# Patient Record
Sex: MALE | Race: WHITE | Employment: OTHER | URBAN - METROPOLITAN AREA
[De-identification: names, ages, dates, MRNs, and addresses within clinical notes are randomized per-mention and may not be internally consistent; named-entity substitution may affect disease eponyms.]

---

## 2017-01-03 ENCOUNTER — NURSE ONLY (OUTPATIENT)
Dept: CARDIOLOGY CLINIC | Age: 52
End: 2017-01-03

## 2017-01-03 DIAGNOSIS — I42.6 ALCOHOLIC CARDIOMYOPATHY (HCC): ICD-10-CM

## 2017-01-03 DIAGNOSIS — Z95.810 AUTOMATIC IMPLANTABLE CARDIOVERTER-DEFIBRILLATOR IN SITU: Primary | ICD-10-CM

## 2017-01-03 PROCEDURE — 93295 DEV INTERROG REMOTE 1/2/MLT: CPT | Performed by: INTERNAL MEDICINE

## 2017-01-03 PROCEDURE — 93296 REM INTERROG EVL PM/IDS: CPT | Performed by: INTERNAL MEDICINE

## 2017-01-05 DIAGNOSIS — E03.9 HYPOTHYROIDISM, UNSPECIFIED TYPE: ICD-10-CM

## 2017-01-05 DIAGNOSIS — E11.42 TYPE 2 DIABETES MELLITUS WITH PERIPHERAL NEUROPATHY (HCC): ICD-10-CM

## 2017-01-05 DIAGNOSIS — Z23 NEED FOR PNEUMOCOCCAL VACCINATION: ICD-10-CM

## 2017-01-05 DIAGNOSIS — E78.5 HYPERLIPIDEMIA, UNSPECIFIED HYPERLIPIDEMIA TYPE: ICD-10-CM

## 2017-01-05 RX ORDER — PEN NEEDLE, DIABETIC 31 GX3/16"
NEEDLE, DISPOSABLE MISCELLANEOUS
Qty: 100 EACH | Refills: 3 | Status: SHIPPED | OUTPATIENT
Start: 2017-01-05 | End: 2017-04-21 | Stop reason: SDUPTHER

## 2017-01-11 ENCOUNTER — TELEPHONE (OUTPATIENT)
Dept: FAMILY MEDICINE CLINIC | Age: 52
End: 2017-01-11

## 2017-01-11 DIAGNOSIS — E11.42 TYPE 2 DIABETES MELLITUS WITH PERIPHERAL NEUROPATHY (HCC): ICD-10-CM

## 2017-01-11 DIAGNOSIS — Z23 NEED FOR PNEUMOCOCCAL VACCINATION: ICD-10-CM

## 2017-01-11 DIAGNOSIS — E03.9 HYPOTHYROIDISM, UNSPECIFIED TYPE: ICD-10-CM

## 2017-01-11 DIAGNOSIS — E78.5 HYPERLIPIDEMIA, UNSPECIFIED HYPERLIPIDEMIA TYPE: ICD-10-CM

## 2017-01-11 RX ORDER — GABAPENTIN 300 MG/1
300 CAPSULE ORAL 3 TIMES DAILY
Qty: 90 CAPSULE | Refills: 3 | Status: SHIPPED | OUTPATIENT
Start: 2017-01-11 | End: 2017-01-17 | Stop reason: SDUPTHER

## 2017-01-17 ENCOUNTER — OFFICE VISIT (OUTPATIENT)
Dept: FAMILY MEDICINE CLINIC | Age: 52
End: 2017-01-17

## 2017-01-17 VITALS
OXYGEN SATURATION: 97 % | DIASTOLIC BLOOD PRESSURE: 80 MMHG | HEIGHT: 70 IN | SYSTOLIC BLOOD PRESSURE: 124 MMHG | TEMPERATURE: 97.6 F | HEART RATE: 84 BPM | BODY MASS INDEX: 32.21 KG/M2 | WEIGHT: 225 LBS

## 2017-01-17 DIAGNOSIS — E78.5 HYPERLIPIDEMIA, UNSPECIFIED HYPERLIPIDEMIA TYPE: ICD-10-CM

## 2017-01-17 DIAGNOSIS — E03.9 HYPOTHYROIDISM, UNSPECIFIED TYPE: ICD-10-CM

## 2017-01-17 DIAGNOSIS — E11.42 TYPE 2 DIABETES MELLITUS WITH PERIPHERAL NEUROPATHY (HCC): Primary | ICD-10-CM

## 2017-01-17 DIAGNOSIS — Z23 NEED FOR INFLUENZA VACCINATION: ICD-10-CM

## 2017-01-17 DIAGNOSIS — I10 ESSENTIAL HYPERTENSION: ICD-10-CM

## 2017-01-17 DIAGNOSIS — Z23 NEED FOR PNEUMOCOCCAL VACCINATION: ICD-10-CM

## 2017-01-17 PROCEDURE — 90630 INFLUENZA, QUADRIVALENT, INTRADERMAL, PF (FLUZONE QUADRIVALENT PF ID): CPT | Performed by: FAMILY MEDICINE

## 2017-01-17 PROCEDURE — 36415 COLL VENOUS BLD VENIPUNCTURE: CPT | Performed by: FAMILY MEDICINE

## 2017-01-17 PROCEDURE — 99214 OFFICE O/P EST MOD 30 MIN: CPT | Performed by: FAMILY MEDICINE

## 2017-01-17 PROCEDURE — 90471 IMMUNIZATION ADMIN: CPT | Performed by: FAMILY MEDICINE

## 2017-01-17 RX ORDER — LEVOTHYROXINE SODIUM 125 MCG
125 TABLET ORAL DAILY
Qty: 30 TABLET | Refills: 3 | Status: SHIPPED | OUTPATIENT
Start: 2017-01-17 | End: 2017-10-25

## 2017-01-17 RX ORDER — GABAPENTIN 300 MG/1
300 CAPSULE ORAL 3 TIMES DAILY
Qty: 90 CAPSULE | Refills: 6 | Status: SHIPPED | OUTPATIENT
Start: 2017-01-17 | End: 2017-09-06 | Stop reason: SDUPTHER

## 2017-01-17 RX ORDER — GLIPIZIDE 5 MG/1
TABLET ORAL
Qty: 60 TABLET | Refills: 6 | Status: SHIPPED | OUTPATIENT
Start: 2017-01-17 | End: 2017-08-16 | Stop reason: SDUPTHER

## 2017-01-17 RX ORDER — CARVEDILOL 12.5 MG/1
TABLET ORAL
Qty: 60 TABLET | Refills: 6 | Status: SHIPPED | OUTPATIENT
Start: 2017-01-17 | End: 2017-09-06 | Stop reason: SDUPTHER

## 2017-01-17 RX ORDER — TRAMADOL HYDROCHLORIDE 50 MG/1
TABLET ORAL
Qty: 30 TABLET | Refills: 2 | Status: SHIPPED | OUTPATIENT
Start: 2017-01-17 | End: 2017-04-21 | Stop reason: SDUPTHER

## 2017-01-17 ASSESSMENT — ENCOUNTER SYMPTOMS
ABDOMINAL DISTENTION: 1
COUGH: 0
EYE DISCHARGE: 0
CONSTIPATION: 0
DIARRHEA: 0
ABDOMINAL PAIN: 0
CHEST TIGHTNESS: 0
SHORTNESS OF BREATH: 0
NAUSEA: 1
ANAL BLEEDING: 0
BLOOD IN STOOL: 0

## 2017-01-18 LAB
A/G RATIO: 1.7 (ref 1.1–2.2)
ALBUMIN SERPL-MCNC: 4.5 G/DL (ref 3.4–5)
ALP BLD-CCNC: 68 U/L (ref 40–129)
ALT SERPL-CCNC: 19 U/L (ref 10–40)
ANION GAP SERPL CALCULATED.3IONS-SCNC: 18 MMOL/L (ref 3–16)
AST SERPL-CCNC: 15 U/L (ref 15–37)
BILIRUB SERPL-MCNC: 0.7 MG/DL (ref 0–1)
BUN BLDV-MCNC: 33 MG/DL (ref 7–20)
CALCIUM SERPL-MCNC: 10.1 MG/DL (ref 8.3–10.6)
CHLORIDE BLD-SCNC: 94 MMOL/L (ref 99–110)
CHOLESTEROL, TOTAL: 144 MG/DL (ref 0–199)
CO2: 26 MMOL/L (ref 21–32)
CREAT SERPL-MCNC: 1.1 MG/DL (ref 0.9–1.3)
ESTIMATED AVERAGE GLUCOSE: 177.2 MG/DL
GFR AFRICAN AMERICAN: >60
GFR NON-AFRICAN AMERICAN: >60
GLOBULIN: 2.7 G/DL
GLUCOSE BLD-MCNC: 175 MG/DL (ref 70–99)
HBA1C MFR BLD: 7.8 %
HDLC SERPL-MCNC: 41 MG/DL (ref 40–60)
LDL CHOLESTEROL CALCULATED: 51 MG/DL
POTASSIUM SERPL-SCNC: 5.7 MMOL/L (ref 3.5–5.1)
SODIUM BLD-SCNC: 138 MMOL/L (ref 136–145)
TOTAL PROTEIN: 7.2 G/DL (ref 6.4–8.2)
TRIGL SERPL-MCNC: 259 MG/DL (ref 0–150)
TSH SERPL DL<=0.05 MIU/L-ACNC: 11.14 UIU/ML (ref 0.27–4.2)
VLDLC SERPL CALC-MCNC: 52 MG/DL

## 2017-01-20 ENCOUNTER — HOSPITAL ENCOUNTER (OUTPATIENT)
Dept: WOUND CARE | Age: 52
Discharge: HOME | End: 2017-01-20
Attending: INTERNAL MEDICINE | Admitting: INTERNAL MEDICINE

## 2017-01-20 ENCOUNTER — NURSE ONLY (OUTPATIENT)
Dept: FAMILY MEDICINE CLINIC | Age: 52
End: 2017-01-20

## 2017-01-20 ENCOUNTER — TELEPHONE (OUTPATIENT)
Dept: FAMILY MEDICINE CLINIC | Age: 52
End: 2017-01-20

## 2017-01-20 VITALS
RESPIRATION RATE: 16 BRPM | HEIGHT: 70 IN | TEMPERATURE: 97.4 F | WEIGHT: 229.6 LBS | HEART RATE: 85 BPM | SYSTOLIC BLOOD PRESSURE: 133 MMHG | DIASTOLIC BLOOD PRESSURE: 79 MMHG | BODY MASS INDEX: 32.87 KG/M2

## 2017-01-20 DIAGNOSIS — E11.621 DIABETIC ULCER OF LEFT FOOT, LIMITED TO BREAKDOWN OF SKIN (HCC): ICD-10-CM

## 2017-01-20 DIAGNOSIS — E87.5 SERUM POTASSIUM ELEVATED: Primary | ICD-10-CM

## 2017-01-20 DIAGNOSIS — L97.521 DIABETIC ULCER OF LEFT FOOT, LIMITED TO BREAKDOWN OF SKIN (HCC): ICD-10-CM

## 2017-01-20 LAB
ANION GAP SERPL CALCULATED.3IONS-SCNC: 14 MMOL/L (ref 3–16)
BUN BLDV-MCNC: 27 MG/DL (ref 7–20)
CALCIUM SERPL-MCNC: 9.4 MG/DL (ref 8.3–10.6)
CHLORIDE BLD-SCNC: 98 MMOL/L (ref 99–110)
CO2: 26 MMOL/L (ref 21–32)
CREAT SERPL-MCNC: 1 MG/DL (ref 0.9–1.3)
GFR AFRICAN AMERICAN: >60
GFR NON-AFRICAN AMERICAN: >60
GLUCOSE BLD-MCNC: 211 MG/DL (ref 70–99)
POTASSIUM SERPL-SCNC: 5.1 MMOL/L (ref 3.5–5.1)
SODIUM BLD-SCNC: 138 MMOL/L (ref 136–145)

## 2017-01-20 PROCEDURE — 36415 COLL VENOUS BLD VENIPUNCTURE: CPT | Performed by: FAMILY MEDICINE

## 2017-01-20 PROCEDURE — 97597 DBRDMT OPN WND 1ST 20 CM/<: CPT | Performed by: INTERNAL MEDICINE

## 2017-01-20 RX ORDER — ALOGLIPTIN 25 MG/1
25 TABLET, FILM COATED ORAL DAILY
Qty: 90 TABLET | Refills: 1 | Status: SHIPPED | OUTPATIENT
Start: 2017-01-20 | End: 2017-07-18 | Stop reason: SDUPTHER

## 2017-01-20 ASSESSMENT — PAIN SCALES - GENERAL: PAINLEVEL_OUTOF10: 6

## 2017-01-20 ASSESSMENT — PAIN DESCRIPTION - PAIN TYPE: TYPE: ACUTE PAIN

## 2017-01-20 ASSESSMENT — PAIN DESCRIPTION - ORIENTATION: ORIENTATION: LEFT

## 2017-01-20 ASSESSMENT — PAIN DESCRIPTION - ONSET: ONSET: ON-GOING

## 2017-01-20 ASSESSMENT — PAIN DESCRIPTION - PROGRESSION: CLINICAL_PROGRESSION: NOT CHANGED

## 2017-01-20 ASSESSMENT — PAIN DESCRIPTION - FREQUENCY: FREQUENCY: INTERMITTENT

## 2017-01-20 ASSESSMENT — PAIN DESCRIPTION - DESCRIPTORS: DESCRIPTORS: SORE;TENDER

## 2017-01-22 PROBLEM — L97.521 DIABETIC ULCER OF LEFT FOOT, LIMITED TO BREAKDOWN OF SKIN (HCC): Status: ACTIVE | Noted: 2017-01-22

## 2017-01-22 PROBLEM — E11.621 DIABETIC ULCER OF LEFT FOOT, LIMITED TO BREAKDOWN OF SKIN (HCC): Status: ACTIVE | Noted: 2017-01-22

## 2017-01-27 ENCOUNTER — HOSPITAL ENCOUNTER (OUTPATIENT)
Dept: WOUND CARE | Age: 52
Discharge: HOME | End: 2017-01-27
Attending: INTERNAL MEDICINE | Admitting: INTERNAL MEDICINE

## 2017-01-27 VITALS
HEART RATE: 89 BPM | HEIGHT: 70 IN | SYSTOLIC BLOOD PRESSURE: 130 MMHG | RESPIRATION RATE: 18 BRPM | BODY MASS INDEX: 32.7 KG/M2 | TEMPERATURE: 97.3 F | WEIGHT: 228.4 LBS | DIASTOLIC BLOOD PRESSURE: 76 MMHG

## 2017-01-27 DIAGNOSIS — E11.621: ICD-10-CM

## 2017-01-27 DIAGNOSIS — L97.511: ICD-10-CM

## 2017-01-27 PROCEDURE — 97597 DBRDMT OPN WND 1ST 20 CM/<: CPT | Performed by: INTERNAL MEDICINE

## 2017-01-29 PROBLEM — L97.511 DIABETIC ULCER OF RIGHT FOOT, LIMITED TO BREAKDOWN OF SKIN (HCC): Status: ACTIVE | Noted: 2017-01-29

## 2017-01-29 PROBLEM — E11.621 DIABETIC ULCER OF RIGHT FOOT, LIMITED TO BREAKDOWN OF SKIN (HCC): Status: ACTIVE | Noted: 2017-01-29

## 2017-02-03 ENCOUNTER — HOSPITAL ENCOUNTER (OUTPATIENT)
Dept: WOUND CARE | Age: 52
Discharge: HOME | End: 2017-02-03
Attending: INTERNAL MEDICINE | Admitting: INTERNAL MEDICINE

## 2017-02-03 VITALS
WEIGHT: 228.4 LBS | HEART RATE: 83 BPM | RESPIRATION RATE: 16 BRPM | TEMPERATURE: 97 F | HEIGHT: 70 IN | BODY MASS INDEX: 32.7 KG/M2 | DIASTOLIC BLOOD PRESSURE: 75 MMHG | SYSTOLIC BLOOD PRESSURE: 125 MMHG

## 2017-02-03 PROCEDURE — 97597 DBRDMT OPN WND 1ST 20 CM/<: CPT | Performed by: INTERNAL MEDICINE

## 2017-02-03 ASSESSMENT — PAIN SCALES - GENERAL: PAINLEVEL_OUTOF10: 0

## 2017-02-10 ENCOUNTER — HOSPITAL ENCOUNTER (OUTPATIENT)
Dept: WOUND CARE | Age: 52
Discharge: HOME | End: 2017-02-10
Attending: INTERNAL MEDICINE | Admitting: INTERNAL MEDICINE

## 2017-02-10 VITALS
RESPIRATION RATE: 16 BRPM | SYSTOLIC BLOOD PRESSURE: 118 MMHG | HEIGHT: 70 IN | BODY MASS INDEX: 32.64 KG/M2 | HEART RATE: 88 BPM | TEMPERATURE: 97.2 F | DIASTOLIC BLOOD PRESSURE: 79 MMHG | WEIGHT: 228 LBS

## 2017-02-10 PROCEDURE — 97597 DBRDMT OPN WND 1ST 20 CM/<: CPT | Performed by: INTERNAL MEDICINE

## 2017-02-17 ENCOUNTER — HOSPITAL ENCOUNTER (OUTPATIENT)
Dept: WOUND CARE | Age: 52
Discharge: HOME | End: 2017-02-17
Attending: INTERNAL MEDICINE | Admitting: INTERNAL MEDICINE

## 2017-02-17 VITALS
DIASTOLIC BLOOD PRESSURE: 84 MMHG | HEART RATE: 88 BPM | BODY MASS INDEX: 33.13 KG/M2 | SYSTOLIC BLOOD PRESSURE: 156 MMHG | HEIGHT: 70 IN | TEMPERATURE: 97.6 F | RESPIRATION RATE: 16 BRPM | WEIGHT: 231.4 LBS

## 2017-02-17 PROCEDURE — 97597 DBRDMT OPN WND 1ST 20 CM/<: CPT | Performed by: INTERNAL MEDICINE

## 2017-02-17 PROCEDURE — 11042 DBRDMT SUBQ TIS 1ST 20SQCM/<: CPT | Performed by: INTERNAL MEDICINE

## 2017-02-22 PROBLEM — L97.522 DIABETIC ULCER OF LEFT FOOT WITH FAT LAYER EXPOSED (HCC): Status: ACTIVE | Noted: 2017-01-22

## 2017-02-24 ENCOUNTER — HOSPITAL ENCOUNTER (OUTPATIENT)
Dept: WOUND CARE | Age: 52
Discharge: HOME | End: 2017-02-24
Attending: INTERNAL MEDICINE | Admitting: INTERNAL MEDICINE

## 2017-02-24 VITALS
HEIGHT: 70 IN | SYSTOLIC BLOOD PRESSURE: 135 MMHG | WEIGHT: 230.6 LBS | TEMPERATURE: 97.9 F | RESPIRATION RATE: 16 BRPM | HEART RATE: 90 BPM | DIASTOLIC BLOOD PRESSURE: 80 MMHG | BODY MASS INDEX: 33.01 KG/M2

## 2017-02-24 PROCEDURE — 97597 DBRDMT OPN WND 1ST 20 CM/<: CPT | Performed by: INTERNAL MEDICINE

## 2017-02-24 ASSESSMENT — PAIN SCALES - GENERAL: PAINLEVEL_OUTOF10: 0

## 2017-02-27 DIAGNOSIS — E78.5 HYPERLIPIDEMIA, UNSPECIFIED HYPERLIPIDEMIA TYPE: ICD-10-CM

## 2017-02-27 DIAGNOSIS — Z23 NEED FOR PNEUMOCOCCAL VACCINATION: ICD-10-CM

## 2017-02-27 DIAGNOSIS — E03.9 HYPOTHYROIDISM, UNSPECIFIED TYPE: ICD-10-CM

## 2017-02-27 DIAGNOSIS — E11.42 TYPE 2 DIABETES MELLITUS WITH PERIPHERAL NEUROPATHY (HCC): ICD-10-CM

## 2017-03-03 ENCOUNTER — HOSPITAL ENCOUNTER (OUTPATIENT)
Dept: WOUND CARE | Age: 52
Discharge: HOME | End: 2017-03-03
Attending: INTERNAL MEDICINE | Admitting: INTERNAL MEDICINE

## 2017-03-10 ENCOUNTER — HOSPITAL ENCOUNTER (OUTPATIENT)
Dept: WOUND CARE | Age: 52
Discharge: HOME | End: 2017-03-10
Attending: INTERNAL MEDICINE | Admitting: INTERNAL MEDICINE

## 2017-03-10 VITALS
BODY MASS INDEX: 32.93 KG/M2 | HEIGHT: 70 IN | TEMPERATURE: 97.3 F | DIASTOLIC BLOOD PRESSURE: 75 MMHG | RESPIRATION RATE: 18 BRPM | HEART RATE: 83 BPM | WEIGHT: 230 LBS | SYSTOLIC BLOOD PRESSURE: 137 MMHG

## 2017-03-10 PROCEDURE — 15275 SKIN SUB GRAFT FACE/NK/HF/G: CPT | Performed by: INTERNAL MEDICINE

## 2017-03-17 ENCOUNTER — HOSPITAL ENCOUNTER (OUTPATIENT)
Dept: WOUND CARE | Age: 52
Discharge: HOME | End: 2017-03-17
Attending: INTERNAL MEDICINE | Admitting: INTERNAL MEDICINE

## 2017-03-17 VITALS
DIASTOLIC BLOOD PRESSURE: 91 MMHG | SYSTOLIC BLOOD PRESSURE: 147 MMHG | WEIGHT: 227 LBS | TEMPERATURE: 97.2 F | HEART RATE: 89 BPM | RESPIRATION RATE: 17 BRPM | BODY MASS INDEX: 32.5 KG/M2 | HEIGHT: 70 IN

## 2017-03-17 PROCEDURE — 15275 SKIN SUB GRAFT FACE/NK/HF/G: CPT | Performed by: INTERNAL MEDICINE

## 2017-03-17 ASSESSMENT — PAIN SCALES - GENERAL: PAINLEVEL_OUTOF10: 0

## 2017-03-24 ENCOUNTER — HOSPITAL ENCOUNTER (OUTPATIENT)
Dept: WOUND CARE | Age: 52
Discharge: HOME | End: 2017-03-24
Attending: INTERNAL MEDICINE | Admitting: INTERNAL MEDICINE

## 2017-03-24 VITALS
BODY MASS INDEX: 32.5 KG/M2 | TEMPERATURE: 98 F | DIASTOLIC BLOOD PRESSURE: 78 MMHG | HEIGHT: 70 IN | WEIGHT: 227 LBS | HEART RATE: 102 BPM | SYSTOLIC BLOOD PRESSURE: 114 MMHG | RESPIRATION RATE: 18 BRPM

## 2017-03-24 PROCEDURE — 15275 SKIN SUB GRAFT FACE/NK/HF/G: CPT | Performed by: INTERNAL MEDICINE

## 2017-03-24 ASSESSMENT — PAIN SCALES - GENERAL: PAINLEVEL_OUTOF10: 0

## 2017-03-28 ENCOUNTER — NURSE ONLY (OUTPATIENT)
Dept: CARDIOLOGY CLINIC | Age: 52
End: 2017-03-28

## 2017-03-28 DIAGNOSIS — Z95.810 AUTOMATIC IMPLANTABLE CARDIOVERTER-DEFIBRILLATOR IN SITU: Primary | ICD-10-CM

## 2017-03-28 DIAGNOSIS — I42.6 ALCOHOLIC CARDIOMYOPATHY (HCC): ICD-10-CM

## 2017-03-31 ENCOUNTER — HOSPITAL ENCOUNTER (OUTPATIENT)
Dept: WOUND CARE | Age: 52
Discharge: HOME | End: 2017-03-31
Attending: CLINICAL NURSE SPECIALIST | Admitting: CLINICAL NURSE SPECIALIST

## 2017-03-31 VITALS
WEIGHT: 228 LBS | HEIGHT: 70 IN | TEMPERATURE: 98 F | RESPIRATION RATE: 18 BRPM | SYSTOLIC BLOOD PRESSURE: 145 MMHG | HEART RATE: 93 BPM | BODY MASS INDEX: 32.64 KG/M2 | DIASTOLIC BLOOD PRESSURE: 85 MMHG

## 2017-03-31 PROCEDURE — 15271 SKIN SUB GRAFT TRNK/ARM/LEG: CPT | Performed by: CLINICAL NURSE SPECIALIST

## 2017-04-07 ENCOUNTER — HOSPITAL ENCOUNTER (OUTPATIENT)
Dept: WOUND CARE | Age: 52
Discharge: HOME | End: 2017-04-07
Attending: INTERNAL MEDICINE | Admitting: INTERNAL MEDICINE

## 2017-04-07 VITALS
HEART RATE: 91 BPM | HEIGHT: 70 IN | RESPIRATION RATE: 18 BRPM | TEMPERATURE: 97.4 F | DIASTOLIC BLOOD PRESSURE: 82 MMHG | WEIGHT: 225 LBS | SYSTOLIC BLOOD PRESSURE: 132 MMHG | BODY MASS INDEX: 32.21 KG/M2

## 2017-04-07 PROCEDURE — 15275 SKIN SUB GRAFT FACE/NK/HF/G: CPT | Performed by: INTERNAL MEDICINE

## 2017-04-07 ASSESSMENT — PAIN SCALES - GENERAL: PAINLEVEL_OUTOF10: 0

## 2017-04-12 PROBLEM — L97.521 DIABETIC ULCER OF LEFT FOOT, LIMITED TO BREAKDOWN OF SKIN (HCC): Status: RESOLVED | Noted: 2017-01-22 | Resolved: 2017-04-12

## 2017-04-12 PROBLEM — E11.621 DIABETIC ULCER OF LEFT FOOT, LIMITED TO BREAKDOWN OF SKIN (HCC): Status: RESOLVED | Noted: 2017-01-22 | Resolved: 2017-04-12

## 2017-04-14 ENCOUNTER — HOSPITAL ENCOUNTER (OUTPATIENT)
Dept: WOUND CARE | Age: 52
Discharge: HOME | End: 2017-04-14
Attending: INTERNAL MEDICINE | Admitting: INTERNAL MEDICINE

## 2017-04-14 VITALS
WEIGHT: 224 LBS | DIASTOLIC BLOOD PRESSURE: 76 MMHG | RESPIRATION RATE: 17 BRPM | HEIGHT: 70 IN | BODY MASS INDEX: 32.07 KG/M2 | TEMPERATURE: 97 F | HEART RATE: 90 BPM | SYSTOLIC BLOOD PRESSURE: 121 MMHG

## 2017-04-14 PROCEDURE — 15275 SKIN SUB GRAFT FACE/NK/HF/G: CPT | Performed by: INTERNAL MEDICINE

## 2017-04-14 ASSESSMENT — PAIN SCALES - GENERAL: PAINLEVEL_OUTOF10: 0

## 2017-04-18 ENCOUNTER — OFFICE VISIT (OUTPATIENT)
Dept: CARDIOLOGY CLINIC | Age: 52
End: 2017-04-18

## 2017-04-18 ENCOUNTER — OFFICE VISIT (OUTPATIENT)
Dept: FAMILY MEDICINE CLINIC | Age: 52
End: 2017-04-18

## 2017-04-18 VITALS
OXYGEN SATURATION: 99 % | DIASTOLIC BLOOD PRESSURE: 82 MMHG | WEIGHT: 227.4 LBS | HEIGHT: 70 IN | HEART RATE: 87 BPM | BODY MASS INDEX: 32.56 KG/M2 | SYSTOLIC BLOOD PRESSURE: 136 MMHG

## 2017-04-18 DIAGNOSIS — E78.2 MIXED HYPERLIPIDEMIA: ICD-10-CM

## 2017-04-18 DIAGNOSIS — I10 ESSENTIAL HYPERTENSION: ICD-10-CM

## 2017-04-18 DIAGNOSIS — E03.9 HYPOTHYROIDISM, UNSPECIFIED TYPE: Primary | ICD-10-CM

## 2017-04-18 DIAGNOSIS — E03.9 ACQUIRED HYPOTHYROIDISM: ICD-10-CM

## 2017-04-18 DIAGNOSIS — E11.42 TYPE 2 DIABETES MELLITUS WITH PERIPHERAL NEUROPATHY (HCC): ICD-10-CM

## 2017-04-18 DIAGNOSIS — I42.6 ALCOHOLIC CARDIOMYOPATHY (HCC): Primary | ICD-10-CM

## 2017-04-18 DIAGNOSIS — Z95.810 AUTOMATIC IMPLANTABLE CARDIOVERTER-DEFIBRILLATOR IN SITU: ICD-10-CM

## 2017-04-18 LAB
A/G RATIO: 1.9 (ref 1.1–2.2)
ALBUMIN SERPL-MCNC: 4.6 G/DL (ref 3.4–5)
ALP BLD-CCNC: 65 U/L (ref 40–129)
ALT SERPL-CCNC: 19 U/L (ref 10–40)
ANION GAP SERPL CALCULATED.3IONS-SCNC: 18 MMOL/L (ref 3–16)
AST SERPL-CCNC: 16 U/L (ref 15–37)
BILIRUB SERPL-MCNC: 0.5 MG/DL (ref 0–1)
BUN BLDV-MCNC: 16 MG/DL (ref 7–20)
CALCIUM SERPL-MCNC: 9.6 MG/DL (ref 8.3–10.6)
CHLORIDE BLD-SCNC: 98 MMOL/L (ref 99–110)
CO2: 23 MMOL/L (ref 21–32)
CREAT SERPL-MCNC: 0.7 MG/DL (ref 0.9–1.3)
GFR AFRICAN AMERICAN: >60
GFR NON-AFRICAN AMERICAN: >60
GLOBULIN: 2.4 G/DL
GLUCOSE BLD-MCNC: 176 MG/DL (ref 70–99)
POTASSIUM SERPL-SCNC: 5 MMOL/L (ref 3.5–5.1)
SODIUM BLD-SCNC: 139 MMOL/L (ref 136–145)
TOTAL PROTEIN: 7 G/DL (ref 6.4–8.2)
TSH SERPL DL<=0.05 MIU/L-ACNC: 4.89 UIU/ML (ref 0.27–4.2)

## 2017-04-18 PROCEDURE — 99213 OFFICE O/P EST LOW 20 MIN: CPT | Performed by: FAMILY MEDICINE

## 2017-04-18 PROCEDURE — 99214 OFFICE O/P EST MOD 30 MIN: CPT | Performed by: INTERNAL MEDICINE

## 2017-04-18 PROCEDURE — 36415 COLL VENOUS BLD VENIPUNCTURE: CPT | Performed by: FAMILY MEDICINE

## 2017-04-18 ASSESSMENT — ENCOUNTER SYMPTOMS
SHORTNESS OF BREATH: 0
EYE PAIN: 1
ANAL BLEEDING: 0
NAUSEA: 0
EYE DISCHARGE: 0
ABDOMINAL DISTENTION: 1
VOMITING: 0
CHEST TIGHTNESS: 0
COUGH: 0
BLOOD IN STOOL: 0
ABDOMINAL PAIN: 0
CONSTIPATION: 0
DIARRHEA: 0

## 2017-04-19 ENCOUNTER — TELEPHONE (OUTPATIENT)
Dept: FAMILY MEDICINE CLINIC | Age: 52
End: 2017-04-19

## 2017-04-19 VITALS
DIASTOLIC BLOOD PRESSURE: 84 MMHG | SYSTOLIC BLOOD PRESSURE: 130 MMHG | HEIGHT: 70 IN | HEART RATE: 84 BPM | OXYGEN SATURATION: 97 % | WEIGHT: 227 LBS | BODY MASS INDEX: 32.5 KG/M2

## 2017-04-19 LAB
ESTIMATED AVERAGE GLUCOSE: 165.7 MG/DL
HBA1C MFR BLD: 7.4 %

## 2017-04-21 ENCOUNTER — HOSPITAL ENCOUNTER (OUTPATIENT)
Dept: WOUND CARE | Age: 52
Discharge: HOME | End: 2017-04-21
Attending: INTERNAL MEDICINE | Admitting: INTERNAL MEDICINE

## 2017-04-21 VITALS
HEIGHT: 70 IN | BODY MASS INDEX: 32.47 KG/M2 | TEMPERATURE: 97.3 F | SYSTOLIC BLOOD PRESSURE: 120 MMHG | DIASTOLIC BLOOD PRESSURE: 74 MMHG | RESPIRATION RATE: 16 BRPM | HEART RATE: 81 BPM | WEIGHT: 226.8 LBS

## 2017-04-21 DIAGNOSIS — L97.522 DIABETIC ULCER OF TOE OF LEFT FOOT WITH FAT LAYER EXPOSED (HCC): ICD-10-CM

## 2017-04-21 DIAGNOSIS — E78.5 HYPERLIPIDEMIA, UNSPECIFIED HYPERLIPIDEMIA TYPE: ICD-10-CM

## 2017-04-21 DIAGNOSIS — E11.621 DIABETIC ULCER OF TOE OF LEFT FOOT WITH FAT LAYER EXPOSED (HCC): ICD-10-CM

## 2017-04-21 DIAGNOSIS — Z23 NEED FOR PNEUMOCOCCAL VACCINATION: ICD-10-CM

## 2017-04-21 DIAGNOSIS — E03.9 HYPOTHYROIDISM, UNSPECIFIED TYPE: ICD-10-CM

## 2017-04-21 DIAGNOSIS — E11.42 TYPE 2 DIABETES MELLITUS WITH PERIPHERAL NEUROPATHY (HCC): ICD-10-CM

## 2017-04-21 PROCEDURE — 15275 SKIN SUB GRAFT FACE/NK/HF/G: CPT | Performed by: INTERNAL MEDICINE

## 2017-04-21 PROCEDURE — 97597 DBRDMT OPN WND 1ST 20 CM/<: CPT | Performed by: INTERNAL MEDICINE

## 2017-04-21 PROCEDURE — 11055 PARING/CUTG B9 HYPRKER LES 1: CPT | Performed by: INTERNAL MEDICINE

## 2017-04-21 RX ORDER — TRAMADOL HYDROCHLORIDE 50 MG/1
TABLET ORAL
Qty: 30 TABLET | Refills: 3 | Status: SHIPPED | OUTPATIENT
Start: 2017-04-21 | End: 2017-09-06 | Stop reason: SDUPTHER

## 2017-04-21 RX ORDER — PEN NEEDLE, DIABETIC 31 GX3/16"
NEEDLE, DISPOSABLE MISCELLANEOUS
Qty: 100 EACH | Refills: 5 | Status: SHIPPED | OUTPATIENT
Start: 2017-04-21 | End: 2018-01-03 | Stop reason: SDUPTHER

## 2017-04-21 RX ORDER — SPIRONOLACTONE 25 MG/1
TABLET ORAL
Qty: 180 TABLET | Refills: 3 | Status: SHIPPED | OUTPATIENT
Start: 2017-04-21 | End: 2017-08-16 | Stop reason: SDUPTHER

## 2017-04-27 PROBLEM — L97.522 DIABETIC ULCER OF TOE OF LEFT FOOT WITH FAT LAYER EXPOSED (HCC): Status: ACTIVE | Noted: 2017-04-27

## 2017-04-27 PROBLEM — E11.621 DIABETIC ULCER OF TOE OF LEFT FOOT WITH FAT LAYER EXPOSED (HCC): Status: ACTIVE | Noted: 2017-04-27

## 2017-04-28 ENCOUNTER — HOSPITAL ENCOUNTER (OUTPATIENT)
Dept: WOUND CARE | Age: 52
Discharge: HOME | End: 2017-04-28
Attending: INTERNAL MEDICINE | Admitting: INTERNAL MEDICINE

## 2017-04-28 VITALS
BODY MASS INDEX: 32.23 KG/M2 | DIASTOLIC BLOOD PRESSURE: 79 MMHG | HEART RATE: 85 BPM | SYSTOLIC BLOOD PRESSURE: 125 MMHG | RESPIRATION RATE: 18 BRPM | TEMPERATURE: 97.3 F | WEIGHT: 224.6 LBS

## 2017-04-28 PROCEDURE — 11042 DBRDMT SUBQ TIS 1ST 20SQCM/<: CPT | Performed by: INTERNAL MEDICINE

## 2017-04-28 PROCEDURE — 97597 DBRDMT OPN WND 1ST 20 CM/<: CPT | Performed by: INTERNAL MEDICINE

## 2017-05-05 ENCOUNTER — HOSPITAL ENCOUNTER (OUTPATIENT)
Dept: WOUND CARE | Age: 52
Discharge: HOME | End: 2017-05-05
Attending: INTERNAL MEDICINE | Admitting: INTERNAL MEDICINE

## 2017-05-05 VITALS
BODY MASS INDEX: 32.4 KG/M2 | SYSTOLIC BLOOD PRESSURE: 121 MMHG | TEMPERATURE: 97.9 F | WEIGHT: 225.8 LBS | RESPIRATION RATE: 20 BRPM | HEART RATE: 87 BPM | DIASTOLIC BLOOD PRESSURE: 77 MMHG

## 2017-05-05 DIAGNOSIS — E11.621 DIABETIC ULCER OF TOE OF LEFT FOOT WITH FAT LAYER EXPOSED (HCC): ICD-10-CM

## 2017-05-05 DIAGNOSIS — L97.522 DIABETIC ULCER OF TOE OF LEFT FOOT WITH FAT LAYER EXPOSED (HCC): ICD-10-CM

## 2017-05-05 DIAGNOSIS — E11.42 TYPE 2 DIABETES MELLITUS WITH PERIPHERAL NEUROPATHY (HCC): Primary | ICD-10-CM

## 2017-05-05 PROCEDURE — 11055 PARING/CUTG B9 HYPRKER LES 1: CPT | Performed by: INTERNAL MEDICINE

## 2017-05-05 PROCEDURE — 11042 DBRDMT SUBQ TIS 1ST 20SQCM/<: CPT | Performed by: INTERNAL MEDICINE

## 2017-05-05 PROCEDURE — 15275 SKIN SUB GRAFT FACE/NK/HF/G: CPT | Performed by: INTERNAL MEDICINE

## 2017-05-12 ENCOUNTER — HOSPITAL ENCOUNTER (OUTPATIENT)
Dept: WOUND CARE | Age: 52
Discharge: HOME | End: 2017-05-12
Attending: INTERNAL MEDICINE | Admitting: INTERNAL MEDICINE

## 2017-05-12 VITALS
HEIGHT: 70 IN | TEMPERATURE: 97.5 F | WEIGHT: 224 LBS | SYSTOLIC BLOOD PRESSURE: 139 MMHG | DIASTOLIC BLOOD PRESSURE: 79 MMHG | HEART RATE: 80 BPM | RESPIRATION RATE: 18 BRPM | BODY MASS INDEX: 32.07 KG/M2

## 2017-05-12 PROCEDURE — 15275 SKIN SUB GRAFT FACE/NK/HF/G: CPT | Performed by: INTERNAL MEDICINE

## 2017-05-12 PROCEDURE — 11042 DBRDMT SUBQ TIS 1ST 20SQCM/<: CPT | Performed by: INTERNAL MEDICINE

## 2017-05-12 ASSESSMENT — PAIN SCALES - GENERAL: PAINLEVEL_OUTOF10: 0

## 2017-05-19 ENCOUNTER — HOSPITAL ENCOUNTER (OUTPATIENT)
Dept: WOUND CARE | Age: 52
Discharge: HOME | End: 2017-05-19
Attending: INTERNAL MEDICINE | Admitting: INTERNAL MEDICINE

## 2017-05-19 VITALS
TEMPERATURE: 97.6 F | WEIGHT: 225 LBS | HEART RATE: 83 BPM | HEIGHT: 70 IN | BODY MASS INDEX: 32.21 KG/M2 | DIASTOLIC BLOOD PRESSURE: 77 MMHG | SYSTOLIC BLOOD PRESSURE: 123 MMHG | RESPIRATION RATE: 16 BRPM

## 2017-05-19 PROCEDURE — 15275 SKIN SUB GRAFT FACE/NK/HF/G: CPT | Performed by: INTERNAL MEDICINE

## 2017-05-19 PROCEDURE — 11042 DBRDMT SUBQ TIS 1ST 20SQCM/<: CPT | Performed by: INTERNAL MEDICINE

## 2017-05-20 ENCOUNTER — HOSPITAL ENCOUNTER (OUTPATIENT)
Dept: OTHER | Age: 52
Discharge: HOME | End: 2017-05-20
Attending: INTERNAL MEDICINE | Admitting: INTERNAL MEDICINE

## 2017-05-20 DIAGNOSIS — L97.522 DIABETIC ULCER OF TOE OF LEFT FOOT WITH FAT LAYER EXPOSED (HCC): ICD-10-CM

## 2017-05-20 DIAGNOSIS — E11.42 TYPE 2 DIABETES MELLITUS WITH PERIPHERAL NEUROPATHY (HCC): ICD-10-CM

## 2017-05-20 DIAGNOSIS — E11.621 DIABETIC ULCER OF TOE OF LEFT FOOT WITH FAT LAYER EXPOSED (HCC): ICD-10-CM

## 2017-05-25 PROBLEM — L97.523 DIABETIC ULCER OF TOE OF LEFT FOOT WITH NECROSIS OF MUSCLE (HCC): Status: ACTIVE | Noted: 2017-04-27

## 2017-05-26 ENCOUNTER — HOSPITAL ENCOUNTER (OUTPATIENT)
Dept: WOUND CARE | Age: 52
Discharge: HOME | End: 2017-05-26
Attending: INTERNAL MEDICINE | Admitting: INTERNAL MEDICINE

## 2017-05-26 VITALS
HEART RATE: 84 BPM | WEIGHT: 226.8 LBS | SYSTOLIC BLOOD PRESSURE: 120 MMHG | DIASTOLIC BLOOD PRESSURE: 77 MMHG | RESPIRATION RATE: 16 BRPM | HEIGHT: 70 IN | BODY MASS INDEX: 32.47 KG/M2 | TEMPERATURE: 97.4 F

## 2017-05-26 PROCEDURE — 97597 DBRDMT OPN WND 1ST 20 CM/<: CPT | Performed by: INTERNAL MEDICINE

## 2017-05-26 ASSESSMENT — PAIN SCALES - GENERAL: PAINLEVEL_OUTOF10: 0

## 2017-06-02 ENCOUNTER — HOSPITAL ENCOUNTER (OUTPATIENT)
Dept: WOUND CARE | Age: 52
Discharge: HOME | End: 2017-06-02
Attending: INTERNAL MEDICINE | Admitting: INTERNAL MEDICINE

## 2017-06-02 VITALS
HEIGHT: 70 IN | BODY MASS INDEX: 32.07 KG/M2 | DIASTOLIC BLOOD PRESSURE: 79 MMHG | HEART RATE: 78 BPM | SYSTOLIC BLOOD PRESSURE: 132 MMHG | WEIGHT: 224 LBS | TEMPERATURE: 98.1 F | RESPIRATION RATE: 16 BRPM

## 2017-06-02 PROCEDURE — 97597 DBRDMT OPN WND 1ST 20 CM/<: CPT | Performed by: INTERNAL MEDICINE

## 2017-06-09 ENCOUNTER — HOSPITAL ENCOUNTER (OUTPATIENT)
Dept: WOUND CARE | Age: 52
Discharge: HOME | End: 2017-06-09
Attending: INTERNAL MEDICINE | Admitting: INTERNAL MEDICINE

## 2017-06-09 VITALS
DIASTOLIC BLOOD PRESSURE: 86 MMHG | RESPIRATION RATE: 18 BRPM | HEIGHT: 70 IN | HEART RATE: 91 BPM | BODY MASS INDEX: 32.27 KG/M2 | SYSTOLIC BLOOD PRESSURE: 142 MMHG | TEMPERATURE: 97.4 F | WEIGHT: 225.4 LBS

## 2017-06-09 PROCEDURE — 97597 DBRDMT OPN WND 1ST 20 CM/<: CPT | Performed by: INTERNAL MEDICINE

## 2017-06-09 ASSESSMENT — PAIN SCALES - GENERAL: PAINLEVEL_OUTOF10: 0

## 2017-06-16 ENCOUNTER — HOSPITAL ENCOUNTER (OUTPATIENT)
Dept: WOUND CARE | Age: 52
Discharge: HOME | End: 2017-06-16
Attending: INTERNAL MEDICINE | Admitting: INTERNAL MEDICINE

## 2017-06-16 VITALS
HEART RATE: 90 BPM | SYSTOLIC BLOOD PRESSURE: 128 MMHG | DIASTOLIC BLOOD PRESSURE: 77 MMHG | HEIGHT: 70 IN | TEMPERATURE: 97.8 F | RESPIRATION RATE: 18 BRPM

## 2017-06-16 PROCEDURE — 97597 DBRDMT OPN WND 1ST 20 CM/<: CPT | Performed by: INTERNAL MEDICINE

## 2017-06-16 ASSESSMENT — PAIN SCALES - GENERAL: PAINLEVEL_OUTOF10: 0

## 2017-06-23 ENCOUNTER — HOSPITAL ENCOUNTER (OUTPATIENT)
Dept: WOUND CARE | Age: 52
Discharge: HOME | End: 2017-06-23
Attending: INTERNAL MEDICINE | Admitting: INTERNAL MEDICINE

## 2017-06-23 VITALS
DIASTOLIC BLOOD PRESSURE: 79 MMHG | BODY MASS INDEX: 31.5 KG/M2 | WEIGHT: 220 LBS | HEIGHT: 70 IN | TEMPERATURE: 97.8 F | SYSTOLIC BLOOD PRESSURE: 121 MMHG | RESPIRATION RATE: 18 BRPM | HEART RATE: 87 BPM

## 2017-06-23 PROCEDURE — 97597 DBRDMT OPN WND 1ST 20 CM/<: CPT | Performed by: INTERNAL MEDICINE

## 2017-06-26 PROCEDURE — 93295 DEV INTERROG REMOTE 1/2/MLT: CPT | Performed by: INTERNAL MEDICINE

## 2017-06-26 PROCEDURE — 93296 REM INTERROG EVL PM/IDS: CPT | Performed by: INTERNAL MEDICINE

## 2017-06-27 ENCOUNTER — NURSE ONLY (OUTPATIENT)
Dept: CARDIOLOGY CLINIC | Age: 52
End: 2017-06-27

## 2017-06-27 DIAGNOSIS — I42.6 ALCOHOLIC CARDIOMYOPATHY (HCC): ICD-10-CM

## 2017-06-27 DIAGNOSIS — Z95.810 AUTOMATIC IMPLANTABLE CARDIOVERTER-DEFIBRILLATOR IN SITU: Primary | ICD-10-CM

## 2017-06-30 ENCOUNTER — HOSPITAL ENCOUNTER (OUTPATIENT)
Dept: WOUND CARE | Age: 52
Discharge: HOME | End: 2017-06-30
Attending: INTERNAL MEDICINE | Admitting: INTERNAL MEDICINE

## 2017-06-30 VITALS
SYSTOLIC BLOOD PRESSURE: 144 MMHG | TEMPERATURE: 97.6 F | HEIGHT: 70 IN | DIASTOLIC BLOOD PRESSURE: 87 MMHG | HEART RATE: 80 BPM | RESPIRATION RATE: 16 BRPM | WEIGHT: 222.4 LBS | BODY MASS INDEX: 31.84 KG/M2

## 2017-06-30 PROCEDURE — 97597 DBRDMT OPN WND 1ST 20 CM/<: CPT | Performed by: INTERNAL MEDICINE

## 2017-06-30 ASSESSMENT — PAIN SCALES - GENERAL: PAINLEVEL_OUTOF10: 0

## 2017-07-07 ENCOUNTER — HOSPITAL ENCOUNTER (OUTPATIENT)
Dept: WOUND CARE | Age: 52
Discharge: HOME | End: 2017-07-07
Attending: INTERNAL MEDICINE | Admitting: INTERNAL MEDICINE

## 2017-07-07 VITALS
RESPIRATION RATE: 18 BRPM | HEIGHT: 70 IN | WEIGHT: 222 LBS | DIASTOLIC BLOOD PRESSURE: 76 MMHG | TEMPERATURE: 97.5 F | BODY MASS INDEX: 31.78 KG/M2 | HEART RATE: 86 BPM | SYSTOLIC BLOOD PRESSURE: 116 MMHG

## 2017-07-07 PROCEDURE — 97597 DBRDMT OPN WND 1ST 20 CM/<: CPT | Performed by: INTERNAL MEDICINE

## 2017-07-07 ASSESSMENT — PAIN SCALES - GENERAL: PAINLEVEL_OUTOF10: 0

## 2017-07-13 ENCOUNTER — HOSPITAL ENCOUNTER (OUTPATIENT)
Dept: WOUND CARE | Age: 52
Discharge: HOME | End: 2017-07-13
Attending: INTERNAL MEDICINE | Admitting: INTERNAL MEDICINE

## 2017-07-13 VITALS
SYSTOLIC BLOOD PRESSURE: 125 MMHG | TEMPERATURE: 98.3 F | HEIGHT: 70 IN | BODY MASS INDEX: 32.21 KG/M2 | RESPIRATION RATE: 18 BRPM | HEART RATE: 88 BPM | WEIGHT: 225 LBS | DIASTOLIC BLOOD PRESSURE: 78 MMHG

## 2017-07-13 DIAGNOSIS — S90.511A ABRASION, RIGHT ANKLE, INITIAL ENCOUNTER: ICD-10-CM

## 2017-07-13 PROCEDURE — 97597 DBRDMT OPN WND 1ST 20 CM/<: CPT | Performed by: INTERNAL MEDICINE

## 2017-07-18 RX ORDER — ALOGLIPTIN 25 MG/1
TABLET, FILM COATED ORAL
Qty: 30 TABLET | Refills: 0 | Status: SHIPPED | OUTPATIENT
Start: 2017-07-18 | End: 2017-08-16 | Stop reason: SDUPTHER

## 2017-07-21 ENCOUNTER — HOSPITAL ENCOUNTER (OUTPATIENT)
Dept: WOUND CARE | Age: 52
Discharge: HOME | End: 2017-07-21
Attending: INTERNAL MEDICINE | Admitting: INTERNAL MEDICINE

## 2017-07-21 VITALS
BODY MASS INDEX: 31.78 KG/M2 | DIASTOLIC BLOOD PRESSURE: 82 MMHG | RESPIRATION RATE: 18 BRPM | SYSTOLIC BLOOD PRESSURE: 139 MMHG | TEMPERATURE: 97.4 F | WEIGHT: 222 LBS | HEART RATE: 87 BPM | HEIGHT: 70 IN

## 2017-07-21 PROBLEM — S90.511A ABRASION, RIGHT ANKLE, INITIAL ENCOUNTER: Status: ACTIVE | Noted: 2017-07-21

## 2017-07-21 PROCEDURE — 97597 DBRDMT OPN WND 1ST 20 CM/<: CPT | Performed by: INTERNAL MEDICINE

## 2017-07-28 ENCOUNTER — HOSPITAL ENCOUNTER (OUTPATIENT)
Dept: WOUND CARE | Age: 52
Discharge: HOME | End: 2017-07-28
Attending: INTERNAL MEDICINE | Admitting: INTERNAL MEDICINE

## 2017-07-28 VITALS
WEIGHT: 221 LBS | HEART RATE: 85 BPM | HEIGHT: 70 IN | DIASTOLIC BLOOD PRESSURE: 84 MMHG | TEMPERATURE: 97.9 F | SYSTOLIC BLOOD PRESSURE: 147 MMHG | RESPIRATION RATE: 18 BRPM | BODY MASS INDEX: 31.64 KG/M2

## 2017-07-28 PROCEDURE — 97597 DBRDMT OPN WND 1ST 20 CM/<: CPT | Performed by: INTERNAL MEDICINE

## 2017-07-28 PROCEDURE — 11043 DBRDMT MUSC&/FSCA 1ST 20/<: CPT | Performed by: INTERNAL MEDICINE

## 2017-08-03 PROBLEM — L97.521 DIABETIC ULCER OF LEFT FOOT ASSOCIATED WITH TYPE 2 DIABETES MELLITUS, LIMITED TO BREAKDOWN OF SKIN (HCC): Status: ACTIVE | Noted: 2017-04-27

## 2017-08-04 ENCOUNTER — HOSPITAL ENCOUNTER (OUTPATIENT)
Dept: WOUND CARE | Age: 52
Discharge: HOME | End: 2017-08-04
Attending: INTERNAL MEDICINE | Admitting: INTERNAL MEDICINE

## 2017-08-04 VITALS
TEMPERATURE: 97.8 F | HEIGHT: 70 IN | HEART RATE: 86 BPM | BODY MASS INDEX: 31.5 KG/M2 | WEIGHT: 220 LBS | SYSTOLIC BLOOD PRESSURE: 142 MMHG | RESPIRATION RATE: 18 BRPM | DIASTOLIC BLOOD PRESSURE: 84 MMHG

## 2017-08-04 PROCEDURE — 29445 APPL RIGID TOT CNTC LEG CAST: CPT | Performed by: INTERNAL MEDICINE

## 2017-08-14 ENCOUNTER — HOSPITAL ENCOUNTER (OUTPATIENT)
Dept: WOUND CARE | Age: 52
Discharge: HOME | End: 2017-08-14
Attending: PODIATRIST | Admitting: PODIATRIST

## 2017-08-14 VITALS
RESPIRATION RATE: 28 BRPM | DIASTOLIC BLOOD PRESSURE: 77 MMHG | WEIGHT: 217.8 LBS | TEMPERATURE: 98.1 F | SYSTOLIC BLOOD PRESSURE: 125 MMHG | HEIGHT: 70 IN | BODY MASS INDEX: 31.18 KG/M2 | HEART RATE: 91 BPM

## 2017-08-14 ASSESSMENT — PAIN SCALES - GENERAL: PAINLEVEL_OUTOF10: 0

## 2017-08-16 DIAGNOSIS — E03.9 HYPOTHYROIDISM, UNSPECIFIED TYPE: ICD-10-CM

## 2017-08-16 DIAGNOSIS — E11.42 TYPE 2 DIABETES MELLITUS WITH PERIPHERAL NEUROPATHY (HCC): ICD-10-CM

## 2017-08-16 DIAGNOSIS — Z23 NEED FOR PNEUMOCOCCAL VACCINATION: ICD-10-CM

## 2017-08-16 DIAGNOSIS — E78.5 HYPERLIPIDEMIA, UNSPECIFIED HYPERLIPIDEMIA TYPE: ICD-10-CM

## 2017-08-16 RX ORDER — TRAMADOL HYDROCHLORIDE 50 MG/1
TABLET ORAL
Qty: 30 TABLET | Refills: 0 | OUTPATIENT
Start: 2017-08-16

## 2017-08-16 RX ORDER — GLIPIZIDE 5 MG/1
TABLET ORAL
Qty: 60 TABLET | Refills: 0 | Status: SHIPPED | OUTPATIENT
Start: 2017-08-16 | End: 2017-09-06 | Stop reason: SDUPTHER

## 2017-08-16 RX ORDER — ALOGLIPTIN 25 MG/1
TABLET, FILM COATED ORAL
Qty: 30 TABLET | Refills: 0 | Status: SHIPPED | OUTPATIENT
Start: 2017-08-16 | End: 2017-09-06 | Stop reason: SDUPTHER

## 2017-08-17 ENCOUNTER — HOSPITAL ENCOUNTER (OUTPATIENT)
Dept: SURGERY | Age: 52
Discharge: HOME | End: 2017-08-17
Attending: PODIATRIST | Admitting: PODIATRIST

## 2017-08-17 VITALS
DIASTOLIC BLOOD PRESSURE: 81 MMHG | WEIGHT: 217 LBS | TEMPERATURE: 97.9 F | SYSTOLIC BLOOD PRESSURE: 118 MMHG | HEART RATE: 75 BPM | OXYGEN SATURATION: 98 % | HEIGHT: 70 IN | BODY MASS INDEX: 31.07 KG/M2 | RESPIRATION RATE: 16 BRPM

## 2017-08-17 LAB
ANION GAP SERPL CALCULATED.3IONS-SCNC: 12 MMOL/L (ref 3–16)
BUN BLDV-MCNC: 19 MG/DL (ref 7–20)
CALCIUM SERPL-MCNC: 8.7 MG/DL (ref 8.3–10.6)
CHLORIDE BLD-SCNC: 97 MMOL/L (ref 99–110)
CO2: 26 MMOL/L (ref 21–32)
CREAT SERPL-MCNC: 1 MG/DL (ref 0.9–1.3)
GFR AFRICAN AMERICAN: >60
GFR NON-AFRICAN AMERICAN: >60
GLUCOSE BLD-MCNC: 326 MG/DL (ref 70–99)
GLUCOSE BLD-MCNC: 365 MG/DL (ref 70–99)
PERFORMED ON: ABNORMAL
POTASSIUM SERPL-SCNC: 4.7 MMOL/L (ref 3.5–5.1)
SODIUM BLD-SCNC: 135 MMOL/L (ref 136–145)

## 2017-08-17 PROCEDURE — 93010 ELECTROCARDIOGRAM REPORT: CPT | Performed by: INTERNAL MEDICINE

## 2017-08-17 RX ORDER — MORPHINE SULFATE 4 MG/ML
2 INJECTION, SOLUTION INTRAMUSCULAR; INTRAVENOUS EVERY 5 MIN PRN
Status: DISCONTINUED | OUTPATIENT
Start: 2017-08-17 | End: 2017-08-18 | Stop reason: HOSPADM

## 2017-08-17 RX ORDER — SPIRONOLACTONE 25 MG/1
TABLET ORAL
Qty: 60 TABLET | Refills: 5 | Status: SHIPPED | OUTPATIENT
Start: 2017-08-17 | End: 2017-09-06 | Stop reason: SDUPTHER

## 2017-08-17 RX ORDER — MEPERIDINE HYDROCHLORIDE 25 MG/ML
12.5 INJECTION INTRAMUSCULAR; INTRAVENOUS; SUBCUTANEOUS EVERY 5 MIN PRN
Status: DISCONTINUED | OUTPATIENT
Start: 2017-08-17 | End: 2017-08-18 | Stop reason: HOSPADM

## 2017-08-17 RX ORDER — ONDANSETRON 2 MG/ML
4 INJECTION INTRAMUSCULAR; INTRAVENOUS PRN
Status: DISCONTINUED | OUTPATIENT
Start: 2017-08-17 | End: 2017-08-18 | Stop reason: HOSPADM

## 2017-08-17 RX ORDER — SODIUM CHLORIDE, SODIUM LACTATE, POTASSIUM CHLORIDE, CALCIUM CHLORIDE 600; 310; 30; 20 MG/100ML; MG/100ML; MG/100ML; MG/100ML
INJECTION, SOLUTION INTRAVENOUS CONTINUOUS
Status: DISCONTINUED | OUTPATIENT
Start: 2017-08-17 | End: 2017-08-18 | Stop reason: HOSPADM

## 2017-08-17 RX ORDER — HYDRALAZINE HYDROCHLORIDE 20 MG/ML
5 INJECTION INTRAMUSCULAR; INTRAVENOUS EVERY 10 MIN PRN
Status: DISCONTINUED | OUTPATIENT
Start: 2017-08-17 | End: 2017-08-18 | Stop reason: HOSPADM

## 2017-08-17 RX ORDER — DIPHENHYDRAMINE HYDROCHLORIDE 50 MG/ML
12.5 INJECTION INTRAMUSCULAR; INTRAVENOUS
Status: ACTIVE | OUTPATIENT
Start: 2017-08-17 | End: 2017-08-17

## 2017-08-17 RX ORDER — HYDROMORPHONE HCL 110MG/55ML
0.25 PATIENT CONTROLLED ANALGESIA SYRINGE INTRAVENOUS EVERY 5 MIN PRN
Status: DISCONTINUED | OUTPATIENT
Start: 2017-08-17 | End: 2017-08-18 | Stop reason: HOSPADM

## 2017-08-17 RX ORDER — LIDOCAINE HYDROCHLORIDE 10 MG/ML
1 INJECTION, SOLUTION EPIDURAL; INFILTRATION; INTRACAUDAL; PERINEURAL
Status: COMPLETED | OUTPATIENT
Start: 2017-08-17 | End: 2017-08-17

## 2017-08-17 RX ORDER — HYDROMORPHONE HCL 110MG/55ML
0.5 PATIENT CONTROLLED ANALGESIA SYRINGE INTRAVENOUS EVERY 5 MIN PRN
Status: DISCONTINUED | OUTPATIENT
Start: 2017-08-17 | End: 2017-08-18 | Stop reason: HOSPADM

## 2017-08-17 RX ORDER — SODIUM CHLORIDE 0.9 % (FLUSH) 0.9 %
10 SYRINGE (ML) INJECTION PRN
Status: DISCONTINUED | OUTPATIENT
Start: 2017-08-17 | End: 2017-08-18 | Stop reason: HOSPADM

## 2017-08-17 RX ORDER — OXYCODONE HYDROCHLORIDE AND ACETAMINOPHEN 5; 325 MG/1; MG/1
1-2 TABLET ORAL EVERY 4 HOURS PRN
Qty: 30 TABLET | Refills: 0 | Status: SHIPPED | OUTPATIENT
Start: 2017-08-17 | End: 2017-08-24

## 2017-08-17 RX ORDER — LABETALOL HYDROCHLORIDE 5 MG/ML
5 INJECTION, SOLUTION INTRAVENOUS EVERY 10 MIN PRN
Status: DISCONTINUED | OUTPATIENT
Start: 2017-08-17 | End: 2017-08-18 | Stop reason: HOSPADM

## 2017-08-17 RX ORDER — PROMETHAZINE HYDROCHLORIDE 25 MG/ML
6.25 INJECTION, SOLUTION INTRAMUSCULAR; INTRAVENOUS
Status: DISCONTINUED | OUTPATIENT
Start: 2017-08-17 | End: 2017-08-18 | Stop reason: HOSPADM

## 2017-08-17 RX ORDER — SODIUM CHLORIDE 0.9 % (FLUSH) 0.9 %
10 SYRINGE (ML) INJECTION EVERY 12 HOURS SCHEDULED
Status: DISCONTINUED | OUTPATIENT
Start: 2017-08-17 | End: 2017-08-18 | Stop reason: HOSPADM

## 2017-08-17 RX ORDER — OXYCODONE HYDROCHLORIDE AND ACETAMINOPHEN 5; 325 MG/1; MG/1
1 TABLET ORAL PRN
Status: ACTIVE | OUTPATIENT
Start: 2017-08-17 | End: 2017-08-17

## 2017-08-17 RX ORDER — OXYCODONE HYDROCHLORIDE AND ACETAMINOPHEN 5; 325 MG/1; MG/1
2 TABLET ORAL PRN
Status: ACTIVE | OUTPATIENT
Start: 2017-08-17 | End: 2017-08-17

## 2017-08-17 RX ORDER — MORPHINE SULFATE 4 MG/ML
1 INJECTION, SOLUTION INTRAMUSCULAR; INTRAVENOUS EVERY 5 MIN PRN
Status: DISCONTINUED | OUTPATIENT
Start: 2017-08-17 | End: 2017-08-18 | Stop reason: HOSPADM

## 2017-08-17 RX ADMIN — SODIUM CHLORIDE, SODIUM LACTATE, POTASSIUM CHLORIDE, CALCIUM CHLORIDE: 600; 310; 30; 20 INJECTION, SOLUTION INTRAVENOUS at 08:44

## 2017-08-17 RX ADMIN — LIDOCAINE HYDROCHLORIDE 1 ML: 10 INJECTION, SOLUTION EPIDURAL; INFILTRATION; INTRACAUDAL; PERINEURAL at 08:44

## 2017-08-17 ASSESSMENT — PAIN SCALES - GENERAL
PAINLEVEL_OUTOF10: 0
PAINLEVEL_OUTOF10: 0

## 2017-08-17 ASSESSMENT — PAIN - FUNCTIONAL ASSESSMENT: PAIN_FUNCTIONAL_ASSESSMENT: 0-10

## 2017-08-21 ENCOUNTER — HOSPITAL ENCOUNTER (OUTPATIENT)
Dept: WOUND CARE | Age: 52
Discharge: HOME | End: 2017-08-21
Attending: PODIATRIST | Admitting: PODIATRIST

## 2017-08-21 VITALS
WEIGHT: 224 LBS | TEMPERATURE: 97.9 F | HEART RATE: 95 BPM | HEIGHT: 70 IN | DIASTOLIC BLOOD PRESSURE: 78 MMHG | BODY MASS INDEX: 32.07 KG/M2 | RESPIRATION RATE: 18 BRPM | SYSTOLIC BLOOD PRESSURE: 133 MMHG

## 2017-08-23 LAB
EKG ATRIAL RATE: 77 BPM
EKG DIAGNOSIS: NORMAL
EKG P AXIS: 35 DEGREES
EKG P-R INTERVAL: 158 MS
EKG Q-T INTERVAL: 406 MS
EKG QRS DURATION: 108 MS
EKG QTC CALCULATION (BAZETT): 459 MS
EKG R AXIS: -35 DEGREES
EKG T AXIS: 15 DEGREES
EKG VENTRICULAR RATE: 77 BPM

## 2017-08-28 ENCOUNTER — HOSPITAL ENCOUNTER (OUTPATIENT)
Dept: WOUND CARE | Age: 52
Discharge: HOME | End: 2017-08-28
Attending: PODIATRIST | Admitting: PODIATRIST

## 2017-08-28 VITALS
WEIGHT: 220 LBS | HEART RATE: 83 BPM | RESPIRATION RATE: 18 BRPM | SYSTOLIC BLOOD PRESSURE: 135 MMHG | DIASTOLIC BLOOD PRESSURE: 82 MMHG | TEMPERATURE: 97.8 F | BODY MASS INDEX: 31.5 KG/M2 | HEIGHT: 70 IN

## 2017-08-28 ASSESSMENT — PAIN SCALES - GENERAL: PAINLEVEL_OUTOF10: 0

## 2017-09-06 ENCOUNTER — HOSPITAL ENCOUNTER (OUTPATIENT)
Dept: WOUND CARE | Age: 52
Discharge: HOME | End: 2017-09-06
Attending: INTERNAL MEDICINE | Admitting: INTERNAL MEDICINE

## 2017-09-06 ENCOUNTER — OFFICE VISIT (OUTPATIENT)
Dept: FAMILY MEDICINE CLINIC | Age: 52
End: 2017-09-06

## 2017-09-06 VITALS
DIASTOLIC BLOOD PRESSURE: 68 MMHG | WEIGHT: 219 LBS | SYSTOLIC BLOOD PRESSURE: 102 MMHG | BODY MASS INDEX: 31.42 KG/M2 | HEART RATE: 72 BPM

## 2017-09-06 VITALS
SYSTOLIC BLOOD PRESSURE: 114 MMHG | DIASTOLIC BLOOD PRESSURE: 77 MMHG | BODY MASS INDEX: 31.42 KG/M2 | HEART RATE: 83 BPM | TEMPERATURE: 96.8 F | HEIGHT: 70 IN | RESPIRATION RATE: 17 BRPM

## 2017-09-06 DIAGNOSIS — E11.42 TYPE 2 DIABETES MELLITUS WITH PERIPHERAL NEUROPATHY (HCC): ICD-10-CM

## 2017-09-06 DIAGNOSIS — E03.9 HYPOTHYROIDISM, UNSPECIFIED TYPE: ICD-10-CM

## 2017-09-06 DIAGNOSIS — E78.5 HYPERLIPIDEMIA, UNSPECIFIED HYPERLIPIDEMIA TYPE: ICD-10-CM

## 2017-09-06 DIAGNOSIS — R19.5 POSITIVE FIT (FECAL IMMUNOCHEMICAL TEST): Primary | ICD-10-CM

## 2017-09-06 DIAGNOSIS — Z23 NEED FOR PNEUMOCOCCAL VACCINATION: ICD-10-CM

## 2017-09-06 LAB
A/G RATIO: 1.6 (ref 1.1–2.2)
ALBUMIN SERPL-MCNC: 4.4 G/DL (ref 3.4–5)
ALP BLD-CCNC: 74 U/L (ref 40–129)
ALT SERPL-CCNC: 19 U/L (ref 10–40)
ANION GAP SERPL CALCULATED.3IONS-SCNC: 16 MMOL/L (ref 3–16)
AST SERPL-CCNC: 15 U/L (ref 15–37)
BILIRUB SERPL-MCNC: 0.7 MG/DL (ref 0–1)
BUN BLDV-MCNC: 24 MG/DL (ref 7–20)
CALCIUM SERPL-MCNC: 9.7 MG/DL (ref 8.3–10.6)
CHLORIDE BLD-SCNC: 97 MMOL/L (ref 99–110)
CO2: 24 MMOL/L (ref 21–32)
CREAT SERPL-MCNC: 1 MG/DL (ref 0.9–1.3)
CREATININE URINE: 98.6 MG/DL (ref 39–259)
GFR AFRICAN AMERICAN: >60
GFR NON-AFRICAN AMERICAN: >60
GLOBULIN: 2.7 G/DL
GLUCOSE BLD-MCNC: 291 MG/DL (ref 70–99)
MICROALBUMIN UR-MCNC: 20.4 MG/DL
MICROALBUMIN/CREAT UR-RTO: 206.9 MG/G (ref 0–30)
POTASSIUM SERPL-SCNC: 5.2 MMOL/L (ref 3.5–5.1)
SODIUM BLD-SCNC: 137 MMOL/L (ref 136–145)
T4 FREE: 1.4 NG/DL (ref 0.9–1.8)
TOTAL PROTEIN: 7.1 G/DL (ref 6.4–8.2)
TSH REFLEX: 12.54 UIU/ML (ref 0.27–4.2)

## 2017-09-06 PROCEDURE — 97597 DBRDMT OPN WND 1ST 20 CM/<: CPT | Performed by: INTERNAL MEDICINE

## 2017-09-06 PROCEDURE — 36415 COLL VENOUS BLD VENIPUNCTURE: CPT | Performed by: FAMILY MEDICINE

## 2017-09-06 PROCEDURE — 90471 IMMUNIZATION ADMIN: CPT | Performed by: FAMILY MEDICINE

## 2017-09-06 PROCEDURE — 99214 OFFICE O/P EST MOD 30 MIN: CPT | Performed by: FAMILY MEDICINE

## 2017-09-06 PROCEDURE — 90732 PPSV23 VACC 2 YRS+ SUBQ/IM: CPT | Performed by: FAMILY MEDICINE

## 2017-09-06 RX ORDER — CARVEDILOL 12.5 MG/1
TABLET ORAL
Qty: 180 TABLET | Refills: 3 | Status: SHIPPED | OUTPATIENT
Start: 2017-09-06 | End: 2018-01-03 | Stop reason: SDUPTHER

## 2017-09-06 RX ORDER — LISINOPRIL 5 MG/1
5 TABLET ORAL DAILY
Qty: 90 TABLET | Refills: 3 | Status: SHIPPED | OUTPATIENT
Start: 2017-09-06 | End: 2018-01-03 | Stop reason: SDUPTHER

## 2017-09-06 RX ORDER — FUROSEMIDE 40 MG/1
TABLET ORAL
Qty: 180 TABLET | Refills: 3 | Status: SHIPPED | OUTPATIENT
Start: 2017-09-06 | End: 2018-01-03 | Stop reason: SDUPTHER

## 2017-09-06 RX ORDER — TRAMADOL HYDROCHLORIDE 50 MG/1
50 TABLET ORAL EVERY 8 HOURS PRN
Qty: 30 TABLET | Refills: 3 | Status: SHIPPED | OUTPATIENT
Start: 2017-09-06 | End: 2018-01-03 | Stop reason: SDUPTHER

## 2017-09-06 RX ORDER — SPIRONOLACTONE 25 MG/1
25 TABLET ORAL DAILY
Qty: 90 TABLET | Refills: 3 | Status: SHIPPED | OUTPATIENT
Start: 2017-09-06 | End: 2018-01-03 | Stop reason: SDUPTHER

## 2017-09-06 RX ORDER — GABAPENTIN 300 MG/1
300 CAPSULE ORAL 2 TIMES DAILY
Qty: 180 CAPSULE | Refills: 3 | Status: SHIPPED | OUTPATIENT
Start: 2017-09-06 | End: 2018-01-03 | Stop reason: SDUPTHER

## 2017-09-06 RX ORDER — GLIPIZIDE 5 MG/1
5 TABLET ORAL
Qty: 180 TABLET | Refills: 3 | Status: SHIPPED | OUTPATIENT
Start: 2017-09-06 | End: 2018-01-03 | Stop reason: SDUPTHER

## 2017-09-06 RX ORDER — ALOGLIPTIN 25 MG/1
TABLET, FILM COATED ORAL
Qty: 90 TABLET | Refills: 3 | Status: SHIPPED | OUTPATIENT
Start: 2017-09-06 | End: 2018-01-03 | Stop reason: SDUPTHER

## 2017-09-06 RX ORDER — PRAVASTATIN SODIUM 20 MG
TABLET ORAL
Qty: 90 TABLET | Refills: 3 | Status: SHIPPED | OUTPATIENT
Start: 2017-09-06 | End: 2018-01-03 | Stop reason: SDUPTHER

## 2017-09-06 ASSESSMENT — PATIENT HEALTH QUESTIONNAIRE - PHQ9
2. FEELING DOWN, DEPRESSED OR HOPELESS: 0
SUM OF ALL RESPONSES TO PHQ QUESTIONS 1-9: 0
SUM OF ALL RESPONSES TO PHQ9 QUESTIONS 1 & 2: 0
1. LITTLE INTEREST OR PLEASURE IN DOING THINGS: 0

## 2017-09-07 LAB
ESTIMATED AVERAGE GLUCOSE: 203 MG/DL
HBA1C MFR BLD: 8.7 %

## 2017-09-07 ASSESSMENT — ENCOUNTER SYMPTOMS
ANAL BLEEDING: 0
DIARRHEA: 0
COUGH: 0
EYE DISCHARGE: 0
ABDOMINAL PAIN: 0
ABDOMINAL DISTENTION: 0
CHEST TIGHTNESS: 0
SHORTNESS OF BREATH: 0
CONSTIPATION: 0
BLOOD IN STOOL: 0

## 2017-09-10 PROBLEM — S90.511A ABRASION, RIGHT ANKLE, INITIAL ENCOUNTER: Status: RESOLVED | Noted: 2017-07-21 | Resolved: 2017-09-10

## 2017-09-11 ENCOUNTER — HOSPITAL ENCOUNTER (OUTPATIENT)
Dept: WOUND CARE | Age: 52
Discharge: HOME | End: 2017-09-11
Attending: PODIATRIST | Admitting: PODIATRIST

## 2017-09-11 VITALS
RESPIRATION RATE: 20 BRPM | BODY MASS INDEX: 31.78 KG/M2 | HEIGHT: 70 IN | HEART RATE: 91 BPM | SYSTOLIC BLOOD PRESSURE: 148 MMHG | DIASTOLIC BLOOD PRESSURE: 84 MMHG | TEMPERATURE: 97.3 F | WEIGHT: 222 LBS

## 2017-09-11 PROCEDURE — 29445 APPL RIGID TOT CNTC LEG CAST: CPT | Performed by: INTERNAL MEDICINE

## 2017-09-11 PROCEDURE — 97597 DBRDMT OPN WND 1ST 20 CM/<: CPT | Performed by: INTERNAL MEDICINE

## 2017-09-19 ENCOUNTER — HOSPITAL ENCOUNTER (OUTPATIENT)
Dept: WOUND CARE | Age: 52
Discharge: HOME | End: 2017-09-19
Attending: INTERNAL MEDICINE | Admitting: INTERNAL MEDICINE

## 2017-09-19 VITALS
HEART RATE: 86 BPM | DIASTOLIC BLOOD PRESSURE: 80 MMHG | BODY MASS INDEX: 31.35 KG/M2 | HEIGHT: 70 IN | WEIGHT: 219 LBS | TEMPERATURE: 98.4 F | RESPIRATION RATE: 18 BRPM | SYSTOLIC BLOOD PRESSURE: 131 MMHG

## 2017-09-19 PROCEDURE — 29445 APPL RIGID TOT CNTC LEG CAST: CPT | Performed by: INTERNAL MEDICINE

## 2017-09-19 PROCEDURE — 97597 DBRDMT OPN WND 1ST 20 CM/<: CPT | Performed by: INTERNAL MEDICINE

## 2017-09-26 ENCOUNTER — NURSE ONLY (OUTPATIENT)
Dept: CARDIOLOGY CLINIC | Age: 52
End: 2017-09-26

## 2017-09-26 ENCOUNTER — HOSPITAL ENCOUNTER (OUTPATIENT)
Dept: WOUND CARE | Age: 52
Discharge: HOME | End: 2017-09-26
Attending: INTERNAL MEDICINE | Admitting: INTERNAL MEDICINE

## 2017-09-26 VITALS
RESPIRATION RATE: 17 BRPM | HEART RATE: 82 BPM | BODY MASS INDEX: 32.04 KG/M2 | WEIGHT: 223.8 LBS | HEIGHT: 70 IN | TEMPERATURE: 97.5 F | DIASTOLIC BLOOD PRESSURE: 80 MMHG | SYSTOLIC BLOOD PRESSURE: 126 MMHG

## 2017-09-26 DIAGNOSIS — Z95.810 AUTOMATIC IMPLANTABLE CARDIOVERTER-DEFIBRILLATOR IN SITU: Primary | ICD-10-CM

## 2017-09-26 DIAGNOSIS — I42.6 ALCOHOLIC CARDIOMYOPATHY (HCC): ICD-10-CM

## 2017-09-26 PROCEDURE — 93295 DEV INTERROG REMOTE 1/2/MLT: CPT | Performed by: INTERNAL MEDICINE

## 2017-09-26 PROCEDURE — 93296 REM INTERROG EVL PM/IDS: CPT | Performed by: INTERNAL MEDICINE

## 2017-09-26 PROCEDURE — 99215 OFFICE O/P EST HI 40 MIN: CPT | Performed by: INTERNAL MEDICINE

## 2017-09-26 PROCEDURE — 97597 DBRDMT OPN WND 1ST 20 CM/<: CPT | Performed by: INTERNAL MEDICINE

## 2017-09-26 ASSESSMENT — PAIN SCALES - GENERAL: PAINLEVEL_OUTOF10: 0

## 2017-09-27 PROBLEM — M86.472 CHRONIC OSTEOMYELITIS OF LEFT FOOT WITH DRAINING SINUS (HCC): Status: ACTIVE | Noted: 2017-09-27

## 2017-09-29 PROBLEM — L97.524 DIABETIC ULCER OF LEFT FOOT ASSOCIATED WITH TYPE 2 DIABETES MELLITUS, WITH NECROSIS OF BONE (HCC): Status: ACTIVE | Noted: 2017-04-27

## 2017-09-29 PROBLEM — M86.172 ACUTE OSTEOMYELITIS OF LEFT FOOT (HCC): Status: ACTIVE | Noted: 2017-09-27

## 2017-10-02 ENCOUNTER — HOSPITAL ENCOUNTER (OUTPATIENT)
Dept: WOUND CARE | Age: 52
Discharge: HOME | End: 2017-10-02
Attending: PODIATRIST | Admitting: PODIATRIST

## 2017-10-02 VITALS
TEMPERATURE: 98.2 F | WEIGHT: 222.4 LBS | SYSTOLIC BLOOD PRESSURE: 125 MMHG | HEART RATE: 88 BPM | RESPIRATION RATE: 17 BRPM | BODY MASS INDEX: 31.84 KG/M2 | HEIGHT: 70 IN | DIASTOLIC BLOOD PRESSURE: 75 MMHG

## 2017-10-02 PROCEDURE — 97597 DBRDMT OPN WND 1ST 20 CM/<: CPT | Performed by: INTERNAL MEDICINE

## 2017-10-02 PROCEDURE — 99214 OFFICE O/P EST MOD 30 MIN: CPT | Performed by: INTERNAL MEDICINE

## 2017-10-02 ASSESSMENT — PAIN SCALES - GENERAL: PAINLEVEL_OUTOF10: 0

## 2017-10-02 NOTE — IP AVS SNAPSHOT
After Visit Summary  (Discharge Instructions)    Medication List for Home    Based on the information you provided to us as well as any changes during this visit, the following is your updated medication list.  Compare this with your prescription bottles at home. If you have any questions or concerns, contact your primary care physician's office. Daily Medication List (This medication list can be shared with any healthcare provider who is helping you manage your medications)      ASK your doctor about these medications if you have questions        Last Dose    Next Dose Due AM NOON PM NIGHT    alogliptin 25 MG Tabs tablet   Commonly known as:  NESINA   Take 1 tablet by mouth daily                                         aspirin 81 MG tablet   Take 81 mg by mouth daily. Blood Glucose Meter Kit   Use to test blood sugars. carvedilol 12.5 MG tablet   Commonly known as:  COREG   TAKE ONE TABLET BY MOUTH TWICE DAILY                                         furosemide 40 MG tablet   Commonly known as:  LASIX   TAKE ONE TABLET BY MOUTH TWICE DAILY                                         gabapentin 300 MG capsule   Commonly known as:  NEURONTIN   Take 1 capsule by mouth 2 times daily                                         glipiZIDE 5 MG tablet   Commonly known as:  GLUCOTROL   Take 1 tablet by mouth 2 times daily (before meals)                                         insulin glargine 300 UNIT/ML injection pen   Commonly known as:  TOUJEO SOLOSTAR   Inject 60 Units into the skin nightly                                         Lancets Misc   Use to test blood sugars once daily.                                          lisinopril 5 MG tablet   Commonly known as:  PRINIVIL;ZESTRIL   Take 1 tablet by mouth daily                                         magnesium oxide 400 (241.3 Mg) MG Tabs tablet   Commonly known as:  MAG-OX Influenza, Intradermal, Quadrivalent, Preservative Free 2017 0.1 mL 2015 Intradermal    Influenza, MDCK Quadrivalent, Preservative Free 2017 0.5 mL 2015 Intramuscular    Pneumococcal 13-valent Conjugate (Gtpmffi47) 2016 0.5 mL 2015 Intramuscular    Pneumococcal Polysaccharide (Fxodcshbs95) 2017 0.5 mL 2015 Intramuscular    Tdap (Boostrix, Adacel) 11/3/2015 0.5 mL 2015 Intramuscular      Last Vitals          Most Recent Value    Temp  98.2 °F (36.8 °C)    Pulse  88    Resp  17    BP  125/75         After Visit Summary    This summary was created for you. Thank you for entrusting your care to us. The following information includes details about your hospital/visit stay along with steps you should take to help with your recovery once you leave the hospital.  In this packet, you will find information about the topics listed below:    · Instructions about your medications including a list of your home medications  · A summary of your hospital visit  · Follow-up appointments once you have left the hospital  · Your care plan at home      You may receive a survey regarding the care you received during your stay. Your input is valuable to us. We encourage you to complete and return your survey in the envelope provided. We hope you will choose us in the future for your healthcare needs. Patient Information     Patient Name SILVIA Sanchez 1965      Care Provided at:     Name Address Phone       [facility] [address] [phone]            Your Visit    Here you will find information about your visit, including the reason for your visit. Please take this sheet with you when you visit your doctor or other health care provider in the future. It will help determine the best possible medical care for you at that time.   If you have any questions once you leave the hospital, please call the department phone number listed below. Why you were here     Your primary diagnosis was:  Not on File      Visit Information     Date & Time Provider Department Dept. Phone    10/2/2017 Izabella Bass DPM [facility] [phone]       Follow-up Appointments    Below is a list of your follow-up and future appointments. This may not be a complete list as you may have made appointments directly with providers that we are not aware of or your providers may have made some for you. Please call your providers to confirm appointments. It is important to keep your appointments. Please bring your current insurance card, photo ID, co-pay, and all medication bottles to your appointment. If self-pay, payment is expected at the time of service. Future Appointments     10/11/2017 8:00 AM     Appointment with Judi Hogue MD at [facility] Endocrinology & Diabetes ([phone])   Please arrive 15 minutes prior to scheduled appointment time to complete paper work, bring photo ID and insurance cards. [address] [facility]       10/20/2017 7:30 AM     Appointment with Ivana Ramirez MD at [address] [place] [address] ([phone])   Please arrive 15 minutes prior to appointment, bring photo ID and insurance card. 6[address] [facility] [address]       12/6/2017 8:20 AM     Appointment with Antonia Colorado MD at [place] [address] ([phone])   Please arrive 15 minutes prior to appointment, bring photo ID and insurance card.    [address] [place] [id number]       1/2/2018 5:00 PM     Appointment with [employer] PHONE TRANSMISSION at [address] ([phone])   [address]  [address] [address]       4/10/2018 5:00 PM     Appointment with [employer] PHONE TRANSMISSION at [address] [place] [address] ([phone])   [address] [place] [address]         Preventive Care        Date Due Eye Exam By An Eye Doctor 1/5/2017    Diabetic Foot Exam 6/20/2017    Colon Cancer Stool Test 7/5/2017    Hemoglobin A1C (Test For Long-Term Glucose Control) 12/26/2017    Cholesterol Screening 1/17/2018    Urine Check For Kidney Problems 9/6/2018    Tetanus Combination Vaccine (2 - Td) 11/3/2025                 Care Plan Once You Return Home    This section includes instructions you will need to follow once you leave the hospital.  Your care team will discuss these with you, so you and those caring for you know how to best care for your health needs at home. This section may also include educational information about certain health topics that may be of help to you. Discharge [address] [facility] Physician Orders and Discharge [address], [address]  [place], [facility]  Telephone: [phone] Fax: [phone]        Please fax these orders to:  N/A . Your wound-care supplies will be provided by:  Misael Win      NAME: Mimi Palacios Laureen: 1965  PRIMARY DIAGNOSIS FOR WOUND CARE CENTER:  DFU Pitts 1     ANESTHETIC APPLIED IN [facility] TODAY:  4% topical lidocaine      Cellular or tissue product history, if applicable:      #1 oasis [id number] EXP. 08/2018 saline lot: EXP 11/18 6S30   #2 oasis [id number] EXP 08/2018 saline lot EXP 11/18 6s33   #3 oasis [id number] EXP 08/2018 saline lot EXP 11/18 6S34   #4 oasis [id number] EXP 08/2018/ saline lot EXP 11/18 6S05  #5 oasis [id number] EXP 08/2018 saline lot EXP 11/18 6S33  #6 oasis [id number] Exp 08/2018 saline lot EXP 11/18 6s33  #7 oasis [id number] Exp 08/2018 saline lot EXP 1/19 7AA0   #8 Carole Byrne [id number] Exp: 08/2018 saline lot EXP: 1/19 7AAO  #9 Burton [id number] EXP 08/2018 Saline lot EXP 2/19 7B13  #10 oasis [id number] EXP 08/2018 saline lot Exp 2/19 7B13     Prior testing or prescriptions, if applicable:      None        Wound cleansing:   Do not scrub or use excessive force. F/U Appointment is with Dr. Juan Miguel Rodriguez on Monday                                 At                                          MD Signature:__________________________________Date:____________________ at_____________        Your nurse  is ROMERO Thomason RN. If we applied slip-resistant hospital socks today, be sure to remove them at least once a  day to inspect your toes or feet, even if you're not changing the wraps or dressings  underneath. If you see anything concerning (redness, excess moisture, etc), please call  and let us know right away. Should you experience any significant changes in your wound(s) (including redness, increased warmth, increased pain, increased drainage, odor, or fever) or have questions about your wound care, please contact the [address] [place] [address] at [phone] Monday-Thursday from 8:00 am  4:30 pm, or Friday from 8:00 am - 2:30 pm.         Important information for a smoker       SMOKING: QUIT SMOKING. THIS IS THE MOST IMPORTANT ACTION YOU CAN TAKE TO IMPROVE YOUR CURRENT AND FUTURE HEALTH. Call the [address] [place] [address] at [facility] ([phone])    Smoking harms nonsmokers. When nonsmokers are around people who smoke, they absorb nicotine, carbon monoxide, and other ingredients of tobacco smoke. DO NOT SMOKE AROUND CHILDREN     Children exposed to secondhand smoke are at an increased risk of:  Sudden Infant Death Syndrome (SIDS), acute respiratory infections, inflammation of the middle ear, and severe asthma. Over a longer time, it causes heart disease and lung cancer. There is no safe level of exposure to secondhand smoke. MyChart Signup     Our records indicate that you have declined MyChart signup. View your information online  ? Review your current list of  medications, immunization, and allergies. ? Review your future test results online .

## 2017-10-02 NOTE — PROGRESS NOTES
[facility] Progress Note      Mack Watkins     : 1965    DATE OF VISIT:  10/2/2017    Subjective:     Mack Watkins is a 46 y.o. male who has a chief complaint of a diabetic ulcer located on the bilateral foot. No pain in wounds. Patient was doing well a couple weeks ago. Was in TCC and when it was removed last week exposed bone of the 2nd MH which appeared necrotic. Was admitted and underwent resection of partial 2nd metatarsal. States doing well since surgery. No issues with antibiotics. Mr. Devin Pineda has a past medical history of Acute osteomyelitis of right foot ([address] Ascites; Blood transfusion reaction; Burst fracture of lumbar vertebra ([address] Cellulitis of right lower extremity; Diabetes ([address] Diabetic ulcer of right foot ([address] Diabetic ulcer of 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 2 diabetes mellitus, with necrosis of bone ([address] Fracture of tibial plateau; MRSA (methicillin resistant staph aureus) culture positive; Neuropathic ulcer of left foot, limited to breakdown of skin ([address] Neuropathic ulcer of toe ([address] Pleural effusion due to congestive heart failure ([address] Smoker; and Systolic CHF, acute ([address] He has a past surgical history that includes knee surgery (Left); pacemaker placement; other surgical history (Bilateral, 9/17/15); Toe amputation; Foot Tendon Surgery (Right, ); Foot Tendon Surgery (Left, 2017); other surgical history (Left, 2017); and Foot surgery (Left, 2017).     His family history includes Cancer in his maternal grandfather and maternal grandmother; Heart Disease in his father, maternal grandfather, maternal grandmother, mother, paternal grandfather, and paternal grandmother; High Blood Pressure in his father, maternal grandfather, maternal grandmother, mother, paternal grandfather, and paternal grandmother; High Cholesterol in his father, maternal grandfather, maternal grandmother, mother, paternal Ronnell.          Electronically signed by Zeb Reyes DPM on 10/2/2017 at 10:38 AM.

## 2017-10-08 NOTE — PROGRESS NOTES
88 [facility] Progress Note    Delores Reynoso     : 1965    DATE OF VISIT:  10/2/2017    Subjective:     Delores Reynoso is a 46 y.o. male who has a diabetic ulcer located on the left foot. Dr. Norah Guardado quickly took him into the OR last week for resection of his 2nd met head (plus a small portion of the met shaft). The bone at the level of transection was hard and bleeding. Wound re-dressed a couple of times since surgery; no pus or malodor. No F/C/D, no sore throat or mouth, no N/V/D, rash. Staying off his feet as much as possible. Current complaint of pain in this ulcer? yes. Quality of pain: aching and sharp  Timing: intermittent, stable  Severity: mild  Associated Signs/Symptoms:  edema, erythema, drainage and numbness  Other significant symptoms or pertinent ulcer history: as above. Additional ulcer(s) noted? yes. Small Pitts 1 right plantar ulcer. Mr. Jenae Crespo has a past medical history of Acute osteomyelitis of right foot ([address] Ascites; Blood transfusion reaction; Burst fracture of lumbar vertebra ([address] Cellulitis of right lower extremity; Diabetes ([address] Diabetic ulcer of right foot ([address] Diabetic ulcer of 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 2 diabetes mellitus, with necrosis of bone ([address] Fracture of tibial plateau; MRSA (methicillin resistant staph aureus) culture positive; Neuropathic ulcer of left foot, limited to breakdown of skin ([address] Neuropathic ulcer of toe ([address] Pleural effusion due to congestive heart failure ([address] Smoker; and Systolic CHF, acute ([address] He has a past surgical history that includes knee surgery (Left); pacemaker placement; other surgical history (Bilateral, 9/17/15); Toe amputation; Foot Tendon Surgery (Right, ); Foot Tendon Surgery (Left, 2017); other surgical history (Left, 2017); and Foot surgery (Left, 2017).     His family history includes Cancer in his maternal grandfather and maternal grandmother; Heart Disease in his father, maternal grandfather, maternal grandmother, mother, paternal grandfather, and paternal grandmother; High Blood Pressure in his father, maternal grandfather, maternal grandmother, mother, paternal grandfather, and paternal grandmother; High Cholesterol in his father, maternal grandfather, maternal grandmother, mother, paternal grandfather, and paternal grandmother. Mr. Luci De La Torre reports that he quit smoking about 37 years ago. He has never used smokeless tobacco. He reports that he does not drink alcohol or use drugs. His current medication list consists of Blood Glucose Meter, Insulin Pen Needle, Lancets, alogliptin, aspirin, carvedilol, furosemide, gabapentin, glipiZIDE, glucose blood VI test strips, insulin glargine, levothyroxine, lisinopril, magnesium oxide, metFORMIN, oxyCODONE-acetaminophen, pravastatin, sildenafil, spironolactone, sulfamethoxazole-trimethoprim, therapeutic multivitamin-minerals, and traMADol. On Bactrim since discharge home. Allergies: Bee venom    Pertinent items from the review of systems are discussed in the HPI; the remainder of the ROS was reviewed and is negative.      Objective:     Vitals:    10/02/17 0810   BP: 125/75   Pulse: 88   Resp: 17   Temp: 98.2 °F (36.8 °C)   TempSrc: Oral   Weight: 222 lb 6.4 oz (100.9 kg)   Height: 5' 10\" (1.778 m)       Constitutional:  well-developed, well-nourished, NAD  Psychiatric:  oriented to person, place and time; mood and affect appropriate for the situation   Eyes:  pupils equal, round and reactive to light; sclerae anicteric, conjunctivae not pale  ENT: no thrush or oral ulcers, mucous membranes moist  Abd: soft, NT, ND, good BS  Cardiovascular:  bilateral pedal pulses palpable; mild left pedal edema, foot warm, good cap refill  Lymphatic:  no inguinal or popliteal adenopathy, no angitis; mild cellulitis of left forefoot, faded since POD #1  Musculoskeletal:  no clubbing, cyanosis or petechiae; 09/27/17 Foot Left-Dressing/Treatment: Other (Comment) (dry dressing )  Wound 07/13/17 #11, left plantar foot, DFU, Pitts 1, onset 7/12/17, gradually appeared-Dressing/Treatment: Other (Comment) (sensicare, aquacell ag, 4x4, cast pad, kerlix, ace )  Wound 01/27/17 #8, Right Plantar,DFU Pitts 1, 1/25/17, pressure-Dressing/Treatment:  (aquacell ag, dry dressing ). Darco shoe and PegAssist insert for the left. Street shoe and custom insert for the right; considered casting today, but he drove himself. Not medically stable to return to work at this time. Continue Bactrim for residual soft tissue infection. If bone not completely covered and left foot wound without less depth next week, will urge starting HBOT. Home treatment: good handwashing before and after any dressing changes. Cleanse ulcer with saline or soap & water before dressing change. May use Vaseline (petrolatum), Aquaphor, Aveeno, CeraVe, Cetaphil, Eucerin, Lubriderm, etc for dry skin. Dressing type for home: Other: Multidex and gauze to the right foot daily; ZnO, silver alginate and gauze to the left foot, 3 times weekly. Written discharge instructions given to patient. Follow up in 1 week.     Electronically signed by Hope Vick MD on 10/8/2017 at 11:18 AM.

## 2017-10-09 ENCOUNTER — HOSPITAL ENCOUNTER (OUTPATIENT)
Dept: WOUND CARE | Age: 52
Discharge: HOME | End: 2017-10-09
Attending: PODIATRIST | Admitting: PODIATRIST

## 2017-10-09 VITALS
TEMPERATURE: 97.1 F | RESPIRATION RATE: 18 BRPM | HEART RATE: 83 BPM | BODY MASS INDEX: 32.07 KG/M2 | WEIGHT: 224 LBS | DIASTOLIC BLOOD PRESSURE: 74 MMHG | HEIGHT: 70 IN | SYSTOLIC BLOOD PRESSURE: 124 MMHG

## 2017-10-09 PROCEDURE — 99215 OFFICE O/P EST HI 40 MIN: CPT | Performed by: INTERNAL MEDICINE

## 2017-10-09 PROCEDURE — 97597 DBRDMT OPN WND 1ST 20 CM/<: CPT | Performed by: INTERNAL MEDICINE

## 2017-10-09 PROCEDURE — 11042 DBRDMT SUBQ TIS 1ST 20SQCM/<: CPT | Performed by: INTERNAL MEDICINE

## 2017-10-09 NOTE — PLAN OF CARE
Problem: Wound:  Intervention: Assess pain status  Wound debridement per MD, patient will return tomorrow for TCC of LRLE, f/u x 1 week, MD orders/D/C instructions reviewed with patient, all questions answered; copy of instructions given to patient

## 2017-10-09 NOTE — PROGRESS NOTES
[place] HBO Evaluation Note      Meldon Rinne     : 1965    DATE OF VISIT:  10/9/2017    Subjective:     Meldon Rinne is a 46 y.o. male who has a chief complaint of a diabetic ulcer located on the left foot. He and I were discussing the possible utility of hyperbaric oxygen therapy for his diagnosis of a Pitts 3 diabetic foot ulcer. Current complaint of pain in this ulcer? yes. Quality of pain: aching and sharp  Timing: intermittent, stable  Severity: moderate  Associated Signs/Symptoms:  edema, drainage and numbness  Other significant symptoms or pertinent wound history: systemically feeling ok this past week. Briefly had the sensation of a small ulcer on his lip, but no sore throat, no N/V/D, no F/C/D, no drug rash. Glucoses mostly 130s this week. Staying off his feet as much as he can; not back to work. Additional ulcer(s) noted? yes. Right plantar ulcer, small, but not getting smaller with current Rx (ever since casting stopped recently). Mr. Ludy Crystal has a past medical history of Acute osteomyelitis of right foot ([address] Ascites; Blood transfusion reaction; Burst fracture of lumbar vertebra ([address] Cellulitis of left foot; Cellulitis of right lower extremity; Diabetes ([address] Diabetic ulcer of right foot ([address] Diabetic ulcer of 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 2 diabetes mellitus, with necrosis of bone ([address] Fracture of tibial plateau; MRSA (methicillin resistant staph aureus) culture positive; Neuropathic ulcer of left foot, limited to breakdown of skin ([address] Neuropathic ulcer of toe ([address] Pleural effusion due to congestive heart failure ([address] Smoker; and Systolic CHF, acute ([address] He has a past surgical history that includes knee surgery (Left); pacemaker placement; other surgical history (Bilateral, 9/17/15); Toe amputation; Foot Tendon Surgery (Right, );  Foot Tendon Surgery (Left, 2017); other surgical history (Left, 2017); and Foot surgery (Left, 09/27/2017). His family history includes Cancer in his maternal grandfather and maternal grandmother; Heart Disease in his father, maternal grandfather, maternal grandmother, mother, paternal grandfather, and paternal grandmother; High Blood Pressure in his father, maternal grandfather, maternal grandmother, mother, paternal grandfather, and paternal grandmother; High Cholesterol in his father, maternal grandfather, maternal grandmother, mother, paternal grandfather, and paternal grandmother. Mr. Chadwick Sherman reports that he quit smoking about 37 years ago. He has never used smokeless tobacco. He reports that he does not drink alcohol or use drugs. His current medication list consists of Blood Glucose Meter, Insulin Pen Needle, Lancets, alogliptin, aspirin, carvedilol, furosemide, gabapentin, glipiZIDE, glucose blood VI test strips, insulin glargine, levothyroxine, lisinopril, magnesium oxide, metFORMIN, oxyCODONE-acetaminophen, pravastatin, sildenafil, spironolactone, sulfamethoxazole-trimethoprim, therapeutic multivitamin-minerals, and traMADol. Allergies: Bee venom    Pertinent items from the review of systems are discussed in the HPI; the remainder of the ROS was reviewed and is negative.      Objective:     Vitals:    10/09/17 0923   BP: 124/74   Pulse: 83   Resp: 18   Temp: 97.1 °F (36.2 °C)   TempSrc: Oral   Weight: 224 lb (101.6 kg)   Height: 5' 10\" (1.778 m)       Constitutional:  well-developed, well-nourished, overweight, NAD  Psychiatric:  oriented to person, place and time; mood and affect appropriate for the situation   Eyes:  pupils equal, round and reactive to light; sclerae anicteric, conjunctivae not pale  ENT: no thrush or oral ulcers, mucous membranes moist  Abd: soft, NT, ND, good BS  ENT: no thrush or oral ulcers; TMs visible, no effusion or erythema  Lungs: clear to auscultation B/L  Cardiovascular:  bilateral pedal pulses palpable; 1+ lower extremity edema, a limited to breakdown of skin ([facility] E11.621, L97.511    Acquired hypothyroidism E03.9    Diabetic ulcer of left foot associated with type 2 diabetes mellitus, with necrosis of bone ([facility]) E11.621, L97.524    Acute osteomyelitis of left foot ([facility]) M86.172       Assessment of today's active condition(s): DM2, glycemic control worse recently, but medications being adjusted, neuropathy, no significant vascular disease, longstanding B/L plantar Pitts 1 neuropathic ulcers which had improved with casting to an extent, but the right one recurred quickly after casting was stopped, and the left one acutely deteriorated two weeks ago while in a cast, I think from excess time on his feet causing shear and friction, maceration, tissue necrosis, and then leading to localized soft tissue infection, exposure of metatarsal head with acute osteomyelitis, resected 2 weeks ago in the OR. No clear signs of ongoing osteomyelitis necessarily, and the cellulitis is resolved, but bone is still exposed, the wound probes deeply, and he is at high risk of limb loss if we don't get him healed as quickly as possible. Factors contributing to occurrence and/or persistence of the chronic ulcer include diabetes, poor glucose control, chronic pressure, shear force, obesity and non-adherence. I believe HBOT is indicated as an important adjunctive therapy in this case because of Mr. Juana Marcelo refractory condition, to speed healing and to decrease the chance of serious complications including spread of infection and necrosis, and the risk of loss of limb. Specifically, the goals of HBOT in this case are to accelerate neoangiogenesis to support granulation tissue formation and cover the exposed bone, along with assistance in control of any edema and potential residual infection. Due to the potential adverse effects of HBO therapy, I reviewed some particular aspects of Mr. Juana Marcelo medical history.  There is no history of idiopathic epilepsy, appeared-Wound Length (cm): 1 cm    Wound 01/27/17 #8, Right Plantar,DFU Pitts 1, 1/25/17, pressure-Wound Width (cm): 0.5 cm  Incision 09/27/17 Foot Left-Wound Width (cm): 0.1 cm  Wound 07/13/17 #11, left plantar foot, DFU, Pitts 1, onset 7/12/17, gradually appeared-Wound Width (cm): 0.3 cm    Wound 01/27/17 #8, Right Plantar,DFU Pitts 1, 1/25/17, pressure-Wound Depth (cm) : 0.1  Incision 09/27/17 Foot Left-Wound Depth (cm) : 0.1  Wound 07/13/17 #11, left plantar foot, DFU, Pitts 1, onset 7/12/17, gradually appeared-Wound Depth (cm) : 1.6 (with 11:00 tunneling of greater than 3 cm)    The ulcers were then irrigated with normal saline solution. The procedure was completed with a small amount of bleeding, and hemostasis was by pressure. The patient tolerated the procedure well, with no significant complications. The patient's level of pain during and after the procedure was monitored, and is noted in the wound documentation flowsheet. Discharge plan:     Treatment in the wound care center today: Wound 01/27/17 #8, Right Plantar,DFU Pitts 1, 1/25/17, pressure-Dressing/Treatment:  (Safgel,Bandaid). Iodoform and dry gauze to the left foot. Will send documentation to insurance company to check on authorization of HBOT. He and I agree that his right foot would benefit from total contact casting again, but he drove himself today, so I don't feel comfortable applying it and then having him drive himself home. He can get a ride in here tomorrow to have his cast placed. Casts will likely continue weekly, and we'll have to be sure that any cast changes are after HBOT for that day. Continue offloading with the Darco shoe and PegAssist insert on the left side. Not medically stable for return to work at this time. Complete Bactrim script this week. If bone still palpable in a week or two, will repeat labs and XR. He has Percocet PRN for pain, but has needed less this past week.   Needs to work more diligently on glucose control -- reviewed some diabetic diet principles today. Home treatment: good handwashing before and after any dressing changes. Cleanse wound with saline or soap & water before dressing change. May use Vaseline (petrolatum), Aquaphor, Aveeno, CeraVe, Cetaphil, Eucerin, Lubriderm, etc for dry skin. Dressing type for home: Other: change silver alginate to iodoform on the left foot, lightly packing the wound and covering with gauze, daily. Written discharge instructions given to patient. Follow up in 1 day for TCC, hopefully in just a couple of days to start HBOT.     Electronically signed by Hope Vick MD on 10/9/2017 at 12:28 PM.

## 2017-10-10 ENCOUNTER — HOSPITAL ENCOUNTER (OUTPATIENT)
Dept: OTHER | Age: 52
Discharge: HOME | End: 2017-10-31
Attending: INTERNAL MEDICINE | Admitting: INTERNAL MEDICINE

## 2017-10-10 ENCOUNTER — HOSPITAL ENCOUNTER (OUTPATIENT)
Dept: WOUND CARE | Age: 52
Discharge: HOME | End: 2017-10-10
Attending: INTERNAL MEDICINE | Admitting: INTERNAL MEDICINE

## 2017-10-10 VITALS
DIASTOLIC BLOOD PRESSURE: 68 MMHG | SYSTOLIC BLOOD PRESSURE: 106 MMHG | BODY MASS INDEX: 32.08 KG/M2 | WEIGHT: 223.6 LBS | HEART RATE: 79 BPM | TEMPERATURE: 97.6 F | RESPIRATION RATE: 18 BRPM

## 2017-10-10 PROCEDURE — 29445 APPL RIGID TOT CNTC LEG CAST: CPT | Performed by: INTERNAL MEDICINE

## 2017-10-10 NOTE — PLAN OF CARE
Problem: Wound:  Goal: Will show signs of wound healing; wound closure and no evidence of infection  Will show signs of wound healing; wound closure and no evidence of infection   Outcome: Ongoing  Left foot not assessed today since seen with Dr. Hari Lizama on 10/9/17. TCC applied to Right foot per Dr. Leif Lizama. Will plan for HBOT orientation on 10/11/17 after his endorinology appt. Then plan for first treatment in HBOT on Thursday. F/u with Dr. Leif Lizama next Wednesday in [facility]. Pt. Instructed to call if something doesn't feel right & needs to have his cast changed sooner, to call [facility] or go to the ER if after hours or on the weekend. Pt. Jonh miranda. Discharge instructions reviewed with patient & mother, all questions answered, copy given to patient. Dressings were applied to all wounds per M.D. Instructions at this visit.

## 2017-10-11 ENCOUNTER — HOSPITAL ENCOUNTER (OUTPATIENT)
Dept: WOUND CARE | Age: 52
Discharge: HOME | End: 2017-10-11
Attending: INTERNAL MEDICINE | Admitting: INTERNAL MEDICINE

## 2017-10-11 NOTE — PROGRESS NOTES
Pt did not show for HBOT orientation. Called patient. Patient stated that he was not feeling well, upset stomach and diarrhea.   Pt to come for orientation and 1st HBOT tomorrow at 8:30 am.

## 2017-10-16 ENCOUNTER — HOSPITAL ENCOUNTER (OUTPATIENT)
Dept: WOUND CARE | Age: 52
Discharge: HOME | End: 2017-10-16
Admitting: INTERNAL MEDICINE

## 2017-10-16 ENCOUNTER — HOSPITAL ENCOUNTER (OUTPATIENT)
Dept: WOUND CARE | Age: 52
Discharge: HOME | End: 2017-10-16
Attending: INTERNAL MEDICINE | Admitting: INTERNAL MEDICINE

## 2017-10-16 VITALS
SYSTOLIC BLOOD PRESSURE: 159 MMHG | DIASTOLIC BLOOD PRESSURE: 79 MMHG | HEART RATE: 79 BPM | RESPIRATION RATE: 16 BRPM | TEMPERATURE: 97.6 F

## 2017-10-16 VITALS
SYSTOLIC BLOOD PRESSURE: 141 MMHG | TEMPERATURE: 97.9 F | HEART RATE: 73 BPM | DIASTOLIC BLOOD PRESSURE: 79 MMHG | RESPIRATION RATE: 16 BRPM

## 2017-10-16 LAB
GLUCOSE BLD-MCNC: 223 MG/DL (ref 70–99)
GLUCOSE BLD-MCNC: 247 MG/DL (ref 70–99)
PERFORMED ON: ABNORMAL
PERFORMED ON: ABNORMAL

## 2017-10-16 PROCEDURE — 99183 HYPERBARIC OXYGEN THERAPY: CPT | Performed by: INTERNAL MEDICINE

## 2017-10-16 NOTE — PLAN OF CARE
Problem: Wound:  Goal: Will show signs of wound healing; wound closure and no evidence of infection  Will show signs of wound healing; wound closure and no evidence of infection   Outcome: Ongoing  Met with patient to discuss HBO:       Discussed         1. How HBO will help the wound to heal         2. Complications associated with HBO i.e Barotrauma, pneumothorax, myopia,              hypoglycemia, oxygen toxicity,         3. Demonstrated techniques to equilibrate ear pressure         4. Discussed what is and what is not permitted in the chamber and the reasons why         5. Discussed daily routine           Instruct Pt to equilibrate ears   Assess Pts level of anxiety about HBO   Assess effects of HBO on Pitts 3 foot wound  Compliance with HBOT   Reinforce safety protocol with HBOT- pt aware of what can be taken into chamber and restricted items as well.     Left Foot dressing changed today per Dr. Karon Pace

## 2017-10-16 NOTE — PLAN OF CARE
Problem: Physical Regulation:  Goal: Tolerate HBO therapy and barotrauma will be prevented  Tolerate HBO therapy and barotrauma will be prevented  Outcome: Ongoing  Patient seen for HBOT treatment 1/30 today. Patient assessment WNL and cleared for HBOT. Patient was compressed in HBO chamber to 2.0 CAREY at 1.0 psi/min. Patient is tolerating treatment well and is currently resting comfortably and watching television. HBO RN at chamber side, will continue to monitor.

## 2017-10-17 ENCOUNTER — HOSPITAL ENCOUNTER (OUTPATIENT)
Dept: WOUND CARE | Age: 52
Discharge: HOME | End: 2017-10-17
Admitting: INTERNAL MEDICINE

## 2017-10-17 VITALS
HEIGHT: 70 IN | SYSTOLIC BLOOD PRESSURE: 117 MMHG | WEIGHT: 223 LBS | DIASTOLIC BLOOD PRESSURE: 72 MMHG | HEART RATE: 80 BPM | TEMPERATURE: 97.9 F | BODY MASS INDEX: 31.92 KG/M2 | RESPIRATION RATE: 16 BRPM

## 2017-10-17 LAB
GLUCOSE BLD-MCNC: 197 MG/DL (ref 70–99)
PERFORMED ON: ABNORMAL

## 2017-10-17 PROCEDURE — 99183 HYPERBARIC OXYGEN THERAPY: CPT | Performed by: INTERNAL MEDICINE

## 2017-10-17 ASSESSMENT — PAIN SCALES - GENERAL: PAINLEVEL_OUTOF10: 0

## 2017-10-17 NOTE — PROGRESS NOTES
[facility] Progress Note    Henrietta Henry     : 1965    DATE OF VISIT:  10/10/2017    Subjective:     Henrietta Henry is a 46 y.o. male who has a diabetic and neuropathic ulcer located on the right foot. Significant symptoms or pertinent wound history since last visit: Mr. Wang Marie was here yesterday for his formal ID and wound-care follow-up visit. We agreed on casting his right foot again, to get that ulcer healed, but since he drove himself here yesterday, we asked him to return today, with a ride home, to apply the cast with a safer plan for him to get home. No concerns overnight. Additional ulcer(s) noted? yes. The Pitts 3 ulcer on the left foot. His current medication list consists of Blood Glucose Meter, Insulin Pen Needle, Lancets, alogliptin, aspirin, carvedilol, furosemide, gabapentin, glipiZIDE, glucose blood VI test strips, insulin glargine, levothyroxine, lisinopril, magnesium oxide, metFORMIN, oxyCODONE-acetaminophen, pravastatin, sildenafil, spironolactone, therapeutic multivitamin-minerals, and traMADol. Has a few more days of Bactrim left. Allergies: Bee venom    Objective:     Vitals:    10/10/17 0814   BP: 106/68   Pulse: 79   Resp: 18   Temp: 97.6 °F (36.4 °C)   TempSrc: Oral   Weight: 223 lb 9.6 oz (101.4 kg)     AAOx3, NAD  Intact right pedal pulses, mild RLE edema, no cellulitis  Loss of pedal protective sensation, stable neuropathic deformities (severe hallux valgus, prominence of met heads in the plantar direction)  No acute arthritis  Vanesa-ulcer skin: Callus. Ulcer(s): small, pink-red, granular, mild fibrinous and serous exudate. Photos also saved in electronic chart.     Today's Measurements:  Wound 17 #8, Right Plantar,DFU Pitts 1, 17, pressure-Wound Length (cm):  (not measured today d/t seen on 10/9/17)  Wound 17 #11, left plantar foot, DFU, Pitts 3, onset 17, gradually appeared-Wound Length (cm):  (Not assessed gauze, foam), the wound care center staff applied the first few fabric layers of the cast system. I then personally applied the final fiberglass layers, being careful to keep the ankle at 90 degrees, and to custom-mold the cast to the patient's foot, ankle and leg. After additional drying time, proper walking technique was reviewed, and Mr. Wang Marie was able to safely walk out of the wound care center. Discharge plan:     Consideration for a cane or walker was recommended, to help with safe ambulation. Reminders were given regarding the importance of keeping the cast dry; information on a cast protector was offered. A walking sandal or cast shoe was provided, for use on hard surfaces, and outside of the home. An EvenUp device was recommended for the bottom of his contralateral shoe, to decrease ambulatory stress on the hips and knees. A pillowcase or something similar was recommended during sleep, to protect the contralateral leg from abrasions. Return to the wound care center in 8 days for cast removal and ulcer exam. Mr. Wang Marie should call the wound care center, or his primary doctor or the emergency room if after hours, if he experiences severe pain, numbness of the foot or toes, fever or chills, a strong odor from within the cast, or any other new concerning symptoms. If he's noting pain or a sense of cast pistoning in the coming days, we can change the cast sooner, after one of his HBO sessions. Received word back from his insurance company that HBOT does not require prior auth for his diagnosis, so will plan on a formal re-orientation visit and then start of HBOT later this week.      Electronically signed by Dayanna Agudelo MD on 10/17/2017 at 7:25 AM.

## 2017-10-17 NOTE — PLAN OF CARE
Problem: Wound:  Goal: Will show signs of wound healing; wound closure and no evidence of infection  Will show signs of wound healing; wound closure and no evidence of infection   Outcome: Ongoing  Patient seen for HBOT treatment   2/ 30   today. Patient assessment WNL and cleared for HBOT. Patient was compressed in HBO chamber to 1.0 CAREY at 2.0 psi/min. Patient is tolerating treatment well and is currently resting comfortably and watching television. HBO RN at chamber side, will continue to monitor.

## 2017-10-18 ENCOUNTER — HOSPITAL ENCOUNTER (OUTPATIENT)
Dept: WOUND CARE | Age: 52
Discharge: HOME | End: 2017-10-18
Attending: INTERNAL MEDICINE | Admitting: INTERNAL MEDICINE

## 2017-10-18 ENCOUNTER — HOSPITAL ENCOUNTER (OUTPATIENT)
Dept: WOUND CARE | Age: 52
Discharge: HOME | End: 2017-10-18
Admitting: INTERNAL MEDICINE

## 2017-10-18 VITALS
TEMPERATURE: 97.6 F | RESPIRATION RATE: 17 BRPM | DIASTOLIC BLOOD PRESSURE: 76 MMHG | HEIGHT: 70 IN | BODY MASS INDEX: 32.35 KG/M2 | WEIGHT: 226 LBS | SYSTOLIC BLOOD PRESSURE: 132 MMHG | HEART RATE: 77 BPM

## 2017-10-18 VITALS
RESPIRATION RATE: 18 BRPM | DIASTOLIC BLOOD PRESSURE: 76 MMHG | HEART RATE: 77 BPM | SYSTOLIC BLOOD PRESSURE: 132 MMHG | TEMPERATURE: 97.6 F

## 2017-10-18 LAB
GLUCOSE BLD-MCNC: 223 MG/DL (ref 70–99)
GLUCOSE BLD-MCNC: 233 MG/DL (ref 70–99)
PERFORMED ON: ABNORMAL
PERFORMED ON: ABNORMAL

## 2017-10-18 PROCEDURE — 29445 APPL RIGID TOT CNTC LEG CAST: CPT | Performed by: INTERNAL MEDICINE

## 2017-10-18 PROCEDURE — 99183 HYPERBARIC OXYGEN THERAPY: CPT | Performed by: EMERGENCY MEDICINE

## 2017-10-18 RX ORDER — OXYCODONE HYDROCHLORIDE AND ACETAMINOPHEN 5; 325 MG/1; MG/1
1-2 TABLET ORAL 3 TIMES DAILY PRN
Qty: 28 TABLET | Refills: 0 | Status: SHIPPED | OUTPATIENT
Start: 2017-10-18 | End: 2017-10-20 | Stop reason: SDUPTHER

## 2017-10-18 ASSESSMENT — PAIN SCALES - GENERAL: PAINLEVEL_OUTOF10: 0

## 2017-10-18 NOTE — PLAN OF CARE
Problem: Wound:  Goal: Will show signs of wound healing; wound closure and no evidence of infection  Will show signs of wound healing; wound closure and no evidence of infection   Outcome: Ongoing  Right Plantar foot wound showing improvement, debridement per Dr. Kailee Martínez & pt. Tolerated well. Will cont with TCC for off-loading as ordered. Left plantar foot with bone exposure per MD, debridement per Dr. Kailee Martínez & pt. Tolerated well. Will cont. With 1/4\" Iodoform & wound care as ordered. F/u in [address] [place] [address],[address] in 1 week as ordered. Discharge instructions reviewed with patient, all questions answered, copy given to patient. Dressings were applied to all wounds per M.D. Instructions at this visit.

## 2017-10-18 NOTE — PROGRESS NOTES
to the [facility] today for hyperbaric oxygen treatment #3 of 30 for the diagnosis as stated above. Treatment given at 2 CAREY for 90 minutes, with no air breaks. Patient Active Problem List   Diagnosis Code    Hypertension I10    Type 2 diabetes mellitus with peripheral neuropathy (HCC) G91.73    Alcoholic cardiomyopathy Z58.5    Automatic implantable cardioverter-defibrillator in situ Z95.810    Hypothyroidism E03.9    Hyperlipidemia E78.5    Onychomycosis B35.1    Dystrophic nail L60.3    Callus of foot L84    Hallux valgus M20.10    Diabetic ulcer of right foot associated with type 2 diabetes mellitus, limited to breakdown of skin (HCC) E11.621, L97.511    Acquired hypothyroidism E03.9    Diabetic ulcer of left foot associated with type 2 diabetes mellitus, with necrosis of bone ([facility]) E11.621, L97.524    Acute osteomyelitis of left foot ([facility] M86.172       In my clinical judgement, ongoing HBO therapy is necessary at this time, given a threat to patient function, limb or life from the current condition. Adjunctive Rx, objective weekly progress and goals of Rx will generally be updated on Mondays. Plan     1. Hyperbaric Oxygen - Ruben Eagle tolerated the treatment well today without complications. Continue HBO treatment as outlined above. 2. Other -     I was present on these premises and immediately available to furnish assistance & direction throughout the procedure.      -- Electronically signed by Juwan Juarez MD on 10/18/2017 at 10:56 AM

## 2017-10-18 NOTE — PLAN OF CARE
Problem: Wound:  Goal: Will show signs of wound healing; wound closure and no evidence of infection  Will show signs of wound healing; wound closure and no evidence of infection   Outcome: Ongoing  Pt to the [address] [place] [address],[address] [place] [address] for HBOT. Assessment completed and pt cleared for treatment. Pt to 2.0 CAREY at a rate of 2.0 psi. Pt denies complaints. Pt watching tv. Nurse at chamber side and will continue to monitor.

## 2017-10-19 ENCOUNTER — HOSPITAL ENCOUNTER (OUTPATIENT)
Dept: WOUND CARE | Age: 52
Discharge: HOME | End: 2017-10-19
Admitting: INTERNAL MEDICINE

## 2017-10-19 VITALS
HEART RATE: 84 BPM | SYSTOLIC BLOOD PRESSURE: 117 MMHG | DIASTOLIC BLOOD PRESSURE: 68 MMHG | TEMPERATURE: 97.7 F | RESPIRATION RATE: 18 BRPM

## 2017-10-19 DIAGNOSIS — M79.672 ACUTE POSTOPERATIVE PAIN OF LEFT FOOT: ICD-10-CM

## 2017-10-19 DIAGNOSIS — G89.18 ACUTE POSTOPERATIVE PAIN OF LEFT FOOT: ICD-10-CM

## 2017-10-19 LAB
GLUCOSE BLD-MCNC: 241 MG/DL (ref 70–99)
GLUCOSE BLD-MCNC: 305 MG/DL (ref 70–99)
PERFORMED ON: ABNORMAL
PERFORMED ON: ABNORMAL

## 2017-10-19 PROCEDURE — 99183 HYPERBARIC OXYGEN THERAPY: CPT | Performed by: INTERNAL MEDICINE

## 2017-10-19 ASSESSMENT — PAIN SCALES - GENERAL: PAINLEVEL_OUTOF10: 0

## 2017-10-19 NOTE — PLAN OF CARE
Problem: Wound:  Goal: Will show signs of wound healing; wound closure and no evidence of infection  Will show signs of wound healing; wound closure and no evidence of infection   Outcome: Ongoing  Patient seen for HBOT treatment  4 / 30 today, VS per doc flow sheet, bilateral lungs clear, , TEEDS-0. Patient assessment WNL and cleared for HBOT. Patient was compressed in HBO chamber to 2.0 CAREY at 2.0 psi/min. Patient is tolerating treatment well and is currently resting comfortably and watching television. HBO RN at chamber side, will continue to monitor.

## 2017-10-20 ENCOUNTER — HOSPITAL ENCOUNTER (OUTPATIENT)
Dept: WOUND CARE | Age: 52
Discharge: HOME | End: 2017-10-20
Admitting: INTERNAL MEDICINE

## 2017-10-20 ENCOUNTER — OFFICE VISIT (OUTPATIENT)
Dept: CARDIOLOGY CLINIC | Age: 52
End: 2017-10-20

## 2017-10-20 VITALS
TEMPERATURE: 97.9 F | SYSTOLIC BLOOD PRESSURE: 146 MMHG | DIASTOLIC BLOOD PRESSURE: 85 MMHG | RESPIRATION RATE: 16 BRPM | HEART RATE: 86 BPM

## 2017-10-20 VITALS
BODY MASS INDEX: 32.21 KG/M2 | HEART RATE: 81 BPM | HEIGHT: 70 IN | OXYGEN SATURATION: 97 % | DIASTOLIC BLOOD PRESSURE: 82 MMHG | WEIGHT: 225 LBS | SYSTOLIC BLOOD PRESSURE: 136 MMHG

## 2017-10-20 DIAGNOSIS — I42.6 ALCOHOLIC CARDIOMYOPATHY (HCC): Primary | ICD-10-CM

## 2017-10-20 DIAGNOSIS — Z95.810 AUTOMATIC IMPLANTABLE CARDIOVERTER-DEFIBRILLATOR IN SITU: ICD-10-CM

## 2017-10-20 DIAGNOSIS — E78.2 MIXED HYPERLIPIDEMIA: ICD-10-CM

## 2017-10-20 DIAGNOSIS — E03.9 ACQUIRED HYPOTHYROIDISM: ICD-10-CM

## 2017-10-20 LAB
GLUCOSE BLD-MCNC: 235 MG/DL (ref 70–99)
GLUCOSE BLD-MCNC: 316 MG/DL (ref 70–99)
PERFORMED ON: ABNORMAL
PERFORMED ON: ABNORMAL

## 2017-10-20 PROCEDURE — 1036F TOBACCO NON-USER: CPT | Performed by: INTERNAL MEDICINE

## 2017-10-20 PROCEDURE — 1111F DSCHRG MED/CURRENT MED MERGE: CPT | Performed by: INTERNAL MEDICINE

## 2017-10-20 PROCEDURE — G8427 DOCREV CUR MEDS BY ELIG CLIN: HCPCS | Performed by: INTERNAL MEDICINE

## 2017-10-20 PROCEDURE — 99214 OFFICE O/P EST MOD 30 MIN: CPT | Performed by: INTERNAL MEDICINE

## 2017-10-20 PROCEDURE — 99183 HYPERBARIC OXYGEN THERAPY: CPT | Performed by: INTERNAL MEDICINE

## 2017-10-20 PROCEDURE — G8484 FLU IMMUNIZE NO ADMIN: HCPCS | Performed by: INTERNAL MEDICINE

## 2017-10-20 PROCEDURE — G8417 CALC BMI ABV UP PARAM F/U: HCPCS | Performed by: INTERNAL MEDICINE

## 2017-10-20 PROCEDURE — 3017F COLORECTAL CA SCREEN DOC REV: CPT | Performed by: INTERNAL MEDICINE

## 2017-10-20 NOTE — LETTER
[address] [place] [address] Cardiology - [address] [place], [place] [address]  Phone: [phone]  Fax: [phone]    Estelle Love MD        2017     [facility], [address]    Patient: Ainsley Walker  MR Number: [id number]  YOB: 1965  Date of Visit: 10/20/2017    Dear Dr. Islas Ahr:      [facility]  10/20/2017     Subjective:  Mr. Svetlana Pickens is here today for follow up of  CHF and alcoholic  cardiomyopathy. Today he reports he has been feeling well. He has been taking his medications as prescribed. His energy level is good. He is being seen in wound care for diabetic ulcers on both feet. Currently hyperbaric therapy. He denies chest pain, palpitations, syncope, dizziness, shortness of breath or edema. [place]:    Mr. Svetlana Pickens presents for cardiac follow up for CM. He is sp ICD placement  by Dr. Karuna Ayala for non ischemic CM with low EF that failed to improve with guide line based medical therapy. Most recent check 9.27.17 and functioning normal without any shocks. He is no longer drinking ETOH products. Review of Systems: 12 point ROS negative in all areas as listed below except as in [place]  Constitutional, EENT, Cardiovascular, pulmonary, GI, , Musculoskeletal, skin, neurological, hematological, endocrine, Psychiatric    Reviewed past medical history, social, and family history. No alcohol nonsmoker  He works for [employer]. Mother: Heart disease, MI x4, young age. Father: Heart disease, CABG age 67.    Siblings: brother  age 40 of CHF, CVA  Past Medical History:   Diagnosis Date    Acute osteomyelitis of right foot ([address] 2016    Ascites     Blood transfusion reaction     Burst fracture of lumbar vertebra ([address] 2012    Cellulitis of left foot     Cellulitis of right lower extremity ,     Diabetes ([address]     Diabetic ulcer of right foot ([address] early   Diabetic ulcer of 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 2 diabetes mellitus, with necrosis of bone ([address]     Fracture of tibial plateau 60/2/6621    MRSA (methicillin resistant staph aureus) culture positive 4/28/16, 4/20/16    foot wound    Neuropathic ulcer of left foot, limited to breakdown of skin ([address] 11/3/2016    Neuropathic ulcer of toe ([address] 2/13/2014    Pleural effusion due to congestive heart failure ([address]     Smoker     Systolic CHF, acute ([address] 7/8/2013     Past Surgical History:   Procedure Laterality Date    FOOT SURGERY Left 09/27/2017    LEFT FOOT ULCER DEBRIDEMENT, POSSIBLE SECOND METATARSAL    FOOT TENDON SURGERY Right 2016    FOOT TENDON SURGERY Left 06/30/2017    KNEE SURGERY Left     OTHER SURGICAL HISTORY Bilateral 9/17/15    amputation 2nd digit bilateral, flexor tenotomy right hallux    OTHER SURGICAL HISTORY Left 08/17/2017    PARTIAL LEFT FOOT AMPUTATION      PACEMAKER PLACEMENT      1/23/14 pacer/defib    TOE AMPUTATION      2 toes       Objective:   /82   Pulse 81   Ht 5' 10\" (1.778 m)   Wt 225 lb (102.1 kg)   SpO2 97%   BMI 32.28 kg/m²      Wt Readings from Last 3 Encounters:   10/20/17 225 lb (102.1 kg)   10/18/17 226 lb (102.5 kg)   10/17/17 223 lb (101.2 kg)       Physical Exam:  General: No Respiratory distress, appears well developed and well nourished. Eyes:  Sclera nonicteric  Nose/Sinuses:  negative findings: nose shows no deformity, asymmetry, or inflammation, nasal mucosa normal, septum midline with no perforation or bleeding  Back:  no pain to palpation  Joint:  no active joint inflammation  Musculoskeletal:  negative  Skin:  Warm and dry  Neck: No JVD and no Carotid Bruits. Chest:  Clear to auscultation, respiration easy  Cardiovascular:  RRR, 96 bpm S1S2 normal, no murmur, no rub or thrill.   Abdomen:  Normal liver and spleen No ascites, obese  Extremities:  No edema, no clubbing or cyanosis  Pulses: Femoral pulses are 2+ Neuro: intact Doppler study suggests restrictive diastolic dysfunction. The left atrium is mildly dilated. Mild mitral regurgitation is present. Aortic valve appears sclerotic but opens adequately. There is moderate-severe tricuspid regurgitation with RVSP estimated at 40  mmHg. Stress test: 7/13:   No scintigraphic evidence of stress-induced myocardial  ischemia. 2. Chronic inferoapical infarct. 3. Severe left ventricular dilatation with severe diffuse  hypokinesis. 4. Severely reduced LVEF of approximately 20%. Echo 10/15/13  The left ventricular systolic function is severely reduced with an ejection  fraction of 20-25%. Severe global HK with the lateral wall being most  kinetic. Evidence for diastolic dysfunction. Left ventricular size is mildly increased. The left atrium is mildly dilated. The right atrium is mildly dilated. Mild mitral and aortic regurgitation is present. There is mild tricuspid regurgitation with RVSP estimated at 26 mmHg. Mild MAC. Assessment:  Sena Bradford was seen today for 6 month follow-up, cardiomyopathy, hypertension and hyperlipidemia. Diagnoses and associated orders for this visit:    Alcoholic cardiomyopathy, stable chronic systolic CHF functional class I    Hypertension, controlled BP: (136)/(82)      Automatic implantable cardiac defibrillator in situ recent check in April 2016 normal function        Plan:  1. Continue current medications he is compliant with therapy. 2. Remain as active as possible. 3. Continue regular device checks. 4. Continue to follow up with Nadia Caceres MD in regards to your Thyroid. I did not order any blood tests this time. 5. Healthy lifestyle education reviewed including nutrition, exercise and activity   6. Follow up with me 6 months. Zunilda Jones MD 10/20/2017 7:39 AM          If you have questions, please do not hesitate to call me. I look forward to following Ruben along with you.     Sincerely,        Corrina Serrano MD

## 2017-10-20 NOTE — PROGRESS NOTES
[address] Office Note  10/20/2017     Subjective:  Mr. Frantz Manriquez is here today for follow up of  CHF and alcoholic  cardiomyopathy. Today he reports he has been feeling well. He has been taking his medications as prescribed. His energy level is good. He is being seen in wound care for diabetic ulcers on both feet. Currently hyperbaric therapy. He denies chest pain, palpitations, syncope, dizziness, shortness of breath or edema. [place]:    Mr. Frantz Manriquez presents for cardiac follow up for CM. He is sp ICD placement  by Dr. Marck Guadarrama for non ischemic CM with low EF that failed to improve with guide line based medical therapy. Most recent check 9.27.17 and functioning normal without any shocks. He is no longer drinking ETOH products. Review of Systems: 12 point ROS negative in all areas as listed below except as in [place]  Constitutional, EENT, Cardiovascular, pulmonary, GI, , Musculoskeletal, skin, neurological, hematological, endocrine, Psychiatric    Reviewed past medical history, social, and family history. No alcohol nonsmoker  He works for [employer]. Mother: Heart disease, MI x4, young age. Father: Heart disease, CABG age 67.    Siblings: brother  age 40 of CHF, CVA  Past Medical History:   Diagnosis Date    Acute osteomyelitis of right foot ([address] 2016    Ascites     Blood transfusion reaction     Burst fracture of lumbar vertebra ([address] 2012    Cellulitis of left foot     Cellulitis of right lower extremity ,     Diabetes ([address]     Diabetic ulcer of right foot ([address] early     Diabetic ulcer of 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 2 diabetes mellitus, with necrosis of bone ([address]     Fracture of tibial plateau 3965    MRSA (methicillin resistant staph aureus) culture positive 16, 16    foot wound    Neuropathic ulcer of left foot, limited to breakdown of skin ([address] 11/3/2016    Neuropathic ulcer of toe ([address] 2014    Pleural effusion by mouth 2 times daily (with meals) 180 tablet 3    traMADol (ULTRAM) 50 MG tablet Take 1 tablet by mouth every 8 hours as needed for Pain 30 tablet 3    gabapentin (NEURONTIN) 300 MG capsule Take 1 capsule by mouth 2 times daily 180 capsule 3    insulin glargine (TOUJEO SOLOSTAR) 300 UNIT/ML injection pen Inject 60 Units into the skin nightly (Patient taking differently: Inject 70 Units into the skin nightly ) 15 Pen 3    carvedilol (COREG) 12.5 MG tablet TAKE ONE TABLET BY MOUTH TWICE DAILY 180 tablet 3    pravastatin (PRAVACHOL) 20 MG tablet TAKE ONE TABLET BY MOUTH ONE TIME DAILY 90 tablet 3    furosemide (LASIX) 40 MG tablet TAKE ONE TABLET BY MOUTH TWICE DAILY 180 tablet 3    lisinopril (PRINIVIL;ZESTRIL) 5 MG tablet Take 1 tablet by mouth daily 90 tablet 3    alogliptin (NESINA) 25 MG TABS tablet Take 1 tablet by mouth daily 90 tablet 3    spironolactone (ALDACTONE) 25 MG tablet Take 1 tablet by mouth daily 90 tablet 3    magnesium oxide (MAG-OX) 400 (241.3 Mg) MG TABS tablet Take 1 tablet by mouth daily (Patient taking differently: Take 400 mg by mouth 2 times daily ) 60 tablet 11    SYNTHROID 125 MCG tablet Take 1 tablet by mouth Daily Synthroid SONI 30 tablet 3    Multiple Vitamins-Minerals (THERAPEUTIC MULTIVITAMIN-MINERALS) tablet Take 1 tablet by mouth daily      sildenafil (VIAGRA) 100 MG tablet Take 1 tablet by mouth as needed for Erectile Dysfunction. 13 tablet 3    aspirin 81 MG tablet Take 81 mg by mouth daily.  [DISCONTINUED] oxyCODONE-acetaminophen (PERCOCET) 5-325 MG per tablet Take 1-2 tablets by mouth 3 times daily as needed for Pain . 28 tablet 0    UNIFINE PENTIPS 31G X 5 MM MISC USE 1 EACH DAY AS NEEDED FOR TESTING 100 each 5    ONE TOUCH ULTRA TEST strip Test blood sugar once daily. 50 strip 5    Blood Glucose Monitoring Suppl (BLOOD GLUCOSE METER) KIT Use to test blood sugars. 1 kit 0    Lancets MISC Use to test blood sugars once daily.  100 each 3     No

## 2017-10-20 NOTE — PLAN OF CARE
Problem: Wound:  Goal: Will show signs of wound healing; wound closure and no evidence of infection  Will show signs of wound healing; wound closure and no evidence of infection   Instruct Pt to equilibrate ears   Assess Pts level of anxiety about HBO   Assess effects of HBO on DFU  Compliance with HBOT   Reinforce safety protocol with HBOT- pt aware of what can be taken into chamber and restricted items as well. Outcome: Ongoing  Patient seen for HBOT treatment 5 /30     today. Patient assessment WNL- AVSS, TEED=0bilat, Lungs CTA, FSBS 316. Pt has TCC to R foot and dressing to L foot  - cleared for HBOT. Patient was compressed in HBO chamber to 2.0 CAREY at 2.0 psi/min x 90 min treatment w/o air breaks. Patient is tolerating treatment well and is currently resting comfortably and watching television. HBO RN at chamber side, will continue to monitor.

## 2017-10-20 NOTE — PATIENT INSTRUCTIONS
Plan:  1. Continue current medications. 2. Remain as active as possible. 3. Continue regular device checks. 4. Continue to follow up with Nadia Caceres MD in regards to your Thyroid. 5. Follow up with me 6 months.

## 2017-10-25 ENCOUNTER — HOSPITAL ENCOUNTER (OUTPATIENT)
Dept: WOUND CARE | Age: 52
Discharge: HOME | End: 2017-10-25
Admitting: INTERNAL MEDICINE

## 2017-10-25 ENCOUNTER — HOSPITAL ENCOUNTER (OUTPATIENT)
Dept: WOUND CARE | Age: 52
Discharge: HOME | End: 2017-10-25
Attending: INTERNAL MEDICINE | Admitting: INTERNAL MEDICINE

## 2017-10-25 ENCOUNTER — OFFICE VISIT (OUTPATIENT)
Dept: ENDOCRINOLOGY | Age: 52
End: 2017-10-25

## 2017-10-25 VITALS
RESPIRATION RATE: 17 BRPM | DIASTOLIC BLOOD PRESSURE: 81 MMHG | HEART RATE: 94 BPM | TEMPERATURE: 97.8 F | SYSTOLIC BLOOD PRESSURE: 140 MMHG

## 2017-10-25 VITALS
SYSTOLIC BLOOD PRESSURE: 115 MMHG | DIASTOLIC BLOOD PRESSURE: 76 MMHG | TEMPERATURE: 96.7 F | RESPIRATION RATE: 18 BRPM | HEART RATE: 91 BPM | HEIGHT: 70 IN | OXYGEN SATURATION: 98 % | WEIGHT: 226 LBS | BODY MASS INDEX: 32.35 KG/M2

## 2017-10-25 VITALS
DIASTOLIC BLOOD PRESSURE: 81 MMHG | RESPIRATION RATE: 18 BRPM | HEART RATE: 94 BPM | TEMPERATURE: 97.8 F | SYSTOLIC BLOOD PRESSURE: 140 MMHG

## 2017-10-25 DIAGNOSIS — E03.9 ACQUIRED HYPOTHYROIDISM: Primary | ICD-10-CM

## 2017-10-25 LAB
GLUCOSE BLD-MCNC: 206 MG/DL (ref 70–99)
GLUCOSE BLD-MCNC: 331 MG/DL (ref 70–99)
PERFORMED ON: ABNORMAL
PERFORMED ON: ABNORMAL

## 2017-10-25 PROCEDURE — G8417 CALC BMI ABV UP PARAM F/U: HCPCS | Performed by: INTERNAL MEDICINE

## 2017-10-25 PROCEDURE — 99203 OFFICE O/P NEW LOW 30 MIN: CPT | Performed by: INTERNAL MEDICINE

## 2017-10-25 PROCEDURE — 3017F COLORECTAL CA SCREEN DOC REV: CPT | Performed by: INTERNAL MEDICINE

## 2017-10-25 PROCEDURE — 99183 HYPERBARIC OXYGEN THERAPY: CPT | Performed by: INTERNAL MEDICINE

## 2017-10-25 PROCEDURE — 97597 DBRDMT OPN WND 1ST 20 CM/<: CPT | Performed by: INTERNAL MEDICINE

## 2017-10-25 PROCEDURE — 29445 APPL RIGID TOT CNTC LEG CAST: CPT | Performed by: INTERNAL MEDICINE

## 2017-10-25 PROCEDURE — G8484 FLU IMMUNIZE NO ADMIN: HCPCS | Performed by: INTERNAL MEDICINE

## 2017-10-25 PROCEDURE — G8427 DOCREV CUR MEDS BY ELIG CLIN: HCPCS | Performed by: INTERNAL MEDICINE

## 2017-10-25 RX ORDER — LEVOTHYROXINE AND LIOTHYRONINE 38; 9 UG/1; UG/1
60 TABLET ORAL DAILY
Qty: 30 TABLET | Refills: 3 | Status: SHIPPED | OUTPATIENT
Start: 2017-10-25 | End: 2018-01-31

## 2017-10-25 NOTE — PROGRESS NOTES
Endocrinology       New Patient Consultation  Nicole Claros M.D. Phone: [phone]   FAX: [phone]       Anny Roa   YOB: 1965    Date of Visit:  10/25/2017    Allergies   Allergen Reactions    Bee Venom Swelling and Rash     Outpatient Prescriptions Marked as Taking for the 10/25/17 encounter (Office Visit) with Judi Hogue MD   Medication Sig Dispense Refill    glipiZIDE (GLUCOTROL) 5 MG tablet Take 1 tablet by mouth 2 times daily (before meals) 180 tablet 3    metFORMIN (GLUCOPHAGE) 1000 MG tablet Take 1 tablet by mouth 2 times daily (with meals) 180 tablet 3    traMADol (ULTRAM) 50 MG tablet Take 1 tablet by mouth every 8 hours as needed for Pain 30 tablet 3    gabapentin (NEURONTIN) 300 MG capsule Take 1 capsule by mouth 2 times daily 180 capsule 3    insulin glargine (TOUJEO SOLOSTAR) 300 UNIT/ML injection pen Inject 60 Units into the skin nightly (Patient taking differently: Inject 70 Units into the skin nightly ) 15 Pen 3    carvedilol (COREG) 12.5 MG tablet TAKE ONE TABLET BY MOUTH TWICE DAILY 180 tablet 3    pravastatin (PRAVACHOL) 20 MG tablet TAKE ONE TABLET BY MOUTH ONE TIME DAILY 90 tablet 3    furosemide (LASIX) 40 MG tablet TAKE ONE TABLET BY MOUTH TWICE DAILY 180 tablet 3    lisinopril (PRINIVIL;ZESTRIL) 5 MG tablet Take 1 tablet by mouth daily 90 tablet 3    alogliptin (NESINA) 25 MG TABS tablet Take 1 tablet by mouth daily 90 tablet 3    spironolactone (ALDACTONE) 25 MG tablet Take 1 tablet by mouth daily 90 tablet 3    magnesium oxide (MAG-OX) 400 (241.3 Mg) MG TABS tablet Take 1 tablet by mouth daily (Patient taking differently: Take 400 mg by mouth 2 times daily ) 60 tablet 11    UNIFINE PENTIPS 31G X 5 MM MISC USE 1 EACH DAY AS NEEDED FOR TESTING 100 each 5    ONE TOUCH ULTRA TEST strip Test blood sugar once daily.  50 strip 5    SYNTHROID 125 MCG tablet Take 1 tablet by mouth Daily Synthroid SONI 30 tablet 3    Multiple SURGERY Left 06/30/2017    KNEE SURGERY Left     OTHER SURGICAL HISTORY Bilateral 9/17/15    amputation 2nd digit bilateral, flexor tenotomy right hallux    OTHER SURGICAL HISTORY Left 08/17/2017    PARTIAL LEFT FOOT AMPUTATION      PACEMAKER PLACEMENT      1/23/14 pacer/defib    TOE AMPUTATION      2 toes     Family History   Problem Relation Age of Onset    Heart Disease Mother     High Blood Pressure Mother     High Cholesterol Mother     Heart Disease Father     High Blood Pressure Father     High Cholesterol Father     Heart Disease Maternal Grandmother     High Blood Pressure Maternal Grandmother     High Cholesterol Maternal Grandmother     Cancer Maternal Grandmother      bone marrow & breast    Heart Disease Maternal Grandfather     High Blood Pressure Maternal Grandfather     High Cholesterol Maternal Grandfather     Cancer Maternal Grandfather      pancreatic CA    Heart Disease Paternal Grandmother     High Blood Pressure Paternal Grandmother     High Cholesterol Paternal Grandmother     Heart Disease Paternal Grandfather     High Blood Pressure Paternal Grandfather     High Cholesterol Paternal Grandfather      History   Smoking Status    Former Smoker    Quit date: 2/13/1980   Smokeless Tobacco    Never Used      History   Alcohol Use No       HPI      Anny Roa is a 46 y.o. male who is here for initial evaluation of thyroid disease  Patient is being seen at the request of Antonia Colorado MD    Patient has a PMH of DM, hypothyroidism, diabetic foot ulcers, osteomyelitis. Diagnosed with Hypothyroidism in [id number]  Current thyroid medication: levothyroxine 125  mcg daily  Does not take it regularly as it makes him bloated. Holds for 2-3 days and then resumes. Has tried taking it different times of the day and still unable to tolerate it. Takes it on an empty stomach these days.      FH of hypothyroidism: yes - Aunt  FH of thyroid cancer: no    C/o feeling tired and fatigued. Review of system Please see the scanned document filled by the patient, reviewed  by me today. Physical Exam   Constitutional: He is oriented to person, place, and time. He appears well-developed. No distress. HENT:   Mouth/Throat: Oropharynx is clear and moist.   Eyes: EOM are normal.   Neck: No thyromegaly present. Cardiovascular: Normal rate and normal heart sounds. Pulmonary/Chest: Effort normal. No respiratory distress. He has no wheezes. Abdominal: Soft. Bowel sounds are normal. There is no tenderness. Musculoskeletal: He exhibits no edema. Neurological: He is alert and oriented to person, place, and time. Skin: Skin is warm and dry. He is not diaphoretic. Psychiatric: His behavior is normal. Thought content normal.     Lab Results   Component Value Date    TSH 4.89 (H) 04/18/2017    TSH 11.14 (H) 01/17/2017    TSH 2.36 04/11/2016    TSH 5.81 (H) 12/29/2015    TSH 9.68 (H) 11/03/2015    TSH 5.02 (H) 07/15/2015    TSH 5.18 (H) 02/06/2015    TSH 5.03 (H) 08/06/2014       Assessment/Plan      1. Hypothyroidism    This 46 yrs old male has h/o hypothyroidism. He is currently on levothyroxine 125 mcg daily. He does not take it regularly as it makes him bloated. TSH 4.87 in 09/17  Free T4 normal.     Could not afford name brand synthroid. Discussed management options. Discussed using natural thyroid formulations to see if he can tolerate them better. Will start him on armour thyroid 60 mg daily. Repeat labs before next visit. 2. DM. Managed by Dr. Yue Hill    3. Diabetic foot ulcers. As per podiatry.

## 2017-10-25 NOTE — PLAN OF CARE
Problem: Wound:  Goal: Will show signs of wound healing; wound closure and no evidence of infection  Will show signs of wound healing; wound closure and no evidence of infection   Instruct Pt to equilibrate ears   Assess Pts level of anxiety about HBO   Assess effects of HBO on DFU  Compliance with HBOT   Reinforce safety protocol with HBOT- pt aware of what can be taken into chamber and restricted items as well. Outcome: Ongoing  Right plantar foot wound healed, will cont. With TCC for at least an additional week. Left plantar foot wound measuring smaller at surface & his mother is unable to get packing into wound as ordered. Wound injected with Lidocaine per Dr. Leif Lizama & site opened, overall depth has improved from last week. Will cont. With current wound care regime on left & f/u in [facility] in 1 week as ordered. Discharge instructions reviewed with patient, all questions answered, copy given to patient. Dressings were applied to all wounds per M.D. Instructions at this visit.

## 2017-10-25 NOTE — PLAN OF CARE
Problem: Wound:  Goal: Will show signs of wound healing; wound closure and no evidence of infection  Will show signs of wound healing; wound closure and no evidence of infection   Instruct Pt to equilibrate ears   Assess Pts level of anxiety about HBO   Assess effects of HBO on DFU  Compliance with HBOT   Reinforce safety protocol with HBOT- pt aware of what can be taken into chamber and restricted items as well. Outcome: Ongoing  Pt to the [address] [place] [address] for HBOT. Assessment completed and patient cleared for treatment. Pt to 2.0 CAREY at a rate of 2.0 psi. Pt denies any complaints and watching tv. Nurse at chamber side and will continue to monitor.

## 2017-10-25 NOTE — PROGRESS NOTES
to the [facility] today for hyperbaric oxygen treatment #1 of 30 for the diagnosis as stated above. Treatment given at 2 CAREY for 90 minutes, with no air breaks. Patient Active Problem List   Diagnosis Code    Hypertension I10    Type 2 diabetes mellitus with peripheral neuropathy (HCC) Q03.22    Alcoholic cardiomyopathy Q12.0    Automatic implantable cardioverter-defibrillator in situ Z95.810    Hypothyroidism E03.9    Hyperlipidemia E78.5    Onychomycosis B35.1    Dystrophic nail L60.3    Callus of foot L84    Hallux valgus M20.10    Diabetic ulcer of right foot associated with type 2 diabetes mellitus, limited to breakdown of skin (HCC) E11.621, L97.511    Acquired hypothyroidism E03.9    Diabetic ulcer of left foot associated with type 2 diabetes mellitus, with necrosis of bone (HCC) E11.621, L97.524    Acute osteomyelitis of left foot (HCC) M86.172    Acute postoperative pain of left foot M79.672, G89.18       In my clinical judgement, ongoing HBO therapy is necessary at this time, given a threat to patient function, limb or life from the current condition. Adjunctive Rx, objective weekly progress and goals of Rx will generally be updated on Mondays -- today is just his first dive. Plan     1. Hyperbaric Oxygen - Ruben Eagle tolerated the treatment well today without complications. Continue HBO treatment as outlined above. 2. Other -     I was present on these premises and immediately available to furnish assistance & direction throughout the procedure.      -- Electronically signed by Yesenia Le MD on 10/25/2017 at 7:41 AM

## 2017-10-26 ENCOUNTER — HOSPITAL ENCOUNTER (OUTPATIENT)
Dept: WOUND CARE | Age: 52
Discharge: HOME | End: 2017-10-26
Admitting: INTERNAL MEDICINE

## 2017-10-26 VITALS
TEMPERATURE: 98.7 F | HEART RATE: 78 BPM | RESPIRATION RATE: 18 BRPM | SYSTOLIC BLOOD PRESSURE: 134 MMHG | DIASTOLIC BLOOD PRESSURE: 81 MMHG

## 2017-10-26 LAB
GLUCOSE BLD-MCNC: 168 MG/DL (ref 70–99)
GLUCOSE BLD-MCNC: 217 MG/DL (ref 70–99)
PERFORMED ON: ABNORMAL
PERFORMED ON: ABNORMAL

## 2017-10-26 ASSESSMENT — PAIN SCALES - GENERAL: PAINLEVEL_OUTOF10: 0

## 2017-10-26 NOTE — PROGRESS NOTES
[address]  Hyperbaric Oxygen    Zeynep Cleveland     : 1965    DATE OF VISIT:  10/19/2017    Subjective     Zeynep Cleveland is a 46 y.o. male who  has a past medical history of Acute osteomyelitis of right foot ([address] (2016); Ascites; Blood transfusion reaction; Burst fracture of lumbar vertebra ([address] (2012); Cellulitis of left foot; Cellulitis of right lower extremity (, ); Diabetes ([address] Diabetic ulcer of right foot ([address] (early ); Diabetic ulcer of 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 2 diabetes mellitus, with necrosis of bone ([address] Fracture of tibial plateau (1052); MRSA (methicillin resistant staph aureus) culture positive (16, 16); Neuropathic ulcer of left foot, limited to breakdown of skin ([address] (11/3/2016); Neuropathic ulcer of toe ([address] (2014); Pleural effusion due to congestive heart failure ([address] Smoker; and Systolic CHF, acute ([address] (2013). He presents to the [facility] today for hyperbaric oxygen treatment of his [place] 3 DFU, which is refractory to standard wound care therapy for 30 days. Patient denies fever. Patient denies nausea, vomiting or diarrhea; no ear troubles and no other new complaints; no fears or anxiety regarding treatment today. Objective     Vitals:    10/19/17 1045 10/19/17 1246   BP: (!) 131/96 117/68   Pulse: 89 84   Resp: 18 18   Temp: 97.7 °F (36.5 °C) 97.7 °F (36.5 °C)   TempSrc: Oral Oral       General:  Alert, cooperative, no distress. Ears: External otic canals are within acceptable limits. TMs are well visualized. Teed Grade 0 on the right and 0 on the left pre-treatment. Teed Grade 0 on the right and 0 on the left post-treatment. Lungs:  Clear to auscultation bilaterally. Ulcer: Not examined today in HBO, if applicable.       Recent Labs      10/25/17   0943  10/25/17   1148  10/26/17   0812  10/26/17   1011   SAUL  331*  206*  217*  168*

## 2017-10-26 NOTE — PROGRESS NOTES
[address]  Hyperbaric Oxygen    Johnny Hooks     : 1965    DATE OF VISIT:  10/26/2017    Subjective     Johnny Hooks is a 46 y.o. male who  has a past medical history of Acute osteomyelitis of right foot ([address] (2016); Ascites; Blood transfusion reaction; Burst fracture of lumbar vertebra ([address] (2012); Cellulitis of left foot; Cellulitis of right lower extremity (, ); Diabetes ([address] Diabetic ulcer of right foot ([address] (early ); Diabetic ulcer of 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 2 diabetes mellitus, with necrosis of bone ([address] Fracture of tibial plateau (); MRSA (methicillin resistant staph aureus) culture positive (16, 16); Neuropathic ulcer of left foot, limited to breakdown of skin ([address] (11/3/2016); Neuropathic ulcer of toe ([address] (2014); Pleural effusion due to congestive heart failure ([address] Smoker; and Systolic CHF, acute ([address] (2013). He presents to the [facility] today for hyperbaric oxygen treatment of his DFU 3, which is refractory to standard wound care therapy for 30 days. Patient denies fever. Patient denies nausea, vomiting or diarrhea; no ear troubles and no other new complaints; no fears or anxiety regarding treatment today. Objective     Vitals:    10/26/17 0800 10/26/17 1006   BP: 117/65 134/81   Pulse: 81 78   Resp: 18 18   Temp: 99.7 °F (37.6 °C) 98.7 °F (37.1 °C)   TempSrc:  Oral       General:  Alert, cooperative, no distress. Ears: External otic canals are within acceptable limits. TMs are well visualized. Teed Grade 0 on the right and 0 on the left pre-treatment. Teed Grade 0 on the right and 0 on the left post-treatment. Lungs:  Clear to auscultation bilaterally. Ulcer: Not examined today in HBO, if applicable.       Recent Labs      10/25/17   0943  10/25/17   1148  10/26/17   0812  10/26/17   1011   POCGLU  331*  206*  217*  168*       Assessment

## 2017-10-26 NOTE — PLAN OF CARE
Problem: Wound:  Goal: Will show signs of wound healing; wound closure and no evidence of infection  Will show signs of wound healing; wound closure and no evidence of infection   Instruct Pt to equilibrate ears   Assess Pts level of anxiety about HBO   Assess effects of HBO on DFU  Compliance with HBOT   Reinforce safety protocol with HBOT- pt aware of what can be taken into chamber and restricted items as well. Outcome: Ongoing  Patient seen for HBOT treatment   7/ 30 today, VS per doc flow sheet, bilateral lungs clear, ,TEEDS=0. Patient assessment WNL and cleared for HBOT. Patient was compressed in HBO chamber to 2.0 CAREY at 2.0 psi/min. Patient is tolerating treatment well and is currently resting comfortably and watching television. HBO RN at chamber side, will continue to monitor.

## 2017-10-26 NOTE — PROGRESS NOTES
Diagnosis Code    Hypertension I10    Type 2 diabetes mellitus with peripheral neuropathy ([facility]) F98.38    Alcoholic cardiomyopathy U02.8    Automatic implantable cardioverter-defibrillator in situ Z95.810    Hypothyroidism E03.9    Hyperlipidemia E78.5    Onychomycosis B35.1    Dystrophic nail L60.3    Callus of foot L84    Hallux valgus M20.10    Diabetic ulcer of right foot associated with type 2 diabetes mellitus, limited to breakdown of skin ([facility]) E11.621, L97.511    Acquired hypothyroidism E03.9    Diabetic ulcer of left foot associated with type 2 diabetes mellitus, with necrosis of bone ([facility]) E11.621, L97.524    Acute osteomyelitis of left foot ([facility]) M86.172    Acute postoperative pain of left foot M79.672, G89.18       Assessment of today's active condition(s): uncontrolled DM2, neuropathy, nearly (re)healed Pitts 1 right plantar ulcer, and I believe improving Pitts 3 process on the left, after recent met head excision for acute osteo -- need to be vigilant for signs of residual osteo, though the amount of palpable bone is less each week. Factors contributing to occurrence and/or persistence of the chronic ulcer include diabetes, poor glucose control, shear force and decreased tissue oxygenation. Medical necessity of today's visit is shown by the above documentation. Sharp debridement is indicated today, based upon the exam findings in the wound(s) above. Procedure note:     Consent obtained. Time out performed per [facility] policy. Anesthetic  Anesthetic: 4% Topical Xylocaine     Using a curette and #15 blade scalpel, I sharply debrided the B/L foot ulcer(s) down through and including the removal of dermis. The type(s) of tissue debrided included fibrin, necrotic/eschar and callus. Total Surface Area Debrided: 1 sq cm.     Post  Debridement Measurements:  Wound 01/27/17 #8, Right Plantar,DFU Pitts 1, 1/25/17, pressure-Wound Length (cm): 0.4 cm  Wound 07/13/17 #11, left plantar foot, DFU, Pitts 3, onset 7/12/17, gradually appeared-Wound Length (cm): 0.8 cm    Wound 01/27/17 #8, Right Plantar,DFU Pitts 1, 1/25/17, pressure-Wound Width (cm): 0.1 cm  Wound 07/13/17 #11, left plantar foot, DFU, Pitts 3, onset 7/12/17, gradually appeared-Wound Width (cm): 0.5 cm    Wound 01/27/17 #8, Right Plantar,DFU Pitts 1, 1/25/17, pressure-Wound Depth (cm) : 0.1  Wound 07/13/17 #11, left plantar foot, DFU, Pitts 3, onset 7/12/17, gradually appeared-Wound Depth (cm) : 2.5    The ulcers were then irrigated with normal saline solution. The procedure was completed with a moderate amount of bleeding, and hemostasis was by pressure and by silver nitrate stick. The patient tolerated the procedure well, with no significant complications. The patient's level of pain during and after the procedure was monitored, and is noted in the wound documentation flowsheet.  _______________    Because of this patient's neuropathy and resulting neuropathic ulcer, I recommended a total contact cast as the optimal method to offload the ulcer and help it to heal in a timely manner. He has no active infection or gangrene, has adequate arterial flow to the pertinent extremity, does not have severe edema or severe pain, can maintain a stable gait, and will not have issues with driving with the cast in place. After the ulcer was cleansed with saline and had a dressing applied (periwound ZnO, polysporin, gauze), the wound care center staff applied the first few fabric layers of the cast system. I then personally applied the final fiberglass layers, being careful to keep the ankle at 90 degrees, and to custom-mold the cast to the patient's foot, ankle and leg. After additional drying time, proper walking technique was reviewed, and Mr. Candido Brown was able to safely walk out of the wound care center.     Discharge plan:     Treatment in the wound care center today: Wound 07/13/17 #11, left plantar foot, DFU, Pitts 3, onset 7/12/17, gradually appeared-Dressing/Treatment: Other (Comment) (sensicare,1/4 in iodaform, 4x4, cast padding,kerlix,ace). Consideration for a cane or walker was recommended, to help with safe ambulation. Reminders were given regarding the importance of keeping the cast dry; information on a cast protector was offered. A walking sandal or cast shoe was provided, for use on hard surfaces, and outside of the home. An EvenUp device was recommended for the bottom of his contralateral shoe, to decrease ambulatory stress on the hips and knees. A pillowcase or something similar was recommended during sleep, to protect the contralateral leg from abrasions. Mr. Jenn Vizcaino should call the wound care center, or his primary doctor or the emergency room if after hours, if he experiences severe pain, numbness of the foot or toes, fever or chills, a strong odor from within the cast, or any other new concerning symptoms. Home treatment: good handwashing before and after any dressing changes. Cleanse wound with saline or soap & water before dressing change. May use Vaseline (petrolatum), Aquaphor, Aveeno, CeraVe, Cetaphil, Eucerin, Lubriderm, etc for dry skin. Dressing type for home: Other: ZnO, Iodoform, padding, mild compression to left foot, every 1-2 days. Written discharge instructions given to patient. Follow up in 1 week to see me, but daily for HBOT.     Electronically signed by Liliam Cortez MD on 10/26/2017 at 1:26 PM.

## 2017-10-27 ENCOUNTER — HOSPITAL ENCOUNTER (OUTPATIENT)
Dept: WOUND CARE | Age: 52
Discharge: HOME | End: 2017-10-27
Admitting: INTERNAL MEDICINE

## 2017-10-27 VITALS
DIASTOLIC BLOOD PRESSURE: 81 MMHG | HEART RATE: 79 BPM | TEMPERATURE: 98.2 F | RESPIRATION RATE: 16 BRPM | SYSTOLIC BLOOD PRESSURE: 131 MMHG

## 2017-10-27 LAB
GLUCOSE BLD-MCNC: 165 MG/DL (ref 70–99)
GLUCOSE BLD-MCNC: 217 MG/DL (ref 70–99)
PERFORMED ON: ABNORMAL
PERFORMED ON: ABNORMAL

## 2017-10-27 PROCEDURE — 99183 HYPERBARIC OXYGEN THERAPY: CPT | Performed by: INTERNAL MEDICINE

## 2017-10-27 ASSESSMENT — PAIN SCALES - GENERAL: PAINLEVEL_OUTOF10: 0

## 2017-10-27 NOTE — PLAN OF CARE
Problem: Wound:  Goal: Will show signs of wound healing; wound closure and no evidence of infection  Will show signs of wound healing; wound closure and no evidence of infection   Instruct Pt to equilibrate ears   Assess Pts level of anxiety about HBO   Assess effects of HBO on DFU  Compliance with HBOT   Reinforce safety protocol with HBOT- pt aware of what can be taken into chamber and restricted items as well. Outcome: Ongoing  Patient seen for HBOT treatment  8 / 30   today. Patient assessment WNL- AVSS , TEED=0 bilat, Lungs CTA, FSBS 217 - cleared for HBOT. Patient was compressed in HBO chamber to 2.0 CAREY at 2.0 psi/min x 90 min treatment w/o air breaks. Patient is tolerating treatment well and is currently resting comfortably and watching television. HBO RN at chamber side, will continue to monitor.

## 2017-10-28 NOTE — PROGRESS NOTES
Timothy Conway is a 46 y.o. male who presented to the [facility] today for hyperbaric oxygen treatment #5 of 30 for the diagnosis as stated above. Treatment given at 2 CAREY for 90 minutes, with no air breaks. Patient Active Problem List   Diagnosis Code    Hypertension I10    Type 2 diabetes mellitus with peripheral neuropathy (HCC) T19.13    Alcoholic cardiomyopathy G93.0    Automatic implantable cardioverter-defibrillator in situ Z95.810    Hypothyroidism E03.9    Hyperlipidemia E78.5    Onychomycosis B35.1    Dystrophic nail L60.3    Callus of foot L84    Hallux valgus M20.10    Diabetic ulcer of right foot associated with type 2 diabetes mellitus, limited to breakdown of skin ([facility]) E11.621, L97.511    Acquired hypothyroidism E03.9    Diabetic ulcer of left foot associated with type 2 diabetes mellitus, with necrosis of bone ([facility]CC) E11.621, L97.524    Acute osteomyelitis of left foot ([facility]) M86.172    Acute postoperative pain of left foot M79.672, G89.18       In my clinical judgement, ongoing HBO therapy is necessary at this time, given a threat to patient function, limb or life from the current condition. Adjunctive Rx, objective weekly progress and goals of Rx will generally be updated on Mondays. Plan     1. Hyperbaric Oxygen - Ruben Eagle tolerated the treatment well today without complications. Continue HBO treatment as outlined above. 2. Other -     I was present on these premises and immediately available to furnish assistance & direction throughout the procedure.      -- Electronically signed by Devang Hannon MD on 10/28/2017 at 6:48 AM

## 2017-10-30 ENCOUNTER — HOSPITAL ENCOUNTER (OUTPATIENT)
Dept: WOUND CARE | Age: 52
Discharge: HOME | End: 2017-10-30
Admitting: INTERNAL MEDICINE

## 2017-10-30 VITALS
DIASTOLIC BLOOD PRESSURE: 81 MMHG | HEART RATE: 78 BPM | TEMPERATURE: 98.1 F | RESPIRATION RATE: 18 BRPM | SYSTOLIC BLOOD PRESSURE: 131 MMHG

## 2017-10-30 LAB
GLUCOSE BLD-MCNC: 188 MG/DL (ref 70–99)
GLUCOSE BLD-MCNC: 210 MG/DL (ref 70–99)
PERFORMED ON: ABNORMAL
PERFORMED ON: ABNORMAL

## 2017-10-30 PROCEDURE — 99183 HYPERBARIC OXYGEN THERAPY: CPT | Performed by: INTERNAL MEDICINE

## 2017-10-30 NOTE — PLAN OF CARE
Problem: Wound:  Goal: Will show signs of wound healing; wound closure and no evidence of infection  Will show signs of wound healing; wound closure and no evidence of infection   Instruct Pt to equilibrate ears   Assess Pts level of anxiety about HBO   Assess effects of HBO on DFU  Compliance with HBOT   Reinforce safety protocol with HBOT- pt aware of what can be taken into chamber and restricted items as well. Outcome: Ongoing  Patient seen for HBOT treatment 9/30 today. Patient assessment WNL and cleared for HBOT. Patient was compressed in HBO chamber to 2.0 CAREY at 2.0 psi/min. Patient is tolerating treatment well and is currently resting comfortably and watching television. HBO RN at chamber side, will continue to monitor.

## 2017-10-31 ENCOUNTER — HOSPITAL ENCOUNTER (OUTPATIENT)
Dept: WOUND CARE | Age: 52
Discharge: HOME | End: 2017-10-31
Admitting: INTERNAL MEDICINE

## 2017-10-31 VITALS
DIASTOLIC BLOOD PRESSURE: 78 MMHG | TEMPERATURE: 98.2 F | HEART RATE: 70 BPM | SYSTOLIC BLOOD PRESSURE: 132 MMHG | RESPIRATION RATE: 16 BRPM

## 2017-10-31 LAB
GLUCOSE BLD-MCNC: 209 MG/DL (ref 70–99)
GLUCOSE BLD-MCNC: 250 MG/DL (ref 70–99)
PERFORMED ON: ABNORMAL
PERFORMED ON: ABNORMAL

## 2017-10-31 PROCEDURE — 99183 HYPERBARIC OXYGEN THERAPY: CPT | Performed by: INTERNAL MEDICINE

## 2017-10-31 NOTE — PLAN OF CARE
Problem: Wound:  Goal: Will show signs of wound healing; wound closure and no evidence of infection  Will show signs of wound healing; wound closure and no evidence of infection   Instruct Pt to equilibrate ears   Assess Pts level of anxiety about HBO   Assess effects of HBO on DFU  Compliance with HBOT   Reinforce safety protocol with HBOT- pt aware of what can be taken into chamber and restricted items as well. Outcome: Ongoing  Patient seen for HBOT treatment   10/ 30 today. Patient assessment WNL with the exception of FSBS 250. Pt cleared for HBOT. Patient was compressed in HBO chamber to 2.0 CAREY at 2.0 psi/min. Patient is tolerating treatment well and is currently resting comfortably and watching television. HBO RN at chamber side, will continue to monitor.

## 2017-11-01 ENCOUNTER — HOSPITAL ENCOUNTER (OUTPATIENT)
Dept: WOUND CARE | Age: 52
Discharge: HOME | End: 2017-11-30
Admitting: INTERNAL MEDICINE

## 2017-11-01 ENCOUNTER — HOSPITAL ENCOUNTER (OUTPATIENT)
Dept: WOUND CARE | Age: 52
Discharge: HOME | End: 2017-11-01
Attending: INTERNAL MEDICINE | Admitting: INTERNAL MEDICINE

## 2017-11-01 ENCOUNTER — HOSPITAL ENCOUNTER (OUTPATIENT)
Dept: OTHER | Age: 52
Discharge: HOME | End: 2017-11-01
Attending: INTERNAL MEDICINE | Admitting: INTERNAL MEDICINE

## 2017-11-01 VITALS
RESPIRATION RATE: 17 BRPM | BODY MASS INDEX: 32.1 KG/M2 | WEIGHT: 224.2 LBS | HEART RATE: 80 BPM | TEMPERATURE: 98 F | DIASTOLIC BLOOD PRESSURE: 80 MMHG | HEIGHT: 70 IN | SYSTOLIC BLOOD PRESSURE: 142 MMHG

## 2017-11-01 VITALS
HEART RATE: 80 BPM | TEMPERATURE: 98 F | RESPIRATION RATE: 18 BRPM | SYSTOLIC BLOOD PRESSURE: 142 MMHG | DIASTOLIC BLOOD PRESSURE: 80 MMHG

## 2017-11-01 LAB
GLUCOSE BLD-MCNC: 172 MG/DL (ref 70–99)
GLUCOSE BLD-MCNC: 189 MG/DL (ref 70–99)
PERFORMED ON: ABNORMAL
PERFORMED ON: ABNORMAL

## 2017-11-01 PROCEDURE — 29445 APPL RIGID TOT CNTC LEG CAST: CPT | Performed by: INTERNAL MEDICINE

## 2017-11-01 PROCEDURE — 99183 HYPERBARIC OXYGEN THERAPY: CPT | Performed by: INTERNAL MEDICINE

## 2017-11-01 PROCEDURE — 97597 DBRDMT OPN WND 1ST 20 CM/<: CPT | Performed by: INTERNAL MEDICINE

## 2017-11-01 ASSESSMENT — PAIN SCALES - GENERAL: PAINLEVEL_OUTOF10: 0

## 2017-11-01 NOTE — PROGRESS NOTES
[address]  Hyperbaric Oxygen    Mahnaz Jeffries     : 1965    DATE OF VISIT:  10/25/2017    Subjective     Ruben Arroyo is a 46 y.o. male who  has a past medical history of Acute osteomyelitis of right foot ([address] (2016); Ascites; Blood transfusion reaction; Burst fracture of lumbar vertebra ([address] (2012); Cellulitis of left foot; Cellulitis of right lower extremity (, ); Diabetes ([address] Diabetic ulcer of right foot ([address] (early ); Diabetic ulcer of 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 2 diabetes mellitus, with necrosis of bone ([address] Fracture of tibial plateau (); MRSA (methicillin resistant staph aureus) culture positive (16, 16); Neuropathic ulcer of left foot, limited to breakdown of skin ([address] (11/3/2016); Neuropathic ulcer of toe ([address] (2014); Pleural effusion due to congestive heart failure ([address] Smoker; and Systolic CHF, acute ([address] (2013). He presents to the [facility] today for hyperbaric oxygen treatment of his Pitts 3 DFU, which is refractory to standard wound care therapy for 30 days. Patient denies fever. Patient denies nausea, vomiting or diarrhea; no ear troubles and no other new complaints; no fears or anxiety regarding treatment today. Objective     Vitals:    10/25/17 0950 10/25/17 1145   BP: 133/75 (!) 140/81   Pulse: 93 94   Resp: 18 18   Temp: 97.7 °F (36.5 °C) 97.8 °F (36.6 °C)   TempSrc: Oral Oral       General:  Alert, cooperative, no distress. Ears: External otic canals are within acceptable limits. TMs are well visualized. Teed Grade 0 on the right and 0 on the left pre-treatment. Teed Grade 0 on the right and 0 on the left post-treatment. Lungs:  Clear to auscultation bilaterally. Ulcer: Not examined today in HBO, if applicable.       Recent Labs      10/30/17   0811  10/30/17   1008  10/31/17   0806  10/31/17   1002   POCGLU  210*  188*  250*  209* Davina Middleton is a 46 y.o. male who presented to the [facility] today for hyperbaric oxygen treatment #6 of 30 for the diagnosis as stated above. Treatment given at 2 CAREY for 90 minutes, with no air breaks. Patient Active Problem List   Diagnosis Code    Hypertension I10    Type 2 diabetes mellitus with peripheral neuropathy (HCC) M92.03    Alcoholic cardiomyopathy S91.6    Automatic implantable cardioverter-defibrillator in situ Z95.810    Hypothyroidism E03.9    Hyperlipidemia E78.5    Onychomycosis B35.1    Dystrophic nail L60.3    Callus of foot L84    Hallux valgus M20.10    Diabetic ulcer of right foot associated with type 2 diabetes mellitus, limited to breakdown of skin ([facility]) E11.621, L97.511    Acquired hypothyroidism E03.9    Diabetic ulcer of left foot associated with type 2 diabetes mellitus, with necrosis of bone ([facility]) E11.621, L97.524    Acute osteomyelitis of left foot ([facility]) M86.172    Acute postoperative pain of left foot M79.672, G89.18       In my clinical judgement, ongoing HBO therapy is necessary at this time, given a threat to patient function, limb or life from the current condition. Adjunctive Rx, objective weekly progress and goals of Rx will generally be updated on Mondays. Plan     1. Hyperbaric Oxygen - Ruben Eagle tolerated the treatment well today without complications. Continue HBO treatment as outlined above. 2. Other -     I was present on these premises and immediately available to furnish assistance & direction throughout the procedure.      -- Electronically signed by Thu Colin MD on 11/1/2017 at 5:45 AM

## 2017-11-02 ENCOUNTER — TELEPHONE (OUTPATIENT)
Dept: ENDOCRINOLOGY | Age: 52
End: 2017-11-02

## 2017-11-02 ENCOUNTER — HOSPITAL ENCOUNTER (OUTPATIENT)
Dept: WOUND CARE | Age: 52
Discharge: HOME | End: 2017-11-02
Admitting: INTERNAL MEDICINE

## 2017-11-02 VITALS
HEART RATE: 84 BPM | BODY MASS INDEX: 32.07 KG/M2 | TEMPERATURE: 98.1 F | HEIGHT: 70 IN | DIASTOLIC BLOOD PRESSURE: 85 MMHG | WEIGHT: 224 LBS | SYSTOLIC BLOOD PRESSURE: 132 MMHG | RESPIRATION RATE: 18 BRPM

## 2017-11-02 LAB
GLUCOSE BLD-MCNC: 155 MG/DL (ref 70–99)
GLUCOSE BLD-MCNC: 213 MG/DL (ref 70–99)
PERFORMED ON: ABNORMAL
PERFORMED ON: ABNORMAL

## 2017-11-02 PROCEDURE — 99183 HYPERBARIC OXYGEN THERAPY: CPT | Performed by: INTERNAL MEDICINE

## 2017-11-02 ASSESSMENT — PAIN SCALES - GENERAL
PAINLEVEL_OUTOF10: 0
PAINLEVEL_OUTOF10: 0

## 2017-11-02 NOTE — PLAN OF CARE
Problem: Wound:  Goal: Will show signs of wound healing; wound closure and no evidence of infection  Will show signs of wound healing; wound closure and no evidence of infection   Instruct Pt to equilibrate ears   Assess Pts level of anxiety about HBO   Assess effects of HBO on DFU  Compliance with HBOT   Reinforce safety protocol with HBOT- pt aware of what can be taken into chamber and restricted items as well. Outcome: Ongoing  Patient seen for HBOT treatment  12 / 30  Today, VS per doc flow sheet, bilateral lungs clear. TEEDS=0 , . Patient assessment WNL and cleared for HBOT. Patient was compressed in HBO chamber to 2.0 CAREY at 2.0 psi/min. Patient is tolerating treatment well and is currently resting comfortably and watching television. HBO RN at chamber side, will continue to monitor.

## 2017-11-03 ENCOUNTER — HOSPITAL ENCOUNTER (OUTPATIENT)
Dept: WOUND CARE | Age: 52
Discharge: HOME | End: 2017-11-03
Admitting: INTERNAL MEDICINE

## 2017-11-03 VITALS
RESPIRATION RATE: 16 BRPM | DIASTOLIC BLOOD PRESSURE: 80 MMHG | SYSTOLIC BLOOD PRESSURE: 140 MMHG | TEMPERATURE: 98.9 F | HEART RATE: 83 BPM

## 2017-11-03 LAB
GLUCOSE BLD-MCNC: 147 MG/DL (ref 70–99)
GLUCOSE BLD-MCNC: 223 MG/DL (ref 70–99)
PERFORMED ON: ABNORMAL
PERFORMED ON: ABNORMAL

## 2017-11-03 PROCEDURE — 99183 HYPERBARIC OXYGEN THERAPY: CPT | Performed by: INTERNAL MEDICINE

## 2017-11-04 PROBLEM — G89.18 ACUTE POSTOPERATIVE PAIN OF LEFT FOOT: Status: RESOLVED | Noted: 2017-10-19 | Resolved: 2017-11-04

## 2017-11-04 PROBLEM — M79.672 ACUTE POSTOPERATIVE PAIN OF LEFT FOOT: Status: RESOLVED | Noted: 2017-10-19 | Resolved: 2017-11-04

## 2017-11-04 PROBLEM — M86.172 ACUTE OSTEOMYELITIS OF LEFT FOOT (HCC): Status: RESOLVED | Noted: 2017-09-27 | Resolved: 2017-11-04

## 2017-11-04 NOTE — PROGRESS NOTES
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 Factors contributing to occurrence and/or persistence of the chronic ulcer include diabetes, poor glucose control, shear force and decreased tissue oxygenation. Medical necessity of today's visit is shown by the above documentation. Sharp debridement is indicated today, based upon the exam findings in the wound(s) above. Procedure note:     Consent obtained. Time out performed per [facility] policy. Anesthetic  Anesthetic: Xylocaine Injectable  % plain/with epinephrine: 2% plain lidocaine   Total cc: 2 -- injected into the ID and SQ space on the left    Using a curette, #15 blade scalpel and forceps, I sharply excised and debrided the left foot ulcer(s) down through and including the removal of dermis. The type(s) of tissue debrided included fibrin, callus and a rim of healthy but overriding skin and granulation tissue. Total Surface Area Debrided: 1 sq cm.  After the wound edge was excised, I curetted some fibrin from within the wound, but did not definitively palpate tendon or bone.    Post  Debridement Measurements:  Wound 07/13/17 #11, left plantar foot, DFU, Pitts 3, onset 7/12/17, gradually appeared-Wound Length (cm): 0.9 cm  Wound 01/27/17 #8, Right Plantar,DFU Pitts 1, 1/25/17, pressure-Wound Length (cm): 0 cm    Wound 07/13/17 #11, left plantar foot, DFU, Pitts 3, onset 7/12/17, gradually appeared-Wound Width (cm): 0.8 cm  Wound 01/27/17 #8, Right Plantar,DFU Pitts 1, 1/25/17, pressure-Wound Width (cm): 0 cm    Wound 07/13/17 #11, left plantar foot, DFU, Pitts 3, onset 7/12/17, gradually appeared-Wound Depth (cm) : 1.8  Wound 01/27/17 #8, Right Plantar,DFU Pitts 1, 1/25/17, pressure-Wound Depth (cm) : 0    The ulcers were then irrigated with normal saline solution. The procedure was completed with a moderate amount of bleeding, and hemostasis was by pressure and by silver nitrate stick. The patient tolerated the procedure well, with no significant complications. The patient's level of pain during and after the procedure was monitored, and is noted in the wound documentation flowsheet.  ____________________    Because of this patient's neuropathy and resulting RIGHT foot neuropathic ulcer, I recommended a total contact cast as the optimal method to offload the ulcer and help it to heal in a timely manner -- though healed today, it's very fragile, and will reopen quickly if we back off on offloading. He has no active infection or gangrene, has adequate arterial flow to the pertinent extremity, does not have severe edema or severe pain, can maintain a stable gait, and will not have issues with driving with the cast in place. After the ulcer was cleansed with saline and had a dressing applied (Aquaphor, foam), the wound care center staff applied the first few fabric layers of the cast system. I then personally applied the final fiberglass layers, being careful to keep the ankle at 90 degrees, and to custom-mold the cast to the patient's foot, ankle and leg.  After additional drying time, proper walking technique was reviewed, and Mr. Purvi Shaw was able to safely walk out of the wound care center. Discharge plan:     Treatment in the wound care center today: Wound 07/13/17 #11, left plantar foot, DFU, Pitts 3, onset 7/12/17, gradually appeared-Dressing/Treatment: Ace Wrap, Dry dressing, 4x4, Other (Comment) (ZnO-nicki,iodoform,cast pad, kerlix)    Consideration for a cane or walker was recommended, to help with safe ambulation. Reminders were given regarding the importance of keeping the cast dry; information on a cast protector was offered. A walking sandal or cast shoe was provided, for use on hard surfaces, and outside of the home. An EvenUp device was recommended for the bottom of his contralateral shoe, to decrease ambulatory stress on the hips and knees. A pillowcase or something similar was recommended during sleep, to protect the contralateral leg from abrasions. Mr. Purvi Shaw should call the wound care center, or his primary doctor or the emergency room if after hours, if he experiences severe pain, numbness of the foot or toes, fever or chills, a strong odor from within the cast, or any other new concerning symptoms. Home treatment: good handwashing before and after any dressing changes. Cleanse wound with saline or soap & water before dressing change. May use Vaseline (petrolatum), Aquaphor, Aveeno, CeraVe, Cetaphil, Eucerin, Lubriderm, etc for dry skin. Dressing type for home: Other: as above for the left foot, daily. Written discharge instructions given to patient. Follow up in 1 week to see me, daily for HBOT. I wrote him a script for a new glucometer (he says his is a couple of years old), and asked him to check his glucose several times daily for the next several days; based on results, I might recommend an adjustment in insulin dose on Monday.     Electronically signed by Dani Pulido MD on 11/4/2017 at 6:59 PM.

## 2017-11-06 ENCOUNTER — HOSPITAL ENCOUNTER (OUTPATIENT)
Dept: WOUND CARE | Age: 52
Discharge: HOME | End: 2017-11-06
Admitting: INTERNAL MEDICINE

## 2017-11-06 VITALS
DIASTOLIC BLOOD PRESSURE: 84 MMHG | HEART RATE: 85 BPM | RESPIRATION RATE: 16 BRPM | TEMPERATURE: 97.8 F | SYSTOLIC BLOOD PRESSURE: 121 MMHG

## 2017-11-06 LAB
GLUCOSE BLD-MCNC: 134 MG/DL (ref 70–99)
GLUCOSE BLD-MCNC: 192 MG/DL (ref 70–99)
PERFORMED ON: ABNORMAL
PERFORMED ON: ABNORMAL

## 2017-11-06 PROCEDURE — 99183 HYPERBARIC OXYGEN THERAPY: CPT | Performed by: INTERNAL MEDICINE

## 2017-11-06 NOTE — PLAN OF CARE
Problem: Wound:  Goal: Will show signs of wound healing; wound closure and no evidence of infection  Will show signs of wound healing; wound closure and no evidence of infection   Instruct Pt to equilibrate ears   Assess Pts level of anxiety about HBO   Assess effects of HBO on DFU  Compliance with HBOT   Reinforce safety protocol with HBOT- pt aware of what can be taken into chamber and restricted items as well. Outcome: Ongoing  Patient seen for HBOT treatment 14/30 today. Patient assessment WNL and cleared for HBOT. Patient was compressed in HBO chamber to 2.0 CAREY at 2.0 psi/min. Patient is tolerating treatment well and is currently resting comfortably and watching television. HBO RN at chamber side, will continue to monitor.

## 2017-11-07 ENCOUNTER — HOSPITAL ENCOUNTER (OUTPATIENT)
Dept: WOUND CARE | Age: 52
Discharge: HOME | End: 2017-11-07
Admitting: INTERNAL MEDICINE

## 2017-11-07 VITALS
DIASTOLIC BLOOD PRESSURE: 82 MMHG | HEART RATE: 80 BPM | RESPIRATION RATE: 16 BRPM | TEMPERATURE: 97.9 F | SYSTOLIC BLOOD PRESSURE: 132 MMHG

## 2017-11-07 LAB
GLUCOSE BLD-MCNC: 148 MG/DL (ref 70–99)
GLUCOSE BLD-MCNC: 216 MG/DL (ref 70–99)
PERFORMED ON: ABNORMAL
PERFORMED ON: ABNORMAL

## 2017-11-07 PROCEDURE — 99183 HYPERBARIC OXYGEN THERAPY: CPT | Performed by: INTERNAL MEDICINE

## 2017-11-07 NOTE — PROGRESS NOTES
11/07/17   1005   POCGLU  192*  134*  216*  148*       Assessment     Ruben Syed is a 46 y.o. male who presented to the [facility] today for hyperbaric oxygen treatment #9 of 30 for the diagnosis as stated above. Treatment given at 2 CAREY for 90 minutes, with no air breaks. Patient Active Problem List   Diagnosis Code    Hypertension I10    Type 2 diabetes mellitus with peripheral neuropathy (HCC) Z53.54    Alcoholic cardiomyopathy L35.1    Automatic implantable cardioverter-defibrillator in situ Z95.810    Hypothyroidism E03.9    Hyperlipidemia E78.5    Onychomycosis B35.1    Dystrophic nail L60.3    Callus of foot L84    Hallux valgus M20.10    Diabetic ulcer of right foot associated with type 2 diabetes mellitus, limited to breakdown of skin (HCC) E11.621, L97.511    Acquired hypothyroidism E03.9    Diabetic ulcer of left foot associated with type 2 diabetes mellitus, with necrosis of bone (HCC) E11.621, L97.524       In my clinical judgement, ongoing HBO therapy is necessary at this time, given a threat to patient function, limb or life from the current condition. Good progress with HBOT thus far, with better wound granulation, less wound depth and volume, no signs of active soft tissue infection, and at least near-complete granulation tissue coverage over previously exposed bone. Tolerance of therapy has been excellent thus far, and he still does have some more clinical progress to make. Still do need to work more on glucoses. Plan to continue HBOT until pre-determined goals are met, as long as he's making progress and tolerating therapy. Plan     1. Hyperbaric Oxygen - Ruben Eagle tolerated the treatment well today without complications. Continue HBO treatment as outlined above. 2. Other -     I was present on these premises and immediately available to furnish assistance & direction throughout the procedure.      -- Electronically signed by Caroline Barrientos MD [place] on 11/7/2017 at 6:49 PM

## 2017-11-08 ENCOUNTER — HOSPITAL ENCOUNTER (OUTPATIENT)
Dept: WOUND CARE | Age: 52
Discharge: HOME | End: 2017-11-08
Attending: INTERNAL MEDICINE | Admitting: INTERNAL MEDICINE

## 2017-11-08 ENCOUNTER — HOSPITAL ENCOUNTER (OUTPATIENT)
Dept: WOUND CARE | Age: 52
Discharge: HOME | End: 2017-11-08
Admitting: INTERNAL MEDICINE

## 2017-11-08 VITALS
HEIGHT: 70 IN | TEMPERATURE: 98 F | DIASTOLIC BLOOD PRESSURE: 75 MMHG | WEIGHT: 226 LBS | BODY MASS INDEX: 32.35 KG/M2 | SYSTOLIC BLOOD PRESSURE: 124 MMHG | RESPIRATION RATE: 18 BRPM | HEART RATE: 78 BPM

## 2017-11-08 VITALS
RESPIRATION RATE: 18 BRPM | SYSTOLIC BLOOD PRESSURE: 124 MMHG | HEART RATE: 78 BPM | DIASTOLIC BLOOD PRESSURE: 75 MMHG | TEMPERATURE: 98 F

## 2017-11-08 LAB
GLUCOSE BLD-MCNC: 138 MG/DL (ref 70–99)
GLUCOSE BLD-MCNC: 201 MG/DL (ref 70–99)
PERFORMED ON: ABNORMAL
PERFORMED ON: ABNORMAL

## 2017-11-08 PROCEDURE — 11055 PARING/CUTG B9 HYPRKER LES 1: CPT | Performed by: INTERNAL MEDICINE

## 2017-11-08 PROCEDURE — 97597 DBRDMT OPN WND 1ST 20 CM/<: CPT | Performed by: INTERNAL MEDICINE

## 2017-11-08 PROCEDURE — 99183 HYPERBARIC OXYGEN THERAPY: CPT | Performed by: EMERGENCY MEDICINE

## 2017-11-08 NOTE — PLAN OF CARE
Problem: Wound:  Goal: Will show signs of wound healing; wound closure and no evidence of infection  Will show signs of wound healing; wound closure and no evidence of infection   Instruct Pt to equilibrate ears   Assess Pts level of anxiety about HBO   Assess effects of HBO on DFU  Compliance with HBOT   Reinforce safety protocol with HBOT- pt aware of what can be taken into chamber and restricted items as well. Outcome: Ongoing  Pt to the [facility] for HBOT. Assessment completed and pt cleared for treatment. Pt to 2.0 CAREY at a rate of 2.0 psi. Pt to watching tv and denies any complaints. Nurse at chamber side and will continue to monitor.

## 2017-11-08 NOTE — PROGRESS NOTES
[address]  Hyperbaric Oxygen    Jerry Rosa     : 1965    DATE OF VISIT:  2017    Subjective     Jerry Rosa is a 46 y.o. male who  has a past medical history of Acute osteomyelitis of left foot ([address] (2017); Acute osteomyelitis of right foot ([address] (2016); Ascites; Blood transfusion reaction; Burst fracture of lumbar vertebra ([address] (2012); Cellulitis of left foot; Cellulitis of right lower extremity (, ); Diabetes ([address] Diabetic ulcer of right foot ([address] (early ); Diabetic ulcer of 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 2 diabetes mellitus, with necrosis of bone ([address] Fracture of tibial plateau (); MRSA (methicillin resistant staph aureus) culture positive (16, 16); Neuropathic ulcer of left foot, limited to breakdown of skin ([address] (11/3/2016); Neuropathic ulcer of toe ([address] (2014); Pleural effusion due to congestive heart failure ([address] Smoker; and Systolic CHF, acute ([address] (2013). He presents to the [facility] today for hyperbaric oxygen treatment of his diabetic foot ulcer  , which is refractory to standard wound care therapy for 30 days. Patient denies fever. Patient denies nausea, vomiting or diarrhea; no ear troubles and no other new complaints; no fears or anxiety regarding treatment today. Objective     Vitals:    17 0812   BP: 119/81   Pulse: 84   Resp: 18   Temp: 98 °F (36.7 °C)   TempSrc: Oral       General:  Alert, cooperative, no distress. Ears: External otic canals are within acceptable limits. TMs are well visualized. Teed Grade 0 on the right and 0 on the left pre-treatment. Teed Grade 0 on the right and 0 on the left post-treatment. Lungs:  Clear to auscultation bilaterally. Ulcer: Not examined today in HBO, if applicable.       Recent Labs      17   0814  17   1010  17   0810  17   1005  17   0803   SAUL  192* 350 Arsh Floyd is a 46 y.o. male who presented to the [facility] today for hyperbaric oxygen treatment #16 of 30 for the diagnosis as stated above. Treatment given at 2 CAREY for 90 minutes, with no air breaks. Patient Active Problem List   Diagnosis Code    Hypertension I10    Type 2 diabetes mellitus with peripheral neuropathy (HCC) M84.43    Alcoholic cardiomyopathy P78.1    Automatic implantable cardioverter-defibrillator in situ Z95.810    Hypothyroidism E03.9    Hyperlipidemia E78.5    Onychomycosis B35.1    Dystrophic nail L60.3    Callus of foot L84    Hallux valgus M20.10    Diabetic ulcer of right foot associated with type 2 diabetes mellitus, limited to breakdown of skin (HCC) E11.621, L97.511    Acquired hypothyroidism E03.9    Diabetic ulcer of left foot associated with type 2 diabetes mellitus, with necrosis of bone (HCC) E11.621, L97.524       In my clinical judgement, ongoing HBO therapy is necessary at this time, given a threat to patient function, limb or life from the current condition. Adjunctive Rx, objective weekly progress and goals of Rx will generally be updated on Mondays. Plan     1. Hyperbaric Oxygen - Ruben Eagle tolerated the treatment well today without complications. Continue HBO treatment as outlined above. 2. Other -     I was present on these premises and immediately available to furnish assistance & direction throughout the procedure.      -- Electronically signed by Melvin Elizabeth MD on 11/8/2017 at 10:09 AM

## 2017-11-09 ENCOUNTER — HOSPITAL ENCOUNTER (OUTPATIENT)
Dept: WOUND CARE | Age: 52
Discharge: HOME | End: 2017-11-09
Admitting: INTERNAL MEDICINE

## 2017-11-09 VITALS
TEMPERATURE: 98 F | DIASTOLIC BLOOD PRESSURE: 80 MMHG | SYSTOLIC BLOOD PRESSURE: 129 MMHG | BODY MASS INDEX: 32.35 KG/M2 | HEIGHT: 70 IN | WEIGHT: 226 LBS | HEART RATE: 80 BPM | RESPIRATION RATE: 18 BRPM

## 2017-11-09 LAB
GLUCOSE BLD-MCNC: 116 MG/DL (ref 70–99)
GLUCOSE BLD-MCNC: 147 MG/DL (ref 70–99)
PERFORMED ON: ABNORMAL
PERFORMED ON: ABNORMAL

## 2017-11-09 PROCEDURE — 99183 HYPERBARIC OXYGEN THERAPY: CPT | Performed by: INTERNAL MEDICINE

## 2017-11-09 ASSESSMENT — PAIN SCALES - GENERAL: PAINLEVEL_OUTOF10: 0

## 2017-11-09 NOTE — PLAN OF CARE
Problem: Wound:  Goal: Will show signs of wound healing; wound closure and no evidence of infection  Will show signs of wound healing; wound closure and no evidence of infection   Instruct Pt to equilibrate ears   Assess Pts level of anxiety about HBO   Assess effects of HBO on DFU  Compliance with HBOT   Reinforce safety protocol with HBOT- pt aware of what can be taken into chamber and restricted items as well. Outcome: Ongoing  Patient seen for HBOT treatment   17/30 today, VS per doc flow sheet, bilateral lungs clear, , TEEDS=0. Patient assessment WNL and cleared for HBOT. Patient was compressed in HBO chamber to 2.0 CAREY at 2.0 psi/min. Patient is tolerating treatment well and is currently resting comfortably and watching television. HBO RN at chamber side, will continue to monitor.

## 2017-11-09 NOTE — PROGRESS NOTES
[address]  Hyperbaric Oxygen    Dalia Rinne     : 1965    DATE OF VISIT:  2017    Subjective     Dalia Rinne is a 46 y.o. male who  has a past medical history of Acute osteomyelitis of left foot ([address] (2017); Acute osteomyelitis of right foot ([address] (2016); Ascites; Blood transfusion reaction; Burst fracture of lumbar vertebra ([address] (2012); Cellulitis of left foot; Cellulitis of right lower extremity (, ); Diabetes ([address] Diabetic ulcer of right foot ([address] (early ); Diabetic ulcer of 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 2 diabetes mellitus, with necrosis of bone ([address] Fracture of tibial plateau (5411); MRSA (methicillin resistant staph aureus) culture positive (16, 16); Neuropathic ulcer of left foot, limited to breakdown of skin ([address] (11/3/2016); Neuropathic ulcer of toe ([address] (2014); Pleural effusion due to congestive heart failure ([address] Smoker; and Systolic CHF, acute ([address] (2013). He presents to the [facility] today for hyperbaric oxygen treatment of his Pitts 3 DFU, which is refractory to standard wound care therapy for 30 days. Patient denies fever. Patient denies nausea, vomiting or diarrhea; no ear troubles and no other new complaints; no fears or anxiety regarding treatment today. Objective     Vitals:    17 0815 17 1014   BP: 118/74 (!) 142/80   Pulse: 85 80   Resp: 18 18   Temp: 98.2 °F (36.8 °C) 98 °F (36.7 °C)   TempSrc: Oral Oral       General:  Alert, cooperative, no distress. Ears: External otic canals are within acceptable limits. TMs are well visualized. Teed Grade 0 on the right and 0 on the left pre-treatment. Teed Grade 0 on the right and 0 on the left post-treatment. Lungs:  Clear to auscultation bilaterally. Ulcer: Not examined today in HBO, if applicable.       Recent Labs      17   0814  17   1010  17   0810 11/07/17   1005  11/08/17   0803  11/08/17   1010   POCGLU  192*  134*  216*  148*  201*  138*       Assessment     Ruben Grubbs is a 46 y.o. male who presented to the [facility] today for hyperbaric oxygen treatment #11 of 30 for the diagnosis as stated above. Treatment given at 2 CAREY for 90 minutes, with no air breaks. Patient Active Problem List   Diagnosis Code    Hypertension I10    Type 2 diabetes mellitus with peripheral neuropathy (HCC) X11.82    Alcoholic cardiomyopathy V27.6    Automatic implantable cardioverter-defibrillator in situ Z95.810    Hypothyroidism E03.9    Hyperlipidemia E78.5    Onychomycosis B35.1    Dystrophic nail L60.3    Callus of foot L84    Hallux valgus M20.10    Diabetic ulcer of right foot associated with type 2 diabetes mellitus, limited to breakdown of skin (HCC) E11.621, L97.511    Acquired hypothyroidism E03.9    Diabetic ulcer of left foot associated with type 2 diabetes mellitus, with necrosis of bone (HCC) E11.621, L97.524       In my clinical judgement, ongoing HBO therapy is necessary at this time, given a threat to patient function, limb or life from the current condition. Adjunctive Rx, objective weekly progress and goals of Rx will generally be updated on Mondays. Plan     1. Hyperbaric Oxygen - Ruben Eagle tolerated the treatment well today without complications. Continue HBO treatment as outlined above. 2. Other -     I was present on these premises and immediately available to furnish assistance & direction throughout the procedure.      -- Electronically signed by Devang Hannon MD on 11/8/2017 at 9:49 PM

## 2017-11-10 ENCOUNTER — HOSPITAL ENCOUNTER (OUTPATIENT)
Dept: WOUND CARE | Age: 52
Discharge: HOME | End: 2017-11-10
Admitting: INTERNAL MEDICINE

## 2017-11-10 VITALS
HEART RATE: 81 BPM | RESPIRATION RATE: 16 BRPM | SYSTOLIC BLOOD PRESSURE: 126 MMHG | TEMPERATURE: 97.8 F | DIASTOLIC BLOOD PRESSURE: 78 MMHG

## 2017-11-10 LAB
GLUCOSE BLD-MCNC: 163 MG/DL (ref 70–99)
GLUCOSE BLD-MCNC: 189 MG/DL (ref 70–99)
PERFORMED ON: ABNORMAL
PERFORMED ON: ABNORMAL

## 2017-11-10 PROCEDURE — 99183 HYPERBARIC OXYGEN THERAPY: CPT | Performed by: INTERNAL MEDICINE

## 2017-11-11 PROBLEM — L97.511 DIABETIC ULCER OF RIGHT FOOT ASSOCIATED WITH TYPE 2 DIABETES MELLITUS, LIMITED TO BREAKDOWN OF SKIN (HCC): Status: RESOLVED | Noted: 2017-01-29 | Resolved: 2017-11-11

## 2017-11-11 PROBLEM — L97.525 DIABETIC ULCER OF LEFT FOOT ASSOCIATED WITH TYPE 2 DIABETES MELLITUS, WITH MUSCLE INVOLVEMENT WITHOUT EVIDENCE OF NECROSIS (HCC): Status: ACTIVE | Noted: 2017-04-27

## 2017-11-11 PROBLEM — E11.621 DIABETIC ULCER OF RIGHT FOOT ASSOCIATED WITH TYPE 2 DIABETES MELLITUS, LIMITED TO BREAKDOWN OF SKIN (HCC): Status: RESOLVED | Noted: 2017-01-29 | Resolved: 2017-11-11

## 2017-11-11 NOTE — PROGRESS NOTES
[address]  Hyperbaric Oxygen    Evita Elaine     : 1965    DATE OF VISIT:  2017    Subjective     Evita Elaine is a 46 y.o. male who  has a past medical history of Acute osteomyelitis of left foot ([address] (2017); Acute osteomyelitis of right foot ([address] (2016); Ascites; Blood transfusion reaction; Burst fracture of lumbar vertebra ([address] (2012); Cellulitis of left foot; Cellulitis of right lower extremity (, ); Diabetes ([address] Diabetic ulcer of right foot ([address] (early ); Diabetic ulcer of 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 2 diabetes mellitus, with necrosis of bone ([address] Fracture of tibial plateau (7067); MRSA (methicillin resistant staph aureus) culture positive (16, 16); Neuropathic ulcer of left foot, limited to breakdown of skin ([address] (11/3/2016); Neuropathic ulcer of toe ([address] (2014); Pleural effusion due to congestive heart failure ([address] Smoker; and Systolic CHF, acute ([address] (2013). He presents to the [facility] today for hyperbaric oxygen treatment of his Pitts 3 DFU, which is refractory to standard wound care therapy for 30 days. Patient denies fever. Patient denies nausea, vomiting or diarrhea; no ear troubles and no other new complaints; no fears or anxiety regarding treatment today. Objective     Vitals:    17 0815 17 1008   BP: 112/75 132/85   Pulse: 88 84   Resp: 18 18   Temp: 97.8 °F (36.6 °C) 98.1 °F (36.7 °C)   TempSrc: Oral Oral   Weight: 224 lb (101.6 kg) 224 lb (101.6 kg)   Height: 5' 10\" (1.778 m) 5' 10\" (1.778 m)       General:  Alert, cooperative, no distress. Ears: External otic canals are within acceptable limits. TMs are well visualized. Teed Grade 0 on the right and 0 on the left pre-treatment. Teed Grade 0 on the right and 0 on the left post-treatment. Lungs:  Clear to auscultation bilaterally.     Ulcer: Not examined today in HBO, if applicable. Recent Labs      11/09/17   0804  11/09/17   1000  11/10/17   0752  11/10/17   0945   POCGLU  147*  116*  189*  163*       Assessment     Ruben Aponte is a 46 y.o. male who presented to the [facility] today for hyperbaric oxygen treatment #12 of 30 for the diagnosis as stated above. Treatment given at 2 CAREY for 90 minutes, with no air breaks. Patient Active Problem List   Diagnosis Code    Hypertension I10    Type 2 diabetes mellitus with peripheral neuropathy (HCC) L41.80    Alcoholic cardiomyopathy L46.1    Automatic implantable cardioverter-defibrillator in situ Z95.810    Hypothyroidism E03.9    Hyperlipidemia E78.5    Onychomycosis B35.1    Dystrophic nail L60.3    Callus of foot L84    Hallux valgus M20.10    Acquired hypothyroidism E03.9    Diabetic ulcer of left foot associated with type 2 diabetes mellitus, with muscle involvement without evidence of necrosis (HCC) E11.621, L97.525       In my clinical judgement, ongoing HBO therapy is necessary at this time, given a threat to patient function, limb or life from the current condition. Adjunctive Rx, objective weekly progress and goals of Rx will generally be updated on Mondays. Plan     1. Hyperbaric Oxygen - Ruben Eagle tolerated the treatment well today without complications. Continue HBO treatment as outlined above. 2. Other -     I was present on these premises and immediately available to furnish assistance & direction throughout the procedure.      -- Electronically signed by Thu Colin MD on 11/11/2017 at 12:38 PM

## 2017-11-11 NOTE — PROGRESS NOTES
foot L84    Hallux valgus M20.10    Acquired hypothyroidism E03.9    Diabetic ulcer of left foot associated with type 2 diabetes mellitus, with muscle involvement without evidence of necrosis ([facility]) E11.621, L97.525       Assessment of today's active condition(s): DM2, worsening glucose control recently, neuropathy, delicately healed right foot Pitts 1 ulcer, slowly healing Pitts 3 ulcer on the left, no strong signs of ongoing infection after met head resection and oral ABx. Factors contributing to occurrence and/or persistence of the chronic ulcer include diabetes, poor glucose control, shear force and decreased tissue oxygenation. Medical necessity of today's visit is shown by the above documentation. Sharp debridement is indicated today, based upon the exam findings in the wound(s) above. Procedure note:     Consent obtained. Time out performed per [facility] policy. Anesthetic  Anesthetic: 4% Topical Xylocaine     Using a curette, I sharply debrided the left foot ulcer(s) down through and including the removal of dermis. The type(s) of tissue debrided included fibrin and exudate. Total Surface Area Debrided: 1 sq cm. Post  Debridement Measurements:  Wound 07/13/17 #11, left plantar foot, DFU, Pitts 3, onset 7/12/17, gradually appeared-Wound Length (cm): 0.9 cm  Wound 01/27/17 #8, Right Plantar,DFU Pitts 1, 1/25/17, pressure-Wound Length (cm): 0 cm    Wound 07/13/17 #11, left plantar foot, DFU, Pitts 3, onset 7/12/17, gradually appeared-Wound Width (cm): 0.5 cm  Wound 01/27/17 #8, Right Plantar,DFU Pitts 1, 1/25/17, pressure-Wound Width (cm): 0 cm    Wound 07/13/17 #11, left plantar foot, DFU, Pitts 3, onset 7/12/17, gradually appeared-Wound Depth (cm) : 1.7  Wound 01/27/17 #8, Right Plantar,DFU Pitts 1, 1/25/17, pressure-Wound Depth (cm) : 0    The ulcers were then irrigated with normal saline solution. The procedure was completed with a small amount of bleeding, and hemostasis was by pressure.  The patient tolerated the procedure well, with no significant complications. The patient's level of pain during and after the procedure was monitored, and is noted in the wound documentation flowsheet.  _______________    Because of this patient's neuropathy and resulting neuropathic ulcer (at high risk for reopening quickly if offloading is not kept up with), I recommended a RIGHT-sided total contact cast as the optimal method to offload the ulcer and help it to heal in a timely manner. He has no active infection or gangrene, has adequate arterial flow to the pertinent extremity, does not have severe edema or severe pain, can maintain a stable gait, and will not have issues with driving with the cast in place. After the ulcer was cleansed with saline and had a dressing applied (Aquaphor, foam), the wound care center staff applied the first few fabric layers of the cast system. I then personally applied the final fiberglass layers, being careful to keep the ankle at 90 degrees, and to custom-mold the cast to the patient's foot, ankle and leg. After additional drying time, proper walking technique was reviewed, and Mr. Mary Horner was able to safely walk out of the wound care center. Discharge plan:     Treatment in the wound care center today: Wound 07/13/17 #11, left plantar foot, DFU, Pitts 3, onset 7/12/17, gradually appeared-Dressing/Treatment: Dry dressing, Other (Comment) (Betadine gauze tucked into wound,4x4's,padmini,tape). Consideration for a cane or walker was recommended, to help with safe ambulation. Reminders were given regarding the importance of keeping the cast dry; information on a cast protector was offered. A walking sandal or cast shoe was provided, for use on hard surfaces, and outside of the home. An EvenUp device was recommended for the bottom of his contralateral shoe, to decrease ambulatory stress on the hips and knees.  A pillowcase or something similar was recommended during sleep, to protect the contralateral leg from abrasions. Mr. Ramon Valencia should call the wound care center, or his primary doctor or the emergency room if after hours, if he experiences severe pain, numbness of the foot or toes, fever or chills, a strong odor from within the cast, or any other new concerning symptoms. Continue Darco shoe and PegAssist insert on the left. Home treatment: good handwashing before and after any dressing changes. Cleanse wound with saline or soap & water before dressing change. May use Vaseline (petrolatum), Aquaphor, Aveeno, CeraVe, Cetaphil, Eucerin, Lubriderm, etc for dry skin. Dressing type for home: Other: will add a silver collagen, still lightly pack wound with 1/4\" Iodoform as well, cover gauze, daily. Written discharge instructions given to patient. Follow up in 1 week to see me, daily for HBOT.     Electronically signed by Lin Essex, MD on 11/11/2017 at 8:12 AM.

## 2017-11-12 NOTE — PROGRESS NOTES
[address]  Hyperbaric Oxygen    Johnny Hooks     : 1965    DATE OF VISIT:  11/3/2017    Subjective     Johnny Hooks is a 46 y.o. male who  has a past medical history of Acute osteomyelitis of left foot ([address] (2017); Acute osteomyelitis of right foot ([address] (2016); Ascites; Blood transfusion reaction; Burst fracture of lumbar vertebra ([address] (2012); Cellulitis of left foot; Cellulitis of right lower extremity (, ); Diabetes ([address] Diabetic ulcer of right foot ([address] (early ); Diabetic ulcer of 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 2 diabetes mellitus, with necrosis of bone ([address] Fracture of tibial plateau (); MRSA (methicillin resistant staph aureus) culture positive (16, 16); Neuropathic ulcer of left foot, limited to breakdown of skin ([address] (11/3/2016); Neuropathic ulcer of toe ([address] (2014); Pleural effusion due to congestive heart failure ([address] Smoker; and Systolic CHF, acute ([address] (2013). He presents to the [facility] today for hyperbaric oxygen treatment of his Pitts 3 DFU, which is refractory to standard wound care therapy for 30 days. Patient denies fever. Patient denies nausea, vomiting or diarrhea; no ear troubles and no other new complaints; no fears or anxiety regarding treatment today. Objective     Vitals:    17 0815 17 1017   BP: 117/76 (!) 140/80   Pulse: 86 83   Resp: 18 16   Temp: 98.1 °F (36.7 °C) 98.9 °F (37.2 °C)   TempSrc: Oral Oral       General:  Alert, cooperative, no distress. Ears: External otic canals are within acceptable limits. TMs are well visualized. Teed Grade 0 on the right and 0 on the left pre-treatment. Teed Grade 0 on the right and 0 on the left post-treatment. Lungs:  Clear to auscultation bilaterally. Ulcer: Not examined today in HBO, if applicable.       Recent Labs      11/10/17   0752  11/10/17   0945   POCGLU  189*

## 2017-11-13 ENCOUNTER — HOSPITAL ENCOUNTER (OUTPATIENT)
Dept: WOUND CARE | Age: 52
Discharge: HOME | End: 2017-11-13
Admitting: INTERNAL MEDICINE

## 2017-11-13 VITALS
TEMPERATURE: 98 F | RESPIRATION RATE: 16 BRPM | SYSTOLIC BLOOD PRESSURE: 125 MMHG | DIASTOLIC BLOOD PRESSURE: 81 MMHG | HEART RATE: 81 BPM

## 2017-11-13 LAB
GLUCOSE BLD-MCNC: 147 MG/DL (ref 70–99)
GLUCOSE BLD-MCNC: 177 MG/DL (ref 70–99)
PERFORMED ON: ABNORMAL
PERFORMED ON: ABNORMAL

## 2017-11-13 PROCEDURE — 99183 HYPERBARIC OXYGEN THERAPY: CPT | Performed by: INTERNAL MEDICINE

## 2017-11-13 NOTE — PLAN OF CARE
Problem: Wound:  Goal: Will show signs of wound healing; wound closure and no evidence of infection  Will show signs of wound healing; wound closure and no evidence of infection   Instruct Pt to equilibrate ears   Assess Pts level of anxiety about HBO   Assess effects of HBO on DFU  Compliance with HBOT   Reinforce safety protocol with HBOT- pt aware of what can be taken into chamber and restricted items as well. Outcome: Ongoing  Patient seen for HBOT treatment  19 /30     today. Patient assessment WNL- AVSS, lungs CTA, FSBS 177 prior to HBO, TEED=0 bilat - cleared for HBOT. Patient was compressed in HBO chamber to 2.0 CAREY at 2.0 psi/min x 90 min treatment w/o air breaks. Patient is tolerating treatment well and is currently resting comfortably and watching television. HBO RN at chamber side, will continue to monitor.

## 2017-11-14 ENCOUNTER — HOSPITAL ENCOUNTER (OUTPATIENT)
Dept: WOUND CARE | Age: 52
Discharge: HOME | End: 2017-11-14
Admitting: INTERNAL MEDICINE

## 2017-11-14 VITALS
DIASTOLIC BLOOD PRESSURE: 76 MMHG | HEART RATE: 81 BPM | TEMPERATURE: 97.7 F | SYSTOLIC BLOOD PRESSURE: 117 MMHG | RESPIRATION RATE: 16 BRPM

## 2017-11-14 LAB
GLUCOSE BLD-MCNC: 229 MG/DL (ref 70–99)
PERFORMED ON: ABNORMAL

## 2017-11-14 PROCEDURE — 99183 HYPERBARIC OXYGEN THERAPY: CPT | Performed by: INTERNAL MEDICINE

## 2017-11-14 NOTE — PLAN OF CARE
Problem: Wound:  Goal: Will show signs of wound healing; wound closure and no evidence of infection  Will show signs of wound healing; wound closure and no evidence of infection   Instruct Pt to equilibrate ears   Assess Pts level of anxiety about HBO   Assess effects of HBO on DFU  Compliance with HBOT   Reinforce safety protocol with HBOT- pt aware of what can be taken into chamber and restricted items as well. Outcome: Ongoing  Patient seen for HBOT treatment  20 / 30  today. Patient assessment WNL and cleared for HBOT. Patient was compressed in HBO chamber to 2.0 CAREY at 2.0 psi/min. Patient is tolerating treatment well and is currently resting comfortably and watching television. HBO RN at chamber side, will continue to monitor.

## 2017-11-14 NOTE — PROGRESS NOTES
[address] [facility] Radha Giron is a 46 y.o. male who presented to the [facility] today for hyperbaric oxygen treatment #14 of 30 for the diagnosis as stated above. Treatment given at 2 CAREY for 90 minutes, with no air breaks. Patient Active Problem List   Diagnosis Code    Hypertension I10    Type 2 diabetes mellitus with peripheral neuropathy (HCC) P27.19    Alcoholic cardiomyopathy J99.4    Automatic implantable cardioverter-defibrillator in situ Z95.810    Hypothyroidism E03.9    Hyperlipidemia E78.5    Onychomycosis B35.1    Dystrophic nail L60.3    Callus of foot L84    Hallux valgus M20.10    Acquired hypothyroidism E03.9    Diabetic ulcer of left foot associated with type 2 diabetes mellitus, with muscle involvement without evidence of necrosis (HCC) E11.621, L97.525       In my clinical judgement, ongoing HBO therapy is necessary at this time, given a threat to patient function, limb or life from the current condition.  In terms of an overall summary to his clinical course and wound care to this point: his left MIREYA was 1.00, he has a palpable pedal pulse, and no other signs of large-vessel malperfusion; necrotic bone seems likely to have been completely removed in the OR recently, and necrotic soft tissue is now at a minimum, and debrided weekly as needed; no cellulitis on exam, and his acute metatarsal head osteo seems likely to have been cured with surgical resection and a brief course of post-op Abx; he has no significant left foot edema; wound bed moisture balance is good, without significant periwound maceration; tissue growth is being addressed by HBOT, and while I'd like to add a cellular or tissue product to his local care, the small area of his wound makes him ineligible for insurance coverage of that, despite the wound depth; offloading is currently with a Darco shoe and PegAssist insert, avoidance of excessive ambulation, and taking time off work; pain was significant for a week or two post-op, but is minimal now; systemically, the main issue is glucose control, which has drifted a bit in the last month or so -- I've asked him to measure his glucoses several times per day for the next several days to see if there is a straightforward adjustment we can make to his medications. His tolerance of therapy has been excellent, with no claustrophobia, cardiorespiratory symptoms, treatment-related hypoglycemia, barotrauma or visual changes. Wound healing progress has been good thus far, with (I believe) complete granulation over the previously exposed bone, no active infection, and a decrease in wound depth from 2.5 to 1.8 cm in the last couple of weeks. The main ongoing goal of therapy is enhancement of granulation tissue formation, to further decrease that wound depth and get him closer to complete healing. At this time I believe the likely benefits of ongoing HBOT outweigh the risks, and that we should continue therapy; Mr. Jamila Thakkar is in agreement. Plan     1. Hyperbaric Oxygen - Ruben Eagle tolerated the treatment well today without complications. Continue HBO treatment as outlined above. 2. Other -     I was present on these premises and immediately available to furnish assistance & direction throughout the procedure.      -- Electronically signed by Rosetta Contreras MD on 11/14/2017 at 7:03 AM

## 2017-11-15 ENCOUNTER — HOSPITAL ENCOUNTER (OUTPATIENT)
Dept: WOUND CARE | Age: 52
Discharge: HOME | End: 2017-11-15
Admitting: INTERNAL MEDICINE

## 2017-11-15 ENCOUNTER — HOSPITAL ENCOUNTER (OUTPATIENT)
Dept: WOUND CARE | Age: 52
Discharge: HOME | End: 2017-11-15
Attending: INTERNAL MEDICINE | Admitting: INTERNAL MEDICINE

## 2017-11-15 VITALS
WEIGHT: 220 LBS | SYSTOLIC BLOOD PRESSURE: 138 MMHG | RESPIRATION RATE: 16 BRPM | HEART RATE: 76 BPM | TEMPERATURE: 97.9 F | DIASTOLIC BLOOD PRESSURE: 75 MMHG | BODY MASS INDEX: 31.57 KG/M2

## 2017-11-15 VITALS
HEART RATE: 76 BPM | SYSTOLIC BLOOD PRESSURE: 138 MMHG | DIASTOLIC BLOOD PRESSURE: 75 MMHG | TEMPERATURE: 97.9 F | RESPIRATION RATE: 16 BRPM

## 2017-11-15 LAB
GLUCOSE BLD-MCNC: 111 MG/DL (ref 70–99)
GLUCOSE BLD-MCNC: 129 MG/DL (ref 70–99)
GLUCOSE BLD-MCNC: 150 MG/DL (ref 70–99)
PERFORMED ON: ABNORMAL

## 2017-11-15 PROCEDURE — 97597 DBRDMT OPN WND 1ST 20 CM/<: CPT | Performed by: INTERNAL MEDICINE

## 2017-11-15 PROCEDURE — 99183 HYPERBARIC OXYGEN THERAPY: CPT | Performed by: EMERGENCY MEDICINE

## 2017-11-15 NOTE — PLAN OF CARE
Problem: Wound:  Goal: Will show signs of wound healing; wound closure and no evidence of infection  Will show signs of wound healing; wound closure and no evidence of infection   Instruct Pt to equilibrate ears   Assess Pts level of anxiety about HBO   Assess effects of HBO on DFU  Compliance with HBOT   Reinforce safety protocol with HBOT- pt aware of what can be taken into chamber and restricted items as well. Outcome: Ongoing  Right plantar foot remains healed. Cont. With blue foam again this week. Pt. To call [facility] to schedule appt. For re-eval. Of shoes & inserts. Left plantar foot stable, debridement per Dr. John Rosado & pt. Tolerated well. Depth not filling in. X-Ray ordered & pt. To have done after HBOT 11/16/17. Will cont. With current wound care regime this week. Cont. With HBOT as ordered. F/u in [address] [place] [address] in 1 week as ordered. Reinforced importance of off-loading as much as possible. Discharge instructions reviewed with patient, all questions answered, copy given to patient. Dressings were applied to all wounds per M.D. Instructions at this visit.

## 2017-11-16 ENCOUNTER — HOSPITAL ENCOUNTER (OUTPATIENT)
Dept: WOUND CARE | Age: 52
Discharge: HOME | End: 2017-11-16
Admitting: INTERNAL MEDICINE

## 2017-11-16 VITALS
BODY MASS INDEX: 31.5 KG/M2 | HEIGHT: 70 IN | TEMPERATURE: 97.9 F | DIASTOLIC BLOOD PRESSURE: 74 MMHG | SYSTOLIC BLOOD PRESSURE: 134 MMHG | RESPIRATION RATE: 16 BRPM | HEART RATE: 83 BPM | WEIGHT: 220 LBS

## 2017-11-16 LAB
GLUCOSE BLD-MCNC: 175 MG/DL (ref 70–99)
GLUCOSE BLD-MCNC: 205 MG/DL (ref 70–99)
PERFORMED ON: ABNORMAL
PERFORMED ON: ABNORMAL

## 2017-11-16 ASSESSMENT — PAIN SCALES - GENERAL: PAINLEVEL_OUTOF10: 0

## 2017-11-16 NOTE — PROGRESS NOTES
[address]  Hyperbaric Oxygen    Brigida Rothman     : 1965    DATE OF VISIT:  2017    Subjective     Brigida Rothman is a 46 y.o. male who  has a past medical history of Acute osteomyelitis of left foot ([address] (2017); Acute osteomyelitis of right foot ([address] (2016); Ascites; Blood transfusion reaction; Burst fracture of lumbar vertebra ([address] (2012); Cellulitis of left foot; Cellulitis of right lower extremity (, ); Diabetes ([address] Diabetic ulcer of right foot ([address] (early ); Diabetic ulcer of 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 2 diabetes mellitus, with necrosis of bone ([address] Fracture of tibial plateau (8413); MRSA (methicillin resistant staph aureus) culture positive (16, 16); Neuropathic ulcer of left foot, limited to breakdown of skin ([address] (11/3/2016); Neuropathic ulcer of toe ([address] (2014); Pleural effusion due to congestive heart failure ([address] Smoker; and Systolic CHF, acute ([facility] (2013). He presents to the [facility] today for hyperbaric oxygen treatment of his DFU 3, which is refractory to standard wound care therapy for 30 days. Patient denies fever. Patient denies nausea, vomiting or diarrhea; no ear troubles and no other new complaints; no fears or anxiety regarding treatment today. Objective     Vitals:    17 0745   BP: 134/74   Pulse: 90   Resp: 16   Temp: 97.6 °F (36.4 °C)   TempSrc: Oral   Weight: 220 lb (99.8 kg)   Height: 5' 10\" (1.778 m)       General:  Alert, cooperative, no distress. Ears: External otic canals are within acceptable limits. TMs are well visualized. Teed Grade 0 on the right and 0 on the left pre-treatment. Teed Grade 0 on the right and 0 on the left post-treatment. Lungs:  Clear to auscultation bilaterally. Ulcer: Not examined today in HBO, if applicable.       Recent Labs      17   1013  17   0819  11/15/17   0804 11/15/17   0832  11/15/17   1023  11/16/17   0756   [address] Luci De La Torre is a 46 y.o. male who presented to the [facility] today for hyperbaric oxygen treatment #22 of 30 for the diagnosis as stated above. Treatment given at 2 CAREY for 90 minutes, with no air breaks. Patient Active Problem List   Diagnosis Code    Hypertension I10    Type 2 diabetes mellitus with peripheral neuropathy (HCC) E36.59    Alcoholic cardiomyopathy A03.9    Automatic implantable cardioverter-defibrillator in situ Z95.810    Hypothyroidism E03.9    Hyperlipidemia E78.5    Onychomycosis B35.1    Dystrophic nail L60.3    Callus of foot L84    Hallux valgus M20.10    Acquired hypothyroidism E03.9    Diabetic ulcer of left foot associated with type 2 diabetes mellitus, with muscle involvement without evidence of necrosis (HCC) E11.621, L97.525       In my clinical judgement, ongoing HBO therapy is necessary at this time, given a threat to patient function, limb or life from the current condition. Adjunctive Rx, objective weekly progress and goals of Rx will generally be updated on Mondays. Plan     1. Hyperbaric Oxygen - Ruben Eagle tolerated the treatment well today without complications. Continue HBO treatment as outlined above. 2. Other -     I was present on these premises and immediately available to furnish assistance & direction throughout the procedure.      -- Electronically signed by Yury Car MD on 11/16/2017 at 9:40 AM

## 2017-11-16 NOTE — PLAN OF CARE
Problem: Wound:  Goal: Will show signs of wound healing; wound closure and no evidence of infection  Will show signs of wound healing; wound closure and no evidence of infection   Instruct Pt to equilibrate ears   Assess Pts level of anxiety about HBO   Assess effects of HBO on DFU  Compliance with HBOT   Reinforce safety protocol with HBOT- pt aware of what can be taken into chamber and restricted items as well. Outcome: Ongoing  Patient seen for HBOT treatment  22 / 30  Today, VS per doc flow sheet, bilateral lungs clear, Bg 205 Patient assessment WNL and cleared for HBOT. Patient was compressed in HBO chamber to 2.0 CAREY at 2.0 psi/min. Patient is tolerating treatment well and is currently resting comfortably and watching television. HBO RN at chamber side, will continue to monitor.

## 2017-11-20 ENCOUNTER — HOSPITAL ENCOUNTER (OUTPATIENT)
Dept: WOUND CARE | Age: 52
Discharge: HOME | End: 2017-11-20
Attending: INTERNAL MEDICINE | Admitting: INTERNAL MEDICINE

## 2017-11-20 ENCOUNTER — HOSPITAL ENCOUNTER (OUTPATIENT)
Dept: WOUND CARE | Age: 52
Discharge: HOME | End: 2017-11-20
Admitting: INTERNAL MEDICINE

## 2017-11-20 VITALS
TEMPERATURE: 97.9 F | HEART RATE: 81 BPM | SYSTOLIC BLOOD PRESSURE: 141 MMHG | RESPIRATION RATE: 16 BRPM | DIASTOLIC BLOOD PRESSURE: 92 MMHG

## 2017-11-20 VITALS
BODY MASS INDEX: 31.78 KG/M2 | HEART RATE: 81 BPM | SYSTOLIC BLOOD PRESSURE: 141 MMHG | RESPIRATION RATE: 16 BRPM | DIASTOLIC BLOOD PRESSURE: 92 MMHG | HEIGHT: 70 IN | WEIGHT: 222 LBS | TEMPERATURE: 97.9 F

## 2017-11-20 LAB
GLUCOSE BLD-MCNC: 202 MG/DL (ref 70–99)
GLUCOSE BLD-MCNC: 245 MG/DL (ref 70–99)
PERFORMED ON: ABNORMAL
PERFORMED ON: ABNORMAL

## 2017-11-20 PROCEDURE — 99183 HYPERBARIC OXYGEN THERAPY: CPT | Performed by: INTERNAL MEDICINE

## 2017-11-20 PROCEDURE — 99213 OFFICE O/P EST LOW 20 MIN: CPT | Performed by: INTERNAL MEDICINE

## 2017-11-20 ASSESSMENT — PAIN SCALES - GENERAL: PAINLEVEL_OUTOF10: 0

## 2017-11-20 NOTE — PLAN OF CARE
Problem: Wound:  Goal: Will show signs of wound healing; wound closure and no evidence of infection  Will show signs of wound healing; wound closure and no evidence of infection   Instruct Pt to equilibrate ears   Assess Pts level of anxiety about HBO   Assess effects of HBO on DFU  Compliance with HBOT   Reinforce safety protocol with HBOT- pt aware of what can be taken into chamber and restricted items as well. Outcome: Ongoing  Pt stated that when he changed his dressing he had pus come out of it. Pt to continue with present dressing regimen and HBOT. Dr John Rosado saw patient. Pt to follow up in the [facility] in 1 week with Dr John Rosado. Discharge instructions reviewed with patient, all questions answered, copy given to patient. Dressings were applied to all wounds per M.D. Instructions at this visit.

## 2017-11-20 NOTE — PROGRESS NOTES
[address]  Hyperbaric Oxygen    Jp Barahona     : 1965    DATE OF VISIT:  11/10/2017    Subjective     Jp Barahona is a 46 y.o. male who  has a past medical history of Acute osteomyelitis of left foot ([address] (2017); Acute osteomyelitis of right foot ([address] (2016); Ascites; Blood transfusion reaction; Burst fracture of lumbar vertebra ([address] (2012); Cellulitis of left foot; Cellulitis of right lower extremity (, ); Diabetes ([address] Diabetic ulcer of right foot ([address] (early ); Diabetic ulcer of 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 2 diabetes mellitus, with necrosis of bone ([address] Fracture of tibial plateau (); MRSA (methicillin resistant staph aureus) culture positive (16, 16); Neuropathic ulcer of left foot, limited to breakdown of skin ([address] (11/3/2016); Neuropathic ulcer of toe ([address] (2014); Pleural effusion due to congestive heart failure ([address] Smoker; and Systolic CHF, acute ([address] (2013). He presents to the [facility] today for hyperbaric oxygen treatment of his Pitts 3 DFU, which is refractory to standard wound care therapy for 30 days. Patient denies fever. Patient denies nausea, vomiting or diarrhea; no ear troubles and no other new complaints; no fears or anxiety regarding treatment today. Objective     Vitals:    11/10/17 0745 11/10/17 0947   BP: (!) 144/80 126/78   Pulse: 85 81   Resp: 18 16   Temp: 97 °F (36.1 °C) 97.8 °F (36.6 °C)   TempSrc: Oral Oral       General:  Alert, cooperative, no distress. Ears: External otic canals are within acceptable limits. TMs are well visualized. Teed Grade 0 on the right and 0 on the left pre-treatment. Teed Grade 0 on the right and 0 on the left post-treatment. Lungs:  Clear to auscultation bilaterally. Ulcer: Not examined today in HBO, if applicable.       Recent Labs      17   0756  17   0953   POCGLU  245* ОЛЕГ/Jeremy Murphy Skinny Sherman is a 46 y.o. male who presented to the [facility] today for hyperbaric oxygen treatment #18 of 30 for the diagnosis as stated above. Treatment given at 2 CAREY for 90 minutes, with no air breaks. Patient Active Problem List   Diagnosis Code    Hypertension I10    Type 2 diabetes mellitus with peripheral neuropathy (HCC) I87.24    Alcoholic cardiomyopathy X67.5    Automatic implantable cardioverter-defibrillator in situ Z95.810    Hypothyroidism E03.9    Hyperlipidemia E78.5    Onychomycosis B35.1    Dystrophic nail L60.3    Callus of foot L84    Hallux valgus M20.10    Acquired hypothyroidism E03.9    Diabetic ulcer of left foot associated with type 2 diabetes mellitus, with muscle involvement without evidence of necrosis (HCC) E11.621, L97.525       In my clinical judgement, ongoing HBO therapy is necessary at this time, given a threat to patient function, limb or life from the current condition. Adjunctive Rx, objective weekly progress and goals of Rx will generally be updated on Mondays. Plan     1. Hyperbaric Oxygen - Ruben Eagle tolerated the treatment well today without complications. Continue HBO treatment as outlined above. 2. Other -     I was present on these premises and immediately available to furnish assistance & direction throughout the procedure.      -- Electronically signed by Hope Vick MD on 11/20/2017 at 12:04 PM

## 2017-11-20 NOTE — PLAN OF CARE
Problem: Wound:  Goal: Will show signs of wound healing; wound closure and no evidence of infection  Will show signs of wound healing; wound closure and no evidence of infection   Instruct Pt to equilibrate ears   Assess Pts level of anxiety about HBO   Assess effects of HBO on DFU  Compliance with HBOT   Reinforce safety protocol with HBOT- pt aware of what can be taken into chamber and restricted items as well. Outcome: Ongoing  Patient seen for HBOT treatment 23/30 today. Patient assessment WNL and cleared for HBOT. Patient was compressed in HBO chamber to 2.0 CAREY at 2.0 psi/min. Patient is tolerating treatment well and is currently resting comfortably and watching television. HBO RN at chamber side, will continue to monitor.

## 2017-11-21 ENCOUNTER — HOSPITAL ENCOUNTER (OUTPATIENT)
Dept: WOUND CARE | Age: 52
Discharge: HOME | End: 2017-11-21
Admitting: INTERNAL MEDICINE

## 2017-11-21 VITALS
HEART RATE: 84 BPM | RESPIRATION RATE: 16 BRPM | TEMPERATURE: 97.8 F | DIASTOLIC BLOOD PRESSURE: 82 MMHG | SYSTOLIC BLOOD PRESSURE: 124 MMHG

## 2017-11-21 LAB
GLUCOSE BLD-MCNC: 181 MG/DL (ref 70–99)
GLUCOSE BLD-MCNC: 214 MG/DL (ref 70–99)
PERFORMED ON: ABNORMAL
PERFORMED ON: ABNORMAL

## 2017-11-21 PROCEDURE — 99183 HYPERBARIC OXYGEN THERAPY: CPT | Performed by: INTERNAL MEDICINE

## 2017-11-21 NOTE — PROGRESS NOTES
[address]  Hyperbaric Oxygen    Felicia De Anda     : 1965    DATE OF VISIT:  2017    Subjective     Felicia De Anda is a 46 y.o. male who  has a past medical history of Acute osteomyelitis of left foot ([address] (2017); Acute osteomyelitis of right foot ([address] (2016); Ascites; Blood transfusion reaction; Burst fracture of lumbar vertebra ([address] (2012); Cellulitis of left foot; Cellulitis of right lower extremity (, ); Diabetes ([address] Diabetic ulcer of right foot ([address] (early ); Diabetic ulcer of 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 2 diabetes mellitus, with necrosis of bone ([address] Fracture of tibial plateau (); MRSA (methicillin resistant staph aureus) culture positive (16, 16); Neuropathic ulcer of left foot, limited to breakdown of skin ([address] (11/3/2016); Neuropathic ulcer of toe ([address] (2014); Pleural effusion due to congestive heart failure ([address] Smoker; and Systolic CHF, acute ([address] (2013). He presents to the [facility] today for hyperbaric oxygen treatment of his Pitts 3 DFU, which is refractory to standard wound care therapy for 30 days. Patient denies fever. Patient denies nausea, vomiting or diarrhea; no ear troubles and no other new complaints; no fears or anxiety regarding treatment today. Objective     Vitals:    17 0810 17 1010   BP: 120/75 125/81   Pulse: 88 81   Resp: 16 16   Temp: 97.9 °F (36.6 °C) 98 °F (36.7 °C)   TempSrc: Oral Oral       General:  Alert, cooperative, no distress. Ears: External otic canals are within acceptable limits. TMs are well visualized. Teed Grade 0 on the right and 0 on the left pre-treatment. Teed Grade 0 on the right and 0 on the left post-treatment. Lungs:  Clear to auscultation bilaterally. Ulcer: Not examined today in HBO, if applicable.       Recent Labs      17   0756  17   0953  17   0753 11/21/17   [address] Myesha Vizcaino is a 46 y.o. male who presented to the [facility] today for hyperbaric oxygen treatment #19 of 30 for the diagnosis as stated above. Treatment given at 2 CAREY for 90 minutes, with no air breaks. Patient Active Problem List   Diagnosis Code    Hypertension I10    Type 2 diabetes mellitus with peripheral neuropathy (HCC) I40.13    Alcoholic cardiomyopathy C91.2    Automatic implantable cardioverter-defibrillator in situ Z95.810    Hypothyroidism E03.9    Hyperlipidemia E78.5    Onychomycosis B35.1    Dystrophic nail L60.3    Callus of foot L84    Hallux valgus M20.10    Acquired hypothyroidism E03.9    Diabetic ulcer of left foot associated with type 2 diabetes mellitus, with muscle involvement without evidence of necrosis (HCC) E11.621, L97.525       In my clinical judgement, ongoing HBO therapy is necessary at this time, given a threat to patient function, limb or life from the current condition. Plan     1. Hyperbaric Oxygen - Ruben Eagle tolerated the treatment well today without complications. Continue HBO treatment as outlined above. 2. Other -     I was present on these premises and immediately available to furnish assistance & direction throughout the procedure.      -- Electronically signed by Liliam Cortez MD on 11/21/2017 at 2:09 PM

## 2017-11-21 NOTE — PROGRESS NOTES
[address]  Hyperbaric Oxygen    Dick Hilario     : 1965    DATE OF VISIT:  2017    Subjective     Dick Hilario is a 46 y.o. male who  has a past medical history of Acute osteomyelitis of left foot ([address] (2017); Acute osteomyelitis of right foot ([address] (2016); Ascites; Blood transfusion reaction; Burst fracture of lumbar vertebra ([address] (2012); Cellulitis of left foot; Cellulitis of right lower extremity (, ); Diabetes ([address] Diabetic ulcer of right foot ([address] (early ); Diabetic ulcer of 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 2 diabetes mellitus, with necrosis of bone ([address] Fracture of tibial plateau (); MRSA (methicillin resistant staph aureus) culture positive (16, 16); Neuropathic ulcer of left foot, limited to breakdown of skin ([address] (11/3/2016); Neuropathic ulcer of toe ([address] (2014); Pleural effusion due to congestive heart failure ([address] Smoker; and Systolic CHF, acute ([address] (2013). He presents to the [facility] today for hyperbaric oxygen treatment of his Pitts 3 DFU, which is refractory to standard wound care therapy for 30 days. Patient denies fever. Patient denies nausea, vomiting or diarrhea; no ear troubles and no other new complaints; no fears or anxiety regarding treatment today. Objective     Vitals:    17 0818 17 1015   BP: 126/71 117/76   Pulse: 85 81   Resp: 16 16   Temp: 98 °F (36.7 °C) 97.7 °F (36.5 °C)   TempSrc: Oral Oral       General:  Alert, cooperative, no distress. Ears: External otic canals are within acceptable limits. TMs are well visualized. Teed Grade 0 on the right and 0 on the left pre-treatment. Teed Grade 0 on the right and 0 on the left post-treatment. Lungs:  Clear to auscultation bilaterally. Ulcer: Not examined today in HBO, if applicable.       Recent Labs      17   0756  17   0953  17   0753

## 2017-11-22 ENCOUNTER — HOSPITAL ENCOUNTER (OUTPATIENT)
Dept: WOUND CARE | Age: 52
Discharge: HOME | End: 2017-11-22
Admitting: INTERNAL MEDICINE

## 2017-11-22 VITALS
HEART RATE: 80 BPM | DIASTOLIC BLOOD PRESSURE: 67 MMHG | SYSTOLIC BLOOD PRESSURE: 111 MMHG | TEMPERATURE: 99 F | RESPIRATION RATE: 18 BRPM

## 2017-11-22 LAB
GLUCOSE BLD-MCNC: 151 MG/DL (ref 70–99)
GLUCOSE BLD-MCNC: 162 MG/DL (ref 70–99)
PERFORMED ON: ABNORMAL
PERFORMED ON: ABNORMAL

## 2017-11-22 PROCEDURE — 99183 HYPERBARIC OXYGEN THERAPY: CPT | Performed by: INTERNAL MEDICINE

## 2017-11-22 NOTE — PLAN OF CARE
Problem: Wound:  Goal: Will show signs of wound healing; wound closure and no evidence of infection  Will show signs of wound healing; wound closure and no evidence of infection   Instruct Pt to equilibrate ears   Assess Pts level of anxiety about HBO   Assess effects of HBO on DFU  Compliance with HBOT   Reinforce safety protocol with HBOT- pt aware of what can be taken into chamber and restricted items as well. Outcome: Ongoing  Pt to the [facility] for HBOT. Assessment completed and cleared for treatment. Pt to 2.0 CAREY at a rate of 2.0 psi. Pt denies complaints. Pt resting and watching tv. Nurse continues to monitor at bedside.

## 2017-11-24 NOTE — PROGRESS NOTES
[facility] Progress Note    Neel Gee     : 1965    DATE OF VISIT:  11/15/2017    Subjective:     Neel Gee is a 46 y.o. male who has a diabetic ulcer located on the left foot. Significant symptoms or pertinent wound history since last visit: feeling ok, minimal pain, mild drainage, no redness or swelling, tolerating HBOT well, still has some progress to make with glucose control. No F/C/D, no wound malodor. Has not yet called  to schedule an appt to have right insert adjusted. Additional ulcer(s) noted? no. Right side remains delicately healed. His current medication list consists of Blood Glucose Meter, Blood Glucose Monitoring Suppl, Insulin Pen Needle, Lancets, alogliptin, aspirin, carvedilol, furosemide, gabapentin, glipiZIDE, glucose blood VI test strips, insulin glargine, lisinopril, magnesium oxide, metFORMIN, pravastatin, sildenafil, spironolactone, therapeutic multivitamin-minerals, thyroid, and traMADol. Allergies: Bee venom    Objective:     Vitals:    11/15/17 1035   BP: 138/75   Pulse: 76   Resp: 16   Temp: 97.9 °F (36.6 °C)   TempSrc: Oral   Weight: 220 lb (99.8 kg)     AAOx3, NAD, overweight  Intact distal pulses, feet warm, good cap refill  Mild B/L lower leg edema; no cellulitis, angitis, fluctuance, acute arthritis  Stable hallux valgus and other forefoot neuropathic deformities  Right plantar ulcer delicately healed, mild hyperkeratosis  Vanesa-ulcer skin: Induration, Warmth and Pink, mild callus  Ulcer(s): granular, some fibrinous debris, depth stagnant however, but no palpable bone, no pus or malodor. Pre-debridement wound measurements are in the wound documentation flowsheet. Photos also saved in electronic chart.     Assessment:     Patient Active Problem List   Diagnosis Code    Hypertension I10    Type 2 diabetes mellitus with peripheral neuropathy (HCC) Z66.86    Alcoholic cardiomyopathy L42.8    Automatic implantable appeared-Wound Depth (cm) : 2  Wound 01/27/17 #8, Right Plantar,DFU Pitts 1, 1/25/17, pressure-Wound Depth (cm) : 0    The ulcers were then irrigated with normal saline solution. The procedure was completed with a small amount of bleeding, and hemostasis was by pressure. The patient tolerated the procedure well, with no significant complications. The patient's level of pain during and after the procedure was monitored, and is noted in the wound documentation flowsheet. Discharge plan:     Treatment in the wound care center today: Wound 07/13/17 #11, left plantar foot, DFU, Pitts 3, onset 7/12/17, gradually appeared-Dressing/Treatment: Dry dressing, 4x4 (roll gauze, cast paddiing, ace wrap). I applied a memory foam cushion over the healed right plantar ulcer today, lightly secured in place with Vahid. Needs to call [id number] to schedule a session to adjust his right shoe insert. Follow-up XR today or tomorrow. Keep focused on DM med and diet adherence. Continue HBOT for now. Home treatment: good handwashing before and after any dressing changes. Cleanse wound with saline or soap & water before dressing change. May use Vaseline (petrolatum), Aquaphor, Aveeno, CeraVe, Cetaphil, Eucerin, Lubriderm, etc for dry skin. Dressing type for home: Other: collagen, iodoform, gauze to left foot, daily. Written discharge instructions given to patient. Follow up in 1 week to see me, daily for HBOT.     Electronically signed by Anneliese Galeano MD on 11/23/2017 at 8:08 PM.

## 2017-11-24 NOTE — PROGRESS NOTES
[address]  Hyperbaric Oxygen    Nereida Almonte     : 1965    DATE OF VISIT:  2017    Subjective     Nereida Almonte is a 46 y.o. male who  has a past medical history of Acute osteomyelitis of left foot ([address] (2017); Acute osteomyelitis of right foot ([address] (2016); Ascites; Blood transfusion reaction; Burst fracture of lumbar vertebra ([address] (2012); Cellulitis of left foot; Cellulitis of right lower extremity (, ); Diabetes ([address] Diabetic ulcer of right foot ([address] (early ); Diabetic ulcer of 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 2 diabetes mellitus, with necrosis of bone ([address] Fracture of tibial plateau (5311); MRSA (methicillin resistant staph aureus) culture positive (16, 16); Neuropathic ulcer of left foot, limited to breakdown of skin ([address] (11/3/2016); Neuropathic ulcer of toe ([address] (2014); Pleural effusion due to congestive heart failure ([address] Smoker; and Systolic CHF, acute ([address] (2013). He presents to the [facility] today for hyperbaric oxygen treatment of his Pitts 3 DFU, which is refractory to standard wound care therapy for 30 days. Patient denies fever. Patient denies nausea, vomiting or diarrhea; no ear troubles and no other new complaints; no fears or anxiety regarding treatment today. Objective     Vitals:    17 0755 17 0950   BP: 138/83 (!) 141/92   Pulse: 97 81   Resp: 16 16   Temp: 97.7 °F (36.5 °C) 97.9 °F (36.6 °C)   TempSrc: Axillary Oral       General:  Alert, cooperative, no distress. Ears: External otic canals are within acceptable limits. TMs are well visualized. Teed Grade 0 on the right and 0 on the left pre-treatment. Teed Grade 0 on the right and 0 on the left post-treatment. Lungs:  Clear to auscultation bilaterally. Ulcer: Not examined today in HBO, if applicable.       Recent Labs      17   0804  17   1000   POCGLU [address] [facility] Jenae Crespo is a 46 y.o. male who presented to the [facility] today for hyperbaric oxygen treatment #23 of 30 for the diagnosis as stated above. Treatment given at 2 CAREY for 90 minutes, with no air breaks. Patient Active Problem List   Diagnosis Code    Hypertension I10    Type 2 diabetes mellitus with peripheral neuropathy (HCC) B96.44    Alcoholic cardiomyopathy G15.3    Automatic implantable cardioverter-defibrillator in situ Z95.810    Hypothyroidism E03.9    Hyperlipidemia E78.5    Onychomycosis B35.1    Dystrophic nail L60.3    Callus of foot L84    Hallux valgus M20.10    Acquired hypothyroidism E03.9    Diabetic ulcer of left foot associated with type 2 diabetes mellitus, with muscle involvement without evidence of necrosis (HCC) E11.621, L97.525       In my clinical judgement, ongoing HBO therapy is necessary at this time, given a threat to patient function, limb or life from the current condition. Plan     1. Hyperbaric Oxygen - Ruben Eagle tolerated the treatment well today without complications. Continue HBO treatment as outlined above. 2. Other -     I was present on these premises and immediately available to furnish assistance & direction throughout the procedure.      -- Electronically signed by Danisha Devi MD on 11/24/2017 at 6:17 PM

## 2017-11-24 NOTE — PROGRESS NOTES
[facility] Progress Note    Mahnaz Jeffries     : 1965    DATE OF VISIT:  2017    Subjective:     Mahnaz Jeffries is a 46 y.o. male who has a diabetic ulcer located on the left foot. Current complaint of pain in this ulcer? yes. Quality of pain: aching  Timing: intermittent  Severity: mild  Associated Signs/Symptoms:  drainage and numbness  Other significant symptoms or pertinent ulcer history: Mr. Nely Almanza had been scheduled to see me on Wednesday, but he stated there was purulent drainage on packing when his dressing was changed over the weekend. No malodor, no fever, no redness or swelling. Still has progress to make with glucose control. Additional ulcer(s) noted? no. Right plantar ulcer remains healed. Mr. Nely Almanza has a past medical history of Acute osteomyelitis of left foot ([address] Acute osteomyelitis of right foot ([address] Ascites; Blood transfusion reaction; Burst fracture of lumbar vertebra ([address] Cellulitis of left foot; Cellulitis of right lower extremity; Diabetes ([address] Diabetic ulcer of right foot ([address] Diabetic ulcer of 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 2 diabetes mellitus, with necrosis of bone ([address] Fracture of tibial plateau; MRSA (methicillin resistant staph aureus) culture positive; Neuropathic ulcer of left foot, limited to breakdown of skin ([address] Neuropathic ulcer of toe ([address] Pleural effusion due to congestive heart failure ([address] Smoker; and Systolic CHF, acute ([address] He has a past surgical history that includes knee surgery (Left); pacemaker placement; other surgical history (Bilateral, 9/17/15); Toe amputation; Foot Tendon Surgery (Right, ); Foot Tendon Surgery (Left, 2017); other surgical history (Left, 2017); and Foot surgery (Left, 2017).     His family history includes Cancer in his maternal grandfather and maternal grandmother; Heart Disease in his father, maternal grandfather, maternal grandmother, mother, paternal grandfather, and paternal grandmother; High Blood Pressure in his father, maternal grandfather, maternal grandmother, mother, paternal grandfather, and paternal grandmother; High Cholesterol in his father, maternal grandfather, maternal grandmother, mother, paternal grandfather, and paternal grandmother. Mr. Jose Manuel Austin reports that he quit smoking about 37 years ago. He has never used smokeless tobacco. He reports that he does not drink alcohol or use drugs. His current medication list consists of Blood Glucose Meter, Blood Glucose Monitoring Suppl, Insulin Pen Needle, Lancets, alogliptin, aspirin, carvedilol, furosemide, gabapentin, glipiZIDE, glucose blood VI test strips, insulin glargine, lisinopril, magnesium oxide, metFORMIN, pravastatin, sildenafil, spironolactone, therapeutic multivitamin-minerals, thyroid, and traMADol. Allergies: Bee venom    Pertinent items from the review of systems are discussed in the HPI; the remainder of the ROS was reviewed and is negative. Objective:     Vitals:    11/20/17 1012   BP: (!) 141/92   Pulse: 81   Resp: 16   Temp: 97.9 °F (36.6 °C)   TempSrc: Oral   Weight: 222 lb (100.7 kg)   Height: 5' 10\" (1.778 m)       Constitutional:  well-developed, well-nourished, overweight, NAD  Psychiatric:  oriented to person, place and time; mood and affect appropriate for the situation   Eyes:  pupils equal, round and reactive to light; sclerae anicteric, conjunctivae not pale  Cardiovascular:  bilateral pedal pulses palpable; no lower extremity edema  Lymphatic:  no inguinal or popliteal adenopathy, no cellulitis or angitis  Musculoskeletal:  no clubbing, cyanosis or petechiae; RLE and LLE with no gross effusions or acute arthritis; stable severe left hallux valgus  Vanesa-ulcer skin:  Induration, Warmth and Pink.   Ulcer(s):  visible portion red, granular, just serous exudate today, no clear palpable bone, and the volume of the wound cavity feels a bit less than last week, with the depth back down a couple of mm too. Today's ulcer measurements are in the electronic flowsheet. Photos also saved in electronic chart. Lab Results   Component Value Date    LABALBU 3.8 09/27/2017     Lab Results   Component Value Date    CREATININE 0.7 (L) 09/27/2017     Lab Results   Component Value Date    HGB 11.8 (L) 09/27/2017     Lab Results   Component Value Date    LABA1C 9.0 09/26/2017     XR reviewed -- difficult to completely rule out focal osteomyelitis at the cut end of the metatarsal, especially medially; laterally, there looks to be some evolving bony callus; recent depth of probing does seem to just about reach the cut end of bone, when laid out on the Xray. Assessment:     Patient Active Problem List   Diagnosis Code    Hypertension I10    Uncontrolled type 2 diabetes mellitus with diabetic polyneuropathy, with long-term current use of insulin ([facility]) E11.42, Z79.4, P94.70    Alcoholic cardiomyopathy E82.2    Automatic implantable cardioverter-defibrillator in situ Z95.810    Hypothyroidism E03.9    Hyperlipidemia E78.5    Onychomycosis B35.1    Dystrophic nail L60.3    Callus of foot L84    Hallux valgus M20.10    Acquired hypothyroidism E03.9    Diabetic ulcer of left foot associated with type 2 diabetes mellitus, with muscle involvement without evidence of necrosis ([facility]) E11.621, L97.525       Assessment of today's active condition(s): uncontrolled DM2, neuropathy, healed Pitts 1 neuropathic ulcer on the right plantar foot, healing Pitts 3 process on the right; I am not sure there was truly purulence over the weekend, perhaps just serous exudate, some fibrin, etc. No signs of soft tissue infection at all; possibility of osteo at the cut end of the metatarsal still remains, but it seems less likely than last week, by exam. Factors contributing to occurrence and/or persistence of the chronic ulcer include diabetes, poor glucose control, shear force and obesity. Medical necessity of today's visit is shown by the above documentation. Sharp debridement is not indicated today, based upon the exam findings in the wound(s) above. Discharge plan:     Treatment in the wound care center today: Wound 01/27/17 #8, Right Plantar,DFU Pitts 1, 1/25/17, pressure-Dressing/Treatment: Foam (placed per  )  Wound 07/13/17 #11, left plantar foot, DFU, Pitts 3, onset 7/12/17, gradually appeared-Dressing/Treatment: Other (Comment) (betadine,2x2,4x4,padmini). Continue HBOT for now, at least until wound depth decreases in a consistent manner and healing seems inevitable. Continue offloading with the Darco shoe and PegAssist insert on the left for now. He's still not contacted [address] [place] [address] [place] about getting his right insert adjusted, and needs to do that now. Durable healing there is far from guaranteed. Our HBO nurse inquired today about DM education classes, as she's seen some higher glucoses back in HBO recently; worryingly, Mr. Robbi Floyd responded that his DM isn't related to diet. ... We have a good bit of work to do, but I'll see what kind of progress we can make. After already losing several toes, and requiring HBOT a couple of times, I would have hoped he would have been more intent to see what he can do to prevent major limb loss. Home treatment: good handwashing before and after any dressing changes. Cleanse ulcer with saline or soap & water before dressing change. May use Vaseline (petrolatum), Aquaphor, Aveeno, CeraVe, Cetaphil, Eucerin, Lubriderm, etc for dry skin. Dressing type for home: Other: collagen, lightly packed iodoform, dry cover dressing, daily. Written discharge instructions given to patient. Follow up in 1 week to see me, daily for HBOT.     Electronically signed by Any John MD on 11/24/2017 at 6:26 PM.

## 2017-11-26 NOTE — PROGRESS NOTES
[address]  Hyperbaric Oxygen    Brigida Rothman     : 1965    DATE OF VISIT:  2017    Subjective     Brigida Rothman is a 46 y.o. male who  has a past medical history of Acute osteomyelitis of left foot ([address] (2017); Acute osteomyelitis of right foot ([address] (2016); Ascites; Blood transfusion reaction; Burst fracture of lumbar vertebra ([address] (2012); Cellulitis of left foot; Cellulitis of right lower extremity (, ); Diabetes ([address] Diabetic ulcer of right foot ([address] (early ); Diabetic ulcer of 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 2 diabetes mellitus, with necrosis of bone ([address] Fracture of tibial plateau (); MRSA (methicillin resistant staph aureus) culture positive (16, 16); Neuropathic ulcer of left foot, limited to breakdown of skin ([address] (11/3/2016); Neuropathic ulcer of toe ([address] (2014); Pleural effusion due to congestive heart failure ([address] Smoker; and Systolic CHF, acute ([address] (2013). He presents to the [facility] today for hyperbaric oxygen treatment of his Pitts 3 DFU, which is refractory to standard wound care therapy for 30 days. Patient denies fever. Patient denies nausea, vomiting or diarrhea; no ear troubles and no other new complaints; no fears or anxiety regarding treatment today. Objective     Vitals:    17 0805 17 1000   BP: 111/67 111/67   Pulse: 80 80   Resp: 18 18   Temp: 97.7 °F (36.5 °C) 99 °F (37.2 °C)   TempSrc: Oral Oral       General:  Alert, cooperative, no distress. Ears: External otic canals are within acceptable limits. TMs are well visualized. Teed Grade 0 on the right and 0 on the left pre-treatment. Teed Grade 0 on the right and 0 on the left post-treatment. Lungs:  Clear to auscultation bilaterally. Ulcer: Not examined today in HBO, if applicable. No results for input(s): POCGLU in the last 72 hours.     Assessment Anny Roa is a 46 y.o. male who presented to the [facility] today for hyperbaric oxygen treatment #25 of 30 for the diagnosis as stated above. Treatment given at 2 CAREY for 90 minutes, with no air breaks. Patient Active Problem List   Diagnosis Code    Hypertension I10    Uncontrolled type 2 diabetes mellitus with diabetic polyneuropathy, with long-term current use of insulin (HCC) E11.42, Z79.4, F54.94    Alcoholic cardiomyopathy C91.2    Automatic implantable cardioverter-defibrillator in situ Z95.810    Hypothyroidism E03.9    Hyperlipidemia E78.5    Onychomycosis B35.1    Dystrophic nail L60.3    Callus of foot L84    Hallux valgus M20.10    Acquired hypothyroidism E03.9    Diabetic ulcer of left foot associated with type 2 diabetes mellitus, with muscle involvement without evidence of necrosis (HCC) E11.621, L97.525       In my clinical judgement, ongoing HBO therapy is necessary at this time, given a threat to patient function, limb or life from the current condition. Adjunctive Rx, objective weekly progress and goals of Rx will generally be updated on Mondays. Plan     1. Hyperbaric Oxygen - Ruben Eagle tolerated the treatment well today without complications. Continue HBO treatment as outlined above. 2. Other -     I was present on these premises and immediately available to furnish assistance & direction throughout the procedure.      -- Electronically signed by Fina Carlson MD on 11/25/2017 at 9:32 PM

## 2017-11-27 ENCOUNTER — HOSPITAL ENCOUNTER (OUTPATIENT)
Dept: WOUND CARE | Age: 52
Discharge: HOME | End: 2017-11-27
Admitting: INTERNAL MEDICINE

## 2017-11-27 VITALS
DIASTOLIC BLOOD PRESSURE: 77 MMHG | HEART RATE: 83 BPM | SYSTOLIC BLOOD PRESSURE: 149 MMHG | TEMPERATURE: 97.7 F | RESPIRATION RATE: 16 BRPM

## 2017-11-27 LAB
GLUCOSE BLD-MCNC: 177 MG/DL (ref 70–99)
GLUCOSE BLD-MCNC: 257 MG/DL (ref 70–99)
PERFORMED ON: ABNORMAL
PERFORMED ON: ABNORMAL

## 2017-11-27 PROCEDURE — 99183 HYPERBARIC OXYGEN THERAPY: CPT | Performed by: INTERNAL MEDICINE

## 2017-11-27 NOTE — PROGRESS NOTES
[address]  Hyperbaric Oxygen    Henrietta Henry     : 1965    DATE OF VISIT:  2017    Subjective     Henrietta Henry is a 46 y.o. male who  has a past medical history of Acute osteomyelitis of left foot ([address] (2017); Acute osteomyelitis of right foot ([address] (2016); Ascites; Blood transfusion reaction; Burst fracture of lumbar vertebra ([address] (2012); Cellulitis of left foot; Cellulitis of right lower extremity (, ); Diabetes ([address] Diabetic ulcer of right foot ([address] (early ); Diabetic ulcer of 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 2 diabetes mellitus, with necrosis of bone ([address] Fracture of tibial plateau (2230); MRSA (methicillin resistant staph aureus) culture positive (16, 16); Neuropathic ulcer of left foot, limited to breakdown of skin ([address] (11/3/2016); Neuropathic ulcer of toe ([address] (2014); Pleural effusion due to congestive heart failure ([address] Smoker; and Systolic CHF, acute ([address] (2013). He presents to the [facility] today for hyperbaric oxygen treatment of his Pitts 3 DFU, which is refractory to standard wound care therapy for 30 days. Patient denies fever. Patient denies nausea, vomiting or diarrhea; no ear troubles and no other new complaints; no fears or anxiety regarding treatment today. Objective     Vitals:    17 0815 17 1010   BP: 136/79 (!) 149/77   Pulse: 79 83   Resp: 16 16   Temp: 97.7 °F (36.5 °C) 97.7 °F (36.5 °C)   TempSrc: Oral Oral       General:  Alert, cooperative, no distress. Ears: External otic canals are within acceptable limits. TMs are well visualized. Teed Grade 0 on the right and 0 on the left pre-treatment. Teed Grade 0 on the right and 0 on the left post-treatment. Lungs:  Clear to auscultation bilaterally. Ulcer: Not examined today in HBO, if applicable.       Recent Labs      17   0813  17   1015   POCGLU  257* [address] Anai Girard is a 46 y.o. male who presented to the [facility] today for hyperbaric oxygen treatment #26 of 30 for the diagnosis as stated above. Treatment given at 2 CAREY for 90 minutes, with no air breaks. Patient Active Problem List   Diagnosis Code    Hypertension I10    Uncontrolled type 2 diabetes mellitus with diabetic polyneuropathy, with long-term current use of insulin (HCC) E11.42, Z79.4, W19.85    Alcoholic cardiomyopathy Y87.5    Automatic implantable cardioverter-defibrillator in situ Z95.810    Hypothyroidism E03.9    Hyperlipidemia E78.5    Onychomycosis B35.1    Dystrophic nail L60.3    Callus of foot L84    Hallux valgus M20.10    Acquired hypothyroidism E03.9    Diabetic ulcer of left foot associated with type 2 diabetes mellitus, with muscle involvement without evidence of necrosis (HCC) E11.621, L97.525       In my clinical judgement, ongoing HBO therapy is necessary at this time, given a threat to patient function, limb or life from the current condition. Adjunctive Rx, objective weekly progress and goals of Rx will be updated after I next examine his wound, on Wednesday. May be useful to extend therapy beyond 30 treatments, based on wound exam, wound depth, and trajectory of measurements over these last few weeks. Plan     1. Hyperbaric Oxygen - Ruben Eagle tolerated the treatment well today without complications. Continue HBO treatment as outlined above. 2. Other - I gave him some refresher material on diabetic dietary principles today as well. Improved glycemic control can help in the short term with wound healing, and obviously in the long term, with a number of things. I was present on these premises and immediately available to furnish assistance & direction throughout the procedure.      -- Electronically signed by Boo Cueto MD on 11/27/2017 at 6:16 PM

## 2017-11-28 ENCOUNTER — HOSPITAL ENCOUNTER (OUTPATIENT)
Dept: WOUND CARE | Age: 52
Discharge: HOME | End: 2017-11-28
Admitting: INTERNAL MEDICINE

## 2017-11-28 VITALS
SYSTOLIC BLOOD PRESSURE: 134 MMHG | RESPIRATION RATE: 16 BRPM | DIASTOLIC BLOOD PRESSURE: 85 MMHG | TEMPERATURE: 98 F | HEART RATE: 81 BPM

## 2017-11-28 LAB
GLUCOSE BLD-MCNC: 144 MG/DL (ref 70–99)
GLUCOSE BLD-MCNC: 191 MG/DL (ref 70–99)
PERFORMED ON: ABNORMAL
PERFORMED ON: ABNORMAL

## 2017-11-28 PROCEDURE — 99183 HYPERBARIC OXYGEN THERAPY: CPT | Performed by: INTERNAL MEDICINE

## 2017-11-28 NOTE — PLAN OF CARE
Problem: Wound:  Goal: Will show signs of wound healing; wound closure and no evidence of infection  Will show signs of wound healing; wound closure and no evidence of infection   Instruct Pt to equilibrate ears   Assess Pts level of anxiety about HBO   Assess effects of HBO on DFU  Compliance with HBOT   Reinforce safety protocol with HBOT- pt aware of what can be taken into chamber and restricted items as well. Outcome: Ongoing  Patient seen for HBOT treatment  27 / 30 today. Patient assessment WNL and cleared for HBOT. Patient was compressed in HBO chamber to 2.0 CAREY at 2.0 psi/min. Patient is tolerating treatment well and is currently resting comfortably and watching television. HBO RN at chamber side, will continue to monitor.

## 2017-11-29 ENCOUNTER — HOSPITAL ENCOUNTER (OUTPATIENT)
Dept: WOUND CARE | Age: 52
Discharge: HOME | End: 2017-11-29
Attending: INTERNAL MEDICINE | Admitting: INTERNAL MEDICINE

## 2017-11-29 ENCOUNTER — HOSPITAL ENCOUNTER (OUTPATIENT)
Dept: WOUND CARE | Age: 52
Discharge: HOME | End: 2017-11-29
Admitting: INTERNAL MEDICINE

## 2017-11-29 VITALS
HEART RATE: 81 BPM | DIASTOLIC BLOOD PRESSURE: 90 MMHG | RESPIRATION RATE: 18 BRPM | TEMPERATURE: 98.7 F | SYSTOLIC BLOOD PRESSURE: 119 MMHG

## 2017-11-29 VITALS
SYSTOLIC BLOOD PRESSURE: 119 MMHG | BODY MASS INDEX: 31.81 KG/M2 | TEMPERATURE: 98.7 F | DIASTOLIC BLOOD PRESSURE: 90 MMHG | WEIGHT: 222.2 LBS | HEART RATE: 81 BPM | RESPIRATION RATE: 18 BRPM | HEIGHT: 70 IN

## 2017-11-29 LAB
GLUCOSE BLD-MCNC: 164 MG/DL (ref 70–99)
GLUCOSE BLD-MCNC: 204 MG/DL (ref 70–99)
PERFORMED ON: ABNORMAL
PERFORMED ON: ABNORMAL

## 2017-11-29 PROCEDURE — 99183 HYPERBARIC OXYGEN THERAPY: CPT | Performed by: EMERGENCY MEDICINE

## 2017-11-29 PROCEDURE — 97597 DBRDMT OPN WND 1ST 20 CM/<: CPT | Performed by: INTERNAL MEDICINE

## 2017-11-29 ASSESSMENT — PAIN SCALES - GENERAL: PAINLEVEL_OUTOF10: 0

## 2017-11-29 NOTE — PROGRESS NOTES
[address] [facility] Jo Ann is a 46 y.o. male who presented to the [facility] today for hyperbaric oxygen treatment #28 of 30 for the diagnosis as stated above. Treatment given at 2 CAREY for 90 minutes, with no air breaks. Patient Active Problem List   Diagnosis Code    Hypertension I10    Uncontrolled type 2 diabetes mellitus with diabetic polyneuropathy, with long-term current use of insulin (HCC) E11.42, Z79.4, K25.33    Alcoholic cardiomyopathy X91.2    Automatic implantable cardioverter-defibrillator in situ Z95.810    Hypothyroidism E03.9    Hyperlipidemia E78.5    Onychomycosis B35.1    Dystrophic nail L60.3    Callus of foot L84    Hallux valgus M20.10    Acquired hypothyroidism E03.9    Diabetic ulcer of left foot associated with type 2 diabetes mellitus, with muscle involvement without evidence of necrosis (HCC) E11.621, L97.525       In my clinical judgement, ongoing HBO therapy is necessary at this time, given a threat to patient function, limb or life from the current condition. Adjunctive Rx, objective weekly progress and goals of Rx will generally be updated on Mondays. Plan     1. Hyperbaric Oxygen - Ruben Eagle tolerated the treatment well today without complications. Continue HBO treatment as outlined above. 2. Other -     I was present on these premises and immediately available to furnish assistance & direction throughout the procedure.      -- Electronically signed by Joaquin Archuleta MD on 11/29/2017 at 11:53 AM

## 2017-11-30 NOTE — PROGRESS NOTES
[address]  Hyperbaric Oxygen    Telma Meredith     : 1965    DATE OF VISIT:  2017    Subjective     Telma Meredith is a 46 y.o. male who  has a past medical history of Acute osteomyelitis of left foot ([address] (2017); Acute osteomyelitis of right foot ([address] (2016); Ascites; Blood transfusion reaction; Burst fracture of lumbar vertebra ([address] (2012); Cellulitis of left foot; Cellulitis of right lower extremity (, ); Diabetes ([address] Diabetic ulcer of right foot ([address] (early ); Diabetic ulcer of 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 2 diabetes mellitus, with necrosis of bone ([address] Fracture of tibial plateau (5676); MRSA (methicillin resistant staph aureus) culture positive (16, 16); Neuropathic ulcer of left foot, limited to breakdown of skin ([address] (11/3/2016); Neuropathic ulcer of toe ([address] (2014); Pleural effusion due to congestive heart failure ([address] Smoker; and Systolic CHF, acute ([address] (2013). He presents to the [facility] today for hyperbaric oxygen treatment of his Pitts 3 DFU, which is refractory to standard wound care therapy for 30 days. Patient denies fever. Patient denies nausea, vomiting or diarrhea; no ear troubles and no other new complaints; no fears or anxiety regarding treatment today. Objective     Vitals:    17 0811 17 1010   BP: 124/76 134/85   Pulse: 86 81   Resp: 16 16   Temp: 97.8 °F (36.6 °C) 98 °F (36.7 °C)   TempSrc: Oral Oral       General:  Alert, cooperative, no distress. Ears: External otic canals are within acceptable limits. TMs are well visualized. Teed Grade 0 on the right and 0 on the left pre-treatment. Teed Grade 0 on the right and 0 on the left post-treatment. Lungs:  Clear to auscultation bilaterally. Ulcer: Not examined today in HBO, if applicable.       Recent Labs      17   0813  17   1015  17   1036 11/28/17   1007  11/29/17   0823  11/29/17   1014   POCGLU  257*  177*  191*  144*  204*  [address] Nanette Bal is a 46 y.o. male who presented to the [facility] today for hyperbaric oxygen treatment #27 of 30 for the diagnosis as stated above. Treatment given at 2 CAREY for 90 minutes, with no air breaks. Patient Active Problem List   Diagnosis Code    Hypertension I10    Uncontrolled type 2 diabetes mellitus with diabetic polyneuropathy, with long-term current use of insulin (HCC) E11.42, Z79.4, U69.85    Alcoholic cardiomyopathy I70.2    Automatic implantable cardioverter-defibrillator in situ Z95.810    Hypothyroidism E03.9    Hyperlipidemia E78.5    Onychomycosis B35.1    Dystrophic nail L60.3    Callus of foot L84    Hallux valgus M20.10    Acquired hypothyroidism E03.9    Diabetic ulcer of left foot associated with type 2 diabetes mellitus, with muscle involvement without evidence of necrosis (HCC) E11.621, L97.525       In my clinical judgement, ongoing HBO therapy is necessary at this time, given a threat to patient function, limb or life from the current condition. Adjunctive Rx, objective weekly progress and goals of Rx will generally be updated on Mondays. Plan     1. Hyperbaric Oxygen - Ruben Eagle tolerated the treatment well today without complications. Continue HBO treatment as outlined above. 2. Other -     I was present on these premises and immediately available to furnish assistance & direction throughout the procedure.      -- Electronically signed by Soraida Bates MD on 11/29/2017 at 8:43 PM

## 2017-12-01 ENCOUNTER — HOSPITAL ENCOUNTER (OUTPATIENT)
Dept: OTHER | Age: 52
Discharge: HOME | End: 2017-12-01
Attending: INTERNAL MEDICINE | Admitting: INTERNAL MEDICINE

## 2017-12-01 ENCOUNTER — TELEPHONE (OUTPATIENT)
Dept: FAMILY MEDICINE CLINIC | Age: 52
End: 2017-12-01

## 2017-12-01 ENCOUNTER — HOSPITAL ENCOUNTER (OUTPATIENT)
Dept: WOUND CARE | Age: 52
Discharge: HOME | End: 2017-12-31
Admitting: INTERNAL MEDICINE

## 2017-12-01 VITALS
SYSTOLIC BLOOD PRESSURE: 123 MMHG | RESPIRATION RATE: 16 BRPM | TEMPERATURE: 98.7 F | HEART RATE: 79 BPM | DIASTOLIC BLOOD PRESSURE: 80 MMHG

## 2017-12-01 LAB
GLUCOSE BLD-MCNC: 176 MG/DL (ref 70–99)
GLUCOSE BLD-MCNC: 222 MG/DL (ref 70–99)
PERFORMED ON: ABNORMAL
PERFORMED ON: ABNORMAL

## 2017-12-01 PROCEDURE — 99183 HYPERBARIC OXYGEN THERAPY: CPT | Performed by: INTERNAL MEDICINE

## 2017-12-01 NOTE — TELEPHONE ENCOUNTER
Pt dropped off \"  Therapeutic Shoes\" form to be completed by PCP. Last Seen 9/6/17  Future appt 12/6/17  I will place on [employer]'s desk. Please call when ready for .

## 2017-12-01 NOTE — PLAN OF CARE
Problem: Wound:  Goal: Will show signs of wound healing; wound closure and no evidence of infection  Will show signs of wound healing; wound closure and no evidence of infection   Instruct Pt to equilibrate ears   Assess Pts level of anxiety about HBO   Assess effects of HBO on DFU  Compliance with HBOT   Reinforce safety protocol with HBOT- pt aware of what can be taken into chamber and restricted items as well. Outcome: Ongoing  Patient seen for HBOT treatment  29 /30     today. Patient assessment WNL- AVSS, lungs CTA, TEED=0, FSBS 222 - cleared for HBOT. Patient was compressed in HBO chamber to 2.0 CAREY at 2.0 psi/minx 90 min treatment w/o air breaks. Patient is tolerating treatment well and is currently resting comfortably and watching television. HBO RN at chamber side, will continue to monitor.

## 2017-12-04 ENCOUNTER — HOSPITAL ENCOUNTER (OUTPATIENT)
Dept: WOUND CARE | Age: 52
Discharge: HOME | End: 2017-12-04
Attending: INTERNAL MEDICINE | Admitting: INTERNAL MEDICINE

## 2017-12-04 VITALS
SYSTOLIC BLOOD PRESSURE: 127 MMHG | BODY MASS INDEX: 32.35 KG/M2 | WEIGHT: 226 LBS | DIASTOLIC BLOOD PRESSURE: 77 MMHG | RESPIRATION RATE: 16 BRPM | TEMPERATURE: 96.6 F | HEIGHT: 70 IN | HEART RATE: 89 BPM

## 2017-12-04 PROCEDURE — 97597 DBRDMT OPN WND 1ST 20 CM/<: CPT | Performed by: INTERNAL MEDICINE

## 2017-12-04 NOTE — PLAN OF CARE
Problem: Wound:  Goal: Will show signs of wound healing; wound closure and no evidence of infection  Will show signs of wound healing; wound closure and no evidence of infection   Instruct Pt to equilibrate ears   Assess Pts level of anxiety about HBO   Assess effects of HBO on DFU  Compliance with HBOT   Reinforce safety protocol with HBOT- pt aware of what can be taken into chamber and restricted items as well. Outcome: Ongoing  Pt to the [address] [place] [address],[address] for follow up appointment. Dr Aletha Rubalcava to take patient to surgery on 12/7/17 for left foot. Pt to continue with present dressing regimen. Pt to continue with HBOT except on 12/7/17 and 12/8/17 due to surgery. Pt to follow up in the [address] [place] [address],[facility] Floor in 1 week with Dr Daryl Gilmore and Dr Aletha Rubalcava. Discharge instructions reviewed with patient, all questions answered, copy given to patient. Dressings were applied to all wounds per M.D. Instructions at this visit.

## 2017-12-05 NOTE — PROGRESS NOTES
[facility] Progress Note    Dick Hilario     : 1965    DATE OF VISIT:  2017    Subjective:     Dick Hilario is a 46 y.o. male who has a diabetic and neuropathic ulcer located on the B/L foot. Significant symptoms or pertinent wound history since last visit: has continue HBOT. No problems with dressings on the left side. Modest drainage, no pus or pain or fever or malodor. Additional ulcer(s) noted? yes. Right side opened up again, though he does have an appt on Friday at [facility] for an insert adjustment. His current medication list consists of Blood Glucose Meter, Blood Glucose Monitoring Suppl, Insulin Pen Needle, Lancets, alogliptin, aspirin, carvedilol, furosemide, gabapentin, glipiZIDE, glucose blood VI test strips, insulin glargine, lisinopril, magnesium oxide, metFORMIN, pravastatin, sildenafil, spironolactone, therapeutic multivitamin-minerals, thyroid, and traMADol. Allergies: Bee venom    Objective:     Vitals:    17 1025   BP: (!) 119/90   Pulse: 81   Resp: 18   Temp: 98.7 °F (37.1 °C)   TempSrc: Oral   Weight: 222 lb 3.2 oz (100.8 kg)   Height: 5' 10\" (1.778 m)     AAOx3, overweight, NAD  Intact distal pulses, feet warm, good cap refill, trace LE edema  No cellulitis, angitis, fluctuance, acute arthritis; stable neuropathic forefoot deformities (mostly hallux valgus L>R and plantar deviation of central met heads)  Vanesa-ulcer skin: Induration, Warmth, Callus and Pink. Ulcer(s): right side small, red, minor skin-edge and fibrinous necrosis; left side small in cross section, initially seemed only to probe in a few mm, but then quickly gave way to its prior 2 cm depth, with some fibrinous necrosis, no definite palpable bone, but laterally definitely a sense that the texture of tissue was different than the granulation elsewhere. Pre-debridement wound measurements are in the wound documentation flowsheet.  Photos also saved in electronic chart.    Assessment:     Patient Active Problem List   Diagnosis Code    Hypertension I10    Uncontrolled type 2 diabetes mellitus with diabetic polyneuropathy, with long-term current use of insulin ([facility]) E11.42, Z79.4, Z43.04    Alcoholic cardiomyopathy B06.0    Automatic implantable cardioverter-defibrillator in situ Z95.810    Hypothyroidism E03.9    Hyperlipidemia E78.5    Onychomycosis B35.1    Dystrophic nail L60.3    Callus of foot L84    Hallux valgus M20.10    Diabetic ulcer of right foot associated with type 2 diabetes mellitus, limited to breakdown of skin ([facility]) E11.621, L97.511    Acquired hypothyroidism E03.9    Diabetic ulcer of left foot associated with type 2 diabetes mellitus, with muscle involvement without evidence of necrosis ([facility]) E11.621, L97.525       Assessment of today's active condition(s): uncontrolled DM2, neuropathy, recurrent Pitts 1 right DFU and persistent Pitts 3 left DFU despite met head resection and post-op oral Abx -- suspect either a small focus of residual chronic osteo, or just a small focus of fibronecrotic deep soft tissue that can't be excised through the current wound. Factors contributing to occurrence and/or persistence of the chronic ulcer include diabetes, poor glucose control, chronic pressure, shear force, obesity, decreased tissue oxygenation and non-adherence. Medical necessity of today's visit is shown by the above documentation. Sharp debridement is indicated today, based upon the exam findings in the wound(s) above. Procedure note:     Consent obtained. Time out performed per [facility] policy. Anesthetic  Anesthetic: Other (comment) (4% lidocaine)     Using a curette and #15 blade scalpel, I sharply debrided the B/L foot ulcer(s) down through and including the removal of dermis. The type(s) of tissue debrided included fibrin, necrotic/eschar and callus. Total Surface Area Debrided: 1 sq cm.     Post  Debridement Measurements:  Wound 07/13/17 #11, left plantar foot, DFU, Pitts 3, onset 7/12/17, gradually appeared-Wound Length (cm): 0.5 cm  Wound 01/27/17 #8, Right Plantar,DFU Pitts 1, 1/25/17, pressure-Wound Length (cm): 0.7 cm    Wound 07/13/17 #11, left plantar foot, DFU, Pitts 3, onset 7/12/17, gradually appeared-Wound Width (cm): 0.3 cm  Wound 01/27/17 #8, Right Plantar,DFU Pitts 1, 1/25/17, pressure-Wound Width (cm): 0.3 cm    Wound 07/13/17 #11, left plantar foot, DFU, Pitts 3, onset 7/12/17, gradually appeared-Wound Depth (cm) : 2  Wound 01/27/17 #8, Right Plantar,DFU Pitts 1, 1/25/17, pressure-Wound Depth (cm) : 0.1    The ulcers were then irrigated with normal saline solution. The procedure was completed with a small amount of bleeding, and hemostasis was by pressure. The patient tolerated the procedure well, with no significant complications. The patient's level of pain during and after the procedure was monitored, and is noted in the wound documentation flowsheet. Discharge plan:     Treatment in the wound care center today: Wound 07/13/17 #11, left plantar foot, DFU, Pitts 3, onset 7/12/17, gradually appeared-Dressing/Treatment: Other (Comment) ([id number],,padmini-)  Wound 01/27/17 #8, Right Plantar,DFU Pitts 1, 1/25/17, pressure-Dressing/Treatment: Antibacterial Ointment, Dry dressing, Foam.    F/U at [facility] on Friday re: adjusting his right shoe and insert; F/U for left-sided work once healed there. If the right side doesn't heal soon with his custom insert, will have to revert to a TCC or football dressing; Darco shoe and PegAssist insert hasn't worked as well for him. Still needs to get more on top of his glucose control and diet. Home treatment: good handwashing before and after any dressing changes. Cleanse wound with saline or soap & water before dressing change. May use Vaseline (petrolatum), Aquaphor, Aveeno, CeraVe, Cetaphil, Eucerin, Lubriderm, etc for dry skin.      Dressing type for home: Other: collagen and gauze to

## 2017-12-05 NOTE — PRE-PROCEDURE INSTRUCTIONS
surgery. 14.  If you have a Living Will and Durable Power of  for Healthcare, please bring in a copy. 13.  Notify your Surgeon if you develop any illness between now and surgery time; cough, cold, fever, sore throat, nausea, vomiting, etc.  Please notify your surgeon if you experience dizziness, shortness of breath or blurred vision between now and the time of your surgery. 16.  DO NOT shave your operative site 96 hours (4 days) prior to surgery. For face and neck surgery, men may use an electric razor 48 hours (2 days) prior to surgery. 17. Shower the night before surgery and the morning of surgery with  an antibacterial soap   or  Chlorhexidine gluconate (for total joint replacement). To provide excellent care, visitors will be limited to two in a room at any given time. Please no children under the age of 15 in the surgical department.

## 2017-12-05 NOTE — PROGRESS NOTES
88 [facility] Progress Note      Dinorah Mccarthy     : 1965    DATE OF VISIT:  2017    Subjective:     Dinorah Mccarthy is a 46 y.o. male who has a chief complaint of a diabetic ulcer located on the bilateral foot. Patient is regularly followed in [facility] by Dr. Brooke Guerrero. Left foot wound had started to heal and depth had decreased. However last week depth greatly increased again. Therefore I am asked for consult for possible surgical intervention. Current complaint of pain in this ulcer? No.      Mr. Lindajo Brittle has a past medical history of Acute osteomyelitis of left foot ([address] Acute osteomyelitis of right foot ([address] Ascites; Blood transfusion reaction; Burst fracture of lumbar vertebra ([address] Cellulitis of left foot; Cellulitis of right lower extremity; Diabetes ([address] Diabetic ulcer of right foot ([address] Diabetic ulcer of 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 2 diabetes mellitus, with necrosis of bone ([address] Fracture of tibial plateau; MRSA (methicillin resistant staph aureus) culture positive; Neuropathic ulcer of left foot, limited to breakdown of skin ([address] Neuropathic ulcer of toe ([address] Pleural effusion due to congestive heart failure ([address] Smoker; and Systolic CHF, acute ([address] He has a past surgical history that includes knee surgery (Left); pacemaker placement; other surgical history (Bilateral, 9/17/15); Toe amputation; Foot Tendon Surgery (Right, ); Foot Tendon Surgery (Left, 2017); other surgical history (Left, 2017); and Foot surgery (Left, 2017).     His family history includes Cancer in his maternal grandfather and maternal grandmother; Heart Disease in his father, maternal grandfather, maternal grandmother, mother, paternal grandfather, and paternal grandmother; High Blood Pressure in his father, maternal grandfather, maternal grandmother, mother, paternal grandfather, and paternal grandmother; High Cholesterol in his father, maternal grandfather, maternal grandmother, mother, paternal grandfather, and paternal grandmother. Mr. Ludy Crystal reports that he quit smoking about 37 years ago. He has never used smokeless tobacco. He reports that he does not drink alcohol or use drugs. His current medication list consists of Blood Glucose Meter, Blood Glucose Monitoring Suppl, Insulin Pen Needle, Lancets, alogliptin, aspirin, carvedilol, furosemide, gabapentin, glipiZIDE, glucose blood VI test strips, insulin glargine, lisinopril, magnesium oxide, metFORMIN, pravastatin, sildenafil, spironolactone, therapeutic multivitamin-minerals, thyroid, and traMADol. Allergies: Bee venom    Pertinent items from the review of systems are discussed in the HPI; the remainder of the ROS was reviewed and is negative. Objective:     /77   Pulse 89   Temp 96.6 °F (35.9 °C) (Oral)   Resp 16   Ht 5' 10\" (1.778 m)   Wt 226 lb (102.5 kg)   BMI 32.43 kg/m²   General Appearance: alert and oriented to person, place and time, well developed and well- nourished, in no acute distress  Skin: warm and dry, no rash or erythema  Head: normocephalic and atraumatic  Eyes: pupils equal, round, and reactive to light, extraocular eye movements intact, conjunctivae normal  ENT: tympanic membrane, external ear and ear canal normal bilaterally, nose without deformity, nasal mucosa and turbinates normal without polyps  Neck: supple and non-tender without mass, no thyromegaly or thyroid nodules, no cervical lymphadenopathy  Pulmonary/Chest: clear to auscultation bilaterally- no wheezes, rales or rhonchi, normal air movement, no respiratory distress  Cardiovascular: normal rate, regular rhythm, normal S1 and S2, no murmurs, rubs, clicks, or gallops, distal pulses intact, no carotid bruits  Abdomen: soft, non-tender, non-distended, normal bowel sounds, no masses or organomegaly.      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 Brooke Guerrero. Today's ulcer measurements are in the wound documentation flowsheet. Wound 07/13/17 #11, left plantar foot, DFU, Pitts 3, onset 7/12/17, gradually appeared-Wound Length (cm): 0.4 cm  Wound 01/27/17 #8, Right Plantar,DFU Pitts 1, 1/25/17, pressure-Wound Length (cm): 0.6 cm  Wound 07/13/17 #11, left plantar foot, DFU, Pitts 3, onset 7/12/17, gradually appeared-Wound Width (cm): 0.3 cm  Wound 01/27/17 #8, Right Plantar,DFU Pitts 1, 1/25/17, pressure-Wound Width (cm): 0.4 cm  Wound 07/13/17 #11, left plantar foot, DFU, Pitts 3, onset 7/12/17, gradually appeared-Wound Depth (cm) : 0.6  Wound 01/27/17 #8, Right Plantar,DFU Pitts 1, 1/25/17, pressure-Wound Depth (cm) : 0.4    LABS  Lab Results   Component Value Date    LABA1C 9.0 09/26/2017     Xray left foot -   The distal end of the 2nd metatarsal has a permeates hip up appearance   worrisome for recurrent osteomyelitis. Prominent callus formation is present   about the head of this bone. Moderate soft tissue swelling about the   forefoot is present. Moderate degenerative changes are present at the 1st   metatarsophalangeal joint. Severe hallux valgus deformity present. No soft   tissue gas for finding worrisome for abscess. No other significant   abnormality. Small vessel arterial calcifications noted.            Impression   Recurrent or residual osteomyelitis involving the distal shaft of the 2nd   metatarsal.         Assessment:     Patient Active Problem List   Diagnosis Code    Hypertension I10    Uncontrolled type 2 diabetes mellitus with diabetic polyneuropathy, with long-term current use of insulin ([facility]) E11.42, Z79.4, X96.93    Alcoholic cardiomyopathy A16.1    Automatic implantable cardioverter-defibrillator in situ Z95.810    Hypothyroidism E03.9    Hyperlipidemia E78.5    Onychomycosis B35.1    Dystrophic nail L60.3    Callus of foot L84    Hallux valgus M20.10    Diabetic ulcer of right foot associated with type 2 diabetes mellitus, limited to breakdown of skin ([facility]) E11.621, L97.511    Acquired hypothyroidism E03.9    Diabetic ulcer of left foot associated with type 2 diabetes mellitus, with muscle involvement without evidence of necrosis ([facility]) E11.621, L97.525       Assessment of today's active condition(s): diabetic foot ulcer bilateral, osteomyelitis right foot, diabetes mellitus uncontrolled. Factors contributing to occurrence and/or persistence of the chronic ulcer include diabetes, poor glucose control and chronic pressure. Sharp debridement is not indicated today, based upon the exam findings in the ulcer(s) above. Discharge plan:     Treatment in the wound care center today: Wound 07/13/17 #11, left plantar foot, DFU, Pitts 3, onset 7/12/17, gradually appeared-Dressing/Treatment: Other (Comment) (sensicare,wound gel,iodarform,4x4,cast padding,kerlix,)  Wound 01/27/17 #8, Right Plantar,DFU Pitts 1, 1/25/17, pressure-Dressing/Treatment: Other (Comment) (blue foam, michelle, 4x4, kerlix). Home treatment: good handwashing before and after any dressing changes. Cleanse ulcer with saline or soap & water before dressing change. May use Vaseline (petrolatum), Aquaphor, Aveeno, CeraVe, Cetaphil, Eucerin, Lubriderm, etc for dry skin. Dressing type for home: Continue wound care orders as per Dr. Gunnar Alegre.    Written discharge instructions given to patient. Offload ulcer(s) as directed. Elevate leg(s) as directed. Follow up in 1 week. Reviewed xrays today. Patient has a wound that started to improve and then depth greatly increased again. Therefore I agree with Dr. Kailee Martínez that he will need surgical intervention. Explained to patient that it is difficult to determine exactly what needs to be excised at this point. Explained risks, complications, alternatives and benefits with patient. Understands chance of nonhealing wound, infection, need for further surgery, loss of limb or life.              Electronically signed by Izabella Bass DPM on 12/5/2017 at 8:27 AM.

## 2017-12-06 ENCOUNTER — HOSPITAL ENCOUNTER (OUTPATIENT)
Dept: WOUND CARE | Age: 52
Discharge: HOME | End: 2017-12-06
Admitting: INTERNAL MEDICINE

## 2017-12-06 VITALS
DIASTOLIC BLOOD PRESSURE: 81 MMHG | SYSTOLIC BLOOD PRESSURE: 133 MMHG | TEMPERATURE: 98 F | RESPIRATION RATE: 18 BRPM | HEART RATE: 80 BPM

## 2017-12-06 LAB
GLUCOSE BLD-MCNC: 161 MG/DL (ref 70–99)
PERFORMED ON: ABNORMAL

## 2017-12-06 PROCEDURE — 99183 HYPERBARIC OXYGEN THERAPY: CPT | Performed by: EMERGENCY MEDICINE

## 2017-12-06 NOTE — PLAN OF CARE
Problem: Wound:  Goal: Will show signs of wound healing; wound closure and no evidence of infection  Will show signs of wound healing; wound closure and no evidence of infection   Instruct Pt to equilibrate ears   Assess Pts level of anxiety about HBO   Assess effects of HBO on DFU  Compliance with HBOT   Reinforce safety protocol with HBOT- pt aware of what can be taken into chamber and restricted items as well. Outcome: Ongoing  Pt to the [facility] for HBOT. Assessment competed and pt cleared for treatment. Pt to 2.0 AT at rate of 2.0 psi. Pt denies complaints, watching tv. Nurse at chamber side and will continue to monitor.

## 2017-12-07 ENCOUNTER — HOSPITAL ENCOUNTER (OUTPATIENT)
Dept: SURGERY | Age: 52
Discharge: HOME | End: 2017-12-07
Attending: PODIATRIST | Admitting: PODIATRIST

## 2017-12-07 VITALS
SYSTOLIC BLOOD PRESSURE: 127 MMHG | WEIGHT: 226 LBS | DIASTOLIC BLOOD PRESSURE: 78 MMHG | HEART RATE: 73 BPM | TEMPERATURE: 97.9 F | OXYGEN SATURATION: 100 % | HEIGHT: 70 IN | RESPIRATION RATE: 12 BRPM | BODY MASS INDEX: 32.35 KG/M2

## 2017-12-07 LAB
GLUCOSE BLD-MCNC: 210 MG/DL (ref 70–99)
PERFORMED ON: ABNORMAL

## 2017-12-07 RX ORDER — PROMETHAZINE HYDROCHLORIDE 25 MG/ML
6.25 INJECTION, SOLUTION INTRAMUSCULAR; INTRAVENOUS
Status: ACTIVE | OUTPATIENT
Start: 2017-12-07 | End: 2017-12-07

## 2017-12-07 RX ORDER — OXYCODONE HYDROCHLORIDE AND ACETAMINOPHEN 5; 325 MG/1; MG/1
2 TABLET ORAL PRN
Status: COMPLETED | OUTPATIENT
Start: 2017-12-07 | End: 2017-12-07

## 2017-12-07 RX ORDER — MORPHINE SULFATE 4 MG/ML
1 INJECTION, SOLUTION INTRAMUSCULAR; INTRAVENOUS EVERY 5 MIN PRN
Status: DISCONTINUED | OUTPATIENT
Start: 2017-12-07 | End: 2017-12-08 | Stop reason: HOSPADM

## 2017-12-07 RX ORDER — ONDANSETRON 2 MG/ML
4 INJECTION INTRAMUSCULAR; INTRAVENOUS
Status: ACTIVE | OUTPATIENT
Start: 2017-12-07 | End: 2017-12-07

## 2017-12-07 RX ORDER — SODIUM CHLORIDE 0.9 % (FLUSH) 0.9 %
10 SYRINGE (ML) INJECTION PRN
Status: DISCONTINUED | OUTPATIENT
Start: 2017-12-07 | End: 2017-12-08 | Stop reason: HOSPADM

## 2017-12-07 RX ORDER — DIPHENHYDRAMINE HYDROCHLORIDE 50 MG/ML
12.5 INJECTION INTRAMUSCULAR; INTRAVENOUS
Status: ACTIVE | OUTPATIENT
Start: 2017-12-07 | End: 2017-12-07

## 2017-12-07 RX ORDER — LIDOCAINE HYDROCHLORIDE 10 MG/ML
0.3 INJECTION, SOLUTION EPIDURAL; INFILTRATION; INTRACAUDAL; PERINEURAL
Status: ACTIVE | OUTPATIENT
Start: 2017-12-07 | End: 2017-12-07

## 2017-12-07 RX ORDER — OXYCODONE HYDROCHLORIDE AND ACETAMINOPHEN 5; 325 MG/1; MG/1
1-2 TABLET ORAL EVERY 4 HOURS PRN
Qty: 30 TABLET | Refills: 0 | Status: SHIPPED | OUTPATIENT
Start: 2017-12-07 | End: 2017-12-14

## 2017-12-07 RX ORDER — SODIUM CHLORIDE, SODIUM LACTATE, POTASSIUM CHLORIDE, CALCIUM CHLORIDE 600; 310; 30; 20 MG/100ML; MG/100ML; MG/100ML; MG/100ML
INJECTION, SOLUTION INTRAVENOUS CONTINUOUS
Status: DISCONTINUED | OUTPATIENT
Start: 2017-12-07 | End: 2017-12-08 | Stop reason: HOSPADM

## 2017-12-07 RX ORDER — HYDRALAZINE HYDROCHLORIDE 20 MG/ML
5 INJECTION INTRAMUSCULAR; INTRAVENOUS EVERY 10 MIN PRN
Status: DISCONTINUED | OUTPATIENT
Start: 2017-12-07 | End: 2017-12-08 | Stop reason: HOSPADM

## 2017-12-07 RX ORDER — OXYCODONE HYDROCHLORIDE AND ACETAMINOPHEN 5; 325 MG/1; MG/1
1 TABLET ORAL PRN
Status: COMPLETED | OUTPATIENT
Start: 2017-12-07 | End: 2017-12-07

## 2017-12-07 RX ORDER — LABETALOL HYDROCHLORIDE 5 MG/ML
5 INJECTION, SOLUTION INTRAVENOUS EVERY 10 MIN PRN
Status: DISCONTINUED | OUTPATIENT
Start: 2017-12-07 | End: 2017-12-08 | Stop reason: HOSPADM

## 2017-12-07 RX ORDER — HYDROMORPHONE HCL 110MG/55ML
0.5 PATIENT CONTROLLED ANALGESIA SYRINGE INTRAVENOUS EVERY 5 MIN PRN
Status: DISCONTINUED | OUTPATIENT
Start: 2017-12-07 | End: 2017-12-08 | Stop reason: HOSPADM

## 2017-12-07 RX ORDER — HYDROMORPHONE HCL 110MG/55ML
0.25 PATIENT CONTROLLED ANALGESIA SYRINGE INTRAVENOUS EVERY 5 MIN PRN
Status: DISCONTINUED | OUTPATIENT
Start: 2017-12-07 | End: 2017-12-08 | Stop reason: HOSPADM

## 2017-12-07 RX ORDER — SODIUM CHLORIDE 0.9 % (FLUSH) 0.9 %
10 SYRINGE (ML) INJECTION EVERY 12 HOURS SCHEDULED
Status: DISCONTINUED | OUTPATIENT
Start: 2017-12-07 | End: 2017-12-08 | Stop reason: HOSPADM

## 2017-12-07 RX ORDER — MORPHINE SULFATE 10 MG/ML
2 INJECTION, SOLUTION INTRAMUSCULAR; INTRAVENOUS EVERY 5 MIN PRN
Status: DISCONTINUED | OUTPATIENT
Start: 2017-12-07 | End: 2017-12-08 | Stop reason: HOSPADM

## 2017-12-07 RX ORDER — MEPERIDINE HYDROCHLORIDE 25 MG/ML
12.5 INJECTION INTRAMUSCULAR; INTRAVENOUS; SUBCUTANEOUS EVERY 5 MIN PRN
Status: DISCONTINUED | OUTPATIENT
Start: 2017-12-07 | End: 2017-12-08 | Stop reason: HOSPADM

## 2017-12-07 RX ADMIN — SODIUM CHLORIDE, SODIUM LACTATE, POTASSIUM CHLORIDE, CALCIUM CHLORIDE: 600; 310; 30; 20 INJECTION, SOLUTION INTRAVENOUS at 09:17

## 2017-12-07 RX ADMIN — OXYCODONE HYDROCHLORIDE AND ACETAMINOPHEN 1 TABLET: 5; 325 TABLET ORAL at 11:12

## 2017-12-07 ASSESSMENT — PAIN SCALES - GENERAL
PAINLEVEL_OUTOF10: 0
PAINLEVEL_OUTOF10: 4

## 2017-12-07 ASSESSMENT — PAIN - FUNCTIONAL ASSESSMENT: PAIN_FUNCTIONAL_ASSESSMENT: 0-10

## 2017-12-07 NOTE — ANESTHESIA PRE-OP
Department of Anesthesiology  Preprocedure Note       Name:  Rosalina Bailon   Age:  46 y.o.  :  1965                                          MRN:  [id number]         Date:  2017      Surgeon:    Procedure:    Medications prior to admission:   Prior to Admission medications    Medication Sig Start Date End Date Taking? Authorizing Provider   thyroid [facility] THYROID) 60 MG tablet Take 1 tablet by mouth daily 10/25/17 10/25/18 Yes Darren Sena MD   Blood Glucose Monitoring Suppl GABI Measure glucose before breakfast, before dinner and at bedtime daily (we will check at lunchtime at HBO therapy).  10/25/17  Yes Albert Caban MD   glipiZIDE (GLUCOTROL) 5 MG tablet Take 1 tablet by mouth 2 times daily (before meals) 17  Yes Leann Kiran MD   metFORMIN (GLUCOPHAGE) 1000 MG tablet Take 1 tablet by mouth 2 times daily (with meals) 17  Yes Leann Kiran MD   traMADol [place]) 50 MG tablet Take 1 tablet by mouth every 8 hours as needed for Pain 17  Yes Leann Kiran MD   gabapentin (NEURONTIN) 300 MG capsule Take 1 capsule by mouth 2 times daily 17  Yes Leann Kiran MD   insulin glargine (TOUJEO SOLOSTAR) 300 UNIT/ML injection pen Inject 60 Units into the skin nightly  Patient taking differently: Inject 75 Units into the skin nightly  17  Yes Leann Kiran MD   carvedilol (COREG) 12.5 MG tablet TAKE ONE TABLET BY MOUTH TWICE DAILY 17  Yes Leann Kiran MD   pravastatin (PRAVACHOL) 20 MG tablet TAKE ONE TABLET BY MOUTH ONE TIME DAILY 17  Yes Leann Kiran MD   furosemide (LASIX) 40 MG tablet TAKE ONE TABLET BY MOUTH TWICE DAILY 17  Yes Leann Kiran MD   lisinopril (PRINIVIL;ZESTRIL) 5 MG tablet Take 1 tablet by mouth daily 17  Yes Leann Kiran MD   alogliptin (NESINA) 25 MG TABS tablet Take 1 tablet by mouth daily 17  Yes Leann Kiran MD   spironolactone (ALDACTONE) 25 MG tablet Take 1 tablet by mouth daily 17 TWICE DAILY 180 tablet 3    pravastatin (PRAVACHOL) 20 MG tablet TAKE ONE TABLET BY MOUTH ONE TIME DAILY 90 tablet 3    furosemide (LASIX) 40 MG tablet TAKE ONE TABLET BY MOUTH TWICE DAILY 180 tablet 3    lisinopril (PRINIVIL;ZESTRIL) 5 MG tablet Take 1 tablet by mouth daily 90 tablet 3    alogliptin (NESINA) 25 MG TABS tablet Take 1 tablet by mouth daily 90 tablet 3    spironolactone (ALDACTONE) 25 MG tablet Take 1 tablet by mouth daily 90 tablet 3    magnesium oxide (MAG-OX) 400 (241.3 Mg) MG TABS tablet Take 1 tablet by mouth daily (Patient taking differently: Take 400 mg by mouth 2 times daily ) 60 tablet 11    UNIFINE PENTIPS 31G X 5 MM MISC USE 1 EACH DAY AS NEEDED FOR TESTING 100 each 5    Multiple Vitamins-Minerals (THERAPEUTIC MULTIVITAMIN-MINERALS) tablet Take 1 tablet by mouth daily      Blood Glucose Monitoring Suppl (BLOOD GLUCOSE METER) KIT Use to test blood sugars. 1 kit 0    Lancets MISC Use to test blood sugars once daily. 100 each 3    aspirin 81 MG tablet Take 81 mg by mouth daily.  ONE TOUCH ULTRA TEST strip Test blood sugar once daily. 50 strip 5    sildenafil (VIAGRA) 100 MG tablet Take 1 tablet by mouth as needed for Erectile Dysfunction. 13 tablet 3     Current Facility-Administered Medications   Medication Dose Route Frequency Provider Last Rate Last Dose    ceFAZolin (ANCEF) 2 g in sterile water 20 mL IV syringe  2 g Intravenous Once Bruce Winston DPM        lactated ringers infusion   Intravenous Continuous Emerita Angela MD        sodium chloride flush 0.9 % injection 10 mL  10 mL Intravenous 2 times per day Emerita Angela MD        sodium chloride flush 0.9 % injection 10 mL  10 mL Intravenous PRN Emerita Angela MD        lidocaine PF 1 % injection 0.3 mL  0.3 mL Intradermal Once PRN Emerita Angela MD           Allergies:     Allergies   Allergen Reactions    Bee Venom Swelling and Rash       Problem List:    Patient Active Problem List   Diagnosis Code    Hypertension I10    Uncontrolled type 2 diabetes mellitus with diabetic polyneuropathy, with long-term current use of insulin ([facility]) E11.42, Z79.4, C34.83    Alcoholic cardiomyopathy T29.5    Automatic implantable cardioverter-defibrillator in situ Z95.810    Hypothyroidism E03.9    Hyperlipidemia E78.5    Onychomycosis B35.1    Dystrophic nail L60.3    Callus of foot L84    Hallux valgus M20.10    Diabetic ulcer of right foot associated with type 2 diabetes mellitus, limited to breakdown of skin ([facility]) E11.621, L97.511    Acquired hypothyroidism E03.9    Diabetic ulcer of left foot associated with type 2 diabetes mellitus, with muscle involvement without evidence of necrosis ([address] E11.621, L97.525       Past Medical History:        Diagnosis Date    Acute osteomyelitis of left foot ([address] 9/27/2017    Acute osteomyelitis of right foot ([address] 4/25/2016    Ascites     Blood transfusion reaction     Burst fracture of lumbar vertebra ([facility]) 11/9/2012    Cellulitis of left foot     Cellulitis of right lower extremity 2/16, 5/16    Diabetes ([address]     Diabetic ulcer of right foot ([address] early 2016    Diabetic ulcer of 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 2 diabetes mellitus, with necrosis of bone ([facility])     Fracture of tibial plateau 85/1/7663    MRSA (methicillin resistant staph aureus) culture positive 4/28/16, 4/20/16    foot wound    Neuropathic ulcer of left foot, limited to breakdown of skin ([address] 11/3/2016    Neuropathic ulcer of toe ([address] 2/13/2014    Pleural effusion due to congestive heart failure ([facility])     Smoker     Systolic CHF, acute ([address] 7/8/2013       Past Surgical History:        Procedure Laterality Date    FOOT SURGERY Left 09/27/2017    LEFT FOOT ULCER DEBRIDEMENT, POSSIBLE SECOND METATARSAL    FOOT TENDON SURGERY Right 2016    FOOT TENDON SURGERY Left 06/30/2017    KNEE SURGERY Left     OTHER SURGICAL HISTORY Bilateral 9/17/15    amputation 2nd digit bilateral, flexor tenotomy right hallux    OTHER SURGICAL HISTORY Left 08/17/2017    PARTIAL LEFT FOOT AMPUTATION      PACEMAKER PLACEMENT      1/23/14 pacer/defib    TOE AMPUTATION      2 toes       Social History:    Social History   Substance Use Topics    Smoking status: Former Smoker     Quit date: 2/13/1980    Smokeless tobacco: Never Used    Alcohol use No                                Counseling given: Not Answered      Vital Signs (Current):   Vitals:    12/05/17 1233 12/07/17 0854   BP:  (!) 149/88   Pulse:  76   Resp:  16   Temp:  97.9 °F (36.6 °C)   TempSrc:  Temporal   SpO2:  99%   Weight: 226 lb (102.5 kg)    Height: 5' 10\" (1.778 m)                                               BP Readings from Last 3 Encounters:   12/07/17 (!) 149/88   12/06/17 133/81   12/04/17 127/77       NPO Status: Time of last liquid consumption: 0000                        Time of last solid consumption: 0000                        Date of last liquid consumption: 12/07/17                        Date of last solid food consumption: 12/07/17    BMI:   Wt Readings from Last 3 Encounters:   12/05/17 226 lb (102.5 kg)   12/04/17 226 lb (102.5 kg)   11/29/17 222 lb 3.2 oz (100.8 kg)     Body mass index is 32.43 kg/m². Anesthesia Evaluation   no history of anesthetic complications:   Airway: Mallampati: II  TM distance: >3 FB   Neck ROM: full  Mouth opening: > = 3 FB Dental:    (+) edentulous      Pulmonary:                             ROS comment: H/o tob   Cardiovascular:    (+) hypertension:, pacemaker: pacemaker, CHF:,                   Neuro/Psych:               GI/Hepatic/Renal:            ROS comment: ascites. Endo/Other:    (+) Type II DM, using insulin, hypothyroidism::., .                 Abdominal:           Vascular:                                     Pre-Operative Diagnosis: OSTEOMYLITIS, DIABETIC ULCER LEFT    46 y.o.   BMI:  Body mass index is 32.43 kg/m².      Vitals:    12/05/17 1233 12/07/17 0854   BP:  (!) 149/88 Pulse:  76   Resp:  16   Temp:  97.9 °F (36.6 °C)   TempSrc:  Temporal   SpO2:  99%   Weight: 226 lb (102.5 kg)    Height: 5' 10\" (1.778 m)        Allergies   Allergen Reactions    Bee Venom Swelling and Rash       Social History   Substance Use Topics    Smoking status: Former Smoker     Quit date: 2/13/1980    Smokeless tobacco: Never Used    Alcohol use No       LABS:    CBC  Lab Results   Component Value Date/Time    WBC 6.2 09/27/2017 05:27 AM    HGB 11.8 (L) 09/27/2017 05:27 AM    HCT 34.3 (L) 09/27/2017 05:27 AM     09/27/2017 05:27 AM     RENAL  Lab Results   Component Value Date/Time     (L) 09/27/2017 05:27 AM    K 4.1 09/27/2017 05:27 AM    CL 97 (L) 09/27/2017 05:27 AM    CO2 26 09/27/2017 05:27 AM    BUN 20 09/27/2017 05:27 AM    CREATININE 0.7 (L) 09/27/2017 05:27 AM    GLUCOSE 141 (H) 09/27/2017 05:27 AM     COAGS  Lab Results   Component Value Date/Time    PROTIME 11.4 09/26/2017 04:30 PM    INR 1.01 09/26/2017 04:30 PM    APTT 33.5 09/26/2017 04:30 PM        Anesthesia Plan      MAC     ASA 3     (Risks, benefits and alternatives of MAC anesthesia discussed with pt. Questions answered. Willing to proceed.)  Induction: intravenous. Anesthetic plan and risks discussed with patient.                       Sang Rehman MD   12/7/2017

## 2017-12-07 NOTE — H&P
Progress Note    Admit Date:  12/7/2017    Subjective:  46 y.o. male who is seen for evaluation of ulcer on left foot. Regularly followed in [address] [place] [address],[address].        Past Medical History:        Diagnosis Date    Acute osteomyelitis of left foot ([address] 9/27/2017    Acute osteomyelitis of right foot ([address] 4/25/2016    Ascites     Blood transfusion reaction     Burst fracture of lumbar vertebra ([facility]CC) 11/9/2012    Cellulitis of left foot     Cellulitis of right lower extremity 2/16, 5/16    Diabetes ([address]     Diabetic ulcer of right foot ([address] early 2016    Diabetic ulcer of 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 2 diabetes mellitus, with necrosis of bone (HCC)     Fracture of tibial plateau 19/7/4967    MRSA (methicillin resistant staph aureus) culture positive 4/28/16, 4/20/16    foot wound    Neuropathic ulcer of left foot, limited to breakdown of skin ([address] 11/3/2016    Neuropathic ulcer of toe ([facility]) 2/13/2014    Pleural effusion due to congestive heart failure ([facility])     Smoker     Systolic CHF, acute ([address] 7/8/2013       Past Surgical History:        Procedure Laterality Date    FOOT SURGERY Left 09/27/2017    LEFT FOOT ULCER DEBRIDEMENT, POSSIBLE SECOND METATARSAL    FOOT TENDON SURGERY Right 2016    FOOT TENDON SURGERY Left 06/30/2017    KNEE SURGERY Left     OTHER SURGICAL HISTORY Bilateral 9/17/15    amputation 2nd digit bilateral, flexor tenotomy right hallux    OTHER SURGICAL HISTORY Left 08/17/2017    PARTIAL LEFT FOOT AMPUTATION      PACEMAKER PLACEMENT      1/23/14 pacer/defib    TOE AMPUTATION      2 toes       Current Medications:    Current Facility-Administered Medications: lactated ringers infusion, , Intravenous, Continuous  sodium chloride flush 0.9 % injection 10 mL, 10 mL, Intravenous, 2 times per day  sodium chloride flush 0.9 % injection 10 mL, 10 mL, Intravenous, PRN  lidocaine PF 1 % injection 0.3 mL, 0.3 mL, Intradermal, Once PRN  HYDROmorphone (DILAUDID) injection 0.25 mg, 0.25 mg, prominence left forefoot        Assessment:  Patient Active Problem List   Diagnosis Code    Hypertension I10    Uncontrolled type 2 diabetes mellitus with diabetic polyneuropathy, with long-term current use of insulin ([facility]) E11.42, Z79.4, Z20.93    Alcoholic cardiomyopathy C97.9    Automatic implantable cardioverter-defibrillator in situ Z95.810    Hypothyroidism E03.9    Hyperlipidemia E78.5    Onychomycosis B35.1    Dystrophic nail L60.3    Callus of foot L84    Hallux valgus M20.10    Diabetic ulcer of right foot associated with type 2 diabetes mellitus, limited to breakdown of skin ([facility]) E11.621, L97.511    Acquired hypothyroidism E03.9    Diabetic ulcer of left foot associated with type 2 diabetes mellitus, with muscle involvement without evidence of necrosis ([facility] G85.701, Z08.365         Plan  Patient examined. Explained risks, complications, alternatives and benefits with patient. Understands chance of nonhealing wound, infection, need for further surgery, loss of limb or life.            Electronically signed by Jeffery Fagan DPM on 12/7/2017 at 9:57 AM.

## 2017-12-07 NOTE — ANESTHESIA POST-OP
Postoperative Anesthesia Note    Name:    Ainsley Walker  MRN:      [id number]    Patient Vitals for the past 12 hrs:   BP Temp Temp src Pulse Resp SpO2   12/07/17 1114 127/78 97.9 °F (36.6 °C) Temporal 73 12 100 %   12/07/17 1026 124/76 97.9 °F (36.6 °C) Temporal 75 12 98 %   12/07/17 0854 (!) 149/88 97.9 °F (36.6 °C) Temporal 76 16 99 %        LABS:    CBC  Lab Results   Component Value Date/Time    WBC 6.2 09/27/2017 05:27 AM    HGB 11.8 (L) 09/27/2017 05:27 AM    HCT 34.3 (L) 09/27/2017 05:27 AM     09/27/2017 05:27 AM     RENAL  Lab Results   Component Value Date/Time     (L) 09/27/2017 05:27 AM    K 4.1 09/27/2017 05:27 AM    CL 97 (L) 09/27/2017 05:27 AM    CO2 26 09/27/2017 05:27 AM    BUN 20 09/27/2017 05:27 AM    CREATININE 0.7 (L) 09/27/2017 05:27 AM    GLUCOSE 141 (H) 09/27/2017 05:27 AM     COAGS  Lab Results   Component Value Date/Time    PROTIME 11.4 09/26/2017 04:30 PM    INR 1.01 09/26/2017 04:30 PM    APTT 33.5 09/26/2017 04:30 PM       Intake & Output: In: 500 [I.V.:500]  Out: 0     Nausea & Vomiting:  No    Level of Consciousness:  Awake    Pain Assessment:  Adequate analgesia    Anesthesia Complications:  No apparent anesthetic complications    SUMMARY      Vital signs stable  OK to discharge from Stage I post anesthesia care.   Care transferred from Anesthesiology department on discharge from perioperative area

## 2017-12-07 NOTE — BRIEF OP NOTE
Brief Postoperative Note    Arthurine [place]  YOB: 1965  [id number]    Pre-operative Diagnosis: osteomyelitis left foot, diabetic foot ulcer left, diabetes mellitus     Post-operative Diagnosis: Same    Procedure: Debridement infected bone and tissue left foot; ulcer debridement left foot with graft application    Anesthesia: MAC and Local    Surgeons/Assistants: Fior Aguayo     Estimated Blood Loss: less than 50     Complications: None    Specimens: Was Obtained: bone for culture, bone for path    Findings: ulcer right foot      Electronically signed by Fior Aguayo DPM on 12/7/2017 at 10:30 AM

## 2017-12-07 NOTE — PROGRESS NOTES
Sonny Elias is a 46 y.o. male who presented to the [facility] today for hyperbaric oxygen treatment #29 of 30 for the diagnosis as stated above. Treatment given at 2 CAREY for 90 minutes, with no air breaks. Patient Active Problem List   Diagnosis Code    Hypertension I10    Uncontrolled type 2 diabetes mellitus with diabetic polyneuropathy, with long-term current use of insulin ([facility]) E11.42, Z79.4, M79.77    Alcoholic cardiomyopathy W01.6    Automatic implantable cardioverter-defibrillator in situ Z95.810    Hypothyroidism E03.9    Hyperlipidemia E78.5    Onychomycosis B35.1    Dystrophic nail L60.3    Callus of foot L84    Hallux valgus M20.10    Diabetic ulcer of right foot associated with type 2 diabetes mellitus, limited to breakdown of skin ([facility]) E11.621, L97.511    Acquired hypothyroidism E03.9    Diabetic ulcer of left foot associated with type 2 diabetes mellitus, with muscle involvement without evidence of necrosis ([facility]) E11.621, L97.525       In my clinical judgement, ongoing HBO therapy is necessary at this time, given a threat to patient function, limb or life from the current condition. Adjunctive Rx, objective weekly progress and goals of Rx will generally be updated on Mondays. Plan     1. Hyperbaric Oxygen - Ruben Eagle tolerated the treatment well today without complications. Continue HBO treatment as outlined above. Will decide on Monday whether we extend HBO any longer -- I'm concerned that there is still some pathology at the distal end of the left foot wound; if it should prove to be filling in by Monday, will likely stop at 30 dives; if not, I'll have Dr. Ariana Baird see him to discuss surgical exploration and debridement, and will likely extend to 40 dives. 2. Other -     I was present on these premises and immediately available to furnish assistance & direction throughout the procedure.      -- Electronically signed by Any John MD on 12/7/2017 at 8:40 AM

## 2017-12-11 ENCOUNTER — HOSPITAL ENCOUNTER (OUTPATIENT)
Dept: WOUND CARE | Age: 52
Discharge: HOME | End: 2017-12-11
Admitting: INTERNAL MEDICINE

## 2017-12-11 ENCOUNTER — HOSPITAL ENCOUNTER (OUTPATIENT)
Dept: WOUND CARE | Age: 52
Discharge: HOME | End: 2017-12-11
Attending: PODIATRIST | Admitting: PODIATRIST

## 2017-12-11 VITALS
RESPIRATION RATE: 16 BRPM | TEMPERATURE: 97.9 F | DIASTOLIC BLOOD PRESSURE: 81 MMHG | HEART RATE: 78 BPM | SYSTOLIC BLOOD PRESSURE: 135 MMHG

## 2017-12-11 VITALS
SYSTOLIC BLOOD PRESSURE: 135 MMHG | TEMPERATURE: 97.9 F | DIASTOLIC BLOOD PRESSURE: 81 MMHG | BODY MASS INDEX: 32.64 KG/M2 | WEIGHT: 228 LBS | RESPIRATION RATE: 16 BRPM | HEART RATE: 78 BPM | HEIGHT: 70 IN

## 2017-12-11 LAB
GLUCOSE BLD-MCNC: 232 MG/DL (ref 70–99)
GLUCOSE BLD-MCNC: 274 MG/DL (ref 70–99)
PERFORMED ON: ABNORMAL
PERFORMED ON: ABNORMAL

## 2017-12-11 PROCEDURE — 97597 DBRDMT OPN WND 1ST 20 CM/<: CPT | Performed by: INTERNAL MEDICINE

## 2017-12-11 PROCEDURE — 99183 HYPERBARIC OXYGEN THERAPY: CPT | Performed by: INTERNAL MEDICINE

## 2017-12-11 ASSESSMENT — PAIN SCALES - GENERAL: PAINLEVEL_OUTOF10: 0

## 2017-12-12 ENCOUNTER — HOSPITAL ENCOUNTER (OUTPATIENT)
Dept: WOUND CARE | Age: 52
Discharge: HOME | End: 2017-12-12
Admitting: INTERNAL MEDICINE

## 2017-12-12 VITALS
RESPIRATION RATE: 16 BRPM | DIASTOLIC BLOOD PRESSURE: 82 MMHG | TEMPERATURE: 98 F | HEART RATE: 80 BPM | SYSTOLIC BLOOD PRESSURE: 143 MMHG

## 2017-12-12 LAB
ANAEROBIC CULTURE: NORMAL
CULTURE SURGICAL: NORMAL
GLUCOSE BLD-MCNC: 300 MG/DL (ref 70–99)
GLUCOSE BLD-MCNC: 342 MG/DL (ref 70–99)
GRAM STAIN RESULT: NORMAL
PERFORMED ON: ABNORMAL
PERFORMED ON: ABNORMAL

## 2017-12-12 PROCEDURE — 99183 HYPERBARIC OXYGEN THERAPY: CPT | Performed by: INTERNAL MEDICINE
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
## 2017-12-12 NOTE — PLAN OF CARE
Problem: Wound:  Goal: Will show signs of wound healing; wound closure and no evidence of infection  Will show signs of wound healing; wound closure and no evidence of infection   Instruct Pt to equilibrate ears   Assess Pts level of anxiety about HBO   Assess effects of HBO on DFU  Compliance with HBOT   Reinforce safety protocol with HBOT- pt aware of what can be taken into chamber and restricted items as well. Outcome: Ongoing  Patient seen for HBOT treatment 32 /40  today. Patient assessment WNL with the exception of FSBS 342, notified  and patient cleared for HBOT. Patient was compressed in HBO chamber to 2.0 CAREY at 2.0 psi/min. Patient is tolerating treatment well and is currently resting comfortably and watching television. HBO RN at chamber side, will continue to monitor.

## 2017-12-14 ENCOUNTER — HOSPITAL ENCOUNTER (OUTPATIENT)
Dept: WOUND CARE | Age: 52
Discharge: HOME | End: 2017-12-14
Admitting: INTERNAL MEDICINE

## 2017-12-14 VITALS
TEMPERATURE: 98.6 F | HEIGHT: 70 IN | WEIGHT: 228 LBS | HEART RATE: 81 BPM | DIASTOLIC BLOOD PRESSURE: 80 MMHG | SYSTOLIC BLOOD PRESSURE: 136 MMHG | BODY MASS INDEX: 32.64 KG/M2 | RESPIRATION RATE: 18 BRPM

## 2017-12-14 LAB
GLUCOSE BLD-MCNC: 199 MG/DL (ref 70–99)
GLUCOSE BLD-MCNC: 223 MG/DL (ref 70–99)
PERFORMED ON: ABNORMAL
PERFORMED ON: ABNORMAL

## 2017-12-14 ASSESSMENT — PAIN SCALES - GENERAL: PAINLEVEL_OUTOF10: 0

## 2017-12-15 ENCOUNTER — HOSPITAL ENCOUNTER (OUTPATIENT)
Dept: WOUND CARE | Age: 52
Discharge: HOME | End: 2017-12-15
Admitting: INTERNAL MEDICINE

## 2017-12-15 VITALS
HEART RATE: 77 BPM | DIASTOLIC BLOOD PRESSURE: 75 MMHG | SYSTOLIC BLOOD PRESSURE: 113 MMHG | TEMPERATURE: 98.2 F | RESPIRATION RATE: 18 BRPM

## 2017-12-15 LAB
GLUCOSE BLD-MCNC: 196 MG/DL (ref 70–99)
GLUCOSE BLD-MCNC: 223 MG/DL (ref 70–99)
PERFORMED ON: ABNORMAL
PERFORMED ON: ABNORMAL

## 2017-12-15 PROCEDURE — 99183 HYPERBARIC OXYGEN THERAPY: CPT | Performed by: INTERNAL MEDICINE

## 2017-12-15 ASSESSMENT — PAIN DESCRIPTION - DESCRIPTORS: DESCRIPTORS: DULL;ACHING

## 2017-12-15 ASSESSMENT — PAIN DESCRIPTION - PAIN TYPE: TYPE: SURGICAL PAIN

## 2017-12-15 ASSESSMENT — PAIN DESCRIPTION - ONSET: ONSET: ON-GOING

## 2017-12-15 ASSESSMENT — PAIN SCALES - GENERAL: PAINLEVEL_OUTOF10: 1

## 2017-12-15 ASSESSMENT — ACTIVITIES OF DAILY LIVING (ADL): EFFECT OF PAIN ON DAILY ACTIVITIES: WALKING

## 2017-12-15 ASSESSMENT — PAIN DESCRIPTION - PROGRESSION: CLINICAL_PROGRESSION: GRADUALLY IMPROVING

## 2017-12-15 ASSESSMENT — PAIN DESCRIPTION - ORIENTATION: ORIENTATION: LEFT

## 2017-12-15 ASSESSMENT — PAIN DESCRIPTION - FREQUENCY: FREQUENCY: INTERMITTENT

## 2017-12-15 ASSESSMENT — PAIN DESCRIPTION - LOCATION: LOCATION: FOOT

## 2017-12-15 NOTE — PLAN OF CARE
Problem: Wound:  Goal: Will show signs of wound healing; wound closure and no evidence of infection  Will show signs of wound healing; wound closure and no evidence of infection   Instruct Pt to equilibrate ears   Assess Pts level of anxiety about HBO   Assess effects of HBO on DFU  Compliance with HBOT   Reinforce safety protocol with HBOT- pt aware of what can be taken into chamber and restricted items as well. Outcome: Ongoing  Patient seen for HBOT treatment 34 / 30    today. Patient assessment WNL -AVSS, lungs CTA, TEED=0 bilat, FSBS 223-cleared for HBOT. Patient was compressed in HBO chamber to 2.0 CAREY at 2.0 psi/min x 90 min treatment w/o airbreaks. Patient is tolerating treatment well and is currently resting comfortably and watching television. HBO RN at chamber side, will continue to monitor.

## 2017-12-18 ENCOUNTER — HOSPITAL ENCOUNTER (OUTPATIENT)
Dept: WOUND CARE | Age: 52
Discharge: HOME | End: 2017-12-18
Admitting: INTERNAL MEDICINE

## 2017-12-18 ENCOUNTER — HOSPITAL ENCOUNTER (OUTPATIENT)
Dept: WOUND CARE | Age: 52
Discharge: HOME | End: 2017-12-18
Attending: PODIATRIST | Admitting: PODIATRIST

## 2017-12-18 VITALS
DIASTOLIC BLOOD PRESSURE: 66 MMHG | TEMPERATURE: 98 F | RESPIRATION RATE: 16 BRPM | BODY MASS INDEX: 32.47 KG/M2 | HEART RATE: 80 BPM | HEIGHT: 70 IN | SYSTOLIC BLOOD PRESSURE: 116 MMHG | WEIGHT: 226.8 LBS

## 2017-12-18 VITALS
RESPIRATION RATE: 16 BRPM | DIASTOLIC BLOOD PRESSURE: 66 MMHG | SYSTOLIC BLOOD PRESSURE: 116 MMHG | HEART RATE: 80 BPM | TEMPERATURE: 98 F

## 2017-12-18 LAB
GLUCOSE BLD-MCNC: 211 MG/DL (ref 70–99)
GLUCOSE BLD-MCNC: 246 MG/DL (ref 70–99)
PERFORMED ON: ABNORMAL
PERFORMED ON: ABNORMAL

## 2017-12-18 PROCEDURE — 97597 DBRDMT OPN WND 1ST 20 CM/<: CPT | Performed by: INTERNAL MEDICINE

## 2017-12-18 PROCEDURE — 99183 HYPERBARIC OXYGEN THERAPY: CPT | Performed by: INTERNAL MEDICINE

## 2017-12-18 RX ORDER — OXYCODONE HYDROCHLORIDE AND ACETAMINOPHEN 5; 325 MG/1; MG/1
1-2 TABLET ORAL EVERY 4 HOURS PRN
Qty: 30 TABLET | Refills: 0 | Status: SHIPPED | OUTPATIENT
Start: 2017-12-18 | End: 2017-12-25

## 2017-12-18 ASSESSMENT — PAIN SCALES - GENERAL: PAINLEVEL_OUTOF10: 0

## 2017-12-18 NOTE — PROGRESS NOTES
7 OR path with \"benign cartilage and bone\" (no inflammation). Assessment:     Patient Active Problem List   Diagnosis Code    Hypertension I10    Uncontrolled type 2 diabetes mellitus with diabetic polyneuropathy, with long-term current use of insulin ([facility]) E11.42, Z79.4, L00.55    Alcoholic cardiomyopathy I34.1    Automatic implantable cardioverter-defibrillator in situ Z95.810    Hypothyroidism E03.9    Hyperlipidemia E78.5    Onychomycosis B35.1    Dystrophic nail L60.3    Callus of foot L84    Hallux valgus M20.10    Diabetic ulcer of right foot associated with type 2 diabetes mellitus, limited to breakdown of skin ([facility]) E11.621, L97.511    Acquired hypothyroidism E03.9    Diabetic ulcer of left foot associated with type 2 diabetes mellitus, with muscle involvement without evidence of necrosis ([facility]) E11.621, L97.525       Assessment of today's active condition(s): DM2, uncontrolled, neuropathy, stagnant right plantar Pitts 1 ulcer, but hoping for more healing now, with inserts adjusted and with less ambulation; left sided Pitts 3 hopefully starting to heal more soon, now that some deep fibronecrotic tissue was removed; had been infected (acute osteo), but not clearly now. Factors contributing to occurrence and/or persistence of the chronic ulcer include diabetes, poor glucose control, chronic pressure, shear force and decreased tissue oxygenation. Medical necessity of today's visit is shown by the above documentation. Sharp debridement is indicated today, based upon the exam findings in the wound(s) above. Procedure note:     Consent obtained. Time out performed per [facility] policy. Anesthetic  Anesthetic: Other (comment) (4% lidocaine)     Using a curette and #15 blade scalpel, I sharply debrided the right foot ulcer(s) down through and including the removal of dermis. The type(s) of tissue debrided included fibrin, necrotic/eschar and callus. Total Surface Area Debrided: 1 sq cm.     Post Debridement Measurements:  Wound 07/13/17 #11, left plantar foot, DFU, Pitts 3, onset 7/12/17, gradually appeared-Wound Length (cm): 0.9 cm  Incision 12/07/17 Foot Left-Wound Length (cm): 2.3 cm  Wound 01/27/17 #8, Right Plantar,DFU Pitts 1, 1/25/17, pressure-Wound Length (cm): 0.9 cm    Wound 07/13/17 #11, left plantar foot, DFU, Pitts 3, onset 7/12/17, gradually appeared-Wound Width (cm): 0.3 cm  Incision 12/07/17 Foot Left-Wound Width (cm): 0.1 cm  Wound 01/27/17 #8, Right Plantar,DFU Pitts 1, 1/25/17, pressure-Wound Width (cm): 0.7 cm    Wound 07/13/17 #11, left plantar foot, DFU, Pitts 3, onset 7/12/17, gradually appeared-Wound Depth (cm) : 0.2  Incision 12/07/17 Foot Left-Wound Depth (cm) : 0.1  Wound 01/27/17 #8, Right Plantar,DFU Pitts 1, 1/25/17, pressure-Wound Depth (cm) : 0.2    The ulcers were then irrigated with normal saline solution. The procedure was completed with a small amount of bleeding, and hemostasis was by pressure and by silver nitrate stick. The patient tolerated the procedure well, with no significant complications. The patient's level of pain during and after the procedure was monitored, and is noted in the wound documentation flowsheet. Discharge plan:     Treatment in the wound care center today: Wound 07/13/17 #11, left plantar foot, DFU, Pitts 3, onset 7/12/17, gradually appeared-Dressing/Treatment:  (betadine, polymen, 4x4, kerlix, coban )  Incision 12/07/17 Foot Left-Dressing/Treatment: Dry dressing (betadine)  Wound 01/27/17 #8, Right Plantar,DFU Pitts 1, 1/25/17, pressure-Dressing/Treatment:  (pso, dry dressing coban ). Local care on left side per Dr. Alma Harmon. Continue to offload there with Darco shoe and PegAssist insert; custom insert and street shoe on the right. Continue to avoid excessive ambulation; currently off work.   Still has work to be done with glucoses -- some patient education materials handed out recently (re: diet), but he declined formal DM ed classes. Continue HBOT for now, until left foot wound depth is fairly minimal.    Home treatment: good handwashing before and after any dressing changes. Cleanse wound with saline or soap & water before dressing change. May use Vaseline (petrolatum), Aquaphor, Aveeno, CeraVe, Cetaphil, Eucerin, Lubriderm, etc for dry skin. Dressing type for home: Other: collagen (if wound moist and draining) or Multidex gel (if drier), gauze to right foot, daily. Written discharge instructions given to patient. Follow up in 1 week to see Dr. Macho Last and me, but daily for HBOT.     Electronically signed by Prudencio Farris MD on 12/18/2017 at 4:26 PM.

## 2017-12-18 NOTE — PROGRESS NOTES
[address]  Hyperbaric Oxygen    Dick Hilario     : 1965    DATE OF VISIT:  2017    Subjective     Dick Hilario is a 46 y.o. male who  has a past medical history of Acute osteomyelitis of left foot ([address] (2017); Acute osteomyelitis of right foot ([address] (2016); Ascites; Blood transfusion reaction; Burst fracture of lumbar vertebra ([address] (2012); Cellulitis of left foot; Cellulitis of right lower extremity (, ); Diabetes ([address] Diabetic ulcer of right foot ([address] (early ); Diabetic ulcer of 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 2 diabetes mellitus, with necrosis of bone ([address] Fracture of tibial plateau (2936); MRSA (methicillin resistant staph aureus) culture positive (16, 16); Neuropathic ulcer of left foot, limited to breakdown of skin ([address] (11/3/2016); Neuropathic ulcer of toe ([address] (2014); Pleural effusion due to congestive heart failure ([address] Smoker; and Systolic CHF, acute ([address] (2013). He presents to the [facility] today for hyperbaric oxygen treatment of his Pitts 3 DFU, which is refractory to standard wound care therapy for 30 days. Patient denies fever. Patient denies nausea, vomiting or diarrhea; no ear troubles and no other new complaints; no fears or anxiety regarding treatment today. Had surgery on Thursday, via a new dorsal incision, with exploration of the depths of the wound, and debridement of a bit of fibronecrotic soft tissue and bone; no gross signs of infection. No immediate post-op problems    Objective     Vitals:    17 0755 17 0950   BP: (!) 178/92 135/81   Pulse: 90 78   Resp: 16 16   Temp: 97.6 °F (36.4 °C) 97.9 °F (36.6 °C)   TempSrc: Oral Oral       General:  Alert, cooperative, no distress. Ears: External otic canals are within acceptable limits. TMs are well visualized. Teed Grade 0 on the right and 0 on the left pre-treatment.    Teed 12/17/2017 at 9:05 PM

## 2017-12-18 NOTE — PROGRESS NOTES
that he quit smoking about 37 years ago. He has never used smokeless tobacco. He reports that he does not drink alcohol or use drugs. His current medication list consists of Blood Glucose Meter, Blood Glucose Monitoring Suppl, Insulin Pen Needle, Lancets, alogliptin, aspirin, carvedilol, furosemide, gabapentin, glipiZIDE, glucose blood VI test strips, insulin glargine, lisinopril, magnesium oxide, metFORMIN, oxyCODONE-acetaminophen, pravastatin, sildenafil, spironolactone, therapeutic multivitamin-minerals, thyroid, and traMADol. Allergies: Bee venom    Pertinent items from the review of systems are discussed in the HPI; the remainder of the ROS was reviewed and is negative. Objective:     /66   Pulse 80   Temp 98 °F (36.7 °C) (Oral)   Resp 16   Ht 5' 10\" (1.778 m)   Wt 226 lb 12.8 oz (102.9 kg)   BMI 32.54 kg/m²   General Appearance: alert and oriented to person, place and time, well developed and well- nourished, in no acute distress  Skin: warm and dry, no rash or erythema  Head: normocephalic and atraumatic  Eyes: pupils equal, round, and reactive to light, extraocular eye movements intact, conjunctivae normal  ENT: tympanic membrane, external ear and ear canal normal bilaterally, nose without deformity, nasal mucosa and turbinates normal without polyps  Neck: supple and non-tender without mass, no thyromegaly or thyroid nodules, no cervical lymphadenopathy  Pulmonary/Chest: clear to auscultation bilaterally- no wheezes, rales or rhonchi, normal air movement, no respiratory distress  Cardiovascular: normal rate, regular rhythm, normal S1 and S2, no murmurs, rubs, clicks, or gallops, distal pulses intact, no carotid bruits  Abdomen: soft, non-tender, non-distended, normal bowel sounds, no masses or organomegaly.      Dorsalis pedis pulse left palpable  Posterior tibial pulse left palpable  Dorsalis pedis pulse right palpable  Posterior tibial pulse right palpable  Protective sensation absent bilateral LE      Ulcer on the plantar aspect left 2nd ray region with no fibrotic tissue, red granulation tissue, mild serous drainage, no hyperkeratotic rim, no undermining, minimal tunneling towards 2nd metatarsal, no purulence, no malodor, no eschar, no periwound maceration, no periwound erythema, minimal edema, no crepitus, no increase in skin temperature, ulcer probes to soft tissue only     Sutures intact dorsal aspect left 2nd ray well coapted. Mild ecchymosis noted. 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 Larry Cruz. Today's ulcer measurements are in the wound documentation flowsheet.      Wound 07/13/17 #11, left plantar foot, DFU, Pitts 3, onset 7/12/17, gradually appeared-Wound Length (cm): 0.5 cm  Wound 01/27/17 #8, Right Plantar,DFU Pitts 1, 1/25/17, pressure-Wound Length (cm): 0.7 cm  Incision 12/07/17 Foot Left-Wound Length (cm): 2 cm  Wound 07/13/17 #11, left plantar foot, DFU, Pitts 3, onset 7/12/17, gradually appeared-Wound Width (cm): 0.5 cm  Wound 01/27/17 #8, Right Plantar,DFU Pitts 1, 1/25/17, pressure-Wound Width (cm): 0.2 cm  Incision 12/07/17 Foot Left-Wound Width (cm): 0.1 cm  Wound 07/13/17 #11, left plantar foot, DFU, Pitts 3, onset 7/12/17, gradually appeared-Wound Depth (cm) : 0.5  Wound 01/27/17 #8, Right Plantar,DFU Pitts 1, 1/25/17, pressure-Wound Depth (cm) : 0.4  Incision 12/07/17 Foot Left-Wound Depth (cm) : 0.1    LABS  Lab Results   Component Value Date    LABA1C 9.0 09/26/2017     Culture - no growth noted    Path - no osteomyelitis noted      Assessment:     Patient Active Problem List   Diagnosis Code    Hypertension I10    Uncontrolled type 2 diabetes mellitus with diabetic polyneuropathy, with long-term current use of insulin ([facility]) E11.42, Z79.4, Q48.61    Alcoholic cardiomyopathy I25.7    Automatic implantable cardioverter-defibrillator in situ Z95.810    Hypothyroidism E03.9    Hyperlipidemia E78.5    Onychomycosis B35.1    Dystrophic nail L60.3    Callus of foot L84    Hallux valgus M20.10    Diabetic ulcer of right foot associated with type 2 diabetes mellitus, limited to breakdown of skin ([facility]) E11.621, L97.511    Acquired hypothyroidism E03.9    Diabetic ulcer of left foot associated with type 2 diabetes mellitus, with muscle involvement without evidence of necrosis ([facility]) E11.621, L97.525       Assessment of today's active condition(s): diabetic foot ulcer bilateral, diabetes mellitus uncontrolled. Factors contributing to occurrence and/or persistence of the chronic ulcer include diabetes, poor glucose control and chronic pressure. Sharp debridement is not indicated today, based upon the exam findings in the ulcer(s) above. Discharge plan:     Treatment in the wound care center today: Wound 07/13/17 #11, left plantar foot, DFU, Pitts 3, onset 7/12/17, gradually appeared-Dressing/Treatment: Other (Comment) (betadine,polymem ag,4x4,abd,kerlix,coban)  Wound 01/27/17 #8, Right Plantar,DFU Pitts 1, 1/25/17, pressure-Dressing/Treatment: Other (Comment) (Polysporin,4x4,kerlix)  Incision 12/07/17 Foot Left-Dressing/Treatment: Other (Comment) (betadine,polymem ag,4x4,abd,kerlix,coban). Home treatment: good handwashing before and after any dressing changes. Cleanse ulcer with saline or soap & water before dressing change. May use Vaseline (petrolatum), Aquaphor, Aveeno, CeraVe, Cetaphil, Eucerin, Lubriderm, etc for dry skin. Dressing type for home: Continue wound care orders as per Dr. Kailee Martínez to right foot. Betadine and polymem to the left foot to remain intact for one week. Written discharge instructions given to patient. Offload ulcer(s) as directed. Elevate leg(s) as directed. Follow up in 1 week. HBO as per Dr. Kailee Martínez.          Electronically signed by Izabella Bass DPM on 12/18/2017 at 4:14 PM.

## 2017-12-18 NOTE — PROGRESS NOTES
[address]  Hyperbaric Oxygen    Ainsley Walker     : 1965    DATE OF VISIT:  2017    Subjective     Ainsley Walker is a 46 y.o. male who  has a past medical history of Acute osteomyelitis of left foot ([address] (2017); Acute osteomyelitis of right foot ([address] (2016); Ascites; Blood transfusion reaction; Burst fracture of lumbar vertebra ([address] (2012); Cellulitis of left foot; Cellulitis of right lower extremity (, ); Diabetes ([address] Diabetic ulcer of right foot ([address] (early ); Diabetic ulcer of 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 2 diabetes mellitus, with necrosis of bone ([address] Fracture of tibial plateau (); MRSA (methicillin resistant staph aureus) culture positive (16, 16); Neuropathic ulcer of left foot, limited to breakdown of skin ([address] (11/3/2016); Neuropathic ulcer of toe ([address] (2014); Pleural effusion due to congestive heart failure ([address] Smoker; and Systolic CHF, acute ([address] (2013). He presents to the [facility] today for hyperbaric oxygen treatment of his Lelan Jorden 3 left DU, which is refractory to standard wound care therapy for 30 days. Patient denies fever. Patient denies nausea, vomiting or diarrhea; no ear troubles and no other new complaints; no fears or anxiety regarding treatment today. Objective     Vitals:    17 0748 17 0950   BP: 128/76 (!) 143/82   Pulse: 89 80   Resp: 16 16   Temp: 98.1 °F (36.7 °C) 98 °F (36.7 °C)   TempSrc: Oral Oral       General:  Alert, cooperative, no distress. Ears: External otic canals are within acceptable limits. TMs are well visualized. Teed Grade 0 on the right and 0 on the left pre-treatment. Teed Grade 0 on the right and 0 on the left post-treatment. Lungs:  Clear to auscultation bilaterally. Ulcer: Not examined today in HBO, if applicable.       Recent Labs      17   0813  17   0948   POCGLU  246*

## 2017-12-19 ENCOUNTER — HOSPITAL ENCOUNTER (OUTPATIENT)
Dept: WOUND CARE | Age: 52
Discharge: HOME | End: 2017-12-19
Admitting: INTERNAL MEDICINE

## 2017-12-19 VITALS
HEART RATE: 79 BPM | TEMPERATURE: 98 F | RESPIRATION RATE: 16 BRPM | DIASTOLIC BLOOD PRESSURE: 83 MMHG | SYSTOLIC BLOOD PRESSURE: 130 MMHG

## 2017-12-19 LAB
GLUCOSE BLD-MCNC: 170 MG/DL (ref 70–99)
GLUCOSE BLD-MCNC: 209 MG/DL (ref 70–99)
PERFORMED ON: ABNORMAL
PERFORMED ON: ABNORMAL

## 2017-12-19 PROCEDURE — 99183 HYPERBARIC OXYGEN THERAPY: CPT | Performed by: INTERNAL MEDICINE

## 2017-12-19 NOTE — PLAN OF CARE
Problem: Wound:  Goal: Will show signs of wound healing; wound closure and no evidence of infection  Will show signs of wound healing; wound closure and no evidence of infection   Instruct Pt to equilibrate ears   Assess Pts level of anxiety about HBO   Assess effects of HBO on DFU  Compliance with HBOT   Reinforce safety protocol with HBOT- pt aware of what can be taken into chamber and restricted items as well. Outcome: Ongoing  Patient seen for HBOT treatment  36 / 40 today. Patient assessment WNL and cleared for HBOT. Patient was compressed in HBO chamber to 2.0 CAREY at 2.0 psi/min. Patient is tolerating treatment well and is currently resting comfortably and watching television. HBO RN at chamber side, will continue to monitor.

## 2017-12-20 ENCOUNTER — HOSPITAL ENCOUNTER (OUTPATIENT)
Dept: WOUND CARE | Age: 52
Discharge: HOME | End: 2017-12-20
Admitting: INTERNAL MEDICINE

## 2017-12-20 VITALS
RESPIRATION RATE: 18 BRPM | SYSTOLIC BLOOD PRESSURE: 120 MMHG | DIASTOLIC BLOOD PRESSURE: 70 MMHG | TEMPERATURE: 97.7 F | HEART RATE: 87 BPM

## 2017-12-20 LAB
GLUCOSE BLD-MCNC: 191 MG/DL (ref 70–99)
GLUCOSE BLD-MCNC: 224 MG/DL (ref 70–99)
PERFORMED ON: ABNORMAL
PERFORMED ON: ABNORMAL

## 2017-12-20 PROCEDURE — 99183 HYPERBARIC OXYGEN THERAPY: CPT | Performed by: EMERGENCY MEDICINE

## 2017-12-20 NOTE — PROGRESS NOTES
[address]  Hyperbaric Oxygen    [employer]     : 1965    DATE OF VISIT:  2017    Kingsley Bustos is a 46 y.o. male who  has a past medical history of Acute osteomyelitis of left foot ([address] (2017); Acute osteomyelitis of right foot ([address] (2016); Ascites; Blood transfusion reaction; Burst fracture of lumbar vertebra ([address] (2012); Cellulitis of left foot; Cellulitis of right lower extremity (, ); Diabetes ([address] Diabetic ulcer of right foot ([address] (early ); Diabetic ulcer of 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 2 diabetes mellitus, with necrosis of bone ([address] Fracture of tibial plateau (7472); MRSA (methicillin resistant staph aureus) culture positive (16, 16); Neuropathic ulcer of left foot, limited to breakdown of skin ([address] (11/3/2016); Neuropathic ulcer of toe ([address] (2014); Pleural effusion due to congestive heart failure ([address] Smoker; and Systolic CHF, acute ([address] (2013). He presents to the [facility] today for hyperbaric oxygen treatment of his diabetic foot ulcer  , which is refractory to standard wound care therapy for 30 days. Patient denies fever. Patient denies nausea, vomiting or diarrhea; no ear troubles and no other new complaints; no fears or anxiety regarding treatment today. Objective     Vitals:    17 0804 17 1005   BP: 120/70 120/70   Pulse: 87 87   Resp: 18 18   Temp: 97.9 °F (36.6 °C) 97.7 °F (36.5 °C)   TempSrc: Oral Oral       General:  Alert, cooperative, no distress. Ears: External otic canals are within acceptable limits. TMs are well visualized. Teed Grade 0 on the right and 0 on the left pre-treatment. Teed Grade 0 on the right and 0 on the left post-treatment. Lungs:  Clear to auscultation bilaterally. Ulcer: Not examined today in HBO, if applicable.       Recent Labs      17   0813  17   0948  17

## 2017-12-21 ENCOUNTER — HOSPITAL ENCOUNTER (OUTPATIENT)
Dept: WOUND CARE | Age: 52
Discharge: HOME | End: 2017-12-21
Admitting: INTERNAL MEDICINE

## 2017-12-21 VITALS
RESPIRATION RATE: 18 BRPM | WEIGHT: 226 LBS | BODY MASS INDEX: 32.35 KG/M2 | TEMPERATURE: 97.6 F | DIASTOLIC BLOOD PRESSURE: 74 MMHG | HEIGHT: 70 IN | HEART RATE: 91 BPM | SYSTOLIC BLOOD PRESSURE: 121 MMHG

## 2017-12-21 LAB
GLUCOSE BLD-MCNC: 170 MG/DL (ref 70–99)
GLUCOSE BLD-MCNC: 218 MG/DL (ref 70–99)
PERFORMED ON: ABNORMAL
PERFORMED ON: ABNORMAL

## 2017-12-21 ASSESSMENT — PAIN SCALES - GENERAL: PAINLEVEL_OUTOF10: 0

## 2017-12-27 NOTE — PROGRESS NOTES
Evita Elaine is a 46 y.o. male who presented to the [facility] today for hyperbaric oxygen treatment #34 of 40 for the diagnosis as stated above. Treatment given at 2 CAREY for 90 minutes, with no air breaks. Patient Active Problem List   Diagnosis Code    Hypertension I10    Uncontrolled type 2 diabetes mellitus with diabetic polyneuropathy, with long-term current use of insulin ([facility]) E11.42, Z79.4, Y16.28    Alcoholic cardiomyopathy E25.6    Automatic implantable cardioverter-defibrillator in situ Z95.810    Hypothyroidism E03.9    Hyperlipidemia E78.5    Onychomycosis B35.1    Dystrophic nail L60.3    Callus of foot L84    Hallux valgus M20.10    Diabetic ulcer of right foot associated with type 2 diabetes mellitus, limited to breakdown of skin ([facility]) E11.621, L97.511    Acquired hypothyroidism E03.9    Diabetic ulcer of left foot associated with type 2 diabetes mellitus, with muscle involvement without evidence of necrosis ([facility]) E11.621, L97.525       In my clinical judgement, ongoing HBO therapy is necessary at this time, given a threat to patient function, limb or life from the current condition. Adjunctive Rx, objective weekly progress and goals of Rx will generally be updated on Mondays. Plan     1. Hyperbaric Oxygen - Ruben Eagle tolerated the treatment well today without complications. Continue HBO treatment as outlined above. 2. Other - wound-care visit on Monday. I was present on these premises and immediately available to furnish assistance & direction throughout the procedure.      -- Electronically signed by Kyara Gould MD on 12/27/2017 at 10:57 AM

## 2017-12-29 ENCOUNTER — HOSPITAL ENCOUNTER (OUTPATIENT)
Dept: WOUND CARE | Age: 52
Discharge: HOME | End: 2017-12-29
Attending: PODIATRIST | Admitting: PODIATRIST

## 2017-12-29 VITALS
BODY MASS INDEX: 32.35 KG/M2 | HEART RATE: 88 BPM | HEIGHT: 70 IN | RESPIRATION RATE: 18 BRPM | SYSTOLIC BLOOD PRESSURE: 130 MMHG | WEIGHT: 226 LBS | DIASTOLIC BLOOD PRESSURE: 80 MMHG | TEMPERATURE: 97.3 F

## 2017-12-29 RX ORDER — SULFAMETHOXAZOLE AND TRIMETHOPRIM 800; 160 MG/1; MG/1
1 TABLET ORAL 2 TIMES DAILY
Qty: 28 TABLET | Refills: 0 | Status: SHIPPED | OUTPATIENT
Start: 2017-12-29 | End: 2018-01-04 | Stop reason: HOSPADM

## 2017-12-29 RX ORDER — ALOGLIPTIN 25 MG/1
TABLET, FILM COATED ORAL
Qty: 30 TABLET | Refills: 0 | OUTPATIENT
Start: 2017-12-29

## 2017-12-29 ASSESSMENT — PAIN SCALES - GENERAL: PAINLEVEL_OUTOF10: 0

## 2018-01-01 ENCOUNTER — HOSPITAL ENCOUNTER (OUTPATIENT)
Dept: OTHER | Age: 53
Discharge: HOME | End: 2018-01-31
Attending: INTERNAL MEDICINE | Admitting: INTERNAL MEDICINE

## 2018-01-01 NOTE — PROGRESS NOTES
[address]  Hyperbaric Oxygen    Evaristo Boston     : 1965    DATE OF VISIT:  2017    Subjective     Evaristo Boston is a 46 y.o. male who  has a past medical history of Acute osteomyelitis of left foot ([address] (2017); Acute osteomyelitis of right foot ([address] (2016); Ascites; Blood transfusion reaction; Burst fracture of lumbar vertebra ([address] (2012); Cellulitis of left foot; Cellulitis of right lower extremity (, ); Diabetes ([address] Diabetic ulcer of right foot ([address] (early ); Diabetic ulcer of 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 2 diabetes mellitus, with necrosis of bone ([address] Fracture of tibial plateau (); MRSA (methicillin resistant staph aureus) culture positive (16, 16); Neuropathic ulcer of left foot, limited to breakdown of skin ([address] (11/3/2016); Neuropathic ulcer of toe ([address] (2014); Pleural effusion due to congestive heart failure ([address] Smoker; and Systolic CHF, acute ([address] (2013). He presents to the [facility] today for hyperbaric oxygen treatment of his Pitts 3 DFU, which is refractory to standard wound care therapy for 30 days. Patient denies fever. Patient denies nausea, vomiting or diarrhea; no ear troubles and no other new complaints; no fears or anxiety regarding treatment today. Objective     Vitals:    17 0815 17 0950   BP: 127/73 116/66   Pulse: 89 80   Resp: 16 16   Temp: 97.6 °F (36.4 °C) 98 °F (36.7 °C)   TempSrc: Oral Oral       General:  Alert, cooperative, no distress. Ears: External otic canals are within acceptable limits. TMs are well visualized. Teed Grade 0 on the right and 0 on the left pre-treatment. Teed Grade 0 on the right and 0 on the left post-treatment. Lungs:  Clear to auscultation bilaterally. Ulcer: See wound-care note from today. No results for input(s): POCGLU in the last 72 hours.     Assessment     Ruben CASANOVA Livan Pettit is a 46 y.o. male who presented to the [facility] today for hyperbaric oxygen treatment #35 of 40 for the diagnosis as stated above. Treatment given at 2 CAREY for 90 minutes, with no air breaks. Patient Active Problem List   Diagnosis Code    Hypertension I10    Uncontrolled type 2 diabetes mellitus with diabetic polyneuropathy, with long-term current use of insulin (HCC) E11.42, Z79.4, G99.66    Alcoholic cardiomyopathy D99.5    Automatic implantable cardioverter-defibrillator in situ Z95.810    Hypothyroidism E03.9    Hyperlipidemia E78.5    Onychomycosis B35.1    Dystrophic nail L60.3    Callus of foot L84    Hallux valgus M20.10    Diabetic ulcer of right foot associated with type 2 diabetes mellitus, limited to breakdown of skin (HCC) E11.621, L97.511    Acquired hypothyroidism E03.9    Diabetic ulcer of left foot associated with type 2 diabetes mellitus, with muscle involvement without evidence of necrosis ([facility]) E11.621, L97.525       In my clinical judgement, ongoing HBO therapy is necessary at this time, given a threat to patient function, limb or life from the current condition. Adjunctive Rx, objective weekly progress and goals of Rx will generally be updated on Mondays. Plan     1. Hyperbaric Oxygen - Ruben Eagle tolerated the treatment well today without complications. Continue HBO treatment as outlined above; plan to stop at the end of the week, given good progress with therapy, and the fact that he's getting close to being healed on that left side. 2. Other -     I was present on these premises and immediately available to furnish assistance & direction throughout the procedure.      -- Electronically signed by Carlos Tom MD on 12/31/2017 at 9:56 PM

## 2018-01-01 NOTE — PROGRESS NOTES
drainage, change daily. Written discharge instructions given to patient. Follow up in 1 week for wound-care visit, but daily for HBOT -- anticipate stopping at the end of this week, given good progress on left side recently.     Electronically signed by Kyara Gould MD on 12/31/2017 at 10:32 PM.

## 2018-01-01 NOTE — PROGRESS NOTES
Mack Watkins is a 46 y.o. male who presented to the [facility] today for hyperbaric oxygen treatment #36 of 40 for the diagnosis as stated above. Treatment given at 2 CAREY for 90 minutes, with no air breaks. Patient Active Problem List   Diagnosis Code    Hypertension I10    Uncontrolled type 2 diabetes mellitus with diabetic polyneuropathy, with long-term current use of insulin (HCC) E11.42, Z79.4, Y21.44    Alcoholic cardiomyopathy X86.5    Automatic implantable cardioverter-defibrillator in situ Z95.810    Hypothyroidism E03.9    Hyperlipidemia E78.5    Onychomycosis B35.1    Dystrophic nail L60.3    Callus of foot L84    Hallux valgus M20.10    Diabetic ulcer of right foot associated with type 2 diabetes mellitus, limited to breakdown of skin ([facility]) E11.621, L97.511    Acquired hypothyroidism E03.9    Diabetic ulcer of left foot associated with type 2 diabetes mellitus, with muscle involvement without evidence of necrosis ([facility]) E11.621, L97.525       In my clinical judgement, ongoing HBO therapy is necessary at this time, given a threat to patient function, limb or life from the current condition. Adjunctive Rx, objective weekly progress and goals of Rx will generally be updated on Mondays. Plan     1. Hyperbaric Oxygen - Ruben Eagle tolerated the treatment well today without complications. Continue HBO treatment as outlined above. 2. Other -     I was present on these premises and immediately available to furnish assistance & direction throughout the procedure.      -- Electronically signed by Ricardo Rivas MD on 1/1/2018 at 4:02 PM

## 2018-01-02 ENCOUNTER — NURSE ONLY (OUTPATIENT)
Dept: CARDIOLOGY CLINIC | Age: 53
End: 2018-01-02

## 2018-01-02 DIAGNOSIS — Z95.810 AUTOMATIC IMPLANTABLE CARDIOVERTER-DEFIBRILLATOR IN SITU: Primary | ICD-10-CM

## 2018-01-02 DIAGNOSIS — I42.6 ALCOHOLIC CARDIOMYOPATHY (HCC): ICD-10-CM

## 2018-01-02 PROCEDURE — 93296 REM INTERROG EVL PM/IDS: CPT | Performed by: INTERNAL MEDICINE

## 2018-01-02 PROCEDURE — 93295 DEV INTERROG REMOTE 1/2/MLT: CPT | Performed by: INTERNAL MEDICINE

## 2018-01-03 ENCOUNTER — OFFICE VISIT (OUTPATIENT)
Dept: FAMILY MEDICINE CLINIC | Age: 53
End: 2018-01-03

## 2018-01-03 VITALS
SYSTOLIC BLOOD PRESSURE: 112 MMHG | OXYGEN SATURATION: 97 % | BODY MASS INDEX: 32.43 KG/M2 | DIASTOLIC BLOOD PRESSURE: 72 MMHG | HEART RATE: 90 BPM | WEIGHT: 226 LBS

## 2018-01-03 DIAGNOSIS — E78.2 MIXED HYPERLIPIDEMIA: ICD-10-CM

## 2018-01-03 DIAGNOSIS — E03.9 ACQUIRED HYPOTHYROIDISM: ICD-10-CM

## 2018-01-03 DIAGNOSIS — I10 ESSENTIAL HYPERTENSION: ICD-10-CM

## 2018-01-03 DIAGNOSIS — E03.9 HYPOTHYROIDISM, UNSPECIFIED TYPE: ICD-10-CM

## 2018-01-03 DIAGNOSIS — E11.621 DIABETIC ULCER OF OTHER PART OF LEFT FOOT ASSOCIATED WITH TYPE 2 DIABETES MELLITUS, WITH MUSCLE INVOLVEMENT WITHOUT EVIDENCE OF NECROSIS (HCC): ICD-10-CM

## 2018-01-03 DIAGNOSIS — L97.525 DIABETIC ULCER OF OTHER PART OF LEFT FOOT ASSOCIATED WITH TYPE 2 DIABETES MELLITUS, WITH MUSCLE INVOLVEMENT WITHOUT EVIDENCE OF NECROSIS (HCC): ICD-10-CM

## 2018-01-03 LAB
A/G RATIO: 1.5 (ref 1.1–2.2)
ALBUMIN SERPL-MCNC: 4.6 G/DL (ref 3.4–5)
ALP BLD-CCNC: 81 U/L (ref 40–129)
ALT SERPL-CCNC: 15 U/L (ref 10–40)
ANION GAP SERPL CALCULATED.3IONS-SCNC: 16 MMOL/L (ref 3–16)
AST SERPL-CCNC: 13 U/L (ref 15–37)
BILIRUB SERPL-MCNC: 0.4 MG/DL (ref 0–1)
BUN BLDV-MCNC: 37 MG/DL (ref 7–20)
CALCIUM SERPL-MCNC: 10 MG/DL (ref 8.3–10.6)
CHLORIDE BLD-SCNC: 91 MMOL/L (ref 99–110)
CHOLESTEROL, TOTAL: 134 MG/DL (ref 0–199)
CO2: 24 MMOL/L (ref 21–32)
CREAT SERPL-MCNC: 1.9 MG/DL (ref 0.9–1.3)
CREATININE URINE: 111.8 MG/DL (ref 39–259)
GFR AFRICAN AMERICAN: 45
GFR NON-AFRICAN AMERICAN: 37
GLOBULIN: 3 G/DL
GLUCOSE BLD-MCNC: 364 MG/DL (ref 70–99)
HDLC SERPL-MCNC: 25 MG/DL (ref 40–60)
LDL CHOLESTEROL CALCULATED: ABNORMAL MG/DL
LDL CHOLESTEROL DIRECT: 49 MG/DL
MICROALBUMIN UR-MCNC: 6.1 MG/DL
MICROALBUMIN/CREAT UR-RTO: 54.6 MG/G (ref 0–30)
POTASSIUM SERPL-SCNC: 6.4 MMOL/L (ref 3.5–5.1)
SODIUM BLD-SCNC: 131 MMOL/L (ref 136–145)
T3 FREE: 3.3 PG/ML (ref 2.3–4.2)
T4 FREE: 1 NG/DL (ref 0.9–1.8)
TOTAL PROTEIN: 7.6 G/DL (ref 6.4–8.2)
TRIGL SERPL-MCNC: 415 MG/DL (ref 0–150)
TSH SERPL DL<=0.05 MIU/L-ACNC: 7.69 UIU/ML (ref 0.27–4.2)
VLDLC SERPL CALC-MCNC: ABNORMAL MG/DL

## 2018-01-03 PROCEDURE — 3045F PR MOST RECENT HEMOGLOBIN A1C LEVEL 7.0-9.0%: CPT | Performed by: FAMILY MEDICINE

## 2018-01-03 PROCEDURE — G8427 DOCREV CUR MEDS BY ELIG CLIN: HCPCS | Performed by: FAMILY MEDICINE

## 2018-01-03 PROCEDURE — G8417 CALC BMI ABV UP PARAM F/U: HCPCS | Performed by: FAMILY MEDICINE

## 2018-01-03 PROCEDURE — G8484 FLU IMMUNIZE NO ADMIN: HCPCS | Performed by: FAMILY MEDICINE

## 2018-01-03 PROCEDURE — 99214 OFFICE O/P EST MOD 30 MIN: CPT | Performed by: FAMILY MEDICINE

## 2018-01-03 PROCEDURE — 1036F TOBACCO NON-USER: CPT | Performed by: FAMILY MEDICINE

## 2018-01-03 PROCEDURE — 36415 COLL VENOUS BLD VENIPUNCTURE: CPT | Performed by: FAMILY MEDICINE

## 2018-01-03 PROCEDURE — 3017F COLORECTAL CA SCREEN DOC REV: CPT | Performed by: FAMILY MEDICINE

## 2018-01-03 RX ORDER — FUROSEMIDE 40 MG/1
TABLET ORAL
Qty: 180 TABLET | Refills: 3 | Status: SHIPPED | OUTPATIENT
Start: 2018-01-03 | End: 2018-12-18 | Stop reason: SDUPTHER

## 2018-01-03 RX ORDER — CARVEDILOL 12.5 MG/1
TABLET ORAL
Qty: 180 TABLET | Refills: 3 | Status: SHIPPED | OUTPATIENT
Start: 2018-01-03 | End: 2018-12-18 | Stop reason: SDUPTHER

## 2018-01-03 RX ORDER — GLIPIZIDE 5 MG/1
5 TABLET ORAL
Qty: 180 TABLET | Refills: 3 | Status: SHIPPED | OUTPATIENT
Start: 2018-01-03 | End: 2020-02-14 | Stop reason: SDUPTHER

## 2018-01-03 RX ORDER — PRAVASTATIN SODIUM 20 MG
TABLET ORAL
Qty: 90 TABLET | Refills: 3 | Status: SHIPPED | OUTPATIENT
Start: 2018-01-03 | End: 2018-12-18 | Stop reason: SDUPTHER

## 2018-01-03 RX ORDER — TRAMADOL HYDROCHLORIDE 50 MG/1
50 TABLET ORAL EVERY 8 HOURS PRN
Qty: 30 TABLET | Refills: 2 | Status: SHIPPED | OUTPATIENT
Start: 2018-01-03 | End: 2019-01-03

## 2018-01-03 RX ORDER — GABAPENTIN 300 MG/1
300 CAPSULE ORAL 2 TIMES DAILY
Qty: 180 CAPSULE | Refills: 3 | Status: SHIPPED | OUTPATIENT
Start: 2018-01-03 | End: 2020-05-11

## 2018-01-03 RX ORDER — LISINOPRIL 5 MG/1
5 TABLET ORAL DAILY
Qty: 90 TABLET | Refills: 3 | Status: SHIPPED | OUTPATIENT
Start: 2018-01-03 | End: 2018-12-18 | Stop reason: SDUPTHER

## 2018-01-03 RX ORDER — ALOGLIPTIN 25 MG/1
TABLET, FILM COATED ORAL
Qty: 90 TABLET | Refills: 3 | Status: SHIPPED | OUTPATIENT
Start: 2018-01-03 | End: 2018-06-11 | Stop reason: ALTCHOICE

## 2018-01-03 RX ORDER — SPIRONOLACTONE 25 MG/1
25 TABLET ORAL DAILY
Qty: 90 TABLET | Refills: 3 | Status: SHIPPED | OUTPATIENT
Start: 2018-01-03 | End: 2018-12-18 | Stop reason: SDUPTHER

## 2018-01-03 NOTE — PROGRESS NOTES
Subjective:      Patient ID: Caity Ha is a 46 y.o. male. HPI: 46 y.o. in for   Chief Complaint   Patient presents with    Medication Check     tramadol    Diabetes    Hypertension    Hyperlipidemia      Diabetes: pt's numbers are improved but is continuing to get revisions of toe amputations that are not healing well. Lab Results   Component Value Date    LABA1C 7.1 01/03/2018     Lab Results   Component Value Date    .1 01/03/2018      No sugars over 200. Most less than 150  Currently taking glipizide 5mg bid, metformin 1g bid, toujeo 80 units nightly, and nesina 25mg daily    Htn: doing well. Taking carvedilol, lisinopril, spironolactone and lasix (CHF; sees cards)    Hld: pravastatin    Hypothyroid: sees Dr Anusha Perera. I have asked the pt to have him take over the DM2 as well given that I am leaving the practice. Pt did not tolerate t4 (GI issues even when his tsh was at goal), now on armour thyroid and doing better    Chronic back and foot pain: I am currently managing him with tramadol.  (he is about to have another foot surgery; it is okay to get percocet from Dr Jatin Scruggs.)    Review of Systems   Constitutional: Negative for chills, fatigue and fever. HENT: Negative for ear pain, hearing loss and postnasal drip. Eyes: Negative for discharge. Respiratory: Negative for cough, chest tightness and shortness of breath. Cardiovascular: Negative for chest pain and palpitations. Gastrointestinal: Negative for abdominal distention, abdominal pain, anal bleeding, blood in stool, constipation and diarrhea. Genitourinary: Negative for difficulty urinating and dysuria. Musculoskeletal: Negative. Skin: Negative for rash. /72 (Site: Right Arm, Position: Sitting)   Pulse 90   Wt 226 lb (102.5 kg)   SpO2 97%   BMI 32.43 kg/m²    Objective:   Physical Exam   Constitutional: He appears well-developed and well-nourished. No distress.    HENT:   Head: Normocephalic and MD)  Medication Contracts: Existing medication contract. Heidi Doran MD)     Acquired hypothyroidism: pt has bw ordered by DR Ros Norris will repeat today. Mixed hyperlipidemia:  Stable, no changes. bw as below  -     Lipid Panel  -     Comprehensive Metabolic Panel    Essential hypertension: controlled, no changes. -     Comprehensive Metabolic Panel    Other orders  -     glipiZIDE (GLUCOTROL) 5 MG tablet; Take 1 tablet by mouth 2 times daily (before meals)  -     metFORMIN (GLUCOPHAGE) 1000 MG tablet; Take 1 tablet by mouth 2 times daily (with meals)  -     gabapentin (NEURONTIN) 300 MG capsule; Take 1 capsule by mouth 2 times daily. -     insulin glargine (TOUJEO SOLOSTAR) 300 UNIT/ML injection pen; Inject 80 Units into the skin nightly  -     carvedilol (COREG) 12.5 MG tablet; TAKE ONE TABLET BY MOUTH TWICE DAILY  -     pravastatin (PRAVACHOL) 20 MG tablet; TAKE ONE TABLET BY MOUTH ONE TIME DAILY  -     lisinopril (PRINIVIL;ZESTRIL) 5 MG tablet; Take 1 tablet by mouth daily  -     furosemide (LASIX) 40 MG tablet; TAKE ONE TABLET BY MOUTH TWICE DAILY  -     alogliptin (NESINA) 25 MG TABS tablet; Take 1 tablet by mouth daily  -     spironolactone (ALDACTONE) 25 MG tablet; Take 1 tablet by mouth daily  -     magnesium oxide (MAG-OX) 400 (241.3 Mg) MG TABS tablet; Take 1 tablet by mouth 2 times daily  -     glucose blood VI test strips (ONE TOUCH ULTRA TEST) strip;  Test blood sugar once daily.  -     Insulin Pen Needle (UNIFINE PENTIPS) 31G X 5 MM MISC; USE 1 EACH DAY AS NEEDED FOR TESTING

## 2018-01-04 ENCOUNTER — HOSPITAL ENCOUNTER (OUTPATIENT)
Dept: GENERAL RADIOLOGY | Age: 53
Discharge: HOME | End: 2018-01-04

## 2018-01-04 ENCOUNTER — HOSPITAL ENCOUNTER (OUTPATIENT)
Dept: SURGERY | Age: 53
Discharge: HOME | End: 2018-01-04

## 2018-01-04 VITALS
DIASTOLIC BLOOD PRESSURE: 92 MMHG | OXYGEN SATURATION: 100 % | HEART RATE: 77 BPM | RESPIRATION RATE: 16 BRPM | HEIGHT: 70 IN | SYSTOLIC BLOOD PRESSURE: 143 MMHG | TEMPERATURE: 97.8 F | BODY MASS INDEX: 32.21 KG/M2 | WEIGHT: 225 LBS

## 2018-01-04 DIAGNOSIS — L97.514 ULCER OF RIGHT FOOT, WITH NECROSIS OF BONE (HCC): Primary | ICD-10-CM

## 2018-01-04 LAB
ALBUMIN SERPL-MCNC: 4 G/DL (ref 3.4–5)
ANION GAP SERPL CALCULATED.3IONS-SCNC: 8 MMOL/L (ref 3–16)
BUN BLDV-MCNC: 39 MG/DL (ref 7–20)
CALCIUM SERPL-MCNC: 9.5 MG/DL (ref 8.3–10.6)
CHLORIDE BLD-SCNC: 91 MMOL/L (ref 99–110)
CO2: 28 MMOL/L (ref 21–32)
CREAT SERPL-MCNC: 1.6 MG/DL (ref 0.9–1.3)
ESTIMATED AVERAGE GLUCOSE: 157.1 MG/DL
GFR AFRICAN AMERICAN: 55
GFR NON-AFRICAN AMERICAN: 46
GLUCOSE BLD-MCNC: 239 MG/DL (ref 70–99)
GLUCOSE BLD-MCNC: 253 MG/DL (ref 70–99)
HBA1C MFR BLD: 7.1 %
PERFORMED ON: ABNORMAL
PHOSPHORUS: 4.6 MG/DL (ref 2.5–4.9)
POTASSIUM SERPL-SCNC: 5.2 MMOL/L (ref 3.5–5.1)
SODIUM BLD-SCNC: 127 MMOL/L (ref 136–145)

## 2018-01-04 RX ORDER — HYDRALAZINE HYDROCHLORIDE 20 MG/ML
5 INJECTION INTRAMUSCULAR; INTRAVENOUS EVERY 10 MIN PRN
Status: DISCONTINUED | OUTPATIENT
Start: 2018-01-04 | End: 2018-01-05 | Stop reason: HOSPADM

## 2018-01-04 RX ORDER — SODIUM CHLORIDE, SODIUM LACTATE, POTASSIUM CHLORIDE, CALCIUM CHLORIDE 600; 310; 30; 20 MG/100ML; MG/100ML; MG/100ML; MG/100ML
INJECTION, SOLUTION INTRAVENOUS CONTINUOUS
Status: DISCONTINUED | OUTPATIENT
Start: 2018-01-04 | End: 2018-01-05 | Stop reason: HOSPADM

## 2018-01-04 RX ORDER — MORPHINE SULFATE 10 MG/ML
2 INJECTION, SOLUTION INTRAMUSCULAR; INTRAVENOUS EVERY 5 MIN PRN
Status: DISCONTINUED | OUTPATIENT
Start: 2018-01-04 | End: 2018-01-05 | Stop reason: HOSPADM

## 2018-01-04 RX ORDER — HYDROMORPHONE HCL 110MG/55ML
0.25 PATIENT CONTROLLED ANALGESIA SYRINGE INTRAVENOUS EVERY 5 MIN PRN
Status: DISCONTINUED | OUTPATIENT
Start: 2018-01-04 | End: 2018-01-05 | Stop reason: HOSPADM

## 2018-01-04 RX ORDER — LIDOCAINE HYDROCHLORIDE 10 MG/ML
0.3 INJECTION, SOLUTION EPIDURAL; INFILTRATION; INTRACAUDAL; PERINEURAL
Status: COMPLETED | OUTPATIENT
Start: 2018-01-04 | End: 2018-01-04

## 2018-01-04 RX ORDER — SODIUM CHLORIDE 0.9 % (FLUSH) 0.9 %
10 SYRINGE (ML) INJECTION EVERY 12 HOURS SCHEDULED
Status: DISCONTINUED | OUTPATIENT
Start: 2018-01-04 | End: 2018-01-05 | Stop reason: HOSPADM

## 2018-01-04 RX ORDER — MORPHINE SULFATE 4 MG/ML
1 INJECTION, SOLUTION INTRAMUSCULAR; INTRAVENOUS EVERY 5 MIN PRN
Status: DISCONTINUED | OUTPATIENT
Start: 2018-01-04 | End: 2018-01-05 | Stop reason: HOSPADM

## 2018-01-04 RX ORDER — DIPHENHYDRAMINE HYDROCHLORIDE 50 MG/ML
12.5 INJECTION INTRAMUSCULAR; INTRAVENOUS
Status: ACTIVE | OUTPATIENT
Start: 2018-01-04 | End: 2018-01-04

## 2018-01-04 RX ORDER — SODIUM CHLORIDE 0.9 % (FLUSH) 0.9 %
10 SYRINGE (ML) INJECTION PRN
Status: DISCONTINUED | OUTPATIENT
Start: 2018-01-04 | End: 2018-01-05 | Stop reason: HOSPADM

## 2018-01-04 RX ORDER — MEPERIDINE HYDROCHLORIDE 25 MG/ML
12.5 INJECTION INTRAMUSCULAR; INTRAVENOUS; SUBCUTANEOUS EVERY 5 MIN PRN
Status: DISCONTINUED | OUTPATIENT
Start: 2018-01-04 | End: 2018-01-05 | Stop reason: HOSPADM

## 2018-01-04 RX ORDER — LABETALOL HYDROCHLORIDE 5 MG/ML
5 INJECTION, SOLUTION INTRAVENOUS EVERY 10 MIN PRN
Status: DISCONTINUED | OUTPATIENT
Start: 2018-01-04 | End: 2018-01-05 | Stop reason: HOSPADM

## 2018-01-04 RX ORDER — OXYCODONE HYDROCHLORIDE AND ACETAMINOPHEN 5; 325 MG/1; MG/1
1-2 TABLET ORAL EVERY 4 HOURS PRN
Qty: 30 TABLET | Refills: 0 | Status: SHIPPED | OUTPATIENT
Start: 2018-01-04 | End: 2018-01-11

## 2018-01-04 RX ORDER — DOXYCYCLINE HYCLATE 100 MG
100 TABLET ORAL 2 TIMES DAILY
Qty: 28 TABLET | Refills: 0 | Status: SHIPPED | OUTPATIENT
Start: 2018-01-04 | End: 2018-01-22 | Stop reason: ALTCHOICE

## 2018-01-04 RX ORDER — ONDANSETRON 2 MG/ML
4 INJECTION INTRAMUSCULAR; INTRAVENOUS
Status: ACTIVE | OUTPATIENT
Start: 2018-01-04 | End: 2018-01-04

## 2018-01-04 RX ORDER — OXYCODONE HYDROCHLORIDE AND ACETAMINOPHEN 5; 325 MG/1; MG/1
1 TABLET ORAL PRN
Status: ACTIVE | OUTPATIENT
Start: 2018-01-04 | End: 2018-01-04

## 2018-01-04 RX ORDER — PROMETHAZINE HYDROCHLORIDE 25 MG/ML
6.25 INJECTION, SOLUTION INTRAMUSCULAR; INTRAVENOUS
Status: ACTIVE | OUTPATIENT
Start: 2018-01-04 | End: 2018-01-04

## 2018-01-04 RX ORDER — OXYCODONE HYDROCHLORIDE AND ACETAMINOPHEN 5; 325 MG/1; MG/1
2 TABLET ORAL PRN
Status: ACTIVE | OUTPATIENT
Start: 2018-01-04 | End: 2018-01-04

## 2018-01-04 RX ORDER — HYDROMORPHONE HCL 110MG/55ML
0.5 PATIENT CONTROLLED ANALGESIA SYRINGE INTRAVENOUS EVERY 5 MIN PRN
Status: DISCONTINUED | OUTPATIENT
Start: 2018-01-04 | End: 2018-01-05 | Stop reason: HOSPADM

## 2018-01-04 RX ADMIN — SODIUM CHLORIDE, SODIUM LACTATE, POTASSIUM CHLORIDE, CALCIUM CHLORIDE: 600; 310; 30; 20 INJECTION, SOLUTION INTRAVENOUS at 07:11

## 2018-01-04 RX ADMIN — LIDOCAINE HYDROCHLORIDE 0.3 ML: 10 INJECTION, SOLUTION EPIDURAL; INFILTRATION; INTRACAUDAL; PERINEURAL at 07:12

## 2018-01-04 ASSESSMENT — PAIN - FUNCTIONAL ASSESSMENT: PAIN_FUNCTIONAL_ASSESSMENT: 0-10

## 2018-01-04 NOTE — ANESTHESIA PRE-OP
Zonia Kauffman MD        HYDROmorphone (DILAUDID) injection 0.5 mg  0.5 mg Intravenous Q5 Min KELLY Elaine MD        morphine (PF) injection 1 mg  1 mg Intravenous Q5 Min KELLY Elaine MD        morphine (PF) injection 2 mg  2 mg Intravenous Q5 Min KELLY Elaine MD        oxyCODONE-acetaminophen (PERCOCET) 5-325 MG per tablet 1 tablet  1 tablet Oral KELLY Elaine MD        Or    oxyCODONE-acetaminophen (PERCOCET) 5-325 MG per tablet 2 tablet  2 tablet Oral KELLY Elaine MD        diphenhydrAMINE (BENADRYL) injection 12.5 mg  12.5 mg Intravenous Once PRAMELIA Elaine MD        ondansetron [facility]) injection 4 mg  4 mg Intravenous Once PRAMELIA Elaine MD        promethazine [facility]) injection 6.25 mg  6.25 mg Intravenous Once PRAMELIA Elaine MD        labetalol (NORMODYNE;TRANDATE) injection 5 mg  5 mg Intravenous Q10 Min PRAMELIA Elaine MD        hydrALAZINE (APRESOLINE) injection 5 mg  5 mg Intravenous Q10 Min PRAMELIA Elaine MD        meperidine (DEMEROL) injection 12.5 mg  12.5 mg Intravenous Q5 Min PRAMELIA Elaine MD           Allergies:     Allergies   Allergen Reactions    Bee Venom Swelling and Rash       Problem List:    Patient Active Problem List   Diagnosis Code    Hypertension I10    Uncontrolled type 2 diabetes mellitus with diabetic polyneuropathy, with long-term current use of insulin ([facility]) E11.42, Z79.4, T47.11    Alcoholic cardiomyopathy U26.0    Automatic implantable cardioverter-defibrillator in situ Z95.810    Hypothyroidism E03.9    Hyperlipidemia E78.5    Onychomycosis B35.1    Dystrophic nail L60.3    Callus of foot L84    Hallux valgus M20.10    Diabetic ulcer of right foot associated with type 2 diabetes mellitus, limited to breakdown of skin ([facility]) E11.621, L97.511    Acquired hypothyroidism E03.9    Diabetic ulcer of left foot associated with type 2 diabetes

## 2018-01-04 NOTE — PROGRESS NOTES
Assessment unchanged from previous. Verbalizes understanding of discharge instructions and written instructions also given to patient. Questions and concerns addressed at this time. Mother at bedside. Denies pain or any needs at this time. IV d/c'd. Pt discharged via wheelchair to car in good condition. All personal belongings returned to pt. Pt had a local procedure.

## 2018-01-04 NOTE — BRIEF OP NOTE
Brief Postoperative Note    Kit Hopper  YOB: 1965  [id number]    Pre-operative Diagnosis: diabetic foot ulcer right, osteomyelitis right foot, diabetes mellitus     Post-operative Diagnosis: Same    Procedure: Debridement infected bone and tissue right foot. Ulcer debridement right foot with graft application.      Anesthesia: Local    Surgeons/Assistants: Karlyne Aase     Estimated Blood Loss: less than 50     Complications: None    Specimens: Was Obtained: bone for culture, bone for path    Findings: ulcer right foot    Electronically signed by Karlyne Aase, DPM on 1/4/2018 at 8:29 AM

## 2018-01-04 NOTE — H&P
Progress Note    Admit Date:  1/4/2018    Subjective:  46 y.o. male who is seen for evaluation of ulcer right foot. Exposed bone of the metatarsal.   Regularly followed in the [facility]. Potassium is elevated. Has been on Bactrim.        Past Medical History:        Diagnosis Date    Acute osteomyelitis of left foot ([address] 9/27/2017    Acute osteomyelitis of right foot ([address] 4/25/2016    Ascites     Blood transfusion reaction     Burst fracture of lumbar vertebra (HCC) 11/9/2012    Cellulitis of left foot     Cellulitis of right lower extremity 2/16, 5/16    Diabetes ([address]     Diabetic ulcer of right foot ([address] early 2016    Diabetic ulcer of 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 2 diabetes mellitus, with necrosis of bone ([address]     Fracture of tibial plateau 35/5/6406    MRSA (methicillin resistant staph aureus) culture positive 4/28/16, 4/20/16    foot wound    Neuropathic ulcer of left foot, limited to breakdown of skin ([address] 11/3/2016    Neuropathic ulcer of toe ([address] 2/13/2014    Pleural effusion due to congestive heart failure ([address]     Smoker     Systolic CHF, acute ([address] 7/8/2013       Past Surgical History:        Procedure Laterality Date    FOOT SURGERY Left 09/27/2017    LEFT FOOT ULCER DEBRIDEMENT, POSSIBLE SECOND METATARSAL    FOOT TENDON SURGERY Right 2016    FOOT TENDON SURGERY Left 06/30/2017    KNEE SURGERY Left     OTHER SURGICAL HISTORY Bilateral 9/17/15    amputation 2nd digit bilateral, flexor tenotomy right hallux    OTHER SURGICAL HISTORY Left 08/17/2017    PARTIAL LEFT FOOT AMPUTATION      OTHER SURGICAL HISTORY Left 12/07/2017    Debridement infected bone and tissue left foot; ulcer debridement left foot with graft application with dr mathur     PACEMAKER PLACEMENT      1/23/14 pacer/defib    TOE AMPUTATION      2 toes       Current Medications:    Current Facility-Administered Medications: ceFAZolin (ANCEF) 2 g in sterile water 20 mL IV syringe, 2 g, Intravenous,

## 2018-01-04 NOTE — PROGRESS NOTES
soft, non-tender, non-distended, normal bowel sounds, no masses or organomegaly. Dorsalis pedis pulse left palpable  Posterior tibial pulse left palpable  Dorsalis pedis pulse right palpable  Posterior tibial pulse right palpable  Protective sensation absent bilateral LE      Ulcer on the plantar aspect right 2nd MH with mild fibrotic tissue, red granulation tissue, mild serous drainage, no hyperkeratotic rim, + undermining, + tunneling towards 2nd metatarsal and dorsal foot, no purulence, no malodor, no eschar, no periwound maceration, mild periwound erythema, mild edema, no crepitus, no increase in skin temperature, ulcer probes to soft tissue only     Sutures intact dorsal aspect left 2nd ray well coapted. Ulcer sub left 2nd MH epithelialized. Prior amputation left 2nd and 3rd digits      Today's ulcer measurements are in the wound documentation flowsheet.      Wound 07/13/17 #11, left plantar foot, DFU, Pitts 3, onset 7/12/17, gradually appeared-Wound Length (cm): 0.3 cm  Wound 01/27/17 #8, Right Plantar,DFU Pitts 1, 1/25/17, pressure-Wound Length (cm): 0.7 cm  Incision 12/07/17 Foot Left-Wound Length (cm): 1.5 cm  Wound 07/13/17 #11, left plantar foot, DFU, Pitts 3, onset 7/12/17, gradually appeared-Wound Width (cm): 0.1 cm  Wound 01/27/17 #8, Right Plantar,DFU Pitts 1, 1/25/17, pressure-Wound Width (cm): 0.4 cm  Incision 12/07/17 Foot Left-Wound Width (cm): 0.1 cm  Wound 07/13/17 #11, left plantar foot, DFU, Pitts 3, onset 7/12/17, gradually appeared-Wound Depth (cm) : 0.2  Wound 01/27/17 #8, Right Plantar,DFU Pitts 1, 1/25/17, pressure-Wound Depth (cm) : 0.9  Incision 12/07/17 Foot Left-Wound Depth (cm) : 0.1    LABS  Lab Results   Component Value Date    LABA1C 7.1 01/03/2018     Culture - no growth noted    Path - no osteomyelitis noted      Assessment:     Patient Active Problem List   Diagnosis Code    Hypertension I10    Uncontrolled type 2 diabetes mellitus with diabetic polyneuropathy, with long-term current use of insulin ([facility]) E11.42, Z79.4, E05.33    Alcoholic cardiomyopathy Y24.3    Automatic implantable cardioverter-defibrillator in situ Z95.810    Hypothyroidism E03.9    Hyperlipidemia E78.5    Onychomycosis B35.1    Dystrophic nail L60.3    Callus of foot L84    Hallux valgus M20.10    Diabetic ulcer of right foot associated with type 2 diabetes mellitus, limited to breakdown of skin ([facility]) E11.621, L97.511    Acquired hypothyroidism E03.9    Diabetic ulcer of left foot associated with type 2 diabetes mellitus, with muscle involvement without evidence of necrosis ([facility]) E11.621, L97.525       Assessment of today's active condition(s): diabetic foot ulcer bilateral, diabetes mellitus uncontrolled. Factors contributing to occurrence and/or persistence of the chronic ulcer include diabetes, poor glucose control and chronic pressure. Sharp debridement is not indicated today, based upon the exam findings in the ulcer(s) above. Discharge plan:     Treatment in the wound care center today: Wound 07/13/17 #11, left plantar foot, DFU, Pitts 3, onset 7/12/17, gradually appeared-Dressing/Treatment: Other (Comment) (betadine,dry dressing )  Wound 01/27/17 #8, Right Plantar,DFU Pitts 1, 1/25/17, pressure-Dressing/Treatment: Other (Comment) (betadine,dry dressing )  Incision 12/07/17 Foot Left-Dressing/Treatment: Other (Comment) (betadine,dry dressing). Home treatment: good handwashing before and after any dressing changes. Cleanse ulcer with saline or soap & water before dressing change. May use Vaseline (petrolatum), Aquaphor, Aveeno, CeraVe, Cetaphil, Eucerin, Lubriderm, etc for dry skin. Dressing type for home: Sutures removed left foot. Betadine and dry dressing daily. Right foot - betadine and dry dressing daily. Written discharge instructions given to patient. Offload ulcer(s) as directed. Elevate leg(s) as directed. Follow up in 1 week.     Ordered xray right foot to evaluate for osteomyelitis. Given exposed bone in nonhealing wound for months I expect underlying osteomyelitis. Prominence of the metatarsal head is also causing pressure on the wound site and by excising this bone will allow the wound to improve. Rx Bactrim given cellulitis. Discussed risks, complications, alternatives and benefits with patient and he opts for surgery given the long standing ulcer. Understands chance of nonhealing wound, infection, need for further surgery, transfer lesions, loss of limb or life. Will schedule next week.          Electronically signed by Shauna Dominguez DPM on 1/4/2018 at 10:37 AM.

## 2018-01-06 ASSESSMENT — ENCOUNTER SYMPTOMS
BLOOD IN STOOL: 0
SHORTNESS OF BREATH: 0
DIARRHEA: 0
ABDOMINAL DISTENTION: 0
CONSTIPATION: 0
ABDOMINAL PAIN: 0
EYE DISCHARGE: 0
ANAL BLEEDING: 0
COUGH: 0
CHEST TIGHTNESS: 0

## 2018-01-07 LAB
GRAM STAIN RESULT: NORMAL
WOUND/ABSCESS: NORMAL

## 2018-01-08 ENCOUNTER — HOSPITAL ENCOUNTER (OUTPATIENT)
Dept: WOUND CARE | Age: 53
Discharge: HOME | End: 2018-01-08
Attending: PODIATRIST | Admitting: PODIATRIST

## 2018-01-08 VITALS
BODY MASS INDEX: 32.78 KG/M2 | HEIGHT: 70 IN | HEART RATE: 99 BPM | WEIGHT: 229 LBS | RESPIRATION RATE: 16 BRPM | TEMPERATURE: 97.8 F | SYSTOLIC BLOOD PRESSURE: 164 MMHG | DIASTOLIC BLOOD PRESSURE: 81 MMHG

## 2018-01-08 NOTE — PROGRESS NOTES
[address] [facility] Progress Note      David Lantigua     : 1965    DATE OF VISIT:  2018    Subjective:     David Lantigua is a 46 y.o. male who has a chief complaint of a diabetic ulcer located on the bilateral foot. States doing well since surgery. No issues with left foot. Current complaint of pain in this ulcer? No.      Mr. Mckenna Win has a past medical history of Acute osteomyelitis of left foot ([address] Acute osteomyelitis of right foot ([address] Ascites; Blood transfusion reaction; Burst fracture of lumbar vertebra ([address] Cellulitis of left foot; Cellulitis of right lower extremity; Diabetes ([address] Diabetic ulcer of right foot ([address] Diabetic ulcer of 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 2 diabetes mellitus, with necrosis of bone ([address] Fracture of tibial plateau; MRSA (methicillin resistant staph aureus) culture positive; Neuropathic ulcer of left foot, limited to breakdown of skin ([address] Neuropathic ulcer of toe ([address] Pleural effusion due to congestive heart failure ([address] Smoker; and Systolic CHF, acute ([address] He has a past surgical history that includes knee surgery (Left); pacemaker placement; other surgical history (Bilateral, 9/17/15); Toe amputation; Foot Tendon Surgery (Right, ); Foot Tendon Surgery (Left, 2017); other surgical history (Left, 2017); Foot surgery (Left, 2017); other surgical history (Left, 2017); and other surgical history (Right, 2018). His family history includes Cancer in his maternal grandfather and maternal grandmother; Heart Disease in his father, maternal grandfather, maternal grandmother, mother, paternal grandfather, and paternal grandmother; High Blood Pressure in his father, maternal grandfather, maternal grandmother, mother, paternal grandfather, and paternal grandmother; High Cholesterol in his father, maternal grandfather, maternal grandmother, mother, paternal grandfather, and paternal grandmother. Mr. Aries Jackson reports that he quit smoking about 37 years ago. He has never used smokeless tobacco. He reports that he does not drink alcohol or use drugs. His current medication list consists of Blood Glucose Meter, Blood Glucose Monitoring Suppl, Insulin Pen Needle, Lancets, alogliptin, aspirin, carvedilol, doxycycline hyclate, furosemide, gabapentin, glipiZIDE, glucose blood VI test strips, insulin glargine, lisinopril, magnesium oxide, metFORMIN, oxyCODONE-acetaminophen, pravastatin, sildenafil, spironolactone, therapeutic multivitamin-minerals, thyroid, and traMADol. Allergies: Bactrim [sulfamethoxazole-trimethoprim] and Bee venom    Pertinent items from the review of systems are discussed in the HPI; the remainder of the ROS was reviewed and is negative. Objective:     BP (!) 164/81   Pulse 99   Temp 97.8 °F (36.6 °C) (Oral)   Resp 16   Ht 5' 10\" (1.778 m)   Wt 229 lb (103.9 kg)   BMI 32.86 kg/m²   General Appearance: alert and oriented to person, place and time, well developed and well- nourished, in no acute distress  Skin: warm and dry, no rash or erythema  Head: normocephalic and atraumatic  Eyes: pupils equal, round, and reactive to light, extraocular eye movements intact, conjunctivae normal  ENT: tympanic membrane, external ear and ear canal normal bilaterally, nose without deformity, nasal mucosa and turbinates normal without polyps  Neck: supple and non-tender without mass, no thyromegaly or thyroid nodules, no cervical lymphadenopathy  Pulmonary/Chest: clear to auscultation bilaterally- no wheezes, rales or rhonchi, normal air movement, no respiratory distress  Cardiovascular: normal rate, regular rhythm, normal S1 and S2, no murmurs, rubs, clicks, or gallops, distal pulses intact, no carotid bruits  Abdomen: soft, non-tender, non-distended, normal bowel sounds, no masses or organomegaly.      Dorsalis pedis pulse left palpable  Posterior tibial pulse left palpable  Dorsalis pedis Onychomycosis B35.1    Dystrophic nail L60.3    Callus of foot L84    Hallux valgus M20.10    Diabetic ulcer of right foot associated with type 2 diabetes mellitus, limited to breakdown of skin (HCC) E11.621, L97.511    Acquired hypothyroidism E03.9    Diabetic ulcer of left foot associated with type 2 diabetes mellitus, with muscle involvement without evidence of necrosis (HCC) E11.621, L97.525       Assessment of today's active condition(s): diabetic foot ulcer right, S/P partial resection right 2nd MH secondary to osteomyelitis, diabetes mellitus uncontrolled. Factors contributing to occurrence and/or persistence of the chronic ulcer include diabetes, poor glucose control and chronic pressure. Sharp debridement is not indicated today, based upon the exam findings in the ulcer(s) above. Discharge plan:     Treatment in the wound care center today: Incision 01/04/18 Foot Right-Dressing/Treatment: Other (Comment) (polymem ag,4x4,abd,kerlix,coban)  Wound 01/27/17 #8, Right Plantar,[facility], 1/25/17, pressure-Dressing/Treatment: Other (Comment) (polymem ag,4x4,abd,kerlix,coban). Home treatment: good handwashing before and after any dressing changes. Cleanse ulcer with saline or soap & water before dressing change. May use Vaseline (petrolatum), Aquaphor, Aveeno, CeraVe, Cetaphil, Eucerin, Lubriderm, etc for dry skin. Dressing type for home: Right foot - polymem and well padded dry dressing to remain intact for one week. Written discharge instructions given to patient. Offload ulcer(s) as directed. Elevate leg(s) as directed. Follow up in 1 week.         Electronically signed by Sony Lopez DPM on 1/8/2018 at 2:34 PM.

## 2018-01-09 NOTE — PROGRESS NOTES
See PACEART report under Cardiology tab. Carelink transmission shows normal functioning device. Thoracic impedance trend stable.

## 2018-01-15 ENCOUNTER — HOSPITAL ENCOUNTER (OUTPATIENT)
Dept: WOUND CARE | Age: 53
Discharge: HOME | End: 2018-01-15
Attending: PODIATRIST | Admitting: PODIATRIST

## 2018-01-15 VITALS
BODY MASS INDEX: 32.64 KG/M2 | WEIGHT: 228 LBS | SYSTOLIC BLOOD PRESSURE: 128 MMHG | HEIGHT: 70 IN | RESPIRATION RATE: 20 BRPM | DIASTOLIC BLOOD PRESSURE: 79 MMHG | HEART RATE: 92 BPM | TEMPERATURE: 97.6 F

## 2018-01-15 NOTE — PLAN OF CARE
Problem: Wound:  Goal: Will show signs of wound healing; wound closure and no evidence of infection  Will show signs of wound healing; wound closure and no evidence of infection   Instruct Pt to equilibrate ears   Assess Pts level of anxiety about HBO   Assess effects of HBO on DFU  Compliance with HBOT   Reinforce safety protocol with HBOT- pt aware of what can be taken into chamber and restricted items as well. Outcome: Ongoing  Pt to the [facility] for follow up appointment. Sutures intact. Pt to continue with present dressing regimen. Pt to follow up in the [facility] in 1 week with Dr Ronna Silva. Discharge instructions reviewed with patient, all questions answered, copy given to patient. Dressings were applied to all wounds per M.D. Instructions at this visit.

## 2018-01-18 NOTE — PROGRESS NOTES
88 [facility] Progress Note      Saul Ding     : 1965    DATE OF VISIT:  1/15/2018    Subjective:     Saul Ding is a 46 y.o. male who has a chief complaint of a diabetic ulcer located on the bilateral foot. States doing well since surgery. No issues with left foot. Current complaint of pain in this ulcer? No.      Mr. Heather Blount has a past medical history of Acute osteomyelitis of left foot ([address] Acute osteomyelitis of right foot ([address] Ascites; Blood transfusion reaction; Burst fracture of lumbar vertebra ([address] Cellulitis of left foot; Cellulitis of right lower extremity; Diabetes ([address] Diabetic ulcer of right foot ([address] Diabetic ulcer of 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 2 diabetes mellitus, with necrosis of bone ([address] Fracture of tibial plateau; MRSA (methicillin resistant staph aureus) culture positive; Neuropathic ulcer of left foot, limited to breakdown of skin ([address] Neuropathic ulcer of toe ([address] Pleural effusion due to congestive heart failure ([address] Smoker; and Systolic CHF, acute ([address] He has a past surgical history that includes knee surgery (Left); pacemaker placement; other surgical history (Bilateral, 9/17/15); Toe amputation; Foot Tendon Surgery (Right, ); Foot Tendon Surgery (Left, 2017); other surgical history (Left, 2017); Foot surgery (Left, 2017); other surgical history (Left, 2017); and other surgical history (Right, 2018).     His family history includes Cancer in his maternal grandfather and maternal grandmother; Heart Disease in his father, maternal grandfather, maternal grandmother, mother, paternal grandfather, and paternal grandmother; High Blood Pressure in his father, maternal grandfather, maternal grandmother, mother, paternal grandfather, and paternal grandmother; High Cholesterol in his father, maternal grandfather, maternal grandmother, mother, paternal grandfather, and paternal palpable  Posterior tibial pulse right palpable  Protective sensation absent bilateral LE      Ulcer on the plantar aspect right 2nd MH with no fibrotic tissue, red granulation tissue, mild serous drainage, no hyperkeratotic rim, mild undermining, no tunneling, no purulence, no malodor, no eschar, no periwound maceration, mild periwound erythema, mild edema, no crepitus, no increase in skin temperature, ulcer probes to soft tissue only     Sutures intact dorsal aspect right 2nd ray well coapted. Left foot - no open lesions noted. Today's ulcer measurements are in the wound documentation flowsheet.      Wound 01/27/17 #8, Right Plantar,[facility], 1/25/17, pressure-Wound Length (cm): 0.4 cm  Incision 01/04/18 Foot Right-Wound Length (cm): 3 cm  Wound 01/27/17 #8, Right Plantar,[facility] 1, 1/25/17, pressure-Wound Width (cm): 0.2 cm  Incision 01/04/18 Foot Right-Wound Width (cm): 0.6 cm  Wound 01/27/17 #8, Right Plantar,[facility] 1, 1/25/17, pressure-Wound Depth (cm) : 0.5  Incision 01/04/18 Foot Right-Wound Depth (cm) : 0.1    LABS  Lab Results   Component Value Date    LABA1C 7.1 01/03/2018     Culture - no growth noted    Path - no osteomyelitis noted      Assessment:     Patient Active Problem List   Diagnosis Code    Hypertension I10    Uncontrolled type 2 diabetes mellitus with diabetic polyneuropathy, with long-term current use of insulin ([facility]) E11.42, Z79.4, L88.79    Alcoholic cardiomyopathy P93.8    Automatic implantable cardioverter-defibrillator in situ Z95.810    Hypothyroidism E03.9    Hyperlipidemia E78.5    Onychomycosis B35.1    Dystrophic nail L60.3    Callus of foot L84    Hallux valgus M20.10    Diabetic ulcer of right foot associated with type 2 diabetes mellitus, limited to breakdown of skin ([facility]) E11.621, L97.511    Acquired hypothyroidism E03.9    Diabetic ulcer of left foot associated with type 2 diabetes mellitus, with muscle involvement without evidence of necrosis ([facility] E11.621, L97.525       Assessment of today's active condition(s): diabetic foot ulcer right, S/P partial resection right 2nd MH secondary to osteomyelitis, diabetes mellitus uncontrolled. Factors contributing to occurrence and/or persistence of the chronic ulcer include diabetes, poor glucose control and chronic pressure. Sharp debridement is not indicated today, based upon the exam findings in the ulcer(s) above. Discharge plan:     Treatment in the wound care center today: Wound 01/27/17 #8, Right Plantar,[facility], 1/25/17, pressure-Dressing/Treatment: Other (Comment) (sensicare,aquacell ag, dry dressing,coban ). Home treatment: good handwashing before and after any dressing changes. Cleanse ulcer with saline or soap & water before dressing change. May use Vaseline (petrolatum), Aquaphor, Aveeno, CeraVe, Cetaphil, Eucerin, Lubriderm, etc for dry skin. Dressing type for home: Right foot - polymem and well padded dry dressing to remain intact for one week. Written discharge instructions given to patient. Offload ulcer(s) as directed. Elevate leg(s) as directed. Follow up in 1 week.         Electronically signed by Ellyn Stanley DPM on 1/18/2018 at 8:38 AM.

## 2018-01-22 ENCOUNTER — HOSPITAL ENCOUNTER (OUTPATIENT)
Dept: WOUND CARE | Age: 53
Discharge: HOME | End: 2018-01-22
Attending: PODIATRIST | Admitting: PODIATRIST

## 2018-01-22 VITALS
BODY MASS INDEX: 32.47 KG/M2 | RESPIRATION RATE: 20 BRPM | DIASTOLIC BLOOD PRESSURE: 86 MMHG | TEMPERATURE: 97.6 F | HEART RATE: 97 BPM | SYSTOLIC BLOOD PRESSURE: 144 MMHG | HEIGHT: 70 IN | WEIGHT: 226.8 LBS

## 2018-01-22 NOTE — PLAN OF CARE
Problem: Wound:  Goal: Will show signs of wound healing; wound closure and no evidence of infection  Will show signs of wound healing; wound closure and no evidence of infection   Instruct Pt to equilibrate ears   Assess Pts level of anxiety about HBO   Assess effects of HBO on DFU  Compliance with HBOT   Reinforce safety protocol with HBOT- pt aware of what can be taken into chamber and restricted items as well. Outcome: Ongoing  Pt to the [address] [place] [address] for follow up appointment. Pt to continue with weekly dressing. Pt to follow up in the [address] [place] [address],[address] [place] [address] in 1 week with Dr Brittny Navarrete. Discharge instructions reviewed with patient, all questions answered, copy given to patient. Dressings were applied to all wounds per M.D. Instructions at this visit.

## 2018-01-29 ENCOUNTER — HOSPITAL ENCOUNTER (OUTPATIENT)
Dept: WOUND CARE | Age: 53
Discharge: HOME | End: 2018-01-29
Attending: PODIATRIST | Admitting: PODIATRIST

## 2018-01-29 VITALS
RESPIRATION RATE: 18 BRPM | HEIGHT: 70 IN | DIASTOLIC BLOOD PRESSURE: 88 MMHG | WEIGHT: 226 LBS | TEMPERATURE: 96.6 F | BODY MASS INDEX: 32.35 KG/M2 | SYSTOLIC BLOOD PRESSURE: 138 MMHG | HEART RATE: 91 BPM

## 2018-01-29 ASSESSMENT — PAIN SCALES - GENERAL: PAINLEVEL_OUTOF10: 0

## 2018-01-29 NOTE — PROGRESS NOTES
88 [facility] Progress Note      Willis Vega     : 1965    DATE OF VISIT:  2018    Subjective:     Willis Vega is a 46 y.o. male who has a chief complaint of a diabetic ulcer located on the bilateral foot. States doing well since surgery. No issues with left foot. Current complaint of pain in this ulcer? No.      Mr. Jose Cruz Zheng has a past medical history of Acute osteomyelitis of left foot ([address] Acute osteomyelitis of right foot ([address] Ascites; Blood transfusion reaction; Burst fracture of lumbar vertebra ([address] Cellulitis of left foot; Cellulitis of right lower extremity; Diabetes ([address] Diabetic ulcer of right foot ([address] Diabetic ulcer of 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 2 diabetes mellitus, with necrosis of bone ([address] Fracture of tibial plateau; MRSA (methicillin resistant staph aureus) culture positive; Neuropathic ulcer of left foot, limited to breakdown of skin ([address] Neuropathic ulcer of toe ([address] Pleural effusion due to congestive heart failure ([address] Smoker; and Systolic CHF, acute ([address] He has a past surgical history that includes knee surgery (Left); pacemaker placement; other surgical history (Bilateral, 9/17/15); Toe amputation; Foot Tendon Surgery (Right, ); Foot Tendon Surgery (Left, 2017); other surgical history (Left, 2017); Foot surgery (Left, 2017); other surgical history (Left, 2017); and other surgical history (Right, 2018).     His family history includes Cancer in his maternal grandfather and maternal grandmother; Heart Disease in his father, maternal grandfather, maternal grandmother, mother, paternal grandfather, and paternal grandmother; High Blood Pressure in his father, maternal grandfather, maternal grandmother, mother, paternal grandfather, and paternal grandmother; High Cholesterol in his father, maternal grandfather, maternal grandmother, mother, paternal grandfather, and paternal tibial pulse right palpable  Protective sensation absent bilateral LE      Ulcer on the plantar aspect right 2nd MH epithelialized. Sutures intact dorsal aspect right 2nd ray well coapted. Left foot - no open lesions noted. Today's ulcer measurements are in the wound documentation flowsheet. Incision 01/04/18 Foot Right-Wound Length (cm): 2.5 cm  [REMOVED] Wound 01/27/17 #8, Right Plantar,[facility] 1, 1/25/17, pressure-Wound Length (cm): 0 cm  Incision 01/04/18 Foot Right-Wound Width (cm): 0.1 cm  [REMOVED] Wound 01/27/17 #8, Right Plantar,[facility] 1, 1/25/17, pressure-Wound Width (cm): 0 cm  Incision 01/04/18 Foot Right-Wound Depth (cm) : 0.1  [REMOVED] Wound 01/27/17 #8, Right Plantar,[facility] 1, 1/25/17, pressure-Wound Depth (cm) : 0    LABS  Lab Results   Component Value Date    LABA1C 7.1 01/03/2018     Culture - no growth noted    Path - no osteomyelitis noted      Assessment:     Patient Active Problem List   Diagnosis Code    Hypertension I10    Uncontrolled type 2 diabetes mellitus with diabetic polyneuropathy, with long-term current use of insulin ([facility]) E11.42, Z79.4, G74.73    Alcoholic cardiomyopathy M52.5    Automatic implantable cardioverter-defibrillator in situ Z95.810    Hypothyroidism E03.9    Hyperlipidemia E78.5    Onychomycosis B35.1    Dystrophic nail L60.3    Callus of foot L84    Hallux valgus M20.10    Diabetic ulcer of right foot associated with type 2 diabetes mellitus, limited to breakdown of skin ([facility]) E11.621, L97.511    Acquired hypothyroidism E03.9    Diabetic ulcer of left foot associated with type 2 diabetes mellitus, with muscle involvement without evidence of necrosis ([facility]) E11.621, L97.525       Assessment of today's active condition(s): diabetic foot ulcer right, S/P partial resection right 2nd MH secondary to osteomyelitis, diabetes mellitus uncontrolled.  Factors contributing to occurrence and/or persistence of the chronic ulcer include

## 2018-01-31 ENCOUNTER — OFFICE VISIT (OUTPATIENT)
Dept: ENDOCRINOLOGY | Age: 53
End: 2018-01-31

## 2018-01-31 VITALS
DIASTOLIC BLOOD PRESSURE: 82 MMHG | SYSTOLIC BLOOD PRESSURE: 144 MMHG | WEIGHT: 229.2 LBS | HEART RATE: 92 BPM | HEIGHT: 70 IN | OXYGEN SATURATION: 98 % | BODY MASS INDEX: 32.81 KG/M2

## 2018-01-31 DIAGNOSIS — E03.9 HYPOTHYROIDISM, UNSPECIFIED TYPE: Primary | ICD-10-CM

## 2018-01-31 PROCEDURE — G8427 DOCREV CUR MEDS BY ELIG CLIN: HCPCS | Performed by: INTERNAL MEDICINE

## 2018-01-31 PROCEDURE — 1036F TOBACCO NON-USER: CPT | Performed by: INTERNAL MEDICINE

## 2018-01-31 PROCEDURE — 3017F COLORECTAL CA SCREEN DOC REV: CPT | Performed by: INTERNAL MEDICINE

## 2018-01-31 PROCEDURE — G8484 FLU IMMUNIZE NO ADMIN: HCPCS | Performed by: INTERNAL MEDICINE

## 2018-01-31 PROCEDURE — G8417 CALC BMI ABV UP PARAM F/U: HCPCS | Performed by: INTERNAL MEDICINE

## 2018-01-31 PROCEDURE — 99214 OFFICE O/P EST MOD 30 MIN: CPT | Performed by: INTERNAL MEDICINE

## 2018-01-31 PROCEDURE — 3045F PR MOST RECENT HEMOGLOBIN A1C LEVEL 7.0-9.0%: CPT | Performed by: INTERNAL MEDICINE

## 2018-01-31 RX ORDER — LEVOTHYROXINE AND LIOTHYRONINE 57; 13.5 UG/1; UG/1
90 TABLET ORAL DAILY
Qty: 30 TABLET | Refills: 3 | Status: SHIPPED | OUTPATIENT
Start: 2018-01-31 | End: 2018-03-14 | Stop reason: SDUPTHER

## 2018-01-31 ASSESSMENT — ENCOUNTER SYMPTOMS
BLURRED VISION: 0
ORTHOPNEA: 0
PHOTOPHOBIA: 0
DOUBLE VISION: 0
COUGH: 0
BACK PAIN: 0
HEMOPTYSIS: 0

## 2018-01-31 ASSESSMENT — PATIENT HEALTH QUESTIONNAIRE - PHQ9
SUM OF ALL RESPONSES TO PHQ QUESTIONS 1-9: 0
1. LITTLE INTEREST OR PLEASURE IN DOING THINGS: 0
SUM OF ALL RESPONSES TO PHQ9 QUESTIONS 1 & 2: 0
2. FEELING DOWN, DEPRESSED OR HOPELESS: 0

## 2018-01-31 NOTE — PROGRESS NOTES
tablet 3    Blood Glucose Monitoring Suppl GABI Measure glucose before breakfast, before dinner and at bedtime daily (we will check at lunchtime at HBO therapy). 1 Device 0    Multiple Vitamins-Minerals (THERAPEUTIC MULTIVITAMIN-MINERALS) tablet Take 1 tablet by mouth daily      Blood Glucose Monitoring Suppl (BLOOD GLUCOSE METER) KIT Use to test blood sugars. 1 kit 0    Lancets MISC Use to test blood sugars once daily. 100 each 3    sildenafil (VIAGRA) 100 MG tablet Take 1 tablet by mouth as needed for Erectile Dysfunction. 13 tablet 3    aspirin 81 MG tablet Take 81 mg by mouth daily. Vitals:    01/31/18 1045   BP: (!) 144/82   Site: Left Arm   Position: Sitting   Cuff Size: Large Adult   Pulse: 92   SpO2: 98%   Weight: 229 lb 3.2 oz (104 kg)   Height: 5' 10\" (1.778 m)     Body mass index is 32.89 kg/m².      Wt Readings from Last 3 Encounters:   01/31/18 229 lb 3.2 oz (104 kg)   01/29/18 226 lb (102.5 kg)   01/22/18 226 lb 12.8 oz (102.9 kg)     BP Readings from Last 3 Encounters:   01/31/18 (!) 144/82   01/29/18 138/88   01/22/18 (!) 144/86        Past Medical History:   Diagnosis Date    Acute osteomyelitis of left foot ([address] 9/27/2017    Acute osteomyelitis of right foot ([address] 4/25/2016    Ascites     Blood transfusion reaction     Burst fracture of lumbar vertebra ([address] 11/9/2012    Cellulitis of left foot     Cellulitis of right lower extremity 2/16, 5/16    Diabetes ([address]     Diabetic ulcer of right foot ([address] early 2016    Diabetic ulcer of 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 2 diabetes mellitus, with necrosis of bone ([address]     Fracture of tibial plateau 83/1/4991    MRSA (methicillin resistant staph aureus) culture positive 4/28/16, 4/20/16    foot wound    Neuropathic ulcer of left foot, limited to breakdown of skin ([address] 11/3/2016    Neuropathic ulcer of toe ([address] 2/13/2014    Pleural effusion due to congestive heart failure (HCC)     Smoker     Systolic CHF, acute

## 2018-02-05 ENCOUNTER — HOSPITAL ENCOUNTER (OUTPATIENT)
Dept: WOUND CARE | Age: 53
Discharge: HOME | End: 2018-02-05
Attending: PODIATRIST | Admitting: PODIATRIST

## 2018-02-05 VITALS
RESPIRATION RATE: 18 BRPM | WEIGHT: 228 LBS | BODY MASS INDEX: 32.64 KG/M2 | SYSTOLIC BLOOD PRESSURE: 127 MMHG | HEART RATE: 90 BPM | DIASTOLIC BLOOD PRESSURE: 69 MMHG | HEIGHT: 70 IN | TEMPERATURE: 97.2 F

## 2018-03-09 ENCOUNTER — NURSE ONLY (OUTPATIENT)
Dept: FAMILY MEDICINE CLINIC | Age: 53
End: 2018-03-09

## 2018-03-09 DIAGNOSIS — E03.9 HYPOTHYROIDISM, UNSPECIFIED TYPE: ICD-10-CM

## 2018-03-09 LAB
A/G RATIO: 1.7 (ref 1.1–2.2)
ALBUMIN SERPL-MCNC: 4.5 G/DL (ref 3.4–5)
ALP BLD-CCNC: 74 U/L (ref 40–129)
ALT SERPL-CCNC: 23 U/L (ref 10–40)
ANION GAP SERPL CALCULATED.3IONS-SCNC: 13 MMOL/L (ref 3–16)
AST SERPL-CCNC: 16 U/L (ref 15–37)
BILIRUB SERPL-MCNC: 0.5 MG/DL (ref 0–1)
BUN BLDV-MCNC: 19 MG/DL (ref 7–20)
CALCIUM SERPL-MCNC: 8.8 MG/DL (ref 8.3–10.6)
CHLORIDE BLD-SCNC: 98 MMOL/L (ref 99–110)
CO2: 26 MMOL/L (ref 21–32)
CREAT SERPL-MCNC: 0.9 MG/DL (ref 0.9–1.3)
GFR AFRICAN AMERICAN: >60
GFR NON-AFRICAN AMERICAN: >60
GLOBULIN: 2.7 G/DL
GLUCOSE BLD-MCNC: 393 MG/DL (ref 70–99)
POTASSIUM SERPL-SCNC: 5 MMOL/L (ref 3.5–5.1)
SODIUM BLD-SCNC: 137 MMOL/L (ref 136–145)
T3 FREE: 3.9 PG/ML (ref 2.3–4.2)
T4 FREE: 1 NG/DL (ref 0.9–1.8)
TOTAL PROTEIN: 7.2 G/DL (ref 6.4–8.2)
TSH SERPL DL<=0.05 MIU/L-ACNC: 4.19 UIU/ML (ref 0.27–4.2)

## 2018-03-09 PROCEDURE — 36415 COLL VENOUS BLD VENIPUNCTURE: CPT | Performed by: FAMILY MEDICINE

## 2018-03-14 ENCOUNTER — OFFICE VISIT (OUTPATIENT)
Dept: ENDOCRINOLOGY | Age: 53
End: 2018-03-14

## 2018-03-14 VITALS
DIASTOLIC BLOOD PRESSURE: 80 MMHG | HEIGHT: 70 IN | OXYGEN SATURATION: 99 % | HEART RATE: 81 BPM | BODY MASS INDEX: 32.44 KG/M2 | WEIGHT: 226.6 LBS | SYSTOLIC BLOOD PRESSURE: 135 MMHG

## 2018-03-14 DIAGNOSIS — E03.9 ACQUIRED HYPOTHYROIDISM: ICD-10-CM

## 2018-03-14 DIAGNOSIS — I10 ESSENTIAL HYPERTENSION: ICD-10-CM

## 2018-03-14 DIAGNOSIS — E78.2 MIXED HYPERLIPIDEMIA: ICD-10-CM

## 2018-03-14 PROCEDURE — 99214 OFFICE O/P EST MOD 30 MIN: CPT | Performed by: NURSE PRACTITIONER

## 2018-03-14 PROCEDURE — G8427 DOCREV CUR MEDS BY ELIG CLIN: HCPCS | Performed by: NURSE PRACTITIONER

## 2018-03-14 PROCEDURE — G8417 CALC BMI ABV UP PARAM F/U: HCPCS | Performed by: NURSE PRACTITIONER

## 2018-03-14 PROCEDURE — 3045F PR MOST RECENT HEMOGLOBIN A1C LEVEL 7.0-9.0%: CPT | Performed by: NURSE PRACTITIONER

## 2018-03-14 PROCEDURE — 1036F TOBACCO NON-USER: CPT | Performed by: NURSE PRACTITIONER

## 2018-03-14 PROCEDURE — 3017F COLORECTAL CA SCREEN DOC REV: CPT | Performed by: NURSE PRACTITIONER

## 2018-03-14 PROCEDURE — G8482 FLU IMMUNIZE ORDER/ADMIN: HCPCS | Performed by: NURSE PRACTITIONER

## 2018-03-14 RX ORDER — LEVOTHYROXINE AND LIOTHYRONINE 57; 13.5 UG/1; UG/1
90 TABLET ORAL DAILY
Qty: 30 TABLET | Refills: 3 | Status: SHIPPED | OUTPATIENT
Start: 2018-03-14 | End: 2018-09-11 | Stop reason: SDUPTHER

## 2018-03-14 ASSESSMENT — ENCOUNTER SYMPTOMS
DIARRHEA: 0
COLOR CHANGE: 0
SHORTNESS OF BREATH: 0
BACK PAIN: 1
CONSTIPATION: 0
EYE PAIN: 0
NAUSEA: 0

## 2018-03-14 ASSESSMENT — PATIENT HEALTH QUESTIONNAIRE - PHQ9
SUM OF ALL RESPONSES TO PHQ9 QUESTIONS 1 & 2: 0
SUM OF ALL RESPONSES TO PHQ QUESTIONS 1-9: 0
2. FEELING DOWN, DEPRESSED OR HOPELESS: 0
1. LITTLE INTEREST OR PLEASURE IN DOING THINGS: 0
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
## 2018-03-16 ENCOUNTER — TELEPHONE (OUTPATIENT)
Dept: ENDOCRINOLOGY | Age: 53
End: 2018-03-16

## 2018-03-16 NOTE — TELEPHONE ENCOUNTER
Pt called to say his new script for Victoza needs a PA. He uses [facility]  located @ [employer].

## 2018-03-20 ENCOUNTER — OFFICE VISIT (OUTPATIENT)
Dept: FAMILY MEDICINE CLINIC | Age: 53
End: 2018-03-20

## 2018-03-20 VITALS
OXYGEN SATURATION: 97 % | WEIGHT: 227 LBS | TEMPERATURE: 97.8 F | HEIGHT: 70 IN | BODY MASS INDEX: 32.5 KG/M2 | DIASTOLIC BLOOD PRESSURE: 80 MMHG | HEART RATE: 89 BPM | SYSTOLIC BLOOD PRESSURE: 124 MMHG

## 2018-03-20 DIAGNOSIS — Z01.818 PREOP EXAMINATION: Primary | ICD-10-CM

## 2018-03-20 PROCEDURE — G8482 FLU IMMUNIZE ORDER/ADMIN: HCPCS | Performed by: NURSE PRACTITIONER

## 2018-03-20 PROCEDURE — 99242 OFF/OP CONSLTJ NEW/EST SF 20: CPT | Performed by: NURSE PRACTITIONER

## 2018-03-20 PROCEDURE — G8427 DOCREV CUR MEDS BY ELIG CLIN: HCPCS | Performed by: NURSE PRACTITIONER

## 2018-03-20 PROCEDURE — G8417 CALC BMI ABV UP PARAM F/U: HCPCS | Performed by: NURSE PRACTITIONER

## 2018-03-20 PROCEDURE — 3017F COLORECTAL CA SCREEN DOC REV: CPT | Performed by: NURSE PRACTITIONER

## 2018-03-20 NOTE — PROGRESS NOTES
negative  INTEGUMENT/BREAST:  negative  HEMATOLOGIC/LYMPHATIC:  negative  ALLERGIC/IMMUNOLOGIC:  negative  ENDOCRINE:  Negative for symptoms. Does have Diabetes  MUSCULOSKELETAL:  negative  NEUROLOGICAL:  negative    PHYSICAL EXAM:      /80   Pulse 89   Temp 97.8 °F (36.6 °C)   Ht 5' 10\" (1.778 m)   Wt 227 lb (103 kg)   SpO2 97%   BMI 32.57 kg/m²     CONSTITUTIONAL:  awake, alert, cooperative, no apparent distress, and appears stated age    Eyes:  Lids and lashes normal, pupils equal, round and reactive to light, extra ocular muscles intact, sclera clear, conjunctiva normal    Head/ENT:  Normocephalic, without obvious abnormality, atramatic, sinuses nontender on palpation, external ears without lesions, oral pharynx with moist mucus membranes, tonsils without erythema or exudates, gums normal and good dentition. Neck:  Supple, symmetrical, trachea midline, no adenopathy, thyroid symmetric, not enlarged and no tenderness, skin normal, No carotid bruit    Heart:  Normal apical impulse, regular rate and rhythm, normal S1 and S2, no S3 or S4, and no murmur noted    Lungs:  No increased work of breathing, good air exchange, clear to auscultation bilaterally, no crackles or wheezing    Abdomen:  No scars, normal bowel sounds, soft, non-distended, non-tender, no masses palpated, no hepatosplenomegally    Extremities:  No clubbing, cyanosis, or edema    NEUROLOGIC:  Awake, alert, oriented to name, place and time. Cranial nerves II-XII are grossly intact. Motor is 5 out of 5 bilaterally. DATA:  EKG:  Date:  9/26/2017  I have reviewed EKG with the following interpretation:  Impression:  Sinus rhythm with short AK  Non-specific intra-ventricular conduction delay  Otherwise normal ECG      ASSESSMENT AND PLAN:    1. Patient is a 46 y.o. male with above specified procedure planned on 3/26/18  with Dr. Carmen Nayak at [facility].  They will not require cardiology evaluation prior

## 2018-04-10 ENCOUNTER — NURSE ONLY (OUTPATIENT)
Dept: CARDIOLOGY CLINIC | Age: 53
End: 2018-04-10

## 2018-04-10 DIAGNOSIS — I42.6 ALCOHOLIC CARDIOMYOPATHY (HCC): ICD-10-CM

## 2018-04-10 DIAGNOSIS — Z95.810 AUTOMATIC IMPLANTABLE CARDIOVERTER-DEFIBRILLATOR IN SITU: Primary | ICD-10-CM

## 2018-04-10 PROCEDURE — 93296 REM INTERROG EVL PM/IDS: CPT | Performed by: INTERNAL MEDICINE

## 2018-04-10 PROCEDURE — 93295 DEV INTERROG REMOTE 1/2/MLT: CPT | Performed by: INTERNAL MEDICINE

## 2018-06-11 ENCOUNTER — OFFICE VISIT (OUTPATIENT)
Dept: CARDIOLOGY CLINIC | Age: 53
End: 2018-06-11

## 2018-06-11 VITALS
BODY MASS INDEX: 30.49 KG/M2 | DIASTOLIC BLOOD PRESSURE: 70 MMHG | HEART RATE: 98 BPM | WEIGHT: 213 LBS | OXYGEN SATURATION: 97 % | SYSTOLIC BLOOD PRESSURE: 122 MMHG | HEIGHT: 70 IN

## 2018-06-11 DIAGNOSIS — I42.6 ALCOHOLIC CARDIOMYOPATHY (HCC): Primary | ICD-10-CM

## 2018-06-11 DIAGNOSIS — I10 ESSENTIAL HYPERTENSION: ICD-10-CM

## 2018-06-11 DIAGNOSIS — Z95.810 AUTOMATIC IMPLANTABLE CARDIOVERTER-DEFIBRILLATOR IN SITU: ICD-10-CM

## 2018-06-11 DIAGNOSIS — E78.2 MIXED HYPERLIPIDEMIA: ICD-10-CM

## 2018-06-11 PROCEDURE — G8417 CALC BMI ABV UP PARAM F/U: HCPCS | Performed by: INTERNAL MEDICINE

## 2018-06-11 PROCEDURE — G8427 DOCREV CUR MEDS BY ELIG CLIN: HCPCS | Performed by: INTERNAL MEDICINE

## 2018-06-11 PROCEDURE — 1036F TOBACCO NON-USER: CPT | Performed by: INTERNAL MEDICINE

## 2018-06-11 PROCEDURE — 99214 OFFICE O/P EST MOD 30 MIN: CPT | Performed by: INTERNAL MEDICINE

## 2018-06-11 PROCEDURE — 3017F COLORECTAL CA SCREEN DOC REV: CPT | Performed by: INTERNAL MEDICINE

## 2018-07-10 ENCOUNTER — NURSE ONLY (OUTPATIENT)
Dept: CARDIOLOGY CLINIC | Age: 53
End: 2018-07-10

## 2018-07-10 DIAGNOSIS — Z95.810 AUTOMATIC IMPLANTABLE CARDIOVERTER-DEFIBRILLATOR IN SITU: Primary | ICD-10-CM

## 2018-07-10 DIAGNOSIS — I42.6 ALCOHOLIC CARDIOMYOPATHY (HCC): ICD-10-CM

## 2018-07-10 PROCEDURE — 93296 REM INTERROG EVL PM/IDS: CPT | Performed by: INTERNAL MEDICINE

## 2018-07-10 PROCEDURE — 93295 DEV INTERROG REMOTE 1/2/MLT: CPT | Performed by: INTERNAL MEDICINE

## 2018-07-10 NOTE — LETTER
[address] [employer] [facility] [phone]  [address] [facility],[address] [phone]    Pacemaker/Defibrillator Clinic          07/12/18        Tomi Hernandez [address]        Dear Bladimir Randall    This letter is to inform you that we received the transmission from your monitor at home that checks your pacemaker and/or defibrillator, or implanted heart monitor. The next date your monitor will automatically transmit will be 10/09/18. Please do not send additional routine transmissions unless specifically requested. Your device and monitor are wireless and most transmit cellularly, but please periodically check your monitor is still plugged in to the electrical outlet. If you still use the telephone land line to send please ensure the connection to the phone wendy is secure. This will help to ensure successful automatic transmissions in the future. Also, the monitor needs to be close to you while sleeping at night. Please be aware that the remote device transmission sites are periodically monitored only during regular business hours during which simultaneous in-office device clinics are being run. If your transmission requires attention, we will contact you as soon as possible. Thank you.             Maria Esther Delatorre

## 2018-08-01 ENCOUNTER — HOSPITAL ENCOUNTER (OUTPATIENT)
Dept: GENERAL RADIOLOGY | Age: 53
Discharge: HOME | End: 2018-08-01
Payer: MEDICAID

## 2018-08-01 ENCOUNTER — HOSPITAL ENCOUNTER (OUTPATIENT)
Age: 53
Discharge: HOME | End: 2018-08-01
Payer: MEDICAID

## 2018-08-01 DIAGNOSIS — L97.524 ULCER OF LEFT FOOT, WITH NECROSIS OF BONE (HCC): ICD-10-CM

## 2018-08-01 PROCEDURE — 73630 X-RAY EXAM OF FOOT: CPT

## 2018-08-06 NOTE — PRE-PROCEDURE INSTRUCTIONS
.1.  Do not eat or drink anything after 12 midnight prior to surgery. This includes no water, chewing gum or mints. 2.  Take the following pills with a small sip of water on the morning of surgery 8/9/18.  3.  Aspirin, Ibuprofen, Advil, Naproxen, Vitamin E and other Anti-inflammatory products should be stopped for 5 days before surgery or as directed by your physician. 4.  Check with your doctor regarding stopping Plavix, Coumadin, Lovenox, Fragmin or other blood thinners. 5.  Do not smoke and do not drink alcoholic beverages 24 hours prior to surgery. This includes NA Beer. 6.  You may brush your teeth and gargle the morning of surgery. DO NOT SWALLOW WATER.  7.  You MUST make arrangements for a responsible adult to take you home after your surgery. You will not be allowed to leave alone or drive yourself home. It is strongly suggested someone stay with you the first 24 hours. Your surgery will be cancelled if you do not have a ride home. 8.  A parent/legal guardian must accompany a child scheduled for surgery and plan to stay at the hospital until the child is discharged. Please do not bring other children with you. 9.  Please wear simple, loose fitting clothing to the hospital.  Erich Gilford not bring valuables ( money, credit cards, checkbooks, etc.)  Do not wear any makeup (including no eye makeup) or nail polish on your fingers or toes. 10.  Do not wear any jewelry or piercing on the day of surgery. All body piercing jewelry must be removed. 11.  If you have dentures, they will be removed before going to the OR; we will provide you a container. If you wear contact lenses or glasses, they will be removed; please bring a case for them. 12.  Please see your family doctor/pediatrician for a history & physical and/or concerning medications. Bring any test results/reports from your physician's office the day of surgery.     13.  Remember to bring Blood Bank Bracelet to the hospital on the day of

## 2018-08-08 ENCOUNTER — ANESTHESIA EVENT (OUTPATIENT)
Dept: OPERATING ROOM | Age: 53
End: 2018-08-08
Payer: MEDICAID

## 2018-08-08 NOTE — ANESTHESIA PRE PROCEDURE
Department of Anesthesiology  Preprocedure Note       Name:  Jan Ibrahim   Age:  48 y.o.  :  1965                                          MRN:  [id number]         Date:  2018      Surgeon:  Jose Nix DPM    Procedure:  PARTIAL FOOT AMPUTATION    HPI: This is a 49 y/o male with a h/o EtOH-Induced CM, DM,HTN who is s/p ICD placement. Most recent check . and functioning normal without any shocks. He is no longer drinking ETOH products. Patient has a h/o DM-ulcers of both feet and now has progressed to osteomyelitis. Medications prior to admission:    Dulaglutide 1.5 MG/0.5ML SOPN Inject 1.5 Units into the skin once a week   thyroid (ARMOUR THYROID) 90 MG tablet Take 1 tablet by mouth daily   insulin glargine (TOUJEO SOLOSTAR)  injection pen Inject 90 Units into the skin nightly   glipiZIDE (GLUCOTROL) 5 MG tablet Take 1 tablet by mouth 2 times daily (before meals)   metFORMIN (GLUCOPHAGE) 1000 MG tablet Take 1 tablet by mouth 2 times daily (with meals)   traMADol (ULTRAM) 50 MG tablet Take 1 tablet by mouth every 8 hours as needed for Pain.   gabapentin (NEURONTIN) 300 MG capsule Take 1 capsule by mouth 2 times daily. carvedilol (COREG) 12.5 MG tablet TAKE ONE TABLET BY MOUTH TWICE DAILY   pravastatin (PRAVACHOL) 20 MG tablet TAKE ONE TABLET BY MOUTH ONE TIME DAILY   lisinopril (PRINIVIL;ZESTRIL) 5 MG tablet Take 1 tablet by mouth daily   furosemide (LASIX) 40 MG tablet TAKE ONE TABLET BY MOUTH TWICE DAILY   spironolactone (ALDACTONE) 25 MG tablet Take 1 tablet by mouth daily   magnesium oxide (MAG-OX) 400 mg  tablet Take 1 tablet by mouth 2 times daily   Multiple Vitamins-Minerals  Take 1 tablet by mouth daily   sildenafil (VIAGRA) 100 MG tablet Take 1 tablet by mouth as needed for Erectile Dysfunction. aspirin 81 MG tablet Take 81 mg by mouth daily.        Allergies:     Bactrim [Sulfamethoxazole-Trimethoprim] Rash     Pt reports that his lips tingle and then skin on his lips peel off,and rash on thigh     Bee Venom Swelling and Rash     Problem List:     Hypertension I10    Uncontrolled type 2 diabetes mellitus with diabetic polyneuropathy, with long-term current use of insulin E11.42, Z79.4, V34.80    Alcoholic cardiomyopathy N93.8    Automatic implantable cardioverter-defibrillator in situ Z95.810    Hypothyroidism E03.9    Hyperlipidemia E78.5    Onychomycosis B35.1    Dystrophic nail L60.3    Callus of foot L84    Hallux valgus M20.10    Diabetic ulcer of right foot associated with type 2 diabetes mellitus, limited to breakdown of skin ([facility]) E11.621, L97.511    Acquired hypothyroidism E03.9    Diabetic ulcer of left foot associated with type 2 diabetes mellitus, with muscle involvement without evidence of necrosis ([address] E11.621, Q09.221     Past Medical History:     Acute osteomyelitis of left foot ([address] 9/27/2017    Acute osteomyelitis of right foot ([address] 4/25/2016    Ascites     Blood transfusion reaction     Burst fracture of lumbar vertebra (HCC) 11/9/2012    Cellulitis of left foot     Cellulitis of right lower extremity 2/16, 5/16    Diabetes ([address]     Diabetic ulcer of right foot ([address] early 2016    Diabetic ulcer of 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 2 diabetes mellitus, with necrosis of bone      Fracture of tibial plateau 11/5/8651    MRSA (methicillin resistant staph aureus) culture positive 4/28/16, 4/20/16    foot wound    Neuropathic ulcer of left foot, limited to breakdown of skin ([address] 11/3/2016    Neuropathic ulcer of toe ([address] 2/13/2014    Pleural effusion due to congestive heart failure ([address]     Smoker     quit 5686    Systolic CHF, acute ([address] 7/8/2013     Past Surgical History:     FOOT SURGERY Left 09/27/2017    LEFT FOOT ULCER DEBRIDEMENT, POSSIBLE SECOND METATARSAL    FOOT TENDON SURGERY Right 2016    FOOT TENDON SURGERY Left 06/30/2017    KNEE SURGERY Left     OTHER SURGICAL HISTORY Bilateral 9/17/15    amputation 2nd digit bilateral, flexor tenotomy right hallux    OTHER SURGICAL HISTORY Left 2017    PARTIAL LEFT FOOT AMPUTATION      OTHER SURGICAL HISTORY Left 2017    Debridement infected bone and tissue left foot; ulcer debridement left foot with graft application with dr mathur     OTHER SURGICAL HISTORY Right 2018    DEBRIDEMENT INFECTED BONE AND TISSUE RIGHT FOOT, ULCER DEBRIDEMENT RIGHT FOOT WITH GRAFT APPLICATION    PACEMAKER PLACEMENT      14 pacer/defib    TOE AMPUTATION      2 toes     Social History:     Smoking status: Former Smoker     Quit date: 1980    Smokeless tobacco: Never Used    Alcohol use No     Vital Signs (Current): BP: 138/82  Pulse: 84 Resp: 14  SpO2: 99 Temp: 98.4 °F (36.9 °C) Height: 5' 10\" (1.778 m)  (18)  Weight: 230 lb (104.3 kg)  (18) BMI: 33.1    NPO Status:>MN    EK2017 Sinus rhythm with short HI 82; Non-specific intra-ventricular conduction delay   ECHO: Left ventricular systolic function is mildly reduced with an ejection fraction of 44%. Mild global hypokinesis. Diastolic filling parameters suggests grade I diastolic dysfunction. Compared to previous study from 2014, there is improvement in ejection fraction.     CBC:    WBC 6.2 2017    RBC 3.93 2017    HGB 11.8 2017    HCT 34.3 2017     2017     CMP:     2018    K 5.0 2018    CL 98 2018    CO2 26 2018    BUN 19 2018    CREATININE 0.9 2018    GLUCOSE 393 2018    PROT 7.2 2018    PROT 7.1 2012    CALCIUM 8.8 2018    BILITOT 0.5 2018    ALKPHOS 74 2018    AST 16 2018    ALT 23 2018     Coags:    PROTIME 11.4 2017    INR 1.01 2017    APTT 33.5 2017     Anesthesia Evaluation  Patient summary reviewed and Nursing notes reviewed  Airway: Mallampati: II  TM distance: >3 FB   Neck ROM: full  Mouth opening: > = 3 FB Dental:    (+) edentulous

## 2018-08-09 ENCOUNTER — APPOINTMENT (OUTPATIENT)
Dept: GENERAL RADIOLOGY | Age: 53
End: 2018-08-09
Attending: PODIATRIST
Payer: MEDICAID

## 2018-08-09 ENCOUNTER — ANESTHESIA (OUTPATIENT)
Dept: OPERATING ROOM | Age: 53
End: 2018-08-09
Payer: MEDICAID

## 2018-08-09 ENCOUNTER — HOSPITAL ENCOUNTER (OUTPATIENT)
Age: 53
Setting detail: OUTPATIENT SURGERY
Discharge: HOME | End: 2018-08-09
Attending: PODIATRIST | Admitting: PODIATRIST
Payer: MEDICAID

## 2018-08-09 VITALS
WEIGHT: 230 LBS | TEMPERATURE: 98 F | DIASTOLIC BLOOD PRESSURE: 86 MMHG | BODY MASS INDEX: 32.93 KG/M2 | HEART RATE: 86 BPM | SYSTOLIC BLOOD PRESSURE: 134 MMHG | HEIGHT: 70 IN | OXYGEN SATURATION: 98 % | RESPIRATION RATE: 16 BRPM

## 2018-08-09 VITALS
RESPIRATION RATE: 16 BRPM | OXYGEN SATURATION: 98 % | TEMPERATURE: 98.6 F | DIASTOLIC BLOOD PRESSURE: 69 MMHG | SYSTOLIC BLOOD PRESSURE: 113 MMHG

## 2018-08-09 DIAGNOSIS — E11.621 DIABETIC ULCER OF TOE OF LEFT FOOT ASSOCIATED WITH TYPE 2 DIABETES MELLITUS, WITH MUSCLE INVOLVEMENT WITHOUT EVIDENCE OF NECROSIS (HCC): ICD-10-CM

## 2018-08-09 DIAGNOSIS — L97.525 DIABETIC ULCER OF TOE OF LEFT FOOT ASSOCIATED WITH TYPE 2 DIABETES MELLITUS, WITH MUSCLE INVOLVEMENT WITHOUT EVIDENCE OF NECROSIS (HCC): ICD-10-CM

## 2018-08-09 DIAGNOSIS — G89.18 POSTOPERATIVE PAIN: Primary | ICD-10-CM

## 2018-08-09 LAB
GLUCOSE BLD-MCNC: 84 MG/DL (ref 70–99)
GLUCOSE BLD-MCNC: 91 MG/DL (ref 70–99)
PERFORMED ON: NORMAL
PERFORMED ON: NORMAL

## 2018-08-09 PROCEDURE — 7100000011 HC PHASE II RECOVERY - ADDTL 15 MIN: Performed by: PODIATRIST

## 2018-08-09 PROCEDURE — 6370000000 HC RX 637 (ALT 250 FOR IP): Performed by: ANESTHESIOLOGY

## 2018-08-09 PROCEDURE — 3700000001 HC ADD 15 MINUTES (ANESTHESIA): Performed by: PODIATRIST

## 2018-08-09 PROCEDURE — 73620 X-RAY EXAM OF FOOT: CPT

## 2018-08-09 PROCEDURE — 2580000003 HC RX 258: Performed by: ANESTHESIOLOGY

## 2018-08-09 PROCEDURE — 7100000010 HC PHASE II RECOVERY - FIRST 15 MIN: Performed by: PODIATRIST

## 2018-08-09 PROCEDURE — L3260 AMBULATORY SURGICAL BOOT EAC: HCPCS | Performed by: PODIATRIST

## 2018-08-09 PROCEDURE — 2500000003 HC RX 250 WO HCPCS: Performed by: PODIATRIST

## 2018-08-09 PROCEDURE — 88305 TISSUE EXAM BY PATHOLOGIST: CPT

## 2018-08-09 PROCEDURE — 2500000003 HC RX 250 WO HCPCS: Performed by: NURSE ANESTHETIST, CERTIFIED REGISTERED

## 2018-08-09 PROCEDURE — 6360000002 HC RX W HCPCS: Performed by: PODIATRIST

## 2018-08-09 PROCEDURE — 2709999900 HC NON-CHARGEABLE SUPPLY: Performed by: PODIATRIST

## 2018-08-09 PROCEDURE — 2500000003 HC RX 250 WO HCPCS: Performed by: ANESTHESIOLOGY

## 2018-08-09 PROCEDURE — 3600000003 HC SURGERY LEVEL 3 BASE: Performed by: PODIATRIST

## 2018-08-09 PROCEDURE — 6360000002 HC RX W HCPCS: Performed by: NURSE ANESTHETIST, CERTIFIED REGISTERED

## 2018-08-09 PROCEDURE — 3700000000 HC ANESTHESIA ATTENDED CARE: Performed by: PODIATRIST

## 2018-08-09 PROCEDURE — 3600000013 HC SURGERY LEVEL 3 ADDTL 15MIN: Performed by: PODIATRIST

## 2018-08-09 PROCEDURE — 88311 DECALCIFY TISSUE: CPT

## 2018-08-09 DEVICE — PATCH AMNION 2 LAYR PROTCT 4 X 4CM STERISHIELD II: Type: IMPLANTABLE DEVICE | Site: FOOT | Status: FUNCTIONAL

## 2018-08-09 RX ORDER — FENTANYL CITRATE 50 UG/ML
25 INJECTION, SOLUTION INTRAMUSCULAR; INTRAVENOUS EVERY 5 MIN PRN
Status: DISCONTINUED | OUTPATIENT
Start: 2018-08-09 | End: 2018-08-09 | Stop reason: HOSPADM

## 2018-08-09 RX ORDER — OXYCODONE HYDROCHLORIDE AND ACETAMINOPHEN 5; 325 MG/1; MG/1
1-2 TABLET ORAL EVERY 4 HOURS PRN
Qty: 28 TABLET | Refills: 0 | Status: SHIPPED | OUTPATIENT
Start: 2018-08-09 | End: 2018-08-16

## 2018-08-09 RX ORDER — SODIUM CHLORIDE, SODIUM LACTATE, POTASSIUM CHLORIDE, CALCIUM CHLORIDE 600; 310; 30; 20 MG/100ML; MG/100ML; MG/100ML; MG/100ML
INJECTION, SOLUTION INTRAVENOUS
Status: DISCONTINUED
Start: 2018-08-09 | End: 2018-08-09 | Stop reason: HOSPADM

## 2018-08-09 RX ORDER — MEPERIDINE HYDROCHLORIDE 25 MG/ML
12.5 INJECTION INTRAMUSCULAR; INTRAVENOUS; SUBCUTANEOUS EVERY 5 MIN PRN
Status: DISCONTINUED | OUTPATIENT
Start: 2018-08-09 | End: 2018-08-09 | Stop reason: HOSPADM

## 2018-08-09 RX ORDER — HYDROMORPHONE HCL 110MG/55ML
0.5 PATIENT CONTROLLED ANALGESIA SYRINGE INTRAVENOUS EVERY 5 MIN PRN
Status: DISCONTINUED | OUTPATIENT
Start: 2018-08-09 | End: 2018-08-09 | Stop reason: HOSPADM

## 2018-08-09 RX ORDER — MIDAZOLAM HYDROCHLORIDE 1 MG/ML
INJECTION INTRAMUSCULAR; INTRAVENOUS PRN
Status: DISCONTINUED | OUTPATIENT
Start: 2018-08-09 | End: 2018-08-09 | Stop reason: SDUPTHER

## 2018-08-09 RX ORDER — HYDRALAZINE HYDROCHLORIDE 20 MG/ML
5 INJECTION INTRAMUSCULAR; INTRAVENOUS EVERY 10 MIN PRN
Status: DISCONTINUED | OUTPATIENT
Start: 2018-08-09 | End: 2018-08-09 | Stop reason: HOSPADM

## 2018-08-09 RX ORDER — LIDOCAINE HYDROCHLORIDE 10 MG/ML
INJECTION, SOLUTION INFILTRATION; PERINEURAL PRN
Status: DISCONTINUED | OUTPATIENT
Start: 2018-08-09 | End: 2018-08-09 | Stop reason: SDUPTHER

## 2018-08-09 RX ORDER — OXYCODONE HYDROCHLORIDE AND ACETAMINOPHEN 5; 325 MG/1; MG/1
1 TABLET ORAL PRN
Status: COMPLETED | OUTPATIENT
Start: 2018-08-09 | End: 2018-08-09

## 2018-08-09 RX ORDER — LIDOCAINE HYDROCHLORIDE 10 MG/ML
INJECTION, SOLUTION EPIDURAL; INFILTRATION; INTRACAUDAL; PERINEURAL PRN
Status: DISCONTINUED | OUTPATIENT
Start: 2018-08-09 | End: 2018-08-09 | Stop reason: HOSPADM

## 2018-08-09 RX ORDER — SODIUM CHLORIDE, SODIUM LACTATE, POTASSIUM CHLORIDE, CALCIUM CHLORIDE 600; 310; 30; 20 MG/100ML; MG/100ML; MG/100ML; MG/100ML
INJECTION, SOLUTION INTRAVENOUS CONTINUOUS
Status: DISCONTINUED | OUTPATIENT
Start: 2018-08-09 | End: 2018-08-09 | Stop reason: HOSPADM

## 2018-08-09 RX ORDER — LIDOCAINE HYDROCHLORIDE 10 MG/ML
0.3 INJECTION, SOLUTION EPIDURAL; INFILTRATION; INTRACAUDAL; PERINEURAL ONCE
Status: COMPLETED | OUTPATIENT
Start: 2018-08-09 | End: 2018-08-09

## 2018-08-09 RX ORDER — BUPIVACAINE HYDROCHLORIDE 5 MG/ML
INJECTION, SOLUTION PERINEURAL PRN
Status: DISCONTINUED | OUTPATIENT
Start: 2018-08-09 | End: 2018-08-09 | Stop reason: HOSPADM

## 2018-08-09 RX ORDER — PROPOFOL 10 MG/ML
INJECTION, EMULSION INTRAVENOUS PRN
Status: DISCONTINUED | OUTPATIENT
Start: 2018-08-09 | End: 2018-08-09 | Stop reason: SDUPTHER

## 2018-08-09 RX ORDER — HYDROMORPHONE HCL 110MG/55ML
0.25 PATIENT CONTROLLED ANALGESIA SYRINGE INTRAVENOUS EVERY 5 MIN PRN
Status: DISCONTINUED | OUTPATIENT
Start: 2018-08-09 | End: 2018-08-09 | Stop reason: HOSPADM

## 2018-08-09 RX ORDER — OXYCODONE HYDROCHLORIDE AND ACETAMINOPHEN 5; 325 MG/1; MG/1
2 TABLET ORAL PRN
Status: COMPLETED | OUTPATIENT
Start: 2018-08-09 | End: 2018-08-09

## 2018-08-09 RX ORDER — ONDANSETRON 2 MG/ML
4 INJECTION INTRAMUSCULAR; INTRAVENOUS
Status: DISCONTINUED | OUTPATIENT
Start: 2018-08-09 | End: 2018-08-09 | Stop reason: HOSPADM

## 2018-08-09 RX ADMIN — Medication 2 G: at 07:52

## 2018-08-09 RX ADMIN — OXYCODONE HYDROCHLORIDE AND ACETAMINOPHEN 1 TABLET: 5; 325 TABLET ORAL at 08:56

## 2018-08-09 RX ADMIN — MIDAZOLAM 2 MG: 1 INJECTION INTRAMUSCULAR; INTRAVENOUS at 07:55

## 2018-08-09 RX ADMIN — PROPOFOL 10 MG: 10 INJECTION, EMULSION INTRAVENOUS at 08:05

## 2018-08-09 RX ADMIN — LIDOCAINE HYDROCHLORIDE 0.3 ML: 10 INJECTION, SOLUTION EPIDURAL; INFILTRATION; INTRACAUDAL; PERINEURAL at 06:54

## 2018-08-09 RX ADMIN — PROPOFOL 40 MG: 10 INJECTION, EMULSION INTRAVENOUS at 07:59

## 2018-08-09 RX ADMIN — PROPOFOL 10 MG: 10 INJECTION, EMULSION INTRAVENOUS at 08:02

## 2018-08-09 RX ADMIN — SODIUM CHLORIDE, POTASSIUM CHLORIDE, SODIUM LACTATE AND CALCIUM CHLORIDE: 600; 310; 30; 20 INJECTION, SOLUTION INTRAVENOUS at 06:54

## 2018-08-09 RX ADMIN — LIDOCAINE HYDROCHLORIDE 40 MG: 10 INJECTION, SOLUTION INFILTRATION; PERINEURAL at 07:59

## 2018-08-09 ASSESSMENT — PULMONARY FUNCTION TESTS
PIF_VALUE: 0
PIF_VALUE: 1
PIF_VALUE: 0
PIF_VALUE: 0
PIF_VALUE: 1
PIF_VALUE: 0
PIF_VALUE: 2
PIF_VALUE: 0

## 2018-08-09 ASSESSMENT — PAIN DESCRIPTION - ORIENTATION: ORIENTATION: LEFT

## 2018-08-09 ASSESSMENT — ENCOUNTER SYMPTOMS: SHORTNESS OF BREATH: 0

## 2018-08-09 ASSESSMENT — PAIN DESCRIPTION - PAIN TYPE: TYPE: ACUTE PAIN

## 2018-08-09 ASSESSMENT — PAIN SCALES - GENERAL
PAINLEVEL_OUTOF10: 4
PAINLEVEL_OUTOF10: 4

## 2018-08-09 ASSESSMENT — PAIN DESCRIPTION - FREQUENCY: FREQUENCY: INTERMITTENT

## 2018-08-09 ASSESSMENT — PAIN DESCRIPTION - PROGRESSION: CLINICAL_PROGRESSION: GRADUALLY WORSENING

## 2018-08-09 ASSESSMENT — PAIN DESCRIPTION - ONSET: ONSET: GRADUAL

## 2018-08-09 ASSESSMENT — LIFESTYLE VARIABLES: SMOKING_STATUS: 0

## 2018-08-09 ASSESSMENT — PAIN DESCRIPTION - DESCRIPTORS: DESCRIPTORS: BURNING

## 2018-08-09 ASSESSMENT — PAIN DESCRIPTION - LOCATION: LOCATION: FOOT

## 2018-08-09 NOTE — H&P
soft, non-tender, non-distended, normal bowel sounds, no masses or organomegaly    DP/PT palpable left foot  Ulcer on the plantar medial aspect of the 1st MPJ with exposed bone. Mild serous drainage noted. Mild erythema noted. No increase in skin temperature noted. Bony prominence medial aspect of the 1st MH with abducted hallux noted. Assessment:  Patient Active Problem List   Diagnosis Code    Hypertension I10    Uncontrolled type 2 diabetes mellitus with diabetic polyneuropathy, with long-term current use of insulin ([facility]) E11.42, Z79.4, S51.47    Alcoholic cardiomyopathy B56.6    Automatic implantable cardioverter-defibrillator in situ Z95.810    Hypothyroidism E03.9    Hyperlipidemia E78.5    Onychomycosis B35.1    Dystrophic nail L60.3    Callus of foot L84    Hallux valgus M20.10    Diabetic ulcer of right foot associated with type 2 diabetes mellitus, limited to breakdown of skin ([facility]) E11.621, L97.511    Acquired hypothyroidism E03.9    Diabetic ulcer of left foot associated with type 2 diabetes mellitus, with muscle involvement without evidence of necrosis ([facility]) E11.621, L97.525       diabetic foot ulcer left secondary to peripheral neuropathy   Osteomyelitis left foot secondary to diabetes mellitus   diabetes mellitus       Plan  Patient examined. Discussed risks, complications, alternatives and benefits with patient. Understands chance of nonhealing wound, infection, need for further surgery, loss of limb or life.          Electronically signed by Sawyer Nicholson DPM on 8/9/2018 at 7:38 AM.

## 2018-08-09 NOTE — ANESTHESIA POSTPROCEDURE EVALUATION
Department of Anesthesiology  Postprocedure Note    Patient: Victor Manuel Gould  MRN: [id number]  YOB: 1965  Date of evaluation: 8/9/2018  Time:  6:56 AM     Procedure Summary     Date:  08/09/18 Room / Location:  [address] [place] [address] / [facility] OR    Anesthesia Start:   Anesthesia Stop:      Procedure:  PARTIAL FOOT AMPUTATION (Left ) Diagnosis:  (DIABETIC FOOT ULCER, OSTEOMYELITIS)    Surgeon:  Jose Chicas DPM Responsible Provider:      Anesthesia Type:  TIVA ASA Status:  3     Anesthesia Post Evaluation   Anesthetic Problems: no   Last Vitals:     BP: 134/86 Pulse: 86   Resp: 16 SpO2: 98   Temp: 98 °F (36.7 °C)     Cardiovascular System Stable: yes  Respiratory Function: Airway Patent yes  ETT no  Ventilator no  Level of consciousness: awake, alert and oriented  Post-op pain: adequate analgesia  Hydration Adequate: yes  Nausea/Vomiting:no  Other Issues:     Helen Snow MD

## 2018-08-09 NOTE — PROGRESS NOTES
Pt is alert and oriented, stable for discharge to home, iv out ,cath intact, pressure and dressing applied, pt and family received printed and verbal education for post op care at home, denied any further questions at this time, pt taken out via wheelchair   Pt received percocet x 1 po tab for pain rate of 4, plus icepacks , xray completed before discharge

## 2018-08-22 ENCOUNTER — ANESTHESIA EVENT (OUTPATIENT)
Dept: OPERATING ROOM | Age: 53
End: 2018-08-22
Payer: MEDICAID

## 2018-08-22 ASSESSMENT — ENCOUNTER SYMPTOMS: SHORTNESS OF BREATH: 0

## 2018-08-22 NOTE — ANESTHESIA PRE PROCEDURE
Department of Anesthesiology  Preprocedure Note       Name:  Victor Manuel Gould   Age:  48 y.o.  :  1965                                          MRN:  [id number]         Date:  2018      Surgeon:  Jose Chicas DPM    Procedure: ULCER DEBRIDEMENT LEFT FOOT WITH GRAFT APPLICATION    HPI:  This is a 49 y/o male with a h/o EtOH-Induced CM, DM,HTN who is s/p ICD placement. Most recent check 17 and functioning normal without any shocks. He is no longer drinking ETOH products. Patient has a h/o DM-ulcers of both feet and now has progressed to osteomyelitis. He was las operated upon on . Medications prior to admission:   Dulaglutide 1.5 MG/0.5ML SOPN Inject 1.5 Units into the skin once a week   thyroid (ARMOUR THYROID) 90 MG tablet Take 1 tablet by mouth daily   insulin glargine (TOUJEO SOLOSTAR) 300 UNIT/ML injection pen Inject 90 Units into the skin nightly   glipiZIDE (GLUCOTROL) 5 MG tablet Take 1 tablet by mouth 2 times daily (before meals)   metFORMIN (GLUCOPHAGE) 1000 MG tablet Take 1 tablet by mouth 2 times daily (with meals)   traMADol (ULTRAM) 50 MG tablet Take 1 tablet by mouth every 8 hours as needed for Pain.   gabapentin (NEURONTIN) 300 MG capsule Take 1 capsule by mouth 2 times daily. carvedilol (COREG) 12.5 MG tablet TAKE ONE TABLET BY MOUTH TWICE DAILY   pravastatin (PRAVACHOL) 20 MG tablet TAKE ONE TABLET BY MOUTH ONE TIME DAILY   lisinopril (PRINIVIL;ZESTRIL) 5 MG tablet Take 1 tablet by mouth daily   furosemide (LASIX) 40 MG tablet TAKE ONE TABLET BY MOUTH TWICE DAILY   spironolactone (ALDACTONE) 25 MG tablet Take 1 tablet by mouth daily   magnesium oxide (MAG-OX) 400 (241.3 Mg) MG TABS tablet Take 1 tablet by mouth 2 times daily   glucose blood VI test strips (ONE TOUCH ULTRA TEST) strip Test blood sugar once daily.    Insulin Pen Needle (UNIFINE PENTIPS) 31G X 5 MM MISC USE 1 EACH DAY AS NEEDED FOR TESTING   Blood Glucose Monitoring Suppl GABI Measure glucose before breakfast, resistant staph aureus) culture positive 16, 16    foot wound    Neuropathic ulcer of left foot, limited to breakdown of skin ([address] 11/3/2016    Neuropathic ulcer of toe ([address] 2014    Pleural effusion due to congestive heart failure ([address]     Smoker     quit 2986    Systolic CHF, acute ([address] 2013     Past Surgical History:     FOOT AMPUTATION Left 2018    PARTIAL FOOT AMPUTATION w. Dr Vesna Miller Left 2017    LEFT FOOT ULCER DEBRIDEMENT, POSSIBLE SECOND METATARSAL    FOOT TENDON SURGERY Right 2016    FOOT TENDON SURGERY Left 2017    KNEE SURGERY Left     OTHER SURGICAL HISTORY Bilateral 9/17/15    amputation 2nd digit bilateral, flexor tenotomy right hallux    OTHER SURGICAL HISTORY Left 2017    PARTIAL LEFT FOOT AMPUTATION      OTHER SURGICAL HISTORY Left 2017    Debridement infected bone and tissue left foot; ulcer debridement left foot with graft application with dr mathur     OTHER SURGICAL HISTORY Right 2018    DEBRIDEMENT INFECTED BONE AND TISSUE RIGHT FOOT, ULCER DEBRIDEMENT RIGHT FOOT WITH GRAFT APPLICATION    PACEMAKER PLACEMENT      14 pacer/defib    MS PART REMV OTHR TARSAL/METATARSAL Left 2018    PARTIAL FOOT AMPUTATION WITH GRAFT APPLICATION performed by Marbella Higuera DPM at [address] TOE AMPUTATION      2 toes     Social History:     Smoking status: Former Smoker     Quit date: 1980    Smokeless tobacco: Never Used    Alcohol use No     Vital Signs (Current):  BP: 129/78  Pulse: 89 Resp: 16  SpO2: 99 Temp: 98.2 °F (36.8 °C) Height: 5' 10\" (1.778 m)  (18)  Weight: 213 lb (96.6 kg)  (18) BMI: 30.6    NPO Status: >MN    EK2017 Sinus rhythm with short MS 82; Non-specific intra-ventricular conduction delay   ECHO: Left ventricular systolic function is mildly reduced with an ejection fraction of 44%. Mild global hypokinesis.  Diastolic filling parameters suggests grade I diastolic

## 2018-08-23 ENCOUNTER — APPOINTMENT (OUTPATIENT)
Dept: GENERAL RADIOLOGY | Age: 53
End: 2018-08-23
Attending: PODIATRIST
Payer: MEDICAID

## 2018-08-23 ENCOUNTER — ANESTHESIA (OUTPATIENT)
Dept: OPERATING ROOM | Age: 53
End: 2018-08-23
Payer: MEDICAID

## 2018-08-23 ENCOUNTER — HOSPITAL ENCOUNTER (OUTPATIENT)
Age: 53
Setting detail: OUTPATIENT SURGERY
Discharge: HOME | End: 2018-08-23
Attending: PODIATRIST | Admitting: PODIATRIST
Payer: MEDICAID

## 2018-08-23 VITALS
SYSTOLIC BLOOD PRESSURE: 117 MMHG | BODY MASS INDEX: 30.49 KG/M2 | OXYGEN SATURATION: 98 % | DIASTOLIC BLOOD PRESSURE: 78 MMHG | TEMPERATURE: 98 F | RESPIRATION RATE: 16 BRPM | WEIGHT: 213 LBS | HEART RATE: 84 BPM | HEIGHT: 70 IN

## 2018-08-23 VITALS
SYSTOLIC BLOOD PRESSURE: 114 MMHG | DIASTOLIC BLOOD PRESSURE: 71 MMHG | TEMPERATURE: 98.6 F | OXYGEN SATURATION: 99 % | RESPIRATION RATE: 3 BRPM

## 2018-08-23 DIAGNOSIS — G89.18 POSTOPERATIVE PAIN: Primary | ICD-10-CM

## 2018-08-23 LAB
GLUCOSE BLD-MCNC: 90 MG/DL (ref 70–99)
PERFORMED ON: NORMAL

## 2018-08-23 PROCEDURE — L3260 AMBULATORY SURGICAL BOOT EAC: HCPCS | Performed by: PODIATRIST

## 2018-08-23 PROCEDURE — 2580000003 HC RX 258: Performed by: PODIATRIST

## 2018-08-23 PROCEDURE — 7100000011 HC PHASE II RECOVERY - ADDTL 15 MIN: Performed by: PODIATRIST

## 2018-08-23 PROCEDURE — 3700000001 HC ADD 15 MINUTES (ANESTHESIA): Performed by: PODIATRIST

## 2018-08-23 PROCEDURE — 2580000003 HC RX 258: Performed by: NURSE ANESTHETIST, CERTIFIED REGISTERED

## 2018-08-23 PROCEDURE — 2580000003 HC RX 258

## 2018-08-23 PROCEDURE — 7100000010 HC PHASE II RECOVERY - FIRST 15 MIN: Performed by: PODIATRIST

## 2018-08-23 PROCEDURE — 2500000003 HC RX 250 WO HCPCS: Performed by: NURSE ANESTHETIST, CERTIFIED REGISTERED

## 2018-08-23 PROCEDURE — 3600000012 HC SURGERY LEVEL 2 ADDTL 15MIN: Performed by: PODIATRIST

## 2018-08-23 PROCEDURE — 3600000002 HC SURGERY LEVEL 2 BASE: Performed by: PODIATRIST

## 2018-08-23 PROCEDURE — 2709999900 HC NON-CHARGEABLE SUPPLY: Performed by: PODIATRIST

## 2018-08-23 PROCEDURE — 6360000002 HC RX W HCPCS: Performed by: NURSE ANESTHETIST, CERTIFIED REGISTERED

## 2018-08-23 PROCEDURE — 73620 X-RAY EXAM OF FOOT: CPT

## 2018-08-23 PROCEDURE — 3700000000 HC ANESTHESIA ATTENDED CARE: Performed by: PODIATRIST

## 2018-08-23 PROCEDURE — 2500000003 HC RX 250 WO HCPCS: Performed by: PODIATRIST

## 2018-08-23 PROCEDURE — 6360000002 HC RX W HCPCS: Performed by: PODIATRIST

## 2018-08-23 DEVICE — PATCH AMNION 2 LAYR PROTCT 4 X 4CM STERISHIELD II: Type: IMPLANTABLE DEVICE | Site: FOOT | Status: FUNCTIONAL

## 2018-08-23 RX ORDER — ONDANSETRON 2 MG/ML
4 INJECTION INTRAMUSCULAR; INTRAVENOUS
Status: DISCONTINUED | OUTPATIENT
Start: 2018-08-23 | End: 2018-08-23 | Stop reason: HOSPADM

## 2018-08-23 RX ORDER — OXYCODONE HYDROCHLORIDE AND ACETAMINOPHEN 5; 325 MG/1; MG/1
1-2 TABLET ORAL EVERY 4 HOURS PRN
Qty: 28 TABLET | Refills: 0 | Status: SHIPPED | OUTPATIENT
Start: 2018-08-23 | End: 2018-08-30

## 2018-08-23 RX ORDER — SODIUM CHLORIDE, SODIUM LACTATE, POTASSIUM CHLORIDE, CALCIUM CHLORIDE 600; 310; 30; 20 MG/100ML; MG/100ML; MG/100ML; MG/100ML
INJECTION, SOLUTION INTRAVENOUS ONCE
Status: DISCONTINUED | OUTPATIENT
Start: 2018-08-23 | End: 2018-08-23

## 2018-08-23 RX ORDER — HYDROMORPHONE HCL 110MG/55ML
0.5 PATIENT CONTROLLED ANALGESIA SYRINGE INTRAVENOUS EVERY 5 MIN PRN
Status: DISCONTINUED | OUTPATIENT
Start: 2018-08-23 | End: 2018-08-23 | Stop reason: HOSPADM

## 2018-08-23 RX ORDER — SODIUM CHLORIDE, SODIUM LACTATE, POTASSIUM CHLORIDE, CALCIUM CHLORIDE 600; 310; 30; 20 MG/100ML; MG/100ML; MG/100ML; MG/100ML
INJECTION, SOLUTION INTRAVENOUS
Status: COMPLETED
Start: 2018-08-23 | End: 2018-08-23

## 2018-08-23 RX ORDER — FENTANYL CITRATE 50 UG/ML
25 INJECTION, SOLUTION INTRAMUSCULAR; INTRAVENOUS EVERY 5 MIN PRN
Status: DISCONTINUED | OUTPATIENT
Start: 2018-08-23 | End: 2018-08-23 | Stop reason: HOSPADM

## 2018-08-23 RX ORDER — MAGNESIUM HYDROXIDE 1200 MG/15ML
LIQUID ORAL CONTINUOUS PRN
Status: DISCONTINUED | OUTPATIENT
Start: 2018-08-23 | End: 2018-08-23 | Stop reason: HOSPADM

## 2018-08-23 RX ORDER — SODIUM CHLORIDE, SODIUM LACTATE, POTASSIUM CHLORIDE, CALCIUM CHLORIDE 600; 310; 30; 20 MG/100ML; MG/100ML; MG/100ML; MG/100ML
INJECTION, SOLUTION INTRAVENOUS ONCE
Status: CANCELLED | OUTPATIENT
Start: 2018-08-23

## 2018-08-23 RX ORDER — LIDOCAINE HYDROCHLORIDE 20 MG/ML
INJECTION, SOLUTION INFILTRATION; PERINEURAL PRN
Status: DISCONTINUED | OUTPATIENT
Start: 2018-08-23 | End: 2018-08-23 | Stop reason: SDUPTHER

## 2018-08-23 RX ORDER — DEXAMETHASONE SODIUM PHOSPHATE 4 MG/ML
INJECTION, SOLUTION INTRA-ARTICULAR; INTRALESIONAL; INTRAMUSCULAR; INTRAVENOUS; SOFT TISSUE PRN
Status: DISCONTINUED | OUTPATIENT
Start: 2018-08-23 | End: 2018-08-23 | Stop reason: SDUPTHER

## 2018-08-23 RX ORDER — MEPERIDINE HYDROCHLORIDE 25 MG/ML
12.5 INJECTION INTRAMUSCULAR; INTRAVENOUS; SUBCUTANEOUS EVERY 5 MIN PRN
Status: DISCONTINUED | OUTPATIENT
Start: 2018-08-23 | End: 2018-08-23 | Stop reason: HOSPADM

## 2018-08-23 RX ORDER — OXYCODONE HYDROCHLORIDE AND ACETAMINOPHEN 5; 325 MG/1; MG/1
2 TABLET ORAL PRN
Status: DISCONTINUED | OUTPATIENT
Start: 2018-08-23 | End: 2018-08-23 | Stop reason: HOSPADM

## 2018-08-23 RX ORDER — SODIUM CHLORIDE, SODIUM LACTATE, POTASSIUM CHLORIDE, CALCIUM CHLORIDE 600; 310; 30; 20 MG/100ML; MG/100ML; MG/100ML; MG/100ML
INJECTION, SOLUTION INTRAVENOUS CONTINUOUS PRN
Status: DISCONTINUED | OUTPATIENT
Start: 2018-08-23 | End: 2018-08-23 | Stop reason: SDUPTHER

## 2018-08-23 RX ORDER — OXYCODONE HYDROCHLORIDE AND ACETAMINOPHEN 5; 325 MG/1; MG/1
1 TABLET ORAL PRN
Status: DISCONTINUED | OUTPATIENT
Start: 2018-08-23 | End: 2018-08-23 | Stop reason: HOSPADM

## 2018-08-23 RX ORDER — LIDOCAINE HYDROCHLORIDE 10 MG/ML
INJECTION, SOLUTION EPIDURAL; INFILTRATION; INTRACAUDAL; PERINEURAL PRN
Status: DISCONTINUED | OUTPATIENT
Start: 2018-08-23 | End: 2018-08-23 | Stop reason: HOSPADM

## 2018-08-23 RX ORDER — HYDROMORPHONE HCL 110MG/55ML
0.25 PATIENT CONTROLLED ANALGESIA SYRINGE INTRAVENOUS EVERY 5 MIN PRN
Status: DISCONTINUED | OUTPATIENT
Start: 2018-08-23 | End: 2018-08-23 | Stop reason: HOSPADM

## 2018-08-23 RX ORDER — PROPOFOL 10 MG/ML
INJECTION, EMULSION INTRAVENOUS PRN
Status: DISCONTINUED | OUTPATIENT
Start: 2018-08-23 | End: 2018-08-23 | Stop reason: SDUPTHER

## 2018-08-23 RX ORDER — HYDRALAZINE HYDROCHLORIDE 20 MG/ML
5 INJECTION INTRAMUSCULAR; INTRAVENOUS EVERY 10 MIN PRN
Status: DISCONTINUED | OUTPATIENT
Start: 2018-08-23 | End: 2018-08-23 | Stop reason: HOSPADM

## 2018-08-23 RX ORDER — BUPIVACAINE HYDROCHLORIDE 5 MG/ML
INJECTION, SOLUTION EPIDURAL; INTRACAUDAL PRN
Status: DISCONTINUED | OUTPATIENT
Start: 2018-08-23 | End: 2018-08-23 | Stop reason: HOSPADM

## 2018-08-23 RX ADMIN — SODIUM CHLORIDE, POTASSIUM CHLORIDE, SODIUM LACTATE AND CALCIUM CHLORIDE: 600; 310; 30; 20 INJECTION, SOLUTION INTRAVENOUS at 10:21

## 2018-08-23 RX ADMIN — PROPOFOL 20 MG: 10 INJECTION, EMULSION INTRAVENOUS at 10:37

## 2018-08-23 RX ADMIN — PROPOFOL 20 MG: 10 INJECTION, EMULSION INTRAVENOUS at 10:30

## 2018-08-23 RX ADMIN — LIDOCAINE HYDROCHLORIDE 80 MG: 20 INJECTION, SOLUTION INFILTRATION; PERINEURAL at 10:21

## 2018-08-23 RX ADMIN — PROPOFOL 30 MG: 10 INJECTION, EMULSION INTRAVENOUS at 10:24

## 2018-08-23 RX ADMIN — PROPOFOL 20 MG: 10 INJECTION, EMULSION INTRAVENOUS at 10:42

## 2018-08-23 RX ADMIN — PROPOFOL 20 MG: 10 INJECTION, EMULSION INTRAVENOUS at 10:32

## 2018-08-23 RX ADMIN — PROPOFOL 50 MG: 10 INJECTION, EMULSION INTRAVENOUS at 10:22

## 2018-08-23 RX ADMIN — PROPOFOL 20 MG: 10 INJECTION, EMULSION INTRAVENOUS at 10:28

## 2018-08-23 RX ADMIN — DEXAMETHASONE SODIUM PHOSPHATE 10 MG: 4 INJECTION, SOLUTION INTRAMUSCULAR; INTRAVENOUS at 10:26

## 2018-08-23 RX ADMIN — SODIUM CHLORIDE, POTASSIUM CHLORIDE, SODIUM LACTATE AND CALCIUM CHLORIDE 1000 ML: 600; 310; 30; 20 INJECTION, SOLUTION INTRAVENOUS at 10:10

## 2018-08-23 RX ADMIN — PROPOFOL 20 MG: 10 INJECTION, EMULSION INTRAVENOUS at 10:34

## 2018-08-23 RX ADMIN — Medication 2 G: at 10:20

## 2018-08-23 RX ADMIN — PROPOFOL 20 MG: 10 INJECTION, EMULSION INTRAVENOUS at 10:26

## 2018-08-23 ASSESSMENT — PULMONARY FUNCTION TESTS
PIF_VALUE: 0

## 2018-08-23 ASSESSMENT — PAIN - FUNCTIONAL ASSESSMENT: PAIN_FUNCTIONAL_ASSESSMENT: 0-10

## 2018-09-11 DIAGNOSIS — E03.9 ACQUIRED HYPOTHYROIDISM: ICD-10-CM

## 2018-09-11 RX ORDER — DULAGLUTIDE 1.5 MG/.5ML
INJECTION, SOLUTION SUBCUTANEOUS
Qty: 2 ML | Refills: 2 | Status: SHIPPED | OUTPATIENT
Start: 2018-09-11 | End: 2018-12-03 | Stop reason: SDUPTHER

## 2018-09-11 RX ORDER — INSULIN GLARGINE 300 U/ML
90 INJECTION, SOLUTION SUBCUTANEOUS NIGHTLY
Qty: 9 ML | Refills: 2 | Status: SHIPPED | OUTPATIENT
Start: 2018-09-11 | End: 2018-12-03 | Stop reason: SDUPTHER

## 2018-09-11 RX ORDER — THYROID,PORK 90 MG
90 TABLET ORAL DAILY
Qty: 30 TABLET | Refills: 2 | Status: SHIPPED | OUTPATIENT
Start: 2018-09-11 | End: 2019-01-25 | Stop reason: ALTCHOICE

## 2018-10-09 ENCOUNTER — NURSE ONLY (OUTPATIENT)
Dept: CARDIOLOGY CLINIC | Age: 53
End: 2018-10-09
Payer: MEDICAID

## 2018-10-09 DIAGNOSIS — Z95.810 AUTOMATIC IMPLANTABLE CARDIOVERTER-DEFIBRILLATOR IN SITU: Primary | ICD-10-CM

## 2018-10-09 DIAGNOSIS — I42.6 ALCOHOLIC CARDIOMYOPATHY (HCC): ICD-10-CM

## 2018-10-09 PROCEDURE — 93296 REM INTERROG EVL PM/IDS: CPT | Performed by: INTERNAL MEDICINE

## 2018-10-09 PROCEDURE — 93295 DEV INTERROG REMOTE 1/2/MLT: CPT | Performed by: INTERNAL MEDICINE

## 2018-10-11 NOTE — PROGRESS NOTES
[employer] transmission shows normal functioning device. 4 SVT episodes . Thoracic impedance stable. On coreg. See PACEART report under Cardiology tab.

## 2018-10-18 ENCOUNTER — TELEPHONE (OUTPATIENT)
Dept: CARDIOLOGY CLINIC | Age: 53
End: 2018-10-18

## 2018-10-18 NOTE — TELEPHONE ENCOUNTER
PACEMAKER REMOTE (PACEART)   Order: [id number]   Status:  Edited Result - FINAL   Visible to patient:  No (Not Released)   Notes recorded by Sonu Ramirez MD on 10/18/2018 at 9:02 AM EDT  Device check shows 4 tachycardia episodes ST vs AT. Increase Coreg to 25 mg bid     Notified the patient's mom of this.

## 2018-11-01 ENCOUNTER — HOSPITAL ENCOUNTER (OUTPATIENT)
Age: 53
Setting detail: OUTPATIENT SURGERY
Discharge: HOME | End: 2018-11-01
Attending: PODIATRIST | Admitting: PODIATRIST
Payer: MEDICAID

## 2018-11-01 ENCOUNTER — ANESTHESIA EVENT (OUTPATIENT)
Dept: OPERATING ROOM | Age: 53
End: 2018-11-01
Payer: MEDICAID

## 2018-11-01 ENCOUNTER — ANESTHESIA (OUTPATIENT)
Dept: OPERATING ROOM | Age: 53
End: 2018-11-01
Payer: MEDICAID

## 2018-11-01 ENCOUNTER — APPOINTMENT (OUTPATIENT)
Dept: GENERAL RADIOLOGY | Age: 53
End: 2018-11-01
Attending: PODIATRIST
Payer: MEDICAID

## 2018-11-01 VITALS
SYSTOLIC BLOOD PRESSURE: 93 MMHG | RESPIRATION RATE: 13 BRPM | OXYGEN SATURATION: 100 % | DIASTOLIC BLOOD PRESSURE: 54 MMHG

## 2018-11-01 VITALS
HEART RATE: 90 BPM | SYSTOLIC BLOOD PRESSURE: 113 MMHG | BODY MASS INDEX: 31.55 KG/M2 | RESPIRATION RATE: 16 BRPM | DIASTOLIC BLOOD PRESSURE: 72 MMHG | WEIGHT: 213 LBS | OXYGEN SATURATION: 97 % | HEIGHT: 69 IN | TEMPERATURE: 97.3 F

## 2018-11-01 DIAGNOSIS — G89.18 POSTOPERATIVE PAIN: Primary | ICD-10-CM

## 2018-11-01 LAB
GLUCOSE BLD-MCNC: 107 MG/DL (ref 70–99)
GLUCOSE BLD-MCNC: 113 MG/DL (ref 70–99)
PERFORMED ON: ABNORMAL
PERFORMED ON: ABNORMAL

## 2018-11-01 PROCEDURE — 88304 TISSUE EXAM BY PATHOLOGIST: CPT

## 2018-11-01 PROCEDURE — 2709999900 HC NON-CHARGEABLE SUPPLY: Performed by: PODIATRIST

## 2018-11-01 PROCEDURE — 2500000003 HC RX 250 WO HCPCS: Performed by: NURSE ANESTHETIST, CERTIFIED REGISTERED

## 2018-11-01 PROCEDURE — 3700000000 HC ANESTHESIA ATTENDED CARE: Performed by: PODIATRIST

## 2018-11-01 PROCEDURE — 6360000002 HC RX W HCPCS: Performed by: PODIATRIST

## 2018-11-01 PROCEDURE — 7100000011 HC PHASE II RECOVERY - ADDTL 15 MIN: Performed by: PODIATRIST

## 2018-11-01 PROCEDURE — 3600000003 HC SURGERY LEVEL 3 BASE: Performed by: PODIATRIST

## 2018-11-01 PROCEDURE — 2580000003 HC RX 258: Performed by: ANESTHESIOLOGY

## 2018-11-01 PROCEDURE — 7100000010 HC PHASE II RECOVERY - FIRST 15 MIN: Performed by: PODIATRIST

## 2018-11-01 PROCEDURE — 2500000003 HC RX 250 WO HCPCS: Performed by: PODIATRIST

## 2018-11-01 PROCEDURE — 3600000013 HC SURGERY LEVEL 3 ADDTL 15MIN: Performed by: PODIATRIST

## 2018-11-01 PROCEDURE — 2500000003 HC RX 250 WO HCPCS: Performed by: ANESTHESIOLOGY

## 2018-11-01 PROCEDURE — 6360000002 HC RX W HCPCS: Performed by: NURSE ANESTHETIST, CERTIFIED REGISTERED

## 2018-11-01 PROCEDURE — 88311 DECALCIFY TISSUE: CPT

## 2018-11-01 PROCEDURE — 3700000001 HC ADD 15 MINUTES (ANESTHESIA): Performed by: PODIATRIST

## 2018-11-01 PROCEDURE — 2580000003 HC RX 258: Performed by: PODIATRIST

## 2018-11-01 PROCEDURE — 73620 X-RAY EXAM OF FOOT: CPT

## 2018-11-01 DEVICE — PATCH AMNION 2 LAYR PROTCT 4 X 4CM STERISHIELD II: Type: IMPLANTABLE DEVICE | Site: FOOT | Status: FUNCTIONAL

## 2018-11-01 RX ORDER — HYDROMORPHONE HCL 110MG/55ML
0.5 PATIENT CONTROLLED ANALGESIA SYRINGE INTRAVENOUS EVERY 5 MIN PRN
Status: DISCONTINUED | OUTPATIENT
Start: 2018-11-01 | End: 2018-11-01 | Stop reason: HOSPADM

## 2018-11-01 RX ORDER — OXYCODONE HYDROCHLORIDE AND ACETAMINOPHEN 5; 325 MG/1; MG/1
2 TABLET ORAL PRN
Status: DISCONTINUED | OUTPATIENT
Start: 2018-11-01 | End: 2018-11-01 | Stop reason: HOSPADM

## 2018-11-01 RX ORDER — BUPIVACAINE HYDROCHLORIDE 5 MG/ML
INJECTION, SOLUTION EPIDURAL; INTRACAUDAL PRN
Status: DISCONTINUED | OUTPATIENT
Start: 2018-11-01 | End: 2018-11-01 | Stop reason: HOSPADM

## 2018-11-01 RX ORDER — OXYCODONE HYDROCHLORIDE AND ACETAMINOPHEN 5; 325 MG/1; MG/1
1 TABLET ORAL EVERY 4 HOURS PRN
COMMUNITY
End: 2019-01-25 | Stop reason: ALTCHOICE

## 2018-11-01 RX ORDER — DIPHENHYDRAMINE HYDROCHLORIDE 50 MG/ML
12.5 INJECTION INTRAMUSCULAR; INTRAVENOUS
Status: DISCONTINUED | OUTPATIENT
Start: 2018-11-01 | End: 2018-11-01 | Stop reason: HOSPADM

## 2018-11-01 RX ORDER — MEPERIDINE HYDROCHLORIDE 25 MG/ML
12.5 INJECTION INTRAMUSCULAR; INTRAVENOUS; SUBCUTANEOUS EVERY 5 MIN PRN
Status: DISCONTINUED | OUTPATIENT
Start: 2018-11-01 | End: 2018-11-01 | Stop reason: HOSPADM

## 2018-11-01 RX ORDER — HYDROMORPHONE HCL 110MG/55ML
0.25 PATIENT CONTROLLED ANALGESIA SYRINGE INTRAVENOUS EVERY 5 MIN PRN
Status: DISCONTINUED | OUTPATIENT
Start: 2018-11-01 | End: 2018-11-01 | Stop reason: HOSPADM

## 2018-11-01 RX ORDER — MIDAZOLAM HYDROCHLORIDE 1 MG/ML
INJECTION INTRAMUSCULAR; INTRAVENOUS PRN
Status: DISCONTINUED | OUTPATIENT
Start: 2018-11-01 | End: 2018-11-01 | Stop reason: SDUPTHER

## 2018-11-01 RX ORDER — ONDANSETRON 2 MG/ML
4 INJECTION INTRAMUSCULAR; INTRAVENOUS PRN
Status: DISCONTINUED | OUTPATIENT
Start: 2018-11-01 | End: 2018-11-01 | Stop reason: HOSPADM

## 2018-11-01 RX ORDER — PROMETHAZINE HYDROCHLORIDE 25 MG/ML
6.25 INJECTION, SOLUTION INTRAMUSCULAR; INTRAVENOUS
Status: DISCONTINUED | OUTPATIENT
Start: 2018-11-01 | End: 2018-11-01 | Stop reason: HOSPADM

## 2018-11-01 RX ORDER — LABETALOL HYDROCHLORIDE 5 MG/ML
5 INJECTION, SOLUTION INTRAVENOUS EVERY 10 MIN PRN
Status: DISCONTINUED | OUTPATIENT
Start: 2018-11-01 | End: 2018-11-01 | Stop reason: HOSPADM

## 2018-11-01 RX ORDER — FENTANYL CITRATE 50 UG/ML
INJECTION, SOLUTION INTRAMUSCULAR; INTRAVENOUS PRN
Status: DISCONTINUED | OUTPATIENT
Start: 2018-11-01 | End: 2018-11-01 | Stop reason: SDUPTHER

## 2018-11-01 RX ORDER — LIDOCAINE HYDROCHLORIDE 20 MG/ML
INJECTION, SOLUTION INFILTRATION; PERINEURAL PRN
Status: DISCONTINUED | OUTPATIENT
Start: 2018-11-01 | End: 2018-11-01 | Stop reason: SDUPTHER

## 2018-11-01 RX ORDER — OXYCODONE HYDROCHLORIDE AND ACETAMINOPHEN 5; 325 MG/1; MG/1
1-2 TABLET ORAL EVERY 4 HOURS PRN
Qty: 28 TABLET | Refills: 0 | Status: SHIPPED | OUTPATIENT
Start: 2018-11-01 | End: 2018-11-08

## 2018-11-01 RX ORDER — MAGNESIUM HYDROXIDE 1200 MG/15ML
LIQUID ORAL CONTINUOUS PRN
Status: DISCONTINUED | OUTPATIENT
Start: 2018-11-01 | End: 2018-11-01 | Stop reason: HOSPADM

## 2018-11-01 RX ORDER — CIPROFLOXACIN 500 MG/1
500 TABLET, FILM COATED ORAL 2 TIMES DAILY
Qty: 28 TABLET | Refills: 0 | Status: SHIPPED | OUTPATIENT
Start: 2018-11-01 | End: 2018-11-15

## 2018-11-01 RX ORDER — HYDRALAZINE HYDROCHLORIDE 20 MG/ML
5 INJECTION INTRAMUSCULAR; INTRAVENOUS EVERY 10 MIN PRN
Status: DISCONTINUED | OUTPATIENT
Start: 2018-11-01 | End: 2018-11-01 | Stop reason: HOSPADM

## 2018-11-01 RX ORDER — PROPOFOL 10 MG/ML
INJECTION, EMULSION INTRAVENOUS PRN
Status: DISCONTINUED | OUTPATIENT
Start: 2018-11-01 | End: 2018-11-01 | Stop reason: SDUPTHER

## 2018-11-01 RX ORDER — MORPHINE SULFATE 10 MG/ML
2 INJECTION, SOLUTION INTRAMUSCULAR; INTRAVENOUS EVERY 5 MIN PRN
Status: DISCONTINUED | OUTPATIENT
Start: 2018-11-01 | End: 2018-11-01 | Stop reason: HOSPADM

## 2018-11-01 RX ORDER — LIDOCAINE HYDROCHLORIDE 10 MG/ML
INJECTION, SOLUTION EPIDURAL; INFILTRATION; INTRACAUDAL; PERINEURAL PRN
Status: DISCONTINUED | OUTPATIENT
Start: 2018-11-01 | End: 2018-11-01 | Stop reason: HOSPADM

## 2018-11-01 RX ORDER — SODIUM CHLORIDE, SODIUM LACTATE, POTASSIUM CHLORIDE, CALCIUM CHLORIDE 600; 310; 30; 20 MG/100ML; MG/100ML; MG/100ML; MG/100ML
INJECTION, SOLUTION INTRAVENOUS CONTINUOUS
Status: DISCONTINUED | OUTPATIENT
Start: 2018-11-01 | End: 2018-11-01 | Stop reason: HOSPADM

## 2018-11-01 RX ORDER — MORPHINE SULFATE 10 MG/ML
1 INJECTION, SOLUTION INTRAMUSCULAR; INTRAVENOUS EVERY 5 MIN PRN
Status: DISCONTINUED | OUTPATIENT
Start: 2018-11-01 | End: 2018-11-01 | Stop reason: HOSPADM

## 2018-11-01 RX ORDER — OXYCODONE HYDROCHLORIDE AND ACETAMINOPHEN 5; 325 MG/1; MG/1
1 TABLET ORAL PRN
Status: DISCONTINUED | OUTPATIENT
Start: 2018-11-01 | End: 2018-11-01 | Stop reason: HOSPADM

## 2018-11-01 RX ORDER — LIDOCAINE HYDROCHLORIDE 10 MG/ML
2 INJECTION, SOLUTION EPIDURAL; INFILTRATION; INTRACAUDAL; PERINEURAL
Status: COMPLETED | OUTPATIENT
Start: 2018-11-01 | End: 2018-11-01

## 2018-11-01 RX ADMIN — Medication 2 G: at 09:35

## 2018-11-01 RX ADMIN — MIDAZOLAM 2 MG: 1 INJECTION INTRAMUSCULAR; INTRAVENOUS at 09:40

## 2018-11-01 RX ADMIN — LIDOCAINE HYDROCHLORIDE 0.3 ML: 10 INJECTION, SOLUTION EPIDURAL; INFILTRATION; INTRACAUDAL; PERINEURAL at 08:59

## 2018-11-01 RX ADMIN — SODIUM CHLORIDE, POTASSIUM CHLORIDE, SODIUM LACTATE AND CALCIUM CHLORIDE: 600; 310; 30; 20 INJECTION, SOLUTION INTRAVENOUS at 08:59

## 2018-11-01 RX ADMIN — PROPOFOL 250 MG: 10 INJECTION, EMULSION INTRAVENOUS at 09:44

## 2018-11-01 RX ADMIN — LIDOCAINE HYDROCHLORIDE 50 MG: 20 INJECTION, SOLUTION INFILTRATION; PERINEURAL at 09:44

## 2018-11-01 RX ADMIN — FENTANYL CITRATE 50 MCG: 50 INJECTION INTRAMUSCULAR; INTRAVENOUS at 09:40

## 2018-11-01 ASSESSMENT — PULMONARY FUNCTION TESTS
PIF_VALUE: 1
PIF_VALUE: 0
PIF_VALUE: 1
PIF_VALUE: 0
PIF_VALUE: 0
PIF_VALUE: 1
PIF_VALUE: 0
PIF_VALUE: 0
PIF_VALUE: 1
PIF_VALUE: 0
PIF_VALUE: 1
PIF_VALUE: 0
PIF_VALUE: 1
PIF_VALUE: 0
PIF_VALUE: 1
PIF_VALUE: 0
PIF_VALUE: 1
PIF_VALUE: 1

## 2018-11-01 ASSESSMENT — PAIN SCALES - GENERAL
PAINLEVEL_OUTOF10: 0
PAINLEVEL_OUTOF10: 0

## 2018-11-01 ASSESSMENT — PAIN - FUNCTIONAL ASSESSMENT: PAIN_FUNCTIONAL_ASSESSMENT: 0-10

## 2018-11-01 NOTE — ANESTHESIA POSTPROCEDURE EVALUATION
Department of Anesthesiology  Postprocedure Note    Patient: Fior Spears  MRN: [id number]  YOB: 1965  Date of evaluation: 11/1/2018  Time:  12:52 PM     Procedure Summary     Date:  11/01/18 Room / Location:  [place] [address] / [facility] OR    Anesthesia Start:  0940 Anesthesia Stop:  6598    Procedure:  PARTIAL RESECTION LEFT METATARSAL, ULCER DEBRIDEMENT LEFT FOOT WITH GRAFT APPLICATION (Left Foot) Diagnosis:  (DIABETIC FOOT ULCER, OSTEOMYELITIS)    Surgeon:  Jailene Flores DPM Responsible Provider:  Kathryn Barr MD    Anesthesia Type:  MAC ASA Status:  3          Anesthesia Type: MAC    Stephan Phase I:      Stephan Phase II: Stephan Score: 10    Last vitals: Reviewed and per EMR flowsheets.        Anesthesia Post Evaluation    Patient location during evaluation: PACU  Patient participation: complete - patient participated  Level of consciousness: awake and alert  Pain score: 0  Airway patency: patent  Nausea & Vomiting: no nausea and no vomiting  Complications: no  Cardiovascular status: blood pressure returned to baseline  Respiratory status: acceptable  Hydration status: stable

## 2018-11-01 NOTE — H&P
ulcer  MUSCULOSKELETAL:  positive for  pain  NEUROLOGICAL:  positive for numbness      Objective:   Ht 5' 9\" (1.753 m)   Wt 213 lb (96.6 kg)   BMI 31.45 kg/m²     Data:  CBC: No results for input(s): WBC, HGB, HCT, MCV, PLT in the last 72 hours. BMP: No results for input(s): NA, K, CL, CO2, PHOS, BUN, CREATININE in the last 72 hours. Invalid input(s): CA  LIVER PROFILE: No results for input(s): AST, ALT, LIPASE, BILIDIR, BILITOT, ALKPHOS in the last 72 hours. Invalid input(s): AMYLASE,  ALB  PT/INR: No results for input(s): PROT, INR in the last 72 hours. HgBA1c:  Lab Results   Component Value Date    LABA1C 7.1 01/03/2018       Cultures:     Imaging: xray left foot - prior partial resection of 1st and 2nd rays    Physical Exam:    General Appearance: alert and oriented to person, place and time, well developed and well- nourished, in no acute distress  Skin: warm and dry, no rash or erythema  Head: normocephalic and atraumatic  Eyes: pupils equal, round, and reactive to light, extraocular eye movements intact, conjunctivae normal  ENT: tympanic membrane, external ear and ear canal normal bilaterally, nose without deformity, nasal mucosa and turbinates normal without polyps  Neck: supple and non-tender without mass, no thyromegaly or thyroid nodules, no cervical lymphadenopathy  Pulmonary/Chest: clear to auscultation bilaterally- no wheezes, rales or rhonchi, normal air movement, no respiratory distress  Cardiovascular: normal rate, regular rhythm, normal S1 and S2, no murmurs, rubs, clicks, or gallops, distal pulses intact, no carotid bruits  Abdomen: soft, non-tender, non-distended, normal bowel sounds, no masses or organomegaly    DP/PT palpable left foot  Ulcer on the left plantar forefoot in region of 3rd MH with fibrotic and granulation tissue noted. Mild serous drainage. Exposed bone of the 3rd MH noted which is discolored.    Prior partial amputation of 1st and 2nd rays and 3rd

## 2018-11-01 NOTE — ANESTHESIA PRE PROCEDURE
Department of Anesthesiology  Preprocedure Note       Name:  Mackenzie Walton   Age:  48 y.o.  :  1965                                          MRN:  [id number]         Date:  2018      Surgeon: Tobin Brewer):  Radha Sam DPM    Procedure: PARTIAL RESECTION LEFT METATARSAL, ULCER DEBRIDEMENT LEFT FOOT WITH GRAFT APPLICATION (Left Foot)    Medications prior to admission:   Prior to Admission medications    Medication Sig Start Date End Date Taking? Authorizing Provider   oxyCODONE-acetaminophen (PERCOCET) 5-325 MG per tablet Take 1 tablet by mouth every 4 hours as needed for Pain. Evonne Rose Historical Provider, MD REHMAN THYROID 90 MG tablet Take 1 tablet by mouth daily 18 Yes Marcie Lake MD   TRULICITY 1.5 JM/1.5RK SOPN Inject 1.5 mg (1 PEN) into the skin once a week 18  Yes Marcie Lake MD   TOUJEO SOLOSTAR 300 UNIT/ML injection pen Inject 90 Units into the skin nightly 18  Yes Marcie Lake MD   glipiZIDE (GLUCOTROL) 5 MG tablet Take 1 tablet by mouth 2 times daily (before meals) 1/3/18  Yes Adelita Randhawa MD   metFORMIN (GLUCOPHAGE) 1000 MG tablet Take 1 tablet by mouth 2 times daily (with meals) 1/3/18  Yes Adelita Randhawa MD   traMADol (ULTRAM) 50 MG tablet Take 1 tablet by mouth every 8 hours as needed for Pain. 1/3/18 1/3/19 Yes Adelita Randhawa MD   gabapentin (NEURONTIN) 300 MG capsule Take 1 capsule by mouth 2 times daily.  1/3/18 1/3/19 Yes Adelita Randhawa MD   carvedilol (COREG) 12.5 MG tablet TAKE ONE TABLET BY MOUTH TWICE DAILY  Patient taking differently: Take 25 mg by mouth 2 times daily TAKE ONE TABLET BY MOUTH TWICE DAILY  1/3/18  Yes Adelita Randhawa MD   pravastatin (PRAVACHOL) 20 MG tablet TAKE ONE TABLET BY MOUTH ONE TIME DAILY 1/3/18  Yes Adelita Randhawa MD   lisinopril (PRINIVIL;ZESTRIL) 5 MG tablet Take 1 tablet by mouth daily 1/3/18  Yes Adelita Randhawa MD   furosemide (LASIX) 40 MG tablet TAKE ONE TABLET BY MOUTH TWICE DAILY 09/27/2017    RBC 3.93 09/27/2017    HGB 11.8 09/27/2017    HCT 34.3 09/27/2017    MCV 87.2 09/27/2017    RDW 13.2 09/27/2017     09/27/2017       CMP:   Lab Results   Component Value Date     03/09/2018    K 5.0 03/09/2018    CL 98 03/09/2018    CO2 26 03/09/2018    BUN 19 03/09/2018    CREATININE 0.9 03/09/2018    GFRAA >60 03/09/2018    GFRAA >60 08/01/2012    AGRATIO 1.7 03/09/2018    LABGLOM >60 03/09/2018    GLUCOSE 393 03/09/2018    PROT 7.2 03/09/2018    PROT 7.1 08/01/2012    CALCIUM 8.8 03/09/2018    BILITOT 0.5 03/09/2018    ALKPHOS 74 03/09/2018    AST 16 03/09/2018    ALT 23 03/09/2018       POC Tests: No results for input(s): POCGLU, POCNA, POCK, POCCL, POCBUN, POCHEMO, POCHCT in the last 72 hours. Coags:   Lab Results   Component Value Date    PROTIME 11.4 09/26/2017    INR 1.01 09/26/2017    APTT 33.5 09/26/2017       HCG (If Applicable): No results found for: PREGTESTUR, PREGSERUM, HCG, HCGQUANT     ABGs: No results found for: PHART, PO2ART, PPU6MHW, DAS1UGN, BEART, W9AQENCQ     Type & Screen (If Applicable):  No results found for: [facility]    Anesthesia Evaluation  Patient summary reviewed and Nursing notes reviewed  Airway: Mallampati: II  TM distance: <3 FB   Neck ROM: full  Mouth opening: > = 3 FB Dental:    (+) edentulous      Pulmonary:Negative Pulmonary ROS and normal exam  breath sounds clear to auscultation                             Cardiovascular:    (+) hypertension:, pacemaker: AICD, CHF: systolic,         Rhythm: regular  Rate: normal           : will give as needed in OR. ROS comment: EF 44%, grade 1 DD     Neuro/Psych:   (+) neuromuscular disease:,             GI/Hepatic/Renal: Neg GI/Hepatic/Renal ROS            Endo/Other:    (+) DiabetesType II DM, , hypothyroidism::., .                 Abdominal:   (+) obese,         Vascular: negative vascular ROS.                                        Anesthesia Plan      MAC     ASA 3       Induction:

## 2018-11-01 NOTE — PROGRESS NOTES
Post op [id number]. Pt given soda per request. Pt denies pain or other needs. Call light in reach. Family at bedside.

## 2018-11-01 NOTE — PROGRESS NOTES
Assessment unchanged from previous. Verbalizes understanding of discharge instructions and written instructions also given to patient. Questions and concerns addressed at this time. Pt's family at bedside. Denies pain or any needs at this time. IV d/c'd. Pt discharged via wheelchair to car in good condition. All personal belongings returned to pt.

## 2018-11-02 NOTE — OP NOTE
[address]                 [address] [place] [address]                                OPERATIVE REPORT    PATIENT NAME: Priya Ingram                    :        1965  MED REC NO:   [id number]                          ROOM:  ACCOUNT NO:   [de-identified]                           ADMIT DATE: 2018  PROVIDER:     Nehemiah Henderson DPM    DATE OF PROCEDURE:  2018    PREOPERATIVE DIAGNOSES:  1. Partial resection of left metatarsal.  2.  Ulcer debridement of left foot with graft application. POSTOPERATIVE DIAGNOSES:  1. Partial resection of left metatarsal.  2.  Ulcer debridement of left foot with graft application. SURGEON:  Nehemiah Henderson DPM    ANESTHESIA:  Local MAC. HEMOSTASIS:  Ankle tourniquet 250 mmHg. ESTIMATED BLOOD LOSS:  Less than 50 mL. REPORT OF OPERATION:  The patient brought in to the operating room,  placed on the operating table in supine position. Under mild IV  sedation, the surgical site was then anesthetized with 10 mL of 1:1  mixture of 1% lidocaine plain and 0.5% Marcaine plain. The foot was  then scrubbed, prepped, and draped in the usual sterile manner. An  Esmarch was then utilized to exsanguinate the left foot. Ankle  tourniquet was then inflated to 250 mmHg. Attention was then directed to the left plantar foot where the  ulceration was identified. At this time, a linear longitudinal incision  was made superiorly to the ulceration site. Dissection was carried down  in order to expose the head of the third metatarsal.  At this time, a  sagittal saw was utilized to transect the third metatarsal and  approximately the mid shaft region. Distal fragment was then passed off  the field in total.  A combination of scalpel and rongeur was then  utilized to excise surrounding  tissues back to the healthy tissue.     Attention was then directed to the plantar aspect of the forefoot where  the ulceration was

## 2018-12-04 RX ORDER — INSULIN GLARGINE 300 U/ML
90 INJECTION, SOLUTION SUBCUTANEOUS NIGHTLY
Qty: 9 ML | Refills: 1 | Status: SHIPPED | OUTPATIENT
Start: 2018-12-04 | End: 2019-01-30 | Stop reason: SDUPTHER

## 2018-12-04 RX ORDER — DULAGLUTIDE 1.5 MG/.5ML
INJECTION, SOLUTION SUBCUTANEOUS
Qty: 2 ML | Refills: 1 | Status: SHIPPED | OUTPATIENT
Start: 2018-12-04 | End: 2019-01-30 | Stop reason: SDUPTHER

## 2018-12-18 ENCOUNTER — OFFICE VISIT (OUTPATIENT)
Dept: CARDIOLOGY CLINIC | Age: 53
End: 2018-12-18
Payer: MEDICAID

## 2018-12-18 VITALS
SYSTOLIC BLOOD PRESSURE: 114 MMHG | DIASTOLIC BLOOD PRESSURE: 70 MMHG | BODY MASS INDEX: 30.78 KG/M2 | OXYGEN SATURATION: 96 % | HEIGHT: 70 IN | HEART RATE: 98 BPM | WEIGHT: 215 LBS

## 2018-12-18 DIAGNOSIS — I10 ESSENTIAL HYPERTENSION: ICD-10-CM

## 2018-12-18 DIAGNOSIS — Z95.810 AUTOMATIC IMPLANTABLE CARDIOVERTER-DEFIBRILLATOR IN SITU: ICD-10-CM

## 2018-12-18 DIAGNOSIS — E78.2 MIXED HYPERLIPIDEMIA: ICD-10-CM

## 2018-12-18 DIAGNOSIS — I42.6 ALCOHOLIC CARDIOMYOPATHY (HCC): Primary | ICD-10-CM

## 2018-12-18 DIAGNOSIS — E11.9 TYPE 2 DIABETES MELLITUS WITHOUT COMPLICATION, WITHOUT LONG-TERM CURRENT USE OF INSULIN (HCC): ICD-10-CM

## 2018-12-18 PROCEDURE — G8417 CALC BMI ABV UP PARAM F/U: HCPCS | Performed by: INTERNAL MEDICINE

## 2018-12-18 PROCEDURE — 3045F PR MOST RECENT HEMOGLOBIN A1C LEVEL 7.0-9.0%: CPT | Performed by: INTERNAL MEDICINE

## 2018-12-18 PROCEDURE — G8484 FLU IMMUNIZE NO ADMIN: HCPCS | Performed by: INTERNAL MEDICINE

## 2018-12-18 PROCEDURE — 3017F COLORECTAL CA SCREEN DOC REV: CPT | Performed by: INTERNAL MEDICINE

## 2018-12-18 PROCEDURE — 1036F TOBACCO NON-USER: CPT | Performed by: INTERNAL MEDICINE

## 2018-12-18 PROCEDURE — G8427 DOCREV CUR MEDS BY ELIG CLIN: HCPCS | Performed by: INTERNAL MEDICINE

## 2018-12-18 PROCEDURE — 99214 OFFICE O/P EST MOD 30 MIN: CPT | Performed by: INTERNAL MEDICINE

## 2018-12-18 PROCEDURE — 2022F DILAT RTA XM EVC RTNOPTHY: CPT | Performed by: INTERNAL MEDICINE

## 2018-12-18 RX ORDER — CARVEDILOL 25 MG/1
25 TABLET ORAL 2 TIMES DAILY
Qty: 180 TABLET | Refills: 3 | Status: SHIPPED | OUTPATIENT
Start: 2018-12-18 | End: 2019-09-17 | Stop reason: SDUPTHER

## 2018-12-18 RX ORDER — FUROSEMIDE 40 MG/1
TABLET ORAL
Qty: 180 TABLET | Refills: 3 | Status: SHIPPED | OUTPATIENT
Start: 2018-12-18 | End: 2019-09-17 | Stop reason: SDUPTHER

## 2018-12-18 RX ORDER — SPIRONOLACTONE 25 MG/1
25 TABLET ORAL DAILY
Qty: 90 TABLET | Refills: 3 | Status: SHIPPED | OUTPATIENT
Start: 2018-12-18 | End: 2019-09-17 | Stop reason: SDUPTHER

## 2018-12-18 RX ORDER — LISINOPRIL 5 MG/1
5 TABLET ORAL DAILY
Qty: 90 TABLET | Refills: 3 | Status: SHIPPED | OUTPATIENT
Start: 2018-12-18 | End: 2019-09-17 | Stop reason: SDUPTHER

## 2018-12-18 RX ORDER — PRAVASTATIN SODIUM 20 MG
TABLET ORAL
Qty: 90 TABLET | Refills: 3 | Status: SHIPPED | OUTPATIENT
Start: 2018-12-18 | End: 2019-09-17 | Stop reason: SDUPTHER

## 2018-12-18 NOTE — PATIENT INSTRUCTIONS
rusty:  1. Continue current medications he is compliant with therapy. 2. Remain as active as possible. 3. Continue regular device checks. 4.   Healthy lifestyle education reviewed including nutrition, exercise and activity   5. Follow up with me 6 months.    6. Will check cmp, tsh, A1c, lipids in January

## 2019-01-08 ENCOUNTER — NURSE ONLY (OUTPATIENT)
Dept: CARDIOLOGY CLINIC | Age: 54
End: 2019-01-08
Payer: MEDICAID

## 2019-01-08 DIAGNOSIS — I42.6 ALCOHOLIC CARDIOMYOPATHY (HCC): ICD-10-CM

## 2019-01-08 DIAGNOSIS — Z95.810 AUTOMATIC IMPLANTABLE CARDIOVERTER-DEFIBRILLATOR IN SITU: Primary | ICD-10-CM

## 2019-01-08 PROCEDURE — 93296 REM INTERROG EVL PM/IDS: CPT | Performed by: INTERNAL MEDICINE

## 2019-01-08 PROCEDURE — 93295 DEV INTERROG REMOTE 1/2/MLT: CPT | Performed by: INTERNAL MEDICINE

## 2019-01-23 ENCOUNTER — HOSPITAL ENCOUNTER (OUTPATIENT)
Dept: GENERAL RADIOLOGY | Age: 54
Discharge: HOME | End: 2019-01-23
Payer: MEDICAID

## 2019-01-23 ENCOUNTER — HOSPITAL ENCOUNTER (OUTPATIENT)
Age: 54
Discharge: HOME | End: 2019-01-23
Payer: MEDICAID

## 2019-01-23 DIAGNOSIS — L97.525 DIABETIC ULCER OF TOE OF LEFT FOOT ASSOCIATED WITH TYPE 2 DIABETES MELLITUS, WITH MUSCLE INVOLVEMENT WITHOUT EVIDENCE OF NECROSIS (HCC): ICD-10-CM

## 2019-01-23 DIAGNOSIS — I42.6 ALCOHOLIC CARDIOMYOPATHY (HCC): ICD-10-CM

## 2019-01-23 DIAGNOSIS — E11.9 TYPE 2 DIABETES MELLITUS WITHOUT COMPLICATION, WITHOUT LONG-TERM CURRENT USE OF INSULIN (HCC): ICD-10-CM

## 2019-01-23 DIAGNOSIS — E11.621 DIABETIC ULCER OF TOE OF LEFT FOOT ASSOCIATED WITH TYPE 2 DIABETES MELLITUS, WITH MUSCLE INVOLVEMENT WITHOUT EVIDENCE OF NECROSIS (HCC): ICD-10-CM

## 2019-01-23 DIAGNOSIS — Z95.810 AUTOMATIC IMPLANTABLE CARDIOVERTER-DEFIBRILLATOR IN SITU: ICD-10-CM

## 2019-01-23 LAB
A/G RATIO: 1.7 (ref 1.1–2.2)
ALBUMIN SERPL-MCNC: 4.6 G/DL (ref 3.4–5)
ALP BLD-CCNC: 69 U/L (ref 40–129)
ALT SERPL-CCNC: 19 U/L (ref 10–40)
ANION GAP SERPL CALCULATED.3IONS-SCNC: 15 MMOL/L (ref 3–16)
AST SERPL-CCNC: 14 U/L (ref 15–37)
BILIRUB SERPL-MCNC: 0.6 MG/DL (ref 0–1)
BUN BLDV-MCNC: 25 MG/DL (ref 7–20)
CALCIUM SERPL-MCNC: 9.2 MG/DL (ref 8.3–10.6)
CHLORIDE BLD-SCNC: 100 MMOL/L (ref 99–110)
CHOLESTEROL, FASTING: 142 MG/DL (ref 0–199)
CO2: 24 MMOL/L (ref 21–32)
CREAT SERPL-MCNC: 1 MG/DL (ref 0.9–1.3)
GFR AFRICAN AMERICAN: >60
GFR NON-AFRICAN AMERICAN: >60
GLOBULIN: 2.7 G/DL
GLUCOSE FASTING: 246 MG/DL (ref 70–99)
HDLC SERPL-MCNC: 32 MG/DL (ref 40–60)
LDL CHOLESTEROL CALCULATED: 57 MG/DL
POTASSIUM SERPL-SCNC: 4.8 MMOL/L (ref 3.5–5.1)
SODIUM BLD-SCNC: 139 MMOL/L (ref 136–145)
T4 FREE: 1 NG/DL (ref 0.9–1.8)
TOTAL PROTEIN: 7.3 G/DL (ref 6.4–8.2)
TRIGLYCERIDE, FASTING: 266 MG/DL (ref 0–150)
TSH REFLEX FT4: 19.2 UIU/ML (ref 0.27–4.2)
VLDLC SERPL CALC-MCNC: 53 MG/DL

## 2019-01-23 PROCEDURE — 84443 ASSAY THYROID STIM HORMONE: CPT

## 2019-01-23 PROCEDURE — 80061 LIPID PANEL: CPT

## 2019-01-23 PROCEDURE — 36415 COLL VENOUS BLD VENIPUNCTURE: CPT

## 2019-01-23 PROCEDURE — 84439 ASSAY OF FREE THYROXINE: CPT

## 2019-01-23 PROCEDURE — 83036 HEMOGLOBIN GLYCOSYLATED A1C: CPT

## 2019-01-23 PROCEDURE — 80053 COMPREHEN METABOLIC PANEL: CPT

## 2019-01-23 PROCEDURE — 73620 X-RAY EXAM OF FOOT: CPT

## 2019-01-24 LAB
ESTIMATED AVERAGE GLUCOSE: 142.7 MG/DL
HBA1C MFR BLD: 6.6 %

## 2019-01-30 ENCOUNTER — ANESTHESIA EVENT (OUTPATIENT)
Dept: OPERATING ROOM | Age: 54
End: 2019-01-30
Payer: MEDICAID

## 2019-01-30 RX ORDER — INSULIN GLARGINE 300 U/ML
90 INJECTION, SOLUTION SUBCUTANEOUS NIGHTLY
Qty: 9 ML | Refills: 1 | Status: SHIPPED | OUTPATIENT
Start: 2019-01-30 | End: 2020-05-26 | Stop reason: SDUPTHER

## 2019-01-30 RX ORDER — DULAGLUTIDE 1.5 MG/.5ML
INJECTION, SOLUTION SUBCUTANEOUS
Qty: 2 ML | Refills: 0 | Status: SHIPPED | OUTPATIENT
Start: 2019-01-30 | End: 2020-02-14 | Stop reason: SDUPTHER

## 2019-01-31 ENCOUNTER — APPOINTMENT (OUTPATIENT)
Dept: GENERAL RADIOLOGY | Age: 54
End: 2019-01-31
Attending: PODIATRIST
Payer: MEDICAID

## 2019-01-31 ENCOUNTER — HOSPITAL ENCOUNTER (OUTPATIENT)
Age: 54
Setting detail: OUTPATIENT SURGERY
Discharge: HOME | End: 2019-01-31
Attending: PODIATRIST | Admitting: PODIATRIST
Payer: MEDICAID

## 2019-01-31 ENCOUNTER — ANESTHESIA (OUTPATIENT)
Dept: OPERATING ROOM | Age: 54
End: 2019-01-31
Payer: MEDICAID

## 2019-01-31 VITALS
TEMPERATURE: 97.2 F | HEIGHT: 70 IN | OXYGEN SATURATION: 96 % | DIASTOLIC BLOOD PRESSURE: 62 MMHG | SYSTOLIC BLOOD PRESSURE: 119 MMHG | WEIGHT: 215 LBS | BODY MASS INDEX: 30.78 KG/M2 | HEART RATE: 88 BPM | RESPIRATION RATE: 16 BRPM

## 2019-01-31 VITALS
OXYGEN SATURATION: 99 % | DIASTOLIC BLOOD PRESSURE: 66 MMHG | RESPIRATION RATE: 14 BRPM | SYSTOLIC BLOOD PRESSURE: 102 MMHG

## 2019-01-31 DIAGNOSIS — G89.18 POSTOPERATIVE PAIN: Primary | ICD-10-CM

## 2019-01-31 LAB
EKG ATRIAL RATE: 89 BPM
EKG DIAGNOSIS: NORMAL
EKG P AXIS: 14 DEGREES
EKG P-R INTERVAL: 134 MS
EKG Q-T INTERVAL: 384 MS
EKG QRS DURATION: 112 MS
EKG QTC CALCULATION (BAZETT): 467 MS
EKG R AXIS: -37 DEGREES
EKG T AXIS: 22 DEGREES
EKG VENTRICULAR RATE: 89 BPM
GLUCOSE BLD-MCNC: 195 MG/DL (ref 70–99)
PERFORMED ON: ABNORMAL

## 2019-01-31 PROCEDURE — 2580000003 HC RX 258: Performed by: PODIATRIST

## 2019-01-31 PROCEDURE — 73620 X-RAY EXAM OF FOOT: CPT

## 2019-01-31 PROCEDURE — 7100000011 HC PHASE II RECOVERY - ADDTL 15 MIN: Performed by: PODIATRIST

## 2019-01-31 PROCEDURE — 2580000003 HC RX 258: Performed by: ANESTHESIOLOGY

## 2019-01-31 PROCEDURE — 3700000001 HC ADD 15 MINUTES (ANESTHESIA): Performed by: PODIATRIST

## 2019-01-31 PROCEDURE — 93005 ELECTROCARDIOGRAM TRACING: CPT | Performed by: ANESTHESIOLOGY

## 2019-01-31 PROCEDURE — 3600000002 HC SURGERY LEVEL 2 BASE: Performed by: PODIATRIST

## 2019-01-31 PROCEDURE — 2500000003 HC RX 250 WO HCPCS: Performed by: PODIATRIST

## 2019-01-31 PROCEDURE — 3700000000 HC ANESTHESIA ATTENDED CARE: Performed by: PODIATRIST

## 2019-01-31 PROCEDURE — 93010 ELECTROCARDIOGRAM REPORT: CPT | Performed by: INTERNAL MEDICINE

## 2019-01-31 PROCEDURE — 2500000003 HC RX 250 WO HCPCS: Performed by: ANESTHESIOLOGY

## 2019-01-31 PROCEDURE — 2709999900 HC NON-CHARGEABLE SUPPLY: Performed by: PODIATRIST

## 2019-01-31 PROCEDURE — 7100000010 HC PHASE II RECOVERY - FIRST 15 MIN: Performed by: PODIATRIST

## 2019-01-31 PROCEDURE — 6360000002 HC RX W HCPCS: Performed by: NURSE ANESTHETIST, CERTIFIED REGISTERED

## 2019-01-31 PROCEDURE — 3600000012 HC SURGERY LEVEL 2 ADDTL 15MIN: Performed by: PODIATRIST

## 2019-01-31 PROCEDURE — 6360000002 HC RX W HCPCS: Performed by: PODIATRIST

## 2019-01-31 DEVICE — PATCH AMNION 2 LAYR PROTCT 4 X 4CM STERISHIELD II: Type: IMPLANTABLE DEVICE | Site: FOOT | Status: FUNCTIONAL

## 2019-01-31 RX ORDER — BUPIVACAINE HYDROCHLORIDE 5 MG/ML
INJECTION, SOLUTION EPIDURAL; INTRACAUDAL PRN
Status: DISCONTINUED | OUTPATIENT
Start: 2019-01-31 | End: 2019-01-31 | Stop reason: HOSPADM

## 2019-01-31 RX ORDER — OXYCODONE HYDROCHLORIDE AND ACETAMINOPHEN 5; 325 MG/1; MG/1
1 TABLET ORAL PRN
Status: DISCONTINUED | OUTPATIENT
Start: 2019-01-31 | End: 2019-01-31 | Stop reason: HOSPADM

## 2019-01-31 RX ORDER — FENTANYL CITRATE 50 UG/ML
INJECTION, SOLUTION INTRAMUSCULAR; INTRAVENOUS PRN
Status: DISCONTINUED | OUTPATIENT
Start: 2019-01-31 | End: 2019-01-31 | Stop reason: SDUPTHER

## 2019-01-31 RX ORDER — OXYCODONE HYDROCHLORIDE AND ACETAMINOPHEN 5; 325 MG/1; MG/1
1-2 TABLET ORAL EVERY 4 HOURS PRN
Qty: 28 TABLET | Refills: 0 | Status: SHIPPED | OUTPATIENT
Start: 2019-01-31 | End: 2019-02-07

## 2019-01-31 RX ORDER — MORPHINE SULFATE 10 MG/ML
1 INJECTION, SOLUTION INTRAMUSCULAR; INTRAVENOUS EVERY 5 MIN PRN
Status: DISCONTINUED | OUTPATIENT
Start: 2019-01-31 | End: 2019-01-31 | Stop reason: HOSPADM

## 2019-01-31 RX ORDER — HYDROMORPHONE HCL 110MG/55ML
0.5 PATIENT CONTROLLED ANALGESIA SYRINGE INTRAVENOUS EVERY 5 MIN PRN
Status: DISCONTINUED | OUTPATIENT
Start: 2019-01-31 | End: 2019-01-31 | Stop reason: HOSPADM

## 2019-01-31 RX ORDER — MORPHINE SULFATE 10 MG/ML
2 INJECTION, SOLUTION INTRAMUSCULAR; INTRAVENOUS EVERY 5 MIN PRN
Status: DISCONTINUED | OUTPATIENT
Start: 2019-01-31 | End: 2019-01-31 | Stop reason: HOSPADM

## 2019-01-31 RX ORDER — MEPERIDINE HYDROCHLORIDE 25 MG/ML
12.5 INJECTION INTRAMUSCULAR; INTRAVENOUS; SUBCUTANEOUS EVERY 5 MIN PRN
Status: DISCONTINUED | OUTPATIENT
Start: 2019-01-31 | End: 2019-01-31 | Stop reason: HOSPADM

## 2019-01-31 RX ORDER — PROMETHAZINE HYDROCHLORIDE 25 MG/ML
6.25 INJECTION, SOLUTION INTRAMUSCULAR; INTRAVENOUS
Status: DISCONTINUED | OUTPATIENT
Start: 2019-01-31 | End: 2019-01-31 | Stop reason: HOSPADM

## 2019-01-31 RX ORDER — PROPOFOL 10 MG/ML
INJECTION, EMULSION INTRAVENOUS PRN
Status: DISCONTINUED | OUTPATIENT
Start: 2019-01-31 | End: 2019-01-31 | Stop reason: SDUPTHER

## 2019-01-31 RX ORDER — SODIUM CHLORIDE, SODIUM LACTATE, POTASSIUM CHLORIDE, CALCIUM CHLORIDE 600; 310; 30; 20 MG/100ML; MG/100ML; MG/100ML; MG/100ML
INJECTION, SOLUTION INTRAVENOUS CONTINUOUS
Status: DISCONTINUED | OUTPATIENT
Start: 2019-01-31 | End: 2019-01-31 | Stop reason: HOSPADM

## 2019-01-31 RX ORDER — DIPHENHYDRAMINE HYDROCHLORIDE 50 MG/ML
12.5 INJECTION INTRAMUSCULAR; INTRAVENOUS
Status: DISCONTINUED | OUTPATIENT
Start: 2019-01-31 | End: 2019-01-31 | Stop reason: HOSPADM

## 2019-01-31 RX ORDER — LABETALOL HYDROCHLORIDE 5 MG/ML
5 INJECTION, SOLUTION INTRAVENOUS EVERY 10 MIN PRN
Status: DISCONTINUED | OUTPATIENT
Start: 2019-01-31 | End: 2019-01-31 | Stop reason: HOSPADM

## 2019-01-31 RX ORDER — HYDROMORPHONE HCL 110MG/55ML
0.25 PATIENT CONTROLLED ANALGESIA SYRINGE INTRAVENOUS EVERY 5 MIN PRN
Status: DISCONTINUED | OUTPATIENT
Start: 2019-01-31 | End: 2019-01-31 | Stop reason: HOSPADM

## 2019-01-31 RX ORDER — SODIUM CHLORIDE 0.9 % (FLUSH) 0.9 %
10 SYRINGE (ML) INJECTION EVERY 12 HOURS SCHEDULED
Status: DISCONTINUED | OUTPATIENT
Start: 2019-01-31 | End: 2019-01-31 | Stop reason: HOSPADM

## 2019-01-31 RX ORDER — MAGNESIUM HYDROXIDE 1200 MG/15ML
LIQUID ORAL CONTINUOUS PRN
Status: DISCONTINUED | OUTPATIENT
Start: 2019-01-31 | End: 2019-01-31 | Stop reason: HOSPADM

## 2019-01-31 RX ORDER — ONDANSETRON 2 MG/ML
4 INJECTION INTRAMUSCULAR; INTRAVENOUS PRN
Status: DISCONTINUED | OUTPATIENT
Start: 2019-01-31 | End: 2019-01-31 | Stop reason: HOSPADM

## 2019-01-31 RX ORDER — SODIUM CHLORIDE 0.9 % (FLUSH) 0.9 %
10 SYRINGE (ML) INJECTION PRN
Status: DISCONTINUED | OUTPATIENT
Start: 2019-01-31 | End: 2019-01-31 | Stop reason: HOSPADM

## 2019-01-31 RX ORDER — LIDOCAINE HYDROCHLORIDE 10 MG/ML
1 INJECTION, SOLUTION EPIDURAL; INFILTRATION; INTRACAUDAL; PERINEURAL
Status: COMPLETED | OUTPATIENT
Start: 2019-01-31 | End: 2019-01-31

## 2019-01-31 RX ORDER — HYDRALAZINE HYDROCHLORIDE 20 MG/ML
5 INJECTION INTRAMUSCULAR; INTRAVENOUS EVERY 10 MIN PRN
Status: DISCONTINUED | OUTPATIENT
Start: 2019-01-31 | End: 2019-01-31 | Stop reason: HOSPADM

## 2019-01-31 RX ORDER — OXYCODONE HYDROCHLORIDE AND ACETAMINOPHEN 5; 325 MG/1; MG/1
2 TABLET ORAL PRN
Status: DISCONTINUED | OUTPATIENT
Start: 2019-01-31 | End: 2019-01-31 | Stop reason: HOSPADM

## 2019-01-31 RX ORDER — LIDOCAINE HYDROCHLORIDE 10 MG/ML
INJECTION, SOLUTION EPIDURAL; INFILTRATION; INTRACAUDAL; PERINEURAL PRN
Status: DISCONTINUED | OUTPATIENT
Start: 2019-01-31 | End: 2019-01-31 | Stop reason: HOSPADM

## 2019-01-31 RX ORDER — MIDAZOLAM HYDROCHLORIDE 1 MG/ML
INJECTION INTRAMUSCULAR; INTRAVENOUS PRN
Status: DISCONTINUED | OUTPATIENT
Start: 2019-01-31 | End: 2019-01-31 | Stop reason: SDUPTHER

## 2019-01-31 RX ADMIN — FENTANYL CITRATE 50 MCG: 50 INJECTION INTRAMUSCULAR; INTRAVENOUS at 09:38

## 2019-01-31 RX ADMIN — LIDOCAINE HYDROCHLORIDE 0.3 ML: 10 INJECTION, SOLUTION EPIDURAL; INFILTRATION; INTRACAUDAL; PERINEURAL at 09:02

## 2019-01-31 RX ADMIN — PROPOFOL 80 MG: 10 INJECTION, EMULSION INTRAVENOUS at 09:54

## 2019-01-31 RX ADMIN — Medication 2 G: at 09:36

## 2019-01-31 RX ADMIN — PROPOFOL 80 MG: 10 INJECTION, EMULSION INTRAVENOUS at 09:47

## 2019-01-31 RX ADMIN — PROPOFOL 80 MG: 10 INJECTION, EMULSION INTRAVENOUS at 09:44

## 2019-01-31 RX ADMIN — MIDAZOLAM 2 MG: 1 INJECTION INTRAMUSCULAR; INTRAVENOUS at 09:38

## 2019-01-31 RX ADMIN — SODIUM CHLORIDE, POTASSIUM CHLORIDE, SODIUM LACTATE AND CALCIUM CHLORIDE: 600; 310; 30; 20 INJECTION, SOLUTION INTRAVENOUS at 09:01

## 2019-01-31 ASSESSMENT — PULMONARY FUNCTION TESTS
PIF_VALUE: 0

## 2019-01-31 ASSESSMENT — PAIN SCALES - GENERAL
PAINLEVEL_OUTOF10: 0
PAINLEVEL_OUTOF10: 0

## 2019-04-05 RX ORDER — DULAGLUTIDE 1.5 MG/.5ML
INJECTION, SOLUTION SUBCUTANEOUS
Qty: 2 ML | Refills: 0 | OUTPATIENT
Start: 2019-04-05

## 2019-04-09 ENCOUNTER — NURSE ONLY (OUTPATIENT)
Dept: CARDIOLOGY CLINIC | Age: 54
End: 2019-04-09
Payer: MEDICAID

## 2019-04-09 DIAGNOSIS — I42.6 ALCOHOLIC CARDIOMYOPATHY (HCC): ICD-10-CM

## 2019-04-09 DIAGNOSIS — Z95.810 AUTOMATIC IMPLANTABLE CARDIOVERTER-DEFIBRILLATOR IN SITU: Primary | ICD-10-CM

## 2019-04-09 PROCEDURE — 93295 DEV INTERROG REMOTE 1/2/MLT: CPT | Performed by: INTERNAL MEDICINE

## 2019-04-09 PROCEDURE — 93296 REM INTERROG EVL PM/IDS: CPT | Performed by: INTERNAL MEDICINE

## 2019-04-09 NOTE — LETTER
[address] [facility] [phone]  [address] [facility],[address] [phone]    Pacemaker/Defibrillator Clinic          04/10/19        Tomi [address]        Dear Andria Quevedo    This letter is to inform you that we received the transmission from your monitor at home that checks your pacemaker and/or defibrillator, or implanted heart monitor. The next date your monitor will automatically transmit will be 7/9/19. Please do not send additional routine transmissions unless specifically requested. Your device and monitor are wireless and most transmit cellularly, but please periodically check your monitor is still plugged in to the electrical outlet. If you still use the telephone land line to send please ensure the connection to the phone wendy is secure. This will help to ensure successful automatic transmissions in the future. Also, the monitor needs to be close to you while sleeping at night. Please be aware that the remote device transmission sites are periodically monitored only during regular business hours during which simultaneous in-office device clinics are being run. If your transmission requires attention, we will contact you as soon as possible. Thank you.             Maria Esther 81

## 2019-07-09 ENCOUNTER — NURSE ONLY (OUTPATIENT)
Dept: CARDIOLOGY CLINIC | Age: 54
End: 2019-07-09

## 2019-07-09 DIAGNOSIS — Z95.810 ICD (IMPLANTABLE CARDIOVERTER-DEFIBRILLATOR) IN PLACE: Primary | ICD-10-CM

## 2019-07-09 DIAGNOSIS — I42.6 ALCOHOLIC CARDIOMYOPATHY (HCC): ICD-10-CM

## 2019-07-09 PROCEDURE — 93296 REM INTERROG EVL PM/IDS: CPT | Performed by: INTERNAL MEDICINE

## 2019-07-09 PROCEDURE — 93295 DEV INTERROG REMOTE 1/2/MLT: CPT | Performed by: INTERNAL MEDICINE

## 2019-09-16 NOTE — PROGRESS NOTES
APPLICATION performed by Rosa Velez DPM at [facility] TARSAL/METATARSAL Left 11/1/2018    PARTIAL RESECTION LEFT METATARSAL, ULCER DEBRIDEMENT LEFT FOOT WITH GRAFT APPLICATION performed by Rosa Velez DPM at [facility] TOE AMPUTATION      2 toes       Objective:   /70   Pulse 96   Ht 5' 10\" (1.778 m)   Wt 216 lb (98 kg)   SpO2 98%   BMI 30.99 kg/m²     Wt Readings from Last 3 Encounters:   09/17/19 216 lb (98 kg)   01/25/19 215 lb (97.5 kg)   12/18/18 215 lb (97.5 kg)       Physical Exam:  General: No Respiratory distress, appears well developed and well nourished. Eyes:  Sclera nonicteric  Nose/Sinuses:  negative findings: nose shows no deformity, asymmetry, or inflammation, nasal mucosa normal, septum midline with no perforation or bleeding  Back:  no pain to palpation  Joint:  no active joint inflammation  Musculoskeletal:  negative  Skin:  Warm and dry  Neck: No JVD and no Carotid Bruits. Chest:  Clear to auscultation, respiration easy  Cardiovascular:  RRR, 96 bpm S1S2 normal, no murmur, no rub or thrill.   Abdomen:  Normal liver and spleen No ascites, obese  Extremities:  No edema  no clubbing or cyanosis  Pulses: Femoral pulses are 2+ Neuro: intact    Medications:   Outpatient Encounter Medications as of 9/17/2019   Medication Sig Dispense Refill    TOUJEO SOLOSTAR 300 UNIT/ML injection pen Inject 90 Units into the skin nightly 9 mL 1    TRULICITY 1.5 FM/5.6KF SOPN Inject 1.5 mg (1 PEN) into the skin once a week 2 mL 0    carvedilol (COREG) 25 MG tablet Take 1 tablet by mouth 2 times daily TAKE ONE TABLET BY MOUTH TWICE DAILY 180 tablet 3    pravastatin (PRAVACHOL) 20 MG tablet TAKE ONE TABLET BY MOUTH ONE TIME DAILY 90 tablet 3    lisinopril (PRINIVIL;ZESTRIL) 5 MG tablet Take 1 tablet by mouth daily 90 tablet 3    furosemide (LASIX) 40 MG tablet TAKE ONE TABLET BY MOUTH TWICE DAILY 180 tablet 3    spironolactone (ALDACTONE) 25 MG tablet Take 1 tablet by mouth

## 2019-09-17 ENCOUNTER — OFFICE VISIT (OUTPATIENT)
Dept: CARDIOLOGY CLINIC | Age: 54
End: 2019-09-17
Payer: COMMERCIAL

## 2019-09-17 VITALS
OXYGEN SATURATION: 98 % | WEIGHT: 216 LBS | BODY MASS INDEX: 30.92 KG/M2 | DIASTOLIC BLOOD PRESSURE: 70 MMHG | HEART RATE: 96 BPM | HEIGHT: 70 IN | SYSTOLIC BLOOD PRESSURE: 118 MMHG

## 2019-09-17 DIAGNOSIS — Z95.810 AUTOMATIC IMPLANTABLE CARDIOVERTER-DEFIBRILLATOR IN SITU: ICD-10-CM

## 2019-09-17 DIAGNOSIS — E78.2 MIXED HYPERLIPIDEMIA: ICD-10-CM

## 2019-09-17 DIAGNOSIS — I10 ESSENTIAL HYPERTENSION: ICD-10-CM

## 2019-09-17 DIAGNOSIS — I42.6 ALCOHOLIC CARDIOMYOPATHY (HCC): Primary | ICD-10-CM

## 2019-09-17 PROCEDURE — 99213 OFFICE O/P EST LOW 20 MIN: CPT | Performed by: INTERNAL MEDICINE

## 2019-09-17 RX ORDER — PRAVASTATIN SODIUM 20 MG
TABLET ORAL
Qty: 90 TABLET | Refills: 3 | Status: SHIPPED | OUTPATIENT
Start: 2019-09-17 | End: 2020-05-11 | Stop reason: SDUPTHER

## 2019-09-17 RX ORDER — FUROSEMIDE 40 MG/1
TABLET ORAL
Qty: 180 TABLET | Refills: 3 | Status: SHIPPED | OUTPATIENT
Start: 2019-09-17 | End: 2020-05-11 | Stop reason: SDUPTHER

## 2019-09-17 RX ORDER — SPIRONOLACTONE 25 MG/1
25 TABLET ORAL DAILY
Qty: 90 TABLET | Refills: 3 | Status: ON HOLD | OUTPATIENT
Start: 2019-09-17 | End: 2020-08-15 | Stop reason: HOSPADM

## 2019-09-17 RX ORDER — LISINOPRIL 5 MG/1
5 TABLET ORAL DAILY
Qty: 90 TABLET | Refills: 3 | Status: SHIPPED | OUTPATIENT
Start: 2019-09-17 | End: 2020-05-11 | Stop reason: SDUPTHER

## 2019-09-17 RX ORDER — CARVEDILOL 25 MG/1
25 TABLET ORAL 2 TIMES DAILY
Qty: 180 TABLET | Refills: 3 | Status: SHIPPED | OUTPATIENT
Start: 2019-09-17 | End: 2020-05-11 | Stop reason: SDUPTHER

## 2019-10-16 ENCOUNTER — HOSPITAL ENCOUNTER (OUTPATIENT)
Age: 54
Discharge: HOME | End: 2019-10-16
Payer: COMMERCIAL

## 2019-10-16 ENCOUNTER — HOSPITAL ENCOUNTER (OUTPATIENT)
Dept: GENERAL RADIOLOGY | Age: 54
Discharge: HOME | End: 2019-10-16
Payer: COMMERCIAL

## 2019-10-16 DIAGNOSIS — L97.509 DIABETIC FOOT ULCER ASSOCIATED WITH OTHER SPECIFIED DIABETES MELLITUS, UNSPECIFIED LATERALITY, UNSPECIFIED PART OF FOOT, UNSPECIFIED ULCER STAGE (HCC): ICD-10-CM

## 2019-10-16 DIAGNOSIS — E13.621 DIABETIC FOOT ULCER ASSOCIATED WITH OTHER SPECIFIED DIABETES MELLITUS, UNSPECIFIED LATERALITY, UNSPECIFIED PART OF FOOT, UNSPECIFIED ULCER STAGE (HCC): ICD-10-CM

## 2019-10-16 DIAGNOSIS — I42.6 ALCOHOLIC CARDIOMYOPATHY (HCC): ICD-10-CM

## 2019-10-16 DIAGNOSIS — E78.2 MIXED HYPERLIPIDEMIA: ICD-10-CM

## 2019-10-16 LAB
A/G RATIO: 1 (ref 1.1–2.2)
ALBUMIN SERPL-MCNC: 4 G/DL (ref 3.4–5)
ALP BLD-CCNC: 87 U/L (ref 40–129)
ALT SERPL-CCNC: 12 U/L (ref 10–40)
ANION GAP SERPL CALCULATED.3IONS-SCNC: 12 MMOL/L (ref 3–16)
AST SERPL-CCNC: 12 U/L (ref 15–37)
BASOPHILS ABSOLUTE: 0 K/UL (ref 0–0.2)
BASOPHILS RELATIVE PERCENT: 0.6 %
BILIRUB SERPL-MCNC: 0.3 MG/DL (ref 0–1)
BUN BLDV-MCNC: 23 MG/DL (ref 7–20)
CALCIUM SERPL-MCNC: 9.2 MG/DL (ref 8.3–10.6)
CHLORIDE BLD-SCNC: 100 MMOL/L (ref 99–110)
CHOLESTEROL, TOTAL: 100 MG/DL (ref 0–199)
CO2: 25 MMOL/L (ref 21–32)
CREAT SERPL-MCNC: 1 MG/DL (ref 0.9–1.3)
EOSINOPHILS ABSOLUTE: 0.2 K/UL (ref 0–0.6)
EOSINOPHILS RELATIVE PERCENT: 2.4 %
GFR AFRICAN AMERICAN: >60
GFR NON-AFRICAN AMERICAN: >60
GLOBULIN: 3.9 G/DL
GLUCOSE BLD-MCNC: 211 MG/DL (ref 70–99)
HCT VFR BLD CALC: 26.7 % (ref 40.5–52.5)
HDLC SERPL-MCNC: 36 MG/DL (ref 40–60)
HEMOGLOBIN: 9 G/DL (ref 13.5–17.5)
LDL CHOLESTEROL CALCULATED: 29 MG/DL
LYMPHOCYTES ABSOLUTE: 1.8 K/UL (ref 1–5.1)
LYMPHOCYTES RELATIVE PERCENT: 23 %
MCH RBC QN AUTO: 28.7 PG (ref 26–34)
MCHC RBC AUTO-ENTMCNC: 33.7 G/DL (ref 31–36)
MCV RBC AUTO: 85.2 FL (ref 80–100)
MONOCYTES ABSOLUTE: 0.4 K/UL (ref 0–1.3)
MONOCYTES RELATIVE PERCENT: 5.4 %
NEUTROPHILS ABSOLUTE: 5.4 K/UL (ref 1.7–7.7)
NEUTROPHILS RELATIVE PERCENT: 68.6 %
PDW BLD-RTO: 16.5 % (ref 12.4–15.4)
PLATELET # BLD: 221 K/UL (ref 135–450)
PMV BLD AUTO: 7.1 FL (ref 5–10.5)
POTASSIUM SERPL-SCNC: 4.8 MMOL/L (ref 3.5–5.1)
RBC # BLD: 3.13 M/UL (ref 4.2–5.9)
SODIUM BLD-SCNC: 137 MMOL/L (ref 136–145)
TOTAL PROTEIN: 7.9 G/DL (ref 6.4–8.2)
TRIGL SERPL-MCNC: 176 MG/DL (ref 0–150)
VLDLC SERPL CALC-MCNC: 35 MG/DL
WBC # BLD: 7.8 K/UL (ref 4–11)

## 2019-10-16 PROCEDURE — 80061 LIPID PANEL: CPT

## 2019-10-16 PROCEDURE — 85025 COMPLETE CBC W/AUTO DIFF WBC: CPT

## 2019-10-16 PROCEDURE — 36415 COLL VENOUS BLD VENIPUNCTURE: CPT

## 2019-10-16 PROCEDURE — 73630 X-RAY EXAM OF FOOT: CPT

## 2019-10-16 PROCEDURE — 80053 COMPREHEN METABOLIC PANEL: CPT

## 2019-10-21 ENCOUNTER — TELEPHONE (OUTPATIENT)
Dept: CARDIOLOGY CLINIC | Age: 54
End: 2019-10-21

## 2019-10-21 DIAGNOSIS — D64.9 ANEMIA, UNSPECIFIED TYPE: Primary | ICD-10-CM

## 2019-10-29 ENCOUNTER — NURSE ONLY (OUTPATIENT)
Dept: CARDIOLOGY CLINIC | Age: 54
End: 2019-10-29
Payer: COMMERCIAL

## 2019-10-29 DIAGNOSIS — I42.6 ALCOHOLIC CARDIOMYOPATHY (HCC): ICD-10-CM

## 2019-10-29 DIAGNOSIS — Z95.810 ICD (IMPLANTABLE CARDIOVERTER-DEFIBRILLATOR) IN PLACE: ICD-10-CM

## 2019-10-29 PROCEDURE — 93295 DEV INTERROG REMOTE 1/2/MLT: CPT | Performed by: INTERNAL MEDICINE

## 2019-10-29 PROCEDURE — 93296 REM INTERROG EVL PM/IDS: CPT | Performed by: INTERNAL MEDICINE

## 2019-11-04 ENCOUNTER — TELEPHONE (OUTPATIENT)
Dept: CARDIOLOGY CLINIC | Age: 54
End: 2019-11-04

## 2019-11-19 NOTE — PROGRESS NOTES
Pt is being discharged to home. Discharge instructions and scripts gone over and given to pt/mother. Pt/mother denies any questions or concerns at this time. IV removed. Pt is in stable condition. RN will review with Dr Renner.

## 2019-11-27 ENCOUNTER — HOSPITAL ENCOUNTER (OUTPATIENT)
Dept: GENERAL RADIOLOGY | Age: 54
Discharge: HOME | End: 2019-11-27
Payer: COMMERCIAL

## 2019-11-27 ENCOUNTER — OFFICE VISIT (OUTPATIENT)
Dept: CARDIOLOGY CLINIC | Age: 54
End: 2019-11-27
Payer: COMMERCIAL

## 2019-11-27 ENCOUNTER — HOSPITAL ENCOUNTER (OUTPATIENT)
Age: 54
Discharge: HOME | End: 2019-11-27
Payer: COMMERCIAL

## 2019-11-27 VITALS
DIASTOLIC BLOOD PRESSURE: 78 MMHG | OXYGEN SATURATION: 98 % | SYSTOLIC BLOOD PRESSURE: 120 MMHG | RESPIRATION RATE: 16 BRPM | WEIGHT: 222.23 LBS | BODY MASS INDEX: 31.81 KG/M2 | HEIGHT: 70 IN | HEART RATE: 84 BPM

## 2019-11-27 DIAGNOSIS — L97.522 ULCER OF LEFT FOOT, WITH FAT LAYER EXPOSED (HCC): ICD-10-CM

## 2019-11-27 DIAGNOSIS — I47.29 NSVT (NONSUSTAINED VENTRICULAR TACHYCARDIA): ICD-10-CM

## 2019-11-27 DIAGNOSIS — Z76.89 ENCOUNTER TO ESTABLISH CARE: Primary | ICD-10-CM

## 2019-11-27 DIAGNOSIS — Z76.89 ENCOUNTER TO ESTABLISH CARE: ICD-10-CM

## 2019-11-27 PROCEDURE — 36415 COLL VENOUS BLD VENIPUNCTURE: CPT

## 2019-11-27 PROCEDURE — 84439 ASSAY OF FREE THYROXINE: CPT

## 2019-11-27 PROCEDURE — 84443 ASSAY THYROID STIM HORMONE: CPT

## 2019-11-27 PROCEDURE — 73630 X-RAY EXAM OF FOOT: CPT

## 2019-11-27 PROCEDURE — 99243 OFF/OP CNSLTJ NEW/EST LOW 30: CPT | Performed by: INTERNAL MEDICINE

## 2019-11-28 LAB
T4 FREE: 1 NG/DL (ref 0.9–1.8)
TSH REFLEX: 11.12 UIU/ML (ref 0.27–4.2)

## 2019-12-02 RX ORDER — LEVOTHYROXINE SODIUM 0.03 MG/1
25 TABLET ORAL DAILY
Qty: 30 TABLET | Refills: 11 | Status: SHIPPED | OUTPATIENT
Start: 2019-12-02 | End: 2019-12-03 | Stop reason: ALTCHOICE

## 2019-12-03 ENCOUNTER — TELEPHONE (OUTPATIENT)
Dept: CARDIOLOGY CLINIC | Age: 54
End: 2019-12-03

## 2019-12-03 RX ORDER — LEVOTHYROXINE AND LIOTHYRONINE 38; 9 UG/1; UG/1
60 TABLET ORAL DAILY
Qty: 30 TABLET | Refills: 3 | Status: SHIPPED | OUTPATIENT
Start: 2019-12-03 | End: 2020-04-23

## 2020-02-14 ENCOUNTER — OFFICE VISIT (OUTPATIENT)
Dept: INTERNAL MEDICINE CLINIC | Age: 55
End: 2020-02-14

## 2020-02-14 VITALS
SYSTOLIC BLOOD PRESSURE: 120 MMHG | BODY MASS INDEX: 32.21 KG/M2 | HEART RATE: 70 BPM | DIASTOLIC BLOOD PRESSURE: 80 MMHG | HEIGHT: 70 IN | RESPIRATION RATE: 14 BRPM | WEIGHT: 225 LBS

## 2020-02-14 DIAGNOSIS — E03.9 ACQUIRED HYPOTHYROIDISM: ICD-10-CM

## 2020-02-14 LAB
BASOPHILS ABSOLUTE: 0.1 K/UL (ref 0–0.2)
BASOPHILS RELATIVE PERCENT: 0.7 %
CREATININE URINE: 38.1 MG/DL (ref 39–259)
EOSINOPHILS ABSOLUTE: 0.2 K/UL (ref 0–0.6)
EOSINOPHILS RELATIVE PERCENT: 2.8 %
HCT VFR BLD CALC: 38.8 % (ref 40.5–52.5)
HEMOGLOBIN: 13.7 G/DL (ref 13.5–17.5)
LYMPHOCYTES ABSOLUTE: 2 K/UL (ref 1–5.1)
LYMPHOCYTES RELATIVE PERCENT: 29 %
MCH RBC QN AUTO: 31.5 PG (ref 26–34)
MCHC RBC AUTO-ENTMCNC: 35.3 G/DL (ref 31–36)
MCV RBC AUTO: 89.2 FL (ref 80–100)
MICROALBUMIN UR-MCNC: 15.6 MG/DL
MICROALBUMIN/CREAT UR-RTO: 409.4 MG/G (ref 0–30)
MONOCYTES ABSOLUTE: 0.5 K/UL (ref 0–1.3)
MONOCYTES RELATIVE PERCENT: 6.7 %
NEUTROPHILS ABSOLUTE: 4.3 K/UL (ref 1.7–7.7)
NEUTROPHILS RELATIVE PERCENT: 60.8 %
PDW BLD-RTO: 14.7 % (ref 12.4–15.4)
PLATELET # BLD: 181 K/UL (ref 135–450)
PMV BLD AUTO: 7.9 FL (ref 5–10.5)
RBC # BLD: 4.34 M/UL (ref 4.2–5.9)
TSH SERPL DL<=0.05 MIU/L-ACNC: 6.27 UIU/ML (ref 0.27–4.2)
WBC # BLD: 7.1 K/UL (ref 4–11)

## 2020-02-14 PROCEDURE — 99204 OFFICE O/P NEW MOD 45 MIN: CPT | Performed by: INTERNAL MEDICINE

## 2020-02-14 PROCEDURE — 81002 URINALYSIS NONAUTO W/O SCOPE: CPT | Performed by: INTERNAL MEDICINE

## 2020-02-14 RX ORDER — GLIPIZIDE 5 MG/1
5 TABLET ORAL
Qty: 180 TABLET | Refills: 3 | Status: SHIPPED | OUTPATIENT
Start: 2020-02-14 | End: 2020-12-01 | Stop reason: SDUPTHER

## 2020-02-14 ASSESSMENT — ENCOUNTER SYMPTOMS
NAUSEA: 0
BACK PAIN: 0
SHORTNESS OF BREATH: 0
WHEEZING: 0
ABDOMINAL PAIN: 0
VOMITING: 0
RHINORRHEA: 0

## 2020-02-14 NOTE — PROGRESS NOTES
5/16    Diabetes ([address]     Diabetic ulcer of right foot ([address] early 2016    Diabetic ulcer of 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 2 diabetes mellitus, with necrosis of bone ([address]     Fracture of tibial plateau 25/1/0066    MRSA (methicillin resistant staph aureus) culture positive 4/28/16, 4/20/16    foot wound    Neuropathic ulcer of left foot, limited to breakdown of skin ([address] 11/3/2016    Neuropathic ulcer of toe ([address] 2/13/2014    Pleural effusion due to congestive heart failure ([address]     Smoker     quit [id number]    Systolic CHF, acute ([address] 7/8/2013       Past Surgical History:   Procedure Laterality Date    FOOT AMPUTATION Left 08/09/2018    PARTIAL FOOT AMPUTATION w. Dr Phani Warner Left 1/31/2019    EXOSTECTOMY LEFT FOOT, ULCER DEBRIDEMENT LEFT FOOT WITH GRAFT APPLICATION performed by Kirstin Olguin DPM at [place] [address] Left 09/27/2017    LEFT FOOT ULCER DEBRIDEMENT, POSSIBLE SECOND METATARSAL    FOOT SURGERY Left 01/31/2019    Exostectomy left foot, ulcer debridement with graft application left foot    FOOT TENDON SURGERY Right 2016    FOOT TENDON SURGERY Left 06/30/2017    KNEE SURGERY Left     OTHER SURGICAL HISTORY Bilateral 9/17/15    amputation 2nd digit bilateral, flexor tenotomy right hallux    OTHER SURGICAL HISTORY Left 08/17/2017    PARTIAL LEFT FOOT AMPUTATION      OTHER SURGICAL HISTORY Left 12/07/2017    Debridement infected bone and tissue left foot; ulcer debridement left foot with graft application with dr mathur     OTHER SURGICAL HISTORY Right 01/04/2018    DEBRIDEMENT INFECTED BONE AND TISSUE RIGHT FOOT, ULCER DEBRIDEMENT RIGHT FOOT WITH GRAFT APPLICATION    OTHER SURGICAL HISTORY Left 11/01/2018    Procedure: PARTIAL RESECTION LEFT METATARSAL, ULCER DEBRIDEMENT LEFT FOOT WITH GRAFT APPLICATION     PACEMAKER PLACEMENT      1/23/14 pacer/defib    [place] OFFICE/OUTPT VISIT,PROCEDURE ONLY Left 8/23/2018    ULCER DEBRIDEMENT LEFT FOOT WITH GRAFT APPLICATION performed by Pastor Domo DPM at [facility] TARSAL/METATARSAL Left 2018    PARTIAL FOOT AMPUTATION WITH GRAFT APPLICATION performed by Pastor Domo DPM at [facility] TARSAL/METATARSAL Left 2018    PARTIAL RESECTION LEFT METATARSAL, ULCER DEBRIDEMENT LEFT FOOT WITH GRAFT APPLICATION performed by Pastor Domo DPM at [facility] OR    TOE AMPUTATION      2 toes       Family History   Problem Relation Age of Onset    Heart Disease Mother     High Blood Pressure Mother     High Cholesterol Mother     Diabetes Mother     Anemia Mother     Heart Disease Father     High Blood Pressure Father     High Cholesterol Father     Heart Disease Maternal Grandmother     High Blood Pressure Maternal Grandmother     High Cholesterol Maternal Grandmother     Cancer Maternal Grandmother         bone marrow & breast    Heart Disease Maternal Grandfather     High Blood Pressure Maternal Grandfather     High Cholesterol Maternal Grandfather     Cancer Maternal Grandfather         pancreatic CA    Heart Disease Paternal Grandmother     High Blood Pressure Paternal Grandmother     High Cholesterol Paternal Grandmother     Heart Disease Paternal Grandfather     High Blood Pressure Paternal Grandfather     High Cholesterol Paternal Grandfather     Stroke Brother     Heart Failure Brother     Heart Disease Brother     Diabetes type 2  Brother     Diabetes type 2  Brother     Diabetes type 2  Brother        Social History     Tobacco Use    Smoking status: Former Smoker     Last attempt to quit: 1980     Years since quittin.0    Smokeless tobacco: Never Used   Substance Use Topics    Alcohol use: No     Alcohol/week: 0.0 standard drinks    Drug use: No         Current Outpatient Medications:     metFORMIN (GLUCOPHAGE) 1000 MG tablet, Take 1 tablet by mouth 2 times daily (with meals), Disp: 180 tablet, Rfl: 3    glipiZIDE (GLUCOTROL) 5 MG tablet, Take 1 tablet by mouth 2 times daily (before meals), Disp: 180 tablet, Rfl: 3    Dulaglutide (TRULICITY) 1.5 NO/5.0HZ SOPN, Inject 1.5 mg (1 PEN) into the skin once a week, Disp: 2 mL, Rfl: 0    thyroid (ARMOUR THYROID) 60 MG tablet, Take 1 tablet by mouth daily, Disp: 30 tablet, Rfl: 3    spironolactone (ALDACTONE) 25 MG tablet, Take 1 tablet by mouth daily, Disp: 90 tablet, Rfl: 3    pravastatin (PRAVACHOL) 20 MG tablet, TAKE ONE TABLET BY MOUTH ONE TIME DAILY, Disp: 90 tablet, Rfl: 3    lisinopril (PRINIVIL;ZESTRIL) 5 MG tablet, Take 1 tablet by mouth daily, Disp: 90 tablet, Rfl: 3    furosemide (LASIX) 40 MG tablet, TAKE ONE TABLET BY MOUTH TWICE DAILY, Disp: 180 tablet, Rfl: 3    carvedilol (COREG) 25 MG tablet, Take 1 tablet by mouth 2 times daily TAKE ONE TABLET BY MOUTH TWICE DAILY, Disp: 180 tablet, Rfl: 3    aspirin 81 MG tablet, Take 1 tablet by mouth daily, Disp: 90 tablet, Rfl: 3    TOUJEO SOLOSTAR 300 UNIT/ML injection pen, Inject 90 Units into the skin nightly, Disp: 9 mL, Rfl: 1    magnesium oxide (MAG-OX) 400 (241.3 Mg) MG TABS tablet, Take 1 tablet by mouth 2 times daily, Disp: 180 tablet, Rfl: 3    gabapentin (NEURONTIN) 300 MG capsule, Take 1 capsule by mouth 2 times daily. , Disp: 180 capsule, Rfl: 3    glucose blood VI test strips (ONE TOUCH ULTRA TEST) strip, Test blood sugar once daily. , Disp: 150 strip, Rfl: 3    Insulin Pen Needle (UNIFINE PENTIPS) 31G X 5 MM MISC, USE 1 EACH DAY AS NEEDED FOR TESTING, Disp: 100 each, Rfl: 3    Blood Glucose Monitoring Suppl GABI, Measure glucose before breakfast, before dinner and at bedtime daily (we will check at lunchtime at HBO therapy). , Disp: 1 Device, Rfl: 0    Multiple Vitamins-Minerals (THERAPEUTIC MULTIVITAMIN-MINERALS) tablet, Take 1 tablet by mouth daily, Disp: , Rfl:     Blood Glucose Monitoring Suppl (BLOOD GLUCOSE METER) KIT, Use to test blood sugars. , Disp: 1 kit, Rfl: 0    Lancets MISC, Use to test blood sugars once daily. , Disp: 100 each, Rfl: 3    sildenafil (VIAGRA) 100 MG tablet, Take 1 tablet by mouth as needed for Erectile Dysfunction. , Disp: 13 tablet, Rfl: 3    /80 (Site: Right Upper Arm, Position: Supine, Cuff Size: Medium Adult)   Pulse 70   Resp 14   Ht 5' 10\" (1.778 m)   Wt 225 lb (102.1 kg)   BMI 32.28 kg/m²         Objective:   Physical Exam  Constitutional:       Appearance: He is well-developed. HENT:      Head: Normocephalic. Eyes:      Conjunctiva/sclera: Conjunctivae normal.      Pupils: Pupils are equal, round, and reactive to light. Neck:      Musculoskeletal: Normal range of motion and neck supple. Thyroid: No thyroid mass or thyromegaly. Vascular: No carotid bruit or JVD. Trachea: Trachea normal.   Cardiovascular:      Rate and Rhythm: Normal rate and regular rhythm. Heart sounds: Normal heart sounds. No murmur. No gallop. Pulmonary:      Effort: Pulmonary effort is normal. No respiratory distress. Breath sounds: Normal breath sounds. No wheezing or rales. Abdominal:      General: Bowel sounds are normal. There is no distension. Palpations: Abdomen is soft. There is no hepatomegaly, splenomegaly or mass. Tenderness: There is no abdominal tenderness. Musculoskeletal: Normal range of motion. Lymphadenopathy:      Cervical: No cervical adenopathy. Skin:     General: Skin is warm and dry. Findings: No rash. Neurological:      Mental Status: He is alert and oriented to person, place, and time. Cranial Nerves: No cranial nerve deficit. Deep Tendon Reflexes: Reflexes are normal and symmetric. Psychiatric:         Behavior: Behavior normal.         Thought Content: Thought content normal.         Judgment: Judgment normal.         Assessment:       Diagnosis Orders   1.  Uncontrolled type 2 diabetes mellitus with diabetic polyneuropathy, with long-term current use of insulin (HCC)  Dulaglutide (TRULICITY) 1.5 UL/6.1OY SOPN    CBC Auto Differential    Microalbumin / Creatinine Urine Ratio    POCT Urinalysis no Micro   2. Alcoholic cardiomyopathy     3. Essential hypertension     4. Hyperlipidemia associated with type 2 diabetes mellitus ([address]     5. Acquired hypothyroidism  TSH without Reflex          Plan:      #Diabetes mellitus type 2. Patient has been doing better. Last A1c was 6.6. Continue insulin. #Hypertension. Blood pressure was high on initial check. Blood pressure be rechecked. It was normal    #Cardiomyopathy due to alcohol. He is completely quit drinking 5 years ago. Continue with Coreg Lasix and lisinopril. He sees cardiology. #Hyperlipidemia well controlled. #Hypo-thyroidism. He cannot tolerate levothyroxine. He takes [place] Thyroid. Need to check. #Anemia. GI work-up negative. Repeat blood count.             Arden Fermin MD

## 2020-03-03 ENCOUNTER — NURSE ONLY (OUTPATIENT)
Dept: CARDIOLOGY CLINIC | Age: 55
End: 2020-03-03
Payer: COMMERCIAL

## 2020-03-03 PROCEDURE — 93295 DEV INTERROG REMOTE 1/2/MLT: CPT | Performed by: INTERNAL MEDICINE

## 2020-03-03 PROCEDURE — 93296 REM INTERROG EVL PM/IDS: CPT | Performed by: INTERNAL MEDICINE

## 2020-03-19 RX ORDER — DULAGLUTIDE 1.5 MG/.5ML
INJECTION, SOLUTION SUBCUTANEOUS
Qty: 2 ML | Refills: 0 | Status: SHIPPED | OUTPATIENT
Start: 2020-03-19 | End: 2020-05-14

## 2020-04-24 RX ORDER — THYROID 60 MG
TABLET ORAL
Qty: 30 TABLET | Refills: 5 | Status: SHIPPED | OUTPATIENT
Start: 2020-04-24 | End: 2021-01-05

## 2020-05-11 ENCOUNTER — OFFICE VISIT (OUTPATIENT)
Dept: CARDIOLOGY CLINIC | Age: 55
End: 2020-05-11
Payer: COMMERCIAL

## 2020-05-11 VITALS
OXYGEN SATURATION: 98 % | DIASTOLIC BLOOD PRESSURE: 70 MMHG | TEMPERATURE: 97.6 F | WEIGHT: 230 LBS | SYSTOLIC BLOOD PRESSURE: 108 MMHG | BODY MASS INDEX: 32.93 KG/M2 | HEIGHT: 70 IN | HEART RATE: 98 BPM

## 2020-05-11 PROCEDURE — 99214 OFFICE O/P EST MOD 30 MIN: CPT | Performed by: INTERNAL MEDICINE

## 2020-05-11 RX ORDER — CARVEDILOL 25 MG/1
25 TABLET ORAL 2 TIMES DAILY
Qty: 180 TABLET | Refills: 3 | Status: ON HOLD | OUTPATIENT
Start: 2020-05-11 | End: 2020-08-15 | Stop reason: HOSPADM

## 2020-05-11 RX ORDER — FUROSEMIDE 40 MG/1
TABLET ORAL
Qty: 180 TABLET | Refills: 3 | Status: ON HOLD | OUTPATIENT
Start: 2020-05-11 | End: 2020-08-15 | Stop reason: HOSPADM

## 2020-05-11 RX ORDER — LISINOPRIL 5 MG/1
5 TABLET ORAL DAILY
Qty: 90 TABLET | Refills: 3 | Status: ON HOLD | OUTPATIENT
Start: 2020-05-11 | End: 2020-08-15 | Stop reason: HOSPADM

## 2020-05-11 RX ORDER — PRAVASTATIN SODIUM 20 MG
TABLET ORAL
Qty: 90 TABLET | Refills: 3 | Status: ON HOLD | OUTPATIENT
Start: 2020-05-11 | End: 2020-08-15 | Stop reason: HOSPADM

## 2020-05-11 NOTE — LETTER
Diagnosis Date    Acute osteomyelitis of left foot ([address] 9/27/2017    Acute osteomyelitis of right foot ([address] 4/25/2016    Ascites     Blood transfusion reaction     Burst fracture of lumbar vertebra (HCC) 11/9/2012    Cellulitis of left foot     Cellulitis of right lower extremity 2/16, 5/16    Diabetes ([address]     Diabetic ulcer of right foot ([address] early 2016    Diabetic ulcer of 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 2 diabetes mellitus, with necrosis of bone (HCC)     Fracture of tibial plateau 25/7/1409    MRSA (methicillin resistant staph aureus) culture positive 4/28/16, 4/20/16    foot wound    Neuropathic ulcer of left foot, limited to breakdown of skin ([address] 11/3/2016    Neuropathic ulcer of toe ([address] 2/13/2014    Pleural effusion due to congestive heart failure ([address]     Smoker     quit 1655    Systolic CHF, acute ([address] 7/8/2013     Past Surgical History:   Procedure Laterality Date    FOOT AMPUTATION Left 08/09/2018    PARTIAL FOOT AMPUTATION w. Dr Osmin Lee Left 1/31/2019    EXOSTECTOMY LEFT FOOT, ULCER DEBRIDEMENT LEFT FOOT WITH GRAFT APPLICATION performed by Jasper Evans DPM at [address] Left 09/27/2017    LEFT FOOT ULCER DEBRIDEMENT, POSSIBLE SECOND METATARSAL    FOOT SURGERY Left 01/31/2019    Exostectomy left foot, ulcer debridement with graft application left foot    FOOT TENDON SURGERY Right 2016    FOOT TENDON SURGERY Left 06/30/2017    KNEE SURGERY Left     OTHER SURGICAL HISTORY Bilateral 9/17/15    amputation 2nd digit bilateral, flexor tenotomy right hallux    OTHER SURGICAL HISTORY Left 08/17/2017    PARTIAL LEFT FOOT AMPUTATION      OTHER SURGICAL HISTORY Left 12/07/2017    Debridement infected bone and tissue left foot; ulcer debridement left foot with graft application with dr mathur     OTHER SURGICAL HISTORY Right 01/04/2018    DEBRIDEMENT INFECTED BONE AND TISSUE RIGHT FOOT, ULCER DEBRIDEMENT RIGHT FOOT WITH GRAFT APPLICATION  metFORMIN (GLUCOPHAGE) 1000 MG tablet Take 1 tablet by mouth 2 times daily (with meals) 180 tablet 3    glipiZIDE (GLUCOTROL) 5 MG tablet Take 1 tablet by mouth 2 times daily (before meals) 180 tablet 3    spironolactone (ALDACTONE) 25 MG tablet Take 1 tablet by mouth daily 90 tablet 3    pravastatin (PRAVACHOL) 20 MG tablet TAKE ONE TABLET BY MOUTH ONE TIME DAILY 90 tablet 3    lisinopril (PRINIVIL;ZESTRIL) 5 MG tablet Take 1 tablet by mouth daily 90 tablet 3    furosemide (LASIX) 40 MG tablet TAKE ONE TABLET BY MOUTH TWICE DAILY 180 tablet 3    carvedilol (COREG) 25 MG tablet Take 1 tablet by mouth 2 times daily TAKE ONE TABLET BY MOUTH TWICE DAILY 180 tablet 3    aspirin 81 MG tablet Take 1 tablet by mouth daily 90 tablet 3    TOUJEO SOLOSTAR 300 UNIT/ML injection pen Inject 90 Units into the skin nightly 9 mL 1    gabapentin (NEURONTIN) 300 MG capsule Take 1 capsule by mouth 2 times daily. 180 capsule 3    glucose blood VI test strips (ONE TOUCH ULTRA TEST) strip Test blood sugar once daily. 150 strip 3    Insulin Pen Needle (UNIFINE PENTIPS) 31G X 5 MM MISC USE 1 EACH DAY AS NEEDED FOR TESTING 100 each 3    Blood Glucose Monitoring Suppl GABI Measure glucose before breakfast, before dinner and at bedtime daily (we will check at lunchtime at HBO therapy). 1 Device 0    Multiple Vitamins-Minerals (THERAPEUTIC MULTIVITAMIN-MINERALS) tablet Take 1 tablet by mouth daily      Blood Glucose Monitoring Suppl (BLOOD GLUCOSE METER) KIT Use to test blood sugars. 1 kit 0    Lancets MISC Use to test blood sugars once daily. 100 each 3    sildenafil (VIAGRA) 100 MG tablet Take 1 tablet by mouth as needed for Erectile Dysfunction. 13 tablet 3     No facility-administered encounter medications on file as of 5/11/2020. Lab Data:  CBC: No results for input(s): WBC, HGB, HCT, MCV, PLT in the last 72 hours. BMP: No results for input(s): NA, K, CL, CO2, PHOS, BUN, CREATININE in the last 72 hours.

## 2020-05-11 NOTE — PROGRESS NOTES
4/20/16    foot wound    Neuropathic ulcer of left foot, limited to breakdown of skin ([facility] 11/3/2016    Neuropathic ulcer of toe ([facility] 2/13/2014    Pleural effusion due to congestive heart failure ([facility]     Smoker     quit 0836    Systolic CHF, acute ([facility] 7/8/2013     Past Surgical History:   Procedure Laterality Date    FOOT AMPUTATION Left 08/09/2018    PARTIAL FOOT AMPUTATION w. Dr Quinton Robledo Left 1/31/2019    EXOSTECTOMY LEFT FOOT, ULCER DEBRIDEMENT LEFT FOOT WITH GRAFT APPLICATION performed by Deysi Vail DPM at [facility] Left 09/27/2017    LEFT FOOT ULCER DEBRIDEMENT, POSSIBLE SECOND METATARSAL    FOOT SURGERY Left 01/31/2019    Exostectomy left foot, ulcer debridement with graft application left foot    FOOT TENDON SURGERY Right 2016    FOOT TENDON SURGERY Left 06/30/2017    KNEE SURGERY Left     OTHER SURGICAL HISTORY Bilateral 9/17/15    amputation 2nd digit bilateral, flexor tenotomy right hallux    OTHER SURGICAL HISTORY Left 08/17/2017    PARTIAL LEFT FOOT AMPUTATION      OTHER SURGICAL HISTORY Left 12/07/2017    Debridement infected bone and tissue left foot; ulcer debridement left foot with graft application with dr mathur     OTHER SURGICAL HISTORY Right 01/04/2018    DEBRIDEMENT INFECTED BONE AND TISSUE RIGHT FOOT, ULCER DEBRIDEMENT RIGHT FOOT WITH GRAFT APPLICATION    OTHER SURGICAL HISTORY Left 11/01/2018    Procedure: PARTIAL RESECTION LEFT METATARSAL, ULCER DEBRIDEMENT LEFT FOOT WITH GRAFT APPLICATION     PACEMAKER PLACEMENT      1/23/14 pacer/defib    AR OFFICE/OUTPT VISIT,PROCEDURE ONLY Left 8/23/2018    ULCER DEBRIDEMENT LEFT FOOT WITH GRAFT APPLICATION performed by Deysi Vail DPM at [facility] TARSAL/METATARSAL Left 8/9/2018    PARTIAL FOOT AMPUTATION WITH GRAFT APPLICATION performed by Deysi Vail DPM at [facility] TARSAL/METATARSAL Left 11/1/2018    PARTIAL RESECTION LEFT METATARSAL, ULCER DEBRIDEMENT LEFT FOOT WITH GRAFT APPLICATION performed by Fay Flowers DPM at [address] TOE AMPUTATION      2 toes       Objective: There were no vitals taken for this visit. Wt Readings from Last 3 Encounters:   02/14/20 225 lb (102.1 kg)   11/27/19 222 lb 3.7 oz (100.8 kg)   09/17/19 216 lb (98 kg)       Physical Exam:  General: No Respiratory distress, appears well developed and well nourished. Eyes:  Sclera nonicteric  Nose/Sinuses:  negative findings: nose shows no deformity, asymmetry, or inflammation, nasal mucosa normal, septum midline with no perforation or bleeding  Back:  no pain to palpation  Joint:  no active joint inflammation  Musculoskeletal:  negative  Skin:  Warm and dry  Neck: No JVD and no Carotid Bruits. Chest:  Clear to auscultation, respiration easy  Cardiovascular:  RRR, 96 bpm S1S2 normal, no murmur, no rub or thrill.   Abdomen:  Normal liver and spleen No ascites, obese  Extremities:  No edema  no clubbing or cyanosis    Medications:   Outpatient Encounter Medications as of 5/11/2020   Medication Sig Dispense Refill    magnesium oxide (MAG-OX) 400 (241.3 Mg) MG TABS tablet Take 1 tablet by mouth 2 times daily 180 tablet 1    ARMOUR THYROID 60 MG tablet Take 1 tablet by mouth daily 30 tablet 5    Dulaglutide (TRULICITY) 1.5 TZ/1.7CV SOPN Inject 1.5 mg (1 PEN) into the skin once a week 2 mL 0    metFORMIN (GLUCOPHAGE) 1000 MG tablet Take 1 tablet by mouth 2 times daily (with meals) 180 tablet 3    glipiZIDE (GLUCOTROL) 5 MG tablet Take 1 tablet by mouth 2 times daily (before meals) 180 tablet 3    spironolactone (ALDACTONE) 25 MG tablet Take 1 tablet by mouth daily 90 tablet 3    pravastatin (PRAVACHOL) 20 MG tablet TAKE ONE TABLET BY MOUTH ONE TIME DAILY 90 tablet 3    lisinopril (PRINIVIL;ZESTRIL) 5 MG tablet Take 1 tablet by mouth daily 90 tablet 3    furosemide (LASIX) 40 MG tablet TAKE ONE TABLET BY MOUTH TWICE DAILY 180 tablet 3    carvedilol (COREG) 25 MG tablet

## 2020-05-14 RX ORDER — DULAGLUTIDE 1.5 MG/.5ML
INJECTION, SOLUTION SUBCUTANEOUS
Qty: 2 ML | Refills: 0 | Status: SHIPPED | OUTPATIENT
Start: 2020-05-14 | End: 2020-07-20

## 2020-05-26 ENCOUNTER — OFFICE VISIT (OUTPATIENT)
Dept: INTERNAL MEDICINE CLINIC | Age: 55
End: 2020-05-26

## 2020-05-26 ENCOUNTER — HOSPITAL ENCOUNTER (OUTPATIENT)
Age: 55
Discharge: HOME | End: 2020-05-26
Payer: COMMERCIAL

## 2020-05-26 VITALS
WEIGHT: 233 LBS | HEART RATE: 70 BPM | BODY MASS INDEX: 33.36 KG/M2 | RESPIRATION RATE: 14 BRPM | HEIGHT: 70 IN | SYSTOLIC BLOOD PRESSURE: 130 MMHG | DIASTOLIC BLOOD PRESSURE: 80 MMHG

## 2020-05-26 LAB
A/G RATIO: 1.5 (ref 1.1–2.2)
ALBUMIN SERPL-MCNC: 4 G/DL (ref 3.4–5)
ALP BLD-CCNC: 63 U/L (ref 40–129)
ALT SERPL-CCNC: 20 U/L (ref 10–40)
ANION GAP SERPL CALCULATED.3IONS-SCNC: 11 MMOL/L (ref 3–16)
AST SERPL-CCNC: 17 U/L (ref 15–37)
BILIRUB SERPL-MCNC: 0.5 MG/DL (ref 0–1)
BUN BLDV-MCNC: 18 MG/DL (ref 7–20)
CALCIUM SERPL-MCNC: 9.1 MG/DL (ref 8.3–10.6)
CHLORIDE BLD-SCNC: 101 MMOL/L (ref 99–110)
CHOLESTEROL, FASTING: 115 MG/DL (ref 0–199)
CO2: 26 MMOL/L (ref 21–32)
CREAT SERPL-MCNC: 0.8 MG/DL (ref 0.9–1.3)
GFR AFRICAN AMERICAN: >60
GFR NON-AFRICAN AMERICAN: >60
GLOBULIN: 2.7 G/DL
GLUCOSE BLD-MCNC: 159 MG/DL (ref 70–99)
HCT VFR BLD CALC: 35 % (ref 40.5–52.5)
HDLC SERPL-MCNC: 30 MG/DL (ref 40–60)
HEMOGLOBIN: 12.2 G/DL (ref 13.5–17.5)
LDL CHOLESTEROL CALCULATED: ABNORMAL MG/DL
LDL CHOLESTEROL DIRECT: 42 MG/DL
MCH RBC QN AUTO: 31.1 PG (ref 26–34)
MCHC RBC AUTO-ENTMCNC: 34.9 G/DL (ref 31–36)
MCV RBC AUTO: 89.1 FL (ref 80–100)
PDW BLD-RTO: 13.6 % (ref 12.4–15.4)
PLATELET # BLD: 190 K/UL (ref 135–450)
PMV BLD AUTO: 7.6 FL (ref 5–10.5)
POTASSIUM SERPL-SCNC: 4.5 MMOL/L (ref 3.5–5.1)
RBC # BLD: 3.93 M/UL (ref 4.2–5.9)
SODIUM BLD-SCNC: 138 MMOL/L (ref 136–145)
T4 FREE: 0.9 NG/DL (ref 0.9–1.8)
TOTAL PROTEIN: 6.7 G/DL (ref 6.4–8.2)
TRIGLYCERIDE, FASTING: 354 MG/DL (ref 0–150)
TSH REFLEX: 10.56 UIU/ML (ref 0.27–4.2)
VLDLC SERPL CALC-MCNC: ABNORMAL MG/DL
WBC # BLD: 7.7 K/UL (ref 4–11)

## 2020-05-26 PROCEDURE — 84443 ASSAY THYROID STIM HORMONE: CPT

## 2020-05-26 PROCEDURE — 83036 HEMOGLOBIN GLYCOSYLATED A1C: CPT

## 2020-05-26 PROCEDURE — 80053 COMPREHEN METABOLIC PANEL: CPT

## 2020-05-26 PROCEDURE — 99214 OFFICE O/P EST MOD 30 MIN: CPT | Performed by: INTERNAL MEDICINE

## 2020-05-26 PROCEDURE — 84439 ASSAY OF FREE THYROXINE: CPT

## 2020-05-26 PROCEDURE — 85027 COMPLETE CBC AUTOMATED: CPT

## 2020-05-26 PROCEDURE — 80061 LIPID PANEL: CPT

## 2020-05-26 PROCEDURE — 36415 COLL VENOUS BLD VENIPUNCTURE: CPT

## 2020-05-26 RX ORDER — INSULIN GLARGINE 300 U/ML
90 INJECTION, SOLUTION SUBCUTANEOUS NIGHTLY
Qty: 9 ML | Refills: 1 | Status: SHIPPED | OUTPATIENT
Start: 2020-05-26 | End: 2020-07-20

## 2020-05-26 RX ORDER — BLOOD-GLUCOSE METER
1 KIT MISCELLANEOUS DAILY
Qty: 1 KIT | Refills: 0 | Status: SHIPPED | OUTPATIENT
Start: 2020-05-26 | End: 2021-05-04 | Stop reason: ALTCHOICE

## 2020-05-26 ASSESSMENT — PATIENT HEALTH QUESTIONNAIRE - PHQ9
2. FEELING DOWN, DEPRESSED OR HOPELESS: 0
SUM OF ALL RESPONSES TO PHQ9 QUESTIONS 1 & 2: 0
SUM OF ALL RESPONSES TO PHQ QUESTIONS 1-9: 0
SUM OF ALL RESPONSES TO PHQ QUESTIONS 1-9: 0
1. LITTLE INTEREST OR PLEASURE IN DOING THINGS: 0

## 2020-05-26 ASSESSMENT — ENCOUNTER SYMPTOMS
ABDOMINAL PAIN: 0
BACK PAIN: 0
RHINORRHEA: 0
SHORTNESS OF BREATH: 0
WHEEZING: 0
VOMITING: 0
NAUSEA: 0

## 2020-05-26 NOTE — PROGRESS NOTES
([address]     Diabetic ulcer of right foot ([address] early 2016    Diabetic ulcer of 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 2 diabetes mellitus, with necrosis of bone ([address]     Fracture of tibial plateau 76/3/4721    MRSA (methicillin resistant staph aureus) culture positive 4/28/16, 4/20/16    foot wound    Neuropathic ulcer of left foot, limited to breakdown of skin ([address] 11/3/2016    Neuropathic ulcer of toe ([address] 2/13/2014    Pleural effusion due to congestive heart failure ([address]     Smoker     quit 6584    Systolic CHF, acute ([address] 7/8/2013       Past Surgical History:   Procedure Laterality Date    FOOT AMPUTATION Left 08/09/2018    PARTIAL FOOT AMPUTATION w. Dr Sophia Simmons Left 1/31/2019    EXOSTECTOMY LEFT FOOT, ULCER DEBRIDEMENT LEFT FOOT WITH GRAFT APPLICATION performed by Katherine Menjivar DPM at [address] Left 09/27/2017    LEFT FOOT ULCER DEBRIDEMENT, POSSIBLE SECOND METATARSAL    FOOT SURGERY Left 01/31/2019    Exostectomy left foot, ulcer debridement with graft application left foot    FOOT TENDON SURGERY Right 2016    FOOT TENDON SURGERY Left 06/30/2017    KNEE SURGERY Left     OTHER SURGICAL HISTORY Bilateral 9/17/15    amputation 2nd digit bilateral, flexor tenotomy right hallux    OTHER SURGICAL HISTORY Left 08/17/2017    PARTIAL LEFT FOOT AMPUTATION      OTHER SURGICAL HISTORY Left 12/07/2017    Debridement infected bone and tissue left foot; ulcer debridement left foot with graft application with dr mathur     OTHER SURGICAL HISTORY Right 01/04/2018    DEBRIDEMENT INFECTED BONE AND TISSUE RIGHT FOOT, ULCER DEBRIDEMENT RIGHT FOOT WITH GRAFT APPLICATION    OTHER SURGICAL HISTORY Left 11/01/2018    Procedure: PARTIAL RESECTION LEFT METATARSAL, ULCER DEBRIDEMENT LEFT FOOT WITH GRAFT APPLICATION     PACEMAKER PLACEMENT      1/23/14 pacer/defib    RI OFFICE/OUTPT VISIT,PROCEDURE ONLY Left 8/23/2018    ULCER DEBRIDEMENT LEFT FOOT WITH GRAFT APPLICATION performed by w/Device KIT, 1 kit by Does not apply route daily, Disp: 1 kit, Rfl: 0    TRULICITY 1.5 ON/6.6YK SOPN, Inject 1.5 mg (1 PEN) into the skin once a week, Disp: 2 mL, Rfl: 0    magnesium oxide (MAG-OX) 400 (241.3 Mg) MG TABS tablet, Take 1 tablet by mouth 2 times daily, Disp: 180 tablet, Rfl: 1    pravastatin (PRAVACHOL) 20 MG tablet, TAKE ONE TABLET BY MOUTH ONE TIME DAILY, Disp: 90 tablet, Rfl: 3    lisinopril (PRINIVIL;ZESTRIL) 5 MG tablet, Take 1 tablet by mouth daily, Disp: 90 tablet, Rfl: 3    furosemide (LASIX) 40 MG tablet, TAKE ONE TABLET BY MOUTH TWICE DAILY, Disp: 180 tablet, Rfl: 3    carvedilol (COREG) 25 MG tablet, Take 1 tablet by mouth 2 times daily TAKE ONE TABLET BY MOUTH TWICE DAILY, Disp: 180 tablet, Rfl: 3    ARMOUR THYROID 60 MG tablet, Take 1 tablet by mouth daily, Disp: 30 tablet, Rfl: 5    metFORMIN (GLUCOPHAGE) 1000 MG tablet, Take 1 tablet by mouth 2 times daily (with meals), Disp: 180 tablet, Rfl: 3    glipiZIDE (GLUCOTROL) 5 MG tablet, Take 1 tablet by mouth 2 times daily (before meals), Disp: 180 tablet, Rfl: 3    spironolactone (ALDACTONE) 25 MG tablet, Take 1 tablet by mouth daily, Disp: 90 tablet, Rfl: 3    aspirin 81 MG tablet, Take 1 tablet by mouth daily, Disp: 90 tablet, Rfl: 3    glucose blood VI test strips (ONE TOUCH ULTRA TEST) strip, Test blood sugar once daily. , Disp: 150 strip, Rfl: 3    Insulin Pen Needle (UNIFINE PENTIPS) 31G X 5 MM MISC, USE 1 EACH DAY AS NEEDED FOR TESTING, Disp: 100 each, Rfl: 3    Blood Glucose Monitoring Suppl GABI, Measure glucose before breakfast, before dinner and at bedtime daily (we will check at lunchtime at [facility] therapy). , Disp: 1 Device, Rfl: 0    Multiple Vitamins-Minerals (THERAPEUTIC MULTIVITAMIN-MINERALS) tablet, Take 1 tablet by mouth daily, Disp: , Rfl:     Blood Glucose Monitoring Suppl (BLOOD GLUCOSE METER) KIT, Use to test blood sugars. , Disp: 1 kit, Rfl: 0    Lancets MISC, Use to test blood sugars once daily. , Disp: insulin. #Hypertension. Blood pressure was high on initial check. Blood pressure be rechecked. It was normal    #Cardiomyopathy due to alcohol. He is completely quit drinking 5 years ago. Continue with Coreg Lasix and lisinopril. He sees cardiology. #Hyperlipidemia well controlled. #Hypo-thyroidism. He cannot tolerate levothyroxine. He takes [place] Thyroid. #Anemia. GI work-up negative.             Taras Arredondo MD

## 2020-05-27 LAB
ESTIMATED AVERAGE GLUCOSE: 165.7 MG/DL
HBA1C MFR BLD: 7.4 %

## 2020-06-02 ENCOUNTER — NURSE ONLY (OUTPATIENT)
Dept: CARDIOLOGY CLINIC | Age: 55
End: 2020-06-02
Payer: COMMERCIAL

## 2020-06-02 PROCEDURE — 93295 DEV INTERROG REMOTE 1/2/MLT: CPT | Performed by: INTERNAL MEDICINE

## 2020-06-02 PROCEDURE — 93296 REM INTERROG EVL PM/IDS: CPT | Performed by: INTERNAL MEDICINE

## 2020-07-20 RX ORDER — DULAGLUTIDE 1.5 MG/.5ML
INJECTION, SOLUTION SUBCUTANEOUS
Qty: 2 ML | Refills: 0 | Status: SHIPPED | OUTPATIENT
Start: 2020-07-20 | End: 2020-11-23

## 2020-07-20 RX ORDER — INSULIN GLARGINE 300 U/ML
90 INJECTION, SOLUTION SUBCUTANEOUS NIGHTLY
Qty: 9 ML | Refills: 2 | Status: ON HOLD | OUTPATIENT
Start: 2020-07-20 | End: 2020-08-15 | Stop reason: HOSPADM

## 2020-08-04 ENCOUNTER — HOSPITAL ENCOUNTER (INPATIENT)
Age: 55
LOS: 2 days | Discharge: OTHER INSTITUTION | DRG: 871 | End: 2020-08-06
Attending: EMERGENCY MEDICINE | Admitting: INTERNAL MEDICINE
Payer: COMMERCIAL

## 2020-08-04 ENCOUNTER — APPOINTMENT (OUTPATIENT)
Dept: GENERAL RADIOLOGY | Age: 55
DRG: 871 | End: 2020-08-04

## 2020-08-04 PROBLEM — J96.00 ACUTE RESPIRATORY FAILURE (HCC): Status: ACTIVE | Noted: 2020-08-04

## 2020-08-04 LAB
A/G RATIO: 1.4 (ref 1.1–2.2)
ALBUMIN SERPL-MCNC: 4.5 G/DL (ref 3.4–5)
ALP BLD-CCNC: 89 U/L (ref 40–129)
ALT SERPL-CCNC: 22 U/L (ref 10–40)
ANION GAP SERPL CALCULATED.3IONS-SCNC: 13 MMOL/L (ref 3–16)
ANISOCYTOSIS: ABNORMAL
APTT: 32 SEC (ref 24.2–36.2)
AST SERPL-CCNC: 28 U/L (ref 15–37)
ATYPICAL LYMPHOCYTE RELATIVE PERCENT: 6 % (ref 0–6)
BANDED NEUTROPHILS RELATIVE PERCENT: 1 % (ref 0–7)
BASOPHILS ABSOLUTE: 0 K/UL (ref 0–0.2)
BASOPHILS RELATIVE PERCENT: 0 %
BILIRUB SERPL-MCNC: 0.7 MG/DL (ref 0–1)
BUN BLDV-MCNC: 16 MG/DL (ref 7–20)
CALCIUM SERPL-MCNC: 9 MG/DL (ref 8.3–10.6)
CHLORIDE BLD-SCNC: 100 MMOL/L (ref 99–110)
CO2: 23 MMOL/L (ref 21–32)
CREAT SERPL-MCNC: 1.1 MG/DL (ref 0.9–1.3)
D DIMER: 275 NG/ML DDU (ref 0–229)
EKG ATRIAL RATE: 142 BPM
EKG DIAGNOSIS: NORMAL
EKG P AXIS: 50 DEGREES
EKG P-R INTERVAL: 124 MS
EKG Q-T INTERVAL: 288 MS
EKG QRS DURATION: 116 MS
EKG QTC CALCULATION (BAZETT): 443 MS
EKG R AXIS: -22 DEGREES
EKG T AXIS: 79 DEGREES
EKG VENTRICULAR RATE: 142 BPM
EOSINOPHILS ABSOLUTE: 0.2 K/UL (ref 0–0.6)
EOSINOPHILS RELATIVE PERCENT: 1 %
GFR AFRICAN AMERICAN: >60
GFR NON-AFRICAN AMERICAN: >60
GLOBULIN: 3.2 G/DL
GLUCOSE BLD-MCNC: 266 MG/DL (ref 70–99)
GLUCOSE BLD-MCNC: 334 MG/DL (ref 70–99)
GLUCOSE BLD-MCNC: 358 MG/DL (ref 70–99)
GLUCOSE BLD-MCNC: 396 MG/DL (ref 70–99)
HCT VFR BLD CALC: 43.6 % (ref 40.5–52.5)
HEMATOLOGY PATH CONSULT: NORMAL
HEMATOLOGY PATH CONSULT: YES
HEMOGLOBIN: 14.8 G/DL (ref 13.5–17.5)
INR BLD: 0.92 (ref 0.86–1.14)
LACTIC ACID, SEPSIS: 2 MMOL/L (ref 0.4–1.9)
LACTIC ACID, SEPSIS: 2.8 MMOL/L (ref 0.4–1.9)
LYMPHOCYTES ABSOLUTE: 6.9 K/UL (ref 1–5.1)
LYMPHOCYTES RELATIVE PERCENT: 27 %
MCH RBC QN AUTO: 30.7 PG (ref 26–34)
MCHC RBC AUTO-ENTMCNC: 33.9 G/DL (ref 31–36)
MCV RBC AUTO: 90.5 FL (ref 80–100)
MONOCYTES ABSOLUTE: 1.3 K/UL (ref 0–1.3)
MONOCYTES RELATIVE PERCENT: 6 %
NEUTROPHILS ABSOLUTE: 12.6 K/UL (ref 1.7–7.7)
NEUTROPHILS RELATIVE PERCENT: 59 %
PDW BLD-RTO: 14 % (ref 12.4–15.4)
PERFORMED ON: ABNORMAL
PLATELET # BLD: 322 K/UL (ref 135–450)
PLATELET SLIDE REVIEW: ADEQUATE
PMV BLD AUTO: 7.3 FL (ref 5–10.5)
POIKILOCYTES: ABNORMAL
POLYCHROMASIA: ABNORMAL
POTASSIUM REFLEX MAGNESIUM: 4.3 MMOL/L (ref 3.5–5.1)
PRO-BNP: 2569 PG/ML (ref 0–124)
PROCALCITONIN: 0.08 NG/ML (ref 0–0.15)
PROTHROMBIN TIME: 10.7 SEC (ref 10–13.2)
RBC # BLD: 4.82 M/UL (ref 4.2–5.9)
SLIDE REVIEW: ABNORMAL
SODIUM BLD-SCNC: 136 MMOL/L (ref 136–145)
TOTAL PROTEIN: 7.7 G/DL (ref 6.4–8.2)
TROPONIN: 0.15 NG/ML
TROPONIN: 0.23 NG/ML
TROPONIN: 0.36 NG/ML
TROPONIN: 0.4 NG/ML
TSH REFLEX FT4: 4.79 UIU/ML (ref 0.27–4.2)
WBC # BLD: 21 K/UL (ref 4–11)

## 2020-08-04 PROCEDURE — 6360000002 HC RX W HCPCS: Performed by: INTERNAL MEDICINE

## 2020-08-04 PROCEDURE — 84484 ASSAY OF TROPONIN QUANT: CPT

## 2020-08-04 PROCEDURE — 94660 CPAP INITIATION&MGMT: CPT

## 2020-08-04 PROCEDURE — 99291 CRITICAL CARE FIRST HOUR: CPT | Performed by: INTERNAL MEDICINE

## 2020-08-04 PROCEDURE — 85379 FIBRIN DEGRADATION QUANT: CPT

## 2020-08-04 PROCEDURE — 99285 EMERGENCY DEPT VISIT HI MDM: CPT

## 2020-08-04 PROCEDURE — 6370000000 HC RX 637 (ALT 250 FOR IP): Performed by: INTERNAL MEDICINE

## 2020-08-04 PROCEDURE — U0003 INFECTIOUS AGENT DETECTION BY NUCLEIC ACID (DNA OR RNA); SEVERE ACUTE RESPIRATORY SYNDROME CORONAVIRUS 2 (SARS-COV-2) (CORONAVIRUS DISEASE [COVID-19]), AMPLIFIED PROBE TECHNIQUE, MAKING USE OF HIGH THROUGHPUT TECHNOLOGIES AS DESCRIBED BY CMS-2020-01-R: HCPCS

## 2020-08-04 PROCEDURE — 6360000002 HC RX W HCPCS: Performed by: STUDENT IN AN ORGANIZED HEALTH CARE EDUCATION/TRAINING PROGRAM

## 2020-08-04 PROCEDURE — 36415 COLL VENOUS BLD VENIPUNCTURE: CPT

## 2020-08-04 PROCEDURE — 83880 ASSAY OF NATRIURETIC PEPTIDE: CPT

## 2020-08-04 PROCEDURE — 71045 X-RAY EXAM CHEST 1 VIEW: CPT

## 2020-08-04 PROCEDURE — 2580000003 HC RX 258: Performed by: INTERNAL MEDICINE

## 2020-08-04 PROCEDURE — 84443 ASSAY THYROID STIM HORMONE: CPT

## 2020-08-04 PROCEDURE — 96375 TX/PRO/DX INJ NEW DRUG ADDON: CPT

## 2020-08-04 PROCEDURE — 84439 ASSAY OF FREE THYROXINE: CPT

## 2020-08-04 PROCEDURE — 6370000000 HC RX 637 (ALT 250 FOR IP): Performed by: EMERGENCY MEDICINE

## 2020-08-04 PROCEDURE — 2580000003 HC RX 258: Performed by: EMERGENCY MEDICINE

## 2020-08-04 PROCEDURE — 2000000000 HC ICU R&B

## 2020-08-04 PROCEDURE — 94761 N-INVAS EAR/PLS OXIMETRY MLT: CPT

## 2020-08-04 PROCEDURE — 85730 THROMBOPLASTIN TIME PARTIAL: CPT

## 2020-08-04 PROCEDURE — 85610 PROTHROMBIN TIME: CPT

## 2020-08-04 PROCEDURE — 2700000000 HC OXYGEN THERAPY PER DAY

## 2020-08-04 PROCEDURE — 99223 1ST HOSP IP/OBS HIGH 75: CPT | Performed by: INTERNAL MEDICINE

## 2020-08-04 PROCEDURE — 84145 PROCALCITONIN (PCT): CPT

## 2020-08-04 PROCEDURE — 96365 THER/PROPH/DIAG IV INF INIT: CPT

## 2020-08-04 PROCEDURE — 85025 COMPLETE CBC W/AUTO DIFF WBC: CPT

## 2020-08-04 PROCEDURE — 6360000002 HC RX W HCPCS: Performed by: EMERGENCY MEDICINE

## 2020-08-04 PROCEDURE — 80053 COMPREHEN METABOLIC PANEL: CPT

## 2020-08-04 PROCEDURE — 93005 ELECTROCARDIOGRAM TRACING: CPT | Performed by: EMERGENCY MEDICINE

## 2020-08-04 PROCEDURE — 99254 IP/OBS CNSLTJ NEW/EST MOD 60: CPT | Performed by: INTERNAL MEDICINE

## 2020-08-04 PROCEDURE — 96367 TX/PROPH/DG ADDL SEQ IV INF: CPT

## 2020-08-04 PROCEDURE — 83605 ASSAY OF LACTIC ACID: CPT

## 2020-08-04 PROCEDURE — 93010 ELECTROCARDIOGRAM REPORT: CPT | Performed by: INTERNAL MEDICINE

## 2020-08-04 RX ORDER — SODIUM CHLORIDE 0.9 % (FLUSH) 0.9 %
10 SYRINGE (ML) INJECTION PRN
Status: DISCONTINUED | OUTPATIENT
Start: 2020-08-04 | End: 2020-08-06 | Stop reason: HOSPADM

## 2020-08-04 RX ORDER — NITROGLYCERIN 0.4 MG/1
0.4 TABLET SUBLINGUAL ONCE
Status: COMPLETED | OUTPATIENT
Start: 2020-08-04 | End: 2020-08-04

## 2020-08-04 RX ORDER — POLYETHYLENE GLYCOL 3350 17 G/17G
17 POWDER, FOR SOLUTION ORAL DAILY PRN
Status: DISCONTINUED | OUTPATIENT
Start: 2020-08-04 | End: 2020-08-06 | Stop reason: HOSPADM

## 2020-08-04 RX ORDER — FUROSEMIDE 10 MG/ML
40 INJECTION INTRAMUSCULAR; INTRAVENOUS 2 TIMES DAILY
Status: DISCONTINUED | OUTPATIENT
Start: 2020-08-04 | End: 2020-08-05

## 2020-08-04 RX ORDER — ACETAMINOPHEN 650 MG/1
650 SUPPOSITORY RECTAL EVERY 6 HOURS PRN
Status: DISCONTINUED | OUTPATIENT
Start: 2020-08-04 | End: 2020-08-06 | Stop reason: HOSPADM

## 2020-08-04 RX ORDER — NICOTINE POLACRILEX 4 MG
15 LOZENGE BUCCAL PRN
Status: DISCONTINUED | OUTPATIENT
Start: 2020-08-04 | End: 2020-08-06 | Stop reason: HOSPADM

## 2020-08-04 RX ORDER — DEXTROSE MONOHYDRATE 25 G/50ML
12.5 INJECTION, SOLUTION INTRAVENOUS PRN
Status: DISCONTINUED | OUTPATIENT
Start: 2020-08-04 | End: 2020-08-06 | Stop reason: HOSPADM

## 2020-08-04 RX ORDER — PRAVASTATIN SODIUM 20 MG
20 TABLET ORAL NIGHTLY
Status: DISCONTINUED | OUTPATIENT
Start: 2020-08-04 | End: 2020-08-06 | Stop reason: HOSPADM

## 2020-08-04 RX ORDER — INSULIN GLARGINE 100 [IU]/ML
32 INJECTION, SOLUTION SUBCUTANEOUS NIGHTLY
Status: DISCONTINUED | OUTPATIENT
Start: 2020-08-04 | End: 2020-08-05

## 2020-08-04 RX ORDER — SPIRONOLACTONE 25 MG/1
25 TABLET ORAL DAILY
Status: DISCONTINUED | OUTPATIENT
Start: 2020-08-04 | End: 2020-08-06 | Stop reason: HOSPADM

## 2020-08-04 RX ORDER — ALBUTEROL SULFATE 90 UG/1
2 AEROSOL, METERED RESPIRATORY (INHALATION) EVERY 4 HOURS PRN
Status: DISCONTINUED | OUTPATIENT
Start: 2020-08-04 | End: 2020-08-06 | Stop reason: HOSPADM

## 2020-08-04 RX ORDER — ASPIRIN 81 MG/1
81 TABLET, CHEWABLE ORAL DAILY
Status: DISCONTINUED | OUTPATIENT
Start: 2020-08-05 | End: 2020-08-06 | Stop reason: HOSPADM

## 2020-08-04 RX ORDER — ACETAMINOPHEN 325 MG/1
650 TABLET ORAL EVERY 6 HOURS PRN
Status: DISCONTINUED | OUTPATIENT
Start: 2020-08-04 | End: 2020-08-06 | Stop reason: HOSPADM

## 2020-08-04 RX ORDER — SODIUM CHLORIDE 0.9 % (FLUSH) 0.9 %
10 SYRINGE (ML) INJECTION EVERY 12 HOURS SCHEDULED
Status: DISCONTINUED | OUTPATIENT
Start: 2020-08-04 | End: 2020-08-06 | Stop reason: HOSPADM

## 2020-08-04 RX ORDER — FUROSEMIDE 40 MG/1
40 TABLET ORAL 2 TIMES DAILY
Status: DISCONTINUED | OUTPATIENT
Start: 2020-08-04 | End: 2020-08-04

## 2020-08-04 RX ORDER — CARVEDILOL 25 MG/1
25 TABLET ORAL 2 TIMES DAILY
Status: DISCONTINUED | OUTPATIENT
Start: 2020-08-04 | End: 2020-08-06 | Stop reason: HOSPADM

## 2020-08-04 RX ORDER — LEVOTHYROXINE AND LIOTHYRONINE 19; 4.5 UG/1; UG/1
60 TABLET ORAL DAILY
Status: DISCONTINUED | OUTPATIENT
Start: 2020-08-04 | End: 2020-08-04

## 2020-08-04 RX ORDER — DEXTROSE MONOHYDRATE 50 MG/ML
100 INJECTION, SOLUTION INTRAVENOUS PRN
Status: DISCONTINUED | OUTPATIENT
Start: 2020-08-04 | End: 2020-08-06 | Stop reason: HOSPADM

## 2020-08-04 RX ORDER — 0.9 % SODIUM CHLORIDE 0.9 %
30 INTRAVENOUS SOLUTION INTRAVENOUS ONCE
Status: COMPLETED | OUTPATIENT
Start: 2020-08-04 | End: 2020-08-04

## 2020-08-04 RX ORDER — ONDANSETRON 2 MG/ML
4 INJECTION INTRAMUSCULAR; INTRAVENOUS EVERY 6 HOURS PRN
Status: DISCONTINUED | OUTPATIENT
Start: 2020-08-04 | End: 2020-08-06 | Stop reason: HOSPADM

## 2020-08-04 RX ORDER — FUROSEMIDE 10 MG/ML
20 INJECTION INTRAMUSCULAR; INTRAVENOUS ONCE
Status: COMPLETED | OUTPATIENT
Start: 2020-08-04 | End: 2020-08-04

## 2020-08-04 RX ORDER — ASPIRIN 325 MG
325 TABLET ORAL ONCE
Status: COMPLETED | OUTPATIENT
Start: 2020-08-04 | End: 2020-08-04

## 2020-08-04 RX ORDER — LEVOTHYROXINE AND LIOTHYRONINE 19; 4.5 UG/1; UG/1
60 TABLET ORAL DAILY
Status: DISCONTINUED | OUTPATIENT
Start: 2020-08-05 | End: 2020-08-05

## 2020-08-04 RX ORDER — LEVOTHYROXINE SODIUM ANHYDROUS 100 UG/5ML
50 INJECTION, POWDER, LYOPHILIZED, FOR SOLUTION INTRAVENOUS DAILY
Status: DISCONTINUED | OUTPATIENT
Start: 2020-08-04 | End: 2020-08-04

## 2020-08-04 RX ORDER — PROMETHAZINE HYDROCHLORIDE 25 MG/1
12.5 TABLET ORAL EVERY 6 HOURS PRN
Status: DISCONTINUED | OUTPATIENT
Start: 2020-08-04 | End: 2020-08-06 | Stop reason: HOSPADM

## 2020-08-04 RX ADMIN — Medication 10 ML: at 11:11

## 2020-08-04 RX ADMIN — FUROSEMIDE 20 MG: 10 INJECTION, SOLUTION INTRAVENOUS at 08:05

## 2020-08-04 RX ADMIN — SPIRONOLACTONE 25 MG: 25 TABLET ORAL at 11:09

## 2020-08-04 RX ADMIN — ONDANSETRON HYDROCHLORIDE 4 MG: 2 INJECTION, SOLUTION INTRAMUSCULAR; INTRAVENOUS at 19:03

## 2020-08-04 RX ADMIN — CARVEDILOL 25 MG: 25 TABLET, FILM COATED ORAL at 21:14

## 2020-08-04 RX ADMIN — ASPIRIN 325 MG: 325 TABLET, FILM COATED ORAL at 07:40

## 2020-08-04 RX ADMIN — INSULIN GLARGINE 32 UNITS: 100 INJECTION, SOLUTION SUBCUTANEOUS at 21:42

## 2020-08-04 RX ADMIN — DEXTROSE MONOHYDRATE 500 MG: 50 INJECTION, SOLUTION INTRAVENOUS at 07:39

## 2020-08-04 RX ADMIN — MUPIROCIN: 20 OINTMENT TOPICAL at 21:14

## 2020-08-04 RX ADMIN — ENOXAPARIN SODIUM 100 MG: 100 INJECTION SUBCUTANEOUS at 21:31

## 2020-08-04 RX ADMIN — Medication 10 ML: at 21:15

## 2020-08-04 RX ADMIN — FUROSEMIDE 40 MG: 10 INJECTION, SOLUTION INTRAMUSCULAR; INTRAVENOUS at 18:38

## 2020-08-04 RX ADMIN — Medication 10 ML: at 21:14

## 2020-08-04 RX ADMIN — CARVEDILOL 25 MG: 25 TABLET, FILM COATED ORAL at 11:11

## 2020-08-04 RX ADMIN — VANCOMYCIN HYDROCHLORIDE 1.5 G: 10 INJECTION, POWDER, LYOPHILIZED, FOR SOLUTION INTRAVENOUS at 14:28

## 2020-08-04 RX ADMIN — THYROID, PORCINE 60 MG: 30 TABLET ORAL at 11:09

## 2020-08-04 RX ADMIN — NITROGLYCERIN 0.4 MG: 0.4 TABLET SUBLINGUAL at 05:58

## 2020-08-04 RX ADMIN — INSULIN LISPRO 6 UNITS: 100 INJECTION, SOLUTION INTRAVENOUS; SUBCUTANEOUS at 16:35

## 2020-08-04 RX ADMIN — CEFTRIAXONE SODIUM 1 G: 1 INJECTION, POWDER, FOR SOLUTION INTRAMUSCULAR; INTRAVENOUS at 06:28

## 2020-08-04 RX ADMIN — SODIUM CHLORIDE 2190 ML: 9 INJECTION, SOLUTION INTRAVENOUS at 06:28

## 2020-08-04 RX ADMIN — NITROGLYCERIN 0.5 INCH: 20 OINTMENT TOPICAL at 05:58

## 2020-08-04 RX ADMIN — MUPIROCIN: 20 OINTMENT TOPICAL at 11:11

## 2020-08-04 RX ADMIN — INSULIN LISPRO 10 UNITS: 100 INJECTION, SOLUTION INTRAVENOUS; SUBCUTANEOUS at 11:16

## 2020-08-04 RX ADMIN — PRAVASTATIN SODIUM 20 MG: 20 TABLET ORAL at 21:14

## 2020-08-04 ASSESSMENT — PAIN SCALES - GENERAL: PAINLEVEL_OUTOF10: 0

## 2020-08-04 NOTE — PROGRESS NOTES
RN addressed thyroid medication dosing with Dr. Madison Ramirez. Patient expressed to nursing staff that he will not take levothyroxine because he is unable to tolerate it. RN was instructed by Dr. Madison Ramirez to d/c IV synthroid and restart PO [place] 60 mg due to patient intolerance ov levothyroxine.      Electronically signed by Lizz Howard RN on 8/4/2020 at 3:00 PM

## 2020-08-04 NOTE — CONSULTS
[facility] Wound Ostomy Continence Nurse  Consult Note       NAME:  Frank Lei  MEDICAL RECORD NUMBER:  [id number]  AGE: 54 y.o. GENDER: male  : 1965  TODAY'S DATE:  2020    Subjective: I see Dr. Chari Bolton about once a month. Reason for WOCN Evaluation and Assessment: Diabetic foot ulcers      Frank Lei is a 54 y.o. male referred by:   [] Physician  [x] Nursing  [] Other:     Wound Identification:  Wound Type: diabetic  Contributing Factors: diabetes, chronic pressure, decreased mobility and shear force    Wound History: The patient states that just earlier today he began to feel short of breath. Notes this is been intermittently worsening over the Course of the last week. Has had a mild but generally nonproductive cough. Denies chest pain. Denies lower extremity swelling. No abdominal pain, nausea or vomiting. No fevers. EMS found the patient at his house satting 72% on room air. He was placed on 15 L nonrebreather mask and his oxygen saturation came up to 92%.   Current Wound Care Treatment: Bilateral feet - betadine    Patient Goal of Care:  [x] Wound Healing  [] Odor Control  [] Palliative Care  [] Pain Control   [] Other:         PAST MEDICAL HISTORY        Diagnosis Date    Acute osteomyelitis of left foot ([address] 2017    Acute osteomyelitis of right foot ([address] 2016    Ascites     Blood transfusion reaction     Burst fracture of lumbar vertebra ([address] 2012    Cellulitis of left foot     Cellulitis of right lower extremity ,     Diabetes ([address]     Diabetic ulcer of right foot ([address] early     Diabetic ulcer of 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 2 diabetes mellitus, with necrosis of bone (HCC)     Fracture of tibial plateau     MRSA (methicillin resistant staph aureus) culture positive 16, 16    foot wound    Neuropathic ulcer of left foot, limited to breakdown of skin ([address] 11/3/2016    Neuropathic ulcer of toe ([address] 2/13/2014    Pleural effusion due to congestive heart failure ([facility]     Smoker     quit 7667    Systolic CHF, acute ([facility] 7/8/2013       PAST SURGICAL HISTORY    Past Surgical History:   Procedure Laterality Date    FOOT AMPUTATION Left 08/09/2018    PARTIAL FOOT AMPUTATION w. Dr Farhana Rasmussen Left 1/31/2019    EXOSTECTOMY LEFT FOOT, ULCER DEBRIDEMENT LEFT FOOT WITH GRAFT APPLICATION performed by Ron Rojas DPM at [facility] Left 09/27/2017    LEFT FOOT ULCER DEBRIDEMENT, POSSIBLE SECOND METATARSAL    FOOT SURGERY Left 01/31/2019    Exostectomy left foot, ulcer debridement with graft application left foot    FOOT TENDON SURGERY Right 2016    FOOT TENDON SURGERY Left 06/30/2017    KNEE SURGERY Left     OTHER SURGICAL HISTORY Bilateral 9/17/15    amputation 2nd digit bilateral, flexor tenotomy right hallux    OTHER SURGICAL HISTORY Left 08/17/2017    PARTIAL LEFT FOOT AMPUTATION      OTHER SURGICAL HISTORY Left 12/07/2017    Debridement infected bone and tissue left foot; ulcer debridement left foot with graft application with dr mathur     OTHER SURGICAL HISTORY Right 01/04/2018    DEBRIDEMENT INFECTED BONE AND TISSUE RIGHT FOOT, ULCER DEBRIDEMENT RIGHT FOOT WITH GRAFT APPLICATION    OTHER SURGICAL HISTORY Left 11/01/2018    Procedure: PARTIAL RESECTION LEFT METATARSAL, ULCER DEBRIDEMENT LEFT FOOT WITH GRAFT APPLICATION     PACEMAKER PLACEMENT      1/23/14 pacer/defib    [place] OFFICE/OUTPT VISIT,PROCEDURE ONLY Left 8/23/2018    ULCER DEBRIDEMENT LEFT FOOT WITH GRAFT APPLICATION performed by Ron Rojas DPM at [facility] TARSAL/METATARSAL Left 8/9/2018    PARTIAL FOOT AMPUTATION WITH GRAFT APPLICATION performed by Ron Rojas DPM at [facility] TARSAL/METATARSAL Left 11/1/2018    PARTIAL RESECTION LEFT METATARSAL, ULCER DEBRIDEMENT LEFT FOOT WITH GRAFT APPLICATION performed by Ron Rojas DPM at [address] TOE AMPUTATION      2 toes FAMILY HISTORY    Family History   Problem Relation Age of Onset    Heart Disease Mother     High Blood Pressure Mother     High Cholesterol Mother     Diabetes Mother     Anemia Mother     Heart Disease Father     High Blood Pressure Father     High Cholesterol Father     Heart Disease Maternal Grandmother     High Blood Pressure Maternal Grandmother     High Cholesterol Maternal Grandmother     Cancer Maternal Grandmother         bone marrow & breast    Heart Disease Maternal Grandfather     High Blood Pressure Maternal Grandfather     High Cholesterol Maternal Grandfather     Cancer Maternal Grandfather         pancreatic CA    Heart Disease Paternal Grandmother     High Blood Pressure Paternal Grandmother     High Cholesterol Paternal Grandmother     Heart Disease Paternal Grandfather     High Blood Pressure Paternal Grandfather     High Cholesterol Paternal Grandfather     Stroke Brother     Heart Failure Brother     Heart Disease Brother     Diabetes type 2  Brother     Diabetes type 2  Brother     Diabetes type 2  Brother        SOCIAL HISTORY    Social History     Tobacco Use    Smoking status: Former Smoker     Last attempt to quit: 1980     Years since quittin.5    Smokeless tobacco: Never Used   Substance Use Topics    Alcohol use: No     Alcohol/week: 0.0 standard drinks    Drug use: No       ALLERGIES    Allergies   Allergen Reactions    Bactrim [Sulfamethoxazole-Trimethoprim] Rash     Pt reports that his lips tingle and then skin on his lips peel off,and rash on thigh       Bee Venom Swelling and Rash       MEDICATIONS    No current facility-administered medications on file prior to encounter.       Current Outpatient Medications on File Prior to Encounter   Medication Sig Dispense Refill    TRULICITY 1.5 CW/4.7AB SOPN Inject 1.5 mg (1 PEN) into the skin once a week 2 mL 0    TOUJEO SOLOSTAR 300 UNIT/ML SOPN Inject 90 Units into the skin nightly 9 mL 2 08/04/2020     H/H:    Lab Results   Component Value Date    HGB 14.8 08/04/2020    HCT 43.6 08/04/2020     PTT:    Lab Results   Component Value Date    APTT 32.0 08/04/2020   [APTT}  PT/INR:    Lab Results   Component Value Date    PROTIME 10.7 08/04/2020    INR 0.92 08/04/2020     HgBA1c:    Lab Results   Component Value Date    LABA1C 7.4 05/26/2020       Assessment: R Foot Plantar - diabetic ulcer with red wound bed  L Foot Plantar and 4th Toe - dry diabetic ulcers   Christiano Risk Score: Christiano Scale Score: 21    Patient Active Problem List   Diagnosis Code    Hypertension I10    Uncontrolled type 2 diabetes mellitus with diabetic polyneuropathy, with long-term current use of insulin ([facility]) E11.42, Z79.4, V61.98    Acute systolic CHF (congestive heart failure) ([facility]) M60.09    Alcoholic cardiomyopathy Z05.8    Automatic implantable cardioverter-defibrillator in situ Z95.810    Hyperlipidemia E78.5    Onychomycosis B35.1    Dystrophic nail L60.3    Callus of foot L84    Hallux valgus M20.10    Diabetic ulcer of right foot associated with type 2 diabetes mellitus, limited to breakdown of skin ([facility]) E11.621, L97.511    Acquired hypothyroidism E03.9    Diabetic ulcer of left foot associated with type 2 diabetes mellitus, with muscle involvement without evidence of necrosis ([facility]) E11.621, L97.525    NSVT (nonsustained ventricular tachycardia) ([facility]) I47.2    Acute respiratory failure ([facility]) J96.00    Community acquired pneumonia J18.9       Measurements:  Wound 02/13/14 Diabetic Ulcer Other (Comment) #1 right great toe (onset 11/15/2014) (Active)   Number of days: 2364       Wound 07/13/17 #10, right lateral ankle, DFU, [place] 1, 7/7/17, trauma (Active)   Number of days: 1118       Incision 09/17/15 Toe (Comment  which one) Left (Active)   Number of days: 1783       Incision 09/27/17 Foot Left (Active)   Number of days: 1042       Incision 08/09/18 Foot Left (Active)   Number of days: 726       Incision 08/23/18 Foot Left (Active)   Number of days: 712       Incision 11/01/18 Foot Left (Active)   Number of days: 642       Wound 08/04/20 Foot Right;Plantar (Active)   Wound Image   08/04/20 1711   Wound Diabetic 08/04/20 1711   Dressing Status Intact 08/04/20 1711   Dressing/Treatment Betadine swabs 08/04/20 1711   Wound Cleansed Rinsed/Irrigated with saline 08/04/20 1711   Dressing Change Due 08/05/20 08/04/20 1711   Wound Length (cm) 2 cm 08/04/20 1711   Wound Width (cm) 1 cm 08/04/20 1711   Wound Depth (cm) 0.5 cm 08/04/20 1711   Wound Surface Area (cm^2) 2 cm^2 08/04/20 1711   Wound Volume (cm^3) 1 cm^3 08/04/20 1711   Wound Assessment Red;Yellow 08/04/20 1711   Drainage Amount None 08/04/20 1711   Odor None 08/04/20 1711   Margins Unattached edges 08/04/20 1711   Vanesa-wound Assessment Calloused 08/04/20 1711   Red%Wound Bed 100 08/04/20 1711   Culture Taken No 08/04/20 1711   Number of days: 0    R Foot Plantar:         Wound 08/04/20 Foot Left;Plantar (Active)   Wound Image   08/04/20 1711   Wound Diabetic 08/04/20 1711   Dressing Status Intact 08/04/20 1711   Dressing/Treatment Betadine swabs 08/04/20 1711   Wound Cleansed Rinsed/Irrigated with saline 08/04/20 1711   Dressing Change Due 08/05/20 08/04/20 1711   Wound Length (cm) 0.8 cm 08/04/20 1711   Wound Width (cm) 0.6 cm 08/04/20 1711   Wound Depth (cm) 0.2 cm 08/04/20 1711   Wound Surface Area (cm^2) 0.48 cm^2 08/04/20 1711   Wound Volume (cm^3) 0.1 cm^3 08/04/20 1711   Wound Assessment Dry;Pink;Yellow 08/04/20 1711   Drainage Amount None 08/04/20 1711   Odor None 08/04/20 1711   Margins Unattached edges 08/04/20 1711   Vanesa-wound Assessment Calloused 08/04/20 1711   [place]%Wound Bed 25 08/04/20 1711   Yellow%Wound Bed 75 08/04/20 1711   Culture Taken No 08/04/20 1711   Number of days: 0    L Foot Plantar:         Wound 08/04/20 Toe (Comment  which one) Left 4th Toe (Active)   Wound Image   08/04/20 1711   Wound Diabetic 08/04/20 1711   Dressing Status Intact 08/04/20 1711   Dressing/Treatment Betadine swabs 08/04/20 1711   Wound Cleansed Rinsed/Irrigated with saline 08/04/20 1711   Dressing Change Due 08/05/20 08/04/20 1711   Wound Length (cm) 0.5 cm 08/04/20 1711   Wound Width (cm) 0.5 cm 08/04/20 1711   Wound Depth (cm) 0.3 cm 08/04/20 1711   Wound Surface Area (cm^2) 0.25 cm^2 08/04/20 1711   Wound Volume (cm^3) 0.08 cm^3 08/04/20 1711   Wound Assessment Dry;Yellow 08/04/20 1711   Drainage Amount None 08/04/20 1711   Odor None 08/04/20 1711   Margins Unattached edges 08/04/20 1711   Vanesa-wound Assessment Calloused 08/04/20 1711   Yellow%Wound Bed 100 08/04/20 1711   Culture Taken No 08/04/20 1711   Number of days: 0   L 4th Toe:      Response to treatment:  Well tolerated by patient. Pain Assessment:  Severity:  0 / 10  Quality of pain: N/A  Wound Pain Timing/Severity: none  Premedicated: No    Plan   Plan of Care: Wound 08/04/20 Foot Right;Plantar-Dressing/Treatment: Betadine swabs  Wound 08/04/20 Foot Left;Plantar-Dressing/Treatment: Betadine swabs  Wound 08/04/20 Toe (Comment  which one) Left 4th Toe-Dressing/Treatment: Betadine swabs   Recommendation: Bilateral Feet - clean with NSS; pat dry; paint with betadine daily  Call Wound Care for deterioration. Specialty Bed Required : Yes   [x] Low Air Loss   [x] Pressure Redistribution  [] Fluid Immersion  [] Bariatric  [] Total Pressure Relief  [] Other:     Current Diet: DIET CARB CONTROL;   Dietician consult:  No    Discharge Plan:  Placement for patient upon discharge: home with support    Patient appropriate for Outpatient [address]: Yes pt of Dr. Shaheen Will    Referrals:  []   [] [address] [facility]  [] Supplies  [] Other    Patient/Caregiver Teaching:  Level of patient/caregiver understanding able to:   [x] Indicates understanding       [] Needs reinforcement  [] Unsuccessful      [] Verbal Understanding  [] Demonstrated understanding       [] No evidence of learning  [] Refused teaching         [] N/A       Electronically signed by Chi Jones RN, CWOCN on 8/4/2020 at 5:16 PM

## 2020-08-04 NOTE — ED NOTES
Report given to [facility]. Patient belongings sent with patient. VSS.      Bre Darden RN  08/04/20 6711

## 2020-08-04 NOTE — PROGRESS NOTES
Pharmacy Note  Vancomycin Consult    Felisa Poole is a 54 y.o. male started on Vancomycin for CAP; consult received from Dr. Esha Novoa to manage therapy. Also receiving the following antibiotics: Rocephin and Zithromax. Patient Active Problem List   Diagnosis    Hypertension    Uncontrolled type 2 diabetes mellitus with diabetic polyneuropathy, with long-term current use of insulin (HCC)    Alcoholic cardiomyopathy    Automatic implantable cardioverter-defibrillator in situ    Hyperlipidemia    Onychomycosis    Dystrophic nail    Callus of foot    Hallux valgus    Diabetic ulcer of right foot associated with type 2 diabetes mellitus, limited to breakdown of skin ([address]    Acquired hypothyroidism    Diabetic ulcer of left foot associated with type 2 diabetes mellitus, with muscle involvement without evidence of necrosis (HCC)    NSVT (nonsustained ventricular tachycardia) ([facility])    Acute respiratory failure ([facility])       Allergies:  Bactrim [sulfamethoxazole-trimethoprim] and Bee venom     Temp max:     Recent Labs     08/04/20  0545   BUN 16       Recent Labs     08/04/20  0545   CREATININE 1.1       Recent Labs     08/04/20  0545   WBC 21.0*         Intake/Output Summary (Last 24 hours) at 8/4/2020 1203  Last data filed at 8/4/2020 0941  Gross per 24 hour   Intake --   Output 400 ml   Net -400 ml       Culture Date      Source                       Results  NGTD    Ht Readings from Last 1 Encounters:   08/04/20 5' 10\" (1.778 m)        Wt Readings from Last 1 Encounters:   08/04/20 230 lb (104.3 kg)         Body mass index is 33 kg/m². Estimated Creatinine Clearance: 92 mL/min (based on SCr of 1.1 mg/dL). Goal Trough Level: 15-18 mcg/mL    Assessment/Plan:  Will initiate Vancomycin 1500mg q12h and trough on 8/6 at 0130 . Thank you for the consult. Will continue to follow.   Gautam Coronel Pharm D 8/4/202012:04 PM  .

## 2020-08-04 NOTE — CONSULTS
Reason for referral and CC: Acute hypoxic respiratory failure, c/o SOB    HISTORY OF PRESENT ILLNESS: 59-year-old male with a history of alcoholic cardiomyopathy presented with shortness of breath. He notes this is not been compliant by as much scrotal or lower extremity edema. He reports compliance with his medication regimen. No fever. No CP. No productive cough. Worsening over the last several days. He was severely hypoxic in the emergency room and remains hypoxic despite high flow oxygen and was placed on BiPAP in the ICU.        Past Medical History:   Diagnosis Date    Acute osteomyelitis of left foot ([address] 9/27/2017    Acute osteomyelitis of right foot ([address] 4/25/2016    Ascites     Blood transfusion reaction     Burst fracture of lumbar vertebra (HCC) 11/9/2012    Cellulitis of left foot     Cellulitis of right lower extremity 2/16, 5/16    Diabetes ([address]     Diabetic ulcer of right foot ([address] early 2016    Diabetic ulcer of 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 2 diabetes mellitus, with necrosis of bone (HCC)     Fracture of tibial plateau 25/7/0529    MRSA (methicillin resistant staph aureus) culture positive 4/28/16, 4/20/16    foot wound    Neuropathic ulcer of left foot, limited to breakdown of skin ([address] 11/3/2016    Neuropathic ulcer of toe ([address] 2/13/2014    Pleural effusion due to congestive heart failure ([address]     Smoker     quit [id number]    Systolic CHF, acute ([address] 7/8/2013     Past Surgical History:   Procedure Laterality Date    FOOT AMPUTATION Left 08/09/2018    PARTIAL FOOT AMPUTATION w. Dr Stefany Guevara Left 1/31/2019    EXOSTECTOMY LEFT FOOT, ULCER DEBRIDEMENT LEFT FOOT WITH GRAFT APPLICATION performed by Pa Aguillon DPM at [place] [address]. Natacha Robles Left 09/27/2017    LEFT FOOT ULCER DEBRIDEMENT, POSSIBLE SECOND METATARSAL    FOOT SURGERY Left 01/31/2019    Exostectomy left foot, ulcer debridement with graft application left foot    FOOT TENDON SURGERY Right 2016    FOOT TENDON SURGERY Left 06/30/2017    KNEE SURGERY Left     OTHER SURGICAL HISTORY Bilateral 9/17/15    amputation 2nd digit bilateral, flexor tenotomy right hallux    OTHER SURGICAL HISTORY Left 08/17/2017    PARTIAL LEFT FOOT AMPUTATION      OTHER SURGICAL HISTORY Left 12/07/2017    Debridement infected bone and tissue left foot; ulcer debridement left foot with graft application with dr mathur     OTHER SURGICAL HISTORY Right 01/04/2018    DEBRIDEMENT INFECTED BONE AND TISSUE RIGHT FOOT, ULCER DEBRIDEMENT RIGHT FOOT WITH GRAFT APPLICATION    OTHER SURGICAL HISTORY Left 11/01/2018    Procedure: PARTIAL RESECTION LEFT METATARSAL, ULCER DEBRIDEMENT LEFT FOOT WITH GRAFT APPLICATION     PACEMAKER PLACEMENT      1/23/14 pacer/defib    [place] OFFICE/OUTPT VISIT,PROCEDURE ONLY Left 8/23/2018    ULCER DEBRIDEMENT LEFT FOOT WITH GRAFT APPLICATION performed by Vanessa Diaz DPM at [facility] TARSAL/METATARSAL Left 8/9/2018    PARTIAL FOOT AMPUTATION WITH GRAFT APPLICATION performed by Vanessa Diaz DPM at [facility] TARSAL/METATARSAL Left 11/1/2018    PARTIAL RESECTION LEFT METATARSAL, ULCER DEBRIDEMENT LEFT FOOT WITH GRAFT APPLICATION performed by Vanessa Diaz DPM at [address] [place] [address] TOE AMPUTATION      2 toes     Family History  family history includes Anemia in his mother; Cancer in his maternal grandfather and maternal grandmother; Diabetes in his mother; Diabetes type 2  in his brother, brother, and brother; Heart Disease in his brother, father, maternal grandfather, maternal grandmother, mother, paternal grandfather, and paternal grandmother; Heart Failure in his brother; High Blood Pressure in his father, maternal grandfather, maternal grandmother, mother, paternal grandfather, and paternal grandmother; High Cholesterol in his father, maternal grandfather, maternal grandmother, mother, paternal grandfather, and paternal grandmother; Stroke in his brother.     Social History:  reports that he quit smoking about 40 years ago. He has never used smokeless tobacco.   reports no history of alcohol use. ALLERGIES:  Patient is allergic to bactrim [sulfamethoxazole-trimethoprim] and bee venom.   Continuous Infusions:   dextrose       Scheduled Meds:   sodium chloride flush  10 mL Intravenous 2 times per day    [START ON 8/5/2020] aspirin  81 mg Oral Daily    carvedilol  25 mg Oral BID    pravastatin  20 mg Oral Nightly    spironolactone  25 mg Oral Daily    sodium chloride flush  10 mL Intravenous 2 times per day    enoxaparin  40 mg Subcutaneous Daily    [START ON 8/5/2020] azithromycin  500 mg Intravenous Q24H    And    [START ON 8/5/2020] cefTRIAXone (ROCEPHIN) IV  1 g Intravenous Q24H    insulin lispro  0-12 Units Subcutaneous TID WC    insulin lispro  0-6 Units Subcutaneous Nightly    insulin glargine  32 Units Subcutaneous Nightly    mupirocin   Nasal BID    vancomycin  1,500 mg Intravenous Q12H    furosemide  40 mg Intravenous BID    levothyroxine  50 mcg Intravenous Daily     PRN Meds:  sodium chloride flush, sodium chloride flush, acetaminophen **OR** acetaminophen, polyethylene glycol, promethazine **OR** ondansetron, glucose, dextrose, glucagon (rDNA), dextrose, albuterol sulfate HFA    REVIEW OF SYSTEMS:  Constitutional: Negative for fever  HENT: Negative for sore throat  Eyes: Negative for redness   Respiratory: + for dyspnea, no cough  Cardiovascular: Negative for chest pain  Gastrointestinal: Negative for vomiting, diarrhea   Genitourinary: Negative for hematuria   Musculoskeletal: Negative for arthralgias   Skin: Negative for rash  Neurological: Negative for syncope  Hematological: Negative for adenopathy  Psychiatric/Behavorial: Negative for anxiety    PHYSICAL EXAM: BP (!) 160/98   Pulse 122   Temp 97.4 °F (36.3 °C) (Oral)   Resp 13   Ht 5' 10\" (1.778 m)   Wt 230 lb (104.3 kg)   SpO2 97%   BMI 33.00 kg/m²  on bipap  Constitutional:  No acute distress. Eyes: PERRL. Conjunctivae anicteric. ENT: Normal nose. Normal tongue. Neck:  Trachea is midline. No thyroid tenderness. Respiratory: +accessory muscle usage. No wheezes. No rales. No Rhonchi. Cardiovascular: Normal S1S2. No digit clubbing. No digit cyanosis. No LE edema. Gastrointestinal: No mass palpated. No tenderness palpated. No umbilical hernia. Lymphatic: No cervical or supraclavicular adenopathy. Skin: No rash on the exposed extremities. No Nodules or induration on exposed extremities. Psychiatric: No anxiety or Agitation. Alert and Oriented to person, place and time. CBC:   Recent Labs     08/04/20  0545   WBC 21.0*   HGB 14.8   HCT 43.6   MCV 90.5        BMP:   Recent Labs     08/04/20  0545      K 4.3      CO2 23   BUN 16   CREATININE 1.1        Recent Labs     08/04/20  0545   AST 28   ALT 22   BILITOT 0.7   ALKPHOS 89     Recent Labs     08/04/20  0545   PROTIME 10.7   INR 0.92     No results for input(s): NITRITE, COLORU, PHUR, LABCAST, WBCUA, RBCUA, MUCUS, TRICHOMONAS, YEAST, BACTERIA, CLARITYU, SPECGRAV, LEUKOCYTESUR, UROBILINOGEN, BILIRUBINUR, BLOODU, GLUCOSEU, AMORPHOUS in the last 72 hours. Invalid input(s): KETONESU  No results for input(s): PHART, ZPJ6EWT, PO2ART in the last 72 hours. Procalcitonin     Chest imaging was reviewed by me and showed   Bilateral lung multifocal ill-defined consolidation, greatest at the right    lung base, consistent with pneumonia. ASSESSMENT:  · Acute hypoxic respiratory failure  · Acute on chronic systolic heart failure  · Possible CAP: His procalcitonin is normal and he does not have a productive cough however he did have leukocytosis on admission. Will cover with antibiotics today.   · Elevated troponin  · ICD  · DM2    PLAN:  Noninvasive positive pressure ventilation per my orders if needed  Supplemental oxygen to maintain SaO2 >92%; wean as tolerated  Droplet plus precautions while COVID test pending  IV lasix  Follow troponin  Cardiology following  CTX/Azithromycin D#1  Consider stopping vanc tomorrow   Sputum Cx  · SSI  · Lovenox DVT prophylaxis  · CCT32 min    Thank you Robbie Ohara MD for this consult

## 2020-08-04 NOTE — ED PROVIDER NOTES
[address] ED  EMERGENCY DEPARTMENT ENCOUNTER      Pt Name: Ricarda Walls  MRN: [id number]  [place] 1965  Date of evaluation: 8/4/2020  Provider: Yobany Nickerson MD    CHIEF COMPLAINT       Chief Complaint   Patient presents with    Respiratory Distress     pt c/o diff breathing, pt 72% on RA per squad, 92% on 15 lpm, pt , pt is very diaphoretic, , pt hx of CHF, DM and pacemaker         HISTORY OF PRESENT ILLNESS   (Location/Symptom, Timing/Onset, Context/Setting, Quality, Duration, Modifying Factors, Severity)  Note limiting factors. Ricarda Walls is a 54 y.o. male with past medical history of hypertension, hyperlipidemia, diabetes, cardiomyopathy status post AICD here today with shortness of breath    The patient states that just earlier today he began to feel short of breath. Notes this is been intermittently worsening over the Course of the last week. Has had a mild but generally nonproductive cough. Denies chest pain. Denies lower extremity swelling. No abdominal pain, nausea or vomiting. No fevers. EMS found the patient at his house satting 72% on room air. He was placed on 15 L nonrebreather mask and his oxygen saturation came up to 92%. HPI    Nursing Notes were reviewed. REVIEW OF SYSTEMS    (2-9 systems for level 4, 10 or more for level 5)     Review of Systems    Please see HPI for pertinent positive and negative review of system findings. A full 10 system ROS was performed and otherwise negative.         PAST MEDICAL HISTORY     Past Medical History:   Diagnosis Date    Acute osteomyelitis of left foot ([address] 9/27/2017    Acute osteomyelitis of right foot ([address] 4/25/2016    Ascites     Blood transfusion reaction     Burst fracture of lumbar vertebra ([address] 11/9/2012    Cellulitis of left foot     Cellulitis of right lower extremity 2/16, 5/16    Diabetes ([address]     Diabetic ulcer of right foot ([address] early 2016    Diabetic ulcer of 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 2 diabetes mellitus, with necrosis of bone ([address]     Fracture of tibial plateau 17/8/2271    MRSA (methicillin resistant staph aureus) culture positive 4/28/16, 4/20/16    foot wound    Neuropathic ulcer of left foot, limited to breakdown of skin ([address] 11/3/2016    Neuropathic ulcer of toe ([address] 2/13/2014    Pleural effusion due to congestive heart failure ([address]     Smoker     quit [id number]    Systolic CHF, acute ([address] 7/8/2013         SURGICAL HISTORY       Past Surgical History:   Procedure Laterality Date    FOOT AMPUTATION Left 08/09/2018    PARTIAL FOOT AMPUTATION w. Dr Maverick Sim Left 1/31/2019    EXOSTECTOMY LEFT FOOT, ULCER DEBRIDEMENT LEFT FOOT WITH GRAFT APPLICATION performed by Govind Beck DPM at [address] [place] [address] Left 09/27/2017    LEFT FOOT ULCER DEBRIDEMENT, POSSIBLE SECOND METATARSAL    FOOT SURGERY Left 01/31/2019    Exostectomy left foot, ulcer debridement with graft application left foot    FOOT TENDON SURGERY Right 2016    FOOT TENDON SURGERY Left 06/30/2017    KNEE SURGERY Left     OTHER SURGICAL HISTORY Bilateral 9/17/15    amputation 2nd digit bilateral, flexor tenotomy right hallux    OTHER SURGICAL HISTORY Left 08/17/2017    PARTIAL LEFT FOOT AMPUTATION      OTHER SURGICAL HISTORY Left 12/07/2017    Debridement infected bone and tissue left foot; ulcer debridement left foot with graft application with dr mathur     OTHER SURGICAL HISTORY Right 01/04/2018    DEBRIDEMENT INFECTED BONE AND TISSUE RIGHT FOOT, ULCER DEBRIDEMENT RIGHT FOOT WITH GRAFT APPLICATION    OTHER SURGICAL HISTORY Left 11/01/2018    Procedure: PARTIAL RESECTION LEFT METATARSAL, ULCER DEBRIDEMENT LEFT FOOT WITH GRAFT APPLICATION     PACEMAKER PLACEMENT      1/23/14 pacer/defib    [place] OFFICE/OUTPT VISIT,PROCEDURE ONLY Left 8/23/2018    ULCER DEBRIDEMENT LEFT FOOT WITH GRAFT APPLICATION performed by Govind Beck DPM at [facility] TARSAL/METATARSAL Left 8/9/2018    PARTIAL FOOT AMPUTATION WITH GRAFT APPLICATION performed by Dieudonne Eaton DPM at [facility] TARSAL/METATARSAL Left 11/1/2018    PARTIAL RESECTION LEFT METATARSAL, ULCER DEBRIDEMENT LEFT FOOT WITH GRAFT APPLICATION performed by Dieudonne Eaton DPM at [address] [place] [address] TOE AMPUTATION      2 toes         CURRENT MEDICATIONS       Previous Medications    ARMOUR THYROID 60 MG TABLET    Take 1 tablet by mouth daily    ASPIRIN 81 MG TABLET    Take 1 tablet by mouth daily    BLOOD GLUCOSE MONITORING SUPPL (BLOOD GLUCOSE METER) KIT    Use to test blood sugars. BLOOD GLUCOSE MONITORING SUPPL (ONE TOUCH ULTRA MINI) W/DEVICE KIT    1 kit by Does not apply route daily    BLOOD GLUCOSE MONITORING SUPPL GABI    Measure glucose before breakfast, before dinner and at bedtime daily (we will check at lunchtime at [facility] therapy). CARVEDILOL (COREG) 25 MG TABLET    Take 1 tablet by mouth 2 times daily TAKE ONE TABLET BY MOUTH TWICE DAILY    FUROSEMIDE (LASIX) 40 MG TABLET    TAKE ONE TABLET BY MOUTH TWICE DAILY    GLIPIZIDE (GLUCOTROL) 5 MG TABLET    Take 1 tablet by mouth 2 times daily (before meals)    GLUCOSE BLOOD VI TEST STRIPS (ONE TOUCH ULTRA TEST) STRIP    Test blood sugar once daily. INSULIN PEN NEEDLE (UNIFINE PENTIPS) 31G X 5 MM MISC    USE 1 EACH DAY AS NEEDED FOR TESTING    LANCETS MISC    Use to test blood sugars once daily.     LISINOPRIL (PRINIVIL;ZESTRIL) 5 MG TABLET    Take 1 tablet by mouth daily    MAGNESIUM OXIDE (MAG-OX) 400 (241.3 MG) MG TABS TABLET    Take 1 tablet by mouth 2 times daily    METFORMIN (GLUCOPHAGE) 1000 MG TABLET    Take 1 tablet by mouth 2 times daily (with meals)    MULTIPLE VITAMINS-MINERALS (THERAPEUTIC MULTIVITAMIN-MINERALS) TABLET    Take 1 tablet by mouth daily    PRAVASTATIN (PRAVACHOL) 20 MG TABLET    TAKE ONE TABLET BY MOUTH ONE TIME DAILY    SPIRONOLACTONE (ALDACTONE) 25 MG TABLET    Take 1 tablet by mouth daily    TOUJEO SOLOSTAR 300 UNIT/ML SOPN Inject 90 Units into the skin nightly    TRULICITY 1.5 ZP/5.5ZC SOPN    Inject 1.5 mg (1 PEN) into the skin once a week       ALLERGIES     Bactrim [sulfamethoxazole-trimethoprim] and Bee venom    FAMILY HISTORY       Family History   Problem Relation Age of Onset    Heart Disease Mother     High Blood Pressure Mother     High Cholesterol Mother     Diabetes Mother     Anemia Mother     Heart Disease Father     High Blood Pressure Father     High Cholesterol Father     Heart Disease Maternal Grandmother     High Blood Pressure Maternal Grandmother     High Cholesterol Maternal Grandmother     Cancer Maternal Grandmother         bone marrow & breast    Heart Disease Maternal Grandfather     High Blood Pressure Maternal Grandfather     High Cholesterol Maternal Grandfather     Cancer Maternal Grandfather         pancreatic CA    Heart Disease Paternal Grandmother     High Blood Pressure Paternal Grandmother     High Cholesterol Paternal Grandmother     Heart Disease Paternal Grandfather     High Blood Pressure Paternal Grandfather     High Cholesterol Paternal Grandfather     Stroke Brother     Heart Failure Brother     Heart Disease Brother     Diabetes type 2  Brother     Diabetes type 2  Brother     Diabetes type 2  Brother           SOCIAL HISTORY       Social History     Socioeconomic History    Marital status:      Spouse name: Not on file    Number of children: Not on file    Years of education: Not on file    Highest education level: Not on file   Occupational History    Not on file   Social Needs    Financial resource strain: Not on file    Food insecurity     Worry: Not on file     Inability: Not on file    Transportation needs     Medical: Not on file     Non-medical: Not on file   Tobacco Use    Smoking status: Former Smoker     Last attempt to quit: 1980     Years since quittin.5    Smokeless tobacco: Never Used   Substance and Sexual Activity    Alcohol use: No     Alcohol/week: 0.0 standard drinks    Drug use: No    Sexual activity: Never   Lifestyle    Physical activity     Days per week: Not on file     Minutes per session: Not on file    Stress: Not on file   Relationships    Social connections     Talks on phone: Not on file     Gets together: Not on file     Attends Latter-day service: Not on file     Active member of club or organization: Not on file     Attends meetings of clubs or organizations: Not on file     Relationship status: Not on file    Intimate partner violence     Fear of current or ex partner: Not on file     Emotionally abused: Not on file     Physically abused: Not on file     Forced sexual activity: Not on file   Other Topics Concern    Not on file   Social History Narrative    Not on file       SCREENINGS               PHYSICAL EXAM    (up to 7 for level 4, 8 or more for level 5)     ED Triage Vitals [08/04/20 0539]   BP Temp Temp src Pulse Resp SpO2 Height Weight   (!) 165/139 -- -- 143 (!) 37 94 % 5' 10\" (1.778 m) 230 lb (104.3 kg)       Physical Exam    General appearance: Patient in respiratory distress and diaphoretic  Skin:  Warm. Dry. Eye:  Extraocular movements intact. Ears, nose, mouth and throat:  Oral mucosa moist,  Neck:  Trachea midline. Heart: Tachycardic but regular  Perfusion:  intact  Respiratory: Rales heard throughout all lung fields with increased work of breathing. Tachypneic  Abdominal:   Non distended. Nontender  Neurological:  Alert and oriented x 3.   Moves all extremities spontaneously  Musculoskeletal:   Normal ROM, no deformities          Psychiatric:  Normal mood      DIAGNOSTIC RESULTS       Labs Reviewed   CBC WITH AUTO DIFFERENTIAL - Abnormal; Notable for the following components:       Result Value    WBC 21.0 (*)     All other components within normal limits    Narrative:     Performed at:  [facility] [place] [facility],  [place], [facility] Phone [phone]   COMPREHENSIVE METABOLIC PANEL W/ REFLEX TO MG FOR LOW K - Abnormal; Notable for the following components:    Glucose 396 (*)     All other components within normal limits    Narrative:     Performed at:  [facility],  [place], [facility]   Phone [phone]   TROPONIN - Abnormal; Notable for the following components:    Troponin 0.15 (*)     All other components within normal limits    Narrative:     Performed at:  [place] [address],  [place], [place]   Phone [phone]   BRAIN NATRIURETIC PEPTIDE - Abnormal; Notable for the following components:    Pro-BNP 2,569 (*)     All other components within normal limits    Narrative:     Performed at:  [place] [address],  [place], [place]   Phone [phone]   LACTATE, SEPSIS - Abnormal; Notable for the following components:    Lactic Acid, Sepsis 2.8 (*)     All other components within normal limits    Narrative:     Performed at:  [facility],  [place], [place]   Phone [phone]   D-DIMER, QUANTITATIVE - Abnormal; Notable for the following components:    D-Dimer, Quant 275 (*)     All other components within normal limits    Narrative:     Performed at:  [facility] [address],  [place], [place]   Phone [phone]   PROTIME-INR    Narrative:     Performed at:  [place] [address],  [place], [place]   Phone [phone]   APTT    Narrative:     Performed at:  [facility] [address],  [place], [place]   Phone [phone]   LACTATE, SEPSIS   COVID-19       Interpretation per the Radiologist below, if obtained/available at the time of this note:    XR CHEST PORTABLE   Final Result   Bilateral lung multifocal ill-defined consolidation, greatest at the right   lung base, consistent with pneumonia. All other labs/imaging were within normal range or not returned as of this dictation. EMERGENCY DEPARTMENT COURSE and DIFFERENTIAL DIAGNOSIS/MDM:   Vitals:    Vitals:    08/04/20 0539 08/04/20 0603 08/04/20 0611   BP: (!) 165/139 (!) 144/79    Pulse: 143 136    Resp: (!) 37 (!) 31 26   SpO2: 94% 98%    Weight: 230 lb (104.3 kg)     Height: 5' 10\" (1.778 m)         EKG: Sinus tachycardia rate of 142 bpm.  Septal Q waves. Prolonged QRS at 116 ms. No ST elevation. Compared to prior from 1/31/2019 morphology is unchanged. Patient presents to the emergency department today shortness of breath. Significant increased work of breathing and diaphoresis on exam.  Markedly tachycardic and hypertensive upon arrival.  History of CHF and I was initially concerned about possible flash pulmonary edema. Patient was given initial dose of nitrates placed on BiPAP and his heart rate lowered approximately 30 bpm he was much more comfortable but after laboratory studies returned there is more concern for sepsis from community-acquired pneumonia. Patient has leukocytosis, lactic acidosis with right greater than left pulmonary infiltrates. COVID testing performed. Nitropaste was wiped off the patient he was given IV fluids here. He does appear much more comfortable at this time heart rate now in the 1 teens and respiratory rate has improved on BiPAP as well. Plan to admit for further treatment    Addendum: Patient is markedly improving is resting comfortably.   Initially placed on BiPAP for possible flash pulmonary edema given her new concern for multifocal pulmonary infiltrates and potentially COVID-19 will switch to Vapotherm in order to avoid BiPAP and its possible aerosolization of viral particles    MDM    CONSULTS     IP CONSULT TO HOSPITALIST    Critical Care:     CRITICAL CARE TIME   Total Critical Care time was 30 minutes, excluding separately reportable procedures. There was a high probability of clinically significant/life threatening deterioration in the patient's condition which required my urgent intervention. This time was spent reviewing the patient chart, interpreting diagnostic/laboratory data, management of BiPAP setting, transfusion of large-volume IV fluids and antibiotics for community-acquired pneumonia with sepsis and administration of supplemental oxygen for acute hypoxic respiratory failure      REASSESSMENT          PROCEDURE     Unless otherwise noted below, none     Procedures      FINAL IMPRESSION      1. Acute respiratory failure with hypoxia ([address]    2. Community acquired pneumonia, unspecified laterality    3. Septicemia [facility])            DISPOSITION/PLAN   DISPOSITION Decision To Admit 08/04/2020 06:22:27 AM        PATIENT REFERRED TO:  No follow-up provider specified. DISCHARGE MEDICATIONS:  New Prescriptions    No medications on file     Controlled Substances Monitoring:     RX Monitoring 1/6/2018   Attestation The Prescription Monitoring Report for this patient was reviewed today. Periodic Controlled Substance Monitoring Possible medication side effects, risk of tolerance and/or dependence, and alternative treatments discussed. ;No signs of potential drug abuse or diversion identified.        (Please note that portions of this note were completed with a voice recognition program.  Efforts were made to edit the dictations but occasionally words are mis-transcribed.)    Bari Antoine MD (electronically signed)  Attending Emergency Physician            Carlos Trejo MD  08/04/20 [id number]       Carlos Trejo MD  08/04/20 2207

## 2020-08-04 NOTE — ED NOTES
Received telephone admission orders from Dr. Julien Goldsmith. He will place additional admission orders when he arrives at the hospital. Pt admitted to ICU on vapotherm.       Slime Mock RN  08/04/20 1938

## 2020-08-04 NOTE — PROGRESS NOTES
4 Eyes Skin Assessment     The patient is being assess for   Admission    I agree that 2 RN's have performed a thorough Head to Toe Skin Assessment on the patient. ALL assessment sites listed below have been assessed. Areas assessed by both nurses:   [x]   Head, Face, and Ears   [x]   Shoulders, Back, and Chest, Abdomen  [x]   Arms, Elbows, and Hands   [x]   Coccyx, Sacrum, and Ischium  [x]   Legs, Feet, and Heels        x1 diabetic foot ulcer to plantar surface of each foot. Both appear healthy. No drainage. Nearly healed. **SHARE this note so that the co-signing nurse is able to place an eSignature**    Co-signer eSignature: Electronically signed by Cuba Polk RN on 8/4/20 at 1:28 PM EDT    Does the Patient have Skin Breakdown?   Yes LDA WOUND CARE was Initiated documentation include the Vanesa-wound, Wound Assessment, Measurements, Dressing Treatment, Drainage, and Color\",          Christiano Prevention initiated:  No  Wound Care Orders initiated:  pending      WOC nurse consulted for Pressure Injury (Stage 3,4, Unstageable, DTI, NWPT, Complex wounds)and New or Established Ostomies:  Yes      Primary Nurse eSignature: Electronically signed by Mikayla Carcamo RN on 8/4/20 at 11:22 AM EDT

## 2020-08-04 NOTE — CONSULTS
[facility]  [phone]        Reason for Consultation/Chief Complaint: \"I have been having sob. \"    Remains in covid rule out    History of Present Illness:  Vivien Saucedo is a 54 y.o. patient who presented to the hospital with complaints of shortness of breath  He is in COVID precautions and test results are pending. He has non ischemic cardiomyopathy due to alcohol abuse and he is no longer drinking. I have been asked to provide consultation regarding further management and testing. Past Medical History: LOV with DR Brion Kayser May 2020  PMH CMP. He is sp ICD placement  by Dr. Jerome Green for non ischemic CM with low EF that failed to improve with guide line based medical therapy. He is no longer drinking ETOH products. has a past medical history of Acute osteomyelitis of left foot ([address] Acute osteomyelitis of right foot ([address] Ascites, Blood transfusion reaction, Burst fracture of lumbar vertebra ([address] Cellulitis of left foot, Cellulitis of right lower extremity, Diabetes ([address] Diabetic ulcer of right foot ([address] Diabetic ulcer of 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 2 diabetes mellitus, with necrosis of bone ([address] Fracture of tibial plateau, MRSA (methicillin resistant staph aureus) culture positive, Neuropathic ulcer of left foot, limited to breakdown of skin ([address] Neuropathic ulcer of toe ([address] Pleural effusion due to congestive heart failure ([address] Smoker, and Systolic CHF, acute ([address] Surgical History:   has a past surgical history that includes knee surgery (Left); pacemaker placement; other surgical history (Bilateral, 9/17/15); Toe amputation; Foot Tendon Surgery (Right, 2016); Foot Tendon Surgery (Left, 06/30/2017); other surgical history (Left, 08/17/2017); Foot surgery (Left, 09/27/2017); other surgical history (Left, 12/07/2017); other surgical history (Right, 01/04/2018);  Foot Amputation (Left, 08/09/2018); pr part remv othr tarsal/metatarsal (Left, 2018); pr office/outpt visit,procedure only (Left, 2018); other surgical history (Left, 2018); pr part remv othr tarsal/metatarsal (Left, 2018); Foot surgery (Left, 2019); and Foot Debridement (Left, 2019). Social History:   , works at [employer] lives with his mother. He reports that he quit smoking about 40 years ago. He has never used smokeless tobacco. He reports that he does not drink alcohol or use drugs. Family History: Mother: Heart disease, MI x4, young age. Father: Heart disease, CABG age 67. Siblings: brother  age 40 of CHF, CVA  family history includes Anemia in his mother; Cancer in his maternal grandfather and maternal grandmother; Diabetes in his mother; Diabetes type 2  in his brother, brother, and brother; Heart Disease in his brother, father, maternal grandfather, maternal grandmother, mother, paternal grandfather, and paternal grandmother; Heart Failure in his brother; High Blood Pressure in his father, maternal grandfather, maternal grandmother, mother, paternal grandfather, and paternal grandmother; High Cholesterol in his father, maternal grandfather, maternal grandmother, mother, paternal grandfather, and paternal grandmother; Stroke in his brother. Home Medications:  Were reviewed and are listed in nursing record. and/or listed below  Prior to Admission medications    Medication Sig Start Date End Date Taking?  Authorizing Provider   TRULICITY 1.5 AG/9.1YA SOPN Inject 1.5 mg (1 PEN) into the skin once a week 20  Yes MD SANDRA Desai 300 UNIT/ML SOPN Inject 90 Units into the skin nightly 20  Yes Ana Sanders MD   Blood Glucose Monitoring Suppl (ONE TOUCH ULTRA MINI) w/Device KIT 1 kit by Does not apply route daily 20  Yes Ana Sanders MD   magnesium oxide (MAG-OX) 400 (241.3 Mg) MG TABS tablet Take 1 tablet by mouth 2 times daily 20  Yes Christine Cobb MD pravastatin (PRAVACHOL) 20 MG tablet TAKE ONE TABLET BY MOUTH ONE TIME DAILY 5/11/20  Yes Christine Cobb MD   lisinopril (PRINIVIL;ZESTRIL) 5 MG tablet Take 1 tablet by mouth daily 5/11/20  Yes Christine Cobb MD   furosemide (LASIX) 40 MG tablet TAKE ONE TABLET BY MOUTH TWICE DAILY 5/11/20  Yes Christine Cobb MD   carvedilol (COREG) 25 MG tablet Take 1 tablet by mouth 2 times daily TAKE ONE TABLET BY MOUTH TWICE DAILY 5/11/20  Yes Christine Cobb MD   ARMOUR THYROID 60 MG tablet Take 1 tablet by mouth daily 4/24/20  Yes Christine Cobb MD   metFORMIN (GLUCOPHAGE) 1000 MG tablet Take 1 tablet by mouth 2 times daily (with meals) 2/14/20  Yes Ana Sanders MD   glipiZIDE (GLUCOTROL) 5 MG tablet Take 1 tablet by mouth 2 times daily (before meals) 2/14/20  Yes Ana Sanders MD   spironolactone (ALDACTONE) 25 MG tablet Take 1 tablet by mouth daily 9/17/19  Yes Christine Cobb MD   aspirin 81 MG tablet Take 1 tablet by mouth daily 9/17/19  Yes Christine Cobb MD   glucose blood VI test strips (ONE TOUCH ULTRA TEST) strip Test blood sugar once daily. 1/3/18  Yes Tanmay Morrissey MD   Insulin Pen Needle (UNIFINE PENTIPS) 31G X 5 MM MISC USE 1 EACH DAY AS NEEDED FOR TESTING 1/3/18  Yes Tanmay Morrissey MD   Blood Glucose Monitoring Suppl GABI Measure glucose before breakfast, before dinner and at bedtime daily (we will check at lunchtime at HBO therapy). 10/25/17  Yes Chung Salazar MD   Multiple Vitamins-Minerals (THERAPEUTIC MULTIVITAMIN-MINERALS) tablet Take 1 tablet by mouth daily   Yes Sherrie Thomas MD   Blood Glucose Monitoring Suppl (BLOOD GLUCOSE METER) KIT Use to test blood sugars. 7/15/15  Yes Tanmay Morrissey MD   Lancets MISC Use to test blood sugars once daily.  7/15/15  Yes Tanmay Morrissey MD        Allergies:  Bactrim [sulfamethoxazole-trimethoprim] and Bee venom     Review of Systems:   12 point ROS negative in all areas as listed below except as in [place]  Constitutional, EENT, Cardiovascular, pulmonary, GI, , Musculoskeletal, skin, neurological, hematological, endocrine, Psychiatric    Physical Examination:    Vitals:    08/04/20 1000   BP: (!) 137/92   Pulse: 113   Resp: 20   Temp:    SpO2: 100%    Weight: 230 lb (104.3 kg)       Deferred for now due to COVID isolation and to conserve PPE  General Appearance:  Alert, cooperative, no distress, appears stated age   Head:  Normocephalic, without obvious abnormality, atraumatic   Eyes:  PERRL, conjunctiva/corneas clear       Nose: Nares normal, no drainage or sinus tenderness   Throat: Lips, mucosa, and tongue normal   Neck: Supple, symmetrical, trachea midline, no adenopathy, thyroid: not enlarged, symmetric, no tenderness/mass/nodules, no carotid bruit or JVD       Lungs:   Clear to auscultation bilaterally, respirations unlabored   Chest Wall:  No tenderness or deformity   Heart:  Regular rate and rhythm, S1, S2 normal, no murmur, rub or gallop   Abdomen:   Soft, non-tender, bowel sounds active all four quadrants,  no masses, no organomegaly           Extremities: Extremities normal, atraumatic, no cyanosis or edema   Pulses: 2+ and symmetric   Skin: Skin color, texture, turgor normal, no rashes or lesions   Pysch: Normal mood and affect   Neurologic: Normal gross motor and sensory exam.         Labs  CBC:   Lab Results   Component Value Date    WBC 21.0 08/04/2020    RBC 4.82 08/04/2020    HGB 14.8 08/04/2020    HCT 43.6 08/04/2020    MCV 90.5 08/04/2020    RDW 14.0 08/04/2020     08/04/2020     CMP:    Lab Results   Component Value Date     08/04/2020    K 4.3 08/04/2020     08/04/2020    CO2 23 08/04/2020    BUN 16 08/04/2020    CREATININE 1.1 08/04/2020    GFRAA >60 08/04/2020    GFRAA >60 08/01/2012    AGRATIO 1.4 08/04/2020    LABGLOM >60 08/04/2020    GLUCOSE 396 08/04/2020    PROT 7.7 08/04/2020    PROT 7.1 08/01/2012    CALCIUM 9.0 08/04/2020    BILITOT 0.7 08/04/2020    ALKPHOS 89 2020    AST 28 2020    ALT 22 2020     PT/INR:  No results found for: PTINR  Lab Results   Component Value Date    TROPONINI 0.15 (H) 2020     BNP 2569  Troponin 0.15 D-dimer 275    EK20 I have reviewed EKG with the following interpretation: Sinus tachycardia Intra-ventricular conduction delay Septal infarct , age undetermined Abnormal ECGNo significant change was found When compared with ECG of1.31.19Confirmed by GIL HUFFMAN, [address] ()      CXR 20  FINDINGS: Left-sided ICD is seen with single right ventricular lead which is grossly appropriate in position. The heart is normal in size and configuration. The mediastinal contours are within normal limits. Ill-defined dense consolidation is seen scattered within the bilateral lungs, most severe within the right lung base. No definitive evidence of pleural effusion is identified. Bones and soft tissues are unremarkable. Device check 20  Carelink transmission shows normal sensing and pacing function. See interrogation for more details. Episodes Since: 03-Mar-2020  2 Non-sustained VT and 3 SVT-wavelets (longest 1.5 min). He takes yamile-ox and coreg. Device check 3/3/20   Carelink transmission shows normal sensing and pacing function. See interrogation for more details. Hx SVT/NSVT (coreg). Episodes Since: 26-Oct-2019  3 SVT-longest 1 min and 3 sec. Average day/night ventricular rates slightly higher then trend over the last 2-3 weeks. Ov RKG 3/17. 5.6 years battery life      Device check 10/9/18  Carelink transmission shows normal functioning device. 4 SVT episodes . Thoracic impedance stable     17  Carelink transmission shows normal functioning device. No new arrhythmias recorded. Thoracic impedance trend stable.   5.5.16 Echo doppler of heart   Summary   Limited study for estimation of ejection fraction.   Left ventricular systolic function is mildly reduced with an ejection   fraction of 44% by Alanis's method.   Mild global hypokinesis.   Diastolic filling parameters suggests grade I diastolic dysfunction.   Compared to previous study from 1-9-2014, there is improvement in ejection   fraction.        7/8/13 ECHO-  The left ventricular systolic function is severely reduced with an ejection  fraction of 15-20 %. There is akinesis of the entire septal and inferior walls. There is severe hypokinesis of the anterior and lateral walls. Doppler study suggests restrictive diastolic dysfunction. The left atrium is mildly dilated. Mild mitral regurgitation is present. Aortic valve appears sclerotic but opens adequately. There is moderate-severe tricuspid regurgitation with RVSP estimated at 40  mmHg.     Stress test: 7/13:   No scintigraphic evidence of stress-induced myocardial  ischemia. 2. Chronic inferoapical infarct. 3. Severe left ventricular dilatation with severe diffuse  hypokinesis. 4. Severely reduced LVEF of approximately 20%.    Echo 10/15/13  The left ventricular systolic function is severely reduced with an ejection  fraction of 20-25%. Severe global HK with the lateral wall being most  kinetic. Evidence for diastolic dysfunction. Left ventricular size is mildly increased. The left atrium is mildly dilated. The right atrium is mildly dilated. Mild mitral and aortic regurgitation is present. There is mild tricuspid regurgitation with RVSP estimated at 26 mmHg.   Mild MAC.       Assessment  Acute resp failure  Hypothyroidism  Non ischemic cardiomyopathy with low EF  AICD  DM    plan  Change lasix to IV  Change armor thyroid to IV  Supportive care    Patient Active Problem List   Diagnosis    Hypertension    Uncontrolled type 2 diabetes mellitus with diabetic polyneuropathy, with long-term current use of insulin (HCC)    Alcoholic cardiomyopathy    Automatic implantable cardioverter-defibrillator in situ    Hyperlipidemia    Onychomycosis    Dystrophic nail    Callus of foot  Hallux valgus    Diabetic ulcer of right foot associated with type 2 diabetes mellitus, limited to breakdown of skin ([address]    Acquired hypothyroidism    Diabetic ulcer of left foot associated with type 2 diabetes mellitus, with muscle involvement without evidence of necrosis ([address]    NSVT (nonsustained ventricular tachycardia) ([address]    Acute respiratory failure ([address]            Thank you for allowing to us to participate in the [facility]. Further evaluation will be based upon the patient's clinical course and testing results.      Shellie Mora MD

## 2020-08-04 NOTE — H&P
Hospital Medicine History & Physical      PCP: Era Roland MD    Date of Admission: 8/4/2020    Date of Service: Pt seen/examined on 8/4/2020     Chief Complaint:    Chief Complaint   Patient presents with    Respiratory Distress     pt c/o diff breathing, pt 72% on RA per squad, 92% on 15 lpm, pt , pt is very diaphoretic, , pt hx of CHF, DM and pacemaker       History Of Present Illness: The patient is a 54 y.o. male with DM type 2, neuropathy, and systolic CHF, severe nonischemic cardiomyopathy related to alcohol abuse with ejection fraction 15%, s/p AICD who presents to [facility] with c/o respiratory distress. Patient has been having intermittent shortness of breath for the last 1 week, symptoms got significantly worse early this morning. Patient was dyspneic and diaphoretic on arrival to the ED. He has had a mild cough. He denies chest pain, lower extremity edema, abdominal pain, nausea, vomiting, or fevers. He was hypoxic in the 70's on RA upon EMS arrival.  Patient tachypnea, tachycardic, and hypoxic. BP elevated. Patient placed on Vapotherm. Labs with lactic acidosis, elevated BNP, elevated troponin, leukocytosis. CXR with pneumonia. Admitted to ICU. Pulmonology and cardiology consulted. Patient currently on high flow oxygen per Vapotherm, FiO2 40% 25 L, on broad-spectrum IV antibiotics, and received IV Lasix. Starting to have good urine output. He is feeling better already. No dyspnea or distress now.     Rule out COVID-19, patient placed on droplet plus precautions    Past Medical History:        Diagnosis Date    Acute osteomyelitis of left foot ([address] 9/27/2017    Acute osteomyelitis of right foot ([address] 4/25/2016    Ascites     Blood transfusion reaction     Burst fracture of lumbar vertebra ([address] 11/9/2012    Cellulitis of left foot     Cellulitis of right lower extremity 2/16, 5/16    Diabetes ([address]     Diabetic ulcer of right foot ([address] early 2016  Diabetic ulcer of 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 2 diabetes mellitus, with necrosis of bone (HCC)     Fracture of tibial plateau 67/6/9767    MRSA (methicillin resistant staph aureus) culture positive 4/28/16, 4/20/16    foot wound    Neuropathic ulcer of left foot, limited to breakdown of skin ([facility] 11/3/2016    Neuropathic ulcer of toe ([facility] 2/13/2014    Pleural effusion due to congestive heart failure ([facility]     Smoker     quit [id number]    Systolic CHF, acute ([facility] 7/8/2013       Past Surgical History:        Procedure Laterality Date    FOOT AMPUTATION Left 08/09/2018    PARTIAL FOOT AMPUTATION w. Dr Cruzito Winslow Left 1/31/2019    EXOSTECTOMY LEFT FOOT, ULCER DEBRIDEMENT LEFT FOOT WITH GRAFT APPLICATION performed by Shantell Calderon DPM at [facility] Left 09/27/2017    LEFT FOOT ULCER DEBRIDEMENT, POSSIBLE SECOND METATARSAL    FOOT SURGERY Left 01/31/2019    Exostectomy left foot, ulcer debridement with graft application left foot    FOOT TENDON SURGERY Right 2016    FOOT TENDON SURGERY Left 06/30/2017    KNEE SURGERY Left     OTHER SURGICAL HISTORY Bilateral 9/17/15    amputation 2nd digit bilateral, flexor tenotomy right hallux    OTHER SURGICAL HISTORY Left 08/17/2017    PARTIAL LEFT FOOT AMPUTATION      OTHER SURGICAL HISTORY Left 12/07/2017    Debridement infected bone and tissue left foot; ulcer debridement left foot with graft application with dr mathur     OTHER SURGICAL HISTORY Right 01/04/2018    DEBRIDEMENT INFECTED BONE AND TISSUE RIGHT FOOT, ULCER DEBRIDEMENT RIGHT FOOT WITH GRAFT APPLICATION    OTHER SURGICAL HISTORY Left 11/01/2018    Procedure: PARTIAL RESECTION LEFT METATARSAL, ULCER DEBRIDEMENT LEFT FOOT WITH GRAFT APPLICATION     PACEMAKER PLACEMENT      1/23/14 pacer/defib    [place] OFFICE/OUTPT VISIT,PROCEDURE ONLY Left 8/23/2018    ULCER DEBRIDEMENT LEFT FOOT WITH GRAFT APPLICATION performed by Shantell Calderon DPM at [facility] TARSAL/METATARSAL Left 8/9/2018    PARTIAL FOOT AMPUTATION WITH GRAFT APPLICATION performed by [place], DPM at [facility] TARSAL/METATARSAL Left 11/1/2018    PARTIAL RESECTION LEFT METATARSAL, ULCER DEBRIDEMENT LEFT FOOT WITH GRAFT APPLICATION performed by [place], DPM at [address] [place] [address],[address]      2 toes       Medications Prior to Admission:    Prior to Admission medications    Medication Sig Start Date End Date Taking?  Authorizing Provider   TRULICITY 1.5 CP/5.0UI SOPN Inject 1.5 mg (1 PEN) into the skin once a week 7/20/20  Yes MD SANDRA Talamantes 300 UNIT/ML SOPN Inject 90 Units into the skin nightly 7/20/20  Yes Rachael York MD   Blood Glucose Monitoring Suppl (ONE TOUCH ULTRA MINI) w/Device KIT 1 kit by Does not apply route daily 5/26/20  Yes Rachael York MD   magnesium oxide (MAG-OX) 400 (241.3 Mg) MG TABS tablet Take 1 tablet by mouth 2 times daily 5/11/20  Yes Dao Oconnell MD   pravastatin (PRAVACHOL) 20 MG tablet TAKE ONE TABLET BY MOUTH ONE TIME DAILY 5/11/20  Yes Dao Oconnell MD   lisinopril (PRINIVIL;ZESTRIL) 5 MG tablet Take 1 tablet by mouth daily 5/11/20  Yes Dao Oconnell MD   furosemide (LASIX) 40 MG tablet TAKE ONE TABLET BY MOUTH TWICE DAILY 5/11/20  Yes Dao Oconnell MD   carvedilol (COREG) 25 MG tablet Take 1 tablet by mouth 2 times daily TAKE ONE TABLET BY MOUTH TWICE DAILY 5/11/20  Yes Dao Oconnell MD   ARMOUR THYROID 60 MG tablet Take 1 tablet by mouth daily 4/24/20  Yes Dao Oconnell MD   metFORMIN (GLUCOPHAGE) 1000 MG tablet Take 1 tablet by mouth 2 times daily (with meals) 2/14/20  Yes Rachael York MD   glipiZIDE (GLUCOTROL) 5 MG tablet Take 1 tablet by mouth 2 times daily (before meals) 2/14/20  Yes Rachael York MD   spironolactone (ALDACTONE) 25 MG tablet Take 1 tablet by mouth daily 9/17/19  Yes Dao Oconnell MD   aspirin 81 MG tablet Take 1 tablet by mouth daily 9/17/19  Yes Rosanna Manriquez MD   glucose blood VI test strips (ONE TOUCH ULTRA TEST) strip Test blood sugar once daily. 1/3/18  Yes Lynn Riedel, MD   Insulin Pen Needle (UNIFINE PENTIPS) 31G X 5 MM MISC USE 1 EACH DAY AS NEEDED FOR TESTING 1/3/18  Yes Lynn Riedel, MD   Blood Glucose Monitoring Suppl GABI Measure glucose before breakfast, before dinner and at bedtime daily (we will check at lunchtime at HBO therapy). 10/25/17  Yes Colette Quintero MD   Multiple Vitamins-Minerals (THERAPEUTIC MULTIVITAMIN-MINERALS) tablet Take 1 tablet by mouth daily   Yes Sherrie Thomas MD   Blood Glucose Monitoring Suppl (BLOOD GLUCOSE METER) KIT Use to test blood sugars. 7/15/15  Yes Lynn Riedel, MD   Lancets MISC Use to test blood sugars once daily. 7/15/15  Yes Lynn Riedel, MD       Allergies:  Bactrim [sulfamethoxazole-trimethoprim] and Bee venom    Social History:    TOBACCO:   reports that he quit smoking about 40 years ago. He has never used smokeless tobacco.  ETOH:   reports no history of alcohol use.       Family History:   Positive as follows:        Problem Relation Age of Onset    Heart Disease Mother     High Blood Pressure Mother     High Cholesterol Mother     Diabetes Mother     Anemia Mother     Heart Disease Father     High Blood Pressure Father     High Cholesterol Father     Heart Disease Maternal Grandmother     High Blood Pressure Maternal Grandmother     High Cholesterol Maternal Grandmother     Cancer Maternal Grandmother         bone marrow & breast    Heart Disease Maternal Grandfather     High Blood Pressure Maternal Grandfather     High Cholesterol Maternal Grandfather     Cancer Maternal Grandfather         pancreatic CA    Heart Disease Paternal Grandmother     High Blood Pressure Paternal Grandmother     High Cholesterol Paternal Grandmother     Heart Disease Paternal Grandfather     High Blood Pressure Paternal Grandfather     High Cholesterol Paternal Grandfather     Stroke Brother     Heart Failure Brother     Heart Disease Brother     Diabetes type 2  Brother     Diabetes type 2  Brother     Diabetes type 2  Brother        REVIEW OF SYSTEMS:     Constitutional: Negative for fever   HENT: Negative for sore throat   Eyes: Negative for redness   Respiratory: +  for dyspnea, cough   Cardiovascular: Negative for chest pain   Gastrointestinal: Negative for vomiting, diarrhea   Genitourinary: Negative for hematuria   Musculoskeletal: Negative for arthralgias   Skin: Negative for rash   Neurological: Negative for syncope   Hematological: Negative for adenopathy   Psychiatric/Behavorial: Negative for anxiety        PHYSICAL EXAM:    /87   Pulse 109   Temp 97.4 °F (36.3 °C) (Oral)   Resp 23   Ht 5' 10\" (1.778 m)   Wt 230 lb (104.3 kg)   SpO2 96%   BMI 33.00 kg/m²     Gen: No distress. Alert. Awake and well-oriented  Eyes: PERRL. No sclera icterus. No conjunctival injection. ENT: No discharge. Pharynx clear. Neck: No JVD. No Carotid Bruit. Trachea midline. Resp: No accessory muscle use. Diminished breath sounds with bibasilar rales , mild rhonchi . CV: Regular rate. Regular rhythm. No murmur. No rub. No edema. GI: Non-tender. Non-distended. No masses. No organomegaly. Normal bowel sounds. No hernia. Skin: Warm and dry. No nodule on exposed extremities. No rash on exposed extremities. M/S: No cyanosis. No joint deformity. No clubbing. Neuro: Awake. Grossly nonfocal    Psych: Oriented x 3. No anxiety or agitation.      CBC:   Recent Labs     08/04/20  0545   WBC 21.0*   HGB 14.8   HCT 43.6   MCV 90.5        BMP:   Recent Labs     08/04/20  0545      K 4.3      CO2 23   BUN 16   CREATININE 1.1     LIVER PROFILE:   Recent Labs     08/04/20  0545   AST 28   ALT 22   BILITOT 0.7   ALKPHOS 89     PT/INR:   Recent Labs     08/04/20  0545   PROTIME 10.7   INR 0.92     APTT:   Recent Labs     08/04/20  0545   APTT 32.0       CARDIAC ENZYMES  Recent Labs     08/04/20  0545 08/04/20  1029   TROPONINI 0.15* 0.23*       U/A:    Lab Results   Component Value Date    COLORU Yellow 09/26/2017    WBCUA None seen 09/26/2017    RBCUA 0-2 09/26/2017    MUCUS 1+ 09/26/2017    CLARITYU Clear 09/26/2017    SPECGRAV 1.020 09/26/2017    LEUKOCYTESUR Negative 09/26/2017    BLOODU Negative 09/26/2017    GLUCOSEU Negative 09/26/2017       CULTURES  COVID: pending  Sputum: pending    EKG:  I have reviewed the EKG with the following interpretation:   Sinus tachycardia Intra-ventricular conduction delay, Septal infarct    RADIOLOGY  XR CHEST PORTABLE   Final Result   Bilateral lung multifocal ill-defined consolidation, greatest at the right   lung base, consistent with pneumonia. Active Problems:    Acute respiratory failure (HCC)  Resolved Problems:    * No resolved hospital problems. *        ASSESSMENT/PLAN:    Acute hypoxic respiratory failure  - patient presented in respiratory distress  - due to pneumonia, CHF. COVID needs to be ruled out.   -Admitted to ICU  -Keep on droplet plus precautions  - on Vapotherm  -  Pulmonology consulted  - wean oxygen as tolerated. COVID rule out  - testing is pending.   -On droplet plus precautions    Pneumonia, gram positive organism  - treat with Rocephin, Zithromax, Vanc D#1.   - albuterol prn. Sepsis, POA  - lactic acidosis, leukocytosis, tachycardia, tachypnea, hypoxia  - due to pneumonia  - not requiring pressors  - sputum cx pending    Acute on chronic systolic CHF  Severe nonischemic cardiomyopathy due to alcohol abuse  -Ejection fraction 15% Per old records. No recent echocardiogram in chart. Will check echo  - s/p AICD. - continue IV Lasix, he is starting to diurese well  - cont Aldactone, Coreg.   - Lisinopril was held. Elevated troponin  - 0.15, 0.23  - monitor on tele  - consulted cardiology. - aspirin was added.     DM type 2, with hyperglycemia  Neuropathy  - monitor glucose. - continue Lantus 32 units nightly. SSI coverage. Hyperlipidemia  - on Pravastatin. Hypothyroidism  - continue Cowgill thyroid. Patient refuses to take any other form of levothyroxine.   Check TSH    DVT Prophylaxis: Lovenox  Diet: DIET CARB CONTROL;  Code Status: Full Code          Tim Noel MD    8/4/2020

## 2020-08-04 NOTE — PROGRESS NOTES
Shift assessment completed. Patient is currently on VAPOTHERM at 25 LPM and 40 FiO2. Lung sounds diminished with bilaterally crackles throughout. Respirations seem labored, patient denies SOB. Nonproductive cough as report by patient. ST on monitor 110s. BP remains elevated 130s/100s. Pitting edema +1 in BLE. CHG bath given.     Electronically signed by Cuba Polk RN on 8/4/2020 at 1:40 PM

## 2020-08-04 NOTE — PROGRESS NOTES
RESPIRATORY THERAPY ASSESSMENT    Name:  Jeanette Goode Record Number:  [id number]  Age: 54 y.o. Gender: male  : 1965  Today's Date:  2020  Room:  3006/3006-01    Assessment     Is the patient being admitted for a COPD or Asthma exacerbation? No   (If yes the patient will be seen every 4 hours for the first 24 hours and then reassessed)    Patient Admission Diagnosis      Allergies  Allergies   Allergen Reactions    Bactrim [Sulfamethoxazole-Trimethoprim] Rash     Pt reports that his lips tingle and then skin on his lips peel off,and rash on thigh       Bee Venom Swelling and Rash       Minimum Predicted Vital Capacity:     na          Actual Vital Capacity:      na              Pulmonary History: Former smoker  Home Oxygen Therapy:  room air  Home Respiratory Therapy: None   Current Respiratory Therapy:  Albuterol mdi prn          Respiratory Severity Index(RSI)   Patients with orders for inhalation medications, oxygen, or any therapeutic treatment modality will be placed on Respiratory Protocol. They will be assessed with the first treatment and at least every 72 hours thereafter. The following severity scale will be used to determine frequency of treatment intervention.     Smoking History: Pulmonary Disease or Smoking History, Greater than 15 pack year = 2    Social History  Social History     Tobacco Use    Smoking status: Former Smoker     Last attempt to quit: 1980     Years since quittin.5    Smokeless tobacco: Never Used   Substance Use Topics    Alcohol use: No     Alcohol/week: 0.0 standard drinks    Drug use: No       Recent Surgical History: None = 0  Past Surgical History  Past Surgical History:   Procedure Laterality Date    FOOT AMPUTATION Left 2018    PARTIAL FOOT AMPUTATION w. Dr Byron Mtz Left 2019    EXOSTECTOMY LEFT FOOT, ULCER DEBRIDEMENT LEFT FOOT WITH GRAFT APPLICATION [facility] Rose Mary Jenkins [facility] SURGERY Left 09/27/2017    LEFT FOOT ULCER DEBRIDEMENT, POSSIBLE SECOND METATARSAL    FOOT SURGERY Left 01/31/2019    Exostectomy left foot, ulcer debridement with graft application left foot    FOOT TENDON SURGERY Right 2016    FOOT TENDON SURGERY Left 06/30/2017    KNEE SURGERY Left     OTHER SURGICAL HISTORY Bilateral 9/17/15    amputation 2nd digit bilateral, flexor tenotomy right hallux    OTHER SURGICAL HISTORY Left 08/17/2017    PARTIAL LEFT FOOT AMPUTATION      OTHER SURGICAL HISTORY Left 12/07/2017    Debridement infected bone and tissue left foot; ulcer debridement left foot with graft application with dr mathur     OTHER SURGICAL HISTORY Right 01/04/2018    DEBRIDEMENT INFECTED BONE AND TISSUE RIGHT FOOT, ULCER DEBRIDEMENT RIGHT FOOT WITH GRAFT APPLICATION    OTHER SURGICAL HISTORY Left 11/01/2018    Procedure: PARTIAL RESECTION LEFT METATARSAL, ULCER DEBRIDEMENT LEFT FOOT WITH GRAFT APPLICATION     PACEMAKER PLACEMENT      1/23/14 pacer/defib    [place] OFFICE/OUTPT VISIT,PROCEDURE ONLY Left 8/23/2018    ULCER DEBRIDEMENT LEFT FOOT WITH GRAFT APPLICATION performed by Saeed Arias DPM at [facility] TARSAL/METATARSAL Left 8/9/2018    PARTIAL FOOT AMPUTATION WITH GRAFT APPLICATION performed by Saeed Arias DPM at [facility] TARSAL/METATARSAL Left 11/1/2018    PARTIAL RESECTION LEFT METATARSAL, ULCER DEBRIDEMENT LEFT FOOT WITH GRAFT APPLICATION performed by Saeed Arias DPM at [address] TOE AMPUTATION      2 toes       Level of Consciousness: Alert, Oriented, and Cooperative = 0    Level of Activity: Walking with assistance = 1    Respiratory Pattern: Increased; RR 21-30 = 1    Breath Sounds: Diminshed bilaterally and/or crackles = 2    Sputum   ,  ,    Cough: Strong, spontaneous, non-productive = 0    Vital Signs   BP (!) 132/99   Pulse 110   Temp 97.4 °F (36.3 °C) (Oral)   Resp 24   Ht 5' 10\" (1.778 m)   Wt 230 lb (104.3 kg)   SpO2 97%   BMI 33.00 kg/m² SPO2 (COPD values may differ): 88-89% on room air or greater than 92% on FiO2 28- 35% = 2    Peak Flow (asthma only): not applicable = 0    RSI: 7-8 = BID and Q4HPRN (every four hours as needed) for dyspnea        Plan       Goals: medication delivery, mobilize retained secretions, volume expansion and improve oxygenation    Patient/caregiver was educated on the proper method of use for Respiratory Care Devices:  Yes      Level of patient/caregiver understanding able to:   ? Verbalize understanding   ? Demonstrate understanding       ? Teach back        ? Needs reinforcement       ? No available caregiver               ? Other:     Response to education:  Good     Is patient being placed on Home Treatment Regimen? No     Does the patient have everything they need prior to discharge? Yes     Comments: patient assessed/interviewed. Plan of Care: Continue prn. Covid r/o    Electronically signed by Ramses Cruz RCP on 8/4/2020 at 11:52 AM    Respiratory Protocol Guidelines     1. Assessment and treatment by Respiratory Therapy will be initiated for medication and therapeutic interventions upon initiation of aerosolized medication. 2. Physician will be contacted for respiratory rate (RR) greater than 35 breaths per minute. Therapy will be held for heart rate (HR) greater than 140 beats per minute, pending direction from physician. 3. Bronchodilators will be administered via Metered Dose Inhaler (MDI) with spacer when the following criteria are met:  a. Alert and cooperative     b. HR < 140 bpm  c. RR < 30 bpm                d. Can demonstrate a 2-3 second inspiratory hold  4. Bronchodilators will be administered via Hand Held Nebulizer [facility] to patients when ANY of the following criteria are met  a. Incognizant or uncooperative          b. Patients treated with HHN at Home        c. Unable to demonstrate proper use of MDI with spacer     d. RR > 30 bpm   5.  Bronchodilators will be delivered via Metered Dose Inhaler (MDI), HHN, Aerogen to intubated patients on mechanical ventilation. 6. Inhalation medication orders will be delivered and/or substituted as outlined below. Aerosolized Medications Ordering and Administration Guidelines:    1. All Medications will be ordered by a physician, and their frequency and/or modality will be adjusted as defined by the patients Respiratory Severity Index (RSI) score. 2. If the patient does not have documented COPD, consider discontinuing anticholinergics when RSI is less than 9.  3. If the bronchospasm worsens (increased RSI), then the bronchodilator frequency can be increased to a maximum of every 4 hours. If greater than every 4 hours is required, the physician will be contacted. 4. If the bronchospasm improves, the frequency of the bronchodilator can be decreased, based on the patient's RSI, but not less than home treatment regimen frequency. 5. Bronchodilator(s) will be discontinued if patient has a RSI less than 9 and has received no scheduled or as needed treatment for 72  Hrs. Patients Ordered on a Mucolytic Agent:    1. Must always be administered with a bronchodilator. 2. Discontinue if patient experiences worsened bronchospasm, or secretions have lessened to the point that the patient is able to clear them with a cough. Anti-inflammatory and Combination Medications:    1. If the patient lacks prior history of lung disease, is not using inhaled anti-inflammatory medication at home, and lacks wheezing by examination or by history for at least 24 hours, contact physician for possible discontinuation.

## 2020-08-04 NOTE — PROGRESS NOTES
Patient admitted to ICU for shortness of breath, hypoxia and increased WOB. Patient states that while at home over the past week he has had several episodes of SOB while at rest and somewhat improved with exertion. However, this morning at 0300 he awoke from dead sleep with increased SOB, WOB and diaphoresis. Denies any chest pain during this time. However, he does state he had stomach pain during this episode. EMS was called by mother and he was found extremely diaphoretic and SOB on the porch. 72% on RA. Placed on NRB and increased. Telemetry monitor hooked up to patient. ST on monitor 120s. Hx of systolic CHF. Last ECHO is saw was 2016 44% EF. Defibrillator to left chest.   Sees Dr. Susi Mata in office. ? Cardiology consult. HTN upon arrival to ICU. Breathing pattern appears labored. Lung sounds show crackling throughout. Admitted to ICU on [address] [place] [address]. Patient is currently on VAPOTHERM at 30 LPM and 40% FiO2. Chronic cough per patient. NON PRODUCTIVE. He believes it is from LISINOPRIL. I will address this with ICU team. Cough is unchanged compared to his normal cough per patient. Mental status appears intact. A&O x4. BUE strength is strong and equal. BLE strength is strong and equal. 5/5. PERRLA. Abdomen appears rounded and soft. Bowels active active x4. IV access is x2 PIV. Urinary is WNL. +1 pitting edema noted in BLE. Refused CHG bath at this time d/t SOB. Skin assessment complete.        Electronically signed by Debbie Canada RN on 8/4/2020 at 9:23 AM    Vitals:    08/04/20 0921   BP: (!) 151/100   Pulse: 120   Resp: 24   Temp: 98.3 °F (36.8 °C)   SpO2: 97%

## 2020-08-04 NOTE — PROGRESS NOTES
Patient reports \"stomach feeling upset and sudden hot flash\". 4mg IV Zofran given.      Electronically signed by Ilan Orr RN on 8/4/2020 at 7:31 PM

## 2020-08-04 NOTE — PROGRESS NOTES
WOB appears improved with BiPAP. BP improved as well.      Electronically signed by Lizz Howard RN on 8/4/2020 at 10:09 AM

## 2020-08-04 NOTE — PROGRESS NOTES
Patient on BiPAP with s a setting of 20/8.      Electronically signed by Dez Murphy RN on 8/4/2020 at 9:55 AM

## 2020-08-04 NOTE — PROGRESS NOTES
Spoke with Dr. Cyndy Smith of cardiology regarding tropnin continuing to trend upward. Now 0.40. RN was instructed to start patient on full dose LOVENOX starting tonight and then BID on 8/5.  However, Dr. Cyndy Smith wants the morning dose of lovenox held until [place] sees patient in the AM.     Electronically signed by Maria T Enrique RN on 8/4/2020 at 6:45 PM

## 2020-08-05 ENCOUNTER — APPOINTMENT (OUTPATIENT)
Dept: GENERAL RADIOLOGY | Age: 55
DRG: 871 | End: 2020-08-05

## 2020-08-05 LAB
ANION GAP SERPL CALCULATED.3IONS-SCNC: 10 MMOL/L (ref 3–16)
BASOPHILS ABSOLUTE: 0 K/UL (ref 0–0.2)
BASOPHILS RELATIVE PERCENT: 0.4 %
BUN BLDV-MCNC: 16 MG/DL (ref 7–20)
CALCIUM SERPL-MCNC: 8.6 MG/DL (ref 8.3–10.6)
CHLORIDE BLD-SCNC: 99 MMOL/L (ref 99–110)
CO2: 25 MMOL/L (ref 21–32)
CREAT SERPL-MCNC: 0.8 MG/DL (ref 0.9–1.3)
EOSINOPHILS ABSOLUTE: 0.1 K/UL (ref 0–0.6)
EOSINOPHILS RELATIVE PERCENT: 1.5 %
GFR AFRICAN AMERICAN: >60
GFR NON-AFRICAN AMERICAN: >60
GLUCOSE BLD-MCNC: 212 MG/DL (ref 70–99)
GLUCOSE BLD-MCNC: 228 MG/DL (ref 70–99)
GLUCOSE BLD-MCNC: 234 MG/DL (ref 70–99)
GLUCOSE BLD-MCNC: 236 MG/DL (ref 70–99)
GLUCOSE BLD-MCNC: 274 MG/DL (ref 70–99)
GLUCOSE BLD-MCNC: 314 MG/DL (ref 70–99)
HCT VFR BLD CALC: 35 % (ref 40.5–52.5)
HEMOGLOBIN: 12 G/DL (ref 13.5–17.5)
LIPASE: 22 U/L (ref 13–60)
LV EF: 28 %
LVEF MODALITY: NORMAL
LYMPHOCYTES ABSOLUTE: 2.4 K/UL (ref 1–5.1)
LYMPHOCYTES RELATIVE PERCENT: 28 %
MAGNESIUM: 1.9 MG/DL (ref 1.8–2.4)
MCH RBC QN AUTO: 31.1 PG (ref 26–34)
MCHC RBC AUTO-ENTMCNC: 34.4 G/DL (ref 31–36)
MCV RBC AUTO: 90.3 FL (ref 80–100)
MONOCYTES ABSOLUTE: 0.6 K/UL (ref 0–1.3)
MONOCYTES RELATIVE PERCENT: 7 %
NEUTROPHILS ABSOLUTE: 5.4 K/UL (ref 1.7–7.7)
NEUTROPHILS RELATIVE PERCENT: 63.1 %
PDW BLD-RTO: 14.1 % (ref 12.4–15.4)
PERFORMED ON: ABNORMAL
PLATELET # BLD: 190 K/UL (ref 135–450)
PMV BLD AUTO: 7.2 FL (ref 5–10.5)
POTASSIUM REFLEX MAGNESIUM: 3.5 MMOL/L (ref 3.5–5.1)
RBC # BLD: 3.87 M/UL (ref 4.2–5.9)
SARS-COV-2, NAA: NOT DETECTED
SODIUM BLD-SCNC: 134 MMOL/L (ref 136–145)
T4 FREE: 1.1 NG/DL (ref 0.9–1.8)
WBC # BLD: 8.6 K/UL (ref 4–11)

## 2020-08-05 PROCEDURE — 71045 X-RAY EXAM CHEST 1 VIEW: CPT

## 2020-08-05 PROCEDURE — 6370000000 HC RX 637 (ALT 250 FOR IP): Performed by: INTERNAL MEDICINE

## 2020-08-05 PROCEDURE — 99232 SBSQ HOSP IP/OBS MODERATE 35: CPT | Performed by: INTERNAL MEDICINE

## 2020-08-05 PROCEDURE — 6360000002 HC RX W HCPCS: Performed by: INTERNAL MEDICINE

## 2020-08-05 PROCEDURE — 36415 COLL VENOUS BLD VENIPUNCTURE: CPT

## 2020-08-05 PROCEDURE — 93306 TTE W/DOPPLER COMPLETE: CPT

## 2020-08-05 PROCEDURE — 2580000003 HC RX 258: Performed by: INTERNAL MEDICINE

## 2020-08-05 PROCEDURE — 83690 ASSAY OF LIPASE: CPT

## 2020-08-05 PROCEDURE — 2580000003 HC RX 258

## 2020-08-05 PROCEDURE — 99233 SBSQ HOSP IP/OBS HIGH 50: CPT | Performed by: INTERNAL MEDICINE

## 2020-08-05 PROCEDURE — 80048 BASIC METABOLIC PNL TOTAL CA: CPT

## 2020-08-05 PROCEDURE — 85025 COMPLETE CBC W/AUTO DIFF WBC: CPT

## 2020-08-05 PROCEDURE — 83735 ASSAY OF MAGNESIUM: CPT

## 2020-08-05 PROCEDURE — 74018 RADEX ABDOMEN 1 VIEW: CPT

## 2020-08-05 PROCEDURE — 2060000000 HC ICU INTERMEDIATE R&B

## 2020-08-05 RX ORDER — POTASSIUM CHLORIDE 750 MG/1
20 TABLET, EXTENDED RELEASE ORAL ONCE
Status: COMPLETED | OUTPATIENT
Start: 2020-08-05 | End: 2020-08-05

## 2020-08-05 RX ORDER — LISINOPRIL 5 MG/1
2.5 TABLET ORAL 2 TIMES DAILY
Status: DISCONTINUED | OUTPATIENT
Start: 2020-08-05 | End: 2020-08-06 | Stop reason: HOSPADM

## 2020-08-05 RX ORDER — LEVOTHYROXINE AND LIOTHYRONINE 19; 4.5 UG/1; UG/1
90 TABLET ORAL DAILY
Status: DISCONTINUED | OUTPATIENT
Start: 2020-08-06 | End: 2020-08-06 | Stop reason: HOSPADM

## 2020-08-05 RX ORDER — INSULIN GLARGINE 100 [IU]/ML
40 INJECTION, SOLUTION SUBCUTANEOUS NIGHTLY
Status: DISCONTINUED | OUTPATIENT
Start: 2020-08-05 | End: 2020-08-06 | Stop reason: HOSPADM

## 2020-08-05 RX ORDER — SODIUM CHLORIDE 9 MG/ML
INJECTION, SOLUTION INTRAVENOUS
Status: COMPLETED
Start: 2020-08-05 | End: 2020-08-05

## 2020-08-05 RX ORDER — AZITHROMYCIN 250 MG/1
500 TABLET, FILM COATED ORAL DAILY
Status: COMPLETED | OUTPATIENT
Start: 2020-08-06 | End: 2020-08-06

## 2020-08-05 RX ADMIN — CEFTRIAXONE SODIUM 1 G: 1 INJECTION, POWDER, FOR SOLUTION INTRAMUSCULAR; INTRAVENOUS at 05:31

## 2020-08-05 RX ADMIN — ASPIRIN 81 MG: 81 TABLET, CHEWABLE ORAL at 08:01

## 2020-08-05 RX ADMIN — INSULIN GLARGINE 40 UNITS: 100 INJECTION, SOLUTION SUBCUTANEOUS at 20:43

## 2020-08-05 RX ADMIN — SODIUM CHLORIDE 250 ML: 9 INJECTION, SOLUTION INTRAVENOUS at 05:32

## 2020-08-05 RX ADMIN — INSULIN LISPRO 8 UNITS: 100 INJECTION, SOLUTION INTRAVENOUS; SUBCUTANEOUS at 08:05

## 2020-08-05 RX ADMIN — LISINOPRIL 2.5 MG: 5 TABLET ORAL at 17:49

## 2020-08-05 RX ADMIN — MUPIROCIN: 20 OINTMENT TOPICAL at 08:05

## 2020-08-05 RX ADMIN — Medication 10 ML: at 20:42

## 2020-08-05 RX ADMIN — ONDANSETRON HYDROCHLORIDE 4 MG: 2 INJECTION, SOLUTION INTRAMUSCULAR; INTRAVENOUS at 05:43

## 2020-08-05 RX ADMIN — MUPIROCIN: 20 OINTMENT TOPICAL at 20:42

## 2020-08-05 RX ADMIN — FUROSEMIDE 40 MG: 10 INJECTION, SOLUTION INTRAMUSCULAR; INTRAVENOUS at 16:55

## 2020-08-05 RX ADMIN — DEXTROSE MONOHYDRATE 500 MG: 5 INJECTION INTRAVENOUS at 05:31

## 2020-08-05 RX ADMIN — POTASSIUM CHLORIDE 20 MEQ: 750 TABLET, EXTENDED RELEASE ORAL at 11:55

## 2020-08-05 RX ADMIN — THYROID, PORCINE 60 MG: 30 TABLET ORAL at 05:32

## 2020-08-05 RX ADMIN — Medication 10 ML: at 08:05

## 2020-08-05 RX ADMIN — FUROSEMIDE 40 MG: 10 INJECTION, SOLUTION INTRAMUSCULAR; INTRAVENOUS at 08:01

## 2020-08-05 RX ADMIN — SPIRONOLACTONE 25 MG: 25 TABLET ORAL at 08:01

## 2020-08-05 RX ADMIN — ENOXAPARIN SODIUM 100 MG: 100 INJECTION SUBCUTANEOUS at 09:00

## 2020-08-05 RX ADMIN — CARVEDILOL 25 MG: 25 TABLET, FILM COATED ORAL at 20:42

## 2020-08-05 RX ADMIN — CARVEDILOL 25 MG: 25 TABLET, FILM COATED ORAL at 08:01

## 2020-08-05 RX ADMIN — VANCOMYCIN HYDROCHLORIDE 1.5 G: 10 INJECTION, POWDER, LYOPHILIZED, FOR SOLUTION INTRAVENOUS at 01:41

## 2020-08-05 RX ADMIN — PRAVASTATIN SODIUM 20 MG: 20 TABLET ORAL at 20:42

## 2020-08-05 ASSESSMENT — PAIN SCALES - GENERAL: PAINLEVEL_OUTOF10: 0

## 2020-08-05 NOTE — PROGRESS NOTES
Procedure Laterality Date    FOOT AMPUTATION Left 08/09/2018    PARTIAL FOOT AMPUTATION w. Dr Nany Sam Left 1/31/2019    EXOSTECTOMY LEFT FOOT, ULCER DEBRIDEMENT LEFT FOOT WITH GRAFT APPLICATION performed by Olga Lidia Be DPM at [address] Left 09/27/2017    LEFT FOOT ULCER DEBRIDEMENT, POSSIBLE SECOND METATARSAL    FOOT SURGERY Left 01/31/2019    Exostectomy left foot, ulcer debridement with graft application left foot    FOOT TENDON SURGERY Right 2016    FOOT TENDON SURGERY Left 06/30/2017    KNEE SURGERY Left     OTHER SURGICAL HISTORY Bilateral 9/17/15    amputation 2nd digit bilateral, flexor tenotomy right hallux    OTHER SURGICAL HISTORY Left 08/17/2017    PARTIAL LEFT FOOT AMPUTATION      OTHER SURGICAL HISTORY Left 12/07/2017    Debridement infected bone and tissue left foot; ulcer debridement left foot with graft application with dr mathur     OTHER SURGICAL HISTORY Right 01/04/2018    DEBRIDEMENT INFECTED BONE AND TISSUE RIGHT FOOT, ULCER DEBRIDEMENT RIGHT FOOT WITH GRAFT APPLICATION    OTHER SURGICAL HISTORY Left 11/01/2018    Procedure: PARTIAL RESECTION LEFT METATARSAL, ULCER DEBRIDEMENT LEFT FOOT WITH GRAFT APPLICATION     PACEMAKER PLACEMENT      1/23/14 pacer/defib    [place] OFFICE/OUTPT VISIT,PROCEDURE ONLY Left 8/23/2018    ULCER DEBRIDEMENT LEFT FOOT WITH GRAFT APPLICATION performed by Olga Lidia Be DPM at [facility] TARSAL/METATARSAL Left 8/9/2018    PARTIAL FOOT AMPUTATION WITH GRAFT APPLICATION performed by Olga Lidia Be DPM at [facility] TARSAL/METATARSAL Left 11/1/2018    PARTIAL RESECTION LEFT METATARSAL, ULCER DEBRIDEMENT LEFT FOOT WITH GRAFT APPLICATION performed by Olga Lidia Be DPM at [facility] 81 TOE AMPUTATION      2 toes       Objective:   /79   Pulse 94   Temp 98.1 °F (36.7 °C) (Oral)   Resp 26   Ht 5' 10\" (1.778 m)   Wt 230 lb (104.3 kg)   SpO2 100%   BMI 33.00 kg/m²       Intake/Output Summary (Last 24 hours) at 8/5/2020 0915  Last data filed at 8/5/2020 0600  Gross per 24 hour   Intake 955 ml   Output 1375 ml   Net -420 ml       TELEMETRY: NSR    Physical Exam:  General: No Respiratory distress, appears well developed and well nourished. Eyes:  Sclera nonicteric  Nose/Sinuses:  negative findings: nose shows no deformity, asymmetry, or inflammation, nasal mucosa normal, septum midline with no perforation or bleeding  Back:  no pain to palpation  Joint:  no active joint inflammation  Musculoskeletal:  negative  Skin:  Warm and dry  Neck:  Negative for JVD and Carotid Bruits. Chest:  Clear to auscultation, respiration easy  Cardiovascular:  RRR, S1S2 normal, no murmur, no rub or thrill.   Abdomen:  Soft normal liver and spleen  Extremities:   No edema, clubbing, cyanosis,  Pulses: Femoral and pedal pulses are normal.  Neuro: intact    Medications:    [START ON 8/6/2020] thyroid  90 mg Oral Daily    sodium chloride flush  10 mL Intravenous 2 times per day    aspirin  81 mg Oral Daily    carvedilol  25 mg Oral BID    pravastatin  20 mg Oral Nightly    spironolactone  25 mg Oral Daily    sodium chloride flush  10 mL Intravenous 2 times per day    azithromycin  500 mg Intravenous Q24H    And    cefTRIAXone (ROCEPHIN) IV  1 g Intravenous Q24H    insulin lispro  0-12 Units Subcutaneous TID WC    insulin lispro  0-6 Units Subcutaneous Nightly    insulin glargine  32 Units Subcutaneous Nightly    mupirocin   Nasal BID    furosemide  40 mg Intravenous BID    enoxaparin  1 mg/kg Subcutaneous BID      dextrose       sodium chloride flush, sodium chloride flush, acetaminophen **OR** acetaminophen, polyethylene glycol, promethazine **OR** ondansetron, glucose, dextrose, glucagon (rDNA), dextrose, albuterol sulfate HFA    Lab Data:  CBC:   Recent Labs     08/04/20  0545 08/05/20  0454   WBC 21.0* 8.6   HGB 14.8 12.0*   HCT 43.6 35.0*   MCV 90.5 90.3    190     BMP:   Recent Labs 20  0545 20  0454    134*   K 4.3 3.5    99   CO2 23 25   BUN 16 16   CREATININE 1.1 0.8*     LIVER PROFILE:   Recent Labs     20  0545 20  0454   AST 28  --    ALT 22  --    LIPASE  --  22.0   BILITOT 0.7  --    ALKPHOS 89  --      PT/INR:   Recent Labs     20  0545   PROTIME 10.7   INR 0.92     APTT:   Recent Labs     2045   APTT 32.0     BNP:  No results for input(s): BNP in the last 72 hours. IMAGING:     TROPONINI 0.15 (H) 2020   Troponin 0.15, 0.23, 0.40, 0.36  BNP 2569  Troponin 0.15 D-dimer 275   Xray abdomen:.  Impression    Nonobstructive bowel gas pattern.         Formed stool noted in the right colon may indicate constipation.           Echo doppler.     Summary   Left ventricular systolic function is severely reduced with ejection   fraction estimated at 25-30 %. Severe global hypokinesis is present. Left ventricle size is normal.   Normal left ventricular wall thickness. Grade III diastolic dysfunction with elevated filing pressure. Mild posterior mitral annular calcification is present. No evidence of mitral valve stenosis. Severe mitral regurgitation. The left atrium is mildly dilated. No evidence of aortic valve stenosis. Mild aortic regurgitation is present. Mild tricuspid regurgitation. Normal systolic pulmonary artery pressure (SPAP) estimated at 39 mmHg (RA   pressure 8 mmHg).   EK20 I have reviewed EKG with the following interpretation: Sinus tachycardia Intra-ventricular conduction delay Septal infarct , age undetermined Abnormal ECGNo significant change was found When compared with ECG of1.31.19Confirmed by GIL HUFFMAN, [address] ()       CXR 20  FINDINGS: Left-sided ICD is seen with single right ventricular lead which is grossly appropriate in position.  The heart is normal in size and configuration.  The mediastinal contours are within normal limits.  Ill-defined dense consolidation is seen scattered within the bilateral lungs, most severe within the right lung base.  No definitive evidence of pleural effusion is identified.  Bones and soft tissues are unremarkable.      Device check 06/02/20  Carelink transmission shows normal sensing and pacing function. See interrogation for more details. Episodes Since: 03-Mar-2020  2 Non-sustained VT and 3 SVT-wavelets (longest 1.5 min). He takes yamile-ox and coreg.       Echo 5.5.16  Summary   Limited study for estimation of ejection fraction. Left ventricular systolic function is mildly reduced with an ejection   fraction of 44% by Alanis's method. Mild global hypokinesis. Diastolic filling parameters suggests grade I diastolic dysfunction. Compared to previous study from 1-9-2014, there is improvement in ejection   fraction. Priority  Sent On  From  To  Message Type     7/10/2013  4:27 PM  MD Mainor Souza MD  CC'd Results    Radiation Dose Estimates     No radiation information found for this patient    Narrative    SPECT MYOCARDIAL PERFUSION SCAN WITH WALL MOTION ANALYSIS AND    EJECTION FRACTION CALCULATION: 7/9/2013.         Comparison: None.         Clinical History: Shortness of breath, cough, leg swelling, CHF.         Findings:         Following the LexiScan protocol, 25.2 mCi of Tc-Myoview was    injected during stress, while 26.2 mCi of Tc-Myoview was injected    during rest.  Then, standard SPECT myocardial perfusion imaging    was performed, along with wall motion analysis and ejection    fraction calculation.         The SPECT myocardial perfusion images demonstrate persistent    decreased radiotracer uptake within the inferoapical wall on both    stress and rest imaging, consistent with chronic infarct. There is    no scintigraphic evidence of stress-induced myocardial ischemia or    a reversible defect. There is global left ventricular dilatation.     Wall motion analysis demonstrates diffuse severe hypokinesis. The    LVEF is severely reduced, measuring approximately 20%.           Impression    IMPRESSION:         1. No scintigraphic evidence of stress-induced myocardial    ischemia.         2. Chronic inferoapical infarct.         3. Severe left ventricular dilatation with severe diffuse    hypokinesis.         4. Severely reduced LVEF of approximately 20%. Assessment:  Acute myocardial injury   Abdominal pain rule out intraabdominal process as a cause of his symptoms  Acute resp failure  Hypothyroidism  Non ischemic cardiomyopathy with low EF  AICD  DM     Plan:  Based on these findings I recommend left heart cath for definitive evaluation of coronary arteries. Risks, benefits, expectations, and alternative treatments were discussed. Questions appropriately answered. Ayesha Alonso agrees to proceed and verbalized understanding. Change armor thyroid to higher dose   Supportive care  Discussed with RN and critical care attending.   Patient Active Problem List    Diagnosis Date Noted    Alcoholic cardiomyopathy 15/58/6576     Priority: High     Class: Chronic    Acute systolic CHF (congestive heart failure) ([address] 07/08/2013     Priority: High     Class: Acute    Acute respiratory failure ([address] 08/04/2020    Community acquired pneumonia     Septicemia ([address]     NSVT (nonsustained ventricular tachycardia) ([address] 11/27/2019    Diabetic ulcer of left foot associated with type 2 diabetes mellitus, with muscle involvement without evidence of necrosis ([address] 04/27/2017    Acquired hypothyroidism 04/18/2017    Diabetic ulcer of right foot associated with type 2 diabetes mellitus, limited to breakdown of skin ([address] 01/29/2017    Hallux valgus 11/11/2016    Callus of foot 11/03/2016    Onychomycosis 04/02/2016    Dystrophic nail 04/02/2016    Hyperlipidemia 02/23/2016    Automatic implantable cardioverter-defibrillator in situ 01/30/2014    Hypertension     Uncontrolled type 2 diabetes mellitus with diabetic polyneuropathy, with long-term current use of insulin ([facility]        Kandice Archibald MD 8/5/2020 9:15 AM

## 2020-08-05 NOTE — PROGRESS NOTES
Report given to Kurtis Daniel RN at bedside. Transferred to PCU in stable condition at this time.    Electronically signed by Janna Simpson RN on 8/5/2020 at 6:11 PM

## 2020-08-05 NOTE — PROGRESS NOTES
Shift assessment complete. Overall, patient appears much improved and more comfortable compared to yesterday. Troponin maxed at 0.40. Now 0.36. No need to trend any further per Dr. Brisa Stewart. Denies any chest pain. However, during episode of SOB and increased WOB patient consistently states that symptoms are as followed, mid abdominal pain that progresses to SOB and then extreme diaphoresis. No episode of this overnight or today thus far. NSR on monitor. Lungs appear clear in uppers and diminished in lowers. On 2L NC now. RR are easy and even. Bowels are active x4. Abdomen is rounded, soft and non tender at this time. States he is passing gas and stool with no problem    Trace edema noted in BLE. Appears improved. Good urine output and kidney function stable. Per Dr. Brisa Stewart there is no plan to CATH today but may CATH tomorrow if COVID is ruled out. ECHO also ordered. Okay to resume full dose lovenox and give food.     Electronically signed by Vineet Gomez RN on 8/5/2020 at 8:47 AM    Vitals:    08/05/20 0802   BP: 120/82   Pulse: 96   Resp: 19   Temp: 98.1 °F (36.7 °C)   SpO2: 98%

## 2020-08-05 NOTE — PROGRESS NOTES
Progress Note    Admit Date:  8/4/2020    Subjective:  Mr. Elfego Alexander is  doing much better today. Hypoxia has resolved. He has had good diuresis on IV Lasix  Echocardiogram done. COVID-19 ruled out   troponin elevated . no CP or SOB today       Objective:   /76   Pulse 90   Temp 97 °F (36.1 °C) (Oral)   Resp 18   Ht 5' 10\" (1.778 m)   Wt 230 lb (104.3 kg)   SpO2 93%   BMI 33.00 kg/m²       Intake/Output Summary (Last 24 hours) at 8/5/2020 1623  Last data filed at 8/5/2020 1228  Gross per 24 hour   Intake 1175 ml   Output 1575 ml   Net -400 ml         Physical Exam:  General:  Awake, alert, NAD  Skin:  Warm and dry  Neck:  JVD absent. Neck supple  Chest:  Clear to auscultation, respiration easy. No wheezes, rales or rhonchi. Cardiovascular:  RRR ,S1S2 normal  Abdomen:  Soft, non tender, non distended, BS +  Extremities:  No edema. Intact peripheral pulses.  Brisk cap refill, < 2 secs  Neuro: non focal      Medications:   Scheduled Meds:   [START ON 8/6/2020] thyroid  90 mg Oral Daily    [START ON 8/6/2020] azithromycin  500 mg Oral Daily    insulin lispro  0-18 Units Subcutaneous TID WC    insulin lispro  0-9 Units Subcutaneous Nightly    insulin glargine  40 Units Subcutaneous Nightly    sodium chloride flush  10 mL Intravenous 2 times per day    aspirin  81 mg Oral Daily    carvedilol  25 mg Oral BID    pravastatin  20 mg Oral Nightly    spironolactone  25 mg Oral Daily    sodium chloride flush  10 mL Intravenous 2 times per day    cefTRIAXone (ROCEPHIN) IV  1 g Intravenous Q24H    mupirocin   Nasal BID    furosemide  40 mg Intravenous BID    enoxaparin  1 mg/kg Subcutaneous BID       Continuous Infusions:   dextrose         Data:  CBC:   Recent Labs     08/04/20  0545 08/05/20  0454   WBC 21.0* 8.6   RBC 4.82 3.87*   HGB 14.8 12.0*   HCT 43.6 35.0*   MCV 90.5 90.3   RDW 14.0 14.1    190     BMP:   Recent Labs     08/04/20  0545 08/05/20  0454    134*   K 4.3 3.5  99   CO2 23 25   BUN 16 16   CREATININE 1.1 0.8*     BNP: No results for input(s): BNP in the last 72 hours. PT/INR:   Recent Labs     08/04/20  0545   PROTIME 10.7   INR 0.92     APTT:   Recent Labs     08/04/20  0545   APTT 32.0     CARDIAC ENZYMES:   Recent Labs     08/04/20  1029 08/04/20  1709 08/04/20  2051   TROPONINI 0.23* 0.40* 0.36*     FASTING LIPID PANEL:  Lab Results   Component Value Date    CHOL 100 10/16/2019    HDL 30 (L) 05/26/2020    TRIG 176 (H) 10/16/2019     LIVER PROFILE:   Recent Labs     08/04/20  0545   AST 28   ALT 22   BILITOT 0.7   ALKPHOS 89          XR ABDOMEN (KUB) (SINGLE AP VIEW)   Final Result   Nonobstructive bowel gas pattern. Formed stool noted in the right colon may indicate constipation. XR CHEST PORTABLE   Final Result   Bilateral lung multifocal ill-defined consolidation, greatest at the right   lung base, consistent with pneumonia.               ECHO   Summary    Left ventricular systolic function is severely reduced with ejection    fraction estimated at 25-30 %.    Severe global hypokinesis is present.    Left ventricle size is normal.    Normal left ventricular wall thickness.    Grade III diastolic dysfunction with elevated filing pressure.    Mild posterior mitral annular calcification is present.    No evidence of mitral valve stenosis.    Severe mitral regurgitation.    The left atrium is mildly dilated.    No evidence of aortic valve stenosis.    Mild aortic regurgitation is present.    Mild tricuspid regurgitation.    Normal systolic pulmonary artery pressure (SPAP) estimated at 39 mmHg (RA    pressure 8 mmHg).     Assessment:  Active Problems:    Acute systolic CHF (congestive heart failure) (HCC)    Acute respiratory failure ([address]    Community acquired pneumonia    Septicemia ([address]  Resolved Problems:    * No resolved hospital problems. *      Plan:     Acute hypoxic respiratory failure  - Patient presented in respiratory distress. Admitted to ICU . Seen by pulmonology , placed on high flow O2 per vapotherm   - due to CHF.   - PNA ruled out . COVID  ruled out.   - off  droplet plus precautions. abx Dced   - wean oxygen as tolerated. On RA today     SIRS  - lactic acidosis, leukocytosis, tachycardia, tachypnea, hypoxia-- resolved   - no sepsis  - not requiring pressors  - off abx      Acute on chronic systolic CHF  Severe nonischemic cardiomyopathy due to alcohol abuse  -Ejection fraction 15% Per old records in 2013 . pt had neg stress test at that time . S/P AICD. Ayleen Roblero No recent echocardiogram in chart. Per pt EF was up to 45 % in 2016     Checked ECHO this admission on 8/5/2020-->  EF 25 to 30 %  - s/p AICD. - continue IV Lasix, he is starting to diurese well  - cont Aldactone, Coreg, Lisinopril      Elevated troponin   NSTEMI  - 0.15, 0.23,0.36   - monitor on tele  - consulted cardiology. - aspirin was added. - plan cath in AM      DM type 2, with hyperglycemia  Neuropathy  - monitor glucose. - continue Lantus 32 units nightly. SSI coverage.      Hyperlipidemia  - on Pravastatin.      Hypothyroidism  - continue Willard thyroid- dose increased . Patient refuses to take any other form of levothyroxine. Checked TSH 4.79     DVT Prophylaxis: Lovenox  Diet: DIET CARB CONTROL;  Code Status: Full Code     Plan transfer to PCU.    Keep n.p.o. after midnight for cardiac cath in a.m..     Kristan No MD 8/5/2020 4:23 PM

## 2020-08-05 NOTE — PROGRESS NOTES
Pulmonary Progress Note    CC: Acute hypoxic respiratory failure    Subjective:  Did not need Bipap overnight. Related that his abd pain came with SOB and sweating. Has not recuurred today      EXAM: /78   Pulse 96   Temp 98.5 °F (36.9 °C) (Oral)   Resp 15   Ht 5' 10\" (1.778 m)   Wt 230 lb (104.3 kg)   SpO2 97%   BMI 33.00 kg/m²  on 2lpm  Constitutional:  No acute distress. Eyes: PERRL. Conjunctivae anicteric. ENT: Normal nose. Normal tongue. Neck:  Trachea is midline. No thyroid tenderness. Respiratory: No accessory muscle usage. no decreased breath sounds. No wheezes. No rales. No Rhonchi. Cardiovascular: Normal S1S2. No digit clubbing. No digit cyanosis. No LE edema. Psychiatric: No anxiety or Agitation. Alert and Oriented to person, place and time.     Scheduled Meds:   sodium chloride flush  10 mL Intravenous 2 times per day    aspirin  81 mg Oral Daily    carvedilol  25 mg Oral BID    pravastatin  20 mg Oral Nightly    spironolactone  25 mg Oral Daily    sodium chloride flush  10 mL Intravenous 2 times per day    azithromycin  500 mg Intravenous Q24H    And    cefTRIAXone (ROCEPHIN) IV  1 g Intravenous Q24H    insulin lispro  0-12 Units Subcutaneous TID WC    insulin lispro  0-6 Units Subcutaneous Nightly    insulin glargine  32 Units Subcutaneous Nightly    mupirocin   Nasal BID    vancomycin  1,500 mg Intravenous Q12H    furosemide  40 mg Intravenous BID    thyroid  60 mg Oral Daily    enoxaparin  1 mg/kg Subcutaneous BID     Continuous Infusions:   dextrose       PRN Meds:  sodium chloride flush, sodium chloride flush, acetaminophen **OR** acetaminophen, polyethylene glycol, promethazine **OR** ondansetron, glucose, dextrose, glucagon (rDNA), dextrose, albuterol sulfate HFA    Labs:  CBC:   Recent Labs     08/04/20  0545 08/05/20  0454   WBC 21.0* 8.6   HGB 14.8 12.0*   HCT 43.6 35.0*   MCV 90.5 90.3    190     BMP:   Recent Labs     08/04/20  0545 08/05/20  0454    134*   K 4.3 3.5    99   CO2 23 25   BUN 16 16   CREATININE 1.1 0.8*       Cultures:    Chest imaging was reviewed by me and showed   Bilateral lung multifocal ill-defined consolidation, greatest at the right    lung base, consistent with pneumonia.            ASSESSMENT:  · Acute hypoxic respiratory failure -resolved  · Acute on chronic systolic heart failure  · NSTEMI  · Possible CAP: His procalcitonin is normal and leukocytosis resolved  · ICD  · DM2  · NO further abdominal pain today - gilmer have been an anginal equivalent     PLAN:  · Droplet plus precautions while COVID test pending  · IV lasix  · TTE pending  · Cardiology following and rec [facility] if COVID negative  · CTX/Azithromycin D#2  · Increase Lantus to 40 and SSI to high  · Stop vanc today and consider stopping all abx soon  · Sputum Cx  · SSI  · Lovenox therapeutic dose  · OK for PCU with tele  · Check KUB for abd pain he had yesterday  · OK for transfer to [facility] [place] if VOID neg today

## 2020-08-05 NOTE — FLOWSHEET NOTE
08/04/20 2002   Vital Signs   Temp 98.5 °F (36.9 °C)   Temp Source Oral   Pulse 108   Heart Rate Source Monitor   Resp 22   /88   MAP (mmHg) 99   Level of Consciousness 0   MEWS Score 3   Oxygen Therapy   SpO2 95 %   O2 Device High flow nasal cannula   O2 Flow Rate (L/min) 3 L/min   Pt. Resting in bed. Call light in reach. Shift assessment completed see flow sheet. Denies any needs at this time. Will continue to monitor.

## 2020-08-05 NOTE — CARE COORDINATION
Pulmonary Rehab: No  Pain Clinic: No  Community Mental Health: No    Wound Clinic: No     COVID SCREENING: Yes - collected 8/4/20    Other: none    The Plan for Transition of Care is related to the following treatment goals:  Return home    DISCHARGE PLAN:  Patient states that he lives with his mother in one floor home. He states he was independent with all ADLs and does his own driving. He denies any home health needs. He may need to transfer to [facility] for a cardiac cath. Explained Case Management role/services.

## 2020-08-06 ENCOUNTER — HOSPITAL ENCOUNTER (INPATIENT)
Dept: CARDIAC CATH/INVASIVE PROCEDURES | Age: 55
LOS: 9 days | Discharge: HOME HEALTH | DRG: 233 | End: 2020-08-15
Attending: INTERNAL MEDICINE | Admitting: THORACIC SURGERY (CARDIOTHORACIC VASCULAR SURGERY)
Payer: COMMERCIAL

## 2020-08-06 ENCOUNTER — PROCEDURE VISIT (OUTPATIENT)
Dept: CARDIOLOGY CLINIC | Age: 55
End: 2020-08-06

## 2020-08-06 VITALS
HEART RATE: 93 BPM | SYSTOLIC BLOOD PRESSURE: 132 MMHG | TEMPERATURE: 97.6 F | RESPIRATION RATE: 18 BRPM | WEIGHT: 231.3 LBS | DIASTOLIC BLOOD PRESSURE: 78 MMHG | BODY MASS INDEX: 33.11 KG/M2 | OXYGEN SATURATION: 96 % | HEIGHT: 70 IN

## 2020-08-06 PROBLEM — E66.9 OBESITY: Status: ACTIVE | Noted: 2020-08-06

## 2020-08-06 PROBLEM — I25.10 CAD IN NATIVE ARTERY: Status: ACTIVE | Noted: 2020-08-06

## 2020-08-06 PROBLEM — R07.9 CHEST PAIN: Status: ACTIVE | Noted: 2020-08-06

## 2020-08-06 LAB
ALBUMIN SERPL-MCNC: 3.2 G/DL (ref 3.4–5)
ANION GAP SERPL CALCULATED.3IONS-SCNC: 10 MMOL/L (ref 3–16)
BUN BLDV-MCNC: 22 MG/DL (ref 7–20)
CALCIUM SERPL-MCNC: 8.5 MG/DL (ref 8.3–10.6)
CHLORIDE BLD-SCNC: 100 MMOL/L (ref 99–110)
CO2: 26 MMOL/L (ref 21–32)
CREAT SERPL-MCNC: 1 MG/DL (ref 0.9–1.3)
GFR AFRICAN AMERICAN: >60
GFR NON-AFRICAN AMERICAN: >60
GLUCOSE BLD-MCNC: 175 MG/DL (ref 70–99)
GLUCOSE BLD-MCNC: 240 MG/DL (ref 70–99)
GLUCOSE BLD-MCNC: 255 MG/DL (ref 70–99)
GLUCOSE BLD-MCNC: 264 MG/DL (ref 70–99)
GLUCOSE BLD-MCNC: 276 MG/DL (ref 70–99)
HCT VFR BLD CALC: 31.9 % (ref 40.5–52.5)
HEMOGLOBIN: 10.8 G/DL (ref 13.5–17.5)
MCH RBC QN AUTO: 30.1 PG (ref 26–34)
MCHC RBC AUTO-ENTMCNC: 33.8 G/DL (ref 31–36)
MCV RBC AUTO: 89.1 FL (ref 80–100)
PDW BLD-RTO: 13.7 % (ref 12.4–15.4)
PERFORMED ON: ABNORMAL
PHOSPHORUS: 3.4 MG/DL (ref 2.5–4.9)
PLATELET # BLD: 179 K/UL (ref 135–450)
PMV BLD AUTO: 7.2 FL (ref 5–10.5)
POC ACT LR: 255 SEC
POTASSIUM SERPL-SCNC: 4 MMOL/L (ref 3.5–5.1)
RBC # BLD: 3.58 M/UL (ref 4.2–5.9)
SODIUM BLD-SCNC: 136 MMOL/L (ref 136–145)
WBC # BLD: 5.9 K/UL (ref 4–11)

## 2020-08-06 PROCEDURE — B2111ZZ FLUOROSCOPY OF MULTIPLE CORONARY ARTERIES USING LOW OSMOLAR CONTRAST: ICD-10-PCS | Performed by: INTERNAL MEDICINE

## 2020-08-06 PROCEDURE — 80069 RENAL FUNCTION PANEL: CPT

## 2020-08-06 PROCEDURE — 2580000003 HC RX 258

## 2020-08-06 PROCEDURE — 6360000002 HC RX W HCPCS: Performed by: INTERNAL MEDICINE

## 2020-08-06 PROCEDURE — 2580000003 HC RX 258: Performed by: INTERNAL MEDICINE

## 2020-08-06 PROCEDURE — 6360000002 HC RX W HCPCS

## 2020-08-06 PROCEDURE — 99238 HOSP IP/OBS DSCHRG MGMT 30/<: CPT | Performed by: INTERNAL MEDICINE

## 2020-08-06 PROCEDURE — 36415 COLL VENOUS BLD VENIPUNCTURE: CPT

## 2020-08-06 PROCEDURE — 2060000000 HC ICU INTERMEDIATE R&B

## 2020-08-06 PROCEDURE — 2500000003 HC RX 250 WO HCPCS

## 2020-08-06 PROCEDURE — B2151ZZ FLUOROSCOPY OF LEFT HEART USING LOW OSMOLAR CONTRAST: ICD-10-PCS | Performed by: INTERNAL MEDICINE

## 2020-08-06 PROCEDURE — 85347 COAGULATION TIME ACTIVATED: CPT

## 2020-08-06 PROCEDURE — 93458 L HRT ARTERY/VENTRICLE ANGIO: CPT | Performed by: INTERNAL MEDICINE

## 2020-08-06 PROCEDURE — 4A023N7 MEASUREMENT OF CARDIAC SAMPLING AND PRESSURE, LEFT HEART, PERCUTANEOUS APPROACH: ICD-10-PCS | Performed by: INTERNAL MEDICINE

## 2020-08-06 PROCEDURE — 6370000000 HC RX 637 (ALT 250 FOR IP): Performed by: INTERNAL MEDICINE

## 2020-08-06 PROCEDURE — 93458 L HRT ARTERY/VENTRICLE ANGIO: CPT

## 2020-08-06 PROCEDURE — 2709999900 HC NON-CHARGEABLE SUPPLY

## 2020-08-06 PROCEDURE — 93642 EP EVL 1/2CHMB TRNSVNS CVDFB: CPT

## 2020-08-06 PROCEDURE — 85027 COMPLETE CBC AUTOMATED: CPT

## 2020-08-06 PROCEDURE — C1894 INTRO/SHEATH, NON-LASER: HCPCS

## 2020-08-06 PROCEDURE — 99233 SBSQ HOSP IP/OBS HIGH 50: CPT | Performed by: INTERNAL MEDICINE

## 2020-08-06 PROCEDURE — 6360000004 HC RX CONTRAST MEDICATION

## 2020-08-06 PROCEDURE — C1769 GUIDE WIRE: HCPCS

## 2020-08-06 PROCEDURE — 99232 SBSQ HOSP IP/OBS MODERATE 35: CPT | Performed by: INTERNAL MEDICINE

## 2020-08-06 RX ORDER — SODIUM CHLORIDE 0.9 % (FLUSH) 0.9 %
10 SYRINGE (ML) INJECTION PRN
Status: DISCONTINUED | OUTPATIENT
Start: 2020-08-06 | End: 2020-08-11

## 2020-08-06 RX ORDER — 0.9 % SODIUM CHLORIDE 0.9 %
INTRAVENOUS SOLUTION INTRAVENOUS CONTINUOUS PRN
Status: COMPLETED | OUTPATIENT
Start: 2020-08-06 | End: 2020-08-06

## 2020-08-06 RX ORDER — FUROSEMIDE 40 MG/1
40 TABLET ORAL 2 TIMES DAILY
Status: DISCONTINUED | OUTPATIENT
Start: 2020-08-06 | End: 2020-08-11

## 2020-08-06 RX ORDER — SODIUM CHLORIDE 0.9 % (FLUSH) 0.9 %
10 SYRINGE (ML) INJECTION EVERY 12 HOURS SCHEDULED
Status: DISCONTINUED | OUTPATIENT
Start: 2020-08-06 | End: 2020-08-11

## 2020-08-06 RX ORDER — DEXTROSE MONOHYDRATE 25 G/50ML
12.5 INJECTION, SOLUTION INTRAVENOUS PRN
Status: DISCONTINUED | OUTPATIENT
Start: 2020-08-06 | End: 2020-08-11

## 2020-08-06 RX ORDER — FENTANYL CITRATE 50 UG/ML
INJECTION, SOLUTION INTRAMUSCULAR; INTRAVENOUS
Status: COMPLETED | OUTPATIENT
Start: 2020-08-06 | End: 2020-08-06

## 2020-08-06 RX ORDER — DEXTROSE MONOHYDRATE 50 MG/ML
100 INJECTION, SOLUTION INTRAVENOUS PRN
Status: DISCONTINUED | OUTPATIENT
Start: 2020-08-06 | End: 2020-08-11

## 2020-08-06 RX ORDER — HEPARIN SODIUM 1000 [USP'U]/ML
INJECTION, SOLUTION INTRAVENOUS; SUBCUTANEOUS
Status: COMPLETED | OUTPATIENT
Start: 2020-08-06 | End: 2020-08-06

## 2020-08-06 RX ORDER — CARVEDILOL 6.25 MG/1
25 TABLET ORAL 2 TIMES DAILY
Status: DISCONTINUED | OUTPATIENT
Start: 2020-08-06 | End: 2020-08-11

## 2020-08-06 RX ORDER — POLYETHYLENE GLYCOL 3350 17 G/17G
17 POWDER, FOR SOLUTION ORAL DAILY PRN
Status: DISCONTINUED | OUTPATIENT
Start: 2020-08-06 | End: 2020-08-11

## 2020-08-06 RX ORDER — SODIUM CHLORIDE 9 MG/ML
INJECTION, SOLUTION INTRAVENOUS
Status: COMPLETED
Start: 2020-08-06 | End: 2020-08-06

## 2020-08-06 RX ORDER — PROMETHAZINE HYDROCHLORIDE 25 MG/1
12.5 TABLET ORAL EVERY 6 HOURS PRN
Status: DISCONTINUED | OUTPATIENT
Start: 2020-08-06 | End: 2020-08-11

## 2020-08-06 RX ORDER — INSULIN GLARGINE 100 [IU]/ML
72 INJECTION, SOLUTION SUBCUTANEOUS NIGHTLY
Status: DISCONTINUED | OUTPATIENT
Start: 2020-08-06 | End: 2020-08-10

## 2020-08-06 RX ORDER — ACETAMINOPHEN 325 MG/1
650 TABLET ORAL EVERY 6 HOURS PRN
Status: DISCONTINUED | OUTPATIENT
Start: 2020-08-06 | End: 2020-08-11

## 2020-08-06 RX ORDER — ACETAMINOPHEN 650 MG/1
650 SUPPOSITORY RECTAL EVERY 6 HOURS PRN
Status: DISCONTINUED | OUTPATIENT
Start: 2020-08-06 | End: 2020-08-11

## 2020-08-06 RX ORDER — SPIRONOLACTONE 25 MG/1
25 TABLET ORAL DAILY
Status: DISCONTINUED | OUTPATIENT
Start: 2020-08-07 | End: 2020-08-11

## 2020-08-06 RX ORDER — MIDAZOLAM HYDROCHLORIDE 5 MG/ML
INJECTION INTRAMUSCULAR; INTRAVENOUS
Status: COMPLETED | OUTPATIENT
Start: 2020-08-06 | End: 2020-08-06

## 2020-08-06 RX ORDER — LEVOTHYROXINE AND LIOTHYRONINE 19; 4.5 UG/1; UG/1
60 TABLET ORAL DAILY
Status: DISCONTINUED | OUTPATIENT
Start: 2020-08-07 | End: 2020-08-11

## 2020-08-06 RX ORDER — ACETAMINOPHEN 325 MG/1
650 TABLET ORAL EVERY 4 HOURS PRN
Status: DISCONTINUED | OUTPATIENT
Start: 2020-08-06 | End: 2020-08-11

## 2020-08-06 RX ORDER — ASPIRIN 81 MG/1
81 TABLET ORAL DAILY
Status: DISCONTINUED | OUTPATIENT
Start: 2020-08-07 | End: 2020-08-11

## 2020-08-06 RX ORDER — LISINOPRIL 10 MG/1
5 TABLET ORAL DAILY
Status: DISCONTINUED | OUTPATIENT
Start: 2020-08-07 | End: 2020-08-11

## 2020-08-06 RX ORDER — ONDANSETRON 2 MG/ML
4 INJECTION INTRAMUSCULAR; INTRAVENOUS EVERY 6 HOURS PRN
Status: DISCONTINUED | OUTPATIENT
Start: 2020-08-06 | End: 2020-08-11

## 2020-08-06 RX ORDER — PRAVASTATIN SODIUM 40 MG
40 TABLET ORAL NIGHTLY
Status: DISCONTINUED | OUTPATIENT
Start: 2020-08-06 | End: 2020-08-11

## 2020-08-06 RX ORDER — NICOTINE POLACRILEX 4 MG
15 LOZENGE BUCCAL PRN
Status: DISCONTINUED | OUTPATIENT
Start: 2020-08-06 | End: 2020-08-11

## 2020-08-06 RX ORDER — GLIPIZIDE 5 MG/1
5 TABLET ORAL
Status: DISCONTINUED | OUTPATIENT
Start: 2020-08-06 | End: 2020-08-10

## 2020-08-06 RX ADMIN — MIDAZOLAM HYDROCHLORIDE 2 MG: 5 INJECTION INTRAMUSCULAR; INTRAVENOUS at 16:28

## 2020-08-06 RX ADMIN — CARVEDILOL 25 MG: 6.25 TABLET, FILM COATED ORAL at 21:47

## 2020-08-06 RX ADMIN — MIDAZOLAM HYDROCHLORIDE 1 MG: 5 INJECTION INTRAMUSCULAR; INTRAVENOUS at 16:55

## 2020-08-06 RX ADMIN — AZITHROMYCIN 500 MG: 250 TABLET, FILM COATED ORAL at 08:13

## 2020-08-06 RX ADMIN — SPIRONOLACTONE 25 MG: 25 TABLET ORAL at 08:13

## 2020-08-06 RX ADMIN — Medication 10 ML: at 08:14

## 2020-08-06 RX ADMIN — Medication 10 ML: at 20:22

## 2020-08-06 RX ADMIN — FENTANYL CITRATE 50 MCG: 50 INJECTION, SOLUTION INTRAMUSCULAR; INTRAVENOUS at 16:45

## 2020-08-06 RX ADMIN — HEPARIN SODIUM 5000 UNITS: 1000 INJECTION, SOLUTION INTRAVENOUS; SUBCUTANEOUS at 16:47

## 2020-08-06 RX ADMIN — Medication 10 ML: at 08:13

## 2020-08-06 RX ADMIN — PRAVASTATIN SODIUM 40 MG: 40 TABLET ORAL at 21:47

## 2020-08-06 RX ADMIN — GLIPIZIDE 5 MG: 5 TABLET ORAL at 18:06

## 2020-08-06 RX ADMIN — INSULIN GLARGINE 72 UNITS: 100 INJECTION, SOLUTION SUBCUTANEOUS at 22:31

## 2020-08-06 RX ADMIN — CARVEDILOL 25 MG: 25 TABLET, FILM COATED ORAL at 08:13

## 2020-08-06 RX ADMIN — MAGNESIUM GLUCONATE 500 MG ORAL TABLET 400 MG: 500 TABLET ORAL at 21:47

## 2020-08-06 RX ADMIN — CEFTRIAXONE SODIUM 1 G: 1 INJECTION, POWDER, FOR SOLUTION INTRAMUSCULAR; INTRAVENOUS at 05:18

## 2020-08-06 RX ADMIN — LISINOPRIL 2.5 MG: 5 TABLET ORAL at 08:13

## 2020-08-06 RX ADMIN — FENTANYL CITRATE 50 MCG: 50 INJECTION, SOLUTION INTRAMUSCULAR; INTRAVENOUS at 16:28

## 2020-08-06 RX ADMIN — FENTANYL CITRATE 50 MCG: 50 INJECTION, SOLUTION INTRAMUSCULAR; INTRAVENOUS at 17:05

## 2020-08-06 RX ADMIN — FENTANYL CITRATE 50 MCG: 50 INJECTION, SOLUTION INTRAMUSCULAR; INTRAVENOUS at 16:55

## 2020-08-06 RX ADMIN — Medication 500 ML: at 16:47

## 2020-08-06 RX ADMIN — ASPIRIN 81 MG: 81 TABLET, CHEWABLE ORAL at 08:13

## 2020-08-06 RX ADMIN — FUROSEMIDE 40 MG: 40 TABLET ORAL at 18:06

## 2020-08-06 RX ADMIN — MIDAZOLAM HYDROCHLORIDE 1 MG: 5 INJECTION INTRAMUSCULAR; INTRAVENOUS at 17:05

## 2020-08-06 RX ADMIN — INSULIN LISPRO 2 UNITS: 100 INJECTION, SOLUTION INTRAVENOUS; SUBCUTANEOUS at 18:07

## 2020-08-06 RX ADMIN — THYROID, PORCINE 90 MG: 30 TABLET ORAL at 05:19

## 2020-08-06 RX ADMIN — SODIUM CHLORIDE 250 ML: 9 INJECTION, SOLUTION INTRAVENOUS at 05:20

## 2020-08-06 RX ADMIN — MIDAZOLAM HYDROCHLORIDE 2 MG: 5 INJECTION INTRAMUSCULAR; INTRAVENOUS at 16:45

## 2020-08-06 ASSESSMENT — PAIN SCALES - GENERAL
PAINLEVEL_OUTOF10: 0
PAINLEVEL_OUTOF10: 0

## 2020-08-06 NOTE — H&P
Hospital Medicine History & Physical      PCP: Pau Manriquez MD    Date of Admission: 8/6/2020    Date of Service: Pt seen/examined on 6 August and Admitted to Inpatient with expected LOS greater than two midnights due to medical therapy. Chief Complaint:  Chest Pain       History Of Present Illness:       54 y.o. male w/ hx DM/HTN who presented to [facility] with moderately severe substernal chest pain and underwent LHCath found to have multivessel CAD. He is currently CP free. The patient denies any fever/chills or other signs/sxs of systemic illness or identifiable aggravating/alleviating factors.       Past Medical History:          Diagnosis Date    Acute osteomyelitis of left foot ([address] 9/27/2017    Acute osteomyelitis of right foot ([address] 4/25/2016    Ascites     Blood transfusion reaction     Burst fracture of lumbar vertebra (HCC) 11/9/2012    Cellulitis of left foot     Cellulitis of right lower extremity 2/16, 5/16    Diabetes ([address]     Diabetic ulcer of right foot ([address] early 2016    Diabetic ulcer of 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 2 diabetes mellitus, with necrosis of bone ([address]     ETOH abuse     Fracture of tibial plateau 21/4/5623    High cholesterol     HTN (hypertension)     MI (myocardial infarction) ([address] 08/04/2020    + Troponins    MRSA (methicillin resistant staph aureus) culture positive 4/28/16, 4/20/16    foot wound    Neuropathic ulcer of left foot, limited to breakdown of skin ([address] 11/3/2016    Neuropathic ulcer of toe ([address] 2/13/2014    NSVT (nonsustained ventricular tachycardia) ([address] 2014    Pleural effusion due to congestive heart failure ([address]     Smoker     quit [id number]    Systolic CHF, acute ([address] 7/8/2013       Past Surgical History:          Procedure Laterality Date    CARDIAC DEFIBRILLATOR PLACEMENT  01/29/2014    ICD Placement    FOOT AMPUTATION Left 08/09/2018    PARTIAL FOOT AMPUTATION w. Dr Joanna Ortega DEBRIDEMENT Left 1/31/2019    EXOSTECTOMY LEFT FOOT, ULCER DEBRIDEMENT LEFT FOOT WITH GRAFT APPLICATION performed by Jaswant Jacobo DPM at [address] Left 09/27/2017    LEFT FOOT ULCER DEBRIDEMENT, POSSIBLE SECOND METATARSAL    FOOT SURGERY Left 01/31/2019    Exostectomy left foot, ulcer debridement with graft application left foot    FOOT TENDON SURGERY Right 2016    FOOT TENDON SURGERY Left 06/30/2017    KNEE SURGERY Left     OTHER SURGICAL HISTORY Bilateral 9/17/15    amputation 2nd digit bilateral, flexor tenotomy right hallux    OTHER SURGICAL HISTORY Left 08/17/2017    PARTIAL LEFT FOOT AMPUTATION      OTHER SURGICAL HISTORY Left 12/07/2017    Debridement infected bone and tissue left foot; ulcer debridement left foot with graft application with dr mathur     OTHER SURGICAL HISTORY Right 01/04/2018    DEBRIDEMENT INFECTED BONE AND TISSUE RIGHT FOOT, ULCER DEBRIDEMENT RIGHT FOOT WITH GRAFT APPLICATION    OTHER SURGICAL HISTORY Left 11/01/2018    Procedure: PARTIAL RESECTION LEFT METATARSAL, ULCER DEBRIDEMENT LEFT FOOT WITH GRAFT APPLICATION     DE OFFICE/OUTPT VISIT,PROCEDURE ONLY Left 8/23/2018    ULCER DEBRIDEMENT LEFT FOOT WITH GRAFT APPLICATION performed by Jaswant Jacobo DPM at [facility] TARSAL/METATARSAL Left 8/9/2018    PARTIAL FOOT AMPUTATION WITH GRAFT APPLICATION performed by Jaswant Jacobo DPM at [facility] TARSAL/METATARSAL Left 11/1/2018    PARTIAL RESECTION LEFT METATARSAL, ULCER DEBRIDEMENT LEFT FOOT WITH GRAFT APPLICATION performed by Jaswant Jacobo DPM at [address] [facility]      2 toes       Medications Prior to Admission:      Prior to Admission medications    Medication Sig Start Date End Date Taking?  Authorizing Provider   TRULICITY 1.5 HM/6.7RQ SOPN Inject 1.5 mg (1 PEN) into the skin once a week 7/20/20  Yes MD SANDRA Torres 300 UNIT/ML SOPN Inject 90 Units into the skin nightly 7/20/20  Yes 7/15/15  Yes Melania Taylor MD   Lancets MISC Use to test blood sugars once daily. 7/15/15  Yes Melania Taylor MD       Allergies:  Bactrim [sulfamethoxazole-trimethoprim] and Bee venom    Social History:      The patient currently lives independently    TOBACCO:   reports that he quit smoking about 40 years ago. He has never used smokeless tobacco.  ETOH:   reports no history of alcohol use. Family History:      Reviewed in detail and positive as follows:        Problem Relation Age of Onset    Heart Disease Mother     High Blood Pressure Mother     High Cholesterol Mother     Diabetes Mother     Anemia Mother     Heart Disease Father     High Blood Pressure Father     High Cholesterol Father     Heart Disease Maternal Grandmother     High Blood Pressure Maternal Grandmother     High Cholesterol Maternal Grandmother     Cancer Maternal Grandmother         bone marrow & breast    Heart Disease Maternal Grandfather     High Blood Pressure Maternal Grandfather     High Cholesterol Maternal Grandfather     Cancer Maternal Grandfather         pancreatic CA    Heart Disease Paternal Grandmother     High Blood Pressure Paternal Grandmother     High Cholesterol Paternal Grandmother     Heart Disease Paternal Grandfather     High Blood Pressure Paternal Grandfather     High Cholesterol Paternal Grandfather     Stroke Brother     Heart Failure Brother     Heart Disease Brother     Diabetes type 2  Brother     Diabetes type 2  Brother     Diabetes type 2  Brother        REVIEW OF SYSTEMS:   Pertinent positives as noted in the HPI. All other systems reviewed and negative. PHYSICAL EXAM:    Ht 5' 10\" (1.778 m)   Wt 231 lb (104.8 kg)   BMI 33.15 kg/m²     General appearance:  No apparent distress, appears stated age and cooperative. HEENT:  Normal cephalic, atraumatic without obvious deformity. Pupils equal, round, and reactive to light. Extra ocular muscles intact. Conjunctivae/corneas clear. Neck: Supple, with full range of motion. No jugular venous distention. Trachea midline. Respiratory:  Normal respiratory effort. Clear to auscultation, bilaterally without Rales/Wheezes/Rhonchi. Cardiovascular:  Regular rate and rhythm with normal S1/S2 without murmurs, rubs or gallops. Abdomen: Soft, non-tender, non-distended with normal bowel sounds. Musculoskeletal:  No clubbing, cyanosis or edema bilaterally. Full range of motion without deformity. Skin: Skin color, texture, turgor normal.  No rashes or lesions. Neurologic:  Neurovascularly intact without any focal sensory/motor deficits. Cranial nerves: II-XII intact, grossly non-focal.  Psychiatric:  Alert and oriented, thought content appropriate, normal insight  Capillary Refill: Brisk,< 3 seconds   Peripheral Pulses: +2 palpable, equal bilaterally       CXR:  I have reviewed the CXR with the following interpretation: No acute ASD   EKG:  I have reviewed the EKG with the following interpretation: No acute ischemic changes.      Labs:     Recent Labs     08/04/20  0545 08/05/20  0454 08/06/20  0511   WBC 21.0* 8.6 5.9   HGB 14.8 12.0* 10.8*   HCT 43.6 35.0* 31.9*    190 179     Recent Labs     08/04/20  0545 08/05/20  0454 08/06/20  0511    134* 136   K 4.3 3.5 4.0    99 100   CO2 23 25 26   BUN 16 16 22*   CREATININE 1.1 0.8* 1.0   CALCIUM 9.0 8.6 8.5   PHOS  --   --  3.4     Recent Labs     08/04/20  0545   AST 28   ALT 22   BILITOT 0.7   ALKPHOS 89     Recent Labs     08/04/20  0545   INR 0.92     Recent Labs     08/04/20  1029 08/04/20  1709 08/04/20  2051   TROPONINI 0.23* 0.40* 0.36*       Urinalysis:      Lab Results   Component Value Date    NITRU Negative 09/26/2017    WBCUA None seen 09/26/2017    RBCUA 0-2 09/26/2017    BLOODU Negative 09/26/2017    SPECGRAV 1.020 09/26/2017    GLUCOSEU Negative 09/26/2017         ASSESSMENT:    Active Hospital Problems    Diagnosis Date Noted    CAD in native artery [I25.10] 08/06/2020    Chest pain [R07.9] 08/06/2020    Obesity [E66.9] 08/06/2020    Acquired hypothyroidism [E03.9] 04/18/2017    Hyperlipidemia [E78.5] 02/23/2016    Hypertension [I10]     DM (diabetes mellitus) ([facility] [E11.9] 09/18/2012       PLAN:      CAD - multivessel CAD on [facility] 6 August w/out complications for CT surgery eval vs high risk staged PCI. HTN - w/out known CAD and no evidence of active signs/sxs of ischemia/failure. Currently controlled on home meds w/ vitals reviewed and documented as above. HyperLipidemia - controlled on home Statin. Continue, w/ f/u and med adjustment w/ PCP    DM2 - controlled on home Insulin - continued. Follow FSBS/SSI high regimen. Last HbA1c 7.4% dated May 2020. Anticipate resuming/continuing home regimen at discharge. HypoThyroid - clinically euthyroid on oral replacement therapy. Continue, w/ outpt monitoring as previously arranged. Obesity -  With Body mass index is 33.15 kg/m². Complicating assessment and treatment. Placing patient at risk for multiple co-morbidities as well as early death and contributing to the patient's presentation. Counseled on weight loss. DVT Prophylaxis: LMWH  Diet: DIET GENERAL;  Code Status: Full Code      PT/OT Eval Status: Not yet ordered. Dispo - Here for the foreseeable future. Zayda Manriquez MD    Thank you Soumya Fuentes MD for the opportunity to be involved in this patient's care. If you have any questions or concerns please feel free to contact me at [phone].

## 2020-08-06 NOTE — PLAN OF CARE
Problem: Pain:  Goal: Pain level will decrease  Description: Pain level will decrease  8/5/2020 1038 by Carolanne Sandifer, RN  Outcome: Ongoing     Problem: Pain:  Goal: Control of acute pain  Description: Control of acute pain  8/5/2020 1038 by Carolanne Sandifer, RN  Outcome: Ongoing     Problem: Pain:  Goal: Control of chronic pain  Description: Control of chronic pain  8/5/2020 1038 by Carolanne Sandifer, RN  Outcome: Ongoing     Problem: Pain:  Goal: Patient's pain/discomfort is manageable  Description: Patient's pain/discomfort is manageable  8/5/2020 1038 by Carolanne Sandifer, RN  Outcome: Ongoing     Problem: Falls - Risk of:  Goal: Will remain free from falls  Description: Will remain free from falls  8/5/2020 1038 by Carolanne Sandifer, RN  Outcome: Ongoing     Problem: Falls - Risk of:  Goal: Absence of physical injury  Description: Absence of physical injury  8/5/2020 1038 by Carolanne Sandifer, RN  Outcome: Ongoing     Problem: Infection:  Goal: Will remain free from infection  Description: Will remain free from infection  8/5/2020 1038 by Carolanne Sandifer, RN  Outcome: Ongoing     Problem: Safety:  Goal: Free from accidental physical injury  Description: Free from accidental physical injury  8/5/2020 1038 by Carolanne Sandifer, RN  Outcome: Ongoing     Problem: Safety:  Goal: Free from intentional harm  Description: Free from intentional harm  8/5/2020 1038 by Carolanne Sandifer, RN  Outcome: Ongoing     Problem: Daily Care:  Goal: Daily care needs are met  Description: Daily care needs are met  8/5/2020 1038 by Carolanne Sandifer, RN  Outcome: Ongoing     Problem: Skin Integrity:  Goal: Skin integrity will stabilize  Description: Skin integrity will stabilize  8/5/2020 1038 by Carolanne Sandifer, RN  Outcome: Ongoing     Problem: Discharge Planning:  Goal: Patients continuum of care needs are met  Description: Patients continuum of care needs are met  8/5/2020 1038 by Carolanne Sandifer,
Problem: Pain:  Goal: Pain level will decrease  Description: Pain level will decrease  Outcome: Ongoing  Goal: Control of acute pain  Description: Control of acute pain  Outcome: Ongoing  Goal: Control of chronic pain  Description: Control of chronic pain  Outcome: Ongoing  Goal: Patient's pain/discomfort is manageable  Description: Patient's pain/discomfort is manageable  Outcome: Ongoing     Problem: Falls - Risk of:  Goal: Will remain free from falls  Description: Will remain free from falls  Outcome: Ongoing  Goal: Absence of physical injury  Description: Absence of physical injury  Outcome: Ongoing     Problem: Infection:  Goal: Will remain free from infection  Description: Will remain free from infection  Outcome: Ongoing     Problem: Safety:  Goal: Free from accidental physical injury  Description: Free from accidental physical injury  Outcome: Ongoing  Goal: Free from intentional harm  Description: Free from intentional harm  Outcome: Ongoing     Problem: Daily Care:  Goal: Daily care needs are met  Description: Daily care needs are met  Outcome: Ongoing     Problem: Skin Integrity:  Goal: Skin integrity will stabilize  Description: Skin integrity will stabilize  Outcome: Ongoing     Problem: Discharge Planning:  Goal: Patients continuum of care needs are met  Description: Patients continuum of care needs are met  Outcome: Ongoing
met  8/5/2020 2308 by Nayeli Barahona RN  Outcome: Ongoing  8/5/2020 1038 by Debbie Canada RN  Outcome: Ongoing     Problem: Skin Integrity:  Goal: Skin integrity will stabilize  Description: Skin integrity will stabilize  8/5/2020 2308 by Nayeli Barahona RN  Outcome: Ongoing  8/5/2020 1038 by Debbie Canada RN  Outcome: Ongoing     Problem: Discharge Planning:  Goal: Patients continuum of care needs are met  Description: Patients continuum of care needs are met  8/5/2020 2308 by Nayeli Barahona RN  Outcome: Ongoing  8/5/2020 1038 by Debbie Canada RN  Outcome: Ongoing     Problem: OXYGENATION/RESPIRATORY FUNCTION  Goal: Patient will maintain patent airway  8/5/2020 2308 by Nayeli Barahona RN  Outcome: Ongoing  8/5/2020 1038 by Debbie Canada RN  Outcome: Ongoing  Goal: Patient will achieve/maintain normal respiratory rate/effort  Description: Respiratory rate and effort will be within normal limits for the patient  8/5/2020 2308 by Nayeli Barahona RN  Outcome: Ongoing  8/5/2020 1038 by Debbie Canada RN  Outcome: Ongoing     Problem: HEMODYNAMIC STATUS  Goal: Patient has stable vital signs and fluid balance  8/5/2020 2308 by Nayeli Barahona RN  Outcome: Ongoing  8/5/2020 1038 by Debbie Canada RN  Outcome: Ongoing     Problem: FLUID AND ELECTROLYTE IMBALANCE  Goal: Fluid and electrolyte balance are achieved/maintained  8/5/2020 2308 by Nayeli Barahona RN  Outcome: Ongoing  8/5/2020 1038 by Debbie Canada RN  Outcome: Ongoing     Problem: ACTIVITY INTOLERANCE/IMPAIRED MOBILITY  Goal: Mobility/activity is maintained at optimum level for patient  8/5/2020 2308 by Nayeli Barahona RN  Outcome: Ongoing  8/5/2020 1038 by Debbie Canada RN  Outcome: Ongoing

## 2020-08-06 NOTE — PROGRESS NOTES
Patient transferred to [facility] in stable condition. Patient belongings sent with patient. Telemetry removed and patient placed on cardiac monitor for transport.

## 2020-08-06 NOTE — PROGRESS NOTES
Pulmonary Progress Note    CC: Acute hypoxic respiratory failure    Subjective:  Did not need Bipap overnight. Yesterday he related that his abd pain came with SOB and sweating. Has not recuurred today      EXAM: BP 96/75   Pulse 83   Temp 97.1 °F (36.2 °C) (Oral)   Resp 18   Ht 5' 10\" (1.778 m)   Wt 231 lb 4.8 oz (104.9 kg)   SpO2 95%   BMI 33.19 kg/m²  on 2lpm  Constitutional:  No acute distress. Eyes: PERRL. Conjunctivae anicteric. ENT: Normal nose. Normal tongue. Neck:  Trachea is midline. No thyroid tenderness. Respiratory: No accessory muscle usage. no decreased breath sounds. No wheezes. No rales. No Rhonchi. Cardiovascular: Normal S1S2. No digit clubbing. No digit cyanosis. No LE edema. Psychiatric: No anxiety or Agitation. Alert and Oriented to person, place and time.     Scheduled Meds:   thyroid  90 mg Oral Daily    azithromycin  500 mg Oral Daily    insulin lispro  0-18 Units Subcutaneous TID WC    insulin lispro  0-9 Units Subcutaneous Nightly    insulin glargine  40 Units Subcutaneous Nightly    lisinopril  2.5 mg Oral BID    sodium chloride flush  10 mL Intravenous 2 times per day    aspirin  81 mg Oral Daily    carvedilol  25 mg Oral BID    pravastatin  20 mg Oral Nightly    spironolactone  25 mg Oral Daily    sodium chloride flush  10 mL Intravenous 2 times per day    cefTRIAXone (ROCEPHIN) IV  1 g Intravenous Q24H    mupirocin   Nasal BID     Continuous Infusions:   dextrose       PRN Meds:  sodium chloride flush, sodium chloride flush, acetaminophen **OR** acetaminophen, polyethylene glycol, promethazine **OR** ondansetron, glucose, dextrose, glucagon (rDNA), dextrose, albuterol sulfate HFA    Labs:  CBC:   Recent Labs     08/04/20  0545 08/05/20  0454 08/06/20  0511   WBC 21.0* 8.6 5.9   HGB 14.8 12.0* 10.8*   HCT 43.6 35.0* 31.9*   MCV 90.5 90.3 89.1    190 179     BMP:   Recent Labs     08/04/20  0545 08/05/20  0454 08/06/20  0511    134* 136   K

## 2020-08-06 NOTE — PLAN OF CARE
Problem: Falls - Risk of:  Goal: Will remain free from falls  Description: Will remain free from falls  Outcome: Ongoing  Goal: Absence of physical injury  Description: Absence of physical injury  Outcome: Ongoing  Patient instructed to use call light if assistance is needed. Call light within reach.

## 2020-08-06 NOTE — PROGRESS NOTES
Brief Pre-Op Note/Sedation Assessment      Xenia Smith  1965  Cath Pool Rm/NONE      [id number]  4:33 PM    Planned Procedure: Cardiac Catheterization Procedure    Post Procedure Plan: Return to same level of care    Consent: I have discussed with the patient and/or the patient representative the indication, alternatives, and the possible risks and/or complications of the planned procedure and the anesthesia methods. The patient and/or patient representative appear to understand and agree to proceed. DISCUSSION OF CARDIAC CATHETERIZATION PROCEDURES: The procedures, indications, risks and alternatives have been discussed with the patient and, as appropriate, with the patient's guardian . Risks discussed included, but are not limited to, bleeding, development of blood clots/emboli, damage to blood vessels, renal failure, malignant cardiac arrhythmias, stroke, heart attack, emergent coronary bypass surgery, death, dye allergy. The patient (and guardian as appropriate) expressed understanding of the aforementioned and wished to proceed. Chief Complaint: Dyspnea, may be anginal equivalent      Indications for Cath Procedure: Worsening Angina and Cardiomyopathy  Anginal Classification within 2 weeks:  CCS IV - Inability to perform any activity without angina or angina at rest, i.e., severe limitation  NYHA Heart Failure Class within 2 weeks: Class IV - Symptoms of HF at rest, Newly Diagnosed? No, Heart Failure Type: Systolic  Is Cath Lab Visit Valve-related?: No  Surgical Risk: N/A  Functional Type: N/A    Anti- Anginal Meds within 2 weeks:   Yes: Beta Blockers and Non-Statin (Any)    Stress or Imaging Studies Performed:  None     Vital Signs:  Ht 5' 10\" (1.778 m)   Wt 231 lb (104.8 kg)   BMI 33.15 kg/m²     156/70  HR 60    Allergies:   Allergies   Allergen Reactions    Bactrim [Sulfamethoxazole-Trimethoprim] Rash     Pt reports that his lips tingle and then skin on his lips peel off,and rash HISTORY Left 08/17/2017    PARTIAL LEFT FOOT AMPUTATION      OTHER SURGICAL HISTORY Left 12/07/2017    Debridement infected bone and tissue left foot; ulcer debridement left foot with graft application with dr mathur     OTHER SURGICAL HISTORY Right 01/04/2018    DEBRIDEMENT INFECTED BONE AND TISSUE RIGHT FOOT, ULCER DEBRIDEMENT RIGHT FOOT WITH GRAFT APPLICATION    OTHER SURGICAL HISTORY Left 11/01/2018    Procedure: PARTIAL RESECTION LEFT METATARSAL, ULCER DEBRIDEMENT LEFT FOOT WITH GRAFT APPLICATION     MA OFFICE/OUTPT VISIT,PROCEDURE ONLY Left 8/23/2018    ULCER DEBRIDEMENT LEFT FOOT WITH GRAFT APPLICATION performed by Kaushik Gannon DPM at [facility] TARSAL/METATARSAL Left 8/9/2018    PARTIAL FOOT AMPUTATION WITH GRAFT APPLICATION performed by Kaushik Gannon DPM at [facility] TARSAL/METATARSAL Left 11/1/2018    PARTIAL RESECTION LEFT METATARSAL, ULCER DEBRIDEMENT LEFT FOOT WITH GRAFT APPLICATION performed by Kaushik Gannon DPM at [address] [place] [address]      2 toes         Medications:  No current facility-administered medications for this encounter. Pre-Sedation:    Pre-Sedation Documentation and Exam:  I have assessed the patient and agree with the H&P present on the chart. Prior History of Anesthesia Complications:   none    Modified Mallampati:  III (soft palate, base of uvula visible)    ASA Classification:  Class 3 - A patient with severe systemic disease that limits activity but is not incapacitating      Stephan Scale: Activity:  2 - Able to move 4 extremities voluntarily on command  Respiration:  2 - Able to breathe deeply and cough freely  Circulation:  2 - BP+/- 20mmHg of normal  Consciousness:  2 - Fully awake  Oxygen Saturation (color):  2 - Able to maintain oxygen saturation >92% on room air    Sedation/Anesthesia Plan:  Guard the patient's safety and welfare. Minimize physical discomfort and pain.   Minimize negative psychological responses to

## 2020-08-06 NOTE — PROGRESS NOTES
[facility] Daily Progress Note      Admit Date:  8/4/2020    Subjective:  Mr. Kai Dorado is in ICU COVID isolation  covid negative   Vivien Saucedo is a 54 y.o. patient who presented to the hospital with complaints of shortness of breath  He is in COVID precautions and test results are pending. He has non ischemic cardiomyopathy due to alcohol abuse and he is no longer drinking. He has abdominal pain get diaphoretic and then symptoms go away. Staff is concerned he may be having angina variant  His troponins are slightly rising but no STEMI  No prior documented CAD although nuclear stress test in 2013 was abnormal for fixed defect  No documented left heart cath in records. He subsequently had AICD but no cor angiogram      ROS:  NA  Past Medical History: LOV with Dr Brion Kayser May 2020  PMH  alcohol induced cardiomyoapthy. He is s/p ICD placement  1.29.14 by Dr. Jerome Green for non ischemic CM with low EF that failed to improve with guide line based medical therapy.  Madisyn Liu is no longer drinking ETOH products.    Past Medical History:   Diagnosis Date    Acute osteomyelitis of left foot ([address] 9/27/2017    Acute osteomyelitis of right foot ([address] 4/25/2016    Ascites     Blood transfusion reaction     Burst fracture of lumbar vertebra (HCC) 11/9/2012    Cellulitis of left foot     Cellulitis of right lower extremity 2/16, 5/16    Diabetes ([address]     Diabetic ulcer of right foot ([address] early 2016    Diabetic ulcer of 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 2 diabetes mellitus, with necrosis of bone ([address]     Fracture of tibial plateau 63/5/8615    MRSA (methicillin resistant staph aureus) culture positive 4/28/16, 4/20/16    foot wound    Neuropathic ulcer of left foot, limited to breakdown of skin ([address] 11/3/2016    Neuropathic ulcer of toe ([address] 2/13/2014    Pleural effusion due to congestive heart failure ([address]     Smoker     quit [id number]    Systolic CHF, acute ([address] 7/8/2013     Past Surgical History: Procedure Laterality Date    FOOT AMPUTATION Left 08/09/2018    PARTIAL FOOT AMPUTATION w. Dr Farhana Rasmussen Left 1/31/2019    EXOSTECTOMY LEFT FOOT, ULCER DEBRIDEMENT LEFT FOOT WITH GRAFT APPLICATION performed by Ron Rojas DPM at [facility] Left 09/27/2017    LEFT FOOT ULCER DEBRIDEMENT, POSSIBLE SECOND METATARSAL    FOOT SURGERY Left 01/31/2019    Exostectomy left foot, ulcer debridement with graft application left foot    FOOT TENDON SURGERY Right 2016    FOOT TENDON SURGERY Left 06/30/2017    KNEE SURGERY Left     OTHER SURGICAL HISTORY Bilateral 9/17/15    amputation 2nd digit bilateral, flexor tenotomy right hallux    OTHER SURGICAL HISTORY Left 08/17/2017    PARTIAL LEFT FOOT AMPUTATION      OTHER SURGICAL HISTORY Left 12/07/2017    Debridement infected bone and tissue left foot; ulcer debridement left foot with graft application with dr mathur     OTHER SURGICAL HISTORY Right 01/04/2018    DEBRIDEMENT INFECTED BONE AND TISSUE RIGHT FOOT, ULCER DEBRIDEMENT RIGHT FOOT WITH GRAFT APPLICATION    OTHER SURGICAL HISTORY Left 11/01/2018    Procedure: PARTIAL RESECTION LEFT METATARSAL, ULCER DEBRIDEMENT LEFT FOOT WITH GRAFT APPLICATION     PACEMAKER PLACEMENT      1/23/14 pacer/defib    SD OFFICE/OUTPT VISIT,PROCEDURE ONLY Left 8/23/2018    ULCER DEBRIDEMENT LEFT FOOT WITH GRAFT APPLICATION performed by Ron Rojas DPM at [facility] TARSAL/METATARSAL Left 8/9/2018    PARTIAL FOOT AMPUTATION WITH GRAFT APPLICATION performed by Ron Rojas DPM at [facility] TARSAL/METATARSAL Left 11/1/2018    PARTIAL RESECTION LEFT METATARSAL, ULCER DEBRIDEMENT LEFT FOOT WITH GRAFT APPLICATION performed by Ron Rojas DPM at [facility] 81 TOE AMPUTATION      2 toes       Objective:   BP 96/75   Pulse 83   Temp 97.1 °F (36.2 °C) (Oral)   Resp 18   Ht 5' 10\" (1.778 m)   Wt 231 lb 4.8 oz (104.9 kg)   SpO2 95%   BMI 33.19 kg/m²       Intake/Output Summary (Last 24 hours) at 8/6/2020 0751  Last data filed at 8/6/2020 0520  Gross per 24 hour   Intake 820 ml   Output 1550 ml   Net -730 ml       TELEMETRY: NSR    Physical Exam:  General: No Respiratory distress, appears well developed and well nourished. Eyes:  Sclera nonicteric  Nose/Sinuses:  negative findings: nose shows no deformity, asymmetry, or inflammation, nasal mucosa normal, septum midline with no perforation or bleeding  Back:  no pain to palpation  Joint:  no active joint inflammation  Musculoskeletal:  negative  Skin:  Warm and dry  Neck:  Negative for JVD and Carotid Bruits. Chest:  Clear to auscultation, respiration easy  Cardiovascular:  RRR, S1S2 normal, no murmur, no rub or thrill.   Abdomen:  Soft normal liver and spleen  Extremities:   No edema, clubbing, cyanosis,  Pulses: Femoral 1 + rt 2 + left femoral  Neg rt pedal and left DP 2+  Neuro: intact    Medications:    thyroid  90 mg Oral Daily    azithromycin  500 mg Oral Daily    insulin lispro  0-18 Units Subcutaneous TID WC    insulin lispro  0-9 Units Subcutaneous Nightly    insulin glargine  40 Units Subcutaneous Nightly    lisinopril  2.5 mg Oral BID    sodium chloride flush  10 mL Intravenous 2 times per day    aspirin  81 mg Oral Daily    carvedilol  25 mg Oral BID    pravastatin  20 mg Oral Nightly    spironolactone  25 mg Oral Daily    sodium chloride flush  10 mL Intravenous 2 times per day    mupirocin   Nasal BID      dextrose       sodium chloride flush, sodium chloride flush, acetaminophen **OR** acetaminophen, polyethylene glycol, promethazine **OR** ondansetron, glucose, dextrose, glucagon (rDNA), dextrose, albuterol sulfate HFA    Lab Data:  CBC:   Recent Labs     08/04/20  0545 08/05/20  0454 08/06/20  0511   WBC 21.0* 8.6 5.9   HGB 14.8 12.0* 10.8*   HCT 43.6 35.0* 31.9*   MCV 90.5 90.3 89.1    190 179     BMP:   Recent Labs     08/04/20  0545 08/05/20  0454 08/06/20  0511    134* 136   K 4.3 3.5 4.0    99 100   CO2 23 25 26   PHOS  --   --  3.4   BUN 16 16 22*   CREATININE 1.1 0.8* 1.0     LIVER PROFILE:   Recent Labs     20  0545 20  0454   AST 28  --    ALT 22  --    LIPASE  --  22.0   BILITOT 0.7  --    ALKPHOS 89  --      PT/INR:   Recent Labs     20  0545   PROTIME 10.7   INR 0.92     APTT:   Recent Labs     20  0545   APTT 32.0     BNP:  No results for input(s): BNP in the last 72 hours. IMAGING:     TROPONINI 0.15 (H) 2020   Troponin 0.15, 0.23, 0.40, 0.36  BNP 2569  Troponin 0.15 D-dimer 275   Xray abdomen:20  Impression    Nonobstructive bowel gas pattern.         Formed stool noted in the right colon may indicate constipation.           Echo doppler.     Summary   Left ventricular systolic function is severely reduced with ejection   fraction estimated at 25-30 %. Severe global hypokinesis is present. Left ventricle size is normal.   Normal left ventricular wall thickness. Grade III diastolic dysfunction with elevated filing pressure. Mild posterior mitral annular calcification is present. No evidence of mitral valve stenosis. Severe mitral regurgitation. The left atrium is mildly dilated. No evidence of aortic valve stenosis. Mild aortic regurgitation is present. Mild tricuspid regurgitation. Normal systolic pulmonary artery pressure (SPAP) estimated at 39 mmHg (RA   pressure 8 mmHg).   EK20 I have reviewed EKG with the following interpretation: Sinus tachycardia Intra-ventricular conduction delay Septal infarct , age undetermined Abnormal ECGNo significant change was found When compared with ECG of1.31.19Confirmed by GIL HUFFMAN, [address] ()       CXR 20  FINDINGS: Left-sided ICD is seen with single right ventricular lead which is grossly appropriate in position.  The heart is normal in size and configuration.  The mediastinal contours are within normal limits.  Ill-defined dense consolidation is seen scattered within the bilateral lungs, most severe within the right lung base.  No definitive evidence of pleural effusion is identified.  Bones and soft tissues are unremarkable.      Device check 06/02/20  Carelink transmission shows normal sensing and pacing function. See interrogation for more details. Episodes Since: 03-Mar-2020  2 Non-sustained VT and 3 SVT-wavelets (longest 1.5 min). He takes yamile-ox and coreg.       Echo 5.5.16  Summary   Limited study for estimation of ejection fraction. Left ventricular systolic function is mildly reduced with an ejection   fraction of 44% by Alanis's method. Mild global hypokinesis. Diastolic filling parameters suggests grade I diastolic dysfunction. Compared to previous study from 1-9-2014, there is improvement in ejection   fraction. Priority  Sent On  From  To  Message Type     7/10/2013  4:27 PM  MD Shivani Khan MD  CC'd Results    Radiation Dose Estimates     No radiation information found for this patient    Narrative    SPECT MYOCARDIAL PERFUSION SCAN WITH WALL MOTION ANALYSIS AND    EJECTION FRACTION CALCULATION: 7/9/2013.         Comparison: None.         Clinical History: Shortness of breath, cough, leg swelling, CHF.         Findings:         Following the LexiScan protocol, 25.2 mCi of Tc-Myoview was    injected during stress, while 26.2 mCi of Tc-Myoview was injected    during rest.  Then, standard SPECT myocardial perfusion imaging    was performed, along with wall motion analysis and ejection    fraction calculation.         The SPECT myocardial perfusion images demonstrate persistent    decreased radiotracer uptake within the inferoapical wall on both    stress and rest imaging, consistent with chronic infarct. There is    no scintigraphic evidence of stress-induced myocardial ischemia or    a reversible defect. There is global left ventricular dilatation. Wall motion analysis demonstrates diffuse severe hypokinesis.  The LVEF is severely reduced, measuring approximately 20%.           Impression    IMPRESSION:         1. No scintigraphic evidence of stress-induced myocardial    ischemia.         2. Chronic inferoapical infarct.         3. Severe left ventricular dilatation with severe diffuse    hypokinesis.         4. Severely reduced LVEF of approximately 20%. Assessment:  Acute myocardial injury   Abdominal pain rule out intraabdominal process as a cause of his symptoms  Acute resp failure  Hypothyroidism  Non ischemic cardiomyopathy with low EF  AICD  DM     Plan:  Based on these findings I recommend left heart cath for definitive evaluation of coronary arteries. Risks, benefits, expectations, and alternative treatments were discussed. Questions appropriately answered. Gil Randolph agrees to proceed and verbalized understanding. Change armor thyroid to higher dose   Supportive care  Discussed with RN and critical care attending.   Patient Active Problem List    Diagnosis Date Noted    Alcoholic cardiomyopathy 80/45/8278     Priority: High     Class: Chronic    Acute systolic CHF (congestive heart failure) ([address] 07/08/2013     Priority: High     Class: Acute    Acute respiratory failure ([address] 08/04/2020    Community acquired pneumonia     Septicemia ([address]     NSVT (nonsustained ventricular tachycardia) ([address] 11/27/2019    Diabetic ulcer of left foot associated with type 2 diabetes mellitus, with muscle involvement without evidence of necrosis ([address] 04/27/2017    Acquired hypothyroidism 04/18/2017    Diabetic ulcer of right foot associated with type 2 diabetes mellitus, limited to breakdown of skin ([address] 01/29/2017    Hallux valgus 11/11/2016    Callus of foot 11/03/2016    Onychomycosis 04/02/2016    Dystrophic nail 04/02/2016    Hyperlipidemia 02/23/2016    Automatic implantable cardioverter-defibrillator in situ 01/30/2014    Hypertension     Uncontrolled type 2 diabetes mellitus with

## 2020-08-06 NOTE — PROGRESS NOTES
8/6/20 0733 [facility] needing to go to [facility] [place] cath lab.  will be at 1000 8/6/20 cardiac cath 1100, spoke to Wilberto Poe at [facility].  Sean Baptiste MT/HUC

## 2020-08-06 NOTE — DISCHARGE SUMMARY
Name:  Dedrick Marcelo  Room:  /8005-38  MRN:    [id number]    Discharge Summary      This discharge summary is in conjunction with a complete physical exam done on the day of discharge. Discharging Physician: Dr. Leona Kim: 8/4/2020  Discharge: 8/6/2020     HPI taken from admission H&P:    The patient is a 54 y.o. male with DM type 2, neuropathy, and systolic CHF, severe nonischemic cardiomyopathy related to alcohol abuse with ejection fraction 15%, s/p AICD who presents to [facility] with c/o respiratory distress. Patient has been having intermittent shortness of breath for the last 1 week, symptoms got significantly worse early this morning. Patient was dyspneic and diaphoretic on arrival to the ED. He has had a mild cough. He denies chest pain, lower extremity edema, abdominal pain, nausea, vomiting, or fevers. He was hypoxic in the 70's on RA upon EMS arrival.  Patient tachypnea, tachycardic, and hypoxic. BP elevated. Patient placed on Vapotherm. Labs with lactic acidosis, elevated BNP, elevated troponin, leukocytosis. CXR with pneumonia. Admitted to ICU. Pulmonology and cardiology consulted. Patient currently on high flow oxygen per Vapotherm, FiO2 40% 25 L, on broad-spectrum IV antibiotics, and received IV Lasix. Starting to have good urine output. He is feeling better already. No dyspnea or distress now.     Rule out COVID-19, patient placed on droplet plus precautions    Diagnoses this Admission and Hospital Course   Acute hypoxic respiratory failure  - Patient presented in respiratory distress. Admitted to ICU . Seen by pulmonology , placed on high flow O2 per vapotherm   - due to CHF.   - PNA ruled out . COVID  ruled out.   - off  droplet plus precautions. abx Dced   - wean oxygen as tolerated.  On RA today     SIRS  - lactic acidosis, leukocytosis, tachycardia, tachypnea, hypoxia-- resolved   - no sepsis  - not requiring pressors  - off abx      Acute on chronic systolic CHF  Severe nonischemic cardiomyopathy due to alcohol abuse  -Ejection fraction 15% Per old records in 2013 . pt had neg stress test at that time . S/P AICD. Cata Mars recent echocardiogram in chart. Per pt EF was up to 45 % in 2016     Checked ECHO this admission on 8/5/2020-->  EF 25 to 30 %  - s/p AICD. - continue IV Lasix, he is starting to diurese well  - cont Aldactone, Coreg, Lisinopril     Elevated troponin   NSTEMI  - 0.15, 0.23,0.36   - monitor on tele  - consulted cardiology. - aspirin was added. - transfer to [facility] [place] for [facility]     DM type 2, with hyperglycemia  Neuropathy  - monitor glucose. - continue Lantus 32 units nightly. SSI coverage.      Hyperlipidemia  - on Pravastatin.      Hypothyroidism  - continue [place] thyroid- dose increased .  Patient refuses to take any other form of levothyroxine.    - Checked TSH 4.79    Procedures (Please Review Full Report for Details)  ECHO    Consults    Cardiology  Pulmonology     Physical Exam at Discharge:    /78   Pulse 93   Temp 97.6 °F (36.4 °C) (Oral)   Resp 18   Ht 5' 10\" (1.778 m)   Wt 231 lb 4.8 oz (104.9 kg)   SpO2 96%   BMI 33.19 kg/m²   General:  Awake, alert, NAD  Skin:  Warm and dry  Neck:  JVD absent. Neck supple  Chest:  Clear to auscultation, respiration easy. No wheezes, rales or rhonchi. Cardiovascular:  RRR ,S1S2 normal  Abdomen:  Soft, non tender, non distended, BS +  Extremities:  No edema. Intact peripheral pulses.  Brisk cap refill, < 2 secs  Neuro: non focal    CBC:   Recent Labs     08/04/20  0545 08/05/20  0454 08/06/20  0511   WBC 21.0* 8.6 5.9   HGB 14.8 12.0* 10.8*   HCT 43.6 35.0* 31.9*   MCV 90.5 90.3 89.1    190 179     BMP:   Recent Labs     08/04/20  0545 08/05/20  0454 08/06/20  0511    134* 136   K 4.3 3.5 4.0    99 100   CO2 23 25 26   PHOS  --   --  3.4   BUN 16 16 22*   CREATININE 1.1 0.8* 1.0     LIVER PROFILE:   Recent Labs     08/04/20  0545 08/05/20  0454   AST 28  --    ALT 22 --    LIPASE  --  22.0   BILITOT 0.7  --    ALKPHOS 89  --      PT/INR:   Recent Labs     08/04/20  0545   PROTIME 10.7   INR 0.92     APTT:   Recent Labs     08/04/20  0545   APTT 32.0     CULTURES  COVID-19: not detected     RADIOLOGY  XR CHEST PORTABLE   Final Result   Decreased pulmonary vascular congestion. Decreased interstitial edema. XR ABDOMEN (KUB) (SINGLE AP VIEW)   Final Result   Nonobstructive bowel gas pattern. Formed stool noted in the right colon may indicate constipation. XR CHEST PORTABLE   Final Result   Bilateral lung multifocal ill-defined consolidation, greatest at the right   lung base, consistent with pneumonia. ECHO:  Conclusions      Summary   Left ventricular systolic function is severely reduced with ejection   fraction estimated at 25-30 %. Severe global hypokinesis is present. Left ventricle size is normal.   Normal left ventricular wall thickness. Grade III diastolic dysfunction with elevated filing pressure. Mild posterior mitral annular calcification is present. No evidence of mitral valve stenosis. Severe mitral regurgitation. The left atrium is mildly dilated. No evidence of aortic valve stenosis. Mild aortic regurgitation is present. Mild tricuspid regurgitation. Normal systolic pulmonary artery pressure (SPAP) estimated at 39 mmHg (RA   pressure 8 mmHg).             Discharge Medications  Medications at the time of transfer  Current Facility-Administered Medications   Medication Dose Route Frequency Provider Last Rate Last Dose    thyroid (ARMOUR) tablet 90 mg  90 mg Oral Daily Juan Blas MD   90 mg at 08/06/20 0519    insulin lispro (HUMALOG) injection vial 0-18 Units  0-18 Units Subcutaneous TID WC Amee Tyler MD   6 Units at 08/05/20 1655    insulin lispro (HUMALOG) injection vial 0-9 Units  0-9 Units Subcutaneous Nightly Amee Tyler MD   3 Units at 08/05/20 2043    insulin glargine (LANTUS) injection vial 40 Units  40 Units Subcutaneous Nightly Herrera Park MD   40 Units at 08/05/20 2043    lisinopril (PRINIVIL;ZESTRIL) tablet 2.5 mg  2.5 mg Oral BID yMra Fowler MD   2.5 mg at 08/06/20 0813    sodium chloride flush 0.9 % injection 10 mL  10 mL Intravenous 2 times per day Essence Muñoz MD   10 mL at 08/06/20 0814    sodium chloride flush 0.9 % injection 10 mL  10 mL Intravenous PRN Essence Muñoz MD        aspirin chewable tablet 81 mg  81 mg Oral Daily Essence Muñoz MD   81 mg at 08/06/20 0813    carvedilol (COREG) tablet 25 mg  25 mg Oral BID Essence Muñoz MD   25 mg at 08/06/20 0813    pravastatin (PRAVACHOL) tablet 20 mg  20 mg Oral Nightly Essence Muñoz MD   20 mg at 08/05/20 2042    spironolactone (ALDACTONE) tablet 25 mg  25 mg Oral Daily Essence Muñoz MD   25 mg at 08/06/20 0813    sodium chloride flush 0.9 % injection 10 mL  10 mL Intravenous 2 times per day Essence Muñoz MD   10 mL at 08/06/20 0813    sodium chloride flush 0.9 % injection 10 mL  10 mL Intravenous PRN Essence Muñoz MD        acetaminophen (TYLENOL) tablet 650 mg  650 mg Oral Q6H PRN Essence Muñoz MD        Or    acetaminophen (TYLENOL) suppository 650 mg  650 mg Rectal Q6H PRN Essence Muñoz MD        polyethylene glycol (GLYCOLAX) packet 17 g  17 g Oral Daily PRN Essence Muñoz MD        promethazine (PHENERGAN) tablet 12.5 mg  12.5 mg Oral Q6H PRN Essence Muñoz MD        Or    ondansetron (ZOFRAN) injection 4 mg  4 mg Intravenous Q6H PRN Essence Muñoz MD   4 mg at 08/05/20 0543    glucose (GLUTOSE) 40 % oral gel 15 g  15 g Oral PRN Essence Muñoz MD        dextrose 50 % IV solution  12.5 g Intravenous PRN Essence Muñoz MD        glucagon (rDNA) injection 1 mg  1 mg Intramuscular PRN Essence Muñoz MD        dextrose 5 % solution  100 mL/hr Intravenous PRN Demarco Parham MD        albuterol sulfate  (90 Base) MCG/ACT inhaler 2 puff  2 puff Inhalation Q4H PRN Demarco Parham MD        mupirocin (BACTROBAN) 2 % ointment   Nasal BID Eufemia Crawley MD             Transferred in stable condition to [facility] [place] for [place] [facility] regarding transfer         Eufemia Crawley MD   8/6/2020

## 2020-08-06 NOTE — FLOWSHEET NOTE
08/05/20 2000   Vital Signs   Temp 97 °F (36.1 °C)   Temp Source Oral   Pulse 96   Heart Rate Source Monitor   Resp 18   /75   Level of Consciousness 0   MEWS Score 1   Oxygen Therapy   SpO2 94 %   O2 Device None (Room air)   Pt. Resting in bed. Call light in reach. Shift assessment completed see flow sheet. Denies any needs at this time. Will continue to monitor.

## 2020-08-06 NOTE — PROGRESS NOTES
4 Eyes Skin Assessment     The patient is being assess for   Cath Lab Post-Op    I agree that 2 RN's have performed a thorough Head to Toe Skin Assessment on the patient. ALL assessment sites listed below have been assessed. Areas assessed by both nurses:   [x]   Head, Face, and Ears   [x]   Shoulders, Back, and Chest, Abdomen  [x]   Arms, Elbows, and Hands   [x]   Coccyx, Sacrum, and Ischium  [x]   Legs, Feet, and Heels        Healing diabetic ulcers to bilateral feet    **SHARE this note so that the co-signing nurse is able to place an eSignature**    Co-signer eSignature: {Esignature:[id number]}    Does the Patient have Skin Breakdown?   Yes [facility] WOUND CARE was Initiated documentation include the Vanesa-wound, Wound Assessment, Measurements, Dressing Treatment, Drainage, and Color\",          Christiano Prevention initiated:  No   Wound Care Orders initiated:  Yes      [id number]  nurse consulted for Pressure Injury (Stage 3,4, Unstageable, DTI, NWPT, Complex wounds)and New or Established Ostomies:  Yes      Primary Nurse eSignature: Electronically signed by Neel Mcghee RN on 8/6/20 at 6:28 PM EDT

## 2020-08-07 LAB
ALBUMIN SERPL-MCNC: 3.5 G/DL (ref 3.4–5)
ANION GAP SERPL CALCULATED.3IONS-SCNC: 11 MMOL/L (ref 3–16)
BILIRUBIN URINE: NEGATIVE
BLOOD, URINE: ABNORMAL
BUN BLDV-MCNC: 16 MG/DL (ref 7–20)
CALCIUM SERPL-MCNC: 8.5 MG/DL (ref 8.3–10.6)
CHLORIDE BLD-SCNC: 104 MMOL/L (ref 99–110)
CLARITY: CLEAR
CO2: 26 MMOL/L (ref 21–32)
COLOR: YELLOW
CREAT SERPL-MCNC: 0.8 MG/DL (ref 0.9–1.3)
EPITHELIAL CELLS, UA: NORMAL /HPF (ref 0–5)
ESTIMATED AVERAGE GLUCOSE: 145.6 MG/DL
GFR AFRICAN AMERICAN: >60
GFR NON-AFRICAN AMERICAN: >60
GLUCOSE BLD-MCNC: 109 MG/DL (ref 70–99)
GLUCOSE BLD-MCNC: 144 MG/DL (ref 70–99)
GLUCOSE BLD-MCNC: 194 MG/DL (ref 70–99)
GLUCOSE BLD-MCNC: 198 MG/DL (ref 70–99)
GLUCOSE BLD-MCNC: 249 MG/DL (ref 70–99)
GLUCOSE URINE: NEGATIVE MG/DL
HBA1C MFR BLD: 6.7 %
HCT VFR BLD CALC: 31.3 % (ref 40.5–52.5)
HEMOGLOBIN: 10.8 G/DL (ref 13.5–17.5)
KETONES, URINE: NEGATIVE MG/DL
LEUKOCYTE ESTERASE, URINE: NEGATIVE
LV EF: 15 %
LVEF MODALITY: NORMAL
MAGNESIUM: 2.1 MG/DL (ref 1.8–2.4)
MCH RBC QN AUTO: 30.6 PG (ref 26–34)
MCHC RBC AUTO-ENTMCNC: 34.6 G/DL (ref 31–36)
MCV RBC AUTO: 88.7 FL (ref 80–100)
MICROSCOPIC EXAMINATION: YES
NITRITE, URINE: NEGATIVE
PDW BLD-RTO: 13.9 % (ref 12.4–15.4)
PERFORMED ON: ABNORMAL
PH UA: 6 (ref 5–8)
PHOSPHORUS: 3.2 MG/DL (ref 2.5–4.9)
PLATELET # BLD: 178 K/UL (ref 135–450)
PMV BLD AUTO: 7 FL (ref 5–10.5)
POTASSIUM SERPL-SCNC: 3.8 MMOL/L (ref 3.5–5.1)
PROTEIN UA: 30 MG/DL
RBC # BLD: 3.53 M/UL (ref 4.2–5.9)
RBC UA: NORMAL /HPF (ref 0–4)
SODIUM BLD-SCNC: 141 MMOL/L (ref 136–145)
SPECIFIC GRAVITY UA: 1.02 (ref 1–1.03)
URINE REFLEX TO CULTURE: ABNORMAL
URINE TYPE: ABNORMAL
UROBILINOGEN, URINE: 0.2 E.U./DL
WBC # BLD: 5.7 K/UL (ref 4–11)
WBC UA: NORMAL /HPF (ref 0–5)

## 2020-08-07 PROCEDURE — 2580000003 HC RX 258: Performed by: INTERNAL MEDICINE

## 2020-08-07 PROCEDURE — 6370000000 HC RX 637 (ALT 250 FOR IP): Performed by: NURSE PRACTITIONER

## 2020-08-07 PROCEDURE — 2580000003 HC RX 258: Performed by: NURSE PRACTITIONER

## 2020-08-07 PROCEDURE — 6360000002 HC RX W HCPCS: Performed by: INTERNAL MEDICINE

## 2020-08-07 PROCEDURE — 94729 DIFFUSING CAPACITY: CPT

## 2020-08-07 PROCEDURE — 94060 EVALUATION OF WHEEZING: CPT

## 2020-08-07 PROCEDURE — 36415 COLL VENOUS BLD VENIPUNCTURE: CPT

## 2020-08-07 PROCEDURE — 94760 N-INVAS EAR/PLS OXIMETRY 1: CPT

## 2020-08-07 PROCEDURE — 99233 SBSQ HOSP IP/OBS HIGH 50: CPT | Performed by: NURSE PRACTITIONER

## 2020-08-07 PROCEDURE — 6370000000 HC RX 637 (ALT 250 FOR IP): Performed by: THORACIC SURGERY (CARDIOTHORACIC VASCULAR SURGERY)

## 2020-08-07 PROCEDURE — 81001 URINALYSIS AUTO W/SCOPE: CPT

## 2020-08-07 PROCEDURE — 85027 COMPLETE CBC AUTOMATED: CPT

## 2020-08-07 PROCEDURE — 2060000000 HC ICU INTERMEDIATE R&B

## 2020-08-07 PROCEDURE — 83735 ASSAY OF MAGNESIUM: CPT

## 2020-08-07 PROCEDURE — 83036 HEMOGLOBIN GLYCOSYLATED A1C: CPT

## 2020-08-07 PROCEDURE — 94726 PLETHYSMOGRAPHY LUNG VOLUMES: CPT

## 2020-08-07 PROCEDURE — 6360000002 HC RX W HCPCS: Performed by: NURSE PRACTITIONER

## 2020-08-07 PROCEDURE — 6370000000 HC RX 637 (ALT 250 FOR IP): Performed by: INTERNAL MEDICINE

## 2020-08-07 PROCEDURE — 80069 RENAL FUNCTION PANEL: CPT

## 2020-08-07 RX ORDER — ALBUTEROL SULFATE 90 UG/1
4 AEROSOL, METERED RESPIRATORY (INHALATION) ONCE
Status: COMPLETED | OUTPATIENT
Start: 2020-08-07 | End: 2020-08-07

## 2020-08-07 RX ORDER — M-VIT,TX,IRON,MINS/CALC/FOLIC 27MG-0.4MG
1 TABLET ORAL DAILY
Status: DISCONTINUED | OUTPATIENT
Start: 2020-08-07 | End: 2020-08-11

## 2020-08-07 RX ADMIN — PRAVASTATIN SODIUM 40 MG: 40 TABLET ORAL at 20:14

## 2020-08-07 RX ADMIN — INSULIN LISPRO 2 UNITS: 100 INJECTION, SOLUTION INTRAVENOUS; SUBCUTANEOUS at 20:53

## 2020-08-07 RX ADMIN — ENOXAPARIN SODIUM 40 MG: 40 INJECTION SUBCUTANEOUS at 08:42

## 2020-08-07 RX ADMIN — SPIRONOLACTONE 25 MG: 25 TABLET ORAL at 08:41

## 2020-08-07 RX ADMIN — Medication 4 PUFF: at 15:43

## 2020-08-07 RX ADMIN — LISINOPRIL 5 MG: 10 TABLET ORAL at 08:41

## 2020-08-07 RX ADMIN — Medication 10 ML: at 20:14

## 2020-08-07 RX ADMIN — Medication 1 TABLET: at 11:29

## 2020-08-07 RX ADMIN — INSULIN LISPRO 2 UNITS: 100 INJECTION, SOLUTION INTRAVENOUS; SUBCUTANEOUS at 08:42

## 2020-08-07 RX ADMIN — FUROSEMIDE 40 MG: 40 TABLET ORAL at 17:22

## 2020-08-07 RX ADMIN — INSULIN LISPRO 2 UNITS: 100 INJECTION, SOLUTION INTRAVENOUS; SUBCUTANEOUS at 11:29

## 2020-08-07 RX ADMIN — GLIPIZIDE 5 MG: 5 TABLET ORAL at 17:22

## 2020-08-07 RX ADMIN — FUROSEMIDE 40 MG: 40 TABLET ORAL at 05:46

## 2020-08-07 RX ADMIN — MAGNESIUM GLUCONATE 500 MG ORAL TABLET 400 MG: 500 TABLET ORAL at 20:14

## 2020-08-07 RX ADMIN — INSULIN LISPRO 2 UNITS: 100 INJECTION, SOLUTION INTRAVENOUS; SUBCUTANEOUS at 17:22

## 2020-08-07 RX ADMIN — LEVOTHYROXINE, LIOTHYRONINE 60 MG: 19; 4.5 TABLET ORAL at 08:49

## 2020-08-07 RX ADMIN — CARVEDILOL 25 MG: 6.25 TABLET, FILM COATED ORAL at 20:14

## 2020-08-07 RX ADMIN — INSULIN GLARGINE 72 UNITS: 100 INJECTION, SOLUTION SUBCUTANEOUS at 20:52

## 2020-08-07 RX ADMIN — Medication 10 ML: at 20:16

## 2020-08-07 RX ADMIN — ASPIRIN 81 MG: 81 TABLET, COATED ORAL at 08:41

## 2020-08-07 RX ADMIN — GLIPIZIDE 5 MG: 5 TABLET ORAL at 05:46

## 2020-08-07 RX ADMIN — CARVEDILOL 25 MG: 6.25 TABLET, FILM COATED ORAL at 08:41

## 2020-08-07 RX ADMIN — MAGNESIUM GLUCONATE 500 MG ORAL TABLET 400 MG: 500 TABLET ORAL at 08:41

## 2020-08-07 RX ADMIN — Medication 10 ML: at 08:42

## 2020-08-07 RX ADMIN — Medication 10 ML: at 08:49

## 2020-08-07 RX ADMIN — IRON SUCROSE 200 MG: 20 INJECTION, SOLUTION INTRAVENOUS at 12:28

## 2020-08-07 ASSESSMENT — PAIN SCALES - GENERAL
PAINLEVEL_OUTOF10: 0

## 2020-08-07 NOTE — CONSULTS
 Systolic CHF, acute ([facility] [address] 7/8/2013     Past Surgical History:        Procedure Laterality Date    CARDIAC DEFIBRILLATOR PLACEMENT  01/29/2014    ICD Placement    FOOT AMPUTATION Left 08/09/2018    PARTIAL FOOT AMPUTATION w. Dr Arleth Agudelo Left 1/31/2019    EXOSTECTOMY LEFT FOOT, ULCER DEBRIDEMENT LEFT FOOT WITH GRAFT APPLICATION performed by Saint Flank, DPM at [address] Left 09/27/2017    LEFT FOOT ULCER DEBRIDEMENT, POSSIBLE SECOND METATARSAL    FOOT SURGERY Left 01/31/2019    Exostectomy left foot, ulcer debridement with graft application left foot    FOOT TENDON SURGERY Right 2016    FOOT TENDON SURGERY Left 06/30/2017    KNEE SURGERY Left     OTHER SURGICAL HISTORY Bilateral 9/17/15    amputation 2nd digit bilateral, flexor tenotomy right hallux    OTHER SURGICAL HISTORY Left 08/17/2017    PARTIAL LEFT FOOT AMPUTATION      OTHER SURGICAL HISTORY Left 12/07/2017    Debridement infected bone and tissue left foot; ulcer debridement left foot with graft application with dr mathur     OTHER SURGICAL HISTORY Right 01/04/2018    DEBRIDEMENT INFECTED BONE AND TISSUE RIGHT FOOT, ULCER DEBRIDEMENT RIGHT FOOT WITH GRAFT APPLICATION    OTHER SURGICAL HISTORY Left 11/01/2018    Procedure: PARTIAL RESECTION LEFT METATARSAL, ULCER DEBRIDEMENT LEFT FOOT WITH GRAFT APPLICATION     [place] OFFICE/OUTPT VISIT,PROCEDURE ONLY Left 8/23/2018    ULCER DEBRIDEMENT LEFT FOOT WITH GRAFT APPLICATION performed by Saint Flank, DPM at [facility] TARSAL/METATARSAL Left 8/9/2018    PARTIAL FOOT AMPUTATION WITH GRAFT APPLICATION performed by Saint Flank, DPM at [facility] TARSAL/METATARSAL Left 11/1/2018    PARTIAL RESECTION LEFT METATARSAL, ULCER DEBRIDEMENT LEFT FOOT WITH GRAFT APPLICATION performed by Saint Flank, DPM at [facility]      2 toes       Medications Prior to Admission:   Medications Prior to Admission: TRULICITY 1.5 AY/1.2ZO SOPN, Inject 1.5 mg (1 PEN) into the skin once a week  TOUJEO SOLOSTAR 300 UNIT/ML SOPN, Inject 90 Units into the skin nightly  Blood Glucose Monitoring Suppl (ONE TOUCH ULTRA MINI) w/Device KIT, 1 kit by Does not apply route daily  magnesium oxide (MAG-OX) 400 (241.3 Mg) MG TABS tablet, Take 1 tablet by mouth 2 times daily  pravastatin (PRAVACHOL) 20 MG tablet, TAKE ONE TABLET BY MOUTH ONE TIME DAILY  lisinopril (PRINIVIL;ZESTRIL) 5 MG tablet, Take 1 tablet by mouth daily  furosemide (LASIX) 40 MG tablet, TAKE ONE TABLET BY MOUTH TWICE DAILY  carvedilol (COREG) 25 MG tablet, Take 1 tablet by mouth 2 times daily TAKE ONE TABLET BY MOUTH TWICE DAILY  ARMOUR THYROID 60 MG tablet, Take 1 tablet by mouth daily  metFORMIN (GLUCOPHAGE) 1000 MG tablet, Take 1 tablet by mouth 2 times daily (with meals)  glipiZIDE (GLUCOTROL) 5 MG tablet, Take 1 tablet by mouth 2 times daily (before meals)  spironolactone (ALDACTONE) 25 MG tablet, Take 1 tablet by mouth daily  aspirin 81 MG tablet, Take 1 tablet by mouth daily  glucose blood VI test strips (ONE TOUCH ULTRA TEST) strip, Test blood sugar once daily. Insulin Pen Needle (UNIFINE PENTIPS) 31G X 5 MM MISC, USE 1 EACH DAY AS NEEDED FOR TESTING  Blood Glucose Monitoring Suppl GABI, Measure glucose before breakfast, before dinner and at bedtime daily (we will check at lunchtime at HBO therapy). Multiple Vitamins-Minerals (THERAPEUTIC MULTIVITAMIN-MINERALS) tablet, Take 1 tablet by mouth daily  Blood Glucose Monitoring Suppl (BLOOD GLUCOSE METER) KIT, Use to test blood sugars. Lancets MISC, Use to test blood sugars once daily. Allergies:  Bactrim [sulfamethoxazole-trimethoprim] and Bee venom    Social History:    TOBACCO:   reports that he quit smoking about 40 years ago. He has never used smokeless tobacco.  ETOH:   reports no history of alcohol use. quit drinking in 2013  CAFFEINE ABUSE:  No  DRUGS:   reports no history of drug use.   LIFESTYLE: active    MARITAL STATUS:   OCCUPATION:  Retired      Family History:        Problem Relation Age of Onset    Heart Disease Mother     High Blood Pressure Mother     High Cholesterol Mother     Diabetes Mother     Anemia Mother     Heart Disease Father     High Blood Pressure Father     High Cholesterol Father     Heart Disease Maternal Grandmother     High Blood Pressure Maternal Grandmother     High Cholesterol Maternal Grandmother     Cancer Maternal Grandmother         bone marrow & breast    Heart Disease Maternal Grandfather     High Blood Pressure Maternal Grandfather     High Cholesterol Maternal Grandfather     Cancer Maternal Grandfather         pancreatic CA    Heart Disease Paternal Grandmother     High Blood Pressure Paternal Grandmother     High Cholesterol Paternal Grandmother     Heart Disease Paternal Grandfather     High Blood Pressure Paternal Grandfather     High Cholesterol Paternal Grandfather     Stroke Brother     Heart Failure Brother     Heart Disease Brother     Diabetes type 2  Brother     Diabetes type 2  Brother     Diabetes type 2  Brother        REVIEW OF SYSTEMS:      Constitutional:  No night sweats, headaches, weight loss. Eyes:  No glaucoma, cataracts. ENMT:  No nosebleeds, deviated septum. Cardiac:  No arrhythmias, +chest pain. Vascular:  No claudication, varicosities. GI:  No PUD, heartburn. :  No kidney stones, frequent UTIs  Musculoskeletal:  No arthritis, gout. Respiratory:  No SOB, emphysema, asthma. Integumentary:  No dermatitis, itching, rash. Neurological:  No stroke, TIAs, seizures. Psychiatric:  No depression, anxiety. Endocrine: No thyroid issues. +diabetes  Hematologic:  No bleeding, easy bruising. Immunologic:  No known cancer, steroid therapies.     PHYSICAL EXAM:    VITALS:  /64   Pulse 90   Temp 97.8 °F (36.6 °C) (Oral)   Resp 18   Ht 5' 10\" (1.778 m)   Wt 231 lb (104.8 kg)   SpO2 95%   BMI 33.15 kg/m²     Constitutional: Well developed and nourished male. No acute distress. +obesity. Eyes:  lids and lashes normal, pupils equal, round and reactive to light, extra ocular muscles intact, sclera clear, conjunctiva normal    Head/ENT:  +upper and lower dentures (left at home) otherwise normal gums, & palate. Moist mucus membranes. No cyanosis or pallor. Neck:  supple, symmetrical, trachea midline, no lymphadenopathy, no jugular venous distension, no carotid bruits and MASSES:  no masses. No thyromegaly. Lungs:  no increased work of breathing, good air exchange, no retractions and clear to auscultation, no crackles or wheezing. No tactile fremitus. Cardiovascular:  regular rate and rhythm, S1, S2 normal, no murmur, click, rub or gallop. Apical impulse in 5th intercostal space. Pulses:  Right dorsalis pedis 1, Left dorsalis pedis 1, Right posterior tibial 1, Left Posterior tibial 1, Right radial 2, and Left radial 2. Abdomen:  normal bowel sounds, non-tender, aorta ELIN 2/2 body habitus and bruits absent. No hepatosplenomegaly or masses. Musculoskeletal:  Back is straight and non-tender, full ROM of upper and lower extremities. No kyphosis or scoliosis. Extremities:  Warm, pink, no clubbing, cyanosis, petechiae, ischemia, or deformities. no peripheral edema. +only has end small toe on left foot, on right foot missing 2nd toe next to great toe. Has diabetic foot ulcerations on plantar surface of each foot. Skin: no rashes, no ecchymoses, no petechiae, no nodules, no jaundice, +hyperpigmentation -  lower leg(s) bilateral, full sleeve tattoos on BUE, tattoos on legs, bilateral diabetic foot ulcerations.     Neurological/Psychiatric: oriented, normal mood, CN II-XII intact    DATA:    EK2020 Sinus tachycardia Intra-ventricular conduction delay Septal infarct , age undetermined Abnormal ECG No significant change was found When compared with ECG of1.. Confirmed by GIL HUFFMAN, [email or web address] () on 2020 6:51:55 AM      CBC with Differential:    Lab Results   Component Value Date    WBC 5.7 2020    RBC 3.53 2020    HGB 10.8 2020    HCT 31.3 2020     2020    MCV 88.7 2020    MCH 30.6 2020    MCHC 34.6 2020    RDW 13.9 2020    SEGSPCT 54.5 2012    BANDSPCT 1 2020    LYMPHOPCT 28.0 2020    MONOPCT 7.0 2020    EOSPCT 2.3 2012    BASOPCT 0.4 2020    MONOSABS 0.6 2020    LYMPHSABS 2.4 2020    EOSABS 0.1 2020    BASOSABS 0.0 2020    DIFFTYPE Auto 2012     CMP:    Lab Results   Component Value Date     2020    K 3.8 2020    K 3.5 2020     2020    CO2 26 2020    BUN 16 2020    CREATININE 0.8 2020    GFRAA >60 2020    GFRAA >60 2012    AGRATIO 1.4 2020    LABGLOM >60 2020    GLUCOSE 109 2020    PROT 7.7 2020    PROT 7.1 2012    LABALBU 3.5 2020    CALCIUM 8.5 2020    BILITOT 0.7 2020    ALKPHOS 89 2020    AST 28 2020    ALT 22 2020     Hepatic Function Panel:    Lab Results   Component Value Date    ALKPHOS 89 2020    ALT 22 2020    AST 28 2020    PROT 7.7 2020    PROT 7.1 2012    BILITOT 0.7 2020    LABALBU 3.5 2020     Magnesium:    Lab Results   Component Value Date    MG 2.10 2020     Phosphorus:    Lab Results   Component Value Date    PHOS 3.2 2020     PT/INR:    Lab Results   Component Value Date    PROTIME 10.7 2020    INR 0.92 2020     Last 3 Troponin:    Lab Results   Component Value Date    TROPONINI 0.36 2020    TROPONINI 0.40 2020    TROPONINI 0.23 2020     CXR: 2020   Impression    Decreased pulmonary vascular congestion.  Decreased interstitial edema.       ECHO: 2020 with Dr. Lewis [place]   Summary:   Left ventricular systolic function is severely reduced with ejection fraction estimated at 25-30 %. Severe global hypokinesis is present. Left ventricle size is normal.   Normal left ventricular wall thickness. Grade III diastolic dysfunction with elevated filing pressure. Mild posterior mitral annular calcification is present. No evidence of mitral valve stenosis. Severe mitral regurgitation. The left atrium is mildly dilated. No evidence of aortic valve stenosis. Mild aortic regurgitation is present. Mild tricuspid regurgitation. Normal systolic pulmonary artery pressure (SPAP) estimated at 39 mmHg (RA   pressure 8 mmHg). LHC: 8/6/20 with Dr. Alis Olea  LVEDP  19   GRADIENT ACROSS AORTIC VALVE  none   LV FUNCTION EF 15 %   WALL MOTION  severe global hypokinesis   MITRAL REGURGITATION  mild      CORONARY ARTERIES  LM Less than 10% rrmevnkt-tco-thaqzm stenosis            LAD Heavily calcified, there is a proximal-mid 90% stenosis with distal 50 to 60% stenosis vertically noted at the apex.     D1 is a small to medium size vessel with calcification and 40 to 50% ikggrthg-jhl-zpevle stenosis.       LCX Medium size vessel with ostial/proximal and mid 50 to 70% stenosis.       OM1 is a small to medium size vessel with proximal-mid 70% stenosis.       RI Large vessel with 10 to 20% fsedfdgh-kna-paujyr stenosis         RCA Calcified, tortuous vessel with proximal-mid 60 to 70% stenosis.   Distal vessel has 20 to 30% stenosis.      CONCLUSIONS:     Multivessel CAD/ASHD  Will refer to CT surgery for consideration of CABG  Due to severe LV dysfunction, high risk PCI can be considered with Impella support devices as well as atherectomy if he is not felt to be a good surgical candidate.     [id number] Score for Atrial Fibrillation Stroke Risk   Risk   Factors  Component Value   C CHF Yes 1   H HTN Yes 1   A2 Age >= 76 No,  (54 y.o.) 0   D DM Yes 1   S2 Prior Stroke/TIA No 0   V Vascular Disease Yes 1   A Age 74-69 No,  (54 y.o.) 0   Sc Sex male 0 [id number]  Score  4   Score last updated 8/7/20 51:70 AM EDT    Click here for a link to the UpToDate guideline \"Atrial Fibrillation: Anticoagulation therapy to prevent embolization    Disclaimer: Risk Score calculation is dependent on accuracy of patient problem list and past encounter diagnosis. ASSESSMENT AND PLAN:  STS Cardiac Surgery Risk profile: CABG with MVR    Mortality:  4.32%  Renal Failure:  5.25%  Permanent Stroke:  1.63%  Prolonged Ventilation:  28.85%  Deep Sternal Infection:  0.68%  Reoperation:  4.50%  Morbidity and Mortality:  31.90%  Short LOS:  16.18%  Long LOS:  12.56%    Pt is a 53 yo male, with a severely reduced EF of 25%, found to have multivessel CAD as well as severe mitral valve regurgitation. He is scheduled for a CABG with MV repair (and possible balloon pump) on Tuesday 8/11 with Dr. Estelle Hay. Continue with pre-op testing and risk stratification. Carotid duplex. Vein mapping. PFTs. Urine. Gage Kulkarni CNP   8/7/2020  5:36 AM  .  .

## 2020-08-07 NOTE — PROGRESS NOTES
Nutrition Education    Reviewed Heart Failure Diet Guidelines and DM Diet Guidelines with pt today. Pt states has never had this high of an A1c before and would like to lower it. Pt already eats low sodium foods and does not eat many sweets. States tries to eat smaller portions of \"bad\" foods and larger portions of \"good\" foods. Discussed label reading with pt as pt is new to reading these. Pt states understands what to look for on a label and expresses interest in trying this once goes home. Discussed drinking diet soda or water instead of regular soda and choosing whole grain options instead of refined grains. Pt does not drink any milk so suggested pt drink almond, soy, or an alternative kind of milk. Also discussed how to prepare chicken and hamburgers so has less fat and sodium. Pt seemed receptive of info. All questions were answered and RD will be available if pt has any further questions. · Verbally reviewed information with Patient  · Educated on Heart Failure Diet Guidelines; DM Diet Guidelines  · Written educational materials provided. · Refer to Patient Education activity for more details.     Electronically signed by Mayo Jalloh RD, LD on 8/7/20 at 2:43 PM EDT    Contact: [phone]

## 2020-08-07 NOTE — PROGRESS NOTES
[address]     Cardiology                                     Progress Note    Admission date:  2020    Reason for follow up visit: CAD    HPI/CC: Xenia Smith was admitted to [facility] on 2020 with SOB and abdominal pain. COVID negative. Was diagnosed with NSTEMI and was transferred to [facility]. LHC on 2020 showed multivessel CAD and CT surgery has been consulted. Rhythm has been sinus. Subjective: He currently denies chest pain, palpitations, shortness of breath, and dizziness. Vitals:  Blood pressure 117/70, pulse 105, temperature 98.1 °F (36.7 °C), temperature source Oral, resp. rate 18, height 5' 10\" (1.778 m), weight 231 lb 4.8 oz (104.9 kg), SpO2 95 %.   Temp  Av °F (36.7 °C)  Min: 97.8 °F (36.6 °C)  Max: 98.1 °F (36.7 °C)  Pulse  Av.5  Min: 90  Max: 105  BP  Min: 97/65  Max: 133/84  SpO2  Av.3 %  Min: 93 %  Max: 95 %    24 hour I/O    Intake/Output Summary (Last 24 hours) at 2020 0908  Last data filed at 2020 0851  Gross per 24 hour   Intake --   Output 500 ml   Net -500 ml     Current Facility-Administered Medications   Medication Dose Route Frequency Provider Last Rate Last Dose    sodium chloride flush 0.9 % injection 10 mL  10 mL Intravenous 2 times per day Alisha Chu MD   10 mL at 20 0849    sodium chloride flush 0.9 % injection 10 mL  10 mL Intravenous PRN Alisha Chu MD        acetaminophen (TYLENOL) tablet 650 mg  650 mg Oral Q4H PRN Alisha Chu MD        thyroid (ARMOUR) tablet 60 mg  60 mg Oral Daily Terence Smith MD   60 mg at 20 0849    carvedilol (COREG) tablet 25 mg  25 mg Oral BID Terence Smith MD   25 mg at 20 0841    furosemide (LASIX) tablet 40 mg  40 mg Oral BID Terence Smith MD   40 mg at 20 0546    aspirin EC tablet 81 mg  81 mg Oral Daily Terence Smith MD   81 mg at 20 0841    glipiZIDE (GLUCOTROL) tablet 5 mg  5 mg Oral BID AC Terence Smith MD   5 mg at 20 0552    lisinopril (PRINIVIL;ZESTRIL) tablet 5 mg  5 mg Oral Daily Michelle Sampson MD   5 mg at 08/07/20 0841    magnesium oxide (MAG-OX) tablet 400 mg  400 mg Oral BID Michelle Sampson MD   400 mg at 08/07/20 0841    pravastatin (PRAVACHOL) tablet 40 mg  40 mg Oral Nightly Michelle Sampson MD   40 mg at 08/06/20 2147    spironolactone (ALDACTONE) tablet 25 mg  25 mg Oral Daily Michelle Sampson MD   25 mg at 08/07/20 0841    glucose (GLUTOSE) 40 % oral gel 15 g  15 g Oral PRN Michelle Sampson MD        dextrose 50 % IV solution  12.5 g Intravenous PRN Michelle Sampson MD        glucagon (rDNA) injection 1 mg  1 mg Intramuscular PRN Michelle Sampson MD        dextrose 5 % solution  100 mL/hr Intravenous PRN Michelle Sampson MD        insulin lispro (HUMALOG) injection vial 0-12 Units  0-12 Units Subcutaneous TID WC Michelle Sampson MD   2 Units at 08/07/20 6957    insulin lispro (HUMALOG) injection vial 0-6 Units  0-6 Units Subcutaneous Nightly Michelle Sampson MD        sodium chloride flush 0.9 % injection 10 mL  10 mL Intravenous 2 times per day Michelle Sampson MD   10 mL at 08/07/20 0842    sodium chloride flush 0.9 % injection 10 mL  10 mL Intravenous PRN Michelle Sampson MD        acetaminophen (TYLENOL) tablet 650 mg  650 mg Oral Q6H PRN Michelle Sampson MD        Or   [facility] acetaminophen (TYLENOL) suppository 650 mg  650 mg Rectal Q6H PRN Michelle Sampson MD        polyethylene glycol (GLYCOLAX) packet 17 g  17 g Oral Daily PRN Michelle Sampson MD        promethazine (PHENERGAN) tablet 12.5 mg  12.5 mg Oral Q6H PRN Michelle Sampson MD        Or    [facility]ron [facility]) injection 4 mg  4 mg Intravenous Q6H PRN Michelle Sampson MD        enoxaparin (LOVENOX) injection 40 mg  40 mg Subcutaneous Daily Michelle Sampson MD   40 mg at 08/07/20 0842    insulin glargine (LANTUS) injection vial 72 Units  72 Units Subcutaneous Nightly Michelle Sampson MD   72 Units at 08/06/20 8146       Objective:     Telemetry monitor: sinus    Physical Exam:  Constitutional and general appearance: alert, cooperative, no distress and appears stated age  HEENT: PERRL, no cervical lymphadenopathy. No masses palpable. Normal oral mucosa  Respiratory:  · Normal excursion and expansion without use of accessory muscles  · Resp auscultation: Normal breath sounds without wheezing, rhonchi, and rales  Cardiovascular:  · The apical impulse is not displaced  · Heart tones are crisp and normal. regular S1 and S2.  · Jugular venous pulsation Normal  · The carotid upstroke is normal in amplitude and contour without delay or bruit  · Peripheral pulses are symmetrical and full   Abdomen:  · No masses or tenderness  · Bowel sounds present  Extremities:  ·  No cyanosis or clubbing  ·  No lower extremity edema  ·  Skin: warm and dry; right radial access site stable; right radial pulse 2+; right hand warm, normal color  Neurological:  · Alert and oriented  · Moves all extremities well  · No abnormalities of mood, affect, memory, mentation, or behavior are noted    Data    Echo 8/5/2020:  Left ventricular systolic function is severely reduced with ejection fraction estimated at 25-30 %. Severe global hypokinesis is present. Left ventricle size is normal.  Normal left ventricular wall thickness. Grade III diastolic dysfunction with elevated filing pressure. Mild posterior mitral annular calcification is present. No evidence of mitral valve stenosis. Severe mitral regurgitation. The left atrium is mildly dilated. No evidence of aortic valve stenosis. Mild aortic regurgitation is present. Mild tricuspid regurgitation. Normal systolic pulmonary artery pressure (SPAP) estimated at 39 mmHg (RA pressure 8 mmHg).     [facility] 8/6/2020 Eulas Govern):  LVGRAM     LVEDP  19   GRADIENT ACROSS AORTIC VALVE  none   LV FUNCTION EF 15 %   WALL MOTION  severe global hypokinesis   MITRAL REGURGITATION  mild      CORONARY ARTERIES     LM Less than 10% jawnghbq-pxb-wcwuhw stenosis          LAD Heavily calcified, there is a proximal-mid 90% stenosis with distal 50 to 60% stenosis vertically noted at the apex.     D1 is a small to medium size vessel with calcification and 40 to 50% pidiqcsn-byw-uxeksi stenosis.       LCX Medium size vessel with ostial/proximal and mid 50 to 70% stenosis.       OM1 is a small to medium size vessel with proximal-mid 70% stenosis.       RI Large vessel with 10 to 20% zledyftq-fzq-qmokfi stenosis         RCA Calcified, tortuous vessel with proximal-mid 60 to 70% stenosis. Distal vessel has 20 to 30% stenosis.      CONCLUSIONS:      Multivessel CAD/ASHD  Will refer to CT surgery for consideration of CABG  Due to severe LV dysfunction, high risk PCI can be considered with Impella support devices as well as atherectomy if he is not felt to be a good surgical candidate. All labs and testing reviewed.   Lab Review     Renal Profile:   Lab Results   Component Value Date    CREATININE 0.8 08/07/2020    BUN 16 08/07/2020     08/07/2020    K 3.8 08/07/2020    K 3.5 08/05/2020     08/07/2020    CO2 26 08/07/2020     CBC:    Lab Results   Component Value Date    WBC 5.7 08/07/2020    RBC 3.53 08/07/2020    HGB 10.8 08/07/2020    HCT 31.3 08/07/2020    MCV 88.7 08/07/2020    RDW 13.9 08/07/2020     08/07/2020     BNP:    Lab Results   Component Value Date    BNP 1,049 07/08/2013     Fasting Lipid Panel:    Lab Results   Component Value Date    CHOL 100 10/16/2019    HDL 30 05/26/2020    TRIG 176 10/16/2019     Cardiac Enzymes:  CK/MbTroponin  Lab Results   Component Value Date    TROPONINI 0.36 08/04/2020     PT/ INR   Lab Results   Component Value Date    INR 0.92 08/04/2020    INR 1.01 09/26/2017    INR 1.15 01/23/2014    PROTIME 10.7 08/04/2020    PROTIME 11.4 09/26/2017    PROTIME 12.8 01/23/2014     PTT No results found for: PTT   Lab Results   Component Value Date    MG 2.10 08/07/2020      Lab Results   Component Value Date    TSH 6.27 02/14/2020 Assessment:  NSTEMI  CAD: ongoing   -MV disease on [facility] 8/6/2020  History of nonischemic cardiomyopathy: now with ischemia    -s/p single chamber ICD implant 1/2014 ([employer])   -EF 25-30% on echo 8/2020  Chronic combined CHF: compensated  HTN: controlled   Mitral regurgitation: severe on echo, mild on cath 8/2020  HLD  DM with neuropathy   History of osteomyelitis in both feet   Hypothyroidism  Acute respiratory failure   -COVID negative 8/4/2020  History of alcohol abuse: quit 2015  Chronic normocytic anemia: stable    Plan:   1. Continue ASA, Coreg, Lasix, lisinopril, spironolactone, and statin  2. CT surgery evaluation pending. Per Dr. Brandie Naylor, can consider high risk PCI with Impella support if he is not a good surgical candidate.     Sherly Gar, APRN-CNP  [facility]  [phone]

## 2020-08-07 NOTE — PROGRESS NOTES
Hospitalist Progress Note      PCP: Milena Almeida MD    Date of Admission: 8/6/2020    Chief Complaint: Chest pain    Hospital Course: See H&P    Subjective:   Patient is up in bed, comfortable, not in distress. Denies any chest pain or shortness of breath. No new event overnight noted. Medications:  Reviewed    Infusion Medications    dextrose       Scheduled Medications    therapeutic multivitamin-minerals  1 tablet Oral Daily    sodium chloride flush  10 mL Intravenous 2 times per day    thyroid  60 mg Oral Daily    carvedilol  25 mg Oral BID    furosemide  40 mg Oral BID    aspirin  81 mg Oral Daily    glipiZIDE  5 mg Oral BID AC    lisinopril  5 mg Oral Daily    magnesium oxide  400 mg Oral BID    pravastatin  40 mg Oral Nightly    spironolactone  25 mg Oral Daily    insulin lispro  0-12 Units Subcutaneous TID WC    insulin lispro  0-6 Units Subcutaneous Nightly    sodium chloride flush  10 mL Intravenous 2 times per day    enoxaparin  40 mg Subcutaneous Daily    insulin glargine  72 Units Subcutaneous Nightly     PRN Meds: sodium chloride flush, acetaminophen, glucose, dextrose, glucagon (rDNA), dextrose, sodium chloride flush, acetaminophen **OR** acetaminophen, polyethylene glycol, promethazine **OR** ondansetron      Intake/Output Summary (Last 24 hours) at 8/7/2020 1132  Last data filed at 8/7/2020 0851  Gross per 24 hour   Intake --   Output 500 ml   Net -500 ml       Physical Exam Performed:    /83   Pulse 96   Temp 98.2 °F (36.8 °C) (Oral)   Resp 18   Ht 5' 10\" (1.778 m)   Wt 231 lb 4.8 oz (104.9 kg)   SpO2 96%   BMI 33.19 kg/m²     General appearance: No apparent distress, appears stated age and cooperative. HEENT: Pupils equal, round, and reactive to light. Conjunctivae/corneas clear. Neck: Supple, with full range of motion. No jugular venous distention. Trachea midline. Respiratory:  Normal respiratory effort.  Clear to auscultation, bilaterally without Rales/Wheezes/Rhonchi. Cardiovascular: Regular rate and rhythm with normal S1/S2 without murmurs, rubs or gallops. Abdomen: Soft, non-tender, non-distended with normal bowel sounds. Musculoskeletal: No clubbing, cyanosis or edema bilaterally. Full range of motion without deformity. Skin: Skin color, texture, turgor normal.  No rashes or lesions. Neurologic:  Neurovascularly intact without any focal sensory/motor deficits.  Cranial nerves: II-XII intact, grossly non-focal.  Psychiatric: Alert and oriented, thought content appropriate, normal insight  Capillary Refill: Brisk,< 3 seconds   Peripheral Pulses: +2 palpable, equal bilaterally       Labs:   Recent Labs     08/05/20  0454 08/06/20  0511 08/07/20  0500   WBC 8.6 5.9 5.7   HGB 12.0* 10.8* 10.8*   HCT 35.0* 31.9* 31.3*    179 178     Recent Labs     08/05/20  0454 08/06/20  0511 08/07/20  0500   * 136 141   K 3.5 4.0 3.8   CL 99 100 104   CO2 25 26 26   BUN 16 22* 16   CREATININE 0.8* 1.0 0.8*   CALCIUM 8.6 8.5 8.5   PHOS  --  3.4 3.2       Recent Labs     08/04/20  1709 08/04/20  2051   TROPONINI 0.40* 0.36*       Urinalysis:      Lab Results   Component Value Date    NITRU Negative 09/26/2017    WBCUA None seen 09/26/2017    RBCUA 0-2 09/26/2017    BLOODU Negative 09/26/2017    SPECGRAV 1.020 09/26/2017    GLUCOSEU Negative 09/26/2017       Radiology:  VL PRE OP VEIN MAPPING    (Results Pending)   VL DUP CAROTID BILATERAL    (Results Pending)           Assessment/Plan:    Active Hospital Problems    Diagnosis    Alcoholic cardiomyopathy [A97.1]     Priority: High     Class: Chronic    CAD in native artery [I25.10]    Chest pain [R07.9]    Obesity [E66.9]    Acquired hypothyroidism [E03.9]    Hyperlipidemia [E78.5]    Hypertension [I10]    [facility] [E11.9]     CAD - multivessel CAD on [facility] 6 August w/out complications for CT surgery eval vs high risk staged PCI.       HTN - w/out known CAD and no evidence of active signs/sxs of ischemia/failure. Currently controlled on home meds w/ vitals reviewed and documented as above.     HyperLipidemia - controlled on home Statin. Continue, w/ f/u and med adjustment w/ PCP     DM2 - controlled on home Insulin - continued. Follow FSBS/SSI high regimen. Last HbA1c 7.4% dated May 2020. Anticipate resuming/continuing home regimen at discharge.      HypoThyroid - clinically euthyroid on oral replacement therapy. Continue, w/ outpt monitoring as previously arranged.      Obesity -  With Body mass index is 33.15 kg/m². Complicating assessment and treatment. Placing patient at risk for multiple co-morbidities as well as early death and contributing to the patient's presentation. Counseled on weight loss.     DVT Prophylaxis: Lovenox  Diet: DIET CARDIAC;  Code Status: Full Code    PT/OT Eval Status: Ambulatory    Dispo - pending Post op course    John Torres MD

## 2020-08-07 NOTE — PROGRESS NOTES
Full PFT performed in Pulmonary Function Lab. Patient tolerated well. Copy of test placed in patients chart.

## 2020-08-07 NOTE — CARE COORDINATION
CASE MANAGEMENT INITIAL ASSESSMENT      Reviewed chart and completed assessment with: patient   Explained Case Management role/services. Primary contact information: Bryan Bhat date/status: 8/6/20 Inpatient  Diagnosis: left heart cath   Is this a Readmission?: no    Insurance: [facility] required for SNF - Y        3 night stay required -  N    Living arrangements, Adls, care needs, prior to admission: lives in a house with his mom    Transportation: patient     Durable Medical Equipment at home: none      Services in the home and/or outpatient, prior to admission: none      PT/OT recs: 2 [place] Notification (HEN): not initiated    Barriers to discharge: none    Plan/comments: Patient is independent at home. He denies any DME or services at home. Was seen at [facility] by CM. Likely no needs from CM.

## 2020-08-07 NOTE — PLAN OF CARE
Problem: Nutrition  Goal: Optimal nutrition therapy  Outcome: Ongoing     Nutrition Problem #1: Increased nutrient needs  Intervention: Food and/or Nutrient Delivery: Continue Current Diet  Nutritional Goals: Consume >50% of meals

## 2020-08-07 NOTE — CONSULTS
Consulted for diabetic ulcers on bilateral feet; pt familiar to Wound Care RN, saw at [facility] on 8/4/2020; will continue treatment started at [facility]; Wound Care to follow.

## 2020-08-08 ENCOUNTER — APPOINTMENT (OUTPATIENT)
Dept: VASCULAR LAB | Age: 55
DRG: 233 | End: 2020-08-08
Attending: INTERNAL MEDICINE

## 2020-08-08 ENCOUNTER — ANESTHESIA EVENT (OUTPATIENT)
Dept: OPERATING ROOM | Age: 55
DRG: 233 | End: 2020-08-08

## 2020-08-08 LAB
ANION GAP SERPL CALCULATED.3IONS-SCNC: 12 MMOL/L (ref 3–16)
BASOPHILS ABSOLUTE: 0 K/UL (ref 0–0.2)
BASOPHILS RELATIVE PERCENT: 0.7 %
BUN BLDV-MCNC: 13 MG/DL (ref 7–20)
CALCIUM SERPL-MCNC: 9.4 MG/DL (ref 8.3–10.6)
CHLORIDE BLD-SCNC: 99 MMOL/L (ref 99–110)
CO2: 29 MMOL/L (ref 21–32)
CREAT SERPL-MCNC: 0.9 MG/DL (ref 0.9–1.3)
EOSINOPHILS ABSOLUTE: 0.2 K/UL (ref 0–0.6)
EOSINOPHILS RELATIVE PERCENT: 2.6 %
GFR AFRICAN AMERICAN: >60
GFR NON-AFRICAN AMERICAN: >60
GLUCOSE BLD-MCNC: 107 MG/DL (ref 70–99)
GLUCOSE BLD-MCNC: 200 MG/DL (ref 70–99)
GLUCOSE BLD-MCNC: 217 MG/DL (ref 70–99)
GLUCOSE BLD-MCNC: 230 MG/DL (ref 70–99)
GLUCOSE BLD-MCNC: 267 MG/DL (ref 70–99)
HCT VFR BLD CALC: 34.5 % (ref 40.5–52.5)
HEMOGLOBIN: 11.9 G/DL (ref 13.5–17.5)
LYMPHOCYTES ABSOLUTE: 1.7 K/UL (ref 1–5.1)
LYMPHOCYTES RELATIVE PERCENT: 27.1 %
MCH RBC QN AUTO: 31 PG (ref 26–34)
MCHC RBC AUTO-ENTMCNC: 34.5 G/DL (ref 31–36)
MCV RBC AUTO: 90 FL (ref 80–100)
MONOCYTES ABSOLUTE: 0.4 K/UL (ref 0–1.3)
MONOCYTES RELATIVE PERCENT: 6.7 %
NEUTROPHILS ABSOLUTE: 3.9 K/UL (ref 1.7–7.7)
NEUTROPHILS RELATIVE PERCENT: 62.9 %
PDW BLD-RTO: 13.9 % (ref 12.4–15.4)
PERFORMED ON: ABNORMAL
PLATELET # BLD: 208 K/UL (ref 135–450)
PMV BLD AUTO: 6.9 FL (ref 5–10.5)
POTASSIUM SERPL-SCNC: 3.9 MMOL/L (ref 3.5–5.1)
RBC # BLD: 3.83 M/UL (ref 4.2–5.9)
SODIUM BLD-SCNC: 140 MMOL/L (ref 136–145)
WBC # BLD: 6.2 K/UL (ref 4–11)

## 2020-08-08 PROCEDURE — 80048 BASIC METABOLIC PNL TOTAL CA: CPT

## 2020-08-08 PROCEDURE — 6370000000 HC RX 637 (ALT 250 FOR IP): Performed by: INTERNAL MEDICINE

## 2020-08-08 PROCEDURE — 2580000003 HC RX 258: Performed by: INTERNAL MEDICINE

## 2020-08-08 PROCEDURE — 93971 EXTREMITY STUDY: CPT

## 2020-08-08 PROCEDURE — 99232 SBSQ HOSP IP/OBS MODERATE 35: CPT | Performed by: NURSE PRACTITIONER

## 2020-08-08 PROCEDURE — 6360000002 HC RX W HCPCS: Performed by: INTERNAL MEDICINE

## 2020-08-08 PROCEDURE — P9047 ALBUMIN (HUMAN), 25%, 50ML: HCPCS

## 2020-08-08 PROCEDURE — 2500000003 HC RX 250 WO HCPCS

## 2020-08-08 PROCEDURE — 6360000002 HC RX W HCPCS

## 2020-08-08 PROCEDURE — 85025 COMPLETE CBC W/AUTO DIFF WBC: CPT

## 2020-08-08 PROCEDURE — 93880 EXTRACRANIAL BILAT STUDY: CPT

## 2020-08-08 PROCEDURE — 36415 COLL VENOUS BLD VENIPUNCTURE: CPT

## 2020-08-08 PROCEDURE — 2580000003 HC RX 258

## 2020-08-08 PROCEDURE — 2060000000 HC ICU INTERMEDIATE R&B

## 2020-08-08 PROCEDURE — 6370000000 HC RX 637 (ALT 250 FOR IP): Performed by: NURSE PRACTITIONER

## 2020-08-08 RX ADMIN — INSULIN GLARGINE 72 UNITS: 100 INJECTION, SOLUTION SUBCUTANEOUS at 20:51

## 2020-08-08 RX ADMIN — CARVEDILOL 25 MG: 6.25 TABLET, FILM COATED ORAL at 08:04

## 2020-08-08 RX ADMIN — Medication 1 TABLET: at 08:03

## 2020-08-08 RX ADMIN — Medication 10 ML: at 20:35

## 2020-08-08 RX ADMIN — GLIPIZIDE 5 MG: 5 TABLET ORAL at 16:28

## 2020-08-08 RX ADMIN — FUROSEMIDE 40 MG: 40 TABLET ORAL at 18:49

## 2020-08-08 RX ADMIN — LEVOTHYROXINE, LIOTHYRONINE 60 MG: 19; 4.5 TABLET ORAL at 10:14

## 2020-08-08 RX ADMIN — Medication 10 ML: at 20:38

## 2020-08-08 RX ADMIN — INSULIN LISPRO 2 UNITS: 100 INJECTION, SOLUTION INTRAVENOUS; SUBCUTANEOUS at 20:51

## 2020-08-08 RX ADMIN — INSULIN LISPRO 6 UNITS: 100 INJECTION, SOLUTION INTRAVENOUS; SUBCUTANEOUS at 12:15

## 2020-08-08 RX ADMIN — MAGNESIUM GLUCONATE 500 MG ORAL TABLET 400 MG: 500 TABLET ORAL at 08:03

## 2020-08-08 RX ADMIN — MAGNESIUM GLUCONATE 500 MG ORAL TABLET 400 MG: 500 TABLET ORAL at 20:34

## 2020-08-08 RX ADMIN — GLIPIZIDE 5 MG: 5 TABLET ORAL at 10:15

## 2020-08-08 RX ADMIN — FUROSEMIDE 40 MG: 40 TABLET ORAL at 05:46

## 2020-08-08 RX ADMIN — SPIRONOLACTONE 25 MG: 25 TABLET ORAL at 08:04

## 2020-08-08 RX ADMIN — ENOXAPARIN SODIUM 40 MG: 40 INJECTION SUBCUTANEOUS at 08:04

## 2020-08-08 RX ADMIN — CARVEDILOL 25 MG: 6.25 TABLET, FILM COATED ORAL at 20:35

## 2020-08-08 RX ADMIN — ASPIRIN 81 MG: 81 TABLET, COATED ORAL at 08:03

## 2020-08-08 RX ADMIN — LISINOPRIL 5 MG: 10 TABLET ORAL at 08:03

## 2020-08-08 RX ADMIN — PRAVASTATIN SODIUM 40 MG: 40 TABLET ORAL at 20:35

## 2020-08-08 RX ADMIN — INSULIN LISPRO 4 UNITS: 100 INJECTION, SOLUTION INTRAVENOUS; SUBCUTANEOUS at 16:28

## 2020-08-08 RX ADMIN — Medication 10 ML: at 08:04

## 2020-08-08 ASSESSMENT — ENCOUNTER SYMPTOMS
RESPIRATORY NEGATIVE: 1
GASTROINTESTINAL NEGATIVE: 1

## 2020-08-08 ASSESSMENT — PAIN SCALES - GENERAL
PAINLEVEL_OUTOF10: 0
PAINLEVEL_OUTOF10: 0

## 2020-08-08 NOTE — PROGRESS NOTES
[facility]  Cardiology  Progress Note    Admission date:  2020    Reason for follow up visit: NSTEMI/CAD    HPI/CC: Leah Gonzalez is a 54 y.o. male who was admitted to [facility] 2020 for SOB. Troponin elevated. Transferred to [facility] 2020 showed multi vessel CAD, CT surgery referral. Plan for CABG 2020. Rhythm has been sinus. Subjective: Denies chest pain, shortness of breath, palpitations and dizziness. Vitals:  Blood pressure 118/73, pulse 87, temperature 98.6 °F (37 °C), temperature source Oral, resp. rate 19, height 5' 10\" (1.778 m), weight 230 lb 9.6 oz (104.6 kg), SpO2 95 %.   Temp  Av.2 °F (36.8 °C)  Min: 98 °F (36.7 °C)  Max: 98.6 °F (37 °C)  Pulse  Av.7  Min: 87  Max: 105  BP  Min: 117/70  Max: 143/88  SpO2  Av.2 %  Min: 94 %  Max: 96 %    24 hour I/O    Intake/Output Summary (Last 24 hours) at 2020 8915  Last data filed at 2020 0548  Gross per 24 hour   Intake 190 ml   Output 1725 ml   Net -1535 ml     Current Facility-Administered Medications   Medication Dose Route Frequency Provider Last Rate Last Dose    therapeutic multivitamin-minerals 1 tablet  1 tablet Oral Daily JOSE Glynn CNP   1 tablet at 20 1129    [START ON 2020] iron sucrose (VENOFER) 200 mg in sodium chloride 0.9 % 100 mL IVPB  200 mg Intravenous Once JOSE Glynn CNP        sodium chloride flush 0.9 % injection 10 mL  10 mL Intravenous 2 times per day Kaur Escobedo MD   10 mL at 20 2016    sodium chloride flush 0.9 % injection 10 mL  10 mL Intravenous PRN Kaur Escobedo MD        acetaminophen (TYLENOL) tablet 650 mg  650 mg Oral Q4H PRN Kaur Escobedo MD        [facility]) tablet 60 mg  60 mg Oral Daily Michelle Estevez MD   60 mg at 20 0849    carvedilol (COREG) tablet 25 mg  25 mg Oral BID Michelle Estevez MD   25 mg at 20    furosemide (LASIX) tablet 40 mg  40 mg Oral BID Michelle Estevez MD   40 mg at 20 5787  aspirin EC tablet 81 mg  81 mg Oral Daily Airam Simpson MD   81 mg at 08/07/20 0841    glipiZIDE (GLUCOTROL) tablet 5 mg  5 mg Oral BID AC Airam Simpson MD   5 mg at 08/07/20 1722    lisinopril (PRINIVIL;ZESTRIL) tablet 5 mg  5 mg Oral Daily Airam Simpson MD   5 mg at 08/07/20 0841    magnesium oxide (MAG-OX) tablet 400 mg  400 mg Oral BID Airam Simpson MD   400 mg at 08/07/20 2014    pravastatin (PRAVACHOL) tablet 40 mg  40 mg Oral Nightly Airam Simpson MD   40 mg at 08/07/20 2014    spironolactone (ALDACTONE) tablet 25 mg  25 mg Oral Daily Airam Simpson MD   25 mg at 08/07/20 0841    glucose (GLUTOSE) 40 % oral gel 15 g  15 g Oral PRN Airam Simpson MD        dextrose 50 % IV solution  12.5 g Intravenous PRN Airam Simpson MD        glucagon (rDNA) injection 1 mg  1 mg Intramuscular PRN Airam Simpson MD        dextrose 5 % solution  100 mL/hr Intravenous PRN Airam Simpson MD        insulin lispro (HUMALOG) injection vial 0-12 Units  0-12 Units Subcutaneous TID WC Airam Simpson MD   2 Units at 08/07/20 1722    insulin lispro (HUMALOG) injection vial 0-6 Units  0-6 Units Subcutaneous Nightly Airam Simpson MD   2 Units at 08/07/20 2053    sodium chloride flush 0.9 % injection 10 mL  10 mL Intravenous 2 times per day Airam Simpson MD   10 mL at 08/07/20 2014    sodium chloride flush 0.9 % injection 10 mL  10 mL Intravenous PRN Airam Simpson MD        acetaminophen (TYLENOL) tablet 650 mg  650 mg Oral Q6H PRN Airam Simpson MD        Or   Boris Oviedo acetaminophen (TYLENOL) suppository 650 mg  650 mg Rectal Q6H PRN Airam Simpson MD        polyethylene glycol (GLYCOLAX) packet 17 g  17 g Oral Daily PRN Airam Simpson MD        promethazine (PHENERGAN) tablet 12.5 mg  12.5 mg Oral Q6H PRN Airam Simpson MD        Or    ondansetron [facility]) injection 4 mg  4 mg Intravenous Q6H PRN Airam Simpson MD        enoxaparin (LOVENOX) injection 40 mg  40 mg Subcutaneous Daily Dora Trevizo MD Alexandre   40 mg at 08/07/20 0842    insulin glargine (LANTUS) injection vial 72 Units  72 Units Subcutaneous Nightly Nikko Mckeon MD   72 Units at 08/07/20 2052     Review of Systems   Constitutional: Negative. Respiratory: Negative. Cardiovascular: Negative. Gastrointestinal: Negative. Neurological: Negative. Objective:     Telemetry monitor: SR    Physical Exam:  Constitutional:  Comfortable and alert, NAD, appears older than stated age  Eyes: PERRL, sclera nonicteric  Neck:  Supple, no masses, no thyroidmegaly, no JVD  Skin:  Warm and dry; no rash or lesions  Heart:  Regular, normal apex, S1 and S2 normal, no M/G/R  Lungs:  Normal respiratory effort; clear; no wheezing/rhonchi/rales  Abdomen: soft, non tender, + bowel sounds  Extremities:  No edema or cyanosis; no clubbing  Neuro: alert and oriented, moves legs and arms equally, normal mood and affect  Right radial site soft, no hematoma, 2+ pulse    Data Reviewed:    Coronary angiogram 8/6/2020:  Cardiomyopathy  PROCEDURES PERFORMED    Left heart catheterization  LVgram  Coronary angiogam  Coronary cath  PROCEDURE DESCRIPTION   Risks/benefits/alternatives/outcomes were discussed with patient and/or family and informed consent was obtained. Using the [facility] scale, the patient's right radial artery was found to be a level B. Patient was prepped draped in the usual sterile fashion. Local anaesthetic was applied over puncture site. Using a back wall technique, a 6 Chinese Terumo sheath was inserted into right radial artery. Verapamil, nitroglycerin were administered through the sheath. Heparin was administered. Diagnostic 5 [facility] Medtronic pigtail, ultra, [place], 3 [facility] catheters were used for diagnostic angiograms. Pigtail was used for left ventricular angiogram, ultra and [place] were taken, however, left-sided coronaries could not be cannulated with the ultra catheter and rather were cannulated with the La catheter.   Neither the ultra nor the [place]re were able to be utilized for RCA cannulation and a 3 DRC catheter was used for that. At the conclusion of the procedure, a TR band was placed over the puncture site and hemostasis was obtained. There were no immediate complications. I supervised sedation with versed 6 mg/fentanyl 200 Mcg during the procedure. 150 cc contrast was utilized. <20cc EBL. FINDINGS     LVEDP  19   GRADIENT ACROSS AORTIC VALVE  none   LV FUNCTION EF 15 %   WALL MOTION  severe global hypokinesis   MITRAL REGURGITATION  mild      LM Less than 10% ydwihiod-prj-pjwmtz stenosis            LAD Heavily calcified, there is a proximal-mid 90% stenosis with distal 50 to 60% stenosis vertically noted at the apex.     D1 is a small to medium size vessel with calcification and 40 to 50% kbsyfnrq-kpi-fskwhs stenosis.       LCX Medium size vessel with ostial/proximal and mid 50 to 70% stenosis.       OM1 is a small to medium size vessel with proximal-mid 70% stenosis.       RI Large vessel with 10 to 20% wzgobmuk-nsf-atqzkx stenosis         RCA Calcified, tortuous vessel with proximal-mid 60 to 70% stenosis. Distal vessel has 20 to 30% stenosis. CONCLUSIONS:    Multivessel CAD/ASHD  Will refer to CT surgery for consideration of CABG  Due to severe LV dysfunction, high risk PCI can be considered with Impella support devices as well as atherectomy if he is not felt to be a good surgical candidate. Echo 8/5/2020:  Left ventricular systolic function is severely reduced with ejection   fraction estimated at 25-30 %. Severe global hypokinesis is present. Left ventricle size is normal.   Normal left ventricular wall thickness. Grade III diastolic dysfunction with elevated filing pressure. Mild posterior mitral annular calcification is present. No evidence of mitral valve stenosis. Severe mitral regurgitation. The left atrium is mildly dilated. No evidence of aortic valve stenosis.    Mild aortic regurgitation is present. Mild tricuspid regurgitation. Normal systolic pulmonary artery pressure (SPAP) estimated at 39 mmHg (RA   pressure 8 mmHg). Lab Reviewed:     Renal Profile:  Lab Results   Component Value Date    CREATININE 0.8 08/07/2020    BUN 16 08/07/2020     08/07/2020    K 3.8 08/07/2020    K 3.5 08/05/2020     08/07/2020    CO2 26 08/07/2020     CBC:    Lab Results   Component Value Date    WBC 5.7 08/07/2020    RBC 3.53 08/07/2020    HGB 10.8 08/07/2020    HCT 31.3 08/07/2020    MCV 88.7 08/07/2020    RDW 13.9 08/07/2020     08/07/2020     BNP:    Lab Results   Component Value Date    PROBNP 2,569 08/04/2020     Fasting Lipid Panel:    Lab Results   Component Value Date    CHOL 100 10/16/2019    HDL 30 05/26/2020    TRIG 176 10/16/2019     Cardiac Enzymes:  CK/MbTroponin  Lab Results   Component Value Date    TROPONINI 0.36 08/04/2020     PT/ INR   Lab Results   Component Value Date    INR 0.92 08/04/2020    INR 1.01 09/26/2017    INR 1.15 01/23/2014    PROTIME 10.7 08/04/2020    PROTIME 11.4 09/26/2017    PROTIME 12.8 01/23/2014     PTT No results found for: PTT   Lab Results   Component Value Date    MG 2.10 08/07/2020      Lab Results   Component Value Date    TSH 6.27 02/14/2020     All labs and imaging reviewed today    Assessment:  CAD/NSTEMI: awaiting CABG, multivessel on angiogram 8/6/2020  History of nonischemic cardiomyopathy, now with ischemia: EF 25-30% on echo 8/2020; EF 15-20% in 2013   - s/p ICD 1/2014   - historically due to alcohol abuse  Chronic combined systolic and diastolic CHF  Mitral regurgitation: severe on LHC, mild on echo 8/2020  HTN: controlled  HLD: controlled, LDL 42, continue statin  DM: hgb a1c 6.7  Hypothyroidism  History of alcohol abuse: quit 2015  Anemia    Plan:   1. CBC and BMP  2. Continue aspirin, statin, carvedilol, lisinopril, spironolactone, lasix  3. Pre op testing per CT surgery  4.  Anticipate CABG with MV repair 8/11/2020    Abi Recinos,

## 2020-08-08 NOTE — PROGRESS NOTES
Hospitalist Progress Note      PCP: Soumya Fuentes MD    Date of Admission: 8/6/2020    Chief Complaint: Chest pain    Hospital Course: See H&P    Subjective:   Patient is up in bed, comfortable, not in distress. Denies any chest pain or shortness of breath. No new event overnight noted. Medications:  Reviewed    Infusion Medications    dextrose       Scheduled Medications    therapeutic multivitamin-minerals  1 tablet Oral Daily    [START ON 8/9/2020] iron sucrose  200 mg Intravenous Once    sodium chloride flush  10 mL Intravenous 2 times per day    thyroid  60 mg Oral Daily    carvedilol  25 mg Oral BID    furosemide  40 mg Oral BID    aspirin  81 mg Oral Daily    glipiZIDE  5 mg Oral BID AC    lisinopril  5 mg Oral Daily    magnesium oxide  400 mg Oral BID    pravastatin  40 mg Oral Nightly    spironolactone  25 mg Oral Daily    insulin lispro  0-12 Units Subcutaneous TID WC    insulin lispro  0-6 Units Subcutaneous Nightly    sodium chloride flush  10 mL Intravenous 2 times per day    enoxaparin  40 mg Subcutaneous Daily    insulin glargine  72 Units Subcutaneous Nightly     PRN Meds: sodium chloride flush, acetaminophen, glucose, dextrose, glucagon (rDNA), dextrose, sodium chloride flush, acetaminophen **OR** acetaminophen, polyethylene glycol, promethazine **OR** ondansetron      Intake/Output Summary (Last 24 hours) at 8/8/2020 1520  Last data filed at 8/8/2020 0548  Gross per 24 hour   Intake 190 ml   Output 1225 ml   Net -1035 ml       Physical Exam Performed:    /77   Pulse 88   Temp 98.1 °F (36.7 °C) (Oral)   Resp 16   Ht 5' 10\" (1.778 m)   Wt 230 lb 9.6 oz (104.6 kg)   SpO2 96%   BMI 33.09 kg/m²     General appearance: No apparent distress, appears stated age and cooperative. HEENT: Pupils equal, round, and reactive to light. Conjunctivae/corneas clear. Neck: Supple, with full range of motion. No jugular venous distention.  Trachea midline. Respiratory:  Normal respiratory effort. Clear to auscultation, bilaterally without Rales/Wheezes/Rhonchi. Cardiovascular: Regular rate and rhythm with normal S1/S2 without murmurs, rubs or gallops. Abdomen: Soft, non-tender, non-distended with normal bowel sounds. Musculoskeletal: No clubbing, cyanosis or edema bilaterally. Full range of motion without deformity. Skin: Skin color, texture, turgor normal.  No rashes or lesions. Neurologic:  Neurovascularly intact without any focal sensory/motor deficits. Cranial nerves: II-XII intact, grossly non-focal.  Psychiatric: Alert and oriented, thought content appropriate, normal insight  Capillary Refill: Brisk,< 3 seconds   Peripheral Pulses: +2 palpable, equal bilaterally       Labs:   Recent Labs     08/06/20  0511 08/07/20  0500 08/08/20  1041   WBC 5.9 5.7 6.2   HGB 10.8* 10.8* 11.9*   HCT 31.9* 31.3* 34.5*    178 208     Recent Labs     08/06/20  0511 08/07/20  0500 08/08/20  1041    141 140   K 4.0 3.8 3.9    104 99   CO2 26 26 29   BUN 22* 16 13   CREATININE 1.0 0.8* 0.9   CALCIUM 8.5 8.5 9.4   PHOS 3.4 3.2  --        No results for input(s): CKTOTAL, TROPONINI in the last 72 hours. Urinalysis:      Lab Results   Component Value Date    NITRU Negative 08/07/2020    WBCUA None seen 08/07/2020    RBCUA 3-4 08/07/2020    BLOODU SMALL 08/07/2020    SPECGRAV 1.020 08/07/2020    GLUCOSEU Negative 08/07/2020       Radiology:  VL DUP CAROTID BILATERAL         VL PRE OP VEIN MAPPING                 Assessment/Plan:    Active Hospital Problems    Diagnosis    Alcoholic cardiomyopathy [W61.3]     Priority: High     Class: Chronic    CAD in native artery [I25.10]    Chest pain [R07.9]    Obesity [E66.9]    Acquired hypothyroidism [E03.9]    Hyperlipidemia [E78.5]    Hypertension [I10]    [facility] [E11.9]     CAD - multivessel CAD on [facility] 6 August w/out complications for CT surgery eval vs high risk staged PCI.

## 2020-08-08 NOTE — PLAN OF CARE
Problem: Falls - Risk of:  Goal: Will remain free from falls  Description: Will remain free from falls  Outcome: Ongoing  Note: Safety/fall prevention maintained. Bed locked on lowest position, side rails up 2 x 4, Hourly rounding done. Attended to needs promptly. Pt free from falls.  MKRN     Problem: Falls - Risk of:  Goal: Absence of physical injury  Description: Absence of physical injury  Outcome: Ongoing

## 2020-08-08 NOTE — PLAN OF CARE
Problem: Falls - Risk of:  Goal: Will remain free from falls  Description: Will remain free from falls  8/8/2020 1021 by Eliseo Salcido RN  Outcome: Ongoing  Note: Pt. Is independent. Calls out for needs. 8/8/2020 0956 by Eliseo Salcido RN  Outcome: Ongoing  Note: Pt. Is independent. Calls out for needs. 8/8/2020 0443 by Robert Marte RN  Outcome: Ongoing  Note: Safety/fall prevention maintained. Bed locked on lowest position, side rails up 2 x 4, Hourly rounding done. Attended to needs promptly. Pt free from falls. MKRN  Goal: Absence of physical injury  Description: Absence of physical injury  8/8/2020 0443 by Robert Marte RN  Outcome: Ongoing     Problem: Preoperative Routine:  Goal: Will comply with preparation for surgery or procedure  Description: Will comply with preparation for surgery or procedure  8/8/2020 1021 by Eliseo Salcido RN  Outcome: Ongoing  Note: Pt. Is aware and cooperative with testing for procedure. 8/8/2020 0956 by Eliseo Salcido RN  Outcome: Ongoing  Note: Pt. Is cooperative with testing for procedure. Problem: Pain:  Goal: Pain level will decrease  Description: Pain level will decrease  Outcome: Ongoing  Goal: Control of acute pain  Description: Control of acute pain  Outcome: Ongoing  Note: Pt. Uses pain scale. Medications available as needed.

## 2020-08-08 NOTE — PLAN OF CARE
Problem: Falls - Risk of:  Goal: Will remain free from falls  Description: Will remain free from falls  8/8/2020 1021 by Brenda Funez RN  Outcome: Ongoing  Note: Pt. Is independent. Calls out for needs. 8/8/2020 0956 by Brenda Funez RN  Outcome: Ongoing  Note: Pt. Is independent. Calls out for needs. 8/8/2020 0443 by Shun Mejía RN  Outcome: Ongoing  Note: Safety/fall prevention maintained. Bed locked on lowest position, side rails up 2 x 4, Hourly rounding done. Attended to needs promptly. Pt free from falls. MKRN  Goal: Absence of physical injury  Description: Absence of physical injury  8/8/2020 0443 by Shun Mejía RN  Outcome: Ongoing     Problem: Preoperative Routine:  Goal: Will comply with preparation for surgery or procedure  Description: Will comply with preparation for surgery or procedure  8/8/2020 1021 by Brenda Funez RN  Outcome: Ongoing  Note: Pt. Is aware and cooperative with testing for procedure. 8/8/2020 0956 by Brenda Funez RN  Outcome: Ongoing  Note: Pt. Is cooperative with testing for procedure. Problem: Pain:  Goal: Pain level will decrease  Description: Pain level will decrease  Outcome: Ongoing  Goal: Control of acute pain  Description: Control of acute pain  Outcome: Ongoing  Note: Pt. Uses pain scale. Medications available as needed. Problem: Serum Glucose Level - Abnormal:  Goal: Ability to maintain appropriate glucose levels will improve  Description: Ability to maintain appropriate glucose levels will improve  Outcome: Ongoing  Note: Blood glucose monitored before meals and at bedtime. Medications available as needed.

## 2020-08-08 NOTE — FLOWSHEET NOTE
08/07/20 2011   Assessment   Charting Type Shift assessment   Neurological   Neuro (WDL) WDL   Level of Consciousness 0   Orientation Level Oriented X4   Cognition Appropriate judgement; Appropriate for developmental age; Follows commands   Swallow Screening   Is the patient able to remain alert for testing? Yes   Was the Patient Eating a Modified Diet Prior to being Admitted? No   Is there an Existing PEG or Abdominal Feeding Tube? No   Does the patient have a Head of Bed [facility] restriction <30°? No   Is there a Tracheostomy Tube Present? No   3 oz Water Swallow Screen Pass   Lizzie Coma Scale   Eye Opening 4   Best Verbal Response 5   Best Motor Response 6   Gray Coma Scale Score 15   HEENT   Teeth Edentulous   Respiratory   Respiratory Pattern Regular   Respiratory Depth Normal   Respiratory Quality/Effort Unlabored   Chest Assessment Chest expansion symmetrical   Breath Sounds   Right Upper Lobe Clear   Right Middle Lobe Diminished   Right Lower Lobe Diminished   Left Upper Lobe Clear   Left Lower Lobe Diminished   Cardiac   Cardiac Regularity Regular   Heart Sounds S1, S2   Cardiac Rhythm NSR  (on tele monitor)   Rhythm Interpretation   Pulse 98   Cardiac Monitor   Telemetry Monitor On Yes   Telemetry Audible Yes   Telemetry Alarms Set Yes   Gastrointestinal   RUQ Bowel Sounds Active   LUQ Bowel Sounds Active   RLQ Bowel Sounds Active   LLQ Bowel Sounds Active   Abdomen Inspection Rounded; Soft   Tenderness Soft;Tenderness   Peripheral Vascular   RLE Edema +1   LLE Edema +2   Sensation RLE Full sensation   Sensation LLE Full sensation   RUE Neurovascular Assessment   Capillary Refill Less than/equal to 3 seconds   Color Appropriate for ethnicity   Temperature Warm   Sensation RUE Full sensation   R Radial Pulse +2   RLE Neurovascular Assessment   Capillary Refill Less than/equal to 3 seconds   Color Appropriate for ethnicity   R Pedal Pulse +2   Temperature Warm   R Calf Tenderness  Negative   LLE Neurovascular Assessment   Capillary Refill Less than/equal to 3 seconds   Color Appropriate for ethnicity   L Pedal Pulse +2   Temperature Warm   L Calf Tenderness Negative   Puncture Site Assessment 1   Location Radial - right   Site Assessment No redness, drainage, swelling or hematoma   Skin Color/Condition   Skin Color/Condition (WDL)   (multiple scattered skin tattoos)   Skin Condition/Temp Dry; Warm   Skin Integrity   Skin Integrity Other (Comment)  (diabetic ulcer)   Location Feet   Musculoskeletal   RL Extremity Amputation  (2nd toe)   LL Extremity Amputation  (toes)   Genitourinary   Genitourinary (WDL) WDL   Flank Tenderness No   Suprapubic Tenderness No   Dysuria No   Urine Assessment   Incontinence No   Anus/Rectum   Anus/Rectum (WDL) WDL   Wound 08/04/20 Foot Right;Plantar   Date First Assessed/Time First Assessed: 08/04/20 1710   Present on Hospital Admission: Yes  Primary Wound Type: Diabetic Ulcer  Location: Foot  Wound Location Orientation: Right;Plantar   Wound Diabetic   Dressing Status Dry   Dressing/Treatment Betadine swabs;Open to air   Wound Assessment Red;Yellow   Wound 08/04/20 Foot Left;Plantar   Date First Assessed/Time First Assessed: 08/04/20 1710   Present on Hospital Admission: Yes  Primary Wound Type: Diabetic Ulcer  Location: Foot  Wound Location Orientation: Left;Plantar   Wound Diabetic   Dressing Status Dry   Dressing/Treatment Betadine swabs;Open to air   Drainage Amount None   Wound 08/04/20 Toe (Comment  which one) Left 4th Toe   Date First Assessed/Time First Assessed: 08/04/20 1711   Present on Hospital Admission: Yes  Primary Wound Type: Diabetic Ulcer  Location: Toe (Comment  which one)  Wound Location Orientation: Left  Wound Description (Comments): 4th Toe   Wound Diabetic   Dressing/Treatment Open to air   Wound Assessment Dry   Drainage Amount None   Psychosocial   Psychosocial (WDL) WDL   Pt resting quietly in bed, No c/o pain/discomfort.  Updated with POC, verbalized understanding. No concerns voiced. Denies any needs at this time. Safety/fall prevention maintained. Personal belongings and call light within reach. Will continue to monitor.  RSOALIORN

## 2020-08-08 NOTE — PLAN OF CARE
Patient's EF (Ejection Fraction) is less than 40%    Heart Failure Medications:  Diuretics[de-identified] Spironolactone    (One of the following REQUIRED for EF <40%/SYSTOLIC FAILURE but MAY be used in EF% >40%/DIASTOLIC FAILURE)        ACE[de-identified] Lisinopril        ARB[de-identified] None         ARNI[de-identified] None    (Beta Blockers)  NON- Evidenced Based Beta Blocker (for EF% >40%/DIASTOLIC FAILURE): None    Evidenced Based Beta Blocker::(REQUIRED for EF% <40%/SYSTOLIC FAILURE) Carvedilol- Coreg  . .................................................................................................................................................. Patient's weights and intake/output reviewed: No    Patient's Last Weight: 230.9 lbs obtained by standing scale. Difference of 1 lbs less than last documented weight. Intake/Output Summary (Last 24 hours) at 8/8/2020 1034  Last data filed at 8/8/2020 0548  Gross per 24 hour   Intake 190 ml   Output 1225 ml   Net -1035 ml       Comorbidities Reviewed No    Patient has a past medical history of Acute osteomyelitis of left foot ([address] Acute osteomyelitis of right foot ([address] Ascites, Blood transfusion reaction, Burst fracture of lumbar vertebra ([address] Cellulitis of left foot, Cellulitis of right lower extremity, Diabetes ([address] Diabetic ulcer of right foot ([address] Diabetic ulcer of 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 2 diabetes mellitus, with necrosis of bone ([address] ETOH abuse, Fracture of tibial plateau, High cholesterol, HTN (hypertension), MI (myocardial infarction) ([address] MRSA (methicillin resistant staph aureus) culture positive, Neuropathic ulcer of left foot, limited to breakdown of skin ([address] Neuropathic ulcer of toe ([address] NSVT (nonsustained ventricular tachycardia) ([address] Pleural effusion due to congestive heart failure ([address] Smoker, and Systolic CHF, acute ([facility] [address]      >>For CHF and Comorbidity documentation on Education Time and Topics, please see Education Tab    Progressive Mobility Assessment: What is this patient's Current Level of Mobility?: Ambulatory-Up Ad Elena  How was this patient Mobilized today?: Edge of Bed                 With Whom? Self                 Level of Difficulty/Assistance: Independent     Pt sitting in bed at this time on room air. Pt denies shortness of breath. Pt without lower extremity edema.      Patient and/or Family's stated Goal of Care this Admission: increase activity tolerance prior to discharge        :

## 2020-08-09 LAB
ANION GAP SERPL CALCULATED.3IONS-SCNC: 8 MMOL/L (ref 3–16)
BASOPHILS ABSOLUTE: 0 K/UL (ref 0–0.2)
BASOPHILS RELATIVE PERCENT: 0.6 %
BUN BLDV-MCNC: 14 MG/DL (ref 7–20)
CALCIUM SERPL-MCNC: 9 MG/DL (ref 8.3–10.6)
CHLORIDE BLD-SCNC: 98 MMOL/L (ref 99–110)
CO2: 28 MMOL/L (ref 21–32)
CREAT SERPL-MCNC: 0.9 MG/DL (ref 0.9–1.3)
EOSINOPHILS ABSOLUTE: 0.2 K/UL (ref 0–0.6)
EOSINOPHILS RELATIVE PERCENT: 3.5 %
GFR AFRICAN AMERICAN: >60
GFR NON-AFRICAN AMERICAN: >60
GLUCOSE BLD-MCNC: 146 MG/DL (ref 70–99)
GLUCOSE BLD-MCNC: 170 MG/DL (ref 70–99)
GLUCOSE BLD-MCNC: 177 MG/DL (ref 70–99)
GLUCOSE BLD-MCNC: 271 MG/DL (ref 70–99)
GLUCOSE BLD-MCNC: 299 MG/DL (ref 70–99)
HCT VFR BLD CALC: 32.1 % (ref 40.5–52.5)
HEMOGLOBIN: 11.2 G/DL (ref 13.5–17.5)
LYMPHOCYTES ABSOLUTE: 1.8 K/UL (ref 1–5.1)
LYMPHOCYTES RELATIVE PERCENT: 29.2 %
MCH RBC QN AUTO: 31 PG (ref 26–34)
MCHC RBC AUTO-ENTMCNC: 34.8 G/DL (ref 31–36)
MCV RBC AUTO: 89.1 FL (ref 80–100)
MONOCYTES ABSOLUTE: 0.4 K/UL (ref 0–1.3)
MONOCYTES RELATIVE PERCENT: 7.4 %
NEUTROPHILS ABSOLUTE: 3.6 K/UL (ref 1.7–7.7)
NEUTROPHILS RELATIVE PERCENT: 59.3 %
PDW BLD-RTO: 14.2 % (ref 12.4–15.4)
PERFORMED ON: ABNORMAL
PLATELET # BLD: 179 K/UL (ref 135–450)
PMV BLD AUTO: 7.1 FL (ref 5–10.5)
POTASSIUM SERPL-SCNC: 3.4 MMOL/L (ref 3.5–5.1)
RBC # BLD: 3.6 M/UL (ref 4.2–5.9)
SODIUM BLD-SCNC: 134 MMOL/L (ref 136–145)
WBC # BLD: 6 K/UL (ref 4–11)

## 2020-08-09 PROCEDURE — 6370000000 HC RX 637 (ALT 250 FOR IP): Performed by: NURSE PRACTITIONER

## 2020-08-09 PROCEDURE — 99999 PR OFFICE/OUTPT VISIT,PROCEDURE ONLY: CPT | Performed by: THORACIC SURGERY (CARDIOTHORACIC VASCULAR SURGERY)

## 2020-08-09 PROCEDURE — 6360000002 HC RX W HCPCS: Performed by: INTERNAL MEDICINE

## 2020-08-09 PROCEDURE — 2580000003 HC RX 258: Performed by: INTERNAL MEDICINE

## 2020-08-09 PROCEDURE — 36415 COLL VENOUS BLD VENIPUNCTURE: CPT

## 2020-08-09 PROCEDURE — 85025 COMPLETE CBC W/AUTO DIFF WBC: CPT

## 2020-08-09 PROCEDURE — 2060000000 HC ICU INTERMEDIATE R&B

## 2020-08-09 PROCEDURE — 6360000002 HC RX W HCPCS: Performed by: NURSE PRACTITIONER

## 2020-08-09 PROCEDURE — 80048 BASIC METABOLIC PNL TOTAL CA: CPT

## 2020-08-09 PROCEDURE — 6370000000 HC RX 637 (ALT 250 FOR IP): Performed by: INTERNAL MEDICINE

## 2020-08-09 PROCEDURE — 2580000003 HC RX 258: Performed by: NURSE PRACTITIONER

## 2020-08-09 PROCEDURE — 99232 SBSQ HOSP IP/OBS MODERATE 35: CPT | Performed by: NURSE PRACTITIONER

## 2020-08-09 RX ADMIN — SPIRONOLACTONE 25 MG: 25 TABLET ORAL at 08:24

## 2020-08-09 RX ADMIN — INSULIN LISPRO 1 UNITS: 100 INJECTION, SOLUTION INTRAVENOUS; SUBCUTANEOUS at 20:25

## 2020-08-09 RX ADMIN — Medication 1 TABLET: at 08:24

## 2020-08-09 RX ADMIN — Medication 10 ML: at 20:24

## 2020-08-09 RX ADMIN — INSULIN LISPRO 2 UNITS: 100 INJECTION, SOLUTION INTRAVENOUS; SUBCUTANEOUS at 08:27

## 2020-08-09 RX ADMIN — CARVEDILOL 25 MG: 6.25 TABLET, FILM COATED ORAL at 20:24

## 2020-08-09 RX ADMIN — CARVEDILOL 25 MG: 6.25 TABLET, FILM COATED ORAL at 08:25

## 2020-08-09 RX ADMIN — LISINOPRIL 5 MG: 10 TABLET ORAL at 08:25

## 2020-08-09 RX ADMIN — LEVOTHYROXINE, LIOTHYRONINE 60 MG: 19; 4.5 TABLET ORAL at 08:24

## 2020-08-09 RX ADMIN — PRAVASTATIN SODIUM 40 MG: 40 TABLET ORAL at 20:24

## 2020-08-09 RX ADMIN — ASPIRIN 81 MG: 81 TABLET, COATED ORAL at 08:25

## 2020-08-09 RX ADMIN — FUROSEMIDE 40 MG: 40 TABLET ORAL at 17:10

## 2020-08-09 RX ADMIN — GLIPIZIDE 5 MG: 5 TABLET ORAL at 08:30

## 2020-08-09 RX ADMIN — FUROSEMIDE 40 MG: 40 TABLET ORAL at 05:00

## 2020-08-09 RX ADMIN — INSULIN LISPRO 6 UNITS: 100 INJECTION, SOLUTION INTRAVENOUS; SUBCUTANEOUS at 17:11

## 2020-08-09 RX ADMIN — MAGNESIUM GLUCONATE 500 MG ORAL TABLET 400 MG: 500 TABLET ORAL at 20:24

## 2020-08-09 RX ADMIN — INSULIN LISPRO 6 UNITS: 100 INJECTION, SOLUTION INTRAVENOUS; SUBCUTANEOUS at 11:43

## 2020-08-09 RX ADMIN — MAGNESIUM GLUCONATE 500 MG ORAL TABLET 400 MG: 500 TABLET ORAL at 08:24

## 2020-08-09 RX ADMIN — GLIPIZIDE 5 MG: 5 TABLET ORAL at 17:10

## 2020-08-09 RX ADMIN — ENOXAPARIN SODIUM 40 MG: 40 INJECTION SUBCUTANEOUS at 08:24

## 2020-08-09 RX ADMIN — Medication 10 ML: at 08:26

## 2020-08-09 RX ADMIN — INSULIN GLARGINE 72 UNITS: 100 INJECTION, SOLUTION SUBCUTANEOUS at 20:25

## 2020-08-09 RX ADMIN — IRON SUCROSE 200 MG: 20 INJECTION, SOLUTION INTRAVENOUS at 08:24

## 2020-08-09 ASSESSMENT — ENCOUNTER SYMPTOMS
GASTROINTESTINAL NEGATIVE: 1
RESPIRATORY NEGATIVE: 1

## 2020-08-09 ASSESSMENT — PAIN SCALES - GENERAL: PAINLEVEL_OUTOF10: 0

## 2020-08-09 NOTE — PLAN OF CARE
Patient's EF (Ejection Fraction) is less than 40%    Heart Failure Medications:  Diuretics[de-identified] Spironolactone    (One of the following REQUIRED for EF <40%/SYSTOLIC FAILURE but MAY be used in EF% >40%/DIASTOLIC FAILURE)        ACE[de-identified] Lisinopril        ARB[de-identified] None         ARNI[de-identified] None    (Beta Blockers)  NON- Evidenced Based Beta Blocker (for EF% >40%/DIASTOLIC FAILURE): None    Evidenced Based Beta Blocker::(REQUIRED for EF% <40%/SYSTOLIC FAILURE) Carvedilol- Coreg  . .................................................................................................................................................. Patient's weights and intake/output reviewed: No    Patient's Last Weight: 231.1 lbs obtained by standing scale. Difference of 0.5 lbs more than last documented weight. Intake/Output Summary (Last 24 hours) at 8/9/2020 1022  Last data filed at 8/9/2020 3108  Gross per 24 hour   Intake 550 ml   Output 1725 ml   Net -1175 ml       Comorbidities Reviewed No    Patient has a past medical history of Acute osteomyelitis of left foot ([address] Acute osteomyelitis of right foot ([address] Ascites, Blood transfusion reaction, Burst fracture of lumbar vertebra ([address] Cellulitis of left foot, Cellulitis of right lower extremity, Diabetes ([address] Diabetic ulcer of right foot ([address] Diabetic ulcer of 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 2 diabetes mellitus, with necrosis of bone ([address] ETOH abuse, Fracture of tibial plateau, High cholesterol, HTN (hypertension), MI (myocardial infarction) ([address] MRSA (methicillin resistant staph aureus) culture positive, Neuropathic ulcer of left foot, limited to breakdown of skin ([address] Neuropathic ulcer of toe ([facility] [address] NSVT (nonsustained ventricular tachycardia) ([facility] [facility] Pleural effusion due to congestive heart failure ([facility] Smoker, and Systolic CHF, acute ([facility] [address]      >>For CHF and Comorbidity documentation on Education Time and Topics, please see Education Tab    Progressive Mobility Assessment:  What is this patient's Current Level of Mobility?: Ambulatory-Up Ad Elena  How was this patient Mobilized today?: Up to Chair                 With Whom? Self                 Level of Difficulty/Assistance: Independent     Pt up in chair at this time on room air. Pt denies shortness of breath. Pt without lower extremity edema.      Patient and/or Family's stated Goal of Care this Admission: increase activity tolerance prior to discharge        :

## 2020-08-09 NOTE — PLAN OF CARE
Problem: HEMODYNAMIC STATUS  Goal: Patient has stable vital signs and fluid balance  Outcome: Ongoing  Note:   Patient's EF (Ejection Fraction) is less than 40%    Heart Failure Medications:  Diuretics[de-identified] Furosemide and Spironolactone    (One of the following REQUIRED for EF <40%/SYSTOLIC FAILURE but MAY be used in EF% >40%/DIASTOLIC FAILURE)        ACE[de-identified] Lisinopril        ARB[de-identified] Other         ARNI[de-identified] None    (Beta Blockers)  NON- Evidenced Based Beta Blocker (for EF% >40%/DIASTOLIC FAILURE): Other    Evidenced Based Beta Blocker::(REQUIRED for EF% <40%/SYSTOLIC FAILURE) Carvedilol- Coreg  . .................................................................................................................................................. Patient's weights and intake/output reviewed: Yes    Patient's Last Weight: 230 lbs obtained by standing scale. Difference of 1 lbs more than last documented weight. Intake/Output Summary (Last 24 hours) at 8/9/2020 0453  Last data filed at 8/9/2020 0451  Gross per 24 hour   Intake 790 ml   Output 1100 ml   Net -310 ml       Comorbidities Reviewed Yes    Patient has a past medical history of Acute osteomyelitis of left foot ([address] Acute osteomyelitis of right foot ([address] Ascites, Blood transfusion reaction, Burst fracture of lumbar vertebra ([address] Cellulitis of left foot, Cellulitis of right lower extremity, Diabetes ([address] Diabetic ulcer of right foot ([address] Diabetic ulcer of 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 2 diabetes mellitus, with necrosis of bone ([address] ETOH abuse, Fracture of tibial plateau, High cholesterol, HTN (hypertension), MI (myocardial infarction) ([address] MRSA (methicillin resistant staph aureus) culture positive, Neuropathic ulcer of left foot, limited to breakdown of skin ([address] Neuropathic ulcer of toe ([address] NSVT (nonsustained ventricular tachycardia) ([address] Pleural effusion due to congestive heart failure ([address] Smoker, and Systolic CHF, acute ([address] >>For CHF and Comorbidity documentation on Education Time and Topics, please see Education Tab    Progressive Mobility Assessment:  What is this patient's Current Level of Mobility?: Ambulatory-Up Ad Elena  How was this patient Mobilized today?: Edge of Bed, Up to Chair, Bedside Commode,  Up to Toilet/Shower, Up in Room, and Up in Hallway                 With Whom? Self                 Level of Difficulty/Assistance: Independent     Pt resting in bed at this time on room air. Pt denies shortness of breath. Pt with pitting lower extremity edema.      Patient and/or Family's stated Goal of Care this Admission: reduce shortness of breath, increase activity tolerance, better understand heart failure and disease management, be more comfortable, and reduce lower extremity edema prior to discharge        :

## 2020-08-09 NOTE — PROGRESS NOTES
Hospitalist Progress Note      PCP: Krishna Lunsford MD    Date of Admission: 8/6/2020    Chief Complaint: Chest pain    Hospital Course: See H&P    Subjective:   Patient is up in bed, comfortable, not in distress. Denies any chest pain or shortness of breath. No new event overnight noted. Patient feeling well and ambulating on the unit independently      Medications:  Reviewed    Infusion Medications    dextrose       Scheduled Medications    therapeutic multivitamin-minerals  1 tablet Oral Daily    sodium chloride flush  10 mL Intravenous 2 times per day    thyroid  60 mg Oral Daily    carvedilol  25 mg Oral BID    furosemide  40 mg Oral BID    aspirin  81 mg Oral Daily    glipiZIDE  5 mg Oral BID AC    lisinopril  5 mg Oral Daily    magnesium oxide  400 mg Oral BID    pravastatin  40 mg Oral Nightly    spironolactone  25 mg Oral Daily    insulin lispro  0-12 Units Subcutaneous TID WC    insulin lispro  0-6 Units Subcutaneous Nightly    sodium chloride flush  10 mL Intravenous 2 times per day    enoxaparin  40 mg Subcutaneous Daily    insulin glargine  72 Units Subcutaneous Nightly     PRN Meds: sodium chloride flush, acetaminophen, glucose, dextrose, glucagon (rDNA), dextrose, sodium chloride flush, acetaminophen **OR** acetaminophen, polyethylene glycol, promethazine **OR** ondansetron      Intake/Output Summary (Last 24 hours) at 8/9/2020 1456  Last data filed at 8/9/2020 1343  Gross per 24 hour   Intake 1030 ml   Output 2150 ml   Net -1120 ml       Physical Exam Performed:    /63   Pulse 94   Temp 97.9 °F (36.6 °C) (Oral)   Resp 15   Ht 5' 10\" (1.778 m)   Wt 231 lb 1.6 oz (104.8 kg)   SpO2 95%   BMI 33.16 kg/m²     General appearance: No apparent distress, appears stated age and cooperative. HEENT: Pupils equal, round, and reactive to light. Conjunctivae/corneas clear. Neck: Supple, with full range of motion. No jugular venous distention.  Trachea controlled on home Insulin - continued. Follow FSBS/SSI high regimen. Last HbA1c 7.4% dated May 2020. Anticipate resuming/continuing home regimen at discharge.      HypoThyroid - clinically euthyroid on oral replacement therapy. Continue, w/ outpt monitoring as previously arranged.      Obesity -  With Body mass index is 33.15 kg/m². Complicating assessment and treatment. Placing patient at risk for multiple co-morbidities as well as early death and contributing to the patient's presentation. Counseled on weight loss.     DVT Prophylaxis: Lovenox  Diet: DIET CARDIAC;  Code Status: Full Code    PT/OT Eval Status: Ambulatory    Dispo - pending Post op course    Josias Rodriguez MD

## 2020-08-09 NOTE — FLOWSHEET NOTE
08/08/20 2033   Assessment   Charting Type Shift assessment   Neurological   Neuro (WDL) WDL   Level of Consciousness 0   Orientation Level Oriented X4   Cognition Appropriate judgement; Appropriate safety awareness; Appropriate attention/concentration; Appropriate for developmental age   [de-identified] Screening   Is the patient able to remain alert for testing? Yes   Was the Patient Eating a Modified Diet Prior to being Admitted? No   Is there an Existing PEG or Abdominal Feeding Tube? No   Does the patient have a Head of Bed [facility] restriction <30°? No   Is there a Tracheostomy Tube Present? No   3 oz Water Swallow Screen Pass   Lizzie Coma Scale   Eye Opening 4   Best Verbal Response 5   Best Motor Response 6   Lizzie Coma Scale Score 15   HEENT   Teeth Edentulous   Respiratory   Respiratory Pattern Regular   Respiratory Depth Normal   Respiratory Quality/Effort Unlabored   Chest Assessment Chest expansion symmetrical   Breath Sounds   Right Upper Lobe Clear   Right Middle Lobe Diminished   Right Lower Lobe Diminished   Left Upper Lobe Clear   Left Lower Lobe Diminished   Cardiac   Cardiac Regularity Regular   Heart Sounds S1, S2   Cardiac Rhythm NSR  (on tele monitor. MKRN)   Rhythm Interpretation   Pulse 93   Cardiac Monitor   Telemetry Monitor On Yes   Telemetry Audible Yes   Telemetry Alarms Set Yes   Telemetry Box Number 70   Gastrointestinal   RUQ Bowel Sounds Active   LUQ Bowel Sounds Active   RLQ Bowel Sounds Active   LLQ Bowel Sounds Active   Abdomen Inspection Rounded; Soft   Tenderness Soft;Nontender   Peripheral Vascular   RLE Edema +1   LLE Edema +2   Sensation RLE Full sensation   Sensation LLE Full sensation   RUE Neurovascular Assessment   Capillary Refill Less than/equal to 3 seconds   Color Appropriate for ethnicity   Temperature Warm   Sensation RUE Full sensation   R Radial Pulse +2   RLE Neurovascular Assessment   Capillary Refill Less than/equal to 3 seconds   Color Appropriate for ethnicity   R Pedal Pulse +2   Temperature Warm   R Calf Tenderness  Negative   LLE Neurovascular Assessment   Capillary Refill Less than/equal to 3 seconds   Color Appropriate for ethnicity   L Pedal Pulse +2   Temperature Warm   L Calf Tenderness Negative   Skin Color/Condition   Skin Color/Condition (WDL) WDL  (multiple scattered  skin tattoos- MKRN)   Skin Condition/Temp Dry; Warm   Skin Integrity   Skin Integrity Other (Comment)  (Diabetic ulcers)   Location Feet   Musculoskeletal   RL Extremity Amputation  (toes)   LL Extremity Amputation  (toes)   Genitourinary   Genitourinary (WDL) WDL   Flank Tenderness No   Suprapubic Tenderness No   Dysuria No   Wound 08/04/20 Foot Right;Plantar   Date First Assessed/Time First Assessed: 08/04/20 1710   Present on Hospital Admission: Yes  Primary Wound Type: Diabetic Ulcer  Location: Foot  Wound Location Orientation: Right;Plantar   Wound Diabetic   Dressing Status Dry   Dressing/Treatment Betadine swabs   Wound Assessment Dry   Drainage Amount None   Vanesa-wound Assessment Calloused   Wound 08/04/20 Foot Left;Plantar   Date First Assessed/Time First Assessed: 08/04/20 1710   Present on Hospital Admission: Yes  Primary Wound Type: Diabetic Ulcer  Location: Foot  Wound Location Orientation: Left;Plantar   Wound Diabetic   Dressing Status Dry   Dressing/Treatment Betadine swabs   Wound Assessment Dry   Drainage Amount None   Vanesa-wound Assessment Calloused   Wound 08/04/20 Toe (Comment  which one) Left 4th Toe   Date First Assessed/Time First Assessed: 08/04/20 1711   Present on Hospital Admission: Yes  Primary Wound Type: Diabetic Ulcer  Location: Toe (Comment  which one)  Wound Location Orientation: Left  Wound Description (Comments): 4th Toe   Wound Diabetic   Dressing Status Dry   Dressing/Treatment Open to air   Wound Assessment Dry   Drainage Amount None   Psychosocial   Psychosocial (WDL) WDL   Pt resting quietly in bed, shift assessment done.  Updated with POC, verbalized

## 2020-08-09 NOTE — PLAN OF CARE
Problem: Falls - Risk of:  Goal: Will remain free from falls  Description: Will remain free from falls  Outcome: Ongoing  Note: Safety/fall prevention maintained. Bed locked on lowest position, side rails up 2 x 4, hourly rounding done. Attended to needs promptly. Pt free from falls.  MKRN     Problem: Preoperative Routine:  Goal: Will comply with preparation for surgery or procedure  Description: Will comply with preparation for surgery or procedure  Outcome: Ongoing     Problem: Pain:  Goal: Pain level will decrease  Description: Pain level will decrease  Outcome: Ongoing     Problem: Serum Glucose Level - Abnormal:  Goal: Ability to maintain appropriate glucose levels will improve  Description: Ability to maintain appropriate glucose levels will improve  Outcome: Ongoing

## 2020-08-09 NOTE — PROGRESS NOTES
[facility]  Cardiology  Progress Note    Admission date:  2020    Reason for follow up visit: NSTEMI/CAD    HPI/CC: Kerry Rodriguez is a 54 y.o. male who was admitted to [address] [place] [address] 2020 for SOB. Troponin elevated. Transferred to [facility] and [address] [place] [address] 2020 showed multi vessel CAD, CT surgery referral. Plan for CABG 2020. Rhythm has been sinus. Subjective: Denies chest pain, shortness of breath, palpitations and dizziness. Vitals:  Blood pressure 120/74, pulse 88, temperature 98.8 °F (37.1 °C), temperature source Oral, resp. rate 18, height 5' 10\" (1.778 m), weight 231 lb 1.6 oz (104.8 kg), SpO2 96 %.   Temp  Av.2 °F (36.8 °C)  Min: 97.7 °F (36.5 °C)  Max: 98.8 °F (37.1 °C)  Pulse  Av.2  Min: 83  Max: 94  BP  Min: 110/71  Max: 139/85  SpO2  Av.7 %  Min: 93 %  Max: 97 %    24 hour I/O    Intake/Output Summary (Last 24 hours) at 2020 0745  Last data filed at 2020 0500  Gross per 24 hour   Intake 790 ml   Output 1100 ml   Net -310 ml     Current Facility-Administered Medications   Medication Dose Route Frequency Provider Last Rate Last Dose    therapeutic multivitamin-minerals 1 tablet  1 tablet Oral Daily Willberto Jalloh APRN - CNP   1 tablet at 20 0803    iron sucrose (VENOFER) 200 mg in sodium chloride 0.9 % 100 mL IVPB  200 mg Intravenous Once Willma Yeimi, APRN - CNP        sodium chloride flush 0.9 % injection 10 mL  10 mL Intravenous 2 times per day Cat Pope MD   10 mL at 20    sodium chloride flush 0.9 % injection 10 mL  10 mL Intravenous PRN Cat Pope MD        acetaminophen (TYLENOL) tablet 650 mg  650 mg Oral Q4H PRN Cat Pope MD        thyroid [facility]) tablet 60 mg  60 mg Oral Daily Ben Kuhn MD   60 mg at 20 1014    carvedilol (COREG) tablet 25 mg  25 mg Oral BID Ben Kuhn MD   25 mg at 20    furosemide (LASIX) tablet 40 mg  40 mg Oral BID Ben Kuhn MD   40 mg at 20 0500    aspirin EC tablet 81 mg  81 mg Oral Daily Janus Holter, MD   81 mg at 08/08/20 0803    glipiZIDE (GLUCOTROL) tablet 5 mg  5 mg Oral BID AC Janus Holter, MD   5 mg at 08/08/20 1628    lisinopril (PRINIVIL;ZESTRIL) tablet 5 mg  5 mg Oral Daily Janus Holter, MD   5 mg at 08/08/20 0803    magnesium oxide (MAG-OX) tablet 400 mg  400 mg Oral BID Janus Holter, MD   400 mg at 08/08/20 2034    pravastatin (PRAVACHOL) tablet 40 mg  40 mg Oral Nightly Janus Holter, MD   40 mg at 08/08/20 2035    spironolactone (ALDACTONE) tablet 25 mg  25 mg Oral Daily Janus Holter, MD   25 mg at 08/08/20 0804    glucose (GLUTOSE) 40 % oral gel 15 g  15 g Oral PRN Janus Holter, MD        dextrose 50 % IV solution  12.5 g Intravenous PRN Janus Holter, MD        glucagon (rDNA) injection 1 mg  1 mg Intramuscular PRN Janus Holter, MD        dextrose 5 % solution  100 mL/hr Intravenous PRN Janus Holter, MD        insulin lispro (HUMALOG) injection vial 0-12 Units  0-12 Units Subcutaneous TID WC Janus Holter, MD   4 Units at 08/08/20 1628    insulin lispro (HUMALOG) injection vial 0-6 Units  0-6 Units Subcutaneous Nightly Janus Holter, MD   2 Units at 08/08/20 2051    sodium chloride flush 0.9 % injection 10 mL  10 mL Intravenous 2 times per day Janus Holter, MD   10 mL at 08/08/20 2035    sodium chloride flush 0.9 % injection 10 mL  10 mL Intravenous PRN Janus Holter, MD        acetaminophen (TYLENOL) tablet 650 mg  650 mg Oral Q6H PRN Janus Holter, MD        Or   [facility] acetaminophen (TYLENOL) suppository 650 mg  650 mg Rectal Q6H PRN Janus Holter, MD        polyethylene glycol (GLYCOLAX) packet 17 g  17 g Oral Daily PRN Janus Holter, MD        promethazine (PHENERGAN) tablet 12.5 mg  12.5 mg Oral Q6H PRN Janus Holter, MD        Or    ondansetron [facility]) injection 4 mg  4 mg Intravenous Q6H PRN Janus Holter, MD        enoxaparin (LOVENOX) injection 40 mg  40 mg Subcutaneous Daily Janus Holter, MD   40 mg at 08/08/20 0804    insulin glargine (LANTUS) injection vial 72 Units  72 Units Subcutaneous Nightly Michelle Sampson MD   72 Units at 08/08/20 2051     Review of Systems   Constitutional: Negative. Respiratory: Negative. Cardiovascular: Negative. Gastrointestinal: Negative. Neurological: Negative. Objective:     Telemetry monitor: SR    Physical Exam:  Constitutional:  Comfortable and alert, NAD, appears older than stated age  Eyes: PERRL, sclera nonicteric  Neck:  Supple, no masses, no thyroidmegaly, no JVD  Skin:  Warm and dry; no rash or lesions  Heart:  Regular, normal apex, S1 and S2 normal, no M/G/R  Lungs:  Normal respiratory effort; clear; no wheezing/rhonchi/rales  Abdomen: soft, non tender, + bowel sounds  Extremities:  No edema or cyanosis; no clubbing  Neuro: alert and oriented, moves legs and arms equally, normal mood and affect  Right radial site soft, no hematoma, 2+ pulse    Data Reviewed:    Coronary angiogram 8/6/2020:  Cardiomyopathy  PROCEDURES PERFORMED    Left heart catheterization  LVgram  Coronary angiogam  Coronary cath  PROCEDURE DESCRIPTION   Risks/benefits/alternatives/outcomes were discussed with patient and/or family and informed consent was obtained. Using the [facility] scale, the patient's right radial artery was found to be a level B. Patient was prepped draped in the usual sterile fashion. Local anaesthetic was applied over puncture site. Using a back wall technique, a 6 [place] Terumo sheath was inserted into right radial artery. Verapamil, nitroglycerin were administered through the sheath. Heparin was administered. Diagnostic [facility] [employer] pigtail, ultra, La, 3 [facility] catheters were used for diagnostic angiograms. Pigtail was used for left ventricular angiogram, ultra and Natoma were taken, however, left-sided coronaries could not be cannulated with the ultra catheter and rather were cannulated with the La catheter.   Neither the ultra nor the Natoma were able to be utilized for RCA cannulation and a 3 DRC catheter was used for that. At the conclusion of the procedure, a TR band was placed over the puncture site and hemostasis was obtained. There were no immediate complications. I supervised sedation with versed 6 mg/fentanyl 200 Mcg during the procedure. 150 cc contrast was utilized. <20cc EBL. FINDINGS     LVEDP  19   GRADIENT ACROSS AORTIC VALVE  none   LV FUNCTION EF 15 %   WALL MOTION  severe global hypokinesis   MITRAL REGURGITATION  mild      LM Less than 10% nlsltqca-car-aisant stenosis            LAD Heavily calcified, there is a proximal-mid 90% stenosis with distal 50 to 60% stenosis vertically noted at the apex.     D1 is a small to medium size vessel with calcification and 40 to 50% jjqtnfko-sul-euufyp stenosis.       LCX Medium size vessel with ostial/proximal and mid 50 to 70% stenosis.       OM1 is a small to medium size vessel with proximal-mid 70% stenosis.       RI Large vessel with 10 to 20% tjlfasuv-ucx-fasalt stenosis         RCA Calcified, tortuous vessel with proximal-mid 60 to 70% stenosis. Distal vessel has 20 to 30% stenosis. CONCLUSIONS:    Multivessel CAD/ASHD  Will refer to CT surgery for consideration of CABG  Due to severe LV dysfunction, high risk PCI can be considered with Impella support devices as well as atherectomy if he is not felt to be a good surgical candidate. Echo 8/5/2020:  Left ventricular systolic function is severely reduced with ejection   fraction estimated at 25-30 %. Severe global hypokinesis is present. Left ventricle size is normal.   Normal left ventricular wall thickness. Grade III diastolic dysfunction with elevated filing pressure. Mild posterior mitral annular calcification is present. No evidence of mitral valve stenosis. Severe mitral regurgitation. The left atrium is mildly dilated. No evidence of aortic valve stenosis. Mild aortic regurgitation is present.    Mild tricuspid regurgitation. Normal systolic pulmonary artery pressure (SPAP) estimated at 39 mmHg (RA   pressure 8 mmHg). Lab Reviewed:     Renal Profile:  Lab Results   Component Value Date    CREATININE 0.9 08/09/2020    BUN 14 08/09/2020     08/09/2020    K 3.4 08/09/2020    K 3.5 08/05/2020    CL 98 08/09/2020    CO2 28 08/09/2020     CBC:    Lab Results   Component Value Date    WBC 6.0 08/09/2020    RBC 3.60 08/09/2020    HGB 11.2 08/09/2020    HCT 32.1 08/09/2020    MCV 89.1 08/09/2020    RDW 14.2 08/09/2020     08/09/2020     BNP:    Lab Results   Component Value Date    PROBNP 2,569 08/04/2020     Fasting Lipid Panel:    Lab Results   Component Value Date    CHOL 100 10/16/2019    HDL 30 05/26/2020    TRIG 176 10/16/2019     Cardiac Enzymes:  CK/MbTroponin  Lab Results   Component Value Date    TROPONINI 0.36 08/04/2020     PT/ INR   Lab Results   Component Value Date    INR 0.92 08/04/2020    INR 1.01 09/26/2017    INR 1.15 01/23/2014    PROTIME 10.7 08/04/2020    PROTIME 11.4 09/26/2017    PROTIME 12.8 01/23/2014     PTT No results found for: PTT   Lab Results   Component Value Date    MG 2.10 08/07/2020      Lab Results   Component Value Date    TSH 6.27 02/14/2020     All labs and imaging reviewed today    Assessment:  CAD/NSTEMI: awaiting CABG, multivessel on angiogram 8/6/2020  History of nonischemic cardiomyopathy, now with ischemia: EF 25-30% on echo 8/2020; EF 15-20% in 2013   - s/p ICD 1/2014   - historically due to alcohol abuse  Chronic combined systolic and diastolic CHF: compensated  Mitral regurgitation: severe on LHC, mild on echo 8/2020  HTN: controlled  HLD: controlled, LDL 42, continue statin  DM: hgb a1c 6.7  Hypothyroidism  History of alcohol abuse: quit 2015  Anemia    Plan: 1. Continue aspirin, statin, carvedilol, lisinopril, spironolactone, lasix  2. Pre op testing per CT surgery  3. Anticipate CABG with MV repair 8/11/2020  4.  Cardiology will not see tomorrow unless needed, will see post op.     Kvng Valles, JOSE-CNP  [address]  [phone]

## 2020-08-10 LAB
ABO/RH: NORMAL
ANION GAP SERPL CALCULATED.3IONS-SCNC: 10 MMOL/L (ref 3–16)
ANTIBODY SCREEN: NORMAL
BASOPHILS ABSOLUTE: 0.1 K/UL (ref 0–0.2)
BASOPHILS RELATIVE PERCENT: 0.9 %
BUN BLDV-MCNC: 14 MG/DL (ref 7–20)
CALCIUM SERPL-MCNC: 9.4 MG/DL (ref 8.3–10.6)
CHLORIDE BLD-SCNC: 102 MMOL/L (ref 99–110)
CO2: 29 MMOL/L (ref 21–32)
CREAT SERPL-MCNC: 0.8 MG/DL (ref 0.9–1.3)
EOSINOPHILS ABSOLUTE: 0.3 K/UL (ref 0–0.6)
EOSINOPHILS RELATIVE PERCENT: 4.6 %
GFR AFRICAN AMERICAN: >60
GFR NON-AFRICAN AMERICAN: >60
GLUCOSE BLD-MCNC: 156 MG/DL (ref 70–99)
GLUCOSE BLD-MCNC: 247 MG/DL (ref 70–99)
GLUCOSE BLD-MCNC: 300 MG/DL (ref 70–99)
GLUCOSE BLD-MCNC: 78 MG/DL (ref 70–99)
GLUCOSE BLD-MCNC: 97 MG/DL (ref 70–99)
HCT VFR BLD CALC: 33.6 % (ref 40.5–52.5)
HEMOGLOBIN: 11.8 G/DL (ref 13.5–17.5)
LYMPHOCYTES ABSOLUTE: 2.3 K/UL (ref 1–5.1)
LYMPHOCYTES RELATIVE PERCENT: 30.6 %
MAGNESIUM: 1.9 MG/DL (ref 1.8–2.4)
MCH RBC QN AUTO: 31.2 PG (ref 26–34)
MCHC RBC AUTO-ENTMCNC: 35.2 G/DL (ref 31–36)
MCV RBC AUTO: 88.5 FL (ref 80–100)
MONOCYTES ABSOLUTE: 0.6 K/UL (ref 0–1.3)
MONOCYTES RELATIVE PERCENT: 7.9 %
NEUTROPHILS ABSOLUTE: 4.2 K/UL (ref 1.7–7.7)
NEUTROPHILS RELATIVE PERCENT: 56 %
PDW BLD-RTO: 14.4 % (ref 12.4–15.4)
PERFORMED ON: ABNORMAL
PERFORMED ON: NORMAL
PLATELET # BLD: 199 K/UL (ref 135–450)
PMV BLD AUTO: 7.1 FL (ref 5–10.5)
POTASSIUM SERPL-SCNC: 3.5 MMOL/L (ref 3.5–5.1)
RBC # BLD: 3.8 M/UL (ref 4.2–5.9)
SODIUM BLD-SCNC: 141 MMOL/L (ref 136–145)
WBC # BLD: 7.5 K/UL (ref 4–11)

## 2020-08-10 PROCEDURE — P9016 RBC LEUKOCYTES REDUCED: HCPCS

## 2020-08-10 PROCEDURE — 6370000000 HC RX 637 (ALT 250 FOR IP): Performed by: NURSE PRACTITIONER

## 2020-08-10 PROCEDURE — 80048 BASIC METABOLIC PNL TOTAL CA: CPT

## 2020-08-10 PROCEDURE — 86901 BLOOD TYPING SEROLOGIC RH(D): CPT

## 2020-08-10 PROCEDURE — 2580000003 HC RX 258: Performed by: INTERNAL MEDICINE

## 2020-08-10 PROCEDURE — 83735 ASSAY OF MAGNESIUM: CPT

## 2020-08-10 PROCEDURE — 86923 COMPATIBILITY TEST ELECTRIC: CPT

## 2020-08-10 PROCEDURE — 86850 RBC ANTIBODY SCREEN: CPT

## 2020-08-10 PROCEDURE — 2060000000 HC ICU INTERMEDIATE R&B

## 2020-08-10 PROCEDURE — 36415 COLL VENOUS BLD VENIPUNCTURE: CPT

## 2020-08-10 PROCEDURE — 85025 COMPLETE CBC W/AUTO DIFF WBC: CPT

## 2020-08-10 PROCEDURE — 86900 BLOOD TYPING SEROLOGIC ABO: CPT

## 2020-08-10 PROCEDURE — 6370000000 HC RX 637 (ALT 250 FOR IP): Performed by: INTERNAL MEDICINE

## 2020-08-10 PROCEDURE — 6360000002 HC RX W HCPCS: Performed by: INTERNAL MEDICINE

## 2020-08-10 RX ORDER — MIDAZOLAM HYDROCHLORIDE 5 MG/ML
2 INJECTION INTRAMUSCULAR; INTRAVENOUS ONCE
Status: COMPLETED | OUTPATIENT
Start: 2020-08-11 | End: 2020-08-11

## 2020-08-10 RX ORDER — CARVEDILOL 3.12 MG/1
3.12 TABLET ORAL ONCE
Status: COMPLETED | OUTPATIENT
Start: 2020-08-10 | End: 2020-08-11

## 2020-08-10 RX ORDER — INSULIN GLARGINE 100 [IU]/ML
35 INJECTION, SOLUTION SUBCUTANEOUS NIGHTLY
Status: DISCONTINUED | OUTPATIENT
Start: 2020-08-10 | End: 2020-08-11

## 2020-08-10 RX ORDER — SODIUM CHLORIDE 9 MG/ML
INJECTION, SOLUTION INTRAVENOUS CONTINUOUS
Status: DISCONTINUED | OUTPATIENT
Start: 2020-08-11 | End: 2020-08-11

## 2020-08-10 RX ORDER — BISACODYL 10 MG
10 SUPPOSITORY, RECTAL RECTAL ONCE
Status: DISCONTINUED | OUTPATIENT
Start: 2020-08-10 | End: 2020-08-11

## 2020-08-10 RX ORDER — CHLORHEXIDINE GLUCONATE 4 G/100ML
SOLUTION TOPICAL SEE ADMIN INSTRUCTIONS
Status: COMPLETED | OUTPATIENT
Start: 2020-08-10 | End: 2020-08-11

## 2020-08-10 RX ORDER — CHLORHEXIDINE GLUCONATE 0.12 MG/ML
15 RINSE ORAL ONCE
Status: COMPLETED | OUTPATIENT
Start: 2020-08-11 | End: 2020-08-11

## 2020-08-10 RX ORDER — ASPIRIN 81 MG/1
81 TABLET ORAL ONCE
Status: COMPLETED | OUTPATIENT
Start: 2020-08-11 | End: 2020-08-11

## 2020-08-10 RX ADMIN — GLIPIZIDE 5 MG: 5 TABLET ORAL at 10:11

## 2020-08-10 RX ADMIN — INSULIN LISPRO 4 UNITS: 100 INJECTION, SOLUTION INTRAVENOUS; SUBCUTANEOUS at 22:02

## 2020-08-10 RX ADMIN — Medication 10 ML: at 09:20

## 2020-08-10 RX ADMIN — CARVEDILOL 25 MG: 6.25 TABLET, FILM COATED ORAL at 21:57

## 2020-08-10 RX ADMIN — ENOXAPARIN SODIUM 40 MG: 40 INJECTION SUBCUTANEOUS at 08:42

## 2020-08-10 RX ADMIN — MAGNESIUM GLUCONATE 500 MG ORAL TABLET 400 MG: 500 TABLET ORAL at 21:57

## 2020-08-10 RX ADMIN — SPIRONOLACTONE 25 MG: 25 TABLET ORAL at 08:42

## 2020-08-10 RX ADMIN — INSULIN GLARGINE 35 UNITS: 100 INJECTION, SOLUTION SUBCUTANEOUS at 22:02

## 2020-08-10 RX ADMIN — CARVEDILOL 25 MG: 6.25 TABLET, FILM COATED ORAL at 08:41

## 2020-08-10 RX ADMIN — MUPIROCIN: 20 OINTMENT TOPICAL at 22:00

## 2020-08-10 RX ADMIN — MAGNESIUM GLUCONATE 500 MG ORAL TABLET 400 MG: 500 TABLET ORAL at 08:41

## 2020-08-10 RX ADMIN — FUROSEMIDE 40 MG: 40 TABLET ORAL at 06:16

## 2020-08-10 RX ADMIN — Medication 10 ML: at 21:59

## 2020-08-10 RX ADMIN — LISINOPRIL 5 MG: 10 TABLET ORAL at 08:41

## 2020-08-10 RX ADMIN — Medication 1 TABLET: at 08:41

## 2020-08-10 RX ADMIN — PRAVASTATIN SODIUM 40 MG: 40 TABLET ORAL at 21:58

## 2020-08-10 RX ADMIN — INSULIN LISPRO 4 UNITS: 100 INJECTION, SOLUTION INTRAVENOUS; SUBCUTANEOUS at 11:51

## 2020-08-10 RX ADMIN — FUROSEMIDE 40 MG: 40 TABLET ORAL at 17:41

## 2020-08-10 RX ADMIN — Medication 10 ML: at 23:36

## 2020-08-10 RX ADMIN — LEVOTHYROXINE, LIOTHYRONINE 60 MG: 19; 4.5 TABLET ORAL at 09:15

## 2020-08-10 RX ADMIN — ANTISEPTIC SURGICAL SCRUB: 0.04 SOLUTION TOPICAL at 21:59

## 2020-08-10 RX ADMIN — ASPIRIN 81 MG: 81 TABLET, COATED ORAL at 08:41

## 2020-08-10 RX ADMIN — INSULIN LISPRO 2 UNITS: 100 INJECTION, SOLUTION INTRAVENOUS; SUBCUTANEOUS at 17:41

## 2020-08-10 ASSESSMENT — PAIN SCALES - GENERAL
PAINLEVEL_OUTOF10: 0
PAINLEVEL_OUTOF10: 0

## 2020-08-10 NOTE — FLOWSHEET NOTE
L Pedal Pulse +2   Temperature Warm   Puncture Site Assessment 1   Location Radial - right   Site Assessment No change   Skin Color/Condition   Skin Color/Condition (WDL) WDL   Skin Integrity   Skin Integrity (WDL) X   Skin Integrity Other (Comment)  (diabetic ulcer)   Location bilateral feet   Musculoskeletal   Musculoskeletal (WDL) X   RL Extremity Amputation  (toes)   LL Extremity Amputation  (toes)   Genitourinary   Genitourinary (WDL) WDL   Anus/Rectum   Anus/Rectum (WDL) WDL   Wound 08/04/20 Foot Right;Plantar   Date First Assessed/Time First Assessed: 08/04/20 1710   Present on Hospital Admission: Yes  Primary Wound Type: Diabetic Ulcer  Location: Foot  Wound Location Orientation: Right;Plantar   Dressing Change Due 08/10/20   Wound Assessment Dry   Drainage Amount None   Vanesa-wound Assessment Calloused   Wound 08/04/20 Foot Left;Plantar   Date First Assessed/Time First Assessed: 08/04/20 1710   Present on Hospital Admission: Yes  Primary Wound Type: Diabetic Ulcer  Location: Foot  Wound Location Orientation: Left;Plantar   Dressing Status Dry   Dressing Change Due 08/10/20   Wound Assessment Dry   Drainage Amount None   Odor None   Vanesa-wound Assessment Calloused   Wound 08/04/20 Toe (Comment  which one) Left 4th Toe   Date First Assessed/Time First Assessed: 08/04/20 1711   Present on Hospital Admission: Yes  Primary Wound Type: Diabetic Ulcer  Location: Toe (Comment  which one)  Wound Location Orientation: Left  Wound Description (Comments): 4th Toe   Dressing Status Dry   Dressing/Treatment Open to air   Wound Assessment Dry   Drainage Amount None   Vanesa-wound Assessment Calloused   Psychosocial   Psychosocial (WDL) WDL

## 2020-08-10 NOTE — CARE COORDINATION
Consult placed for CM to see patient due to financial assistance. Spoke with patient at bedside. He is having a CABG tomorrow. He is worried about his hospital bill because he states he does not know how much longer he will have insurance due to he has been unemployed since last November. He is agreeable with a referral to financial counselor. Message sent to Neha Smith in financial of the above. She states she will follow up with patient to see if we can offer financial assistance and if he will qualify for Medicaid.

## 2020-08-10 NOTE — PROGRESS NOTES
Hospitalist Progress Note      PCP: Aurora Bales MD    Date of Admission: 8/6/2020    Chief Complaint: Chest Pain    Subjective: no new c/o. Medications:  Reviewed    Infusion Medications    dextrose       Scheduled Medications    therapeutic multivitamin-minerals  1 tablet Oral Daily    sodium chloride flush  10 mL Intravenous 2 times per day    thyroid  60 mg Oral Daily    carvedilol  25 mg Oral BID    furosemide  40 mg Oral BID    aspirin  81 mg Oral Daily    glipiZIDE  5 mg Oral BID AC    lisinopril  5 mg Oral Daily    magnesium oxide  400 mg Oral BID    pravastatin  40 mg Oral Nightly    spironolactone  25 mg Oral Daily    insulin lispro  0-12 Units Subcutaneous TID WC    insulin lispro  0-6 Units Subcutaneous Nightly    sodium chloride flush  10 mL Intravenous 2 times per day    enoxaparin  40 mg Subcutaneous Daily    insulin glargine  72 Units Subcutaneous Nightly     PRN Meds: sodium chloride flush, acetaminophen, glucose, dextrose, glucagon (rDNA), dextrose, sodium chloride flush, acetaminophen **OR** acetaminophen, polyethylene glycol, promethazine **OR** ondansetron      Intake/Output Summary (Last 24 hours) at 8/10/2020 0854  Last data filed at 8/10/2020 0615  Gross per 24 hour   Intake 720 ml   Output 2750 ml   Net -2030 ml       Physical Exam Performed:    /77   Pulse 95   Temp 97.7 °F (36.5 °C) (Oral)   Resp 18   Ht 5' 10\" (1.778 m)   Wt 228 lb 6.4 oz (103.6 kg)   SpO2 95%   BMI 32.77 kg/m²     General appearance: No apparent distress, appears stated age and cooperative. HEENT: Pupils equal, round, and reactive to light. Conjunctivae/corneas clear. Neck: Supple, with full range of motion. No jugular venous distention. Trachea midline. Respiratory:  Normal respiratory effort. Clear to auscultation, bilaterally without Rales/Wheezes/Rhonchi. Cardiovascular: Regular rate and rhythm with normal S1/S2 without murmurs, rubs or gallops.   Abdomen: Soft, non-tender, non-distended with normal bowel sounds. Musculoskeletal: No clubbing, cyanosis or edema bilaterally. Full range of motion without deformity. Skin: Skin color, texture, turgor normal.  No rashes or lesions. Neurologic:  Neurovascularly intact without any focal sensory/motor deficits. Cranial nerves: II-XII intact, grossly non-focal.  Psychiatric: Alert and oriented, thought content appropriate, normal insight  Capillary Refill: Brisk,< 3 seconds   Peripheral Pulses: +2 palpable, equal bilaterally       Labs:   Recent Labs     08/08/20  1041 08/09/20  0514 08/10/20  0527   WBC 6.2 6.0 7.5   HGB 11.9* 11.2* 11.8*   HCT 34.5* 32.1* 33.6*    179 199     Recent Labs     08/08/20  1041 08/09/20  0514 08/10/20  0527    134* 141   K 3.9 3.4* 3.5   CL 99 98* 102   CO2 29 28 29   BUN 13 14 14   CREATININE 0.9 0.9 0.8*   CALCIUM 9.4 9.0 9.4     No results for input(s): AST, ALT, BILIDIR, BILITOT, ALKPHOS in the last 72 hours. No results for input(s): INR in the last 72 hours. No results for input(s): Vallarie Brunette in the last 72 hours. Urinalysis:      Lab Results   Component Value Date    NITRU Negative 08/07/2020    WBCUA None seen 08/07/2020    RBCUA 3-4 08/07/2020    BLOODU SMALL 08/07/2020    SPECGRAV 1.020 08/07/2020    GLUCOSEU Negative 08/07/2020       Consults:    IP CONSULT TO CARDIOTHORACIC SURGERY  IP CONSULT TO SOCIAL WORK      Assessment/Plan:    Active Hospital Problems    Diagnosis    Alcoholic cardiomyopathy [I42.3]     Priority: High     Class: Chronic    CAD in native artery [I25.10]    Chest pain [R07.9]    Obesity [E66.9]    Acquired hypothyroidism [E03.9]    Hyperlipidemia [E78.5]    Hypertension [I10]    DM (diabetes mellitus) ([facility] [address] [E11.9]       CAD - multivessel CAD seen on [facility] 6 August w/out complications. Evaluated by  CT surgery for a high risk staged PCI.    Awaiting a CABG procedure on 8/11/20     HTN - w/out known CAD and no evidence of active signs/sxs of ischemia/failure. Currently controlled on home meds w/ vitals reviewed and documented as above.     HyperLipidemia - controlled on home Statin. Continue, w/ f/u and med adjustment w/ PCP     DM2 - controlled on home oral antiglycemics - held and Insulin - dose reduced 10 August -  continued at 1/2 home dose PM before surgery .  Follow FSBS/SSI high regimen. Last HbA1c 7.4% dated May 2020. Anticipate resuming/continuing home regimen at discharge.      HypoThyroid - clinically euthyroid on oral replacement therapy. Continue, w/ outpt monitoring as previously arranged.      Obesity -  With Body mass index is 62.20 kg/m². Complicating assessment and treatment. Placing patient at risk for multiple co-morbidities as well as early death and contributing to the patient's presentation.  Counseled on weight loss.         DVT Prophylaxis: LMWH  Diet: DIET CARDIAC;  Code Status: Full Code      PT/OT Eval Status: not yet ordered.      Dispo - pending Post op course      Sandrita Rodriguez MD

## 2020-08-10 NOTE — PROGRESS NOTES
Progress Note    Pre op     Cc: denies any symptoms     Vital Signs:                                                 /77   Pulse 95   Temp 97.7 °F (36.5 °C) (Oral)   Resp 18   Ht 5' 10\" (1.778 m)   Wt 228 lb 6.4 oz (103.6 kg)   SpO2 95%   BMI 32.77 kg/m²           Admission Weight: 231 lb (104.8 kg)      Vent Settings:  Vent Information  SpO2: 95 %       I/O:      Intake/Output Summary (Last 24 hours) at 8/10/2020 1109  Last data filed at 8/10/2020 0615  Gross per 24 hour   Intake 360 ml   Output 2325 ml   Net -1965 ml     O/E  GEN: nad  HEENT: unremarkable  CV: RRR   Pulm: air entry b/l  Abd: soft, nt, nd, no peritoneal signs  Ext: warm, edema, wound c/d/i    Data Review:  CBC:   Recent Labs     08/08/20  1041 08/09/20  0514 08/10/20  0527   WBC 6.2 6.0 7.5   HGB 11.9* 11.2* 11.8*   HCT 34.5* 32.1* 33.6*   MCV 90.0 89.1 88.5    179 199     BMP:   Recent Labs     08/08/20  1041 08/09/20  0514 08/10/20  0527    134* 141   K 3.9 3.4* 3.5   CL 99 98* 102   CO2 29 28 29   BUN 13 14 14   CREATININE 0.9 0.9 0.8*   CALCIUM 9.4 9.0 9.4   MG  --   --  1.90       Assessment/Plan:  Care per primary team    Multivessel CAD:  Pre op testing completed. Answered all pt questions. Plan for surgery tomorrow with Dr. Elsa Wilson. (CABG with MV repair, AIDEN clip and possible balloon pump)  NPO at midnight.          JOSE Julian - CNP  8/10/2020  11:09 AM

## 2020-08-10 NOTE — ANESTHESIA PRE PROCEDURE
Department of Anesthesiology  Preprocedure Note       Name:  Eunice Jain   Age:  54 y.o.  :  1965                                          MRN:  [id number]         Date:  8/10/2020      Surgeon: Mir Winslow):  Lidia Gruber MD    Procedure: Procedure(s):  CORONARY ARTERY BYPASS GRAFTING X3, INTERNAL MAMMARY ARTERY, SAPHENOUS VEIN GRAFT, ON PUMP WITH  MITRAL VALVE REPAIR WITH POSSIBLE INTRAAORTIC BALLOON PUMP    Medications prior to admission:   Prior to Admission medications    Medication Sig Start Date End Date Taking?  Authorizing Provider   TRULICITY 1.5 UT/5.3MY SOPN Inject 1.5 mg (1 PEN) into the skin once a week 20  Yes MD JANINE LiUJEO SOLOSTAR 300 UNIT/ML SOPN Inject 90 Units into the skin nightly 20  Yes Lizz Grijalva MD   Blood Glucose Monitoring Suppl (ONE TOUCH ULTRA MINI) w/Device KIT 1 kit by Does not apply route daily 20  Yes Lizz Grijalva MD   magnesium oxide (MAG-OX) 400 (241.3 Mg) MG TABS tablet Take 1 tablet by mouth 2 times daily 20  Yes Paramjit Warner MD   pravastatin (PRAVACHOL) 20 MG tablet TAKE ONE TABLET BY MOUTH ONE TIME DAILY 20  Yes Paramjit Warner MD   lisinopril (PRINIVIL;ZESTRIL) 5 MG tablet Take 1 tablet by mouth daily 20  Yes Paramjit Warner MD   furosemide (LASIX) 40 MG tablet TAKE ONE TABLET BY MOUTH TWICE DAILY 20  Yes Paramjit Warner MD   carvedilol (COREG) 25 MG tablet Take 1 tablet by mouth 2 times daily TAKE ONE TABLET BY MOUTH TWICE DAILY 20  Yes Paramjit Warner MD   ARMOUR THYROID 60 MG tablet Take 1 tablet by mouth daily 20  Yes Paramjit Warner MD   metFORMIN (GLUCOPHAGE) 1000 MG tablet Take 1 tablet by mouth 2 times daily (with meals) 20  Yes Lizz Grijalva MD   glipiZIDE (GLUCOTROL) 5 MG tablet Take 1 tablet by mouth 2 times daily (before meals) 20  Yes Lizz Grijalva MD   spironolactone (ALDACTONE) 25 MG tablet Take 1 tablet by mouth daily 19 Yes Adrianne Dos Santos MD   aspirin 81 MG tablet Take 1 tablet by mouth daily 9/17/19  Yes Adrianne Dos Santos MD   glucose blood VI test strips (ONE TOUCH ULTRA TEST) strip Test blood sugar once daily. 1/3/18  Yes Telma Jin MD   Insulin Pen Needle (UNIFINE PENTIPS) 31G X 5 MM MISC USE 1 EACH DAY AS NEEDED FOR TESTING 1/3/18  Yes Telma Jin MD   Blood Glucose Monitoring Suppl GABI Measure glucose before breakfast, before dinner and at bedtime daily (we will check at lunchtime at HBO therapy). 10/25/17  Yes Viviana Ng MD   Multiple Vitamins-Minerals (THERAPEUTIC MULTIVITAMIN-MINERALS) tablet Take 1 tablet by mouth daily   Yes Historical MD Martha   Blood Glucose Monitoring Suppl (BLOOD GLUCOSE METER) KIT Use to test blood sugars. 7/15/15  Yes Telma Jin MD   Lancets MISC Use to test blood sugars once daily.  7/15/15  Yes Telma Jin MD       Current medications:    Current Facility-Administered Medications   Medication Dose Route Frequency Provider Last Rate Last Dose    insulin glargine (LANTUS) injection vial 35 Units  35 Units Subcutaneous Nightly Airam Simpson MD        bisacodyl (DULCOLAX) suppository 10 mg  10 mg Rectal Once JOSE Galdamez CNP        [START ON 8/11/2020] insulin regular (HUMULIN R;NOVOLIN R) 100 Units in sodium chloride 0.9 % 100 mL infusion  0.5 Units/hr Intravenous On Call JOSE Galdamez CNP        [START ON 8/11/2020] aspirin EC tablet 81 mg  81 mg Oral Once JOSE Galdamez CNP        [START ON 8/11/2020] 0.9 % sodium chloride infusion   Intravenous Continuous JOSE Galdamez CNP        therapeutic multivitamin-minerals 1 tablet  1 tablet Oral Daily JOSE Galdamez CNP   1 tablet at 08/10/20 0841    sodium chloride flush 0.9 % injection 10 mL  10 mL Intravenous 2 times per day Slava Garza MD   10 mL at 08/08/20 2038    sodium chloride flush 0.9 % injection 10 mL  10 mL Intravenous PRN Slava Garza MD       Madlyn Guard acetaminophen (TYLENOL) tablet 650 mg  650 mg Oral Q4H PRN Alisha Chu MD        thyroid [facility]) tablet 60 mg  60 mg Oral Daily Terence Smith MD   60 mg at 08/10/20 0915    carvedilol (COREG) tablet 25 mg  25 mg Oral BID Terence Smith MD   25 mg at 08/10/20 0841    furosemide (LASIX) tablet 40 mg  40 mg Oral BID Terence Smith MD   40 mg at 08/10/20 0616    aspirin EC tablet 81 mg  81 mg Oral Daily Terence Smith MD   81 mg at 08/10/20 0841    lisinopril (PRINIVIL;ZESTRIL) tablet 5 mg  5 mg Oral Daily Terence Smith MD   5 mg at 08/10/20 0841    magnesium oxide (MAG-OX) tablet 400 mg  400 mg Oral BID Terence Smith MD   400 mg at 08/10/20 0841    pravastatin (PRAVACHOL) tablet 40 mg  40 mg Oral Nightly Terence Smith MD   40 mg at 08/09/20 2024    spironolactone (ALDACTONE) tablet 25 mg  25 mg Oral Daily Terence Smith MD   25 mg at 08/10/20 0842    glucose (GLUTOSE) 40 % oral gel 15 g  15 g Oral PRN Terence Smith MD        dextrose 50 % IV solution  12.5 g Intravenous PRN Terence Smith MD        glucagon (rDNA) injection 1 mg  1 mg Intramuscular PRN Terence Smith MD        dextrose 5 % solution  100 mL/hr Intravenous PRN Terence Smith MD        insulin lispro (HUMALOG) injection vial 0-12 Units  0-12 Units Subcutaneous TID WC Terence Smith MD   4 Units at 08/10/20 1151    insulin lispro (HUMALOG) injection vial 0-6 Units  0-6 Units Subcutaneous Nightly Terence Smith MD   1 Units at 08/09/20 2025    sodium chloride flush 0.9 % injection 10 mL  10 mL Intravenous 2 times per day Terence Smith MD   10 mL at 08/10/20 0920    sodium chloride flush 0.9 % injection 10 mL  10 mL Intravenous PRN Terence Smith MD        acetaminophen (TYLENOL) tablet 650 mg  650 mg Oral Q6H PRN Terence Smith MD        Or   Olam Anel acetaminophen (TYLENOL) suppository 650 mg  650 mg Rectal Q6H PRN Terence Smith MD        polyethylene glycol [facility]) packet 17 g  17 g Oral Daily PRN Terence Smith, MD        promethazine (PHENERGAN) tablet 12.5 mg  12.5 mg Oral Q6H PRN Richard Sesay MD        Or    ondansetron [facility]) injection 4 mg  4 mg Intravenous Q6H PRN Richard Sesay MD        enoxaparin (LOVENOX) injection 40 mg  40 mg Subcutaneous Daily Richard Sesay MD   40 mg at 08/10/20 5305       Allergies:     Allergies   Allergen Reactions    Bactrim [Sulfamethoxazole-Trimethoprim] Rash     Pt reports that his lips tingle and then skin on his lips peel off,and rash on thigh       Bee Venom Swelling and Rash       Problem List:    Patient Active Problem List   Diagnosis Code    DM (diabetes mellitus) ([facility] E11.9    Hypertension I10    Uncontrolled type 2 diabetes mellitus with diabetic polyneuropathy, with long-term current use of insulin ([facility]) E11.42, Z79.4, E02.96    Alcoholic cardiomyopathy V98.9    Automatic implantable cardioverter-defibrillator in situ Z95.810    Hyperlipidemia E78.5    Onychomycosis B35.1    Dystrophic nail L60.3    Callus of foot L84    Hallux valgus M20.10    Diabetic ulcer of right foot associated with type 2 diabetes mellitus, limited to breakdown of skin ([facility]) E11.621, L97.511    Acquired hypothyroidism E03.9    Diabetic ulcer of left foot associated with type 2 diabetes mellitus, with muscle involvement without evidence of necrosis ([facility]) E11.621, L97.525    NSVT (nonsustained ventricular tachycardia) ([facility]) I47.2    Acute respiratory failure ([facility]) J96.00    Community acquired pneumonia J18.9    Septicemia ([facility]) A41.9    NSTEMI (non-ST elevated myocardial infarction) ([facility] I21.4    CAD in native artery I25.10    Chest pain R07.9    Obesity E66.9       Past Medical History:        Diagnosis Date    Acute osteomyelitis of left foot ([facility] 9/27/2017    Acute osteomyelitis of right foot ([facility] 4/25/2016    Ascites     Blood transfusion reaction     Burst fracture of lumbar vertebra ([facility]) 11/9/2012    Cellulitis of left foot     Cellulitis of right lower extremity 2/16, 5/16    Diabetes ([address]     Diabetic ulcer of right foot ([address] early 2016    Diabetic ulcer of 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 2 diabetes mellitus, with necrosis of bone ([address]     ETOH abuse     Fracture of tibial plateau 05/1/4296    High cholesterol     HTN (hypertension)     MI (myocardial infarction) ([address] 08/04/2020    + Troponins    MRSA (methicillin resistant staph aureus) culture positive 4/28/16, 4/20/16    foot wound    Neuropathic ulcer of left foot, limited to breakdown of skin ([address] 11/3/2016    Neuropathic ulcer of toe ([address] 2/13/2014    NSVT (nonsustained ventricular tachycardia) ([address] 2014    Pleural effusion due to congestive heart failure ([address]     Smoker     quit 0973    Systolic CHF, acute ([address] 7/8/2013       Past Surgical History:        Procedure Laterality Date    CARDIAC DEFIBRILLATOR PLACEMENT  01/29/2014    ICD Placement    FOOT AMPUTATION Left 08/09/2018    PARTIAL FOOT AMPUTATION w. Dr Nikky Kerns Left 1/31/2019    EXOSTECTOMY LEFT FOOT, ULCER DEBRIDEMENT LEFT FOOT WITH GRAFT APPLICATION performed by Tasha Bolaños DPM at [address] Left 09/27/2017    LEFT FOOT ULCER DEBRIDEMENT, POSSIBLE SECOND METATARSAL    FOOT SURGERY Left 01/31/2019    Exostectomy left foot, ulcer debridement with graft application left foot    FOOT TENDON SURGERY Right 2016    FOOT TENDON SURGERY Left 06/30/2017    KNEE SURGERY Left     OTHER SURGICAL HISTORY Bilateral 9/17/15    amputation 2nd digit bilateral, flexor tenotomy right hallux    OTHER SURGICAL HISTORY Left 08/17/2017    PARTIAL LEFT FOOT AMPUTATION      OTHER SURGICAL HISTORY Left 12/07/2017    Debridement infected bone and tissue left foot; ulcer debridement left foot with graft application with dr mathur     OTHER SURGICAL HISTORY Right 01/04/2018    DEBRIDEMENT INFECTED BONE AND TISSUE RIGHT FOOT, ULCER DEBRIDEMENT RIGHT FOOT WITH GRAFT APPLICATION    OTHER SURGICAL HISTORY Date     08/10/2020    K 3.5 08/10/2020    K 3.5 2020     08/10/2020    CO2 29 08/10/2020    BUN 14 08/10/2020    CREATININE 0.8 08/10/2020    GFRAA >60 08/10/2020    GFRAA >60 2012    AGRATIO 1.4 2020    LABGLOM >60 08/10/2020    GLUCOSE 78 08/10/2020    PROT 7.7 2020    PROT 7.1 2012    CALCIUM 9.4 08/10/2020    BILITOT 0.7 2020    ALKPHOS 89 2020    AST 28 2020    ALT 22 2020       POC Tests:   Recent Labs     08/10/20  1142   POCGLU 247*       Coags:   Lab Results   Component Value Date    PROTIME 10.7 2020    INR 0.92 2020    APTT 32.0 2020       HCG (If Applicable): No results found for: PREGTESTUR, PREGSERUM, HCG, HCGQUANT     ABGs: No results found for: PHART, PO2ART, DKO8GFQ, LBM6WRS, BEART, Q8JDEPKN     Type & Screen (If Applicable):  No results found for: LABABO, LABRH    Drug/Infectious Status (If Applicable):  No results found for: HIV, HEPCAB    COVID-19 Screening (If Applicable):   Lab Results   Component Value Date    COVID19 NOT DETECTED 2020         Anesthesia Evaluation    Airway: Mallampati: II  TM distance: <3 FB   Neck ROM: full  Mouth opening: > = 3 FB Dental:    (+) edentulous      Pulmonary:   (+) pneumonia: resolved,                            ROS comment: Social History    Tobacco Use      Smoking status: Former Smoker        Quit date: 1980        Years since quittin.5      Smokeless tobacco: Never Used     Cardiovascular:    (+) hypertension:, past MI:, CAD: obstructive, CHF: systolic, hyperlipidemia                  Neuro/Psych:   (+) neuromuscular disease:, psychiatric history:            GI/Hepatic/Renal:             Endo/Other:    (+) DiabetesType II DM, using insulin, hypothyroidism::., .                 Abdominal:           Vascular:                                        Anesthesia Plan      general     ASA 4     (  [address] EVALUATION FORM       Name:  Tatiana Calix

## 2020-08-11 ENCOUNTER — ANESTHESIA (OUTPATIENT)
Dept: OPERATING ROOM | Age: 55
DRG: 233 | End: 2020-08-11

## 2020-08-11 ENCOUNTER — APPOINTMENT (OUTPATIENT)
Dept: GENERAL RADIOLOGY | Age: 55
DRG: 233 | End: 2020-08-11
Attending: INTERNAL MEDICINE

## 2020-08-11 VITALS — DIASTOLIC BLOOD PRESSURE: 71 MMHG | OXYGEN SATURATION: 95 % | SYSTOLIC BLOOD PRESSURE: 113 MMHG | TEMPERATURE: 98.6 F

## 2020-08-11 LAB
ACTIVATED CLOTTING TIME: 117 SEC (ref 99–130)
ACTIVATED CLOTTING TIME: 498 SEC (ref 99–130)
ACTIVATED CLOTTING TIME: 589 SEC (ref 99–130)
ACTIVATED CLOTTING TIME: 780 SEC (ref 99–130)
ACTIVATED CLOTTING TIME: 84 SEC (ref 99–130)
ALBUMIN SERPL-MCNC: 3.6 G/DL (ref 3.4–5)
ALP BLD-CCNC: 56 U/L (ref 40–129)
ALT SERPL-CCNC: 24 U/L (ref 10–40)
ANION GAP SERPL CALCULATED.3IONS-SCNC: 10 MMOL/L (ref 3–16)
ANION GAP SERPL CALCULATED.3IONS-SCNC: 9 MMOL/L (ref 3–16)
APTT: 34.9 SEC (ref 24.2–36.2)
AST SERPL-CCNC: 17 U/L (ref 15–37)
BASE EXCESS ARTERIAL: 1 (ref -3–3)
BASE EXCESS ARTERIAL: 1 (ref -3–3)
BASE EXCESS ARTERIAL: 3 (ref -3–3)
BASE EXCESS ARTERIAL: 4 (ref -3–3)
BASE EXCESS ARTERIAL: 5 (ref -3–3)
BASOPHILS ABSOLUTE: 0.1 K/UL (ref 0–0.2)
BASOPHILS RELATIVE PERCENT: 0.8 %
BILIRUB SERPL-MCNC: 0.5 MG/DL (ref 0–1)
BILIRUBIN DIRECT: <0.2 MG/DL (ref 0–0.3)
BILIRUBIN, INDIRECT: ABNORMAL MG/DL (ref 0–1)
BLOOD BANK DISPENSE STATUS: NORMAL
BLOOD BANK PRODUCT CODE: NORMAL
BPU ID: NORMAL
BUN BLDV-MCNC: 16 MG/DL (ref 7–20)
BUN BLDV-MCNC: 19 MG/DL (ref 7–20)
CALCIUM IONIZED: 1.06 MMOL/L (ref 1.12–1.32)
CALCIUM IONIZED: 1.07 MMOL/L (ref 1.12–1.32)
CALCIUM IONIZED: 1.19 MMOL/L (ref 1.12–1.32)
CALCIUM IONIZED: 1.3 MMOL/L (ref 1.12–1.32)
CALCIUM IONIZED: 1.41 MMOL/L (ref 1.12–1.32)
CALCIUM SERPL-MCNC: 8.3 MG/DL (ref 8.3–10.6)
CALCIUM SERPL-MCNC: 9.1 MG/DL (ref 8.3–10.6)
CHLORIDE BLD-SCNC: 103 MMOL/L (ref 99–110)
CHLORIDE BLD-SCNC: 99 MMOL/L (ref 99–110)
CHOLESTEROL, TOTAL: 108 MG/DL (ref 0–199)
CO2: 24 MMOL/L (ref 21–32)
CO2: 30 MMOL/L (ref 21–32)
CREAT SERPL-MCNC: 0.9 MG/DL (ref 0.9–1.3)
CREAT SERPL-MCNC: 0.9 MG/DL (ref 0.9–1.3)
DESCRIPTION BLOOD BANK: NORMAL
EOSINOPHILS ABSOLUTE: 0.2 K/UL (ref 0–0.6)
EOSINOPHILS RELATIVE PERCENT: 3.1 %
FIBRINOGEN: 201 MG/DL (ref 200–397)
GFR AFRICAN AMERICAN: >60
GFR AFRICAN AMERICAN: >60
GFR NON-AFRICAN AMERICAN: >60
GFR NON-AFRICAN AMERICAN: >60
GLUCOSE BLD-MCNC: 107 MG/DL (ref 70–99)
GLUCOSE BLD-MCNC: 113 MG/DL (ref 70–99)
GLUCOSE BLD-MCNC: 118 MG/DL (ref 70–99)
GLUCOSE BLD-MCNC: 126 MG/DL (ref 70–99)
GLUCOSE BLD-MCNC: 129 MG/DL (ref 70–99)
GLUCOSE BLD-MCNC: 131 MG/DL (ref 70–99)
GLUCOSE BLD-MCNC: 134 MG/DL (ref 70–99)
GLUCOSE BLD-MCNC: 134 MG/DL (ref 70–99)
GLUCOSE BLD-MCNC: 141 MG/DL (ref 70–99)
GLUCOSE BLD-MCNC: 183 MG/DL (ref 70–99)
GLUCOSE BLD-MCNC: 184 MG/DL (ref 70–99)
GLUCOSE BLD-MCNC: 186 MG/DL (ref 70–99)
GLUCOSE BLD-MCNC: 197 MG/DL (ref 70–99)
GLUCOSE BLD-MCNC: 206 MG/DL (ref 70–99)
GLUCOSE BLD-MCNC: 215 MG/DL (ref 70–99)
GLUCOSE BLD-MCNC: 218 MG/DL (ref 70–99)
HCO3 ARTERIAL: 24.4 MMOL/L (ref 21–29)
HCO3 ARTERIAL: 26.7 MMOL/L (ref 21–29)
HCO3 ARTERIAL: 27.1 MMOL/L (ref 21–29)
HCO3 ARTERIAL: 28.3 MMOL/L (ref 21–29)
HCO3 ARTERIAL: 28.8 MMOL/L (ref 21–29)
HCT VFR BLD CALC: 24.8 % (ref 40.5–52.5)
HCT VFR BLD CALC: 26.6 % (ref 40.5–52.5)
HCT VFR BLD CALC: 28.3 % (ref 40.5–52.5)
HCT VFR BLD CALC: 30.7 % (ref 40.5–52.5)
HDLC SERPL-MCNC: 23 MG/DL (ref 40–60)
HEMOGLOBIN: 10.7 G/DL (ref 13.5–17.5)
HEMOGLOBIN: 6.3 GM/DL (ref 13.5–17.5)
HEMOGLOBIN: 6.6 GM/DL (ref 13.5–17.5)
HEMOGLOBIN: 7 GM/DL (ref 13.5–17.5)
HEMOGLOBIN: 8.5 G/DL (ref 13.5–17.5)
HEMOGLOBIN: 8.5 GM/DL (ref 13.5–17.5)
HEMOGLOBIN: 9.1 G/DL (ref 13.5–17.5)
HEMOGLOBIN: 9.2 GM/DL (ref 13.5–17.5)
HEMOGLOBIN: 9.2 GM/DL (ref 13.5–17.5)
HEMOGLOBIN: 9.6 G/DL (ref 13.5–17.5)
INR BLD: 1.04 (ref 0.86–1.14)
INR BLD: 1.32 (ref 0.86–1.14)
LACTATE: 1.1 MMOL/L (ref 0.4–2)
LDL CHOLESTEROL CALCULATED: 35 MG/DL
LYMPHOCYTES ABSOLUTE: 2.4 K/UL (ref 1–5.1)
LYMPHOCYTES RELATIVE PERCENT: 34.8 %
MAGNESIUM: 1.8 MG/DL (ref 1.8–2.4)
MAGNESIUM: 2.8 MG/DL (ref 1.8–2.4)
MAGNESIUM: 2.8 MG/DL (ref 1.8–2.4)
MCH RBC QN AUTO: 30.7 PG (ref 26–34)
MCH RBC QN AUTO: 31.1 PG (ref 26–34)
MCHC RBC AUTO-ENTMCNC: 34.1 G/DL (ref 31–36)
MCHC RBC AUTO-ENTMCNC: 35 G/DL (ref 31–36)
MCV RBC AUTO: 88.9 FL (ref 80–100)
MCV RBC AUTO: 90.1 FL (ref 80–100)
MONOCYTES ABSOLUTE: 0.6 K/UL (ref 0–1.3)
MONOCYTES RELATIVE PERCENT: 8.4 %
NEUTROPHILS ABSOLUTE: 3.6 K/UL (ref 1.7–7.7)
NEUTROPHILS RELATIVE PERCENT: 52.9 %
O2 SAT, ARTERIAL: 100 % (ref 93–100)
O2 SAT, ARTERIAL: 98 % (ref 93–100)
PCO2 ARTERIAL: 34.4 MM HG (ref 35–45)
PCO2 ARTERIAL: 38 MM HG (ref 35–45)
PCO2 ARTERIAL: 42.5 MM HG (ref 35–45)
PCO2 ARTERIAL: 44.1 MM HG (ref 35–45)
PCO2 ARTERIAL: 51.4 MM HG (ref 35–45)
PDW BLD-RTO: 14.3 % (ref 12.4–15.4)
PDW BLD-RTO: 14.4 % (ref 12.4–15.4)
PERFORMED ON: ABNORMAL
PH ARTERIAL: 7.32 (ref 7.35–7.45)
PH ARTERIAL: 7.41 (ref 7.35–7.45)
PH ARTERIAL: 7.42 (ref 7.35–7.45)
PH ARTERIAL: 7.46 (ref 7.35–7.45)
PH ARTERIAL: 7.48 (ref 7.35–7.45)
PLATELET # BLD: 165 K/UL (ref 135–450)
PLATELET # BLD: 177 K/UL (ref 135–450)
PMV BLD AUTO: 6.7 FL (ref 5–10.5)
PMV BLD AUTO: 6.8 FL (ref 5–10.5)
PO2 ARTERIAL: 112.7 MM HG (ref 75–108)
PO2 ARTERIAL: 200.2 MM HG (ref 75–108)
PO2 ARTERIAL: 273.9 MM HG (ref 75–108)
PO2 ARTERIAL: 321.5 MM HG (ref 75–108)
PO2 ARTERIAL: 410.6 MM HG (ref 75–108)
POC HEMATOCRIT: 18 % (ref 40.5–52.5)
POC HEMATOCRIT: 19 % (ref 40.5–52.5)
POC HEMATOCRIT: 21 % (ref 40.5–52.5)
POC HEMATOCRIT: 25 % (ref 40.5–52.5)
POC HEMATOCRIT: 27 % (ref 40.5–52.5)
POC HEMATOCRIT: 27 % (ref 40.5–52.5)
POC POTASSIUM: 3.4 MMOL/L (ref 3.5–5.1)
POC POTASSIUM: 4 MMOL/L (ref 3.5–5.1)
POC POTASSIUM: 4.1 MMOL/L (ref 3.5–5.1)
POC POTASSIUM: 4.5 MMOL/L (ref 3.5–5.1)
POC POTASSIUM: 4.8 MMOL/L (ref 3.5–5.1)
POC SAMPLE TYPE: ABNORMAL
POC SODIUM: 133 MMOL/L (ref 136–145)
POC SODIUM: 133 MMOL/L (ref 136–145)
POC SODIUM: 135 MMOL/L (ref 136–145)
POC SODIUM: 136 MMOL/L (ref 136–145)
POC SODIUM: 140 MMOL/L (ref 136–145)
POTASSIUM SERPL-SCNC: 3.5 MMOL/L (ref 3.5–5.1)
POTASSIUM SERPL-SCNC: 4.1 MMOL/L (ref 3.5–5.1)
POTASSIUM SERPL-SCNC: 4.8 MMOL/L (ref 3.5–5.1)
POTASSIUM SERPL-SCNC: 5.1 MMOL/L (ref 3.5–5.1)
PROTHROMBIN TIME: 12.1 SEC (ref 10–13.2)
PROTHROMBIN TIME: 15.3 SEC (ref 10–13.2)
RBC # BLD: 3.14 M/UL (ref 4.2–5.9)
RBC # BLD: 3.45 M/UL (ref 4.2–5.9)
SODIUM BLD-SCNC: 136 MMOL/L (ref 136–145)
SODIUM BLD-SCNC: 139 MMOL/L (ref 136–145)
TCO2 ARTERIAL: 26 MMOL/L
TCO2 ARTERIAL: 28 MMOL/L
TCO2 ARTERIAL: 28 MMOL/L
TCO2 ARTERIAL: 30 MMOL/L
TCO2 ARTERIAL: 30 MMOL/L
TOTAL PROTEIN: 5.9 G/DL (ref 6.4–8.2)
TRIGL SERPL-MCNC: 248 MG/DL (ref 0–150)
VLDLC SERPL CALC-MCNC: 50 MG/DL
WBC # BLD: 18.9 K/UL (ref 4–11)
WBC # BLD: 6.8 K/UL (ref 4–11)

## 2020-08-11 PROCEDURE — 33518 CABG ARTERY-VEIN TWO: CPT | Performed by: THORACIC SURGERY (CARDIOTHORACIC VASCULAR SURGERY)

## 2020-08-11 PROCEDURE — 6370000000 HC RX 637 (ALT 250 FOR IP): Performed by: THORACIC SURGERY (CARDIOTHORACIC VASCULAR SURGERY)

## 2020-08-11 PROCEDURE — 85025 COMPLETE CBC W/AUTO DIFF WBC: CPT

## 2020-08-11 PROCEDURE — 0210099 BYPASS CORONARY ARTERY, ONE ARTERY FROM LEFT INTERNAL MAMMARY WITH AUTOLOGOUS VENOUS TISSUE, OPEN APPROACH: ICD-10-PCS | Performed by: THORACIC SURGERY (CARDIOTHORACIC VASCULAR SURGERY)

## 2020-08-11 PROCEDURE — 83605 ASSAY OF LACTIC ACID: CPT

## 2020-08-11 PROCEDURE — 85018 HEMOGLOBIN: CPT

## 2020-08-11 PROCEDURE — 02L70CK OCCLUSION OF LEFT ATRIAL APPENDAGE WITH EXTRALUMINAL DEVICE, OPEN APPROACH: ICD-10-PCS | Performed by: THORACIC SURGERY (CARDIOTHORACIC VASCULAR SURGERY)

## 2020-08-11 PROCEDURE — 7100000010 HC PHASE II RECOVERY - FIRST 15 MIN

## 2020-08-11 PROCEDURE — 85347 COAGULATION TIME ACTIVATED: CPT

## 2020-08-11 PROCEDURE — 6360000002 HC RX W HCPCS: Performed by: ANESTHESIOLOGY

## 2020-08-11 PROCEDURE — 2500000003 HC RX 250 WO HCPCS: Performed by: ANESTHESIOLOGY

## 2020-08-11 PROCEDURE — 3600000018 HC SURGERY OHS ADDTL 15MIN: Performed by: THORACIC SURGERY (CARDIOTHORACIC VASCULAR SURGERY)

## 2020-08-11 PROCEDURE — 71045 X-RAY EXAM CHEST 1 VIEW: CPT

## 2020-08-11 PROCEDURE — 2580000003 HC RX 258: Performed by: NURSE PRACTITIONER

## 2020-08-11 PROCEDURE — 6360000002 HC RX W HCPCS: Performed by: THORACIC SURGERY (CARDIOTHORACIC VASCULAR SURGERY)

## 2020-08-11 PROCEDURE — 6360000002 HC RX W HCPCS

## 2020-08-11 PROCEDURE — 6370000000 HC RX 637 (ALT 250 FOR IP): Performed by: NURSE PRACTITIONER

## 2020-08-11 PROCEDURE — 7100000011 HC PHASE II RECOVERY - ADDTL 15 MIN

## 2020-08-11 PROCEDURE — 85027 COMPLETE CBC AUTOMATED: CPT

## 2020-08-11 PROCEDURE — 85384 FIBRINOGEN ACTIVITY: CPT

## 2020-08-11 PROCEDURE — 82330 ASSAY OF CALCIUM: CPT

## 2020-08-11 PROCEDURE — 82947 ASSAY GLUCOSE BLOOD QUANT: CPT

## 2020-08-11 PROCEDURE — 3600000008 HC SURGERY OHS BASE: Performed by: THORACIC SURGERY (CARDIOTHORACIC VASCULAR SURGERY)

## 2020-08-11 PROCEDURE — 82803 BLOOD GASES ANY COMBINATION: CPT

## 2020-08-11 PROCEDURE — C1729 CATH, DRAINAGE: HCPCS | Performed by: THORACIC SURGERY (CARDIOTHORACIC VASCULAR SURGERY)

## 2020-08-11 PROCEDURE — 94669 MECHANICAL CHEST WALL OSCILL: CPT

## 2020-08-11 PROCEDURE — 3700000000 HC ANESTHESIA ATTENDED CARE: Performed by: THORACIC SURGERY (CARDIOTHORACIC VASCULAR SURGERY)

## 2020-08-11 PROCEDURE — 84132 ASSAY OF SERUM POTASSIUM: CPT

## 2020-08-11 PROCEDURE — 85730 THROMBOPLASTIN TIME PARTIAL: CPT

## 2020-08-11 PROCEDURE — 33508 ENDOSCOPIC VEIN HARVEST: CPT | Performed by: THORACIC SURGERY (CARDIOTHORACIC VASCULAR SURGERY)

## 2020-08-11 PROCEDURE — 3E0T3BZ INTRODUCTION OF ANESTHETIC AGENT INTO PERIPHERAL NERVES AND PLEXI, PERCUTANEOUS APPROACH: ICD-10-PCS | Performed by: ANESTHESIOLOGY

## 2020-08-11 PROCEDURE — 33533 CABG ARTERIAL SINGLE: CPT | Performed by: THORACIC SURGERY (CARDIOTHORACIC VASCULAR SURGERY)

## 2020-08-11 PROCEDURE — 80076 HEPATIC FUNCTION PANEL: CPT

## 2020-08-11 PROCEDURE — 2580000003 HC RX 258: Performed by: INTERNAL MEDICINE

## 2020-08-11 PROCEDURE — P9045 ALBUMIN (HUMAN), 5%, 250 ML: HCPCS | Performed by: THORACIC SURGERY (CARDIOTHORACIC VASCULAR SURGERY)

## 2020-08-11 PROCEDURE — 06BP4ZZ EXCISION OF RIGHT SAPHENOUS VEIN, PERCUTANEOUS ENDOSCOPIC APPROACH: ICD-10-PCS | Performed by: THORACIC SURGERY (CARDIOTHORACIC VASCULAR SURGERY)

## 2020-08-11 PROCEDURE — 85610 PROTHROMBIN TIME: CPT

## 2020-08-11 PROCEDURE — 2580000003 HC RX 258: Performed by: THORACIC SURGERY (CARDIOTHORACIC VASCULAR SURGERY)

## 2020-08-11 PROCEDURE — 2500000003 HC RX 250 WO HCPCS: Performed by: THORACIC SURGERY (CARDIOTHORACIC VASCULAR SURGERY)

## 2020-08-11 PROCEDURE — 94640 AIRWAY INHALATION TREATMENT: CPT

## 2020-08-11 PROCEDURE — 2709999900 HC NON-CHARGEABLE SUPPLY: Performed by: THORACIC SURGERY (CARDIOTHORACIC VASCULAR SURGERY)

## 2020-08-11 PROCEDURE — C9290 INJ, BUPIVACAINE LIPOSOME: HCPCS | Performed by: THORACIC SURGERY (CARDIOTHORACIC VASCULAR SURGERY)

## 2020-08-11 PROCEDURE — 80061 LIPID PANEL: CPT

## 2020-08-11 PROCEDURE — 5A1221Z PERFORMANCE OF CARDIAC OUTPUT, CONTINUOUS: ICD-10-PCS | Performed by: THORACIC SURGERY (CARDIOTHORACIC VASCULAR SURGERY)

## 2020-08-11 PROCEDURE — 83735 ASSAY OF MAGNESIUM: CPT

## 2020-08-11 PROCEDURE — 2100000000 HC CCU R&B

## 2020-08-11 PROCEDURE — 83036 HEMOGLOBIN GLYCOSYLATED A1C: CPT

## 2020-08-11 PROCEDURE — 94761 N-INVAS EAR/PLS OXIMETRY MLT: CPT

## 2020-08-11 PROCEDURE — 2580000003 HC RX 258: Performed by: ANESTHESIOLOGY

## 2020-08-11 PROCEDURE — 84295 ASSAY OF SERUM SODIUM: CPT

## 2020-08-11 PROCEDURE — 021109W BYPASS CORONARY ARTERY, TWO ARTERIES FROM AORTA WITH AUTOLOGOUS VENOUS TISSUE, OPEN APPROACH: ICD-10-PCS | Performed by: THORACIC SURGERY (CARDIOTHORACIC VASCULAR SURGERY)

## 2020-08-11 PROCEDURE — 2780000010 HC IMPLANT OTHER: Performed by: THORACIC SURGERY (CARDIOTHORACIC VASCULAR SURGERY)

## 2020-08-11 PROCEDURE — 6360000002 HC RX W HCPCS: Performed by: NURSE PRACTITIONER

## 2020-08-11 PROCEDURE — 80048 BASIC METABOLIC PNL TOTAL CA: CPT

## 2020-08-11 PROCEDURE — 2700000000 HC OXYGEN THERAPY PER DAY

## 2020-08-11 PROCEDURE — 85014 HEMATOCRIT: CPT

## 2020-08-11 PROCEDURE — 3700000001 HC ADD 15 MINUTES (ANESTHESIA): Performed by: THORACIC SURGERY (CARDIOTHORACIC VASCULAR SURGERY)

## 2020-08-11 DEVICE — ZINACTIVE USE 2540329 DEVICE OCCL CLP L40MM PLUNG GRP FLX SHFT FOR GILLINOV: Type: IMPLANTABLE DEVICE | Site: HEART | Status: FUNCTIONAL

## 2020-08-11 RX ORDER — MIDAZOLAM HYDROCHLORIDE 1 MG/ML
INJECTION INTRAMUSCULAR; INTRAVENOUS PRN
Status: DISCONTINUED | OUTPATIENT
Start: 2020-08-11 | End: 2020-08-11 | Stop reason: SDUPTHER

## 2020-08-11 RX ORDER — NEOSTIGMINE METHYLSULFATE 5 MG/5 ML
SYRINGE (ML) INTRAVENOUS PRN
Status: DISCONTINUED | OUTPATIENT
Start: 2020-08-11 | End: 2020-08-11 | Stop reason: SDUPTHER

## 2020-08-11 RX ORDER — POLYETHYLENE GLYCOL 3350 17 G/17G
17 POWDER, FOR SOLUTION ORAL DAILY
Status: DISCONTINUED | OUTPATIENT
Start: 2020-08-12 | End: 2020-08-15 | Stop reason: HOSPADM

## 2020-08-11 RX ORDER — OXYCODONE HYDROCHLORIDE 5 MG/1
5 TABLET ORAL EVERY 4 HOURS PRN
Status: DISCONTINUED | OUTPATIENT
Start: 2020-08-11 | End: 2020-08-15 | Stop reason: HOSPADM

## 2020-08-11 RX ORDER — KETAMINE HYDROCHLORIDE 50 MG/ML
INJECTION, SOLUTION, CONCENTRATE INTRAMUSCULAR; INTRAVENOUS PRN
Status: DISCONTINUED | OUTPATIENT
Start: 2020-08-11 | End: 2020-08-11 | Stop reason: SDUPTHER

## 2020-08-11 RX ORDER — ASPIRIN 300 MG/1
300 SUPPOSITORY RECTAL DAILY
Status: DISCONTINUED | OUTPATIENT
Start: 2020-08-11 | End: 2020-08-11

## 2020-08-11 RX ORDER — KETOROLAC TROMETHAMINE 30 MG/ML
15 INJECTION, SOLUTION INTRAMUSCULAR; INTRAVENOUS EVERY 6 HOURS PRN
Status: DISCONTINUED | OUTPATIENT
Start: 2020-08-11 | End: 2020-08-13

## 2020-08-11 RX ORDER — EPHEDRINE SULFATE 50 MG/ML
INJECTION INTRAVENOUS PRN
Status: DISCONTINUED | OUTPATIENT
Start: 2020-08-11 | End: 2020-08-11 | Stop reason: SDUPTHER

## 2020-08-11 RX ORDER — PROTAMINE SULFATE 10 MG/ML
50 INJECTION, SOLUTION INTRAVENOUS
Status: ACTIVE | OUTPATIENT
Start: 2020-08-11 | End: 2020-08-11

## 2020-08-11 RX ORDER — SODIUM CHLORIDE, SODIUM LACTATE, POTASSIUM CHLORIDE, CALCIUM CHLORIDE 600; 310; 30; 20 MG/100ML; MG/100ML; MG/100ML; MG/100ML
INJECTION, SOLUTION INTRAVENOUS CONTINUOUS PRN
Status: DISCONTINUED | OUTPATIENT
Start: 2020-08-11 | End: 2020-08-11 | Stop reason: SDUPTHER

## 2020-08-11 RX ORDER — MILRINONE LACTATE 0.2 MG/ML
0.12 INJECTION, SOLUTION INTRAVENOUS CONTINUOUS
Status: DISCONTINUED | OUTPATIENT
Start: 2020-08-11 | End: 2020-08-13

## 2020-08-11 RX ORDER — MAGNESIUM SULFATE IN WATER 40 MG/ML
2 INJECTION, SOLUTION INTRAVENOUS PRN
Status: DISCONTINUED | OUTPATIENT
Start: 2020-08-11 | End: 2020-08-15 | Stop reason: HOSPADM

## 2020-08-11 RX ORDER — SODIUM CHLORIDE 0.9 % (FLUSH) 0.9 %
10 SYRINGE (ML) INJECTION EVERY 12 HOURS SCHEDULED
Status: DISCONTINUED | OUTPATIENT
Start: 2020-08-11 | End: 2020-08-15 | Stop reason: HOSPADM

## 2020-08-11 RX ORDER — ONDANSETRON 2 MG/ML
4 INJECTION INTRAMUSCULAR; INTRAVENOUS EVERY 8 HOURS PRN
Status: DISCONTINUED | OUTPATIENT
Start: 2020-08-11 | End: 2020-08-15 | Stop reason: HOSPADM

## 2020-08-11 RX ORDER — FAMOTIDINE 20 MG/1
20 TABLET, FILM COATED ORAL 2 TIMES DAILY
Status: DISCONTINUED | OUTPATIENT
Start: 2020-08-12 | End: 2020-08-15 | Stop reason: HOSPADM

## 2020-08-11 RX ORDER — OXYCODONE HYDROCHLORIDE 5 MG/1
10 TABLET ORAL EVERY 4 HOURS PRN
Status: DISCONTINUED | OUTPATIENT
Start: 2020-08-11 | End: 2020-08-15 | Stop reason: HOSPADM

## 2020-08-11 RX ORDER — CALCIUM CHLORIDE 100 MG/ML
INJECTION INTRAVENOUS; INTRAVENTRICULAR PRN
Status: DISCONTINUED | OUTPATIENT
Start: 2020-08-11 | End: 2020-08-11 | Stop reason: SDUPTHER

## 2020-08-11 RX ORDER — DEXTROSE MONOHYDRATE 50 MG/ML
100 INJECTION, SOLUTION INTRAVENOUS PRN
Status: DISCONTINUED | OUTPATIENT
Start: 2020-08-11 | End: 2020-08-15 | Stop reason: HOSPADM

## 2020-08-11 RX ORDER — ACETAMINOPHEN 325 MG/1
650 TABLET ORAL EVERY 6 HOURS SCHEDULED
Status: COMPLETED | OUTPATIENT
Start: 2020-08-12 | End: 2020-08-14

## 2020-08-11 RX ORDER — ATORVASTATIN CALCIUM 40 MG/1
40 TABLET, FILM COATED ORAL NIGHTLY
Status: DISCONTINUED | OUTPATIENT
Start: 2020-08-12 | End: 2020-08-15 | Stop reason: HOSPADM

## 2020-08-11 RX ORDER — MORPHINE SULFATE 4 MG/ML
4 INJECTION, SOLUTION INTRAMUSCULAR; INTRAVENOUS
Status: DISCONTINUED | OUTPATIENT
Start: 2020-08-11 | End: 2020-08-15 | Stop reason: HOSPADM

## 2020-08-11 RX ORDER — FUROSEMIDE 10 MG/ML
20 INJECTION INTRAMUSCULAR; INTRAVENOUS 4 TIMES DAILY
Status: DISCONTINUED | OUTPATIENT
Start: 2020-08-12 | End: 2020-08-14

## 2020-08-11 RX ORDER — CLOPIDOGREL BISULFATE 75 MG/1
75 TABLET ORAL DAILY
Status: DISCONTINUED | OUTPATIENT
Start: 2020-08-12 | End: 2020-08-15 | Stop reason: HOSPADM

## 2020-08-11 RX ORDER — ALBUMIN, HUMAN INJ 5% 5 %
25 SOLUTION INTRAVENOUS PRN
Status: DISCONTINUED | OUTPATIENT
Start: 2020-08-11 | End: 2020-08-14

## 2020-08-11 RX ORDER — POTASSIUM CHLORIDE 29.8 MG/ML
20 INJECTION INTRAVENOUS PRN
Status: DISCONTINUED | OUTPATIENT
Start: 2020-08-11 | End: 2020-08-15 | Stop reason: HOSPADM

## 2020-08-11 RX ORDER — POTASSIUM CHLORIDE 750 MG/1
10 TABLET, EXTENDED RELEASE ORAL
Status: DISCONTINUED | OUTPATIENT
Start: 2020-08-12 | End: 2020-08-14

## 2020-08-11 RX ORDER — SODIUM CHLORIDE 9 MG/ML
INJECTION, SOLUTION INTRAVENOUS CONTINUOUS PRN
Status: DISCONTINUED | OUTPATIENT
Start: 2020-08-11 | End: 2020-08-11 | Stop reason: SDUPTHER

## 2020-08-11 RX ORDER — DEXTROSE MONOHYDRATE 25 G/50ML
12.5 INJECTION, SOLUTION INTRAVENOUS PRN
Status: DISCONTINUED | OUTPATIENT
Start: 2020-08-11 | End: 2020-08-15 | Stop reason: HOSPADM

## 2020-08-11 RX ORDER — GLYCOPYRROLATE 0.2 MG/ML
INJECTION INTRAMUSCULAR; INTRAVENOUS PRN
Status: DISCONTINUED | OUTPATIENT
Start: 2020-08-11 | End: 2020-08-11 | Stop reason: SDUPTHER

## 2020-08-11 RX ORDER — HYDRALAZINE HYDROCHLORIDE 20 MG/ML
5 INJECTION INTRAMUSCULAR; INTRAVENOUS EVERY 5 MIN PRN
Status: DISCONTINUED | OUTPATIENT
Start: 2020-08-11 | End: 2020-08-15 | Stop reason: HOSPADM

## 2020-08-11 RX ORDER — ALBUTEROL SULFATE 2.5 MG/3ML
2.5 SOLUTION RESPIRATORY (INHALATION)
Status: DISCONTINUED | OUTPATIENT
Start: 2020-08-11 | End: 2020-08-15 | Stop reason: HOSPADM

## 2020-08-11 RX ORDER — MORPHINE SULFATE 2 MG/ML
2 INJECTION, SOLUTION INTRAMUSCULAR; INTRAVENOUS
Status: DISCONTINUED | OUTPATIENT
Start: 2020-08-11 | End: 2020-08-15 | Stop reason: HOSPADM

## 2020-08-11 RX ORDER — CHLORHEXIDINE GLUCONATE 0.12 MG/ML
15 RINSE ORAL 2 TIMES DAILY
Status: DISCONTINUED | OUTPATIENT
Start: 2020-08-11 | End: 2020-08-15 | Stop reason: HOSPADM

## 2020-08-11 RX ORDER — PROTAMINE SULFATE 10 MG/ML
INJECTION, SOLUTION INTRAVENOUS PRN
Status: DISCONTINUED | OUTPATIENT
Start: 2020-08-11 | End: 2020-08-11 | Stop reason: SDUPTHER

## 2020-08-11 RX ORDER — INSULIN GLARGINE 100 [IU]/ML
0.25 INJECTION, SOLUTION SUBCUTANEOUS NIGHTLY
Status: DISCONTINUED | OUTPATIENT
Start: 2020-08-12 | End: 2020-08-15 | Stop reason: HOSPADM

## 2020-08-11 RX ORDER — METOPROLOL TARTRATE 5 MG/5ML
2.5 INJECTION INTRAVENOUS EVERY 10 MIN PRN
Status: DISCONTINUED | OUTPATIENT
Start: 2020-08-11 | End: 2020-08-15 | Stop reason: HOSPADM

## 2020-08-11 RX ORDER — SODIUM CHLORIDE 0.9 % (FLUSH) 0.9 %
10 SYRINGE (ML) INJECTION PRN
Status: DISCONTINUED | OUTPATIENT
Start: 2020-08-11 | End: 2020-08-15 | Stop reason: HOSPADM

## 2020-08-11 RX ORDER — FENTANYL CITRATE 50 UG/ML
INJECTION, SOLUTION INTRAMUSCULAR; INTRAVENOUS PRN
Status: DISCONTINUED | OUTPATIENT
Start: 2020-08-11 | End: 2020-08-11 | Stop reason: SDUPTHER

## 2020-08-11 RX ORDER — ASPIRIN 81 MG/1
81 TABLET ORAL DAILY
Status: DISCONTINUED | OUTPATIENT
Start: 2020-08-12 | End: 2020-08-15 | Stop reason: HOSPADM

## 2020-08-11 RX ORDER — MIDAZOLAM HYDROCHLORIDE 1 MG/ML
1 INJECTION INTRAMUSCULAR; INTRAVENOUS
Status: DISCONTINUED | OUTPATIENT
Start: 2020-08-11 | End: 2020-08-13

## 2020-08-11 RX ORDER — DEXTROSE AND SODIUM CHLORIDE 5; .45 G/100ML; G/100ML
INJECTION, SOLUTION INTRAVENOUS CONTINUOUS
Status: DISCONTINUED | OUTPATIENT
Start: 2020-08-11 | End: 2020-08-12

## 2020-08-11 RX ORDER — ACETAMINOPHEN 650 MG/1
650 SUPPOSITORY RECTAL EVERY 4 HOURS PRN
Status: DISCONTINUED | OUTPATIENT
Start: 2020-08-11 | End: 2020-08-15 | Stop reason: HOSPADM

## 2020-08-11 RX ORDER — ACETAMINOPHEN 650 MG/1
SUPPOSITORY RECTAL PRN
Status: DISCONTINUED | OUTPATIENT
Start: 2020-08-11 | End: 2020-08-11 | Stop reason: ALTCHOICE

## 2020-08-11 RX ORDER — AMINOCAPROIC ACID 250 MG/ML
INJECTION, SOLUTION INTRAVENOUS PRN
Status: DISCONTINUED | OUTPATIENT
Start: 2020-08-11 | End: 2020-08-11 | Stop reason: SDUPTHER

## 2020-08-11 RX ORDER — KETOROLAC TROMETHAMINE 30 MG/ML
INJECTION, SOLUTION INTRAMUSCULAR; INTRAVENOUS
Status: COMPLETED
Start: 2020-08-11 | End: 2020-08-11

## 2020-08-11 RX ORDER — MILRINONE LACTATE 0.2 MG/ML
INJECTION, SOLUTION INTRAVENOUS CONTINUOUS PRN
Status: DISCONTINUED | OUTPATIENT
Start: 2020-08-11 | End: 2020-08-11 | Stop reason: SDUPTHER

## 2020-08-11 RX ORDER — NICOTINE POLACRILEX 4 MG
15 LOZENGE BUCCAL PRN
Status: DISCONTINUED | OUTPATIENT
Start: 2020-08-11 | End: 2020-08-15 | Stop reason: HOSPADM

## 2020-08-11 RX ORDER — HEPARIN SODIUM 1000 [USP'U]/ML
INJECTION, SOLUTION INTRAVENOUS; SUBCUTANEOUS PRN
Status: DISCONTINUED | OUTPATIENT
Start: 2020-08-11 | End: 2020-08-11 | Stop reason: SDUPTHER

## 2020-08-11 RX ORDER — CISATRACURIUM BESYLATE 2 MG/ML
INJECTION, SOLUTION INTRAVENOUS PRN
Status: DISCONTINUED | OUTPATIENT
Start: 2020-08-11 | End: 2020-08-11 | Stop reason: SDUPTHER

## 2020-08-11 RX ADMIN — Medication 1.5 G: at 08:00

## 2020-08-11 RX ADMIN — SODIUM CHLORIDE 2.1 UNITS/HR: 9 INJECTION, SOLUTION INTRAVENOUS at 08:10

## 2020-08-11 RX ADMIN — HEPARIN SODIUM 45000 UNITS: 1000 INJECTION, SOLUTION INTRAVENOUS; SUBCUTANEOUS at 09:12

## 2020-08-11 RX ADMIN — ALBUTEROL SULFATE 2.5 MG: 2.5 SOLUTION RESPIRATORY (INHALATION) at 19:43

## 2020-08-11 RX ADMIN — MUPIROCIN: 20 OINTMENT TOPICAL at 20:36

## 2020-08-11 RX ADMIN — CEFAZOLIN SODIUM 2 G: 10 INJECTION, POWDER, FOR SOLUTION INTRAVENOUS at 20:30

## 2020-08-11 RX ADMIN — MIDAZOLAM HYDROCHLORIDE 2 MG: 5 INJECTION, SOLUTION INTRAMUSCULAR; INTRAVENOUS at 06:39

## 2020-08-11 RX ADMIN — PROTAMINE SULFATE 50 MG: 10 INJECTION, SOLUTION INTRAVENOUS at 11:03

## 2020-08-11 RX ADMIN — CARVEDILOL 3.12 MG: 3.12 TABLET, FILM COATED ORAL at 05:15

## 2020-08-11 RX ADMIN — CEFAZOLIN SODIUM 1 G: 10 INJECTION, POWDER, FOR SOLUTION INTRAVENOUS at 11:00

## 2020-08-11 RX ADMIN — CISATRACURIUM BESYLATE 10 MG: 2 INJECTION INTRAVENOUS at 08:30

## 2020-08-11 RX ADMIN — ANTISEPTIC SURGICAL SCRUB: 0.04 SOLUTION TOPICAL at 04:58

## 2020-08-11 RX ADMIN — FENTANYL CITRATE 250 MCG: 50 INJECTION, SOLUTION INTRAMUSCULAR; INTRAVENOUS at 07:40

## 2020-08-11 RX ADMIN — SODIUM CHLORIDE, POTASSIUM CHLORIDE, SODIUM LACTATE AND CALCIUM CHLORIDE: 600; 310; 30; 20 INJECTION, SOLUTION INTRAVENOUS at 07:33

## 2020-08-11 RX ADMIN — Medication 10 ML: at 06:31

## 2020-08-11 RX ADMIN — ALBUMIN (HUMAN) 12.5 G: 12.5 INJECTION, SOLUTION INTRAVENOUS at 21:00

## 2020-08-11 RX ADMIN — ALBUMIN (HUMAN) 12.5 G: 12.5 INJECTION, SOLUTION INTRAVENOUS at 20:00

## 2020-08-11 RX ADMIN — SODIUM CHLORIDE, POTASSIUM CHLORIDE, SODIUM LACTATE AND CALCIUM CHLORIDE: 600; 310; 30; 20 INJECTION, SOLUTION INTRAVENOUS at 12:09

## 2020-08-11 RX ADMIN — FENTANYL CITRATE 250 MCG: 50 INJECTION, SOLUTION INTRAMUSCULAR; INTRAVENOUS at 08:14

## 2020-08-11 RX ADMIN — DEXTROSE AND SODIUM CHLORIDE: 5; 450 INJECTION, SOLUTION INTRAVENOUS at 12:10

## 2020-08-11 RX ADMIN — ASPIRIN 325 MG: 325 TABLET, DELAYED RELEASE ORAL at 18:28

## 2020-08-11 RX ADMIN — KETAMINE HYDROCHLORIDE 100 MG: 50 INJECTION INTRAMUSCULAR; INTRAVENOUS at 07:38

## 2020-08-11 RX ADMIN — POTASSIUM CHLORIDE 20 MEQ: 400 INJECTION, SOLUTION INTRAVENOUS at 12:23

## 2020-08-11 RX ADMIN — AMINOCAPROIC ACID 5000 MG: 250 INJECTION, SOLUTION INTRAVENOUS at 08:00

## 2020-08-11 RX ADMIN — KETOROLAC TROMETHAMINE 15 MG: 30 INJECTION, SOLUTION INTRAMUSCULAR at 14:05

## 2020-08-11 RX ADMIN — CEFAZOLIN SODIUM 2 G: 10 INJECTION, POWDER, FOR SOLUTION INTRAVENOUS at 08:00

## 2020-08-11 RX ADMIN — SODIUM CHLORIDE: 9 INJECTION, SOLUTION INTRAVENOUS at 08:10

## 2020-08-11 RX ADMIN — OXYCODONE 5 MG: 5 TABLET ORAL at 18:31

## 2020-08-11 RX ADMIN — Medication 15 ML: at 04:41

## 2020-08-11 RX ADMIN — POTASSIUM CHLORIDE 20 MEQ: 400 INJECTION, SOLUTION INTRAVENOUS at 13:25

## 2020-08-11 RX ADMIN — CISATRACURIUM BESYLATE 10 MG: 2 INJECTION INTRAVENOUS at 10:30

## 2020-08-11 RX ADMIN — EPINEPHRINE 0.01 MCG/KG/MIN: 1 INJECTION, SOLUTION, CONCENTRATE INTRAVENOUS at 08:10

## 2020-08-11 RX ADMIN — CISATRACURIUM BESYLATE 20 MG: 2 INJECTION INTRAVENOUS at 07:41

## 2020-08-11 RX ADMIN — KETOROLAC TROMETHAMINE 15 MG: 30 INJECTION, SOLUTION INTRAMUSCULAR at 21:01

## 2020-08-11 RX ADMIN — SODIUM CHLORIDE: 9 INJECTION, SOLUTION INTRAVENOUS at 05:05

## 2020-08-11 RX ADMIN — Medication 10 ML: at 20:37

## 2020-08-11 RX ADMIN — MIDAZOLAM HYDROCHLORIDE 4 MG: 2 INJECTION, SOLUTION INTRAMUSCULAR; INTRAVENOUS at 07:38

## 2020-08-11 RX ADMIN — ASPIRIN 81 MG: 81 TABLET ORAL at 05:02

## 2020-08-11 RX ADMIN — ALBUMIN (HUMAN) 250 ML: 12.5 INJECTION, SOLUTION INTRAVENOUS at 13:30

## 2020-08-11 RX ADMIN — VANCOMYCIN HYDROCHLORIDE 1.5 G: 10 INJECTION, POWDER, LYOPHILIZED, FOR SOLUTION INTRAVENOUS at 20:36

## 2020-08-11 RX ADMIN — MUPIROCIN: 20 OINTMENT TOPICAL at 05:08

## 2020-08-11 RX ADMIN — ALBUTEROL SULFATE 2.5 MG: 2.5 SOLUTION RESPIRATORY (INHALATION) at 15:18

## 2020-08-11 RX ADMIN — Medication 5 MG: at 11:30

## 2020-08-11 RX ADMIN — CALCIUM CHLORIDE 0.25 G: 100 INJECTION, SOLUTION INTRAVENOUS at 11:05

## 2020-08-11 RX ADMIN — SODIUM CHLORIDE, POTASSIUM CHLORIDE, SODIUM LACTATE AND CALCIUM CHLORIDE: 600; 310; 30; 20 INJECTION, SOLUTION INTRAVENOUS at 11:17

## 2020-08-11 RX ADMIN — FENTANYL CITRATE 500 MCG: 50 INJECTION, SOLUTION INTRAMUSCULAR; INTRAVENOUS at 08:45

## 2020-08-11 RX ADMIN — PROTAMINE SULFATE 600 MG: 10 INJECTION, SOLUTION INTRAVENOUS at 10:50

## 2020-08-11 RX ADMIN — Medication 10 ML: at 05:04

## 2020-08-11 RX ADMIN — CALCIUM CHLORIDE 0.5 G: 100 INJECTION, SOLUTION INTRAVENOUS at 10:48

## 2020-08-11 RX ADMIN — MILRINONE LACTATE 0.38 MCG/KG/MIN: 0.2 INJECTION, SOLUTION INTRAVENOUS at 08:10

## 2020-08-11 RX ADMIN — CALCIUM CHLORIDE 0.25 G: 100 INJECTION, SOLUTION INTRAVENOUS at 11:15

## 2020-08-11 RX ADMIN — EPHEDRINE SULFATE 5 MG: 50 INJECTION INTRAVENOUS at 10:56

## 2020-08-11 RX ADMIN — GLYCOPYRROLATE 0.5 MG: 0.2 INJECTION, SOLUTION INTRAMUSCULAR; INTRAVENOUS at 11:30

## 2020-08-11 RX ADMIN — Medication 15 ML: at 20:36

## 2020-08-11 ASSESSMENT — PULMONARY FUNCTION TESTS
PIF_VALUE: 30
PIF_VALUE: 27
PIF_VALUE: 0
PIF_VALUE: 30
PIF_VALUE: 31
PIF_VALUE: 37
PIF_VALUE: 35
PIF_VALUE: 30
PIF_VALUE: 36
PIF_VALUE: 36
PIF_VALUE: 29
PIF_VALUE: 31
PIF_VALUE: 37
PIF_VALUE: 1
PIF_VALUE: 3
PIF_VALUE: 0
PIF_VALUE: 32
PIF_VALUE: 1
PIF_VALUE: 35
PIF_VALUE: 36
PIF_VALUE: 1
PIF_VALUE: 28
PIF_VALUE: 0
PIF_VALUE: 30
PIF_VALUE: 2
PIF_VALUE: 31
PIF_VALUE: 29
PIF_VALUE: 34
PIF_VALUE: 31
PIF_VALUE: 1
PIF_VALUE: 18
PIF_VALUE: 31
PIF_VALUE: 0
PIF_VALUE: 1
PIF_VALUE: 1
PIF_VALUE: 40
PIF_VALUE: 31
PIF_VALUE: 1
PIF_VALUE: 28
PIF_VALUE: 1
PIF_VALUE: 36
PIF_VALUE: 36
PIF_VALUE: 28
PIF_VALUE: 1
PIF_VALUE: 0
PIF_VALUE: 0
PIF_VALUE: 29
PIF_VALUE: 0
PIF_VALUE: 31
PIF_VALUE: 31
PIF_VALUE: 30
PIF_VALUE: 1
PIF_VALUE: 28
PIF_VALUE: 5
PIF_VALUE: 31
PIF_VALUE: 30
PIF_VALUE: 1
PIF_VALUE: 32
PIF_VALUE: 29
PIF_VALUE: 34
PIF_VALUE: 10
PIF_VALUE: 33
PIF_VALUE: 38
PIF_VALUE: 1
PIF_VALUE: 0
PIF_VALUE: 37
PIF_VALUE: 29
PIF_VALUE: 30
PIF_VALUE: 0
PIF_VALUE: 31
PIF_VALUE: 0
PIF_VALUE: 34
PIF_VALUE: 28
PIF_VALUE: 1
PIF_VALUE: 35
PIF_VALUE: 19
PIF_VALUE: 28
PIF_VALUE: 33
PIF_VALUE: 35
PIF_VALUE: 19
PIF_VALUE: 33
PIF_VALUE: 1
PIF_VALUE: 31
PIF_VALUE: 0
PIF_VALUE: 0
PIF_VALUE: 1
PIF_VALUE: 31
PIF_VALUE: 32
PIF_VALUE: 0
PIF_VALUE: 22
PIF_VALUE: 37
PIF_VALUE: 1
PIF_VALUE: 1
PIF_VALUE: 31
PIF_VALUE: 29
PIF_VALUE: 1
PIF_VALUE: 1
PIF_VALUE: 28
PIF_VALUE: 30
PIF_VALUE: 30
PIF_VALUE: 31
PIF_VALUE: 30
PIF_VALUE: 0
PIF_VALUE: 38
PIF_VALUE: 1
PIF_VALUE: 31
PIF_VALUE: 29
PIF_VALUE: 0
PIF_VALUE: 1
PIF_VALUE: 30
PIF_VALUE: 29
PIF_VALUE: 9
PIF_VALUE: 38
PIF_VALUE: 1
PIF_VALUE: 33
PIF_VALUE: 35
PIF_VALUE: 31
PIF_VALUE: 37
PIF_VALUE: 33
PIF_VALUE: 0
PIF_VALUE: 1
PIF_VALUE: 28
PIF_VALUE: 1
PIF_VALUE: 33
PIF_VALUE: 36
PIF_VALUE: 1
PIF_VALUE: 38
PIF_VALUE: 36
PIF_VALUE: 28
PIF_VALUE: 35
PIF_VALUE: 18
PIF_VALUE: 0
PIF_VALUE: 30
PIF_VALUE: 38
PIF_VALUE: 29
PIF_VALUE: 30
PIF_VALUE: 35
PIF_VALUE: 0
PIF_VALUE: 30
PIF_VALUE: 1
PIF_VALUE: 29
PIF_VALUE: 32
PIF_VALUE: 34
PIF_VALUE: 1
PIF_VALUE: 35
PIF_VALUE: 31
PIF_VALUE: 36
PIF_VALUE: 36
PIF_VALUE: 0
PIF_VALUE: 3
PIF_VALUE: 0
PIF_VALUE: 32
PIF_VALUE: 30
PIF_VALUE: 17
PIF_VALUE: 30
PIF_VALUE: 13
PIF_VALUE: 31
PIF_VALUE: 30
PIF_VALUE: 33
PIF_VALUE: 1
PIF_VALUE: 38
PIF_VALUE: 36
PIF_VALUE: 37
PIF_VALUE: 28
PIF_VALUE: 1
PIF_VALUE: 32
PIF_VALUE: 29
PIF_VALUE: 0
PIF_VALUE: 30
PIF_VALUE: 32
PIF_VALUE: 28
PIF_VALUE: 28
PIF_VALUE: 0
PIF_VALUE: 37
PIF_VALUE: 35
PIF_VALUE: 28
PIF_VALUE: 0
PIF_VALUE: 37
PIF_VALUE: 38
PIF_VALUE: 32
PIF_VALUE: 0
PIF_VALUE: 34
PIF_VALUE: 36
PIF_VALUE: 0
PIF_VALUE: 29
PIF_VALUE: 22
PIF_VALUE: 36
PIF_VALUE: 28
PIF_VALUE: 31
PIF_VALUE: 28
PIF_VALUE: 0
PIF_VALUE: 28
PIF_VALUE: 35
PIF_VALUE: 1
PIF_VALUE: 27
PIF_VALUE: 30
PIF_VALUE: 10
PIF_VALUE: 36
PIF_VALUE: 1
PIF_VALUE: 0
PIF_VALUE: 31
PIF_VALUE: 28
PIF_VALUE: 29
PIF_VALUE: 33
PIF_VALUE: 31
PIF_VALUE: 17
PIF_VALUE: 0
PIF_VALUE: 0
PIF_VALUE: 37
PIF_VALUE: 35
PIF_VALUE: 36
PIF_VALUE: 35
PIF_VALUE: 36
PIF_VALUE: 37
PIF_VALUE: 27
PIF_VALUE: 35
PIF_VALUE: 1
PIF_VALUE: 35
PIF_VALUE: 30
PIF_VALUE: 31

## 2020-08-11 ASSESSMENT — PAIN SCALES - GENERAL
PAINLEVEL_OUTOF10: 0
PAINLEVEL_OUTOF10: 5
PAINLEVEL_OUTOF10: 5
PAINLEVEL_OUTOF10: 2
PAINLEVEL_OUTOF10: 4

## 2020-08-11 ASSESSMENT — PAIN SCALES - WONG BAKER
WONGBAKER_NUMERICALRESPONSE: 0

## 2020-08-11 ASSESSMENT — PAIN DESCRIPTION - LOCATION: LOCATION: CHEST

## 2020-08-11 ASSESSMENT — PAIN DESCRIPTION - PAIN TYPE: TYPE: SURGICAL PAIN

## 2020-08-11 NOTE — BRIEF OP NOTE
Brief Postoperative Note      Patient: Harjeet Anderson  YOB: 1965  MRN: [id number]    Date of Procedure: 8/11/2020    Pre-Op Diagnosis: -    Post-Op Diagnosis: Same       Procedure(s):  CORONARY ARTERY BYPASS GRAFTING X3, LEFT ATRIAL APPENDAGE CLIP, INTERNAL MAMMARY ARTERY, SAPHENOUS VEIN GRAFT, ON PUMP, 5 LEVEL BILATERAL INTERCOSTAL NERVE BLOCK    Surgeon(s):  Gilbert Wilson MD    Assistant:  Surgical Assistant: Alon Caicedo    Anesthesia: General    Estimated Blood Loss (mL): 529    Complications: None    Specimens:   * No specimens in log *    Implants:  Implant Name Type Inv. Item Serial No.  Lot No. LRB No. Used Action   CLIP ATRICLIP FLEX HNDL 40MM - [id number] Heart CLIP ATRICLIP FLEX HNDL 40MM [id number] ATRICURE  N/A 1 Implanted         Drains:   Chest Tube 1 Mediastinal 24 [place] (Active)       Chest Tube 2 Pleural 24 [place] (Active)       Closed/Suction Drain Right Leg Bulb 7 [place] (Active)       Urethral Catheter Double-lumen; Latex;Straight-tip; Temperature probe 16 fr (Active)       Findings: LAD OM2 RCA     Electronically signed by Gilbert Wilson MD on 8/11/2020 at 11:33 AM

## 2020-08-11 NOTE — PROGRESS NOTES
Regarding med error (waste error) in Pyxis - Initial dose of Versed pulled (2mg) was accidentally wasted and not given to pt. Initial waste (3mg) witnessed by Andree Boggs RN at [facility]. Tried to waste the remianing 2 mg that was unable to be given in pyxis with Jacki as well, but the system would not allow it. New vial of Versed then had to be removed and 2 mg was given to pt successfully.

## 2020-08-11 NOTE — PLAN OF CARE
Problem: HEMODYNAMIC STATUS  Goal: Patient has stable vital signs and fluid balance  8/11/2020 0337 by Mine Jones RN  Outcome: Ongoing     Patient's EF (Ejection Fraction) is less than 40%    Heart Failure Medications:  Diuretics[de-identified] Post OHS-none today  (One of the following REQUIRED for EF <40%/SYSTOLIC FAILURE but MAY be used in EF% >40%/DIASTOLIC FAILURE)        ACE[de-identified]  OHS and None        ARB[de-identified]  OHS and None         ARNI[de-identified]  OHS    (Beta Blockers)  NON- Evidenced Based Beta Blocker (for EF% >40%/DIASTOLIC FAILURE):  OHS and None    Evidenced Based Beta Blocker::(REQUIRED for EF% <40%/SYSTOLIC FAILURE)  OHS and None  . .................................................................................................................................................. Patient's weights and intake/output reviewed: Yes    Patient's Last Weight: 103.5 kg obtained by standing scale. Comorbidities Reviewed  Post op OHS    Patient has a past medical history of Acute osteomyelitis of left foot ([address] Acute osteomyelitis of right foot ([address] Ascites, Blood transfusion reaction, Burst fracture of lumbar vertebra ([address] Cellulitis of left foot, Cellulitis of right lower extremity, Diabetes ([address] Diabetic ulcer of right foot ([address] Diabetic ulcer of 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 2 diabetes mellitus, with necrosis of bone ([address] ETOH abuse, Fracture of tibial plateau, High cholesterol, HTN (hypertension), MI (myocardial infarction) ([address] MRSA (methicillin resistant staph aureus) culture positive, Neuropathic ulcer of left foot, limited to breakdown of skin ([address] Neuropathic ulcer of toe ([address] NSVT (nonsustained ventricular tachycardia) ([address] Pleural effusion due to congestive heart failure ([address] Smoker, and Systolic CHF, acute ([address]      >>For CHF and Comorbidity documentation on Education Time and Topics, please see Education Tab    Progressive Mobility Assessment:  What is this patient's Current Level of Mobility?: Requires Bed Rest  How was this patient Mobilized today?:  OHS                 With Whom? N/A                 Level of Difficulty/Assistance:  N/A      Pt resting in bed at this time on  4 L O2. Pt denies shortness of breath. Pt without lower extremity edema.      Patient and/or Family's stated Goal of Care this Admission: reduce shortness of breath, increase activity tolerance, better understand heart failure and disease management, be more comfortable, and reduce lower extremity edema prior to discharge        :

## 2020-08-11 NOTE — FLOWSHEET NOTE
08/10/20 2020   Assessment   Charting Type Shift assessment   Neurological   Neuro (WDL) WDL   Level of Consciousness 0   Orientation Level Oriented X4   Cognition Appropriate judgement; Appropriate safety awareness; Appropriate attention/concentration; Appropriate for developmental age; Follows commands   [place] Coma Scale   Eye Opening 4   Best Verbal Response 5   Best Motor Response 6   [place] Coma Scale Score 15   NIH/MNHISS Stroke Scale   NIH/MNIHSS Stroke Scale Assessed No   HEENT   HEENT (WDL) X   Teeth Edentulous; Dentures upper;Dentures lower  (dentures at home, not in pt posession )   Respiratory   Respiratory (WDL) X   Respiratory Pattern Regular   Respiratory Depth Normal   Respiratory Quality/Effort Unlabored   Chest Assessment Trachea midline; Chest expansion symmetrical   L Breath Sounds Diminished   R Breath Sounds Diminished   Breath Sounds   Right Upper Lobe Clear   Right Middle Lobe Diminished   Right Lower Lobe Diminished   Left Upper Lobe Clear   Left Lower Lobe Diminished   Cardiac   Cardiac (WDL) WDL   Cardiac Regularity Regular   Heart Sounds S1, S2   Cardiac Rhythm NSR   Rhythm Interpretation   Rhythm Normal sinus rhythm   Pulse 92   Cardiac Monitor   Telemetry Monitor On Yes   Telemetry Audible Yes   Telemetry Alarms Set Yes   Telemetry Box Number 70   Gastrointestinal   Abdominal (WDL) WDL   RUQ Bowel Sounds Active; Audible   LUQ Bowel Sounds Active; Audible   RLQ Bowel Sounds Active; Audible   LLQ Bowel Sounds Active; Audible   Abdomen Inspection Rounded; Soft   Last BM (including prior to admit) 08/09/20   Tenderness Soft; No guarding;Nontender   Peripheral Vascular   Peripheral Vascular (WDL) X   Edema Right lower extremity; Left lower extremity   RLE Edema +1;Non-pitting   LLE Edema +1;Non-pitting   Sensation RLE Full sensation   Sensation LLE Full sensation   RUE Neurovascular Assessment   Capillary Refill Less than/equal to 3 seconds   Color Appropriate for ethnicity   Temperature Warm Sensation RUE Full sensation   R Radial Pulse +2   RLE Neurovascular Assessment   Capillary Refill Less than/equal to 3 seconds   Color Appropriate for ethnicity   R Pedal Pulse +2   Temperature Warm   LLE Neurovascular Assessment   Capillary Refill Less than/equal to 3 seconds   Color Appropriate for ethnicity   L Pedal Pulse +2   Temperature Warm   Puncture Site Assessment 1   Location Radial - right   Site Assessment No change; No redness, drainage, swelling or hematoma   Skin Color/Condition   Skin Color/Condition (WDL) WDL   Skin Condition/Temp Dry; Warm   Skin Integrity   Skin Integrity (WDL) X   Skin Integrity Other (Comment)  (Diabetic Ulcers)   Location Bilateral feet   Musculoskeletal   Musculoskeletal (WDL) X   RL Extremity Amputation  (toes)   LL Extremity Amputation  (toes)   Genitourinary   Genitourinary (WDL) WDL   Flank Tenderness No   Suprapubic Tenderness No   Dysuria No   Urine Assessment   Incontinence No   Urine Color ELIN   Urine Appearance ELIN   Urine Odor ELIN   Anus/Rectum   Anus/Rectum (WDL) WDL   Wound 08/04/20 Foot Right;Plantar   Date First Assessed/Time First Assessed: 08/04/20 1710   Present on Hospital Admission: Yes  Primary Wound Type: Diabetic Ulcer  Location: Foot  Wound Location Orientation: Right;Plantar   Wound Diabetic   Dressing Change Due 08/11/20   Wound Assessment Drainage   Drainage Amount Scant   Drainage Description Serosanguinous   Odor Mild   Margins Unattached edges   Vanesa-wound Assessment Calloused   Wound 08/04/20 Foot Left;Plantar   Date First Assessed/Time First Assessed: 08/04/20 1710   Present on Hospital Admission: Yes  Primary Wound Type: Diabetic Ulcer  Location: Foot  Wound Location Orientation: Left;Plantar   Wound Diabetic   Dressing Status Dry   Dressing Change Due 08/11/20   Wound Assessment Drainage   Drainage Amount Scant   Drainage Description Serosanguinous   Odor Mild   Margins Unattached edges   Vanesa-wound Assessment Calloused   Wound 08/04/20 Toe (Comment  which one) Left 4th Toe   Date First Assessed/Time First Assessed: 08/04/20 1711   Present on Hospital Admission: Yes  Primary Wound Type: Diabetic Ulcer  Location: Toe (Comment  which one)  Wound Location Orientation: Left  Wound Description (Comments): 4th Toe   Dressing Status Dry   Dressing/Treatment Open to air   Wound Assessment Dry   Drainage Amount None   Vanesa-wound Assessment Calloused   Psychosocial   Psychosocial (WDL) WDL

## 2020-08-11 NOTE — PROGRESS NOTES
4 Eyes Skin Assessment     The patient is being assess for   Post-Op Surgical    I agree that 2 RN's have performed a thorough Head to Toe Skin Assessment on the patient. ALL assessment sites listed below have been assessed. Areas assessed by both nurses:   [x]   Head, Face, and Ears   [x]   Shoulders, Back, and Chest, Abdomen  [x]   Arms, Elbows, and Hands   [x]   Coccyx, Sacrum, and Ischium  [x]   Legs, Feet, and Heels    Wounds to bilat feet per assessments along with missing toes, rods and plate in LLE  **SHARE this note so that the co-signing nurse is able to place an eSignature**    Co-signer eSignature: Electronically signed by Harris Almonte RN on 8/11/20 at 5:55 PM EDT    Does the Patient have Skin Breakdown?   Yes [facility] WOUND CARE was Initiated documentation include the Vanesa-wound, Wound Assessment, Measurements, Dressing Treatment, Drainage, and Color\",          Christiano Prevention initiated:  Yes   Wound Care Orders initiated:  Yes      [id number]  nurse consulted for Pressure Injury (Stage 3,4, Unstageable, DTI, NWPT, Complex wounds)and New or Established Ostomies:  NA      Primary Nurse eSignature: Electronically signed by Aneta Dawkins RN on 8/11/20 at 7:10 PM EDT

## 2020-08-11 NOTE — PROGRESS NOTES
Consent signed and placed in paper chart. First pre-op shower completed with Hibiclens. Bactroban administered to both nares. Room and surfaces disinfected with Clorox wipes. Tele leads changed. Pt aware he is to be NPO after midnight in preporation for surgery. Plan of care reviewed with pt, pt verbalized understanding.

## 2020-08-11 NOTE — PROCEDURES
[address] [address]                               PULMONARY FUNCTION    PATIENT NAME: Riley Perry                    :        1965  MED REC NO:   [id number]                          ROOM:       0944  ACCOUNT NO:   [de-identified]                           ADMIT DATE: 2020  PROVIDER:     Santiago Villalpando MD    DATE OF PROCEDURE:  2020    PFT    Spirometry showed FVC 3.29 L, 67% predicted, FEV1 2.63 L, 70% predicted,  with FEV1/FVC ratio of 80%. There was no response to bronchodilator. TLC 74% predicted, ERV 6% predicted and DLCO 55% predicted. IMPRESSION:  PFT shows a mild restrictive defect, part of which is due  to body habitus due to reduced ERV. There is moderate reduction in  diffusion. An interstitial process is not ruled out. Clinical  correlation is recommended. Vandana Carpio MD    D: 08/10/2020 18:13:31       T: 2020 1:39:07     [email or web address]  Job#: [id number]     Doc#: [id number]    CC:   MD Kanika Dent MD

## 2020-08-11 NOTE — PLAN OF CARE
Problem: Falls - Risk of:  Goal: Will remain free from falls  Description: Will remain free from falls  8/11/2020 0337 by Tom Krishnan RN  Outcome: Ongoing  Goal: Absence of physical injury  Description: Absence of physical injury  8/11/2020 0337 by Tmo Krishnan RN  Outcome: Ongoing     Problem: Pain:  Description: Pain management should include both nonpharmacologic and pharmacologic interventions.   Goal: Pain level will decrease  Description: Pain level will decrease  8/11/2020 0286 by Tom Krishnan RN  Outcome: Ongoing  Goal: Control of acute pain  Description: Control of acute pain  8/11/2020 0337 by Tom Krishnan RN  Outcome: Ongoing  Goal: Control of chronic pain  Description: Control of chronic pain  8/11/2020 0337 by Tom Krishnan RN  Outcome: Ongoing   Prn pain medication    Problem: Serum Glucose Level - Abnormal:  Goal: Ability to maintain appropriate glucose levels will improve  Description: Ability to maintain appropriate glucose levels will improve  8/11/2020 0337 by Tom Krishnan RN  Outcome: Ongoing     Problem: OXYGENATION/RESPIRATORY FUNCTION  Goal: Patient will maintain patent airway  8/11/2020 0337 by Tom Krishnan RN  Outcome: Ongoing  Goal: Patient will achieve/maintain normal respiratory rate/effort  Description: Respiratory rate and effort will be within normal limits for the patient  8/11/2020 3335 by Tom Krishnan RN  Outcome: Ongoing     Problem: HEMODYNAMIC STATUS  Goal: Patient has stable vital signs and fluid balance  8/11/2020 0337 by Tom Krishnan RN  Outcome: Ongoing     Problem: FLUID AND ELECTROLYTE IMBALANCE  Goal: Fluid and electrolyte balance are achieved/maintained  8/11/2020 0337 by Tom Krishnan RN  Outcome: Ongoing     Problem: ACTIVITY INTOLERANCE/IMPAIRED MOBILITY  Goal: Mobility/activity is maintained at optimum level for patient  8/11/2020 4301 by Tom Krishnan RN  Outcome: Ongoing     Problem: Pain:  Description: Pain management should include both

## 2020-08-11 NOTE — PROGRESS NOTES
Patient admitted to CVU from [place] [address] ICU monitoring commenced. Report received from anesthesiologist.  Chest x-ray ordered. Labs drawn and sent. Assessment complete. Hemodymanics stable and will continue to monitor. [address]  Jaimee Jimenez

## 2020-08-11 NOTE — PLAN OF CARE
Problem: Falls - Risk of:  Goal: Will remain free from falls  Description: Will remain free from falls  Outcome: Ongoing     Problem: Preoperative Routine:  Goal: Will comply with preparation for surgery or procedure  Description: Will comply with preparation for surgery or procedure  Outcome: Ongoing     Problem: Serum Glucose Level - Abnormal:  Goal: Ability to maintain appropriate glucose levels will improve  Description: Ability to maintain appropriate glucose levels will improve  Outcome: Ongoing     Problem: OXYGENATION/RESPIRATORY FUNCTION  Goal: Patient will maintain patent airway  Outcome: Ongoing     Problem: HEMODYNAMIC STATUS  Goal: Patient has stable vital signs and fluid balance  Outcome: Ongoing     Problem: ACTIVITY INTOLERANCE/IMPAIRED MOBILITY  Goal: Mobility/activity is maintained at optimum level for patient  Outcome: Ongoing

## 2020-08-11 NOTE — PROGRESS NOTES
4 Eyes Skin Assessment     The patient is being assess for   Shift Handoff    I agree that 2 RN's have performed a thorough Head to Toe Skin Assessment on the patient. ALL assessment sites listed below have been assessed. Areas assessed by both nurses:   [x]   Head, Face, and Ears   [x]   Shoulders, Back, and Chest, Abdomen  [x]   Arms, Elbows, and Hands   [x]   Coccyx, Sacrum, and Ischium  [x]   Legs, Feet, and Heels        Wounds as documented from assessment    **SHARE this note so that the co-signing nurse is able to place an eSignature**    Co-signer eSignature: Electronically signed by Rik Morales RN on 8/12/20 at 3:22 AM EDT    Does the Patient have Skin Breakdown?   Yes [facility]DA WOUND CARE was Initiated documentation include the Vanesa-wound, Wound Assessment, Measurements, Dressing Treatment, Drainage, and Color\",          Christiano Prevention initiated:  Yes   Wound Care Orders initiated:  Yes      [address]  nurse consulted for Pressure Injury (Stage 3,4, Unstageable, DTI, NWPT, Complex wounds)and New or Established Ostomies:  Yes      Primary Nurse eSignature: Electronically signed by Anju Lazo RN on 8/11/20 at 7:10 PM EDT

## 2020-08-12 ENCOUNTER — APPOINTMENT (OUTPATIENT)
Dept: GENERAL RADIOLOGY | Age: 55
DRG: 233 | End: 2020-08-12
Attending: INTERNAL MEDICINE

## 2020-08-12 LAB
ANION GAP SERPL CALCULATED.3IONS-SCNC: 10 MMOL/L (ref 3–16)
BUN BLDV-MCNC: 18 MG/DL (ref 7–20)
CALCIUM SERPL-MCNC: 8.2 MG/DL (ref 8.3–10.6)
CHLORIDE BLD-SCNC: 106 MMOL/L (ref 99–110)
CO2: 23 MMOL/L (ref 21–32)
CREAT SERPL-MCNC: 1.1 MG/DL (ref 0.9–1.3)
ESTIMATED AVERAGE GLUCOSE: 142.7 MG/DL
GFR AFRICAN AMERICAN: >60
GFR NON-AFRICAN AMERICAN: >60
GLUCOSE BLD-MCNC: 102 MG/DL (ref 70–99)
GLUCOSE BLD-MCNC: 114 MG/DL (ref 70–99)
GLUCOSE BLD-MCNC: 115 MG/DL (ref 70–99)
GLUCOSE BLD-MCNC: 116 MG/DL (ref 70–99)
GLUCOSE BLD-MCNC: 117 MG/DL (ref 70–99)
GLUCOSE BLD-MCNC: 118 MG/DL (ref 70–99)
GLUCOSE BLD-MCNC: 120 MG/DL (ref 70–99)
GLUCOSE BLD-MCNC: 121 MG/DL (ref 70–99)
GLUCOSE BLD-MCNC: 124 MG/DL (ref 70–99)
GLUCOSE BLD-MCNC: 137 MG/DL (ref 70–99)
GLUCOSE BLD-MCNC: 157 MG/DL (ref 70–99)
GLUCOSE BLD-MCNC: 167 MG/DL (ref 70–99)
HBA1C MFR BLD: 6.6 %
HCT VFR BLD CALC: 24 % (ref 40.5–52.5)
HEMOGLOBIN: 8.3 G/DL (ref 13.5–17.5)
MAGNESIUM: 2.7 MG/DL (ref 1.8–2.4)
MCH RBC QN AUTO: 31 PG (ref 26–34)
MCHC RBC AUTO-ENTMCNC: 34.7 G/DL (ref 31–36)
MCV RBC AUTO: 89.4 FL (ref 80–100)
PDW BLD-RTO: 14.8 % (ref 12.4–15.4)
PERFORMED ON: ABNORMAL
PLATELET # BLD: 169 K/UL (ref 135–450)
PMV BLD AUTO: 7 FL (ref 5–10.5)
POTASSIUM SERPL-SCNC: 4.4 MMOL/L (ref 3.5–5.1)
RBC # BLD: 2.69 M/UL (ref 4.2–5.9)
SODIUM BLD-SCNC: 139 MMOL/L (ref 136–145)
WBC # BLD: 12.7 K/UL (ref 4–11)

## 2020-08-12 PROCEDURE — 97116 GAIT TRAINING THERAPY: CPT

## 2020-08-12 PROCEDURE — 94669 MECHANICAL CHEST WALL OSCILL: CPT

## 2020-08-12 PROCEDURE — 97162 PT EVAL MOD COMPLEX 30 MIN: CPT

## 2020-08-12 PROCEDURE — 80048 BASIC METABOLIC PNL TOTAL CA: CPT

## 2020-08-12 PROCEDURE — 2700000000 HC OXYGEN THERAPY PER DAY

## 2020-08-12 PROCEDURE — 94761 N-INVAS EAR/PLS OXIMETRY MLT: CPT

## 2020-08-12 PROCEDURE — 71045 X-RAY EXAM CHEST 1 VIEW: CPT

## 2020-08-12 PROCEDURE — 2100000000 HC CCU R&B

## 2020-08-12 PROCEDURE — 6360000002 HC RX W HCPCS: Performed by: THORACIC SURGERY (CARDIOTHORACIC VASCULAR SURGERY)

## 2020-08-12 PROCEDURE — 83735 ASSAY OF MAGNESIUM: CPT

## 2020-08-12 PROCEDURE — 2580000003 HC RX 258: Performed by: THORACIC SURGERY (CARDIOTHORACIC VASCULAR SURGERY)

## 2020-08-12 PROCEDURE — 94640 AIRWAY INHALATION TREATMENT: CPT

## 2020-08-12 PROCEDURE — 97535 SELF CARE MNGMENT TRAINING: CPT

## 2020-08-12 PROCEDURE — P9045 ALBUMIN (HUMAN), 5%, 250 ML: HCPCS | Performed by: THORACIC SURGERY (CARDIOTHORACIC VASCULAR SURGERY)

## 2020-08-12 PROCEDURE — 99233 SBSQ HOSP IP/OBS HIGH 50: CPT | Performed by: INTERNAL MEDICINE

## 2020-08-12 PROCEDURE — 6370000000 HC RX 637 (ALT 250 FOR IP): Performed by: THORACIC SURGERY (CARDIOTHORACIC VASCULAR SURGERY)

## 2020-08-12 PROCEDURE — 85027 COMPLETE CBC AUTOMATED: CPT

## 2020-08-12 PROCEDURE — 97166 OT EVAL MOD COMPLEX 45 MIN: CPT

## 2020-08-12 RX ORDER — LEVOTHYROXINE AND LIOTHYRONINE 19; 4.5 UG/1; UG/1
60 TABLET ORAL DAILY
Status: DISCONTINUED | OUTPATIENT
Start: 2020-08-12 | End: 2020-08-15 | Stop reason: HOSPADM

## 2020-08-12 RX ADMIN — OXYCODONE 5 MG: 5 TABLET ORAL at 09:25

## 2020-08-12 RX ADMIN — CEFAZOLIN SODIUM 2 G: 10 INJECTION, POWDER, FOR SOLUTION INTRAVENOUS at 03:06

## 2020-08-12 RX ADMIN — INSULIN GLARGINE 26 UNITS: 100 INJECTION, SOLUTION SUBCUTANEOUS at 20:28

## 2020-08-12 RX ADMIN — OXYCODONE 10 MG: 5 TABLET ORAL at 15:06

## 2020-08-12 RX ADMIN — CEFAZOLIN SODIUM 2 G: 10 INJECTION, POWDER, FOR SOLUTION INTRAVENOUS at 10:59

## 2020-08-12 RX ADMIN — Medication 15 ML: at 08:11

## 2020-08-12 RX ADMIN — SODIUM CHLORIDE 6.79 UNITS/HR: 9 INJECTION, SOLUTION INTRAVENOUS at 14:51

## 2020-08-12 RX ADMIN — Medication 10 ML: at 08:11

## 2020-08-12 RX ADMIN — ACETAMINOPHEN 650 MG: 325 TABLET ORAL at 06:18

## 2020-08-12 RX ADMIN — POTASSIUM CHLORIDE 10 MEQ: 750 TABLET, EXTENDED RELEASE ORAL at 13:30

## 2020-08-12 RX ADMIN — ALBUTEROL SULFATE 2.5 MG: 2.5 SOLUTION RESPIRATORY (INHALATION) at 19:31

## 2020-08-12 RX ADMIN — FUROSEMIDE 20 MG: 10 INJECTION, SOLUTION INTRAMUSCULAR; INTRAVENOUS at 08:19

## 2020-08-12 RX ADMIN — CEFAZOLIN SODIUM 2 G: 10 INJECTION, POWDER, FOR SOLUTION INTRAVENOUS at 18:42

## 2020-08-12 RX ADMIN — ACETAMINOPHEN 650 MG: 325 TABLET ORAL at 17:57

## 2020-08-12 RX ADMIN — FAMOTIDINE 20 MG: 20 TABLET, FILM COATED ORAL at 08:18

## 2020-08-12 RX ADMIN — MUPIROCIN: 20 OINTMENT TOPICAL at 21:33

## 2020-08-12 RX ADMIN — ACETAMINOPHEN 650 MG: 325 TABLET ORAL at 13:30

## 2020-08-12 RX ADMIN — ATORVASTATIN CALCIUM 40 MG: 40 TABLET, FILM COATED ORAL at 20:28

## 2020-08-12 RX ADMIN — POLYETHYLENE GLYCOL 3350 17 G: 17 POWDER, FOR SOLUTION ORAL at 08:18

## 2020-08-12 RX ADMIN — VANCOMYCIN HYDROCHLORIDE 1.5 G: 10 INJECTION, POWDER, LYOPHILIZED, FOR SOLUTION INTRAVENOUS at 08:22

## 2020-08-12 RX ADMIN — Medication 10 ML: at 20:36

## 2020-08-12 RX ADMIN — CLOPIDOGREL BISULFATE 75 MG: 75 TABLET ORAL at 08:18

## 2020-08-12 RX ADMIN — POTASSIUM CHLORIDE 10 MEQ: 750 TABLET, EXTENDED RELEASE ORAL at 08:19

## 2020-08-12 RX ADMIN — ALBUTEROL SULFATE 2.5 MG: 2.5 SOLUTION RESPIRATORY (INHALATION) at 15:56

## 2020-08-12 RX ADMIN — SODIUM CHLORIDE 4.2 UNITS/KG/HR: 9 INJECTION, SOLUTION INTRAVENOUS at 01:04

## 2020-08-12 RX ADMIN — FUROSEMIDE 20 MG: 10 INJECTION, SOLUTION INTRAMUSCULAR; INTRAVENOUS at 17:57

## 2020-08-12 RX ADMIN — FAMOTIDINE 20 MG: 20 TABLET, FILM COATED ORAL at 20:28

## 2020-08-12 RX ADMIN — ALBUTEROL SULFATE 2.5 MG: 2.5 SOLUTION RESPIRATORY (INHALATION) at 08:23

## 2020-08-12 RX ADMIN — ALBUMIN (HUMAN) 12.5 G: 12.5 INJECTION, SOLUTION INTRAVENOUS at 06:45

## 2020-08-12 RX ADMIN — KETOROLAC TROMETHAMINE 15 MG: 30 INJECTION, SOLUTION INTRAMUSCULAR at 03:06

## 2020-08-12 RX ADMIN — ASPIRIN 81 MG: 81 TABLET ORAL at 08:18

## 2020-08-12 RX ADMIN — VANCOMYCIN HYDROCHLORIDE 1.5 G: 10 INJECTION, POWDER, LYOPHILIZED, FOR SOLUTION INTRAVENOUS at 20:27

## 2020-08-12 RX ADMIN — KETOROLAC TROMETHAMINE 15 MG: 30 INJECTION, SOLUTION INTRAMUSCULAR at 20:27

## 2020-08-12 RX ADMIN — ALBUTEROL SULFATE 2.5 MG: 2.5 SOLUTION RESPIRATORY (INHALATION) at 11:58

## 2020-08-12 RX ADMIN — MORPHINE SULFATE 4 MG: 4 INJECTION, SOLUTION INTRAMUSCULAR; INTRAVENOUS at 10:57

## 2020-08-12 RX ADMIN — MUPIROCIN: 20 OINTMENT TOPICAL at 08:11

## 2020-08-12 RX ADMIN — POTASSIUM CHLORIDE 10 MEQ: 750 TABLET, EXTENDED RELEASE ORAL at 17:57

## 2020-08-12 RX ADMIN — ONDANSETRON 4 MG: 2 INJECTION INTRAMUSCULAR; INTRAVENOUS at 09:25

## 2020-08-12 RX ADMIN — FUROSEMIDE 20 MG: 10 INJECTION, SOLUTION INTRAMUSCULAR; INTRAVENOUS at 13:30

## 2020-08-12 RX ADMIN — FUROSEMIDE 20 MG: 10 INJECTION, SOLUTION INTRAMUSCULAR; INTRAVENOUS at 20:28

## 2020-08-12 RX ADMIN — OXYCODONE 5 MG: 5 TABLET ORAL at 02:04

## 2020-08-12 ASSESSMENT — PAIN SCALES - WONG BAKER
WONGBAKER_NUMERICALRESPONSE: 0

## 2020-08-12 ASSESSMENT — PAIN SCALES - GENERAL
PAINLEVEL_OUTOF10: 3
PAINLEVEL_OUTOF10: 7
PAINLEVEL_OUTOF10: 3
PAINLEVEL_OUTOF10: 4
PAINLEVEL_OUTOF10: 3
PAINLEVEL_OUTOF10: 3
PAINLEVEL_OUTOF10: 4
PAINLEVEL_OUTOF10: 1
PAINLEVEL_OUTOF10: 4
PAINLEVEL_OUTOF10: 6
PAINLEVEL_OUTOF10: 4
PAINLEVEL_OUTOF10: 7
PAINLEVEL_OUTOF10: 4

## 2020-08-12 ASSESSMENT — PAIN DESCRIPTION - ORIENTATION
ORIENTATION: LEFT;MID
ORIENTATION: MID

## 2020-08-12 ASSESSMENT — PAIN DESCRIPTION - LOCATION
LOCATION: CHEST

## 2020-08-12 ASSESSMENT — PAIN DESCRIPTION - PAIN TYPE
TYPE: SURGICAL PAIN
TYPE: ACUTE PAIN;SURGICAL PAIN
TYPE: ACUTE PAIN;SURGICAL PAIN

## 2020-08-12 NOTE — PROGRESS NOTES
Occupational Therapy   Occupational Therapy Initial Assessment and Treatment Note  Date: 2020   Patient Name: Joy Nickerson  MRN: [id number]     : 1965    Date of Service: 2020    Discharge Recommendations:  24 hour supervision or assist     Assessment   Performance deficits / Impairments: Decreased functional mobility ; Decreased ADL status; Decreased endurance  After evaluation, pt found to be presenting with the above mentioned occupational performance deficits which are affecting participation in daily living skills. Pt would benefit from continued skilled occupational therapy to address ADLs, functional mobility, and safety while in acute care. Prognosis: Good  Decision Making: Medium Complexity  OT Education: OT Role;Transfer Training;Plan of Care;Precautions  Patient Education: Disease specific ed:  sternal precautions, mobility, use of call light. Pt reported understanding of instructions. REQUIRES OT FOLLOW UP: Yes  Activity Tolerance  Activity Tolerance: Patient Tolerated treatment well  Activity Tolerance: /53  Safety Devices  Safety Devices in place: Yes  Type of devices: Nurse notified;Gait belt;Call light within reach; Left in chair         Patient Diagnosis(es): There were no encounter diagnoses.      has a past medical history of Acute osteomyelitis of left foot ([address] Acute osteomyelitis of right foot ([address] Ascites, Blood transfusion reaction, Burst fracture of lumbar vertebra ([address] Cellulitis of left foot, Cellulitis of right lower extremity, Diabetes ([address] Diabetic ulcer of right foot ([address] Diabetic ulcer of 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 2 diabetes mellitus, with necrosis of bone ([address] ETOH abuse, Fracture of tibial plateau, High cholesterol, HTN (hypertension), MI (myocardial infarction) ([address] MRSA (methicillin resistant staph aureus) culture positive, Neuropathic ulcer of left foot, limited to breakdown of skin ([address] Neuropathic ulcer of toe ([address] NSVT (nonsustained ventricular tachycardia) ([facility] Pleural effusion due to congestive heart failure ([facility] Smoker, and Systolic CHF, acute ([facility] has a past surgical history that includes knee surgery (Left); other surgical history (Bilateral, 9/17/15); Toe amputation; Foot Tendon Surgery (Right, 2016); Foot Tendon Surgery (Left, 06/30/2017); other surgical history (Left, 08/17/2017); Foot surgery (Left, 09/27/2017); other surgical history (Left, 12/07/2017); other surgical history (Right, 01/04/2018); Foot Amputation (Left, 08/09/2018); pr part remv othr tarsal/metatarsal (Left, 8/9/2018); pr office/outpt visit,procedure only (Left, 8/23/2018); other surgical history (Left, 11/01/2018); pr part remv othr tarsal/metatarsal (Left, 11/1/2018); Foot surgery (Left, 01/31/2019); Foot Debridement (Left, 1/31/2019); Cardiac defibrillator placement (01/29/2014); and Coronary artery bypass graft (N/A, 8/11/2020).        Restrictions  Restrictions/Precautions  Restrictions/Precautions: General Precautions, Fall Risk, Cardiac  Position Activity Restriction  Sternal Precautions: No Pushing, No Pulling, 5# Lifting Restrictions  Other position/activity restrictions: ambulate, up in chair, 2 chest tubes, A line    Subjective   General  Chart Reviewed: Yes  Patient assessed for rehabilitation services?: Yes  Referring Practitioner: Joann Powell  Diagnosis: CAD s/p CABG x3 8/11/2020  Subjective  Subjective: Pt agreeable to OT  General Comment  Comments: RN approved therapy  Patient Currently in Pain: Yes  Pain Assessment  Pain Assessment: 0-10  Pain Level: 3  Pain Type: Acute pain;Surgical pain  Pain Location: Chest  Pain Orientation: Left;Mid  Non-Pharmaceutical Pain Intervention(s): Ambulation/Increased Activity;Repositioned  Response to Pain Intervention: Patient Satisfied  Vital Signs  Pulse: 89  Heart Rate Source: Monitor  Resp: 15  Patient Currently in Pain: Yes  Oxygen Therapy  SpO2: 96 %  Pulse Oximeter Device Mode: Continuous  Pulse Oximeter Device Location: Finger  O2 Device: (S) None (Room air)  O2 Flow Rate (L/min): 1 L/min  Social/Functional History  Social/Functional History  Lives With: Other (comment)(mother)  Type of Home: House  Home Layout: One level(1 step into kitchen from living room)  Home Access: Stairs to enter without rails  Entrance Stairs - Number of Steps: 1  Bathroom Shower/Tub: Tub/Shower unit  Bathroom Toilet: Standard  Bathroom Equipment: Shower chair  Home Equipment: Standard walker, Cane, Crutches  ADL Assistance: Independent  Homemaking Assistance: Independent  Ambulation Assistance: Independent  Transfer Assistance: Independent  Active : Yes  Occupation: Unemployed  Type of occupation: blacktop  Leisure & Hobbies: fishing     Objective   Vision: Impaired  Vision Exceptions: Wears glasses for distance  Hearing: Within functional limits    Orientation  Overall Orientation Status: Within Normal Limits     Balance  Sitting Balance: Stand by assistance  Standing Balance: Minimal assistance(RW)  Standing Balance  Activity: bed to chair transfer with RW and min x1 (assist of another to manage multiple lines)  ADL  Feeding: Setup  UE Dressing: Moderate assistance(gown)  LE Dressing: Maximum assistance(socks)  Additional Comments: Pt instructed on sternal precautions for ADLs and transfers. He will need reinforcement of instructions.   Tone RUE  RUE Tone: Normotonic  Tone LUE  LUE Tone: Normotonic  Coordination  Movements Are Fluid And Coordinated: Yes     Bed mobility  Supine to Sit: Moderate assistance;2 Person assistance(HOB partially elevated)  Transfers  Stand Pivot Transfers: Minimal assistance(rw)  Sit to stand: Minimal assistance  Stand to sit: Minimal assistance     Cognition  Overall Cognitive Status: WNL        Sensation  Overall Sensation Status: Impaired(BLE neuropathy)        LUE AROM (degrees)  LUE AROM : WFL  LUE General AROM: within sternal precautions  RUE AROM (degrees)  RUE AROM :

## 2020-08-12 NOTE — PROGRESS NOTES
Physical Therapy    Facility/Department: [facility] - ICU  Initial Assessment    NAME: Clarissa Quevedo  : 1965  MRN: [id number]    Date of Service: 2020    Discharge Recommendations:  24 hour supervision or assist   PT Equipment Recommendations  Equipment Needed: No    Assessment   Body structures, Functions, Activity limitations: Decreased functional mobility ; Decreased balance;Decreased endurance  Assessment: Pt is 53 yo male who presents POD 1 s/p CABG x 3. Pt indep with mobility at baseline. Grossly CGA to min A for mobility this date. Able to take steps to chair with RW. Cues throughout session to maintain sternal precautions. Pt would benefit from continued skilled therapy to address deficits. Recommend home with 24-hr sup at d/c. Treatment Diagnosis: decreased (I) with mobility s/p CABG  Specific instructions for Next Treatment: progress mobility as tolerated  Prognosis: Good  Decision Making: Medium Complexity  PT Education: General Safety;Goals;Gait Training;PT Role;Disease Specific Education;Plan of Care; Functional Mobility Training;Precautions;Transfer Training  Patient Education: Educated on sternal precautions s/p CABG with all mobility tasks -- pt verbalized understanding  Barriers to Learning: none  REQUIRES PT FOLLOW UP: Yes  Activity Tolerance  Activity Tolerance: Patient Tolerated treatment well  Activity Tolerance: Vitals stable throughout session       Patient Diagnosis(es): There were no encounter diagnoses.      has a past medical history of Acute osteomyelitis of left foot ([address] Acute osteomyelitis of right foot ([address] Ascites, Blood transfusion reaction, Burst fracture of lumbar vertebra ([address] Cellulitis of left foot, Cellulitis of right lower extremity, Diabetes ([address] Diabetic ulcer of right foot ([address] Diabetic ulcer of 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 2 diabetes mellitus, with necrosis of bone ([address] ETOH abuse, Fracture of tibial plateau, High cholesterol, HTN (hypertension), MI (myocardial infarction) ([address] MRSA (methicillin resistant staph aureus) culture positive, Neuropathic ulcer of left foot, limited to breakdown of skin ([address] Neuropathic ulcer of toe ([address] NSVT (nonsustained ventricular tachycardia) ([address] Pleural effusion due to congestive heart failure ([address] Smoker, and Systolic CHF, acute ([address] has a past surgical history that includes knee surgery (Left); other surgical history (Bilateral, 9/17/15); Toe amputation; Foot Tendon Surgery (Right, 2016); Foot Tendon Surgery (Left, 06/30/2017); other surgical history (Left, 08/17/2017); Foot surgery (Left, 09/27/2017); other surgical history (Left, 12/07/2017); other surgical history (Right, 01/04/2018); Foot Amputation (Left, 08/09/2018); pr part remv othr tarsal/metatarsal (Left, 8/9/2018); pr office/outpt visit,procedure only (Left, 8/23/2018); other surgical history (Left, 11/01/2018); pr part remv othr tarsal/metatarsal (Left, 11/1/2018); Foot surgery (Left, 01/31/2019); Foot Debridement (Left, 1/31/2019); Cardiac defibrillator placement (01/29/2014); and Coronary artery bypass graft (N/A, 8/11/2020).     Restrictions  Restrictions/Precautions  Restrictions/Precautions: General Precautions, Fall Risk, Cardiac  Position Activity Restriction  Sternal Precautions: No Pushing, No Pulling, 5# Lifting Restrictions  Other position/activity restrictions: ambulate, up in chair  Vision/Hearing  Vision: Impaired  Vision Exceptions: Wears glasses for distance  Hearing: Within functional limits     Subjective  General  Chart Reviewed: Yes  Patient assessed for rehabilitation services?: Yes  Additional Pertinent Hx: Left partial foot amputation  Response To Previous Treatment: Not applicable  Family / Caregiver Present: No  Referring Practitioner: Dr. Boni Nevarez MD  Referral Date : 08/12/20  Diagnosis: CAD; s/p CABG x 3 8/11/2020  Follows Commands: Within Functional Limits  General Comment  Comments: Pt resting in bed on approach; RN cleared pt for therapy  Subjective  Subjective: pt agreeable to therapy  Pain Screening  Patient Currently in Pain: Yes  Pain Assessment  Pain Assessment: 0-10  Pain Level: 3  Pain Type: Acute pain;Surgical pain  Pain Location: Chest  Non-Pharmaceutical Pain Intervention(s): Ambulation/Increased Activity;Repositioned  Response to Pain Intervention: Patient Satisfied       Orientation  Orientation  Overall Orientation Status: Within Functional Limits  Social/Functional History  Social/Functional History  Lives With: Other (comment)(mother)  Type of Home: House  Home Layout: One level(1 step into kitchen from living room)  Home Access: Stairs to enter without rails  Entrance Stairs - Number of Steps: 1  Bathroom Shower/Tub: Tub/Shower unit  Bathroom Toilet: Standard  Bathroom Equipment: Shower chair  Home Equipment: Standard walker, Cane, Crutches  ADL Assistance: Independent  Homemaking Assistance: Independent  Ambulation Assistance: Independent  Transfer Assistance: Independent  Active : Yes  Occupation: Unemployed  Type of occupation: blacktop  Leisure & Hobbies: fishing    Objective     RLE AROM: WFL  LLE AROM : WFL  Strength RLE: WFL  Strength LLE: WFL     Sensation  Overall Sensation Status: Impaired(BLE neuropathy)     Bed mobility  Supine to Sit: Moderate assistance;2 Person assistance(assist for trunk. HOB elevated, cues for sternal precautions)  Sit to Supine: Unable to assess(pt up in chair at end of session)     Transfers  Sit to Stand: Contact guard assistance(Cues for sternal precautions)  Stand to sit: Contact guard assistance     Ambulation  Ambulation?: Yes  Ambulation 1  Surface: level tile  Device: Rolling Walker  Assistance: Minimal assistance  Quality of Gait: Min A for balance and to negotiate RW. Cues for posture and seqencing, no LOB  Gait Deviations: Shuffles; Slow Tamara;Decreased step length;Decreased step height  Distance: 3 ft to chair     Balance  Sitting - Static: Good  Sitting - Dynamic: Good  Standing - Static: Fair;+  Standing - Dynamic: [address]  Times per week: 5-7x/wk while in ICU  Times per day: Daily  Specific instructions for Next Treatment: progress mobility as tolerated  Current Treatment Recommendations: Strengthening, Safety Education & Training, Neuromuscular Re-education, Home Exercise Program, Balance Training, Endurance Training, Patient/Caregiver Education & Training, Functional Mobility Training, Transfer Training, Stair training, Gait Training  Safety Devices  Type of devices: All fall risk precautions in place, Call light within reach, Gait belt, Nurse notified, Left in chair(no chair alarm needed per RN)                                   AM-PAC Score  AM-PAC Inpatient Mobility Raw Score : 17 (08/12/20 1157)  AM-PAC Inpatient T-Scale Score : 42.13 (08/12/20 1157)  Mobility Inpatient CMS 0-100% Score: 50.57 (08/12/20 1157)  Mobility Inpatient CMS G-Code Modifier : CK (08/12/20 1157)          Goals  Short term goals  Time Frame for Short term goals: 1 week (8/19) unless otherwise specified  Short term goal 1: Pt will be mod I with bed mobility. Short term goal 2: Pt will be mod I with transfers. Short term goal 3: Pt will ambulate 150 ft supervision. Short term goal 4: Pt will negotiate 1 stair with SBA. Short term goal 5: 8/15: Pt will participate in 12-15 reps of BLE exercises to promote strength and activity tolerance.   Patient Goals   Patient goals : \"to go home\"       Therapy Time   Individual Concurrent Group Co-treatment   Time In 5496         Time Out 1149         Minutes 20         Timed Code Treatment Minutes: [address] [place] [address] [facility]

## 2020-08-12 NOTE — ADT AUTH CERT
Coronary Artery Bypass Graft (CABG) - Care Day 1 (8/11/2020) by Buffy Bailon RN         Review Status  Review Entered    Completed  8/12/2020 16:25        Criteria Review       Care Day: 1 Care Date: 8/11/2020 Level of Care: ICU    Guideline Day 1    Level Of Care    (X) OR to ICU    8/12/2020 4:25 PM EDT by aJne Feldman      ICU    Clinical Status    (X) * Clinical Indications met [J]    8/12/2020 4:25 PM EDT by Jane Feldman      indications met    Activity    (X) Bed rest with head of bed elevated 40 degrees    Routes    (X) IV fluids, medications    8/12/2020 4:25 PM EDT by Jane Feldman      NS 50ml/hr  D5 ½ Ns 75ml/hr    Interventions    (X) Chest tube    (X) Oxygen    8/12/2020 4:25 PM EDT by Jane Feldman      NC 2LPM    Medications    (X) Prophylactic antibiotics    8/12/2020 4:25 PM EDT by Jane Feldman      Ancef 2g IV q8h  Ancef 2g IV x 1 OR  Vanc 1.5g IV q12h  Vanc 1.5g IV x 1 OR    (X) Possible aspirin    8/12/2020 4:25 PM EDT by Jane Feldman      ASA 325mg PO x 1  ASA 81mg PO x 1    (X) Beta-blocker [L]    8/12/2020 4:25 PM EDT by Isak Fountain 3.125mg PO x 1    * Milestone    Additional Notes             8/11/2020    LOC - IP - concurrent review - ICU       Cardiothoracic surgery note:          Procedure(s):    CORONARY ARTERY BYPASS GRAFTING X3, LEFT ATRIAL APPENDAGE CLIP, INTERNAL MAMMARY ARTERY, SAPHENOUS VEIN GRAFT, ON PUMP, 5 LEVEL BILATERAL INTERCOSTAL NERVE BLOCK    Complications: none    Findings: LAD OM2 RCA       Vitals:  98.4, HR 86, RR 17, BP 82/52, 98% on NC 2LPM       8/11/2020 03:29    Glucose: 129 (H)    Calcium: 9.1    Total Protein: 5.9 (L)    Cholesterol, Total: 108    HDL Cholesterol: 23 (L)    LDL Calculated: 35       8/11/2020 08:04    POC Glucose: 134 (H)    POC Sodium: 140    POC Potassium: 3.4 (L)    POC Hematocrit: 27.0 (L)    Glucose: 129 (H)    Hemoglobin A1C: 6.6    eAG (mg/dL): 142.7    WBC: 6.8    RBC: 3.45 (L)    Hemoglobin Quant: 10.7 (L)    Hematocrit: 30.7 (L)       8/11/2020 08:04    POC Glucose: 134 (H)    Hemoglobin Quant: 9.2 (L)    POC Hematocrit: 27.0 (L)       8/11/2020 09:19    POC Glucose: 218 (H)    Hemoglobin Quant: 8.5 (L)    POC Hematocrit: 25.0 (L)       8/11/2020 18:15    Magnesium: 2.80 (H)       pH, Arterial: 7.480 (H)    pCO2, Arterial: 38.0    pO2, Arterial: 273.9 (H)    HCO3, Arterial: 28.3    TCO2 (calc), Art: 30    Base Excess, Arterial: 5 (H)    O2 Sat, Arterial: 100       Chest xray: Streaky left basilar opacity, new.  Findings likely represent atelectasis or edema. Left-sided Hemingway-Seth catheter with tip which appears curled upon itself in the region of the pulmonary outflow tract.        Meds:    Albuterol neb q4h    Humalog SC TID SSI    Humalog SC nightly SSI    Epinephrine drip 0.01mcg/kg/min OR    Insulin drip 1 unit/hr    Primacor 20mg IV 0.125mcg/kg/min    Levophed drip 2mcg/min    Albumin 25g IV PRN x 3    Oxycodone 5mg PO q4h PRN x 1    Potassium chloride 20meq IV x 2       Orders: NPO, acapella q4h, CT care, daily weight, IS, I&O, continuous oximetry           Coronary Artery Bypass Graft (CABG) - Clinical Indications for Procedure by Akash Manuel RN         Review Status  Review Entered    Completed  8/12/2020 16:23        Criteria Review       Clinical Indications for Procedure    Most Recent : Cindy Fine Most Recent Date: 8/12/2020 4:23 PM EDT    (X) Procedure is indicated for  1 or more  of the following  (1) (2) (3) (4) (5) (6):       (X) Three-vessel coronary artery stenosis (ie, 3 vessels with greater than or equal to 70% stenosis,       FFR less than 0.80, or iFR less than 0.89) [A] [B]       8/12/2020 4:23 PM EDT by Cindy Fine         LAD 90% stenosis   LCX 50-70 % stenosis  RCA 60-70% stenosis        Chest Pain - Care Day 4 (8/9/2020) by Akash Manuel RN         Review Status  Review Entered    Completed  8/12/2020 15:58        Criteria Review       Care Day: 4 Care Date: 2020 Level of Care:    Guideline Day 2    Level Of Care    (X) ICU, intermediate care, or telemetry to discharge    2020 3:58 PM EDT by Si Sink      PCU    Clinical Status    (X) * Hemodynamic stability    2020 3:58 PM EDT by Amalia Aguilera: 98.8, HR 89, RR 18, /63, 95% on RA    ( ) * Acute coronary syndrome ruled out    (X) * Pain absent or managed    (X) * Dangerous arrhythmia absent    ( ) * No identification of etiology of pain requiring inpatient care    ( ) * Discharge plans and education understood    Activity    (X) * Ambulatory    2020 3:58 PM EDT by Si Sink      up as tolerated    Routes    (X) * Oral hydration, medications, and diet    2020 3:58 PM EDT by Si Sink      cardiac diet  see meds below    Medications    (X) Possible aspirin or cardiac medications    2020 3:58 PM EDT by Si Sink      ASA 81mg PO daily    * Milestone    Additional Notes    2020    LOC -  - concurrent review - PCU       Cardiology note:    54 y.o. male who was admitted to [facility] 2020 for SOB. Troponin elevated. Transferred to [facility] and [facility] 2020 showed multi vessel CAD, CT surgery referral. Plan for CABG 2020. Rhythm has been sinus.         Subjective: Denies chest pain, shortness of breath, palpitations and dizziness    Assessment:    CAD/NSTEMI: awaiting CABG, multivessel on angiogram 2020    History of nonischemic cardiomyopathy, now with ischemia: EF 25-30% on echo 2020; EF 15-20% in                 - s/p ICD 2014                - historically due to alcohol abuse    Chronic combined systolic and diastolic CHF: compensated    Mitral regurgitation: severe on LHC, mild on echo 2020    HTN: controlled    HLD: controlled, LDL 42, continue statin    DM: hgb a1c 6.7    Hypothyroidism    History of alcohol abuse: quit     Anemia         Plan: 1. Continue aspirin, statin, carvedilol, lisinopril, spironolactone, lasix    2. Pre op testing per CT surgery    3. Anticipate CABG with MV repair 8/11/2020    4. Cardiology will not see tomorrow unless needed, will see post op. Cardiothoracic surgery:    Assessment/Plan:    Pre op    Plan for surgery with Dr. Desirae Garcia on Tuesday 8/11/20    8/5 [id number] : non-detected           IM note:    Patient is up in bed, comfortable, not in distress. Denies any chest pain or shortness of breath. No new event overnight noted. Patient feeling well and ambulating on the unit independently    Respiratory:  Normal respiratory effort. Clear to auscultation, bilaterally without Rales/Wheezes/Rhonchi. Cardiovascular: Regular rate and rhythm with normal S1/S2 without murmurs, rubs or gallops. Abdomen: Soft, non-tender, non-distended with normal bowel sounds. Musculoskeletal: No clubbing, cyanosis or edema bilaterally.  Full range of motion without deformity. Skin: Skin color, texture, turgor normal.  No rashes or lesions. Neurologic:  Neurovascularly intact without any focal sensory/motor deficits. Cranial nerves: II-XII intact, grossly non-focal.    Psychiatric: Alert and oriented, thought content appropriate, normal insight    Assessment/Plan:         CAD - multivessel CAD on [facility] 6 August w/out complications. Evaluated by  CT surgery for a high risk staged PCI.      Awaiting a CABG procedure on 8/11/20         HTN - w/out known CAD and no evidence of active signs/sxs of ischemia/failure. Currently controlled on home meds w/ vitals reviewed and documented as above.         HyperLipidemia - controlled on home Statin. Continue, w/ f/u and med adjustment w/ PCP         DM2 - controlled on home Insulin - continued.  Follow FSBS/SSI high regimen. Last HbA1c 7.4% dated May 2020. Anticipate resuming/continuing home regimen at discharge.         HypoThyroid - clinically euthyroid on oral replacement therapy.  Continue, w/ outpt monitoring as previously arranged.         Obesity -  With Body mass index is 33.15 kg/m². Complicating assessment and treatment. Placing patient at risk for multiple co-morbidities as well as early death and contributing to the patient's presentation.  Counseled on weight loss.         DVT Prophylaxis: Lovenox    Diet: DIET CARDIAC;    Code Status: Full Code         PT/OT Eval Status: Ambulatory         Vitals: 98.8, HR 89, RR 18, /63, 95% on RA          Sodium: 134 (L)    Potassium: 3.4 (L)    Chloride: 98 (L)       RBC: 3.60 (L)    Hemoglobin Quant: 11.2 (L)    Hematocrit: 32.1 (L)       Meds:    Coreg 25mg PO BID    Lovenox 40mg SC daily    Lasix 40mg PO BID    Glipizide 5mg PO BID    Lantus 35units SC nightly    Humalog SC TID SSI    Humalog SC nightly SSI    Venofer 200mg IV x 1    Lisinopril 5mg PO daily    Mag ox 400mg PO BID    Pravachol 40mg PO nightly    Aldactone 25mg PO daily    [place] 60mg PO daily       Orders: cardiac diet, telemetry, up as tolerated

## 2020-08-12 NOTE — PROGRESS NOTES
4 Eyes Skin Assessment     The patient is being assess for   Shift Handoff    I agree that 2 RN's have performed a thorough Head to Toe Skin Assessment on the patient. ALL assessment sites listed below have been assessed. Areas assessed by both nurses:   [x]   Head, Face, and Ears   [x]   Shoulders, Back, and Chest, Abdomen  [x]   Arms, Elbows, and Hands   [x]   Coccyx, Sacrum, and Ischium  [x]   Legs, Feet, and Heels            **SHARE this note so that the co-signing nurse is able to place an eSignature**    Co-signer eSignature: {Esignature:[id number]}    Does the Patient have Skin Breakdown?   Yes LDA WOUND CARE was Initiated documentation include the Vanesa-wound, Wound Assessment, Measurements, Dressing Treatment, Drainage, and Color\",          Christiano Prevention initiated:  Yes   Wound Care Orders initiated:  Yes      [id number]  nurse consulted for Pressure Injury (Stage 3,4, Unstageable, DTI, NWPT, Complex wounds)and New or Established Ostomies:  Yes      Primary Nurse eSignature: Electronically signed by Melida Flores RN on 8/12/20 at 7:25 AM EDT

## 2020-08-12 NOTE — PROGRESS NOTES
Cellulitis of right lower extremity 2/16, 5/16    Diabetes ([address]     Diabetic ulcer of right foot ([address] early 2016    Diabetic ulcer of 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 2 diabetes mellitus, with necrosis of bone ([address]     ETOH abuse     Fracture of tibial plateau 90/2/0884    High cholesterol     HTN (hypertension)     MI (myocardial infarction) ([address] 08/04/2020    + Troponins    MRSA (methicillin resistant staph aureus) culture positive 4/28/16, 4/20/16    foot wound    Neuropathic ulcer of left foot, limited to breakdown of skin ([address] 11/3/2016    Neuropathic ulcer of toe ([address] 2/13/2014    NSVT (nonsustained ventricular tachycardia) ([address] 2014    Pleural effusion due to congestive heart failure ([address]     Smoker     quit 1453    Systolic CHF, acute ([address] 7/8/2013     Past Surgical History:   Procedure Laterality Date    CARDIAC DEFIBRILLATOR PLACEMENT  01/29/2014    ICD Placement    CORONARY ARTERY BYPASS GRAFT N/A 8/11/2020    CORONARY ARTERY BYPASS GRAFTING X3, LEFT ATRIAL APPENDAGE CLIP, INTERNAL MAMMARY ARTERY, SAPHENOUS VEIN GRAFT, ON PUMP, 5 LEVEL BILATERAL INTERCOSTAL NERVE BLOCK performed by Ruy Weems MD at [facility] Left 08/09/2018    PARTIAL FOOT AMPUTATION w. Dr Stefany Guevara Left 1/31/2019    EXOSTECTOMY LEFT FOOT, ULCER DEBRIDEMENT LEFT FOOT WITH GRAFT APPLICATION performed by Pa Aguillno DPM at [facility] Left 09/27/2017    LEFT FOOT ULCER DEBRIDEMENT, POSSIBLE SECOND METATARSAL    FOOT SURGERY Left 01/31/2019    Exostectomy left foot, ulcer debridement with graft application left foot    FOOT TENDON SURGERY Right 2016    FOOT TENDON SURGERY Left 06/30/2017    KNEE SURGERY Left     OTHER SURGICAL HISTORY Bilateral 9/17/15    amputation 2nd digit bilateral, flexor tenotomy right hallux    OTHER SURGICAL HISTORY Left 08/17/2017    PARTIAL LEFT FOOT AMPUTATION      OTHER SURGICAL HISTORY Left 12/07/2017    Debridement chloride flush  10 mL Intravenous 2 times per day    ceFAZolin (ANCEF) IVPB  2 g Intravenous Q8H    vancomycin (VANCOCIN) IV  1,500 mg Intravenous Q12H    acetaminophen  650 mg Oral 4 times per day    polyethylene glycol  17 g Oral Daily    famotidine  20 mg Oral BID    metoprolol tartrate  12.5 mg Oral BID    chlorhexidine  15 mL Mouth/Throat BID    furosemide  20 mg Intravenous 4x Daily    magnesium oxide  400 mg Oral BID    mupirocin   Nasal BID    potassium chloride  10 mEq Oral TID WC    atorvastatin  40 mg Oral Nightly    aspirin  81 mg Oral Daily    clopidogrel  75 mg Oral Daily    albuterol  2.5 mg Nebulization Q4H WA    insulin glargine  0.25 Units/kg Subcutaneous Nightly    [START ON 8/13/2020] insulin lispro  0-6 Units Subcutaneous TID WC    [START ON 8/13/2020] insulin lispro  0-3 Units Subcutaneous Nightly      dextrose 5 % and 0.45 % NaCl 75 mL/hr at 08/11/20 1210    norepinephrine 4 mcg/min (08/12/20 0704)    niCARdipine      insulin 2.52 Units/kg/hr (08/12/20 0700)    dextrose      milrinone Stopped (08/11/20 1243)    EPINEPHrine infusion Stopped (08/11/20 1235)     sodium chloride flush, potassium chloride, magnesium sulfate, calcium chloride IVPB, calcium chloride IVPB, acetaminophen, oxyCODONE **OR** oxyCODONE, morphine **OR** morphine, midazolam, ondansetron, hydrALAZINE, metoprolol, albumin human, norepinephrine, niCARdipine, glucose, dextrose, glucagon (rDNA), dextrose, EPINEPHrine infusion, ketorolac    Lab Data:  CBC:   Recent Labs     08/11/20  0329  08/11/20  1200  08/11/20  1815 08/11/20  2320 08/12/20  0430   WBC 6.8  --  18.9*  --   --   --  12.7*   HGB 10.7*   < > 9.6*   < > 9.1* 8.5* 8.3*   HCT 30.7*  --  28.3*  --  26.6* 24.8* 24.0*   MCV 88.9  --  90.1  --   --   --  89.4     --  177  --   --   --  169    < > = values in this interval not displayed.      BMP:   Recent Labs     08/11/20  0329 08/11/20  1200 08/11/20  1815 08/11/20  2320 08/12/20  0430  136  --   --  139   K 3.5 4.1 5.1 4.8 4.4   CL 99 103  --   --  106   CO2 30 24  --   --  23   BUN 19 16  --   --  18   CREATININE 0.9 0.9  --   --  1.1     LIVER PROFILE:   Recent Labs     08/11/20  0329   AST 17   ALT 24   BILIDIR <0.2   BILITOT 0.5   ALKPHOS 56     PT/INR:   Recent Labs     08/11/20  0329 08/11/20  1200   PROTIME 12.1 15.3*   INR 1.04 1.32*     APTT:   Recent Labs     08/11/20  1200   APTT 34.9     BNP:  No results for input(s): BNP in the last 72 hours. IMAGING:     Assessment:  CAD s/p VABG  Secondary severe mitral regurgitation proportionate to LV dysfunction  Combined acute on chronic systolic CHF  Non ischemic and ischemic cardiomyopathy  Diabetes mellitus  Hypothyroidism  Primary preventive therapy ICD  Patient Active Problem List    Diagnosis Date Noted    Alcoholic cardiomyopathy 55/08/7390     Priority: High     Class: Chronic    CAD in native artery 08/06/2020    Chest pain 08/06/2020    Obesity 08/06/2020    NSTEMI (non-ST elevated myocardial infarction) ([address]     Acute respiratory failure ([address] 08/04/2020    Community acquired pneumonia     Septicemia ([address]     NSVT (nonsustained ventricular tachycardia) ([address] 11/27/2019    Diabetic ulcer of left foot associated with type 2 diabetes mellitus, with muscle involvement without evidence of necrosis ([address] 04/27/2017    Acquired hypothyroidism 04/18/2017    Diabetic ulcer of right foot associated with type 2 diabetes mellitus, limited to breakdown of skin ([address] 01/29/2017    Hallux valgus 11/11/2016    Callus of foot 11/03/2016    Onychomycosis 04/02/2016    Dystrophic nail 04/02/2016    Hyperlipidemia 02/23/2016    Automatic implantable cardioverter-defibrillator in situ 01/30/2014    Hypertension     Uncontrolled type 2 diabetes mellitus with diabetic polyneuropathy, with long-term current use of insulin ([address]     DM (diabetes mellitus) ([address] 09/18/2012       Plan:  1.  Restart thyroid replacement  2. Inotropics  3. Once weaned off levophed will institute aldactone for low EF  4. Will benefit from long term ace inhibitors  and guideline recommended beta blockers for low EF   5.  Repeat complete echo in 3 months for EF and mitral regurgitation    Core Measures:  · Discharge instructions:   · LVEF documented: 25-30%  · ACEI for LV dysfunction: will add in next few days  · Smoking Cessation:NA  I have spent 35   minutes of face to face time with the patient with more than 50% spent counseling and coordinating care for this patient    Tin Moya MD 8/12/2020 8:11 AM

## 2020-08-12 NOTE — PROGRESS NOTES
CVTS Cardiothoracic Progress Note:                                CC:  Post op follow up     Surgery: 8/11/2020 CORONARY ARTERY BYPASS GRAFTING X3, LEFT ATRIAL APPENDAGE CLIP, INTERNAL MAMMARY ARTERY, SAPHENOUS VEIN GRAFT, ON PUMP, 5 LEVEL BILATERAL INTERCOSTAL NERVE BLOCK     Subjective:   Dietary Intake: improving   no Nausea   Pain Control: controlled  Complaints: mild post op pain near chest tubes  Bowels: have not moved    Past Medical History:   Diagnosis Date    Acute osteomyelitis of left foot ([address] 9/27/2017    Acute osteomyelitis of right foot ([address] 4/25/2016    Ascites     Blood transfusion reaction     Burst fracture of lumbar vertebra (HCC) 11/9/2012    Cellulitis of left foot     Cellulitis of right lower extremity 2/16, 5/16    Diabetes ([address]     Diabetic ulcer of right foot ([address] early 2016    Diabetic ulcer of 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 2 diabetes mellitus, with necrosis of bone ([address]     ETOH abuse     Fracture of tibial plateau 54/8/7736    High cholesterol     HTN (hypertension)     MI (myocardial infarction) ([address] 08/04/2020    + Troponins    MRSA (methicillin resistant staph aureus) culture positive 4/28/16, 4/20/16    foot wound    Neuropathic ulcer of left foot, limited to breakdown of skin ([address] 11/3/2016    Neuropathic ulcer of toe ([address] 2/13/2014    NSVT (nonsustained ventricular tachycardia) ([address] 2014    Pleural effusion due to congestive heart failure ([address]     Smoker     quit 8064    Systolic CHF, acute ([address] 7/8/2013        Past Surgical History:   Procedure Laterality Date    CARDIAC DEFIBRILLATOR PLACEMENT  01/29/2014    ICD Placement    CORONARY ARTERY BYPASS GRAFT N/A 8/11/2020    CORONARY ARTERY BYPASS GRAFTING X3, LEFT ATRIAL APPENDAGE CLIP, INTERNAL MAMMARY ARTERY, SAPHENOUS VEIN GRAFT, ON PUMP, 5 LEVEL BILATERAL INTERCOSTAL NERVE BLOCK performed by Orlin Yang MD at [facility] 08/09/2018    PARTIAL FOOT AMPUTATION [place] Left 1/31/2019    EXOSTECTOMY LEFT FOOT, ULCER DEBRIDEMENT LEFT FOOT WITH GRAFT APPLICATION performed by Armaan Resendiz DPM at [facility] Left 09/27/2017    LEFT FOOT ULCER DEBRIDEMENT, POSSIBLE SECOND METATARSAL    FOOT SURGERY Left 01/31/2019    Exostectomy left foot, ulcer debridement with graft application left foot    FOOT TENDON SURGERY Right 2016    FOOT TENDON SURGERY Left 06/30/2017    KNEE SURGERY Left     OTHER SURGICAL HISTORY Bilateral 9/17/15    amputation 2nd digit bilateral, flexor tenotomy right hallux    OTHER SURGICAL HISTORY Left 08/17/2017    PARTIAL LEFT FOOT AMPUTATION      OTHER SURGICAL HISTORY Left 12/07/2017    Debridement infected bone and tissue left foot; ulcer debridement left foot with graft application with dr mathur     OTHER SURGICAL HISTORY Right 01/04/2018    DEBRIDEMENT INFECTED BONE AND TISSUE RIGHT FOOT, ULCER DEBRIDEMENT RIGHT FOOT WITH GRAFT APPLICATION    OTHER SURGICAL HISTORY Left 11/01/2018    Procedure: PARTIAL RESECTION LEFT METATARSAL, ULCER DEBRIDEMENT LEFT FOOT WITH GRAFT APPLICATION     MS OFFICE/OUTPT VISIT,PROCEDURE ONLY Left 8/23/2018    ULCER DEBRIDEMENT LEFT FOOT WITH GRAFT APPLICATION performed by Armaan Resendiz DPM at [facility] TARSAL/METATARSAL Left 8/9/2018    PARTIAL FOOT AMPUTATION WITH GRAFT APPLICATION performed by Armaan Resendiz DPM at [facility] TARSAL/METATARSAL Left 11/1/2018    PARTIAL RESECTION LEFT METATARSAL, ULCER DEBRIDEMENT LEFT FOOT WITH GRAFT APPLICATION performed by Armaan Resendiz DPM at [facility] TOE AMPUTATION      2 toes        Allergies as of 08/06/2020 - Review Complete 08/06/2020   Allergen Reaction Noted    Bactrim [sulfamethoxazole-trimethoprim] Rash 01/08/2018    Bee venom Swelling and Rash 02/20/2014        Patient Active Problem List   Diagnosis    DM [facility]diabetes mellitus) [facility]    Hypertension    Uncontrolled type 2 diabetes mellitus as LAISHA drain and tolbert. 11. D/C Goals: home with [facility] in next 2-3 days    12.  Continue post-op care of patient in the ICU    Meds:    The patient is on a beta-blocker   The patient is not on an ace-i/ARB- 2/2 relative hypotension   The patient is on a statin   The patient is on oral antiplatelet therapy   ________________________________________________________________________  Jelly Dickerson CNP  8/12/2020  10:36 AM

## 2020-08-12 NOTE — DISCHARGE INSTR - DIET
 Good nutrition is important when healing from an illness, injury, or surgery. Follow any nutrition recommendations given to you during your hospital stay. A plan called Therapeutic Lifestyle Changes (TLC) can help people who have high amounts of cholesterol in their blood. It can help you after a coronary artery bypass graft or angioplasty. Following the [facility] plan can help reduce the low-density lipoprotein, or LDL, cholesterol (also called bad cholesterol) in your blood. High-density lipoprotein cholesterol (sometimes called good cholesterol) helps rid your body of LDL cholesterol. Keys to TLC  Limit saturated fats and trans fats:   Foods high in saturated fats include fatty meat, poultry skin, akins, sausage, whole milk, cream, and butter. Trans fats are found in stick margarine, shortening, some fried foods, and packaged foods made with hydrogenated oils. Instead of butter or stick margarine, try reduced-fat, whipped, or liquid spreads. Limit the amount of cholesterol that you eat to less than 200 milligrams (mg) per day. Foods high in cholesterol include egg yolks (one egg yolk has about 212 mg cholesterol), fatty meat, whole milk, cheese, shrimp, lobster, and crab. Eat more omega-3 fats (heart-healthy fats):   Good choices include salmon, tuna, mackerel, and sardines. Aim to eat fish twice a week. Other foods with omega-3 fats include walnuts and canola and soybean oil. Flaxseed is another source of omega-3 fats. Have it as flaxseed oil or ground flaxseed. Keep the total amount of fat that you eat (including heart-healthy fats) to 25% to 35% of the calories that you eat. If you should eat 2,000 calories per day, your fat intake can be between 50 grams (g) and 75 g per day. Get 20 g to 30 g dietary fiber per day:   Fruits, vegetables, whole grains, and dried beans are good sources of fiber:   Aim for 5 cups of fruits and vegetables per day.    Have 3 ounces (oz) of whole grain foods every day. Plan to eat more plant-based meals, using beans and soy foods for protein. Talk with your dietitian or doctor about what a healthy weight is for you. Set goals to reach and maintain that weight. Talk with your health care team to find out what types of physical activity are best for you. Set a plan to get about 30 minutes of exercise on most days.     Foods Recommended  Food Group Foods Recommended   Grains Whole grain breads and cereals, including oats and barley  Pasta, especially whole wheat or other whole grain types  Brown rice  Low-fat crackers and pretzels   Vegetables Fresh, frozen, or canned vegetables without added fat or salt   Fruits Fresh, frozen, canned, or dried fruit   Milk Nonfat (skim), low-fat, or 1% fat milk or buttermilk  Nonfat or low-fat yogurt or cottage cheese  Fat-free and low-fat cheese   Meat and Other Protein Foods Lean cuts of beef and pork (loin, leg, round, extra lean hamburger)  Skinless poultry  Fish  Venison and other wild game  Dried beans and peas  Nuts and nut butters  Meat alternatives made with soy or textured vegetable protein  Egg whites or egg substitute  Cold cuts made with lean meat or soy protein   Fats and Oils Unsaturated oils (olive, peanut, soy, sunflower, canola)  Soft or liquid margarines and vegetable oil spreads  Salad dressings  Seeds and nuts  Avocado     Foods Not Recommended  Food Group Foods Not Recommended   Grains High-fat bakery products, such as doughnuts, biscuits, croissants, Italian pastries, pies, cookies  Snacks made with partially hydrogenated oils, including chips, cheese puffs, snack mixes, regular crackers, butter-flavored popcorn   Vegetables Fried vegetables  Vegetables prepared with butter, cheese, or cream sauce   Fruits Fried fruits  Fruits served with butter or cream   Milk Whole milk  2% fat milk  Whole milk yogurt or ice cream  Cream  Half-&-half  Cream cheese  Sour cream  Cheese   Meat and Other Protein Foods Higher-fat cuts of meats (ribs, t-bone steak, regular hamburger)  Therisa Constant  Sausage  Cold cuts, such as salami or bologna  Corned beef  Hot dogs  Organ meats (liver, brains, sweetbreads)  Poultry with skin  Fried meat, poultry, and fish  Whole eggs and egg yolks   Fats and Oils Butter  Stick margarine  Shortening  Partially hydrogenated oils  Tropical oils (coconut, palm, and palm kernel oils)     Coronary Artery Bypass Graft (CABG) Sample 1-Day Menu View Nutrient Info   Breakfast 1/2 cup apple juice   3/4 cup oatmeal   1 cup fat-free milk   1 small banana   1 cup brewed coffee   Lunch 2 slices whole-wheat bread   2 oz lean deli turkey breast   1 oz low-fat Swiss cheese   2 slices tomato   2 lettuce leaves   1 pear   1 cup nonfat milk   Afternoon Snack 1 oz trail mix (with nuts, seeds, raisins)   1 cup blueberries   1 cup nonfat milk   Evening Meal 3 oz broiled fish   1 cup brown rice   1 tsp margarine   1 medium stalk broccoli   1 medium carrot   1/8 cup chickpeas, for salad   1 tablespoon olive oil and vinegar dressing   1 small whole-wheat roll   1 tsp margarine   1/2 cup nonfat frozen yogurt   1/4 cup blueberries, with frozen yogurt   1 cup tea          If you have any questions about your diet or nutrition, call the hospital and ask for the dietitian.

## 2020-08-12 NOTE — PROGRESS NOTES
Criteria met per clinical pathway to remove epicardial pacing wires & MD order received. Procedure explained to patient. INR is less than 2.0. Pacing wire site within normal limits. Cleansed site with Chloroprep. Ventricular pacing wires removed per policy without difficulty. Dry sterile dressing applied. Vital signs taken every 15 minutes X 2, every 30 minutes X 1, and every hour X 1 recorded in Doc Flowsheets. Patient tolerated procedure well, heart tones easily auscultated, oxygen saturation within normal limits. No signs/symtoms of cardiac tamponade.

## 2020-08-12 NOTE — PROGRESS NOTES
Patient met criteria per open heart protocol to transition to stepdown level of care. Procedures explained to patient. Left IJ Luna Erichsen discontinued and obturator inserted. Patient tolerated well and minimal ectopy on monitor. Right arterial line kept in pace at this time per Dr. Luis M Canales. Patient tolerated well.

## 2020-08-12 NOTE — CONSULTS
Pt familiar with Wound Care; diabetic ulcers on R Plantar Foot, L Plantar Foot and L 4th Toe; modified wound care orders to paint diabetic ulcers with betadine daily; will continue to follow. 36.9

## 2020-08-12 NOTE — PROGRESS NOTES
At beginning of shift patient's SVR increased to 1500 index dropped to 1.4  CVP 4 PAD 14 albumin given levo increased 1 mc  Then 2 mcs. Patient's index increased no problems through out the night levo decreased starting at 2400.  Patient tolerated well until this am. Levo increased and albumin given this am. Will let Dr. Ben Vásquez advise on transitioning patient this am.

## 2020-08-13 ENCOUNTER — APPOINTMENT (OUTPATIENT)
Dept: GENERAL RADIOLOGY | Age: 55
DRG: 233 | End: 2020-08-13
Attending: INTERNAL MEDICINE

## 2020-08-13 LAB
ANION GAP SERPL CALCULATED.3IONS-SCNC: 9 MMOL/L (ref 3–16)
BUN BLDV-MCNC: 27 MG/DL (ref 7–20)
CALCIUM SERPL-MCNC: 8 MG/DL (ref 8.3–10.6)
CHLORIDE BLD-SCNC: 100 MMOL/L (ref 99–110)
CO2: 21 MMOL/L (ref 21–32)
CREAT SERPL-MCNC: 1.7 MG/DL (ref 0.9–1.3)
GFR AFRICAN AMERICAN: 51
GFR NON-AFRICAN AMERICAN: 42
GLUCOSE BLD-MCNC: 104 MG/DL (ref 70–99)
GLUCOSE BLD-MCNC: 133 MG/DL (ref 70–99)
GLUCOSE BLD-MCNC: 182 MG/DL (ref 70–99)
GLUCOSE BLD-MCNC: 242 MG/DL (ref 70–99)
GLUCOSE BLD-MCNC: 242 MG/DL (ref 70–99)
GLUCOSE BLD-MCNC: 284 MG/DL (ref 70–99)
GLUCOSE BLD-MCNC: 86 MG/DL (ref 70–99)
GLUCOSE BLD-MCNC: 89 MG/DL (ref 70–99)
GLUCOSE BLD-MCNC: 98 MG/DL (ref 70–99)
HCT VFR BLD CALC: 21.4 % (ref 40.5–52.5)
HEMOGLOBIN: 7.1 G/DL (ref 13.5–17.5)
MAGNESIUM: 2.7 MG/DL (ref 1.8–2.4)
MCH RBC QN AUTO: 30.4 PG (ref 26–34)
MCHC RBC AUTO-ENTMCNC: 33.1 G/DL (ref 31–36)
MCV RBC AUTO: 91.8 FL (ref 80–100)
PDW BLD-RTO: 15.9 % (ref 12.4–15.4)
PERFORMED ON: ABNORMAL
PERFORMED ON: NORMAL
PLATELET # BLD: 141 K/UL (ref 135–450)
PMV BLD AUTO: 7.6 FL (ref 5–10.5)
POTASSIUM SERPL-SCNC: 4.9 MMOL/L (ref 3.5–5.1)
RBC # BLD: 2.33 M/UL (ref 4.2–5.9)
SODIUM BLD-SCNC: 130 MMOL/L (ref 136–145)
WBC # BLD: 11.4 K/UL (ref 4–11)

## 2020-08-13 PROCEDURE — 6370000000 HC RX 637 (ALT 250 FOR IP): Performed by: INTERNAL MEDICINE

## 2020-08-13 PROCEDURE — 6370000000 HC RX 637 (ALT 250 FOR IP): Performed by: THORACIC SURGERY (CARDIOTHORACIC VASCULAR SURGERY)

## 2020-08-13 PROCEDURE — 94669 MECHANICAL CHEST WALL OSCILL: CPT

## 2020-08-13 PROCEDURE — 6360000002 HC RX W HCPCS

## 2020-08-13 PROCEDURE — 2580000003 HC RX 258: Performed by: THORACIC SURGERY (CARDIOTHORACIC VASCULAR SURGERY)

## 2020-08-13 PROCEDURE — 80048 BASIC METABOLIC PNL TOTAL CA: CPT

## 2020-08-13 PROCEDURE — 36592 COLLECT BLOOD FROM PICC: CPT

## 2020-08-13 PROCEDURE — 83735 ASSAY OF MAGNESIUM: CPT

## 2020-08-13 PROCEDURE — 36430 TRANSFUSION BLD/BLD COMPNT: CPT

## 2020-08-13 PROCEDURE — 99024 POSTOP FOLLOW-UP VISIT: CPT | Performed by: THORACIC SURGERY (CARDIOTHORACIC VASCULAR SURGERY)

## 2020-08-13 PROCEDURE — 51798 US URINE CAPACITY MEASURE: CPT

## 2020-08-13 PROCEDURE — 94640 AIRWAY INHALATION TREATMENT: CPT

## 2020-08-13 PROCEDURE — 99232 SBSQ HOSP IP/OBS MODERATE 35: CPT | Performed by: INTERNAL MEDICINE

## 2020-08-13 PROCEDURE — 85027 COMPLETE CBC AUTOMATED: CPT

## 2020-08-13 PROCEDURE — 6360000002 HC RX W HCPCS: Performed by: THORACIC SURGERY (CARDIOTHORACIC VASCULAR SURGERY)

## 2020-08-13 PROCEDURE — 2000000000 HC ICU R&B

## 2020-08-13 PROCEDURE — 71045 X-RAY EXAM CHEST 1 VIEW: CPT

## 2020-08-13 RX ORDER — DIPHENHYDRAMINE HYDROCHLORIDE 50 MG/ML
12.5 INJECTION INTRAMUSCULAR; INTRAVENOUS ONCE
Status: COMPLETED | OUTPATIENT
Start: 2020-08-13 | End: 2020-08-13

## 2020-08-13 RX ORDER — ALBUMIN, HUMAN INJ 5% 5 %
12.5 SOLUTION INTRAVENOUS ONCE
Status: DISCONTINUED | OUTPATIENT
Start: 2020-08-13 | End: 2020-08-15 | Stop reason: HOSPADM

## 2020-08-13 RX ORDER — DIPHENHYDRAMINE HYDROCHLORIDE 50 MG/ML
INJECTION INTRAMUSCULAR; INTRAVENOUS
Status: COMPLETED
Start: 2020-08-13 | End: 2020-08-13

## 2020-08-13 RX ORDER — 0.9 % SODIUM CHLORIDE 0.9 %
20 INTRAVENOUS SOLUTION INTRAVENOUS ONCE
Status: COMPLETED | OUTPATIENT
Start: 2020-08-13 | End: 2020-08-13

## 2020-08-13 RX ORDER — SODIUM CHLORIDE 9 MG/ML
INJECTION, SOLUTION INTRAVENOUS
Status: DISPENSED
Start: 2020-08-13 | End: 2020-08-13

## 2020-08-13 RX ORDER — FERROUS SULFATE 325(65) MG
325 TABLET ORAL 2 TIMES DAILY WITH MEALS
Status: DISCONTINUED | OUTPATIENT
Start: 2020-08-13 | End: 2020-08-15 | Stop reason: HOSPADM

## 2020-08-13 RX ADMIN — FUROSEMIDE 20 MG: 10 INJECTION, SOLUTION INTRAMUSCULAR; INTRAVENOUS at 21:50

## 2020-08-13 RX ADMIN — OXYCODONE 5 MG: 5 TABLET ORAL at 21:53

## 2020-08-13 RX ADMIN — ACETAMINOPHEN 650 MG: 325 TABLET ORAL at 10:43

## 2020-08-13 RX ADMIN — MORPHINE SULFATE 4 MG: 4 INJECTION, SOLUTION INTRAMUSCULAR; INTRAVENOUS at 19:12

## 2020-08-13 RX ADMIN — ALBUTEROL SULFATE 2.5 MG: 2.5 SOLUTION RESPIRATORY (INHALATION) at 19:48

## 2020-08-13 RX ADMIN — OXYCODONE 5 MG: 5 TABLET ORAL at 09:53

## 2020-08-13 RX ADMIN — CEFAZOLIN SODIUM 2 G: 10 INJECTION, POWDER, FOR SOLUTION INTRAVENOUS at 03:29

## 2020-08-13 RX ADMIN — METOPROLOL TARTRATE 12.5 MG: 25 TABLET, FILM COATED ORAL at 21:50

## 2020-08-13 RX ADMIN — INSULIN LISPRO 3 UNITS: 100 INJECTION, SOLUTION INTRAVENOUS; SUBCUTANEOUS at 19:09

## 2020-08-13 RX ADMIN — ALBUTEROL SULFATE 2.5 MG: 2.5 SOLUTION RESPIRATORY (INHALATION) at 07:56

## 2020-08-13 RX ADMIN — DIPHENHYDRAMINE HYDROCHLORIDE 12.5 MG: 50 INJECTION, SOLUTION INTRAMUSCULAR; INTRAVENOUS at 10:40

## 2020-08-13 RX ADMIN — POTASSIUM CHLORIDE 10 MEQ: 750 TABLET, EXTENDED RELEASE ORAL at 12:38

## 2020-08-13 RX ADMIN — FUROSEMIDE 20 MG: 10 INJECTION, SOLUTION INTRAMUSCULAR; INTRAVENOUS at 13:25

## 2020-08-13 RX ADMIN — MUPIROCIN: 20 OINTMENT TOPICAL at 21:51

## 2020-08-13 RX ADMIN — INSULIN LISPRO 1 UNITS: 100 INJECTION, SOLUTION INTRAVENOUS; SUBCUTANEOUS at 21:51

## 2020-08-13 RX ADMIN — Medication 10 ML: at 09:45

## 2020-08-13 RX ADMIN — ACETAMINOPHEN 650 MG: 325 TABLET ORAL at 05:57

## 2020-08-13 RX ADMIN — ASPIRIN 81 MG: 81 TABLET ORAL at 09:19

## 2020-08-13 RX ADMIN — Medication 10 ML: at 21:51

## 2020-08-13 RX ADMIN — KETOROLAC TROMETHAMINE 15 MG: 30 INJECTION, SOLUTION INTRAMUSCULAR at 04:22

## 2020-08-13 RX ADMIN — DIPHENHYDRAMINE HYDROCHLORIDE 12.5 MG: 50 INJECTION INTRAMUSCULAR; INTRAVENOUS at 10:40

## 2020-08-13 RX ADMIN — INSULIN GLARGINE 26 UNITS: 100 INJECTION, SOLUTION SUBCUTANEOUS at 21:51

## 2020-08-13 RX ADMIN — FUROSEMIDE 20 MG: 10 INJECTION, SOLUTION INTRAMUSCULAR; INTRAVENOUS at 16:05

## 2020-08-13 RX ADMIN — FUROSEMIDE 20 MG: 10 INJECTION, SOLUTION INTRAMUSCULAR; INTRAVENOUS at 09:20

## 2020-08-13 RX ADMIN — POLYETHYLENE GLYCOL 3350 17 G: 17 POWDER, FOR SOLUTION ORAL at 09:20

## 2020-08-13 RX ADMIN — FAMOTIDINE 20 MG: 20 TABLET, FILM COATED ORAL at 09:19

## 2020-08-13 RX ADMIN — FERROUS SULFATE TAB 325 MG (65 MG ELEMENTAL FE) 325 MG: 325 (65 FE) TAB at 16:05

## 2020-08-13 RX ADMIN — ACETAMINOPHEN 650 MG: 325 TABLET ORAL at 00:23

## 2020-08-13 RX ADMIN — INSULIN LISPRO 2 UNITS: 100 INJECTION, SOLUTION INTRAVENOUS; SUBCUTANEOUS at 12:38

## 2020-08-13 RX ADMIN — ALBUTEROL SULFATE 2.5 MG: 2.5 SOLUTION RESPIRATORY (INHALATION) at 12:03

## 2020-08-13 RX ADMIN — FERROUS SULFATE TAB 325 MG (65 MG ELEMENTAL FE) 325 MG: 325 (65 FE) TAB at 08:08

## 2020-08-13 RX ADMIN — CLOPIDOGREL BISULFATE 75 MG: 75 TABLET ORAL at 09:19

## 2020-08-13 RX ADMIN — ATORVASTATIN CALCIUM 40 MG: 40 TABLET, FILM COATED ORAL at 21:50

## 2020-08-13 RX ADMIN — POTASSIUM CHLORIDE 10 MEQ: 750 TABLET, EXTENDED RELEASE ORAL at 16:05

## 2020-08-13 RX ADMIN — ACETAMINOPHEN 650 MG: 325 TABLET ORAL at 16:05

## 2020-08-13 RX ADMIN — OXYCODONE 10 MG: 5 TABLET ORAL at 17:01

## 2020-08-13 RX ADMIN — SODIUM CHLORIDE 20 ML: 9 INJECTION, SOLUTION INTRAVENOUS at 11:00

## 2020-08-13 RX ADMIN — LEVOTHYROXINE, LIOTHYRONINE 60 MG: 19; 4.5 TABLET ORAL at 10:44

## 2020-08-13 ASSESSMENT — PAIN SCALES - GENERAL
PAINLEVEL_OUTOF10: 0
PAINLEVEL_OUTOF10: 4
PAINLEVEL_OUTOF10: 4
PAINLEVEL_OUTOF10: 0
PAINLEVEL_OUTOF10: 0
PAINLEVEL_OUTOF10: 4
PAINLEVEL_OUTOF10: 0
PAINLEVEL_OUTOF10: 4
PAINLEVEL_OUTOF10: 7
PAINLEVEL_OUTOF10: 3
PAINLEVEL_OUTOF10: 3
PAINLEVEL_OUTOF10: 7
PAINLEVEL_OUTOF10: 0
PAINLEVEL_OUTOF10: 0

## 2020-08-13 NOTE — PROGRESS NOTES
Beginning Blood transfusion per MD order. Pt. Will previous transfusion reaction, Shoshana Ayala CNP aware of hx. Pre-medicated patient with 12.5mg benadryl and Tylenol. Patient monitored with VSS for first 15 minutes.   Will continue to monitor

## 2020-08-13 NOTE — PROGRESS NOTES
[facility] Daily Progress Note      Admit Date:  8/6/2020    Subjective:  Mr. Tad Harmon is seen for cardiology follow up   He is s/p CABG 8.11.20  Denies chest pain, shortness of breath, edema, dizziness, palpitations and syncope. He is on levophed drip at 2 mcg/min  He is extubated and now he is up in chair  Denies chest pain, shortness of breath, edema, dizziness, palpitations and syncope. [place]:  Admitted to [facility] with NSTEMI and acute pulmonary edema  8.4.20   Transferred here for cath and revascularization. Found to have three vessel CAD. Echo last week showed EF 25-30% severe proportionate MRPatrica Past Medical History: LOV with DR Seamus Rothman May 2020  PMH CMP. He is sp ICD placement  by Dr. Herbie Moreno for non ischemic CM with low EF that failed to improve with guide line based medical therapy.  Jamal Hickey is no longer drinking ETOH products. has a past medical history of Acute osteomyelitis of left foot ([address] Acute osteomyelitis of right foot ([address] Ascites, Blood transfusion reaction, Burst fracture of lumbar vertebra ([address] Cellulitis of left foot, Cellulitis of right lower extremity, Diabetes ([address] Diabetic ulcer of right foot ([address] Diabetic ulcer of 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 2 diabetes mellitus, with necrosis of bone ([address] Fracture of tibial plateau, MRSA (methicillin resistant staph aureus) culture positive, Neuropathic ulcer of left foot, limited to breakdown of skin ([address] Neuropathic ulcer of toe ([address] Pleural effusion due to congestive heart failure ([address] Smoker, and Systolic CHF, acute ([address]     ROS:  12 point ROS negative in all areas as listed below except as in [place]  Constitutional, EENT, Cardiovascular, pulmonary, GI, , Musculoskeletal, skin, neurological, hematological, endocrine, Psychiatric    Past Medical History:   Diagnosis Date    Acute osteomyelitis of left foot ([address] 9/27/2017    Acute osteomyelitis of right foot ([address] 4/25/2016    Ascites     Blood transfusion reaction     Burst fracture of lumbar vertebra ([address] 11/9/2012    Cellulitis of left foot     Cellulitis of right lower extremity 2/16, 5/16    Diabetes ([address]     Diabetic ulcer of right foot ([address] early 2016    Diabetic ulcer of 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 2 diabetes mellitus, with necrosis of bone ([address]     ETOH abuse     Fracture of tibial plateau 03/4/1823    High cholesterol     HTN (hypertension)     MI (myocardial infarction) ([address] 08/04/2020    + Troponins    MRSA (methicillin resistant staph aureus) culture positive 4/28/16, 4/20/16    foot wound    Neuropathic ulcer of left foot, limited to breakdown of skin ([address] 11/3/2016    Neuropathic ulcer of toe ([address] 2/13/2014    NSVT (nonsustained ventricular tachycardia) ([address] 2014    Pleural effusion due to congestive heart failure ([address]     Smoker     quit [id number]    Systolic CHF, acute ([address] 7/8/2013     Past Surgical History:   Procedure Laterality Date    CARDIAC DEFIBRILLATOR PLACEMENT  01/29/2014    ICD Placement    CORONARY ARTERY BYPASS GRAFT N/A 8/11/2020    CORONARY ARTERY BYPASS GRAFTING X3, LEFT ATRIAL APPENDAGE CLIP, INTERNAL MAMMARY ARTERY, SAPHENOUS VEIN GRAFT, ON PUMP, 5 LEVEL BILATERAL INTERCOSTAL NERVE BLOCK performed by Elias Bal MD at [facility] Left 08/09/2018    PARTIAL FOOT AMPUTATION w. Dr Lu Glasgow Left 1/31/2019    EXOSTECTOMY LEFT FOOT, ULCER DEBRIDEMENT LEFT FOOT WITH GRAFT APPLICATION performed by Roz Savage DPM at [facility] Left 09/27/2017    LEFT FOOT ULCER DEBRIDEMENT, POSSIBLE SECOND METATARSAL    FOOT SURGERY Left 01/31/2019    Exostectomy left foot, ulcer debridement with graft application left foot    FOOT TENDON SURGERY Right 2016    FOOT TENDON SURGERY Left 06/30/2017    KNEE SURGERY Left     OTHER SURGICAL HISTORY Bilateral 9/17/15    amputation 2nd digit bilateral, flexor tenotomy right hallux    OTHER SURGICAL HISTORY Left 08/17/2017    PARTIAL LEFT FOOT AMPUTATION      OTHER SURGICAL HISTORY Left 12/07/2017    Debridement infected bone and tissue left foot; ulcer debridement left foot with graft application with dr mathur     OTHER SURGICAL HISTORY Right 01/04/2018    DEBRIDEMENT INFECTED BONE AND TISSUE RIGHT FOOT, ULCER DEBRIDEMENT RIGHT FOOT WITH GRAFT APPLICATION    OTHER SURGICAL HISTORY Left 11/01/2018    Procedure: PARTIAL RESECTION LEFT METATARSAL, ULCER DEBRIDEMENT LEFT FOOT WITH GRAFT APPLICATION     OK OFFICE/OUTPT VISIT,PROCEDURE ONLY Left 8/23/2018    ULCER DEBRIDEMENT LEFT FOOT WITH GRAFT APPLICATION performed by Kaushik Gannon DPM at [facility] TARSAL/METATARSAL Left 8/9/2018    PARTIAL FOOT AMPUTATION WITH GRAFT APPLICATION performed by Kaushik Gannon DPM at [facility] TARSAL/METATARSAL Left 11/1/2018    PARTIAL RESECTION LEFT METATARSAL, ULCER DEBRIDEMENT LEFT FOOT WITH GRAFT APPLICATION performed by Kaushik Gannon DPM at [address] [place] [address]'S [facility] TOE AMPUTATION      2 toes       Objective:   BP (!) 105/54   Pulse 92   Temp 97.9 °F (36.6 °C) (Oral)   Resp 16   Ht 5' 10\" (1.778 m)   Wt 241 lb 9.6 oz (109.6 kg)   SpO2 92%   BMI 34.67 kg/m²       Intake/Output Summary (Last 24 hours) at 8/13/2020 1029  Last data filed at 8/13/2020 0947  Gross per 24 hour   Intake 1755 ml   Output 1125 ml   Net 630 ml       TELEMETRY: NSR  Physical Exam:  General: No Respiratory distress, appears well developed and well nourished. Eyes:  Sclera nonicteric  Nose/Sinuses:  negative findings: nose shows no deformity, asymmetry, or inflammation, nasal mucosa normal, septum midline with no perforation or bleeding  Back:  no pain to palpation  Joint:  no active joint inflammation  Musculoskeletal:  negative  Skin:  Warm and dry  Neck:  Negative for JVD and Carotid Bruits.    Chest:   Diminished at LLL posteriorly to auscultation, respiration easy  Cardiovascular:  RRR, S1S2 normal, no murmur, no rub or thrill. Abdomen:  Soft normal liver and spleen  Extremities:  1+ edema,  No clubbing, cyanosis,  Neuro: intact    Medications:    [START ON 8/14/2020] magnesium oxide  400 mg Oral Daily    ferrous sulfate  325 mg Oral BID     albumin human  12.5 g Intravenous Once    sodium chloride  20 mL Intravenous Once    thyroid  60 mg Oral Daily    sodium chloride flush  10 mL Intravenous 2 times per day    acetaminophen  650 mg Oral 4 times per day    polyethylene glycol  17 g Oral Daily    famotidine  20 mg Oral BID    metoprolol tartrate  12.5 mg Oral BID    chlorhexidine  15 mL Mouth/Throat BID    furosemide  20 mg Intravenous 4x Daily    mupirocin   Nasal BID    potassium chloride  10 mEq Oral TID     atorvastatin  40 mg Oral Nightly    aspirin  81 mg Oral Daily    clopidogrel  75 mg Oral Daily    albuterol  2.5 mg Nebulization Q4H WA    insulin glargine  0.25 Units/kg Subcutaneous Nightly    insulin lispro  0-6 Units Subcutaneous TID     insulin lispro  0-3 Units Subcutaneous Nightly      norepinephrine 2 mcg/min (08/13/20 0800)    dextrose       sodium chloride flush, potassium chloride, magnesium sulfate, acetaminophen, oxyCODONE **OR** oxyCODONE, morphine **OR** morphine, ondansetron, hydrALAZINE, metoprolol, albumin human, norepinephrine, glucose, dextrose, glucagon (rDNA), dextrose    Lab Data:  CBC:   Recent Labs     08/11/20 1200 08/11/20 2320 08/12/20 0430 08/13/20  0546   WBC 18.9*  --   --  12.7* 11.4*   HGB 9.6*   < > 8.5* 8.3* 7.1*   HCT 28.3*   < > 24.8* 24.0* 21.4*   MCV 90.1  --   --  89.4 91.8     --   --  169 141    < > = values in this interval not displayed. BMP:   Recent Labs     08/11/20 1200 08/11/20 2320 08/12/20 0430 08/13/20  0546     --   --  139 130*   K 4.1   < > 4.8 4.4 4.9     --   --  106 100   CO2 24  --   --  23 21   BUN 16  --   --  18 27*   CREATININE 0.9  --   --  1.1 1.7*    < > = values in this interval not displayed. LIVER PROFILE:   Recent Labs     08/11/20  0329   AST 17   ALT 24   BILIDIR <0.2   BILITOT 0.5   ALKPHOS 56     PT/INR:   Recent Labs     08/11/20  0329 08/11/20  1200   PROTIME 12.1 15.3*   INR 1.04 1.32*     APTT:   Recent Labs     08/11/20  1200   APTT 34.9     BNP:  No results for input(s): BNP in the last 72 hours. IMAGING:     Assessment:  CAD s/p CABG  Secondary severe mitral regurgitation proportionate to LV dysfunction  Combined acute on chronic systolic CHF  Non ischemic and ischemic cardiomyopathy  Diabetes mellitus  Hypothyroidism  Primary preventive therapy ICD  Patient Active Problem List    Diagnosis Date Noted    Alcoholic cardiomyopathy 99/94/3778     Priority: High     Class: Chronic    CAD in native artery 08/06/2020    Chest pain 08/06/2020    Obesity 08/06/2020    NSTEMI (non-ST elevated myocardial infarction) ([address]     Acute respiratory failure ([address] 08/04/2020    Community acquired pneumonia     Septicemia ([address]     NSVT (nonsustained ventricular tachycardia) ([address] 11/27/2019    Diabetic ulcer of left foot associated with type 2 diabetes mellitus, with muscle involvement without evidence of necrosis ([address] 04/27/2017    Acquired hypothyroidism 04/18/2017    Diabetic ulcer of right foot associated with type 2 diabetes mellitus, limited to breakdown of skin ([address] 01/29/2017    Hallux valgus 11/11/2016    Callus of foot 11/03/2016    Onychomycosis 04/02/2016    Dystrophic nail 04/02/2016    Hyperlipidemia 02/23/2016    Automatic implantable cardioverter-defibrillator in situ 01/30/2014    Hypertension     Uncontrolled type 2 diabetes mellitus with diabetic polyneuropathy, with long-term current use of insulin ([address]     DM (diabetes mellitus) ([address] 09/18/2012       Plan:  1. Stable post op CABG  2. Inotropics being weaned off  3. Once weaned off levophed will institute aldactone for low EF  4.  Will benefit from long term ace inhibitors  and guideline recommended beta blockers for low EF   5.  Repeat complete echo in 3 months for EF and mitral regurgitation    Core Measures:  · Discharge instructions:   · LVEF documented: 25-30%  · ACEI for LV dysfunction: will add in next few days  · Smoking Cessation:NA  I have spent 25   minutes of face to face time with the patient with more than 50% spent counseling and coordinating care for this patient    Yvonne Richards MD 8/13/2020 10:29 AM

## 2020-08-13 NOTE — PROGRESS NOTES
Occupational Therapy/Physical Therapy  Attempted OT /PT tx. Pt receiving blood transfusion. Cont OT/PT.   Jeannine Lee OT  Zelalem Comber PT

## 2020-08-13 NOTE — PROGRESS NOTES
CVTS Cardiothoracic Progress Note:                                CC:  Post op follow up     Surgery: 8/11/2020 CORONARY ARTERY BYPASS GRAFTING X3, LEFT ATRIAL APPENDAGE CLIP, INTERNAL MAMMARY ARTERY, SAPHENOUS VEIN GRAFT, ON PUMP, 5 LEVEL BILATERAL INTERCOSTAL NERVE BLOCK     Subjective:   Dietary Intake: good   no Nausea   Pain Control: controlled  Complaints: mild post op pain near chest tubes  Bowels: have not moved    Hospital course:   8/13 up in chair, appears comfortable. Remains SR.      Past Medical History:   Diagnosis Date    Acute osteomyelitis of left foot ([address] 9/27/2017    Acute osteomyelitis of right foot ([address] 4/25/2016    Ascites     Blood transfusion reaction     Burst fracture of lumbar vertebra (HCC) 11/9/2012    Cellulitis of left foot     Cellulitis of right lower extremity 2/16, 5/16    Diabetes ([address]     Diabetic ulcer of right foot ([address] early 2016    Diabetic ulcer of 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 2 diabetes mellitus, with necrosis of bone ([address]     ETOH abuse     Fracture of tibial plateau 63/4/0583    High cholesterol     HTN (hypertension)     MI (myocardial infarction) ([address] 08/04/2020    + Troponins    MRSA (methicillin resistant staph aureus) culture positive 4/28/16, 4/20/16    foot wound    Neuropathic ulcer of left foot, limited to breakdown of skin ([address] 11/3/2016    Neuropathic ulcer of toe ([address] 2/13/2014    NSVT (nonsustained ventricular tachycardia) ([address] 2014    Pleural effusion due to congestive heart failure ([address]     Smoker     quit 3251    Systolic CHF, acute ([address] 7/8/2013        Past Surgical History:   Procedure Laterality Date    CARDIAC DEFIBRILLATOR PLACEMENT  01/29/2014    ICD Placement    CORONARY ARTERY BYPASS GRAFT N/A 8/11/2020    CORONARY ARTERY BYPASS GRAFTING X3, LEFT ATRIAL APPENDAGE CLIP, INTERNAL MAMMARY ARTERY, SAPHENOUS VEIN GRAFT, ON PUMP, 5 LEVEL BILATERAL INTERCOSTAL NERVE BLOCK performed by Bud Tam MD at [facility]    FOOT AMPUTATION Left 08/09/2018    PARTIAL FOOT AMPUTATION w. Dr Hayley Kebede Left 1/31/2019    EXOSTECTOMY LEFT FOOT, ULCER DEBRIDEMENT LEFT FOOT WITH GRAFT APPLICATION performed by Christina Suárez DPM at [address] Left 09/27/2017    LEFT FOOT ULCER DEBRIDEMENT, POSSIBLE SECOND METATARSAL    FOOT SURGERY Left 01/31/2019    Exostectomy left foot, ulcer debridement with graft application left foot    FOOT TENDON SURGERY Right 2016    FOOT TENDON SURGERY Left 06/30/2017    KNEE SURGERY Left     OTHER SURGICAL HISTORY Bilateral 9/17/15    amputation 2nd digit bilateral, flexor tenotomy right hallux    OTHER SURGICAL HISTORY Left 08/17/2017    PARTIAL LEFT FOOT AMPUTATION      OTHER SURGICAL HISTORY Left 12/07/2017    Debridement infected bone and tissue left foot; ulcer debridement left foot with graft application with dr mathur     OTHER SURGICAL HISTORY Right 01/04/2018    DEBRIDEMENT INFECTED BONE AND TISSUE RIGHT FOOT, ULCER DEBRIDEMENT RIGHT FOOT WITH GRAFT APPLICATION    OTHER SURGICAL HISTORY Left 11/01/2018    Procedure: PARTIAL RESECTION LEFT METATARSAL, ULCER DEBRIDEMENT LEFT FOOT WITH GRAFT APPLICATION     ME OFFICE/OUTPT VISIT,PROCEDURE ONLY Left 8/23/2018    ULCER DEBRIDEMENT LEFT FOOT WITH GRAFT APPLICATION performed by Christina Suárez DPM at [facility] TARSAL/METATARSAL Left 8/9/2018    PARTIAL FOOT AMPUTATION WITH GRAFT APPLICATION performed by Christina Suárez DPM at [facility] TARSAL/METATARSAL Left 11/1/2018    PARTIAL RESECTION LEFT METATARSAL, ULCER DEBRIDEMENT LEFT FOOT WITH GRAFT APPLICATION performed by Christina Suárez DPM at [address] TOE AMPUTATION      2 toes        Allergies as of 08/06/2020 - Review Complete 08/06/2020   Allergen Reaction Noted    Bactrim [sulfamethoxazole-trimethoprim] Rash 01/08/2018    Bee venom Swelling and Rash 02/20/2014        Patient Active Problem List   Diagnosis    DM [facility]diabetes mellitus) [facility]    Hypertension    Uncontrolled type 2 diabetes mellitus with diabetic polyneuropathy, with long-term current use of insulin (HCC)    Alcoholic cardiomyopathy    Automatic implantable cardioverter-defibrillator in situ    Hyperlipidemia    Onychomycosis    Dystrophic nail    Callus of foot    Hallux valgus    Diabetic ulcer of right foot associated with type 2 diabetes mellitus, limited to breakdown of skin ([facility]    Acquired hypothyroidism    Diabetic ulcer of left foot associated with type 2 diabetes mellitus, with muscle involvement without evidence of necrosis (HCC)    NSVT [facility]nonsustained ventricular tachycardia) [facility]    Acute respiratory failure ([facility]    Community acquired pneumonia    Septicemia ([facility]    NSTEMI (non-ST elevated myocardial infarction) ([facility]    CAD in native artery    Chest pain    Obesity        Vital Signs: BP (!) 110/57   Pulse 90   Temp 98.4 °F (36.9 °C) (Axillary)   Resp 19   Ht 5' 10\" (1.778 m)   Wt 241 lb 9.6 oz (109.6 kg)   SpO2 95%   BMI 34.67 kg/m²  O2 Flow Rate (L/min): 0 L/min     Admission Weight: Weight: 231 lb (104.8 kg)    Weight on 8/11 (103.5 kg) Pre-op   Weight on 8/12 (105.2 kg)  8/13  109.6 kg     Intake/Output:     Intake/Output Summary (Last 24 hours) at 8/13/2020 0857  Last data filed at 8/13/2020 0555  Gross per 24 hour   Intake 2310.22 ml   Output 1140 ml   Net 1170.22 ml      GTTS: norepinephrine   Uledi Seth Catheter: PAP 51/26 (36), CVP 19, CO 6.2, CI 2.8,     Extubation Time: 8/11 @ 11:38 AM  Transition Time:  8/12 @ 09:50 AM    LABORATORY DATA:     CBC:   Recent Labs     08/11/20  1200  08/11/20  2320 08/12/20  0430 08/13/20  0546   WBC 18.9*  --   --  12.7* 11.4*   HGB 9.6*   < > 8.5* 8.3* 7.1*   HCT 28.3*   < > 24.8* 24.0* 21.4*   MCV 90.1  --   --  89.4 91.8     --   --  169 141    < > = values in this interval not displayed.      BMP:   Recent Labs     08/11/20  1200  08/11/20  2320 08/12/20  6507 anemia  Hgb 7.1 this morning. Giving 1 unit RBC. Will pre-medicate with IV Benadryl and Tylenol given pt's history of blood transfusion reaction. Added Iron PO daily. 7. Microbiology: nothing at this time    8. Nutrition: cardiac     9. Labs: monitor    10. Post-op Drains/Wires: will remove mediastinal chest tube. 11. D/C Goals: home with [facility] in next 2-3 days    12. Continue post-op care of patient in the ICU    Meds:    The patient is on a beta-blocker   The patient is not on an ace-i/ARB- 2/2 relative hypotension   The patient is on a statin   The patient is on oral antiplatelet therapy   ________________________________________________________________________  Wil Hancock CNP  8/13/2020  8:57 AM  Note reviewed, events of last 24 hours reviewed along with vital signs and I/Os and patient examined. Assessment and plans discussed and are as outlined above.      Nelsy Ray MD, FACS, [facility], FACCP, [place]

## 2020-08-13 NOTE — PROGRESS NOTES
Chest tubes meet criteria to remove per open heart protocol. If chest tubes do not meet criteria, a specific order has been entered to remove by A. Cate NP  No air leak and no crepitus noted. Pt instructed on procedure. Dressings removed. Incision within normal limits. Site cleansed and prepped per protocol. Mediastinal Chest tube removed without difficulty. Vaseline gauze and dry sterile dressing applied. Bilateral breath sounds audible. O2Sats 95% on room air. Patient tolerated well.

## 2020-08-14 LAB
ANION GAP SERPL CALCULATED.3IONS-SCNC: 11 MMOL/L (ref 3–16)
BLOOD BANK DISPENSE STATUS: NORMAL
BLOOD BANK PRODUCT CODE: NORMAL
BPU ID: NORMAL
BUN BLDV-MCNC: 34 MG/DL (ref 7–20)
CALCIUM SERPL-MCNC: 8.6 MG/DL (ref 8.3–10.6)
CHLORIDE BLD-SCNC: 101 MMOL/L (ref 99–110)
CO2: 22 MMOL/L (ref 21–32)
CREAT SERPL-MCNC: 1.3 MG/DL (ref 0.9–1.3)
DESCRIPTION BLOOD BANK: NORMAL
GFR AFRICAN AMERICAN: >60
GFR NON-AFRICAN AMERICAN: 57
GLUCOSE BLD-MCNC: 230 MG/DL (ref 70–99)
GLUCOSE BLD-MCNC: 244 MG/DL (ref 70–99)
GLUCOSE BLD-MCNC: 332 MG/DL (ref 70–99)
GLUCOSE BLD-MCNC: 350 MG/DL (ref 70–99)
GLUCOSE BLD-MCNC: 363 MG/DL (ref 70–99)
HCT VFR BLD CALC: 25.7 % (ref 40.5–52.5)
HEMOGLOBIN: 8.7 G/DL (ref 13.5–17.5)
MAGNESIUM: 2.7 MG/DL (ref 1.8–2.4)
MCH RBC QN AUTO: 31 PG (ref 26–34)
MCHC RBC AUTO-ENTMCNC: 33.9 G/DL (ref 31–36)
MCV RBC AUTO: 91.5 FL (ref 80–100)
PDW BLD-RTO: 15.2 % (ref 12.4–15.4)
PERFORMED ON: ABNORMAL
PLATELET # BLD: 164 K/UL (ref 135–450)
PMV BLD AUTO: 7.4 FL (ref 5–10.5)
POTASSIUM SERPL-SCNC: 5.3 MMOL/L (ref 3.5–5.1)
RBC # BLD: 2.81 M/UL (ref 4.2–5.9)
SODIUM BLD-SCNC: 134 MMOL/L (ref 136–145)
T4 FREE: 1 NG/DL (ref 0.9–1.8)
TSH REFLEX FT4: 4.7 UIU/ML (ref 0.27–4.2)
WBC # BLD: 12.6 K/UL (ref 4–11)

## 2020-08-14 PROCEDURE — 36592 COLLECT BLOOD FROM PICC: CPT

## 2020-08-14 PROCEDURE — 6360000002 HC RX W HCPCS: Performed by: THORACIC SURGERY (CARDIOTHORACIC VASCULAR SURGERY)

## 2020-08-14 PROCEDURE — 6370000000 HC RX 637 (ALT 250 FOR IP): Performed by: THORACIC SURGERY (CARDIOTHORACIC VASCULAR SURGERY)

## 2020-08-14 PROCEDURE — 2580000003 HC RX 258: Performed by: THORACIC SURGERY (CARDIOTHORACIC VASCULAR SURGERY)

## 2020-08-14 PROCEDURE — 97530 THERAPEUTIC ACTIVITIES: CPT

## 2020-08-14 PROCEDURE — 99232 SBSQ HOSP IP/OBS MODERATE 35: CPT | Performed by: INTERNAL MEDICINE

## 2020-08-14 PROCEDURE — 6370000000 HC RX 637 (ALT 250 FOR IP): Performed by: INTERNAL MEDICINE

## 2020-08-14 PROCEDURE — 97110 THERAPEUTIC EXERCISES: CPT

## 2020-08-14 PROCEDURE — 94669 MECHANICAL CHEST WALL OSCILL: CPT

## 2020-08-14 PROCEDURE — 2000000000 HC ICU R&B

## 2020-08-14 PROCEDURE — 84443 ASSAY THYROID STIM HORMONE: CPT

## 2020-08-14 PROCEDURE — 84439 ASSAY OF FREE THYROXINE: CPT

## 2020-08-14 PROCEDURE — 6370000000 HC RX 637 (ALT 250 FOR IP): Performed by: NURSE PRACTITIONER

## 2020-08-14 PROCEDURE — 94640 AIRWAY INHALATION TREATMENT: CPT

## 2020-08-14 PROCEDURE — 85027 COMPLETE CBC AUTOMATED: CPT

## 2020-08-14 PROCEDURE — 97116 GAIT TRAINING THERAPY: CPT

## 2020-08-14 PROCEDURE — 83735 ASSAY OF MAGNESIUM: CPT

## 2020-08-14 PROCEDURE — 80048 BASIC METABOLIC PNL TOTAL CA: CPT

## 2020-08-14 RX ORDER — BISACODYL 10 MG
20 SUPPOSITORY, RECTAL RECTAL ONCE
Status: DISCONTINUED | OUTPATIENT
Start: 2020-08-14 | End: 2020-08-15 | Stop reason: HOSPADM

## 2020-08-14 RX ORDER — FUROSEMIDE 40 MG/1
40 TABLET ORAL 2 TIMES DAILY
Status: DISCONTINUED | OUTPATIENT
Start: 2020-08-14 | End: 2020-08-15 | Stop reason: HOSPADM

## 2020-08-14 RX ORDER — SPIRONOLACTONE 25 MG/1
25 TABLET ORAL 2 TIMES DAILY
Status: DISCONTINUED | OUTPATIENT
Start: 2020-08-14 | End: 2020-08-15 | Stop reason: HOSPADM

## 2020-08-14 RX ADMIN — LEVOTHYROXINE, LIOTHYRONINE 60 MG: 19; 4.5 TABLET ORAL at 09:58

## 2020-08-14 RX ADMIN — OXYCODONE 5 MG: 5 TABLET ORAL at 20:38

## 2020-08-14 RX ADMIN — FERROUS SULFATE TAB 325 MG (65 MG ELEMENTAL FE) 325 MG: 325 (65 FE) TAB at 08:24

## 2020-08-14 RX ADMIN — OXYCODONE 5 MG: 5 TABLET ORAL at 04:18

## 2020-08-14 RX ADMIN — ACETAMINOPHEN 650 MG: 325 TABLET ORAL at 00:07

## 2020-08-14 RX ADMIN — SPIRONOLACTONE 25 MG: 25 TABLET ORAL at 09:54

## 2020-08-14 RX ADMIN — FUROSEMIDE 40 MG: 40 TABLET ORAL at 09:58

## 2020-08-14 RX ADMIN — ATORVASTATIN CALCIUM 40 MG: 40 TABLET, FILM COATED ORAL at 19:52

## 2020-08-14 RX ADMIN — ALBUTEROL SULFATE 2.5 MG: 2.5 SOLUTION RESPIRATORY (INHALATION) at 11:58

## 2020-08-14 RX ADMIN — Medication 15 ML: at 10:02

## 2020-08-14 RX ADMIN — ALBUTEROL SULFATE 2.5 MG: 2.5 SOLUTION RESPIRATORY (INHALATION) at 19:49

## 2020-08-14 RX ADMIN — MORPHINE SULFATE 2 MG: 2 INJECTION, SOLUTION INTRAMUSCULAR; INTRAVENOUS at 22:46

## 2020-08-14 RX ADMIN — POLYETHYLENE GLYCOL 3350 17 G: 17 POWDER, FOR SOLUTION ORAL at 09:51

## 2020-08-14 RX ADMIN — INSULIN LISPRO 5 UNITS: 100 INJECTION, SOLUTION INTRAVENOUS; SUBCUTANEOUS at 19:52

## 2020-08-14 RX ADMIN — MUPIROCIN: 20 OINTMENT TOPICAL at 19:54

## 2020-08-14 RX ADMIN — FAMOTIDINE 20 MG: 20 TABLET, FILM COATED ORAL at 00:06

## 2020-08-14 RX ADMIN — FERROUS SULFATE TAB 325 MG (65 MG ELEMENTAL FE) 325 MG: 325 (65 FE) TAB at 17:25

## 2020-08-14 RX ADMIN — Medication 10 ML: at 08:24

## 2020-08-14 RX ADMIN — ALBUTEROL SULFATE 2.5 MG: 2.5 SOLUTION RESPIRATORY (INHALATION) at 17:01

## 2020-08-14 RX ADMIN — OXYCODONE 5 MG: 5 TABLET ORAL at 16:21

## 2020-08-14 RX ADMIN — FAMOTIDINE 20 MG: 20 TABLET, FILM COATED ORAL at 19:52

## 2020-08-14 RX ADMIN — INSULIN GLARGINE 26 UNITS: 100 INJECTION, SOLUTION SUBCUTANEOUS at 19:52

## 2020-08-14 RX ADMIN — CLOPIDOGREL BISULFATE 75 MG: 75 TABLET ORAL at 09:40

## 2020-08-14 RX ADMIN — FUROSEMIDE 40 MG: 40 TABLET ORAL at 17:25

## 2020-08-14 RX ADMIN — OXYCODONE 5 MG: 5 TABLET ORAL at 09:54

## 2020-08-14 RX ADMIN — METOPROLOL TARTRATE 25 MG: 25 TABLET, FILM COATED ORAL at 19:52

## 2020-08-14 RX ADMIN — INSULIN LISPRO 10 UNITS: 100 INJECTION, SOLUTION INTRAVENOUS; SUBCUTANEOUS at 12:09

## 2020-08-14 RX ADMIN — ASPIRIN 81 MG: 81 TABLET ORAL at 09:40

## 2020-08-14 RX ADMIN — Medication 10 ML: at 19:54

## 2020-08-14 RX ADMIN — ALBUTEROL SULFATE 2.5 MG: 2.5 SOLUTION RESPIRATORY (INHALATION) at 08:40

## 2020-08-14 RX ADMIN — SPIRONOLACTONE 25 MG: 25 TABLET ORAL at 17:29

## 2020-08-14 RX ADMIN — METOPROLOL TARTRATE 25 MG: 25 TABLET, FILM COATED ORAL at 09:52

## 2020-08-14 RX ADMIN — FAMOTIDINE 20 MG: 20 TABLET, FILM COATED ORAL at 08:24

## 2020-08-14 RX ADMIN — INSULIN LISPRO 8 UNITS: 100 INJECTION, SOLUTION INTRAVENOUS; SUBCUTANEOUS at 16:23

## 2020-08-14 RX ADMIN — INSULIN LISPRO 4 UNITS: 100 INJECTION, SOLUTION INTRAVENOUS; SUBCUTANEOUS at 08:19

## 2020-08-14 RX ADMIN — MUPIROCIN: 20 OINTMENT TOPICAL at 10:02

## 2020-08-14 ASSESSMENT — PAIN SCALES - GENERAL
PAINLEVEL_OUTOF10: 5
PAINLEVEL_OUTOF10: 5
PAINLEVEL_OUTOF10: 0
PAINLEVEL_OUTOF10: 6
PAINLEVEL_OUTOF10: 4
PAINLEVEL_OUTOF10: 2
PAINLEVEL_OUTOF10: 2
PAINLEVEL_OUTOF10: 4
PAINLEVEL_OUTOF10: 2
PAINLEVEL_OUTOF10: 0
PAINLEVEL_OUTOF10: 4
PAINLEVEL_OUTOF10: 2

## 2020-08-14 ASSESSMENT — PAIN DESCRIPTION - LOCATION
LOCATION: CHEST
LOCATION: CHEST

## 2020-08-14 ASSESSMENT — PAIN SCALES - WONG BAKER
WONGBAKER_NUMERICALRESPONSE: 0

## 2020-08-14 ASSESSMENT — PAIN DESCRIPTION - PAIN TYPE
TYPE: SURGICAL PAIN
TYPE: SURGICAL PAIN

## 2020-08-14 ASSESSMENT — PAIN DESCRIPTION - ORIENTATION
ORIENTATION: MID
ORIENTATION: MID

## 2020-08-14 NOTE — PROGRESS NOTES
4 Eyes Skin Assessment     The patient is being assess for   Shift Handoff    I agree that 2 RN's have performed a thorough Head to Toe Skin Assessment on the patient. ALL assessment sites listed below have been assessed. Areas assessed by both nurses:   [x]   Head, Face, and Ears   [x]   Shoulders, Back, and Chest, Abdomen  [x]   Arms, Elbows, and Hands   [x]   Coccyx, Sacrum, and Ischium  [x]   Legs, Feet, and Heels            **SHARE this note so that the co-signing nurse is able to place an eSignature**    Co-signer eSignature: {Esignature:[id number]}    Does the Patient have Skin Breakdown?   No          Christiano Prevention initiated:  Yes   Wound Care Orders initiated:  No      WOC nurse consulted for Pressure Injury (Stage 3,4, Unstageable, DTI, NWPT, Complex wounds)and New or Established Ostomies:  NA      Primary Nurse eSignature: {Esignature:[id number]}

## 2020-08-14 NOTE — PROGRESS NOTES
CVTS Cardiothoracic Progress Note:                                CC:  Post op follow up     Surgery: 8/11/2020 CORONARY ARTERY BYPASS GRAFTING X3, LEFT ATRIAL APPENDAGE CLIP, INTERNAL MAMMARY ARTERY, SAPHENOUS VEIN GRAFT, ON PUMP, 5 LEVEL BILATERAL INTERCOSTAL NERVE BLOCK     Subjective:   Dietary Intake: good   no Nausea   Pain Control: controlled  Complaints: mild post op pain near chest tubes  Bowels: have not moved, +flatus    Hospital course:   8/12 mild post op pain, mostly from chest tubes, SR.  8/13 up in chair, appears comfortable. Remains SR.   8/14 up in chair eating breakfast, NAD.     Past Medical History:   Diagnosis Date    Acute osteomyelitis of left foot ([address] 9/27/2017    Acute osteomyelitis of right foot ([address] 4/25/2016    Ascites     Blood transfusion reaction     Burst fracture of lumbar vertebra (HCC) 11/9/2012    Cellulitis of left foot     Cellulitis of right lower extremity 2/16, 5/16    Diabetes ([address]     Diabetic ulcer of right foot ([address] early 2016    Diabetic ulcer of 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 2 diabetes mellitus, with necrosis of bone ([address]     ETOH abuse     Fracture of tibial plateau 09/3/3056    High cholesterol     HTN (hypertension)     MI (myocardial infarction) ([address] 08/04/2020    + Troponins    MRSA (methicillin resistant staph aureus) culture positive 4/28/16, 4/20/16    foot wound    Neuropathic ulcer of left foot, limited to breakdown of skin ([address] 11/3/2016    Neuropathic ulcer of toe ([address] 2/13/2014    NSVT (nonsustained ventricular tachycardia) ([address] 2014    Pleural effusion due to congestive heart failure ([address]     Smoker     quit 1225    Systolic CHF, acute ([address] 7/8/2013        Past Surgical History:   Procedure Laterality Date    CARDIAC DEFIBRILLATOR PLACEMENT  01/29/2014    ICD Placement    CORONARY ARTERY BYPASS GRAFT N/A 8/11/2020    CORONARY ARTERY BYPASS GRAFTING X3, LEFT ATRIAL APPENDAGE CLIP, INTERNAL MAMMARY ARTERY, SAPHENOUS VEIN GRAFT, ON PUMP, 5 LEVEL BILATERAL INTERCOSTAL NERVE BLOCK performed by Faustine Alpers, MD at [facility] Left 08/09/2018    PARTIAL FOOT AMPUTATION w. Dr Ainsley Forrester Left 1/31/2019    EXOSTECTOMY LEFT FOOT, ULCER DEBRIDEMENT LEFT FOOT WITH GRAFT APPLICATION performed by Maribell Alegre DPM at [place] [address] [facility] [address] Natacha Robles Left 09/27/2017    LEFT FOOT ULCER DEBRIDEMENT, POSSIBLE SECOND METATARSAL    FOOT SURGERY Left 01/31/2019    Exostectomy left foot, ulcer debridement with graft application left foot    FOOT TENDON SURGERY Right 2016    FOOT TENDON SURGERY Left 06/30/2017    KNEE SURGERY Left     OTHER SURGICAL HISTORY Bilateral 9/17/15    amputation 2nd digit bilateral, flexor tenotomy right hallux    OTHER SURGICAL HISTORY Left 08/17/2017    PARTIAL LEFT FOOT AMPUTATION      OTHER SURGICAL HISTORY Left 12/07/2017    Debridement infected bone and tissue left foot; ulcer debridement left foot with graft application with dr mathur     OTHER SURGICAL HISTORY Right 01/04/2018    DEBRIDEMENT INFECTED BONE AND TISSUE RIGHT FOOT, ULCER DEBRIDEMENT RIGHT FOOT WITH GRAFT APPLICATION    OTHER SURGICAL HISTORY Left 11/01/2018    Procedure: PARTIAL RESECTION LEFT METATARSAL, ULCER DEBRIDEMENT LEFT FOOT WITH GRAFT APPLICATION     ME OFFICE/OUTPT VISIT,PROCEDURE ONLY Left 8/23/2018    ULCER DEBRIDEMENT LEFT FOOT WITH GRAFT APPLICATION performed by Maribell Alegre DPM at [facility] TARSAL/METATARSAL Left 8/9/2018    PARTIAL FOOT AMPUTATION WITH GRAFT APPLICATION performed by Maribell Alegre DPM at [facility] TARSAL/METATARSAL Left 11/1/2018    PARTIAL RESECTION LEFT METATARSAL, ULCER DEBRIDEMENT LEFT FOOT WITH GRAFT APPLICATION [facility]      2 toes        Allergies as of 08/06/2020 - Review Complete 08/06/2020   Allergen Reaction Noted    Bactrim [sulfamethoxazole-trimethoprim] Rash 01/08/2018    Recent Labs     08/12/20  0430 08/13/20  0546 08/14/20  0430   MG 2.70* 2.70* 2.70*      PT/INR:   Recent Labs     08/11/20  1200   PROTIME 15.3*   INR 1.32*     APTT:   Recent Labs     08/11/20  1200   APTT 34.9     CXR: 8/12/20      Impression    Recent history of cardiothoracic surgery.  Increasing left pleural effusion. ___________________________________________________    Objective:   General appearance: awake, alert, up in chair  Lungs: diminished in bases  Heart: S1S2 RRR; SR on monitor, HR in the 90's   Chest: symmetrical expansion with inspiration and expirations; no rocking of sternum noted   Abdomen: soft, non-tender  Bowel sounds: normoactive   Kidneys: UOP satisfactory ml in 24 hrs; Cr 1.3  Wound/Incisions: Midsternal incision CDI; RLE incisions with dressings CDI; Pacing wires removed 8/12  Extremities: BLE pulses palpable; minimal tibial swelling noted  Neurological: non focal exam, speech strong   Chest tubes/Drains: Chest tube with 100-70-0= 170 ml serosanguinous drainage in 24 hours overnight; no airleaks noted     Assessment:   Post-op: 3 days. Condition: In stable condition. Plan:   1. Cardiovascular: s/p CABG- ASA, BB, Statin   Will increase BB today. Relative hypotension - will revisit starting pt back on ACEI when he follows up in the office as an outpatient. 2. Pulmonary: needs expansion - IS, OOBTC, PT/OT, ambulation. 3. Neurology: analgesia as needed    4. Nephrology: fluid mgt- diurese as tolerated. Will change lasix to 40 mg PO BID. Adding spironolactone 25 mg BID. KRYSTAL - resolving; Cr 1.3 today (was 1.7 yesterday). Keep pt off of nephrotoxic meds    5. Endocrinology: increasing SSI to medium dose, needs tighter control. Will hold off restarting pts home metformin until kidney function returns to baseline. 6. Hematology: acute blood loss anemia- improved. H&H 8.7 & 25.7 this morning, received 1 unit RBC 8/13. Monitor.     7. Microbiology: nothing at this time    8. Nutrition: cardiac   Constipation: Will give suppository this afternoon if he has not had a BM yet. 9. Labs: monitor    10. Post-op Drains/Wires: will remove remaining chest tube. 11. D/C Goals: home with [facility] in next 1-2 days    12.  Continue post-op care of patient in the ICU    Meds:    The patient is on a beta-blocker   The patient is not on an ace-i/ARB- 2/2 relative hypotension   The patient is on a statin   The patient is on oral antiplatelet therapy   ________________________________________________________________________  Dalila Faulkner, CNP  8/14/2020  8:34 AM

## 2020-08-14 NOTE — PROGRESS NOTES
Physical Therapy  Facility/Department: [facility] B2 - ICU  Daily Treatment Note  NAME: Pb Haynes  : 1965  MRN: [id number]    Date of Service: 2020    Discharge Recommendations:  24 hour supervision or assist   PT Equipment Recommendations  Equipment Needed: No    Assessment    Body structures, Functions, Activity limitations: Decreased functional mobility ; Decreased balance;Decreased endurance  Assessment: Patient seen for gait training and LE strengthening. Patient cleared by RN for therapy participation this date. Patient agreeable to therapy. Patient completed transfers and ambulated 100 ft with rollator and CGA with min cues for sternal precautions. Pt completed LE exercises in bed with rest breaks between sets d/t SOB and fatigue. P.T .will continue to follow throughout LOS. Continue to recommend d/c to home with  supervision. Treatment Diagnosis: decreased (I) with mobility s/p CABG  Specific instructions for Next Treatment: progress mobility as tolerated  Prognosis: Good  Decision Making: Medium Complexity  PT Education: General Safety;Goals;Gait Training;PT Role;Disease Specific Education;Plan of Care; Functional Mobility Training;Precautions;Transfer Training  Patient Education: pt educated on importance of sternal precautions and hand placement during transfers  Barriers to Learning: none  REQUIRES PT FOLLOW UP: Yes  Activity Tolerance  Activity Tolerance: Patient Tolerated treatment well  Activity Tolerance: pt required cues for PLB d/t pt holding breath during exercises     Patient Diagnosis(es): There were no encounter diagnoses.      has a past medical history of Acute osteomyelitis of left foot ([address] Acute osteomyelitis of right foot ([address] Ascites, Blood transfusion reaction, Burst fracture of lumbar vertebra ([address] Cellulitis of left foot, Cellulitis of right lower extremity, Diabetes ([address] Diabetic ulcer of right foot ([address] Diabetic ulcer of 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 2 diabetes mellitus, with necrosis of bone ([place]), ETOH abuse, Fracture of tibial plateau, High cholesterol, HTN (hypertension), MI (myocardial infarction) ([place]), MRSA (methicillin resistant staph aureus) culture positive, Neuropathic ulcer of left foot, limited to breakdown of skin ([place]), Neuropathic ulcer of toe ([place]), NSVT (nonsustained ventricular tachycardia) ([place]), Pleural effusion due to congestive heart failure ([place]), Smoker, and Systolic CHF, acute ([place]). has a past surgical history that includes knee surgery (Left); other surgical history (Bilateral, 9/17/15); Toe amputation; Foot Tendon Surgery (Right, 2016); Foot Tendon Surgery (Left, 06/30/2017); other surgical history (Left, 08/17/2017); Foot surgery (Left, 09/27/2017); other surgical history (Left, 12/07/2017); other surgical history (Right, 01/04/2018); Foot Amputation (Left, 08/09/2018); pr part remv othr tarsal/metatarsal (Left, 8/9/2018); pr office/outpt visit,procedure only (Left, 8/23/2018); other surgical history (Left, 11/01/2018); pr part remv othr tarsal/metatarsal (Left, 11/1/2018); Foot surgery (Left, 01/31/2019); Foot Debridement (Left, 1/31/2019); Cardiac defibrillator placement (01/29/2014); and Coronary artery bypass graft (N/A, 8/11/2020). Restrictions  Restrictions/Precautions  Restrictions/Precautions: General Precautions, Fall Risk, Cardiac  Position Activity Restriction  Sternal Precautions: No Pushing, No Pulling, 5# Lifting Restrictions  Other position/activity restrictions: ambulate, up in chair, 1 chest tube, diabetic ulcers on R Plantar Foot, L Plantar Foot and L 4th Toe  Subjective   General  Chart Reviewed:  Yes  Additional Pertinent Hx: Left partial foot amputation  Response To Previous Treatment: Patient unable to report, no changes reported from family or staff  Family / Caregiver Present: No  Referring Practitioner: Dr. Luis M Canales MD  Subjective  Subjective: pt up in chair, agreeable to therapy  Pain Screening  Patient Currently in Pain: Yes  Pain Assessment  Pain Level: 6  Pain Type: Surgical pain  Pain Location: Chest  Pain Orientation: Mid  Non-Pharmaceutical Pain Intervention(s): Ambulation/Increased Activity;Repositioned  Vital Signs  Patient Currently in Pain: Yes       Orientation  Orientation  Overall Orientation Status: Within Functional Limits     Objective   Bed mobility  Supine to Sit: Unable to assess(pt up in chair at end of session)  Sit to Supine: Minimal assistance(HOB elevated)  Transfers  Sit to Stand: Contact guard assistance  Stand to sit: Contact guard assistance(cues for sternal precautions)  Ambulation  Ambulation?: Yes  Ambulation 1  Surface: level tile  Device: Rollator  Assistance: Contact guard assistance  Quality of Gait: Pt ambulates with decreased indy, decreased step length/height without LOB  Gait Deviations: Shuffles; Slow Indy;Decreased step length;Decreased step height  Distance: 100 ft        Exercises  Quad Sets: 1x 15 BLE  Heelslides: 1x 15 BLE  Gluteal Sets: 1x 15 adelaida  Hip Abduction: 1x 15 BLE  Ankle Pumps: 1x 15 BLE                     Goals  Short term goals  Time Frame for Short term goals: 1 week (8/19) unless otherwise specified  Short term goal 1: Pt will be mod I with bed mobility. Short term goal 2: Pt will be mod I with transfers. Short term goal 3: Pt will ambulate 150 ft supervision. Short term goal 4: Pt will negotiate 1 stair with SBA.   Short term goal 5: 8/15: Pt will participate in 12-15 reps of BLE exercises to promote strength and activity tolerance; goal met 8/14 - pt completed 15 reps of LE exercises for strengthening and endurance  Patient Goals   Patient goals : \"to go home\"    Plan    Plan  Times per week: 5-7x/wk while in ICU  Times per day: Daily  Specific instructions for Next Treatment: progress mobility as tolerated  Current Treatment Recommendations: Strengthening, Safety Education & Training, Neuromuscular Re-education, Home Exercise Program, Balance Training, Endurance Training, Patient/Caregiver Education & Training, Functional Mobility Training, Transfer Training, Stair training, Gait Training  Safety Devices  Type of devices: All fall risk precautions in place, Call light within reach, Gait belt, Nurse notified, Left in bed  Restraints  Initially in place: No     Therapy Time   Individual Concurrent Group Co-treatment   Time In 0934         Time Out 0959         Minutes 25         Timed Code Treatment Minutes: 25 Minutes     If pt is unable to be seen after this session, please let this note serve as discharge summary. Please see case management note for discharge disposition. Thank you.     Ree Nayak, PT

## 2020-08-14 NOTE — PLAN OF CARE
Diabetic ulcer of right foot ([address] Diabetic ulcer of 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 2 diabetes mellitus, with necrosis of bone ([address] ETOH abuse, Fracture of tibial plateau, High cholesterol, HTN (hypertension), MI (myocardial infarction) ([address] MRSA (methicillin resistant staph aureus) culture positive, Neuropathic ulcer of left foot, limited to breakdown of skin ([address] Neuropathic ulcer of toe ([address] NSVT (nonsustained ventricular tachycardia) ([address] Pleural effusion due to congestive heart failure ([address] Smoker, and Systolic CHF, acute ([address] >> For CHF and Comorbidity Education Time and Topics, please see Education Tab. Progressive Mobility Assessment:  What is this patient's Current Level of Mobility?: Ambulatory- with Assistance  How was this patient Mobilized today?: Edge of Bed, Up to Chair, Bedside Commode,  Up to Toilet/Shower, Up in Room, and Up in Hallway                 With Whom? Nurse, PT, and OT                 Level of Difficulty/Assistance: 2x Assist     Pt is currently . Pt with nonpitting lower extremity edema. Patient's weights and intake/output reviewed:      Patient and/or Family's stated Goal of Care this Admission: reduce shortness of breath, increase activity tolerance, better understand heart failure and disease management, be more comfortable, and reduce lower extremity edema prior to discharge          Problem: HEMODYNAMIC STATUS  Goal: Patient has stable vital signs and fluid balance  Outcome: Ongoing     Problem: FLUID AND ELECTROLYTE IMBALANCE  Goal: Fluid and electrolyte balance are achieved/maintained  Outcome: Ongoing     Problem: ACTIVITY INTOLERANCE/IMPAIRED MOBILITY  Goal: Mobility/activity is maintained at optimum level for patient  Outcome: Ongoing     Problem: Discharge Planning:  Goal: Discharged to appropriate level of care  Description: Discharged to appropriate level of care  Outcome: Ongoing     Problem:  Activity Intolerance:  Goal: Able to perform

## 2020-08-14 NOTE — PROGRESS NOTES
Chest tubes meet criteria to remove per open heart protocol. No air leak or crepitus. Patient instructed on procedure. Patient premedicated with oxycodone 5 mg. Site cleansed and prepped per protocol. Chest tubes X 1 removed without difficulty. Dry sterile dressing applied. Bilateral breath sounds audible. O2 Sats 92% on room air. Incision site within normal limits. Patient tolerated well. Removed at 1035.   Hulen Shark

## 2020-08-14 NOTE — ADT AUTH CERT
Coronary Artery Bypass Graft (CABG) - Care Day 3 (8/13/2020) by Michelle Lazaro RN         Review Status  Review Entered    Completed  8/14/2020 09:13        Criteria Review       Care Day: 3 Care Date: 8/13/2020 Level of Care: ICU    Guideline Day 2    Level Of Care    (X) ICU    Clinical Status    (X) * Procedure completed    8/14/2020 9:13 AM EDT by Billy Padron      POD 2 CABG    (X) * Hemodynamic stability    8/14/2020 9:13 AM EDT by Billy Padron      Vitals: 98.7, , RR 20, BP 97/52, 89% on RA - recheck 94% on Ra    (X) * No evidence of myocardial ischemia    (X) * Mechanical ventilation absent    Activity    (X) * Up to chair    8/14/2020 9:13 AM EDT by Billy Padron      up to chair for meals    Routes    (X) * Clear liquid diet, advance as tolerated    8/14/2020 9:13 AM EDT by Billy Padron      cardiac diet    (X) Oral or parenteral medications    8/14/2020 9:13 AM EDT by Naun Saldaña see med list below    Interventions    (X) Glucose control    8/14/2020 9:13 AM EDT by Billy Padron      POC glucose 4x daily, SSI    Medications    (X) Aspirin    8/14/2020 9:13 AM EDT by Billy Padron      ASA 81mg PO daily    (X) Beta-blocker    8/14/2020 9:13 AM EDT by Billy Padron      Lopressor 12.5mg PO BID    (X) Statin medication    8/14/2020 9:13 AM EDT by Billy Padron      Lipitor 40mg PO nightly    * Milestone    Additional Notes    8/13/2020    LOC - IP - ICU    Cardiothoracic surgery note:    POD 2:    CORONARY ARTERY BYPASS GRAFTING X3, LEFT ATRIAL APPENDAGE CLIP, INTERNAL MAMMARY ARTERY, SAPHENOUS VEIN GRAFT, ON PUMP, 5 LEVEL BILATERAL INTERCOSTAL NERVE BLOCK       Subjective:    Dietary Intake: good    no Nausea    Pain Control: controlled    Complaints: mild post op pain near chest tubes    Bowels: have not moved    [facility] course:    8/13 up in chair, appears comfortable. Remains SR.        General appearance: awake, alert, up in chair    Lungs: diminished in bases    Heart: S1S2 RRR; SR on monitor, HR in the upper 80's/low 90's    Chest: symmetrical expansion with inspiration and expirations; no rocking of sternum noted    Abdomen: soft, non-tender    Bowel sounds: normoactive    Kidneys: UOP satisfactory ml in 24 hrs; Cr 1.7    Wound/Incisions: Midsternal incision CDI; RLE incisions with dressings CDI; Pacing wires removed 8/12    Extremities: BLE pulses palpable; minimal tibial swelling noted    Neurological: non focal exam, speech strong    Chest tubes/Drains: Chest tube # 1 with 60-= 230 ml serosanguinous drainage in 24 hours overnight; Chest tube # 2 with 60-= 260 ml serosanguinous drainage in 24 hours overnight; no airleaks noted in either tube         Assessment:    Post-op: 2 days. Condition: In stable condition.         Plan:    1. Cardiovascular: s/p CABG- ASA, BB, Statin          Still requiring Levo - will wean as tolerated and transition.         2. Pulmonary: needs expansion - IS, OOBTC, PT/OT, ambulation. Is saturating 95% on RA, appears comfortable.         3. Neurology: analgesia as needed         4. Nephrology: fluid mgt- diurese as tolerated, continue IV lasix 20 mg QID         5. Endocrinology: d/c insulin gtt, start SSI.         6. Hematology: acute blood loss anemia    Hgb 7.1 this morning. Giving 1 unit RBC. Will pre-medicate with IV Benadryl and Tylenol given pt's history of blood transfusion reaction. Added Iron PO daily.         7. Microbiology: nothing at this time         8. Nutrition: cardiac         9. Labs: monitor         10. Post-op Drains/Wires: will remove mediastinal chest tube.         11. D/C Goals: home with [facility] in next 2-3 days         12. Continue post-op care of patient in the ICU       RN note: 1050    Beginning Blood transfusion per MD order.  Pt.  Will previous transfusion reaction, Carolina MELCHOR aware of hx.  Pre-medicated patient with 12.5mg benadryl and Tylenol.  Patient monitored with VSS for first 15 minutes.  Will continue to monitor         RN not 1545:    Chest tubes meet criteria to remove per open heart protocol. If chest tubes do not meet criteria, a specific order has been entered to remove by ROMERO Esposito NP  No air leak and no crepitus noted.  Pt instructed on procedure.   Dressings removed.  Incision within normal limits.  Site cleansed and prepped per protocol.  Mediastinal Chest tube removed without difficulty.  Vaseline gauze and dry sterile dressing applied.  Bilateral breath sounds audible.  O2Sats 95% on room air.    Patient tolerated well.        Vitals: 98.7, , RR 20, BP 97/52, 89% on RA - recheck 94% on Ra          Sodium: 130 (L)    BUN: 27 (H)    Creatinine: 1.7 (H)    GFR Non-: 42 (A)    GFR : 51 (A)    Magnesium: 2.70 (H)    Glucose: 133 (H)    POC Glucose: 182 (H)    Calcium: 8.0 (L)    WBC: 11.4 (H)    RBC: 2.33 (L)    Hemoglobin Quant: 7.1 (L)    Hematocrit: 21.4 (L)    RDW: 15.9 (H)       8/13/2020 12:36    POC Glucose: 242 (H)       8/13/2020 19:09    POC Glucose: 284 (H)       8/13/2020 21:49    POC Glucose: 242 (H)       Chest xray: slightly improved aeration in the left lung base       Meds:    Tylenol 650mg PO 4x daily    Albuterol 2.5mg q4h    Ancef 2g IV q8h    Plavix 75mg PO daily    Benadryl 12.5m IV x 1    Pepcid 20mg PO BID    Iron 325mg PO BID    Lasix 20mg IV 4x daily    Lantus 26 units SC nightly    Humalog SC nightly SSI    Humalog SC TID    Klor-con 10meq PO TID    [place] 60mg PO daily    Insulin regular drip 1unit/hr - d/c'd 0800    Levophed drip 2mcg/min - d/c'd 1230    Toradol 15mg IV q6h PRN x 1    Morphine 4mg IV q2h PRN x 1    Oxycodone 5mg PO q4h PRN x 2    Oxycodone 10mg PO q4h PRN x 1       Orders: daily BMP, CBC, mag, cardiac diet, ambulate, acapella q4h, daily weight, IS, I&O, continuous oximetry, telemetry, up in chair for meals           Coronary Artery Bypass Graft (CABG) - Care Day 1 (8/11/2020) by Meg Rodriguez Marco Antonio Khoury RN         Review Status  Review Entered    Completed  8/12/2020 16:25        Criteria Review       Care Day: 1 Care Date: 8/11/2020 Level of Care: ICU    Guideline Day 1    Level Of Care    (X) OR to ICU    8/12/2020 4:25 PM EDT by Jazmyne Cadena      ICU    Clinical Status    (X) * Clinical Indications met [J]    8/12/2020 4:25 PM EDT by Jazmyne Cadena      indications met    Activity    (X) Bed rest with head of bed elevated 40 degrees    Routes    (X) IV fluids, medications    8/12/2020 4:25 PM EDT by Jazmyne Cadena      NS 50ml/hr  D5 ½ Ns 75ml/hr    Interventions    (X) Chest tube    (X) Oxygen    8/12/2020 4:25 PM EDT by Jazmyne Cadena      NC 2LPM    Medications    (X) Prophylactic antibiotics    8/12/2020 4:25 PM EDT by Jazmyne Cadena      Ancef 2g IV q8h  Ancef 2g IV x 1 OR  Vanc 1.5g IV q12h  Vanc 1.5g IV x 1 OR    (X) Possible aspirin    8/12/2020 4:25 PM EDT by Jazmyne Cadena      ASA 325mg PO x 1  ASA 81mg PO x 1    (X) Beta-blocker [L]    8/12/2020 4:25 PM EDT by Vincent Minus 3.125mg PO x 1    * Milestone    Additional Notes             8/11/2020    LOC -  - concurrent review - ICU       Cardiothoracic surgery note:          Procedure(s):    CORONARY ARTERY BYPASS GRAFTING X3, LEFT ATRIAL APPENDAGE CLIP, INTERNAL MAMMARY ARTERY, SAPHENOUS VEIN GRAFT, ON PUMP, 5 LEVEL BILATERAL INTERCOSTAL NERVE BLOCK    Complications: none    Findings: LAD OM2 RCA       Vitals:  98.4, HR 86, RR 17, BP 82/52, 98% on NC 2LPM       8/11/2020 03:29    Glucose: 129 (H)    Calcium: 9.1    Total Protein: 5.9 (L)    Cholesterol, Total: 108    HDL Cholesterol: 23 (L)    LDL Calculated: 35       8/11/2020 08:04    POC Glucose: 134 (H)    POC Sodium: 140    POC Potassium: 3.4 (L)    POC Hematocrit: 27.0 (L)    Glucose: 129 (H)    Hemoglobin A1C: 6.6    eAG (mg/dL): 142.7    WBC: 6.8    RBC: 3.45 (L)    Hemoglobin Quant: 10.7 (L)    Hematocrit: 30.7 (L)       8/11/2020 08:04    POC Glucose: 134 (H)    Hemoglobin Quant: 9.2 (L)    POC Hematocrit: 27.0 (L)       8/11/2020 09:19    POC Glucose: 218 (H)    Hemoglobin Quant: 8.5 (L)    POC Hematocrit: 25.0 (L)       8/11/2020 18:15    Magnesium: 2.80 (H)       pH, Arterial: 7.480 (H)    pCO2, Arterial: 38.0    pO2, Arterial: 273.9 (H)    HCO3, Arterial: 28.3    TCO2 (calc), Art: 30    Base Excess, Arterial: 5 (H)    O2 Sat, Arterial: 100       Chest xray: Streaky left basilar opacity, new.  Findings likely represent atelectasis or edema. Left-sided Drexel-Seth catheter with tip which appears curled upon itself in the region of the pulmonary outflow tract.        Meds:    Albuterol neb q4h    Humalog SC TID SSI    Humalog SC nightly SSI    Epinephrine drip 0.01mcg/kg/min OR    Insulin drip 1 unit/hr    Primacor 20mg IV 0.125mcg/kg/min    Levophed drip 2mcg/min    Albumin 25g IV PRN x 3    Oxycodone 5mg PO q4h PRN x 1    Potassium chloride 20meq IV x 2       Orders: NPO, acapella q4h, CT care, daily weight, IS, I&O, continuous oximetry           Coronary Artery Bypass Graft (CABG) - Clinical Indications for Procedure by Akash Manuel RN         Review Status  Review Entered    Completed  8/12/2020 16:23        Criteria Review       Clinical Indications for Procedure    Most Recent : Cindy Fine Most Recent Date: 8/12/2020 4:23 PM EDT    (X) Procedure is indicated for  1 or more  of the following  (1) (2) (3) (4) (5) (6):       (X) Three-vessel coronary artery stenosis (ie, 3 vessels with greater than or equal to 70% stenosis,       FFR less than 0.80, or iFR less than 0.89) [A] [B]       8/12/2020 4:23 PM EDT by Cindy Fine         LAD 90% stenosis   LCX 50-70 % stenosis  RCA 60-70% stenosis        Chest Pain - Care Day 4 (8/9/2020) by Akash Maser, RN         Review Status  Review Entered    Completed  8/12/2020 15:58        Criteria Review       Care Day: 4 Care Date: 8/9/2020 Level of Care:    Guideline Day 2    Level Of Care    (X) ICU, intermediate care, or telemetry to discharge    2020 3:58 PM EDT by Shamar Maravilla      PCU    Clinical Status    (X) * Hemodynamic stability    2020 3:58 PM EDT by Talib Runnin.8, HR 89, RR 18, /63, 95% on RA    ( ) * Acute coronary syndrome ruled out    (X) * Pain absent or managed    (X) * Dangerous arrhythmia absent    ( ) * No identification of etiology of pain requiring inpatient care    ( ) * Discharge plans and education understood    Activity    (X) * Ambulatory    2020 3:58 PM EDT by Shamar Maravilla      up as tolerated    Routes    (X) * Oral hydration, medications, and diet    2020 3:58 PM EDT by Shamar Maravilla      cardiac diet  see meds below    Medications    (X) Possible aspirin or cardiac medications    2020 3:58 PM EDT by Shamar Maravilla      ASA 81mg PO daily    * Milestone    Additional Notes    2020    LOC - IP - concurrent review - PCU       Cardiology note:    54 y.o. male who was admitted to [facility] 2020 for SOB. Troponin elevated. Transferred to [facility] and [address] [place] [address] 2020 showed multi vessel CAD, CT surgery referral. Plan for CABG 2020. Rhythm has been sinus.         Subjective: Denies chest pain, shortness of breath, palpitations and dizziness    Assessment:    CAD/NSTEMI: awaiting CABG, multivessel on angiogram 2020    History of nonischemic cardiomyopathy, now with ischemia: EF 25-30% on echo 2020; EF 15-20% in                 - s/p ICD 2014                - historically due to alcohol abuse    Chronic combined systolic and diastolic CHF: compensated    Mitral regurgitation: severe on LHC, mild on echo 2020    HTN: controlled    HLD: controlled, LDL 42, continue statin    DM: hgb a1c 6.7    Hypothyroidism    History of alcohol abuse: quit     Anemia         Plan: 1. Continue aspirin, statin, carvedilol, lisinopril, spironolactone, lasix    2. Pre op testing per CT surgery    3.  Anticipate CABG with MV repair 8/11/2020    4. Cardiology will not see tomorrow unless needed, will see post op. Cardiothoracic surgery:    Assessment/Plan:    Pre op    Plan for surgery with Dr. Boni Nevarez on Tuesday 8/11/20 8/5 [id number] : non-detected           IM note:    Patient is up in bed, comfortable, not in distress. Denies any chest pain or shortness of breath. No new event overnight noted. Patient feeling well and ambulating on the unit independently    Respiratory:  Normal respiratory effort. Clear to auscultation, bilaterally without Rales/Wheezes/Rhonchi. Cardiovascular: Regular rate and rhythm with normal S1/S2 without murmurs, rubs or gallops. Abdomen: Soft, non-tender, non-distended with normal bowel sounds. Musculoskeletal: No clubbing, cyanosis or edema bilaterally.  Full range of motion without deformity. Skin: Skin color, texture, turgor normal.  No rashes or lesions. Neurologic:  Neurovascularly intact without any focal sensory/motor deficits. Cranial nerves: II-XII intact, grossly non-focal.    Psychiatric: Alert and oriented, thought content appropriate, normal insight    Assessment/Plan:         CAD - multivessel CAD on [facility] 6 August w/out complications. Evaluated by  CT surgery for a high risk staged PCI.      Awaiting a CABG procedure on 8/11/20         HTN - w/out known CAD and no evidence of active signs/sxs of ischemia/failure. Currently controlled on home meds w/ vitals reviewed and documented as above.         HyperLipidemia - controlled on home Statin. Continue, w/ f/u and med adjustment w/ PCP         DM2 - controlled on home Insulin - continued.  Follow FSBS/SSI high regimen. Last HbA1c 7.4% dated May 2020. Anticipate resuming/continuing home regimen at discharge.         HypoThyroid - clinically euthyroid on oral replacement therapy. Continue, w/ outpt monitoring as previously arranged.         Obesity -  With Body mass index is 33.15 kg/m².  Complicating assessment and treatment. Placing patient at risk for multiple co-morbidities as well as early death and contributing to the patient's presentation.  Counseled on weight loss.         DVT Prophylaxis: Lovenox    Diet: DIET CARDIAC;    Code Status: Full Code         PT/OT Eval Status: Ambulatory         Vitals: 98.8, HR 89, RR 18, /63, 95% on RA          Sodium: 134 (L)    Potassium: 3.4 (L)    Chloride: 98 (L)       RBC: 3.60 (L)    Hemoglobin Quant: 11.2 (L)    Hematocrit: 32.1 (L)       Meds:    Coreg 25mg PO BID    Lovenox 40mg SC daily    Lasix 40mg PO BID    Glipizide 5mg PO BID    Lantus 35units SC nightly    Humalog SC TID SSI    Humalog SC nightly SSI    Venofer 200mg IV x 1    Lisinopril 5mg PO daily    Mag ox 400mg PO BID    Pravachol 40mg PO nightly    Aldactone 25mg PO daily    [place] 60mg PO daily       Orders: cardiac diet, telemetry, up as tolerated

## 2020-08-14 NOTE — PROGRESS NOTES
Occupational Therapy  Facility/Department: [facility] B2 - ICU  Daily Treatment Note  NAME: Harjeet Anderson  : 1965  MRN: [id number]    Date of Service: 2020    Discharge Recommendations:  24 hour supervision or assist       Assessment   Performance deficits / Impairments: Decreased functional mobility ; Decreased ADL status; Decreased endurance;Decreased high-level IADLs  Assessment: pt normally independent with high level ADL's & functional mobility, pt s/p CABG 8, requiring mod assist with LE self care & min assist with UE self care, min assist with transfers; pt to benefit from skilled OT services  OT Education: OT Role;Transfer Training;Plan of Care;Precautions  Patient Education: Disease specific ed:  sternal precautions, mobility and transfers,  REQUIRES OT FOLLOW UP: Yes  Activity Tolerance  Activity Tolerance: Patient Tolerated treatment well  Activity Tolerance: decreased O 2 sats on RA with minimal exertion, but reboundsin less than a minute with cues for pursed lip breathing  Safety Devices  Type of devices: Call light within reach; Left in bed;Nurse notified         Patient Diagnosis(es): There were no encounter diagnoses.       has a past medical history of Acute osteomyelitis of left foot ([address] Acute osteomyelitis of right foot ([address] Ascites, Blood transfusion reaction, Burst fracture of lumbar vertebra ([address] Cellulitis of left foot, Cellulitis of right lower extremity, Diabetes ([address] Diabetic ulcer of right foot ([address] Diabetic ulcer of 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 2 diabetes mellitus, with necrosis of bone ([address] ETOH abuse, Fracture of tibial plateau, High cholesterol, HTN (hypertension), MI (myocardial infarction) ([address] MRSA (methicillin resistant staph aureus) culture positive, Neuropathic ulcer of left foot, limited to breakdown of skin ([address] Neuropathic ulcer of toe ([address] NSVT (nonsustained ventricular tachycardia) ([address] Pleural effusion due to congestive heart failure ([facility] Smoker, and Systolic CHF, acute ([facility] [address] has a past surgical history that includes knee surgery (Left); other surgical history (Bilateral, 9/17/15); Toe amputation; Foot Tendon Surgery (Right, 2016); Foot Tendon Surgery (Left, 06/30/2017); other surgical history (Left, 08/17/2017); Foot surgery (Left, 09/27/2017); other surgical history (Left, 12/07/2017); other surgical history (Right, 01/04/2018); Foot Amputation (Left, 08/09/2018); pr part remv othr tarsal/metatarsal (Left, 8/9/2018); pr office/outpt visit,procedure only (Left, 8/23/2018); other surgical history (Left, 11/01/2018); pr part remv othr tarsal/metatarsal (Left, 11/1/2018); Foot surgery (Left, 01/31/2019); Foot Debridement (Left, 1/31/2019); Cardiac defibrillator placement (01/29/2014); and Coronary artery bypass graft (N/A, 8/11/2020).     Restrictions  Restrictions/Precautions  Restrictions/Precautions: General Precautions, Fall Risk, Cardiac  Position Activity Restriction  Sternal Precautions: No Pushing, No Pulling, 5# Lifting Restrictions  Other position/activity restrictions: ambulate, up in chair, 1 chest tube, diabetic ulcers on R Plantar Foot, L Plantar Foot and L 4th Toe  Subjective   General  Chart Reviewed: Yes  Patient assessed for rehabilitation services?: Yes  Family / Caregiver Present: No  Referring Practitioner: Funmi Mohan  Diagnosis: CAD s/p CABG x3 8/11/2020  Subjective  Subjective: Pt agreeable to OT  General Comment  Comments: RN cleared pt for OT; pt up in chair,      Orientation  Orientation  Overall Orientation Status: Within Functional Limits  Objective    ADL  Grooming: Setup(high garcia's in bed to wash face & hands)        Balance  Sitting Balance: Supervision(seated EOB)  Standing Balance: Contact guard assistance(with 4WW)  Standing Balance  Time: 3 minutes x 1  Activity: functional mobility  in hallway     Transfers  Sit to stand: Minimal assistance(from chair)  Stand to sit: Contact guard assistance  Transfer Comments: cues for sternal precautions        Cognition  Overall Cognitive Status: WFL    Type of ROM/Therapeutic Exercise: AROM  Comment: BUE in bed  Hand flex/ext: x  15  Reps  Wrist flex/ext:  X  15 Reps  Elbow flex/ext:  x   15 Reps  Forearm sup/pron:  x  15  Reps       Plan   Plan  Times per week: 4-5x  Current Treatment Recommendations: Functional Mobility Training, Endurance Training, Self-Care / ADL, Safety Education & Training           AM-PAC Score        AM-PAC Inpatient Daily Activity Raw Score: 15 (08/14/20 1032)  AM-PAC Inpatient ADL T-Scale Score : 34.69 (08/14/20 1032)  ADL Inpatient CMS 0-100% Score: 56.46 (08/14/20 1032)  ADL Inpatient CMS G-Code Modifier : CK (08/14/20 1032)    Goals  Short term goals  Time Frame for Short term goals: for 1 week (8/19) unless noted  Short term goal 1: Perform functional transfers with SBA with AD as needed; 8/14 progressing, requires CGA/min assist  Short term goal 2: Perform LE ADL tasks with min A  Short term goal 3: Follow sternal precautions during ADLs and transfers with SBA by 8/17  Patient Goals   Patient goals :  \"Go home\"       Therapy Time   Individual Concurrent Group Co-treatment   Time In [address] [facility]         Time Out 1000         Minutes [address] [place] [address]

## 2020-08-14 NOTE — PROGRESS NOTES
[address] Daily Progress Note      Admit Date:  8/6/2020    Subjective:  Mr. Lexi Connell is seen for cardiology follow up   He is s/p CABG 8.11.20  He had a lot of chest pain last night after tube removal.  Denies chest pain, shortness of breath, edema, dizziness, palpitations and syncope. He is off levophed drip He is extubated and now he is up in chair    [place]:  Admitted to [facility] with NSTEMI and acute pulmonary edema  8.4.20   Transferred here for cath and revascularization. Found to have three vessel CAD. Echo last week showed EF 25-30% severe proportionate MRPatrica Past Medical History: LOV with DR Cleo Kessler May 2020  PMH CMP. He is sp ICD placement  by Dr. Lydia Yung for non ischemic CM with low EF that failed to improve with guide line based medical therapy.  Tawanna Garcia is no longer drinking ETOH products. has a past medical history of Acute osteomyelitis of left foot ([address] Acute osteomyelitis of right foot ([address] Ascites, Blood transfusion reaction, Burst fracture of lumbar vertebra ([address] Cellulitis of left foot, Cellulitis of right lower extremity, Diabetes ([address] Diabetic ulcer of right foot ([address] Diabetic ulcer of 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 2 diabetes mellitus, with necrosis of bone ([address] Fracture of tibial plateau, MRSA (methicillin resistant staph aureus) culture positive, Neuropathic ulcer of left foot, limited to breakdown of skin ([address] Neuropathic ulcer of toe ([address] Pleural effusion due to congestive heart failure ([address] Smoker, and Systolic CHF, acute ([address]     ROS:  12 point ROS negative in all areas as listed below except as in [place]  Constitutional, EENT, Cardiovascular, pulmonary, GI, , Musculoskeletal, skin, neurological, hematological, endocrine, Psychiatric    Past Medical History:   Diagnosis Date    Acute osteomyelitis of left foot ([address] 9/27/2017    Acute osteomyelitis of right foot ([address] 4/25/2016    Ascites     Blood transfusion reaction     Burst fracture of AMPUTATION      OTHER SURGICAL HISTORY Left 12/07/2017    Debridement infected bone and tissue left foot; ulcer debridement left foot with graft application with dr mathur     OTHER SURGICAL HISTORY Right 01/04/2018    DEBRIDEMENT INFECTED BONE AND TISSUE RIGHT FOOT, ULCER DEBRIDEMENT RIGHT FOOT WITH GRAFT APPLICATION    OTHER SURGICAL HISTORY Left 11/01/2018    Procedure: PARTIAL RESECTION LEFT METATARSAL, ULCER DEBRIDEMENT LEFT FOOT WITH GRAFT APPLICATION     UT OFFICE/OUTPT VISIT,PROCEDURE ONLY Left 8/23/2018    ULCER DEBRIDEMENT LEFT FOOT WITH GRAFT APPLICATION performed by Tawnya Dumas DPM at [facility] TARSAL/METATARSAL Left 8/9/2018    PARTIAL FOOT AMPUTATION WITH GRAFT APPLICATION performed by Tawnya Dumas DPM at [facility] TARSAL/METATARSAL Left 11/1/2018    PARTIAL RESECTION LEFT METATARSAL, ULCER DEBRIDEMENT LEFT FOOT WITH GRAFT APPLICATION performed by Tawnya Dumas DPM at [facility] TOE AMPUTATION      2 toes       Objective:   BP 99/66   Pulse 102   Temp 98 °F (36.7 °C) (Axillary)   Resp 18   Ht 5' 10\" (1.778 m)   Wt 235 lb 14.4 oz (107 kg)   SpO2 94%   BMI 33.85 kg/m²       Intake/Output Summary (Last 24 hours) at 8/14/2020 0902  Last data filed at 8/14/2020 9062  Gross per 24 hour   Intake 685 ml   Output 1490 ml   Net -805 ml       TELEMETRY: NSR  Physical Exam:  General: No Respiratory distress, appears well developed and well nourished. Eyes:  Sclera nonicteric  Nose/Sinuses:  negative findings: nose shows no deformity, asymmetry, or inflammation, nasal mucosa normal, septum midline with no perforation or bleeding  Back:  no pain to palpation  Joint:  no active joint inflammation  Musculoskeletal:  negative  Skin:  Warm and dry  Neck:  Negative for JVD and Carotid Bruits. Chest:   Crackles bilat ant and post.Cardiovascular:  RRR, S1S2 normal, no murmur, no rub or thrill.   Abdomen:  Soft normal liver and spleen  Extremities:  1+ edema,  No mellitus  Hypothyroidism  Primary preventive therapy ICD  Patient Active Problem List    Diagnosis Date Noted    Alcoholic cardiomyopathy 86/85/4184     Priority: High     Class: Chronic    CAD in native artery 08/06/2020    Chest pain 08/06/2020    Obesity 08/06/2020    NSTEMI (non-ST elevated myocardial infarction) ([address]     Acute respiratory failure ([address] 08/04/2020    Community acquired pneumonia     Septicemia ([address]     NSVT (nonsustained ventricular tachycardia) ([address] 11/27/2019    Diabetic ulcer of left foot associated with type 2 diabetes mellitus, with muscle involvement without evidence of necrosis ([address] 04/27/2017    Acquired hypothyroidism 04/18/2017    Diabetic ulcer of right foot associated with type 2 diabetes mellitus, limited to breakdown of skin ([address] 01/29/2017    Hallux valgus 11/11/2016    Callus of foot 11/03/2016    Onychomycosis 04/02/2016    Dystrophic nail 04/02/2016    Hyperlipidemia 02/23/2016    Automatic implantable cardioverter-defibrillator in situ 01/30/2014    Hypertension     Uncontrolled type 2 diabetes mellitus with diabetic polyneuropathy, with long-term current use of insulin ([address]     DM (diabetes mellitus) ([facility] 09/18/2012       Plan:  1. Stable post op CABG  2. Inotropics weaned off  3. Once BP is better will institute aldactone for low EF  4. Will benefit from long term ace inhibitors  and guideline recommended beta blockers for low EF   5.  Repeat complete echo in 3 months for EF and mitral regurgitation    Core Measures:  · Discharge instructions:   · LVEF documented: 25-30%  · ACEI for LV dysfunction: will add in next few days  · Smoking Cessation:NA  I have spent 25   minutes of face to face time with the patient with more than 50% spent counseling and coordinating care for this patient    Makeda Baker MD 8/14/2020 9:02 AM

## 2020-08-14 NOTE — CARE COORDINATION
Chart review completed. Patient a  54year old male, admitted for CAD. Hospital day 8, currently in ICU level of care following CABG. Writer met with patient at bedside and states his mom is able to provide 24 hour care and agreeable to [facility]. All arrangements thus far have been made. Please advise should any additional needs arise.  Daniela Linda RN

## 2020-08-15 VITALS
SYSTOLIC BLOOD PRESSURE: 123 MMHG | WEIGHT: 237.5 LBS | BODY MASS INDEX: 34 KG/M2 | HEART RATE: 88 BPM | RESPIRATION RATE: 18 BRPM | HEIGHT: 70 IN | TEMPERATURE: 98.2 F | DIASTOLIC BLOOD PRESSURE: 65 MMHG | OXYGEN SATURATION: 98 %

## 2020-08-15 LAB
ANION GAP SERPL CALCULATED.3IONS-SCNC: 12 MMOL/L (ref 3–16)
BUN BLDV-MCNC: 36 MG/DL (ref 7–20)
CALCIUM SERPL-MCNC: 8.6 MG/DL (ref 8.3–10.6)
CHLORIDE BLD-SCNC: 97 MMOL/L (ref 99–110)
CO2: 23 MMOL/L (ref 21–32)
CREAT SERPL-MCNC: 1.1 MG/DL (ref 0.9–1.3)
GFR AFRICAN AMERICAN: >60
GFR NON-AFRICAN AMERICAN: >60
GLUCOSE BLD-MCNC: 224 MG/DL (ref 70–99)
GLUCOSE BLD-MCNC: 251 MG/DL (ref 70–99)
HCT VFR BLD CALC: 24 % (ref 40.5–52.5)
HEMOGLOBIN: 8.2 G/DL (ref 13.5–17.5)
MAGNESIUM: 2.4 MG/DL (ref 1.8–2.4)
MCH RBC QN AUTO: 31.5 PG (ref 26–34)
MCHC RBC AUTO-ENTMCNC: 34.1 G/DL (ref 31–36)
MCV RBC AUTO: 92.4 FL (ref 80–100)
PDW BLD-RTO: 14.9 % (ref 12.4–15.4)
PERFORMED ON: ABNORMAL
PLATELET # BLD: 207 K/UL (ref 135–450)
PMV BLD AUTO: 7.3 FL (ref 5–10.5)
POTASSIUM SERPL-SCNC: 4.5 MMOL/L (ref 3.5–5.1)
RBC # BLD: 2.59 M/UL (ref 4.2–5.9)
SODIUM BLD-SCNC: 132 MMOL/L (ref 136–145)
WBC # BLD: 10.2 K/UL (ref 4–11)

## 2020-08-15 PROCEDURE — 80048 BASIC METABOLIC PNL TOTAL CA: CPT

## 2020-08-15 PROCEDURE — 6370000000 HC RX 637 (ALT 250 FOR IP): Performed by: NURSE PRACTITIONER

## 2020-08-15 PROCEDURE — 94640 AIRWAY INHALATION TREATMENT: CPT

## 2020-08-15 PROCEDURE — 6370000000 HC RX 637 (ALT 250 FOR IP): Performed by: THORACIC SURGERY (CARDIOTHORACIC VASCULAR SURGERY)

## 2020-08-15 PROCEDURE — 99024 POSTOP FOLLOW-UP VISIT: CPT | Performed by: THORACIC SURGERY (CARDIOTHORACIC VASCULAR SURGERY)

## 2020-08-15 PROCEDURE — 83735 ASSAY OF MAGNESIUM: CPT

## 2020-08-15 PROCEDURE — 2580000003 HC RX 258: Performed by: THORACIC SURGERY (CARDIOTHORACIC VASCULAR SURGERY)

## 2020-08-15 PROCEDURE — 6370000000 HC RX 637 (ALT 250 FOR IP): Performed by: INTERNAL MEDICINE

## 2020-08-15 PROCEDURE — 6360000002 HC RX W HCPCS: Performed by: THORACIC SURGERY (CARDIOTHORACIC VASCULAR SURGERY)

## 2020-08-15 PROCEDURE — 94669 MECHANICAL CHEST WALL OSCILL: CPT

## 2020-08-15 PROCEDURE — 85027 COMPLETE CBC AUTOMATED: CPT

## 2020-08-15 RX ORDER — SPIRONOLACTONE 25 MG/1
25 TABLET ORAL 2 TIMES DAILY
Qty: 14 TABLET | Refills: 0 | Status: SHIPPED | OUTPATIENT
Start: 2020-08-15 | End: 2020-09-15 | Stop reason: ALTCHOICE

## 2020-08-15 RX ORDER — ATORVASTATIN CALCIUM 40 MG/1
40 TABLET, FILM COATED ORAL NIGHTLY
Qty: 30 TABLET | Refills: 0 | Status: SHIPPED | OUTPATIENT
Start: 2020-08-15 | End: 2020-09-15 | Stop reason: ALTCHOICE

## 2020-08-15 RX ORDER — CLOPIDOGREL BISULFATE 75 MG/1
75 TABLET ORAL DAILY
Qty: 30 TABLET | Refills: 0 | Status: SHIPPED | OUTPATIENT
Start: 2020-08-15 | End: 2020-09-15 | Stop reason: ALTCHOICE

## 2020-08-15 RX ORDER — OXYCODONE HYDROCHLORIDE 5 MG/1
5 TABLET ORAL EVERY 6 HOURS PRN
Qty: 28 TABLET | Refills: 0 | Status: SHIPPED | OUTPATIENT
Start: 2020-08-15 | End: 2020-08-22

## 2020-08-15 RX ORDER — FUROSEMIDE 40 MG/1
40 TABLET ORAL 2 TIMES DAILY
Qty: 14 TABLET | Refills: 0 | Status: SHIPPED | OUTPATIENT
Start: 2020-08-15 | End: 2020-09-14

## 2020-08-15 RX ORDER — FERROUS SULFATE 325(65) MG
325 TABLET ORAL 2 TIMES DAILY WITH MEALS
Qty: 14 TABLET | Refills: 0 | Status: SHIPPED | OUTPATIENT
Start: 2020-08-15 | End: 2020-08-24 | Stop reason: ALTCHOICE

## 2020-08-15 RX ORDER — POLYETHYLENE GLYCOL 3350 17 G/17G
17 POWDER, FOR SOLUTION ORAL DAILY
Qty: 7 EACH | Refills: 1 | Status: SHIPPED | OUTPATIENT
Start: 2020-08-15 | End: 2020-08-22

## 2020-08-15 RX ORDER — FAMOTIDINE 20 MG/1
20 TABLET, FILM COATED ORAL DAILY
Qty: 30 TABLET | Refills: 0 | Status: SHIPPED | OUTPATIENT
Start: 2020-08-15 | End: 2020-11-16

## 2020-08-15 RX ADMIN — INSULIN LISPRO 6 UNITS: 100 INJECTION, SOLUTION INTRAVENOUS; SUBCUTANEOUS at 08:26

## 2020-08-15 RX ADMIN — CLOPIDOGREL BISULFATE 75 MG: 75 TABLET ORAL at 08:25

## 2020-08-15 RX ADMIN — ALBUTEROL SULFATE 2.5 MG: 2.5 SOLUTION RESPIRATORY (INHALATION) at 08:03

## 2020-08-15 RX ADMIN — METOPROLOL TARTRATE 25 MG: 25 TABLET, FILM COATED ORAL at 08:24

## 2020-08-15 RX ADMIN — MAGNESIUM CITRATE 296 ML: 1.75 LIQUID ORAL at 09:51

## 2020-08-15 RX ADMIN — FERROUS SULFATE TAB 325 MG (65 MG ELEMENTAL FE) 325 MG: 325 (65 FE) TAB at 08:24

## 2020-08-15 RX ADMIN — FAMOTIDINE 20 MG: 20 TABLET, FILM COATED ORAL at 08:25

## 2020-08-15 RX ADMIN — MUPIROCIN: 20 OINTMENT TOPICAL at 08:25

## 2020-08-15 RX ADMIN — SPIRONOLACTONE 25 MG: 25 TABLET ORAL at 08:25

## 2020-08-15 RX ADMIN — POLYETHYLENE GLYCOL 3350 17 G: 17 POWDER, FOR SOLUTION ORAL at 08:25

## 2020-08-15 RX ADMIN — ASPIRIN 81 MG: 81 TABLET ORAL at 08:24

## 2020-08-15 RX ADMIN — ALBUTEROL SULFATE 2.5 MG: 2.5 SOLUTION RESPIRATORY (INHALATION) at 12:10

## 2020-08-15 RX ADMIN — Medication 10 ML: at 08:26

## 2020-08-15 RX ADMIN — LEVOTHYROXINE, LIOTHYRONINE 60 MG: 19; 4.5 TABLET ORAL at 08:25

## 2020-08-15 RX ADMIN — FUROSEMIDE 40 MG: 40 TABLET ORAL at 08:24

## 2020-08-15 RX ADMIN — Medication 15 ML: at 08:25

## 2020-08-15 RX ADMIN — OXYCODONE 5 MG: 5 TABLET ORAL at 04:16

## 2020-08-15 ASSESSMENT — PAIN SCALES - WONG BAKER
WONGBAKER_NUMERICALRESPONSE: 0

## 2020-08-15 ASSESSMENT — PAIN SCALES - GENERAL
PAINLEVEL_OUTOF10: 2
PAINLEVEL_OUTOF10: 4
PAINLEVEL_OUTOF10: 3

## 2020-08-15 ASSESSMENT — PAIN DESCRIPTION - PAIN TYPE: TYPE: SURGICAL PAIN

## 2020-08-15 NOTE — DISCHARGE INSTR - COC
Continuity of Care Form    Patient Name: Kennedy Ontiveros   :  1965  MRN:  [id number]    Admit date:  2020  Discharge date:  ***    Code Status Order: Full Code   Advance Directives:   Advance Care Flowsheet Documentation     Date/Time Healthcare Directive Type of Healthcare Directive Copy in [address] Agent's Name Healthcare Agent's Phone Number    20 [phone]  No, patient does not have an advance directive for healthcare treatment -- -- -- -- --          Admitting Physician:  Evette Warren MD  PCP: Jessica Hackett MD    Discharging Nurse: [facility] Unit/Room#: 2134/2442-14  Discharging Unit Phone Number: ***    Emergency Contact:   Extended Emergency Contact Information  Primary Emergency Contact: Luda Lua  Address: [address] [place] [address] [place]  Phone: [phone]  Relation: Parent  Secondary Emergency Contact: Jayne Leyden  Mobile Phone: [phone]  Relation: Brother/Sister    Past Surgical History:  Past Surgical History:   Procedure Laterality Date    CARDIAC DEFIBRILLATOR PLACEMENT  2014    ICD Placement    CORONARY ARTERY BYPASS GRAFT N/A 2020    CORONARY ARTERY BYPASS GRAFTING X3, LEFT ATRIAL APPENDAGE CLIP, INTERNAL MAMMARY ARTERY, SAPHENOUS VEIN GRAFT, ON PUMP, 5 LEVEL BILATERAL INTERCOSTAL NERVE BLOCK performed by Evette Warren MD at [facility] Left 2018    PARTIAL FOOT AMPUTATION w. Dr Katie Savage Left 2019    EXOSTECTOMY LEFT FOOT, ULCER DEBRIDEMENT LEFT FOOT WITH GRAFT APPLICATION performed by Kaushik Gannon DPM at [facility] Left 2017    LEFT FOOT ULCER [address], POSSIBLE SECOND METATARSAL    FOOT SURGERY Left 2019    Exostectomy left foot, ulcer debridement with graft application left foot    FOOT TENDON SURGERY Right 2016    FOOT TENDON SURGERY Left 2017    KNEE SURGERY Left     OTHER SURGICAL HISTORY Bilateral 9/17/15    amputation 2nd digit bilateral, flexor tenotomy right hallux    OTHER SURGICAL HISTORY Left 08/17/2017    PARTIAL LEFT FOOT AMPUTATION      OTHER SURGICAL HISTORY Left 12/07/2017    Debridement infected bone and tissue left foot; ulcer debridement left foot with graft application with dr mathur     OTHER SURGICAL HISTORY Right 01/04/2018    DEBRIDEMENT INFECTED BONE AND TISSUE RIGHT FOOT, ULCER DEBRIDEMENT RIGHT FOOT WITH GRAFT APPLICATION    OTHER SURGICAL HISTORY Left 11/01/2018    Procedure: PARTIAL RESECTION LEFT METATARSAL, ULCER DEBRIDEMENT LEFT FOOT WITH GRAFT APPLICATION     CT OFFICE/OUTPT VISIT,PROCEDURE ONLY Left 8/23/2018    ULCER DEBRIDEMENT LEFT FOOT WITH GRAFT APPLICATION performed by Schuyler Montiel DPM at [facility] TARSAL/METATARSAL Left 8/9/2018    PARTIAL FOOT AMPUTATION WITH GRAFT APPLICATION performed by Schuyler Montiel DPM at [facility] TARSAL/METATARSAL Left 11/1/2018    PARTIAL RESECTION LEFT METATARSAL, ULCER DEBRIDEMENT LEFT FOOT WITH GRAFT APPLICATION performed by Schuyler Montiel DPM at [facility] TOE AMPUTATION      2 toes       Immunization History:   Immunization History   Administered Date(s) Administered    Influenza, Intradermal, Preservative free 11/03/2015    Influenza, Intradermal, Quadrivalent, Preservative Free 01/17/2017    Influenza, MDCK Quadv, IM, PF (Flucelvax 4 yrs and older) 09/28/2017    Pneumococcal Conjugate 13-valent (Cmyjrhn57) 06/20/2016    Pneumococcal Polysaccharide ([address]05) 09/06/2017    Tdap (Boostrix, Adacel) 11/03/2015       Active Problems:  Patient Active Problem List   Diagnosis Code    DM (diabetes mellitus) ([facility] E11.9    Hypertension I10    Uncontrolled type 2 diabetes mellitus with diabetic polyneuropathy, with long-term current use of insulin ([facility]) E11.42, Z79.4, B86.91    Alcoholic cardiomyopathy D17.1    Automatic implantable cardioverter-defibrillator in situ Z95.810    Hyperlipidemia E78.5    Onychomycosis B35.1    Dystrophic nail L60.3    Callus of foot L84    Hallux valgus M20.10    Diabetic ulcer of right foot associated with type 2 diabetes mellitus, limited to breakdown of skin ([facility]) E11.621, L97.511    Acquired hypothyroidism E03.9    Diabetic ulcer of left foot associated with type 2 diabetes mellitus, with muscle involvement without evidence of necrosis ([facility]) E11.621, L97.525    NSVT (nonsustained ventricular tachycardia) ([facility]) I47.2    Acute respiratory failure ([facility]) J96.00    Community acquired pneumonia J18.9    Septicemia ([facility]) A41.9    NSTEMI (non-ST elevated myocardial infarction) ([facility] I21.4    CAD in native artery I25.10    Chest pain R07.9    Obesity E66.9       Isolation/Infection:   Isolation          No Isolation        Patient Infection Status     Infection Onset Added Last Indicated Last Indicated By Review Planned Expiration Resolved Resolved By    None active    Resolved    COVID-19 Rule Out 20 COVID-19 (Ordered)   20 Rule-Out Test Resulted    MRSA  16 Xi Laboy RN   20 Diomedes Dave, RN          Nurse Assessment:  Last Vital Signs: /65   Pulse 88   Temp 98.2 °F (36.8 °C) (Oral)   Resp 18   Ht 5' 10\" (1.778 m)   Wt 237 lb 8 oz (107.7 kg)   SpO2 95%   BMI 34.08 kg/m²     Last documented pain score (0-10 scale): Pain Level: 2  Last Weight:   Wt Readings from Last 1 Encounters:   08/15/20 237 lb 8 oz (107.7 kg)     Mental Status:  {IP PT MENTAL STATUS:}    IV Access:  {[facility] IV ACCESS:270044025}    Nursing Mobility/ADLs:  Walking   {CHP DME CJBQ:451236093}  Transfer  {CHP DME FQDE:059197454}  Bathing  {CHP DME FIJ}  Dressing  {CHP DME ZMZN:435083291}  Toileting  {CHP DME GFNL:767579777}  Feeding  {CHP DME DEUB:205913052}  Med Admin  {CHP DME DGEY:012530441}  Med Delivery   {[facility] MED Delivery:249012674}    Wound 1711   Wound Depth (cm) 0.2 cm 08/04/20 1711   Wound Surface Area (cm^2) 0.48 cm^2 08/04/20 1711   Wound Volume (cm^3) 0.1 cm^3 08/04/20 1711   Wound Assessment Clean;Dry; Intact 08/15/20 0807   Drainage Amount Scant 08/14/20 1600   Drainage Description Serosanguinous 08/14/20 1600   Odor None 08/14/20 1600   Margins Unattached edges 08/10/20 2020   Vanesa-wound Assessment Clean;Dry; Intact 08/14/20 1600   [place]%Wound Bed 25 08/09/20 1524   Yellow%Wound Bed 75 08/09/20 1524   Culture Taken No 08/04/20 1711   Number of days: 10       Wound 08/04/20 Toe (Comment  which one) Left 4th Toe (Active)   Wound Image   08/04/20 1711   Wound Diabetic 08/14/20 0408   Dressing Status Dry 08/10/20 2020   Dressing/Treatment Open to air 08/15/20 0807   Wound Cleansed Rinsed/Irrigated with saline 08/04/20 1711   Dressing Change Due 08/08/20 08/09/20 1523   Wound Length (cm) 0.5 cm 08/04/20 1711   Wound Width (cm) 0.5 cm 08/04/20 1711   Wound Depth (cm) 0.3 cm 08/04/20 1711   Wound Surface Area (cm^2) 0.25 cm^2 08/04/20 1711   Wound Volume (cm^3) 0.08 cm^3 08/04/20 1711   Wound Assessment Clean;Dry; Intact 08/14/20 1600   Drainage Amount None 08/14/20 1600   Odor None 08/09/20 1524   Margins Unattached edges 08/09/20 1524   Vanesa-wound Assessment Clean;Dry; Intact 08/14/20 1600   Yellow%Wound Bed 100 08/09/20 1524   Black%Wound Bed 25 08/12/20 1600   Culture Taken No 08/04/20 1711   Number of days: 10        Elimination:  Continence:   · Bowel: {YES / MO:48056}  · Bladder: {YES / YL:92790}  Urinary Catheter: {Urinary Catheter:905814483}   Colostomy/Ileostomy/Ileal Conduit: {YES / AR:87318}       Date of Last BM: constipation given mag citrate 8/15/20    Intake/Output Summary (Last 24 hours) at 8/15/2020 1000  Last data filed at 8/15/2020 0902  Gross per 24 hour   Intake 1000 ml   Output 250 ml   Net 750 ml     I/O last 3 completed shifts: In: 65 [P.O.:840;  I.V.:10]  Out: 850 [Urine:850]    Safety Concerns:     sternal precations    Impairments/Disabilities:      None    Nutrition Therapy:  Current Nutrition Therapy:   - Oral Diet:  Carb Control 4 carbs/meal (1800kcals/day)    Routes of Feeding: Oral  Liquids: Thin Liquids  Daily Fluid Restriction: yes - amount ***  Last Modified Barium Swallow with Video (Video Swallowing Test): {Done Not Done YTZE:565733311}    Treatments at the Time of Hospital Discharge:   Respiratory Treatments: ***  Oxygen Therapy:  {Therapy; copd oxygen:46665}  Ventilator:    {[facility] Vent PRHF:035613817}    Rehab Therapies: {THERAPEUTIC INTERVENTION:8716806761}  Weight Bearing Status/Restrictions: {[facility] Weight Bearin}  Other Medical Equipment (for information only, NOT a DME order):  {EQUIPMENT:186618354}  Other Treatments: ***    Patient's personal belongings (please select all that are sent with patient):  {CHP DME Belongings:026925430}    RN SIGNATURE:  {Esignature:566674405}    CASE MANAGEMENT/SOCIAL WORK SECTION    Inpatient Status Date: ***    Readmission Risk Assessment Score:  Readmission Risk              Risk of Unplanned Readmission:        19           Discharging to Facility/ Agency   · Name: [facility]  · Address:  · Phone:[phone]  · Fax:[phone]    Dialysis Facility (if applicable)   · Name:  · Address:  · Dialysis Schedule:  · Phone:  · Fax:    / signature: Electronically signed by Jane Rees RN on 8/15/20 at 11:51 AM EDT    PHYSICIAN SECTION    Prognosis: {Prognosis:8394567822}    Condition at Discharge: [address] [place] [address] Patient Condition:049348344}    Rehab Potential (if transferring to Rehab): {Prognosis:3250009108}    Recommended Labs or Other Treatments After Discharge: ***    Physician Certification: I certify the above information and transfer of Katelynn Gomez  is necessary for the continuing treatment of the diagnosis listed and that he requires {Admit to Appropriate Level of Care:21773} for {GREATER/LESS:204468210} 30 days.      Update Admission H&P: {CHP DME Changes in QFBBR:133883625}    PHYSICIAN SIGNATURE:  Electronically signed by Efe Mota MD on 8/15/20 at 10:00 AM EDT

## 2020-08-15 NOTE — CARE COORDINATION
CASE MANAGEMENT DISCHARGE SUMMARY      Discharge to: home with [address] ordered/agency: na    Transportation:    Family/car: private    Confirmed discharge [address]     Patient: yes       Facility/Agency, name:  [facility] faxed [phone]       RN, name: Geraldo France     Note: Discharging nurse to complete RAD, reconcile AVS, and place final copy with patient's discharge packet. RN to ensure that written prescriptions for  Level II medications are sent with patient to the facility as per protocol.

## 2020-08-15 NOTE — PROGRESS NOTES
Physical Therapy Attempt Note    Chart reviewed for the patient and attempted to see the pt on 8/15/20 at 1128. Unable to see the pt at this time d/t pt requesting to hold therapy until lunch arrives. Pt reports he needs to have a bowel movement today and educated pt that functional mobility stimulates the bowels. Pt continued to decline. Will re-attempt as pt is available and as schedule allows.   Rao Ag, PT, DPT

## 2020-08-15 NOTE — PROGRESS NOTES
Spoke with Dr. Lyndsay De La Cruz on the phone and completed medication reconciliation with readback for the 4 unverified medications at [phone], updated in [address] [place] and med list that patient is to be discharged with.  Patient taken off of telemetry monitor at this time, reviewed discharge instructions with patient and family members, signed AVS placed in chart, patient taken via wheelchair to private vehicle, Marcelle Morejon RN

## 2020-08-15 NOTE — PROGRESS NOTES
Looks well. Dong Ruben to get home but is concerned he hasn't had \"a decent bowel movement\" since he's been in the hospital.  Will use mag citrate this morning. Everything else all set for d/c today if he wants. Told him he can stay until tomorrow if his bowels still disagree with him.

## 2020-08-18 NOTE — DISCHARGE SUMMARY
Cardiac, Vascular & Thoracic Surgery  Discharge Summary    Patient:  Gordon [place] 1965 [id number]   Admission Date:  8/6/2020 10:33 AM  Discharge Date:  8/15/2020 SV    Principle Diagnosis:  CAD in native artery    Secondary Diagnosis:  Principal Problem:    CAD in native artery  Active Problems:    Alcoholic cardiomyopathy    DM (diabetes mellitus) ([facility]    Hypertension    Hyperlipidemia    Acquired hypothyroidism    Chest pain    Obesity  Resolved Problems:    * No resolved hospital problems. *    LHC: 8/6/20 with Dr. Zunilda Oliveros  LVEDP  19   GRADIENT ACROSS AORTIC VALVE  none   LV FUNCTION EF 15 %   WALL MOTION  severe global hypokinesis   MITRAL REGURGITATION  mild      CORONARY ARTERIES  LM Less than 10% nweyjlcw-nuo-lqehaz stenosis            LAD Heavily calcified, there is a proximal-mid 90% stenosis with distal 50 to 60% stenosis vertically noted at the apex.     D1 is a small to medium size vessel with calcification and 40 to 50% znbwagdv-rug-pbuszp stenosis.       LCX Medium size vessel with ostial/proximal and mid 50 to 70% stenosis.       OM1 is a small to medium size vessel with proximal-mid 70% stenosis.       RI Large vessel with 10 to 20% ijnonuwj-yrs-kqmqyq stenosis         RCA Calcified, tortuous vessel with proximal-mid 60 to 70% stenosis.  Distal vessel has 20 to 30% stenosis.      CONCLUSIONS:     Multivessel CAD/ASHD  Will refer to CT surgery for consideration of CABG  Due to severe LV dysfunction, high risk PCI can be considered with Impella support devices as well as atherectomy if he is not felt to be a good surgical candidate.     Procedure: 8/11/2020 CORONARY ARTERY BYPASS GRAFTING X3, LEFT ATRIAL APPENDAGE CLIP, INTERNAL MAMMARY ARTERY, SAPHENOUS VEIN GRAFT, ON PUMP, 5 LEVEL BILATERAL INTERCOSTAL NERVE BLOCK     History: The patient is a 54 y.o. male previous smoker with significant past medical history of HTN, HLD, ICD (placed by Dr. Ana Bernal 2014 for non-ischemic CM with low EF), T2DM (with diabetic ulcers to LE's), class IV systolic HF, and ETOH abuse who presented to [facility] ED on 8/4 with c/o SOB (per EMS was 72% on RA and diaphoretic). He was admitted for further treatment and evaluation. While in ICU he required Vapotherm and BiPap to assist with his oxygenation. Cardiology was consulted and once COVID test was negative he was transferred to [facility] for cardiac catheterization. We have been consulted for possible surgical revascularization vs. High risk PCI. Hospital Course: The post operative period for this patient was mildly complicated by the patient feeling somewhat constipated. He had a BM but reported he hadn't had \"a decent BM\" since surgery. Pt was given mag citrate with a positive result and discharged on 8/15/2020 home with [place] [address]. Discharged Condition: stable    Disposition:  Home with [place] [address]. Medications:  Aspirin:start  ADP Inhibitor (plavix/brillinta/effient):start  ACE/ARB:contraindicated 2/2 hypotension  Betablocker:start  Statin:increase    Discharge Medications:   Zoie Jeffreyor   Home Medication Instructions YPO:[id number]    Printed on:08/18/20 8728   Medication Information                      ARMOUR THYROID 60 MG tablet  Take 1 tablet by mouth daily             aspirin 81 MG tablet  Take 1 tablet by mouth daily             atorvastatin (LIPITOR) 40 MG tablet  Take 1 tablet by mouth nightly             Blood Glucose Monitoring Suppl (BLOOD GLUCOSE METER) KIT  Use to test blood sugars. Blood Glucose Monitoring Suppl (ONE TOUCH ULTRA MINI) w/Device KIT  1 kit by Does not apply route daily             Blood Glucose Monitoring Suppl GABI  Measure glucose before breakfast, before dinner and at bedtime daily (we will check at lunchtime at HBO therapy).              clopidogrel (PLAVIX) 75 MG tablet  Take 1 tablet by mouth daily             famotidine (PEPCID) 20 MG tablet  Take 1 tablet by mouth daily             ferrous sulfate (IRON 325) 325 (65 Fe) MG

## 2020-08-24 ENCOUNTER — OFFICE VISIT (OUTPATIENT)
Dept: CARDIOTHORACIC SURGERY | Age: 55
End: 2020-08-24

## 2020-08-24 VITALS
HEIGHT: 71 IN | SYSTOLIC BLOOD PRESSURE: 120 MMHG | BODY MASS INDEX: 30.52 KG/M2 | DIASTOLIC BLOOD PRESSURE: 72 MMHG | WEIGHT: 218 LBS | TEMPERATURE: 97.8 F | HEART RATE: 98 BPM | OXYGEN SATURATION: 95 %

## 2020-08-24 PROCEDURE — 99024 POSTOP FOLLOW-UP VISIT: CPT | Performed by: THORACIC SURGERY (CARDIOTHORACIC VASCULAR SURGERY)

## 2020-08-24 NOTE — PROGRESS NOTES
[facility]  Clinic Note     8/24/2020 10:31 AM  Surgeon:  Pauline Weems     S/P : Coronary artery bypass x3    Chief complaint :  Subjective:  Mr. Elfego Alexander     Vital Signs: There were no vitals taken for this visit. I/O:  No intake or output data in the 24 hours ending 08/24/20 1031    Exam:   Cardiovascular: S1 plus S2 +0 no additional  Pulmonary: Bilaterally clear no additional sound    Incision: Dry and clean    Labs:   CBC: No results for input(s): WBC, HGB, HCT, MCV, PLT in the last 72 hours. BMP: No results for input(s): NA, K, CL, CO2, PHOS, BUN, CREATININE in the last 72 hours. Invalid input(s): CA  PT/INR: No results for input(s): PROTIME, INR in the last 72 hours. APTT: No results for input(s): APTT in the last 72 hours. Scheduled Meds:     Patient Active Problem List   Diagnosis    DM (diabetes mellitus) ([address]    Hypertension    Uncontrolled type 2 diabetes mellitus with diabetic polyneuropathy, with long-term current use of insulin (HCC)    Alcoholic cardiomyopathy    Automatic implantable cardioverter-defibrillator in situ    Hyperlipidemia    Onychomycosis    Dystrophic nail    Callus of foot    Hallux valgus    Diabetic ulcer of right foot associated with type 2 diabetes mellitus, limited to breakdown of skin ([address]    Acquired hypothyroidism    Diabetic ulcer of left foot associated with type 2 diabetes mellitus, with muscle involvement without evidence of necrosis (HCC)    NSVT (nonsustained ventricular tachycardia) (HCC)    Acute respiratory failure ([address]    Community acquired pneumonia    Septicemia ([address]    NSTEMI (non-ST elevated myocardial infarction) ([address]    CAD in native artery    Chest pain    Obesity       Assessment/Plan:   1. Incision is dry and clean patient is improving stamina is back  2.  We will readjust his pain medications and medication once we see him in 2 weeks    Maulik Driscoll MD FACS

## 2020-08-25 NOTE — OP NOTE
[facility]                                OPERATIVE REPORT    PATIENT NAME: Sarah Canchola                    :        1965  MED REC NO:   [id number]                          ROOM:       7158  ACCOUNT NO:   [de-identified]                           ADMIT DATE: 2020  PROVIDER:     Kalen Neville MD    DATE OF PROCEDURE:  2020    PREOPERATIVE DIAGNOSES:  1. Complex coronary artery disease with non-ST MI.  2.  Hyperlipidemia. 3.  Hypertension. 4.  Diabetes with no complication. 5.  Overweight, obesity. 6.  Alcoholic cardiomyopathy. 7.  Acute on top of chronic congestive heart failure. POSTOPERATIVE DIAGNOSES:  1. Complex coronary artery disease with non-ST MI.  2.  Hyperlipidemia. 3.  Hypertension. 4.  Diabetes with no complication. 5.  Overweight, obesity. 6.  Alcoholic cardiomyopathy. 7.  Acute on top of chronic congestive heart failure. OPERATION PERFORMED:  1. Coronary artery bypass x3, LIMA to LAD, reverse saphenous vein to  the right coronary artery, reverse saphenous vein to OM1.  2.  Intercostal nerve block. 3.  Endo vein harvest.  4.  Left atrial appendage clip. 5.  Temporary pacing wire. 6.  Transesophageal echo. STAFF SURGEON:  Kalen Neville MD    FIRST ASSIST:  Hortencia Shelton. ANESTHESIA:  General.    COMPLICATION:  None immediate. ESTIMATED BLOOD LOSS:  500 on bypass. PROCEDURE DESCRIPTION:  The patient was taken to the operating room. After informed written consent was obtained, lying supine under general  anesthesia, ET tube was placed with no difficulty. Transesophageal echo  was performed showed ejection fraction of roughly 20% preoperatively and  postoperatively, improved up to 40% with minimum chemical support and  atrial appendage showed complete closure with no flow. Next, the  patient was prepped and draped in standard fashion.   Ports for the  course were done in standard manner. Midline incision was made. Bovie  was used to cut through the subcutaneous tissue and fat. Sternotomy was  performed. The left chest was suspended. Mammary was harvested. Good  quality mammary was seen. Left leg incision was made over the greater  saphenous vein. Tunnel was developed. All side branches were  transected. Vein was delivered through an encounter incision. Good  quality vein was seen. Next, the heart was suspended by opening the pericardium placing the  patient on cardiopulmonary bypass through an ascending aorta cannula and  two-stage arteriovenous cannula. Cardioplegia was administered in an  antegrade fashion. Next, good diastolic arrest was achieved. Reverse  saphenous vein was used to reconstruct the anastomosis to the right  coronary artery. Good flow with no leakage was seen. Next, reverse  saphenous vein was used to reconstruct the anastomosis to OM1. Good  flow with no leakage was seen. Proximal was performed to an ascending  aorta, and separately _____ by using 6-0 Prolene. Next, mammary was  used to reconstruct the anastomosis to the LAD. Good flow with no  leakage was seen. Copious amount of irrigation was done. No active  bleeding was seen. One [place] was placed in the middle and one [place] was  placed to the side. The patient weaned off cardiopulmonary bypass with  no difficulty. Protamine was administered, tolerated well by the  patient, decannulated. All proximal and distal side chest tube was  checked. No active bleeding was seen. Two V-wire was placed and  sternotomy was closed. Intercostal nerve block was injected to the left  and right by using Exparel and Marcaine. Soft tissue and skin was  closed. The patient was dispositioned to recovery room in stable  condition without any complication.       Stephen Lunsford MD    D: 08/24/2020 9:03:36       T: 08/24/2020 17:39:34     [email or web address]  Job#: [id number]     Doc#: [id number]    CC:

## 2020-09-01 ENCOUNTER — OFFICE VISIT (OUTPATIENT)
Dept: INTERNAL MEDICINE CLINIC | Age: 55
End: 2020-09-01

## 2020-09-01 VITALS
DIASTOLIC BLOOD PRESSURE: 70 MMHG | RESPIRATION RATE: 12 BRPM | WEIGHT: 210 LBS | HEIGHT: 71 IN | HEART RATE: 70 BPM | BODY MASS INDEX: 29.4 KG/M2 | SYSTOLIC BLOOD PRESSURE: 110 MMHG

## 2020-09-01 PROBLEM — J96.00 ACUTE RESPIRATORY FAILURE (HCC): Status: RESOLVED | Noted: 2020-08-04 | Resolved: 2020-09-01

## 2020-09-01 PROCEDURE — 99214 OFFICE O/P EST MOD 30 MIN: CPT | Performed by: INTERNAL MEDICINE

## 2020-09-01 ASSESSMENT — ENCOUNTER SYMPTOMS
WHEEZING: 0
BACK PAIN: 0
RHINORRHEA: 0
SHORTNESS OF BREATH: 0
VOMITING: 0
NAUSEA: 0
ABDOMINAL PAIN: 0

## 2020-09-01 NOTE — PROGRESS NOTES
Subjective:      Patient ID: Nicki Winston is a 54 y.o. male. HPI     Patient is here for follow up. Patient was was seen in the ER for diaphoresis and sent to [facility]. He has triple vessel disease and had three way CABG. He is doing well after surgery. He had issues with hypoglycemia in the hospital and was asked to stop Metformin and Toujeo. His sugars have since started going up. He has a history of cardiomyopathy. He has an AICD device placed in 2014. Cardiomyopathy is related to alcohol abuse. He sees cardiology on a regular basis. He has a history of anemia. He has seen GI for this. He is also had neuropathic ulcers of his feet possibly from diabetic neuropathy. He is a former smoker. He sees podiatry. He has a history of osteomyelitis of the left and right foot. He has a history of diabetes which has been uncontrolled causing complications with his feet. He has lost the second toe in his right foot and the first, second and third toe on his left foot. His sugars have been under good control recently. Patient's diabetes at present is well controlled. His last hemoglobin A1c was 6.6. He has hyperlipidemia. His cholesterol is at goal.    He has microalbuminuria. Patient has hypothyroidism and takes [place] Thyroid for it. Review of Systems   Constitutional: Negative for activity change and appetite change. HENT: Negative for postnasal drip and rhinorrhea. Respiratory: Negative for shortness of breath and wheezing. Cardiovascular: Negative for chest pain, palpitations and leg swelling. Gastrointestinal: Negative for abdominal pain, nausea and vomiting. Genitourinary: Negative for difficulty urinating and frequency. Musculoskeletal: Negative for back pain and joint swelling. Skin: Negative for rash. Neurological: Negative for light-headedness. Psychiatric/Behavioral: Negative for sleep disturbance.        Past Medical History:   Diagnosis Date    Acute osteomyelitis of left foot ([address] 9/27/2017    Acute osteomyelitis of right foot ([address] 4/25/2016    Acute respiratory failure ([address] 8/4/2020    Ascites     Blood transfusion reaction     Burst fracture of lumbar vertebra (HCC) 11/9/2012    Cellulitis of left foot     Cellulitis of right lower extremity 2/16, 5/16    Community acquired pneumonia     Diabetes ([address]     Diabetic ulcer of right foot ([address] early 2016    Diabetic ulcer of 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 2 diabetes mellitus, with necrosis of bone ([address]     ETOH abuse     Fracture of tibial plateau 68/3/1920    High cholesterol     HTN (hypertension)     MI (myocardial infarction) ([address] 08/04/2020    + Troponins    MRSA (methicillin resistant staph aureus) culture positive 4/28/16, 4/20/16    foot wound    Neuropathic ulcer of left foot, limited to breakdown of skin ([address] 11/3/2016    Neuropathic ulcer of toe ([address] 2/13/2014    NSVT (nonsustained ventricular tachycardia) ([address] 2014    Pleural effusion due to congestive heart failure (HCC)     Septicemia ([address]     Smoker     quit [id number]    Systolic CHF, acute ([address] 7/8/2013       Past Surgical History:   Procedure Laterality Date    CARDIAC DEFIBRILLATOR PLACEMENT  01/29/2014    ICD Placement    CORONARY ARTERY BYPASS GRAFT N/A 8/11/2020    CORONARY ARTERY BYPASS GRAFTING X3, LEFT ATRIAL APPENDAGE CLIP, INTERNAL MAMMARY ARTERY, SAPHENOUS VEIN GRAFT, ON PUMP, 5 LEVEL BILATERAL INTERCOSTAL NERVE BLOCK performed by Ruy Weems MD at \[facility]\"" Left 08/09/2018    PARTIAL FOOT AMPUTATION w. Dr Stefany Guevara Left 1/31/2019    EXOSTECTOMY LEFT FOOT, ULCER DEBRIDEMENT LEFT FOOT WITH GRAFT APPLICATION performed by Pa Aguillon DPM at [place] [address] [place]. Natacha Robles Left 09/27/2017    LEFT FOOT ULCER DEBRIDEMENT, POSSIBLE SECOND METATARSAL    FOOT SURGERY Left 01/31/2019    Exostectomy left foot, ulcer debridement with graft application left foot    FOOT TENDON SURGERY Right 2016    FOOT TENDON SURGERY Left 06/30/2017    KNEE SURGERY Left     OTHER SURGICAL HISTORY Bilateral 9/17/15    amputation 2nd digit bilateral, flexor tenotomy right hallux    OTHER SURGICAL HISTORY Left 08/17/2017    PARTIAL LEFT FOOT AMPUTATION      OTHER SURGICAL HISTORY Left 12/07/2017    Debridement infected bone and tissue left foot; ulcer debridement left foot with graft application with dr mathur     OTHER SURGICAL HISTORY Right 01/04/2018    DEBRIDEMENT INFECTED BONE AND TISSUE RIGHT FOOT, ULCER DEBRIDEMENT RIGHT FOOT WITH GRAFT APPLICATION    OTHER SURGICAL HISTORY Left 11/01/2018    Procedure: PARTIAL RESECTION LEFT METATARSAL, ULCER DEBRIDEMENT LEFT FOOT WITH GRAFT APPLICATION     [place] OFFICE/OUTPT VISIT,PROCEDURE ONLY Left 8/23/2018    ULCER DEBRIDEMENT LEFT FOOT WITH GRAFT APPLICATION performed by Geri Josue DPM at [facility] TARSAL/METATARSAL Left 8/9/2018    PARTIAL FOOT AMPUTATION WITH GRAFT APPLICATION performed by Geri Josue DPM at [facility] TARSAL/METATARSAL Left 11/1/2018    PARTIAL RESECTION LEFT METATARSAL, ULCER DEBRIDEMENT LEFT FOOT WITH GRAFT APPLICATION performed by Geri Josue DPM at [facility] OR    TOE AMPUTATION      2 toes       Family History   Problem Relation Age of Onset    Heart Disease Mother     High Blood Pressure Mother     High Cholesterol Mother     Diabetes Mother     Anemia Mother     Heart Disease Father     High Blood Pressure Father     High Cholesterol Father     Heart Disease Maternal Grandmother     High Blood Pressure Maternal Grandmother     High Cholesterol Maternal Grandmother     Cancer Maternal Grandmother         bone marrow & breast    Heart Disease Maternal Grandfather     High Blood Pressure Maternal Grandfather     High Cholesterol Maternal Grandfather     Cancer Maternal Grandfather         pancreatic CA    Heart Disease Paternal Grandmother     High Blood Pressure to person, place, and time. Cranial Nerves: No cranial nerve deficit. Deep Tendon Reflexes: Reflexes are normal and symmetric. Psychiatric:         Behavior: Behavior normal.         Thought Content: Thought content normal.         Judgment: Judgment normal.         Assessment:       Diagnosis Orders   1. Diabetic ulcer of 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 2 diabetes mellitus, with muscle involvement without evidence of necrosis ([address]     2. Essential hypertension     3. Mixed hyperlipidemia     4. Uncontrolled type 2 diabetes mellitus with diabetic polyneuropathy, with long-term current use of insulin ([address]     5. Diabetic ulcer of other part of right foot associated with type 2 diabetes mellitus, limited to breakdown of skin ([address]     6. Acquired hypothyroidism     7. Alcoholic cardiomyopathy            Plan:        #CAD. He had CABG. Doing well. #Diabetes mellitus type 2. Patient has been doing better. Last A1c was 6.6. Resume Metformin. Will need to start back on insulin. #Hypertension. BP controlled. 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 He is completely quit drinking 5 years ago. Continue with Coreg Lasix and lisinopril. He sees cardiology. #Hyperlipidemia well controlled. #Hypo-thyroidism. He cannot tolerate levothyroxine. He takes [place] Thyroid. #Anemia. GI work-up negative.             Pura River MD

## 2020-09-14 ENCOUNTER — OFFICE VISIT (OUTPATIENT)
Dept: CARDIOTHORACIC SURGERY | Age: 55
End: 2020-09-14

## 2020-09-14 VITALS
WEIGHT: 213 LBS | DIASTOLIC BLOOD PRESSURE: 80 MMHG | SYSTOLIC BLOOD PRESSURE: 118 MMHG | OXYGEN SATURATION: 98 % | HEIGHT: 70 IN | BODY MASS INDEX: 30.49 KG/M2 | HEART RATE: 92 BPM | TEMPERATURE: 97.6 F

## 2020-09-14 PROBLEM — R07.9 CHEST PAIN: Status: RESOLVED | Noted: 2020-08-06 | Resolved: 2020-09-14

## 2020-09-14 PROCEDURE — 99024 POSTOP FOLLOW-UP VISIT: CPT | Performed by: THORACIC SURGERY (CARDIOTHORACIC VASCULAR SURGERY)

## 2020-09-14 RX ORDER — FUROSEMIDE 40 MG/1
40 TABLET ORAL 2 TIMES DAILY
COMMUNITY
End: 2020-09-15 | Stop reason: SDUPTHER

## 2020-09-14 RX ORDER — OXYCODONE HYDROCHLORIDE 5 MG/1
5 TABLET ORAL EVERY 8 HOURS PRN
Qty: 21 TABLET | Refills: 0 | Status: SHIPPED | OUTPATIENT
Start: 2020-09-14 | End: 2020-09-21

## 2020-09-14 RX ORDER — OXYCODONE HYDROCHLORIDE 5 MG/1
5 TABLET ORAL EVERY 8 HOURS PRN
COMMUNITY
End: 2020-09-14 | Stop reason: SDUPTHER

## 2020-09-14 NOTE — PROGRESS NOTES
Cardiac, V[facility]geons  Clinic Note     9/14/2020 9:27 AM  Surgeon:  Gisell Aragon     S/P :  CABG x3, AIDEN clip on 8/11/20    Chief complaint : 2nd post op  Subjective:  Mr. Abby Duarte is doing well post operatively. C/o dull aching pain. Ran out of pain medication about one week ago. Would like a refill today. No edema. Currently on lasix and aldactone. Sees Dr. Ashanti Yu tomorrow. PCP would like patient to startToujerolando today but up to Dr. Gisell Aragon. Vital Signs: There were no vitals taken for this visit. I/O:  No intake or output data in the 24 hours ending 09/14/20 0927    Exam:   Cardiovascular: S1 plus S2 +0 no additional  Pulmonary: Bilaterally clear no additional sound    Lower extremity: Dry and clean    Labs:   CBC: No results for input(s): WBC, HGB, HCT, MCV, PLT in the last 72 hours. BMP: No results for input(s): NA, K, CL, CO2, PHOS, BUN, CREATININE in the last 72 hours. Invalid input(s): CA  PT/INR: No results for input(s): PROTIME, INR in the last 72 hours. APTT: No results for input(s): APTT in the last 72 hours.     Scheduled Meds:     Patient Active Problem List   Diagnosis    [facility]    Hypertension    Uncontrolled type 2 diabetes mellitus with diabetic polyneuropathy, with long-term current use of insulin (HCC)    Alcoholic cardiomyopathy    Automatic implantable cardioverter-defibrillator in situ    Hyperlipidemia    Onychomycosis    Dystrophic nail    Callus of foot    Hallux valgus    Diabetic ulcer of right foot associated with type 2 diabetes mellitus, limited to breakdown of skin ([address]    Acquired hypothyroidism    Diabetic ulcer of left foot associated with type 2 diabetes mellitus, with muscle involvement without evidence of necrosis (HCC)    NSVT [facility]onsustained ventricular [facility]cardia) [facility]    NSTEMI (non-ST elevated myocardial infarction) ([facility]    Coronary artery disease involving native coronary artery of native heart without angina pectoris    Chest pain    Obesity    Acute post-operative pain       Assessment/Plan:   1. Improved  2. Follow-up with cardiology for long-term care  3. Plavix can be DC'd in 3 months  4.  New pain prescription today    Sheree Posadas MD FACS

## 2020-09-15 ENCOUNTER — OFFICE VISIT (OUTPATIENT)
Dept: CARDIOLOGY CLINIC | Age: 55
End: 2020-09-15
Payer: COMMERCIAL

## 2020-09-15 VITALS
WEIGHT: 215 LBS | DIASTOLIC BLOOD PRESSURE: 76 MMHG | SYSTOLIC BLOOD PRESSURE: 114 MMHG | HEIGHT: 70 IN | HEART RATE: 98 BPM | BODY MASS INDEX: 30.78 KG/M2 | OXYGEN SATURATION: 98 %

## 2020-09-15 PROCEDURE — 99214 OFFICE O/P EST MOD 30 MIN: CPT | Performed by: INTERNAL MEDICINE

## 2020-09-15 RX ORDER — ATORVASTATIN CALCIUM 40 MG/1
40 TABLET, FILM COATED ORAL NIGHTLY
Qty: 30 TABLET | Refills: 5 | Status: SHIPPED | OUTPATIENT
Start: 2020-09-15 | End: 2021-02-16

## 2020-09-15 RX ORDER — CLOPIDOGREL BISULFATE 75 MG/1
75 TABLET ORAL DAILY
Qty: 30 TABLET | Refills: 11 | Status: SHIPPED | OUTPATIENT
Start: 2020-09-15 | End: 2021-08-23

## 2020-09-15 RX ORDER — FUROSEMIDE 40 MG/1
40 TABLET ORAL 2 TIMES DAILY
Qty: 60 TABLET | Refills: 5 | Status: SHIPPED | OUTPATIENT
Start: 2020-09-15 | End: 2021-04-28

## 2020-09-15 RX ORDER — METOPROLOL SUCCINATE 50 MG/1
50 TABLET, EXTENDED RELEASE ORAL DAILY
Qty: 30 TABLET | Refills: 11 | Status: SHIPPED | OUTPATIENT
Start: 2020-09-15 | End: 2020-12-02

## 2020-09-15 RX ORDER — SPIRONOLACTONE 25 MG/1
25 TABLET ORAL DAILY
Qty: 30 TABLET | Refills: 5 | Status: SHIPPED | OUTPATIENT
Start: 2020-09-15 | End: 2020-12-09 | Stop reason: SDUPTHER

## 2020-09-15 NOTE — LETTER
[address] Office Note  9/15/2020     Subjective:  Mr. Sehrice De Leon presents today for hospital follow up. S/p CABG x 3. On 20 he presented to [facility] with complaints of SOB. His Echo demonstrated EF 25-30%, severe MR, mild AR and TR.  EKG demonstrated ST, intraventricular conduction delay, septal infarct. Troponin's elevated. He was then transferred to [facility] [place]. A left heart cath performed demonstrated multivessel CAD. He then underwent a CABG x 3 on 2020. Today he reports feeling ok. He states he is not working. Patient denies chest pain, sob, palpitations, dizziness or syncope. PMH:  Alcoholic CMP, ICD placement  by Dr. Eduardo Ayala for non ischemic CM with low EF that failed to improve with guide line based medical therapy. He is no longer drinking ETOH products. On 19 he underwent left foot ulcer debridement. He works for [employer]. Review of Systems: 12 point ROS negative in all areas as listed below except as in [place]  Constitutional, EENT, Cardiovascular, pulmonary, GI, , Musculoskeletal, skin, neurological, hematological, endocrine, Psychiatric    Reviewed past medical history, social, and family history. No alcohol nonsmoker  He works for [employer]. Mother: Heart disease, MI x4, young age. Father: Heart disease, CABG age 67.    Siblings: brother  age 40 of CHF, CVA  Past Medical History:   Diagnosis Date    Acute osteomyelitis of left foot ([address] 2017    Acute osteomyelitis of right foot ([address] 2016    Acute respiratory failure ([address] 2020    Ascites     Blood transfusion reaction     Burst fracture of lumbar vertebra ([address] 2012    Cellulitis of left foot     Cellulitis of right lower extremity ,     Community acquired pneumonia     Diabetes ([address]     Diabetic ulcer of right foot ([address] early     Diabetic ulcer of 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 2 diabetes mellitus, with necrosis of bone ([address] mouth every 8 hours as needed for Pain for up to 7 days. 21 tablet 0    atorvastatin (LIPITOR) 40 MG tablet Take 1 tablet by mouth nightly 30 tablet 0    metoprolol tartrate (LOPRESSOR) 25 MG tablet Take 1 tablet by mouth 2 times daily Hold this medication for pulse <60 or systolic BP <932 60 tablet 0    spironolactone (ALDACTONE) 25 MG tablet Take 1 tablet by mouth 2 times daily for 7 days (Patient taking differently: Take 25 mg by mouth daily ) 14 tablet 0    clopidogrel (PLAVIX) 75 MG tablet Take 1 tablet by mouth daily 30 tablet 0    famotidine (PEPCID) 20 MG tablet Take 1 tablet by mouth daily 30 tablet 0    TRULICITY 1.5 XJ/8.3TQ SOPN Inject 1.5 mg (1 PEN) into the skin once a week 2 mL 0    Blood Glucose Monitoring Suppl (ONE TOUCH ULTRA MINI) w/Device KIT 1 kit by Does not apply route daily 1 kit 0    ARMOUR THYROID 60 MG tablet Take 1 tablet by mouth daily 30 tablet 5    glipiZIDE (GLUCOTROL) 5 MG tablet Take 1 tablet by mouth 2 times daily (before meals) 180 tablet 3    aspirin 81 MG tablet Take 1 tablet by mouth daily 90 tablet 3    glucose blood VI test strips (ONE TOUCH ULTRA TEST) strip Test blood sugar once daily. 150 strip 3    Insulin Pen Needle (UNIFINE PENTIPS) 31G X 5 MM MISC USE 1 EACH DAY AS NEEDED FOR TESTING 100 each 3    Blood Glucose Monitoring Suppl GABI Measure glucose before breakfast, before dinner and at bedtime daily (we will check at lunchtime at HBO therapy). 1 Device 0    Multiple Vitamins-Minerals (THERAPEUTIC MULTIVITAMIN-MINERALS) tablet Take 1 tablet by mouth daily      Blood Glucose Monitoring Suppl (BLOOD GLUCOSE METER) KIT Use to test blood sugars. 1 kit 0    Lancets MISC Use to test blood sugars once daily. 100 each 3     No facility-administered encounter medications on file as of 9/15/2020. Lab Data:  CBC: No results for input(s): WBC, HGB, HCT, MCV, PLT in the last 72 hours. BMP: No results for input(s): NA, K, CL, CO2, PHOS, BUN, CREATININE in the last 72 hours. Invalid input(s): CA  LIVER PROFILE: No results for input(s): AST, ALT, LIPASE, BILIDIR, BILITOT, ALKPHOS in the last 72 hours. Invalid input(s): AMYLASE,  ALB  LIPID:   No components found for: CHLPL  Lab Results   Component Value Date    TRIG 248 (H) 08/11/2020    TRIG 176 (H) 10/16/2019    TRIG 415 (H) 01/03/2018     Lab Results   Component Value Date    HDL 23 (L) 08/11/2020    HDL 30 (L) 05/26/2020    HDL 36 (L) 10/16/2019     Lab Results   Component Value Date    LDLCALC 35 08/11/2020    LDLCALC see below 05/26/2020    [address] 10/16/2019     Lab Results   Component Value Date    LABVLDL 50 08/11/2020    LABVLDL see below 05/26/2020    LABVLDL 35 10/16/2019     PT/INR: No results for input(s): PROTIME, INR in the last 72 hours. A1C:   Lab Results   Component Value Date    LABA1C 6.6 08/11/2020       IMAGING:     EKG 8/4/2020  Sinus tachycardia Intra-ventricular conduction delaySeptal infarct , age undeterminedAbnormal ECGNo significant change was foundWhen compared with ECG of1.31.19Confirmed by GIL HUFFMAN, [address] ([id number]) on 8/4/2020 6:51:55 AM   CABG:  Surgeon:  Desean Cobian     S/P :  CABG x3, AIDEN clip on 8/11/20  CHEST XRAY 8/13/2020  FINDINGS: Recent surgical history of CABG. Left vascular sheath remains in place but [place]-Seth catheter has been removed. ICD stable on the left. Improving aeration in the left lung base with some residual pleural fluid and airspace change. Cardiomegaly is demonstrated. No skeletal finding. CHEST XRAY 8/5/2020  FINDINGS: Cardial pericardial silhouette is stable in appearance. Unipolar pacemaker/ICD is noted. Pulmonary vasculature is congested, but decreased when compared to the previous exam.  Interstitial opacities seen previously has for the most part resolved. No pneumothorax is identified. No free air.  No acute bony abnormality     ECHO 8/5/2020   Summary Left ventricular systolic function is severely reduced with ejection   fraction estimated at 25-30 %. Severe global hypokinesis is present. Left ventricle size is normal.   Normal left ventricular wall thickness. Grade III diastolic dysfunction with elevated filing pressure. Mild posterior mitral annular calcification is present. No evidence of mitral valve stenosis. Severe mitral regurgitation. The left atrium is mildly dilated. No evidence of aortic valve stenosis. Mild aortic regurgitation is present. Mild tricuspid regurgitation. Normal systolic pulmonary artery pressure (SPAP) estimated at 39 mmHg (RA   pressure 8 mmHg). ECHO 5/5/2016   Summary   Limited study for estimation of ejection fraction. Left ventricular systolic function is mildly reduced with an ejection   fraction of 44% by Alanis's method. Mild global hypokinesis. Diastolic filling parameters suggests grade I diastolic dysfunction. Compared to previous study from 1-9-2014, there is improvement in ejection   fraction. Stress test: 7/13:   No scintigraphic evidence of stress-induced myocardial  ischemia. 2. Chronic inferoapical infarct. 3. Severe left ventricular dilatation with severe diffuse  hypokinesis. 4. Severely reduced LVEF of approximately 20%. Assessment:  Encounter Diagnoses   Name Primary?  Essential hypertension     Mixed hyperlipidemia     Alcoholic cardiomyopathy ([address]     Coronary artery disease involving native coronary artery of native heart without angina pectoris Yes       Ruben was seen today for 6 month follow-up, CAD cardiomyopathy, hypertension and hyperlipidemia.     Diagnoses and associated orders for this visit:  CAD s/p CABG 7.98.97  Alcoholic cardiomyopathy, stable chronic systolic CHF functional class I    Hypertension, controlled     Automatic implantable cardiac defibrillator in situ recent check in 7.9.19 normal function    Plan: 1. Recommend waiting until next week (full 6 week recovery) before doing any type of work  2. Stop taking the metoprolol tartrate (lopressor)  3. Start taking Toprol XL 50 mg once daily in view of LV systolic dysfunction  4. Recommend remain on the Plavix long term due to severe CAD  And he is tolerating it well. 5. Follow up in 3 months   6. Labs - lipids, cmp and cbc      QUALITY MEASURES  1. Tobacco Cessation Counseling: NA  2. Retake of BP if >140/90:   NA  3. Documentation to PCP/referring for new patient:  Sent to PCP at close of office visit  4. CAD patient on anti-platelet: Yes  5. CAD patient on STATIN therapy:  Yes  6. Patient with CHF and aFib on anticoagulation:  NA     This note was scribed in the presence of Dr. Patel Loja MD by Fredy Jeff RN.   I, Dr. Heidi Price, personally performed the services described in this documentation, as scribed by the above signed scribe in my presence. It is both accurate and complete to my knowledge. I agree with the details independently gathered by the clinical support staff, while the remaining scribed note accurately describes my personal service to the patient.       Heidi Price MD 9/15/2020 7:59 AM

## 2020-09-15 NOTE — PROGRESS NOTES
Fracture of tibial plateau 02/2/3320    High cholesterol     HTN (hypertension)     MI (myocardial infarction) ([address] 08/04/2020    + Troponins    MRSA (methicillin resistant staph aureus) culture positive 4/28/16, 4/20/16    foot wound    Neuropathic ulcer of left foot, limited to breakdown of skin ([address] 11/3/2016    Neuropathic ulcer of toe ([address] 2/13/2014    NSVT (nonsustained ventricular tachycardia) ([address] 2014    Pleural effusion due to congestive heart failure ([address]     Septicemia ([address]     Smoker     quit [id number]    Systolic CHF, acute ([address] 7/8/2013     Past Surgical History:   Procedure Laterality Date    CARDIAC DEFIBRILLATOR PLACEMENT  01/29/2014    ICD Placement    CORONARY ARTERY BYPASS GRAFT N/A 8/11/2020    CORONARY ARTERY BYPASS GRAFTING X3, LEFT ATRIAL APPENDAGE CLIP, INTERNAL MAMMARY ARTERY, SAPHENOUS VEIN GRAFT, ON PUMP, 5 LEVEL BILATERAL INTERCOSTAL NERVE BLOCK performed by Evette Warren MD at [facility] Left 08/09/2018    PARTIAL FOOT AMPUTATION w. Dr Katie Savage Left 1/31/2019    EXOSTECTOMY LEFT FOOT, ULCER DEBRIDEMENT LEFT FOOT WITH GRAFT APPLICATION performed by Kaushik Gannon DPM at [facility] Left 09/27/2017    LEFT FOOT ULCER DEBRIDEMENT, POSSIBLE SECOND METATARSAL    FOOT SURGERY Left 01/31/2019    Exostectomy left foot, ulcer debridement with graft application left foot    FOOT TENDON SURGERY Right 2016    FOOT TENDON SURGERY Left 06/30/2017    KNEE SURGERY Left     OTHER SURGICAL HISTORY Bilateral 9/17/15    amputation 2nd digit bilateral, flexor tenotomy right hallux    OTHER SURGICAL HISTORY Left 08/17/2017    PARTIAL LEFT FOOT AMPUTATION      OTHER SURGICAL HISTORY Left 12/07/2017    Debridement infected bone and tissue left foot; ulcer debridement left foot with graft application with dr mathur     OTHER SURGICAL HISTORY Right 01/04/2018    DEBRIDEMENT INFECTED BONE AND TISSUE RIGHT FOOT, ULCER DEBRIDEMENT RIGHT FOOT WITH GRAFT APPLICATION    OTHER SURGICAL HISTORY Left 11/01/2018    Procedure: PARTIAL RESECTION LEFT METATARSAL, ULCER DEBRIDEMENT LEFT FOOT WITH GRAFT APPLICATION     [place] OFFICE/OUTPT VISIT,PROCEDURE ONLY Left 8/23/2018    ULCER DEBRIDEMENT LEFT FOOT WITH GRAFT APPLICATION performed by Suyapa Singh DPM at [facility] TARSAL/METATARSAL Left 8/9/2018    PARTIAL FOOT AMPUTATION WITH GRAFT APPLICATION performed by Suyapa Singh DPM at [facility] TARSAL/METATARSAL Left 11/1/2018    PARTIAL RESECTION LEFT METATARSAL, ULCER DEBRIDEMENT LEFT FOOT WITH GRAFT APPLICATION performed by Suyapa Singh DPM at [address] [place] [address] TOE AMPUTATION      2 toes       Objective: There were no vitals taken for this visit. Wt Readings from Last 3 Encounters:   09/14/20 213 lb (96.6 kg)   09/01/20 210 lb (95.3 kg)   08/24/20 218 lb (98.9 kg)       Physical Exam:  General: No Respiratory distress, appears well developed and well nourished. Eyes:  Sclera nonicteric  Nose/Sinuses:  negative findings: nose shows no deformity, asymmetry, or inflammation, nasal mucosa normal, septum midline with no perforation or bleeding  Back:  no pain to palpation  Joint:  no active joint inflammation  Musculoskeletal:  negative  Skin:  Warm and dry  Neck: No JVD and no Carotid Bruits. Chest:  Clear to auscultation, respiration easy  Cardiovascular:  RRR, 96 bpm S1S2 normal, no murmur, no rub or thrill. Extremities:  No edema  no clubbing or cyanosis  Pulses: Femoral pulses are 2+ Neuro: intact    Medications:   Outpatient Encounter Medications as of 9/15/2020   Medication Sig Dispense Refill    metFORMIN (GLUCOPHAGE) 1000 MG tablet Take 1,000 mg by mouth 2 times daily (with meals)      furosemide (LASIX) 40 MG tablet Take 40 mg by mouth 2 times daily      oxyCODONE (ROXICODONE) 5 MG immediate release tablet Take 1 tablet by mouth every 8 hours as needed for Pain for up to 7 days.  21 tablet 0    atorvastatin (LIPITOR) 40 MG tablet Take 1 tablet by mouth nightly 30 tablet 0    metoprolol tartrate (LOPRESSOR) 25 MG tablet Take 1 tablet by mouth 2 times daily Hold this medication for pulse <40 or systolic BP <696 60 tablet 0    spironolactone (ALDACTONE) 25 MG tablet Take 1 tablet by mouth 2 times daily for 7 days (Patient taking differently: Take 25 mg by mouth daily ) 14 tablet 0    clopidogrel (PLAVIX) 75 MG tablet Take 1 tablet by mouth daily 30 tablet 0    famotidine (PEPCID) 20 MG tablet Take 1 tablet by mouth daily 30 tablet 0    TRULICITY 1.5 MU/0.2HM SOPN Inject 1.5 mg (1 PEN) into the skin once a week 2 mL 0    Blood Glucose Monitoring Suppl (ONE TOUCH ULTRA MINI) w/Device KIT 1 kit by Does not apply route daily 1 kit 0    ARMOUR THYROID 60 MG tablet Take 1 tablet by mouth daily 30 tablet 5    glipiZIDE (GLUCOTROL) 5 MG tablet Take 1 tablet by mouth 2 times daily (before meals) 180 tablet 3    aspirin 81 MG tablet Take 1 tablet by mouth daily 90 tablet 3    glucose blood VI test strips (ONE TOUCH ULTRA TEST) strip Test blood sugar once daily. 150 strip 3    Insulin Pen Needle (UNIFINE PENTIPS) 31G X 5 MM MISC USE 1 EACH DAY AS NEEDED FOR TESTING 100 each 3    Blood Glucose Monitoring Suppl GABI Measure glucose before breakfast, before dinner and at bedtime daily (we will check at lunchtime at HBO therapy). 1 Device 0    Multiple Vitamins-Minerals (THERAPEUTIC MULTIVITAMIN-MINERALS) tablet Take 1 tablet by mouth daily      Blood Glucose Monitoring Suppl (BLOOD GLUCOSE METER) KIT Use to test blood sugars. 1 kit 0    Lancets MISC Use to test blood sugars once daily. 100 each 3     No facility-administered encounter medications on file as of 9/15/2020. Lab Data:  CBC: No results for input(s): WBC, HGB, HCT, MCV, PLT in the last 72 hours. BMP: No results for input(s): NA, K, CL, CO2, PHOS, BUN, CREATININE in the last 72 hours.     Invalid input(s): CA  LIVER PROFILE: No results for input(s): AST, ALT, LIPASE, BILIDIR, BILITOT, ALKPHOS in the last 72 hours. Invalid input(s): AMYLASE,  ALB  LIPID:   No components found for: CHLPL  Lab Results   Component Value Date    TRIG 248 (H) 08/11/2020    TRIG 176 (H) 10/16/2019    TRIG 415 (H) 01/03/2018     Lab Results   Component Value Date    HDL 23 (L) 08/11/2020    HDL 30 (L) 05/26/2020    HDL 36 (L) 10/16/2019     Lab Results   Component Value Date    LDLCALC 35 08/11/2020    LDLCALC see below 05/26/2020    [facility] 10/16/2019     Lab Results   Component Value Date    LABVLDL 50 08/11/2020    LABVLDL see below 05/26/2020    LABVLDL 35 10/16/2019     PT/INR: No results for input(s): PROTIME, INR in the last 72 hours. A1C:   Lab Results   Component Value Date    LABA1C 6.6 08/11/2020       IMAGING:     EKG 8/4/2020  Sinus tachycardia Intra-ventricular conduction delaySeptal infarct , age undeterminedAbnormal ECGNo significant change was foundWhen compared with ECG of1.31.19Confirmed by GIL HUFFMAN, [address] (1986) on 8/4/2020 6:51:55 AM   CABG:  Surgeon:  Roz Cruz     S/P :  CABG x3, AIDEN clip on 8/11/20  CHEST XRAY 8/13/2020  FINDINGS: Recent surgical history of CABG. Left vascular sheath remains in place but [place]-Seth catheter has been removed. ICD stable on the left. Improving aeration in the left lung base with some residual pleural fluid and airspace change. Cardiomegaly is demonstrated. No skeletal finding. CHEST XRAY 8/5/2020  FINDINGS: Cardial pericardial silhouette is stable in appearance. Unipolar pacemaker/ICD is noted. Pulmonary vasculature is congested, but decreased when compared to the previous exam.  Interstitial opacities seen previously has for the most part resolved. No pneumothorax is identified. No free air. No acute bony abnormality     ECHO 8/5/2020   Summary   Left ventricular systolic function is severely reduced with ejection   fraction estimated at 25-30 %. Severe global hypokinesis is present.    Left ventricle size is normal.   Normal left ventricular wall thickness. Grade III diastolic dysfunction with elevated filing pressure. Mild posterior mitral annular calcification is present. No evidence of mitral valve stenosis. Severe mitral regurgitation. The left atrium is mildly dilated. No evidence of aortic valve stenosis. Mild aortic regurgitation is present. Mild tricuspid regurgitation. Normal systolic pulmonary artery pressure (SPAP) estimated at 39 mmHg (RA   pressure 8 mmHg). ECHO 5/5/2016   Summary   Limited study for estimation of ejection fraction. Left ventricular systolic function is mildly reduced with an ejection   fraction of 44% by Alanis's method. Mild global hypokinesis. Diastolic filling parameters suggests grade I diastolic dysfunction. Compared to previous study from 1-9-2014, there is improvement in ejection   fraction. Stress test: 7/13:   No scintigraphic evidence of stress-induced myocardial  ischemia. 2. Chronic inferoapical infarct. 3. Severe left ventricular dilatation with severe diffuse  hypokinesis. 4. Severely reduced LVEF of approximately 20%. Assessment:  Encounter Diagnoses   Name Primary?  Essential hypertension     Mixed hyperlipidemia     Alcoholic cardiomyopathy ([address]     Coronary artery disease involving native coronary artery of native heart without angina pectoris Yes       Ruben was seen today for 6 month follow-up, CAD cardiomyopathy, hypertension and hyperlipidemia. Diagnoses and associated orders for this visit:  CAD s/p CABG 7.20.17  Alcoholic cardiomyopathy, stable chronic systolic CHF functional class I    Hypertension, controlled     Automatic implantable cardiac defibrillator in situ recent check in 7.9.19 normal function    Plan:  1. Recommend waiting until next week (full 6 week recovery) before doing any type of work  2. Stop taking the metoprolol tartrate (lopressor)  3.  Start taking Toprol XL 50 mg once daily in view of LV

## 2020-09-15 NOTE — PATIENT INSTRUCTIONS
Plan:  1. Recommend waiting until next week (full 6 week recovery) before doing any type of work  2. Stop taking the metoprolol tartrate (lopressor)  3. Start taking Toprol XL 50 mg once daily  4. Recommend remain on the Plavix and continue all other medications  5. Follow up in 3 months   6.  Labs - lipids, cmp and cbc

## 2020-09-24 RX ORDER — ASPIRIN 81 MG/1
TABLET, COATED ORAL
Qty: 90 TABLET | Refills: 3 | Status: SHIPPED | OUTPATIENT
Start: 2020-09-24 | End: 2021-03-18

## 2020-10-01 ENCOUNTER — TELEPHONE (OUTPATIENT)
Dept: CARDIOTHORACIC SURGERY | Age: 55
End: 2020-10-01

## 2020-10-01 RX ORDER — AMOXICILLIN AND CLAVULANATE POTASSIUM 875; 125 MG/1; MG/1
1 TABLET, FILM COATED ORAL 2 TIMES DAILY
Qty: 20 TABLET | Refills: 0 | Status: SHIPPED | OUTPATIENT
Start: 2020-10-01 | End: 2020-10-11

## 2020-10-01 NOTE — TELEPHONE ENCOUNTER
Patient called today to report that a small area at the proximal end of his midsternal incision has opened up and he did have some purulent drainage yesterday. Picture sent. The area does appear red and swollen. Reviewed above with Dr. Hannon. He would like to put patient on Augmentin for 10 days. He would also like patient to send a repeat picture in about 4-5 days. Patient is agreeable to plan. Only allergic to Bactrim. We will continue to follow.

## 2020-10-06 NOTE — PROGRESS NOTES
acute ([facility] Surgical History:   has a past surgical history that includes knee surgery (Left); other surgical history (Bilateral, 9/17/15); Toe amputation; Foot Tendon Surgery (Right, 2016); Foot Tendon Surgery (Left, 06/30/2017); other surgical history (Left, 08/17/2017); Foot surgery (Left, 09/27/2017); other surgical history (Left, 12/07/2017); other surgical history (Right, 01/04/2018); Foot Amputation (Left, 08/09/2018); pr part remv othr tarsal/metatarsal (Left, 8/9/2018); pr office/outpt visit,procedure only (Left, 8/23/2018); other surgical history (Left, 11/01/2018); pr part remv othr tarsal/metatarsal (Left, 11/1/2018); Foot surgery (Left, 01/31/2019); Foot Debridement (Left, 1/31/2019); Cardiac defibrillator placement (01/29/2014); and Coronary artery bypass graft (N/A, 8/11/2020). Social History:   reports that he quit smoking about 40 years ago. He has never used smokeless tobacco. He reports that he does not drink alcohol or use drugs. Family History:  family history includes Anemia in his mother; Cancer in his maternal grandfather and maternal grandmother; Diabetes in his mother; Diabetes type 2  in his brother, brother, and brother; Heart Disease in his brother, father, maternal grandfather, maternal grandmother, mother, paternal grandfather, and paternal grandmother; Heart Failure in his brother; High Blood Pressure in his father, maternal grandfather, maternal grandmother, mother, paternal grandfather, and paternal grandmother; High Cholesterol in his father, maternal grandfather, maternal grandmother, mother, paternal grandfather, and paternal grandmother; Stroke in his brother.      Home Medications:  Outpatient Encounter Medications as of 10/7/2020   Medication Sig Dispense Refill    amoxicillin-clavulanate (AUGMENTIN) 875-125 MG per tablet Take 1 tablet by mouth 2 times daily for 10 days 20 tablet 0    ASPIRIN LOW DOSE 81 MG EC tablet Take 1 tablet by mouth daily 90 tablet 3    clopidogrel (PLAVIX) 75 MG tablet Take 1 tablet by mouth daily 30 tablet 11    atorvastatin (LIPITOR) 40 MG tablet Take 1 tablet by mouth nightly 30 tablet 5    metoprolol succinate (TOPROL XL) 50 MG extended release tablet Take 1 tablet by mouth daily 30 tablet 11    furosemide (LASIX) 40 MG tablet Take 1 tablet by mouth 2 times daily 60 tablet 5    metFORMIN (GLUCOPHAGE) 1000 MG tablet Take 1,000 mg by mouth 2 times daily (with meals)      TRULICITY 1.5 HT/1.7BA SOPN Inject 1.5 mg (1 PEN) into the skin once a week 2 mL 0    Blood Glucose Monitoring Suppl (ONE TOUCH ULTRA MINI) w/Device KIT 1 kit by Does not apply route daily 1 kit 0    ARMOUR THYROID 60 MG tablet Take 1 tablet by mouth daily 30 tablet 5    glipiZIDE (GLUCOTROL) 5 MG tablet Take 1 tablet by mouth 2 times daily (before meals) 180 tablet 3    glucose blood VI test strips (ONE TOUCH ULTRA TEST) strip Test blood sugar once daily. 150 strip 3    Insulin Pen Needle (UNIFINE PENTIPS) 31G X 5 MM MISC USE 1 EACH DAY AS NEEDED FOR TESTING 100 each 3    Blood Glucose Monitoring Suppl GABI Measure glucose before breakfast, before dinner and at bedtime daily (we will check at lunchtime at HBO therapy). 1 Device 0    Multiple Vitamins-Minerals (THERAPEUTIC MULTIVITAMIN-MINERALS) tablet Take 1 tablet by mouth daily      Blood Glucose Monitoring Suppl (BLOOD GLUCOSE METER) KIT Use to test blood sugars. 1 kit 0    Lancets MISC Use to test blood sugars once daily. 100 each 3    spironolactone (ALDACTONE) 25 MG tablet Take 1 tablet by mouth daily for 1 day 30 tablet 5    famotidine (PEPCID) 20 MG tablet Take 1 tablet by mouth daily 30 tablet 0     No facility-administered encounter medications on file as of 10/7/2020. Allergies:  Bactrim [sulfamethoxazole-trimethoprim] and Bee venom     Review of Systems   Constitutional: Negative. HENT: Negative. Eyes: Negative. Respiratory: Negative. Cardiovascular: Negative.    Gastrointestinal: Negative. Genitourinary: Negative. Musculoskeletal: Negative. Skin: Negative. Neurological: Negative. Hematological: Negative. Psychiatric/Behavioral: Negative. /70   Pulse 93   Ht 5' 10\" (1.778 m)   Wt 216 lb 8 oz (98.2 kg)   SpO2 99%   BMI 31.06 kg/m²      Data:     CABG X3 8/15/2020:  1. Coronary artery bypass x3, LIMA to LAD, reverse saphenous vein to  the right coronary artery, reverse saphenous vein to OM1.  2. CM  3. DM  4. Incomplete LBBB    CARDIAC CATH 8/6/2020:   Multivessel CAD/ASHD  Will refer to CT surgery for consideration of CABG  Due to severe LV dysfunction, high risk PCI can be considered with Impella support devices as well as atherectomy if he is not felt to be a good surgical candidate. ECHO 8/4/2020:    Summary   Left ventricular systolic function is severely reduced with ejection   fraction estimated at 25-30 %. Severe global hypokinesis is present. Left ventricle size is normal.   Normal left ventricular wall thickness. Grade III diastolic dysfunction with elevated filing pressure. Mild posterior mitral annular calcification is present. No evidence of mitral valve stenosis. Severe mitral regurgitation. The left atrium is mildly dilated. No evidence of aortic valve stenosis. Mild aortic regurgitation is present. Mild tricuspid regurgitation. Normal systolic pulmonary artery pressure (SPAP) estimated at 39 mmHg (RA   pressure 8 mmHg). STRESS TEST 7/8/2020:  IMPRESSION:     1. No scintigraphic evidence of stress-induced myocardial ischemia. 2. Chronic inferoapical infarct. 3. Severe left ventricular dilatation with severe diffuse hypokinesis. 4. Severely reduced LVEF of approximately 20%. Objective:  Physical Exam   Constitutional: He is oriented to person, place, and time. He appears well-developed and well-nourished. HENT:   Head: Normocephalic and atraumatic.    Eyes: Pupils are equal, round, and reactive to

## 2020-10-07 ENCOUNTER — NURSE ONLY (OUTPATIENT)
Dept: CARDIOLOGY CLINIC | Age: 55
End: 2020-10-07
Payer: COMMERCIAL

## 2020-10-07 ENCOUNTER — OFFICE VISIT (OUTPATIENT)
Dept: CARDIOLOGY CLINIC | Age: 55
End: 2020-10-07
Payer: COMMERCIAL

## 2020-10-07 VITALS
BODY MASS INDEX: 30.99 KG/M2 | OXYGEN SATURATION: 99 % | HEART RATE: 93 BPM | SYSTOLIC BLOOD PRESSURE: 110 MMHG | HEIGHT: 70 IN | DIASTOLIC BLOOD PRESSURE: 70 MMHG | WEIGHT: 216.5 LBS

## 2020-10-07 PROCEDURE — 93000 ELECTROCARDIOGRAM COMPLETE: CPT | Performed by: INTERNAL MEDICINE

## 2020-10-07 PROCEDURE — 99214 OFFICE O/P EST MOD 30 MIN: CPT | Performed by: INTERNAL MEDICINE

## 2020-10-07 PROCEDURE — 93282 PRGRMG EVAL IMPLANTABLE DFB: CPT | Performed by: INTERNAL MEDICINE

## 2020-10-07 RX ORDER — LISINOPRIL 5 MG/1
5 TABLET ORAL DAILY
COMMUNITY
End: 2021-02-08 | Stop reason: SDUPTHER

## 2020-10-07 NOTE — PATIENT INSTRUCTIONS
Plan:  1. Limited echo to assess left ventricular function in December 2020.   2. Continue all medications as prescribed. 3. Device checks every 3 months. 4. Follow up with EP NP in 6 months.      Call [address] [place] [address] to schedule echo

## 2020-10-07 NOTE — LETTER
[facility]   Cardiac Consultation    Primary Care Provider:  Jaclyn Caraballo MD     Chief Complaint:   Chief Complaint   Patient presents with    1 Year Follow Up    Tachycardia     NSVT    Cardiomyopathy    Other     device management           HPI:  Lucinda Jeans is a 54 y.o. male with a history of alcoholic (nonischemic) CM s/p placement on 1/23/14 [employer] single chamber ICD. He has severe MR, mild AR and TR. He also has CAD, and underwent a CABG x3 on 8/15/2020. Today, 10/7/20, patients device is functioning normally. He has  <0.1%. No VT events recorded since last office visit. Optivol previously increased. Battery life 5 years. Nightly HR >85 bpm x7 days. QRS duration today is 130. He reports he is doing poorly from a cardiac standpoint. He reports that he is still having chest pain every now and again since his CABG 8/15/20. He is taking all mediations as prescribed and tolerated them well. Patient denies current edema, sob, palpitations, dizziness or syncope.         Past Medical History:   has a past medical history of Acute osteomyelitis of left foot ([address] Acute osteomyelitis of right foot ([address] Acute respiratory failure ([address] Ascites, Blood transfusion reaction, Burst fracture of lumbar vertebra ([address] Cellulitis of left foot, Cellulitis of right lower extremity, Community acquired pneumonia, Diabetes ([address] Diabetic ulcer of right foot ([address] Diabetic ulcer of 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 2 diabetes mellitus, with necrosis of bone ([address] ETOH abuse, Fracture of tibial plateau, High cholesterol, HTN (hypertension), MI (myocardial infarction) ([address] MRSA (methicillin resistant staph aureus) culture positive, Neuropathic ulcer of left foot, limited to breakdown of skin ([address] Neuropathic ulcer of toe ([address] NSVT (nonsustained ventricular tachycardia) ([address] Pleural effusion due to congestive heart failure ([facility] Septicemia ([facility] Smoker, and Systolic CHF, acute ([facility] Surgical History:   has a past surgical history that includes knee surgery (Left); other surgical history (Bilateral, 9/17/15); Toe amputation; Foot Tendon Surgery (Right, 2016); Foot Tendon Surgery (Left, 06/30/2017); other surgical history (Left, 08/17/2017); Foot surgery (Left, 09/27/2017); other surgical history (Left, 12/07/2017); other surgical history (Right, 01/04/2018); Foot Amputation (Left, 08/09/2018); pr part remv othr tarsal/metatarsal (Left, 8/9/2018); pr office/outpt visit,procedure only (Left, 8/23/2018); other surgical history (Left, 11/01/2018); pr part remv othr tarsal/metatarsal (Left, 11/1/2018); Foot surgery (Left, 01/31/2019); Foot Debridement (Left, 1/31/2019); Cardiac defibrillator placement (01/29/2014); and Coronary artery bypass graft (N/A, 8/11/2020). Social History:   reports that he quit smoking about 40 years ago. He has never used smokeless tobacco. He reports that he does not drink alcohol or use drugs. Family History:  family history includes Anemia in his mother; Cancer in his maternal grandfather and maternal grandmother; Diabetes in his mother; Diabetes type 2  in his brother, brother, and brother; Heart Disease in his brother, father, maternal grandfather, maternal grandmother, mother, paternal grandfather, and paternal grandmother; Heart Failure in his brother; High Blood Pressure in his father, maternal grandfather, maternal grandmother, mother, paternal grandfather, and paternal grandmother; High Cholesterol in his father, maternal grandfather, maternal grandmother, mother, paternal grandfather, and paternal grandmother; Stroke in his brother.      Home Medications:  Outpatient Encounter Medications as of 10/7/2020   Medication Sig Dispense Refill    amoxicillin-clavulanate (AUGMENTIN) 875-125 MG per tablet Take 1 tablet by mouth 2 times daily for 10 days 20 tablet 0  ASPIRIN LOW DOSE 81 MG EC tablet Take 1 tablet by mouth daily 90 tablet 3    clopidogrel (PLAVIX) 75 MG tablet Take 1 tablet by mouth daily 30 tablet 11    atorvastatin (LIPITOR) 40 MG tablet Take 1 tablet by mouth nightly 30 tablet 5    metoprolol succinate (TOPROL XL) 50 MG extended release tablet Take 1 tablet by mouth daily 30 tablet 11    furosemide (LASIX) 40 MG tablet Take 1 tablet by mouth 2 times daily 60 tablet 5    metFORMIN (GLUCOPHAGE) 1000 MG tablet Take 1,000 mg by mouth 2 times daily (with meals)      TRULICITY 1.5 GM/4.5DP SOPN Inject 1.5 mg (1 PEN) into the skin once a week 2 mL 0    Blood Glucose Monitoring Suppl (ONE TOUCH ULTRA MINI) w/Device KIT 1 kit by Does not apply route daily 1 kit 0    ARMOUR THYROID 60 MG tablet Take 1 tablet by mouth daily 30 tablet 5    glipiZIDE (GLUCOTROL) 5 MG tablet Take 1 tablet by mouth 2 times daily (before meals) 180 tablet 3    glucose blood VI test strips (ONE TOUCH ULTRA TEST) strip Test blood sugar once daily. 150 strip 3    Insulin Pen Needle (UNIFINE PENTIPS) 31G X 5 MM MISC USE 1 EACH DAY AS NEEDED FOR TESTING 100 each 3    Blood Glucose Monitoring Suppl GABI Measure glucose before breakfast, before dinner and at bedtime daily (we will check at lunchtime at HBO therapy). 1 Device 0    Multiple Vitamins-Minerals (THERAPEUTIC MULTIVITAMIN-MINERALS) tablet Take 1 tablet by mouth daily      Blood Glucose Monitoring Suppl (BLOOD GLUCOSE METER) KIT Use to test blood sugars. 1 kit 0    Lancets MISC Use to test blood sugars once daily. 100 each 3    spironolactone (ALDACTONE) 25 MG tablet Take 1 tablet by mouth daily for 1 day 30 tablet 5    famotidine (PEPCID) 20 MG tablet Take 1 tablet by mouth daily 30 tablet 0     No facility-administered encounter medications on file as of 10/7/2020. Allergies:  Bactrim [sulfamethoxazole-trimethoprim] and Bee venom     Review of Systems   Constitutional: Negative. HENT: Negative. Eyes: Negative. Respiratory: Negative. Cardiovascular: Negative. Gastrointestinal: Negative. Genitourinary: Negative. Musculoskeletal: Negative. Skin: Negative. Neurological: Negative. Hematological: Negative. Psychiatric/Behavioral: Negative. /70   Pulse 93   Ht 5' 10\" (1.778 m)   Wt 216 lb 8 oz (98.2 kg)   SpO2 99%   BMI 31.06 kg/m²      Data:     CABG X3 8/15/2020:  1. Coronary artery bypass x3, LIMA to LAD, reverse saphenous vein to  the right coronary artery, reverse saphenous vein to OM1.  2. CM  3. DM  4. Incomplete LBBB    CARDIAC CATH 8/6/2020:   Multivessel CAD/ASHD  Will refer to CT surgery for consideration of CABG  Due to severe LV dysfunction, high risk PCI can be considered with Impella support devices as well as atherectomy if he is not felt to be a good surgical candidate. ECHO 8/4/2020:    Summary   Left ventricular systolic function is severely reduced with ejection   fraction estimated at 25-30 %. Severe global hypokinesis is present. Left ventricle size is normal.   Normal left ventricular wall thickness. Grade III diastolic dysfunction with elevated filing pressure. Mild posterior mitral annular calcification is present. No evidence of mitral valve stenosis. Severe mitral regurgitation. The left atrium is mildly dilated. No evidence of aortic valve stenosis. Mild aortic regurgitation is present. Mild tricuspid regurgitation. Normal systolic pulmonary artery pressure (SPAP) estimated at 39 mmHg (RA   pressure 8 mmHg). STRESS TEST 7/8/2020:  IMPRESSION:     1. No scintigraphic evidence of stress-induced myocardial ischemia. 2. Chronic inferoapical infarct. 3. Severe left ventricular dilatation with severe diffuse hypokinesis. 4. Severely reduced LVEF of approximately 20%. Objective:  Physical Exam   Constitutional: He is oriented to person, place, and time. He appears well-developed and well-nourished. HENT:   Head: Normocephalic and atraumatic. Eyes: Pupils are equal, round, and reactive to light. Neck: Normal range of motion. Cardiovascular: Normal rate, regular rhythm and normal heart sounds. Pulmonary/Chest: Effort normal and breath sounds normal.   Abdominal: Soft. No tenderness. Musculoskeletal: Normal range of motion. He exhibits no edema. Neurological: He is alert and oriented to person, place, and time. Skin: Skin is warm and dry. Psychiatric: He has a normal mood and affect. Assessment:  1.  CAD, S/P CABS  2. NICM-he had a history of nonischemic cardiomyopathy, thought related to ethanol exposure. The etiology of his LV dysfunction is now less clear as he has significant coronary artery disease and has undergone coronary artery bypass surgery. 3. DM  4. Incomplete left bundle branch block-     Plan:  1. Limited echo to assess left ventricular function 3 months after revascularization (in December 2020). Will consider CRT therapy in the future depending on echo results and symptoms. 2. Continue all medications as prescribed. 3. Device checks every 3 months. 4. Follow up with EP NP in 6 months. QUALITY MEASURES  1. Tobacco Cessation Counseling: NA  2. Retake of BP if >140/90:   NA  3. Documentation to PCP/referring for new patient:  Sent to PCP at close of office visit  4. CAD patient on anti-platelet: Yes  5. CAD patient on STATIN therapy:  Yes  6. Patient with CHF and aFib on anticoagulation:  NA    This note has been scribed in the presence of Albaro Azul MD by Rodríguez Delgado RN.     I, Dr. Albaro Azul, personally performed the services described in this documentation as scribed by Rodríguez Delgado RN in my presence, and it is both accurate and complete.         Albaro Azul M.D.

## 2020-10-07 NOTE — PROGRESS NOTES
Patient presents to the device clinic today for a programming evaluation for his defibrillator. Patient has a history of NSVT and CM. Takes Plavix and Toprol XL. Last device interrogation was on 8/6. Since then, no arrhythmias recorded. Optivol previously increased.  < 0.1%  All sensing and pacing parameters are within normal range. No changes need to be made at this time. Patient education was provided about device functionality, in home monitoring, and any other patient questions and/or concerns were addressed. Patient voices understanding. Please see interrogation for more detail. Patient will see Dr. Maurice Laureano today in office. Patient will follow up in 3 months in office or remotely. See [facility] report under the Cardiology tab.

## 2020-11-16 ENCOUNTER — OFFICE VISIT (OUTPATIENT)
Dept: CARDIOTHORACIC SURGERY | Age: 55
End: 2020-11-16

## 2020-11-16 VITALS
HEART RATE: 92 BPM | TEMPERATURE: 97.3 F | OXYGEN SATURATION: 95 % | BODY MASS INDEX: 29.63 KG/M2 | WEIGHT: 207 LBS | RESPIRATION RATE: 14 BRPM | HEIGHT: 70 IN

## 2020-11-16 PROCEDURE — 99024 POSTOP FOLLOW-UP VISIT: CPT | Performed by: THORACIC SURGERY (CARDIOTHORACIC VASCULAR SURGERY)

## 2020-11-16 NOTE — PROGRESS NOTES
[facility]  Clinic Note     11/16/2020 2:29 PM  Surgeon:  Maribel Christensen     S/P : CABG x3 in August    Chief complaint :  Subjective:  Mr. Shawn Randle     Vital Signs: Pulse 92   Temp 97.3 °F (36.3 °C)   Resp 14   Ht 5' 10\" (1.778 m)   Wt 207 lb (93.9 kg)   SpO2 95%   BMI 29.70 kg/m²      I/O:  No intake or output data in the 24 hours ending 11/16/20 1429    Exam:   Cardiovascular: S1 plus S2 +0 no additional sound  Pulmonary: Bilateral clear no additional sound      Incision: Dry and clean PDS suture flipped in the top        Labs:   CBC: No results for input(s): WBC, HGB, HCT, MCV, PLT in the last 72 hours. BMP: No results for input(s): NA, K, CL, CO2, PHOS, BUN, CREATININE in the last 72 hours. Invalid input(s): CA  PT/INR: No results for input(s): PROTIME, INR in the last 72 hours. APTT: No results for input(s): APTT in the last 72 hours. Scheduled Meds:     Patient Active Problem List   Diagnosis    DM [facility]diabetes mellitus) [facility]    Hypertension    Uncontrolled type 2 diabetes mellitus with diabetic polyneuropathy, with long-term current use of insulin (HCC)    Alcoholic cardiomyopathy    Automatic implantable cardioverter-defibrillator in situ    Hyperlipidemia    Onychomycosis    Dystrophic nail    Callus of foot    Hallux valgus    Diabetic ulcer of right foot associated with type 2 diabetes mellitus, limited to breakdown of skin ([address]    Acquired hypothyroidism    Diabetic ulcer of left foot associated with type 2 diabetes mellitus, with muscle involvement without evidence of necrosis (HCC)    NSVT (nonsustained ventricular tachycardia) (HCC)    NSTEMI (non-ST elevated myocardial infarction) ([address]    Coronary artery disease involving native coronary artery of native heart without angina pectoris    Obesity    Acute post-operative pain       Assessment/Plan:   1.  Stitch was removed patient was advised to follow-up as needed    Shaun Wilson MD FACS

## 2020-11-23 RX ORDER — DULAGLUTIDE 1.5 MG/.5ML
INJECTION, SOLUTION SUBCUTANEOUS
Qty: 2 ML | Refills: 0 | Status: ON HOLD | OUTPATIENT
Start: 2020-11-23 | End: 2021-01-16 | Stop reason: HOSPADM

## 2020-12-01 RX ORDER — CARVEDILOL 25 MG/1
TABLET ORAL
Qty: 60 TABLET | Refills: 0 | OUTPATIENT
Start: 2020-12-01

## 2020-12-01 RX ORDER — GLIPIZIDE 5 MG/1
5 TABLET ORAL
Qty: 180 TABLET | Refills: 0 | Status: SHIPPED | OUTPATIENT
Start: 2020-12-01 | End: 2020-12-02

## 2020-12-01 NOTE — TELEPHONE ENCOUNTER
----- Message from Deon Chapman sent at 12/1/2020  4:50 PM EST -----  Okay to do per Dr. America Dhillon.  ----- Message -----  From: Deon Chapman  Sent: 12/1/2020  12:55 PM EST  To: Gail Sanchez MD    Pt requesting a refill for carvedilol 25 mg that Dr. Jhoana Tidwell prescribed in the past. Shows that it was discontinued on 8/15/20 to stop taking at discharge but pt states that he has been taking it. Please advise.   Blanca Cat

## 2020-12-01 NOTE — TELEPHONE ENCOUNTER
----- Message from Lara Snyder sent at 12/1/2020 12:29 PM EST -----  Per Dr. Comfort galdamez to do.  ----- Message -----  From: Mariano Beyer  Sent: 12/1/2020  10:17 AM EST  To: Zachariah Vargas MD    Pt needs glipizide refilled but needs an appointment. Pt states he doesn't have insurance currently and doesn't know when he will. Wanting to know if you can refill his medication?

## 2020-12-02 RX ORDER — CARVEDILOL 25 MG/1
25 TABLET ORAL 2 TIMES DAILY
Qty: 60 TABLET | Refills: 0 | Status: SHIPPED | OUTPATIENT
Start: 2020-12-02 | End: 2021-01-05

## 2020-12-02 RX ORDER — GLIPIZIDE 5 MG/1
5 TABLET ORAL
Qty: 60 TABLET | Refills: 0 | Status: SHIPPED | OUTPATIENT
Start: 2020-12-02 | End: 2021-05-03

## 2020-12-09 ENCOUNTER — OFFICE VISIT (OUTPATIENT)
Dept: CARDIOLOGY CLINIC | Age: 55
End: 2020-12-09
Payer: MEDICAID

## 2020-12-09 ENCOUNTER — NURSE ONLY (OUTPATIENT)
Dept: CARDIOLOGY CLINIC | Age: 55
End: 2020-12-09

## 2020-12-09 VITALS
WEIGHT: 213.5 LBS | SYSTOLIC BLOOD PRESSURE: 90 MMHG | HEIGHT: 70 IN | OXYGEN SATURATION: 95 % | DIASTOLIC BLOOD PRESSURE: 64 MMHG | HEART RATE: 77 BPM | BODY MASS INDEX: 30.56 KG/M2

## 2020-12-09 PROBLEM — Z95.1 S/P CABG X 3: Status: ACTIVE | Noted: 2020-12-09

## 2020-12-09 PROCEDURE — 99214 OFFICE O/P EST MOD 30 MIN: CPT | Performed by: INTERNAL MEDICINE

## 2020-12-09 RX ORDER — MAGNESIUM OXIDE 400 MG/1
400 TABLET ORAL 2 TIMES DAILY
COMMUNITY
End: 2021-02-08 | Stop reason: SDUPTHER

## 2020-12-09 RX ORDER — SPIRONOLACTONE 25 MG/1
25 TABLET ORAL DAILY
Qty: 30 TABLET | Refills: 5 | Status: SHIPPED | OUTPATIENT
Start: 2020-12-09 | End: 2021-08-13

## 2020-12-09 NOTE — LETTER
Reviewed past medical history, social, and family history. No alcohol nonsmoker  He works for [employer]. Mother: Heart disease, MI x4, young age. Father: Heart disease, CABG age 67.    Siblings: brother  age 40 of CHF, CVA  Past Medical History:   Diagnosis Date    Acute osteomyelitis of left foot ([address] 2017    Acute osteomyelitis of right foot ([address] 2016    Acute respiratory failure ([address] 2020    Ascites     Blood transfusion reaction     Burst fracture of lumbar vertebra ([address] 2012    Cellulitis of left foot     Cellulitis of right lower extremity ,     Community acquired pneumonia     Diabetes ([address]     Diabetic ulcer of right foot ([address] early     Diabetic ulcer of 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 2 diabetes mellitus, with necrosis of bone ([address]     ETOH abuse     Fracture of tibial plateau     High cholesterol     HTN (hypertension)     MI (myocardial infarction) ([address] 2020    + Troponins    MRSA (methicillin resistant staph aureus) culture positive 16, 16    foot wound    Neuropathic ulcer of left foot, limited to breakdown of skin ([address] 11/3/2016    Neuropathic ulcer of toe ([address] 2014    NSVT (nonsustained ventricular tachycardia) ([address]     Pleural effusion due to congestive heart failure ([address]     Septicemia ([address]     Smoker     quit [id number]    Systolic CHF, acute ([address] 2013     Past Surgical History:   Procedure Laterality Date    CARDIAC DEFIBRILLATOR PLACEMENT  2014    ICD Placement    CORONARY ARTERY BYPASS GRAFT N/A 2020    CORONARY ARTERY BYPASS GRAFTING X3, LEFT ATRIAL APPENDAGE CLIP, INTERNAL MAMMARY ARTERY, SAPHENOUS VEIN GRAFT, ON PUMP, 5 LEVEL BILATERAL INTERCOSTAL NERVE BLOCK performed by Makayla Denis MD at [facility] 2018    PARTIAL FOOT AMPUTATION w. Dr Davis Shepard Left 2019 EXOSTECTOMY LEFT FOOT, ULCER DEBRIDEMENT LEFT FOOT WITH GRAFT APPLICATION performed by Jessica Valenzuela DPM at [address] Left 09/27/2017    LEFT FOOT ULCER DEBRIDEMENT, POSSIBLE SECOND METATARSAL    FOOT SURGERY Left 01/31/2019    Exostectomy left foot, ulcer debridement with graft application left foot    FOOT TENDON SURGERY Right 2016    FOOT TENDON SURGERY Left 06/30/2017    KNEE SURGERY Left     OTHER SURGICAL HISTORY Bilateral 9/17/15    amputation 2nd digit bilateral, flexor tenotomy right hallux    OTHER SURGICAL HISTORY Left 08/17/2017    PARTIAL LEFT FOOT AMPUTATION      OTHER SURGICAL HISTORY Left 12/07/2017    Debridement infected bone and tissue left foot; ulcer debridement left foot with graft application with dr mathur     OTHER SURGICAL HISTORY Right 01/04/2018    DEBRIDEMENT INFECTED BONE AND TISSUE RIGHT FOOT, ULCER DEBRIDEMENT RIGHT FOOT WITH GRAFT APPLICATION    OTHER SURGICAL HISTORY Left 11/01/2018    Procedure: PARTIAL RESECTION LEFT METATARSAL, ULCER DEBRIDEMENT LEFT FOOT WITH GRAFT APPLICATION     TX OFFICE/OUTPT VISIT,PROCEDURE ONLY Left 8/23/2018    ULCER DEBRIDEMENT LEFT FOOT WITH GRAFT APPLICATION performed by Jessica Valenzuela DPM at [facility] TARSAL/METATARSAL Left 8/9/2018    PARTIAL FOOT AMPUTATION WITH GRAFT APPLICATION performed by Jessica Valenzuela DPM at [facility] TARSAL/METATARSAL Left 11/1/2018    PARTIAL RESECTION LEFT METATARSAL, ULCER DEBRIDEMENT LEFT FOOT WITH GRAFT APPLICATION performed by Jessica Valenzuela DPM at [facility] TOE AMPUTATION      2 toes       Objective:   BP 90/64   Pulse 77   Ht 5' 10\" (1.778 m)   Wt 213 lb 8 oz (96.8 kg)   SpO2 95%   BMI 30.63 kg/m²     Wt Readings from Last 3 Encounters:   12/09/20 213 lb 8 oz (96.8 kg)   11/16/20 207 lb (93.9 kg)   10/07/20 216 lb 8 oz (98.2 kg)       Physical Exam:  General: No Respiratory distress, appears well developed and well nourished.    Eyes:  Sclera nonicteric Nose/Sinuses:  negative findings: nose shows no deformity, asymmetry, or inflammation, nasal mucosa normal, septum midline with no perforation or bleeding  Back:  no pain to palpation  Joint:  no active joint inflammation  Musculoskeletal:  negative  Skin:  Warm and dry  Neck: No JVD and no Carotid Bruits. Chest:  Bilateral crackles at bases, respiration easy  Cardiovascular:  RRR, 96 bpm S1S2 normal, no murmur, no rub or thrill. Extremities:  1 + edema left foot, 3 toes amputated, 1 deep ulcer. Right foot 1 toe amputation and diabetic ulcer. no clubbing or cyanosis  Pulses: Femoral pulses are 2+ Neuro: intact    Medications:   Outpatient Encounter Medications as of 12/9/2020   Medication Sig Dispense Refill    magnesium oxide (MAG-OX) 400 MG tablet Take 400 mg by mouth 2 times daily      glipiZIDE (GLUCOTROL) 5 MG tablet Take 1 tablet by mouth 2 times daily (before meals) 60 tablet 0    carvedilol (COREG) 25 MG tablet Take 1 tablet by mouth 2 times daily TAKE ONE TABLET BY MOUTH TWICE DAILY 60 tablet 0    metFORMIN (GLUCOPHAGE) 1000 MG tablet Take 1 tablet by mouth 2 times daily (with meals) 60 tablet 0    TRULICITY 1.5 JM/3.7FW SOPN Inject 1.5 mg (1 PEN) into the skin once a week 2 mL 0    lisinopril (PRINIVIL;ZESTRIL) 5 MG tablet Take 5 mg by mouth daily      ASPIRIN LOW DOSE 81 MG EC tablet Take 1 tablet by mouth daily 90 tablet 3    clopidogrel (PLAVIX) 75 MG tablet Take 1 tablet by mouth daily 30 tablet 11    atorvastatin (LIPITOR) 40 MG tablet Take 1 tablet by mouth nightly 30 tablet 5    furosemide (LASIX) 40 MG tablet Take 1 tablet by mouth 2 times daily 60 tablet 5    Blood Glucose Monitoring Suppl (ONE TOUCH ULTRA MINI) w/Device KIT 1 kit by Does not apply route daily 1 kit 0    ARMOUR THYROID 60 MG tablet Take 1 tablet by mouth daily 30 tablet 5    glucose blood VI test strips (ONE TOUCH ULTRA TEST) strip Test blood sugar once daily.  150 strip 3  Insulin Pen Needle (UNIFINE PENTIPS) 31G X 5 MM MISC USE 1 EACH DAY AS NEEDED FOR TESTING 100 each 3    Blood Glucose Monitoring Suppl GABI Measure glucose before breakfast, before dinner and at bedtime daily (we will check at lunchtime at HBO therapy). 1 Device 0    Multiple Vitamins-Minerals (THERAPEUTIC MULTIVITAMIN-MINERALS) tablet Take 1 tablet by mouth daily      Blood Glucose Monitoring Suppl (BLOOD GLUCOSE METER) KIT Use to test blood sugars. 1 kit 0    Lancets MISC Use to test blood sugars once daily. 100 each 3    spironolactone (ALDACTONE) 25 MG tablet Take 1 tablet by mouth daily for 1 day 30 tablet 5     No facility-administered encounter medications on file as of 12/9/2020. Lab Data:  CBC: No results for input(s): WBC, HGB, HCT, MCV, PLT in the last 72 hours. BMP: No results for input(s): NA, K, CL, CO2, PHOS, BUN, CREATININE in the last 72 hours. Invalid input(s): CA  LIVER PROFILE: No results for input(s): AST, ALT, LIPASE, BILIDIR, BILITOT, ALKPHOS in the last 72 hours. Invalid input(s): AMYLASE,  ALB  LIPID:   No components found for: CHLPL  Lab Results   Component Value Date    TRIG 248 (H) 08/11/2020    TRIG 176 (H) 10/16/2019    TRIG 415 (H) 01/03/2018     Lab Results   Component Value Date    HDL 23 (L) 08/11/2020    HDL 30 (L) 05/26/2020    HDL 36 (L) 10/16/2019     Lab Results   Component Value Date    LDLCALC 35 08/11/2020    LDLCALC see below 05/26/2020    [address] 10/16/2019     Lab Results   Component Value Date    LABVLDL 50 08/11/2020    LABVLDL see below 05/26/2020    LABVLDL 35 10/16/2019     PT/INR: No results for input(s): PROTIME, INR in the last 72 hours. A1C:   Lab Results   Component Value Date    LABA1C 6.6 08/11/2020       IMAGING:   I have reviewed the following tests and documented in this encounter as follows:   Discussed with patient.   EKG 8/4/2020  Sinus tachycardia Intra-ventricular conduction delaySeptal infarct , age undeterminedAbnormal ECGNo significant change was foundWhen compared with ECG of1.31.19Confirmed by GIL HUFFMAN, [address] ([id number]) on 8/4/2020 6:51:55 AM   CABG:  Surgeon:  Aviva Sommer     S/P :  CABG x3, AIDEN clip on 8/11/20  CHEST XRAY 8/13/2020  FINDINGS: Recent surgical history of CABG. Left vascular sheath remains in place but [place]-Seth catheter has been removed. ICD stable on the left. Improving aeration in the left lung base with some residual pleural fluid and airspace change. Cardiomegaly is demonstrated. No skeletal finding. CHEST XRAY 8/5/2020  FINDINGS: Cardial pericardial silhouette is stable in appearance. Unipolar pacemaker/ICD is noted. Pulmonary vasculature is congested, but decreased when compared to the previous exam.  Interstitial opacities seen previously has for the most part resolved. No pneumothorax is identified. No free air. No acute bony abnormality     ECHO 8/5/2020   Summary   Left ventricular systolic function is severely reduced with ejection   fraction estimated at 25-30 %. Severe global hypokinesis is present. Left ventricle size is normal.   Normal left ventricular wall thickness. Grade III diastolic dysfunction with elevated filing pressure. Mild posterior mitral annular calcification is present. No evidence of mitral valve stenosis. Severe mitral regurgitation. The left atrium is mildly dilated. No evidence of aortic valve stenosis. Mild aortic regurgitation is present. Mild tricuspid regurgitation. Normal systolic pulmonary artery pressure (SPAP) estimated at 39 mmHg (RA   pressure 8 mmHg). ECHO 5/5/2016   Summary   Limited study for estimation of ejection fraction. Left ventricular systolic function is mildly reduced with an ejection   fraction of 44% by Alanis's method. Mild global hypokinesis. Diastolic filling parameters suggests grade I diastolic dysfunction.    Compared to previous study from 1-9-2014, there is improvement in ejection fraction. Stress test: 7/13:   No scintigraphic evidence of stress-induced myocardial  ischemia. 2. Chronic inferoapical infarct. 3. Severe left ventricular dilatation with severe diffuse  hypokinesis. 4. Severely reduced LVEF of approximately 20%. Assessment:  Encounter Diagnoses   Name Primary?  Coronary artery disease involving native coronary artery of native heart without angina pectoris Yes    Automatic implantable cardioverter-defibrillator in situ     S/P CABG x 3     Diabetic ulcer of other part of right foot associated with type 2 diabetes mellitus, limited to breakdown of skin ([facility]        La Russell Hence was seen today for 6 month follow-up, CAD cardiomyopathy, hypertension and hyperlipidemia. Diagnoses and associated orders for this visit:  CAD s/p CABG 6.10.53  Alcoholic cardiomyopathy, stable chronic systolic CHF functional class I    Hypertension, controlled     Automatic implantable cardiac defibrillator in situ recent check in 7.9.19 normal function    Plan:  1. No change in medications. Continue current regimen  2. Follow up in 3 months   3. He is not able to work and has been not working since last November 2019  He is on appropriate medications for his heart condition  He is not able to pay for his blood test or echocardiogram    QUALITY MEASURES  1. Tobacco Cessation Counseling: NA  2. Retake of BP if >140/90:   NA  3. Documentation to PCP/referring for new patient:  Sent to PCP at close of office visit  4. CAD patient on anti-platelet: Yes  5. CAD patient on STATIN therapy:  Yes  6. Patient with CHF and aFib on anticoagulation:  NA       This note was scribed in the presence of Dr. Danielle Wilson MD by Fredy Macias RN.     I, Dr. Usman Churchill, personally performed the services described in this documentation, as scribed by the above signed scribe in my presence. It is both accurate and complete to my knowledge.  I agree with the details independently gathered by the clinical support staff, while the remaining scribed note accurately describes my personal service to the patient.           Lory Riedel, MD 12/9/2020 11:11 AM             Sincerely,      Anil Lopez MD

## 2020-12-09 NOTE — LETTER
[facility] - [address] [facility],First Floor [address]  Phone: [phone]  Fax: [phone]    [address] [place], [place]        December 9, 2020     [address] [address]      To Whom it May Concern:    Patient is under cardiology care. He is unable to work due to his cardiac issues. If you have any questions or concerns, please don't hesitate to call.     Sincerely,        [address] [place], MD

## 2020-12-09 NOTE — PROGRESS NOTES
[address] Office Note  2020     Subjective:  Mr. Fred Johnson presents today for follow up. CABG x 3. [place]:  Today he reports feeling unwell. He states if he tries to any activity he is unable to continue. He states he has to stop and then restart. He states when he coughs or sneezes his chest hurts. He reports he is without insurance right now, but is planning to check into this. Device check on 10/7/20 showed He has  <0.1%. No VT events recorded since last office visit. Optivol previously increased. Battery life 5 years. Nightly HR >85 bpm x7 days. QRS duration was 130. He reports he has a left open wound ulcer on the bottom of his foot. He follows with Dr. Sang Meeks for this for last year and half. PMH:  Alcoholic CMP, ICD placement  by Dr. Willy Valiente for non ischemic CM with low EF that failed to improve with guide line based medical therapy. He is no longer drinking ETOH products. On 19 he underwent left foot ulcer debridement. He works for [employer]. A left heart cath performed 20 demonstrated multivessel CAD. He then underwent a CABG x 3 on 2020. Review of Systems: 12 point ROS negative in all areas as listed below except as in [place]  Constitutional, EENT, Cardiovascular, pulmonary, GI, ,  skin, neurological, hematological, endocrine, Psychiatric    Reviewed past medical history, social, and family history. No alcohol nonsmoker  He works for [employer]. Mother: Heart disease, MI x4, young age. Father: Heart disease, CABG age 67.    Siblings: brother  age 40 of CHF, CVA  Past Medical History:   Diagnosis Date    Acute osteomyelitis of left foot ([address] 2017    Acute osteomyelitis of right foot ([address] 2016    Acute respiratory failure ([address] 2020    Ascites     Blood transfusion reaction     Burst fracture of lumbar vertebra ([address] 2012    Cellulitis of left foot     Cellulitis of right lower extremity ,    [address] bone and tissue left foot; ulcer debridement left foot with graft application with dr mathur     OTHER SURGICAL HISTORY Right 01/04/2018    DEBRIDEMENT INFECTED BONE AND TISSUE RIGHT FOOT, ULCER DEBRIDEMENT RIGHT FOOT WITH GRAFT APPLICATION    OTHER SURGICAL HISTORY Left 11/01/2018    Procedure: PARTIAL RESECTION LEFT METATARSAL, ULCER DEBRIDEMENT LEFT FOOT WITH GRAFT APPLICATION     [place] OFFICE/OUTPT VISIT,PROCEDURE ONLY Left 8/23/2018    ULCER DEBRIDEMENT LEFT FOOT WITH GRAFT APPLICATION performed by Johanna Zarco DPM at [facility] TARSAL/METATARSAL Left 8/9/2018    PARTIAL FOOT AMPUTATION WITH GRAFT APPLICATION performed by Johanna Zarco DPM at [facility] TARSAL/METATARSAL Left 11/1/2018    PARTIAL RESECTION LEFT METATARSAL, ULCER DEBRIDEMENT LEFT FOOT WITH GRAFT APPLICATION performed by Johanna Zarco DPM at [address] TOE AMPUTATION      2 toes       Objective:   BP 90/64   Pulse 77   Ht 5' 10\" (1.778 m)   Wt 213 lb 8 oz (96.8 kg)   SpO2 95%   BMI 30.63 kg/m²     Wt Readings from Last 3 Encounters:   12/09/20 213 lb 8 oz (96.8 kg)   11/16/20 207 lb (93.9 kg)   10/07/20 216 lb 8 oz (98.2 kg)       Physical Exam:  General: No Respiratory distress, appears well developed and well nourished. Eyes:  Sclera nonicteric  Nose/Sinuses:  negative findings: nose shows no deformity, asymmetry, or inflammation, nasal mucosa normal, septum midline with no perforation or bleeding  Back:  no pain to palpation  Joint:  no active joint inflammation  Musculoskeletal:  negative  Skin:  Warm and dry  Neck: No JVD and no Carotid Bruits. Chest:  Bilateral crackles at bases, respiration easy  Cardiovascular:  RRR, 96 bpm S1S2 normal, no murmur, no rub or thrill. Extremities:  1 + edema left foot, 3 toes amputated, 1 deep ulcer. Right foot 1 toe amputation and diabetic ulcer.    no clubbing or cyanosis  Pulses: Femoral pulses are 2+ Neuro: intact    Medications:   Outpatient Encounter Medications as of 12/9/2020   Medication Sig Dispense Refill    magnesium oxide (MAG-OX) 400 MG tablet Take 400 mg by mouth 2 times daily      glipiZIDE (GLUCOTROL) 5 MG tablet Take 1 tablet by mouth 2 times daily (before meals) 60 tablet 0    carvedilol (COREG) 25 MG tablet Take 1 tablet by mouth 2 times daily TAKE ONE TABLET BY MOUTH TWICE DAILY 60 tablet 0    metFORMIN (GLUCOPHAGE) 1000 MG tablet Take 1 tablet by mouth 2 times daily (with meals) 60 tablet 0    TRULICITY 1.5 WQ/0.7EG SOPN Inject 1.5 mg (1 PEN) into the skin once a week 2 mL 0    lisinopril (PRINIVIL;ZESTRIL) 5 MG tablet Take 5 mg by mouth daily      ASPIRIN LOW DOSE 81 MG EC tablet Take 1 tablet by mouth daily 90 tablet 3    clopidogrel (PLAVIX) 75 MG tablet Take 1 tablet by mouth daily 30 tablet 11    atorvastatin (LIPITOR) 40 MG tablet Take 1 tablet by mouth nightly 30 tablet 5    furosemide (LASIX) 40 MG tablet Take 1 tablet by mouth 2 times daily 60 tablet 5    Blood Glucose Monitoring Suppl (ONE TOUCH ULTRA MINI) w/Device KIT 1 kit by Does not apply route daily 1 kit 0    ARMOUR THYROID 60 MG tablet Take 1 tablet by mouth daily 30 tablet 5    glucose blood VI test strips (ONE TOUCH ULTRA TEST) strip Test blood sugar once daily. 150 strip 3    Insulin Pen Needle (UNIFINE PENTIPS) 31G X 5 MM MISC USE 1 EACH DAY AS NEEDED FOR TESTING 100 each 3    Blood Glucose Monitoring Suppl GABI Measure glucose before breakfast, before dinner and at bedtime daily (we will check at lunchtime at HBO therapy). 1 Device 0    Multiple Vitamins-Minerals (THERAPEUTIC MULTIVITAMIN-MINERALS) tablet Take 1 tablet by mouth daily      Blood Glucose Monitoring Suppl (BLOOD GLUCOSE METER) KIT Use to test blood sugars. 1 kit 0    Lancets MISC Use to test blood sugars once daily.  100 each 3    spironolactone (ALDACTONE) 25 MG tablet Take 1 tablet by mouth daily for 1 day 30 tablet 5     No facility-administered encounter medications on file as of 12/9/2020. Lab Data:  CBC: No results for input(s): WBC, HGB, HCT, MCV, PLT in the last 72 hours. BMP: No results for input(s): NA, K, CL, CO2, PHOS, BUN, CREATININE in the last 72 hours. Invalid input(s): CA  LIVER PROFILE: No results for input(s): AST, ALT, LIPASE, BILIDIR, BILITOT, ALKPHOS in the last 72 hours. Invalid input(s): AMYLASE,  ALB  LIPID:   No components found for: CHLPL  Lab Results   Component Value Date    TRIG 248 (H) 08/11/2020    TRIG 176 (H) 10/16/2019    TRIG 415 (H) 01/03/2018     Lab Results   Component Value Date    HDL 23 (L) 08/11/2020    HDL 30 (L) 05/26/2020    HDL 36 (L) 10/16/2019     Lab Results   Component Value Date    LDLCALC 35 08/11/2020    LDLCALC see below 05/26/2020    [address] 10/16/2019     Lab Results   Component Value Date    LABVLDL 50 08/11/2020    LABVLDL see below 05/26/2020    LABVLDL 35 10/16/2019     PT/INR: No results for input(s): PROTIME, INR in the last 72 hours. A1C:   Lab Results   Component Value Date    LABA1C 6.6 08/11/2020       IMAGING:   I have reviewed the following tests and documented in this encounter as follows:   Discussed with patient. EKG 8/4/2020  Sinus tachycardia Intra-ventricular conduction delaySeptal infarct , age undeterminedAbnormal ECGNo significant change was foundWhen compared with ECG of1.31.19Confirmed by GIL HUFFMAN, [address] (1986) on 8/4/2020 6:51:55 AM   CABG:  Surgeon:  Gurmeet Simmons     S/P :  CABG x3, AIDEN clip on 8/11/20  CHEST XRAY 8/13/2020  FINDINGS: Recent surgical history of CABG. Left vascular sheath remains in place but [place]-Seth catheter has been removed. ICD stable on the left. Improving aeration in the left lung base with some residual pleural fluid and airspace change. Cardiomegaly is demonstrated. No skeletal finding. CHEST XRAY 8/5/2020  FINDINGS: Cardial pericardial silhouette is stable in appearance. Unipolar pacemaker/ICD is noted.   Pulmonary vasculature is congested, but decreased when compared to the previous exam.  Interstitial opacities seen previously has for the most part resolved. No pneumothorax is identified. No free air. No acute bony abnormality     ECHO 8/5/2020   Summary   Left ventricular systolic function is severely reduced with ejection   fraction estimated at 25-30 %. Severe global hypokinesis is present. Left ventricle size is normal.   Normal left ventricular wall thickness. Grade III diastolic dysfunction with elevated filing pressure. Mild posterior mitral annular calcification is present. No evidence of mitral valve stenosis. Severe mitral regurgitation. The left atrium is mildly dilated. No evidence of aortic valve stenosis. Mild aortic regurgitation is present. Mild tricuspid regurgitation. Normal systolic pulmonary artery pressure (SPAP) estimated at 39 mmHg (RA   pressure 8 mmHg). ECHO 5/5/2016   Summary   Limited study for estimation of ejection fraction. Left ventricular systolic function is mildly reduced with an ejection   fraction of 44% by Alanis's method. Mild global hypokinesis. Diastolic filling parameters suggests grade I diastolic dysfunction. Compared to previous study from 1-9-2014, there is improvement in ejection   fraction. Stress test: 7/13:   No scintigraphic evidence of stress-induced myocardial  ischemia. 2. Chronic inferoapical infarct. 3. Severe left ventricular dilatation with severe diffuse  hypokinesis. 4. Severely reduced LVEF of approximately 20%. Assessment:  Encounter Diagnoses   Name Primary?     Coronary artery disease involving native coronary artery of native heart without angina pectoris Yes    Automatic implantable cardioverter-defibrillator in situ     S/P CABG x 3     Diabetic ulcer of other part of right foot associated with type 2 diabetes mellitus, limited to breakdown of skin ([address]        Ree Ha was seen today for 6 month follow-up, CAD cardiomyopathy, hypertension and

## 2020-12-28 ENCOUNTER — TELEPHONE (OUTPATIENT)
Dept: CARDIOTHORACIC SURGERY | Age: 55
End: 2020-12-28

## 2020-12-28 NOTE — TELEPHONE ENCOUNTER
Patient called to report that he is having problems with the proximal end of his midsternal incision. He reports redness, irritation, and more stitch is protruding from where  had removed a stitch about 5 weeks ago. Does also report minimal serous drainage from site. He has been on doxycycline for a foot infection that is due to complete tomorrow. Denies fevers. Will have him come in tomorrow to see Dr. Nataly Gaitan since Dr. Edmundo Giron is out of the office until next week. Patient is agreeable to this.

## 2020-12-29 ENCOUNTER — OFFICE VISIT (OUTPATIENT)
Dept: CARDIOTHORACIC SURGERY | Age: 55
End: 2020-12-29
Payer: MEDICAID

## 2020-12-29 VITALS
OXYGEN SATURATION: 98 % | HEART RATE: 92 BPM | SYSTOLIC BLOOD PRESSURE: 96 MMHG | DIASTOLIC BLOOD PRESSURE: 68 MMHG | WEIGHT: 207 LBS | HEIGHT: 70 IN | BODY MASS INDEX: 29.63 KG/M2 | TEMPERATURE: 97.4 F

## 2020-12-29 PROCEDURE — 99213 OFFICE O/P EST LOW 20 MIN: CPT | Performed by: THORACIC SURGERY (CARDIOTHORACIC VASCULAR SURGERY)

## 2020-12-29 RX ORDER — TRAMADOL HYDROCHLORIDE 50 MG/1
50 TABLET ORAL EVERY 6 HOURS PRN
Qty: 12 TABLET | Refills: 0 | Status: SHIPPED | OUTPATIENT
Start: 2020-12-29 | End: 2021-01-03

## 2020-12-29 RX ORDER — CEPHALEXIN 250 MG/1
250 CAPSULE ORAL 2 TIMES DAILY
Qty: 6 CAPSULE | Refills: 0 | Status: ON HOLD | OUTPATIENT
Start: 2020-12-29 | End: 2021-01-16 | Stop reason: HOSPADM

## 2020-12-29 NOTE — PROGRESS NOTES
[facility]  Clinic Note     12/29/2020 11:02 AM  Surgeon:  Shamar Leblanc     S/P : CABG x3 on 8/11/20 with Dr. Jay Garcia. Chief complaint : Possible suture protruding from proximal end of MSI  Subjective:  Mr. Vaughn Smith presents to office today due to red, irritated area at proximal end of midsternal incision. He had previous suture removal with Dr. Jay Garcia in November around the same area. Does have purulent drainage from area. No fever or chills. Vital Signs: BP 96/68 (Site: Left Upper Arm, Position: Sitting, Cuff Size: Medium Adult)   Pulse 92   Temp 97.4 °F (36.3 °C) (Temporal)   Ht 5' 10\" (1.778 m)   Wt 207 lb (93.9 kg)   SpO2 98%   BMI 29.70 kg/m²      I/O:  No intake or output data in the 24 hours ending 12/29/20 1102    Exam:   Cardiovascular:  Pulmonary:    Lower extremity:  Incision:    Chest X-Ray:  normal     Labs:   CBC: No results for input(s): WBC, HGB, HCT, MCV, PLT in the last 72 hours. BMP: No results for input(s): NA, K, CL, CO2, PHOS, BUN, CREATININE in the last 72 hours. Invalid input(s): CA  PT/INR: No results for input(s): PROTIME, INR in the last 72 hours. APTT: No results for input(s): APTT in the last 72 hours.     Scheduled Meds:     Patient Active Problem List   Diagnosis    Hypertension    Uncontrolled type 2 diabetes mellitus with diabetic polyneuropathy, with long-term current use of insulin (HCC)    Alcoholic cardiomyopathy    Automatic implantable cardioverter-defibrillator in situ    Hyperlipidemia    Onychomycosis    Dystrophic nail    Callus of foot    Hallux valgus    Diabetic ulcer of right foot associated with type 2 diabetes mellitus, limited to breakdown of skin ([address]    Acquired hypothyroidism    Diabetic ulcer of left foot associated with type 2 diabetes mellitus, with muscle involvement without evidence of necrosis ([facility])    NSVT (nonsustained ventricular tachycardia) ([address]    NSTEMI (non-ST elevated myocardial infarction) [facility]    Coronary artery disease involving native coronary artery of native heart without angina pectoris    Obesity    Acute post-operative pain    S/P CABG x 3       Assessment/Plan:   1.       Olga Ambriz MD

## 2020-12-29 NOTE — PROGRESS NOTES
without evidence of necrosis ([address]    NSVT (nonsustained ventricular tachycardia) (HCC)    NSTEMI (non-ST elevated myocardial infarction) ([address]    Coronary artery disease involving native coronary artery of native heart without angina pectoris    Obesity    Acute post-operative pain    S/P CABG x 3       Assessment/Plan:   1. Suture granuloma Mr. Lawrence Walton underwent a office incision and drainage and removal of suture today. This involved the superior most portion of his midline sternotomy wound. The rest of his wound is well-healed and intact. Mr. Lawrence Walton states this is the second time he has had issues with his wound as he was previously placed on antibiotics. 2. He will have his follow-up with Dr. Parker Robert; Wound cultures were sent and I placed him on a short course of Keflex. He has an allergy to Bactrim.     Pauline Parrish MD   Cardiothoracic surgery

## 2021-01-01 LAB
GRAM STAIN RESULT: ABNORMAL
ORGANISM: ABNORMAL
WOUND/ABSCESS: ABNORMAL

## 2021-01-01 NOTE — Clinical Note
Dear Ysabel Smith,  Thank you so much for involving me in the care of your patient. Please review the enclosed note. Feel free to call me with any questions.  Warm Regards, Kyrie PAST SURGICAL HISTORY:  No significant past surgical history

## 2021-01-05 DIAGNOSIS — I10 ESSENTIAL HYPERTENSION: ICD-10-CM

## 2021-01-05 DIAGNOSIS — E78.2 MIXED HYPERLIPIDEMIA: ICD-10-CM

## 2021-01-05 DIAGNOSIS — I42.6 ALCOHOLIC CARDIOMYOPATHY (HCC): ICD-10-CM

## 2021-01-05 RX ORDER — CARVEDILOL 25 MG/1
25 TABLET ORAL 2 TIMES DAILY
Qty: 60 TABLET | Refills: 0 | Status: SHIPPED | OUTPATIENT
Start: 2021-01-05 | End: 2021-02-08 | Stop reason: SDUPTHER

## 2021-01-05 RX ORDER — THYROID 60 MG
TABLET ORAL
Qty: 90 TABLET | Refills: 3 | Status: SHIPPED | OUTPATIENT
Start: 2021-01-05 | End: 2021-07-01

## 2021-01-05 NOTE — TELEPHONE ENCOUNTER
Refill request  magnesium oxide (MAG-OX) 400 MG tablet and ORI THYROID 60 MG tablet       [address] [facility], [address] [facility]

## 2021-01-15 ENCOUNTER — APPOINTMENT (OUTPATIENT)
Dept: GENERAL RADIOLOGY | Age: 56
End: 2021-01-15
Payer: MEDICARE

## 2021-01-15 ENCOUNTER — HOSPITAL ENCOUNTER (OUTPATIENT)
Age: 56
Setting detail: OBSERVATION
Discharge: HOME | End: 2021-01-16
Attending: STUDENT IN AN ORGANIZED HEALTH CARE EDUCATION/TRAINING PROGRAM | Admitting: HOSPITALIST
Payer: MEDICARE

## 2021-01-15 DIAGNOSIS — R07.9 CHEST PAIN, UNSPECIFIED TYPE: Primary | ICD-10-CM

## 2021-01-15 LAB
A/G RATIO: 1.5 (ref 1.1–2.2)
ALBUMIN SERPL-MCNC: 4.2 G/DL (ref 3.4–5)
ALP BLD-CCNC: 106 U/L (ref 40–129)
ALT SERPL-CCNC: 13 U/L (ref 10–40)
ANION GAP SERPL CALCULATED.3IONS-SCNC: 12 MMOL/L (ref 3–16)
AST SERPL-CCNC: 12 U/L (ref 15–37)
BASOPHILS ABSOLUTE: 0.1 K/UL (ref 0–0.2)
BASOPHILS RELATIVE PERCENT: 0.7 %
BILIRUB SERPL-MCNC: 0.8 MG/DL (ref 0–1)
BUN BLDV-MCNC: 23 MG/DL (ref 7–20)
CALCIUM SERPL-MCNC: 9 MG/DL (ref 8.3–10.6)
CHLORIDE BLD-SCNC: 93 MMOL/L (ref 99–110)
CO2: 27 MMOL/L (ref 21–32)
CREAT SERPL-MCNC: 1.2 MG/DL (ref 0.9–1.3)
EOSINOPHILS ABSOLUTE: 0.1 K/UL (ref 0–0.6)
EOSINOPHILS RELATIVE PERCENT: 1 %
GFR AFRICAN AMERICAN: >60
GFR NON-AFRICAN AMERICAN: >60
GLOBULIN: 2.8 G/DL
GLUCOSE BLD-MCNC: 433 MG/DL (ref 70–99)
HCT VFR BLD CALC: 32.9 % (ref 40.5–52.5)
HEMOGLOBIN: 11.3 G/DL (ref 13.5–17.5)
LYMPHOCYTES ABSOLUTE: 0.4 K/UL (ref 1–5.1)
LYMPHOCYTES RELATIVE PERCENT: 5.3 %
MCH RBC QN AUTO: 29.6 PG (ref 26–34)
MCHC RBC AUTO-ENTMCNC: 34.5 G/DL (ref 31–36)
MCV RBC AUTO: 86 FL (ref 80–100)
MONOCYTES ABSOLUTE: 0.6 K/UL (ref 0–1.3)
MONOCYTES RELATIVE PERCENT: 6.8 %
NEUTROPHILS ABSOLUTE: 7.1 K/UL (ref 1.7–7.7)
NEUTROPHILS RELATIVE PERCENT: 86.2 %
PDW BLD-RTO: 15.3 % (ref 12.4–15.4)
PLATELET # BLD: 167 K/UL (ref 135–450)
PMV BLD AUTO: 6.8 FL (ref 5–10.5)
POTASSIUM REFLEX MAGNESIUM: 4.5 MMOL/L (ref 3.5–5.1)
PRO-BNP: 3375 PG/ML (ref 0–124)
RBC # BLD: 3.82 M/UL (ref 4.2–5.9)
SODIUM BLD-SCNC: 132 MMOL/L (ref 136–145)
TOTAL PROTEIN: 7 G/DL (ref 6.4–8.2)
TROPONIN: <0.01 NG/ML
WBC # BLD: 8.3 K/UL (ref 4–11)

## 2021-01-15 PROCEDURE — 99283 EMERGENCY DEPT VISIT LOW MDM: CPT

## 2021-01-15 PROCEDURE — 71045 X-RAY EXAM CHEST 1 VIEW: CPT

## 2021-01-15 PROCEDURE — 93005 ELECTROCARDIOGRAM TRACING: CPT | Performed by: STUDENT IN AN ORGANIZED HEALTH CARE EDUCATION/TRAINING PROGRAM

## 2021-01-15 PROCEDURE — 83880 ASSAY OF NATRIURETIC PEPTIDE: CPT

## 2021-01-15 PROCEDURE — 85025 COMPLETE CBC W/AUTO DIFF WBC: CPT

## 2021-01-15 PROCEDURE — 84484 ASSAY OF TROPONIN QUANT: CPT

## 2021-01-15 PROCEDURE — 80053 COMPREHEN METABOLIC PANEL: CPT

## 2021-01-15 RX ORDER — ASPIRIN 81 MG/1
162 TABLET, CHEWABLE ORAL ONCE
Status: COMPLETED | OUTPATIENT
Start: 2021-01-15 | End: 2021-01-16

## 2021-01-15 ASSESSMENT — HEART SCORE: ECG: 0

## 2021-01-15 ASSESSMENT — PAIN SCALES - GENERAL: PAINLEVEL_OUTOF10: 0

## 2021-01-16 VITALS
OXYGEN SATURATION: 97 % | HEIGHT: 70 IN | HEART RATE: 90 BPM | RESPIRATION RATE: 16 BRPM | DIASTOLIC BLOOD PRESSURE: 67 MMHG | WEIGHT: 212.7 LBS | BODY MASS INDEX: 30.45 KG/M2 | TEMPERATURE: 98.8 F | SYSTOLIC BLOOD PRESSURE: 106 MMHG

## 2021-01-16 PROBLEM — R07.9 CHEST PAIN: Status: ACTIVE | Noted: 2021-01-16

## 2021-01-16 LAB
EKG ATRIAL RATE: 104 BPM
EKG DIAGNOSIS: NORMAL
EKG P AXIS: 76 DEGREES
EKG P-R INTERVAL: 174 MS
EKG Q-T INTERVAL: 364 MS
EKG QRS DURATION: 122 MS
EKG QTC CALCULATION (BAZETT): 478 MS
EKG R AXIS: -52 DEGREES
EKG T AXIS: 92 DEGREES
EKG VENTRICULAR RATE: 104 BPM
GLUCOSE BLD-MCNC: 359 MG/DL (ref 70–99)
GLUCOSE BLD-MCNC: 373 MG/DL (ref 70–99)
GLUCOSE BLD-MCNC: 378 MG/DL (ref 70–99)
GLUCOSE BLD-MCNC: 386 MG/DL (ref 70–99)
PERFORMED ON: ABNORMAL
SARS-COV-2, NAAT: NOT DETECTED
TROPONIN: <0.01 NG/ML

## 2021-01-16 PROCEDURE — 2580000003 HC RX 258: Performed by: HOSPITALIST

## 2021-01-16 PROCEDURE — 99214 OFFICE O/P EST MOD 30 MIN: CPT | Performed by: INTERNAL MEDICINE

## 2021-01-16 PROCEDURE — 6360000002 HC RX W HCPCS: Performed by: HOSPITALIST

## 2021-01-16 PROCEDURE — 96372 THER/PROPH/DIAG INJ SC/IM: CPT

## 2021-01-16 PROCEDURE — 84484 ASSAY OF TROPONIN QUANT: CPT

## 2021-01-16 PROCEDURE — U0002 COVID-19 LAB TEST NON-CDC: HCPCS

## 2021-01-16 PROCEDURE — 93010 ELECTROCARDIOGRAM REPORT: CPT | Performed by: INTERNAL MEDICINE

## 2021-01-16 PROCEDURE — 36415 COLL VENOUS BLD VENIPUNCTURE: CPT

## 2021-01-16 PROCEDURE — 6370000000 HC RX 637 (ALT 250 FOR IP): Performed by: HOSPITALIST

## 2021-01-16 PROCEDURE — 6370000000 HC RX 637 (ALT 250 FOR IP): Performed by: INTERNAL MEDICINE

## 2021-01-16 PROCEDURE — G0378 HOSPITAL OBSERVATION PER HR: HCPCS

## 2021-01-16 PROCEDURE — 83036 HEMOGLOBIN GLYCOSYLATED A1C: CPT

## 2021-01-16 PROCEDURE — 99219 PR INITIAL OBSERVATION CARE/DAY 50 MINUTES: CPT | Performed by: NURSE PRACTITIONER

## 2021-01-16 PROCEDURE — 6370000000 HC RX 637 (ALT 250 FOR IP): Performed by: STUDENT IN AN ORGANIZED HEALTH CARE EDUCATION/TRAINING PROGRAM

## 2021-01-16 RX ORDER — ONDANSETRON 2 MG/ML
4 INJECTION INTRAMUSCULAR; INTRAVENOUS EVERY 6 HOURS PRN
Status: DISCONTINUED | OUTPATIENT
Start: 2021-01-16 | End: 2021-01-16 | Stop reason: HOSPADM

## 2021-01-16 RX ORDER — ATORVASTATIN CALCIUM 40 MG/1
40 TABLET, FILM COATED ORAL NIGHTLY
Status: DISCONTINUED | OUTPATIENT
Start: 2021-01-16 | End: 2021-01-16 | Stop reason: HOSPADM

## 2021-01-16 RX ORDER — LEVOTHYROXINE AND LIOTHYRONINE 19; 4.5 UG/1; UG/1
60 TABLET ORAL DAILY
Status: DISCONTINUED | OUTPATIENT
Start: 2021-01-16 | End: 2021-01-16 | Stop reason: HOSPADM

## 2021-01-16 RX ORDER — PEN NEEDLE, DIABETIC 31 GX5/16"
1 NEEDLE, DISPOSABLE MISCELLANEOUS DAILY
Qty: 100 EACH | Refills: 3 | Status: SHIPPED | OUTPATIENT
Start: 2021-01-16 | End: 2021-07-26 | Stop reason: SDUPTHER

## 2021-01-16 RX ORDER — LISINOPRIL 5 MG/1
5 TABLET ORAL DAILY
Status: DISCONTINUED | OUTPATIENT
Start: 2021-01-16 | End: 2021-01-16 | Stop reason: HOSPADM

## 2021-01-16 RX ORDER — CARVEDILOL 25 MG/1
25 TABLET ORAL 2 TIMES DAILY
Status: DISCONTINUED | OUTPATIENT
Start: 2021-01-16 | End: 2021-01-16 | Stop reason: HOSPADM

## 2021-01-16 RX ORDER — POLYETHYLENE GLYCOL 3350 17 G/17G
17 POWDER, FOR SOLUTION ORAL DAILY PRN
Status: DISCONTINUED | OUTPATIENT
Start: 2021-01-16 | End: 2021-01-16 | Stop reason: HOSPADM

## 2021-01-16 RX ORDER — DEXTROSE MONOHYDRATE 50 MG/ML
100 INJECTION, SOLUTION INTRAVENOUS PRN
Status: DISCONTINUED | OUTPATIENT
Start: 2021-01-16 | End: 2021-01-16 | Stop reason: HOSPADM

## 2021-01-16 RX ORDER — SODIUM CHLORIDE 0.9 % (FLUSH) 0.9 %
10 SYRINGE (ML) INJECTION EVERY 12 HOURS SCHEDULED
Status: DISCONTINUED | OUTPATIENT
Start: 2021-01-16 | End: 2021-01-16 | Stop reason: HOSPADM

## 2021-01-16 RX ORDER — NICOTINE POLACRILEX 4 MG
15 LOZENGE BUCCAL PRN
Status: DISCONTINUED | OUTPATIENT
Start: 2021-01-16 | End: 2021-01-16 | Stop reason: HOSPADM

## 2021-01-16 RX ORDER — ACETAMINOPHEN 650 MG/1
650 SUPPOSITORY RECTAL EVERY 6 HOURS PRN
Status: DISCONTINUED | OUTPATIENT
Start: 2021-01-16 | End: 2021-01-16 | Stop reason: HOSPADM

## 2021-01-16 RX ORDER — PROMETHAZINE HYDROCHLORIDE 25 MG/1
12.5 TABLET ORAL EVERY 6 HOURS PRN
Status: DISCONTINUED | OUTPATIENT
Start: 2021-01-16 | End: 2021-01-16 | Stop reason: HOSPADM

## 2021-01-16 RX ORDER — ISOSORBIDE MONONITRATE 30 MG/1
30 TABLET, EXTENDED RELEASE ORAL DAILY
Qty: 30 TABLET | Refills: 3 | Status: SHIPPED | OUTPATIENT
Start: 2021-01-17 | End: 2021-02-11 | Stop reason: SDUPTHER

## 2021-01-16 RX ORDER — DEXTROSE MONOHYDRATE 25 G/50ML
12.5 INJECTION, SOLUTION INTRAVENOUS PRN
Status: DISCONTINUED | OUTPATIENT
Start: 2021-01-16 | End: 2021-01-16 | Stop reason: HOSPADM

## 2021-01-16 RX ORDER — SODIUM CHLORIDE 0.9 % (FLUSH) 0.9 %
10 SYRINGE (ML) INJECTION PRN
Status: DISCONTINUED | OUTPATIENT
Start: 2021-01-16 | End: 2021-01-16 | Stop reason: HOSPADM

## 2021-01-16 RX ORDER — CLOPIDOGREL BISULFATE 75 MG/1
75 TABLET ORAL DAILY
Status: DISCONTINUED | OUTPATIENT
Start: 2021-01-16 | End: 2021-01-16 | Stop reason: HOSPADM

## 2021-01-16 RX ORDER — INSULIN HUMAN 100 [IU]/ML
12 INJECTION, SUSPENSION SUBCUTANEOUS
Qty: 5 PEN | Refills: 3 | Status: SHIPPED | OUTPATIENT
Start: 2021-01-16 | End: 2021-04-29

## 2021-01-16 RX ORDER — ASPIRIN 81 MG/1
81 TABLET ORAL DAILY
Status: DISCONTINUED | OUTPATIENT
Start: 2021-01-16 | End: 2021-01-16 | Stop reason: HOSPADM

## 2021-01-16 RX ORDER — IBUPROFEN 200 MG
1 TABLET ORAL 3 TIMES DAILY
Qty: 100 EACH | Refills: 3 | Status: SHIPPED | OUTPATIENT
Start: 2021-01-16

## 2021-01-16 RX ORDER — ACETAMINOPHEN 325 MG/1
650 TABLET ORAL EVERY 6 HOURS PRN
Status: DISCONTINUED | OUTPATIENT
Start: 2021-01-16 | End: 2021-01-16 | Stop reason: HOSPADM

## 2021-01-16 RX ORDER — ISOSORBIDE MONONITRATE 30 MG/1
30 TABLET, EXTENDED RELEASE ORAL DAILY
Status: DISCONTINUED | OUTPATIENT
Start: 2021-01-16 | End: 2021-01-16 | Stop reason: HOSPADM

## 2021-01-16 RX ADMIN — THYROID, PORCINE 60 MG: 30 TABLET ORAL at 09:08

## 2021-01-16 RX ADMIN — ISOSORBIDE MONONITRATE 30 MG: 30 TABLET ORAL at 09:21

## 2021-01-16 RX ADMIN — CLOPIDOGREL BISULFATE 75 MG: 75 TABLET ORAL at 09:07

## 2021-01-16 RX ADMIN — CARVEDILOL 25 MG: 25 TABLET, FILM COATED ORAL at 09:07

## 2021-01-16 RX ADMIN — ASPIRIN 162 MG: 81 TABLET, CHEWABLE ORAL at 02:16

## 2021-01-16 RX ADMIN — Medication 10 ML: at 09:08

## 2021-01-16 RX ADMIN — LISINOPRIL 5 MG: 5 TABLET ORAL at 09:07

## 2021-01-16 RX ADMIN — INSULIN LISPRO 3 UNITS: 100 INJECTION, SOLUTION INTRAVENOUS; SUBCUTANEOUS at 05:44

## 2021-01-16 RX ADMIN — ENOXAPARIN SODIUM 40 MG: 40 INJECTION SUBCUTANEOUS at 09:07

## 2021-01-16 RX ADMIN — ASPIRIN 81 MG: 81 TABLET, COATED ORAL at 09:07

## 2021-01-16 NOTE — PROGRESS NOTES
Patient admitted to room 308 from ER. Patient oriented to room, call light, bed rails, phone, lights and bathroom. Patient instructed about the schedule of the day including: vital sign frequency, lab draws, possible tests, frequency of MD and staff rounds, daily weights, I &O's and prescribed diet. Telemetry box in place, patient aware of placement and reason. Bed locked, in lowest position, side rails up 2/4, call light within reach. Admission assessment complete. Pt denies any chest pain at this time. Recliner Assessment  Patient is able to demonstrate the ability to move from a reclining position to an upright position within the recliner. 4 Eyes Skin Assessment     The patient is being assess for   Admission    I agree that 2 RN's have performed a thorough Head to Toe Skin Assessment on the patient. ALL assessment sites listed below have been assessed. Areas assessed by both nurses:   [x]   Head, Face, and Ears   [x]   Shoulders, Back, and Chest, Abdomen  [x]   Arms, Elbows, and Hands   [x]   Coccyx, Sacrum, and Ischium  [x]   Legs, Feet, and Heels    Diabetic ulcer to bottom of right foot, left foot, left 4th digit. **SHARE this note so that the co-signing nurse is able to place an eSignature**    Co-signer eSignature: joanne hussein RN    Does the Patient have Skin Breakdown?   No          Christiano Prevention initiated:  NA   Wound Care Orders initiated:  NA      [facility] nurse consulted for Pressure Injury (Stage 3,4, Unstageable, DTI, NWPT, Complex wounds)and New or Established Ostomies:  NA      Primary Nurse eSignature: Electronically signed by Nancy Mata RN on 1/16/21 at 2:43 AM EST

## 2021-01-16 NOTE — CONSULTS
[address] [place] [address]  [phone]        Reason for Consultation/Chief Complaint: \"I have been having SOB and CP . \"  Consulted for CP, CAD    History of Present Illness:  Iliana Sellers is a 54 y.o. patient who presented to [facility] 1/15/21 with c/o SOB and CP. Follows with Dr. Jef Garcia and Dr. Jean Pierre Giron for cardiac care--last [facility] 12/9/20. He has PMH of alcoholic CM s/p ICD per Dr. Radha Stephenson and combined ischemic CM EF=25-30%  CAD s/p 3V CABG per Dr. Rodri Hopper 8/11/20, DM, HLD, HTN, NSTEMI, hx osteomyelitis both feet s/p partial left foot amputation, and diabetic foot ulcers. Most recent ECHO 8/5/20 EF=25-30% (EF=44% in 5/16); severe global HK; grade III DD elevated filling pressure; severe MR; mild AI/TR. Note most recent [address] [facility] 8/6/20 showed MV CAD and then underwent 3V CABG. Most recent judy nuc 7/13 negative for ischemia; EF=20%. Now presents with c/o SOB and CP. Reports laying on couch last night when felt SOB mainly and then had mild chest discomfort dull in character. His SOB lasted couple of hours and mild CP x 1 hour. He took tramadol which helped relieve symptoms. This episode is not like problems before MI. Feels good now without further recurrence. Admit EKG ST 104bpm; left axis; IVCD; nonspecific ST change (no change 10/20 EKG). CXR stable moderate CM; note 3 negative Yuly; BMW=5289 (2569 in 8/20); Reports compliance with meds, fluid restrict, and low salt diet. He has not been taking insulin because pharmacy will not fill given no insurance he reports. I have been asked to provide consultation regarding further management and testing.       Past Medical History:   has a past medical history of Acute osteomyelitis of left foot ([address] Acute osteomyelitis of right foot ([address] Acute respiratory failure ([address] Ascites, Blood transfusion reaction, Burst fracture of lumbar vertebra ([address] Cellulitis of left foot, Cellulitis of right lower extremity, Community acquired pneumonia, Diabetes ([facility] Diabetic ulcer of right foot ([address] Diabetic ulcer of 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 2 diabetes mellitus, with necrosis of bone ([address] ETOH abuse, Fracture of tibial plateau, High cholesterol, History of blood transfusion, HTN (hypertension), Hx of blood clots, MI (myocardial infarction) ([address] MRSA (methicillin resistant staph aureus) culture positive, Neuropathic ulcer of left foot, limited to breakdown of skin ([address] Neuropathic ulcer of toe ([address] NSVT (nonsustained ventricular tachycardia) ([address] Pleural effusion due to congestive heart failure ([address] Septicemia ([address] Smoker, Systolic CHF, acute ([address] and Thyroid disease. Surgical History:   has a past surgical history that includes knee surgery (Left); other surgical history (Bilateral, 9/17/15); Toe amputation; Foot Tendon Surgery (Right, 2016); Foot Tendon Surgery (Left, 06/30/2017); other surgical history (Left, 08/17/2017); Foot surgery (Left, 09/27/2017); other surgical history (Left, 12/07/2017); other surgical history (Right, 01/04/2018); Foot Amputation (Left, 08/09/2018); pr part remv othr tarsal/metatarsal (Left, 8/9/2018); pr office/outpt visit,procedure only (Left, 8/23/2018); other surgical history (Left, 11/01/2018); pr part remv othr tarsal/metatarsal (Left, 11/1/2018); Foot surgery (Left, 01/31/2019); Foot Debridement (Left, 1/31/2019); Cardiac defibrillator placement (01/29/2014); Coronary artery bypass graft (N/A, 8/11/2020); and eye surgery. Social History:   reports that he quit smoking about 40 years ago. He has a 0.25 pack-year smoking history. He has never used smokeless tobacco. He reports that he does not drink alcohol or use drugs.      Family History:  family history includes Anemia in his mother; Cancer in his maternal grandfather and maternal grandmother; Diabetes in his mother; Diabetes type 2  in his brother, brother, and brother; Heart Disease in his brother, father, maternal grandfather, maternal grandmother, mother, paternal grandfather, and paternal grandmother; Heart Failure in his brother; High Blood Pressure in his father, maternal grandfather, maternal grandmother, mother, paternal grandfather, and paternal grandmother; High Cholesterol in his father, maternal grandfather, maternal grandmother, mother, paternal grandfather, and paternal grandmother; Stroke in his brother. Home Medications:  Were reviewed and are listed in nursing record. and/or listed below  Prior to Admission medications    Medication Sig Start Date End Date Taking?  Authorizing Provider   ORI THYROID 60 MG tablet Take 1 tablet by mouth daily 1/5/21  Yes Nuha Perez MD   carvedilol (COREG) 25 MG tablet Take 1 tablet by mouth 2 times daily TAKE ONE TABLET BY MOUTH TWICE DAILY 1/5/21  Yes Cinthya Mcarthur MD   magnesium oxide (MAG-OX) 400 (241.3 Mg) MG TABS tablet Take 1 tablet by mouth 2 times daily 1/5/21  Yes Nuha Perez MD   cephALEXin (KEFLEX) 250 MG capsule Take 1 capsule by mouth 2 times daily 12/29/20  Yes Brandon Sharma MD   spironolactone (ALDACTONE) 25 MG tablet Take 1 tablet by mouth daily for 1 day 12/9/20 1/16/21 Yes Nuha Perez MD   glipiZIDE (GLUCOTROL) 5 MG tablet Take 1 tablet by mouth 2 times daily (before meals) 12/2/20  Yes Cinthya Mcarthur MD   metFORMIN (GLUCOPHAGE) 1000 MG tablet Take 1 tablet by mouth 2 times daily (with meals) 11/23/20  Yes Cinthya Mcarthur MD   TRULICITY 1.5 XX/1.2IG SOPN Inject 1.5 mg (1 PEN) into the skin once a week 11/23/20  Yes Cinthya Mcarthur MD   lisinopril (PRINIVIL;ZESTRIL) 5 MG tablet Take 5 mg by mouth daily   Yes Historical Provider, MD   ASPIRIN LOW DOSE 81 MG EC tablet Take 1 tablet by mouth daily 9/24/20  Yes Nuha Perez MD   clopidogrel (PLAVIX) 75 MG tablet Take 1 tablet by mouth daily 9/15/20  Yes Nuha Perez MD   atorvastatin (LIPITOR) 40 MG tablet Take 1 tablet by mouth nightly 9/15/20  Yes Nuha Perez MD   furosemide (LASIX) 40 MG tablet Take 1 tablet by mouth 2 times daily 9/15/20  Yes Dari Castillo MD   Blood Glucose Monitoring Suppl (ONE TOUCH ULTRA MINI) w/Device KIT 1 kit by Does not apply route daily 5/26/20  Yes Petra Burgess MD   glucose blood VI test strips (ONE TOUCH ULTRA TEST) strip Test blood sugar once daily. 1/3/18  Yes Lorena Kemp MD   Insulin Pen Needle (UNIFINE PENTIPS) 31G X 5 MM MISC USE 1 EACH DAY AS NEEDED FOR TESTING 1/3/18  Yes Lorena Kemp MD   Blood Glucose Monitoring Suppl GABI Measure glucose before breakfast, before dinner and at bedtime daily (we will check at lunchtime at HBO therapy). 10/25/17  Yes Remigio Wilson MD   Multiple Vitamins-Minerals (THERAPEUTIC MULTIVITAMIN-MINERALS) tablet Take 1 tablet by mouth daily   Yes Historical Provider, MD   Blood Glucose Monitoring Suppl (BLOOD GLUCOSE METER) KIT Use to test blood sugars. 7/15/15  Yes Lorena Kemp MD   Lancets MISC Use to test blood sugars once daily.  7/15/15  Yes Lorena Kemp MD   Insulin Glargine (TOUJEO SOLOSTAR SC) Inject 25 Units into the skin    Historical Provider, MD   magnesium oxide (MAG-OX) 400 MG tablet Take 400 mg by mouth 2 times daily    Historical Provider, MD        Allergies:  Bactrim [sulfamethoxazole-trimethoprim] and Bee venom     Review of Systems:   12 point ROS negative in all areas as listed below except as in [place]  Constitutional, EENT, Cardiovascular, pulmonary, GI, , Musculoskeletal, skin, neurological, hematological, endocrine, Psychiatric    Physical Examination:    Vitals:    01/16/21 0230   BP: 108/70   Pulse: 98   Resp: 22   Temp: 98.9 °F (37.2 °C)   SpO2: 94%    Weight: 212 lb 11.2 oz (96.5 kg)         General Appearance:  Alert, cooperative, no distress, appears older thanstated age; extensive tattoos   Head:  Normocephalic, without obvious abnormality, atraumatic   Eyes:  PERRL, conjunctiva/corneas clear       Nose: Nares normal, no drainage or sinus tenderness   Throat: Lips, mucosa, and tongue normal   Neck: Supple, symmetrical, trachea midline, no adenopathy, thyroid: not enlarged, symmetric, no tenderness/mass/nodules, no carotid bruit or JVD       Lungs:   Clear soft bs especially bases   Chest Wall:  No tenderness or deformity   Heart:  Regular rate and rhythm, S1, S2 normal, no murmur, rub or gallop   Abdomen:   Soft, non-tender, bowel sounds active all four quadrants,  no masses, no organomegaly           Extremities: Extremities normal, atraumatic, no cyanosis or edema   Pulses: 2+ and symmetric   Skin: Skin color, texture, turgor normal, no rashes or lesions   Pysch: Normal mood and affect   Neurologic: Normal gross motor and sensory exam.         Labs  CBC:   Lab Results   Component Value Date    WBC 8.3 01/15/2021    RBC 3.82 01/15/2021    HGB 11.3 01/15/2021    HCT 32.9 01/15/2021    MCV 86.0 01/15/2021    RDW 15.3 01/15/2021     01/15/2021     CMP:    Lab Results   Component Value Date     01/15/2021    K 4.5 01/15/2021    CL 93 01/15/2021    CO2 27 01/15/2021    BUN 23 01/15/2021    CREATININE 1.2 01/15/2021    GFRAA >60 01/15/2021    GFRAA >60 08/01/2012    AGRATIO 1.5 01/15/2021    LABGLOM >60 01/15/2021    GLUCOSE 433 01/15/2021    PROT 7.0 01/15/2021    PROT 7.1 08/01/2012    CALCIUM 9.0 01/15/2021    BILITOT 0.8 01/15/2021    ALKPHOS 106 01/15/2021    AST 12 01/15/2021    ALT 13 01/15/2021     PT/INR:  No results found for: PTINR  Lab Results   Component Value Date    TROPONINI <0.01 01/16/2021       EKG:  I have reviewed EKG with the following interpretation:  Impression:  See JAGJIT Coello July 2013  Impression   IMPRESSION:       1. No scintigraphic evidence of stress-induced myocardial   ischemia.        2. Chronic inferoapical infarct.       3. Severe left ventricular dilatation with severe diffuse   hypokinesis.       4. Severely reduced LVEF of approximately 20%.        8/5/20 ECHO Summary   Left ventricular systolic function is severely reduced with EF estimated at 25-30 %. Severe global hypokinesis is present. Left ventricle size is normal.   Normal left ventricular wall thickness. Grade III diastolic dysfunction with elevated filing pressure. Mild posterior mitral annular calcification is present. No evidence of mitral valve stenosis. Severe mitral regurgitation. The left atrium is mildly dilated. No evidence of aortic valve stenosis. Mild aortic regurgitation is present. Mild tricuspid regurgitation. Normal systolic pulmonary artery pressure (SPAP) estimated at 39 mmHg (RA pressure 8 mmHg). Assessment:  Samantha Christie is a 54 y.o. patient who presented to [facility] 1/15/21 with c/o SOB and CP. Follows with Dr. Isabell Flores and Dr. Sravan Pisano for cardiac care--last [facility] 12/9/20. He has PMH of alcoholic CM s/p ICD per Dr. Kori Clayton and combined ischemic CM EF=25-30%  CAD s/p 3V CABG per Dr. Chito Stauffer 8/11/20, DM, HLD, HTN, NSTEMI, hx osteomyelitis both feet s/p partial left foot amputation, and diabetic foot ulcers. Most recent ECHO 8/5/20 EF=25-30% (EF=44% in 5/16); severe global HK; grade III DD elevated filling pressure; severe MR; mild AI/TR. Note most recent [address] [facility] 8/6/20 showed MV CAD and then underwent 3V CABG. Most recent judy nuc 7/13 negative for ischemia; EF=20%. Now presents with c/o SOB and CP. Reports laying on couch last night when felt SOB mainly and then had mild chest discomfort dull in character. His SOB lasted couple of hours and mild CP x 1 hour. He took tramadol which helped relieve symptoms. This episode is not like problems before MI. Feels good now without further recurrence. Admit EKG ST 104bpm; left axis; IVCD; nonspecific ST change (no change 10/20 EKG). CXR stable moderate CM; note 3 negative Yuly; VPK=5020 (2569 in 8/20); Reports compliance with meds, fluid restrict, and low salt diet. He has not been taking insulin because pharmacy will not fill given no insurance he reports.     Diagnosis of SOB and unspecified CP in middle-aged male with hx CAD s/p 3V CABG and combined ETOH/ischemic cardiomyopathy. Recs:  1. Ruled out for MI and no signs of fluid overload on exam to suggest CHF. Could be related to cardiomyopathy and known underlying CAD and needs better med mgt. 2. I recommend ECHO to evaluate LV function and size, wall motion, and valves for any structural abnormalities. Elder Barr and Terry scheduled ECHO for EF reassessment for next week. 3. Start imdur 30mg daily for anti-anginal treatment. No ischemia testing unless EF depressed further on ECHO today compared to 8/20 study. 4. Continue baby asa, lipitor 40mg qd, coreg 25mg BID, plavix 75mg qd, and lisinopril 5mg qd    If ECHO without concerning issues and stable or improved EF then he is OK for d/c from cardiology standpoint later today. He has f/u appt Dr. Karen Barr early March and will call if further issues. Patient Active Problem List   Diagnosis    Hypertension    Uncontrolled type 2 diabetes mellitus with diabetic polyneuropathy, with long-term current use of insulin (HCC)    Alcoholic cardiomyopathy    Automatic implantable cardioverter-defibrillator in situ    Hyperlipidemia    Onychomycosis    Dystrophic nail    Callus of foot    Hallux valgus    Diabetic ulcer of right foot associated with type 2 diabetes mellitus, limited to breakdown of skin ([address]    Acquired hypothyroidism    Diabetic ulcer of left foot associated with type 2 diabetes mellitus, with muscle involvement without evidence of necrosis ([facility]    NSVT (nonsustained ventricular tachycardia) ([address]    NSTEMI (non-ST elevated myocardial infarction) ([address]    Coronary artery disease involving native coronary artery of native heart without angina pectoris    Obesity    Acute post-operative pain    S/P CABG x 3    Chest pain       Thank you for allowing to us to participate in the care or Salty Goldman.  Further evaluation will be based upon the patient's clinical course and testing results.

## 2021-01-16 NOTE — H&P
[facility] Medicine History & Physical      PCP: Cinthia Rosado MD    Date of Admission: 1/15/2021    Date of Service: Pt seen/examined on 1/16/2021     Chief Complaint:    Chief Complaint   Patient presents with    Chest Pain     Co of chest pain that came on about two hours ago with SOB. Pt was sitting on the couch when it started. denies N/V/D         History Of Present Illness: The patient is a 54 y.o. male with hypertension, DM type 2, Systolic CHF, CAD s/p CABG, h/o NSVT, neuropathy, hyperlipidemia, and hypothyroidism who presented to [facility] ED with complaint of shortness of breath and chest pain that started around 8 pm last night while laying on the couch. He was laying on his left side. Patient describes the pain as a dull pain. It was associated with shortness of breath. Aggravated by nothing. Alleviated by aspirin and Tramadol. Pain lasted an hour before subsiding. Denies any URI symptoms, cough, shortness of breath, pain with inspiration, recent new exercises or heavy lifting, abd pain, n/v/d. The patient has had a cardiac work up in the past.     Cardiac risk factors:   - HTN yes  - HLD yes  - DM yes  - Tobacco abuse former  - PHx or FHx of CAD yes    The patient denies any known history of connective tissue disease or aneurysms. The patient denies any recent surgery, hospitalization, immobilization, long car/plane trip, active malignancy, long bone fracture, or history of DVT/PE.     Past Medical History:        Diagnosis Date    Acute osteomyelitis of left foot ([address] 9/27/2017    Acute osteomyelitis of right foot ([address] 4/25/2016    Acute respiratory failure ([address] 8/4/2020    Ascites     Blood transfusion reaction     Burst fracture of lumbar vertebra ([address] 11/9/2012    Cellulitis of left foot     Cellulitis of right lower extremity 2/16, 5/16    Community acquired pneumonia     Diabetes ([address]     Diabetic ulcer of right foot ([address] early 2016    Diabetic ulcer of 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 2 diabetes mellitus, with necrosis of bone ([address]     ETOH abuse     Fracture of tibial plateau 35/0/0824    High cholesterol     History of blood transfusion     reaction    HTN (hypertension)     Hx of blood clots     MI (myocardial infarction) ([address] 08/04/2020    + Troponins    MRSA (methicillin resistant staph aureus) culture positive 4/28/16, 4/20/16    foot wound    Neuropathic ulcer of left foot, limited to breakdown of skin ([address] 11/3/2016    Neuropathic ulcer of toe ([address] 2/13/2014    NSVT (nonsustained ventricular tachycardia) ([address] 2014    Pleural effusion due to congestive heart failure ([address]     Septicemia ([address]     Smoker     quit [id number]    Systolic CHF, acute ([address] 7/8/2013    Thyroid disease        Past Surgical History:        Procedure Laterality Date    CARDIAC DEFIBRILLATOR PLACEMENT  01/29/2014    ICD Placement    CORONARY ARTERY BYPASS GRAFT N/A 8/11/2020    CORONARY ARTERY BYPASS GRAFTING X3, LEFT ATRIAL APPENDAGE CLIP, INTERNAL MAMMARY ARTERY, SAPHENOUS VEIN GRAFT, ON PUMP, 5 LEVEL BILATERAL INTERCOSTAL NERVE BLOCK performed by Farida Leal MD at [address] [facility] Left 08/09/2018    PARTIAL FOOT AMPUTATION w. Dr Angel Brown Left 1/31/2019    EXOSTECTOMY LEFT FOOT, ULCER DEBRIDEMENT LEFT FOOT WITH GRAFT APPLICATION performed by Debora Adler DPM at [facility] Left 09/27/2017    LEFT FOOT ULCER DEBRIDEMENT, POSSIBLE SECOND METATARSAL    FOOT SURGERY Left 01/31/2019    Exostectomy left foot, ulcer debridement with graft application left foot    FOOT TENDON SURGERY Right 2016    FOOT TENDON SURGERY Left 06/30/2017    KNEE SURGERY Left     OTHER SURGICAL HISTORY Bilateral 9/17/15    amputation 2nd digit bilateral, flexor tenotomy right hallux    OTHER SURGICAL HISTORY Left 08/17/2017    PARTIAL LEFT FOOT AMPUTATION      OTHER SURGICAL HISTORY Left 12/07/2017    Debridement infected bone and tissue left foot; ulcer debridement left foot with graft application with dr mathur     OTHER SURGICAL HISTORY Right 01/04/2018    DEBRIDEMENT INFECTED BONE AND TISSUE RIGHT FOOT, ULCER DEBRIDEMENT RIGHT FOOT WITH GRAFT APPLICATION    OTHER SURGICAL HISTORY Left 11/01/2018    Procedure: PARTIAL RESECTION LEFT METATARSAL, ULCER DEBRIDEMENT LEFT FOOT WITH GRAFT APPLICATION     NC OFFICE/OUTPT VISIT,PROCEDURE ONLY Left 8/23/2018    ULCER DEBRIDEMENT LEFT FOOT WITH GRAFT APPLICATION performed by Vidya Tripathi DPM at [facility] TARSAL/METATARSAL Left 8/9/2018    PARTIAL FOOT AMPUTATION WITH GRAFT APPLICATION performed by Vidya Tripathi DPM at [facility] TARSAL/METATARSAL Left 11/1/2018    PARTIAL RESECTION LEFT METATARSAL, ULCER DEBRIDEMENT LEFT FOOT WITH GRAFT APPLICATION performed by Vidya Tripathi DPM at [address] [place] [address]      2 toes       Medications Prior to Admission:    Prior to Admission medications    Medication Sig Start Date End Date Taking?  Authorizing Provider   ORI THYROID 60 MG tablet Take 1 tablet by mouth daily 1/5/21  Yes Lucian Palmer MD   carvedilol (COREG) 25 MG tablet Take 1 tablet by mouth 2 times daily TAKE ONE TABLET BY MOUTH TWICE DAILY 1/5/21  Yes Vj Contreras MD   magnesium oxide (MAG-OX) 400 (241.3 Mg) MG TABS tablet Take 1 tablet by mouth 2 times daily 1/5/21  Yes Lucian Palmer MD   cephALEXin (KEFLEX) 250 MG capsule Take 1 capsule by mouth 2 times daily 12/29/20  Yes Joellen Mathis MD   spironolactone (ALDACTONE) 25 MG tablet Take 1 tablet by mouth daily for 1 day 12/9/20 1/16/21 Yes Lucian Palmer MD   glipiZIDE (GLUCOTROL) 5 MG tablet Take 1 tablet by mouth 2 times daily (before meals) 12/2/20  Yes Vj Contreras MD   metFORMIN (GLUCOPHAGE) 1000 MG tablet Take 1 tablet by mouth 2 times daily (with meals) 11/23/20  Yes Vj Contreras MD   TRULICITY 1.5 AG/8.4GD SOPN Inject 1.5 mg (1 PEN) into the skin once a week 11/23/20  Yes Mercedes Peralta MD   lisinopril (PRINIVIL;ZESTRIL) 5 MG tablet Take 5 mg by mouth daily   Yes Historical Provider, MD   ASPIRIN LOW DOSE 81 MG EC tablet Take 1 tablet by mouth daily 9/24/20  Yes Miguel Phillips MD   clopidogrel (PLAVIX) 75 MG tablet Take 1 tablet by mouth daily 9/15/20  Yes Miguel Phillips MD   atorvastatin (LIPITOR) 40 MG tablet Take 1 tablet by mouth nightly 9/15/20  Yes Miguel Phillips MD   furosemide (LASIX) 40 MG tablet Take 1 tablet by mouth 2 times daily 9/15/20  Yes Miguel Phillips MD   Blood Glucose Monitoring Suppl (ONE TOUCH ULTRA MINI) w/Device KIT 1 kit by Does not apply route daily 5/26/20  Yes Mercedes Peralta MD   glucose blood VI test strips (ONE TOUCH ULTRA TEST) strip Test blood sugar once daily. 1/3/18  Yes Chaim Garrett MD   Insulin Pen Needle (UNIFINE PENTIPS) 31G X 5 MM MISC USE 1 EACH DAY AS NEEDED FOR TESTING 1/3/18  Yes Chaim Garrett MD   Blood Glucose Monitoring Suppl GABI Measure glucose before breakfast, before dinner and at bedtime daily (we will check at lunchtime at HBO therapy). 10/25/17  Yes Governor Hina MD   Multiple Vitamins-Minerals (THERAPEUTIC MULTIVITAMIN-MINERALS) tablet Take 1 tablet by mouth daily   Yes Historical Provider, MD   Blood Glucose Monitoring Suppl (BLOOD GLUCOSE METER) KIT Use to test blood sugars. 7/15/15  Yes Chaim Garrett MD   Lancets MISC Use to test blood sugars once daily. 7/15/15  Yes Chaim Garrett MD   Insulin Glargine (TOUJEO SOLOSTAR SC) Inject 25 Units into the skin    Historical Provider, MD   magnesium oxide (MAG-OX) 400 MG tablet Take 400 mg by mouth 2 times daily    Historical Provider, MD       Allergies:  Bactrim [sulfamethoxazole-trimethoprim] and Bee venom    Social History:    TOBACCO:   reports that he quit smoking about 40 years ago. He has a 0.25 pack-year smoking history.  He has never used smokeless tobacco.  ETOH:   reports no history of alcohol use. Family History:   Positive as follows:        Problem Relation Age of Onset    Heart Disease Mother     High Blood Pressure Mother     High Cholesterol Mother     Diabetes Mother     Anemia Mother     Heart Disease Father     High Blood Pressure Father     High Cholesterol Father     Heart Disease Maternal Grandmother     High Blood Pressure Maternal Grandmother     High Cholesterol Maternal Grandmother     Cancer Maternal Grandmother         bone marrow & breast    Heart Disease Maternal Grandfather     High Blood Pressure Maternal Grandfather     High Cholesterol Maternal Grandfather     Cancer Maternal Grandfather         pancreatic CA    Heart Disease Paternal Grandmother     High Blood Pressure Paternal Grandmother     High Cholesterol Paternal Grandmother     Heart Disease Paternal Grandfather     High Blood Pressure Paternal Grandfather     High Cholesterol Paternal Grandfather     Stroke Brother     Heart Failure Brother     Heart Disease Brother     Diabetes type 2  Brother     Diabetes type 2  Brother     Diabetes type 2  Brother        REVIEW OF SYSTEMS:       Constitutional: Negative for fever   Respiratory: Negative for cough + dyspnea, orthopnea  Cardiovascular: + chest pain   Gastrointestinal: Negative for vomiting, diarrhea   Genitourinary: Negative for dysuria  Musculoskeletal: Negative for arthralgias   Skin: Negative for rash   Neurological: Negative for syncope   Psychiatric/Behavorial: Negative for anxiety    PHYSICAL EXAM:    /67   Pulse 90   Temp 98.8 °F (37.1 °C) (Oral)   Resp 16   Ht 5' 10\" (1.778 m)   Wt 212 lb 11.2 oz (96.5 kg)   SpO2 97%   BMI 30.52 kg/m²     Gen: No distress. Alert. Eyes: PERRL. No sclera icterus. No conjunctival injection. ENT: No discharge. Pharynx clear. Neck:  Trachea midline. Resp: No accessory muscle use. + RLL, LLL crackles. No wheezes. No rhonchi. CV: Regular rate. Regular rhythm. No murmur.   No rub. +1 BLE edema. GI: Non-tender. Non-distended. Normal bowel sounds. No hernia. Skin: Warm and dry. Wound right foot, toe amputations bilateral feet. M/S: No cyanosis. No joint deformity. No clubbing. Neuro: Awake. Grossly nonfocal    Psych: Oriented x 3. No anxiety or agitation. CARDIAC ENZYMES  Recent Labs     01/15/21  2220 01/16/21  0150 01/16/21  0411   TROPONINI <0.01 <0.01 <0.01       CULTURES  Rapid COVID: not detected    EKG:  I have reviewed the EKG with the following interpretation:   Sinus tachycardia, Left axis deviation, Nonspecific ST abnormality, Non-specific intra-ventricular conduction delay    RADIOLOGY  XR CHEST PORTABLE   Final Result   Stable moderate cardiomegaly. Echo 8/5/2020   Summary   Left ventricular systolic function is severely reduced with ejection   fraction estimated at 25-30 %. Severe global hypokinesis is present. Left ventricle size is normal.   Normal left ventricular wall thickness. Grade III diastolic dysfunction with elevated filing pressure. Mild posterior mitral annular calcification is present. No evidence of mitral valve stenosis. Severe mitral regurgitation. The left atrium is mildly dilated. No evidence of aortic valve stenosis. Mild aortic regurgitation is present. Mild tricuspid regurgitation. Normal systolic pulmonary artery pressure (SPAP) estimated at 39 mmHg (RA   pressure 8 mmHg). ASSESSMENT/PLAN:    Chest pain  Shortness of breath  CAD s/p 3 vessel CABG  - Admitted for cardiac evaluation to PCU  - monitored on tele  - Serial troponin negative x3, EKG no acute ischemic changes. - patient is s/p 3 vessel CABG  - seen by cardiology  - check Echo  - continue medical management with Coreg, aspirin, Plavix, Atorvastatin. Added Imdur.      DM type 2  - uncontrolled, with hyperglycemia  - patient states he is unable to afford Toujeo and Trulicity  - he has not been able to take his diabetes medications due to not having insurance and not working since August  - Will Try Lantus, and Humalog. Also on Glipizde and Metformin. - needs close F/u with PCP  - checked HgbA1C    Neuropathy  Diabetic foot ulcers  - F/w Dr. Sahra Lee. Chronic systolic/diastolic CHF  Alcohol related cardiomyopathy  - has AICD  - last EF 25-30%  - checking Echo today  - continue Coreg, Lisinopril, Aldactone, Lasix  - Imdur added  - F/w Dr. Wong Certain. Hypothyroidism  - continue Amour. Hypertension  - BP stable. Continue Coreg, Lisinopril. Imdur was added per cardiology. Hyperlipidemia   - on Atorvastatin.      DVT Prophylaxis: Lovenox  Diet: Diet NPO Effective Now  Code Status: Full Code    Beverly Tracy FNP-C  1/16/2021

## 2021-01-16 NOTE — ED NOTES
EKG obtained and given to Dr. Joselyn Colorado. Patient states he is having ABD pain, and Shortness of breath. Patient did appear to be short of breath. Patient also states his cardiac history is triple by-pass.      [facility]  01/15/21 1679

## 2021-01-16 NOTE — PROGRESS NOTES
Perfect serve sent to  updating him that pt's blood sugar was 373 and there was no scheduled insulin to give at this time. He messaged back to give a 1 time dose now.  Electronically signed by Miguel Schmidt RN on 1/16/2021 at 4:36 AM

## 2021-01-16 NOTE — PROGRESS NOTES
End of shift report given Dariela MARTINEZ. Pt in stable condition, care transferred at this time.  Electronically signed by Michael Coreas RN on 1/16/2021 at 7:25 AM

## 2021-01-16 NOTE — ED PROVIDER NOTES
[address] ED      CHIEF COMPLAINT  Chest Pain (Co of chest pain that came on about two hours ago with SOB. Pt was sitting on the couch when it started. denies N/V/D)       HISTORY OF PRESENT ILLNESS  Mary Anne Funez is a 54 y.o. male with a past medical history of diabetes, hyperlipidemia, hypertension, MI in August 2020, and CHF, who presents to the ED complaining of chest pain. Patient reports acute onset of left-sided chest pain that started at rest at approximately 8 PM.  He is currently pain-free. He reports it was an aching pain, nonradiating. Pain was intermittent when present-no factors that seem to bring it on. He did have associated shortness of breath. He does report chronic cough. No recent change, nonproductive. He denies fever, nausea/vomiting, abdominal pain, or diaphoresis. He states his symptoms that required him to get a cardiac cath were more abdominal pain than chest pain. Palliative or provocative factors. Patient denies previous blood clot or active malignancy, leg swelling, hemoptysis, recent travel or surgery/prolonged immobilization, or OCP or other hormone use. Patient has a history of coronary artery disease with previous cardiac catheterization and CABG. They report that their most recent cardiac evaluation was: 8/2020. Patient had a CABG with grafting x3 on 8/11/2020    Cardiac Cath:   Multivessel CAD/ASHD  Will refer to CT surgery for consideration of CABG  Due to severe LV dysfunction, high risk PCI can be considered with Impella support devices as well as atherectomy if he is not felt to be a good surgical candidate. Echo: Summary   Left ventricular systolic function is severely reduced with ejection fraction estimated at 25-30 %. Severe global hypokinesis is present. Left ventricle size is normal.   Normal left ventricular wall thickness. Grade III diastolic dysfunction with elevated filing pressure.    Mild posterior mitral annular calcification is present. No evidence of mitral valve stenosis. Severe mitral regurgitation. The left atrium is mildly dilated. No evidence of aortic valve stenosis. Mild aortic regurgitation is present. Mild tricuspid regurgitation. Normal systolic pulmonary artery pressure (SPAP) estimated at 39 mmHg (RA pressure 8 mmHg). Patient does not smoke. Patient also has diabetic foot wounds on his bilateral feet. He reports these are stable, no acute concerns at this time. No other complaints, modifying factors or associated symptoms. I have reviewed the following from the nursing documentation.     Past Medical History:   Diagnosis Date    Acute osteomyelitis of left foot ([address] 9/27/2017    Acute osteomyelitis of right foot ([address] 4/25/2016    Acute respiratory failure ([address] 8/4/2020    Ascites     Blood transfusion reaction     Burst fracture of lumbar vertebra (HCC) 11/9/2012    Cellulitis of left foot     Cellulitis of right lower extremity 2/16, 5/16    Community acquired pneumonia     Diabetes ([address]     Diabetic ulcer of right foot ([address] early 2016    Diabetic ulcer of 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 2 diabetes mellitus, with necrosis of bone ([address]     ETOH abuse     Fracture of tibial plateau 96/8/8398    High cholesterol     HTN (hypertension)     MI (myocardial infarction) ([address] 08/04/2020    + Troponins    MRSA (methicillin resistant staph aureus) culture positive 4/28/16, 4/20/16    foot wound    Neuropathic ulcer of left foot, limited to breakdown of skin ([address] 11/3/2016    Neuropathic ulcer of toe ([address] 2/13/2014    NSVT (nonsustained ventricular tachycardia) ([address] 2014    Pleural effusion due to congestive heart failure ([address]     Septicemia ([address]     Smoker     quit 5014    Systolic CHF, acute ([address] 7/8/2013     Past Surgical History:   Procedure Laterality Date    CARDIAC DEFIBRILLATOR PLACEMENT  01/29/2014    ICD Placement    CORONARY ARTERY BYPASS GRAFT N/A 8/11/2020 CORONARY ARTERY BYPASS GRAFTING X3, LEFT ATRIAL APPENDAGE CLIP, INTERNAL MAMMARY ARTERY, SAPHENOUS VEIN GRAFT, ON PUMP, 5 LEVEL BILATERAL INTERCOSTAL NERVE BLOCK performed by Taz Claros MD at hospitals Left 08/09/2018    PARTIAL FOOT AMPUTATION w. Dr John Chatman Left 1/31/2019    EXOSTECTOMY LEFT FOOT, ULCER DEBRIDEMENT LEFT FOOT WITH GRAFT APPLICATION performed by Gerda Mars DPM at [place] [address] Left 09/27/2017    LEFT FOOT ULCER DEBRIDEMENT, POSSIBLE SECOND METATARSAL    FOOT SURGERY Left 01/31/2019    Exostectomy left foot, ulcer debridement with graft application left foot    FOOT TENDON SURGERY Right 2016    FOOT TENDON SURGERY Left 06/30/2017    KNEE SURGERY Left     OTHER SURGICAL HISTORY Bilateral 9/17/15    amputation 2nd digit bilateral, flexor tenotomy right hallux    OTHER SURGICAL HISTORY Left 08/17/2017    PARTIAL LEFT FOOT AMPUTATION      OTHER SURGICAL HISTORY Left 12/07/2017    Debridement infected bone and tissue left foot; ulcer debridement left foot with graft application with dr mathur     OTHER SURGICAL HISTORY Right 01/04/2018    DEBRIDEMENT INFECTED BONE AND TISSUE RIGHT FOOT, ULCER DEBRIDEMENT RIGHT FOOT WITH GRAFT APPLICATION    OTHER SURGICAL HISTORY Left 11/01/2018    Procedure: PARTIAL RESECTION LEFT METATARSAL, ULCER DEBRIDEMENT LEFT FOOT WITH GRAFT APPLICATION     MI OFFICE/OUTPT VISIT,PROCEDURE ONLY Left 8/23/2018    ULCER DEBRIDEMENT LEFT FOOT WITH GRAFT APPLICATION performed by Gerda Mars DPM at [facility] TARSAL/METATARSAL Left 8/9/2018    PARTIAL FOOT AMPUTATION WITH GRAFT APPLICATION performed by Gerda Mars DPM at [facility] TARSAL/METATARSAL Left 11/1/2018    PARTIAL RESECTION LEFT METATARSAL, ULCER DEBRIDEMENT LEFT FOOT WITH GRAFT APPLICATION performed by Gerda Mars DPM at [facility] [address] TOE AMPUTATION      2 toes     Family History   Problem Relation Age of Onset    Heart Disease Mother     High Blood Pressure Mother     High Cholesterol Mother     Diabetes Mother     Anemia Mother     Heart Disease Father     High Blood Pressure Father     High Cholesterol Father     Heart Disease Maternal Grandmother     High Blood Pressure Maternal Grandmother     High Cholesterol Maternal Grandmother     Cancer Maternal Grandmother         bone marrow & breast    Heart Disease Maternal Grandfather     High Blood Pressure Maternal Grandfather     High Cholesterol Maternal Grandfather     Cancer Maternal Grandfather         pancreatic CA    Heart Disease Paternal Grandmother     High Blood Pressure Paternal Grandmother     High Cholesterol Paternal Grandmother     Heart Disease Paternal Grandfather     High Blood Pressure Paternal Grandfather     High Cholesterol Paternal Grandfather     Stroke Brother     Heart Failure Brother     Heart Disease Brother     Diabetes type 2  Brother     Diabetes type 2  Brother     Diabetes type 2  Brother      Social History     Socioeconomic History    Marital status:      Spouse name: Not on file    Number of children: Not on file    Years of education: Not on file    Highest education level: Not on file   Occupational History    Not on file   Social Needs    Financial resource strain: Not on file    Food insecurity     Worry: Not on file     Inability: Not on file    Transportation needs     Medical: Not on file     Non-medical: Not on file   Tobacco Use    Smoking status: Former Smoker     Quit date: 1980     Years since quittin.9    Smokeless tobacco: Never Used   Substance and Sexual Activity    Alcohol use: No     Alcohol/week: 0.0 standard drinks    Drug use: No    Sexual activity: Never   Lifestyle    Physical activity     Days per week: Not on file     Minutes per session: Not on file    Stress: Not on file   Relationships    Social connections     Talks on phone: Not on file     Gets together: Not on file     Attends Nondenominational service: Not on file     Active member of club or organization: Not on file     Attends meetings of clubs or organizations: Not on file     Relationship status: Not on file    Intimate partner violence     Fear of current or ex partner: Not on file     Emotionally abused: Not on file     Physically abused: Not on file     Forced sexual activity: Not on file   Other Topics Concern    Not on file   Social History Narrative    Not on file     Current Facility-Administered Medications   Medication Dose Route Frequency Provider Last Rate Last Admin    aspirin chewable tablet 162 mg  162 mg Oral Once Joseluis Bates MD         Current Outpatient Medications   Medication Sig Dispense Refill    ARMOUR THYROID 60 MG tablet Take 1 tablet by mouth daily 90 tablet 3    carvedilol (COREG) 25 MG tablet Take 1 tablet by mouth 2 times daily TAKE ONE TABLET BY MOUTH TWICE DAILY 60 tablet 0    magnesium oxide (MAG-OX) 400 (241.3 Mg) MG TABS tablet Take 1 tablet by mouth 2 times daily 180 tablet 1    cephALEXin (KEFLEX) 250 MG capsule Take 1 capsule by mouth 2 times daily 6 capsule 0    magnesium oxide (MAG-OX) 400 MG tablet Take 400 mg by mouth 2 times daily      spironolactone (ALDACTONE) 25 MG tablet Take 1 tablet by mouth daily for 1 day 30 tablet 5    glipiZIDE (GLUCOTROL) 5 MG tablet Take 1 tablet by mouth 2 times daily (before meals) 60 tablet 0    metFORMIN (GLUCOPHAGE) 1000 MG tablet Take 1 tablet by mouth 2 times daily (with meals) 60 tablet 0    TRULICITY 1.5 [place]/4.9IT SOPN Inject 1.5 mg (1 PEN) into the skin once a week 2 mL 0    lisinopril (PRINIVIL;ZESTRIL) 5 MG tablet Take 5 mg by mouth daily      ASPIRIN LOW DOSE 81 MG EC tablet Take 1 tablet by mouth daily 90 tablet 3    clopidogrel (PLAVIX) 75 MG tablet Take 1 tablet by mouth daily 30 tablet 11    atorvastatin (LIPITOR) 40 MG tablet Take 1 tablet by mouth nightly 30 tablet 5    furosemide (LASIX) 40 MG tablet Take 1 tablet by mouth 2 times daily 60 tablet 5    Blood Glucose Monitoring Suppl (ONE TOUCH ULTRA MINI) w/Device KIT 1 kit by Does not apply route daily 1 kit 0    glucose blood VI test strips (ONE TOUCH ULTRA TEST) strip Test blood sugar once daily. 150 strip 3    Insulin Pen Needle (UNIFINE PENTIPS) 31G X 5 MM MISC USE 1 EACH DAY AS NEEDED FOR TESTING 100 each 3    Blood Glucose Monitoring Suppl GABI Measure glucose before breakfast, before dinner and at bedtime daily (we will check at lunchtime at HBO therapy). 1 Device 0    Multiple Vitamins-Minerals (THERAPEUTIC MULTIVITAMIN-MINERALS) tablet Take 1 tablet by mouth daily      Blood Glucose Monitoring Suppl (BLOOD GLUCOSE METER) KIT Use to test blood sugars. 1 kit 0    Lancets MISC Use to test blood sugars once daily. 100 each 3     Allergies   Allergen Reactions    Bactrim [Sulfamethoxazole-Trimethoprim] Rash     Pt reports that his lips tingle and then skin on his lips peel off,and rash on thigh       Bee Venom Swelling and Rash       REVIEW OF SYSTEMS  10 systems reviewed, pertinent positives per HPI otherwise noted to be negative. PHYSICAL EXAM  /69   Pulse 104   Temp 99.9 °F (37.7 °C) (Oral)   Resp 18   SpO2 93%    GENERAL APPEARANCE: Awake and alert. Cooperative. HENT: Normocephalic. Atraumatic. Mucous membranes are moist  NECK: Supple. Full range of motion of the neck without stiffness or pain. EYES: PERRL. EOM's grossly intact. HEART/CHEST: RRR. No murmurs. Chest wall is not tender to palpation. LUNGS: Respirations unlabored. CTAB. Good air exchange. Speaking comfortably in full sentences. ABDOMEN: No tenderness. Soft. Non-distended. No masses. No organomegaly. No guarding or rebound. MUSCULOSKELETAL: No extremity edema. Compartments soft. No deformity. No tenderness in the extremities. All extremities neurovascularly intact. SKIN: Warm and dry. No acute rashes. Bilateral foot wounds that patient reports are baseline. Evidence of previous amputations. NEUROLOGICAL: Alert and oriented. No gross facial drooping. Strength 5/5, sensation intact. PSYCHIATRIC: Normal mood and affect. LABS  I have reviewed all labs for this visit.    Results for orders placed or performed during the hospital encounter of 01/15/21   CBC Auto Differential   Result Value Ref Range    WBC 8.3 4.0 - 11.0 K/uL    RBC 3.82 (L) 4.20 - 5.90 M/uL    Hemoglobin 11.3 (L) 13.5 - 17.5 g/dL    Hematocrit 32.9 (L) 40.5 - 52.5 %    MCV 86.0 80.0 - 100.0 fL    MCH 29.6 26.0 - 34.0 pg    MCHC 34.5 31.0 - 36.0 g/dL    RDW 15.3 12.4 - 15.4 %    Platelets 528 091 - 922 K/uL    MPV 6.8 5.0 - 10.5 fL    Neutrophils % 86.2 %    Lymphocytes % 5.3 %    Monocytes % 6.8 %    Eosinophils % 1.0 %    Basophils % 0.7 %    Neutrophils Absolute 7.1 1.7 - 7.7 K/uL    Lymphocytes Absolute 0.4 (L) 1.0 - 5.1 K/uL    Monocytes Absolute 0.6 0.0 - 1.3 K/uL    Eosinophils Absolute 0.1 0.0 - 0.6 K/uL    Basophils Absolute 0.1 0.0 - 0.2 K/uL   Comprehensive Metabolic Panel w/ Reflex to MG   Result Value Ref Range    Sodium 132 (L) 136 - 145 mmol/L    Potassium reflex Magnesium 4.5 3.5 - 5.1 mmol/L    Chloride 93 (L) 99 - 110 mmol/L    CO2 27 21 - 32 mmol/L    Anion Gap 12 3 - 16    Glucose 433 (H) 70 - 99 mg/dL    BUN 23 (H) 7 - 20 mg/dL    CREATININE 1.2 0.9 - 1.3 mg/dL    GFR Non-African American >60 >60    GFR African American >60 >60    Calcium 9.0 8.3 - 10.6 mg/dL    Total Protein 7.0 6.4 - 8.2 g/dL    Alb 4.2 3.4 - 5.0 g/dL    Albumin/Globulin Ratio 1.5 1.1 - 2.2    Total Bilirubin 0.8 0.0 - 1.0 mg/dL    Alkaline Phosphatase 106 40 - 129 U/L    ALT 13 10 - 40 U/L    AST 12 (L) 15 - 37 U/L    Globulin 2.8 g/dL   Troponin   Result Value Ref Range    Troponin <0.01 <0.01 ng/mL   Brain Natriuretic Peptide   Result Value Ref Range    Pro-BNP 3,375 (H) 0 - 124 pg/mL   EKG 12 Lead   Result Value Ref Range    Ventricular Rate 104 BPM    Atrial Rate 104 BPM    P-R Interval 174 ms    QRS Duration troponins. [ER]   4825 BNP is elevated, no significant evidence of fluid overload on exam or chest x-ray. [ER]   6862 Patient is hyperglycemic, no anion gap or change in bicarb. He does have mild hyponatremia, suspect pseudohyponatremia in the setting of hyperglycemia. Also has hypochloremia. [ER]   9394 Kidney function stable from previous. [ER]   2305 Liver function testing unremarkable. [ER]   2306 Heart score of 4. [ER]   Sat Jan 16, 2021   0004 Patient accepted by hospital team.    [ER]      ED Course User Index  [ER] Asim Alonso MD        During the patient's ED course, the patient was given:  Medications   aspirin chewable tablet 162 mg (has no administration in time range)          EKG showed no evidence of acute ischemia. Troponin was within normal limits. Patient has significant cardiac history with recent CABG. Heart score: 4. This falls under the following category: Score of 4-6, which indicates low/moderate risk for major adverse cardiac event and supports observation with repeated troponins and/or non-invasive testing  . Given patient's significant recent cardiac history, do feel that he requires admission at this time for further monitoring and evaluation. Patient had taken 162 mg of aspirin earlier today. Given rest of aspirin load. He is currently pain-free, he was not given nitroglycerin or heparin. BNP was elevated, patient does have known heart failure, no evidence of significant fluid overload on exam or chest x-ray. Low Suspicion for CHF exacerbation. Chest x-ray showed no evidence of pneumonia, pleural effusion, pulmonary edema, or pneumothorax. At this time I have low concern for pulmonary embolism. Patient has not had a previous blood clot. Patient denies other risk factors for pulmonary embolism. Patient does not have any evidence of DVT on exam.  Patient is low risk on Wells criteria.  At this time, considering that risks associated with further work-up for pulmonary embolism outweigh the likelihood of this diagnosis. Low suspicion for aortic pathology. Patient is not hypertensive. Patient has strong pulses in the bilateral radial and femoral arteries. Pain was not maximal at onset. There is no evidence of mediastinal widening on chest x-ray. Patient does not have any neurologic deficits. The evidence indicates that the patient is very low risk for Aortic Dissection and this is consistent with my clinical intuition. The risk of further workup or hospitalization for aortic dissection is likely higher than the risk of the patient having an aortic dissection. Low suspicion for esophageal perforation. Patient has not had vomiting. There is no crepitus on exam.  No subcutaneous air or pneumomediastinum on chest x-ray. Based on results of work-up, I am concerned for chest pain with significant risk factors and significant cardiac history. At this time, do feel the patient requires admission for further work-up and management. Discussed the patient with hospital team.    CLINICAL IMPRESSION  1. Chest pain, unspecified type        Blood pressure 111/69, pulse 97, temperature 99.9 °F (37.7 °C), temperature source Oral, resp. rate 18, SpO2 93 %. DISPOSITION  Akin Vega was admitted in stable condition. DISCLAIMER: This chart was created using Dragon dictation software. Efforts were made by me to ensure accuracy, however some errors may be present due to limitations of this technology and occasionally words are not transcribed correctly.             Donia Felty, MD  01/16/21 0403

## 2021-01-16 NOTE — PROGRESS NOTES
Patient educated on discharge instructions as well as new medications use, dosage, administration and possible side effects. Patient verified knowledge. IV removed without difficulty and dry dressing in place. Telemetry monitor removed and returned to [address]. Pt left facility in stable condition to Home with all of their personal belongings. Pt is currently waiting for his ride.

## 2021-01-16 NOTE — DISCHARGE SUMMARY
See Combined H & P and Discharge Summary       Medication List      START taking these medications    HumuLIN N KwikPen 100 UNIT/ML injection pen  Generic drug: insulin NPH  Inject 12 Units into the skin 2 times daily (before meals)     insulin lispro 100 UNIT/ML injection vial  Commonly known as: HUMALOG  Inject 8 Units into the skin 3 times daily (with meals)     INSULIN SYRINGE 1CC/29G 29G X 1/2\" 1 ML Misc  Commonly known as: AIMSCO INSULIN SYR ULTRA THIN  1 each by Does not apply route 3 times daily     isosorbide mononitrate 30 MG extended release tablet  Commonly known as: IMDUR  Take 1 tablet by mouth daily  Start taking on: January 17, 2021        CHANGE how you take these medications    * Insulin Pen Needle 31G X 5 MM Misc  Commonly known as: Unifine Pentips  USE 1 EACH DAY AS NEEDED FOR TESTING  What changed: Another medication with the same name was added. Make sure you understand how and when to take each. * B-D ULTRAFINE III SHORT PEN 31G X 8 MM Misc  Generic drug: Insulin Pen Needle  1 each by Does not apply route daily  What changed: You were already taking a medication with the same name, and this prescription was added. Make sure you understand how and when to take each. * This list has 2 medication(s) that are the same as other medications prescribed for you. Read the directions carefully, and ask your doctor or other care provider to review them with you. CONTINUE taking these medications    [place] Thyroid 60 MG tablet  Generic drug: thyroid  Take 1 tablet by mouth daily     Aspirin Low Dose 81 MG EC tablet  Generic drug: aspirin  Take 1 tablet by mouth daily     atorvastatin 40 MG tablet  Commonly known as: LIPITOR  Take 1 tablet by mouth nightly     * Blood Glucose Meter Kit  Use to test blood sugars. * blood glucose monitor kit and supplies  Measure glucose before breakfast, before dinner and at bedtime daily (we will check at lunchtime at HBO therapy).      * ONE 1/2\" 1 ML Misc  · isosorbide mononitrate 30 MG extended release tablet         Discharged home in stable condition. Close f/u with PCP and Cardiology. To have outpatient Echo on 1/20/2021.

## 2021-01-16 NOTE — FLOWSHEET NOTE
01/16/21 0811   Vitals   Temp 98.8 °F (37.1 °C)   Temp Source Oral   Pulse 90   Heart Rate Source Monitor   Resp 16   /67   MAP (mmHg) 80   BP Location Left Arm   BP Upper/Lower Upper   Patient Position Semi fowlers   Level of Consciousness Alert (0)   MEWS Score 1   Patient Currently in Pain Denies   Oxygen Therapy   SpO2 97 %   O2 Device None (Room air)   Patient resting quietly in bed. No s/s of distress noted. Shift assessment complete, see flow sheet. Denies needs at this time. Call light within reach. Will continue to monitor.

## 2021-01-16 NOTE — CARE COORDINATION
DISCHARGE ORDER  Date/Time 2021 2:49 PM  Completed by: Alvin Munroe Case Management    Patient Name: Henry Rubalcava      : 1965  Admitting Diagnosis: Chest pain [R07.9]      Admit order Date and Status: 1/15/21 Observation  (verify MD's last order for status of admission)      Noted discharge order. If applicable PT/OT recommendation at Discharge: N/A  DME recommendation by PT/OT: N/A  Confirmed discharge plan  : Yes  with whom patient  If pt confirmed DC plan does family need to be contacted by CM No    Discharge Plan:  Order for dc noted. Met with pt briefly prior to dc. States lives home and plan return. [facility] is IPT[facility] with care. Discussed no insurance and pt [facility] has filed for Medicaid on 1/15/21. Discussed need for insulin with pt and PA as pt has no insurance and has not been taking insulin. PA will change insulin. Nsg to supply pt with insulin for dc as unable to obtain at pharm. Today. Pt inst to call on Monday and come to outpt pharm for insulin needs. Left VM for Evette Trujillo in outpt pharm that pt will be following up re: insulin needs. Chart reviewed and no other dc needs identified. No home O2 on admit       Reviewed chart. Role of discharge planner explained and patient verbalized understanding. Discharge order is noted. Has Home O2 in place on admit:  No  Informed of need to bring portable home O2 tank on day of discharge for nursing to connect prior to leaving:   Not Indicated  Verbalized agreement/Understanding:   Not Indicated  Pt is being d/c'd to home today. Pt's O2 sats are 97% on RA. Discharge timeout done with  Nsg, CM and pt. All discharge needs and concerns addressed.

## 2021-01-17 LAB
ESTIMATED AVERAGE GLUCOSE: 203 MG/DL
HBA1C MFR BLD: 8.7 %

## 2021-01-18 ENCOUNTER — TELEPHONE (OUTPATIENT)
Dept: INTERNAL MEDICINE CLINIC | Age: 56
End: 2021-01-18

## 2021-01-18 NOTE — TELEPHONE ENCOUNTER
[facility] Transitions Initial Follow Up Call    Outreach made within 2 business days of discharge: Yes    Patient: Yvonne Truong Patient : 1965   MRN: [id number]  Reason for Admission: There are no discharge diagnoses documented for the most recent discharge. Discharge Date: 21       Spoke with:  @ 1:45 PM: Attempted to contact patient, unable to leave message. 2021 @ 8:49 am: Spoke to patient     Discharge department/facility: [facility]    [facility] Interactive Patient Contact:  Was patient able to fill all prescriptions: Yes  Was patient instructed to bring all medications to the follow-up visit: Yes  Is patient taking all medications as directed in the discharge summary?  Yes  Does patient understand their discharge instructions: Yes  Does patient have questions or concerns that need addressed prior to 7-14 day follow up office visit: no    Scheduled appointment with PCP within 7-14 days    Follow Up  Future Appointments   Date Time Provider Ada Ovalles   2021 10:00 AM [facility]CHO [facility]OM 01 [facility] Alethia Hodgkin \[facility]\""   3/10/2021 10:30 AM SCHEDULE, [facility] Heriberto Brain [facility]   3/10/2021 10:30 AM MD Heriberto SantiagoRUST [address]

## 2021-01-20 ENCOUNTER — TELEPHONE (OUTPATIENT)
Dept: INTERNAL MEDICINE CLINIC | Age: 56
End: 2021-01-20

## 2021-01-20 ENCOUNTER — HOSPITAL ENCOUNTER (OUTPATIENT)
Dept: NON INVASIVE DIAGNOSTICS | Age: 56
Discharge: HOME | End: 2021-01-20
Payer: MEDICARE

## 2021-01-20 DIAGNOSIS — I42.6 ALCOHOLIC CARDIOMYOPATHY (HCC): ICD-10-CM

## 2021-01-20 PROCEDURE — 93308 TTE F-UP OR LMTD: CPT

## 2021-01-20 NOTE — TELEPHONE ENCOUNTER
----- Message from Ingrid Noel MD sent at 1/20/2021  8:58 AM EST -----  He can skip the visit if he is doing ok. It is up to him  ----- Message -----  From: [place] Jyoti  Sent: 1/20/2021   8:50 AM EST  To: Ingrid Noel MD    Patient recently discharged from the hospital and needs a hospital follow up. However, patient does not have insurance at this time and does not have the money to pay for the visit. Please advise.

## 2021-01-26 ENCOUNTER — TELEPHONE (OUTPATIENT)
Dept: CARDIOLOGY CLINIC | Age: 56
End: 2021-01-26

## 2021-01-26 NOTE — TELEPHONE ENCOUNTER
----- Message from Mirian Levin MA sent at 1/26/2021  8:44 AM EST -----  Patient is seen at the [facility] office.   ----- Message -----  From: Gretta Section  Sent: 1/26/2021   7:22 AM EST  To: Mirian Levin MA    Good morning , not sure why this was sent to [facility] cardio,patient does not come here

## 2021-02-08 DIAGNOSIS — I10 ESSENTIAL HYPERTENSION: ICD-10-CM

## 2021-02-08 DIAGNOSIS — E78.2 MIXED HYPERLIPIDEMIA: ICD-10-CM

## 2021-02-08 DIAGNOSIS — I42.6 ALCOHOLIC CARDIOMYOPATHY (HCC): ICD-10-CM

## 2021-02-08 RX ORDER — LISINOPRIL 5 MG/1
5 TABLET ORAL DAILY
Qty: 30 TABLET | Refills: 5 | Status: SHIPPED | OUTPATIENT
Start: 2021-02-08 | End: 2021-10-27 | Stop reason: SINTOL

## 2021-02-08 RX ORDER — MAGNESIUM OXIDE 400 MG/1
400 TABLET ORAL 2 TIMES DAILY
Qty: 30 TABLET | Refills: 5 | Status: SHIPPED | OUTPATIENT
Start: 2021-02-08 | End: 2021-07-28

## 2021-02-08 RX ORDER — CARVEDILOL 25 MG/1
25 TABLET ORAL 2 TIMES DAILY
Qty: 60 TABLET | Refills: 5 | Status: SHIPPED | OUTPATIENT
Start: 2021-02-08 | End: 2021-09-15

## 2021-02-08 NOTE — TELEPHONE ENCOUNTER
Pt last OV 12/9/20, next scheduled 3/10/21 RKG  Assessment:       Encounter Diagnoses   Name Primary?  Coronary artery disease involving native coronary artery of native heart without angina pectoris Yes    Automatic implantable cardioverter-defibrillator in situ      S/P CABG x 3      Diabetic ulcer of other part of right foot associated with type 2 diabetes mellitus, limited to breakdown of skin ([address]           Ruben was seen today for 6 month follow-up, CAD cardiomyopathy, hypertension and hyperlipidemia.     Diagnoses and associated orders for this visit:  CAD s/p CABG 7.41.58  Alcoholic cardiomyopathy, stable chronic systolic CHF functional class I     Hypertension, controlled      Automatic implantable cardiac defibrillator in situ recent check in 7.9.19 normal function     Plan:  1. No change in medications. Continue current regimen  2. Follow up in 3 months   3.  He is not able to work and has been not working since last November 2019  He is on appropriate medications for his heart condition  He is not able to pay for his blood test or echocardiogram

## 2021-02-08 NOTE — TELEPHONE ENCOUNTER
Refill request  magnesium oxide (MAG-OX) 400 (241.3 Mg) MG TABS tablet    lisinopril (PRINIVIL;ZESTRIL) 5 MG tablet    carvedilol (COREG) 25 MG tablet     [facility] Mojgan Christianson, [address] [facility] [place]

## 2021-02-11 ENCOUNTER — OFFICE VISIT (OUTPATIENT)
Dept: CARDIOLOGY CLINIC | Age: 56
End: 2021-02-11
Payer: MEDICARE

## 2021-02-11 ENCOUNTER — NURSE ONLY (OUTPATIENT)
Dept: CARDIOLOGY CLINIC | Age: 56
End: 2021-02-11
Payer: MEDICARE

## 2021-02-11 VITALS
WEIGHT: 225 LBS | BODY MASS INDEX: 32.21 KG/M2 | DIASTOLIC BLOOD PRESSURE: 66 MMHG | HEART RATE: 80 BPM | SYSTOLIC BLOOD PRESSURE: 100 MMHG | HEIGHT: 70 IN

## 2021-02-11 DIAGNOSIS — I25.5 ISCHEMIC CARDIOMYOPATHY: ICD-10-CM

## 2021-02-11 DIAGNOSIS — Z95.810 ICD (IMPLANTABLE CARDIOVERTER-DEFIBRILLATOR) IN PLACE: ICD-10-CM

## 2021-02-11 DIAGNOSIS — I47.29 NSVT (NONSUSTAINED VENTRICULAR TACHYCARDIA): ICD-10-CM

## 2021-02-11 DIAGNOSIS — I25.5 ISCHEMIC CARDIOMYOPATHY: Primary | ICD-10-CM

## 2021-02-11 DIAGNOSIS — I42.6 ALCOHOLIC CARDIOMYOPATHY (HCC): ICD-10-CM

## 2021-02-11 DIAGNOSIS — I10 ESSENTIAL HYPERTENSION: ICD-10-CM

## 2021-02-11 PROCEDURE — G8484 FLU IMMUNIZE NO ADMIN: HCPCS | Performed by: NURSE PRACTITIONER

## 2021-02-11 PROCEDURE — 99215 OFFICE O/P EST HI 40 MIN: CPT | Performed by: NURSE PRACTITIONER

## 2021-02-11 PROCEDURE — G8427 DOCREV CUR MEDS BY ELIG CLIN: HCPCS | Performed by: NURSE PRACTITIONER

## 2021-02-11 PROCEDURE — 93282 PRGRMG EVAL IMPLANTABLE DFB: CPT | Performed by: INTERNAL MEDICINE

## 2021-02-11 PROCEDURE — G8417 CALC BMI ABV UP PARAM F/U: HCPCS | Performed by: NURSE PRACTITIONER

## 2021-02-11 PROCEDURE — 1036F TOBACCO NON-USER: CPT | Performed by: NURSE PRACTITIONER

## 2021-02-11 PROCEDURE — 3017F COLORECTAL CA SCREEN DOC REV: CPT | Performed by: NURSE PRACTITIONER

## 2021-02-11 RX ORDER — ISOSORBIDE MONONITRATE 30 MG/1
30 TABLET, EXTENDED RELEASE ORAL DAILY
Qty: 90 TABLET | Refills: 3 | Status: SHIPPED | OUTPATIENT
Start: 2021-02-11 | End: 2022-02-18

## 2021-02-11 NOTE — LETTER
Optivol: increased since 10/2020    Past Medical History:   has a past medical history of Acute osteomyelitis of left foot ([address] Acute osteomyelitis of right foot ([address] Acute respiratory failure ([address] Ascites, Blood transfusion reaction, Burst fracture of lumbar vertebra ([address] Cellulitis of left foot, Cellulitis of right lower extremity, Community acquired pneumonia, Diabetes ([address] Diabetic ulcer of right foot ([address] Diabetic ulcer of 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 2 diabetes mellitus, with necrosis of bone ([address] ETOH abuse, Fracture of tibial plateau, High cholesterol, History of blood transfusion, HTN (hypertension), Hx of blood clots, MI (myocardial infarction) ([address] MRSA (methicillin resistant staph aureus) culture positive, Neuropathic ulcer of left foot, limited to breakdown of skin ([address] Neuropathic ulcer of toe ([address] NSVT (nonsustained ventricular tachycardia) ([address] Pleural effusion due to congestive heart failure ([address] Septicemia ([address] Smoker, Systolic CHF, acute ([address] and Thyroid disease. Past Surgical History:   has a past surgical history that includes knee surgery (Left); other surgical history (Bilateral, 9/17/15); Toe amputation; Foot Tendon Surgery (Right, 2016); Foot Tendon Surgery (Left, 06/30/2017); other surgical history (Left, 08/17/2017); Foot surgery (Left, 09/27/2017); other surgical history (Left, 12/07/2017); other surgical history (Right, 01/04/2018); Foot Amputation (Left, 08/09/2018); pr part remv othr tarsal/metatarsal (Left, 8/9/2018); pr office/outpt visit,procedure only (Left, 8/23/2018); other surgical history (Left, 11/01/2018); pr part remv othr tarsal/metatarsal (Left, 11/1/2018); Foot surgery (Left, 01/31/2019); Foot Debridement (Left, 1/31/2019); Cardiac defibrillator placement (01/29/2014); Coronary artery bypass graft (N/A, 8/11/2020); and eye surgery.      Home Medications:    Prior to Admission medications Medication Sig Start Date End Date Taking?  Authorizing Provider   carvedilol (COREG) 25 MG tablet Take 1 tablet by mouth 2 times daily TAKE ONE TABLET BY MOUTH TWICE DAILY 2/8/21  Yes Lucian Palmer MD   lisinopril (PRINIVIL;ZESTRIL) 5 MG tablet Take 1 tablet by mouth daily 2/8/21  Yes Lucian Palmer MD   magnesium oxide (MAG-OX) 400 MG tablet Take 1 tablet by mouth 2 times daily 2/8/21  Yes Lucian Palmer MD   isosorbide mononitrate (IMDUR) 30 MG extended release tablet Take 1 tablet by mouth daily 1/17/21  Yes JOSE Frey CNP   insulin NPH (HUMULIN N KWIKPEN) 100 UNIT/ML injection pen Inject 12 Units into the skin 2 times daily (before meals) 1/16/21  Yes JOSE Frey CNP   Insulin Pen Needle (B-D ULTRAFINE III SHORT PEN) 31G X 8 MM MISC 1 each by Does not apply route daily 1/16/21  Yes JOSE Frey CNP   INSULIN SYRINGE 1CC/29G (AIMSCO INSULIN SYR ULTRA THIN) 29G X 1/2\" 1 ML MISC 1 each by Does not apply route 3 times daily 1/16/21  Yes JOSE Frey CNP   insulin lispro (HUMALOG) 100 UNIT/ML injection vial Inject 8 Units into the skin 3 times daily (with meals) 1/16/21  Yes JOSE Frey CNP   ARMOUR THYROID 60 MG tablet Take 1 tablet by mouth daily 1/5/21  Yes Lucian Palmer MD   spironolactone (ALDACTONE) 25 MG tablet Take 1 tablet by mouth daily for 1 day 12/9/20 2/11/21 Yes Lucian Palmer MD   glipiZIDE (GLUCOTROL) 5 MG tablet Take 1 tablet by mouth 2 times daily (before meals) 12/2/20  Yes Vj Contreras MD   metFORMIN (GLUCOPHAGE) 1000 MG tablet Take 1 tablet by mouth 2 times daily (with meals) 11/23/20  Yes Vj Contreras MD   ASPIRIN LOW DOSE 81 MG EC tablet Take 1 tablet by mouth daily 9/24/20  Yes Lucian Palmer MD   clopidogrel (PLAVIX) 75 MG tablet Take 1 tablet by mouth daily 9/15/20  Yes Lucian Palmer MD   atorvastatin (LIPITOR) 40 MG tablet Take 1 tablet by mouth nightly 9/15/20  Yes Lucian Palmer MD furosemide (LASIX) 40 MG tablet Take 1 tablet by mouth 2 times daily 9/15/20  Yes Cara Baugh MD   Blood Glucose Monitoring Suppl (ONE TOUCH ULTRA MINI) w/Device KIT 1 kit by Does not apply route daily 5/26/20  Yes Ingrid Noel MD   glucose blood VI test strips (ONE TOUCH ULTRA TEST) strip Test blood sugar once daily. 1/3/18  Yes Terence Mcgovern MD   Insulin Pen Needle (UNIFINE PENTIPS) 31G X 5 MM MISC USE 1 EACH DAY AS NEEDED FOR TESTING 1/3/18  Yes Terence Mcgovern MD   Blood Glucose Monitoring Suppl GABI Measure glucose before breakfast, before dinner and at bedtime daily (we will check at lunchtime at HBO therapy). 10/25/17  Yes Subhash Leonardo MD   Multiple Vitamins-Minerals (THERAPEUTIC MULTIVITAMIN-MINERALS) tablet Take 1 tablet by mouth daily   Yes Sherrie Thomas MD   Blood Glucose Monitoring Suppl (BLOOD GLUCOSE METER) KIT Use to test blood sugars. 7/15/15  Yes Terence Mcgovern MD   Lancets MISC Use to test blood sugars once daily. 7/15/15  Yes Terence Mcgovern MD       Allergies:  Bactrim [sulfamethoxazole-trimethoprim] and Bee venom    Social History:   reports that he quit smoking about 41 years ago. He has a 0.25 pack-year smoking history. He has never used smokeless tobacco. He reports that he does not drink alcohol or use drugs. Family History: family history includes Anemia in his mother; Cancer in his maternal grandfather and maternal grandmother; Diabetes in his mother; Diabetes type 2  in his brother, brother, and brother; Heart Disease in his brother, father, maternal grandfather, maternal grandmother, mother, paternal grandfather, and paternal grandmother; Heart Failure in his brother; High Blood Pressure in his father, maternal grandfather, maternal grandmother, mother, paternal grandfather, and paternal grandmother; High Cholesterol in his father, maternal grandfather, maternal grandmother, mother, paternal grandfather, and paternal grandmother; Stroke in his brother. Review of Systems   All 14-point review of systems are completed and pertinent positives are mentioned in the history of present illness. Other systems are reviewed and are negative. PHYSICAL EXAM:    Vital signs:    /66   Pulse 80   Ht 5' 10\" (1.778 m)   Wt 225 lb (102.1 kg)   BMI 32.28 kg/m²      Constitutional and general appearance: alert, cooperative, no distress and appears stated age  [de-identified]: PERRL, no cervical lymphadenopathy. No masses palpable.  Normal oral mucosa  Respiratory:  · Normal excursion and expansion without use of accessory muscles  · Resp auscultation: Normal breath sounds without wheezing, rhonchi, and rales  Cardiovascular:  · The apical impulse is not displaced  · Heart tones are crisp and normal. regular S1 and S2.  · Jugular venous pulsation Normal  · The carotid upstroke is normal in amplitude and contour without delay or bruit  · Peripheral pulses are symmetrical and full   Abdomen:  · No masses or tenderness  · Bowel sounds present  Extremities:  ·  No cyanosis or clubbing  ·  No lower extremity edema  ·  Skin: warm and dry  Neurological:  · Alert and oriented  · Moves all extremities well  · No abnormalities of mood, affect, memory, mentation, or behavior are noted    DATA:    ECG 2/11/2021:  SR with IVCD and a HR of 80 Echo 1/20/2021:  Left ventricular systolic function is reduced with ejection fraction estimated at 25-30 %. All remaining wall segments appear severely hypokinetic . Abnormal (paradoxical) septal motion is present likely due to pacemaker. There is hypokinesis of the mid and basal anterior and anteroseptal wall segments. All remaining wall segments appear severely hypokinetic . Pacer / ICD wire is visualized in the right ventricle and right atrium. Echo 8- 25-30% EF severe global hypokinesis. Echo 8/5/2020:  Left ventricular systolic function is severely reduced with ejection fraction estimated at 25-30 %. Severe global hypokinesis is present. Left ventricle size is normal.  Normal left ventricular wall thickness. Grade III diastolic dysfunction with elevated filing pressure. Mild posterior mitral annular calcification is present. No evidence of mitral valve stenosis. Severe mitral regurgitation. The left atrium is mildly dilated. No evidence of aortic valve stenosis. Mild aortic regurgitation is present. Mild tricuspid regurgitation. Normal systolic pulmonary artery pressure (SPAP) estimated at 39 mmHg (RA pressure 8 mmHg). CT surgery 8/11/2020 [facility]):  CORONARY ARTERY BYPASS GRAFTING X3, LEFT ATRIAL APPENDAGE CLIP, INTERNAL MAMMARY ARTERY, SAPHENOUS VEIN GRAFT    [facility] 8/6/2020 [facility]):  LVGRAM     LVEDP  19   GRADIENT ACROSS AORTIC VALVE  none   LV FUNCTION EF 15 %   WALL MOTION  severe global hypokinesis   MITRAL REGURGITATION  mild      CORONARY ARTERIES     LM Less than 10% proximalmiddistal stenosis            LAD Heavily calcified, there is a proximalmid 90% stenosis with distal 50 to 60% stenosis vertically noted at the apex.     D1 is a small to medium size vessel with calcification and 40 to 50% proximalmiddistal stenosis.       LCX Medium size vessel with ostial/proximal and mid 50 to 70% stenosis.       OM1 is a small to medium size vessel with proximalmid 70% stenosis.       RI Large vessel with 10 to 20% proximalmiddistal stenosis         RCA Calcified, tortuous vessel with proximalmid 60 to 70% stenosis.  Distal vessel has 20 to 30% stenosis.      CONCLUSIONS:      Multivessel CAD/ASHD  Will refer to CT surgery for consideration of CABG  Due to severe LV dysfunction, high risk PCI can be considered with Impella support devices as well as atherectomy if he is not felt to be a good surgical candidate. All labs and testing reviewed. CARDIOLOGY LABS:   CBC: No results for input(s): WBC, HGB, HCT, PLT in the last 72 hours. BMP: No results for input(s): NA, K, CO2, BUN, CREATININE, LABGLOM, GLUCOSE in the last 72 hours. PT/INR: No results for input(s): PROTIME, INR in the last 72 hours. APTT:No results for input(s): APTT in the last 72 hours. FASTING LIPID PANEL:  Lab Results   Component Value Date    HDL 23 08/11/2020    LDLDIRECT 42 05/26/2020    LDLCALC 35 08/11/2020    TRIG 248 08/11/2020     LIVER PROFILE:No results for input(s): AST, ALT, ALB in the last 72 hours. Assessment:   History of mixed cardiomyopathy: ongoing              -s/p single chamber ICD implant 1/2014 ([employer]) for NICM              -EF 25-30% on echo 8/2020 and 1/2020  NSVT: stable  CAD: ongoing              -s/p CABG 8/2020  Chest pain: recurrent   Chronic combined CHF: compensated  HTN: controlled   Mitral regurgitation: severe on echo, mild on cath 8/2020  HLD  DM with neuropathy   History of osteomyelitis in both feet   Hypothyroidism  History of alcohol abuse: quit 2015  Chronic normocytic anemia: stable    Plan:   1. Continue Coreg, lisinopril, Lasix, and spironolactone   2. DAPT, Imdur and statin per cardiology   3. Will review EKG with Dr. Faby Membreno to determine if patient is a candidate for an upgrade to dual chamber versus CRT-D  4.  Follow up with EP to be determined Discussed chest pain with Dr. Shelby Aguilar. MD recommending Maribeth Elam (order placed). Patient to follow up in office with Dr. Shelby Aguilar on 3/10/2021.     MDM: ALEXIS Lang  [facility]  [phone]            Sincerely,      JOSE Jung CNP

## 2021-02-11 NOTE — PROGRESS NOTES
Patient presents to the device clinic today for a programming evaluation for his defibrillator. Patient has a history of NSVT and ICM. Takes MagOx, Coreg, and Plavix. Last device interrogation was on 10/7. Since then, numerous SVT-Wavelet events recorded with V rates ranging 136-150 bpm. The longest recorded event was on 10/17 x 9.5 minutes. 2 NSVT events recorded with the longest and most recent event on 12/18 x 2 seconds. Optivol shows fluid accumulation.  <0.1%    All sensing and pacing parameters are within normal range. No changes need to be made at this time. Patient education was provided about device functionality, in home monitoring, and any other patient questions and/or concerns were addressed. Patient voices understanding. Gave patient [employer]'s phone number to help troubleshoot disconnected monitor status. Please see interrogation for more detail. Patient will see REMA Lakhani today in office. Patient will follow up in 3 months in office or remotely. See [employer] report under the Cardiology tab.

## 2021-02-11 NOTE — PATIENT INSTRUCTIONS
No changes today   I will discuss your chest pain with Dr. Susan Rowell and call you. Follow up to be determined.

## 2021-02-11 NOTE — PROGRESS NOTES
[address]   Electrophysiology Outpatient Note              Date:  February 11, 2021  Patient name: Samantha Christie  YOB: 1965    Primary Care physician: Rebeka Valle MD    HISTORY OF PRESENT ILLNESS: The patient is a 54 y.o.  male with a history of mixed cardiomyopathy, CAD, HTN, chronic combined CHF, mitral regurgitation, HLD, DM, with neuropathy, osteomyelitis, hypothyroidism, anemia, and former alcohol abuse. In 1/2014 he had a single chamber ICD implanted for primary prevention. Was admitted 8/2020 with NSTEMI. LHC showed MVD and he had a CABG x3. Echo 8/2020 showed an EF of 25-30%. He was admitted in 1/2021 with CP and SOB and troponin was negative. Echo 1/2021 showed an EF of 25-30%. Today he is being seen for cardiomyopathy. EKG shows SR with IVCD and a HR of 80. Patient complains of 2 'spells' since discharge 1/16/2021 (reported as chest pain, SOB, blurry vision, and dizziness) that relieved with NTG SL x1. Does not check home weight or BP. Weight is up 13 pounds since discharge. Currently not having chest pain.      Device check today shows:   Brand: evidanza  Mode: VVI  Normal function   Less than 1%   Arrhythmias: PSVT versus ST and 2 seconds of NSVT x2  Battery life 4.8 years  RV impedance 342 ohms   RV threshold 0.75 V @ 0.4 ms  RV sensitivity 0.3 mV  Optivol: increased since 10/2020    Past Medical History:   has a past medical history of Acute osteomyelitis of left foot ([address] Acute osteomyelitis of right foot ([address] Acute respiratory failure ([address] Ascites, Blood transfusion reaction, Burst fracture of lumbar vertebra ([address] Cellulitis of left foot, Cellulitis of right lower extremity, Community acquired pneumonia, Diabetes ([address] Diabetic ulcer of right foot ([address] Diabetic ulcer of 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 2 diabetes mellitus, with necrosis of bone ([address] ETOH abuse, Fracture of tibial plateau, High cholesterol, History of blood daily (before meals) 1/16/21  Yes JOSE Lucio CNP   Insulin Pen Needle (B-D ULTRAFINE III SHORT PEN) 31G X 8 MM MISC 1 each by Does not apply route daily 1/16/21  Yes JOSE Lucio CNP   INSULIN SYRINGE 1CC/29G (AIMSCO INSULIN SYR ULTRA THIN) 29G X 1/2\" 1 ML MISC 1 each by Does not apply route 3 times daily 1/16/21  Yes JOSE Lucio CNP   insulin lispro (HUMALOG) 100 UNIT/ML injection vial Inject 8 Units into the skin 3 times daily (with meals) 1/16/21  Yes JOSE Lucio CNP   ARMOUR THYROID 60 MG tablet Take 1 tablet by mouth daily 1/5/21  Yes yTler Greco MD   spironolactone (ALDACTONE) 25 MG tablet Take 1 tablet by mouth daily for 1 day 12/9/20 2/11/21 Yes Tyler Greco MD   glipiZIDE (GLUCOTROL) 5 MG tablet Take 1 tablet by mouth 2 times daily (before meals) 12/2/20  Yes Petra Burgess MD   metFORMIN (GLUCOPHAGE) 1000 MG tablet Take 1 tablet by mouth 2 times daily (with meals) 11/23/20  Yes Petra Burgess MD   ASPIRIN LOW DOSE 81 MG EC tablet Take 1 tablet by mouth daily 9/24/20  Yes Tyler Greco MD   clopidogrel (PLAVIX) 75 MG tablet Take 1 tablet by mouth daily 9/15/20  Yes Tyler Greco MD   atorvastatin (LIPITOR) 40 MG tablet Take 1 tablet by mouth nightly 9/15/20  Yes Tyler Greco MD   furosemide (LASIX) 40 MG tablet Take 1 tablet by mouth 2 times daily 9/15/20  Yes Tyler Greco MD   Blood Glucose Monitoring Suppl (ONE TOUCH ULTRA MINI) w/Device KIT 1 kit by Does not apply route daily 5/26/20  Yes Petra Burgess MD   glucose blood VI test strips (ONE TOUCH ULTRA TEST) strip Test blood sugar once daily. 1/3/18  Yes Lorena Kemp MD   Insulin Pen Needle (UNIFINE PENTIPS) 31G X 5 MM MISC USE 1 EACH DAY AS NEEDED FOR TESTING 1/3/18  Yes Lorena Kemp MD   Blood Glucose Monitoring Suppl GABI Measure glucose before breakfast, before dinner and at bedtime daily (we will check at lunchtime at HBO therapy).  10/25/17  Yes Alan Solis MD   Multiple Vitamins-Minerals (THERAPEUTIC MULTIVITAMIN-MINERALS) tablet Take 1 tablet by mouth daily   Yes Historical Provider, MD   Blood Glucose Monitoring Suppl (BLOOD GLUCOSE METER) KIT Use to test blood sugars. 7/15/15  Yes Gayathri Savage MD   Lancets MISC Use to test blood sugars once daily. 7/15/15  Yes Gayathri Savage MD       Allergies:  Bactrim [sulfamethoxazole-trimethoprim] and Bee venom    Social History:   reports that he quit smoking about 41 years ago. He has a 0.25 pack-year smoking history. He has never used smokeless tobacco. He reports that he does not drink alcohol or use drugs. Family History: family history includes Anemia in his mother; Cancer in his maternal grandfather and maternal grandmother; Diabetes in his mother; Diabetes type 2  in his brother, brother, and brother; Heart Disease in his brother, father, maternal grandfather, maternal grandmother, mother, paternal grandfather, and paternal grandmother; Heart Failure in his brother; High Blood Pressure in his father, maternal grandfather, maternal grandmother, mother, paternal grandfather, and paternal grandmother; High Cholesterol in his father, maternal grandfather, maternal grandmother, mother, paternal grandfather, and paternal grandmother; Stroke in his brother. Review of Systems   All 14-point review of systems are completed and pertinent positives are mentioned in the history of present illness. Other systems are reviewed and are negative. PHYSICAL EXAM:    Vital signs:    /66   Pulse 80   Ht 5' 10\" (1.778 m)   Wt 225 lb (102.1 kg)   BMI 32.28 kg/m²      Constitutional and general appearance: alert, cooperative, no distress and appears stated age  [de-identified]: PERRL, no cervical lymphadenopathy. No masses palpable.  Normal oral mucosa  Respiratory:  · Normal excursion and expansion without use of accessory muscles  · Resp auscultation: Normal breath sounds without wheezing, rhonchi, and rales  Cardiovascular:  · The apical impulse is not displaced  · Heart tones are crisp and normal. regular S1 and S2.  · Jugular venous pulsation Normal  · The carotid upstroke is normal in amplitude and contour without delay or bruit  · Peripheral pulses are symmetrical and full   Abdomen:  · No masses or tenderness  · Bowel sounds present  Extremities:  ·  No cyanosis or clubbing  ·  No lower extremity edema  ·  Skin: warm and dry  Neurological:  · Alert and oriented  · Moves all extremities well  · No abnormalities of mood, affect, memory, mentation, or behavior are noted    DATA:    ECG 2/11/2021:  SR with IVCD and a HR of 80    Echo 1/20/2021:  Left ventricular systolic function is reduced with ejection fraction estimated at 25-30 %. All remaining wall segments appear severely hypokinetic . Abnormal (paradoxical) septal motion is present likely due to pacemaker. There is hypokinesis of the mid and basal anterior and anteroseptal wall segments. All remaining wall segments appear severely hypokinetic . Pacer / ICD wire is visualized in the right ventricle and right atrium. Echo 8- 25-30% EF severe global hypokinesis. Echo 8/5/2020:  Left ventricular systolic function is severely reduced with ejection fraction estimated at 25-30 %. Severe global hypokinesis is present. Left ventricle size is normal.  Normal left ventricular wall thickness. Grade III diastolic dysfunction with elevated filing pressure. Mild posterior mitral annular calcification is present. No evidence of mitral valve stenosis. Severe mitral regurgitation. The left atrium is mildly dilated. No evidence of aortic valve stenosis. Mild aortic regurgitation is present. Mild tricuspid regurgitation. Normal systolic pulmonary artery pressure (SPAP) estimated at 39 mmHg (RA pressure 8 mmHg).     CT surgery 8/11/2020 [facility]):  CORONARY ARTERY BYPASS GRAFTING X3, LEFT ATRIAL APPENDAGE CLIP, INTERNAL MAMMARY ARTERY, SAPHENOUS VEIN GRAFT    C 8/6/2020 [facility]):  LVGRAM     LVEDP  19   GRADIENT ACROSS AORTIC VALVE  none   LV FUNCTION EF 15 %   WALL MOTION  severe global hypokinesis   MITRAL REGURGITATION  mild      CORONARY ARTERIES     LM Less than 10% sokdkfsr-jco-mvwxxf stenosis            LAD Heavily calcified, there is a proximal-mid 90% stenosis with distal 50 to 60% stenosis vertically noted at the apex.     D1 is a small to medium size vessel with calcification and 40 to 50% zcktddjl-ylz-jbpdsr stenosis.       LCX Medium size vessel with ostial/proximal and mid 50 to 70% stenosis.       OM1 is a small to medium size vessel with proximal-mid 70% stenosis.       RI Large vessel with 10 to 20% vpdwcmeu-fzi-gxrbue stenosis         RCA Calcified, tortuous vessel with proximal-mid 60 to 70% stenosis.  Distal vessel has 20 to 30% stenosis.      CONCLUSIONS:      Multivessel CAD/ASHD  Will refer to CT surgery for consideration of CABG  Due to severe LV dysfunction, high risk PCI can be considered with Impella support devices as well as atherectomy if he is not felt to be a good surgical candidate. All labs and testing reviewed. CARDIOLOGY LABS:   CBC: No results for input(s): WBC, HGB, HCT, PLT in the last 72 hours. BMP: No results for input(s): NA, K, CO2, BUN, CREATININE, LABGLOM, GLUCOSE in the last 72 hours. PT/INR: No results for input(s): PROTIME, INR in the last 72 hours. APTT:No results for input(s): APTT in the last 72 hours. FASTING LIPID PANEL:  Lab Results   Component Value Date    HDL 23 08/11/2020    LDLDIRECT 42 05/26/2020    LDLCALC 35 08/11/2020    TRIG 248 08/11/2020     LIVER PROFILE:No results for input(s): AST, ALT, ALB in the last 72 hours.       Assessment:   History of mixed cardiomyopathy: ongoing              -s/p single chamber ICD implant 1/2014 ([employer]) for NICM              -EF 25-30% on echo 8/2020 and 1/2020  NSVT: stable  CAD: ongoing              -s/p CABG 8/2020  Chest pain: recurrent   Chronic combined CHF: compensated  HTN: controlled   Mitral regurgitation: severe on echo, mild on cath 8/2020  HLD  DM with neuropathy   History of osteomyelitis in both feet   Hypothyroidism  History of alcohol abuse: quit 2015  Chronic normocytic anemia: stable    Plan:   1. Continue Coreg, lisinopril, Lasix, and spironolactone   2. DAPT, Imdur and statin per cardiology   3. Will review EKG with Dr. Ilir Edwards to determine if patient is a candidate for an upgrade to dual chamber versus CRT-D  4. Follow up with EP to be determined    Discussed chest pain with Dr. Michell Zamora. MD recommending [address] (order placed). Patient to follow up in office with Dr. Michell Zamora on 3/10/2021.     MDM: JOSE Nieto-CNP  [employer]  [phone]

## 2021-02-11 NOTE — TELEPHONE ENCOUNTER
LOV 12/09/2020 nathan/ JERRICA    Plan:  1. No change in medications. Continue current regimen  2. Follow up in 3 months   3.  He is not able to work and has been not working since last November 2019  He is on appropriate medications for his heart condition  He is not able to pay for his blood test or echocardiogram

## 2021-02-16 ENCOUNTER — TELEPHONE (OUTPATIENT)
Dept: CARDIOLOGY CLINIC | Age: 56
End: 2021-02-16

## 2021-02-16 RX ORDER — ATORVASTATIN CALCIUM 40 MG/1
40 TABLET, FILM COATED ORAL NIGHTLY
Qty: 90 TABLET | Refills: 1 | Status: SHIPPED | OUTPATIENT
Start: 2021-02-16 | End: 2021-10-18

## 2021-02-16 NOTE — TELEPHONE ENCOUNTER
RN [employer] the pt is seeing Jean-Claude Pipe at [facility] on 3-10-21 at [phone]. Would we be able to work the pt in to see VEL this same day? Thank you.

## 2021-02-16 NOTE — TELEPHONE ENCOUNTER
EKG and chart reviewed with Dr. Rosanne Richardson. Upgrade to CRT-D can be considered if patient is having CHF symptoms. Dr. Rosanne Richardson would like to see him in office after stress test and repeat EKG to determine candidacy.

## 2021-02-19 ENCOUNTER — TELEPHONE (OUTPATIENT)
Dept: CARDIOLOGY CLINIC | Age: 56
End: 2021-02-19

## 2021-02-19 NOTE — TELEPHONE ENCOUNTER
Patient called to inform rkg that he called for an ambulance last night for cold sweats. Paramedics treated him for high sugar and BP.  Patient did not continue to the hospital and is feeling well today

## 2021-02-19 NOTE — TELEPHONE ENCOUNTER
JERRICA, FYI. Last OV 12/9/20  Assessment:       Encounter Diagnoses   Name Primary?  Coronary artery disease involving native coronary artery of native heart without angina pectoris Yes    Automatic implantable cardioverter-defibrillator in situ      S/P CABG x 3      Diabetic ulcer of other part of right foot associated with type 2 diabetes mellitus, limited to breakdown of skin [address] [address]           Ruben was seen today for 6 month follow-up, CAD cardiomyopathy, hypertension and hyperlipidemia.     Diagnoses and associated orders for this visit:  CAD s/p CABG 0.48.86  Alcoholic cardiomyopathy, stable chronic systolic CHF functional class I     Hypertension, controlled      Automatic implantable cardiac defibrillator in situ recent check in 7.9.19 normal function     Plan:  1. No change in medications. Continue current regimen  2. Follow up in 3 months   3.  He is not able to work and has been not working since last November 2019  He is on appropriate medications for his heart condition  He is not able to pay for his blood test or echocardiogram

## 2021-03-01 ENCOUNTER — HOSPITAL ENCOUNTER (OUTPATIENT)
Dept: GENERAL RADIOLOGY | Age: 56
Discharge: HOME | End: 2021-03-01
Payer: MEDICAID

## 2021-03-01 ENCOUNTER — HOSPITAL ENCOUNTER (OUTPATIENT)
Age: 56
Discharge: HOME | End: 2021-03-01
Payer: MEDICAID

## 2021-03-01 DIAGNOSIS — S92.909G: ICD-10-CM

## 2021-03-01 DIAGNOSIS — S92.902A: ICD-10-CM

## 2021-03-01 DIAGNOSIS — E11.40: ICD-10-CM

## 2021-03-01 PROCEDURE — 73630 X-RAY EXAM OF FOOT: CPT

## 2021-03-08 ENCOUNTER — HOSPITAL ENCOUNTER (OUTPATIENT)
Dept: NON INVASIVE DIAGNOSTICS | Age: 56
Discharge: HOME | End: 2021-03-08
Payer: MEDICAID

## 2021-03-08 ENCOUNTER — HOSPITAL ENCOUNTER (OUTPATIENT)
Dept: NUCLEAR MEDICINE | Age: 56
Discharge: HOME | End: 2021-03-08
Payer: MEDICAID

## 2021-03-08 DIAGNOSIS — I25.5 ISCHEMIC CARDIOMYOPATHY: ICD-10-CM

## 2021-03-08 PROCEDURE — 78452 HT MUSCLE IMAGE SPECT MULT: CPT

## 2021-03-08 PROCEDURE — A9502 TC99M TETROFOSMIN: HCPCS | Performed by: NURSE PRACTITIONER

## 2021-03-08 PROCEDURE — 3430000000 HC RX DIAGNOSTIC RADIOPHARMACEUTICAL: Performed by: NURSE PRACTITIONER

## 2021-03-08 PROCEDURE — 93017 CV STRESS TEST TRACING ONLY: CPT

## 2021-03-08 RX ADMIN — TETROFOSMIN 34.8 MILLICURIE: 1.38 INJECTION, POWDER, LYOPHILIZED, FOR SOLUTION INTRAVENOUS at 07:30

## 2021-03-08 NOTE — PROGRESS NOTES
Patient arrived in stress lab. Reports drinking [place]. Dew on his way to the test this am. Informed patient that we are unable to do stress test due to caffeine consumption. Will do resting scan today and stress test tomorrow. Patient informed not to drink anymore caffeine until after test tomorrow. Patient states understanding.

## 2021-03-09 ENCOUNTER — HOSPITAL ENCOUNTER (OUTPATIENT)
Dept: NUCLEAR MEDICINE | Age: 56
Discharge: HOME | End: 2021-03-09
Payer: MEDICAID

## 2021-03-09 LAB
LV EF: 37 %
LVEF MODALITY: NORMAL

## 2021-03-09 PROCEDURE — 6360000002 HC RX W HCPCS

## 2021-03-09 RX ADMIN — TETROFOSMIN 30.1 MILLICURIE: 1.38 INJECTION, POWDER, LYOPHILIZED, FOR SOLUTION INTRAVENOUS at 08:21

## 2021-03-10 ENCOUNTER — NURSE ONLY (OUTPATIENT)
Dept: CARDIOLOGY CLINIC | Age: 56
End: 2021-03-10
Payer: MEDICAID

## 2021-03-10 ENCOUNTER — OFFICE VISIT (OUTPATIENT)
Dept: CARDIOLOGY CLINIC | Age: 56
End: 2021-03-10
Payer: MEDICAID

## 2021-03-10 VITALS
DIASTOLIC BLOOD PRESSURE: 60 MMHG | OXYGEN SATURATION: 93 % | SYSTOLIC BLOOD PRESSURE: 100 MMHG | WEIGHT: 216 LBS | HEIGHT: 70 IN | HEART RATE: 86 BPM | BODY MASS INDEX: 30.92 KG/M2

## 2021-03-10 VITALS
BODY MASS INDEX: 30.99 KG/M2 | HEIGHT: 70 IN | WEIGHT: 216.5 LBS | OXYGEN SATURATION: 93 % | DIASTOLIC BLOOD PRESSURE: 60 MMHG | SYSTOLIC BLOOD PRESSURE: 100 MMHG | HEART RATE: 86 BPM

## 2021-03-10 DIAGNOSIS — Z95.1 S/P CORONARY ARTERY BYPASS GRAFT X 3: ICD-10-CM

## 2021-03-10 DIAGNOSIS — I42.6 ALCOHOLIC CARDIOMYOPATHY (HCC): ICD-10-CM

## 2021-03-10 DIAGNOSIS — I25.10 CORONARY ARTERY DISEASE INVOLVING NATIVE CORONARY ARTERY OF NATIVE HEART WITHOUT ANGINA PECTORIS: Primary | ICD-10-CM

## 2021-03-10 DIAGNOSIS — I25.5 ISCHEMIC CARDIOMYOPATHY: ICD-10-CM

## 2021-03-10 DIAGNOSIS — I47.29 NSVT (NONSUSTAINED VENTRICULAR TACHYCARDIA): ICD-10-CM

## 2021-03-10 DIAGNOSIS — M86.072 ACUTE HEMATOGENOUS OSTEOMYELITIS OF LEFT FOOT (HCC): ICD-10-CM

## 2021-03-10 DIAGNOSIS — Z95.810 ICD (IMPLANTABLE CARDIOVERTER-DEFIBRILLATOR) IN PLACE: ICD-10-CM

## 2021-03-10 DIAGNOSIS — I21.4 NSTEMI (NON-ST ELEVATED MYOCARDIAL INFARCTION) (HCC): ICD-10-CM

## 2021-03-10 DIAGNOSIS — I47.29 NSVT (NONSUSTAINED VENTRICULAR TACHYCARDIA): Primary | ICD-10-CM

## 2021-03-10 DIAGNOSIS — I25.10 CORONARY ARTERY DISEASE INVOLVING NATIVE CORONARY ARTERY OF NATIVE HEART WITHOUT ANGINA PECTORIS: ICD-10-CM

## 2021-03-10 PROCEDURE — G8427 DOCREV CUR MEDS BY ELIG CLIN: HCPCS | Performed by: INTERNAL MEDICINE

## 2021-03-10 PROCEDURE — G8484 FLU IMMUNIZE NO ADMIN: HCPCS | Performed by: INTERNAL MEDICINE

## 2021-03-10 PROCEDURE — G8417 CALC BMI ABV UP PARAM F/U: HCPCS | Performed by: INTERNAL MEDICINE

## 2021-03-10 PROCEDURE — 1036F TOBACCO NON-USER: CPT | Performed by: INTERNAL MEDICINE

## 2021-03-10 PROCEDURE — 99214 OFFICE O/P EST MOD 30 MIN: CPT | Performed by: INTERNAL MEDICINE

## 2021-03-10 PROCEDURE — 93282 PRGRMG EVAL IMPLANTABLE DFB: CPT | Performed by: INTERNAL MEDICINE

## 2021-03-10 PROCEDURE — 3017F COLORECTAL CA SCREEN DOC REV: CPT | Performed by: INTERNAL MEDICINE

## 2021-03-10 NOTE — LETTER
[address] Cardiology - [address] [place] [id number] [facility]  Phone: [phone]  Fax: [phone]    Krystyna Valle MD        March 10, 2021     [address] [address]        Patient is mederate risk clinically for non cardiac type of surgery. May proceed as planned. If you have any questions or concerns, please don't hesitate to call.     Sincerely,        Krystyna Valle MD

## 2021-03-10 NOTE — PROGRESS NOTES
Patient presents to the device clinic today for a programming evaluation for his defibrillator. Patient has a history of ICM and NSVT. Takes Coreg, Plavix, and Mag-Ox. Last device interrogation was on 2/11. Since then, no arrhythmias recorded. TI near baseline.  <0.1%    All sensing and pacing parameters are within normal range. No changes need to be made at this time. Patient education was provided about device functionality, in home monitoring, and any other patient questions and/or concerns were addressed. Patient voices understanding. Please see interrogation for more detail. Patient will see Dr. Haja Darby today in office. Patient will follow up in 3 months in office or remotely. See [employer] report under the Cardiology tab.

## 2021-03-10 NOTE — PROGRESS NOTES
[address] Office Note  3/10/2021     Subjective:  Mr. Carl Urbano presents today for follow up. CABG x 3. [place]:  Today he reports feeling good. He was admitted to the hospital overnight on 1/15/21 for SOB and chest pain. Chest Xray demonstrated heart enlarged but unchanged, stable cardiomegaly. Limited Echo 1/20/21 demonstrated EF 25-30%. Lexiscan stress test on 3/8/2021 demonstrated moderate-severely reduced LVEF 37%, global hypokinesis, large area distal anterior and inferoapical scar, no ischemia. Device check today demonstrates  <0.1%, no new events, 4 years of battery life remaining. He reports he is to have 2 toes amputated by Dr. Rubie Sicard and is needing clearance for this. He reports his blood sugar dropped to 63 and his BP went up. He states he was home and called the paramedics. He states they were able to stabilize his BS and BP. PMH:  Alcoholic CMP, ICD placement  by Dr. Gilda Barajas for non ischemic CM with low EF that failed to improve with guide line based medical therapy. He is no longer drinking ETOH products. On 01/31/19 he underwent left foot ulcer debridement. He works for [employer]. A left heart cath performed 8/6/20 demonstrated multivessel CAD. He then underwent a CABG x 3 on 8/11/2020. Device check on 10/7/20 showed He has  <0.1%. No VT events recorded since last office visit. Optivol previously increased. Battery life 5 years. Nightly HR >85 bpm x7 days. QRS duration was 130. He reports he has a left open wound ulcer on the bottom of his foot. He follows with Dr. Rubie Sicard for this for last year and half. Review of Systems: 12 point ROS negative in all areas as listed below except as in [place]  Constitutional, EENT,pulmonary, GI, ,  skin, neurological, hematological, endocrine, Psychiatric    Reviewed past medical history, social, and family history. No alcohol nonsmoker  HE  IS DISABLED  Mother: Heart disease, MI x4, young age.    Father: Heart disease, CABG age 67.    Siblings: brother  age 40 of CHF, CVA  Past Medical History:   Diagnosis Date    Acute osteomyelitis of left foot ([address] 2017    Acute osteomyelitis of right foot ([address] 2016    Acute respiratory failure ([address] 2020    Ascites     Blood transfusion reaction     Burst fracture of lumbar vertebra ([address] 2012    Cellulitis of left foot     Cellulitis of right lower extremity ,     Community acquired pneumonia     Diabetes ([address]     Diabetic ulcer of right foot ([address] early     Diabetic ulcer of 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 2 diabetes mellitus, with necrosis of bone ([address]     ETOH abuse     Fracture of tibial plateau 458    High cholesterol     History of blood transfusion     reaction    HTN (hypertension)     Hx of blood clots     MI (myocardial infarction) ([address] 2020    + Troponins    MRSA (methicillin resistant staph aureus) culture positive 16, 16    foot wound    Neuropathic ulcer of left foot, limited to breakdown of skin ([address] 11/3/2016    Neuropathic ulcer of toe ([address] 2014    NSVT (nonsustained ventricular tachycardia) ([address]     Pleural effusion due to congestive heart failure (HCC)     Septicemia ([address]     Smoker     quit [id number]    Systolic CHF, acute ([address] 2013    Thyroid disease      Past Surgical History:   Procedure Laterality Date    CARDIAC DEFIBRILLATOR PLACEMENT  2014    ICD Placement    CORONARY ARTERY BYPASS GRAFT N/A 2020    CORONARY ARTERY BYPASS GRAFTING X3, LEFT ATRIAL APPENDAGE CLIP, INTERNAL MAMMARY ARTERY, SAPHENOUS VEIN GRAFT, ON PUMP, 5 LEVEL BILATERAL INTERCOSTAL NERVE BLOCK performed by Lily Hauser MD at [address] Left 2018    PARTIAL FOOT AMPUTATION w. Dr Merry Bates Left 2019    EXOSTECTOMY LEFT FOOT, ULCER DEBRIDEMENT LEFT FOOT WITH GRAFT APPLICATION performed by Aayush Donaldson DPM at [facility]  FOOT SURGERY Left 09/27/2017    LEFT FOOT ULCER DEBRIDEMENT, POSSIBLE SECOND METATARSAL    FOOT SURGERY Left 01/31/2019    Exostectomy left foot, ulcer debridement with graft application left foot    FOOT TENDON SURGERY Right 2016    FOOT TENDON SURGERY Left 06/30/2017    KNEE SURGERY Left     OTHER SURGICAL HISTORY Bilateral 9/17/15    amputation 2nd digit bilateral, flexor tenotomy right hallux    OTHER SURGICAL HISTORY Left 08/17/2017    PARTIAL LEFT FOOT AMPUTATION      OTHER SURGICAL HISTORY Left 12/07/2017    Debridement infected bone and tissue left foot; ulcer debridement left foot with graft application with dr mathur     OTHER SURGICAL HISTORY Right 01/04/2018    DEBRIDEMENT INFECTED BONE AND TISSUE RIGHT FOOT, ULCER DEBRIDEMENT RIGHT FOOT WITH GRAFT APPLICATION    OTHER SURGICAL HISTORY Left 11/01/2018    Procedure: PARTIAL RESECTION LEFT METATARSAL, ULCER DEBRIDEMENT LEFT FOOT WITH GRAFT APPLICATION     AR OFFICE/OUTPT VISIT,PROCEDURE ONLY Left 8/23/2018    ULCER DEBRIDEMENT LEFT FOOT WITH GRAFT APPLICATION performed by Albaro Nair DPM at [facility] TARSAL/METATARSAL Left 8/9/2018    PARTIAL FOOT AMPUTATION WITH GRAFT APPLICATION performed by Albaro Nair DPM at [facility] TARSAL/METATARSAL Left 11/1/2018    PARTIAL RESECTION LEFT METATARSAL, ULCER DEBRIDEMENT LEFT FOOT WITH GRAFT APPLICATION performed by Albaro Nair DPM at [facility] 81 TOE AMPUTATION      2 toes       Objective:   /60   Pulse 86   Ht 5' 10\" (1.778 m)   Wt 216 lb (98 kg)   SpO2 93%   BMI 30.99 kg/m²     Wt Readings from Last 3 Encounters:   03/10/21 216 lb (98 kg)   03/10/21 216 lb 8 oz (98.2 kg)   02/11/21 225 lb (102.1 kg)       Physical Exam:  General: No Respiratory distress, appears well developed and well nourished.    Eyes:  Sclera nonicteric  Nose/Sinuses:  negative findings: nose shows no deformity, asymmetry, or inflammation, nasal mucosa normal, septum midline with PT/INR: No results for input(s): PROTIME, INR in the last 72 hours. A1C:   Lab Results   Component Value Date    LABA1C 8.7 01/16/2021       IMAGING:   I have reviewed the following tests and documented in this encounter as follows:   Discussed with patient. Lexiscan Stress Test 3/8/2021  Summary Moderate to severely reduced LVEF 37%  Global hypokinesis with regional variation (severe inferoapical hypokinesis)  Large area of distal anterior and inferoapical scar. No ischemia   EKG 2/11/21  SR,HR 80    EKG 8/4/2020  Sinus tachycardia Intra-ventricular conduction delaySeptal infarct , age undeterminedAbnormal ECGNo significant change was foundWhen compared with ECG of1.31.19Confirmed by GIL HUFFMAN, [address] ([id number]) on 8/4/2020 6:51:55 AM     CABG:  Surgeon:  Nanda Knox     S/P :  CABG x3, AIDEN clip on 8/11/20    Chest Xray 1/15/2021  Heart is enlarged but unchanged. Pulmonary vessels are upper normal.  No acute or focal airspace disease. Sternotomy wire and single lead left subclavian device noted. No pneumothorax or pleural effusion. Stable moderate cardiomegaly. CHEST XRAY 8/13/2020  FINDINGS: Recent surgical history of CABG. Left vascular sheath remains in place but [place] catheter has been removed. ICD stable on the left. Improving aeration in the left lung base with some residual pleural fluid and airspace change. Cardiomegaly is demonstrated. No skeletal finding. Limited Echo 1/20/21   Summary   Left ventricular systolic function is reduced with ejection fraction   estimated at 25-30 %. All remaining wall segments appear severely hypokinetic . Abnormal (paradoxical) septal motion is present likely due to pacemaker. There is hypokinesis of the mid and basal anterior and anteroseptal wall   segments. All remaining wall segments appear severely hypokinetic . Pacer / ICD wire is visualized in the right ventricle and right atrium.    Echo 8- 25-30% EF severe global hypokinesis. ECHO 8/5/2020   Summary   Left ventricular systolic function is severely reduced with ejection   fraction estimated at 25-30 %. Severe global hypokinesis is present. Left ventricle size is normal.   Normal left ventricular wall thickness. Grade III diastolic dysfunction with elevated filing pressure. Mild posterior mitral annular calcification is present. No evidence of mitral valve stenosis. Severe mitral regurgitation. The left atrium is mildly dilated. No evidence of aortic valve stenosis. Mild aortic regurgitation is present. Mild tricuspid regurgitation. Normal systolic pulmonary artery pressure (SPAP) estimated at 39 mmHg (RA   pressure 8 mmHg). Stress test: 7/13:   No scintigraphic evidence of stress-induced myocardial  ischemia. 2. Chronic inferoapical infarct. 3. Severe left ventricular dilatation with severe diffuse  hypokinesis. 4. Severely reduced LVEF of approximately 20%. Assessment:  Encounter Diagnoses   Name Primary?  Coronary artery disease involving native coronary artery of native heart without angina pectoris Yes    Ischemic cardiomyopathy     S/P coronary artery bypass graft x 3     Acute hematogenous osteomyelitis of left foot (HCC)      Diagnoses and associated orders for this visit:  CAD s/p CABG 8.11.20   on DAPT and statin   Alcoholic cardiomyopathy, stable chronic systolic CHF functional class I     On Coreg  Lasix aldactone and lisinopril  Hypertension, controlled     Automatic implantable cardiac defibrillator in situ recent check in 7.9.19 normal function    Patient is to have toe amputation and will need to wait until this is healed before proceeding with device upgrade to resynchronization therapy. Plan:  1. Patient is to have toe amputation and will need to wait until this is healed before proceeding with device upgrade. May proceed with surgery on foot. - letter written   2. No changes in medications   3.  Labs in [facility] reviewed and found satisfactory  4. Follow up in 3 months       QUALITY MEASURES  1. Tobacco Cessation Counseling: NA  2. Retake of BP if >140/90:   NA  3. Documentation to PCP/referring for new patient:  Sent to PCP at close of office visit  4. CAD patient on anti-platelet: Yes DAPT  5. CAD patient on STATIN therapy:  Yes  6. Patient with CHF and aFib on anticoagulation:  NA     This note was scribed in the presence of Hailey Avelar MD by Laney Hinton RN.       I, Dr. Cheyanne Darby, personally performed the services described in this documentation, as scribed by the above signed scribe in my presence. It is both accurate and complete to my knowledge. I agree with the details independently gathered by the clinical support staff, while the remaining scribed note accurately describes my personal service to the patient.               Cheyanne Darby MD 3/10/2021 10:32 AM

## 2021-03-10 NOTE — PROGRESS NOTES
History:   has a past medical history of Acute osteomyelitis of left foot ([address] Acute osteomyelitis of right foot ([address] Acute respiratory failure ([address] Ascites, Blood transfusion reaction, Burst fracture of lumbar vertebra ([address] Cellulitis of left foot, Cellulitis of right lower extremity, Community acquired pneumonia, Diabetes ([address] Diabetic ulcer of right foot ([address] Diabetic ulcer of 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 2 diabetes mellitus, with necrosis of bone ([address] ETOH abuse, Fracture of tibial plateau, High cholesterol, History of blood transfusion, HTN (hypertension), Hx of blood clots, MI (myocardial infarction) ([address] MRSA (methicillin resistant staph aureus) culture positive, Neuropathic ulcer of left foot, limited to breakdown of skin ([address] Neuropathic ulcer of toe ([address] NSVT (nonsustained ventricular tachycardia) ([address] Pleural effusion due to congestive heart failure ([address] Septicemia ([address] Smoker, Systolic CHF, acute ([address] and Thyroid disease. Surgical History:   has a past surgical history that includes knee surgery (Left); other surgical history (Bilateral, 9/17/15); Toe amputation; Foot Tendon Surgery (Right, 2016); Foot Tendon Surgery (Left, 06/30/2017); other surgical history (Left, 08/17/2017); Foot surgery (Left, 09/27/2017); other surgical history (Left, 12/07/2017); other surgical history (Right, 01/04/2018); Foot Amputation (Left, 08/09/2018); pr part remv othr tarsal/metatarsal (Left, 8/9/2018); pr office/outpt visit,procedure only (Left, 8/23/2018); other surgical history (Left, 11/01/2018); pr part remv othr tarsal/metatarsal (Left, 11/1/2018); Foot surgery (Left, 01/31/2019); Foot Debridement (Left, 1/31/2019); Cardiac defibrillator placement (01/29/2014); Coronary artery bypass graft (N/A, 8/11/2020); and eye surgery. Social History:   reports that he quit smoking about 41 years ago. He has a 0.25 pack-year smoking history.  He has never used smokeless tobacco. He reports that he does not drink alcohol or use drugs. Family History:  family history includes Anemia in his mother; Cancer in his maternal grandfather and maternal grandmother; Diabetes in his mother; Diabetes type 2  in his brother, brother, and brother; Heart Disease in his brother, father, maternal grandfather, maternal grandmother, mother, paternal grandfather, and paternal grandmother; Heart Failure in his brother; High Blood Pressure in his father, maternal grandfather, maternal grandmother, mother, paternal grandfather, and paternal grandmother; High Cholesterol in his father, maternal grandfather, maternal grandmother, mother, paternal grandfather, and paternal grandmother; Stroke in his brother.      Home Medications:  Outpatient Encounter Medications as of 3/10/2021   Medication Sig Dispense Refill    metFORMIN (GLUCOPHAGE) 1000 MG tablet Take 1 tablet by mouth 2 times daily (with meals) 60 tablet 0    atorvastatin (LIPITOR) 40 MG tablet Take 1 tablet by mouth nightly 90 tablet 1    isosorbide mononitrate (IMDUR) 30 MG extended release tablet Take 1 tablet by mouth daily 90 tablet 3    carvedilol (COREG) 25 MG tablet Take 1 tablet by mouth 2 times daily TAKE ONE TABLET BY MOUTH TWICE DAILY 60 tablet 5    lisinopril (PRINIVIL;ZESTRIL) 5 MG tablet Take 1 tablet by mouth daily 30 tablet 5    magnesium oxide (MAG-OX) 400 MG tablet Take 1 tablet by mouth 2 times daily 30 tablet 5    insulin NPH (HUMULIN N KWIKPEN) 100 UNIT/ML injection pen Inject 12 Units into the skin 2 times daily (before meals) 5 pen 3    Insulin Pen Needle (B-D ULTRAFINE III SHORT PEN) 31G X 8 MM MISC 1 each by Does not apply route daily 100 each 3    INSULIN SYRINGE 1CC/29G (AIMSCO INSULIN SYR ULTRA THIN) 29G X 1/2\" 1 ML MISC 1 each by Does not apply route 3 times daily 100 each 3    insulin lispro (HUMALOG) 100 UNIT/ML injection vial Inject 8 Units into the skin 3 times daily (with meals) 1 vial 3    ARMOUR THYROID 60 MG tablet Take 1 tablet by mouth daily 90 tablet 3    glipiZIDE (GLUCOTROL) 5 MG tablet Take 1 tablet by mouth 2 times daily (before meals) 60 tablet 0    ASPIRIN LOW DOSE 81 MG EC tablet Take 1 tablet by mouth daily 90 tablet 3    clopidogrel (PLAVIX) 75 MG tablet Take 1 tablet by mouth daily 30 tablet 11    furosemide (LASIX) 40 MG tablet Take 1 tablet by mouth 2 times daily 60 tablet 5    Blood Glucose Monitoring Suppl (ONE TOUCH ULTRA MINI) w/Device KIT 1 kit by Does not apply route daily 1 kit 0    glucose blood VI test strips (ONE TOUCH ULTRA TEST) strip Test blood sugar once daily. 150 strip 3    Insulin Pen Needle (UNIFINE PENTIPS) 31G X 5 MM MISC USE 1 EACH DAY AS NEEDED FOR TESTING 100 each 3    Blood Glucose Monitoring Suppl GABI Measure glucose before breakfast, before dinner and at bedtime daily (we will check at lunchtime at HBO therapy). 1 Device 0    Multiple Vitamins-Minerals (THERAPEUTIC MULTIVITAMIN-MINERALS) tablet Take 1 tablet by mouth daily      Blood Glucose Monitoring Suppl (BLOOD GLUCOSE METER) KIT Use to test blood sugars. 1 kit 0    Lancets MISC Use to test blood sugars once daily. 100 each 3    spironolactone (ALDACTONE) 25 MG tablet Take 1 tablet by mouth daily for 1 day 30 tablet 5     No facility-administered encounter medications on file as of 3/10/2021. Allergies:  Bactrim [sulfamethoxazole-trimethoprim] and Bee venom     Review of Systems   Constitutional: Negative. HENT: Negative. Eyes: Negative. Respiratory: Negative. Cardiovascular: Negative. Gastrointestinal: Negative. Genitourinary: Negative. Musculoskeletal: Negative. Skin: Negative. Neurological: Negative. Hematological: Negative. Psychiatric/Behavioral: Negative.     /60   Pulse 86   Ht 5' 10\" (1.778 m)   Wt 216 lb 8 oz (98.2 kg)   SpO2 93%   BMI 31.06 kg/m²      Data:     ECG 3/10/2021: SR, occasional PVCs, anterior fascicular block, HR 78 BPM.     Limited echo scintigraphic evidence of stress-induced myocardial ischemia. 2. Chronic inferoapical infarct. 3. Severe left ventricular dilatation with severe diffuse hypokinesis. 4. Severely reduced LVEF of approximately 20%. Objective:  Physical Exam   Constitutional: He is oriented to person, place, and time. He appears well-developed and well-nourished. HENT:   Head: Normocephalic and atraumatic. Eyes: Pupils are equal, round, and reactive to light. Neck: Normal range of motion. Cardiovascular: Normal rate, regular rhythm and normal heart sounds. Pulmonary/Chest: Effort normal and breath sounds normal.   Abdominal: Soft. No tenderness. Musculoskeletal: Normal range of motion. He exhibits no edema. Neurological: He is alert and oriented to person, place, and time. Skin: Skin is warm and dry. Psychiatric: He has a normal mood and affect. Assessment:  1.  CAD, S/P CABG  2. NICM-he had a history of nonischemic cardiomyopathy, thought related to ethanol exposure. The etiology of his LV dysfunction is now less clear as he has significant coronary artery disease and has undergone coronary artery bypass surgery. 3. DM- managed by PCP  4. Incomplete left bundle branch block- unchanged per ECG 3/10/2021.  today. Plan:  1. Discussed risks and benefits of device upgrade: HIS bundle lead or LV lead. The potential for benefit is unclear with a QRS duration of only 134 ms. We have agreed to perform left upper extremity venography to determine patency of the left subclavian vein, as this may influence our decision.   -Patient would like to defer at this time. 2. Venogram of left SVC to assess patency. 3. Continue medications as prescribed. 4. Remote device checks every 3 months. 5. Follow up to be determined depending on results of venogram.     QUALITY MEASURES  1. Tobacco Cessation Counseling: NA  2. Retake of BP if >140/90:   NA  3.  Documentation to PCP/referring for new patient:  Sent to PCP at close of office visit  4. CAD patient on anti-platelet: Yes  5. CAD patient on STATIN therapy:  Yes  6. Patient with CHF and aFib on anticoagulation:  NA    This note has been scribed in the presence of Willis Cullen MD by Roe Phalen, RN.     I, Dr. Willis Cullen, personally performed the services described in this documentation as scribed by Roe Phalen, RN in my presence, and it is both accurate and complete.       Willis Cullen M.D.

## 2021-03-10 NOTE — PATIENT INSTRUCTIONS
Plan:  1. Discussed risks and benefits of device upgrade: HIS bundle lead   -Patient would like to defer at this time. 2. Venogram of left SVC to assess patency. 3. Continue medications as prescribed. 4. Remote device checks every 3 months.     5. Follow up to be determined depending on results of venogram.

## 2021-03-10 NOTE — LETTER
Anticoagulation Summary  As of 10/16/2020    INR goal:  2.0-3.0   TTR:  72.9 % (5.4 y)   INR used for dosin.1 (10/16/2020)   Warfarin maintenance plan:  2.5 mg (5 mg x 0.5) every Fri; 5 mg (5 mg x 1) all other days   Weekly warfarin total:  32.5 mg   Plan last modified:  Fabio Cabrera, PharmD (2020)   Next INR check:  2020   Target end date:  Indefinite    Indications    Pulmonary embolism (HCC) [I26.99]             Anticoagulation Episode Summary     INR check location:  Anticoagulation Clinic    Preferred lab:      Send INR reminders to:      Comments:  indef per PCP       Anticoagulation Care Providers     Provider Role Specialty Phone number    Tom Vega D.O. Referring Internal Medicine [phone]    [facility] Anticoagulation Services Responsible  [phone]        Anticoagulation Patient Findings    Spoke with patient today regarding therapeutic INR of 2.1. Patient confirms current dosing regimen. Patient denies any signs/symptoms of bruising or bleeding or any changes in diet and medications.  Instructed patient to call clinic with any questions or concerns.     Pt is to continue with current warfarin dosing regimen.    Follow up in 3 weeks, to reduce risk of adverse events related to this high risk medication,  Warfarin.    Susana Carrera, Pharmacy Intern             [facility] ([facility]   Cardiac Consultation    Date: 3/10/21  Patient Name: Tarun Maynard  YOB: 1965    Primary Care Physician: Eden Bansal MD    CHIEF COMPLAINT:   Chief Complaint   Patient presents with    Follow-up     Pt reports elevated PB since last OV     Other     device maangement     Coronary Artery Disease    Tachycardia     NSVT       HPI:  Tarun Maynard is a 54 y.o. male with a history of alcoholic (nonischemic) CM s/p placement on 1/23/14 Medtronic single chamber ICD. He has severe MR, mild AR and TR. He also has CAD, and underwent a CABG x3 on 8/15/2020. On 10/7/20, QRS duration is 130. He reported he is not feeling his best. He reports that he is still having chest pain every now and again since his CABG 8/15/20. Patient presented to the ED 1/15/2021 c/o CP. He was started on imdur for chronic chest pain. Patient instructed to have echo completed as outpatient. Patient underwent limited echo 1/20/2021 that demonstrated and EF of 25-30%. All wall segments appear severely hypokinetic. He underwent a stress test on 3/8/2021 that demonstrated an EF of 37%, severe global hypokinesis with no ischemia noted. Today, 3/10/2021, patient's device interrogation demonstrates  <0.1%, no new events, 4 years of battery life remaining. He reports he has been doing ok. He reports he has improved his diet about 2 weeks ago and states he has been feeling much better as a result. He reports that if he over exerts himself for long periods, he gets SOB. He reports that when he ascends a flight of stairs he is slightly winded when he gets to the top. He reports he would not be able to ascend 2 flights of stairs without stopping to rest. He reports that he becomes SOB while chopping wood. He reports he is taking all medications as prescribed and tolerates them well. Patient denies current edema, chest pain, palpitations, dizziness or syncope.         Past Medical History:   has a past medical history of Acute osteomyelitis of left foot ([address] Acute osteomyelitis of right foot ([address] Acute respiratory failure ([address] Ascites, Blood transfusion reaction, Burst fracture of lumbar vertebra ([address] Cellulitis of left foot, Cellulitis of right lower extremity, Community acquired pneumonia, Diabetes ([address] Diabetic ulcer of right foot ([address] Diabetic ulcer of 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 2 diabetes mellitus, with necrosis of bone ([address] ETOH abuse, Fracture of tibial plateau, High cholesterol, History of blood transfusion, HTN (hypertension), Hx of blood clots, MI (myocardial infarction) ([address] MRSA (methicillin resistant staph aureus) culture positive, Neuropathic ulcer of left foot, limited to breakdown of skin ([address] Neuropathic ulcer of toe ([address] NSVT (nonsustained ventricular tachycardia) ([address] Pleural effusion due to congestive heart failure ([address] Septicemia ([address] Smoker, Systolic CHF, acute ([address] and Thyroid disease. Surgical History:   has a past surgical history that includes knee surgery (Left); other surgical history (Bilateral, 9/17/15); Toe amputation; Foot Tendon Surgery (Right, 2016); Foot Tendon Surgery (Left, 06/30/2017); other surgical history (Left, 08/17/2017); Foot surgery (Left, 09/27/2017); other surgical history (Left, 12/07/2017); other surgical history (Right, 01/04/2018); Foot Amputation (Left, 08/09/2018); pr part remv othr tarsal/metatarsal (Left, 8/9/2018); pr office/outpt visit,procedure only (Left, 8/23/2018); other surgical history (Left, 11/01/2018); pr part remv othr tarsal/metatarsal (Left, 11/1/2018); Foot surgery (Left, 01/31/2019); Foot Debridement (Left, 1/31/2019); Cardiac defibrillator placement (01/29/2014); Coronary artery bypass graft (N/A, 8/11/2020); and eye surgery. Social History:   reports that he quit smoking about 41 years ago. He has a 0.25 pack-year smoking history.  He has never used smokeless tobacco. He reports that he does not drink alcohol or use drugs. Family History:  family history includes Anemia in his mother; Cancer in his maternal grandfather and maternal grandmother; Diabetes in his mother; Diabetes type 2  in his brother, brother, and brother; Heart Disease in his brother, father, maternal grandfather, maternal grandmother, mother, paternal grandfather, and paternal grandmother; Heart Failure in his brother; High Blood Pressure in his father, maternal grandfather, maternal grandmother, mother, paternal grandfather, and paternal grandmother; High Cholesterol in his father, maternal grandfather, maternal grandmother, mother, paternal grandfather, and paternal grandmother; Stroke in his brother.      Home Medications:  Outpatient Encounter Medications as of 3/10/2021   Medication Sig Dispense Refill    metFORMIN (GLUCOPHAGE) 1000 MG tablet Take 1 tablet by mouth 2 times daily (with meals) 60 tablet 0    atorvastatin (LIPITOR) 40 MG tablet Take 1 tablet by mouth nightly 90 tablet 1    isosorbide mononitrate (IMDUR) 30 MG extended release tablet Take 1 tablet by mouth daily 90 tablet 3    carvedilol (COREG) 25 MG tablet Take 1 tablet by mouth 2 times daily TAKE ONE TABLET BY MOUTH TWICE DAILY 60 tablet 5    lisinopril (PRINIVIL;ZESTRIL) 5 MG tablet Take 1 tablet by mouth daily 30 tablet 5    magnesium oxide (MAG-OX) 400 MG tablet Take 1 tablet by mouth 2 times daily 30 tablet 5    insulin NPH (HUMULIN N KWIKPEN) 100 UNIT/ML injection pen Inject 12 Units into the skin 2 times daily (before meals) 5 pen 3    Insulin Pen Needle (B-D ULTRAFINE III SHORT PEN) 31G X 8 MM MISC 1 each by Does not apply route daily 100 each 3    INSULIN SYRINGE 1CC/29G (AIMSCO INSULIN SYR ULTRA THIN) 29G X 1/2\" 1 ML MISC 1 each by Does not apply route 3 times daily 100 each 3    insulin lispro (HUMALOG) 100 UNIT/ML injection vial Inject 8 Units into the skin 3 times daily (with meals) 1 vial 3    ARMOUR THYROID 60 MG tablet Take 1 tablet by mouth daily 90 tablet 3    glipiZIDE (GLUCOTROL) 5 MG tablet Take 1 tablet by mouth 2 times daily (before meals) 60 tablet 0    ASPIRIN LOW DOSE 81 MG EC tablet Take 1 tablet by mouth daily 90 tablet 3    clopidogrel (PLAVIX) 75 MG tablet Take 1 tablet by mouth daily 30 tablet 11    furosemide (LASIX) 40 MG tablet Take 1 tablet by mouth 2 times daily 60 tablet 5    Blood Glucose Monitoring Suppl (ONE TOUCH ULTRA MINI) w/Device KIT 1 kit by Does not apply route daily 1 kit 0    glucose blood VI test strips (ONE TOUCH ULTRA TEST) strip Test blood sugar once daily. 150 strip 3    Insulin Pen Needle (UNIFINE PENTIPS) 31G X 5 MM MISC USE 1 EACH DAY AS NEEDED FOR TESTING 100 each 3    Blood Glucose Monitoring Suppl GABI Measure glucose before breakfast, before dinner and at bedtime daily (we will check at lunchtime at [facility] therapy). 1 Device 0    Multiple Vitamins-Minerals (THERAPEUTIC MULTIVITAMIN-MINERALS) tablet Take 1 tablet by mouth daily      Blood Glucose Monitoring Suppl (BLOOD GLUCOSE METER) KIT Use to test blood sugars. 1 kit 0    Lancets MISC Use to test blood sugars once daily. 100 each 3    spironolactone (ALDACTONE) 25 MG tablet Take 1 tablet by mouth daily for 1 day 30 tablet 5     No facility-administered encounter medications on file as of 3/10/2021. Allergies:  Bactrim [sulfamethoxazole-trimethoprim] and Bee venom     Review of Systems   Constitutional: Negative. HENT: Negative. Eyes: Negative. Respiratory: Negative. Cardiovascular: Negative. Gastrointestinal: Negative. Genitourinary: Negative. Musculoskeletal: Negative. Skin: Negative. Neurological: Negative. Hematological: Negative. Psychiatric/Behavioral: Negative.     /60   Pulse 86   Ht 5' 10\" (1.778 m)   Wt 216 lb 8 oz (98.2 kg)   SpO2 93%   BMI 31.06 kg/m²      Data:     ECG 3/10/2021: SR, occasional PVCs, anterior fascicular block, HR 78 BPM.     Limited echo scintigraphic evidence of stress-induced myocardial ischemia. 2. Chronic inferoapical infarct. 3. Severe left ventricular dilatation with severe diffuse hypokinesis. 4. Severely reduced LVEF of approximately 20%. Objective:  Physical Exam   Constitutional: He is oriented to person, place, and time. He appears well-developed and well-nourished. HENT:   Head: Normocephalic and atraumatic. Eyes: Pupils are equal, round, and reactive to light. Neck: Normal range of motion. Cardiovascular: Normal rate, regular rhythm and normal heart sounds. Pulmonary/Chest: Effort normal and breath sounds normal.   Abdominal: Soft. No tenderness. Musculoskeletal: Normal range of motion. He exhibits no edema. Neurological: He is alert and oriented to person, place, and time. Skin: Skin is warm and dry. Psychiatric: He has a normal mood and affect. Assessment:  1.  CAD, S/P CABG  2. NICM-he had a history of nonischemic cardiomyopathy, thought related to ethanol exposure. The etiology of his LV dysfunction is now less clear as he has significant coronary artery disease and has undergone coronary artery bypass surgery. 3. DM- managed by PCP  4. Incomplete left bundle branch block- unchanged per ECG 3/10/2021.  today. Plan:  1. Discussed risks and benefits of device upgrade: HIS bundle lead or LV lead. The potential for benefit is unclear with a QRS duration of only 134 ms. We have agreed to perform left upper extremity venography to determine patency of the left subclavian vein, as this may influence our decision.   -Patient would like to defer at this time. 2. Venogram of left SVC to assess patency. 3. Continue medications as prescribed. 4. Remote device checks every 3 months. 5. Follow up to be determined depending on results of venogram.     QUALITY MEASURES  1. Tobacco Cessation Counseling: NA  2. Retake of BP if >140/90:   NA  3.  Documentation to PCP/referring for new

## 2021-03-10 NOTE — PATIENT INSTRUCTIONS
Plan:  1. Patient is to have toe amputation and will need to wait until this is healed before proceeding with device upgrade. May proceed with surgery  - letter written   2. No changes in medications  3.  Follow up in 3 months

## 2021-03-15 ENCOUNTER — TELEPHONE (OUTPATIENT)
Dept: CARDIOLOGY CLINIC | Age: 56
End: 2021-03-15

## 2021-03-15 NOTE — TELEPHONE ENCOUNTER
Spoke with patient. Patient is scheduled with Dr. Susie Looney for Venogram on 4/6/21 at [facility], arrival time of 6:30am to the Cath Lab. Please have patient arrive to the main entrance of [facility] and check in with the registration desk. Remind patient to be NPO after midnight (8 hours prior). Do not apply lotions/creams on skin the day of procedure. COVID testing 4/1. He may have to reschedule if he is having foot surgery. He will call if needed.

## 2021-03-15 NOTE — TELEPHONE ENCOUNTER
Patient seen in office 3/10/2021. Venogram of left subclavian vein discussed and agreed upon by [facility] and patient. Medications not discussed (patient does not need to hold any meds). Patient will need covid testing prior. Thank you!

## 2021-03-16 ENCOUNTER — HOSPITAL ENCOUNTER (OUTPATIENT)
Age: 56
Discharge: HOME | End: 2021-03-16
Payer: MEDICAID

## 2021-03-16 LAB — SARS-COV-2: NOT DETECTED

## 2021-03-16 PROCEDURE — U0003 INFECTIOUS AGENT DETECTION BY NUCLEIC ACID (DNA OR RNA); SEVERE ACUTE RESPIRATORY SYNDROME CORONAVIRUS 2 (SARS-COV-2) (CORONAVIRUS DISEASE [COVID-19]), AMPLIFIED PROBE TECHNIQUE, MAKING USE OF HIGH THROUGHPUT TECHNOLOGIES AS DESCRIBED BY CMS-2020-01-R: HCPCS

## 2021-03-16 NOTE — PRE-PROCEDURE INSTRUCTIONS

## 2021-03-16 NOTE — PROGRESS NOTES
Leroy Aguilar Preoperative Screening for Elective Surgery/Invasive Procedures While COVID-19 present in the community     Have you had any of the following symptoms? o Fever, chills  o Cough  o Shortness of breath  o Muscle aches/pain  o Diarrhea  o Abdominal pain, nausea, vomiting  o Loss or decrease in taste and / or smell   Risk of Exposure  o Have you recently been hospitalized for COVID-19 or flu-like illness, if so when?  o Recently diagnosed with COVID-19, if so when?  o Recently tested for COVID-19, if so when?  o Have you been in close contact with a person or family member who currently has or recently had COVID-19? If yes, when and in what context?  o Do you live with anybody who in the last 14 days has had fever, chills, shortness of breath, muscle aches, flu-like illness?  o Do you have any close contacts or family members who are currently in the hospital for COVID-19 or flu-like illness? If yes, assess recent close contact with this person. Indicate if the patient has a positive screen by answering yes to one or more of the above questions. Patients who test positive or screen positive prior to surgery or on the day of surgery should be evaluated in conjunction with the surgeon/proceduralist/anesthesiologist to determine the urgency of the procedure.

## 2021-03-17 ENCOUNTER — ANESTHESIA EVENT (OUTPATIENT)
Dept: OPERATING ROOM | Age: 56
End: 2021-03-17
Payer: MEDICAID

## 2021-03-18 ENCOUNTER — HOSPITAL ENCOUNTER (OUTPATIENT)
Age: 56
Setting detail: OUTPATIENT SURGERY
Discharge: HOME | End: 2021-03-18
Attending: PODIATRIST | Admitting: PODIATRIST
Payer: MEDICAID

## 2021-03-18 ENCOUNTER — APPOINTMENT (OUTPATIENT)
Dept: GENERAL RADIOLOGY | Age: 56
End: 2021-03-18
Attending: PODIATRIST
Payer: MEDICAID

## 2021-03-18 ENCOUNTER — ANESTHESIA (OUTPATIENT)
Dept: OPERATING ROOM | Age: 56
End: 2021-03-18
Payer: MEDICAID

## 2021-03-18 VITALS
TEMPERATURE: 97.3 F | RESPIRATION RATE: 16 BRPM | BODY MASS INDEX: 30.92 KG/M2 | WEIGHT: 216 LBS | HEART RATE: 80 BPM | OXYGEN SATURATION: 98 % | DIASTOLIC BLOOD PRESSURE: 65 MMHG | SYSTOLIC BLOOD PRESSURE: 107 MMHG | HEIGHT: 70 IN

## 2021-03-18 VITALS
RESPIRATION RATE: 15 BRPM | DIASTOLIC BLOOD PRESSURE: 58 MMHG | OXYGEN SATURATION: 99 % | SYSTOLIC BLOOD PRESSURE: 101 MMHG

## 2021-03-18 DIAGNOSIS — I10 ESSENTIAL HYPERTENSION: ICD-10-CM

## 2021-03-18 DIAGNOSIS — E78.2 MIXED HYPERLIPIDEMIA: ICD-10-CM

## 2021-03-18 DIAGNOSIS — G89.18 POSTOPERATIVE PAIN: Primary | ICD-10-CM

## 2021-03-18 DIAGNOSIS — I42.6 ALCOHOLIC CARDIOMYOPATHY (HCC): ICD-10-CM

## 2021-03-18 LAB
GLUCOSE BLD-MCNC: 201 MG/DL (ref 70–99)
PERFORMED ON: ABNORMAL

## 2021-03-18 PROCEDURE — 2780000010 HC IMPLANT OTHER: Performed by: PODIATRIST

## 2021-03-18 PROCEDURE — 6360000002 HC RX W HCPCS: Performed by: PODIATRIST

## 2021-03-18 PROCEDURE — 73620 X-RAY EXAM OF FOOT: CPT

## 2021-03-18 PROCEDURE — 3600000013 HC SURGERY LEVEL 3 ADDTL 15MIN: Performed by: PODIATRIST

## 2021-03-18 PROCEDURE — 3600000003 HC SURGERY LEVEL 3 BASE: Performed by: PODIATRIST

## 2021-03-18 PROCEDURE — 3700000001 HC ADD 15 MINUTES (ANESTHESIA): Performed by: PODIATRIST

## 2021-03-18 PROCEDURE — 88311 DECALCIFY TISSUE: CPT

## 2021-03-18 PROCEDURE — 2580000003 HC RX 258: Performed by: PODIATRIST

## 2021-03-18 PROCEDURE — 2580000003 HC RX 258: Performed by: ANESTHESIOLOGY

## 2021-03-18 PROCEDURE — 7100000011 HC PHASE II RECOVERY - ADDTL 15 MIN: Performed by: PODIATRIST

## 2021-03-18 PROCEDURE — 6360000002 HC RX W HCPCS: Performed by: NURSE ANESTHETIST, CERTIFIED REGISTERED

## 2021-03-18 PROCEDURE — 7100000010 HC PHASE II RECOVERY - FIRST 15 MIN: Performed by: PODIATRIST

## 2021-03-18 PROCEDURE — 2500000003 HC RX 250 WO HCPCS: Performed by: ANESTHESIOLOGY

## 2021-03-18 PROCEDURE — 2709999900 HC NON-CHARGEABLE SUPPLY: Performed by: PODIATRIST

## 2021-03-18 PROCEDURE — 3700000000 HC ANESTHESIA ATTENDED CARE: Performed by: PODIATRIST

## 2021-03-18 PROCEDURE — 88305 TISSUE EXAM BY PATHOLOGIST: CPT

## 2021-03-18 PROCEDURE — 2500000003 HC RX 250 WO HCPCS: Performed by: PODIATRIST

## 2021-03-18 PROCEDURE — 2500000003 HC RX 250 WO HCPCS: Performed by: NURSE ANESTHETIST, CERTIFIED REGISTERED

## 2021-03-18 DEVICE — ALLOGRAFT HUM TISS 1 CC AMNIO TISS MEMBRN AMNIFLO CRYOPRES: Type: IMPLANTABLE DEVICE | Site: FOOT | Status: FUNCTIONAL

## 2021-03-18 RX ORDER — OXYCODONE HYDROCHLORIDE AND ACETAMINOPHEN 5; 325 MG/1; MG/1
1 TABLET ORAL EVERY 4 HOURS PRN
Qty: 25 TABLET | Refills: 0 | Status: SHIPPED | OUTPATIENT
Start: 2021-03-18 | End: 2021-03-25

## 2021-03-18 RX ORDER — OXYCODONE HYDROCHLORIDE AND ACETAMINOPHEN 5; 325 MG/1; MG/1
1 TABLET ORAL PRN
Status: DISCONTINUED | OUTPATIENT
Start: 2021-03-18 | End: 2021-03-18 | Stop reason: HOSPADM

## 2021-03-18 RX ORDER — ONDANSETRON 2 MG/ML
4 INJECTION INTRAMUSCULAR; INTRAVENOUS EVERY 30 MIN PRN
Status: DISCONTINUED | OUTPATIENT
Start: 2021-03-18 | End: 2021-03-18 | Stop reason: HOSPADM

## 2021-03-18 RX ORDER — MEPERIDINE HYDROCHLORIDE 25 MG/ML
12.5 INJECTION INTRAMUSCULAR; INTRAVENOUS; SUBCUTANEOUS EVERY 5 MIN PRN
Status: DISCONTINUED | OUTPATIENT
Start: 2021-03-18 | End: 2021-03-18 | Stop reason: HOSPADM

## 2021-03-18 RX ORDER — MAGNESIUM HYDROXIDE 1200 MG/15ML
LIQUID ORAL CONTINUOUS PRN
Status: COMPLETED | OUTPATIENT
Start: 2021-03-18 | End: 2021-03-18

## 2021-03-18 RX ORDER — LABETALOL HYDROCHLORIDE 5 MG/ML
5 INJECTION, SOLUTION INTRAVENOUS
Status: DISCONTINUED | OUTPATIENT
Start: 2021-03-18 | End: 2021-03-18 | Stop reason: HOSPADM

## 2021-03-18 RX ORDER — SODIUM CHLORIDE, SODIUM LACTATE, POTASSIUM CHLORIDE, CALCIUM CHLORIDE 600; 310; 30; 20 MG/100ML; MG/100ML; MG/100ML; MG/100ML
INJECTION, SOLUTION INTRAVENOUS CONTINUOUS
Status: DISCONTINUED | OUTPATIENT
Start: 2021-03-18 | End: 2021-03-18 | Stop reason: HOSPADM

## 2021-03-18 RX ORDER — ONDANSETRON 2 MG/ML
INJECTION INTRAMUSCULAR; INTRAVENOUS PRN
Status: DISCONTINUED | OUTPATIENT
Start: 2021-03-18 | End: 2021-03-18 | Stop reason: SDUPTHER

## 2021-03-18 RX ORDER — HYDRALAZINE HYDROCHLORIDE 20 MG/ML
5 INJECTION INTRAMUSCULAR; INTRAVENOUS EVERY 30 MIN PRN
Status: DISCONTINUED | OUTPATIENT
Start: 2021-03-18 | End: 2021-03-18 | Stop reason: HOSPADM

## 2021-03-18 RX ORDER — OXYCODONE HYDROCHLORIDE AND ACETAMINOPHEN 5; 325 MG/1; MG/1
2 TABLET ORAL PRN
Status: DISCONTINUED | OUTPATIENT
Start: 2021-03-18 | End: 2021-03-18 | Stop reason: HOSPADM

## 2021-03-18 RX ORDER — DIPHENHYDRAMINE HYDROCHLORIDE 50 MG/ML
6.25 INJECTION INTRAMUSCULAR; INTRAVENOUS
Status: DISCONTINUED | OUTPATIENT
Start: 2021-03-18 | End: 2021-03-18 | Stop reason: HOSPADM

## 2021-03-18 RX ORDER — DEXAMETHASONE SODIUM PHOSPHATE 4 MG/ML
INJECTION, SOLUTION INTRA-ARTICULAR; INTRALESIONAL; INTRAMUSCULAR; INTRAVENOUS; SOFT TISSUE PRN
Status: DISCONTINUED | OUTPATIENT
Start: 2021-03-18 | End: 2021-03-18 | Stop reason: SDUPTHER

## 2021-03-18 RX ORDER — LIDOCAINE HYDROCHLORIDE 20 MG/ML
INJECTION, SOLUTION INFILTRATION; PERINEURAL PRN
Status: DISCONTINUED | OUTPATIENT
Start: 2021-03-18 | End: 2021-03-18 | Stop reason: SDUPTHER

## 2021-03-18 RX ORDER — PROPOFOL 10 MG/ML
INJECTION, EMULSION INTRAVENOUS PRN
Status: DISCONTINUED | OUTPATIENT
Start: 2021-03-18 | End: 2021-03-18 | Stop reason: SDUPTHER

## 2021-03-18 RX ADMIN — PROPOFOL 10 MG: 10 INJECTION, EMULSION INTRAVENOUS at 08:08

## 2021-03-18 RX ADMIN — Medication 2000 MG: at 07:53

## 2021-03-18 RX ADMIN — PROPOFOL 50 MG: 10 INJECTION, EMULSION INTRAVENOUS at 08:01

## 2021-03-18 RX ADMIN — PROPOFOL 10 MG: 10 INJECTION, EMULSION INTRAVENOUS at 08:38

## 2021-03-18 RX ADMIN — PROPOFOL 10 MG: 10 INJECTION, EMULSION INTRAVENOUS at 08:19

## 2021-03-18 RX ADMIN — LIDOCAINE HYDROCHLORIDE 0.1 ML: 10 INJECTION, SOLUTION EPIDURAL; INFILTRATION; INTRACAUDAL; PERINEURAL at 06:48

## 2021-03-18 RX ADMIN — SODIUM CHLORIDE, POTASSIUM CHLORIDE, SODIUM LACTATE AND CALCIUM CHLORIDE: 600; 310; 30; 20 INJECTION, SOLUTION INTRAVENOUS at 07:58

## 2021-03-18 RX ADMIN — ONDANSETRON 4 MG: 2 INJECTION, SOLUTION INTRAMUSCULAR; INTRAVENOUS at 08:24

## 2021-03-18 RX ADMIN — PROPOFOL 50 MG: 10 INJECTION, EMULSION INTRAVENOUS at 08:00

## 2021-03-18 RX ADMIN — SODIUM CHLORIDE, POTASSIUM CHLORIDE, SODIUM LACTATE AND CALCIUM CHLORIDE: 600; 310; 30; 20 INJECTION, SOLUTION INTRAVENOUS at 06:48

## 2021-03-18 RX ADMIN — DEXAMETHASONE SODIUM PHOSPHATE 4 MG: 4 INJECTION, SOLUTION INTRAMUSCULAR; INTRAVENOUS at 08:08

## 2021-03-18 RX ADMIN — PROPOFOL 30 MG: 10 INJECTION, EMULSION INTRAVENOUS at 08:24

## 2021-03-18 RX ADMIN — LIDOCAINE HYDROCHLORIDE 60 MG: 20 INJECTION, SOLUTION INFILTRATION; PERINEURAL at 08:00

## 2021-03-18 RX ADMIN — PROPOFOL 20 MG: 10 INJECTION, EMULSION INTRAVENOUS at 08:13

## 2021-03-18 RX ADMIN — PROPOFOL 20 MG: 10 INJECTION, EMULSION INTRAVENOUS at 08:31

## 2021-03-18 ASSESSMENT — PULMONARY FUNCTION TESTS
PIF_VALUE: 0
PIF_VALUE: 0
PIF_VALUE: 2
PIF_VALUE: 0
PIF_VALUE: 1
PIF_VALUE: 0

## 2021-03-18 ASSESSMENT — ENCOUNTER SYMPTOMS: SHORTNESS OF BREATH: 1

## 2021-03-18 ASSESSMENT — PAIN SCALES - GENERAL
PAINLEVEL_OUTOF10: 0
PAINLEVEL_OUTOF10: 0

## 2021-03-18 ASSESSMENT — PAIN - FUNCTIONAL ASSESSMENT: PAIN_FUNCTIONAL_ASSESSMENT: 0-10

## 2021-03-18 NOTE — H&P
HISTORY & PHYSICAL    Admit Date:  3/18/2021    Subjective:  54 y.o. male who is seen for evaluation of ulcers bilateral.       Past Medical History:        Diagnosis Date    Acute osteomyelitis of left foot ([address] 9/27/2017    Acute osteomyelitis of right foot ([address] 4/25/2016    Acute respiratory failure ([address] 8/4/2020    Ascites     Blood transfusion reaction     Burst fracture of lumbar vertebra ([address] 11/9/2012    Cellulitis of left foot     Cellulitis of right lower extremity 2/16, 5/16    Community acquired pneumonia     Diabetes ([address]     Diabetic ulcer of right foot ([address] early 2016    Diabetic ulcer of 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 2 diabetes mellitus, with necrosis of bone ([address]     ETOH abuse     Fracture of tibial plateau 76/8/5056    High cholesterol     History of blood transfusion     reaction    HTN (hypertension)     Hx of blood clots     MI (myocardial infarction) ([address] 08/04/2020    + Troponins    MRSA (methicillin resistant staph aureus) culture positive 4/28/16, 4/20/16    foot wound    Neuropathic ulcer of left foot, limited to breakdown of skin ([address] 11/3/2016    Neuropathic ulcer of toe ([address] 2/13/2014    NSVT (nonsustained ventricular tachycardia) ([address] 2014    Pleural effusion due to congestive heart failure (HCC)     Septicemia ([address]     Smoker     quit [id number]    Systolic CHF, acute ([address] 7/8/2013    Thyroid disease        Past Surgical History:        Procedure Laterality Date    CARDIAC DEFIBRILLATOR PLACEMENT  01/29/2014    ICD Placement    CORONARY ARTERY BYPASS GRAFT N/A 8/11/2020    CORONARY ARTERY BYPASS GRAFTING X3, LEFT ATRIAL APPENDAGE CLIP, INTERNAL MAMMARY ARTERY, SAPHENOUS VEIN GRAFT, ON PUMP, 5 LEVEL BILATERAL INTERCOSTAL NERVE BLOCK performed by Taz Claros MD at [address] Left 08/09/2018    PARTIAL FOOT AMPUTATION w. Dr John Chatman Left 1/31/2019    EXOSTECTOMY LEFT FOOT, ULCER DEBRIDEMENT LEFT FOOT WITH GRAFT APPLICATION performed by Aayush Donaldson DPM at [address] Left 2017    LEFT FOOT ULCER DEBRIDEMENT, POSSIBLE SECOND METATARSAL    FOOT SURGERY Left 2019    Exostectomy left foot, ulcer debridement with graft application left foot    FOOT TENDON SURGERY Right 2016    FOOT TENDON SURGERY Left 2017    KNEE SURGERY Left     OTHER SURGICAL HISTORY Bilateral 9/17/15    amputation 2nd digit bilateral, flexor tenotomy right hallux    OTHER SURGICAL HISTORY Left 2017    PARTIAL LEFT FOOT AMPUTATION      OTHER SURGICAL HISTORY Left 2017    Debridement infected bone and tissue left foot; ulcer debridement left foot with graft application with dr mathur     OTHER SURGICAL HISTORY Right 2018    DEBRIDEMENT INFECTED BONE AND TISSUE RIGHT FOOT, ULCER DEBRIDEMENT RIGHT FOOT WITH GRAFT APPLICATION    OTHER SURGICAL HISTORY Left 2018    Procedure: PARTIAL RESECTION LEFT METATARSAL, ULCER DEBRIDEMENT LEFT FOOT WITH GRAFT APPLICATION     OK OFFICE/OUTPT VISIT,PROCEDURE ONLY Left 2018    ULCER DEBRIDEMENT LEFT FOOT WITH GRAFT APPLICATION performed by Aayush Donaldson DPM at [facility] TARSAL/METATARSAL Left 2018    PARTIAL FOOT AMPUTATION WITH GRAFT APPLICATION performed by Aayush Donaldson DPM at [facility] TARSAL/METATARSAL Left 2018    PARTIAL RESECTION LEFT METATARSAL, ULCER DEBRIDEMENT LEFT FOOT WITH GRAFT APPLICATION performed by Aayush Donaldson DPM at [address] TOE AMPUTATION      2 toes       Current Medications:     ceFAZolin  2,000 mg Intravenous Once       Allergies:  Bactrim [sulfamethoxazole-trimethoprim] and Bee venom    Social History:    Social History     Tobacco Use    Smoking status: Former Smoker     Packs/day: 0.25     Years: 1.00     Pack years: 0.25     Quit date: 1980     Years since quittin.1    Smokeless tobacco: Never Used   Substance Use Topics    Alcohol use: No     Alcohol/week: 0.0 standard drinks    Drug use: No       Family History:       Problem Relation Age of Onset    Heart Disease Mother     High Blood Pressure Mother     High Cholesterol Mother     Diabetes Mother     Anemia Mother     Heart Disease Father     High Blood Pressure Father     High Cholesterol Father     Heart Disease Maternal Grandmother     High Blood Pressure Maternal Grandmother     High Cholesterol Maternal Grandmother     Cancer Maternal Grandmother         bone marrow & breast    Heart Disease Maternal Grandfather     High Blood Pressure Maternal Grandfather     High Cholesterol Maternal Grandfather     Cancer Maternal Grandfather         pancreatic CA    Heart Disease Paternal Grandmother     High Blood Pressure Paternal Grandmother     High Cholesterol Paternal Grandmother     Heart Disease Paternal Grandfather     High Blood Pressure Paternal Grandfather     High Cholesterol Paternal Grandfather     Stroke Brother     Heart Failure Brother     Heart Disease Brother     Diabetes type 2  Brother     Diabetes type 2  Brother     Diabetes type 2  Brother        Review of Systems    CONSTITUTIONAL:  negative  EYES:  negative  HEENT:  negative  RESPIRATORY:  negative  CARDIOVASCULAR:  negative  GASTROINTESTINAL:  negative  GENITOURINARY:  negative  INTEGUMENT/BREAST:  positive for ulcer  MUSCULOSKELETAL:  positive for  pain  NEUROLOGICAL:  positive for numbness      Objective:   /67   Pulse 78   Temp 98.2 °F (36.8 °C) (Infrared)   Resp 14   Ht 5' 10\" (1.778 m)   Wt 216 lb (98 kg)   SpO2 99%   BMI 30.99 kg/m²     Data:  CBC: No results for input(s): WBC, HGB, HCT, MCV, PLT in the last 72 hours. BMP: No results for input(s): NA, K, CL, CO2, PHOS, BUN, CREATININE in the last 72 hours. Invalid input(s): CA  LIVER PROFILE: No results for input(s): AST, ALT, LIPASE, BILIDIR, BILITOT, ALKPHOS in the last 72 hours. Invalid input(s):   AMYLASE, ALB  PT/INR: No results for input(s): PROT, INR in the last 72 hours. HgBA1c:  Lab Results   Component Value Date    LABA1C 8.7 01/16/2021       Cultures:     Imaging:     Physical Exam:    General Appearance: alert and oriented to person, place and time, well developed and well- nourished, in no acute distress  Skin: warm and dry, no rash or erythema  Head: normocephalic and atraumatic  Eyes: pupils equal, round, and reactive to light, extraocular eye movements intact, conjunctivae normal  ENT: tympanic membrane, external ear and ear canal normal bilaterally, nose without deformity, nasal mucosa and turbinates normal without polyps  Neck: supple and non-tender without mass, no thyromegaly or thyroid nodules, no cervical lymphadenopathy  Pulmonary/Chest: clear to auscultation bilaterally- no wheezes, rales or rhonchi, normal air movement, no respiratory distress  Cardiovascular: normal rate, regular rhythm, normal S1 and S2, no murmurs, rubs, clicks, or gallops, distal pulses intact, no carotid bruits  Abdomen: soft, non-tender, non-distended, normal bowel sounds, no masses or organomegaly    DP/PT palpable bilateral  Ulcers bilateral foot. Exposed bone on the left foot. Partial amputation bilateral foot.          Assessment:  Patient Active Problem List   Diagnosis Code    Hypertension I10    Uncontrolled type 2 diabetes mellitus with diabetic polyneuropathy, with long-term current use of insulin ([facility]) E11.42, Z79.4, S27.72    Alcoholic cardiomyopathy Q79.8    Automatic implantable cardioverter-defibrillator in situ Z95.810    Hyperlipidemia E78.5    Onychomycosis B35.1    Dystrophic nail L60.3    Callus of foot L84    Hallux valgus M20.10    Diabetic ulcer of right foot associated with type 2 diabetes mellitus, limited to breakdown of skin ([facility]) E11.621, L97.511    Acquired hypothyroidism E03.9    Diabetic ulcer of left foot associated with type 2 diabetes mellitus, with muscle involvement without evidence of necrosis ([facility] E11.621, L97.525    NSVT (nonsustained ventricular tachycardia) ([facility]) I47.2    NSTEMI (non-ST elevated myocardial infarction) ([facility]) I21.4    Coronary artery disease involving native coronary artery of native heart without angina pectoris I25.10    Obesity E66.9    Acute post-operative pain G89.18    S/P coronary artery bypass graft x 3 Z95.1    Chest pain R07.9    SOB (shortness of breath) R06.02    Ischemic cardiomyopathy I25.5     diabetic foot ulcer bilateral secondary to peripheral neuropathy  Osteomyelitis left foot secondary to diabetes mellitus   diabetes mellitus       Plan  Patient examined. Discussed risks, complications, alternatives and benefits with patient. Understands chance of nonhealing wound, infection, need for further surgery, loss of limb or life. Agrees to proceed with surgery. Has been cleared by cardiology.            Electronically signed by Cristin Piña DPM on 3/18/2021 at 7:43 AM.

## 2021-03-18 NOTE — BRIEF OP NOTE
Brief Postoperative Note      Patient: Gaston Gamble  YOB: 1965  MRN: [id number]    Date of Procedure: 3/18/2021    Pre-Op Diagnosis: OSTEOMYELITIS, DIABETIC FOOT ULCER    Post-Op Diagnosis: Same       Procedure(s):  AMPUTATION LEFT FOURTH AND FIFTH DIGITS, PARTIAL RESECTION RIGHT FIRST METATARSAL    Surgeon(s):  Karolyn Kent DPM    Assistant:  Surgical Assistant: Simone Saldana    Anesthesia: Monitor Anesthesia Care    Estimated Blood Loss (mL): less than 243     Complications: None    Specimens:   ID Type Source Tests Collected by Time Destination   A : PARTIAL FOOT AMPUTATION- BILATERAL Tissue Tissue SURGICAL PATHOLOGY Karolyn Kent DPM 3/18/2021 0830        Implants:  Implant Name Type Inv.  Item Serial No.  Lot No. LRB No. Used Action   ALLOGRAFT HUM TISS 1 CC AMNIO TISS [employer] AM[employer] CRYOP[employer]  ALLOGRAFT HUM TISS 1 CC AMNIO TISS [employer]  [employer]ONE [employer]-WD   1 Implanted   ALLOGRAFT HUM TISS 1 CC AMNIO TISS [employer]  ALLOGRAFT HUM TISS 1 CC AMNIO TISS [employer]  BONE [employer]ANK [employer]-WD   1 Implanted         Drains: * No LDAs found *    Findings: ulcer bilateral    Electronically signed by Karolyn Kent DPM on 3/18/2021 at 8:59 AM

## 2021-03-18 NOTE — ANESTHESIA PRE PROCEDURE
Department of Anesthesiology  Preprocedure Note       Name:  Yvonne Truong   Age:  54 y.o.  :  1965                                          MRN:  [id number]         Date:  3/18/2021      Surgeon: Vladimir Richter):  Jeannie Madrid DPM    Procedure: Procedure(s):  AMPUTATION LEFT FOURTH AND FIFTH DIGITS, PARTIAL RESECTION  SECOND AND THIRD METATARSAL LEFT FOOT, PARTIAL RESECTION RIGHT FIRST METATARSAL    Medications prior to admission:   Prior to Admission medications    Medication Sig Start Date End Date Taking?  Authorizing Provider   metFORMIN (GLUCOPHAGE) 1000 MG tablet Take 1 tablet by mouth 2 times daily (with meals) 21  Yes Cinthya Mcarthur MD   atorvastatin (LIPITOR) 40 MG tablet Take 1 tablet by mouth nightly 21  Yes Nuha Perez MD   isosorbide mononitrate (IMDUR) 30 MG extended release tablet Take 1 tablet by mouth daily 21  Yes Nuha Perez MD   carvedilol (COREG) 25 MG tablet Take 1 tablet by mouth 2 times daily TAKE ONE TABLET BY MOUTH TWICE DAILY 21  Yes Nuha Perez MD   lisinopril (PRINIVIL;ZESTRIL) 5 MG tablet Take 1 tablet by mouth daily 21  Yes Nuha Perez MD   magnesium oxide (MAG-OX) 400 MG tablet Take 1 tablet by mouth 2 times daily 21  Yes Nuha Perez MD   insulin NPH (HUMULIN N KWIKPEN) 100 UNIT/ML injection pen Inject 12 Units into the skin 2 times daily (before meals) 21  Yes JOSE Green CNP   insulin lispro (HUMALOG) 100 UNIT/ML injection vial Inject 8 Units into the skin 3 times daily (with meals) 21  Yes JOSE Green CNP   ARMOUR THYROID 60 MG tablet Take 1 tablet by mouth daily 21  Yes Nuha Perez MD   spironolactone (ALDACTONE) 25 MG tablet Take 1 tablet by mouth daily for 1 day 12/9/20 3/18/21 Yes Nuha Perez MD   glipiZIDE (GLUCOTROL) 5 MG tablet Take 1 tablet by mouth 2 times daily (before meals) 20  Yes Cinthya Mcarthur MD   ASPIRIN LOW DOSE 81 MG EC tablet Take 1 tablet by mouth daily 9/24/20  Yes Tyler Greco MD   clopidogrel (PLAVIX) 75 MG tablet Take 1 tablet by mouth daily 9/15/20  Yes Tyler Greco MD   furosemide (LASIX) 40 MG tablet Take 1 tablet by mouth 2 times daily 9/15/20  Yes Tyler Greco MD   Multiple Vitamins-Minerals (THERAPEUTIC MULTIVITAMIN-MINERALS) tablet Take 1 tablet by mouth daily   Yes Historical Provider, MD   Insulin Pen Needle (B-D ULTRAFINE III SHORT PEN) 31G X 8 MM MISC 1 each by Does not apply route daily 1/16/21   [facility] APRAMELIA - CNP   INSULIN SYRINGE 1CC/29G (AIMSCO INSULIN SYR ULTRA THIN) 29G X 1/2\" 1 ML MISC 1 each by Does not apply route 3 times daily 1/16/21   [facility]   Blood Glucose Monitoring Suppl (ONE TOUCH ULTRA MINI) w/Device KIT 1 kit by Does not apply route daily 5/26/20   Petra Burgess MD   glucose blood VI test strips (ONE TOUCH ULTRA TEST) strip Test blood sugar once daily. 1/3/18   Lorena Kemp MD   Insulin Pen Needle (UNIFINE PENTIPS) 31G X 5 MM MISC USE 1 EACH DAY AS NEEDED FOR TESTING 1/3/18   Lorena Kemp MD   Blood Glucose Monitoring Suppl GABI Measure glucose before breakfast, before dinner and at bedtime daily (we will check at lunchtime at HBO therapy). 10/25/17   Remigio Wilson MD   Blood Glucose Monitoring Suppl (BLOOD GLUCOSE METER) KIT Use to test blood sugars. 7/15/15   Lorena Kemp MD   Lancets MISC Use to test blood sugars once daily.  7/15/15   Lorena Kemp MD       Current medications:    Current Facility-Administered Medications   Medication Dose Route Frequency Provider Last Rate Last Admin    ceFAZolin (ANCEF) 2000 mg in sterile water 20 mL IV syringe  2,000 mg Intravenous Once Nirmala Guerin DPM        lactated ringers infusion   Intravenous Continuous Gaby Vicente MD 50 mL/hr at 03/18/21 0648 New Bag at 03/18/21 0648    meperidine (DEMEROL) injection 12.5 mg  12.5 mg Intravenous Q5 Min PRN MD [place] HYDROmorphone (DILAUDID) injection 0.5 mg  0.5 mg Intravenous Q10 Min PRN Aylin Delgadillo MD        HYDROmorphone (DILAUDID) injection 0.5 mg  0.5 mg Intravenous Q5 Min PRN Aylin Delgadillo MD        oxyCODONE-acetaminophen (PERCOCET) 5-325 MG per tablet 1 tablet  1 tablet Oral PRN Aylin eDlgadillo MD        Or    oxyCODONE-acetaminophen (PERCOCET) 5-325 MG per tablet 2 tablet  2 tablet Oral PRN Aylin Delgadillo MD        ondansetron [facility]) injection 4 mg  4 mg Intravenous Q30 Min PRN Aylin Delgadillo MD        diphenhydrAMINE (BENADRYL) injection 6.25 mg  6.25 mg Intravenous Once PRN Aylin Delgadillo MD        labetalol (NORMODYNE;TRANDATE) injection 5 mg  5 mg Intravenous Q15 Min PRN Aylin Delgadillo MD        hydrALAZINE (APRESOLINE) injection 5 mg  5 mg Intravenous Q30 Min PRN Aylin Delgadillo MD           Allergies:     Allergies   Allergen Reactions    Bactrim [Sulfamethoxazole-Trimethoprim] Rash     Pt reports that his lips tingle and then skin on his lips peel off,and rash on thigh       Bee Venom Swelling and Rash       Problem List:    Patient Active Problem List   Diagnosis Code    Hypertension I10    Uncontrolled type 2 diabetes mellitus with diabetic polyneuropathy, with long-term current use of insulin ([facility]) E11.42, Z79.4, C26.39    Alcoholic cardiomyopathy S82.0    Automatic implantable cardioverter-defibrillator in situ Z95.810    Hyperlipidemia E78.5    Onychomycosis B35.1    Dystrophic nail L60.3    Callus of foot L84    Hallux valgus M20.10    Diabetic ulcer of right foot associated with type 2 diabetes mellitus, limited to breakdown of skin ([facility]) E11.621, L97.511    Acquired hypothyroidism E03.9    Diabetic ulcer of left foot associated with type 2 diabetes mellitus, with muscle involvement without evidence of necrosis ([facility]) E11.621, L97.525    NSVT (nonsustained ventricular tachycardia) ([facility]) I47.2    NSTEMI (non-ST elevated myocardial infarction) ([address] I21.4    Coronary artery disease involving native coronary artery of native heart without angina pectoris I25.10    Obesity E66.9    Acute post-operative pain G89.18    S/P coronary artery bypass graft x 3 Z95.1    Chest pain R07.9    SOB (shortness of breath) R06.02    Ischemic cardiomyopathy I25.5       Past Medical History:        Diagnosis Date    Acute osteomyelitis of left foot ([address] 9/27/2017    Acute osteomyelitis of right foot ([address] 4/25/2016    Acute respiratory failure ([address] 8/4/2020    Ascites     Blood transfusion reaction     Burst fracture of lumbar vertebra ([address] 11/9/2012    Cellulitis of left foot     Cellulitis of right lower extremity 2/16, 5/16    Community acquired pneumonia     Diabetes ([address]     Diabetic ulcer of right foot ([address] early 2016    Diabetic ulcer of 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 2 diabetes mellitus, with necrosis of bone ([address]     ETOH abuse     Fracture of tibial plateau 59/0/6005    High cholesterol     History of blood transfusion     reaction    HTN (hypertension)     Hx of blood clots     MI (myocardial infarction) ([address] 08/04/2020    + Troponins    MRSA (methicillin resistant staph aureus) culture positive 4/28/16, 4/20/16    foot wound    Neuropathic ulcer of left foot, limited to breakdown of skin ([address] 11/3/2016    Neuropathic ulcer of toe ([address] 2/13/2014    NSVT (nonsustained ventricular tachycardia) ([address] 2014    Pleural effusion due to congestive heart failure ([address]     Septicemia ([address]     Smoker     quit 7282    Systolic CHF, acute ([address] 7/8/2013    Thyroid disease        Past Surgical History:        Procedure Laterality Date    CARDIAC DEFIBRILLATOR PLACEMENT  01/29/2014    ICD Placement    CORONARY ARTERY BYPASS GRAFT N/A 8/11/2020    CORONARY ARTERY BYPASS GRAFTING X3, LEFT ATRIAL APPENDAGE CLIP, INTERNAL MAMMARY ARTERY, SAPHENOUS VEIN GRAFT, ON PUMP, 5 LEVEL BILATERAL INTERCOSTAL NERVE BLOCK performed by Kiara Matos MD at [address] Left 2018    PARTIAL FOOT AMPUTATION w. Dr Vivien Kramer Left 2019    EXOSTECTOMY LEFT FOOT, ULCER DEBRIDEMENT LEFT FOOT WITH GRAFT APPLICATION performed by Cesar Gonsalves DPM at [address] [facility] Left 2017    LEFT FOOT ULCER DEBRIDEMENT, POSSIBLE SECOND METATARSAL    FOOT SURGERY Left 2019    Exostectomy left foot, ulcer debridement with graft application left foot    FOOT TENDON SURGERY Right 2016    FOOT TENDON SURGERY Left 2017    KNEE SURGERY Left     OTHER SURGICAL HISTORY Bilateral 9/17/15    amputation 2nd digit bilateral, flexor tenotomy right hallux    OTHER SURGICAL HISTORY Left 2017    PARTIAL LEFT FOOT AMPUTATION      OTHER SURGICAL HISTORY Left 2017    Debridement infected bone and tissue left foot; ulcer debridement left foot with graft application with dr mathur     OTHER SURGICAL HISTORY Right 2018    DEBRIDEMENT INFECTED BONE AND TISSUE RIGHT FOOT, ULCER DEBRIDEMENT RIGHT FOOT WITH GRAFT APPLICATION    OTHER SURGICAL HISTORY Left 2018    Procedure: PARTIAL RESECTION LEFT METATARSAL, ULCER DEBRIDEMENT LEFT FOOT WITH GRAFT APPLICATION     NC OFFICE/OUTPT VISIT,PROCEDURE ONLY Left 2018    ULCER DEBRIDEMENT LEFT FOOT WITH GRAFT APPLICATION performed by Cesar Gonsalves DPM at [facility] TARSAL/METATARSAL Left 2018    PARTIAL FOOT AMPUTATION WITH GRAFT APPLICATION performed by Cesar Gonsalves DPM at [facility] TARSAL/METATARSAL Left 2018    PARTIAL RESECTION LEFT METATARSAL, ULCER DEBRIDEMENT LEFT FOOT WITH GRAFT APPLICATION performed by Cesar Gonsalves DPM at [address] [facility] TOE AMPUTATION      2 toes       Social History:    Social History     Tobacco Use    Smoking status: Former Smoker     Packs/day: 0.25     Years: 1.00     Pack years: 0.25     Quit date: 1980     Years since quittin.1    Smokeless tobacco: Never Used   Substance Use Topics    Alcohol use: No     Alcohol/week: 0.0 standard drinks                                Counseling given: Not Answered      Vital Signs (Current):   Vitals:    03/16/21 1142 03/18/21 0638   BP:  108/67   Pulse:  78   Resp:  14   Temp:  98.2 °F (36.8 °C)   TempSrc:  Infrared   SpO2:  99%   Weight: 216 lb (98 kg) 216 lb (98 kg)   Height: 5' 10\" (1.778 m) 5' 10\" (1.778 m)                                              BP Readings from Last 3 Encounters:   03/18/21 108/67   03/10/21 100/60   03/10/21 100/60       NPO Status: Time of last liquid consumption: 0530                        Time of last solid consumption: 2100                        Date of last liquid consumption: 03/18/21(sip water AM meds)                        Date of last solid food consumption: 03/17/21    BMI:   Wt Readings from Last 3 Encounters:   03/18/21 216 lb (98 kg)   03/10/21 216 lb (98 kg)   03/10/21 216 lb 8 oz (98.2 kg)     Body mass index is 30.99 kg/m².     CBC:   Lab Results   Component Value Date    WBC 8.3 01/15/2021    RBC 3.82 01/15/2021    HGB 11.3 01/15/2021    HCT 32.9 01/15/2021    MCV 86.0 01/15/2021    RDW 15.3 01/15/2021     01/15/2021       CMP:   Lab Results   Component Value Date     01/15/2021    K 4.5 01/15/2021    CL 93 01/15/2021    CO2 27 01/15/2021    BUN 23 01/15/2021    CREATININE 1.2 01/15/2021    GFRAA >60 01/15/2021    GFRAA >60 08/01/2012    AGRATIO 1.5 01/15/2021    LABGLOM >60 01/15/2021    GLUCOSE 433 01/15/2021    PROT 7.0 01/15/2021    PROT 7.1 08/01/2012    CALCIUM 9.0 01/15/2021    BILITOT 0.8 01/15/2021    ALKPHOS 106 01/15/2021    AST 12 01/15/2021    ALT 13 01/15/2021       POC Tests:   Recent Labs     03/18/21  0646   POCGLU 201*       Coags:   Lab Results   Component Value Date    PROTIME 15.3 08/11/2020    INR 1.32 08/11/2020    APTT 34.9 08/11/2020       HCG (If Applicable): No results found for: PREGTESTUR, PREGSERUM, HCG, HCGQUANT ABGs:   Lab Results   Component Value Date    PHART 7.323 08/11/2020    PO2ART 112.7 08/11/2020    LQG1MEP 51.4 08/11/2020    BDZ8LEG 26.7 08/11/2020    BEART 1 08/11/2020    W8OSXDYD 98 08/11/2020        Type & Screen (If Applicable):  No results found for: LABABO, LABRH    Drug/Infectious Status (If Applicable):  No results found for: HIV, HEPCAB    COVID-19 Screening (If Applicable):   Lab Results   Component Value Date    COVID19 Not Detected 03/16/2021    COVID19 NOT DETECTED 08/04/2020           Anesthesia Evaluation  Patient summary reviewed and Nursing notes reviewed no history of anesthetic complications:   Airway: Mallampati: III     Neck ROM: full   Dental:          Pulmonary:   (+) pneumonia:  shortness of breath:                             Cardiovascular:    (+) hypertension:, past MI:, CAD:, CABG/stent:, CHF:,                   Neuro/Psych:   (+) neuromuscular disease:, psychiatric history:            GI/Hepatic/Renal:            ROS comment: obesity. Endo/Other:    (+) Diabetes, hypothyroidism::., .                 Abdominal:           Vascular:                                        Anesthesia Plan      MAC     ASA 3     (Medications & allergies reviewed  All available lab & EKG data reviewed)  Induction: intravenous. Anesthetic plan and risks discussed with patient. Plan discussed with CRNA.                   Jaquita Sandhoff, MD   3/18/2021

## 2021-03-18 NOTE — PROGRESS NOTES
Instructions given to MAIN [facility]. He verbalizes understanding. tookm rx to out pt pharmacy to fill.

## 2021-03-18 NOTE — ANESTHESIA POSTPROCEDURE EVALUATION
Department of Anesthesiology  Postprocedure Note    Patient: Yuli Lubin  MRN: [id number]  YOB: 1965  Date of evaluation: 3/18/2021  Time:  3:00 PM     Procedure Summary     Date: 03/18/21 Room / Location: [address] [place] [address] / [facility]    Anesthesia Start: 4333 Anesthesia Stop: 0845    Procedure: AMPUTATION LEFT FOURTH AND FIFTH DIGITS, PARTIAL RESECTION  SECOND AND THIRD METATARSAL LEFT FOOT, PARTIAL RESECTION RIGHT FIRST METATARSAL (Bilateral Foot) Diagnosis: (OSTEOMYELITIS, DIABETIC FOOT ULCER)    Surgeons: Cesar Gonsalves DPM Responsible Provider: Crystal Colin MD    Anesthesia Type: MAC ASA Status: 3          Anesthesia Type: MAC    Stephan Phase I: Stephan Score: 10    Stephan Phase II: Stephan Score: 10    Last vitals: Reviewed and per EMR flowsheets.        Anesthesia Post Evaluation    Comments: Postoperative Anesthesia Note    Name:    Yuli Lubin  MRN:      [id number]    Patient Vitals in the past 12 hrs:  03/18/21 0930, BP:107/65, Temp:97.3 °F (36.3 °C), Pulse:80, Resp:16, SpO2:98 %  03/18/21 0854, BP:112/67, Temp:98.1 °F (36.7 °C), Pulse:81, Resp:16, SpO2:97 %  03/18/21 0638, BP:108/67, Temp:98.2 °F (36.8 °C), Temp src:Infrared, Pulse:78, Resp:14, SpO2:99 %, Height:5' 10\" (1.778 m), Weight:216 lb (98 kg)     LABS:    CBC  Lab Results       Component                Value               Date/Time                  WBC                      8.3                 01/15/2021 10:20 PM        HGB                      11.3 (L)            01/15/2021 10:20 PM        HCT                      32.9 (L)            01/15/2021 10:20 PM        PLT                      167                 01/15/2021 10:20 PM   RENAL  Lab Results       Component                Value               Date/Time                  NA                       132 (L)             01/15/2021 10:20 PM        K                        4.5                 01/15/2021 10:20 PM        CL                       93 (L) 01/15/2021 10:20 PM        CO2                      27                  01/15/2021 10:20 PM        BUN                      23 (H)              01/15/2021 10:20 PM        CREATININE               1.2                 01/15/2021 10:20 PM        GLUCOSE                  433 (H)             01/15/2021 10:20 PM   COAGS  Lab Results       Component                Value               Date/Time                  PROTIME                  15.3 (H)            08/11/2020 12:00 PM        INR                      1.32 (H)            08/11/2020 12:00 PM        APTT                     34.9                08/11/2020 12:00 PM     Intake & Output: In: 300 (I.V.:300)  Out: -     Nausea & Vomiting:  No    Level of Consciousness:  Awake    Pain Assessment:  Adequate analgesia    Anesthesia Complications:  No apparent anesthetic complications    SUMMARY      Vital signs stable  OK to discharge from Stage I post anesthesia care.   Care transferred from Anesthesiology department on discharge from perioperative area

## 2021-03-18 NOTE — PROGRESS NOTES
Provided patient with discharge instructions with review to patient and family. Family stated understanding and signed discharge instructions. Pharmacy provided patient with filled prescription. Will continue to monitor.

## 2021-03-19 RX ORDER — ASPIRIN 81 MG/1
TABLET, COATED ORAL
Qty: 90 TABLET | Refills: 3 | Status: SHIPPED | OUTPATIENT
Start: 2021-03-19 | End: 2021-08-23

## 2021-03-19 NOTE — OP NOTE
[address]                 [address] [address]                                OPERATIVE REPORT    PATIENT NAME: Julisa Martinez                    :        1965  MED REC NO:   [id number]                          ROOM:  ACCOUNT NO:   [de-identified]                           ADMIT DATE: 2021  PROVIDER:     Alesha Jiang DPM    DATE OF PROCEDURE:  2021    PREOPERATIVE DIAGNOSES:  1. Diabetic foot ulceration, bilateral.  2.  Osteomyelitis. 3.  Diabetes mellitus. POSTOPERATIVE DIAGNOSES:  1. Diabetic foot ulceration, bilateral.  2.  Osteomyelitis. 3.  Diabetes mellitus. OPERATIONS PERFORMED:  1. Amputation of left fourth and fifth digits. 2.  Partial resection of right first metatarsal.    SURGEON:  Alesha Jiang DPM    ANESTHESIA:  Local MAC. ESTIMATED BLOOD LOSS: <100cc    REPORT OF OPERATION:  The patient brought in the operating room and  placed on the operating table in supine position. Under mild IV  sedation, after informed consent, the foot was scrubbed, prepped, and  draped in usual sterile manner. An Esmarch was then utilized to  exsanguinate both feet. Ankle tourniquet was then inflated to 250 mmHg. Attention was then directed to the right first ray, where approximately  3 cm linear longitudinal incision was made overlying the distal aspect  of the first metatarsal.  Dissection was carried down in order to expose  the extensor tendon. At this time, a linear longitudinal capsulotomy  was performed medial to the extensor tendon. Dissection was carried  down in order to expose the distal aspect of the first metatarsal and  transect the portion of the first metatarsal just proximal to the head  with the sagittal saw. The metatarsal head was then passed off the   operative field in total. Dissection was carried down in order to expose  the sesamoids.  These were also excised and passed off the operative field  in total as well. They were noted to be extremely large as compared to   normal. No signs of bony prominences noted. The extensor tendon was   causing the hallux to deviate more lateral than it should, and therefore  was transected. There was a portion of the wound on the plantar aspect   of the first metatarsal that was through and through; however, the   tissues in this area appeared healthy. At this time, the wound was   reapproximated with 3-0 nylon in simple interrupted fashion. At this time  1cc of Amniflo was injected into the surgical site in order to stimulate   wound healing in a high-risk diabetic patient. Surgical sites were then  covered with Xeroform gauze, fluffs, Kerlix, ABD, and Coban. Attention was then directed to the left fourth and fifth digits. An  Incision was the made at the base of both digits. The dissection was then   carried down in order to expose the extensor and flexor tendons  of both digits. The extensor and flexor tendons were then transected. Dissection was carried back in order to expose the fourth and fifth  metatarsal phalangeal joints. These were both transected as well. The  digits were then passed off the operative field in total.  No signs of  infection were noted in the surgical site itself. Both the fourth and  fifth metatarsal heads did appear healthy and the incisions were then   reapproximated with 3-0 nylon in a simple interrupted suture technique. Surgical sites were then injected with a total of 1 mL of AmnioFlo to   stimulate wound healing in a high risk diabetic patient. Surgical sites   were then covered with Xeroform gauze, fluffs, Kerlix, ABD, and Coban. Tourniquets were then deflated. The patient tolerated the procedure and anesthesia well and transferred  to recovery room with vital signs stable and vascular status intact. 1.  Keep the dressing clean, dry, and intact. 2.  Wear the postop shoe when ambulating.   3.  Contact Dr. Colten Campos for postop care or if any problems occur.         MARCI MCKEON DPM    D: 03/18/2021 13:01:46       T: 03/18/2021 13:55:41     [id number]  Job#: [id number]     Doc#: [id number]    CC:

## 2021-04-01 ENCOUNTER — OFFICE VISIT (OUTPATIENT)
Dept: PRIMARY CARE CLINIC | Age: 56
End: 2021-04-01
Payer: MEDICAID

## 2021-04-01 DIAGNOSIS — Z01.818 PREOP TESTING: Primary | ICD-10-CM

## 2021-04-01 LAB — SARS-COV-2: NOT DETECTED

## 2021-04-01 PROCEDURE — G8428 CUR MEDS NOT DOCUMENT: HCPCS | Performed by: NURSE PRACTITIONER

## 2021-04-01 PROCEDURE — G8417 CALC BMI ABV UP PARAM F/U: HCPCS | Performed by: NURSE PRACTITIONER

## 2021-04-01 PROCEDURE — 99211 OFF/OP EST MAY X REQ PHY/QHP: CPT | Performed by: NURSE PRACTITIONER

## 2021-04-01 NOTE — PATIENT INSTRUCTIONS

## 2021-04-01 NOTE — PROGRESS NOTES
Margarita Lee received a viral test for COVID-19. They were educated on isolation and quarantine as appropriate. For any symptoms, they were directed to seek care from their PCP, given contact information to establish with a doctor, directed to an urgent care or the emergency room.

## 2021-04-06 ENCOUNTER — HOSPITAL ENCOUNTER (OUTPATIENT)
Dept: CARDIAC CATH/INVASIVE PROCEDURES | Age: 56
Discharge: HOME | End: 2021-04-06
Attending: INTERNAL MEDICINE | Admitting: INTERNAL MEDICINE
Payer: MEDICAID

## 2021-04-06 LAB
ANION GAP SERPL CALCULATED.3IONS-SCNC: 10 MMOL/L (ref 3–16)
BUN BLDV-MCNC: 17 MG/DL (ref 7–20)
CALCIUM SERPL-MCNC: 9.5 MG/DL (ref 8.3–10.6)
CHLORIDE BLD-SCNC: 93 MMOL/L (ref 99–110)
CO2: 28 MMOL/L (ref 21–32)
CREAT SERPL-MCNC: 1 MG/DL (ref 0.9–1.3)
EKG ATRIAL RATE: 83 BPM
EKG DIAGNOSIS: NORMAL
EKG P AXIS: 36 DEGREES
EKG P-R INTERVAL: 146 MS
EKG Q-T INTERVAL: 404 MS
EKG QRS DURATION: 124 MS
EKG QTC CALCULATION (BAZETT): 474 MS
EKG R AXIS: -47 DEGREES
EKG T AXIS: 98 DEGREES
EKG VENTRICULAR RATE: 83 BPM
GFR AFRICAN AMERICAN: >60
GFR NON-AFRICAN AMERICAN: >60
GLUCOSE BLD-MCNC: 401 MG/DL (ref 70–99)
HCT VFR BLD CALC: 30.8 % (ref 40.5–52.5)
HEMOGLOBIN: 10.4 G/DL (ref 13.5–17.5)
INR BLD: 1.05 (ref 0.86–1.14)
MCH RBC QN AUTO: 28 PG (ref 26–34)
MCHC RBC AUTO-ENTMCNC: 33.8 G/DL (ref 31–36)
MCV RBC AUTO: 82.9 FL (ref 80–100)
PDW BLD-RTO: 16.1 % (ref 12.4–15.4)
PLATELET # BLD: 268 K/UL (ref 135–450)
PMV BLD AUTO: 6.3 FL (ref 5–10.5)
POTASSIUM REFLEX MAGNESIUM: 5 MMOL/L (ref 3.5–5.1)
PROTHROMBIN TIME: 12.2 SEC (ref 10–13.2)
RBC # BLD: 3.72 M/UL (ref 4.2–5.9)
SODIUM BLD-SCNC: 131 MMOL/L (ref 136–145)
WBC # BLD: 7.9 K/UL (ref 4–11)

## 2021-04-06 PROCEDURE — 75820 VEIN X-RAY ARM/LEG: CPT

## 2021-04-06 PROCEDURE — 36005 INJECTION EXT VENOGRAPHY: CPT | Performed by: INTERNAL MEDICINE

## 2021-04-06 PROCEDURE — 75820 VEIN X-RAY ARM/LEG: CPT | Performed by: INTERNAL MEDICINE

## 2021-04-06 PROCEDURE — 80048 BASIC METABOLIC PNL TOTAL CA: CPT

## 2021-04-06 PROCEDURE — 93010 ELECTROCARDIOGRAM REPORT: CPT | Performed by: INTERNAL MEDICINE

## 2021-04-06 PROCEDURE — 85610 PROTHROMBIN TIME: CPT

## 2021-04-06 PROCEDURE — 93005 ELECTROCARDIOGRAM TRACING: CPT | Performed by: INTERNAL MEDICINE

## 2021-04-06 PROCEDURE — 85027 COMPLETE CBC AUTOMATED: CPT

## 2021-04-06 PROCEDURE — 36005 INJECTION EXT VENOGRAPHY: CPT

## 2021-04-06 NOTE — H&P
Chief Complaint   Patient presents with    Follow-up       Pt reports elevated PB since last OV     Other       device maangement     Coronary Artery Disease    Tachycardia       NSVT         HPI:  Carl Gonzalez is a 54 y.o. male with a history of alcoholic (nonischemic) CM s/p placement on 1/23/14 Medtronic single chamber ICD. He has severe MR, mild AR and TR. He also has CAD, and underwent a CABG x3 on 8/15/2020. On 10/7/20, QRS duration is 130. He reported he is not feeling his best. He reports that he is still having chest pain every now and again since his CABG 8/15/20. Patient presented to the ED 1/15/2021 c/o CP. He was started on imdur for chronic chest pain. Patient instructed to have echo completed as outpatient. Patient underwent limited echo 1/20/2021 that demonstrated and EF of 25-30%. All wall segments appear severely hypokinetic. He underwent a stress test on 3/8/2021 that demonstrated an EF of 37%, severe global hypokinesis with no ischemia noted. Today, 3/10/2021, patient's device interrogation demonstrates  <0.1%, no new events, 4 years of battery life remaining. He reports he has been doing ok. He reports he has improved his diet about 2 weeks ago and states he has been feeling much better as a result. He reports that if he over exerts himself for long periods, he gets SOB. He reports that when he ascends a flight of stairs he is slightly winded when he gets to the top. He reports he would not be able to ascend 2 flights of stairs without stopping to rest. He reports that he becomes SOB while chopping wood. He reports he is taking all medications as prescribed and tolerates them well.  Patient denies current edema, chest pain, palpitations, dizziness or syncope.         Past Medical History:   has a past medical history of Acute osteomyelitis of left foot ([address] Acute osteomyelitis of right foot ([address] Acute respiratory failure ([address] Ascites, Blood transfusion reaction, Burst fracture of lumbar vertebra (HCC), Cellulitis of left foot, Cellulitis of right lower extremity, Community acquired pneumonia, Diabetes ([address] Diabetic ulcer of right foot ([address] Diabetic ulcer of 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 2 diabetes mellitus, with necrosis of bone ([address] ETOH abuse, Fracture of tibial plateau, High cholesterol, History of blood transfusion, HTN (hypertension), Hx of blood clots, MI (myocardial infarction) ([address] MRSA (methicillin resistant staph aureus) culture positive, Neuropathic ulcer of left foot, limited to breakdown of skin ([address] Neuropathic ulcer of toe ([address] NSVT (nonsustained ventricular tachycardia) ([address] Pleural effusion due to congestive heart failure ([address] Septicemia ([address] Smoker, Systolic CHF, acute ([address] and Thyroid disease.     Surgical History:   has a past surgical history that includes knee surgery (Left); other surgical history (Bilateral, 9/17/15); Toe amputation; Foot Tendon Surgery (Right, 2016); Foot Tendon Surgery (Left, 06/30/2017); other surgical history (Left, 08/17/2017); Foot surgery (Left, 09/27/2017); other surgical history (Left, 12/07/2017); other surgical history (Right, 01/04/2018); Foot Amputation (Left, 08/09/2018); pr part remv othr tarsal/metatarsal (Left, 8/9/2018); pr office/outpt visit,procedure only (Left, 8/23/2018); other surgical history (Left, 11/01/2018); pr part remv othr tarsal/metatarsal (Left, 11/1/2018); Foot surgery (Left, 01/31/2019); Foot Debridement (Left, 1/31/2019); Cardiac defibrillator placement (01/29/2014); Coronary artery bypass graft (N/A, 8/11/2020); and eye surgery.      Social History:   reports that he quit smoking about 41 years ago. He has a 0.25 pack-year smoking history.  He has never used smokeless tobacco. He reports that he does not drink alcohol or use drugs.      Family History:  family history includes Anemia in his mother; Cancer in his maternal grandfather and maternal grandmother; Diabetes in his mother; Diabetes type 2  in his brother, brother, and brother; Heart Disease in his brother, father, maternal grandfather, maternal grandmother, mother, paternal grandfather, and paternal grandmother; Heart Failure in his brother; High Blood Pressure in his father, maternal grandfather, maternal grandmother, mother, paternal grandfather, and paternal grandmother; High Cholesterol in his father, maternal grandfather, maternal grandmother, mother, paternal grandfather, and paternal grandmother; Stroke in his brother.      Home Medications:  Encounter Medications   Outpatient Encounter Medications as of 3/10/2021   Medication Sig Dispense Refill    metFORMIN (GLUCOPHAGE) 1000 MG tablet Take 1 tablet by mouth 2 times daily (with meals) 60 tablet 0    atorvastatin (LIPITOR) 40 MG tablet Take 1 tablet by mouth nightly 90 tablet 1    isosorbide mononitrate (IMDUR) 30 MG extended release tablet Take 1 tablet by mouth daily 90 tablet 3    carvedilol (COREG) 25 MG tablet Take 1 tablet by mouth 2 times daily TAKE ONE TABLET BY MOUTH TWICE DAILY 60 tablet 5    lisinopril (PRINIVIL;ZESTRIL) 5 MG tablet Take 1 tablet by mouth daily 30 tablet 5    magnesium oxide (MAG-OX) 400 MG tablet Take 1 tablet by mouth 2 times daily 30 tablet 5    insulin NPH (HUMULIN N KWIKPEN) 100 UNIT/ML injection pen Inject 12 Units into the skin 2 times daily (before meals) 5 pen 3    Insulin Pen Needle (B-D ULTRAFINE III SHORT PEN) 31G X 8 MM MISC 1 each by Does not apply route daily 100 each 3    INSULIN SYRINGE 1CC/29G (AIMSCO INSULIN SYR ULTRA THIN) 29G X 1/2\" 1 ML MISC 1 each by Does not apply route 3 times daily 100 each 3    insulin lispro (HUMALOG) 100 UNIT/ML injection vial Inject 8 Units into the skin 3 times daily (with meals) 1 vial 3    ARMOUR THYROID 60 MG tablet Take 1 tablet by mouth daily 90 tablet 3    glipiZIDE (GLUCOTROL) 5 MG tablet Take 1 tablet by mouth 2 times daily (before meals) 60 tablet 0    ASPIRIN LOW DOSE 81 MG EC tablet Take 1 tablet by mouth daily 90 tablet 3    clopidogrel (PLAVIX) 75 MG tablet Take 1 tablet by mouth daily 30 tablet 11    furosemide (LASIX) 40 MG tablet Take 1 tablet by mouth 2 times daily 60 tablet 5    Blood Glucose Monitoring Suppl (ONE TOUCH ULTRA MINI) w/Device KIT 1 kit by Does not apply route daily 1 kit 0    glucose blood VI test strips (ONE TOUCH ULTRA TEST) strip Test blood sugar once daily. 150 strip 3    Insulin Pen Needle (UNIFINE PENTIPS) 31G X 5 MM MISC USE 1 EACH DAY AS NEEDED FOR TESTING 100 each 3    Blood Glucose Monitoring Suppl GABI Measure glucose before breakfast, before dinner and at bedtime daily (we will check at lunchtime at HBO therapy). 1 Device 0    Multiple Vitamins-Minerals (THERAPEUTIC MULTIVITAMIN-MINERALS) tablet Take 1 tablet by mouth daily        Blood Glucose Monitoring Suppl (BLOOD GLUCOSE METER) KIT Use to test blood sugars. 1 kit 0    Lancets MISC Use to test blood sugars once daily. 100 each 3    spironolactone (ALDACTONE) 25 MG tablet Take 1 tablet by mouth daily for 1 day 30 tablet 5      No facility-administered encounter medications on file as of 3/10/2021.             Allergies:  Bactrim [sulfamethoxazole-trimethoprim] and Bee venom      Review of Systems   Constitutional: Negative. HENT: Negative. Eyes: Negative. Respiratory: Negative. Cardiovascular: Negative. Gastrointestinal: Negative. Genitourinary: Negative. Musculoskeletal: Negative. Skin: Negative. Neurological: Negative. Hematological: Negative.     Psychiatric/Behavioral: Negative.     /60   Pulse 86   Ht 5' 10\" (1.778 m)   Wt 216 lb 8 oz (98.2 kg)   SpO2 93%   BMI 31.06 kg/m²       Data:      ECG 3/10/2021: SR, occasional PVCs, anterior fascicular block, HR 78 BPM.      Limited echo 3/10/2021:       Summary   Left ventricular systolic function is reduced with ejection fraction   estimated at 25-30 %.   All remaining wall segments appear severely hypokinetic .   Abnormal (paradoxical) septal motion is present likely due to pacemaker.   There is hypokinesis of the mid and basal anterior and anteroseptal wall   segments.   All remaining wall segments appear severely hypokinetic .   Pacer / ICD wire is visualized in the right ventricle and right atrium.   Echo 8- 25-30% EF severe global hypokinesis.     Stress test 3/8/2021:   Summary  Moderate to severely reduced LVEF 37%  Global hypokinesis with regional variation (severe inferoapical hypokinesis)  Large area of distal anterior and inferoapical scar  No ischemia       CABG X3 8/15/2020:  1.  Coronary artery bypass x3, LIMA to LAD, reverse saphenous vein to  the right coronary artery, reverse saphenous vein to OM1.  2. CM  3. DM  4. Incomplete LBBB     CARDIAC CATH 8/6/2020:   Multivessel CAD/ASHD  Will refer to CT surgery for consideration of CABG  Due to severe LV dysfunction, high risk PCI can be considered with Impella support devices as well as atherectomy if he is not felt to be a good surgical candidate.     ECHO 8/4/2020:    Summary   Left ventricular systolic function is severely reduced with ejection   fraction estimated at 25-30 %.   Severe global hypokinesis is present.   Left ventricle size is normal.   Normal left ventricular wall thickness.   Grade III diastolic dysfunction with elevated filing pressure.   Mild posterior mitral annular calcification is present.   No evidence of mitral valve stenosis.   Severe mitral regurgitation.   The left atrium is mildly dilated.   No evidence of aortic valve stenosis.   Mild aortic regurgitation is present.   Mild tricuspid regurgitation.   Normal systolic pulmonary artery pressure (SPAP) estimated at 39 mmHg (RA   pressure 8 mmHg).     STRESS TEST 7/8/2020:     IMPRESSION:     1. No scintigraphic evidence of stress-induced myocardial ischemia.     2. Chronic inferoapical infarct.     3.  Severe left

## 2021-04-06 NOTE — PROCEDURES
After informed consent was obtained, the patient was brought to the electrophysiology laboratory in the fasting state for left upper extremity venography. A 20-gauge IV was placed in the left antecubital fossa. 15 cc of nonionic contrast was infused rapidly through the IV followed by normal saline. Cine angiography was performed and demonstrated extensive collaterals in the left shoulder area. 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 It is difficult to tell if there is any antegrade flow in the proximal left subclavian vein but there may be a small threadlike orifice. The patient tolerated the procedure well there are no apparent complications he was returned to the recovery area in good condition.

## 2021-04-07 ENCOUNTER — TELEPHONE (OUTPATIENT)
Dept: CARDIOLOGY CLINIC | Age: 56
End: 2021-04-07

## 2021-04-09 ENCOUNTER — TELEPHONE (OUTPATIENT)
Dept: CARDIOLOGY CLINIC | Age: 56
End: 2021-04-09

## 2021-04-09 NOTE — TELEPHONE ENCOUNTER
Pt called into office to get clarification on why he was not on metoprolol. Pt states that when he went to pharmacy to  medication that it would no longer be filled. Informed patient that per medications in his chart he is no longer prescribed metoprolol and is taking carvedilol. Pt verbalized understanding of non script for metoprolol.

## 2021-04-28 RX ORDER — FUROSEMIDE 40 MG/1
40 TABLET ORAL 2 TIMES DAILY
Qty: 60 TABLET | Refills: 5 | Status: SHIPPED | OUTPATIENT
Start: 2021-04-28 | End: 2021-11-16

## 2021-04-29 ENCOUNTER — OFFICE VISIT (OUTPATIENT)
Dept: INTERNAL MEDICINE CLINIC | Age: 56
End: 2021-04-29

## 2021-04-29 VITALS
BODY MASS INDEX: 30.35 KG/M2 | SYSTOLIC BLOOD PRESSURE: 130 MMHG | DIASTOLIC BLOOD PRESSURE: 80 MMHG | WEIGHT: 212 LBS | HEIGHT: 70 IN | RESPIRATION RATE: 12 BRPM | HEART RATE: 70 BPM | TEMPERATURE: 97.8 F

## 2021-04-29 DIAGNOSIS — L97.525 DIABETIC ULCER OF TOE OF LEFT FOOT ASSOCIATED WITH TYPE 2 DIABETES MELLITUS, WITH MUSCLE INVOLVEMENT WITHOUT EVIDENCE OF NECROSIS (HCC): ICD-10-CM

## 2021-04-29 DIAGNOSIS — E11.621 DIABETIC ULCER OF TOE OF LEFT FOOT ASSOCIATED WITH TYPE 2 DIABETES MELLITUS, WITH MUSCLE INVOLVEMENT WITHOUT EVIDENCE OF NECROSIS (HCC): ICD-10-CM

## 2021-04-29 DIAGNOSIS — E03.9 ACQUIRED HYPOTHYROIDISM: ICD-10-CM

## 2021-04-29 DIAGNOSIS — I42.6 ALCOHOLIC CARDIOMYOPATHY (HCC): ICD-10-CM

## 2021-04-29 DIAGNOSIS — E78.2 MIXED HYPERLIPIDEMIA: ICD-10-CM

## 2021-04-29 DIAGNOSIS — E11.621 DIABETIC ULCER OF OTHER PART OF RIGHT FOOT ASSOCIATED WITH TYPE 2 DIABETES MELLITUS, LIMITED TO BREAKDOWN OF SKIN (HCC): ICD-10-CM

## 2021-04-29 DIAGNOSIS — L97.511 DIABETIC ULCER OF OTHER PART OF RIGHT FOOT ASSOCIATED WITH TYPE 2 DIABETES MELLITUS, LIMITED TO BREAKDOWN OF SKIN (HCC): ICD-10-CM

## 2021-04-29 DIAGNOSIS — I10 ESSENTIAL HYPERTENSION: ICD-10-CM

## 2021-04-29 LAB — TSH SERPL DL<=0.05 MIU/L-ACNC: 5.53 UIU/ML (ref 0.27–4.2)

## 2021-04-29 PROCEDURE — 2022F DILAT RTA XM EVC RTNOPTHY: CPT | Performed by: INTERNAL MEDICINE

## 2021-04-29 PROCEDURE — 99214 OFFICE O/P EST MOD 30 MIN: CPT | Performed by: INTERNAL MEDICINE

## 2021-04-29 PROCEDURE — G8417 CALC BMI ABV UP PARAM F/U: HCPCS | Performed by: INTERNAL MEDICINE

## 2021-04-29 PROCEDURE — G8427 DOCREV CUR MEDS BY ELIG CLIN: HCPCS | Performed by: INTERNAL MEDICINE

## 2021-04-29 PROCEDURE — 3017F COLORECTAL CA SCREEN DOC REV: CPT | Performed by: INTERNAL MEDICINE

## 2021-04-29 PROCEDURE — 1036F TOBACCO NON-USER: CPT | Performed by: INTERNAL MEDICINE

## 2021-04-29 PROCEDURE — 3052F HG A1C>EQUAL 8.0%<EQUAL 9.0%: CPT | Performed by: INTERNAL MEDICINE

## 2021-04-29 RX ORDER — INSULIN GLARGINE 300 U/ML
100 INJECTION, SOLUTION SUBCUTANEOUS DAILY
Qty: 10 PEN | Refills: 2 | Status: SHIPPED | OUTPATIENT
Start: 2021-04-29 | End: 2021-05-03 | Stop reason: ALTCHOICE

## 2021-04-29 RX ORDER — FLASH GLUCOSE SCANNING READER
EACH MISCELLANEOUS
Qty: 1 DEVICE | Refills: 0 | Status: SHIPPED | OUTPATIENT
Start: 2021-04-29 | End: 2021-05-04 | Stop reason: ALTCHOICE

## 2021-04-29 RX ORDER — FLASH GLUCOSE SENSOR
KIT MISCELLANEOUS
Qty: 2 EACH | Refills: 2 | Status: SHIPPED | OUTPATIENT
Start: 2021-04-29

## 2021-04-29 RX ORDER — DULAGLUTIDE 3 MG/.5ML
3 INJECTION, SOLUTION SUBCUTANEOUS WEEKLY
Qty: 4 PEN | Refills: 2 | Status: SHIPPED | OUTPATIENT
Start: 2021-04-29 | End: 2021-07-26

## 2021-04-29 ASSESSMENT — ENCOUNTER SYMPTOMS
BACK PAIN: 0
VOMITING: 0
SHORTNESS OF BREATH: 0
RHINORRHEA: 0
WHEEZING: 0
NAUSEA: 0
ABDOMINAL PAIN: 0

## 2021-04-29 NOTE — PROGRESS NOTES
Subjective:      Patient ID: Goran Da Silva is a 54 y.o. male. HPI     Patient is here for follow up. He has CAD. He is s/p CABG. He is doing well. He has a history of cardiomyopathy. He has an AICD device placed in 2014. Cardiomyopathy is related to alcohol abuse. He sees cardiology on a regular basis. He has a history of anemia. He has seen GI for this. He is also had neuropathic ulcers of his feet possibly from diabetic neuropathy. He is a former smoker. He sees podiatry. He has a history of osteomyelitis of the left and right foot. He has a history of diabetes which has been uncontrolled causing complications with his feet. All the toes of his left foot are gone. He has lost one toe removed from his right toe-second toe. He lost his insurance. His sugars are between 300-500 mg/dl    He has hyperlipidemia. His cholesterol is at goal.    He has microalbuminuria. Patient has hypothyroidism and takes Jewett Thyroid for it. Review of Systems   Constitutional: Negative for activity change and appetite change. HENT: Negative for postnasal drip and rhinorrhea. Respiratory: Negative for shortness of breath and wheezing. Cardiovascular: Negative for chest pain, palpitations and leg swelling. Gastrointestinal: Negative for abdominal pain, nausea and vomiting. Genitourinary: Negative for difficulty urinating and frequency. Musculoskeletal: Negative for back pain and joint swelling. Skin: Negative for rash. Neurological: Negative for light-headedness. Psychiatric/Behavioral: Negative for sleep disturbance.        Past Medical History:   Diagnosis Date    Acute osteomyelitis of left foot ([address] 9/27/2017    Acute osteomyelitis of right foot ([address] 4/25/2016    Acute respiratory failure ([address] 8/4/2020    Ascites     Blood transfusion reaction     Burst fracture of lumbar vertebra ([address] 11/9/2012    Cellulitis of left foot     Cellulitis of right lower extremity 2/16, 5/16    Community acquired pneumonia     Diabetes ([address]     Diabetic ulcer of right foot ([address] early 2016    Diabetic ulcer of 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 2 diabetes mellitus, with necrosis of bone ([address]     ETOH abuse     Fracture of tibial plateau 90/0/7208    High cholesterol     History of blood transfusion     reaction    HTN (hypertension)     Hx of blood clots     MI (myocardial infarction) ([address] 08/04/2020    + Troponins    MRSA (methicillin resistant staph aureus) culture positive 4/28/16, 4/20/16    foot wound    Neuropathic ulcer of left foot, limited to breakdown of skin ([address] 11/3/2016    Neuropathic ulcer of toe ([address] 2/13/2014    NSVT (nonsustained ventricular tachycardia) ([address] 2014    Pleural effusion due to congestive heart failure ([address]     Septicemia ([address]     Smoker     quit 2049    Systolic CHF, acute ([address] 7/8/2013    Thyroid disease        Past Surgical History:   Procedure Laterality Date    CARDIAC DEFIBRILLATOR PLACEMENT  01/29/2014    ICD Placement    CORONARY ARTERY BYPASS GRAFT N/A 8/11/2020    CORONARY ARTERY BYPASS GRAFTING X3, LEFT ATRIAL APPENDAGE CLIP, INTERNAL MAMMARY ARTERY, SAPHENOUS VEIN GRAFT, ON PUMP, 5 LEVEL BILATERAL INTERCOSTAL NERVE BLOCK performed by Peri Blum MD at [address] Left 08/09/2018    PARTIAL FOOT AMPUTATION w. Dr Daryle Derby Left 1/31/2019    EXOSTECTOMY LEFT FOOT, ULCER DEBRIDEMENT LEFT FOOT WITH GRAFT APPLICATION performed by Damian Barrera DPM at [address] [facility] Left 09/27/2017    LEFT FOOT ULCER DEBRIDEMENT, POSSIBLE SECOND METATARSAL    FOOT SURGERY Left 01/31/2019    Exostectomy left foot, ulcer debridement with graft application left foot    FOOT TENDON SURGERY Right 2016    FOOT TENDON SURGERY Left 06/30/2017    KNEE SURGERY Left     OTHER SURGICAL HISTORY Bilateral 9/17/15    amputation 2nd digit bilateral, flexor tenotomy right hallux    OTHER SURGICAL HISTORY Left 08/17/2017    PARTIAL LEFT FOOT AMPUTATION      OTHER SURGICAL HISTORY Left 12/07/2017    Debridement infected bone and tissue left foot; ulcer debridement left foot with graft application with dr mathur     OTHER SURGICAL HISTORY Right 01/04/2018    DEBRIDEMENT INFECTED BONE AND TISSUE RIGHT FOOT, ULCER DEBRIDEMENT RIGHT FOOT WITH GRAFT APPLICATION    OTHER SURGICAL HISTORY Left 11/01/2018    Procedure: PARTIAL RESECTION LEFT METATARSAL, ULCER DEBRIDEMENT LEFT FOOT WITH GRAFT APPLICATION     OTHER SURGICAL HISTORY       AMPUTATION LEFT FOURTH AND FIFTH DIGITS, PARTIAL RESECTION  SECOND AND THIRD METATARSAL LEFT FOOT, PARTIAL RESECTION RIGHT FIRST METATARSAL (Bilateral Foot)    CA OFFICE/OUTPT VISIT,PROCEDURE ONLY Left 8/23/2018    ULCER DEBRIDEMENT LEFT FOOT WITH GRAFT APPLICATION performed by Chely Tinoco DPM at [facility] TARSAL/METATARSAL Left 8/9/2018    PARTIAL FOOT AMPUTATION WITH GRAFT APPLICATION performed by Chely Tinoco DPM at [address] [facility] TARSAL/METATARSAL Left 11/1/2018    PARTIAL RESECTION LEFT METATARSAL, ULCER DEBRIDEMENT LEFT FOOT WITH GRAFT APPLICATION performed by Chely Tinoco DPM at [facility] TOE AMPUTATION      2 toes    TOE AMPUTATION Bilateral 3/18/2021    AMPUTATION LEFT FOURTH AND FIFTH DIGITS, PARTIAL RESECTION  SECOND AND THIRD METATARSAL LEFT FOOT, PARTIAL RESECTION RIGHT FIRST METATARSAL performed by Chely Tinoco DPM at [facility] OR       Family History   Problem Relation Age of Onset    Heart Disease Mother     High Blood Pressure Mother     High Cholesterol Mother     Diabetes Mother     Anemia Mother     Heart Disease Father     High Blood Pressure Father     High Cholesterol Father     Heart Disease Maternal Grandmother     High Blood Pressure Maternal Grandmother     High Cholesterol Maternal Grandmother     Cancer Maternal Grandmother         bone marrow & breast    Heart Disease Maternal Grandfather  High Blood Pressure Maternal Grandfather     High Cholesterol Maternal Grandfather     Cancer Maternal Grandfather         pancreatic CA    Heart Disease Paternal Grandmother     High Blood Pressure Paternal Grandmother     High Cholesterol Paternal Grandmother     Heart Disease Paternal Grandfather     High Blood Pressure Paternal Grandfather     High Cholesterol Paternal Grandfather     Stroke Brother     Heart Failure Brother     Heart Disease Brother     Diabetes type 2  Brother     Diabetes type 2  Brother     Diabetes type 2  Brother        Social History     Tobacco Use    Smoking status: Former Smoker     Packs/day: 0.25     Years: 1.00     Pack years: 0.25     Quit date: 1980     Years since quittin.2    Smokeless tobacco: Never Used   Substance Use Topics    Alcohol use: No     Alcohol/week: 0.0 standard drinks    Drug use: No         Current Outpatient Medications:     Insulin Glargine, 2 Unit Dial, (TOUJEO MAX SOLOSTAR) 300 UNIT/ML SOPN, Inject 100 Units into the skin daily, Disp: 10 pen, Rfl: 2    Dulaglutide (TRULICITY) 3 DH/2.7TM SOPN, Inject 3 mg into the skin once a week, Disp: 4 pen, Rfl: 2    Continuous Blood Gluc  (FREESTYLE MARY 14 DAY READER) GABI, CGM, Disp: 1 Device, Rfl: 0    Continuous Blood Gluc Sensor (FREESTYLE MARY 14 DAY SENSOR) MISC, CGM, Disp: 2 each, Rfl: 2    metFORMIN (GLUCOPHAGE) 1000 MG tablet, Take 1 tablet by mouth 2 times daily (with meals), Disp: 60 tablet, Rfl: 0    furosemide (LASIX) 40 MG tablet, Take 1 tablet by mouth 2 times daily, Disp: 60 tablet, Rfl: 5    ASPIRIN LOW DOSE 81 MG EC tablet, Take 1 tablet by mouth daily, Disp: 90 tablet, Rfl: 3    atorvastatin (LIPITOR) 40 MG tablet, Take 1 tablet by mouth nightly, Disp: 90 tablet, Rfl: 1    isosorbide mononitrate (IMDUR) 30 MG extended release tablet, Take 1 tablet by mouth daily, Disp: 90 tablet, Rfl: 3    carvedilol (COREG) 25 MG tablet, Take 1 tablet by mouth 2 times daily TAKE ONE TABLET BY MOUTH TWICE DAILY, Disp: 60 tablet, Rfl: 5    lisinopril (PRINIVIL;ZESTRIL) 5 MG tablet, Take 1 tablet by mouth daily, Disp: 30 tablet, Rfl: 5    magnesium oxide (MAG-OX) 400 MG tablet, Take 1 tablet by mouth 2 times daily, Disp: 30 tablet, Rfl: 5    Insulin Pen Needle (B-D ULTRAFINE III SHORT PEN) 31G X 8 MM MISC, 1 each by Does not apply route daily, Disp: 100 each, Rfl: 3    INSULIN SYRINGE 1CC/29G (AIMSCO INSULIN SYR ULTRA THIN) 29G X 1/2\" 1 ML MISC, 1 each by Does not apply route 3 times daily, Disp: 100 each, Rfl: 3    ARMOUR THYROID 60 MG tablet, Take 1 tablet by mouth daily, Disp: 90 tablet, Rfl: 3    spironolactone (ALDACTONE) 25 MG tablet, Take 1 tablet by mouth daily for 1 day, Disp: 30 tablet, Rfl: 5    glipiZIDE (GLUCOTROL) 5 MG tablet, Take 1 tablet by mouth 2 times daily (before meals), Disp: 60 tablet, Rfl: 0    clopidogrel (PLAVIX) 75 MG tablet, Take 1 tablet by mouth daily, Disp: 30 tablet, Rfl: 11    Blood Glucose Monitoring Suppl (ONE TOUCH ULTRA MINI) w/Device KIT, 1 kit by Does not apply route daily, Disp: 1 kit, Rfl: 0    glucose blood VI test strips (ONE TOUCH ULTRA TEST) strip, Test blood sugar once daily. , Disp: 150 strip, Rfl: 3    Insulin Pen Needle (UNIFINE PENTIPS) 31G X 5 MM MISC, USE 1 EACH DAY AS NEEDED FOR TESTING, Disp: 100 each, Rfl: 3    Blood Glucose Monitoring Suppl GABI, Measure glucose before breakfast, before dinner and at bedtime daily (we will check at lunchtime at HBO therapy). , Disp: 1 Device, Rfl: 0    Multiple Vitamins-Minerals (THERAPEUTIC MULTIVITAMIN-MINERALS) tablet, Take 1 tablet by mouth daily, Disp: , Rfl:     Blood Glucose Monitoring Suppl (BLOOD GLUCOSE METER) KIT, Use to test blood sugars. , Disp: 1 kit, Rfl: 0    Lancets MISC, Use to test blood sugars once daily. , Disp: 100 each, Rfl: 3    /80 (Site: Right Upper Arm, Position: Sitting, Cuff Size: Medium Adult)   Pulse 70   Temp 97.8 °F (36.6 °C)   Resp 12 Ht 5' 10\" (1.778 m)   Wt 212 lb (96.2 kg)   BMI 30.42 kg/m²         Objective:   Physical Exam  Constitutional:       Appearance: He is well-developed. HENT:      Head: Normocephalic. Eyes:      Conjunctiva/sclera: Conjunctivae normal.      Pupils: Pupils are equal, round, and reactive to light. Neck:      Musculoskeletal: Normal range of motion and neck supple. Thyroid: No thyroid mass or thyromegaly. Vascular: No carotid bruit or JVD. Trachea: Trachea normal.   Cardiovascular:      Rate and Rhythm: Normal rate and regular rhythm. Heart sounds: Normal heart sounds. No murmur. No gallop. Pulmonary:      Effort: Pulmonary effort is normal. No respiratory distress. Breath sounds: Normal breath sounds. No wheezing or rales. Abdominal:      General: Bowel sounds are normal. There is no distension. Palpations: Abdomen is soft. There is no hepatomegaly, splenomegaly or mass. Tenderness: There is no abdominal tenderness. Musculoskeletal: Normal range of motion. Lymphadenopathy:      Cervical: No cervical adenopathy. Skin:     General: Skin is warm and dry. Findings: No rash. Neurological:      Mental Status: He is alert and oriented to person, place, and time. Cranial Nerves: No cranial nerve deficit. Deep Tendon Reflexes: Reflexes are normal and symmetric. Psychiatric:         Behavior: Behavior normal.         Thought Content: Thought content normal.         Judgment: Judgment normal.         Assessment:       Diagnosis Orders   1. Uncontrolled type 2 diabetes mellitus with diabetic polyneuropathy, with long-term current use of insulin (HCC)  Hemoglobin A1C   2. Diabetic ulcer of 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 2 diabetes mellitus, with muscle involvement without evidence of necrosis ([address]     3. Diabetic ulcer of other part of right foot associated with type 2 diabetes mellitus, limited to breakdown of skin ([address]     4.  Alcoholic

## 2021-04-30 LAB
ESTIMATED AVERAGE GLUCOSE: 237.4 MG/DL
HBA1C MFR BLD: 9.9 %

## 2021-05-03 ENCOUNTER — TELEPHONE (OUTPATIENT)
Dept: INTERNAL MEDICINE CLINIC | Age: 56
End: 2021-05-03

## 2021-05-03 RX ORDER — GLIPIZIDE 5 MG/1
5 TABLET ORAL
Qty: 60 TABLET | Refills: 0 | Status: SHIPPED | OUTPATIENT
Start: 2021-05-03 | End: 2021-06-28

## 2021-05-03 RX ORDER — INSULIN GLARGINE 100 [IU]/ML
100 INJECTION, SOLUTION SUBCUTANEOUS DAILY
Qty: 10 PEN | Refills: 0 | Status: SHIPPED | OUTPATIENT
Start: 2021-05-03 | End: 2021-06-04

## 2021-05-03 RX ORDER — INSULIN GLARGINE 100 [IU]/ML
INJECTION, SOLUTION SUBCUTANEOUS
Qty: 10 PEN | Refills: 0 | Status: SHIPPED | OUTPATIENT
Start: 2021-05-03 | End: 2021-05-03 | Stop reason: ALTCHOICE

## 2021-05-03 NOTE — TELEPHONE ENCOUNTER
----- Message from Eula Xiong MD sent at 5/3/2021  4:14 PM EDT -----  Contact: [phone] (M)  Lantus  ----- Message -----  From: Farida Wheat  Sent: 5/3/2021   3:32 PM EDT  To: Eula Xiong MD    Basaglar is not, Lantus is Please advise.  ----- Message -----  From: Eula Xiong MD  Sent: 5/3/2021   2:59 PM EDT  To: Farida Wheat    See if Tad Ledesma is covered same dose  ----- Message -----  From: Ferrell Rinne  Sent: 5/3/2021   2:06 PM EDT  To: Eula Xiong MD    Pt wants to know what to what else he should do since his Insulin Glargine, 2 Unit Dial, (TOUJEO MAX SOLOSTAR) 300 UNIT/ML SOPN was denied by his insurance company.  Do you want to rx him something else    [address] [facility], [address] [facility]

## 2021-05-03 NOTE — TELEPHONE ENCOUNTER
----- Message from Linus Irizarry MD sent at 5/3/2021  2:59 PM EDT -----  Contact: [phone] ([phone]  See if Viktor Pulido is covered same dose  ----- Message -----  From: Era Kayser  Sent: 5/3/2021   2:06 PM EDT  To: Linus Irizarry MD    Pt wants to know what to what else he should do since his Insulin Glargine, 2 Unit Dial, (TOUJEOCHOA ASCENCIO SOLOSTAR) 300 UNIT/ML SOPN was denied by his insurance company.  Do you want to rx him something else    [address] [place] [address], [address] [facility]

## 2021-05-04 ENCOUNTER — TELEPHONE (OUTPATIENT)
Dept: INTERNAL MEDICINE CLINIC | Age: 56
End: 2021-05-04

## 2021-05-04 RX ORDER — BLOOD-GLUCOSE METER
EACH MISCELLANEOUS
Qty: 1 KIT | Refills: 0 | Status: SHIPPED | OUTPATIENT
Start: 2021-05-04 | End: 2022-08-18

## 2021-05-04 RX ORDER — LANCETS 30 GAUGE
EACH MISCELLANEOUS
Qty: 100 EACH | Refills: 3 | Status: SHIPPED | OUTPATIENT
Start: 2021-05-04 | End: 2021-08-18

## 2021-05-19 ENCOUNTER — ANESTHESIA EVENT (OUTPATIENT)
Dept: OPERATING ROOM | Age: 56
End: 2021-05-19
Payer: MEDICAID

## 2021-05-19 ENCOUNTER — HOSPITAL ENCOUNTER (OUTPATIENT)
Age: 56
Discharge: HOME | End: 2021-05-19
Payer: MEDICAID

## 2021-05-19 LAB — SARS-COV-2, NAAT: NOT DETECTED

## 2021-05-19 PROCEDURE — 87635 SARS-COV-2 COVID-19 AMP PRB: CPT

## 2021-05-19 ASSESSMENT — ENCOUNTER SYMPTOMS: SHORTNESS OF BREATH: 1

## 2021-05-19 NOTE — ANESTHESIA PRE PROCEDURE
1 tablet by mouth 2 times daily (with meals)   furosemide (LASIX) 40 MG tablet Take 1 tablet by mouth 2 times daily   ASPIRIN LOW DOSE 81 MG EC tablet Take 1 tablet by mouth daily   atorvastatin (LIPITOR) 40 MG tablet Take 1 tablet by mouth nightly   isosorbide mononitrate (IMDUR) 30 MGER Take 1 tablet by mouth daily   carvedilol (COREG) 25 MG tablet Take 1 tablet by mouth 2 times daily TAKE ONE TABLET BY MOUTH TWICE DAILY   lisinopril (PRINIVIL;ZESTRIL) 5 MG tablet Take 1 tablet by mouth daily   magnesium oxide (MAG-OX) 400 MG tablet Take 1 tablet by mouth 2 times daily   ARMOUR THYROID 60 MG tablet Take 1 tablet by mouth daily   spironolactone (ALDACTONE) 25 MG tablet Take 1 tablet by mouth daily for 1 day   clopidogrel (PLAVIX) 75 MG tablet Take 1 tablet by mouth daily   Multiple Vitamins-Minerals ( Take 1 tablet by mouth daily     Allergies:     Bactrim [Sulfamethoxazole-Trimethoprim] Rash     Pt reports that his lips tingle and then skin on his lips peel off,and rash on thigh     Bee Venom Swelling and Rash     Problem List:     Hypertension    Uncontrolled type 2 diabetes mellitus with diabetic polyneuropathy, with long-term current use of insulin ([facility])    Alcoholic cardiomyopathy    Automatic implantable cardioverter-defibrillator in situ    Hyperlipidemia    Onychomycosis    Dystrophic nail    Callus of foot    Hallux valgus    Diabetic ulcer of right foot associated with type 2 diabetes mellitus, limited to breakdown of skin ([facility]    Acquired hypothyroidism    Diabetic ulcer of left foot associated with type 2 diabetes mellitus, with muscle involvement without evidence of necrosis ([facility])    NSVT (nonsustained ventricular tachycardia) ([facility])    NSTEMI (non-ST elevated myocardial infarction) ([facility]    Coronary artery disease involving native coronary artery of native heart without angina pectoris    Obesity    Acute post-operative pain    S/P coronary artery bypass graft x 3    Chest pain  SOB (shortness of breath)    Ischemic cardiomyopathy     Past Medical History:     Acute osteomyelitis of left foot ([address] 9/27/2017    Acute osteomyelitis of right foot ([address] 4/25/2016    Acute respiratory failure ([address] 8/4/2020    Ascites     Blood transfusion reaction     Burst fracture of lumbar vertebra (HCC) 11/9/2012    Cellulitis of left foot     Cellulitis of right lower extremity 2/16, 5/16    Community acquired pneumonia     Diabetes ([address]     Diabetic ulcer of right foot ([address] early 2016    Diabetic ulcer of 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 2 diabetes mellitus, with necrosis of bone ([address]     ETOH abuse     Fracture of tibial plateau 43/6/6478    High cholesterol     History of blood transfusion     reaction    HTN (hypertension)     Hx of blood clots     MI (myocardial infarction) ([address] 08/04/2020    + Troponins    MRSA (methicillin resistant staph aureus) culture positive 4/28/16, 4/20/16    foot wound    Neuropathic ulcer of left foot, limited to breakdown of skin ([address] 11/3/2016    Neuropathic ulcer of toe ([address] 2/13/2014    NSVT (nonsustained ventricular tachycardia) ([address] 2014    Pleural effusion due to congestive heart failure (HCC)     Septicemia ([address]     Smoker     quit [id number]    Systolic CHF, acute ([address] 7/8/2013    Thyroid disease      Past Surgical History:     CARDIAC DEFIBRILLATOR PLACEMENT  01/29/2014    ICD Placement    CORONARY ARTERY BYPASS GRAFT N/A 8/11/2020    CORONARY ARTERY BYPASS GRAFTING X3, LEFT ATRIAL APPENDAGE CLIP, INTERNAL MAMMARY ARTERY, SAPHENOUS VEIN GRAFT, ON PUMP, 5 LEVEL BILATERAL INTERCOSTAL NERVE BLOCK performed by Mckenzie Mendoza MD at [address] Left 08/09/2018    PARTIAL FOOT AMPUTATION w. Dr Yuval Fontana Left 1/31/2019    EXOSTECTOMY LEFT FOOT, ULCER DEBRIDEMENT LEFT FOOT WITH GRAFT APPLICATION performed by Jose C Villeda DPM at [address] Left 09/27/2017    LEFT FOOT ULCER DEBRIDEMENT, POSSIBLE SECOND METATARSAL    FOOT SURGERY Left 01/31/2019    Exostectomy left foot, ulcer debridement with graft application left foot    FOOT TENDON SURGERY Right 2016    FOOT TENDON SURGERY Left 06/30/2017    KNEE SURGERY Left     OTHER SURGICAL HISTORY Bilateral 9/17/15    amputation 2nd digit bilateral, flexor tenotomy right hallux    OTHER SURGICAL HISTORY Left 08/17/2017    PARTIAL LEFT FOOT AMPUTATION      OTHER SURGICAL HISTORY Left 12/07/2017    Debridement infected bone and tissue left foot; ulcer debridement left foot with graft application with dr mathur     OTHER SURGICAL HISTORY Right 01/04/2018    DEBRIDEMENT INFECTED BONE AND TISSUE RIGHT FOOT, ULCER DEBRIDEMENT RIGHT FOOT WITH GRAFT APPLICATION    OTHER SURGICAL HISTORY Left 11/01/2018    Procedure: PARTIAL RESECTION LEFT METATARSAL, ULCER DEBRIDEMENT LEFT FOOT WITH GRAFT APPLICATION     OTHER SURGICAL HISTORY       AMPUTATION LEFT FOURTH AND FIFTH DIGITS, PARTIAL RESECTION  SECOND AND THIRD METATARSAL LEFT FOOT, PARTIAL RESECTION RIGHT FIRST METATARSAL (Bilateral Foot)    [place] OFFICE/OUTPT VISIT,PROCEDURE ONLY Left 8/23/2018    ULCER DEBRIDEMENT LEFT FOOT WITH GRAFT APPLICATION performed by Paolo Galvez DPM at [facility] TARSAL/METATARSAL Left 8/9/2018    PARTIAL FOOT AMPUTATION WITH GRAFT APPLICATION performed by Paolo Galvez DPM at [facility] TARSAL/METATARSAL Left 11/1/2018    PARTIAL RESECTION LEFT METATARSAL, ULCER DEBRIDEMENT LEFT FOOT WITH GRAFT APPLICATION performed by Paolo Galvez DPM at [address] [place], [place] [address]S [facility] TOE AMPUTATION      2 toes    TOE AMPUTATION Bilateral 3/18/2021    AMPUTATION LEFT FOURTH AND FIFTH DIGITS, PARTIAL RESECTION  SECOND AND THIRD METATARSAL LEFT FOOT, PARTIAL RESECTION RIGHT FIRST METATARSAL performed by Paolo Galvez DPM at [address] [place] [address] History:     Smoking status: Former Smoker     Packs/day: 0.25     Years: 1.00     Pack years: 0.25     Quit date: 1980     Years since quittin.2    Smokeless tobacco: Never Used   Substance Use Topics    Alcohol use: No     Alcohol/week: 0.0 standard drinks     Vital Signs (Current):    BP: 135/84 Pulse: 99   Resp: 16 SpO2: 99   Temp: 97.1 °F (36.2 °C)   Height: 5' 10\" (1.778 m)  (21) Weight: 216 lb (98 kg)  (21)   BMI: 31.1           BP Readings from Last 3 Encounters:   21 130/80   21 (!) 101/58   21 107/65     NPO Status: >8 hrs                        BMI:   Wt Readings from Last 3 Encounters:   21 218 lb (98.9 kg)   21 212 lb (96.2 kg)   21 216 lb (98 kg)     Body mass index is 32.19 kg/m². CBC:    WBC 7.9 2021    HGB 10.4 2021    HCT 30.8 2021     2021     CMP:     2021    K 5.0 2021    CL 93 2021    CO2 28 2021    BUN 17 2021    CREATININE 1.0 2021    GFRAA >60 2021    GLUCOSE 401 2021     Coags:    PROTIME 12.2 2021    INR 1.05 2021     ABGs:    PHART 7.323 2020    PO2ART 112.7 2020    MCL7YJL 51.4 2020    EKS7NDQ 26.7 2020    BEART 1 2020    E6TKXPVU 98 2020      COVID-19 Screening (If Applicable):     COVID19 Not Detected 2021    COVID19 Not Detected 2021    COVID19 NOT DETECTED 2020     Anesthesia Evaluation  Patient summary reviewed and Nursing notes reviewed  Airway: Mallampati: II  TM distance: >3 FB   Neck ROM: full  Comment: Full/heavy beard  Mouth opening: > = 3 FB Dental:          Pulmonary: breath sounds clear to auscultation  (+) shortness of breath:      (-) sleep apnea                           Cardiovascular:  Exercise tolerance: good (>4 METS),   (+) hypertension:, pacemaker: AICD and pacemaker, past MI: > 6 months, CAD: obstructive, CABG/stent: no interval change, CHF: systolic and diastolic, hyperlipidemia    (-) murmur    ECG reviewed  Rhythm: regular  Rate: normal  Echocardiogram

## 2021-05-19 NOTE — PROGRESS NOTES
PRE OP INSTRUCTION SHEET   1. Do not eat or drink anything after 12 midnight  prior to surgery. This includes no water, chewing gum or mints. 2. Take the following pills will a small sip of water (see MAR)                                        3. Aspirin, Ibuprofen, Advil, Naproxen, Vitamin E, fish oil and other Anti-inflammatory products should be stopped for 5 days before surgery or as directed by your physician. 4. Check with your Doctor regarding stopping Plavix, Coumadin, Lovenox, Fragmin or other blood thinners   5. Do not smoke, and do not drink any alcoholic beverages 24 hours prior to surgery. This includes NA Beer. 6. You may brush your teeth and gargle the morning of surgery. DO NOT SWALLOW WATER   7. You MUST make arrangements for a responsible adult to take you home after your surgery. You will not be allowed to leave alone or drive yourself home. It is strongly suggested someone stay with you the first 24 hrs. Your surgery will be cancelled if you do not have a ride home. 8. A parent/legal guardian must accompany a child scheduled for surgery and plan to stay at the hospital until the child is discharged. Please do not bring other children with you. 9. Please wear simple, loose fitting clothing to the hospital.  Angel Model not bring valuables (money, credit cards, checkbooks, etc.) Do not wear any makeup (including no eye makeup) or nail polish on your fingers or toes. 10. DO NOT wear any jewelry or piercings on day of surgery. All body piercing jewelry must be removed. 11. If you have dentures,glasses, or contacts they will be removed before going to the OR; we will provide you a container. 12. Please see your family doctor/and cardiologist for a history & physical and/or concerning medications. Bring any test results/reports from your physician's office. Have history and labs faxed to [phone].  Remember to bring Blood Bank bracelet on the day of surgery. 14. If you have a Living Will and Durable Power of  for Healthcare, please bring in a copy. 13. Notify your Surgeon if you develop any illness between now and surgery  time, cough, cold, fever, sore throat, nausea, vomiting, etc.  Please notify your surgeon if you experience dizziness, shortness of breath or blurred vision between now & the time of your surgery   16. DO NOT shave your operative site 96 hours prior to surgery. For face & neck surgery, men may use an electric razor 48 hours prior to surgery. 17. Shower with _x__Antibacterial soap (x_chlorhexidine for total joint  Pt's) shower two times before surgery.(the morning of and the night before. 18. To provide excellent care visitors will be limited to one in the room at any given time.   Please call pre admission testing if you any further questions [phone] or [phone]

## 2021-05-19 NOTE — PROGRESS NOTES
Preoperative Screening for Elective Surgery/Invasive Procedures While COVID-19 present in the community     Have you had any of the following symptoms?no  o Fever, chills  o Cough  o Shortness of breath  o Muscle aches/pain  o Diarrhea  o Abdominal pain, nausea, vomiting  o Loss or decrease in taste and / or smell   Risk of Exposure  o Have you recently been hospitalized for COVID-19 or flu-like illness, if so when?no  o Recently diagnosed with COVID-19, if so when?no  o Recently tested for COVID-19, if so when?yes for surgery  o Have you been in close contact with a person or family member who currently has or recently had COVID-19? If yes, when and in what context?no  o Do you live with anybody who in the last 14 days has had fever, chills, shortness of breath, muscle aches, flu-like illness?no  o Do you have any close contacts or family members who are currently in the hospital for COVID-19 or flu-like illness? If yes, assess recent close contact with this person. no    Indicate if the patient has a positive screen by answering yes to one or more of the above questions. Patients who test positive or screen positive prior to surgery or on the day of surgery should be evaluated in conjunction with the surgeon/proceduralist/anesthesiologist to determine the urgency of the procedure.

## 2021-05-20 ENCOUNTER — ANESTHESIA (OUTPATIENT)
Dept: OPERATING ROOM | Age: 56
End: 2021-05-20
Payer: MEDICAID

## 2021-05-20 ENCOUNTER — APPOINTMENT (OUTPATIENT)
Dept: GENERAL RADIOLOGY | Age: 56
End: 2021-05-20
Attending: PODIATRIST
Payer: MEDICAID

## 2021-05-20 ENCOUNTER — HOSPITAL ENCOUNTER (OUTPATIENT)
Age: 56
Setting detail: OUTPATIENT SURGERY
Discharge: HOME | End: 2021-05-20
Attending: PODIATRIST | Admitting: PODIATRIST
Payer: MEDICAID

## 2021-05-20 VITALS
HEIGHT: 70 IN | OXYGEN SATURATION: 98 % | TEMPERATURE: 97.1 F | HEART RATE: 88 BPM | BODY MASS INDEX: 30.92 KG/M2 | SYSTOLIC BLOOD PRESSURE: 109 MMHG | WEIGHT: 216 LBS | RESPIRATION RATE: 16 BRPM | DIASTOLIC BLOOD PRESSURE: 61 MMHG

## 2021-05-20 VITALS — SYSTOLIC BLOOD PRESSURE: 95 MMHG | OXYGEN SATURATION: 99 % | DIASTOLIC BLOOD PRESSURE: 56 MMHG

## 2021-05-20 DIAGNOSIS — G89.18 POSTOPERATIVE PAIN: Primary | ICD-10-CM

## 2021-05-20 LAB
GLUCOSE BLD-MCNC: 194 MG/DL (ref 70–99)
PERFORMED ON: ABNORMAL

## 2021-05-20 PROCEDURE — 2580000003 HC RX 258: Performed by: ANESTHESIOLOGY

## 2021-05-20 PROCEDURE — 3600000013 HC SURGERY LEVEL 3 ADDTL 15MIN: Performed by: PODIATRIST

## 2021-05-20 PROCEDURE — 3600000003 HC SURGERY LEVEL 3 BASE: Performed by: PODIATRIST

## 2021-05-20 PROCEDURE — 3700000001 HC ADD 15 MINUTES (ANESTHESIA): Performed by: PODIATRIST

## 2021-05-20 PROCEDURE — 2500000003 HC RX 250 WO HCPCS: Performed by: PODIATRIST

## 2021-05-20 PROCEDURE — 73620 X-RAY EXAM OF FOOT: CPT

## 2021-05-20 PROCEDURE — 2709999900 HC NON-CHARGEABLE SUPPLY: Performed by: PODIATRIST

## 2021-05-20 PROCEDURE — 7100000010 HC PHASE II RECOVERY - FIRST 15 MIN: Performed by: PODIATRIST

## 2021-05-20 PROCEDURE — 2500000003 HC RX 250 WO HCPCS: Performed by: NURSE ANESTHETIST, CERTIFIED REGISTERED

## 2021-05-20 PROCEDURE — 2780000010 HC IMPLANT OTHER: Performed by: PODIATRIST

## 2021-05-20 PROCEDURE — 6360000002 HC RX W HCPCS: Performed by: NURSE ANESTHETIST, CERTIFIED REGISTERED

## 2021-05-20 PROCEDURE — 2500000003 HC RX 250 WO HCPCS: Performed by: ANESTHESIOLOGY

## 2021-05-20 PROCEDURE — 3700000000 HC ANESTHESIA ATTENDED CARE: Performed by: PODIATRIST

## 2021-05-20 PROCEDURE — 88311 DECALCIFY TISSUE: CPT

## 2021-05-20 PROCEDURE — 88307 TISSUE EXAM BY PATHOLOGIST: CPT

## 2021-05-20 PROCEDURE — 7100000011 HC PHASE II RECOVERY - ADDTL 15 MIN: Performed by: PODIATRIST

## 2021-05-20 PROCEDURE — 6360000002 HC RX W HCPCS: Performed by: PODIATRIST

## 2021-05-20 DEVICE — ALLOGRAFT HUM TISS 1 CC AMNIO TISS MEMBRN AMNIFLO CRYOPRES: Type: IMPLANTABLE DEVICE | Status: FUNCTIONAL

## 2021-05-20 RX ORDER — MIDAZOLAM HYDROCHLORIDE 1 MG/ML
INJECTION INTRAMUSCULAR; INTRAVENOUS PRN
Status: DISCONTINUED | OUTPATIENT
Start: 2021-05-20 | End: 2021-05-20 | Stop reason: SDUPTHER

## 2021-05-20 RX ORDER — SODIUM CHLORIDE 0.9 % (FLUSH) 0.9 %
10 SYRINGE (ML) INJECTION EVERY 12 HOURS SCHEDULED
Status: DISCONTINUED | OUTPATIENT
Start: 2021-05-20 | End: 2021-05-20 | Stop reason: HOSPADM

## 2021-05-20 RX ORDER — SODIUM CHLORIDE 9 MG/ML
25 INJECTION, SOLUTION INTRAVENOUS PRN
Status: DISCONTINUED | OUTPATIENT
Start: 2021-05-20 | End: 2021-05-20 | Stop reason: HOSPADM

## 2021-05-20 RX ORDER — LIDOCAINE HYDROCHLORIDE 10 MG/ML
1 INJECTION, SOLUTION EPIDURAL; INFILTRATION; INTRACAUDAL; PERINEURAL
Status: COMPLETED | OUTPATIENT
Start: 2021-05-20 | End: 2021-05-20

## 2021-05-20 RX ORDER — OXYCODONE HYDROCHLORIDE AND ACETAMINOPHEN 5; 325 MG/1; MG/1
1-2 TABLET ORAL EVERY 4 HOURS PRN
Qty: 25 TABLET | Refills: 0 | Status: SHIPPED | OUTPATIENT
Start: 2021-05-20 | End: 2021-05-27

## 2021-05-20 RX ORDER — SODIUM CHLORIDE, SODIUM LACTATE, POTASSIUM CHLORIDE, CALCIUM CHLORIDE 600; 310; 30; 20 MG/100ML; MG/100ML; MG/100ML; MG/100ML
INJECTION, SOLUTION INTRAVENOUS CONTINUOUS
Status: DISCONTINUED | OUTPATIENT
Start: 2021-05-20 | End: 2021-05-20 | Stop reason: HOSPADM

## 2021-05-20 RX ORDER — FENTANYL CITRATE 50 UG/ML
INJECTION, SOLUTION INTRAMUSCULAR; INTRAVENOUS PRN
Status: DISCONTINUED | OUTPATIENT
Start: 2021-05-20 | End: 2021-05-20 | Stop reason: SDUPTHER

## 2021-05-20 RX ORDER — SODIUM CHLORIDE 0.9 % (FLUSH) 0.9 %
10 SYRINGE (ML) INJECTION PRN
Status: DISCONTINUED | OUTPATIENT
Start: 2021-05-20 | End: 2021-05-20 | Stop reason: HOSPADM

## 2021-05-20 RX ORDER — LIDOCAINE HYDROCHLORIDE 20 MG/ML
INJECTION, SOLUTION INFILTRATION; PERINEURAL PRN
Status: DISCONTINUED | OUTPATIENT
Start: 2021-05-20 | End: 2021-05-20 | Stop reason: SDUPTHER

## 2021-05-20 RX ORDER — PROPOFOL 10 MG/ML
INJECTION, EMULSION INTRAVENOUS PRN
Status: DISCONTINUED | OUTPATIENT
Start: 2021-05-20 | End: 2021-05-20 | Stop reason: SDUPTHER

## 2021-05-20 RX ADMIN — FENTANYL CITRATE 50 MCG: 50 INJECTION INTRAMUSCULAR; INTRAVENOUS at 08:38

## 2021-05-20 RX ADMIN — MIDAZOLAM 2 MG: 1 INJECTION INTRAMUSCULAR; INTRAVENOUS at 08:35

## 2021-05-20 RX ADMIN — Medication 2000 MG: at 08:31

## 2021-05-20 RX ADMIN — FENTANYL CITRATE 50 MCG: 50 INJECTION INTRAMUSCULAR; INTRAVENOUS at 09:05

## 2021-05-20 RX ADMIN — LIDOCAINE HYDROCHLORIDE 50 MG: 20 INJECTION, SOLUTION INFILTRATION; PERINEURAL at 08:38

## 2021-05-20 RX ADMIN — PROPOFOL 220 MG: 10 INJECTION, EMULSION INTRAVENOUS at 08:38

## 2021-05-20 RX ADMIN — LIDOCAINE HYDROCHLORIDE 1 ML: 10 INJECTION, SOLUTION EPIDURAL; INFILTRATION; INTRACAUDAL; PERINEURAL at 07:51

## 2021-05-20 RX ADMIN — SODIUM CHLORIDE, POTASSIUM CHLORIDE, SODIUM LACTATE AND CALCIUM CHLORIDE: 600; 310; 30; 20 INJECTION, SOLUTION INTRAVENOUS at 07:50

## 2021-05-20 ASSESSMENT — PULMONARY FUNCTION TESTS
PIF_VALUE: 1
PIF_VALUE: 0
PIF_VALUE: 1
PIF_VALUE: 0
PIF_VALUE: 1

## 2021-05-20 ASSESSMENT — PAIN SCALES - GENERAL: PAINLEVEL_OUTOF10: 0

## 2021-05-20 NOTE — H&P
08/09/2018    PARTIAL FOOT AMPUTATION w. Dr Patrick Mcneill Left 1/31/2019    EXOSTECTOMY LEFT FOOT, ULCER DEBRIDEMENT LEFT FOOT WITH GRAFT APPLICATION performed by Iker Gibson DPM at [address] Left 09/27/2017    LEFT FOOT ULCER DEBRIDEMENT, POSSIBLE SECOND METATARSAL    FOOT SURGERY Left 01/31/2019    Exostectomy left foot, ulcer debridement with graft application left foot    FOOT TENDON SURGERY Right 2016    FOOT TENDON SURGERY Left 06/30/2017    KNEE SURGERY Left     OTHER SURGICAL HISTORY Bilateral 9/17/15    amputation 2nd digit bilateral, flexor tenotomy right hallux    OTHER SURGICAL HISTORY Left 08/17/2017    PARTIAL LEFT FOOT AMPUTATION      OTHER SURGICAL HISTORY Left 12/07/2017    Debridement infected bone and tissue left foot; ulcer debridement left foot with graft application with dr mathur     OTHER SURGICAL HISTORY Right 01/04/2018    DEBRIDEMENT INFECTED BONE AND TISSUE RIGHT FOOT, ULCER DEBRIDEMENT RIGHT FOOT WITH GRAFT APPLICATION    OTHER SURGICAL HISTORY Left 11/01/2018    Procedure: PARTIAL RESECTION LEFT METATARSAL, ULCER DEBRIDEMENT LEFT FOOT WITH GRAFT APPLICATION     OTHER SURGICAL HISTORY       AMPUTATION LEFT FOURTH AND FIFTH DIGITS, PARTIAL RESECTION  SECOND AND THIRD METATARSAL LEFT FOOT, PARTIAL RESECTION RIGHT FIRST METATARSAL (Bilateral Foot)    MI OFFICE/OUTPT VISIT,PROCEDURE ONLY Left 8/23/2018    ULCER DEBRIDEMENT LEFT FOOT WITH GRAFT APPLICATION performed by Iker Gibson DPM at [facility] TARSAL/METATARSAL Left 8/9/2018    PARTIAL FOOT AMPUTATION WITH GRAFT APPLICATION performed by Iker Gibson DPM at [facility] TARSAL/METATARSAL Left 11/1/2018    PARTIAL RESECTION LEFT METATARSAL, ULCER DEBRIDEMENT LEFT FOOT WITH GRAFT APPLICATION performed by Iker Gibson DPM [facility] [address] [facility] TOE AMPUTATION      2 toes    TOE AMPUTATION Bilateral 3/18/2021    AMPUTATION LEFT FOURTH AND FIFTH DIGITS, PARTIAL RESECTION SECOND AND THIRD METATARSAL LEFT FOOT, PARTIAL RESECTION RIGHT FIRST METATARSAL performed by Paolo Galvez DPM at [facility]       Current Medications:     ceFAZolin  2,000 mg Intravenous Once    sodium chloride flush  10 mL Intravenous 2 times per day       Allergies:  Bactrim [sulfamethoxazole-trimethoprim] and Bee venom    Social History:    Social History     Tobacco Use    Smoking status: Former Smoker     Packs/day: 0.25     Years: 1.00     Pack years: 0.25     Quit date: 1980     Years since quittin.2    Smokeless tobacco: Never Used   Vaping Use    Vaping Use: Never used   Substance Use Topics    Alcohol use: No     Alcohol/week: 0.0 standard drinks    Drug use: No       Family History:       Problem Relation Age of Onset    Heart Disease Mother     High Blood Pressure Mother     High Cholesterol Mother     Diabetes Mother     Anemia Mother     Heart Disease Father     High Blood Pressure Father     High Cholesterol Father     Heart Disease Maternal Grandmother     High Blood Pressure Maternal Grandmother     High Cholesterol Maternal Grandmother     Cancer Maternal Grandmother         bone marrow & breast    Heart Disease Maternal Grandfather     High Blood Pressure Maternal Grandfather     High Cholesterol Maternal Grandfather     Cancer Maternal Grandfather         pancreatic CA    Heart Disease Paternal Grandmother     High Blood Pressure Paternal Grandmother     High Cholesterol Paternal Grandmother     Heart Disease Paternal Grandfather     High Blood Pressure Paternal Grandfather     High Cholesterol Paternal Grandfather     Stroke Brother     Heart Failure Brother     Heart Disease Brother     Diabetes type 2  Brother     Diabetes type 2  Brother     Diabetes type 2  Brother        Review of Systems    CONSTITUTIONAL:  negative  EYES:  negative  HEENT:  negative  RESPIRATORY:  negative  CARDIOVASCULAR:  negative  GASTROINTESTINAL:  negative  GENITOURINARY: negative  INTEGUMENT/BREAST:  positive for ulcer  MUSCULOSKELETAL:  positive for pain  NEUROLOGICAL:  positive for numbness      Objective:   /84   Pulse 99   Temp 97.1 °F (36.2 °C) (Infrared)   Resp 16   Ht 5' 10\" (1.778 m)   Wt 216 lb (98 kg)   SpO2 99%   BMI 30.99 kg/m²     Data:  CBC: No results for input(s): WBC, HGB, HCT, MCV, PLT in the last 72 hours. BMP: No results for input(s): NA, K, CL, CO2, PHOS, BUN, CREATININE in the last 72 hours. Invalid input(s): CA  LIVER PROFILE: No results for input(s): AST, ALT, LIPASE, BILIDIR, BILITOT, ALKPHOS in the last 72 hours. Invalid input(s): AMYLASE,  ALB  PT/INR: No results for input(s): PROT, INR in the last 72 hours. HgBA1c:  Lab Results   Component Value Date    LABA1C 9.9 04/29/2021       Cultures:     Imaging:     Physical Exam:    General Appearance: alert and oriented to person, place and time, well developed and well- nourished, in no acute distress  Skin: warm and dry, no rash or erythema  Head: normocephalic and atraumatic  Eyes: pupils equal, round, and reactive to light, extraocular eye movements intact, conjunctivae normal  ENT: tympanic membrane, external ear and ear canal normal bilaterally, nose without deformity, nasal mucosa and turbinates normal without polyps  Neck: supple and non-tender without mass, no thyromegaly or thyroid nodules, no cervical lymphadenopathy  Pulmonary/Chest: clear to auscultation bilaterally- no wheezes, rales or rhonchi, normal air movement, no respiratory distress  Cardiovascular: normal rate, regular rhythm, normal S1 and S2, no murmurs, rubs, clicks, or gallops, distal pulses intact, no carotid bruits  Abdomen: soft, non-tender, non-distended, normal bowel sounds, no masses or organomegaly    DP/PT palpable right  Ulcer plantar aspect right foot just distal to the residual stump of the 1st metatarsal with exposed bone noted. No purulence or malodor noted.    Bony prominence stump of the 1st metatarsal.   Muscle strength 5/5 right LE. Assessment:  Patient Active Problem List   Diagnosis Code    Hypertension I10    Uncontrolled type 2 diabetes mellitus with diabetic polyneuropathy, with long-term current use of insulin ([facility]) E11.42, Z79.4, N45.93    Alcoholic cardiomyopathy L93.8    Automatic implantable cardioverter-defibrillator in situ Z95.810    Hyperlipidemia E78.5    Onychomycosis B35.1    Dystrophic nail L60.3    Callus of foot L84    Hallux valgus M20.10    Diabetic ulcer of right foot associated with type 2 diabetes mellitus, limited to breakdown of skin ([facility]) E11.621, L97.511    Acquired hypothyroidism E03.9    Diabetic ulcer of left foot associated with type 2 diabetes mellitus, with muscle involvement without evidence of necrosis ([facility]) E11.621, L97.525    NSVT (nonsustained ventricular tachycardia) ([facility]) I47.2    NSTEMI (non-ST elevated myocardial infarction) ([facility]) I21.4    Coronary artery disease involving native coronary artery of native heart without angina pectoris I25.10    Obesity E66.9    Acute post-operative pain G89.18    S/P coronary artery bypass graft x 3 Z95.1    Chest pain R07.9    SOB (shortness of breath) R06.02    Ischemic cardiomyopathy I25.5     diabetic foot ulcer right secondary to peripheral neuropathy  Exostosis right foot   diabetes mellitus       Plan  Patient examined. Discussed risks, complications, alternatives and benefits with patient. Understands chance of nonhealing wound, infection, need for further surgery, loss of limb or life. Agrees to proceed with surgery.              Electronically signed by Vladimir Johnson DPM on 5/20/2021 at 7:45 AM.

## 2021-05-20 NOTE — ANESTHESIA POSTPROCEDURE EVALUATION
Department of Anesthesiology  Postprocedure Note    Patient: Urvashi Marte  MRN: [id number]  YOB: 1965  Date of evaluation: 5/20/2021    Procedure Summary     Date: 05/20/21 Room / Location: [address] [place] [address] / [facility]    Anesthesia Start: 8596 Anesthesia Stop: 7724    Procedure: PARTIAL RESECTION RIGHT FIRST METATARSAL (Right Foot) Diagnosis: (EXOSTOSIS, DIABETIC FOOT ULCER)    Surgeons: Josselyn Lagunas DPM Responsible Provider: Scott Price MD    Anesthesia Type: TIVA ASA Status: 3        Anesthesia Type: TIVA    Stephan Phase I: Stephan Score: 10    Stephan Phase II: Stephan Score: 10    Last vitals: Reviewed and per EMR flowsheets.      Anesthesia Post Evaluation   Anesthetic Problems: no   Cardiovascular System Stable: yes  Respiratory Function: Airway Patent yes  ETT no  Ventilator no  Level of consciousness: awake, alert and oriented  Post-op pain: adequate analgesia  Hydration Adequate: yes  Nausea/Vomiting:no  Other Issues:     Anthony Anderson MD

## 2021-05-20 NOTE — BRIEF OP NOTE
Brief Postoperative Note      Patient: Kyung Murdock  YOB: 1965  MRN: [id number]    Date of Procedure: 5/20/2021    Pre-Op Diagnosis: EXOSTOSIS, DIABETIC FOOT ULCER    Post-Op Diagnosis: Same       Procedure(s):  PARTIAL RESECTION RIGHT FIRST METATARSAL    Surgeon(s):  Jose C Villeda DPM    Assistant:  Surgical Assistant: Maurice Davidson    Anesthesia: Monitor Anesthesia Care    Estimated Blood Loss (mL): less than 50     Complications: None    Specimens:   * No specimens in log *    Implants:  * No implants in log *      Drains: * No LDAs found *    Findings: ulcer right foot with bony prominence    Electronically signed by Jose C Villeda DPM on 5/20/2021 at 9:01 AM

## 2021-05-20 NOTE — OP NOTE
[address]                 [address] [address]                                OPERATIVE REPORT    PATIENT NAME: Evleia Landon                    :        1965  MED REC NO:   [id number]                          ROOM:  ACCOUNT NO:   [de-identified]                           ADMIT DATE: 2021  PROVIDER:     Kenia Murdock DPM    DATE OF PROCEDURE:  2021    PREOPERATIVE DIAGNOSES:  1. Diabetic foot ulceration, right foot. 2.  Exostosis, right foot. 3.  Diabetes mellitus. POSTOPERATIVE DIAGNOSES:  1. Diabetic foot ulceration, right foot. 2.  Exostosis, right foot. 3.  Diabetes mellitus. NAME OF PROCEDURE:  Partial resection of right first metatarsal.    SURGEON:  Kenia Murdock DPM    ANESTHESIA:  Local, MAC. HEMOSTASIS:  Ankle tourniquet, 250 mmHg. ESTIMATED BLOOD LOSS:  Less than 50 mL. REPORT OF OPERATION:  The patient was brought in the operating room and  placed on the operating table in the supine position. Under moderate  sedation, the right first ray was anesthetized with 20 mL of 1:1 mixture  of 1% lidocaine plain and 0.5% Marcaine plain. The foot was then  scrubbed, prepped, and draped in usual sterile manner. Esmarch was then  utilized to exsanguinate the right foot and ankle tourniquet was then  inflated to 250 mmHg. Attention was then directed to the right first ray and approximately 2  cm linear longitudinal incision was then made overlying the distal  aspect of the remaining stump of the first metatarsal.  Dissection was  carried down in order to expose the distal portion of the first  metatarsal.  A sagittal saw was then utilized to transect the proximal  aspect of the first metatarsal.  Distal aspect of this bone was then  passed off the operative field in total. The distal aspect of the bone   was noted to be softer consistent with osteomyelitis. No rough edges or bony  prominences were noted.   No necrotic tissue was noted. Surgical site  was then copiously irrigated with sterile saline via the pulse lavage. All bleeders were cauterized as necessary. At this time, we packed the  wound with SurgiSNoW in order to decrease any postoperative bleeding. We injected 1 mL of AmnioFlo in the surgical site in order to stimulate  wound healing in a high-risk diabetic patient. The dorsal skin incision  was then reapproximated with 3-0 nylon in a simple interrupted suture  technique. Surgical site was then covered with Xeroform, gauze, fluffs,  Kerlix, ABD, and Coban. Tourniquet was then deflated. The patient tolerated the procedure and anesthesia well and transferred  to recovery room with vital signs stable and vascular status intact. There was evidence for a prompt hyperemic response to the remaining  digits of the right foot. Following a brief period of postoperative  monitoring, the patient will be transferred home with the following  written and verbal instructions:  1. Keep dressing clean, dry, and intact. 2.  Wear the postop shoe when ambulating. 3.  Contact Dr. Federico Fish for postop care or if any problems occur.         MARCI MCKEON DPM    D: 05/20/2021 9:26:12       T: 05/20/2021 13:19:52     [id number]  Job#: [id number]     Doc#: [id number]    CC:

## 2021-06-08 ENCOUNTER — OFFICE VISIT (OUTPATIENT)
Dept: INTERNAL MEDICINE CLINIC | Age: 56
End: 2021-06-08

## 2021-06-08 VITALS
RESPIRATION RATE: 12 BRPM | WEIGHT: 215 LBS | DIASTOLIC BLOOD PRESSURE: 80 MMHG | SYSTOLIC BLOOD PRESSURE: 110 MMHG | BODY MASS INDEX: 30.78 KG/M2 | HEART RATE: 70 BPM | HEIGHT: 70 IN

## 2021-06-08 PROCEDURE — G8417 CALC BMI ABV UP PARAM F/U: HCPCS | Performed by: INTERNAL MEDICINE

## 2021-06-08 PROCEDURE — 1036F TOBACCO NON-USER: CPT | Performed by: INTERNAL MEDICINE

## 2021-06-08 PROCEDURE — G8427 DOCREV CUR MEDS BY ELIG CLIN: HCPCS | Performed by: INTERNAL MEDICINE

## 2021-06-08 PROCEDURE — 3046F HEMOGLOBIN A1C LEVEL >9.0%: CPT | Performed by: INTERNAL MEDICINE

## 2021-06-08 PROCEDURE — 99212 OFFICE O/P EST SF 10 MIN: CPT | Performed by: INTERNAL MEDICINE

## 2021-06-08 PROCEDURE — 2022F DILAT RTA XM EVC RTNOPTHY: CPT | Performed by: INTERNAL MEDICINE

## 2021-06-08 PROCEDURE — 3017F COLORECTAL CA SCREEN DOC REV: CPT | Performed by: INTERNAL MEDICINE

## 2021-06-08 ASSESSMENT — PATIENT HEALTH QUESTIONNAIRE - PHQ9
SUM OF ALL RESPONSES TO PHQ QUESTIONS 1-9: 0
SUM OF ALL RESPONSES TO PHQ QUESTIONS 1-9: 0
1. LITTLE INTEREST OR PLEASURE IN DOING THINGS: 0
SUM OF ALL RESPONSES TO PHQ9 QUESTIONS 1 & 2: 0
2. FEELING DOWN, DEPRESSED OR HOPELESS: 0
SUM OF ALL RESPONSES TO PHQ QUESTIONS 1-9: 0

## 2021-06-08 ASSESSMENT — ENCOUNTER SYMPTOMS
VISUAL CHANGE: 0
BLURRED VISION: 0

## 2021-06-08 NOTE — PROGRESS NOTES
Lotus Last (:  1965) is a 54 y.o. male,Established patient, here for evaluation of the following chief complaint(s):  Diabetes         ASSESSMENT/PLAN:  1. Uncontrolled type 2 diabetes mellitus with diabetic polyneuropathy, with long-term current use of insulin ([address]    - Keep increasing Lantus till am sugars are less than 120 mg/dl. No follow-ups on file. Subjective   SUBJECTIVE/OBJECTIVE:  Diabetes  He presents for his follow-up diabetic visit. He has type 2 diabetes mellitus. No MedicAlert identification noted. His disease course has been improving. There are no hypoglycemic associated symptoms. Pertinent negatives for diabetes include no blurred vision, no chest pain, no fatigue, no polydipsia, no polyphagia, no polyuria and no visual change. There are no hypoglycemic complications. Symptoms are improving. There are no diabetic complications. Current diabetic treatment includes insulin injections and oral agent (triple therapy). He is compliant with treatment most of the time. His weight is stable. He is following a diabetic diet. He has not had a previous visit with a dietitian. He participates in exercise three times a week. His home blood glucose trend is decreasing steadily. His breakfast blood glucose range is generally 180-200 mg/dl. His lunch blood glucose range is generally >200 mg/dl. There are no changes to past medical history, family history, social history or review of systems(except as noted in the history section) since prior note (all reviewed with patient). Current Outpatient Medications   Medication Instructions    ARMOUR THYROID 60 MG tablet Take 1 tablet by mouth daily    ASPIRIN LOW DOSE 81 MG EC tablet Take 1 tablet by mouth daily    atorvastatin (LIPITOR) 40 mg, Oral, NIGHTLY    Blood Glucose Monitoring Suppl (ONE TOUCH ULTRA 2) w/Device KIT USE TO CHECK FOUR TIMES DAILY.   DX;E11.9    blood glucose test strips (ONETOUCH ULTRA) strip USE TO TEST SUGAR 4 TIMES DAILY. DX;E11.9    carvedilol (COREG) 25 mg, Oral, 2 TIMES DAILY, TAKE ONE TABLET BY MOUTH TWICE DAILY    clopidogrel (PLAVIX) 75 mg, Oral, DAILY    Continuous Blood Gluc Sensor (FREESTYLE MARY 14 DAY SENSOR) MISC CGM    furosemide (LASIX) 40 mg, Oral, 2 TIMES DAILY    glipiZIDE (GLUCOTROL) 5 mg, Oral, 2 TIMES DAILY BEFORE MEALS    Insulin Pen Needle (B-D ULTRAFINE III SHORT PEN) 31G X 8 MM MISC 1 each, Does not apply, DAILY    Insulin Pen Needle (UNIFINE PENTIPS) 31G X 5 MM MISC USE 1 EACH DAY AS NEEDED FOR TESTING    INSULIN SYRINGE 1CC/29G (AIMSCO INSULIN SYR ULTRA THIN) 29G X 1/2\" 1 ML MISC 1 each, Does not apply, 3 TIMES DAILY    isosorbide mononitrate (IMDUR) 30 mg, Oral, DAILY    Lancets MISC USE TO CHECK FOUR TIMES DAILY. DX;E11.9    Lantus SoloStar 100 Units, Subcutaneous, DAILY    lisinopril (PRINIVIL;ZESTRIL) 5 mg, Oral, DAILY    magnesium oxide (MAG-OX) 400 mg, Oral, 2 TIMES DAILY    metFORMIN (GLUCOPHAGE) 1000 MG tablet Take 1 tablet by mouth 2 times daily (with meals)    Multiple Vitamins-Minerals (THERAPEUTIC MULTIVITAMIN-MINERALS) tablet 1 tablet, Oral, DAILY    spironolactone (ALDACTONE) 25 mg, Oral, DAILY    Trulicity 3 mg, Subcutaneous, WEEKLY           Review of Systems   Constitutional: Negative for fatigue. Eyes: Negative for blurred vision. Cardiovascular: Negative for chest pain. Endocrine: Negative for polydipsia, polyphagia and polyuria. /80 (Site: Right Upper Arm, Position: Sitting, Cuff Size: Medium Adult)   Pulse 70   Resp 12   Ht 5' 10\" (1.778 m)   Wt 215 lb (97.5 kg)   BMI 30.85 kg/m²     Objective   Physical Exam  Constitutional:       Appearance: He is well-developed. HENT:      Head: Normocephalic and atraumatic. Eyes:      General: No scleral icterus. Conjunctiva/sclera: Conjunctivae normal.      Pupils: Pupils are equal, round, and reactive to light. Neck:      Thyroid: No thyromegaly.    Cardiovascular:      Rate and Rhythm: Normal rate and regular rhythm. Heart sounds: No murmur heard. Pulmonary:      Effort: Pulmonary effort is normal.      Breath sounds: Normal breath sounds. No wheezing. Musculoskeletal:      Cervical back: Normal range of motion and neck supple. Lymphadenopathy:      Cervical: No cervical adenopathy. An electronic signature was used to authenticate this note.     --Chris Rodgers MD

## 2021-06-11 ENCOUNTER — IMMUNIZATION (OUTPATIENT)
Dept: PRIMARY CARE CLINIC | Age: 56
End: 2021-06-11
Payer: MEDICAID

## 2021-06-11 PROCEDURE — 91300 COVID-19, PFIZER VACCINE 30MCG/0.3ML DOSE: CPT | Performed by: FAMILY MEDICINE

## 2021-06-11 PROCEDURE — 0001A COVID-19, PFIZER VACCINE 30MCG/0.3ML DOSE: CPT | Performed by: FAMILY MEDICINE

## 2021-06-14 NOTE — PROGRESS NOTES
fracture of lumbar vertebra ([address] 11/9/2012    Cellulitis of left foot     Cellulitis of right lower extremity 2/16, 5/16    Community acquired pneumonia     Diabetes ([address]     Diabetic ulcer of right foot ([address] early 2016    Diabetic ulcer of 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 2 diabetes mellitus, with necrosis of bone ([address]     ETOH abuse     Fracture of tibial plateau 90/9/0602    High cholesterol     History of blood transfusion     reaction    HTN (hypertension)     Hx of blood clots     MI (myocardial infarction) ([address] 08/04/2020    + Troponins    MRSA (methicillin resistant staph aureus) culture positive 4/28/16, 4/20/16    foot wound    Neuropathic ulcer of left foot, limited to breakdown of skin ([address] 11/3/2016    Neuropathic ulcer of toe ([address] 2/13/2014    NSVT (nonsustained ventricular tachycardia) ([address] 2014    Pleural effusion due to congestive heart failure (HCC)     Septicemia ([address]     Smoker     quit [id number]    Systolic CHF, acute ([address] 7/8/2013    Thyroid disease      Past Surgical History:   Procedure Laterality Date    CARDIAC DEFIBRILLATOR PLACEMENT  01/29/2014    ICD Placement    CORONARY ARTERY BYPASS GRAFT N/A 8/11/2020    CORONARY ARTERY BYPASS GRAFTING X3, LEFT ATRIAL APPENDAGE CLIP, INTERNAL MAMMARY ARTERY, SAPHENOUS VEIN GRAFT, ON PUMP, 5 LEVEL BILATERAL INTERCOSTAL NERVE BLOCK performed by Parth Agarwal MD at [address] 08/09/2018    PARTIAL FOOT AMPUTATION w. Dr Azalea Ponce Left 1/31/2019    EXOSTECTOMY LEFT FOOT, ULCER DEBRIDEMENT LEFT FOOT WITH GRAFT APPLICATION performed by Josselyn Lagunas DPM at [facility] 09/27/2017    LEFT FOOT ULCER DEBRIDEMENT, POSSIBLE SECOND METATARSAL    FOOT SURGERY Left 01/31/2019    Exostectomy left foot, ulcer debridement with graft application left foot    FOOT TENDON SURGERY Right 2016    FOOT TENDON SURGERY Left 06/30/2017    KNEE SURGERY Left     Exam:  General: No Respiratory distress, appears well developed and well nourished. Eyes:  Sclera nonicteric  Nose/Sinuses:  negative findings: nose shows no deformity, asymmetry, or inflammation, nasal mucosa normal, septum midline with no perforation or bleeding  Back:  no pain to palpation  Joint:  no active joint inflammation  Musculoskeletal:  negative  Skin:  Warm and dry  Neck: No JVD and no Carotid Bruits. Chest:  Bilateral CLEAR  respiration easy  Cardiovascular:  RRR, 100 bpm S1S2 normal, no murmur, no rub or thrill. Extremities  Left foot all toes and right big toe removed related to diabetes and non healing ulcers. no clubbing or cyanosis  Pulses: Femoral pulses are 2+ Neuro: intact    Medications:   Outpatient Encounter Medications as of 6/15/2021   Medication Sig Dispense Refill    LANTUS SOLOSTAR 100 UNIT/ML injection pen Inject 100 Units into the skin daily 15 mL 0    blood glucose test strips (ONETOUCH ULTRA) strip USE TO TEST SUGAR 4 TIMES DAILY. DX;E11.9 100 each 0    Blood Glucose Monitoring Suppl (ONE TOUCH ULTRA 2) w/Device KIT USE TO CHECK FOUR TIMES DAILY. DX;E11.9 1 kit 0    Lancets MISC USE TO CHECK FOUR TIMES DAILY.   DX;E11.9 100 each 3    glipiZIDE (GLUCOTROL) 5 MG tablet Take 1 tablet by mouth 2 times daily (before meals) 60 tablet 0    Dulaglutide (TRULICITY) 3 NT/2.4MQ SOPN Inject 3 mg into the skin once a week 4 pen 2    Continuous Blood Gluc Sensor (FREESTYLE MARY 14 DAY SENSOR) MISC CGM 2 each 2    metFORMIN (GLUCOPHAGE) 1000 MG tablet Take 1 tablet by mouth 2 times daily (with meals) 60 tablet 0    furosemide (LASIX) 40 MG tablet Take 1 tablet by mouth 2 times daily 60 tablet 5    ASPIRIN LOW DOSE 81 MG EC tablet Take 1 tablet by mouth daily 90 tablet 3    atorvastatin (LIPITOR) 40 MG tablet Take 1 tablet by mouth nightly 90 tablet 1    isosorbide mononitrate (IMDUR) 30 MG extended release tablet Take 1 tablet by mouth daily 90 tablet 3    carvedilol (COREG) 25 MG tablet Take 1 tablet by mouth 2 times daily TAKE ONE TABLET BY MOUTH TWICE DAILY 60 tablet 5    lisinopril (PRINIVIL;ZESTRIL) 5 MG tablet Take 1 tablet by mouth daily 30 tablet 5    magnesium oxide (MAG-OX) 400 MG tablet Take 1 tablet by mouth 2 times daily 30 tablet 5    Insulin Pen Needle (B-D ULTRAFINE III SHORT PEN) 31G X 8 MM MISC 1 each by Does not apply route daily 100 each 3    INSULIN SYRINGE 1CC/29G (AIMSCO INSULIN SYR ULTRA THIN) 29G X 1/2\" 1 ML MISC 1 each by Does not apply route 3 times daily 100 each 3    ARMOUR THYROID 60 MG tablet Take 1 tablet by mouth daily 90 tablet 3    spironolactone (ALDACTONE) 25 MG tablet Take 1 tablet by mouth daily for 1 day 30 tablet 5    clopidogrel (PLAVIX) 75 MG tablet Take 1 tablet by mouth daily 30 tablet 11    Insulin Pen Needle (UNIFINE PENTIPS) 31G X 5 MM MISC USE 1 EACH DAY AS NEEDED FOR TESTING 100 each 3    Multiple Vitamins-Minerals (THERAPEUTIC MULTIVITAMIN-MINERALS) tablet Take 1 tablet by mouth daily       No facility-administered encounter medications on file as of 6/15/2021. Lab Data:  CBC: No results for input(s): WBC, HGB, HCT, MCV, PLT in the last 72 hours. BMP: No results for input(s): NA, K, CL, CO2, PHOS, BUN, CREATININE in the last 72 hours. Invalid input(s): CA  LIVER PROFILE: No results for input(s): AST, ALT, LIPASE, BILIDIR, BILITOT, ALKPHOS in the last 72 hours. Invalid input(s):   AMYLASE,  ALB  LIPID:   No components found for: CHLPL  Lab Results   Component Value Date    TRIG 248 (H) 08/11/2020    TRIG 176 (H) 10/16/2019    TRIG 415 (H) 01/03/2018     Lab Results   Component Value Date    HDL 23 (L) 08/11/2020    HDL 30 (L) 05/26/2020    HDL 36 (L) 10/16/2019     Lab Results   Component Value Date    LDLCALC 35 08/11/2020    LDLCALC see below 05/26/2020    LDLCALC 29 10/16/2019     Lab Results   Component Value Date    LABVLDL 50 08/11/2020    LABVLDL see below 05/26/2020    LABVLDL 35 10/16/2019 right ventricle and right atrium. Echo 8- 25-30% EF severe global hypokinesis. ECHO 8/5/2020   Summary   Left ventricular systolic function is severely reduced with ejection   fraction estimated at 25-30 %. Severe global hypokinesis is present. Left ventricle size is normal.   Normal left ventricular wall thickness. Grade III diastolic dysfunction with elevated filing pressure. Mild posterior mitral annular calcification is present. No evidence of mitral valve stenosis. Severe mitral regurgitation. The left atrium is mildly dilated. No evidence of aortic valve stenosis. Mild aortic regurgitation is present. Mild tricuspid regurgitation. Normal systolic pulmonary artery pressure (SPAP) estimated at 39 mmHg (RA   pressure 8 mmHg). Stress test: 7/13:   No scintigraphic evidence of stress-induced myocardial  ischemia. 2. Chronic inferoapical infarct. 3. Severe left ventricular dilatation with severe diffuse  hypokinesis. 4. Severely reduced LVEF of approximately 20%. Assessment:  Resting tachycardia while on beta blockers  Encounter Diagnoses   Name Primary?  Alcoholic cardiomyopathy Yes    Coronary artery disease involving native coronary artery of native heart without angina pectoris     Mixed hyperlipidemia     Essential hypertension     Ischemic cardiomyopathy     NSTEMI (non-ST elevated myocardial infarction) ([facility]     NSVT (nonsustained ventricular tachycardia) ([facility])     Automatic implantable cardioverter-defibrillator in situ      Diagnoses and associated orders for this visit:  CAD s/p CABG 8.11.20   on DAPT and statin   Alcoholic cardiomyopathy, stable chronic systolic CHF functional class I     On Coreg  Lasix aldactone and lisinopril  Hypertension, controlled     Automatic implantable cardiac defibrillator in situ recent check today normal function          Plan:  1. Continue current course  2.  Labs ordered: CBC, CMP, FLP, and TSH Free T4 and Free T3  3. Follow up in 6 months    This note was scribed in the presence of Jet Poe MD by Rayetta Cushing RN. QUALITY MEASURES  1. Tobacco Cessation Counseling: NA  2. Retake of BP if >140/90:   NA  3. Documentation to PCP/referring for new patient:  Sent to PCP at close of office visit  4. CAD patient on anti-platelet: Yes DAPT  5. CAD patient on STATIN therapy:  Yes  6. Patient with CHF and aFib on anticoagulation:  NA     I, Dr. Jet Poe, personally performed the services described in this documentation, as scribed by the above signed scribe in my presence. It is both accurate and complete to my knowledge. I agree with the details independently gathered by the clinical support staff, while the remaining scribed note accurately describes my personal service to the patient.                 Jet Poe MD 6/15/2021 10:35 AM  [facility]  [phone] [facility]  [phone] [facility]

## 2021-06-15 ENCOUNTER — OFFICE VISIT (OUTPATIENT)
Dept: CARDIOLOGY CLINIC | Age: 56
End: 2021-06-15
Payer: MEDICAID

## 2021-06-15 ENCOUNTER — TELEPHONE (OUTPATIENT)
Dept: CARDIOLOGY CLINIC | Age: 56
End: 2021-06-15

## 2021-06-15 ENCOUNTER — NURSE ONLY (OUTPATIENT)
Dept: CARDIOLOGY CLINIC | Age: 56
End: 2021-06-15
Payer: MEDICAID

## 2021-06-15 VITALS
OXYGEN SATURATION: 96 % | BODY MASS INDEX: 30.64 KG/M2 | WEIGHT: 214 LBS | DIASTOLIC BLOOD PRESSURE: 68 MMHG | HEART RATE: 101 BPM | SYSTOLIC BLOOD PRESSURE: 100 MMHG | HEIGHT: 70 IN

## 2021-06-15 DIAGNOSIS — E78.2 MIXED HYPERLIPIDEMIA: ICD-10-CM

## 2021-06-15 DIAGNOSIS — I25.5 ISCHEMIC CARDIOMYOPATHY: ICD-10-CM

## 2021-06-15 DIAGNOSIS — I47.29 NSVT (NONSUSTAINED VENTRICULAR TACHYCARDIA): ICD-10-CM

## 2021-06-15 DIAGNOSIS — Z95.810 ICD (IMPLANTABLE CARDIOVERTER-DEFIBRILLATOR) IN PLACE: ICD-10-CM

## 2021-06-15 DIAGNOSIS — I21.4 NSTEMI (NON-ST ELEVATED MYOCARDIAL INFARCTION) (HCC): ICD-10-CM

## 2021-06-15 DIAGNOSIS — I25.10 CORONARY ARTERY DISEASE INVOLVING NATIVE CORONARY ARTERY OF NATIVE HEART WITHOUT ANGINA PECTORIS: ICD-10-CM

## 2021-06-15 DIAGNOSIS — I10 ESSENTIAL HYPERTENSION: ICD-10-CM

## 2021-06-15 DIAGNOSIS — I42.6 ALCOHOLIC CARDIOMYOPATHY (HCC): ICD-10-CM

## 2021-06-15 DIAGNOSIS — Z95.810 AUTOMATIC IMPLANTABLE CARDIOVERTER-DEFIBRILLATOR IN SITU: ICD-10-CM

## 2021-06-15 DIAGNOSIS — I42.6 ALCOHOLIC CARDIOMYOPATHY (HCC): Primary | ICD-10-CM

## 2021-06-15 PROCEDURE — 1036F TOBACCO NON-USER: CPT | Performed by: INTERNAL MEDICINE

## 2021-06-15 PROCEDURE — 93282 PRGRMG EVAL IMPLANTABLE DFB: CPT | Performed by: INTERNAL MEDICINE

## 2021-06-15 PROCEDURE — 99214 OFFICE O/P EST MOD 30 MIN: CPT | Performed by: INTERNAL MEDICINE

## 2021-06-15 PROCEDURE — G8417 CALC BMI ABV UP PARAM F/U: HCPCS | Performed by: INTERNAL MEDICINE

## 2021-06-15 PROCEDURE — G8427 DOCREV CUR MEDS BY ELIG CLIN: HCPCS | Performed by: INTERNAL MEDICINE

## 2021-06-15 PROCEDURE — 3017F COLORECTAL CA SCREEN DOC REV: CPT | Performed by: INTERNAL MEDICINE

## 2021-06-15 NOTE — TELEPHONE ENCOUNTER
Per [facility] pt needs a f/u with him 6 months (9/21). Please advise. Pt can be reached at [phone].       TY

## 2021-06-15 NOTE — TELEPHONE ENCOUNTER
JMB  please advise. Pt's last OV 3/10/21JMB. Pt can be reached at [phone]. Pt wants to know when he should f/u.        TY

## 2021-06-15 NOTE — TELEPHONE ENCOUNTER
Pt is inquiring when he should followup with ? He last saw him March 2021, ov notes state followup determined after venogram. Pt had venogram and was not informed when to follwoup now. Please advise. I informed pt our office will call him back with more information.

## 2021-06-15 NOTE — PROGRESS NOTES
Patient presents to the device clinic today for a programming evaluation for his defibrillator. Patient has a history of ICM and NSVT. Takes Coreg, Plavix, and MagOx. Last device interrogation was on 3/10. Since then, 1 NSVT event recorded on 6/2 x 1 second. Optivol is increased. Nightly HR >85bpm x7 days.  <0.1%    All sensing and pacing parameters are within normal range. No changes need to be made at this time. Patient education was provided about device functionality, in home monitoring, and any other patient questions and/or concerns were addressed. Patient voices understanding. Please see interrogation for more detail. Patient lacey see Dr. Hyun Menon today in office. Patient will follow up in 3 months in office or remotely. See Paceart report under the Cardiology tab.

## 2021-06-16 ENCOUNTER — HOSPITAL ENCOUNTER (OUTPATIENT)
Age: 56
Discharge: HOME | End: 2021-06-16
Payer: MEDICAID

## 2021-06-16 DIAGNOSIS — I25.5 ISCHEMIC CARDIOMYOPATHY: ICD-10-CM

## 2021-06-16 DIAGNOSIS — I21.4 NSTEMI (NON-ST ELEVATED MYOCARDIAL INFARCTION) (HCC): ICD-10-CM

## 2021-06-16 DIAGNOSIS — I10 ESSENTIAL HYPERTENSION: ICD-10-CM

## 2021-06-16 DIAGNOSIS — I25.10 CORONARY ARTERY DISEASE INVOLVING NATIVE CORONARY ARTERY OF NATIVE HEART WITHOUT ANGINA PECTORIS: ICD-10-CM

## 2021-06-16 DIAGNOSIS — E78.2 MIXED HYPERLIPIDEMIA: ICD-10-CM

## 2021-06-16 DIAGNOSIS — I47.29 NSVT (NONSUSTAINED VENTRICULAR TACHYCARDIA): ICD-10-CM

## 2021-06-16 LAB
A/G RATIO: 1.6 (ref 1.1–2.2)
ALBUMIN SERPL-MCNC: 4.2 G/DL (ref 3.4–5)
ALP BLD-CCNC: 87 U/L (ref 40–129)
ALT SERPL-CCNC: 18 U/L (ref 10–40)
ANION GAP SERPL CALCULATED.3IONS-SCNC: 12 MMOL/L (ref 3–16)
AST SERPL-CCNC: 17 U/L (ref 15–37)
BASOPHILS ABSOLUTE: 0 K/UL (ref 0–0.2)
BASOPHILS RELATIVE PERCENT: 0.7 %
BILIRUB SERPL-MCNC: 0.6 MG/DL (ref 0–1)
BUN BLDV-MCNC: 25 MG/DL (ref 7–20)
CALCIUM SERPL-MCNC: 9.1 MG/DL (ref 8.3–10.6)
CHLORIDE BLD-SCNC: 101 MMOL/L (ref 99–110)
CHOLESTEROL, FASTING: 76 MG/DL (ref 0–199)
CO2: 26 MMOL/L (ref 21–32)
CREAT SERPL-MCNC: 1 MG/DL (ref 0.9–1.3)
EOSINOPHILS ABSOLUTE: 0.2 K/UL (ref 0–0.6)
EOSINOPHILS RELATIVE PERCENT: 2.8 %
GFR AFRICAN AMERICAN: >60
GFR NON-AFRICAN AMERICAN: >60
GLOBULIN: 2.7 G/DL
GLUCOSE BLD-MCNC: 155 MG/DL (ref 70–99)
HCT VFR BLD CALC: 33.6 % (ref 40.5–52.5)
HDLC SERPL-MCNC: 26 MG/DL (ref 40–60)
HEMOGLOBIN: 11.7 G/DL (ref 13.5–17.5)
LDL CHOLESTEROL CALCULATED: 12 MG/DL
LYMPHOCYTES ABSOLUTE: 1.6 K/UL (ref 1–5.1)
LYMPHOCYTES RELATIVE PERCENT: 24.8 %
MCH RBC QN AUTO: 29.2 PG (ref 26–34)
MCHC RBC AUTO-ENTMCNC: 34.8 G/DL (ref 31–36)
MCV RBC AUTO: 84 FL (ref 80–100)
MONOCYTES ABSOLUTE: 0.4 K/UL (ref 0–1.3)
MONOCYTES RELATIVE PERCENT: 6.3 %
NEUTROPHILS ABSOLUTE: 4.3 K/UL (ref 1.7–7.7)
NEUTROPHILS RELATIVE PERCENT: 65.4 %
PDW BLD-RTO: 16.1 % (ref 12.4–15.4)
PLATELET # BLD: 181 K/UL (ref 135–450)
PMV BLD AUTO: 7 FL (ref 5–10.5)
POTASSIUM SERPL-SCNC: 4.8 MMOL/L (ref 3.5–5.1)
RBC # BLD: 4 M/UL (ref 4.2–5.9)
SODIUM BLD-SCNC: 139 MMOL/L (ref 136–145)
T3 FREE: 3.2 PG/ML (ref 2.3–4.2)
T4 FREE: 1.2 NG/DL (ref 0.9–1.8)
TOTAL PROTEIN: 6.9 G/DL (ref 6.4–8.2)
TRIGLYCERIDE, FASTING: 192 MG/DL (ref 0–150)
TSH REFLEX FT4: 7.81 UIU/ML (ref 0.27–4.2)
VLDLC SERPL CALC-MCNC: 38 MG/DL
WBC # BLD: 6.6 K/UL (ref 4–11)

## 2021-06-16 PROCEDURE — 84439 ASSAY OF FREE THYROXINE: CPT

## 2021-06-16 PROCEDURE — 85025 COMPLETE CBC W/AUTO DIFF WBC: CPT

## 2021-06-16 PROCEDURE — 80061 LIPID PANEL: CPT

## 2021-06-16 PROCEDURE — 36415 COLL VENOUS BLD VENIPUNCTURE: CPT

## 2021-06-16 PROCEDURE — 84481 FREE ASSAY (FT-3): CPT

## 2021-06-16 PROCEDURE — 84443 ASSAY THYROID STIM HORMONE: CPT

## 2021-06-16 PROCEDURE — 80053 COMPREHEN METABOLIC PANEL: CPT

## 2021-06-16 NOTE — TELEPHONE ENCOUNTER
Called and spoke with Patient, scheduled appt with [facility] and device clinic on 9/8/21 at [address] [place] [address]

## 2021-06-28 RX ORDER — GLIPIZIDE 5 MG/1
5 TABLET ORAL
Qty: 60 TABLET | Refills: 2 | Status: SHIPPED | OUTPATIENT
Start: 2021-06-28 | End: 2021-07-26 | Stop reason: SDUPTHER

## 2021-06-28 RX ORDER — BLOOD SUGAR DIAGNOSTIC
STRIP MISCELLANEOUS
Qty: 100 EACH | Refills: 2 | Status: SHIPPED | OUTPATIENT
Start: 2021-06-28 | End: 2021-10-14

## 2021-07-01 RX ORDER — THYROID 60 MG
TABLET ORAL
Qty: 30 TABLET | Refills: 6 | Status: SHIPPED | OUTPATIENT
Start: 2021-07-01 | End: 2022-02-09

## 2021-07-02 ENCOUNTER — IMMUNIZATION (OUTPATIENT)
Dept: PRIMARY CARE CLINIC | Age: 56
End: 2021-07-02
Payer: MEDICAID

## 2021-07-02 PROCEDURE — 0002A COVID-19, PFIZER VACCINE 30MCG/0.3ML DOSE: CPT | Performed by: FAMILY MEDICINE

## 2021-07-02 PROCEDURE — 91300 COVID-19, PFIZER VACCINE 30MCG/0.3ML DOSE: CPT | Performed by: FAMILY MEDICINE

## 2021-07-26 ENCOUNTER — OFFICE VISIT (OUTPATIENT)
Dept: INTERNAL MEDICINE CLINIC | Age: 56
End: 2021-07-26

## 2021-07-26 VITALS
DIASTOLIC BLOOD PRESSURE: 80 MMHG | HEIGHT: 70 IN | RESPIRATION RATE: 12 BRPM | SYSTOLIC BLOOD PRESSURE: 110 MMHG | WEIGHT: 216 LBS | HEART RATE: 70 BPM | BODY MASS INDEX: 30.92 KG/M2

## 2021-07-26 DIAGNOSIS — I10 ESSENTIAL HYPERTENSION: ICD-10-CM

## 2021-07-26 DIAGNOSIS — E78.2 MIXED HYPERLIPIDEMIA: ICD-10-CM

## 2021-07-26 DIAGNOSIS — E03.9 ACQUIRED HYPOTHYROIDISM: ICD-10-CM

## 2021-07-26 DIAGNOSIS — Z95.810 AUTOMATIC IMPLANTABLE CARDIOVERTER-DEFIBRILLATOR IN SITU: ICD-10-CM

## 2021-07-26 DIAGNOSIS — I42.6 ALCOHOLIC CARDIOMYOPATHY (HCC): ICD-10-CM

## 2021-07-26 DIAGNOSIS — I25.10 CORONARY ARTERY DISEASE INVOLVING NATIVE CORONARY ARTERY OF NATIVE HEART WITHOUT ANGINA PECTORIS: ICD-10-CM

## 2021-07-26 PROBLEM — L97.525 DIABETIC ULCER OF LEFT FOOT ASSOCIATED WITH TYPE 2 DIABETES MELLITUS, WITH MUSCLE INVOLVEMENT WITHOUT EVIDENCE OF NECROSIS (HCC): Status: RESOLVED | Noted: 2017-04-27 | Resolved: 2021-07-26

## 2021-07-26 PROBLEM — E11.621 DIABETIC ULCER OF RIGHT FOOT ASSOCIATED WITH TYPE 2 DIABETES MELLITUS, LIMITED TO BREAKDOWN OF SKIN (HCC): Status: RESOLVED | Noted: 2017-01-29 | Resolved: 2021-07-26

## 2021-07-26 PROBLEM — E11.621 DIABETIC ULCER OF LEFT FOOT ASSOCIATED WITH TYPE 2 DIABETES MELLITUS, WITH MUSCLE INVOLVEMENT WITHOUT EVIDENCE OF NECROSIS (HCC): Status: RESOLVED | Noted: 2017-04-27 | Resolved: 2021-07-26

## 2021-07-26 PROBLEM — L97.511 DIABETIC ULCER OF RIGHT FOOT ASSOCIATED WITH TYPE 2 DIABETES MELLITUS, LIMITED TO BREAKDOWN OF SKIN (HCC): Status: RESOLVED | Noted: 2017-01-29 | Resolved: 2021-07-26

## 2021-07-26 LAB
BILIRUBIN, POC: NORMAL
BLOOD URINE, POC: NORMAL
CLARITY, POC: NORMAL
COLOR, POC: NORMAL
GLUCOSE URINE, POC: NORMAL
KETONES, POC: NORMAL
LEUKOCYTE EST, POC: NORMAL
NITRITE, POC: NORMAL
PH, POC: NORMAL
PROTEIN, POC: NORMAL
SPECIFIC GRAVITY, POC: NORMAL
UROBILINOGEN, POC: NORMAL

## 2021-07-26 PROCEDURE — 1036F TOBACCO NON-USER: CPT | Performed by: INTERNAL MEDICINE

## 2021-07-26 PROCEDURE — 3046F HEMOGLOBIN A1C LEVEL >9.0%: CPT | Performed by: INTERNAL MEDICINE

## 2021-07-26 PROCEDURE — 99214 OFFICE O/P EST MOD 30 MIN: CPT | Performed by: INTERNAL MEDICINE

## 2021-07-26 PROCEDURE — 3017F COLORECTAL CA SCREEN DOC REV: CPT | Performed by: INTERNAL MEDICINE

## 2021-07-26 PROCEDURE — 2022F DILAT RTA XM EVC RTNOPTHY: CPT | Performed by: INTERNAL MEDICINE

## 2021-07-26 PROCEDURE — G8427 DOCREV CUR MEDS BY ELIG CLIN: HCPCS | Performed by: INTERNAL MEDICINE

## 2021-07-26 PROCEDURE — G8417 CALC BMI ABV UP PARAM F/U: HCPCS | Performed by: INTERNAL MEDICINE

## 2021-07-26 PROCEDURE — 81002 URINALYSIS NONAUTO W/O SCOPE: CPT | Performed by: INTERNAL MEDICINE

## 2021-07-26 RX ORDER — PEN NEEDLE, DIABETIC 31 GX5/16"
1 NEEDLE, DISPOSABLE MISCELLANEOUS DAILY
Qty: 100 EACH | Refills: 3 | Status: SHIPPED | OUTPATIENT
Start: 2021-07-26 | End: 2021-11-16

## 2021-07-26 RX ORDER — DULAGLUTIDE 3 MG/.5ML
3 INJECTION, SOLUTION SUBCUTANEOUS WEEKLY
Qty: 2 ML | Refills: 0 | Status: SHIPPED | OUTPATIENT
Start: 2021-07-26 | End: 2021-10-19

## 2021-07-26 RX ORDER — DULAGLUTIDE 3 MG/.5ML
3 INJECTION, SOLUTION SUBCUTANEOUS WEEKLY
Qty: 2 ML | Refills: 0 | Status: SHIPPED | OUTPATIENT
Start: 2021-07-26 | End: 2021-07-26 | Stop reason: SDUPTHER

## 2021-07-26 RX ORDER — INSULIN GLARGINE 100 [IU]/ML
100 INJECTION, SOLUTION SUBCUTANEOUS DAILY
Qty: 15 ML | Refills: 2 | Status: SHIPPED | OUTPATIENT
Start: 2021-07-26 | End: 2021-11-04

## 2021-07-26 RX ORDER — GLIPIZIDE 5 MG/1
5 TABLET ORAL
Qty: 60 TABLET | Refills: 2 | Status: SHIPPED | OUTPATIENT
Start: 2021-07-26 | End: 2022-01-11

## 2021-07-26 ASSESSMENT — ENCOUNTER SYMPTOMS
WHEEZING: 0
NAUSEA: 0
ABDOMINAL PAIN: 0
RHINORRHEA: 0
SHORTNESS OF BREATH: 0
VOMITING: 0
BACK PAIN: 0

## 2021-07-26 NOTE — PROGRESS NOTES
pneumonia     Diabetes ([address]     Diabetic ulcer of right foot ([address] early 2016    Diabetic ulcer of 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 2 diabetes mellitus, with necrosis of bone ([address]     ETOH abuse     Fracture of tibial plateau 74/7/0703    High cholesterol     History of blood transfusion     reaction    HTN (hypertension)     Hx of blood clots     MI (myocardial infarction) ([address] 08/04/2020    + Troponins    MRSA (methicillin resistant staph aureus) culture positive 4/28/16, 4/20/16    foot wound    Neuropathic ulcer of left foot, limited to breakdown of skin ([address] 11/3/2016    Neuropathic ulcer of toe ([address] 2/13/2014    NSVT (nonsustained ventricular tachycardia) ([address] 2014    Pleural effusion due to congestive heart failure ([address]     Septicemia ([address]     Smoker     quit 1490    Systolic CHF, acute ([address] 7/8/2013    Thyroid disease        Past Surgical History:   Procedure Laterality Date    CARDIAC DEFIBRILLATOR PLACEMENT  01/29/2014    ICD Placement    CORONARY ARTERY BYPASS GRAFT N/A 8/11/2020    CORONARY ARTERY BYPASS GRAFTING X3, LEFT ATRIAL APPENDAGE CLIP, INTERNAL MAMMARY ARTERY, SAPHENOUS VEIN GRAFT, ON PUMP, 5 LEVEL BILATERAL INTERCOSTAL NERVE BLOCK performed by Pawan Card MD at [address] Left 08/09/2018    PARTIAL FOOT AMPUTATION w. Dr Parminder Mcarthur Left 1/31/2019    EXOSTECTOMY LEFT FOOT, ULCER DEBRIDEMENT LEFT FOOT WITH GRAFT APPLICATION performed by Olman Lopez DPM at [address] [facility] Left 09/27/2017    LEFT FOOT ULCER DEBRIDEMENT, POSSIBLE SECOND METATARSAL    FOOT SURGERY Left 01/31/2019    Exostectomy left foot, ulcer debridement with graft application left foot    FOOT TENDON SURGERY Right 2016    FOOT TENDON SURGERY Left 06/30/2017    KNEE SURGERY Left     OTHER SURGICAL HISTORY Bilateral 9/17/15    amputation 2nd digit bilateral, flexor tenotomy right hallux    OTHER SURGICAL HISTORY Left 08/17/2017 Grandmother         bone marrow & breast    Heart Disease Maternal Grandfather     High Blood Pressure Maternal Grandfather     High Cholesterol Maternal Grandfather     Cancer Maternal Grandfather         pancreatic CA    Heart Disease Paternal Grandmother     High Blood Pressure Paternal Grandmother     High Cholesterol Paternal Grandmother     Heart Disease Paternal Grandfather     High Blood Pressure Paternal Grandfather     High Cholesterol Paternal Grandfather     Stroke Brother     Heart Failure Brother     Heart Disease Brother     Diabetes type 2  Brother     Diabetes type 2  Brother     Diabetes type 2  Brother        Social History     Tobacco Use    Smoking status: Former Smoker     Packs/day: 0.25     Years: 1.00     Pack years: 0.25     Quit date: 1980     Years since quittin.4    Smokeless tobacco: Never Used   Vaping Use    Vaping Use: Never used   Substance Use Topics    Alcohol use: No     Alcohol/week: 0.0 standard drinks    Drug use: No         Current Outpatient Medications:     TRULICITY 3 VB/9.5OR SOPN, Inject 3 mg into the skin once a week, Disp: 2 mL, Rfl: 0    ARMOUR THYROID 60 MG tablet, Take 1 tablet by mouth daily, Disp: 30 tablet, Rfl: 6    blood glucose test strips (ONETOUCH ULTRA) strip, USE TO TEST SUGAR 4 TIMES DAILY, Disp: 100 each, Rfl: 2    glipiZIDE (GLUCOTROL) 5 MG tablet, Take 1 tablet by mouth 2 times daily (before meals), Disp: 60 tablet, Rfl: 2    metFORMIN (GLUCOPHAGE) 1000 MG tablet, Take 1 tablet by mouth 2 times daily (with meals), Disp: 60 tablet, Rfl: 0    LANTUS SOLOSTAR 100 UNIT/ML injection pen, Inject 100 Units into the skin daily, Disp: 15 mL, Rfl: 0    Blood Glucose Monitoring Suppl (ONE TOUCH ULTRA 2) w/Device KIT, USE TO CHECK FOUR TIMES DAILY. DX;E11.9, Disp: 1 kit, Rfl: 0    Lancets MISC, USE TO CHECK FOUR TIMES DAILY.   DX;E11.9, Disp: 100 each, Rfl: 3    Continuous Blood Gluc Sensor (FREESTYLE MARY 14 DAY SENSOR) MISC, CGM, Disp: 2 each, Rfl: 2    furosemide (LASIX) 40 MG tablet, Take 1 tablet by mouth 2 times daily, Disp: 60 tablet, Rfl: 5    ASPIRIN LOW DOSE 81 MG EC tablet, Take 1 tablet by mouth daily, Disp: 90 tablet, Rfl: 3    atorvastatin (LIPITOR) 40 MG tablet, Take 1 tablet by mouth nightly, Disp: 90 tablet, Rfl: 1    isosorbide mononitrate (IMDUR) 30 MG extended release tablet, Take 1 tablet by mouth daily, Disp: 90 tablet, Rfl: 3    carvedilol (COREG) 25 MG tablet, Take 1 tablet by mouth 2 times daily TAKE ONE TABLET BY MOUTH TWICE DAILY, Disp: 60 tablet, Rfl: 5    lisinopril (PRINIVIL;ZESTRIL) 5 MG tablet, Take 1 tablet by mouth daily, Disp: 30 tablet, Rfl: 5    magnesium oxide (MAG-OX) 400 MG tablet, Take 1 tablet by mouth 2 times daily, Disp: 30 tablet, Rfl: 5    Insulin Pen Needle (B-D ULTRAFINE III SHORT PEN) 31G X 8 MM MISC, 1 each by Does not apply route daily, Disp: 100 each, Rfl: 3    INSULIN SYRINGE 1CC/29G (AIMSCO INSULIN SYR ULTRA THIN) 29G X 1/2\" 1 ML MISC, 1 each by Does not apply route 3 times daily, Disp: 100 each, Rfl: 3    spironolactone (ALDACTONE) 25 MG tablet, Take 1 tablet by mouth daily for 1 day, Disp: 30 tablet, Rfl: 5    clopidogrel (PLAVIX) 75 MG tablet, Take 1 tablet by mouth daily, Disp: 30 tablet, Rfl: 11    Insulin Pen Needle (UNIFINE PENTIPS) 31G X 5 MM MISC, USE 1 EACH DAY AS NEEDED FOR TESTING, Disp: 100 each, Rfl: 3    Multiple Vitamins-Minerals (THERAPEUTIC MULTIVITAMIN-MINERALS) tablet, Take 1 tablet by mouth daily, Disp: , Rfl:     /80 (Site: Left Upper Arm, Position: Sitting, Cuff Size: Medium Adult)   Pulse 70   Resp 12   Ht 5' 10\" (1.778 m)   Wt 216 lb (98 kg)   BMI 30.99 kg/m²         Objective:   Physical Exam  Constitutional:       Appearance: He is well-developed. HENT:      Head: Normocephalic. Eyes:      Conjunctiva/sclera: Conjunctivae normal.      Pupils: Pupils are equal, round, and reactive to light.    Neck:      Thyroid: No thyroid mass or thyromegaly. Vascular: No carotid bruit or JVD. Trachea: Trachea normal.   Cardiovascular:      Rate and Rhythm: Normal rate and regular rhythm. Heart sounds: Normal heart sounds. No murmur heard. No gallop. Pulmonary:      Effort: Pulmonary effort is normal. No respiratory distress. Breath sounds: Normal breath sounds. No wheezing or rales. Abdominal:      General: Bowel sounds are normal. There is no distension. Palpations: Abdomen is soft. There is no hepatomegaly, splenomegaly or mass. Tenderness: There is no abdominal tenderness. Musculoskeletal:         General: Normal range of motion. Cervical back: Normal range of motion and neck supple. Lymphadenopathy:      Cervical: No cervical adenopathy. Skin:     General: Skin is warm and dry. Findings: No rash. Neurological:      Mental Status: He is alert and oriented to person, place, and time. Cranial Nerves: No cranial nerve deficit. Deep Tendon Reflexes: Reflexes are normal and symmetric. Psychiatric:         Behavior: Behavior normal.         Thought Content: Thought content normal.         Judgment: Judgment normal.         Assessment:       Diagnosis Orders   1. Uncontrolled type 2 diabetes mellitus with diabetic polyneuropathy, with long-term current use of insulin ([facility])  Microalbumin / Creatinine Urine Ratio    POCT Urinalysis no Micro    Hemoglobin A1C   2. Acquired hypothyroidism     3. Alcoholic cardiomyopathy ([address]     4. Mixed hyperlipidemia     5. Essential hypertension     6. Coronary artery disease involving native coronary artery of native heart without angina pectoris     7. Automatic implantable cardioverter-defibrillator in situ            Plan:        #CAD. He had CABG. Doing well. #Diabetes mellitus type 2 uncontrolled. Resume insulin. #Hypertension. BP controlled. 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 He is completely quit drinking 5 years ago.   Continue with Coreg Lasix and lisinopril. He sees cardiology. #Hyperlipidemia well controlled. #Hypo-thyroidism. He cannot tolerate levothyroxine. He takes [place] Thyroid. #Anemia. GI work-up negative.             Gail Matute MD

## 2021-07-27 LAB
CREATININE URINE: 105.8 MG/DL (ref 39–259)
ESTIMATED AVERAGE GLUCOSE: 128.4 MG/DL
HBA1C MFR BLD: 6.1 %
MICROALBUMIN UR-MCNC: 34.2 MG/DL
MICROALBUMIN/CREAT UR-RTO: 323.3 MG/G (ref 0–30)

## 2021-08-13 RX ORDER — SPIRONOLACTONE 25 MG/1
TABLET ORAL
Qty: 30 TABLET | Refills: 11 | Status: SHIPPED | OUTPATIENT
Start: 2021-08-13 | End: 2021-12-02

## 2021-08-18 RX ORDER — LANCETS 33 GAUGE
EACH MISCELLANEOUS
Qty: 100 EACH | Refills: 3 | Status: SHIPPED | OUTPATIENT
Start: 2021-08-18

## 2021-08-23 DIAGNOSIS — E78.2 MIXED HYPERLIPIDEMIA: ICD-10-CM

## 2021-08-23 DIAGNOSIS — I42.6 ALCOHOLIC CARDIOMYOPATHY (HCC): ICD-10-CM

## 2021-08-23 DIAGNOSIS — I10 ESSENTIAL HYPERTENSION: ICD-10-CM

## 2021-08-23 RX ORDER — ASPIRIN 81 MG/1
TABLET, COATED ORAL
Qty: 90 TABLET | Refills: 2 | Status: SHIPPED | OUTPATIENT
Start: 2021-08-23 | End: 2022-09-02

## 2021-08-23 RX ORDER — CLOPIDOGREL BISULFATE 75 MG/1
75 TABLET ORAL DAILY
Qty: 90 TABLET | Refills: 2 | Status: SHIPPED | OUTPATIENT
Start: 2021-08-23 | End: 2021-12-14 | Stop reason: SDUPTHER

## 2021-09-07 NOTE — PROGRESS NOTES
[facility]   Cardiac Consultation    Date: 9/8/21  Patient Name: Dilma Stewart  YOB: 1965    Primary Care Physician: Kenny Vargas MD    CHIEF COMPLAINT:   Chief Complaint   Patient presents with    6 Month Follow-Up     HPI:  Dilma Stewart is a 64 y.o. male with a history of alcoholic (nonischemic) CM s/p MDT single chamber ICD (1/23/14), severe MR, mild AR and TR, CAD, s/p CABG x3 (8/15/20). On 10/7/20, QRS duration is 130. Patient presented to the ED 1/15/2021 c/o CP. Limited echo (1/20/21) showed EF of 25-30%. All wall segments appear severely hypokinetic. Stress test (3/8/2021) demonstrated an EF of 37%, severe global hypokinesis with no ischemia noted. S/p venogram (4/6/21) showed functional subtotal occlusion of L SCL vein. He is here today for f/u. Device interrogation today shows normally functioning ICD with stable sensing and pacing thresholds.  <0.1%. No arrhythmias noted. SUMAN at 3.2 yrs. He complains today of LE edema and SOB if he over exerts himself for long periods. Otherwise, denies complaints of palpitations, dizziness, CP, orthopnea, presyncope, or syncope.      Past Medical History:   has a past medical history of Acute osteomyelitis of left foot ([address] Acute osteomyelitis of right foot ([address] Acute respiratory failure ([address] Ascites, Blood transfusion reaction, Burst fracture of lumbar vertebra ([address] Cellulitis of left foot, Cellulitis of right lower extremity, Community acquired pneumonia, Diabetes ([address] Diabetic ulcer of left foot associated with type 2 diabetes mellitus, with muscle involvement without evidence of necrosis ([address] Diabetic ulcer of right foot ([address] Diabetic ulcer of 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 2 diabetes mellitus, with necrosis of bone ([address] ETOH abuse, Fracture of tibial plateau, High cholesterol, History of blood transfusion, HTN (hypertension), Hx of blood clots, MI (myocardial infarction) ([address] MRSA (methicillin resistant staph aureus) culture positive, Neuropathic ulcer of left foot, limited to breakdown of skin ([address] Neuropathic ulcer of toe ([address] NSVT (nonsustained ventricular tachycardia) ([address] Pleural effusion due to congestive heart failure ([address] Septicemia ([address] Smoker, Systolic CHF, acute ([address] and Thyroid disease. Surgical History:   has a past surgical history that includes knee surgery (Left); other surgical history (Bilateral, 9/17/15); Toe amputation; Foot Tendon Surgery (Right, 2016); Foot Tendon Surgery (Left, 06/30/2017); other surgical history (Left, 08/17/2017); Foot surgery (Left, 09/27/2017); other surgical history (Left, 12/07/2017); other surgical history (Right, 01/04/2018); Foot Amputation (Left, 08/09/2018); pr part remv othr tarsal/metatarsal (Left, 8/9/2018); pr office/outpt visit,procedure only (Left, 8/23/2018); other surgical history (Left, 11/01/2018); pr part remv othr tarsal/metatarsal (Left, 11/1/2018); Foot surgery (Left, 01/31/2019); Foot Debridement (Left, 1/31/2019); Cardiac defibrillator placement (01/29/2014); Coronary artery bypass graft (N/A, 8/11/2020); eye surgery; other surgical history; Toe amputation (Bilateral, 3/18/2021); and Toe amputation (Right, 5/20/2021). Social History:   reports that he quit smoking about 41 years ago. He has a 0.25 pack-year smoking history. He has never used smokeless tobacco. He reports that he does not drink alcohol and does not use drugs.      Family History:  family history includes Anemia in his mother; Cancer in his maternal grandfather and maternal grandmother; Diabetes in his mother; Diabetes type 2  in his brother, brother, and brother; Heart Disease in his brother, father, maternal grandfather, maternal grandmother, mother, paternal grandfather, and paternal grandmother; Heart Failure in his brother; High Blood Pressure in his father, maternal grandfather, maternal grandmother, mother, paternal grandfather, and paternal grandmother; High Cholesterol in his father, maternal grandfather, maternal grandmother, mother, paternal grandfather, and paternal grandmother; Stroke in his brother. Home Medications:  Outpatient Encounter Medications as of 9/8/2021   Medication Sig Dispense Refill    ASPIRIN LOW DOSE 81 MG EC tablet Take 1 tablet by mouth daily 90 tablet 2    OneTouch Delica Lancets 58M MISC USE TO CHECK FOUR TIMES DAILY. DX;E11.9 100 each 3    spironolactone (ALDACTONE) 25 MG tablet Take 1 tablet by mouth daily 30 tablet 11    magnesium oxide (MAG-OX) 400 (241.3 Mg) MG TABS tablet TAKE 1 TABLET BY MOUTH 2 TIMES DAILY 60 tablet 0    Dulaglutide (TRULICITY) 3 EZ/8.7ZT SOPN Inject 3 mg into the skin once a week 2 mL 0    glipiZIDE (GLUCOTROL) 5 MG tablet Take 1 tablet by mouth 2 times daily (before meals) 60 tablet 2    metFORMIN (GLUCOPHAGE) 1000 MG tablet Take 1 tablet by mouth 2 times daily (with meals) 60 tablet 2    insulin glargine (LANTUS SOLOSTAR) 100 UNIT/ML injection pen Inject 100 Units into the skin daily 15 mL 2    Insulin Pen Needle (B-D ULTRAFINE III SHORT PEN) 31G X 8 MM MISC 1 each by Does not apply route daily 100 each 3    ARMOUR THYROID 60 MG tablet Take 1 tablet by mouth daily 30 tablet 6    blood glucose test strips (ONETOUCH ULTRA) strip USE TO TEST SUGAR 4 TIMES DAILY 100 each 2    Blood Glucose Monitoring Suppl (ONE TOUCH ULTRA 2) w/Device KIT USE TO CHECK FOUR TIMES DAILY.   DX;E11.9 1 kit 0    Continuous Blood Gluc Sensor (FREESTYLE MARY 14 DAY SENSOR) [facility] CGM 2 each 2    furosemide (LASIX) 40 MG tablet Take 1 tablet by mouth 2 times daily 60 tablet 5    atorvastatin (LIPITOR) 40 MG tablet Take 1 tablet by mouth nightly 90 tablet 1    isosorbide mononitrate (IMDUR) 30 MG extended release tablet Take 1 tablet by mouth daily 90 tablet 3    carvedilol (COREG) 25 MG tablet Take 1 tablet by mouth 2 times daily TAKE ONE TABLET BY MOUTH TWICE DAILY 60 tablet 5    lisinopril (PRINIVIL;ZESTRIL) 5 MG tablet Take 1 tablet by mouth daily 30 tablet 5    INSULIN SYRINGE 1CC/29G (AIMSCO INSULIN SYR ULTRA THIN) 29G X 1/2\" 1 ML MISC 1 each by Does not apply route 3 times daily 100 each 3    Insulin Pen Needle (UNIFINE PENTIPS) 31G X 5 MM MISC USE 1 EACH DAY AS NEEDED FOR TESTING 100 each 3    Multiple Vitamins-Minerals (THERAPEUTIC MULTIVITAMIN-MINERALS) tablet Take 1 tablet by mouth daily      clopidogrel (PLAVIX) 75 MG tablet Take 1 tablet by mouth daily (Patient not taking: Reported on 9/8/2021) 90 tablet 2     No facility-administered encounter medications on file as of 9/8/2021. Allergies:  Bactrim [sulfamethoxazole-trimethoprim] and Bee venom     Review of Systems   Constitutional: Negative. HENT: Negative. Eyes: Negative. Respiratory: Negative. Cardiovascular: Negative. Gastrointestinal: Negative. Genitourinary: Negative. Musculoskeletal: Negative. Skin: Negative. Neurological: Negative. Hematological: Negative. Psychiatric/Behavioral: Negative. BP (!) 140/80   Pulse 101   Ht 5' 10\" (1.778 m)   Wt 221 lb (100.2 kg)   SpO2 98%   BMI 31.71 kg/m²      Data:     ECG 3/10/2021: SR, occasional PVC, anterior fascicular block,  ms    Limited echo 1/10/2021:    Summary   Left ventricular systolic function is reduced with ejection fraction   estimated at 25-30 %. All remaining wall segments appear severely hypokinetic . Abnormal (paradoxical) septal motion is present likely due to pacemaker. There is hypokinesis of the mid and basal anterior and anteroseptal wall   segments. All remaining wall segments appear severely hypokinetic . Pacer / ICD wire is visualized in the right ventricle and right atrium. Echo 8- 25-30% EF severe global hypokinesis.     Stress test 3/8/2021:   Summary  Moderate to severely reduced LVEF 37%  Global hypokinesis with regional variation (severe inferoapical hypokinesis)  Large area of distal anterior and inferoapical scar  No ischemia      CABG X3 8/15/2020:  1. Coronary artery bypass x3, LIMA to LAD, reverse saphenous vein to  the right coronary artery, reverse saphenous vein to OM1.  2. CM  3. DM  4. Incomplete LBBB    CARDIAC CATH 8/6/2020:   Multivessel CAD/ASHD  Will refer to CT surgery for consideration of CABG  Due to severe LV dysfunction, high risk PCI can be considered with Impella support devices as well as atherectomy if he is not felt to be a good surgical candidate. ECHO 8/4/2020:    Summary   Left ventricular systolic function is severely reduced with ejection   fraction estimated at 25-30 %. Severe global hypokinesis is present. Left ventricle size is normal.   Normal left ventricular wall thickness. Grade III diastolic dysfunction with elevated filing pressure. Mild posterior mitral annular calcification is present. No evidence of mitral valve stenosis. Severe mitral regurgitation. The left atrium is mildly dilated. No evidence of aortic valve stenosis. Mild aortic regurgitation is present. Mild tricuspid regurgitation. Normal systolic pulmonary artery pressure (SPAP) estimated at 39 mmHg (RA   pressure 8 mmHg). STRESS TEST 7/8/2020:    IMPRESSION:     1. No scintigraphic evidence of stress-induced myocardial ischemia. 2. Chronic inferoapical infarct. 3. Severe left ventricular dilatation with severe diffuse hypokinesis. 4. Severely reduced LVEF of approximately 20%. Objective:  Physical Exam   Constitutional: He is oriented to person, place, and time. He appears well-developed and well-nourished. HENT:   Head: Normocephalic and atraumatic. Eyes: Pupils are equal, round, and reactive to light. Neck: Normal range of motion. Cardiovascular: Normal rate, regular rhythm and normal heart sounds. Pulmonary/Chest: Effort normal and breath sounds normal.   Abdominal: Soft. No tenderness. Musculoskeletal: Normal range of motion.  He

## 2021-09-08 ENCOUNTER — OFFICE VISIT (OUTPATIENT)
Dept: CARDIOLOGY CLINIC | Age: 56
End: 2021-09-08
Payer: MEDICAID

## 2021-09-08 ENCOUNTER — NURSE ONLY (OUTPATIENT)
Dept: CARDIOLOGY CLINIC | Age: 56
End: 2021-09-08
Payer: MEDICAID

## 2021-09-08 VITALS
DIASTOLIC BLOOD PRESSURE: 80 MMHG | HEIGHT: 70 IN | SYSTOLIC BLOOD PRESSURE: 140 MMHG | WEIGHT: 221 LBS | OXYGEN SATURATION: 98 % | BODY MASS INDEX: 31.64 KG/M2 | HEART RATE: 101 BPM

## 2021-09-08 DIAGNOSIS — I42.6 ALCOHOLIC CARDIOMYOPATHY (HCC): Primary | ICD-10-CM

## 2021-09-08 DIAGNOSIS — I25.5 ISCHEMIC CARDIOMYOPATHY: ICD-10-CM

## 2021-09-08 DIAGNOSIS — I25.10 CORONARY ARTERY DISEASE INVOLVING NATIVE CORONARY ARTERY OF NATIVE HEART WITHOUT ANGINA PECTORIS: ICD-10-CM

## 2021-09-08 DIAGNOSIS — I42.6 ALCOHOLIC CARDIOMYOPATHY (HCC): ICD-10-CM

## 2021-09-08 DIAGNOSIS — I10 ESSENTIAL HYPERTENSION: ICD-10-CM

## 2021-09-08 DIAGNOSIS — I47.29 NSVT (NONSUSTAINED VENTRICULAR TACHYCARDIA): ICD-10-CM

## 2021-09-08 DIAGNOSIS — Z95.810 ICD (IMPLANTABLE CARDIOVERTER-DEFIBRILLATOR) IN PLACE: ICD-10-CM

## 2021-09-08 PROCEDURE — 99214 OFFICE O/P EST MOD 30 MIN: CPT | Performed by: INTERNAL MEDICINE

## 2021-09-08 PROCEDURE — G8417 CALC BMI ABV UP PARAM F/U: HCPCS | Performed by: INTERNAL MEDICINE

## 2021-09-08 PROCEDURE — 3017F COLORECTAL CA SCREEN DOC REV: CPT | Performed by: INTERNAL MEDICINE

## 2021-09-08 PROCEDURE — 1036F TOBACCO NON-USER: CPT | Performed by: INTERNAL MEDICINE

## 2021-09-08 PROCEDURE — G8427 DOCREV CUR MEDS BY ELIG CLIN: HCPCS | Performed by: INTERNAL MEDICINE

## 2021-09-08 NOTE — LETTER
Bridger Lovell  1965           [address]   Cardiac Consultation    Date: 9/8/21  Patient Name: Bridger Lovell  YOB: 1965    Primary Care Physician: Bryan Thomas MD    CHIEF COMPLAINT:   Chief Complaint   Patient presents with    6 Month Follow-Up     HPI:  Bridger Lovell is a 64 y.o. male with a history of alcoholic (nonischemic) CM s/p MDT single chamber ICD (1/23/14), severe MR, mild AR and TR, CAD, s/p CABG x3 (8/15/20). On 10/7/20, QRS duration is 130. Patient presented to the ED 1/15/2021 c/o CP. Limited echo (1/20/21) showed EF of 25-30%. All wall segments appear severely hypokinetic. Stress test (3/8/2021) demonstrated an EF of 37%, severe global hypokinesis with no ischemia noted. S/p venogram (4/6/21) showed functional subtotal occlusion of L SCL vein. He is here today for f/u. Device interrogation today shows normally functioning ICD with stable sensing and pacing thresholds.  <0.1%. No arrhythmias noted. SUMAN at 3.2 yrs. He complains today of LE edema and SOB if he over exerts himself for long periods. Otherwise, denies complaints of palpitations, dizziness, CP, orthopnea, presyncope, or syncope.      Past Medical History:   has a past medical history of Acute osteomyelitis of left foot ([address] Acute osteomyelitis of right foot ([address] Acute respiratory failure ([address] Ascites, Blood transfusion reaction, Burst fracture of lumbar vertebra ([address] Cellulitis of left foot, Cellulitis of right lower extremity, Community acquired pneumonia, Diabetes ([address] Diabetic ulcer of left foot associated with type 2 diabetes mellitus, with muscle involvement without evidence of necrosis ([address] Diabetic ulcer of right foot ([address] Diabetic ulcer of 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 2 diabetes mellitus, with necrosis of bone ([address] ETOH abuse, Fracture of tibial plateau, High cholesterol, History of blood transfusion, HTN (hypertension), Hx of blood clots, MI (myocardial infarction) ([address] MRSA (methicillin resistant staph aureus) culture positive, Neuropathic ulcer of left foot, limited to breakdown of skin ([address] Neuropathic ulcer of toe ([address] NSVT (nonsustained ventricular tachycardia) ([address] Pleural effusion due to congestive heart failure ([address] Septicemia ([address] Smoker, Systolic CHF, acute ([address] and Thyroid disease. Surgical History:   has a past surgical history that includes knee surgery (Left); other surgical history (Bilateral, 9/17/15); Toe amputation; Foot Tendon Surgery (Right, 2016); Foot Tendon Surgery (Left, 06/30/2017); other surgical history (Left, 08/17/2017); Foot surgery (Left, 09/27/2017); other surgical history (Left, 12/07/2017); other surgical history (Right, 01/04/2018); Foot Amputation (Left, 08/09/2018); pr part remv othr tarsal/metatarsal (Left, 8/9/2018); pr office/outpt visit,procedure only (Left, 8/23/2018); other surgical history (Left, 11/01/2018); pr part remv othr tarsal/metatarsal (Left, 11/1/2018); Foot surgery (Left, 01/31/2019); Foot Debridement (Left, 1/31/2019); Cardiac defibrillator placement (01/29/2014); Coronary artery bypass graft (N/A, 8/11/2020); eye surgery; other surgical history; Toe amputation (Bilateral, 3/18/2021); and Toe amputation (Right, 5/20/2021). Social History:   reports that he quit smoking about 41 years ago. He has a 0.25 pack-year smoking history. He has never used smokeless tobacco. He reports that he does not drink alcohol and does not use drugs.      Family History:  family history includes Anemia in his mother; Cancer in his maternal grandfather and maternal grandmother; Diabetes in his mother; Diabetes type 2  in his brother, brother, and brother; Heart Disease in his brother, father, maternal grandfather, maternal grandmother, mother, paternal grandfather, and paternal grandmother; Heart Failure in his brother; High Blood Pressure in his father, maternal grandfather, maternal grandmother, mother, paternal grandfather, and paternal grandmother; High Cholesterol in his father, maternal grandfather, maternal grandmother, mother, paternal grandfather, and paternal grandmother; Stroke in his brother. Home Medications:  Outpatient Encounter Medications as of 9/8/2021   Medication Sig Dispense Refill    ASPIRIN LOW DOSE 81 MG EC tablet Take 1 tablet by mouth daily 90 tablet 2    OneTouch Delica Lancets 05S MISC USE TO CHECK FOUR TIMES DAILY. DX;E11.9 100 each 3    spironolactone (ALDACTONE) 25 MG tablet Take 1 tablet by mouth daily 30 tablet 11    magnesium oxide (MAG-OX) 400 (241.3 Mg) MG TABS tablet TAKE 1 TABLET BY MOUTH 2 TIMES DAILY 60 tablet 0    Dulaglutide (TRULICITY) 3 VX/2.5ZH SOPN Inject 3 mg into the skin once a week 2 mL 0    glipiZIDE (GLUCOTROL) 5 MG tablet Take 1 tablet by mouth 2 times daily (before meals) 60 tablet 2    metFORMIN (GLUCOPHAGE) 1000 MG tablet Take 1 tablet by mouth 2 times daily (with meals) 60 tablet 2    insulin glargine (LANTUS SOLOSTAR) 100 UNIT/ML injection pen Inject 100 Units into the skin daily 15 mL 2    Insulin Pen Needle (B-D ULTRAFINE III SHORT PEN) 31G X 8 MM MISC 1 each by Does not apply route daily 100 each 3    ARMOUR THYROID 60 MG tablet Take 1 tablet by mouth daily 30 tablet 6    blood glucose test strips (ONETOUCH ULTRA) strip USE TO TEST SUGAR 4 TIMES DAILY 100 each 2    Blood Glucose Monitoring Suppl (ONE TOUCH ULTRA 2) w/Device KIT USE TO CHECK FOUR TIMES DAILY.   DX;E11.9 1 kit 0    Continuous Blood Gluc Sensor (FREESTYLE MARY 14 DAY SENSOR) [facility] CGM 2 each 2    furosemide (LASIX) 40 MG tablet Take 1 tablet by mouth 2 times daily 60 tablet 5    atorvastatin (LIPITOR) 40 MG tablet Take 1 tablet by mouth nightly 90 tablet 1    isosorbide mononitrate (IMDUR) 30 MG extended release tablet Take 1 tablet by mouth daily 90 tablet 3    carvedilol (COREG) 25 MG tablet Take 1 tablet by mouth 2 times daily TAKE ONE TABLET BY MOUTH TWICE DAILY 60 tablet 5    lisinopril (PRINIVIL;ZESTRIL) 5 MG tablet Take 1 tablet by mouth daily 30 tablet 5    INSULIN SYRINGE 1CC/29G (AIMSCO INSULIN SYR ULTRA THIN) 29G X 1/2\" 1 ML MISC 1 each by Does not apply route 3 times daily 100 each 3    Insulin Pen Needle (UNIFINE PENTIPS) 31G X 5 MM MISC USE 1 EACH DAY AS NEEDED FOR TESTING 100 each 3    Multiple Vitamins-Minerals (THERAPEUTIC MULTIVITAMIN-MINERALS) tablet Take 1 tablet by mouth daily      clopidogrel (PLAVIX) 75 MG tablet Take 1 tablet by mouth daily (Patient not taking: Reported on 9/8/2021) 90 tablet 2     No facility-administered encounter medications on file as of 9/8/2021. Allergies:  Bactrim [sulfamethoxazole-trimethoprim] and Bee venom     Review of Systems   Constitutional: Negative. HENT: Negative. Eyes: Negative. Respiratory: Negative. Cardiovascular: Negative. Gastrointestinal: Negative. Genitourinary: Negative. Musculoskeletal: Negative. Skin: Negative. Neurological: Negative. Hematological: Negative. Psychiatric/Behavioral: Negative. BP (!) 140/80   Pulse 101   Ht 5' 10\" (1.778 m)   Wt 221 lb (100.2 kg)   SpO2 98%   BMI 31.71 kg/m²      Data:     ECG 3/10/2021: SR, occasional PVC, anterior fascicular block,  ms    Limited echo 1/10/2021:    Summary   Left ventricular systolic function is reduced with ejection fraction   estimated at 25-30 %. All remaining wall segments appear severely hypokinetic . Abnormal (paradoxical) septal motion is present likely due to pacemaker. There is hypokinesis of the mid and basal anterior and anteroseptal wall   segments. All remaining wall segments appear severely hypokinetic . Pacer / ICD wire is visualized in the right ventricle and right atrium. Echo 8- 25-30% EF severe global hypokinesis.     Stress test 3/8/2021:   Summary  Moderate to severely reduced LVEF 37%  Global hypokinesis with regional variation (severe inferoapical hypokinesis) Large area of distal anterior and inferoapical scar  No ischemia      CABG X3 8/15/2020:  1. Coronary artery bypass x3, LIMA to LAD, reverse saphenous vein to  the right coronary artery, reverse saphenous vein to OM1.  2. CM  3. DM  4. Incomplete LBBB    CARDIAC CATH 8/6/2020:   Multivessel CAD/ASHD  Will refer to CT surgery for consideration of CABG  Due to severe LV dysfunction, high risk PCI can be considered with Impella support devices as well as atherectomy if he is not felt to be a good surgical candidate. ECHO 8/4/2020:    Summary   Left ventricular systolic function is severely reduced with ejection   fraction estimated at 25-30 %. Severe global hypokinesis is present. Left ventricle size is normal.   Normal left ventricular wall thickness. Grade III diastolic dysfunction with elevated filing pressure. Mild posterior mitral annular calcification is present. No evidence of mitral valve stenosis. Severe mitral regurgitation. The left atrium is mildly dilated. No evidence of aortic valve stenosis. Mild aortic regurgitation is present. Mild tricuspid regurgitation. Normal systolic pulmonary artery pressure (SPAP) estimated at 39 mmHg (RA   pressure 8 mmHg). STRESS TEST 7/8/2020:    IMPRESSION:     1. No scintigraphic evidence of stress-induced myocardial ischemia. 2. Chronic inferoapical infarct. 3. Severe left ventricular dilatation with severe diffuse hypokinesis. 4. Severely reduced LVEF of approximately 20%. Objective:  Physical Exam   Constitutional: He is oriented to person, place, and time. He appears well-developed and well-nourished. HENT:   Head: Normocephalic and atraumatic. Eyes: Pupils are equal, round, and reactive to light. Neck: Normal range of motion. Cardiovascular: Normal rate, regular rhythm and normal heart sounds. Pulmonary/Chest: Effort normal and breath sounds normal.   Abdominal: Soft. No tenderness.    Musculoskeletal: Normal range of motion. He exhibits no edema. Neurological: He is alert and oriented to person, place, and time. Skin: Skin is warm and dry. Psychiatric: He has a normal mood and affect. Assessment:  1.  CAD, S/P CABG  2. NICM-he had a history of nonischemic cardiomyopathy, thought related to ethanol exposure. The etiology of his LV dysfunction is now less clear as he has significant coronary artery disease and has undergone coronary artery bypass surgery. Venogram showed occluded left subclavian  vein. Discussed surgical intervention for LV lead placement, but based on the QRS duration of only 130 ms, is not clear that the potential for benefit would justify exposure to the risk, discomfort, and expense of a surgical procedure. 3. DM- managed by PCP  4. Incomplete left bundle branch block- unchanged per ECG 3/10/2021.  today. Plan:  1. Continue to follow up with Dr. Jose Angel Gar for CHF  3. Continue medications as prescribed  4. Remote device checks every 3 months   5. Follow up in 6 months with EP NP and 12 months with me    QUALITY MEASURES  1. Tobacco Cessation Counseling: NA  2. Retake of BP if >140/90:   NA  3. Documentation to PCP/referring for new patient:  Sent to PCP at close of office visit  4. CAD patient on anti-platelet: Yes  5. CAD patient on STATIN therapy:  Yes  6. Patient with CHF and aFib on anticoagulation:  NA    I, Karolina Vang RN, am scribing for and in the presence of Dr. Albaro Azul. 09/08/21 9:50 AM  Karolina Vang RN    I, Dr. Albaro Azul, personally performed the services described in this documentation as scribed by Karolina Vang RN in my presence, and it is both accurate and complete.       Albaro Azul M.D.

## 2021-09-08 NOTE — PROGRESS NOTES
Patient presents to the device clinic today for a programming evaluation for hisd defibrillator. Patient has a history of ICM and NSVT. Takes Coreg, Plavix, and MagOx. Last device interrogation was on 6/15. Since then, no arrhythmias recorded. Optivol is increased.  <0.1%    All sensing and pacing parameters are within normal range. No changes need to be made at this time. Patient education was provided about device functionality, in home monitoring, and any other patient questions and/or concerns were addressed. Patient voices understanding. Please see interrogation for more detail. Patient will see Dr. Lainey Farmer today in office. Patient will follow up in 3 months in office or remotely. See P[employer]art report under the Cardiology tab.

## 2021-09-10 PROCEDURE — 93282 PRGRMG EVAL IMPLANTABLE DFB: CPT | Performed by: INTERNAL MEDICINE

## 2021-09-13 DIAGNOSIS — I42.6 ALCOHOLIC CARDIOMYOPATHY (HCC): ICD-10-CM

## 2021-09-13 DIAGNOSIS — I10 ESSENTIAL HYPERTENSION: ICD-10-CM

## 2021-09-13 DIAGNOSIS — E78.2 MIXED HYPERLIPIDEMIA: ICD-10-CM

## 2021-09-15 RX ORDER — CARVEDILOL 25 MG/1
TABLET ORAL
Qty: 180 TABLET | Refills: 3 | Status: SHIPPED | OUTPATIENT
Start: 2021-09-15 | End: 2022-08-18 | Stop reason: SDUPTHER

## 2021-10-14 RX ORDER — BLOOD SUGAR DIAGNOSTIC
STRIP MISCELLANEOUS
Qty: 100 EACH | Refills: 0 | Status: SHIPPED | OUTPATIENT
Start: 2021-10-14 | End: 2021-12-02

## 2021-10-18 RX ORDER — ATORVASTATIN CALCIUM 40 MG/1
40 TABLET, FILM COATED ORAL NIGHTLY
Qty: 90 TABLET | Refills: 1 | Status: SHIPPED | OUTPATIENT
Start: 2021-10-18 | End: 2022-07-14

## 2021-10-19 RX ORDER — DULAGLUTIDE 3 MG/.5ML
3 INJECTION, SOLUTION SUBCUTANEOUS WEEKLY
Qty: 2 ML | Refills: 0 | Status: SHIPPED | OUTPATIENT
Start: 2021-10-19 | End: 2021-11-15

## 2021-10-26 ENCOUNTER — TELEPHONE (OUTPATIENT)
Dept: ORTHOPEDIC SURGERY | Age: 56
End: 2021-10-26

## 2021-10-26 NOTE — TELEPHONE ENCOUNTER
Dear PCP Provider: [facility]) has partnered with [facility] to utilize predictive analytics to improve the care management for patients with arthritic knee pain. Utilizing claims data and patient visit features, we have developed an algorithmic risk score to determine which patient's quality of life may be most impacted to their knee pain. The selection criteria for this  program are: 1) risk score greater than .85; 2) existing [address] [place] [address] Floor patient; and 3) seen for knee pain in the past 3 years. Based upon the predictive analytics risk score  program, your patient has a risk score of greater than . 85 (i.e. 85% probability in having their quality of life impacted by their knee pain). To assist with care management of your patient, a navigator will be contacting them to understand their knee pain status and to educate on the [address] Pain Program, including scheduling an appointment with a joint specialist or their PCP. If you feel this patient not appropriate to be contacted given other medical reasons, please reply back to the navigator within 7 days with reason why not to be contacted. Otherwise, the navigator will follow up with you on the details of the patient encounter after the call.  Thank you for your support for this program.

## 2021-10-27 ENCOUNTER — OFFICE VISIT (OUTPATIENT)
Dept: INTERNAL MEDICINE CLINIC | Age: 56
End: 2021-10-27

## 2021-10-27 VITALS
HEART RATE: 70 BPM | DIASTOLIC BLOOD PRESSURE: 80 MMHG | RESPIRATION RATE: 12 BRPM | BODY MASS INDEX: 30.78 KG/M2 | SYSTOLIC BLOOD PRESSURE: 110 MMHG | HEIGHT: 70 IN | WEIGHT: 215 LBS

## 2021-10-27 DIAGNOSIS — Z95.810 AUTOMATIC IMPLANTABLE CARDIOVERTER-DEFIBRILLATOR IN SITU: ICD-10-CM

## 2021-10-27 DIAGNOSIS — Z79.4 TYPE 2 DIABETES MELLITUS WITHOUT COMPLICATION, WITH LONG-TERM CURRENT USE OF INSULIN (HCC): ICD-10-CM

## 2021-10-27 DIAGNOSIS — Z95.1 S/P CORONARY ARTERY BYPASS GRAFT X 3: ICD-10-CM

## 2021-10-27 DIAGNOSIS — Z79.4 TYPE 2 DIABETES MELLITUS WITHOUT COMPLICATION, WITH LONG-TERM CURRENT USE OF INSULIN (HCC): Primary | ICD-10-CM

## 2021-10-27 DIAGNOSIS — E11.9 TYPE 2 DIABETES MELLITUS WITHOUT COMPLICATION, WITH LONG-TERM CURRENT USE OF INSULIN (HCC): Primary | ICD-10-CM

## 2021-10-27 DIAGNOSIS — I10 PRIMARY HYPERTENSION: ICD-10-CM

## 2021-10-27 DIAGNOSIS — E11.9 TYPE 2 DIABETES MELLITUS WITHOUT COMPLICATION, WITH LONG-TERM CURRENT USE OF INSULIN (HCC): ICD-10-CM

## 2021-10-27 DIAGNOSIS — E78.2 MIXED HYPERLIPIDEMIA: ICD-10-CM

## 2021-10-27 DIAGNOSIS — I42.6 ALCOHOLIC CARDIOMYOPATHY (HCC): ICD-10-CM

## 2021-10-27 DIAGNOSIS — R05.8 ACE-INHIBITOR COUGH: ICD-10-CM

## 2021-10-27 DIAGNOSIS — T46.4X5A ACE-INHIBITOR COUGH: ICD-10-CM

## 2021-10-27 DIAGNOSIS — E03.9 ACQUIRED HYPOTHYROIDISM: ICD-10-CM

## 2021-10-27 PROCEDURE — G8417 CALC BMI ABV UP PARAM F/U: HCPCS | Performed by: INTERNAL MEDICINE

## 2021-10-27 PROCEDURE — 2022F DILAT RTA XM EVC RTNOPTHY: CPT | Performed by: INTERNAL MEDICINE

## 2021-10-27 PROCEDURE — 3044F HG A1C LEVEL LT 7.0%: CPT | Performed by: INTERNAL MEDICINE

## 2021-10-27 PROCEDURE — 3017F COLORECTAL CA SCREEN DOC REV: CPT | Performed by: INTERNAL MEDICINE

## 2021-10-27 PROCEDURE — 1036F TOBACCO NON-USER: CPT | Performed by: INTERNAL MEDICINE

## 2021-10-27 PROCEDURE — G8484 FLU IMMUNIZE NO ADMIN: HCPCS | Performed by: INTERNAL MEDICINE

## 2021-10-27 PROCEDURE — 99214 OFFICE O/P EST MOD 30 MIN: CPT | Performed by: INTERNAL MEDICINE

## 2021-10-27 PROCEDURE — G8427 DOCREV CUR MEDS BY ELIG CLIN: HCPCS | Performed by: INTERNAL MEDICINE

## 2021-10-27 RX ORDER — LOSARTAN POTASSIUM 25 MG/1
25 TABLET ORAL DAILY
Qty: 30 TABLET | Refills: 2 | Status: SHIPPED | OUTPATIENT
Start: 2021-10-27 | End: 2022-01-25

## 2021-10-27 ASSESSMENT — ENCOUNTER SYMPTOMS
RHINORRHEA: 0
BACK PAIN: 0
SHORTNESS OF BREATH: 0
WHEEZING: 0
NAUSEA: 0
VOMITING: 0
ABDOMINAL PAIN: 0

## 2021-10-27 NOTE — PROGRESS NOTES
Subjective:      Patient ID: Tiffanie Francisco is a 64 y.o. male. HPI     Patient is here for follow up. He has diabetes. His sugars are much improved. No hypoglycemia. Vision is stable. He has an eye exam scheduled. No polys. He has CAD. He is s/p CABG. He is doing well. He has a history of cardiomyopathy. He has an AICD device placed in 2014. Cardiomyopathy is related to alcohol abuse. He sees cardiology on a regular basis. He does not drink alcohol anymore. He has a dry cough, like a tickle in his throat since starting lisinopril. He has had it for years. He has a history of anemia. He has seen GI for this. He is also had neuropathic ulcers of his feet possibly from diabetic neuropathy. He is a former smoker. He sees podiatry. He has a history of osteomyelitis of the left and right foot. All the toes of his left foot are gone. He has lost one toe removed from his right toe-second toe. He has hyperlipidemia. His cholesterol is at goal.    He has microalbuminuria. Patient has hypothyroidism and takes [place] Thyroid for it. No diarrhea or constipation. Review of Systems   Constitutional: Negative for activity change and appetite change. HENT: Negative for postnasal drip and rhinorrhea. Respiratory: Negative for shortness of breath and wheezing. Cardiovascular: Negative for chest pain, palpitations and leg swelling. Gastrointestinal: Negative for abdominal pain, nausea and vomiting. Genitourinary: Negative for difficulty urinating and frequency. Musculoskeletal: Negative for back pain and joint swelling. Skin: Negative for rash. Neurological: Negative for light-headedness. Psychiatric/Behavioral: Negative for sleep disturbance.        Past Medical History:   Diagnosis Date    Acute osteomyelitis of left foot ([address] 9/27/2017    Acute osteomyelitis of right foot ([address] 4/25/2016    Acute respiratory failure ([address] 8/4/2020    Ascites     Blood Exostectomy left foot, ulcer debridement with graft application left foot    FOOT TENDON SURGERY Right 2016    FOOT TENDON SURGERY Left 06/30/2017    KNEE SURGERY Left     OTHER SURGICAL HISTORY Bilateral 9/17/15    amputation 2nd digit bilateral, flexor tenotomy right hallux    OTHER SURGICAL HISTORY Left 08/17/2017    PARTIAL LEFT FOOT AMPUTATION      OTHER SURGICAL HISTORY Left 12/07/2017    Debridement infected bone and tissue left foot; ulcer debridement left foot with graft application with dr mathur     OTHER SURGICAL HISTORY Right 01/04/2018    DEBRIDEMENT INFECTED BONE AND TISSUE RIGHT FOOT, ULCER DEBRIDEMENT RIGHT FOOT WITH GRAFT APPLICATION    OTHER SURGICAL HISTORY Left 11/01/2018    Procedure: PARTIAL RESECTION LEFT METATARSAL, ULCER DEBRIDEMENT LEFT FOOT WITH GRAFT APPLICATION     OTHER SURGICAL HISTORY       AMPUTATION LEFT FOURTH AND FIFTH DIGITS, PARTIAL RESECTION  SECOND AND THIRD METATARSAL LEFT FOOT, PARTIAL RESECTION RIGHT FIRST METATARSAL (Bilateral Foot)    [place] OFFICE/OUTPT VISIT,PROCEDURE ONLY Left 8/23/2018    ULCER DEBRIDEMENT LEFT FOOT WITH GRAFT APPLICATION performed by Zia Kessler DPM at [facility] TARSAL/METATARSAL Left 8/9/2018    PARTIAL FOOT AMPUTATION WITH GRAFT APPLICATION performed by Zia Kessler DPM at [facility] TARSAL/METATARSAL Left 11/1/2018    PARTIAL RESECTION LEFT METATARSAL, ULCER DEBRIDEMENT LEFT FOOT WITH GRAFT APPLICATION performed by Zia Kessler DPM at [facility] [place] [address] [address] TOE AMPUTATION      2 toes    TOE AMPUTATION Bilateral 3/18/2021    AMPUTATION LEFT FOURTH AND FIFTH DIGITS, PARTIAL RESECTION  SECOND AND THIRD METATARSAL LEFT FOOT, PARTIAL RESECTION RIGHT FIRST METATARSAL performed by Zia Kessler DPM at [address] TOE AMPUTATION Right 5/20/2021    PARTIAL RESECTION RIGHT FIRST METATARSAL performed by Zia Kessler DPM at [facility] OR       Family History   Problem Relation Age of Onset    Heart Disease Mother     High Blood Pressure Mother     High Cholesterol Mother     Diabetes Mother     Anemia Mother     Heart Disease Father     High Blood Pressure Father     High Cholesterol Father     Heart Disease Maternal Grandmother     High Blood Pressure Maternal Grandmother     High Cholesterol Maternal Grandmother     Cancer Maternal Grandmother         bone marrow & breast    Heart Disease Maternal Grandfather     High Blood Pressure Maternal Grandfather     High Cholesterol Maternal Grandfather     Cancer Maternal Grandfather         pancreatic CA    Heart Disease Paternal Grandmother     High Blood Pressure Paternal Grandmother     High Cholesterol Paternal Grandmother     Heart Disease Paternal Grandfather     High Blood Pressure Paternal Grandfather     High Cholesterol Paternal Grandfather     Stroke Brother     Heart Failure Brother     Heart Disease Brother     Diabetes type 2  Brother     Diabetes type 2  Brother     Diabetes type 2  Brother        Social History     Tobacco Use    Smoking status: Former Smoker     Packs/day: 0.25     Years: 1.00     Pack years: 0.25     Quit date: 1980     Years since quittin.7    Smokeless tobacco: Never Used   Vaping Use    Vaping Use: Never used   Substance Use Topics    Alcohol use: No     Alcohol/week: 0.0 standard drinks    Drug use: No         Current Outpatient Medications:     losartan (COZAAR) 25 MG tablet, Take 1 tablet by mouth daily, Disp: 30 tablet, Rfl: 2    TRULICITY 3 OA/1.2HM SOPN, Inject 3 mg into the skin once a week, Disp: 2 mL, Rfl: 0    atorvastatin (LIPITOR) 40 MG tablet, Take 1 tablet by mouth nightly, Disp: 90 tablet, Rfl: 1    ONETOUCH ULTRA strip, USE TO TEST SUGAR 4 TIMES DAILY, Disp: 100 each, Rfl: 0    carvedilol (COREG) 25 MG tablet, TAKE 1 TABLET BY MOUTH TWICE DAILY, Disp: 180 tablet, Rfl: 3    clopidogrel (PLAVIX) 75 MG tablet, Take 1 tablet by mouth daily (Patient not taking: Reported on 2021), Disp: 90 tablet, Rfl: 2    ASPIRIN LOW DOSE 81 MG EC tablet, Take 1 tablet by mouth daily, Disp: 90 tablet, Rfl: 2    OneTouch Delica Lancets 14F MISC, USE TO CHECK FOUR TIMES DAILY. DX;E11.9, Disp: 100 each, Rfl: 3    spironolactone (ALDACTONE) 25 MG tablet, Take 1 tablet by mouth daily, Disp: 30 tablet, Rfl: 11    magnesium oxide (MAG-OX) 400 (241.3 Mg) MG TABS tablet, TAKE 1 TABLET BY MOUTH 2 TIMES DAILY, Disp: 60 tablet, Rfl: 0    glipiZIDE (GLUCOTROL) 5 MG tablet, Take 1 tablet by mouth 2 times daily (before meals), Disp: 60 tablet, Rfl: 2    metFORMIN (GLUCOPHAGE) 1000 MG tablet, Take 1 tablet by mouth 2 times daily (with meals), Disp: 60 tablet, Rfl: 2    insulin glargine (LANTUS SOLOSTAR) 100 UNIT/ML injection pen, Inject 100 Units into the skin daily, Disp: 15 mL, Rfl: 2    Insulin Pen Needle (B-D ULTRAFINE III SHORT PEN) 31G X 8 MM MISC, 1 each by Does not apply route daily, Disp: 100 each, Rfl: 3    ARMOUR THYROID 60 MG tablet, Take 1 tablet by mouth daily, Disp: 30 tablet, Rfl: 6    Blood Glucose Monitoring Suppl (ONE TOUCH ULTRA 2) w/Device KIT, USE TO CHECK FOUR TIMES DAILY.   DX;E11.9, Disp: 1 kit, Rfl: 0    Continuous Blood Gluc Sensor (FREESTYLE MARY 14 DAY SENSOR) MISC, CGM, Disp: 2 each, Rfl: 2    furosemide (LASIX) 40 MG tablet, Take 1 tablet by mouth 2 times daily, Disp: 60 tablet, Rfl: 5    isosorbide mononitrate (IMDUR) 30 MG extended release tablet, Take 1 tablet by mouth daily, Disp: 90 tablet, Rfl: 3    INSULIN SYRINGE 1CC/29G (AIMSCO INSULIN SYR ULTRA THIN) 29G X 1/2\" 1 ML MISC, 1 each by Does not apply route 3 times daily, Disp: 100 each, Rfl: 3    Insulin Pen Needle (UNIFINE PENTIPS) 31G X 5 MM MISC, USE 1 EACH DAY AS NEEDED FOR TESTING, Disp: 100 each, Rfl: 3    Multiple Vitamins-Minerals (THERAPEUTIC MULTIVITAMIN-MINERALS) tablet, Take 1 tablet by mouth daily, Disp: , Rfl:     /80 (Site: Right Upper Arm, Position: Sitting, Cuff Size: Medium Adult)   Pulse 70   Resp 12   Ht 5' 10\" (1.778 m)   Wt 215 lb (97.5 kg)   BMI 30.85 kg/m²         Objective:   Physical Exam  Constitutional:       Appearance: He is well-developed. HENT:      Head: Normocephalic. Eyes:      Conjunctiva/sclera: Conjunctivae normal.      Pupils: Pupils are equal, round, and reactive to light. Neck:      Thyroid: No thyroid mass or thyromegaly. Vascular: No carotid bruit or JVD. Trachea: Trachea normal.   Cardiovascular:      Rate and Rhythm: Normal rate and regular rhythm. Heart sounds: Normal heart sounds. No murmur heard. No gallop. Pulmonary:      Effort: Pulmonary effort is normal. No respiratory distress. Breath sounds: Normal breath sounds. No wheezing or rales. Abdominal:      General: Bowel sounds are normal. There is no distension. Palpations: Abdomen is soft. There is no hepatomegaly, splenomegaly or mass. Tenderness: There is no abdominal tenderness. Musculoskeletal:         General: Normal range of motion. Cervical back: Normal range of motion and neck supple. Lymphadenopathy:      Cervical: No cervical adenopathy. Skin:     General: Skin is warm and dry. Findings: No rash. Neurological:      Mental Status: He is alert and oriented to person, place, and time. Cranial Nerves: No cranial nerve deficit. Deep Tendon Reflexes: Reflexes are normal and symmetric. Psychiatric:         Behavior: Behavior normal.         Thought Content: Thought content normal.         Judgment: Judgment normal.         Assessment:       Diagnosis Orders   1. Type 2 diabetes mellitus without complication, with long-term current use of insulin (HCC)  Hemoglobin A1C    CBC Auto Differential   2. Alcoholic cardiomyopathy     3. Primary hypertension     4. Automatic implantable cardioverter-defibrillator in situ     5. Mixed hyperlipidemia     6. Acquired hypothyroidism     7. S/P coronary artery bypass graft x 3     8.  ACE-inhibitor cough            Plan:

## 2021-10-28 LAB
BASOPHILS ABSOLUTE: 0.1 K/UL (ref 0–0.2)
BASOPHILS RELATIVE PERCENT: 0.8 %
EOSINOPHILS ABSOLUTE: 0.2 K/UL (ref 0–0.6)
EOSINOPHILS RELATIVE PERCENT: 2.3 %
ESTIMATED AVERAGE GLUCOSE: 157.1 MG/DL
HBA1C MFR BLD: 7.1 %
HCT VFR BLD CALC: 36.2 % (ref 40.5–52.5)
HEMOGLOBIN: 11.5 G/DL (ref 13.5–17.5)
LYMPHOCYTES ABSOLUTE: 1.6 K/UL (ref 1–5.1)
LYMPHOCYTES RELATIVE PERCENT: 20.9 %
MCH RBC QN AUTO: 26.2 PG (ref 26–34)
MCHC RBC AUTO-ENTMCNC: 31.8 G/DL (ref 31–36)
MCV RBC AUTO: 82.2 FL (ref 80–100)
MONOCYTES ABSOLUTE: 0.6 K/UL (ref 0–1.3)
MONOCYTES RELATIVE PERCENT: 7.7 %
NEUTROPHILS ABSOLUTE: 5.3 K/UL (ref 1.7–7.7)
NEUTROPHILS RELATIVE PERCENT: 68.3 %
PDW BLD-RTO: 19.6 % (ref 12.4–15.4)
PLATELET # BLD: 169 K/UL (ref 135–450)
PMV BLD AUTO: 7.6 FL (ref 5–10.5)
RBC # BLD: 4.4 M/UL (ref 4.2–5.9)
WBC # BLD: 7.8 K/UL (ref 4–11)

## 2021-11-03 ENCOUNTER — TELEPHONE (OUTPATIENT)
Dept: ORTHOPEDIC SURGERY | Age: 56
End: 2021-11-03

## 2021-11-03 NOTE — TELEPHONE ENCOUNTER
LVM for patient regarding the [address] [place] [address] Orthopedic joint pain program. Patient can call [phone] for more information or to schedule an appointment with a joint pain specialist.

## 2021-11-04 RX ORDER — INSULIN GLARGINE 100 [IU]/ML
100 INJECTION, SOLUTION SUBCUTANEOUS DAILY
Qty: 15 ML | Refills: 2 | Status: SHIPPED | OUTPATIENT
Start: 2021-11-04 | End: 2021-12-30

## 2021-11-16 RX ORDER — PEN NEEDLE, DIABETIC 32GX 5/32"
NEEDLE, DISPOSABLE MISCELLANEOUS
Qty: 100 EACH | Refills: 2 | Status: SHIPPED | OUTPATIENT
Start: 2021-11-16 | End: 2022-05-20

## 2021-11-16 RX ORDER — FUROSEMIDE 40 MG/1
40 TABLET ORAL 2 TIMES DAILY
Qty: 60 TABLET | Refills: 5 | Status: SHIPPED | OUTPATIENT
Start: 2021-11-16 | End: 2021-12-02

## 2021-12-02 ENCOUNTER — NURSE ONLY (OUTPATIENT)
Dept: CARDIOLOGY CLINIC | Age: 56
End: 2021-12-02
Payer: MEDICAID

## 2021-12-02 ENCOUNTER — OFFICE VISIT (OUTPATIENT)
Dept: CARDIOLOGY CLINIC | Age: 56
End: 2021-12-02
Payer: MEDICAID

## 2021-12-02 VITALS
BODY MASS INDEX: 31.85 KG/M2 | SYSTOLIC BLOOD PRESSURE: 100 MMHG | WEIGHT: 222.5 LBS | OXYGEN SATURATION: 98 % | DIASTOLIC BLOOD PRESSURE: 60 MMHG | HEART RATE: 97 BPM | HEIGHT: 70 IN

## 2021-12-02 DIAGNOSIS — Z79.899 MEDICATION MANAGEMENT: ICD-10-CM

## 2021-12-02 DIAGNOSIS — I21.4 NSTEMI (NON-ST ELEVATED MYOCARDIAL INFARCTION) (HCC): ICD-10-CM

## 2021-12-02 DIAGNOSIS — I25.5 ISCHEMIC CARDIOMYOPATHY: ICD-10-CM

## 2021-12-02 DIAGNOSIS — E78.2 MIXED HYPERLIPIDEMIA: ICD-10-CM

## 2021-12-02 DIAGNOSIS — I25.10 CORONARY ARTERY DISEASE INVOLVING NATIVE CORONARY ARTERY OF NATIVE HEART WITHOUT ANGINA PECTORIS: ICD-10-CM

## 2021-12-02 DIAGNOSIS — I47.29 NSVT (NONSUSTAINED VENTRICULAR TACHYCARDIA): ICD-10-CM

## 2021-12-02 DIAGNOSIS — I50.21 ACUTE SYSTOLIC CONGESTIVE HEART FAILURE (HCC): Primary | ICD-10-CM

## 2021-12-02 DIAGNOSIS — I42.6 ALCOHOLIC CARDIOMYOPATHY (HCC): ICD-10-CM

## 2021-12-02 DIAGNOSIS — I10 HYPERTENSION, UNSPECIFIED TYPE: ICD-10-CM

## 2021-12-02 DIAGNOSIS — Z95.810 ICD (IMPLANTABLE CARDIOVERTER-DEFIBRILLATOR) IN PLACE: ICD-10-CM

## 2021-12-02 PROCEDURE — 93282 PRGRMG EVAL IMPLANTABLE DFB: CPT | Performed by: INTERNAL MEDICINE

## 2021-12-02 PROCEDURE — G8417 CALC BMI ABV UP PARAM F/U: HCPCS | Performed by: INTERNAL MEDICINE

## 2021-12-02 PROCEDURE — G8427 DOCREV CUR MEDS BY ELIG CLIN: HCPCS | Performed by: INTERNAL MEDICINE

## 2021-12-02 PROCEDURE — 1036F TOBACCO NON-USER: CPT | Performed by: INTERNAL MEDICINE

## 2021-12-02 PROCEDURE — G8484 FLU IMMUNIZE NO ADMIN: HCPCS | Performed by: INTERNAL MEDICINE

## 2021-12-02 PROCEDURE — 99214 OFFICE O/P EST MOD 30 MIN: CPT | Performed by: INTERNAL MEDICINE

## 2021-12-02 PROCEDURE — 3017F COLORECTAL CA SCREEN DOC REV: CPT | Performed by: INTERNAL MEDICINE

## 2021-12-02 RX ORDER — BLOOD SUGAR DIAGNOSTIC
STRIP MISCELLANEOUS
Qty: 100 EACH | Refills: 0 | Status: SHIPPED | OUTPATIENT
Start: 2021-12-02 | End: 2022-08-18

## 2021-12-02 RX ORDER — SPIRONOLACTONE 25 MG/1
25 TABLET ORAL 2 TIMES DAILY
Qty: 60 TABLET | Refills: 11 | Status: SHIPPED | OUTPATIENT
Start: 2021-12-02

## 2021-12-02 RX ORDER — FUROSEMIDE 80 MG
80 TABLET ORAL 2 TIMES DAILY
Qty: 60 TABLET | Refills: 11 | Status: SHIPPED | OUTPATIENT
Start: 2021-12-02 | End: 2022-04-14

## 2021-12-02 NOTE — PROGRESS NOTES
Patient presents to the device clinic today for a programming evaluation for his defibrillator. Patient has a history of NSVT and ICM. Takes Coreg, Plavix, and MagOx. Last device interrogation was on 9/8. Since then, 1 NSVT event on 11/22 x 9 beats. Optivol elevated.  <0.1%    All sensing and pacing parameters are within normal range. No changes need to be made at this time. Patient education was provided about device functionality, in home monitoring, and any other patient questions and/or concerns were addressed. Patient voices understanding. Please see interrogation for more detail. Patient lacey see Dr. Danielle Chin today in office. Patient will follow up in 3 months in office or remotely. See Paceart report under the Cardiology tab.

## 2021-12-02 NOTE — PROGRESS NOTES
[facility] Office Note  12/2/2021     Subjective:  Mr. Filomena Wheatley presents today for follow up. CABG x 3. Non ischemic ( alcohol) and ischemic cardiomyopathy s/p CABG  ICD      [place]:  On 6/15/21 he reported feeling fine. He was taking lasix twice a day and tolerating this well. Patient denied chest pain, sob, palpitations, dizziness or syncope. Device check today 6.15.21 demonstrates normal function.  <0.1%. 1 NSVT episode. Optivol increased. He says weight is stable and has no shortness of breath. Today he reports that he has been having sob and edema in his lower legs. Edema 1+ in the lower right leg. He reports that he has been eating chips which has contributed to his edema. Crackles noted at the base of both lungs upon examination. He has been taking his diuretic twice a day for edema. He denies chest pain, dizziness and syncope. He is more bloated in belly has orthopnea. PMH:  Alcoholic CMP, ICD placement  by Dr. Fiona Garcia for non ischemic CM with low EF that failed to improve with guide line based medical therapy. He is no longer drinking ETOH products. On 01/31/19 he underwent left foot ulcer debridement. He works for [employer]. A left heart cath performed 8/6/20 demonstrated multivessel CAD. He then underwent a CABG x 3 on 8/11/2020. Device check on 10/7/20 showed He has  <0.1%. No VT events recorded since last office visit. Optivol previously increased. Battery life 5 years. Nightly HR >85 bpm x7 days. QRS duration was 130. He reports he has a left open wound ulcer on the bottom of his foot. He follows with Dr. Yanira Pryor for this for last year and half. Device check 3/10/21 demonstrated  <0.0%, no new events. Review of Systems: 12 point ROS negative in all areas as listed below except as in [place]  Constitutional, EENT,pulmonary, GI, ,  skin, neurological, hematological, endocrine, Psychiatric    Reviewed past medical history, social, and family history.    No alcohol nonsmoker  HE  IS DISABLED  Mother: Heart disease, MI x4, young age. Father: Heart disease, CABG age 67.    Siblings: brother  age 40 of CHF, CVA  Past Medical History:   Diagnosis Date    Acute osteomyelitis of left foot ([address] 2017    Acute osteomyelitis of right foot ([address] 2016    Acute respiratory failure ([address] 2020    Ascites     Blood transfusion reaction     Burst fracture of lumbar vertebra ([address] 2012    Cellulitis of left foot     Cellulitis of right lower extremity ,     Community acquired pneumonia     Diabetes ([address]     Diabetic ulcer of left foot associated with type 2 diabetes mellitus, with muscle involvement without evidence of necrosis ([address] 2017    Diabetic ulcer of right foot ([address] early     Diabetic ulcer of 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 2 diabetes mellitus, with necrosis of bone ([address]     ETOH abuse     Fracture of tibial plateau     High cholesterol     History of blood transfusion     reaction    HTN (hypertension)     Hx of blood clots     MI (myocardial infarction) ([address] 2020    + Troponins    MRSA (methicillin resistant staph aureus) culture positive 16, 16    foot wound    Neuropathic ulcer of left foot, limited to breakdown of skin ([address] 11/3/2016    Neuropathic ulcer of toe ([address] 2014    NSVT (nonsustained ventricular tachycardia) ([address]     Pleural effusion due to congestive heart failure ([address]     Septicemia ([address]     Smoker     quit [id number]    Systolic CHF, acute ([address] 2013    Thyroid disease      Past Surgical History:   Procedure Laterality Date    CARDIAC DEFIBRILLATOR PLACEMENT  2014    ICD Placement    CORONARY ARTERY BYPASS GRAFT N/A 2020    CORONARY ARTERY BYPASS GRAFTING X3, LEFT ATRIAL APPENDAGE CLIP, INTERNAL MAMMARY ARTERY, SAPHENOUS VEIN GRAFT, ON PUMP, 5 LEVEL BILATERAL INTERCOSTAL NERVE BLOCK performed by Jean Claude Benitez MD at [address] [facility] SURGERY      FOOT AMPUTATION Left 08/09/2018    PARTIAL FOOT AMPUTATION w. Dr Tim Warren Left 1/31/2019    EXOSTECTOMY LEFT FOOT, ULCER DEBRIDEMENT LEFT FOOT WITH GRAFT APPLICATION performed by Betsy Gillis DPM at [address] Left 09/27/2017    LEFT FOOT ULCER DEBRIDEMENT, POSSIBLE SECOND METATARSAL    FOOT SURGERY Left 01/31/2019    Exostectomy left foot, ulcer debridement with graft application left foot    FOOT TENDON SURGERY Right 2016    FOOT TENDON SURGERY Left 06/30/2017    KNEE SURGERY Left     OTHER SURGICAL HISTORY Bilateral 9/17/15    amputation 2nd digit bilateral, flexor tenotomy right hallux    OTHER SURGICAL HISTORY Left 08/17/2017    PARTIAL LEFT FOOT AMPUTATION      OTHER SURGICAL HISTORY Left 12/07/2017    Debridement infected bone and tissue left foot; ulcer debridement left foot with graft application with dr mathur     OTHER SURGICAL HISTORY Right 01/04/2018    DEBRIDEMENT INFECTED BONE AND TISSUE RIGHT FOOT, ULCER DEBRIDEMENT RIGHT FOOT WITH GRAFT APPLICATION    OTHER SURGICAL HISTORY Left 11/01/2018    Procedure: PARTIAL RESECTION LEFT METATARSAL, ULCER DEBRIDEMENT LEFT FOOT WITH GRAFT APPLICATION     OTHER SURGICAL HISTORY       AMPUTATION LEFT FOURTH AND FIFTH DIGITS, PARTIAL RESECTION  SECOND AND THIRD METATARSAL LEFT FOOT, PARTIAL RESECTION RIGHT FIRST METATARSAL (Bilateral Foot)    MD OFFICE/OUTPT VISIT,PROCEDURE ONLY Left 8/23/2018    ULCER DEBRIDEMENT LEFT FOOT WITH GRAFT APPLICATION performed by Betsy Gillis DPM at [facility] TARSAL/METATARSAL Left 8/9/2018    PARTIAL FOOT AMPUTATION WITH GRAFT APPLICATION performed by Betsy Gillis DPM at [facility] TARSAL/METATARSAL Left 11/1/2018    PARTIAL RESECTION LEFT METATARSAL, ULCER DEBRIDEMENT LEFT FOOT WITH GRAFT APPLICATION performed by Betsy Gillis DPM at [facility] TOE AMPUTATION      2 toes    TOE AMPUTATION Bilateral 3/18/2021    AMPUTATION LEFT FOURTH AND FIFTH DIGITS, PARTIAL RESECTION  SECOND AND THIRD METATARSAL LEFT FOOT, PARTIAL RESECTION RIGHT FIRST METATARSAL performed by Geraldo Medina DPM at [address] Right 5/20/2021    PARTIAL RESECTION RIGHT FIRST METATARSAL performed by Geraldo Medina DPM at [facility] OR       Objective:   /60   Pulse 97   Ht 5' 10\" (1.778 m)   Wt 222 lb 8 oz (100.9 kg)   SpO2 98%   BMI 31.93 kg/m²     Wt Readings from Last 3 Encounters:   12/02/21 222 lb 8 oz (100.9 kg)   10/27/21 215 lb (97.5 kg)   09/08/21 221 lb (100.2 kg)       Physical Exam:  General: No Respiratory distress, appears well developed and well nourished. Eyes:  Sclera nonicteric  Nose/Sinuses:  negative findings: nose shows no deformity, asymmetry, or inflammation, nasal mucosa normal, septum midline with no perforation or bleeding  Back:  no pain to palpation  Joint:  no active joint inflammation  Musculoskeletal:  negative  Skin:  Warm and dry  Neck: No JVD and no Carotid Bruits. Chest:  Bilateral crackles at bases ,  respiration easy  Cardiovascular:  RRR, 100 bpm S1S2 normal, no murmur, no rub or thrill. Extremities  Left foot all toes and right big toe removed related to diabetes and non healing ulcers.       no clubbing or cyanosis  Abdomen  Liver 3 inches below costal margin  Pulses: Femoral pulses are 2+ Neuro: intact    Medications:   Outpatient Encounter Medications as of 12/2/2021   Medication Sig Dispense Refill    furosemide (LASIX) 80 MG tablet Take 1 tablet by mouth 2 times daily 1 tab 2 times a day 60 tablet 11    spironolactone (ALDACTONE) 25 MG tablet Take 1 tablet by mouth 2 times daily 60 tablet 11    metFORMIN (GLUCOPHAGE) 1000 MG tablet Take 1 tablet by mouth 2 times daily (with meals) 60 tablet 2    BD PEN NEEDLE WINNIE U/F 32G X 4 MM MISC 1 each by Does not apply route daily 771 each 2    TRULICITY 3 EG/8.4BG SOPN Inject 3 mg into the skin once a week 2 mL 2    LANTUS SOLOSTAR 100 UNIT/ML injection pen Inject 100 Units into the skin daily 15 mL 2    magnesium oxide (MAG-OX) 400 (241.3 Mg) MG TABS tablet TAKE 1 TABLET BY MOUTH 2 TIMES DAILY 60 tablet 0    losartan (COZAAR) 25 MG tablet Take 1 tablet by mouth daily 30 tablet 2    atorvastatin (LIPITOR) 40 MG tablet Take 1 tablet by mouth nightly 90 tablet 1    ONETOUCH ULTRA strip USE TO TEST SUGAR 4 TIMES DAILY 100 each 0    carvedilol (COREG) 25 MG tablet TAKE 1 TABLET BY MOUTH TWICE DAILY 180 tablet 3    ASPIRIN LOW DOSE 81 MG EC tablet Take 1 tablet by mouth daily 90 tablet 2    OneTouch Delica Lancets 86V MISC USE TO CHECK FOUR TIMES DAILY. DX;E11.9 100 each 3    glipiZIDE (GLUCOTROL) 5 MG tablet Take 1 tablet by mouth 2 times daily (before meals) 60 tablet 2    ARMOUR THYROID 60 MG tablet Take 1 tablet by mouth daily 30 tablet 6    Blood Glucose Monitoring Suppl (ONE TOUCH ULTRA 2) w/Device KIT USE TO CHECK FOUR TIMES DAILY. DX;E11.9 1 kit 0    Continuous Blood Gluc Sensor (SecurlySTYLE MARY 14 DAY SENSOR) MISC CGM 2 each 2    isosorbide mononitrate (IMDUR) 30 MG extended release tablet Take 1 tablet by mouth daily 90 tablet 3    INSULIN SYRINGE 1CC/29G (Load DynamiXCO INSULIN SYR ULTRA THIN) 29G X 1/2\" 1 ML MISC 1 each by Does not apply route 3 times daily 100 each 3    Insulin Pen Needle (UNIFINE PENTIPS) 31G X 5 MM MISC USE 1 EACH DAY AS NEEDED FOR TESTING 100 each 3    Multiple Vitamins-Minerals (THERAPEUTIC MULTIVITAMIN-MINERALS) tablet Take 1 tablet by mouth daily      [DISCONTINUED] furosemide (LASIX) 40 MG tablet Take 1 tablet by mouth 2 times daily (Patient taking differently: Take 40 mg by mouth 2 times daily Pt taking 80 mg for increased swelling) 60 tablet 5    clopidogrel (PLAVIX) 75 MG tablet Take 1 tablet by mouth daily (Patient not taking: Reported on 9/8/2021) 90 tablet 2    [DISCONTINUED] spironolactone (ALDACTONE) 25 MG tablet Take 1 tablet by mouth daily 30 tablet 11     No facility-administered encounter medications on file as of 12/2/2021. Lab Data:  CBC: No results for input(s): WBC, HGB, HCT, MCV, PLT in the last 72 hours. BMP: No results for input(s): NA, K, CL, CO2, PHOS, BUN, CREATININE in the last 72 hours. Invalid input(s): CA  LIVER PROFILE: No results for input(s): AST, ALT, LIPASE, BILIDIR, BILITOT, ALKPHOS in the last 72 hours. Invalid input(s): AMYLASE,  ALB  LIPID:   No components found for: CHLPL  Lab Results   Component Value Date    TRIG 248 (H) 08/11/2020    TRIG 176 (H) 10/16/2019    TRIG 415 (H) 01/03/2018     Lab Results   Component Value Date    HDL 26 (L) 06/16/2021    HDL 23 (L) 08/11/2020    HDL 30 (L) 05/26/2020     Lab Results   Component Value Date    LDLCALC 12 06/16/2021    LDLCALC 35 08/11/2020    LDLCALC see below 05/26/2020     Lab Results   Component Value Date    LABVLDL 38 06/16/2021    LABVLDL 50 08/11/2020    LABVLDL see below 05/26/2020     PT/INR: No results for input(s): PROTIME, INR in the last 72 hours. A1C:   Lab Results   Component Value Date    LABA1C 7.1 10/27/2021       IMAGING:   Device check 12.4.21  Normal device function   optivol up  ECHO 1/20/21  Conclusions      Summary   Left ventricular systolic function is reduced with ejection fraction   estimated at 25-30 %. All remaining wall segments appear severely hypokinetic . Abnormal (paradoxical) septal motion is present likely due to pacemaker. There is hypokinesis of the mid and basal anterior and anteroseptal wall   segments. All remaining wall segments appear severely hypokinetic . Pacer / ICD wire is visualized in the right ventricle and right atrium. Echo 8- 25-30% EF severe global hypokinesis. I have reviewed the following tests and documented in this encounter as follows:   Discussed with patient.     EKG 4/6/21  Normal sinus rhythmLeft axis deviationCannot rule out Anterior infarct     Lexiscan Stress Test 3/8/2021  Summary Moderate to severely reduced LVEF 37%  Global hypokinesis with regional variation (severe inferoapical hypokinesis)  Large area of distal anterior and inferoapical scar. No ischemia     EKG 2/11/21  SR,HR 80    EKG 8/4/2020  Sinus tachycardia Intra-ventricular conduction delaySeptal infarct , age undeterminedAbnormal ECGNo significant change was foundWhen compared with ECG of1.31.19Confirmed by GIL HUFFMAN, [address] ([id number]) on 8/4/2020 6:51:55 AM     CABG:  Surgeon:  Leo Mobley     S/P :  CABG x3, AIDEN clip on 8/11/20    Chest Xray 1/15/2021  Heart is enlarged but unchanged. Pulmonary vessels are upper normal.  No acute or focal airspace disease. Sternotomy wire and single lead left subclavian device noted. No pneumothorax or pleural effusion. Stable moderate cardiomegaly. CHEST XRAY 8/13/2020  FINDINGS: Recent surgical history of CABG. Left vascular sheath remains in place but [place]-Seth catheter has been removed. ICD stable on the left. Improving aeration in the left lung base with some residual pleural fluid and airspace change. Cardiomegaly is demonstrated. No skeletal finding. Limited Echo 1/20/21   Summary   Left ventricular systolic function is reduced with ejection fraction   estimated at 25-30 %. All remaining wall segments appear severely hypokinetic . Abnormal (paradoxical) septal motion is present likely due to pacemaker. There is hypokinesis of the mid and basal anterior and anteroseptal wall   segments. All remaining wall segments appear severely hypokinetic . Pacer / ICD wire is visualized in the right ventricle and right atrium. Echo 8- 25-30% EF severe global hypokinesis. ECHO 8/5/2020   Summary   Left ventricular systolic function is severely reduced with ejection   fraction estimated at 25-30 %. Severe global hypokinesis is present. Left ventricle size is normal.   Normal left ventricular wall thickness. Grade III diastolic dysfunction with elevated filing pressure. Mild posterior mitral annular calcification is present.    No evidence of mitral valve stenosis. Severe mitral regurgitation. The left atrium is mildly dilated. No evidence of aortic valve stenosis. Mild aortic regurgitation is present. Mild tricuspid regurgitation. Normal systolic pulmonary artery pressure (SPAP) estimated at 39 mmHg (RA   pressure 8 mmHg). Stress test: 7/13:   No scintigraphic evidence of stress-induced myocardial  ischemia. 2. Chronic inferoapical infarct. 3. Severe left ventricular dilatation with severe diffuse  hypokinesis. 4. Severely reduced LVEF of approximately 20%. Assessment:  Acute on chronic systolic CHF  Encounter Diagnoses   Name Primary?  Alcoholic cardiomyopathy     Hypertension, unspecified type     Mixed hyperlipidemia     [facility]ust[facility]     NSTEMI (non-ST elevated myocardial infarction) [facility]     Coronary artery disease involving native coronary artery of native heart without angina pectoris     Ischemic cardiomyopathy     Medication management     Acute systolic congestive heart failure ([facility] Yes     Diagnoses and associated orders for this visit:  CAD s/p CABG 8.11.20   on DAPT and statin   Alcoholic cardiomyopathy,    On Coreg  Lasix aldactone and lisinopril  Hypertension, controlled     Automatic implantable cardiac defibrillator in situ recent check today normal function          Plan:  1. BMP two weeks after increasing lasix  2. Goal to loose 6-7 pounds lbs in 3 to 4 days to 10 days  3. Take lasix 80 mg 1 tab twice  daily  4. Take spironolactone 25 mg tab 2 times a day  5. No more than 8 cups of fluid a day, low salt diet  6. Follow up with me in 1 month  7. If no better over 7-10 days will have to be admitted for in patient diuresis          Scribe's attestation: This note was scribed in the presence of Anna Baca LPN          QUALITY MEASURES  1. Tobacco Cessation Counseling: NA  2. Retake of BP if >140/90:   NA  3.  Documentation to PCP/referring for new patient:  Sent to PCP at close of office visit  4. CAD patient on anti-platelet: Yes DAPT  5. CAD patient on STATIN therapy:  Yes  6. Patient with CHF and aFib on anticoagulation:  NA     I, Dr. Lola Hansen, personally performed the services described in this documentation, as scribed by the above signed scribe in my presence. It is both accurate and complete to my knowledge. I agree with the details independently gathered by the clinical support staff, while the remaining scribed note accurately describes my personal service to the patient.                 Lola Hansen MD 12/2/2021 10:34 AM  [address]  [phone] [facility] office  [phone] [facility]

## 2021-12-02 NOTE — LETTER
December 2,2021  Midge Danger  1965        [address] Office Note  12/2/2021     Subjective:  Mr. Mari Bonilla presents today for follow up. CABG x 3. Non ischemic ( alcohol) and ischemic cardiomyopathy s/p CABG  ICD      [place]:  On 6/15/21 he reported feeling fine. He was taking lasix twice a day and tolerating this well. Patient denied chest pain, sob, palpitations, dizziness or syncope. Device check today 6.15.21 demonstrates normal function.  <0.1%. 1 NSVT episode. Optivol increased. He says weight is stable and has no shortness of breath. Today he reports that he has been having sob and edema in his lower legs. Edema 1+ in the lower right leg. He reports that he has been eating chips which has contributed to his edema. Crackles noted at the base of both lungs upon examination. He has been taking his diuretic twice a day for edema. He denies chest pain, dizziness and syncope. He is more bloated in belly has orthopnea. PMH:  Alcoholic CMP, ICD placement  by Dr. Liz Stauffer for non ischemic CM with low EF that failed to improve with guide line based medical therapy. He is no longer drinking ETOH products. On 01/31/19 he underwent left foot ulcer debridement. He works for [employer]. A left heart cath performed 8/6/20 demonstrated multivessel CAD. He then underwent a CABG x 3 on 8/11/2020. Device check on 10/7/20 showed He has  <0.1%. No VT events recorded since last office visit. Optivol previously increased. Battery life 5 years. Nightly HR >85 bpm x7 days. QRS duration was 130. He reports he has a left open wound ulcer on the bottom of his foot. He follows with Dr. Johana Kothari for this for last year and half. Device check 3/10/21 demonstrated  <0.0%, no new events.     Review of Systems: 12 point ROS negative in all areas as listed below except as in [place]  Constitutional, EENT,pulmonary, GI, ,  skin, neurological, hematological, endocrine, Psychiatric    Reviewed past medical history, social, and family history. No alcohol nonsmoker  HE  IS DISABLED  Mother: Heart disease, MI x4, young age. Father: Heart disease, CABG age 67.    Siblings: brother  age 40 of CHF, CVA  Past Medical History:   Diagnosis Date    Acute osteomyelitis of left foot ([address] 2017    Acute osteomyelitis of right foot ([address] 2016    Acute respiratory failure ([address] 2020    Ascites     Blood transfusion reaction     Burst fracture of lumbar vertebra ([address] 2012    Cellulitis of left foot     Cellulitis of right lower extremity ,     Community acquired pneumonia     Diabetes ([address]     Diabetic ulcer of left foot associated with type 2 diabetes mellitus, with muscle involvement without evidence of necrosis ([address] 2017    Diabetic ulcer of right foot ([address] early     Diabetic ulcer of 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 2 diabetes mellitus, with necrosis of bone ([address]     ETOH abuse     Fracture of tibial plateau     High cholesterol     History of blood transfusion     reaction    HTN (hypertension)     Hx of blood clots     MI (myocardial infarction) ([address] 2020    + Troponins    MRSA (methicillin resistant staph aureus) culture positive 16, 16    foot wound    Neuropathic ulcer of left foot, limited to breakdown of skin ([address] 11/3/2016    Neuropathic ulcer of toe ([address] 2014    NSVT (nonsustained ventricular tachycardia) ([address]     Pleural effusion due to congestive heart failure ([address]     Septicemia ([address]     Smoker     quit [id number]    Systolic CHF, acute ([address] 2013    Thyroid disease      Past Surgical History:   Procedure Laterality Date    CARDIAC DEFIBRILLATOR PLACEMENT  2014    ICD Placement    CORONARY ARTERY BYPASS GRAFT N/A 2020    CORONARY ARTERY BYPASS GRAFTING X3, LEFT ATRIAL APPENDAGE CLIP, INTERNAL MAMMARY ARTERY, SAPHENOUS VEIN GRAFT, ON PUMP, 5 LEVEL BILATERAL INTERCOSTAL NERVE BLOCK performed by Megan Phillip MD at [address] [place] [address] Left 08/09/2018    PARTIAL FOOT AMPUTATION w. Dr Melida Ng Left 1/31/2019    EXOSTECTOMY LEFT FOOT, ULCER DEBRIDEMENT LEFT FOOT WITH GRAFT APPLICATION performed by Sophie Calderón DPM at [place] [address] Left 09/27/2017    LEFT FOOT ULCER DEBRIDEMENT, POSSIBLE SECOND METATARSAL    FOOT SURGERY Left 01/31/2019    Exostectomy left foot, ulcer debridement with graft application left foot    FOOT TENDON SURGERY Right 2016    FOOT TENDON SURGERY Left 06/30/2017    KNEE SURGERY Left     OTHER SURGICAL HISTORY Bilateral 9/17/15    amputation 2nd digit bilateral, flexor tenotomy right hallux    OTHER SURGICAL HISTORY Left 08/17/2017    PARTIAL LEFT FOOT AMPUTATION      OTHER SURGICAL HISTORY Left 12/07/2017    Debridement infected bone and tissue left foot; ulcer debridement left foot with graft application with dr mathur     OTHER SURGICAL HISTORY Right 01/04/2018    DEBRIDEMENT INFECTED BONE AND TISSUE RIGHT FOOT, ULCER DEBRIDEMENT RIGHT FOOT WITH GRAFT APPLICATION    OTHER SURGICAL HISTORY Left 11/01/2018    Procedure: PARTIAL RESECTION LEFT METATARSAL, ULCER DEBRIDEMENT LEFT FOOT WITH GRAFT APPLICATION     OTHER SURGICAL HISTORY       AMPUTATION LEFT FOURTH AND FIFTH DIGITS, PARTIAL RESECTION  SECOND AND THIRD METATARSAL LEFT FOOT, PARTIAL RESECTION RIGHT FIRST METATARSAL (Bilateral Foot)    NM OFFICE/OUTPT VISIT,PROCEDURE ONLY Left 8/23/2018    ULCER DEBRIDEMENT LEFT FOOT WITH GRAFT APPLICATION performed by Sophie Calderón DPM at [facility] TARSAL/METATARSAL Left 8/9/2018    PARTIAL FOOT AMPUTATION WITH GRAFT APPLICATION performed by Sophie Calderón DPM at [facility] TARSAL/METATARSAL Left 11/1/2018    PARTIAL RESECTION LEFT METATARSAL, ULCER DEBRIDEMENT LEFT FOOT WITH GRAFT APPLICATION performed by Sophie Calderón DPM at [facility]    TOE AMPUTATION      2 toes    TOE AMPUTATION Bilateral 3/18/2021    AMPUTATION LEFT FOURTH AND FIFTH DIGITS, PARTIAL RESECTION  SECOND AND THIRD METATARSAL LEFT FOOT, PARTIAL RESECTION RIGHT FIRST METATARSAL performed by Jayne Knott DPM at [facility] [address] Right 5/20/2021    PARTIAL RESECTION RIGHT FIRST METATARSAL performed by Jayne Knott DPM at [facility] OR       Objective:   /60   Pulse 97   Ht 5' 10\" (1.778 m)   Wt 222 lb 8 oz (100.9 kg)   SpO2 98%   BMI 31.93 kg/m²     Wt Readings from Last 3 Encounters:   12/02/21 222 lb 8 oz (100.9 kg)   10/27/21 215 lb (97.5 kg)   09/08/21 221 lb (100.2 kg)       Physical Exam:  General: No Respiratory distress, appears well developed and well nourished. Eyes:  Sclera nonicteric  Nose/Sinuses:  negative findings: nose shows no deformity, asymmetry, or inflammation, nasal mucosa normal, septum midline with no perforation or bleeding  Back:  no pain to palpation  Joint:  no active joint inflammation  Musculoskeletal:  negative  Skin:  Warm and dry  Neck: No JVD and no Carotid Bruits. Chest:  Bilateral crackles at bases ,  respiration easy  Cardiovascular:  RRR, 100 bpm S1S2 normal, no murmur, no rub or thrill. Extremities  Left foot all toes and right big toe removed related to diabetes and non healing ulcers.       no clubbing or cyanosis  Abdomen  Liver 3 inches below costal margin  Pulses: Femoral pulses are 2+ Neuro: intact    Medications:   Outpatient Encounter Medications as of 12/2/2021   Medication Sig Dispense Refill    furosemide (LASIX) 80 MG tablet Take 1 tablet by mouth 2 times daily 1 tab 2 times a day 60 tablet 11    spironolactone (ALDACTONE) 25 MG tablet Take 1 tablet by mouth 2 times daily 60 tablet 11    metFORMIN (GLUCOPHAGE) 1000 MG tablet Take 1 tablet by mouth 2 times daily (with meals) 60 tablet 2    BD PEN NEEDLE WINNIE U/F 32G X 4 MM MISC 1 each by Does not apply route daily 599 each 2    TRULICITY 3 MT/8.9DZ SOPN Inject 3 mg into the skin once a week 2 mL 2  LANTUS SOLOSTAR 100 UNIT/ML injection pen Inject 100 Units into the skin daily 15 mL 2    magnesium oxide (MAG-OX) 400 (241.3 Mg) MG TABS tablet TAKE 1 TABLET BY MOUTH 2 TIMES DAILY 60 tablet 0    losartan (COZAAR) 25 MG tablet Take 1 tablet by mouth daily 30 tablet 2    atorvastatin (LIPITOR) 40 MG tablet Take 1 tablet by mouth nightly 90 tablet 1    ONETOUCH ULTRA strip USE TO TEST SUGAR 4 TIMES DAILY 100 each 0    carvedilol (COREG) 25 MG tablet TAKE 1 TABLET BY MOUTH TWICE DAILY 180 tablet 3    ASPIRIN LOW DOSE 81 MG EC tablet Take 1 tablet by mouth daily 90 tablet 2    OneTouch Delica Lancets 29X MISC USE TO CHECK FOUR TIMES DAILY. DX;E11.9 100 each 3    glipiZIDE (GLUCOTROL) 5 MG tablet Take 1 tablet by mouth 2 times daily (before meals) 60 tablet 2    ARMOUR THYROID 60 MG tablet Take 1 tablet by mouth daily 30 tablet 6    Blood Glucose Monitoring Suppl (ONE TOUCH ULTRA 2) w/Device KIT USE TO CHECK FOUR TIMES DAILY.   DX;E11.9 1 kit 0    Continuous Blood Gluc Sensor (FREESTYLE MARY 14 DAY SENSOR) MISC CGM 2 each 2    isosorbide mononitrate (IMDUR) 30 MG extended release tablet Take 1 tablet by mouth daily 90 tablet 3    INSULIN SYRINGE 1CC/29G (eTutorCO INSULIN SYR ULTRA THIN) 29G X 1/2\" 1 ML MISC 1 each by Does not apply route 3 times daily 100 each 3    Insulin Pen Needle (UNIFINE PENTIPS) 31G X 5 MM MISC USE 1 EACH DAY AS NEEDED FOR TESTING 100 each 3    Multiple Vitamins-Minerals (THERAPEUTIC MULTIVITAMIN-MINERALS) tablet Take 1 tablet by mouth daily      [DISCONTINUED] furosemide (LASIX) 40 MG tablet Take 1 tablet by mouth 2 times daily (Patient taking differently: Take 40 mg by mouth 2 times daily Pt taking 80 mg for increased swelling) 60 tablet 5    clopidogrel (PLAVIX) 75 MG tablet Take 1 tablet by mouth daily (Patient not taking: Reported on 9/8/2021) 90 tablet 2    [DISCONTINUED] spironolactone (ALDACTONE) 25 MG tablet Take 1 tablet by mouth daily 30 tablet 11     No severely reduced LVEF 37%  Global hypokinesis with regional variation (severe inferoapical hypokinesis)  Large area of distal anterior and inferoapical scar. No ischemia     EKG 2/11/21  SR,HR 80    EKG 8/4/2020  Sinus tachycardia Intra-ventricular conduction delaySeptal infarct , age undeterminedAbnormal ECGNo significant change was foundWhen compared with ECG of1.31.19Confirmed by GIL HUFFMAN, [address] ([id number]) on 8/4/2020 6:51:55 AM     CABG:  Surgeon:  Chris Ponce     S/P :  CABG x3, AIDEN clip on 8/11/20    Chest Xray 1/15/2021  Heart is enlarged but unchanged. Pulmonary vessels are upper normal.  No acute or focal airspace disease. Sternotomy wire and single lead left subclavian device noted. No pneumothorax or pleural effusion. Stable moderate cardiomegaly. CHEST XRAY 8/13/2020  FINDINGS: Recent surgical history of CABG. Left vascular sheath remains in place but [place] catheter has been removed. ICD stable on the left. Improving aeration in the left lung base with some residual pleural fluid and airspace change. Cardiomegaly is demonstrated. No skeletal finding. Limited Echo 1/20/21   Summary   Left ventricular systolic function is reduced with ejection fraction   estimated at 25-30 %. All remaining wall segments appear severely hypokinetic . Abnormal (paradoxical) septal motion is present likely due to pacemaker. There is hypokinesis of the mid and basal anterior and anteroseptal wall   segments. All remaining wall segments appear severely hypokinetic . Pacer / ICD wire is visualized in the right ventricle and right atrium. Echo 8- 25-30% EF severe global hypokinesis. ECHO 8/5/2020   Summary   Left ventricular systolic function is severely reduced with ejection   fraction estimated at 25-30 %. Severe global hypokinesis is present. Left ventricle size is normal.   Normal left ventricular wall thickness. Grade III diastolic dysfunction with elevated filing pressure.    Mild posterior mitral annular calcification is present. No evidence of mitral valve stenosis. Severe mitral regurgitation. The left atrium is mildly dilated. No evidence of aortic valve stenosis. Mild aortic regurgitation is present. Mild tricuspid regurgitation. Normal systolic pulmonary artery pressure (SPAP) estimated at 39 mmHg (RA   pressure 8 mmHg). Stress test: 7/13:   No scintigraphic evidence of stress-induced myocardial  ischemia. 2. Chronic inferoapical infarct. 3. Severe left ventricular dilatation with severe diffuse  hypokinesis. 4. Severely reduced LVEF of approximately 20%. Assessment:  Acute on chronic systolic CHF  Encounter Diagnoses   Name Primary?  Alcoholic cardiomyopathy     Hypertension, unspecified type     Mixed hyperlipidemia     NSVT [facility]nonsustained ventricular tachycardia) [facility]     NSTEMI (non-ST elevated myocardial infarction) [facility]     Coronary artery disease involving native coronary artery of native heart without angina pectoris     Ischemic cardiomyopathy     Medication management     Acute systolic congestive heart failure ([facility] Yes     Diagnoses and associated orders for this visit:  CAD s/p CABG 8.11.20   on DAPT and statin   Alcoholic cardiomyopathy,    On Coreg  Lasix aldactone and lisinopril  Hypertension, controlled     Automatic implantable cardiac defibrillator in situ recent check today normal function          Plan:  1. BMP two weeks after increasing lasix  2. Goal to loose 6-7 pounds lbs in 3 to 4 days to 10 days  3. Take lasix 80 mg 1 tab twice  daily  4. Take spironolactone 25 mg tab 2 times a day  5. No more than 8 cups of fluid a day, low salt diet  6. Follow up with me in 1 month  7. If no better over 7-10 days will have to be admitted for in patient diuresis          Scribe's attestation: This note was scribed in the presence of Dr. Karuna Brandt MDby Joel Noel LPN          QUALITY MEASURES  1.  Tobacco Cessation Counseling: NA  2. Retake of BP if >140/90:   NA  3. Documentation to PCP/referring for new patient:  Sent to PCP at close of office visit  4. CAD patient on anti-platelet: Yes DAPT  5. CAD patient on STATIN therapy:  Yes  6. Patient with CHF and aFib on anticoagulation:  NA     I, Dr. Kiera Richardson, personally performed the services described in this documentation, as scribed by the above signed scribe in my presence. It is both accurate and complete to my knowledge. I agree with the details independently gathered by the clinical support staff, while the remaining scribed note accurately describes my personal service to the patient.                 Kiera Richardson MD 12/2/2021 10:34 AM  [facility]  [phone] [facility]  [phone] [facility]

## 2021-12-02 NOTE — PATIENT INSTRUCTIONS
1. BMP two weeks after increasing lasix  2. Goal to loose 6-7 pounds lbs in 3 to 4 days to 10 days  3. Take lasix 80 mg 1 tab daily  4. Take spironolactone 25 mg tab 2 times a day  5. No more than 8 cups of fluid a day  6.  Follow up with me in 1 month

## 2021-12-13 ENCOUNTER — NURSE ONLY (OUTPATIENT)
Dept: CARDIOLOGY CLINIC | Age: 56
End: 2021-12-13
Payer: MEDICAID

## 2021-12-13 DIAGNOSIS — I42.6 ALCOHOLIC CARDIOMYOPATHY (HCC): ICD-10-CM

## 2021-12-13 DIAGNOSIS — I50.23 ACUTE ON CHRONIC SYSTOLIC CONGESTIVE HEART FAILURE (HCC): ICD-10-CM

## 2021-12-13 DIAGNOSIS — I25.5 ISCHEMIC CARDIOMYOPATHY: ICD-10-CM

## 2021-12-13 DIAGNOSIS — Z95.810 ICD (IMPLANTABLE CARDIOVERTER-DEFIBRILLATOR) IN PLACE: Primary | ICD-10-CM

## 2021-12-14 PROCEDURE — 93295 DEV INTERROG REMOTE 1/2/MLT: CPT | Performed by: INTERNAL MEDICINE

## 2021-12-14 PROCEDURE — 93297 REM INTERROG DEV EVAL ICPMS: CPT | Performed by: INTERNAL MEDICINE

## 2021-12-14 PROCEDURE — 93296 REM INTERROG EVL PM/IDS: CPT | Performed by: INTERNAL MEDICINE

## 2021-12-14 RX ORDER — CLOPIDOGREL BISULFATE 75 MG/1
75 TABLET ORAL DAILY
Qty: 90 TABLET | Refills: 2 | Status: SHIPPED | OUTPATIENT
Start: 2021-12-14 | End: 2022-09-09

## 2021-12-14 NOTE — PROGRESS NOTES
Remote transmission received for patients single chamber ICD. Transmission shows normal sensing and pacing function. EP physician will review. See interrogation under the cardiology tab in the [address] [place] [address] [address] field for more details. Will continue to monitor remotely. Possible OptiVol fluid accumulation: 18-Nov-2021 -- 05-Dec-2021. Recheck 6 weeks. Last  Session 02-Dec-2021-No new/sustained arrhythmias. PVC count increased from 24/hr in 85 days to 124/hr in 11 days. (mag-ox, coreg).

## 2021-12-27 ENCOUNTER — HOSPITAL ENCOUNTER (OUTPATIENT)
Age: 56
Discharge: HOME | End: 2021-12-27
Payer: MEDICAID

## 2021-12-27 DIAGNOSIS — Z79.899 MEDICATION MANAGEMENT: ICD-10-CM

## 2021-12-27 LAB
ANION GAP SERPL CALCULATED.3IONS-SCNC: 13 MMOL/L (ref 3–16)
BUN BLDV-MCNC: 32 MG/DL (ref 7–20)
CALCIUM SERPL-MCNC: 9.5 MG/DL (ref 8.3–10.6)
CHLORIDE BLD-SCNC: 102 MMOL/L (ref 99–110)
CO2: 26 MMOL/L (ref 21–32)
CREAT SERPL-MCNC: 1.3 MG/DL (ref 0.9–1.3)
GFR AFRICAN AMERICAN: >60
GFR NON-AFRICAN AMERICAN: 57
GLUCOSE BLD-MCNC: 110 MG/DL (ref 70–99)
POTASSIUM SERPL-SCNC: 4.8 MMOL/L (ref 3.5–5.1)
SODIUM BLD-SCNC: 141 MMOL/L (ref 136–145)

## 2021-12-27 PROCEDURE — 36415 COLL VENOUS BLD VENIPUNCTURE: CPT

## 2021-12-27 PROCEDURE — 80048 BASIC METABOLIC PNL TOTAL CA: CPT

## 2021-12-28 ENCOUNTER — TELEPHONE (OUTPATIENT)
Dept: CARDIOLOGY CLINIC | Age: 56
End: 2021-12-28

## 2021-12-28 NOTE — TELEPHONE ENCOUNTER
----- Message from Alex Lew MD sent at 12/28/2021 10:49 AM EST -----  Blood test from today is ok.call patient please.

## 2021-12-30 RX ORDER — INSULIN GLARGINE 100 [IU]/ML
100 INJECTION, SOLUTION SUBCUTANEOUS DAILY
Qty: 30 ML | Refills: 0 | Status: SHIPPED | OUTPATIENT
Start: 2021-12-30 | End: 2022-03-11

## 2022-01-05 NOTE — PROGRESS NOTES
[address] Office Note  2022     Subjective:  Mr. Carmen Bynum presents today for follow up. CABG x 3. Non ischemic (alcohol) and ischemic cardiomyopathy s/p CABG  ICD   Chronic Systolic CHF    [place]:     Today, he reports doing well. He notes that edema has resolved. Patient with no complaints of chest pain, SOB, palpitations, dizziness, edema, or orthopnea/PND. He is compliant with medications without side effects. PMH:  Alcoholic CMP, ICD placement  by Dr. Ting Almeida for non ischemic CM with low EF that failed to improve with guide line based medical therapy. He is no longer drinking ETOH products. On 19 he underwent left foot ulcer debridement. He works for [employer]. A left heart cath performed 20 demonstrated multivessel CAD. He then underwent a CABG x 3 on 2020. Device check on 10/7/20 showed He has  <0.1%. No VT events recorded since last office visit. Optivol previously increased. Battery life 5 years. Nightly HR >85 bpm x7 days. QRS duration was 130. He reports he has a left open wound ulcer on the bottom of his foot. He follows with Dr. Negin Bee for this for last year and half. Device check 3/10/21 demonstrated  <0.0%, no new events. Review of Systems: 12 point ROS negative in all areas as listed below except as in [place]  Constitutional, EENT,pulmonary, GI, ,  skin, neurological, hematological, endocrine, Psychiatric    Reviewed past medical history, social, and family history. No alcohol nonsmoker  HE  IS DISABLED  Mother: Heart disease, MI x4, young age. Father: Heart disease, CABG age 67.    Siblings: brother  age 40 of CHF, CVA  Past Medical History:   Diagnosis Date    Acute osteomyelitis of left foot ([address] 2017    Acute osteomyelitis of right foot ([address] 2016    Acute respiratory failure ([address] 2020    Ascites     Blood transfusion reaction     Burst fracture of lumbar vertebra ([address] 2012    Cellulitis of left foot     Cellulitis of right lower extremity 2/16, 5/16    Community acquired pneumonia     Diabetes ([address]     Diabetic ulcer of left foot associated with type 2 diabetes mellitus, with muscle involvement without evidence of necrosis ([address] 4/27/2017    Diabetic ulcer of right foot ([address] early 2016    Diabetic ulcer of 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 2 diabetes mellitus, with necrosis of bone ([address]     ETOH abuse     Fracture of tibial plateau 29/4/5315    High cholesterol     History of blood transfusion     reaction    HTN (hypertension)     Hx of blood clots     MI (myocardial infarction) ([address] 08/04/2020    + Troponins    MRSA (methicillin resistant staph aureus) culture positive 4/28/16, 4/20/16    foot wound    Neuropathic ulcer of left foot, limited to breakdown of skin ([address] 11/3/2016    Neuropathic ulcer of toe ([address] 2/13/2014    NSVT (nonsustained ventricular tachycardia) ([address] 2014    Pleural effusion due to congestive heart failure ([address]     Septicemia ([address]     Smoker     quit [id number]    Systolic CHF, acute ([address] 7/8/2013    Thyroid disease      Past Surgical History:   Procedure Laterality Date    CARDIAC DEFIBRILLATOR PLACEMENT  01/29/2014    ICD Placement    CORONARY ARTERY BYPASS GRAFT N/A 8/11/2020    CORONARY ARTERY BYPASS GRAFTING X3, LEFT ATRIAL APPENDAGE CLIP, INTERNAL MAMMARY ARTERY, SAPHENOUS VEIN GRAFT, ON PUMP, 5 LEVEL BILATERAL INTERCOSTAL NERVE BLOCK performed by Nima Molina MD at [facility] Left 08/09/2018    PARTIAL FOOT AMPUTATION w. Dr Willis Anderson Left 1/31/2019    EXOSTECTOMY LEFT FOOT, ULCER DEBRIDEMENT LEFT FOOT WITH GRAFT APPLICATION performed by Karina Bonds DPM at [facility] Left 09/27/2017    LEFT FOOT ULCER DEBRIDEMENT, POSSIBLE SECOND METATARSAL    FOOT SURGERY Left 01/31/2019    Exostectomy left foot, ulcer debridement with graft application left foot    FOOT TENDON SURGERY Right 2016   Leticia Larger (100.9 kg)   10/27/21 215 lb (97.5 kg)       Recheck BP 90/60    Physical Exam:  General: No Respiratory distress, appears well developed and well nourished. Eyes:  Sclera nonicteric  Nose/Sinuses:  negative findings: nose shows no deformity, asymmetry, or inflammation, nasal mucosa normal, septum midline with no perforation or bleeding  Back:  no pain to palpation  Joint:  no active joint inflammation  Musculoskeletal:  negative  Skin:  Warm and dry  Neck: No JVD and no Carotid Bruits. Chest:  Clear to ascutation,  respiration easy  Cardiovascular:  RRR, 94/min  S1S2 normal, no murmur, no rub or thrill. Extremities  No edema. Left foot all toes and right big toe removed related to diabetes and non healing ulcers. no clubbing or cyanosis  Abdomen  Liver not palpable. Pulses: Femoral pulses are 2+  Radial pulses nonpalpable bilat. Neuro: intact    Medications:   Outpatient Encounter Medications as of 1/6/2022   Medication Sig Dispense Refill    LANTUS SOLOSTAR 100 UNIT/ML injection pen Inject 100 Units into the skin daily 30 mL 0    magnesium oxide (MAG-OX) 400 (241.3 Mg) MG TABS tablet TAKE 1 TABLET BY MOUTH 2 TIMES DAILY 60 tablet 0    clopidogrel (PLAVIX) 75 MG tablet Take 1 tablet by mouth daily 90 tablet 2    furosemide (LASIX) 80 MG tablet Take 1 tablet by mouth 2 times daily 1 tab 2 times a day 60 tablet 11    spironolactone (ALDACTONE) 25 MG tablet Take 1 tablet by mouth 2 times daily 60 tablet 11    blood glucose test strips (ONETOUCH ULTRA) strip USE TO TEST SUGAR 4 TIMES DAILY.   DX:E11.9 100 each 0    metFORMIN (GLUCOPHAGE) 1000 MG tablet Take 1 tablet by mouth 2 times daily (with meals) 60 tablet 2    BD PEN NEEDLE WINNIE U/F 32G X 4 MM MISC 1 each by Does not apply route daily 815 each 2    TRULICITY 3 QT/0.9BA SOPN Inject 3 mg into the skin once a week 2 mL 2    losartan (COZAAR) 25 MG tablet Take 1 tablet by mouth daily 30 tablet 2    atorvastatin (LIPITOR) 40 MG tablet Take 1 tablet by mouth nightly 90 tablet 1    carvedilol (COREG) 25 MG tablet TAKE 1 TABLET BY MOUTH TWICE DAILY 180 tablet 3    ASPIRIN LOW DOSE 81 MG EC tablet Take 1 tablet by mouth daily 90 tablet 2    OneTouch Delica Lancets 72K MISC USE TO CHECK FOUR TIMES DAILY. DX;E11.9 100 each 3    glipiZIDE (GLUCOTROL) 5 MG tablet Take 1 tablet by mouth 2 times daily (before meals) 60 tablet 2    ARMOUR THYROID 60 MG tablet Take 1 tablet by mouth daily 30 tablet 6    Blood Glucose Monitoring Suppl (ONE TOUCH ULTRA 2) w/Device KIT USE TO CHECK FOUR TIMES DAILY. DX;E11.9 1 kit 0    Continuous Blood Gluc Sensor (FREESTYLE MARY 14 DAY SENSOR) MISC CGM 2 each 2    isosorbide mononitrate (IMDUR) 30 MG extended release tablet Take 1 tablet by mouth daily 90 tablet 3    INSULIN SYRINGE 1CC/29G (AIMSCO INSULIN SYR ULTRA THIN) 29G X 1/2\" 1 ML MISC 1 each by Does not apply route 3 times daily 100 each 3    Insulin Pen Needle (UNIFINE PENTIPS) 31G X 5 MM MISC USE 1 EACH DAY AS NEEDED FOR TESTING 100 each 3    Multiple Vitamins-Minerals (THERAPEUTIC MULTIVITAMIN-MINERALS) tablet Take 1 tablet by mouth daily       No facility-administered encounter medications on file as of 1/6/2022. Lab Data:  CBC: No results for input(s): WBC, HGB, HCT, MCV, PLT in the last 72 hours. BMP: No results for input(s): NA, K, CL, CO2, PHOS, BUN, CREATININE, CA in the last 72 hours. LIVER PROFILE: No results for input(s): AST, ALT, LIPASE, BILIDIR, BILITOT, ALKPHOS in the last 72 hours. Invalid input(s):   AMYLASE,  ALB  LIPID:   No components found for: CHLPL  Lab Results   Component Value Date    TRIG 248 (H) 08/11/2020    TRIG 176 (H) 10/16/2019    TRIG 415 (H) 01/03/2018     Lab Results   Component Value Date    HDL 26 (L) 06/16/2021    HDL 23 (L) 08/11/2020    HDL 30 (L) 05/26/2020     Lab Results   Component Value Date    LDLCALC 12 06/16/2021    LDLCALC 35 08/11/2020    LDLCALC see below 05/26/2020     Lab Results   Component Value Date    LABVLDL 38 06/16/2021    LABVLDL 50 08/11/2020    LABVLDL see below 05/26/2020     PT/INR: No results for input(s): PROTIME, INR in the last 72 hours. A1C:   Lab Results   Component Value Date    LABA1C 7.1 10/27/2021       IMAGING:   Device check 12/13/2021  Normal device function PVC   PVC count increased from 24/hr in 85 days to 124/hr in 11 days. (mag-ox, coreg). SUMAN 3.7 yrs    Device check 12.4.21  Normal device function   optivol up    ECHO 1/20/21  Conclusions      Summary   Left ventricular systolic function is reduced with ejection fraction   estimated at 25-30 %. All remaining wall segments appear severely hypokinetic . Abnormal (paradoxical) septal motion is present likely due to pacemaker. There is hypokinesis of the mid and basal anterior and anteroseptal wall   segments. All remaining wall segments appear severely hypokinetic . Pacer / ICD wire is visualized in the right ventricle and right atrium. Echo 8- 25-30% EF severe global hypokinesis. I have reviewed the following tests and documented in this encounter as follows:   Discussed with patient. EKG 4/6/21  Normal sinus rhythmLeft axis deviationCannot rule out Anterior infarct     Lexiscan Stress Test 3/8/2021  Summary Moderate to severely reduced LVEF 37%  Global hypokinesis with regional variation (severe inferoapical hypokinesis)  Large area of distal anterior and inferoapical scar. No ischemia     EKG 2/11/21  SR,HR 80    EKG 8/4/2020  Sinus tachycardia Intra-ventricular conduction delaySeptal infarct , age undeterminedAbnormal ECGNo significant change was foundWhen compared with ECG of1.31.19Confirmed by GIL HUFFMAN, [address] ([id number]) on 8/4/2020 6:51:55 AM     CABG:  Surgeon:  Desire Avila     S/P :  CABG x3, AIDEN clip on 8/11/20    Chest Xray 1/15/2021  Heart is enlarged but unchanged. Pulmonary vessels are upper normal.  No acute or focal airspace disease.   Sternotomy wire and single lead left subclavian device noted.  No pneumothorax or pleural effusion. Stable moderate cardiomegaly. CHEST XRAY 8/13/2020  FINDINGS: Recent surgical history of CABG. Left vascular sheath remains in place but [place] catheter has been removed. ICD stable on the left. Improving aeration in the left lung base with some residual pleural fluid and airspace change. Cardiomegaly is demonstrated. No skeletal finding. Limited Echo 1/20/21   Summary   Left ventricular systolic function is reduced with ejection fraction   estimated at 25-30 %. All remaining wall segments appear severely hypokinetic . Abnormal (paradoxical) septal motion is present likely due to pacemaker. There is hypokinesis of the mid and basal anterior and anteroseptal wall   segments. All remaining wall segments appear severely hypokinetic . Pacer / ICD wire is visualized in the right ventricle and right atrium. Echo 8- 25-30% EF severe global hypokinesis. ECHO 8/5/2020   Summary   Left ventricular systolic function is severely reduced with ejection   fraction estimated at 25-30 %. Severe global hypokinesis is present. Left ventricle size is normal.   Normal left ventricular wall thickness. Grade III diastolic dysfunction with elevated filing pressure. Mild posterior mitral annular calcification is present. No evidence of mitral valve stenosis. Severe mitral regurgitation. The left atrium is mildly dilated. No evidence of aortic valve stenosis. Mild aortic regurgitation is present. Mild tricuspid regurgitation. Normal systolic pulmonary artery pressure (SPAP) estimated at 39 mmHg (RA   pressure 8 mmHg). Stress test: 7/13:   No scintigraphic evidence of stress-induced myocardial  ischemia. 2. Chronic inferoapical infarct. 3. Severe left ventricular dilatation with severe diffuse  hypokinesis. 4. Severely reduced LVEF of approximately 20%.        Assessment:  Acute on chronic systolic CHF  Encounter Diagnoses Name Primary?  Coronary artery disease involving native coronary artery of native heart without angina pectoris     Mixed hyperlipidemia     Hypertension, unspecified type     Ischemic cardiomyopathy     Chronic systolic congestive heart failure (HCC) Yes     Diagnoses and associated orders for this visit:  CAD s/p CABG 8.11.20   on DAPT and statin   Alcoholic cardiomyopathy,    On Coreg  Lasix aldactone and lisinopril  Hypertension, controlled     Automatic implantable cardiac defibrillator in situ recent check today normal function    Plan:  1. Continue current meds except   2. Decrease lasix to 80 in morning and 40 at noon  3. Plan to get Lab in Mar/April prior to office visit      QUALITY MEASURES  1. Tobacco Cessation Counseling: NA  2. Retake of BP if >140/90:   NA  3. Documentation to PCP/referring for new patient:  Sent to PCP at close of office visit  4. CAD patient on anti-platelet: Yes DAPT  5. CAD patient on STATIN therapy:  Yes  6. Patient with CHF and aFib on anticoagulation:  NA     This note was scribed in the presence of Yonatan Zamora MD by Constance Soulier RN. I, Dr. Shawna Tee, personally performed the services described in this documentation, as scribed by the above signed scribe in my presence. It is both accurate and complete to my knowledge. I agree with the details independently gathered by the clinical support staff, while the remaining scribed note accurately describes my personal service to the patient.           Shawna Tee MD 1/6/2022 1:56 PM  [facility]  [phone] [place] office  [phone] [facility]

## 2022-01-06 ENCOUNTER — OFFICE VISIT (OUTPATIENT)
Dept: CARDIOLOGY CLINIC | Age: 57
End: 2022-01-06
Payer: MEDICAID

## 2022-01-06 VITALS
DIASTOLIC BLOOD PRESSURE: 60 MMHG | BODY MASS INDEX: 30.78 KG/M2 | WEIGHT: 215 LBS | OXYGEN SATURATION: 96 % | HEIGHT: 70 IN | SYSTOLIC BLOOD PRESSURE: 86 MMHG | HEART RATE: 99 BPM

## 2022-01-06 DIAGNOSIS — I10 HYPERTENSION, UNSPECIFIED TYPE: ICD-10-CM

## 2022-01-06 DIAGNOSIS — E78.2 MIXED HYPERLIPIDEMIA: ICD-10-CM

## 2022-01-06 DIAGNOSIS — I50.22 CHRONIC SYSTOLIC CONGESTIVE HEART FAILURE (HCC): Primary | ICD-10-CM

## 2022-01-06 DIAGNOSIS — I25.5 ISCHEMIC CARDIOMYOPATHY: ICD-10-CM

## 2022-01-06 DIAGNOSIS — I25.10 CORONARY ARTERY DISEASE INVOLVING NATIVE CORONARY ARTERY OF NATIVE HEART WITHOUT ANGINA PECTORIS: ICD-10-CM

## 2022-01-06 PROCEDURE — G8417 CALC BMI ABV UP PARAM F/U: HCPCS | Performed by: INTERNAL MEDICINE

## 2022-01-06 PROCEDURE — 1036F TOBACCO NON-USER: CPT | Performed by: INTERNAL MEDICINE

## 2022-01-06 PROCEDURE — G8484 FLU IMMUNIZE NO ADMIN: HCPCS | Performed by: INTERNAL MEDICINE

## 2022-01-06 PROCEDURE — G8427 DOCREV CUR MEDS BY ELIG CLIN: HCPCS | Performed by: INTERNAL MEDICINE

## 2022-01-06 PROCEDURE — 3017F COLORECTAL CA SCREEN DOC REV: CPT | Performed by: INTERNAL MEDICINE

## 2022-01-06 PROCEDURE — 99214 OFFICE O/P EST MOD 30 MIN: CPT | Performed by: INTERNAL MEDICINE

## 2022-01-06 NOTE — PATIENT INSTRUCTIONS
Plan:  1. BMP two weeks after increasing lasix  2. Decrease lasix to 80 in morning and 40 at noon  3.  Plan to get Lab in Mar/April prior to office visit

## 2022-01-11 RX ORDER — GLIPIZIDE 5 MG/1
5 TABLET ORAL
Qty: 60 TABLET | Refills: 0 | Status: SHIPPED | OUTPATIENT
Start: 2022-01-11 | End: 2022-03-11

## 2022-01-21 ENCOUNTER — IMMUNIZATION (OUTPATIENT)
Dept: PRIMARY CARE CLINIC | Age: 57
End: 2022-01-21
Payer: MEDICAID

## 2022-01-21 PROCEDURE — 91305 COVID-19, PFIZER GRAY TOP, DO NOT DILUTE, TRIS-SUCROSE, 12+ YRS, PF, 30MCG/ 0.3 ML DOSE: CPT | Performed by: FAMILY MEDICINE

## 2022-01-21 PROCEDURE — 0054A COVID-19, PFIZER GRAY TOP, DO NOT DILUTE, TRIS-SUCROSE, 12+ YRS, PF, 30MCG/ 0.3 ML DOSE: CPT | Performed by: FAMILY MEDICINE

## 2022-01-25 RX ORDER — LOSARTAN POTASSIUM 25 MG/1
25 TABLET ORAL DAILY
Qty: 30 TABLET | Refills: 2 | Status: SHIPPED | OUTPATIENT
Start: 2022-01-25 | End: 2022-05-17

## 2022-02-18 RX ORDER — ISOSORBIDE MONONITRATE 30 MG/1
30 TABLET, EXTENDED RELEASE ORAL DAILY
Qty: 90 TABLET | Refills: 5 | Status: SHIPPED | OUTPATIENT
Start: 2022-02-18

## 2022-03-08 RX ORDER — DULAGLUTIDE 3 MG/.5ML
3 INJECTION, SOLUTION SUBCUTANEOUS WEEKLY
Qty: 2 ML | Refills: 0 | Status: SHIPPED | OUTPATIENT
Start: 2022-03-08 | End: 2022-03-11

## 2022-03-11 RX ORDER — GLIPIZIDE 5 MG/1
5 TABLET ORAL
Qty: 60 TABLET | Refills: 0 | Status: SHIPPED | OUTPATIENT
Start: 2022-03-11 | End: 2022-03-22 | Stop reason: SDUPTHER

## 2022-03-11 RX ORDER — DULAGLUTIDE 3 MG/.5ML
3 INJECTION, SOLUTION SUBCUTANEOUS WEEKLY
Qty: 2 ML | Refills: 0 | Status: SHIPPED | OUTPATIENT
Start: 2022-03-11 | End: 2022-03-22 | Stop reason: SDUPTHER

## 2022-03-11 RX ORDER — INSULIN GLARGINE 100 [IU]/ML
100 INJECTION, SOLUTION SUBCUTANEOUS DAILY
Qty: 30 ML | Refills: 0 | Status: SHIPPED | OUTPATIENT
Start: 2022-03-11 | End: 2022-03-22 | Stop reason: SDUPTHER

## 2022-03-14 ENCOUNTER — NURSE ONLY (OUTPATIENT)
Dept: CARDIOLOGY CLINIC | Age: 57
End: 2022-03-14
Payer: MEDICAID

## 2022-03-14 DIAGNOSIS — I42.6 ALCOHOLIC CARDIOMYOPATHY (HCC): ICD-10-CM

## 2022-03-14 DIAGNOSIS — Z95.810 ICD (IMPLANTABLE CARDIOVERTER-DEFIBRILLATOR) IN PLACE: ICD-10-CM

## 2022-03-14 DIAGNOSIS — I25.5 ISCHEMIC CARDIOMYOPATHY: ICD-10-CM

## 2022-03-14 DIAGNOSIS — I50.23 ACUTE ON CHRONIC SYSTOLIC CONGESTIVE HEART FAILURE (HCC): ICD-10-CM

## 2022-03-14 PROCEDURE — 93295 DEV INTERROG REMOTE 1/2/MLT: CPT | Performed by: INTERNAL MEDICINE

## 2022-03-14 PROCEDURE — 93296 REM INTERROG EVL PM/IDS: CPT | Performed by: INTERNAL MEDICINE

## 2022-03-14 NOTE — PROGRESS NOTES
Remote transmission received for patients single chamber ICD. Transmission shows normal sensing and pacing function. EP physician will review. See interrogation under the cardiology tab in the [address] [place] [address] [address] field for more details. Will continue to monitor remotely. No  arrhythmias recorded. optivol @ 0 and TI above reference. PVC count improved. (mag-ox, coreg).

## 2022-03-15 PROCEDURE — 93297 REM INTERROG DEV EVAL ICPMS: CPT | Performed by: INTERNAL MEDICINE

## 2022-03-22 ENCOUNTER — OFFICE VISIT (OUTPATIENT)
Dept: INTERNAL MEDICINE CLINIC | Age: 57
End: 2022-03-22

## 2022-03-22 VITALS
SYSTOLIC BLOOD PRESSURE: 110 MMHG | BODY MASS INDEX: 30.92 KG/M2 | RESPIRATION RATE: 12 BRPM | WEIGHT: 216 LBS | DIASTOLIC BLOOD PRESSURE: 80 MMHG | HEART RATE: 70 BPM | HEIGHT: 70 IN

## 2022-03-22 DIAGNOSIS — E11.9 TYPE 2 DIABETES MELLITUS WITHOUT COMPLICATION, WITH LONG-TERM CURRENT USE OF INSULIN (HCC): Primary | ICD-10-CM

## 2022-03-22 DIAGNOSIS — E03.9 ACQUIRED HYPOTHYROIDISM: ICD-10-CM

## 2022-03-22 DIAGNOSIS — Z23 NEED FOR INFLUENZA VACCINATION: ICD-10-CM

## 2022-03-22 DIAGNOSIS — I42.6 ALCOHOLIC CARDIOMYOPATHY (HCC): ICD-10-CM

## 2022-03-22 DIAGNOSIS — E11.319 DIABETIC RETINOPATHY OF BOTH EYES WITHOUT MACULAR EDEMA ASSOCIATED WITH TYPE 2 DIABETES MELLITUS, UNSPECIFIED RETINOPATHY SEVERITY (HCC): ICD-10-CM

## 2022-03-22 DIAGNOSIS — Z79.4 TYPE 2 DIABETES MELLITUS WITHOUT COMPLICATION, WITH LONG-TERM CURRENT USE OF INSULIN (HCC): ICD-10-CM

## 2022-03-22 DIAGNOSIS — E11.42 DIABETIC POLYNEUROPATHY ASSOCIATED WITH TYPE 2 DIABETES MELLITUS (HCC): ICD-10-CM

## 2022-03-22 DIAGNOSIS — I10 HYPERTENSION, UNSPECIFIED TYPE: ICD-10-CM

## 2022-03-22 DIAGNOSIS — Z79.4 TYPE 2 DIABETES MELLITUS WITHOUT COMPLICATION, WITH LONG-TERM CURRENT USE OF INSULIN (HCC): Primary | ICD-10-CM

## 2022-03-22 DIAGNOSIS — Z95.1 S/P CORONARY ARTERY BYPASS GRAFT X 3: ICD-10-CM

## 2022-03-22 DIAGNOSIS — E11.21 DIABETIC NEPHROPATHY ASSOCIATED WITH TYPE 2 DIABETES MELLITUS (HCC): ICD-10-CM

## 2022-03-22 DIAGNOSIS — E11.9 TYPE 2 DIABETES MELLITUS WITHOUT COMPLICATION, WITH LONG-TERM CURRENT USE OF INSULIN (HCC): ICD-10-CM

## 2022-03-22 DIAGNOSIS — E78.2 MIXED HYPERLIPIDEMIA: ICD-10-CM

## 2022-03-22 DIAGNOSIS — Z95.810 AUTOMATIC IMPLANTABLE CARDIOVERTER-DEFIBRILLATOR IN SITU: ICD-10-CM

## 2022-03-22 LAB
A/G RATIO: 1.8 (ref 1.1–2.2)
ALBUMIN SERPL-MCNC: 4.8 G/DL (ref 3.4–5)
ALP BLD-CCNC: 117 U/L (ref 40–129)
ALT SERPL-CCNC: 22 U/L (ref 10–40)
ANION GAP SERPL CALCULATED.3IONS-SCNC: 13 MMOL/L (ref 3–16)
AST SERPL-CCNC: 20 U/L (ref 15–37)
BASOPHILS ABSOLUTE: 0 K/UL (ref 0–0.2)
BASOPHILS RELATIVE PERCENT: 0.7 %
BILIRUB SERPL-MCNC: 1.3 MG/DL (ref 0–1)
BUN BLDV-MCNC: 29 MG/DL (ref 7–20)
CALCIUM SERPL-MCNC: 9.7 MG/DL (ref 8.3–10.6)
CHLORIDE BLD-SCNC: 99 MMOL/L (ref 99–110)
CO2: 27 MMOL/L (ref 21–32)
CREAT SERPL-MCNC: 1.3 MG/DL (ref 0.9–1.3)
EOSINOPHILS ABSOLUTE: 0.2 K/UL (ref 0–0.6)
EOSINOPHILS RELATIVE PERCENT: 2.8 %
GFR AFRICAN AMERICAN: >60
GFR NON-AFRICAN AMERICAN: 57
GLUCOSE BLD-MCNC: 141 MG/DL (ref 70–99)
HCT VFR BLD CALC: 36.6 % (ref 40.5–52.5)
HEMOGLOBIN: 12.5 G/DL (ref 13.5–17.5)
LYMPHOCYTES ABSOLUTE: 1.3 K/UL (ref 1–5.1)
LYMPHOCYTES RELATIVE PERCENT: 20.1 %
MCH RBC QN AUTO: 30.8 PG (ref 26–34)
MCHC RBC AUTO-ENTMCNC: 34.2 G/DL (ref 31–36)
MCV RBC AUTO: 90 FL (ref 80–100)
MONOCYTES ABSOLUTE: 0.5 K/UL (ref 0–1.3)
MONOCYTES RELATIVE PERCENT: 7.8 %
NEUTROPHILS ABSOLUTE: 4.4 K/UL (ref 1.7–7.7)
NEUTROPHILS RELATIVE PERCENT: 68.6 %
PDW BLD-RTO: 18.1 % (ref 12.4–15.4)
PLATELET # BLD: 161 K/UL (ref 135–450)
PMV BLD AUTO: 7 FL (ref 5–10.5)
POTASSIUM SERPL-SCNC: 4.5 MMOL/L (ref 3.5–5.1)
RBC # BLD: 4.07 M/UL (ref 4.2–5.9)
SODIUM BLD-SCNC: 139 MMOL/L (ref 136–145)
T4 FREE: 1.1 NG/DL (ref 0.9–1.8)
TOTAL PROTEIN: 7.4 G/DL (ref 6.4–8.2)
TSH REFLEX FT4: 8.71 UIU/ML (ref 0.27–4.2)
URIC ACID, SERUM: 11.7 MG/DL (ref 3.5–7.2)
WBC # BLD: 6.5 K/UL (ref 4–11)

## 2022-03-22 PROCEDURE — 90686 IIV4 VACC NO PRSV 0.5 ML IM: CPT | Performed by: INTERNAL MEDICINE

## 2022-03-22 PROCEDURE — 3017F COLORECTAL CA SCREEN DOC REV: CPT | Performed by: INTERNAL MEDICINE

## 2022-03-22 PROCEDURE — 99214 OFFICE O/P EST MOD 30 MIN: CPT | Performed by: INTERNAL MEDICINE

## 2022-03-22 PROCEDURE — 90471 IMMUNIZATION ADMIN: CPT | Performed by: INTERNAL MEDICINE

## 2022-03-22 PROCEDURE — G8417 CALC BMI ABV UP PARAM F/U: HCPCS | Performed by: INTERNAL MEDICINE

## 2022-03-22 PROCEDURE — 2022F DILAT RTA XM EVC RTNOPTHY: CPT | Performed by: INTERNAL MEDICINE

## 2022-03-22 PROCEDURE — G8482 FLU IMMUNIZE ORDER/ADMIN: HCPCS | Performed by: INTERNAL MEDICINE

## 2022-03-22 PROCEDURE — 1036F TOBACCO NON-USER: CPT | Performed by: INTERNAL MEDICINE

## 2022-03-22 PROCEDURE — G8427 DOCREV CUR MEDS BY ELIG CLIN: HCPCS | Performed by: INTERNAL MEDICINE

## 2022-03-22 PROCEDURE — 3046F HEMOGLOBIN A1C LEVEL >9.0%: CPT | Performed by: INTERNAL MEDICINE

## 2022-03-22 RX ORDER — DULAGLUTIDE 3 MG/.5ML
3 INJECTION, SOLUTION SUBCUTANEOUS WEEKLY
Qty: 2 ML | Refills: 0 | Status: SHIPPED | OUTPATIENT
Start: 2022-03-22 | End: 2022-06-08

## 2022-03-22 RX ORDER — INSULIN GLARGINE 100 [IU]/ML
100 INJECTION, SOLUTION SUBCUTANEOUS DAILY
Qty: 30 ML | Refills: 0 | Status: SHIPPED | OUTPATIENT
Start: 2022-03-22 | End: 2022-06-07

## 2022-03-22 RX ORDER — GLIPIZIDE 5 MG/1
5 TABLET ORAL
Qty: 60 TABLET | Refills: 0 | Status: SHIPPED | OUTPATIENT
Start: 2022-03-22 | End: 2022-06-07

## 2022-03-22 ASSESSMENT — ENCOUNTER SYMPTOMS
ABDOMINAL PAIN: 0
RHINORRHEA: 0
SHORTNESS OF BREATH: 0
WHEEZING: 0
NAUSEA: 0
VOMITING: 0
BACK PAIN: 0

## 2022-03-22 NOTE — PROGRESS NOTES
Subjective:      Patient ID: Herrera Boles is a 64 y.o. male. HPI     Patient is here for follow up. He has diabetes. His sugars are well controlled. His FBG range form 120-160 mg/dl. No hypoglycemia. Vision is stable. No polys. He has retinopathy. He has CAD. He is s/p CABG. He is doing well. He has a history of cardiomyopathy. He has an AICD device placed in 2014. Cardiomyopathy is related to alcohol abuse. He sees cardiology on a regular basis. He does not drink alcohol anymore. He has a dry cough, like a tickle in his throat since starting lisinopril. He has had it for years. He has a history of anemia. He has seen GI for this. He is also had neuropathic ulcers of his feet possibly from diabetic neuropathy. He is a former smoker. He sees podiatry. He has a history of osteomyelitis of the left and right foot. All the toes of his left foot are gone. He has lost one toe removed from his right toe-second toe. He has hyperlipidemia. His cholesterol is at goal.    He has microalbuminuria. Patient has hypothyroidism and takes [place] Thyroid for it. No diarrhea or constipation. Review of Systems   Constitutional: Negative for activity change and appetite change. HENT: Negative for postnasal drip and rhinorrhea. Respiratory: Negative for shortness of breath and wheezing. Cardiovascular: Negative for chest pain, palpitations and leg swelling. Gastrointestinal: Negative for abdominal pain, nausea and vomiting. Genitourinary: Negative for difficulty urinating and frequency. Musculoskeletal: Negative for back pain and joint swelling. Skin: Negative for rash. Neurological: Negative for light-headedness. Psychiatric/Behavioral: Negative for sleep disturbance.        Past Medical History:   Diagnosis Date    Acute osteomyelitis of left foot ([address] 9/27/2017    Acute osteomyelitis of right foot ([address] 4/25/2016    Acute respiratory failure ([address] 8/4/2020    Ascites     Blood transfusion reaction     Burst fracture of lumbar vertebra (HCC) 11/9/2012    Cellulitis of left foot     Cellulitis of right lower extremity 2/16, 5/16    Community acquired pneumonia     Diabetes ([address]     Diabetic ulcer of left foot associated with type 2 diabetes mellitus, with muscle involvement without evidence of necrosis ([address] 4/27/2017    Diabetic ulcer of right foot ([address] early 2016    Diabetic ulcer of 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 2 diabetes mellitus, with necrosis of bone ([address]     ETOH abuse     Fracture of tibial plateau 08/2/7023    High cholesterol     History of blood transfusion     reaction    HTN (hypertension)     Hx of blood clots     MI (myocardial infarction) ([address] 08/04/2020    + Troponins    MRSA (methicillin resistant staph aureus) culture positive 4/28/16, 4/20/16    foot wound    Neuropathic ulcer of left foot, limited to breakdown of skin ([address] 11/3/2016    Neuropathic ulcer of toe ([address] 2/13/2014    NSVT (nonsustained ventricular tachycardia) ([address] 2014    Pleural effusion due to congestive heart failure (HCC)     Septicemia ([address]     Smoker     quit 2216    Systolic CHF, acute ([address] 7/8/2013    Thyroid disease        Past Surgical History:   Procedure Laterality Date    CARDIAC DEFIBRILLATOR PLACEMENT  01/29/2014    ICD Placement    CORONARY ARTERY BYPASS GRAFT N/A 8/11/2020    CORONARY ARTERY BYPASS GRAFTING X3, LEFT ATRIAL APPENDAGE CLIP, INTERNAL MAMMARY ARTERY, SAPHENOUS VEIN GRAFT, ON PUMP, 5 LEVEL BILATERAL INTERCOSTAL NERVE BLOCK performed by Carolina Fernández MD at [facility] Left 08/09/2018    PARTIAL FOOT AMPUTATION w. Dr Yari Miranda Left 1/31/2019    EXOSTECTOMY LEFT FOOT, ULCER DEBRIDEMENT LEFT FOOT WITH GRAFT APPLICATION performed by Rosi Hair DPM at [address] [place] [address] Left 09/27/2017    LEFT FOOT ULCER DEBRIDEMENT, POSSIBLE SECOND METATARSAL    FOOT SURGERY Left 01/31/2019    Exostectomy left foot, ulcer debridement with graft application left foot    FOOT TENDON SURGERY Right 2016    FOOT TENDON SURGERY Left 06/30/2017    KNEE SURGERY Left     OTHER SURGICAL HISTORY Bilateral 9/17/15    amputation 2nd digit bilateral, flexor tenotomy right hallux    OTHER SURGICAL HISTORY Left 08/17/2017    PARTIAL LEFT FOOT AMPUTATION      OTHER SURGICAL HISTORY Left 12/07/2017    Debridement infected bone and tissue left foot; ulcer debridement left foot with graft application with dr matuhr     OTHER SURGICAL HISTORY Right 01/04/2018    DEBRIDEMENT INFECTED BONE AND TISSUE RIGHT FOOT, ULCER DEBRIDEMENT RIGHT FOOT WITH GRAFT APPLICATION    OTHER SURGICAL HISTORY Left 11/01/2018    Procedure: PARTIAL RESECTION LEFT METATARSAL, ULCER DEBRIDEMENT LEFT FOOT WITH GRAFT APPLICATION     OTHER SURGICAL HISTORY       AMPUTATION LEFT FOURTH AND FIFTH DIGITS, PARTIAL RESECTION  SECOND AND THIRD METATARSAL LEFT FOOT, PARTIAL RESECTION RIGHT FIRST METATARSAL (Bilateral Foot)    MI OFFICE/OUTPT VISIT,PROCEDURE ONLY Left 8/23/2018    ULCER DEBRIDEMENT LEFT FOOT WITH GRAFT APPLICATION performed by Lesvia Woods DPM at [facility] TARSAL/METATARSAL Left 8/9/2018    PARTIAL FOOT AMPUTATION WITH GRAFT APPLICATION performed by Lesvia Woods DPM at [facility] TARSAL/METATARSAL Left 11/1/2018    PARTIAL RESECTION LEFT METATARSAL, ULCER DEBRIDEMENT LEFT FOOT WITH GRAFT APPLICATION performed by Lesvia Woods DPM at [facility] TOE AMPUTATION      2 toes    TOE AMPUTATION Bilateral 3/18/2021    AMPUTATION LEFT FOURTH AND FIFTH DIGITS, PARTIAL RESECTION  SECOND AND THIRD METATARSAL LEFT FOOT, PARTIAL RESECTION RIGHT FIRST METATARSAL performed by Lesvia Woods DPM at [facility] TOE AMPUTATION Right 5/20/2021    PARTIAL RESECTION RIGHT FIRST METATARSAL performed by Lesvia Woods DPM at [facility] OR       Family History   Problem Relation Age of Onset    Heart Disease Mother     High Blood Pressure Mother     High Cholesterol Mother     Diabetes Mother     Anemia Mother     Heart Disease Father     High Blood Pressure Father     High Cholesterol Father     Heart Disease Maternal Grandmother     High Blood Pressure Maternal Grandmother     High Cholesterol Maternal Grandmother     Cancer Maternal Grandmother         bone marrow & breast    Heart Disease Maternal Grandfather     High Blood Pressure Maternal Grandfather     High Cholesterol Maternal Grandfather     Cancer Maternal Grandfather         pancreatic CA    Heart Disease Paternal Grandmother     High Blood Pressure Paternal Grandmother     High Cholesterol Paternal Grandmother     Heart Disease Paternal Grandfather     High Blood Pressure Paternal Grandfather     High Cholesterol Paternal Grandfather     Stroke Brother     Heart Failure Brother     Heart Disease Brother     Diabetes type 2  Brother     Diabetes type 2  Brother     Diabetes type 2  Brother        Social History     Tobacco Use    Smoking status: Former Smoker     Packs/day: 0.25     Years: 1.00     Pack years: 0.25     Quit date: 1980     Years since quittin.1    Smokeless tobacco: Never Used   Vaping Use    Vaping Use: Never used   Substance Use Topics    Alcohol use: No     Alcohol/week: 0.0 standard drinks    Drug use: No         Current Outpatient Medications:     glipiZIDE (GLUCOTROL) 5 MG tablet, Take 1 tablet by mouth 2 times daily (before meals), Disp: 60 tablet, Rfl: 0    magnesium oxide (MAG-OX) 400 (241.3 Mg) MG TABS tablet, Take 1 tablet by mouth 2 times daily, Disp: 60 tablet, Rfl: 2    Dulaglutide (TRULICITY) 3 CD/9.7RV SOPN, Inject 3 mg into the skin once a week, Disp: 2 mL, Rfl: 0    metFORMIN (GLUCOPHAGE) 1000 MG tablet, Take 1 tablet by mouth 2 times daily (with meals), Disp: 60 tablet, Rfl: 2    insulin glargine (LANTUS SOLOSTAR) 100 UNIT/ML injection pen, Inject 100 Units into the skin daily, Disp: 30 mL, Rfl: 0    isosorbide mononitrate (IMDUR) 30 MG extended release tablet, Take 1 tablet by mouth daily, Disp: 90 tablet, Rfl: 5    ARMOUR THYROID 60 MG tablet, Take 1 tablet by mouth daily, Disp: 30 tablet, Rfl: 11    losartan (COZAAR) 25 MG tablet, Take 1 tablet by mouth daily, Disp: 30 tablet, Rfl: 2    clopidogrel (PLAVIX) 75 MG tablet, Take 1 tablet by mouth daily, Disp: 90 tablet, Rfl: 2    furosemide (LASIX) 80 MG tablet, Take 1 tablet by mouth 2 times daily 1 tab 2 times a day, Disp: 60 tablet, Rfl: 11    spironolactone (ALDACTONE) 25 MG tablet, Take 1 tablet by mouth 2 times daily, Disp: 60 tablet, Rfl: 11    blood glucose test strips (ONETOUCH ULTRA) strip, USE TO TEST SUGAR 4 TIMES DAILY. DX:E11.9, Disp: 100 each, Rfl: 0    BD PEN NEEDLE WINNIE U/F 32G X 4 MM MISC, 1 each by Does not apply route daily, Disp: 100 each, Rfl: 2    atorvastatin (LIPITOR) 40 MG tablet, Take 1 tablet by mouth nightly, Disp: 90 tablet, Rfl: 1    carvedilol (COREG) 25 MG tablet, TAKE 1 TABLET BY MOUTH TWICE DAILY, Disp: 180 tablet, Rfl: 3    ASPIRIN LOW DOSE 81 MG EC tablet, Take 1 tablet by mouth daily, Disp: 90 tablet, Rfl: 2    OneTouch Delica Lancets 70W MISC, USE TO CHECK FOUR TIMES DAILY. DX;E11.9, Disp: 100 each, Rfl: 3    Blood Glucose Monitoring Suppl (ONE TOUCH ULTRA 2) w/Device KIT, USE TO CHECK FOUR TIMES DAILY.   DX;E11.9, Disp: 1 kit, Rfl: 0    Continuous Blood Gluc Sensor (FREESTYLE MARY 14 DAY SENSOR) MISC, CGM, Disp: 2 each, Rfl: 2    INSULIN SYRINGE 1CC/29G (AIMSCO INSULIN SYR ULTRA THIN) 29G X 1/2\" 1 ML MISC, 1 each by Does not apply route 3 times daily, Disp: 100 each, Rfl: 3    Insulin Pen Needle (UNIFINE PENTIPS) 31G X 5 MM MISC, USE 1 EACH DAY AS NEEDED FOR TESTING, Disp: 100 each, Rfl: 3    Multiple Vitamins-Minerals (THERAPEUTIC MULTIVITAMIN-MINERALS) tablet, Take 1 tablet by mouth daily, Disp: , Rfl:     /80 (Site: Right Upper Arm, Position: Sitting, Cuff Size: Medium Adult)   Pulse 70   Resp 12   Ht 5' 10\" (1.778 m)   Wt 216 lb (98 kg)   BMI 30.99 kg/m²         Objective:   Physical Exam  Constitutional:       Appearance: He is well-developed. HENT:      Head: Normocephalic. Eyes:      Conjunctiva/sclera: Conjunctivae normal.      Pupils: Pupils are equal, round, and reactive to light. Neck:      Thyroid: No thyroid mass or thyromegaly. Vascular: No carotid bruit or JVD. Trachea: Trachea normal.   Cardiovascular:      Rate and Rhythm: Normal rate and regular rhythm. Heart sounds: Normal heart sounds. No murmur heard. No gallop. Pulmonary:      Effort: Pulmonary effort is normal. No respiratory distress. Breath sounds: Normal breath sounds. No wheezing or rales. Abdominal:      General: Bowel sounds are normal. There is no distension. Palpations: Abdomen is soft. There is no hepatomegaly, splenomegaly or mass. Tenderness: There is no abdominal tenderness. Musculoskeletal:         General: Normal range of motion. Cervical back: Normal range of motion and neck supple. Lymphadenopathy:      Cervical: No cervical adenopathy. Skin:     General: Skin is warm and dry. Findings: No rash. Neurological:      Mental Status: He is alert and oriented to person, place, and time. Cranial Nerves: No cranial nerve deficit. Deep Tendon Reflexes: Reflexes are normal and symmetric. Psychiatric:         Behavior: Behavior normal.         Thought Content: Thought content normal.         Judgment: Judgment normal.         Assessment:       Diagnosis Orders   1. Type 2 diabetes mellitus without complication, with long-term current use of insulin (HCC)  Comprehensive Metabolic Panel    CBC with Auto Differential    Hemoglobin A1C    Uric Acid   2. Alcoholic cardiomyopathy     3. Hypertension, unspecified type     4. Automatic implantable cardioverter-defibrillator in situ     5. Mixed hyperlipidemia     6.  Acquired hypothyroidism  TSH with Reflex to FT4   7. S/P coronary artery bypass graft x 3     8. Diabetic polyneuropathy associated with type 2 diabetes mellitus ([facility]     9. Diabetic retinopathy of both eyes without macular edema associated with type 2 diabetes mellitus, unspecified retinopathy severity ([facility]     10. Diabetic nephropathy associated with type 2 diabetes mellitus ([facility]            Plan:        #CAD. He is s/p CABG. No chest pain. #Diabetes mellitus type 2 controlled. Needs labs. He has retinopathy/neuropathy and microalbuminuria. #Hypertension. BP controlled. 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 He is completely quit drinking 5 years ago. Continue with Coreg Lasix and Cozaar. .  He sees cardiology. #Hyperlipidemia well controlled. #Hypo-thyroidism. He cannot tolerate levothyroxine. He takes [place] Thyroid. #Anemia. GI work-up negative.               Elham Manzo MD

## 2022-03-23 LAB
ESTIMATED AVERAGE GLUCOSE: 131.2 MG/DL
HBA1C MFR BLD: 6.2 %

## 2022-03-23 NOTE — TELEPHONE ENCOUNTER
----- Message from Jose Cruz Galdamez MD sent at 3/23/2022  4:23 PM EDT -----  Start allopurinol 200 mg a day. #2 I do not think the [place] Thyroid is working for him and not like to switch hIM to Synthroid. Check if he is ready to do that.

## 2022-03-24 RX ORDER — ALLOPURINOL 100 MG/1
200 TABLET ORAL DAILY
Qty: 60 TABLET | Refills: 2 | Status: SHIPPED | OUTPATIENT
Start: 2022-03-24 | End: 2022-06-21

## 2022-04-04 ENCOUNTER — TELEPHONE (OUTPATIENT)
Dept: INTERNAL MEDICINE CLINIC | Age: 57
End: 2022-04-04

## 2022-04-04 DIAGNOSIS — E03.9 ACQUIRED HYPOTHYROIDISM: Primary | ICD-10-CM

## 2022-04-04 RX ORDER — LEVOTHYROXINE SODIUM 75 MCG
75 TABLET ORAL DAILY
Qty: 30 TABLET | Refills: 1 | Status: SHIPPED | OUTPATIENT
Start: 2022-04-04 | End: 2022-06-02

## 2022-04-13 NOTE — PROGRESS NOTES
[address] Office Note  2022     Subjective:  Mr. Lucretia Saenz presents today for follow up. CABG x 3. Non ischemic (alcohol) and ischemic cardiomyopathy s/p CABG  ICD   Chronic Systolic CHF    [place]:     Today, he reports that he does well. If he does too much he can get short of breath. Diabetes is controlled with A1C  of 6.2%. Indira Marie MD changed Armor thyroid to Synthroid 75 mcg a few weeks ago. He is taking lasix 80 mg in the morning and 40 mg at noon. He tolerates this well. Patient with no complaints of chest pain, SOB, palpitations, dizziness, edema, or orthopnea/PND. PMH:  Alcoholic CMP, ICD placement  by Dr. Kvng Moura for non ischemic CM with low EF that failed to improve with guide line based medical therapy. He is no longer drinking ETOH products. On 19 he underwent left foot ulcer debridement. He works for [employer]. A left heart cath performed 20 demonstrated multivessel CAD. He then underwent a CABG x 3 on 2020. Device check on 10/7/20 showed He has  <0.1%. No VT events recorded since last office visit. Optivol previously increased. Battery life 5 years. Nightly HR >85 bpm x7 days. QRS duration was 130. He reported he has a left open wound ulcer on the bottom of his foot. He follows with Dr. Debi Jaramillo for this for last year and half. Device check 3/10/21 demonstrated  <0.0%, no new events. Review of Systems: 12 point ROS negative in all areas as listed below except as in [place]  Constitutional, EENT,pulmonary, GI, ,  skin, neurological, hematological, endocrine, Psychiatric    Reviewed past medical history, social, and family history. No alcohol nonsmoker  HE  IS DISABLED  Mother: Heart disease, MI x4, young age. Father: Heart disease, CABG age 67.    Siblings: brother  age 40 of CHF, CVA  Past Medical History:   Diagnosis Date    Acute osteomyelitis of left foot ([address] 2017    Acute osteomyelitis of right foot ([address] 4/25/2016    Acute respiratory failure ([address] 8/4/2020    Ascites     Blood transfusion reaction     Burst fracture of lumbar vertebra (HCC) 11/9/2012    Cellulitis of left foot     Cellulitis of right lower extremity 2/16, 5/16    Community acquired pneumonia     Diabetes ([address]     Diabetic ulcer of left foot associated with type 2 diabetes mellitus, with muscle involvement without evidence of necrosis ([address] 4/27/2017    Diabetic ulcer of right foot ([address] early 2016    Diabetic ulcer of 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 2 diabetes mellitus, with necrosis of bone ([address]     ETOH abuse     Fracture of tibial plateau 39/3/6134    High cholesterol     History of blood transfusion     reaction    HTN (hypertension)     Hx of blood clots     MI (myocardial infarction) ([address] 08/04/2020    + Troponins    MRSA (methicillin resistant staph aureus) culture positive 4/28/16, 4/20/16    foot wound    Neuropathic ulcer of left foot, limited to breakdown of skin ([address] 11/3/2016    Neuropathic ulcer of toe ([address] 2/13/2014    NSVT (nonsustained ventricular tachycardia) ([address] 2014    Pleural effusion due to congestive heart failure (HCC)     Septicemia ([address]     Smoker     quit [id number]    Systolic CHF, acute ([address] 7/8/2013    Thyroid disease      Past Surgical History:   Procedure Laterality Date    CARDIAC DEFIBRILLATOR PLACEMENT  01/29/2014    ICD Placement    CORONARY ARTERY BYPASS GRAFT N/A 8/11/2020    CORONARY ARTERY BYPASS GRAFTING X3, LEFT ATRIAL APPENDAGE CLIP, INTERNAL MAMMARY ARTERY, SAPHENOUS VEIN GRAFT, ON PUMP, 5 LEVEL BILATERAL INTERCOSTAL NERVE BLOCK performed by Argelia Brizuela MD at [address] Left 08/09/2018    PARTIAL FOOT AMPUTATION w. Dr Radha Fuentes Left 1/31/2019    EXOSTECTOMY LEFT FOOT, ULCER DEBRIDEMENT LEFT FOOT WITH GRAFT APPLICATION performed by Donnie Way DPM at [address] [facility] TUBE Left 09/27/2017    LEFT FOOT ULCER DEBRIDEMENT, POSSIBLE SECOND METATARSAL    FOOT SURGERY Left 01/31/2019    Exostectomy left foot, ulcer debridement with graft application left foot    FOOT TENDON SURGERY Right 2016    FOOT TENDON SURGERY Left 06/30/2017    KNEE SURGERY Left     OTHER SURGICAL HISTORY Bilateral 9/17/15    amputation 2nd digit bilateral, flexor tenotomy right hallux    OTHER SURGICAL HISTORY Left 08/17/2017    PARTIAL LEFT FOOT AMPUTATION      OTHER SURGICAL HISTORY Left 12/07/2017    Debridement infected bone and tissue left foot; ulcer debridement left foot with graft application with dr mathur     OTHER SURGICAL HISTORY Right 01/04/2018    DEBRIDEMENT INFECTED BONE AND TISSUE RIGHT FOOT, ULCER DEBRIDEMENT RIGHT FOOT WITH GRAFT APPLICATION    OTHER SURGICAL HISTORY Left 11/01/2018    Procedure: PARTIAL RESECTION LEFT METATARSAL, ULCER DEBRIDEMENT LEFT FOOT WITH GRAFT APPLICATION     OTHER SURGICAL HISTORY       AMPUTATION LEFT FOURTH AND FIFTH DIGITS, PARTIAL RESECTION  SECOND AND THIRD METATARSAL LEFT FOOT, PARTIAL RESECTION RIGHT FIRST METATARSAL (Bilateral Foot)    DC OFFICE/OUTPT VISIT,PROCEDURE ONLY Left 8/23/2018    ULCER DEBRIDEMENT LEFT FOOT WITH GRAFT APPLICATION performed by Hector Wade DPM at [facility] TARSAL/METATARSAL Left 8/9/2018    PARTIAL FOOT AMPUTATION WITH GRAFT APPLICATION performed by Hector Wade DPM at [facility] TARSAL/METATARSAL Left 11/1/2018    PARTIAL RESECTION LEFT METATARSAL, ULCER DEBRIDEMENT LEFT FOOT WITH GRAFT APPLICATION performed by Hector Wade DPM at [address] [facility]      2 toes    TOE AMPUTATION Bilateral 3/18/2021    AMPUTATION LEFT FOURTH AND FIFTH DIGITS, PARTIAL RESECTION  SECOND AND THIRD METATARSAL LEFT FOOT, PARTIAL RESECTION RIGHT FIRST METATARSAL performed by Hector Wade DPM at [address] [place], [place] [address]S [facility] TOE AMPUTATION Right 5/20/2021    PARTIAL RESECTION RIGHT FIRST METATARSAL performed by Hector Wade DPM at [facility] OR       Objective: losartan (COZAAR) 25 MG tablet Take 1 tablet by mouth daily 30 tablet 2    clopidogrel (PLAVIX) 75 MG tablet Take 1 tablet by mouth daily 90 tablet 2    spironolactone (ALDACTONE) 25 MG tablet Take 1 tablet by mouth 2 times daily 60 tablet 11    blood glucose test strips (ONETOUCH ULTRA) strip USE TO TEST SUGAR 4 TIMES DAILY. DX:E11.9 100 each 0    BD PEN NEEDLE WINNIE U/F 32G X 4 MM MISC 1 each by Does not apply route daily 100 each 2    atorvastatin (LIPITOR) 40 MG tablet Take 1 tablet by mouth nightly 90 tablet 1    carvedilol (COREG) 25 MG tablet TAKE 1 TABLET BY MOUTH TWICE DAILY 180 tablet 3    ASPIRIN LOW DOSE 81 MG EC tablet Take 1 tablet by mouth daily 90 tablet 2    OneTouch Delica Lancets 26S MISC USE TO CHECK FOUR TIMES DAILY. DX;E11.9 100 each 3    Blood Glucose Monitoring Suppl (ONE TOUCH ULTRA 2) w/Device KIT USE TO CHECK FOUR TIMES DAILY. DX;E11.9 1 kit 0    Continuous Blood Gluc Sensor (FREESTYLE MARY 14 DAY SENSOR) MISC CGM 2 each 2    INSULIN SYRINGE 1CC/29G (AIMSCO INSULIN SYR ULTRA THIN) 29G X 1/2\" 1 ML MISC 1 each by Does not apply route 3 times daily 100 each 3    Insulin Pen Needle (UNIFINE PENTIPS) 31G X 5 MM MISC USE 1 EACH DAY AS NEEDED FOR TESTING 100 each 3    Multiple Vitamins-Minerals (THERAPEUTIC MULTIVITAMIN-MINERALS) tablet Take 1 tablet by mouth daily      allopurinol (ZYLOPRIM) 100 MG tablet Take 2 tablets by mouth daily (Patient not taking: Reported on 4/14/2022) 60 tablet 2    [DISCONTINUED] ARMOUR THYROID 60 MG tablet Take 1 tablet by mouth daily (Patient not taking: Reported on 4/14/2022) 30 tablet 11    [DISCONTINUED] furosemide (LASIX) 80 MG tablet Take 1 tablet by mouth 2 times daily 1 tab 2 times a day 60 tablet 11     No facility-administered encounter medications on file as of 4/14/2022. Lab Data:  CBC: No results for input(s): WBC, HGB, HCT, MCV, PLT in the last 72 hours.   BMP: No results for input(s): NA, K, CL, CO2, PHOS, BUN, CREATININE, CA in the last 72 hours. LIVER PROFILE: No results for input(s): AST, ALT, LIPASE, BILIDIR, BILITOT, ALKPHOS in the last 72 hours. Invalid input(s): AMYLASE,  ALB  LIPID:   No components found for: CHLPL  Lab Results   Component Value Date    TRIG 248 (H) 08/11/2020    TRIG 176 (H) 10/16/2019    TRIG 415 (H) 01/03/2018     Lab Results   Component Value Date    HDL 26 (L) 06/16/2021    HDL 23 (L) 08/11/2020    HDL 30 (L) 05/26/2020     Lab Results   Component Value Date    LDLCALC 12 06/16/2021    LDLCALC 35 08/11/2020    LDLCALC see below 05/26/2020     Lab Results   Component Value Date    LABVLDL 38 06/16/2021    LABVLDL 50 08/11/2020    LABVLDL see below 05/26/2020     PT/INR: No results for input(s): PROTIME, INR in the last 72 hours. A1C:   Lab Results   Component Value Date    LABA1C 6.2 03/22/2022       IMAGING:   Device check 3/14/2022  Normal sensing and pacing  SUMAN 3.3 years  No arrhythmias recorded  PVC count improved    Device check 12/13/2021  Normal device function PVC   PVC count increased from 24/hr in 85 days to 124/hr in 11 days. (mag-ox, coreg). SUMAN 3.7 yrs    Device check 12.4.21  Normal device function   optivol up    ECHO 1/20/21  Conclusions      Summary   Left ventricular systolic function is reduced with ejection fraction   estimated at 25-30 %. All remaining wall segments appear severely hypokinetic . Abnormal (paradoxical) septal motion is present likely due to pacemaker. There is hypokinesis of the mid and basal anterior and anteroseptal wall   segments. All remaining wall segments appear severely hypokinetic . Pacer / ICD wire is visualized in the right ventricle and right atrium. Echo 8- 25-30% EF severe global hypokinesis. I have reviewed the following tests and documented in this encounter as follows:   Discussed with patient.     EKG 4/6/21  Normal sinus rhythmLeft axis deviationCannot rule out Anterior infarct     Lexiscan Stress Test 3/8/2021  Summary Moderate to severely reduced LVEF 37%  Global hypokinesis with regional variation (severe inferoapical hypokinesis)  Large area of distal anterior and inferoapical scar. No ischemia     EKG 2/11/21  SR,HR 80    EKG 8/4/2020  Sinus tachycardia Intra-ventricular conduction delaySeptal infarct , age undeterminedAbnormal ECGNo significant change was foundWhen compared with ECG of1.31.19Confirmed by GIL HUFFMAN, [address] ([id number]) on 8/4/2020 6:51:55 AM     CABG:  Surgeon:  Michael Temple     S/P :  CABG x3, AIDEN clip on 8/11/20    Chest Xray 1/15/2021  Heart is enlarged but unchanged. Pulmonary vessels are upper normal.  No acute or focal airspace disease. Sternotomy wire and single lead left subclavian device noted. No pneumothorax or pleural effusion. Stable moderate cardiomegaly. CHEST XRAY 8/13/2020  FINDINGS: Recent surgical history of CABG. Left vascular sheath remains in place but [place]-Seth catheter has been removed. ICD stable on the left. Improving aeration in the left lung base with some residual pleural fluid and airspace change. Cardiomegaly is demonstrated. No skeletal finding. Limited Echo 1/20/21   Summary   Left ventricular systolic function is reduced with ejection fraction   estimated at 25-30 %. All remaining wall segments appear severely hypokinetic . Abnormal (paradoxical) septal motion is present likely due to pacemaker. There is hypokinesis of the mid and basal anterior and anteroseptal wall   segments. All remaining wall segments appear severely hypokinetic . Pacer / ICD wire is visualized in the right ventricle and right atrium. Echo 8- 25-30% EF severe global hypokinesis. ECHO 8/5/2020   Summary   Left ventricular systolic function is severely reduced with ejection   fraction estimated at 25-30 %. Severe global hypokinesis is present. Left ventricle size is normal.   Normal left ventricular wall thickness.    Grade III diastolic dysfunction with elevated filing pressure. Mild posterior mitral annular calcification is present. No evidence of mitral valve stenosis. Severe mitral regurgitation. The left atrium is mildly dilated. No evidence of aortic valve stenosis. Mild aortic regurgitation is present. Mild tricuspid regurgitation. Normal systolic pulmonary artery pressure (SPAP) estimated at 39 mmHg (RA   pressure 8 mmHg). Stress test: 7/2013   No scintigraphic evidence of stress-induced myocardial  ischemia. 2. Chronic inferoapical infarct. 3. Severe left ventricular dilatation with severe diffuse  hypokinesis. 4. Severely reduced LVEF of approximately 20%. Assessment:    Encounter Diagnoses   Name Primary?  Dilated cardiomyopathy (HCC) Yes    NSVT (nonsustained ventricular tachycardia) (HCC)     Primary hypertension     Mixed hyperlipidemia     Coronary artery disease involving native coronary artery of native heart without angina pectoris     Medication management     Hyperuricemia     Chronic systolic congestive heart failure ([address]     AICD (automatic cardioverter/defibrillator) present      Diagnoses and associated orders for this visit:  CAD s/p CABG 8.11.20   on DAPT and statin   Alcoholic cardiomyopathy,    On Coreg  Lasix aldactone and lisinopril  Hypertension, controlled     Automatic implantable cardiac defibrillator in situ recent check normal function    Plan:  1. Continue current meds  No refills warranted at this time  2. Okay to take allopurinol for hyperuricemia  3. Limited echo to assess heart function at this time  4. Plan to follow up in 4 months. Check lab one week prior to office visit. (BMP)    Your provider has ordered testing for further evaluation. An order/prescription has been included in your paper work.  To schedule outpatient testing, contact Central Scheduling by calling [facility] ([phone]). QUALITY MEASURES  1. Tobacco Cessation Counseling: NA  2. Retake of BP if >140/90:   NA  3. Documentation to PCP/referring for new patient:  Sent to PCP at close of office visit  4. CAD patient on anti-platelet: Yes DAPT  5. CAD patient on STATIN therapy:  Yes  6. Patient with CHF and aFib on anticoagulation:  NA     This note was scribed in the presence of Shree Petit MD by Michelle Pruitt RN. I, Dr. Eloise Kasper, personally performed the services described in this documentation, as scribed by the above signed scribe in my presence. It is both accurate and complete to my knowledge. I agree with the details independently gathered by the clinical support staff, while the remaining scribed note accurately describes my personal service to the patient.         Eloise Kasper MD 4/14/2022 8:34 PM  [address]  [phone] [facility] office  [phone] [facility]

## 2022-04-14 ENCOUNTER — OFFICE VISIT (OUTPATIENT)
Dept: CARDIOLOGY CLINIC | Age: 57
End: 2022-04-14
Payer: MEDICAID

## 2022-04-14 VITALS
OXYGEN SATURATION: 97 % | BODY MASS INDEX: 31.5 KG/M2 | HEIGHT: 70 IN | SYSTOLIC BLOOD PRESSURE: 110 MMHG | DIASTOLIC BLOOD PRESSURE: 66 MMHG | WEIGHT: 220 LBS | HEART RATE: 92 BPM

## 2022-04-14 DIAGNOSIS — I42.0 DILATED CARDIOMYOPATHY (HCC): Primary | ICD-10-CM

## 2022-04-14 DIAGNOSIS — Z79.899 MEDICATION MANAGEMENT: ICD-10-CM

## 2022-04-14 DIAGNOSIS — E78.2 MIXED HYPERLIPIDEMIA: ICD-10-CM

## 2022-04-14 DIAGNOSIS — E79.0 HYPERURICEMIA: ICD-10-CM

## 2022-04-14 DIAGNOSIS — I47.29 NSVT (NONSUSTAINED VENTRICULAR TACHYCARDIA): ICD-10-CM

## 2022-04-14 DIAGNOSIS — I10 PRIMARY HYPERTENSION: ICD-10-CM

## 2022-04-14 DIAGNOSIS — I50.22 CHRONIC SYSTOLIC CONGESTIVE HEART FAILURE (HCC): ICD-10-CM

## 2022-04-14 DIAGNOSIS — Z95.810 AICD (AUTOMATIC CARDIOVERTER/DEFIBRILLATOR) PRESENT: ICD-10-CM

## 2022-04-14 DIAGNOSIS — I25.10 CORONARY ARTERY DISEASE INVOLVING NATIVE CORONARY ARTERY OF NATIVE HEART WITHOUT ANGINA PECTORIS: ICD-10-CM

## 2022-04-14 PROCEDURE — G8427 DOCREV CUR MEDS BY ELIG CLIN: HCPCS | Performed by: INTERNAL MEDICINE

## 2022-04-14 PROCEDURE — 99214 OFFICE O/P EST MOD 30 MIN: CPT | Performed by: INTERNAL MEDICINE

## 2022-04-14 PROCEDURE — 3017F COLORECTAL CA SCREEN DOC REV: CPT | Performed by: INTERNAL MEDICINE

## 2022-04-14 PROCEDURE — G8417 CALC BMI ABV UP PARAM F/U: HCPCS | Performed by: INTERNAL MEDICINE

## 2022-04-14 PROCEDURE — 1036F TOBACCO NON-USER: CPT | Performed by: INTERNAL MEDICINE

## 2022-04-14 RX ORDER — FUROSEMIDE 80 MG
TABLET ORAL
Qty: 60 TABLET | Refills: 0
Start: 2022-04-14 | End: 2022-08-18 | Stop reason: SDUPTHER

## 2022-04-14 NOTE — PATIENT INSTRUCTIONS
1. Continue current meds  No refills warranted at this time  2. Okay to take allopurinol for gout  3. Limited echo to assess heart function at this time  4. Plan to follow up in 4 months. Check lab one week prior to office visit. (BMP)    Your provider has ordered testing for further evaluation. An order/prescription has been included in your paper work.  To schedule outpatient testing, contact Central Scheduling by calling [facility] ([phone]).

## 2022-04-25 ENCOUNTER — HOSPITAL ENCOUNTER (OUTPATIENT)
Dept: NON INVASIVE DIAGNOSTICS | Age: 57
Discharge: HOME | End: 2022-04-25
Payer: MEDICAID

## 2022-04-25 DIAGNOSIS — I47.29 NSVT (NONSUSTAINED VENTRICULAR TACHYCARDIA): ICD-10-CM

## 2022-04-25 DIAGNOSIS — I42.0 DILATED CARDIOMYOPATHY (HCC): ICD-10-CM

## 2022-04-25 DIAGNOSIS — I25.10 CORONARY ARTERY DISEASE INVOLVING NATIVE CORONARY ARTERY OF NATIVE HEART WITHOUT ANGINA PECTORIS: ICD-10-CM

## 2022-04-25 DIAGNOSIS — E78.2 MIXED HYPERLIPIDEMIA: ICD-10-CM

## 2022-04-25 DIAGNOSIS — I10 PRIMARY HYPERTENSION: ICD-10-CM

## 2022-04-25 PROCEDURE — 93308 TTE F-UP OR LMTD: CPT

## 2022-04-25 PROCEDURE — 93306 TTE W/DOPPLER COMPLETE: CPT

## 2022-05-17 RX ORDER — LOSARTAN POTASSIUM 25 MG/1
25 TABLET ORAL DAILY
Qty: 30 TABLET | Refills: 1 | Status: SHIPPED | OUTPATIENT
Start: 2022-05-17 | End: 2022-08-15

## 2022-05-20 RX ORDER — PEN NEEDLE, DIABETIC 32GX 5/32"
NEEDLE, DISPOSABLE MISCELLANEOUS
Qty: 100 EACH | Refills: 0 | Status: SHIPPED | OUTPATIENT
Start: 2022-05-20 | End: 2022-08-03

## 2022-06-02 RX ORDER — LEVOTHYROXINE SODIUM 0.07 MG/1
TABLET ORAL
Qty: 30 TABLET | Refills: 0 | Status: SHIPPED | OUTPATIENT
Start: 2022-06-02 | End: 2022-07-21 | Stop reason: DRUGHIGH

## 2022-06-07 ENCOUNTER — TELEPHONE (OUTPATIENT)
Dept: INTERNAL MEDICINE CLINIC | Age: 57
End: 2022-06-07

## 2022-06-07 RX ORDER — INSULIN GLARGINE 100 [IU]/ML
INJECTION, SOLUTION SUBCUTANEOUS
Qty: 30 PEN | Refills: 0 | Status: SHIPPED | OUTPATIENT
Start: 2022-06-07

## 2022-06-07 RX ORDER — GLIPIZIDE 5 MG/1
5 TABLET ORAL
Qty: 60 TABLET | Refills: 0 | Status: SHIPPED | OUTPATIENT
Start: 2022-06-07 | End: 2022-07-20 | Stop reason: SDUPTHER

## 2022-06-07 RX ORDER — INSULIN GLARGINE 100 [IU]/ML
100 INJECTION, SOLUTION SUBCUTANEOUS DAILY
Qty: 30 ML | Refills: 0 | Status: SHIPPED | OUTPATIENT
Start: 2022-06-07 | End: 2022-06-07 | Stop reason: ALTCHOICE

## 2022-06-07 NOTE — TELEPHONE ENCOUNTER
----- Message from Charissa Diamond MD sent at 6/7/2022  2:21 PM EDT -----  yes  ----- Message -----  From: [place]  Sent: 6/7/2022  12:42 PM EDT  To: Charissa Diamond MD    Pharmacy called stating insurance will not cover Lantus, insurance prefers Jose Julian. Okay to switch?   [facility] -[phone]

## 2022-06-08 LAB
CATARACTS: POSITIVE
DIABETIC RETINOPATHY: NORMAL
GLAUCOMA: NEGATIVE
INTRAOCULAR PRESSURE EYE: NORMAL
VISUAL ACUITY DISTANCE LEFT EYE: NORMAL
VISUAL ACUITY DISTANCE RIGHT EYE: NORMAL

## 2022-06-08 RX ORDER — DULAGLUTIDE 3 MG/.5ML
3 INJECTION, SOLUTION SUBCUTANEOUS WEEKLY
Qty: 2 ML | Refills: 2 | Status: SHIPPED | OUTPATIENT
Start: 2022-06-08 | End: 2022-09-27

## 2022-06-12 PROCEDURE — 93297 REM INTERROG DEV EVAL ICPMS: CPT | Performed by: INTERNAL MEDICINE

## 2022-06-12 PROCEDURE — 93296 REM INTERROG EVL PM/IDS: CPT | Performed by: INTERNAL MEDICINE

## 2022-06-12 PROCEDURE — 93295 DEV INTERROG REMOTE 1/2/MLT: CPT | Performed by: INTERNAL MEDICINE

## 2022-06-13 ENCOUNTER — NURSE ONLY (OUTPATIENT)
Dept: CARDIOLOGY CLINIC | Age: 57
End: 2022-06-13
Payer: MEDICAID

## 2022-06-13 DIAGNOSIS — I42.6 ALCOHOLIC CARDIOMYOPATHY (HCC): ICD-10-CM

## 2022-06-13 DIAGNOSIS — I50.23 ACUTE ON CHRONIC SYSTOLIC CONGESTIVE HEART FAILURE (HCC): ICD-10-CM

## 2022-06-13 DIAGNOSIS — Z95.810 ICD (IMPLANTABLE CARDIOVERTER-DEFIBRILLATOR) IN PLACE: ICD-10-CM

## 2022-06-13 DIAGNOSIS — I25.5 ISCHEMIC CARDIOMYOPATHY: ICD-10-CM

## 2022-06-13 DIAGNOSIS — I50.22 CHRONIC SYSTOLIC CONGESTIVE HEART FAILURE (HCC): ICD-10-CM

## 2022-06-13 NOTE — PROGRESS NOTES
Remote transmission received for patients single chamber ICD. Transmission shows normal sensing and pacing function. EP physician will review. See interrogation under the cardiology tab in the [facility] [address] field for more details. Will continue to monitor remotely. He takes mag-ox, coreg. No sustained arrhythmias recorded. Possible OptiVol fluid accumulation: 05-Jun-2022 -- ongoing, consistent w/ decreased TI. Sent to [facility] to review and advise. (JERRICA MEADE).

## 2022-06-21 RX ORDER — ALLOPURINOL 100 MG/1
200 TABLET ORAL DAILY
Qty: 60 TABLET | Refills: 0 | Status: SHIPPED | OUTPATIENT
Start: 2022-06-21 | End: 2022-07-20 | Stop reason: SDUPTHER

## 2022-07-14 RX ORDER — ATORVASTATIN CALCIUM 40 MG/1
40 TABLET, FILM COATED ORAL NIGHTLY
Qty: 90 TABLET | Refills: 2 | Status: SHIPPED | OUTPATIENT
Start: 2022-07-14

## 2022-07-19 RX ORDER — LANOLIN ALCOHOL/MO/W.PET/CERES
CREAM (GRAM) TOPICAL
Qty: 60 TABLET | Refills: 3 | Status: SHIPPED | OUTPATIENT
Start: 2022-07-19

## 2022-07-20 ENCOUNTER — OFFICE VISIT (OUTPATIENT)
Dept: INTERNAL MEDICINE CLINIC | Age: 57
End: 2022-07-20

## 2022-07-20 VITALS
DIASTOLIC BLOOD PRESSURE: 70 MMHG | HEART RATE: 70 BPM | SYSTOLIC BLOOD PRESSURE: 108 MMHG | BODY MASS INDEX: 31.35 KG/M2 | WEIGHT: 219 LBS | RESPIRATION RATE: 12 BRPM | HEIGHT: 70 IN

## 2022-07-20 DIAGNOSIS — I25.10 CORONARY ARTERY DISEASE INVOLVING NATIVE CORONARY ARTERY OF NATIVE HEART WITHOUT ANGINA PECTORIS: ICD-10-CM

## 2022-07-20 DIAGNOSIS — E11.319 DIABETIC RETINOPATHY OF BOTH EYES WITHOUT MACULAR EDEMA ASSOCIATED WITH TYPE 2 DIABETES MELLITUS, UNSPECIFIED RETINOPATHY SEVERITY (HCC): ICD-10-CM

## 2022-07-20 DIAGNOSIS — E11.21 DIABETIC NEPHROPATHY ASSOCIATED WITH TYPE 2 DIABETES MELLITUS (HCC): ICD-10-CM

## 2022-07-20 DIAGNOSIS — Z79.899 MEDICATION MANAGEMENT: ICD-10-CM

## 2022-07-20 DIAGNOSIS — E03.9 ACQUIRED HYPOTHYROIDISM: ICD-10-CM

## 2022-07-20 DIAGNOSIS — Z00.00 ROUTINE GENERAL MEDICAL EXAMINATION AT A HEALTH CARE FACILITY: Primary | ICD-10-CM

## 2022-07-20 DIAGNOSIS — E11.42 DIABETIC POLYNEUROPATHY ASSOCIATED WITH TYPE 2 DIABETES MELLITUS (HCC): ICD-10-CM

## 2022-07-20 PROBLEM — I50.22 CHRONIC SYSTOLIC CHF (CONGESTIVE HEART FAILURE) (HCC): Status: ACTIVE | Noted: 2022-07-20

## 2022-07-20 LAB
A/G RATIO: 2 (ref 1.1–2.2)
ALBUMIN SERPL-MCNC: 4.9 G/DL (ref 3.4–5)
ALP BLD-CCNC: 117 U/L (ref 40–129)
ALT SERPL-CCNC: 29 U/L (ref 10–40)
ANION GAP SERPL CALCULATED.3IONS-SCNC: 13 MMOL/L (ref 3–16)
AST SERPL-CCNC: 23 U/L (ref 15–37)
BASOPHILS ABSOLUTE: 0.1 K/UL (ref 0–0.2)
BASOPHILS RELATIVE PERCENT: 0.7 %
BILIRUB SERPL-MCNC: 1.1 MG/DL (ref 0–1)
BILIRUBIN, POC: NORMAL
BLOOD URINE, POC: NORMAL
BUN BLDV-MCNC: 36 MG/DL (ref 7–20)
CALCIUM SERPL-MCNC: 9.4 MG/DL (ref 8.3–10.6)
CHLORIDE BLD-SCNC: 98 MMOL/L (ref 99–110)
CHOLESTEROL, TOTAL: 67 MG/DL (ref 0–199)
CLARITY, POC: NORMAL
CO2: 27 MMOL/L (ref 21–32)
COLOR, POC: NORMAL
CREAT SERPL-MCNC: 1.3 MG/DL (ref 0.9–1.3)
CREATININE URINE: 83.6 MG/DL (ref 39–259)
EOSINOPHILS ABSOLUTE: 0.2 K/UL (ref 0–0.6)
EOSINOPHILS RELATIVE PERCENT: 2.7 %
GFR AFRICAN AMERICAN: >60
GFR NON-AFRICAN AMERICAN: 57
GLUCOSE BLD-MCNC: 114 MG/DL (ref 70–99)
GLUCOSE URINE, POC: NORMAL
HCT VFR BLD CALC: 35.2 % (ref 40.5–52.5)
HDLC SERPL-MCNC: 27 MG/DL (ref 40–60)
HEMOGLOBIN: 11.8 G/DL (ref 13.5–17.5)
KETONES, POC: NORMAL
LDL CHOLESTEROL CALCULATED: 18 MG/DL
LEUKOCYTE EST, POC: NORMAL
LYMPHOCYTES ABSOLUTE: 1.3 K/UL (ref 1–5.1)
LYMPHOCYTES RELATIVE PERCENT: 15.9 %
MCH RBC QN AUTO: 30.9 PG (ref 26–34)
MCHC RBC AUTO-ENTMCNC: 33.5 G/DL (ref 31–36)
MCV RBC AUTO: 92.1 FL (ref 80–100)
MICROALBUMIN UR-MCNC: 8 MG/DL
MICROALBUMIN/CREAT UR-RTO: 95.7 MG/G (ref 0–30)
MONOCYTES ABSOLUTE: 0.5 K/UL (ref 0–1.3)
MONOCYTES RELATIVE PERCENT: 6.4 %
NEUTROPHILS ABSOLUTE: 6.2 K/UL (ref 1.7–7.7)
NEUTROPHILS RELATIVE PERCENT: 74.3 %
NITRITE, POC: NORMAL
PDW BLD-RTO: 15.8 % (ref 12.4–15.4)
PH, POC: NORMAL
PLATELET # BLD: 163 K/UL (ref 135–450)
PMV BLD AUTO: 7.5 FL (ref 5–10.5)
POTASSIUM SERPL-SCNC: 4.9 MMOL/L (ref 3.5–5.1)
PROTEIN, POC: NORMAL
RBC # BLD: 3.82 M/UL (ref 4.2–5.9)
SODIUM BLD-SCNC: 138 MMOL/L (ref 136–145)
SPECIFIC GRAVITY, POC: NORMAL
TOTAL PROTEIN: 7.3 G/DL (ref 6.4–8.2)
TRIGL SERPL-MCNC: 109 MG/DL (ref 0–150)
TSH SERPL DL<=0.05 MIU/L-ACNC: 8.17 UIU/ML (ref 0.27–4.2)
URIC ACID, SERUM: 7.2 MG/DL (ref 3.5–7.2)
UROBILINOGEN, POC: NORMAL
VLDLC SERPL CALC-MCNC: 22 MG/DL
WBC # BLD: 8.3 K/UL (ref 4–11)

## 2022-07-20 PROCEDURE — 81002 URINALYSIS NONAUTO W/O SCOPE: CPT | Performed by: INTERNAL MEDICINE

## 2022-07-20 PROCEDURE — 99396 PREV VISIT EST AGE 40-64: CPT | Performed by: INTERNAL MEDICINE

## 2022-07-20 RX ORDER — ALLOPURINOL 100 MG/1
200 TABLET ORAL DAILY
Qty: 60 TABLET | Refills: 2 | Status: SHIPPED | OUTPATIENT
Start: 2022-07-20

## 2022-07-20 RX ORDER — GLIPIZIDE 5 MG/1
5 TABLET ORAL
Qty: 60 TABLET | Refills: 2 | Status: SHIPPED | OUTPATIENT
Start: 2022-07-20

## 2022-07-20 ASSESSMENT — PATIENT HEALTH QUESTIONNAIRE - PHQ9
SUM OF ALL RESPONSES TO PHQ QUESTIONS 1-9: 0
1. LITTLE INTEREST OR PLEASURE IN DOING THINGS: 0
SUM OF ALL RESPONSES TO PHQ QUESTIONS 1-9: 0
SUM OF ALL RESPONSES TO PHQ9 QUESTIONS 1 & 2: 0
2. FEELING DOWN, DEPRESSED OR HOPELESS: 0
SUM OF ALL RESPONSES TO PHQ QUESTIONS 1-9: 0
SUM OF ALL RESPONSES TO PHQ QUESTIONS 1-9: 0

## 2022-07-20 ASSESSMENT — ENCOUNTER SYMPTOMS
SHORTNESS OF BREATH: 0
BACK PAIN: 0
WHEEZING: 0
VOMITING: 0
NAUSEA: 0
RHINORRHEA: 0
ABDOMINAL PAIN: 0

## 2022-07-20 NOTE — PROGRESS NOTES
Subjective:      Patient ID: Wallace Watson is a 62 y.o. male. HPI     Patient is here for an annual exam.    He has diabetes. His sugars are well controlled. His FBG range form  mg/dl. No hypoglycemia. Vision is stable. No polys. He has retinopathy. He has CAD. He is s/p CABG. He is doing well. No chest pain or dyspnea. He has a history of cardiomyopathy. He has an AICD device placed in 2014. Cardiomyopathy is related to alcohol abuse. He sees cardiology on a regular basis. He does not drink alcohol anymore. He has a history of anemia. He has seen GI for this. No bleeding issues. He is also had neuropathic ulcers of his feet possibly from diabetic neuropathy. He is a former smoker. He sees podiatry. He has a history of osteomyelitis of the left and right foot. All the toes of his left foot are gone. He has lost one toe removed from his right toe-second toe. He has hyperlipidemia. His cholesterol is at goal.    He has microalbuminuria. Patient has hypothyroidism and takes [place] Thyroid for it. No diarrhea or constipation. Review of Systems   Constitutional:  Negative for activity change and appetite change. HENT:  Negative for postnasal drip and rhinorrhea. Respiratory:  Negative for shortness of breath and wheezing. Cardiovascular:  Negative for chest pain, palpitations and leg swelling. Gastrointestinal:  Negative for abdominal pain, nausea and vomiting. Genitourinary:  Negative for difficulty urinating and frequency. Musculoskeletal:  Negative for back pain and joint swelling. Blisters feet   Skin:  Negative for rash. Neurological:  Negative for light-headedness. Psychiatric/Behavioral:  Negative for sleep disturbance.           Past Medical History:   Diagnosis Date    Acute osteomyelitis of left foot ([facility] [address] 9/27/2017    Acute osteomyelitis of right foot ([facility] [address] 4/25/2016    Acute respiratory failure ([facility] [address] 8/4/2020    Ascites     Blood transfusion reaction     Burst fracture of lumbar vertebra ([address] 11/9/2012    Cellulitis of left foot     Cellulitis of right lower extremity 2/16, 5/16    Community acquired pneumonia     Diabetes ([address]     Diabetic ulcer of left foot associated with type 2 diabetes mellitus, with muscle involvement without evidence of necrosis ([address] 4/27/2017    Diabetic ulcer of right foot ([address] early 2016    Diabetic ulcer of 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 2 diabetes mellitus, with necrosis of bone ([address]     ETOH abuse     Fracture of tibial plateau 53/9/9625    High cholesterol     History of blood transfusion     reaction    HTN (hypertension)     Hx of blood clots     MI (myocardial infarction) ([address] 08/04/2020    + Troponins    MRSA (methicillin resistant staph aureus) culture positive 4/28/16, 4/20/16    foot wound    Neuropathic ulcer of left foot, limited to breakdown of skin ([address] 11/3/2016    Neuropathic ulcer of toe ([address] 2/13/2014    NSVT (nonsustained ventricular tachycardia) ([address] 2014    Pleural effusion due to congestive heart failure (HCC)     Septicemia (HCC)     Smoker     quit [id number]    Systolic CHF, acute ([address] 7/8/2013    Thyroid disease        Past Surgical History:   Procedure Laterality Date    CARDIAC DEFIBRILLATOR PLACEMENT  01/29/2014    ICD Placement    CORONARY ARTERY BYPASS GRAFT N/A 8/11/2020    CORONARY ARTERY BYPASS GRAFTING X3, LEFT ATRIAL APPENDAGE CLIP, INTERNAL MAMMARY ARTERY, SAPHENOUS VEIN GRAFT, ON PUMP, 5 LEVEL BILATERAL INTERCOSTAL NERVE BLOCK performed by Jeanne Starks MD at [address] [place] [address] Left 08/09/2018    PARTIAL FOOT AMPUTATION w. Dr Samuel Cuba Left 1/31/2019    EXOSTECTOMY LEFT FOOT, ULCER DEBRIDEMENT LEFT FOOT WITH GRAFT APPLICATION performed by Graeme Avina DPM at [address] [facility] Left 09/27/2017    LEFT FOOT ULCER DEBRIDEMENT, POSSIBLE SECOND METATARSAL    FOOT SURGERY Left 01/31/2019    Exostectomy left foot, ulcer debridement with graft application left foot    FOOT TENDON SURGERY Right 2016    FOOT TENDON SURGERY Left 06/30/2017    KNEE SURGERY Left     OTHER SURGICAL HISTORY Bilateral 9/17/15    amputation 2nd digit bilateral, flexor tenotomy right hallux    OTHER SURGICAL HISTORY Left 08/17/2017    PARTIAL LEFT FOOT AMPUTATION      OTHER SURGICAL HISTORY Left 12/07/2017    Debridement infected bone and tissue left foot; ulcer debridement left foot with graft application with dr mathur     OTHER SURGICAL HISTORY Right 01/04/2018    DEBRIDEMENT INFECTED BONE AND TISSUE RIGHT FOOT, ULCER DEBRIDEMENT RIGHT FOOT WITH GRAFT APPLICATION    OTHER SURGICAL HISTORY Left 11/01/2018    Procedure: PARTIAL RESECTION LEFT METATARSAL, ULCER DEBRIDEMENT LEFT FOOT WITH GRAFT APPLICATION     OTHER SURGICAL HISTORY       AMPUTATION LEFT FOURTH AND FIFTH DIGITS, PARTIAL RESECTION  SECOND AND THIRD METATARSAL LEFT FOOT, PARTIAL RESECTION RIGHT FIRST METATARSAL (Bilateral Foot)    [place] OFFICE/OUTPT VISIT,PROCEDURE ONLY Left 8/23/2018    ULCER DEBRIDEMENT LEFT FOOT WITH GRAFT APPLICATION performed by Larry Coello DPM at [address] TARSAL/METATARSAL Left 8/9/2018    PARTIAL FOOT AMPUTATION WITH GRAFT APPLICATION performed by Larry Coello DPM at [address] TARSAL/METATARSAL Left 11/1/2018    PARTIAL RESECTION LEFT METATARSAL, ULCER DEBRIDEMENT LEFT FOOT WITH GRAFT APPLICATION performed by Larry Coello DPM at [address] [facility]      2 toes    TOE AMPUTATION Bilateral 3/18/2021    AMPUTATION LEFT FOURTH AND FIFTH DIGITS, PARTIAL RESECTION  SECOND AND THIRD METATARSAL LEFT FOOT, PARTIAL RESECTION RIGHT FIRST METATARSAL performed by Larry Coello DPM at [facility] OR    TOE AMPUTATION Right 5/20/2021    PARTIAL RESECTION RIGHT FIRST METATARSAL performed by Larry Coello DPM at [facility] OR       Family History   Problem Relation Age of Onset    Heart Disease Mother     High Blood Pressure Mother     High Cholesterol Mother Diabetes Mother     Anemia Mother     Heart Disease Father     High Blood Pressure Father     High Cholesterol Father     Heart Disease Maternal Grandmother     High Blood Pressure Maternal Grandmother     High Cholesterol Maternal Grandmother     Cancer Maternal Grandmother         bone marrow & breast    Heart Disease Maternal Grandfather     High Blood Pressure Maternal Grandfather     High Cholesterol Maternal Grandfather     Cancer Maternal Grandfather         pancreatic CA    Heart Disease Paternal Grandmother     High Blood Pressure Paternal Grandmother     High Cholesterol Paternal Grandmother     Heart Disease Paternal Grandfather     High Blood Pressure Paternal Grandfather     High Cholesterol Paternal Grandfather     Stroke Brother     Heart Failure Brother     Heart Disease Brother     Diabetes type 2  Brother     Diabetes type 2  Brother     Diabetes type 2  Brother        Social History     Tobacco Use    Smoking status: Former     Packs/day: 0.25     Years: 1.00     Pack years: 0.25     Types: Cigarettes     Quit date: 1980     Years since quittin.4    Smokeless tobacco: Never   Vaping Use    Vaping Use: Never used   Substance Use Topics    Alcohol use: No     Alcohol/week: 0.0 standard drinks    Drug use: No         Current Outpatient Medications:     magnesium oxide (MAG-OX) 400 (240 Mg) MG tablet, TAKE 1 TABLET BY MOUTH 2 TIMES DAILY, Disp: 60 tablet, Rfl: 3    atorvastatin (LIPITOR) 40 MG tablet, Take 1 tablet by mouth nightly, Disp: 90 tablet, Rfl: 2    allopurinol (ZYLOPRIM) 100 MG tablet, Take 2 tablets by mouth daily, Disp: 60 tablet, Rfl: 0    TRULICITY 3 KV/5.6XK SOPN, Inject 3 mg into the skin once a week, Disp: 2 mL, Rfl: 2    glipiZIDE (GLUCOTROL) 5 mg tablet, Take 1 tablet by mouth 2 times daily (before meals), Disp: 60 tablet, Rfl: 0    insulin glargine (BASAGLAR KWIKPEN) 100 UNIT/ML injection pen, Inject 100 Units into the skin daily, Disp: 30 pen, Rfl: 0    levothyroxine (SYNTHROID) 75 MCG tablet, Take 1 tablet by mouth Daily, Disp: 30 tablet, Rfl: 0    BD PEN NEEDLE WINNIE U/F 32G X 4 MM MISC, 1 each by Does not apply route daily, Disp: 100 each, Rfl: 0    losartan (COZAAR) 25 MG tablet, Take 1 tablet by mouth daily, Disp: 30 tablet, Rfl: 1    furosemide (LASIX) 80 MG tablet, Take 80 mg in the morning and 40 mg at noon, Disp: 60 tablet, Rfl: 0    magnesium oxide (MAG-OX) 400 (241.3 Mg) MG TABS tablet, Take 1 tablet by mouth 2 times daily, Disp: 60 tablet, Rfl: 2    metFORMIN (GLUCOPHAGE) 1000 MG tablet, Take 1 tablet by mouth 2 times daily (with meals), Disp: 60 tablet, Rfl: 2    isosorbide mononitrate (IMDUR) 30 MG extended release tablet, Take 1 tablet by mouth daily, Disp: 90 tablet, Rfl: 5    clopidogrel (PLAVIX) 75 MG tablet, Take 1 tablet by mouth daily, Disp: 90 tablet, Rfl: 2    spironolactone (ALDACTONE) 25 MG tablet, Take 1 tablet by mouth 2 times daily, Disp: 60 tablet, Rfl: 11    blood glucose test strips (ONETOUCH ULTRA) strip, USE TO TEST SUGAR 4 TIMES DAILY. DX:E11.9, Disp: 100 each, Rfl: 0    carvedilol (COREG) 25 MG tablet, TAKE 1 TABLET BY MOUTH TWICE DAILY, Disp: 180 tablet, Rfl: 3    ASPIRIN LOW DOSE 81 MG EC tablet, Take 1 tablet by mouth daily, Disp: 90 tablet, Rfl: 2    OneTouch Delica Lancets 82L MISC, USE TO CHECK FOUR TIMES DAILY. DX;E11.9, Disp: 100 each, Rfl: 3    Blood Glucose Monitoring Suppl (ONE TOUCH ULTRA 2) w/Device KIT, USE TO CHECK FOUR TIMES DAILY.   DX;E11.9, Disp: 1 kit, Rfl: 0    Continuous Blood Gluc Sensor (FREESTYLE MARY 14 DAY SENSOR) MISC, CGM, Disp: 2 each, Rfl: 2    INSULIN SYRINGE 1CC/29G (AIMSCO INSULIN SYR ULTRA THIN) 29G X 1/2\" 1 ML MISC, 1 each by Does not apply route 3 times daily, Disp: 100 each, Rfl: 3    Insulin Pen Needle (UNIFINE PENTIPS) 31G X 5 MM MISC, USE 1 EACH DAY AS NEEDED FOR TESTING, Disp: 100 each, Rfl: 3    Multiple Vitamins-Minerals (THERAPEUTIC MULTIVITAMIN-MINERALS) tablet, Take 1 tablet by mouth daily, Disp: , associated with type 2 diabetes mellitus ([facility] [address]        5. Diabetic polyneuropathy associated with type 2 diabetes mellitus ([facility] [address]        6. Diabetic retinopathy of both eyes without macular edema associated with type 2 diabetes mellitus, unspecified retinopathy severity ([facility]        7. Acquired hypothyroidism               Plan:        #annual exam done. #CAD. He is s/p CABG. No chest pain. #Diabetes mellitus type 2 controlled. Needs labs. He has retinopathy/neuropathy and microalbuminuria. #Hypertension. BP controlled. 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 He is completely quit drinking 5 years ago. Continue with Coreg Lasix and Cozaar. .  He sees cardiology. #Hyperlipidemia well controlled. #Hypo-thyroidism. He cannot tolerate levothyroxine. He takes [place] Thyroid. #Anemia. GI work-up negative.               Argentina Paula MD

## 2022-07-21 ENCOUNTER — TELEPHONE (OUTPATIENT)
Dept: INTERNAL MEDICINE CLINIC | Age: 57
End: 2022-07-21

## 2022-07-21 LAB
ESTIMATED AVERAGE GLUCOSE: 128.4 MG/DL
HBA1C MFR BLD: 6.1 %

## 2022-07-21 RX ORDER — LEVOTHYROXINE SODIUM 88 UG/1
88 TABLET ORAL DAILY
Qty: 90 TABLET | Refills: 0 | Status: SHIPPED | OUTPATIENT
Start: 2022-07-21 | End: 2022-10-05

## 2022-07-21 NOTE — TELEPHONE ENCOUNTER
----- Message from Giselle Reyes MD sent at 7/21/2022 11:05 AM EDT -----  Sugars are great. Increase Synthroid to 88 MCG. Have him check a TSH and free T4 in 6 weeks with his new PCP.

## 2022-08-03 RX ORDER — PEN NEEDLE, DIABETIC 32GX 5/32"
NEEDLE, DISPOSABLE MISCELLANEOUS
Qty: 100 EACH | Refills: 1 | Status: SHIPPED | OUTPATIENT
Start: 2022-08-03

## 2022-08-15 RX ORDER — LOSARTAN POTASSIUM 25 MG/1
25 TABLET ORAL DAILY
Qty: 30 TABLET | Refills: 1 | Status: SHIPPED | OUTPATIENT
Start: 2022-08-15 | End: 2022-08-18 | Stop reason: SDUPTHER

## 2022-08-17 NOTE — PROGRESS NOTES
[address] Office Note  2022     Subjective:  Mr. Echo Villatoro presents today for follow up. CABG x 3. Non ischemic (alcohol) and ischemic cardiomyopathy s/p CABG  ICD, Chronic Systolic CHF    Solomon:     Today, he reports that he does well today. He notes that he was short of breath yesterday but this has improved today. He is compliant with medications and tolerates well. He has not noticed any weight gain   denies chest pain dizziness or syncope. Rickey Argueta PMH:  Alcoholic CMP, ICD placement  by Dr. Bibi Kunz for non ischemic CM with low EF that failed to improve with guide line based medical therapy. He is no longer drinking ETOH products. On 19 he underwent left foot ulcer debridement. He works for [employer]. A left heart cath performed 20 demonstrated multivessel CAD. He then underwent a CABG x 3 on 2020. Device check on 10/7/20 showed He has  <0.1%. No VT events recorded since last office visit. Optivol previously increased. Battery life 5 years. Nightly HR >85 bpm x7 days. QRS duration was 130. He reported he has a left open wound ulcer on the bottom of his foot. He follows with Dr. Tremaine Ortiz for this for last year and half. Device check 3/10/21 demonstrated  <0.0%, no new events. Review of Systems: 12 point ROS negative in all areas as listed below except as in Solomon  Constitutional, EENT,pulmonary, GI, ,  skin, neurological, hematological, endocrine, Psychiatric    Reviewed past medical history, social, and family history. No alcohol nonsmoker  HE  IS DISABLED  Mother: Heart disease, MI x4, young age. Father: Heart disease, CABG age 67.    Siblings: brother  age 40 of CHF, CVA  Past Medical History:   Diagnosis Date    Acute on chronic systolic congestive heart failure ([address] 2013    Acute osteomyelitis of left foot ([address] 2017    Acute osteomyelitis of right foot ([address] 2016    Acute respiratory failure ([address] 2020    Ascites     Blood transfusion reaction     Burst fracture of lumbar vertebra ([address] 11/09/2012    Cellulitis of left foot     Cellulitis of right lower extremity 2/16, 5/16    Chronic systolic CHF (congestive heart failure) ([address]     Community acquired pneumonia     Diabetes ([address]     Diabetic ulcer of left foot associated with type 2 diabetes mellitus, with muscle involvement without evidence of necrosis ([address] 04/27/2017    Diabetic ulcer of right foot ([address] early 2016    Diabetic ulcer of 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 2 diabetes mellitus, with necrosis of bone ([address]     ETOH abuse     Fracture of tibial plateau 70/32/1738    High cholesterol     History of blood transfusion     reaction    HTN (hypertension)     Hx of blood clots     MI (myocardial infarction) ([address] 08/04/2020    + Troponins    MRSA (methicillin resistant staph aureus) culture positive 4/28/16, 4/20/16    foot wound    Neuropathic ulcer of left foot, limited to breakdown of skin ([address] 11/03/2016    Neuropathic ulcer of toe ([address] 02/13/2014    NSVT (nonsustained ventricular tachycardia) ([address] 2014    Pleural effusion due to congestive heart failure (HCC)     Septicemia (HCC)     Smoker     quit 1101    Systolic CHF, acute ([address] 07/08/2013    Thyroid disease      Past Surgical History:   Procedure Laterality Date    CARDIAC DEFIBRILLATOR PLACEMENT  01/29/2014    ICD Placement    CORONARY ARTERY BYPASS GRAFT N/A 8/11/2020    CORONARY ARTERY BYPASS GRAFTING X3, LEFT ATRIAL APPENDAGE CLIP, INTERNAL MAMMARY ARTERY, SAPHENOUS VEIN GRAFT, ON PUMP, 5 LEVEL BILATERAL INTERCOSTAL NERVE BLOCK performed by Lizz Perez MD at [address] [place] [address] Left 08/09/2018    PARTIAL FOOT AMPUTATION w. Dr Kraig Garcia Left 1/31/2019    EXOSTECTOMY LEFT FOOT, ULCER DEBRIDEMENT LEFT FOOT WITH GRAFT APPLICATION performed by Gucci Medina DPM at [address] [facility] Left 09/27/2017    LEFT FOOT ULCER DEBRIDEMENT, POSSIBLE SECOND METATARSAL FOOT SURGERY Left 01/31/2019    Exostectomy left foot, ulcer debridement with graft application left foot    FOOT TENDON SURGERY Right 2016    FOOT TENDON SURGERY Left 06/30/2017    KNEE SURGERY Left     OTHER SURGICAL HISTORY Bilateral 9/17/15    amputation 2nd digit bilateral, flexor tenotomy right hallux    OTHER SURGICAL HISTORY Left 08/17/2017    PARTIAL LEFT FOOT AMPUTATION      OTHER SURGICAL HISTORY Left 12/07/2017    Debridement infected bone and tissue left foot; ulcer debridement left foot with graft application with dr mathur     OTHER SURGICAL HISTORY Right 01/04/2018    DEBRIDEMENT INFECTED BONE AND TISSUE RIGHT FOOT, ULCER DEBRIDEMENT RIGHT FOOT WITH GRAFT APPLICATION    OTHER SURGICAL HISTORY Left 11/01/2018    Procedure: PARTIAL RESECTION LEFT METATARSAL, ULCER DEBRIDEMENT LEFT FOOT WITH GRAFT APPLICATION     OTHER SURGICAL HISTORY       AMPUTATION LEFT FOURTH AND FIFTH DIGITS, PARTIAL RESECTION  SECOND AND THIRD METATARSAL LEFT FOOT, PARTIAL RESECTION RIGHT FIRST METATARSAL (Bilateral Foot)    ME OFFICE/OUTPT VISIT,PROCEDURE ONLY Left 8/23/2018    ULCER DEBRIDEMENT LEFT FOOT WITH GRAFT APPLICATION performed by Greta Fish DPM at [address] TARSAL/METATARSAL Left 8/9/2018    PARTIAL FOOT AMPUTATION WITH GRAFT APPLICATION performed by Greta Fish DPM at [address] TARSAL/METATARSAL Left 11/1/2018    PARTIAL RESECTION LEFT METATARSAL, ULCER DEBRIDEMENT LEFT FOOT WITH GRAFT APPLICATION performed by Greta Fish DPM at [address]      2 toes    TOE AMPUTATION Bilateral 3/18/2021    AMPUTATION LEFT FOURTH AND FIFTH DIGITS, PARTIAL RESECTION  SECOND AND THIRD METATARSAL LEFT FOOT, PARTIAL RESECTION RIGHT FIRST METATARSAL performed by Greta Fish DPM at [address] [employer] Right 5/20/2021    PARTIAL RESECTION RIGHT FIRST METATARSAL performed by Greta Fish DPM at [facility] OR       Objective:   BP (!) 90/56   Pulse 90   Ht 5' 10\" (1.778 m)   Wt 219 lb (99.3 kg)   SpO2 98%   BMI 31.42 kg/m²     Wt Readings from Last 3 Encounters:   08/18/22 219 lb (99.3 kg)   07/20/22 219 lb (99.3 kg)   04/14/22 220 lb (99.8 kg)           Physical Exam:  General: No Respiratory distress, appears well developed and well nourished. Eyes:  Sclera nonicteric  Nose/Sinuses:  negative findings: nose shows no deformity, asymmetry, or inflammation, nasal mucosa normal, septum midline with no perforation or bleeding  Back:  no pain to palpation  Joint:  no active joint inflammation  Musculoskeletal:  negative  Skin:  Warm and dry  Neck: No JVD and no Carotid Bruits. Chest:  Clear to ascultation,  respiration easy  Cardiovascular:  RRR, 94/min  S1S2 normal, 2/6 murmur over left sternal border, no rub or thrill. Extremities  No edema. Left foot all toes and right big toe removed related to diabetes and non healing ulcers. no clubbing or cyanosis  Abdomen  Liver not palpable. Pulses: Femoral pulses are 2+  Radial pulses nonpalpable bilat. Neuro: intact    Medications:   Outpatient Encounter Medications as of 8/18/2022   Medication Sig Dispense Refill    carvedilol (COREG) 25 MG tablet TAKE 1 TABLET BY MOUTH TWICE DAILY 60 tablet 11    furosemide (LASIX) 80 MG tablet Take 80 mg in the morning and 40 mg at noon 60 tablet 5    losartan (COZAAR) 25 MG tablet Take 1 tablet by mouth daily 30 tablet 11    metFORMIN (GLUCOPHAGE) 1000 MG tablet Take 1 tablet by mouth 2 times daily (with meals) 60 tablet 1    BD PEN NEEDLE WINNIE U/F 32G X 4 MM MISC 1 each by Does not apply route daily 100 each 1    levothyroxine (SYNTHROID) 88 MCG tablet Take 1 tablet by mouth in the morning. 90 tablet 0    glipiZIDE (GLUCOTROL) 5 MG tablet Take 1 tablet by mouth in the morning and 1 tablet in the evening. Take before meals. Take 1 tablet by mouth 2 times daily (before meals). 60 tablet 2    allopurinol (ZYLOPRIM) 100 MG tablet Take 2 tablets by mouth in the morning. Take 2 tablets by mouth daily.  61 tablet 2    magnesium oxide (MAG-OX) 400 (240 Mg) MG tablet TAKE 1 TABLET BY MOUTH 2 TIMES DAILY 60 tablet 3    atorvastatin (LIPITOR) 40 MG tablet Take 1 tablet by mouth nightly 90 tablet 2    TRULICITY 3 EI/2.8WR SOPN Inject 3 mg into the skin once a week 2 mL 2    insulin glargine (BASAGLAR KWIKPEN) 100 UNIT/ML injection pen Inject 100 Units into the skin daily 30 pen 0    isosorbide mononitrate (IMDUR) 30 MG extended release tablet Take 1 tablet by mouth daily 90 tablet 5    clopidogrel (PLAVIX) 75 MG tablet Take 1 tablet by mouth daily 90 tablet 2    spironolactone (ALDACTONE) 25 MG tablet Take 1 tablet by mouth 2 times daily 60 tablet 11    ASPIRIN LOW DOSE 81 MG EC tablet Take 1 tablet by mouth daily 90 tablet 2    OneTouch Delica Lancets 38H MISC USE TO CHECK FOUR TIMES DAILY. DX;E11.9 100 each 3    Continuous Blood Gluc Sensor ([employer] MARY 14 DAY SENSOR) MISC CGM 2 each 2    Insulin Pen Needle (UNIFINE PENTIPS) 31G X 5 MM MISC USE 1 EACH DAY AS NEEDED FOR TESTING 100 each 3    Multiple Vitamins-Minerals (THERAPEUTIC MULTIVITAMIN-MINERALS) tablet Take 1 tablet by mouth daily      [DISCONTINUED] losartan (COZAAR) 25 MG tablet Take 1 tablet by mouth daily 30 tablet 1    [DISCONTINUED] furosemide (LASIX) 80 MG tablet Take 80 mg in the morning and 40 mg at noon 60 tablet 0    [DISCONTINUED] magnesium oxide (MAG-OX) 400 (241.3 Mg) MG TABS tablet Take 1 tablet by mouth 2 times daily (Patient not taking: Reported on 8/18/2022) 60 tablet 2    [DISCONTINUED] blood glucose test strips (ONETOUCH ULTRA) strip USE TO TEST SUGAR 4 TIMES DAILY. DX:E11.9 100 each 0    [DISCONTINUED] carvedilol (COREG) 25 MG tablet TAKE 1 TABLET BY MOUTH TWICE DAILY 180 tablet 3    [DISCONTINUED] Blood Glucose Monitoring Suppl (ONE TOUCH ULTRA 2) w/Device KIT USE TO CHECK FOUR TIMES DAILY.   DX;E11.9 1 kit 0    INSULIN SYRINGE 1CC/29G ([employer] INSULIN SYR ULTRA THIN) 29G X 1/2\" 1 ML MISC 1 each by Does not apply route 3 times daily (Patient not taking: Reported on 8/18/2022) 100 each 3     No facility-administered encounter medications on file as of 8/18/2022. Lab Data:  CBC: No results for input(s): WBC, HGB, HCT, MCV, PLT in the last 72 hours. BMP: No results for input(s): NA, K, CL, CO2, PHOS, BUN, CREATININE, CA in the last 72 hours. LIVER PROFILE: No results for input(s): AST, ALT, LIPASE, BILIDIR, BILITOT, ALKPHOS in the last 72 hours. Invalid input(s): AMYLASE,  ALB  LIPID:   No components found for: CHLPL  Lab Results   Component Value Date    TRIG 109 07/20/2022    TRIG 248 (H) 08/11/2020    TRIG 176 (H) 10/16/2019     Lab Results   Component Value Date    HDL 27 (L) 07/20/2022    HDL 26 (L) 06/16/2021    HDL 23 (L) 08/11/2020     Lab Results   Component Value Date    LDLCALC 18 07/20/2022    LDLCALC 12 06/16/2021    LDLCALC 35 08/11/2020     Lab Results   Component Value Date    LABVLDL 22 07/20/2022    LABVLDL 38 06/16/2021    LABVLDL 50 08/11/2020     PT/INR: No results for input(s): PROTIME, INR in the last 72 hours. A1C:   Lab Results   Component Value Date    LABA1C 6.1 07/20/2022       IMAGING:   Reviewed and discussed with patient  Device Check 6/12/22  Normal sensing and function  No sustained arrhthymias noted  SUMAN 3 yrs    Limited Echo 4/25/2022  Summary  Limited exam for LVEF. The left ventricular systolic function is moderately reduced with an  ejection fraction of 30-35 %. There is diastolic septal flattening consistent with right ventricular  volume overload. There is hypokinesis of the inferior, anteroseptal, inferoseptal and  inferolateral walls. Anterolateral wall appears normal.  Compared to exam done 1/20/2021, left ventricular systolic function appears  slightly improved. Mild mitral regurgitation. Mild to moderate tricuspid regurgitation. Systolic pulmonary artery pressure (SPAP) is estimated at 47 mmHg consistent  with mild pulmonary hypertension (Right atrial pressure of 3 mmHg).     Device check 3/14/2022  Normal sensing and pacing  SUMAN 3.3 years  No arrhythmias recorded  PVC count improved    Device check 12/13/2021  Normal device function PVC   PVC count increased from 24/hr in 85 days to 124/hr in 11 days. (mag-ox, coreg). SUMAN 3.7 yrs    Device check 12.4.21  Normal device function   optivol up    ECHO 1/20/21  Conclusions      Summary   Left ventricular systolic function is reduced with ejection fraction   estimated at 25-30 %. All remaining wall segments appear severely hypokinetic . Abnormal (paradoxical) septal motion is present likely due to pacemaker. There is hypokinesis of the mid and basal anterior and anteroseptal wall   segments. All remaining wall segments appear severely hypokinetic . Pacer / ICD wire is visualized in the right ventricle and right atrium. Echo 8- 25-30% EF severe global hypokinesis. I have reviewed the following tests and documented in this encounter as follows:   Discussed with patient. EKG 4/6/21  Normal sinus rhythmLeft axis deviationCannot rule out Anterior infarct     Lexiscan Stress Test 3/8/2021  Summary Moderate to severely reduced LVEF 37%  Global hypokinesis with regional variation (severe inferoapical hypokinesis)  Large area of distal anterior and inferoapical scar. No ischemia     EKG 2/11/21  SR,HR 80    EKG 8/4/2020  Sinus tachycardia Intra-ventricular conduction delaySeptal infarct , age undeterminedAbnormal ECGNo significant change was foundWhen compared with ECG of1.31.19Confirmed by GIL HUFFMAN, [address] ([id number]) on 8/4/2020 6:51:55 AM     CABG:  Surgeon:  Rosalinda Burton     S/P :  CABG x3, AIDEN clip on 8/11/20    Chest Xray 1/15/2021  Heart is enlarged but unchanged. Pulmonary vessels are upper normal.  No acute or focal airspace disease. Sternotomy wire and single lead left subclavian device noted. No pneumothorax or pleural effusion. Stable moderate cardiomegaly. CHEST XRAY 8/13/2020  FINDINGS: Recent surgical history of CABG. Left vascular sheath remains in place but Venice-Seth catheter has been removed. ICD stable on the left. Improving aeration in the left lung base with some residual pleural fluid and airspace change. Cardiomegaly is demonstrated. No skeletal finding. Limited Echo 1/20/21   Summary   Left ventricular systolic function is reduced with ejection fraction   estimated at 25-30 %. All remaining wall segments appear severely hypokinetic . Abnormal (paradoxical) septal motion is present likely due to pacemaker. There is hypokinesis of the mid and basal anterior and anteroseptal wall   segments. All remaining wall segments appear severely hypokinetic . Pacer / ICD wire is visualized in the right ventricle and right atrium. Echo 8- 25-30% EF severe global hypokinesis. ECHO 8/5/2020   Summary   Left ventricular systolic function is severely reduced with ejection   fraction estimated at 25-30 %. Severe global hypokinesis is present. Left ventricle size is normal.   Normal left ventricular wall thickness. Grade III diastolic dysfunction with elevated filing pressure. Mild posterior mitral annular calcification is present. No evidence of mitral valve stenosis. Severe mitral regurgitation. The left atrium is mildly dilated. No evidence of aortic valve stenosis. Mild aortic regurgitation is present. Mild tricuspid regurgitation. Normal systolic pulmonary artery pressure (SPAP) estimated at 39 mmHg (RA   pressure 8 mmHg). Stress test: 7/2013   No scintigraphic evidence of stress-induced myocardial  ischemia. 2. Chronic inferoapical infarct. 3. Severe left ventricular dilatation with severe diffuse  hypokinesis. 4. Severely reduced LVEF of approximately 20%. Assessment:    Encounter Diagnoses   Name Primary?     Primary hypertension     Mixed hyperlipidemia     Coronary artery disease involving native coronary artery of native heart without angina pectoris     Chronic systolic CHF (congestive heart failure) (HCC) Yes    Alcoholic cardiomyopathy     Essential hypertension     Medication management    BP low normal He is asymptomatic  He is on atorvastatin for CAD recent lipids in July 2022 satisfactory  CAD s/p CABG 8.11.20 no angina stable    on DAPT  beta blockers and statin   Alcoholic cardiomyopathy,    On Coreg  Lasix aldactone and lisinopril  Hypertension, controlled     Automatic implantable cardiac defibrillator in situ recent check normal function    Plan:  1. Continue current meds  Refills warranted at this time  2. No cardiac testing at this time  3. Plan to follow up in 6 months. QUALITY MEASURES  1. Tobacco Cessation Counseling: NA  2. Retake of BP if >140/90:   NA  3. Documentation to PCP/referring for new patient:  Sent to PCP at close of office visit  4. CAD patient on anti-platelet: Yes DAPT  5. CAD patient on STATIN therapy:  Yes  6. Patient with CHF and aFib on anticoagulation:  NA     This note was scribed in the presence of Landry Maza MD by Verónica Simmons RN. I, Dr. Getachew Sher, personally performed the services described in this documentation, as scribed by the above signed scribe in my presence. It is both accurate and complete to my knowledge. I agree with the details independently gathered by the clinical support staff, while the remaining scribed note accurately describes my personal service to the patient.           Getachew Sher MD 8/18/2022 5:20 PM  [address]  [phone] [facility] office  [phone] [facility]

## 2022-08-18 ENCOUNTER — OFFICE VISIT (OUTPATIENT)
Dept: CARDIOLOGY CLINIC | Age: 57
End: 2022-08-18
Payer: MEDICARE

## 2022-08-18 VITALS
HEART RATE: 90 BPM | HEIGHT: 70 IN | DIASTOLIC BLOOD PRESSURE: 56 MMHG | OXYGEN SATURATION: 98 % | SYSTOLIC BLOOD PRESSURE: 90 MMHG | WEIGHT: 219 LBS | BODY MASS INDEX: 31.35 KG/M2

## 2022-08-18 DIAGNOSIS — E78.2 MIXED HYPERLIPIDEMIA: ICD-10-CM

## 2022-08-18 DIAGNOSIS — I10 PRIMARY HYPERTENSION: ICD-10-CM

## 2022-08-18 DIAGNOSIS — I50.22 CHRONIC SYSTOLIC CHF (CONGESTIVE HEART FAILURE) (HCC): Primary | ICD-10-CM

## 2022-08-18 DIAGNOSIS — I25.10 CORONARY ARTERY DISEASE INVOLVING NATIVE CORONARY ARTERY OF NATIVE HEART WITHOUT ANGINA PECTORIS: ICD-10-CM

## 2022-08-18 DIAGNOSIS — Z79.899 MEDICATION MANAGEMENT: ICD-10-CM

## 2022-08-18 DIAGNOSIS — I42.6 ALCOHOLIC CARDIOMYOPATHY (HCC): ICD-10-CM

## 2022-08-18 DIAGNOSIS — I10 ESSENTIAL HYPERTENSION: ICD-10-CM

## 2022-08-18 PROCEDURE — 99214 OFFICE O/P EST MOD 30 MIN: CPT | Performed by: INTERNAL MEDICINE

## 2022-08-18 RX ORDER — FUROSEMIDE 80 MG
TABLET ORAL
Qty: 60 TABLET | Refills: 5 | Status: SHIPPED | OUTPATIENT
Start: 2022-08-18

## 2022-08-18 RX ORDER — LOSARTAN POTASSIUM 25 MG/1
25 TABLET ORAL DAILY
Qty: 30 TABLET | Refills: 11 | Status: SHIPPED | OUTPATIENT
Start: 2022-08-18

## 2022-08-18 RX ORDER — CARVEDILOL 25 MG/1
TABLET ORAL
Qty: 60 TABLET | Refills: 11 | Status: SHIPPED | OUTPATIENT
Start: 2022-08-18

## 2022-08-18 NOTE — PATIENT INSTRUCTIONS
Plan:  1. Continue current meds  Refills warranted at this time  2. No cardiac testing at this time  3. Plan to follow up in 6 months.

## 2022-08-18 NOTE — LETTER
[address]  [address] [address]  Phone: [phone]  Fax: [phone]    Cristobal Mcmullen MD    August 19, 2022     Edi Vu, [address] [facility], [address] [place] [address]    Patient: Jose Aragon   MR Number: [id number]   YOB: 1965   Date of Visit: 8/18/2022       Dear Edi Vu:    Thank you for referring Janine Perez to me for evaluation/treatment. Below are the relevant portions of my assessment and plan of care. If you have questions, please do not hesitate to call me. I look forward to following Ruben along with you.     Sincerely,      Cristobal Mcmullen MD

## 2022-09-01 DIAGNOSIS — I10 ESSENTIAL HYPERTENSION: ICD-10-CM

## 2022-09-01 DIAGNOSIS — E78.2 MIXED HYPERLIPIDEMIA: ICD-10-CM

## 2022-09-01 DIAGNOSIS — I42.6 ALCOHOLIC CARDIOMYOPATHY (HCC): ICD-10-CM

## 2022-09-02 RX ORDER — ASPIRIN 81 MG/1
TABLET, COATED ORAL
Qty: 30 TABLET | Refills: 3 | Status: SHIPPED | OUTPATIENT
Start: 2022-09-02

## 2022-09-09 RX ORDER — CLOPIDOGREL BISULFATE 75 MG/1
75 TABLET ORAL DAILY
Qty: 90 TABLET | Refills: 0 | Status: SHIPPED | OUTPATIENT
Start: 2022-09-09

## 2022-09-12 ENCOUNTER — NURSE ONLY (OUTPATIENT)
Dept: CARDIOLOGY CLINIC | Age: 57
End: 2022-09-12
Payer: MEDICARE

## 2022-09-12 ENCOUNTER — TELEPHONE (OUTPATIENT)
Dept: CARDIOLOGY CLINIC | Age: 57
End: 2022-09-12

## 2022-09-12 DIAGNOSIS — I25.5 ISCHEMIC CARDIOMYOPATHY: ICD-10-CM

## 2022-09-12 DIAGNOSIS — I42.6 ALCOHOLIC CARDIOMYOPATHY (HCC): ICD-10-CM

## 2022-09-12 DIAGNOSIS — I47.29 NSVT (NONSUSTAINED VENTRICULAR TACHYCARDIA): ICD-10-CM

## 2022-09-12 DIAGNOSIS — Z95.810 ICD (IMPLANTABLE CARDIOVERTER-DEFIBRILLATOR) IN PLACE: ICD-10-CM

## 2022-09-12 DIAGNOSIS — I50.22 CHRONIC SYSTOLIC CHF (CONGESTIVE HEART FAILURE) (HCC): Primary | ICD-10-CM

## 2022-09-12 PROCEDURE — 93296 REM INTERROG EVL PM/IDS: CPT | Performed by: INTERNAL MEDICINE

## 2022-09-12 PROCEDURE — G2066 INTER DEVC REMOTE 30D: HCPCS | Performed by: INTERNAL MEDICINE

## 2022-09-12 PROCEDURE — 93297 REM INTERROG DEV EVAL ICPMS: CPT | Performed by: INTERNAL MEDICINE

## 2022-09-12 PROCEDURE — 93295 DEV INTERROG REMOTE 1/2/MLT: CPT | Performed by: INTERNAL MEDICINE

## 2022-09-12 NOTE — PROGRESS NOTES
Remote transmission of pacemaker and/or ICD, or implanted heart monitor shows normal cardiac device function. Patient's last device interrogation was on 6/13. Estimated device longevity is 2.4 years. Patient has a history of NSVT and ICM. Takes Coreg, Plavix, and MagOx.  <0.1%    R wave 9.8 mV    Since last device interrogation, no arrhythmias recorded. TI near baseline. Optivol elevated. EP MD to review. Patient is to follow up in 3 months either remotely or in clinic. Please see the Paceart report under the Cardiology tab for more detail.

## 2022-09-12 NOTE — TELEPHONE ENCOUNTER
See Paceart report under the Cardiology tab. Per recent remote transmission, patient's Optivol fluid index is trending upward, indicating that the patient's fluid level is increasing. Please call patient to inquire of sign or symptoms of fluid increase. Please forward findings to Dr. Stewart Lopez. RKG - Optivol is elevated, however TI near baseline. TriageF Heart Failure Risk Status on 11-Sep-2022 is High.

## 2022-09-13 NOTE — TELEPHONE ENCOUNTER
Patient denies edema or sob. He does not check weight. Notes he feels fine. FYI he did have GI bug last week for a couple of days. But feels fine now.

## 2022-09-13 NOTE — TELEPHONE ENCOUNTER
Please check with patient if he has any weight gain or increase in swelling or shortness of breath. Let me know. Chelsea Casey

## 2022-09-27 RX ORDER — DULAGLUTIDE 3 MG/.5ML
3 INJECTION, SOLUTION SUBCUTANEOUS WEEKLY
Qty: 2 ML | Refills: 0 | Status: SHIPPED | OUTPATIENT
Start: 2022-09-27 | End: 2022-11-02 | Stop reason: SDUPTHER

## 2022-10-04 ENCOUNTER — OFFICE VISIT (OUTPATIENT)
Dept: FAMILY MEDICINE CLINIC | Age: 57
End: 2022-10-04
Payer: MEDICARE

## 2022-10-04 VITALS
BODY MASS INDEX: 30.85 KG/M2 | SYSTOLIC BLOOD PRESSURE: 96 MMHG | WEIGHT: 215 LBS | HEART RATE: 91 BPM | DIASTOLIC BLOOD PRESSURE: 58 MMHG | OXYGEN SATURATION: 97 %

## 2022-10-04 DIAGNOSIS — I42.6 ALCOHOLIC CARDIOMYOPATHY (HCC): ICD-10-CM

## 2022-10-04 DIAGNOSIS — Z76.89 ENCOUNTER TO ESTABLISH CARE: Primary | ICD-10-CM

## 2022-10-04 DIAGNOSIS — I50.22 CHRONIC SYSTOLIC CHF (CONGESTIVE HEART FAILURE) (HCC): ICD-10-CM

## 2022-10-04 DIAGNOSIS — I10 PRIMARY HYPERTENSION: ICD-10-CM

## 2022-10-04 DIAGNOSIS — E03.9 ACQUIRED HYPOTHYROIDISM: ICD-10-CM

## 2022-10-04 DIAGNOSIS — E11.42 DIABETIC POLYNEUROPATHY ASSOCIATED WITH TYPE 2 DIABETES MELLITUS (HCC): ICD-10-CM

## 2022-10-04 DIAGNOSIS — Z95.1 S/P CORONARY ARTERY BYPASS GRAFT X 3: ICD-10-CM

## 2022-10-04 DIAGNOSIS — E78.2 MIXED HYPERLIPIDEMIA: ICD-10-CM

## 2022-10-04 PROBLEM — N18.30 CHRONIC RENAL DISEASE, STAGE III (HCC): Status: ACTIVE | Noted: 2022-10-04

## 2022-10-04 LAB
T4 FREE: 1.5 NG/DL (ref 0.9–1.8)
TSH SERPL DL<=0.05 MIU/L-ACNC: 9.67 UIU/ML (ref 0.27–4.2)

## 2022-10-04 PROCEDURE — 3044F HG A1C LEVEL LT 7.0%: CPT | Performed by: NURSE PRACTITIONER

## 2022-10-04 PROCEDURE — G8484 FLU IMMUNIZE NO ADMIN: HCPCS | Performed by: NURSE PRACTITIONER

## 2022-10-04 PROCEDURE — 2022F DILAT RTA XM EVC RTNOPTHY: CPT | Performed by: NURSE PRACTITIONER

## 2022-10-04 PROCEDURE — G8427 DOCREV CUR MEDS BY ELIG CLIN: HCPCS | Performed by: NURSE PRACTITIONER

## 2022-10-04 PROCEDURE — G8417 CALC BMI ABV UP PARAM F/U: HCPCS | Performed by: NURSE PRACTITIONER

## 2022-10-04 PROCEDURE — 3017F COLORECTAL CA SCREEN DOC REV: CPT | Performed by: NURSE PRACTITIONER

## 2022-10-04 PROCEDURE — 1036F TOBACCO NON-USER: CPT | Performed by: NURSE PRACTITIONER

## 2022-10-04 PROCEDURE — 99214 OFFICE O/P EST MOD 30 MIN: CPT | Performed by: NURSE PRACTITIONER

## 2022-10-04 RX ORDER — BLOOD PRESSURE TEST KIT
1 KIT MISCELLANEOUS DAILY
Qty: 1 KIT | Refills: 0 | Status: SHIPPED | OUTPATIENT
Start: 2022-10-04

## 2022-10-04 SDOH — ECONOMIC STABILITY: FOOD INSECURITY: WITHIN THE PAST 12 MONTHS, YOU WORRIED THAT YOUR FOOD WOULD RUN OUT BEFORE YOU GOT MONEY TO BUY MORE.: NEVER TRUE

## 2022-10-04 SDOH — ECONOMIC STABILITY: FOOD INSECURITY: WITHIN THE PAST 12 MONTHS, THE FOOD YOU BOUGHT JUST DIDN'T LAST AND YOU DIDN'T HAVE MONEY TO GET MORE.: NEVER TRUE

## 2022-10-04 ASSESSMENT — PATIENT HEALTH QUESTIONNAIRE - PHQ9
10. IF YOU CHECKED OFF ANY PROBLEMS, HOW DIFFICULT HAVE THESE PROBLEMS MADE IT FOR YOU TO DO YOUR WORK, TAKE CARE OF THINGS AT HOME, OR GET ALONG WITH OTHER PEOPLE: 0
9. THOUGHTS THAT YOU WOULD BE BETTER OFF DEAD, OR OF HURTING YOURSELF: 0
6. FEELING BAD ABOUT YOURSELF - OR THAT YOU ARE A FAILURE OR HAVE LET YOURSELF OR YOUR FAMILY DOWN: 0
4. FEELING TIRED OR HAVING LITTLE ENERGY: 0
SUM OF ALL RESPONSES TO PHQ QUESTIONS 1-9: 0
SUM OF ALL RESPONSES TO PHQ9 QUESTIONS 1 & 2: 0
5. POOR APPETITE OR OVEREATING: 0
SUM OF ALL RESPONSES TO PHQ QUESTIONS 1-9: 0
3. TROUBLE FALLING OR STAYING ASLEEP: 0
7. TROUBLE CONCENTRATING ON THINGS, SUCH AS READING THE NEWSPAPER OR WATCHING TELEVISION: 0
1. LITTLE INTEREST OR PLEASURE IN DOING THINGS: 0
SUM OF ALL RESPONSES TO PHQ QUESTIONS 1-9: 0
8. MOVING OR SPEAKING SO SLOWLY THAT OTHER PEOPLE COULD HAVE NOTICED. OR THE OPPOSITE, BEING SO FIGETY OR RESTLESS THAT YOU HAVE BEEN MOVING AROUND A LOT MORE THAN USUAL: 0
2. FEELING DOWN, DEPRESSED OR HOPELESS: 0
SUM OF ALL RESPONSES TO PHQ QUESTIONS 1-9: 0

## 2022-10-04 ASSESSMENT — SOCIAL DETERMINANTS OF HEALTH (SDOH): HOW HARD IS IT FOR YOU TO PAY FOR THE VERY BASICS LIKE FOOD, HOUSING, MEDICAL CARE, AND HEATING?: NOT VERY HARD

## 2022-10-04 ASSESSMENT — ENCOUNTER SYMPTOMS: RESPIRATORY NEGATIVE: 1

## 2022-10-04 NOTE — PROGRESS NOTES
10/4/2022    Maeve Brunner (:  1965) is a 62 y.o. male, here for a preventive medicine evaluation. Pt is here today to establish care as a new pt. Previous pt of Dr. Wilbur Burleson, states that he had to switch providers b/c he no longer accepts his insurance. Single. Has 2 grown children. Unemployed, on disability (for the past 2 years) for h/o 6 toe amputations, triple bypass heart surgery and pacemaker. Pt used to work for a [email or web address]. Lives with his mother in a ranch style house. Denies alcohol, drug or tobacco.    H/o alcoholism, quit drinking in . Follows Dr. Maribel Galvez (Cardiology) for CABG x's3, non ischemic (alcohol) and ischemic cardiomyopathy s/p CABG, ICD, Chronic Systolic CHF. Last visit with Dr. Maribel Galvez was 2022, f/u every 6 months. States that he is aware of his low BP. Pt has a wrist machine at home, but states that it always reads \"error\" when he tries to check his BP. H/o type 2 diabetes. Taking Trulicity, metformin, glipizide, and lantus as directed. States that his BS ranges between 104-230. Last A1C checked 2022 - 6.1. Follows Dr. Komal Lincoln (podiatry) for h/o amputations. Has an appt with him tomorrow. Fasting labs done 22. H/o hypothyroidism. Taking Synthroid 88 mcg daily. This dose was increased after labs checked 22. Due for f/u repeat labs.       Patient Active Problem List   Diagnosis    Hypertension    Diabetic polyneuropathy associated with type 2 diabetes mellitus (HCC)    Alcoholic cardiomyopathy    Automatic implantable cardioverter-defibrillator in situ    Hyperlipidemia    Onychomycosis    Dystrophic nail    Callus of foot    Hallux valgus    Acquired hypothyroidism    NSVT (nonsustained ventricular tachycardia)    NSTEMI (non-ST elevated myocardial infarction) ([facility]    Coronary artery disease involving native coronary artery of native heart without angina pectoris    Obesity    Acute post-operative pain    S/P coronary artery bypass graft x 3    Chest pain    SOB (shortness of breath)    Ischemic cardiomyopathy    ACE-inhibitor cough    Diabetic retinopathy of both eyes without macular edema associated with type 2 diabetes mellitus ([facility]    Diabetic nephropathy associated with type 2 diabetes mellitus ([facility]    Chronic systolic CHF (congestive heart failure) ([facility]    Chronic renal disease, stage III ([facility] [[id number]]       Review of Systems   Constitutional: Negative. Respiratory: Negative. Cardiovascular: Negative. Neurological: Negative. Psychiatric/Behavioral: Negative. Prior to Visit Medications    Medication Sig Taking? Authorizing Provider   Blood Pressure KIT 1 kit by Does not apply route daily Yes Latisha Pierce, APRN - CNP   TRULICITY 3 RG/1.7RR SOPN Inject 3 mg into the skin once a week Yes Robby Abraham MD   clopidogrel (PLAVIX) 75 MG tablet Take 1 tablet by mouth daily Yes Tim Worrell MD   ASPIRIN LOW DOSE 81 MG EC tablet Take 1 tablet by mouth daily Yes Tim Worrell MD   carvedilol (COREG) 25 MG tablet TAKE 1 TABLET BY MOUTH TWICE DAILY Yes Tim Worrell MD   furosemide (LASIX) 80 MG tablet Take 80 mg in the morning and 40 mg at noon  Patient taking differently: 80 mg 2 times daily Take 80 mg in the morning and 80 mg at night Yes Tim Worrell MD   losartan (COZAAR) 25 MG tablet Take 1 tablet by mouth daily Yes Tim Worrell MD   metFORMIN (GLUCOPHAGE) 1000 MG tablet Take 1 tablet by mouth 2 times daily (with meals) Yes Robby Abraham MD   BD PEN NEEDLE WINNIE U/F 32G X 4 MM MISC 1 each by Does not apply route daily Yes Robby Abraham MD   levothyroxine (SYNTHROID) 88 MCG tablet Take 1 tablet by mouth in the morning. Yes Robby Abraham MD   glipiZIDE (GLUCOTROL) 5 MG tablet Take 1 tablet by mouth in the morning and 1 tablet in the evening. Take before meals. Take 1 tablet by mouth 2 times daily (before meals).  Yes Robby Abraham MD allopurinol (ZYLOPRIM) 100 MG tablet Take 2 tablets by mouth in the morning. Take 2 tablets by mouth daily. Yes Gui Coronel MD   magnesium oxide (MAG-OX) 400 (240 Mg) MG tablet TAKE 1 TABLET BY MOUTH 2 TIMES DAILY Yes Eric Bone MD   atorvastatin (LIPITOR) 40 MG tablet Take 1 tablet by mouth nightly Yes Eze Gregory MD   isosorbide mononitrate (IMDUR) 30 MG extended release tablet Take 1 tablet by mouth daily Yes Eric Bone MD   spironolactone (ALDACTONE) 25 MG tablet Take 1 tablet by mouth 2 times daily Yes Eze Gregory MD   OneTouch Delica Lancets 08X MISC USE TO CHECK FOUR TIMES DAILY.  DX;E11.9 Yes Gui Coronel MD   Continuous Blood Gluc Sensor (FREESTYLE MARY 14 DAY SENSOR) MISC CGM Yes Gui Coronel MD   INSULIN SYRINGE 1CC/29G (AIMSCO INSULIN SYR ULTRA THIN) 29G X 1/2\" 1 ML MISC 1 each by Does not apply route 3 times daily Yes JOSE Anne - CNP   Insulin Pen Needle (UNIFINE PENTIPS) 31G X 5 MM MISC USE 1 EACH DAY AS NEEDED FOR TESTING Yes Zeke Yanez MD   Multiple Vitamins-Minerals (THERAPEUTIC MULTIVITAMIN-MINERALS) tablet Take 1 tablet by mouth daily Yes Historical Provider, MD   insulin glargine (BASAGLAR KWIKPEN) 100 UNIT/ML injection pen Inject 100 Units into the skin daily  Patient not taking: Reported on 10/4/2022  Gui Coronel MD        Allergies   Allergen Reactions    Bactrim [Sulfamethoxazole-Trimethoprim] Rash     Pt reports that his lips tingle and then skin on his lips peel off,and rash on thigh       Bee Venom Swelling and Rash    Lisinopril Other (See Comments)     cough       Past Medical History:   Diagnosis Date    Acute on chronic systolic congestive heart failure ([address] 07/08/2013    Acute osteomyelitis of left foot ([address] 09/27/2017    Acute osteomyelitis of right foot ([address] 04/25/2016    Acute respiratory failure ([address] 08/04/2020    Ascites     Blood transfusion reaction     Burst fracture of lumbar vertebra ([address] 11/09/2012    Cellulitis of left foot     Cellulitis of right lower extremity 2/16, 5/16    Chronic systolic CHF (congestive heart failure) ([address]     Community acquired pneumonia     Diabetes ([address]     Diabetic ulcer of left foot associated with type 2 diabetes mellitus, with muscle involvement without evidence of necrosis ([address] 04/27/2017    Diabetic ulcer of right foot ([address] early 2016    Diabetic ulcer of 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 2 diabetes mellitus, with necrosis of bone ([address]     ETOH abuse     Fracture of tibial plateau 65/40/1927    High cholesterol     History of blood transfusion     reaction    HTN (hypertension)     Hx of blood clots     MI (myocardial infarction) ([address] 08/04/2020    + Troponins    MRSA (methicillin resistant staph aureus) culture positive 4/28/16, 4/20/16    foot wound    Neuropathic ulcer of left foot, limited to breakdown of skin ([address] 11/03/2016    Neuropathic ulcer of toe ([address] 02/13/2014    NSVT (nonsustained ventricular tachycardia) 2014    Pleural effusion due to congestive heart failure (HCC)     Septicemia (HCC)     Smoker     quit 0270    Systolic CHF, acute ([address] 07/08/2013    Thyroid disease        Past Surgical History:   Procedure Laterality Date    CARDIAC DEFIBRILLATOR PLACEMENT  01/29/2014    ICD Placement    CORONARY ARTERY BYPASS GRAFT N/A 8/11/2020    CORONARY ARTERY BYPASS GRAFTING X3, LEFT ATRIAL APPENDAGE CLIP, INTERNAL MAMMARY ARTERY, SAPHENOUS VEIN GRAFT, ON PUMP, 5 LEVEL BILATERAL INTERCOSTAL NERVE BLOCK performed by Ev Baird MD at [address] [place] [address] Left 08/09/2018    PARTIAL FOOT AMPUTATION w. Dr Brigitte Hill Left 1/31/2019    EXOSTECTOMY LEFT FOOT, ULCER DEBRIDEMENT LEFT FOOT WITH GRAFT APPLICATION performed by Daryl Marcelo DPM at [address] [facility] Left 09/27/2017    LEFT FOOT ULCER DEBRIDEMENT, POSSIBLE SECOND METATARSAL    FOOT SURGERY Left 01/31/2019    Exostectomy left foot, ulcer debridement with graft application left foot    FOOT TENDON SURGERY Right 2016    FOOT TENDON SURGERY Left 06/30/2017    KNEE SURGERY Left     OTHER SURGICAL HISTORY Bilateral 9/17/15    amputation 2nd digit bilateral, flexor tenotomy right hallux    OTHER SURGICAL HISTORY Left 08/17/2017    PARTIAL LEFT FOOT AMPUTATION      OTHER SURGICAL HISTORY Left 12/07/2017    Debridement infected bone and tissue left foot; ulcer debridement left foot with graft application with dr mathur     OTHER SURGICAL HISTORY Right 01/04/2018    DEBRIDEMENT INFECTED BONE AND TISSUE RIGHT FOOT, ULCER DEBRIDEMENT RIGHT FOOT WITH GRAFT APPLICATION    OTHER SURGICAL HISTORY Left 11/01/2018    Procedure: PARTIAL RESECTION LEFT METATARSAL, ULCER DEBRIDEMENT LEFT FOOT WITH GRAFT APPLICATION     OTHER SURGICAL HISTORY       AMPUTATION LEFT FOURTH AND FIFTH DIGITS, PARTIAL RESECTION  SECOND AND THIRD METATARSAL LEFT FOOT, PARTIAL RESECTION RIGHT FIRST METATARSAL (Bilateral Foot)    NM OFFICE/OUTPT VISIT,PROCEDURE ONLY Left 8/23/2018    ULCER DEBRIDEMENT LEFT FOOT WITH GRAFT APPLICATION performed by Nitin Willett DPM at [address] TARSAL/METATARSAL Left 8/9/2018    PARTIAL FOOT AMPUTATION WITH GRAFT APPLICATION performed by Nitin Willett DPM at [address] TARSAL/METATARSAL Left 11/1/2018    PARTIAL RESECTION LEFT METATARSAL, ULCER DEBRIDEMENT LEFT FOOT WITH GRAFT APPLICATION performed by Nitin Willett DPM at [address] [place] [address]      2 toes    TOE AMPUTATION Bilateral 3/18/2021    AMPUTATION LEFT FOURTH AND FIFTH DIGITS, PARTIAL RESECTION  SECOND AND THIRD METATARSAL LEFT FOOT, PARTIAL RESECTION RIGHT FIRST METATARSAL performed by Nitin Willett DPM at [address] [place] [address] Right 5/20/2021    PARTIAL RESECTION RIGHT FIRST METATARSAL performed by Nitin Willett DPM at [place] [address] History     Socioeconomic History    Marital status:      Spouse name: Not on file    Number of children: 2    Years of education: Not on file    Highest education level: Not on file   Occupational History    Not on file   Tobacco Use    Smoking status: Former     Packs/day: 0.25     Years: 1.00     Pack years: 0.25     Types: Cigarettes     Quit date: 1980     Years since quittin.6    Smokeless tobacco: Never   Vaping Use    Vaping Use: Never used   Substance and Sexual Activity    Alcohol use: No     Alcohol/week: 0.0 standard drinks    Drug use: No    Sexual activity: Not Currently     Partners: Female   Other Topics Concern    Not on file   Social History Narrative    Not on file     Social Determinants of Health     Financial Resource Strain: Low Risk     Difficulty of Paying Living Expenses: Not very hard   Food Insecurity: No Food Insecurity    Worried About Running Out of Food in the Last Year: Never true    Ran Out of Food in the Last Year: Never true   Transportation Needs: Not on file   Physical Activity: Not on file   Stress: Not on file   Social Connections: Not on file   Intimate Partner Violence: Not on file   Housing Stability: Not on file        Family History   Problem Relation Age of Onset    Heart Disease Mother     High Blood Pressure Mother     High Cholesterol Mother     Diabetes Mother     Anemia Mother     Heart Disease Father     High Blood Pressure Father     High Cholesterol Father     Heart Disease Maternal Grandmother     High Blood Pressure Maternal Grandmother     High Cholesterol Maternal Grandmother     Cancer Maternal Grandmother         bone marrow & breast    Heart Disease Maternal Grandfather     High Blood Pressure Maternal Grandfather     High Cholesterol Maternal Grandfather     Cancer Maternal Grandfather         pancreatic CA    Heart Disease Paternal Grandmother     High Blood Pressure Paternal Grandmother     High Cholesterol Paternal Grandmother     Heart Disease Paternal Grandfather     High Blood Pressure Paternal Grandfather     High Cholesterol Paternal Grandfather Stroke Brother     Heart Failure Brother     Heart Disease Brother     Diabetes type 2  Brother     Diabetes type 2  Brother     Diabetes type 2  Brother        ADVANCE DIRECTIVE: N, <no information>    Vitals:    10/04/22 0906   BP: (!) 96/58   Site: Right Upper Arm   Position: Sitting   Cuff Size: Medium Adult   Pulse: 91   SpO2: 97%   Weight: 215 lb (97.5 kg)     Estimated body mass index is 30.85 kg/m² as calculated from the following:    Height as of 8/18/22: 5' 10\" (1.778 m). Weight as of this encounter: 215 lb (97.5 kg). Physical Exam  Vitals reviewed. Constitutional:       Appearance: Normal appearance. HENT:      Head: Normocephalic. Cardiovascular:      Rate and Rhythm: Normal rate and regular rhythm. Heart sounds: Murmur heard. Pulmonary:      Effort: Pulmonary effort is normal.      Breath sounds: Normal breath sounds. Musculoskeletal:      Cervical back: Normal range of motion. Skin:     General: Skin is warm and dry. Neurological:      General: No focal deficit present. Mental Status: He is alert and oriented to person, place, and time. Psychiatric:         Mood and Affect: Mood normal.         Behavior: Behavior normal.         Thought Content: Thought content normal.         Judgment: Judgment normal.       No flowsheet data found.     Lab Results   Component Value Date/Time    CHOL 67 07/20/2022 10:53 AM    CHOL 108 08/11/2020 03:29 AM    CHOL 100 10/16/2019 09:54 AM    CHOLFAST 76 06/16/2021 10:00 AM    CHOLFAST 115 05/26/2020 09:32 AM    CHOLFAST 142 01/23/2019 09:45 AM    TRIG 109 07/20/2022 10:53 AM    TRIG 248 08/11/2020 03:29 AM    TRIG 176 10/16/2019 09:54 AM    TRIGLYCFAST 192 06/16/2021 10:00 AM    TRIGLYCFAST 354 05/26/2020 09:32 AM    TRIGLYCFAST 266 01/23/2019 09:45 AM    HDL 27 07/20/2022 10:53 AM    HDL 26 06/16/2021 10:00 AM    HDL 23 08/11/2020 03:29 AM    LDLCALC 18 07/20/2022 10:53 AM    LDLCALC 12 06/16/2021 10:00 AM    LDLCALC 35 08/11/2020 03:29 AM GLUF 246 01/23/2019 09:45 AM    GLUCOSE 114 07/20/2022 10:53 AM    LABA1C 6.1 07/20/2022 10:53 AM    LABA1C 6.2 03/22/2022 10:57 AM    LABA1C 7.1 10/27/2021 02:00 PM       The ASCVD Risk score (Carlos OBREGON, et al., 2019) failed to calculate for the following reasons: The patient has a prior MI or stroke diagnosis    Immunization History   Administered Date(s) Administered    COVID-19, PFIZER GRAY top, DO NOT Dilute, (age 15 y+), IM, 30 mcg/0.3 mL 01/21/2022    COVID-19, PFIZER PURPLE top, DILUTE for use, (age 15 y+), 30mcg/0.3mL 06/11/2021, 07/02/2021, 01/21/2022    INFLUENZA, INTRADERMAL, QUADRIVALENT, PRESERVATIVE FREE 01/17/2017    Influenza Virus Vaccine 11/03/2015    Influenza, FLUARIX, FLULAVAL, FLUZONE (age 10 mo+) AND AFLURIA, (age 1 y+), PF, 0.5mL 03/22/2022    Influenza, FLUCELVAX, (age 10 mo+), MDCK, PF, 0.5mL 09/28/2017    Influenza, Intradermal, Preservative free 11/03/2015    Pneumococcal Conjugate 13-valent (Iqaqttx06) 06/20/2016    Pneumococcal Polysaccharide (Ufbmmpkfl22) 09/06/2017    Tdap (Boostrix, Adacel) 11/03/2015       Health Maintenance   Topic Date Due    Shingles vaccine (1 of 2) Never done    COVID-19 Vaccine (4 - Booster for Pfizer series) 05/21/2022    Flu vaccine (1) 08/01/2022    Annual Wellness Visit (AWV)  Never done    Diabetic retinal exam  06/08/2023    A1C test (Diabetic or Prediabetic)  07/20/2023    Lipids  07/20/2023    Depression Screen  07/20/2023    DTaP/Tdap/Td vaccine (2 - Td or Tdap) 11/03/2025    Colorectal Cancer Screen  12/16/2029    Pneumococcal 0-64 years Vaccine (3 - PPSV23 or PCV20) 07/09/2030    Hepatitis C screen  Completed    HIV screen  Completed    Hepatitis A vaccine  Aged Out    Hepatitis B vaccine  Aged Out    Hib vaccine  Aged Out    Meningococcal (ACWY) vaccine  Aged Out       Assessment & Plan     Encounter to establish care  See HPI. Previous pt of Dr. Maria Elena Pace. Alcoholic cardiomyopathy  Pt quit drinking alcohol 10 years ago.   Follows Dr. Belia Ross every 6 months. Chronic systolic CHF (congestive heart failure) ([facility]  Managed by cardiology. Primary hypertension  BP is low today, and looks like it has been at recent visits in the past 2 months. Pt stated that his wrist BP machine was not working. Will order him an arm cuff to monitor BP. Advised pt to call Dr. Danette Nix office today to let him know what his BP has been to see if he wants to make any adjustments in his medications. Pt VU. -     Blood Pressure KIT; DAILY Starting Tue 10/4/2022, Disp-1 kit, R-0, Normal    Diabetic polyneuropathy associated with type 2 diabetes mellitus ([facility]  Mixed hyperlipidemia  Follows Dr. Cat French. Has an appt with him tomorrow. Last A1C was 6.1 on 7/20/22. Advised pt to continue medications as prescribed. Will f/u with me in 1-2 months for diabetes f/u and AWV. Acquired hypothyroidism  Taking synthroid as directed. Dose was adjusted a few months ago. Will recheck labs today. -     T4, Free  -     TSH    S/P coronary artery bypass graft x 3    Return for Diabetes and AWV November or December 2022.          --JOSE Greco - CNP

## 2022-10-05 DIAGNOSIS — E03.9 ACQUIRED HYPOTHYROIDISM: Primary | ICD-10-CM

## 2022-10-05 RX ORDER — LEVOTHYROXINE SODIUM 0.1 MG/1
100 TABLET ORAL DAILY
Qty: 30 TABLET | Refills: 1 | Status: SHIPPED | OUTPATIENT
Start: 2022-10-05

## 2022-10-25 RX ORDER — DULAGLUTIDE 3 MG/.5ML
3 INJECTION, SOLUTION SUBCUTANEOUS WEEKLY
Qty: 2 ML | Refills: 0 | OUTPATIENT
Start: 2022-10-25

## 2022-11-02 RX ORDER — DULAGLUTIDE 3 MG/.5ML
3 INJECTION, SOLUTION SUBCUTANEOUS WEEKLY
Qty: 2 ML | Refills: 0 | Status: SHIPPED | OUTPATIENT
Start: 2022-11-02 | End: 2022-11-29

## 2022-11-02 NOTE — TELEPHONE ENCOUNTER
Refill Request     CONFIRM preferrred pharmacy with the patient. If Mail Order Rx - Pend for 90 day refill. Last Seen: Last Seen Department: 10/4/2022  Last Seen by PCP: 10/4/2022    Last Written: 8/3/2022 for #60 with #1 refill Metformin  9/27/2022 #2ml no refills for Trulicity    If no future appointment scheduled, route STAFF MESSAGE with patient name to the [facility] for scheduling. Next Appointment:   Future Appointments   Date Time Provider Ada Ovalles   11/16/2022  9:00 AM JOSE Pedroza - CNP [place]  [place] [facility] [place]   12/12/2022  8:15 AM SCHEDULE, [facility] REMOTE Sydnee Chant [facility]   2/13/2023 12:30 PM Tena Lambert MD P CLER CAR [facility]   3/13/2023  8:15 AM SCHEDULE, [facility] REMOTE TRANSMISSION Aurelio Avila [facility]       Message sent to [address] [place] [address] to schedule appt with patient? NO.       Requested Prescriptions     Pending Prescriptions Disp Refills    Dulaglutide (TRULICITY) 3 ZS/6.5TL SOPN 2 mL 0     Sig: Inject 3 mg into the skin once a week    metFORMIN (GLUCOPHAGE) 1000 MG tablet 60 tablet 1     Sig: Take 1 tablet by mouth 2 times daily (with meals)

## 2022-11-11 RX ORDER — LEVOTHYROXINE SODIUM 88 UG/1
88 TABLET ORAL DAILY
Qty: 90 TABLET | Refills: 0 | OUTPATIENT
Start: 2022-11-11

## 2022-11-11 RX ORDER — ALLOPURINOL 100 MG/1
TABLET ORAL
Qty: 60 TABLET | Refills: 0 | OUTPATIENT
Start: 2022-11-11

## 2022-11-14 RX ORDER — ALLOPURINOL 100 MG/1
TABLET ORAL
Qty: 60 TABLET | Refills: 0 | OUTPATIENT
Start: 2022-11-14

## 2022-11-14 RX ORDER — LEVOTHYROXINE SODIUM 88 UG/1
88 TABLET ORAL DAILY
Qty: 90 TABLET | Refills: 0 | OUTPATIENT
Start: 2022-11-14

## 2022-11-15 RX ORDER — LEVOTHYROXINE SODIUM 88 UG/1
88 TABLET ORAL DAILY
Qty: 90 TABLET | Refills: 0 | OUTPATIENT
Start: 2022-11-15

## 2022-11-16 ENCOUNTER — OFFICE VISIT (OUTPATIENT)
Dept: FAMILY MEDICINE CLINIC | Age: 57
End: 2022-11-16
Payer: MEDICARE

## 2022-11-16 VITALS
DIASTOLIC BLOOD PRESSURE: 82 MMHG | OXYGEN SATURATION: 97 % | BODY MASS INDEX: 33.04 KG/M2 | HEIGHT: 68 IN | SYSTOLIC BLOOD PRESSURE: 120 MMHG | HEART RATE: 92 BPM | WEIGHT: 218 LBS

## 2022-11-16 DIAGNOSIS — E11.42 DIABETIC POLYNEUROPATHY ASSOCIATED WITH TYPE 2 DIABETES MELLITUS (HCC): ICD-10-CM

## 2022-11-16 DIAGNOSIS — Z23 FLU VACCINE NEED: ICD-10-CM

## 2022-11-16 DIAGNOSIS — E03.9 ACQUIRED HYPOTHYROIDISM: ICD-10-CM

## 2022-11-16 DIAGNOSIS — Z00.00 WELCOME TO MEDICARE PREVENTIVE VISIT: Primary | ICD-10-CM

## 2022-11-16 LAB
T4 FREE: 1.2 NG/DL (ref 0.9–1.8)
TSH SERPL DL<=0.05 MIU/L-ACNC: 15.45 UIU/ML (ref 0.27–4.2)

## 2022-11-16 PROCEDURE — 3074F SYST BP LT 130 MM HG: CPT | Performed by: NURSE PRACTITIONER

## 2022-11-16 PROCEDURE — 3044F HG A1C LEVEL LT 7.0%: CPT | Performed by: NURSE PRACTITIONER

## 2022-11-16 PROCEDURE — G8482 FLU IMMUNIZE ORDER/ADMIN: HCPCS | Performed by: NURSE PRACTITIONER

## 2022-11-16 PROCEDURE — 99214 OFFICE O/P EST MOD 30 MIN: CPT | Performed by: NURSE PRACTITIONER

## 2022-11-16 PROCEDURE — 90674 CCIIV4 VAC NO PRSV 0.5 ML IM: CPT | Performed by: NURSE PRACTITIONER

## 2022-11-16 PROCEDURE — G8417 CALC BMI ABV UP PARAM F/U: HCPCS | Performed by: NURSE PRACTITIONER

## 2022-11-16 PROCEDURE — 3017F COLORECTAL CA SCREEN DOC REV: CPT | Performed by: NURSE PRACTITIONER

## 2022-11-16 PROCEDURE — G8427 DOCREV CUR MEDS BY ELIG CLIN: HCPCS | Performed by: NURSE PRACTITIONER

## 2022-11-16 PROCEDURE — 2022F DILAT RTA XM EVC RTNOPTHY: CPT | Performed by: NURSE PRACTITIONER

## 2022-11-16 PROCEDURE — G0008 ADMIN INFLUENZA VIRUS VAC: HCPCS | Performed by: NURSE PRACTITIONER

## 2022-11-16 PROCEDURE — G0402 INITIAL PREVENTIVE EXAM: HCPCS | Performed by: NURSE PRACTITIONER

## 2022-11-16 PROCEDURE — 1036F TOBACCO NON-USER: CPT | Performed by: NURSE PRACTITIONER

## 2022-11-16 PROCEDURE — 3078F DIAST BP <80 MM HG: CPT | Performed by: NURSE PRACTITIONER

## 2022-11-16 ASSESSMENT — PATIENT HEALTH QUESTIONNAIRE - PHQ9
SUM OF ALL RESPONSES TO PHQ QUESTIONS 1-9: 0
SUM OF ALL RESPONSES TO PHQ9 QUESTIONS 1 & 2: 0
SUM OF ALL RESPONSES TO PHQ QUESTIONS 1-9: 0
SUM OF ALL RESPONSES TO PHQ QUESTIONS 1-9: 0
1. LITTLE INTEREST OR PLEASURE IN DOING THINGS: 0
2. FEELING DOWN, DEPRESSED OR HOPELESS: 0
SUM OF ALL RESPONSES TO PHQ QUESTIONS 1-9: 0

## 2022-11-16 ASSESSMENT — LIFESTYLE VARIABLES: HOW OFTEN DO YOU HAVE A DRINK CONTAINING ALCOHOL: NEVER

## 2022-11-16 NOTE — PROGRESS NOTES
Medicare Annual Wellness Visit    Tera Valdez is here for Medicare AWV    Assessment & Plan   Welcome to Medicare preventive visit    Recommendations for Preventive Services Due: see orders and patient instructions/AVS.  Recommended screening schedule for the next 5-10 years is provided to the patient in written form: see Patient Instructions/AVS.     Return for Medicare Annual Wellness Visit in 1 year. Subjective   The following acute and/or chronic problems were also addressed today:  H/o type 2 diabetes. Taking basaglar insulin, trulicity, metformin and glipizide as directed. Monitors BS at home. States that it runs between 120-170. A1C done 7/20/2022 and was 6.1. H/o hypothyroidism. Last checked labs 10/4/2022, TSH was elevated, synthroid was increased to 100 mcg daily. Patient's complete Health Risk Assessment and screening values have been reviewed and are found in Flowsheets. The following problems were reviewed today and where indicated follow up appointments were made and/or referrals ordered.     Positive Risk Factor Screenings with Interventions:             General Health and ACP:  General  In general, how would you say your health is?: Good  In the past 7 days, have you experienced any of the following: New or Increased Pain, New or Increased Fatigue, Loneliness, Social Isolation, Stress or Anger?: No  Do you get the social and emotional support that you need?: Yes  Do you have a Living Will?: (!) No    Advance Directives       Power of  Living Will ACP-Advance Directive ACP-Power of     Not on File Not on File Not on File Not on File          General Health Risk Interventions:  No Living Will: Patient declines ACP discussion/assistance    Health Habits/Nutrition:  Physical Activity: Inactive    Days of Exercise per Week: 1 day    Minutes of Exercise per Session: 0 min     Have you lost any weight without trying in the past 3 months?: No  Body mass index: (!) 33.14  Have you seen the dentist within the past year?: N/A - wear dentures  Health Habits/Nutrition Interventions:  N/A     Safety:  Do you have working smoke detectors?: Yes  Do you have any tripping hazards - loose or unsecured carpets or rugs?: No  Do you have any tripping hazards - clutter in doorways, halls, or stairs?: No  Do you have either shower bars, grab bars, non-slip mats or non-slip surfaces in your shower or bathtub?: (!) No  Do all of your stairways have a railing or banister?: (!) No  Do you always fasten your seatbelt when you are in a car?: Yes  Safety Interventions:  Home safety tips provided           Objective   Vitals:    11/16/22 0900   BP: 120/82   Pulse: 92   SpO2: 97%   Weight: 218 lb (98.9 kg)   Height: 5' 8\" (1.727 m)      Body mass index is 33.15 kg/m². Allergies   Allergen Reactions    Bactrim [Sulfamethoxazole-Trimethoprim] Rash     Pt reports that his lips tingle and then skin on his lips peel off,and rash on thigh       Bee Venom Swelling and Rash    Lisinopril Other (See Comments)     cough     Prior to Visit Medications    Medication Sig Taking?  Authorizing Provider   Dulaglutide (TRULICITY) 3 UD/6.6DG SOPN Inject 3 mg into the skin once a week Yes JOSE Wahl CNP   metFORMIN (GLUCOPHAGE) 1000 MG tablet Take 1 tablet by mouth 2 times daily (with meals) Yes JOSE Wahl CNP   levothyroxine (SYNTHROID) 100 MCG tablet Take 1 tablet by mouth daily Yes JOSE Wahl CNP   Blood Pressure KIT 1 kit by Does not apply route daily Yes JOSE Wahl CNP   clopidogrel (PLAVIX) 75 MG tablet Take 1 tablet by mouth daily Yes Catracho Carlson MD   ASPIRIN LOW DOSE 81 MG EC tablet Take 1 tablet by mouth daily Yes Catracho Carlson MD   carvedilol (COREG) 25 MG tablet TAKE 1 TABLET BY MOUTH TWICE DAILY Yes Catracho Carlson MD   furosemide (LASIX) 80 MG tablet Take 80 mg in the morning and 40 mg at noon  Patient taking differently: 80 mg 2 times daily Take 80 mg in the morning and 80 mg at night Yes Andrew Mcclendon MD   losartan (COZAAR) 25 MG tablet Take 1 tablet by mouth daily Yes Andrew Mcclendon MD   BD PEN NEEDLE WINNIE U/F 32G X 4 MM MISC 1 each by Does not apply route daily Yes Graciela Gilmore MD   glipiZIDE (GLUCOTROL) 5 MG tablet Take 1 tablet by mouth in the morning and 1 tablet in the evening. Take before meals. Take 1 tablet by mouth 2 times daily (before meals). Yes Graciela Gilmore MD   allopurinol (ZYLOPRIM) 100 MG tablet Take 2 tablets by mouth in the morning. Take 2 tablets by mouth daily. Yes Graciela Gilmore MD   magnesium oxide (MAG-OX) 400 (240 Mg) MG tablet TAKE 1 TABLET BY MOUTH 2 TIMES DAILY Yes Carla Diaz MD   atorvastatin (LIPITOR) 40 MG tablet Take 1 tablet by mouth nightly Yes Andrew Mcclendon MD   insulin glargine (BASAGLAR KWIKPEN) 100 UNIT/ML injection pen Inject 100 Units into the skin daily Yes Graciela Gilmore MD   isosorbide mononitrate (IMDUR) 30 MG extended release tablet Take 1 tablet by mouth daily Yes Carla Diaz MD   spironolactone (ALDACTONE) 25 MG tablet Take 1 tablet by mouth 2 times daily Yes Andrew Mcclendon MD   OneTouch Delica Lancets 97B MISC USE TO CHECK FOUR TIMES DAILY.  DX;E11.9 Yes Graciela Gilmore MD   Continuous Blood Gluc Sensor (FREESTYLE MARY 14 DAY SENSOR) MISC CGM Yes Graciela Gilmore MD   INSULIN SYRINGE 1CC/29G (AIMSCO INSULIN SYR ULTRA THIN) 29G X 1/2\" 1 ML MISC 1 each by Does not apply route 3 times daily Yes JOSE Godoy CNP   Insulin Pen Needle (UNIFINE PENTIPS) 31G X 5 MM MISC USE 1 EACH DAY AS NEEDED FOR TESTING Yes Deisy Frost MD   Multiple Vitamins-Minerals (THERAPEUTIC MULTIVITAMIN-MINERALS) tablet Take 1 tablet by mouth daily Yes Historical Provider, MD [place] (Including outside providers/suppliers regularly involved in providing care):   Patient Care Team:  JOSE Greco CNP as PCP - General (Nurse Practitioner)  Denise Yoder, JOSE - CNP as PCP - [facility] Empaneled Provider  Jaylin Germain MD as Consulting Physician (Cardiology)     Reviewed and updated this visit:  Allergies  Meds  Problems

## 2022-11-16 NOTE — PATIENT INSTRUCTIONS
Personalized Preventive Plan for Manuel Power - 11/16/2022  Medicare offers a range of preventive health benefits. Some of the tests and screenings are paid in full while other may be subject to a deductible, co-insurance, and/or copay. Some of these benefits include a comprehensive review of your medical history including lifestyle, illnesses that may run in your family, and various assessments and screenings as appropriate. After reviewing your medical record and screening and assessments performed today your provider may have ordered immunizations, labs, imaging, and/or referrals for you. A list of these orders (if applicable) as well as your Preventive Care list are included within your After Visit Summary for your review. Other Preventive Recommendations:    A preventive eye exam performed by an eye specialist is recommended every 1-2 years to screen for glaucoma; cataracts, macular degeneration, and other eye disorders. A preventive dental visit is recommended every 6 months. Try to get at least 150 minutes of exercise per week or 10,000 steps per day on a pedometer . Order or download the FREE \"Exercise & Physical Activity: Your Everyday Guide\" from [employer]. Call 7-637.748.2346 or search [employer] online. You need 9814-9034 mg of calcium and 8369-2256 IU of vitamin D per day. It is possible to meet your calcium requirement with diet alone, but a vitamin D supplement is usually necessary to meet this goal.  When exposed to the sun, use a sunscreen that protects against both UVA and UVB radiation with an SPF of 30 or greater. Reapply every 2 to 3 hours or after sweating, drying off with a towel, or swimming. Always wear a seat belt when traveling in a car. Always wear a helmet when riding a bicycle or motorcycle.

## 2022-11-16 NOTE — PROGRESS NOTES
2022    Francisco J Olson (:  1965) is a 62 y.o. male, here for a preventive medicine evaluation. Pt is here today for his AWV, as well as diabetes and hypothyroidism f/u. H/o type 2 diabetes. Taking basaglar insulin, trulicity, metformin and glipizide as directed. Monitors BS at home. States that it runs between 120-170. A1C done 2022 and was 6.1. H/o hypothyroidism. Last checked labs 10/4/2022, TSH was elevated, synthroid was increased to 100 mcg daily. He has not had repeat labs yet. Orders are still active. Pt denies any complaints. Patient Active Problem List   Diagnosis    Hypertension    Diabetic polyneuropathy associated with type 2 diabetes mellitus (HCC)    Alcoholic cardiomyopathy    Automatic implantable cardioverter-defibrillator in situ    Hyperlipidemia    Onychomycosis    Dystrophic nail    Callus of foot    Hallux valgus    Acquired hypothyroidism    NSVT (nonsustained ventricular tachycardia)    NSTEMI (non-ST elevated myocardial infarction) ([address]    Coronary artery disease involving native coronary artery of native heart without angina pectoris    Obesity    Acute post-operative pain    S/P coronary artery bypass graft x 3    Chest pain    SOB (shortness of breath)    Ischemic cardiomyopathy    ACE-inhibitor cough    Diabetic retinopathy of both eyes without macular edema associated with type 2 diabetes mellitus ([address]    Diabetic nephropathy associated with type 2 diabetes mellitus (HCC)    Chronic systolic CHF (congestive heart failure) ([address]    Chronic renal disease, stage III ([address] [2002]       Review of Systems   All other systems reviewed and are negative. Prior to Visit Medications    Medication Sig Taking?  Authorizing Provider   Dulaglutide (TRULICITY) 3 NH/6.6LF SOPN Inject 3 mg into the skin once a week Yes Zarina Cuba APRN - CNP   metFORMIN (GLUCOPHAGE) 1000 MG tablet Take 1 tablet by mouth 2 times daily (with meals) Yes Simran Burciaga Kanu Jessica, JOSE Goss CNP   levothyroxine (SYNTHROID) 100 MCG tablet Take 1 tablet by mouth daily Yes JOSE Velásquez CNP   Blood Pressure KIT 1 kit by Does not apply route daily Yes JOSE Velásquez CNP   clopidogrel (PLAVIX) 75 MG tablet Take 1 tablet by mouth daily Yes Manuel Cruz MD   ASPIRIN LOW DOSE 81 MG EC tablet Take 1 tablet by mouth daily Yes Manuel Cruz MD   carvedilol (COREG) 25 MG tablet TAKE 1 TABLET BY MOUTH TWICE DAILY Yes Manuel Cruz MD   furosemide (LASIX) 80 MG tablet Take 80 mg in the morning and 40 mg at noon  Patient taking differently: 80 mg 2 times daily Take 80 mg in the morning and 80 mg at night Yes Manuel Cruz MD   losartan (COZAAR) 25 MG tablet Take 1 tablet by mouth daily Yes Manuel Cruz MD   BD PEN NEEDLE WINNIE U/F 32G X 4 MM MISC 1 each by Does not apply route daily Yes Nina Harrison MD   glipiZIDE (GLUCOTROL) 5 MG tablet Take 1 tablet by mouth in the morning and 1 tablet in the evening. Take before meals. Take 1 tablet by mouth 2 times daily (before meals). Yes Nina Harrison MD   allopurinol (ZYLOPRIM) 100 MG tablet Take 2 tablets by mouth in the morning. Take 2 tablets by mouth daily. Yes Nina Harrison MD   magnesium oxide (MAG-OX) 400 (240 Mg) MG tablet TAKE 1 TABLET BY MOUTH 2 TIMES DAILY Yes Adeel Hart MD   atorvastatin (LIPITOR) 40 MG tablet Take 1 tablet by mouth nightly Yes Manuel Cruz MD   insulin glargine (BASAGLAR KWIKPEN) 100 UNIT/ML injection pen Inject 100 Units into the skin daily Yes Nina Harrison MD   isosorbide mononitrate (IMDUR) 30 MG extended release tablet Take 1 tablet by mouth daily Yes Adeel Hart MD   spironolactone (ALDACTONE) 25 MG tablet Take 1 tablet by mouth 2 times daily Yes MD Almaz Gibbons Lancets 68X MISC USE TO CHECK FOUR TIMES DAILY.  DX;E11.9 Yes Nina Harrison MD   Continuous Blood Gluc Sensor (FREESTYLE MRAY 14 DAY SENSOR) MISC CGM Yes Coy Lennox, MD   INSULIN SYRINGE 1CC/29G (AIMSCO INSULIN SYR ULTRA THIN) 29G X 1/2\" 1 ML MISC 1 each by Does not apply route 3 times daily Yes JOSE Marie - CNP   Insulin Pen Needle (Christopher Houston) 31G X 5 MM MISC USE 1 EACH DAY AS NEEDED FOR TESTING Yes Jaskaran Bloom MD   Multiple Vitamins-Minerals (THERAPEUTIC MULTIVITAMIN-MINERALS) tablet Take 1 tablet by mouth daily Yes Historical Provider, MD        Allergies   Allergen Reactions    Bactrim [Sulfamethoxazole-Trimethoprim] Rash     Pt reports that his lips tingle and then skin on his lips peel off,and rash on thigh       Bee Venom Swelling and Rash    Lisinopril Other (See Comments)     cough       Past Medical History:   Diagnosis Date    Acute on chronic systolic congestive heart failure ([address] 07/08/2013    Acute osteomyelitis of left foot ([address] 09/27/2017    Acute osteomyelitis of right foot ([address] 04/25/2016    Acute respiratory failure ([address] 08/04/2020    Ascites     Blood transfusion reaction     Burst fracture of lumbar vertebra ([address] 11/09/2012    Cellulitis of left foot     Cellulitis of right lower extremity 2/16, 5/16    Chronic systolic CHF (congestive heart failure) ([address]     Community acquired pneumonia     Diabetes ([address]     Diabetic ulcer of left foot associated with type 2 diabetes mellitus, with muscle involvement without evidence of necrosis ([address] 04/27/2017    Diabetic ulcer of right foot ([address] early 2016    Diabetic ulcer of 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 2 diabetes mellitus, with necrosis of bone ([address]     ETOH abuse     Fracture of tibial plateau 43/46/8807    High cholesterol     History of blood transfusion     reaction    HTN (hypertension)     Hx of blood clots     MI (myocardial infarction) ([address] 08/04/2020    + Troponins    MRSA (methicillin resistant staph aureus) culture positive 4/28/16, 4/20/16    foot wound    Neuropathic ulcer of left foot, limited to breakdown of skin ([address] 11/03/2016 Neuropathic ulcer of toe ([facility] 02/13/2014    NSVT (nonsustained ventricular tachycardia) 2014    Pleural effusion due to congestive heart failure (HCC)     Septicemia (HCC)     Smoker     quit [id number]    Systolic CHF, acute ([facility] 07/08/2013    Thyroid disease        Past Surgical History:   Procedure Laterality Date    CARDIAC DEFIBRILLATOR PLACEMENT  01/29/2014    ICD Placement    CORONARY ARTERY BYPASS GRAFT N/A 8/11/2020    CORONARY ARTERY BYPASS GRAFTING X3, LEFT ATRIAL APPENDAGE CLIP, INTERNAL MAMMARY ARTERY, SAPHENOUS VEIN GRAFT, ON PUMP, 5 LEVEL BILATERAL INTERCOSTAL NERVE BLOCK performed by Tracie Shepard MD at [address] [facility] Left 08/09/2018    PARTIAL FOOT AMPUTATION w. Dr Ramon Ayala Left 1/31/2019    EXOSTECTOMY LEFT FOOT, ULCER DEBRIDEMENT LEFT FOOT WITH GRAFT APPLICATION performed by Luis Angel Navarro DPM at [address] [facility] Left 09/27/2017    LEFT FOOT ULCER DEBRIDEMENT, POSSIBLE SECOND METATARSAL    FOOT SURGERY Left 01/31/2019    Exostectomy left foot, ulcer debridement with graft application left foot    FOOT TENDON SURGERY Right 2016    FOOT TENDON SURGERY Left 06/30/2017    KNEE SURGERY Left     OTHER SURGICAL HISTORY Bilateral 9/17/15    amputation 2nd digit bilateral, flexor tenotomy right hallux    OTHER SURGICAL HISTORY Left 08/17/2017    PARTIAL LEFT FOOT AMPUTATION      OTHER SURGICAL HISTORY Left 12/07/2017    Debridement infected bone and tissue left foot; ulcer debridement left foot with graft application with dr mathur     OTHER SURGICAL HISTORY Right 01/04/2018    DEBRIDEMENT INFECTED BONE AND TISSUE RIGHT FOOT, ULCER DEBRIDEMENT RIGHT FOOT WITH GRAFT APPLICATION    OTHER SURGICAL HISTORY Left 11/01/2018    Procedure: PARTIAL RESECTION LEFT METATARSAL, ULCER DEBRIDEMENT LEFT FOOT WITH GRAFT APPLICATION     OTHER SURGICAL HISTORY       AMPUTATION LEFT FOURTH AND FIFTH DIGITS, PARTIAL RESECTION  SECOND AND THIRD METATARSAL LEFT FOOT, PARTIAL RESECTION RIGHT FIRST METATARSAL (Bilateral Foot)    OR OFFICE/OUTPT VISIT,PROCEDURE ONLY Left 2018    ULCER DEBRIDEMENT LEFT FOOT WITH GRAFT APPLICATION performed by Harjit Montalvo DPM at [address] TARSAL/METATARSAL Left 2018    PARTIAL FOOT AMPUTATION WITH GRAFT APPLICATION performed by Harjit Montalvo DPM at [address] [facility] TARSAL/METATARSAL Left 2018    PARTIAL RESECTION LEFT METATARSAL, ULCER DEBRIDEMENT LEFT FOOT WITH GRAFT APPLICATION [facility]      2 toes    TOE AMPUTATION Bilateral 3/18/2021    AMPUTATION LEFT FOURTH AND FIFTH DIGITS, PARTIAL RESECTION  SECOND AND THIRD METATARSAL LEFT FOOT, PARTIAL RESECTION RIGHT FIRST METATARSAL performed by Harjit Montalvo DPM at [address] [facility] [facility] Right 2021    PARTIAL RESECTION RIGHT FIRST METATARSAL performed by Harjit Montalvo DPM at [place] [address] History     Socioeconomic History    Marital status:      Spouse name: Not on file    Number of children: 2    Years of education: Not on file    Highest education level: Not on file   Occupational History    Not on file   Tobacco Use    Smoking status: Former     Packs/day: 0.25     Years: 1.00     Pack years: 0.25     Types: Cigarettes     Quit date: 1980     Years since quittin.7    Smokeless tobacco: Never   Vaping Use    Vaping Use: Never used   Substance and Sexual Activity    Alcohol use: No     Alcohol/week: 0.0 standard drinks    Drug use: No    Sexual activity: Not Currently     Partners: Female   Other Topics Concern    Not on file   Social History Narrative    Not on file     Social Determinants of Health     Financial Resource Strain: Low Risk     Difficulty of Paying Living Expenses: Not very hard   Food Insecurity: No Food Insecurity    Worried About Running Out of Food in the Last Year: Never true    Ran Out of Food in the Last Year: Never true   Transportation Needs: Not on file Physical Activity: Inactive    Days of Exercise per Week: 1 day    Minutes of Exercise per Session: 0 min   Stress: Not on file   Social Connections: Not on file   Intimate Partner Violence: Not on file   Housing Stability: Not on file        Family History   Problem Relation Age of Onset    Heart Disease Mother     High Blood Pressure Mother     High Cholesterol Mother     Diabetes Mother     Anemia Mother     Heart Disease Father     High Blood Pressure Father     High Cholesterol Father     Heart Disease Maternal Grandmother     High Blood Pressure Maternal Grandmother     High Cholesterol Maternal Grandmother     Cancer Maternal Grandmother         bone marrow & breast    Heart Disease Maternal Grandfather     High Blood Pressure Maternal Grandfather     High Cholesterol Maternal Grandfather     Cancer Maternal Grandfather         pancreatic CA    Heart Disease Paternal Grandmother     High Blood Pressure Paternal Grandmother     High Cholesterol Paternal Grandmother     Heart Disease Paternal Grandfather     High Blood Pressure Paternal Grandfather     High Cholesterol Paternal Grandfather     Stroke Brother     Heart Failure Brother     Heart Disease Brother     Diabetes type 2  Brother     Diabetes type 2  Brother     Diabetes type 2  Brother        ADVANCE DIRECTIVE: N, <no information>    Vitals:    11/16/22 0900   BP: 120/82   Pulse: 92   SpO2: 97%   Weight: 218 lb (98.9 kg)   Height: 5' 8\" (1.727 m)     Estimated body mass index is 33.15 kg/m² as calculated from the following:    Height as of this encounter: 5' 8\" (1.727 m). Weight as of this encounter: 218 lb (98.9 kg). Physical Exam  Constitutional:       Appearance: Normal appearance. Cardiovascular:      Rate and Rhythm: Normal rate and regular rhythm. Heart sounds: Murmur heard. Pulmonary:      Effort: Pulmonary effort is normal.      Breath sounds: Normal breath sounds. Musculoskeletal:         General: Normal range of motion. Cervical back: Normal range of motion. Skin:     General: Skin is warm and dry. Neurological:      Mental Status: He is alert and oriented to person, place, and time. Psychiatric:         Mood and Affect: Mood normal.         Behavior: Behavior normal.         Thought Content: Thought content normal.       No flowsheet data found. Lab Results   Component Value Date/Time    CHOL 67 07/20/2022 10:53 AM    CHOL 108 08/11/2020 03:29 AM    CHOL 100 10/16/2019 09:54 AM    CHOLFAST 76 06/16/2021 10:00 AM    CHOLFAST 115 05/26/2020 09:32 AM    CHOLFAST 142 01/23/2019 09:45 AM    TRIG 109 07/20/2022 10:53 AM    TRIG 248 08/11/2020 03:29 AM    TRIG 176 10/16/2019 09:54 AM    TRIGLYCFAST 192 06/16/2021 10:00 AM    TRIGLYCFAST 354 05/26/2020 09:32 AM    TRIGLYCFAST 266 01/23/2019 09:45 AM    HDL 27 07/20/2022 10:53 AM    HDL 26 06/16/2021 10:00 AM    HDL 23 08/11/2020 03:29 AM    LDLCALC 18 07/20/2022 10:53 AM    LDLCALC 12 06/16/2021 10:00 AM    LDLCALC 35 08/11/2020 03:29 AM    GLUF 246 01/23/2019 09:45 AM    GLUCOSE 114 07/20/2022 10:53 AM    LABA1C 6.1 07/20/2022 10:53 AM    LABA1C 6.2 03/22/2022 10:57 AM    LABA1C 7.1 10/27/2021 02:00 PM       The ASCVD Risk score (Carlos DK, et al., 2019) failed to calculate for the following reasons:     The patient has a prior MI or stroke diagnosis    Immunization History   Administered Date(s) Administered    COVID-19, PFIZER GRAY top, DO NOT Dilute, (age 15 y+), IM, 30 mcg/0.3 mL 01/21/2022    COVID-19, PFIZER PURPLE top, DILUTE for use, (age 15 y+), 30mcg/0.3mL 06/11/2021, 07/02/2021, 01/21/2022    INFLUENZA, INTRADERMAL, QUADRIVALENT, PRESERVATIVE FREE 01/17/2017    Influenza Virus Vaccine 11/03/2015    Influenza, FLUARIX, FLULAVAL, FLUZONE (age 10 mo+) AND AFLURIA, (age 1 y+), PF, 0.5mL 03/22/2022    Influenza, FLUCELVAX, (age 10 mo+), MDCK, PF, 0.5mL 09/28/2017, 11/16/2022    Influenza, Intradermal, Preservative free 11/03/2015    Pneumococcal Conjugate 13-valent Hernan Cordero) 06/20/2016    Pneumococcal Polysaccharide (Nkroithmo22) 09/06/2017    Tdap (Boostrix, Adacel) 11/03/2015       Health Maintenance   Topic Date Due    Hepatitis B vaccine (1 of 3 - Risk 3-dose series) Never done    Shingles vaccine (1 of 2) Never done    COVID-19 Vaccine (4 - Booster for Pfizer series) 03/18/2022    Diabetic retinal exam  06/08/2023    A1C test (Diabetic or Prediabetic)  07/20/2023    Lipids  07/20/2023    Depression Screen  10/04/2023    Annual Wellness Visit (AWV)  11/17/2023    DTaP/Tdap/Td vaccine (2 - Td or Tdap) 11/03/2025    Colorectal Cancer Screen  12/16/2029    Pneumococcal 0-64 years Vaccine (3 - PPSV23 if available, else PCV20) 07/09/2030    Flu vaccine  Completed    Hepatitis C screen  Completed    HIV screen  Completed    Hepatitis A vaccine  Aged Out    Hib vaccine  Aged Out    Meningococcal (ACWY) vaccine  Aged Out       Assessment & Plan       Acquired hypothyroidism  See HPI. Pt taking Synthroid as directed. Repeat thyroid labs are still active. Will reprint and have pt get them today. Will f/u with him regarding results and POC. Diabetic polyneuropathy associated with type 2 diabetes mellitus ([facility]  See HPI. Taking medications as directed. A1C 7/20/2022 was 6.1. Pt is monitoring his BS at home. States that it runs between 120-170. Denies s/s of hypoglycemia. Will check A1C today and f/u with pt regarding results and POC.      -     Hemoglobin A1C; Future    Flu vaccine need  -     Influenza, FLUCELVAX, (age 10 mo+), IM, Preservative Free, 0.5 mL    Return for Medicare Annual Wellness Visit in 1 year - 3 months for diabetes, Diabetes.          --Marti Joseph, APRN - CNP

## 2022-11-17 DIAGNOSIS — E03.9 ACQUIRED HYPOTHYROIDISM: Primary | ICD-10-CM

## 2022-11-17 LAB
ESTIMATED AVERAGE GLUCOSE: 142.7 MG/DL
HBA1C MFR BLD: 6.6 %

## 2022-11-17 RX ORDER — LEVOTHYROXINE SODIUM 112 UG/1
112 TABLET ORAL DAILY
Qty: 30 TABLET | Refills: 1 | Status: SHIPPED | OUTPATIENT
Start: 2022-11-17 | End: 2023-01-16

## 2022-11-18 DIAGNOSIS — E03.9 ACQUIRED HYPOTHYROIDISM: ICD-10-CM

## 2022-11-18 RX ORDER — LEVOTHYROXINE SODIUM 0.1 MG/1
100 TABLET ORAL DAILY
Qty: 30 TABLET | Refills: 0 | OUTPATIENT
Start: 2022-11-18

## 2022-11-28 RX ORDER — GLIPIZIDE 5 MG/1
TABLET ORAL
Qty: 60 TABLET | Refills: 0 | OUTPATIENT
Start: 2022-11-28

## 2022-11-28 NOTE — TELEPHONE ENCOUNTER
Refill Request     CONFIRM preferrred pharmacy with the patient. If Mail Order Rx - Pend for 90 day refill. Last Seen: Last Seen Department: 11/16/2022  Last Seen by PCP: 11/16/2022    Last Written: 11/2/2022    If no future appointment scheduled, route STAFF MESSAGE with patient name to the [facility] for scheduling. Next Appointment:   Future Appointments   Date Time Provider Ada Ovalles   12/12/2022  8:15 AM SCHEDULE, [employer] REMOTE TRANSMISSION [facility]  [facility]   2/13/2023 12:30 PM Maryjo Rush MD P CLER CAR [facility]   2/16/2023  9:30 AM Jaskaran Montana, APRN - CNP [facility] [place] [facility]   3/13/2023  8:15 AM SCHEDULE, [employer] TRANSMISSION [employer] [facility]       Message sent to [address] [place] [address] to schedule appt with patient?   NO      Requested Prescriptions     Pending Prescriptions Disp Refills    TRULICITY 3 HG/7.1WE SOPN [Pharmacy Med Name: *TRULICITY 3 RV/9.7 ML PEN] 2 mL 0     Sig: Inject 3 mg into the skin once a week

## 2022-11-29 RX ORDER — DULAGLUTIDE 3 MG/.5ML
3 INJECTION, SOLUTION SUBCUTANEOUS WEEKLY
Qty: 2 ML | Refills: 0 | Status: SHIPPED | OUTPATIENT
Start: 2022-11-29

## 2022-12-02 DIAGNOSIS — Z79.899 MEDICATION MANAGEMENT: ICD-10-CM

## 2022-12-02 DIAGNOSIS — E03.9 ACQUIRED HYPOTHYROIDISM: ICD-10-CM

## 2022-12-02 RX ORDER — ALLOPURINOL 100 MG/1
200 TABLET ORAL DAILY
Qty: 180 TABLET | Refills: 1 | Status: SHIPPED | OUTPATIENT
Start: 2022-12-02

## 2022-12-02 RX ORDER — LEVOTHYROXINE SODIUM 112 UG/1
112 TABLET ORAL DAILY
Qty: 90 TABLET | Refills: 1 | Status: SHIPPED | OUTPATIENT
Start: 2022-12-02 | End: 2023-01-31

## 2022-12-02 RX ORDER — GLIPIZIDE 5 MG/1
5 TABLET ORAL
Qty: 180 TABLET | Refills: 1 | Status: SHIPPED | OUTPATIENT
Start: 2022-12-02

## 2022-12-02 RX ORDER — ALLOPURINOL 100 MG/1
TABLET ORAL
Qty: 60 TABLET | Refills: 0 | OUTPATIENT
Start: 2022-12-02

## 2022-12-02 RX ORDER — LANOLIN ALCOHOL/MO/W.PET/CERES
CREAM (GRAM) TOPICAL
Qty: 180 TABLET | Refills: 0 | Status: SHIPPED | OUTPATIENT
Start: 2022-12-02

## 2022-12-02 NOTE — TELEPHONE ENCOUNTER
Refill Request     CONFIRM preferrred pharmacy with the patient. If Mail Order Rx - Pend for 90 day refill. Last Seen: Last Seen Department: 11/16/2022  Last Seen by PCP: 11/16/2022    Last Written: 7/20/22 60 tabs 2 refills     9/9/22 90 tabs 0 refills    2/18/22 90 tabs 5 refills    11/17/22 30 tabs 1 refill    8/18/22 30 tabs 1 refill    If no future appointment scheduled, route STAFF MESSAGE with patient name to the [facility] for scheduling. Next Appointment:   Future Appointments   Date Time Provider Ada Ovalles   12/12/2022  8:15 AM SCHEDULE, Anna Zuñiga REMOTE TRANSMISSION [employer]   2/13/2023 12:30 PM Deon Singh MD P CLER CAR [facility]   2/16/2023  9:30 AM JOSE Pope CNP  [place] [facility]   3/13/2023  8:15 AM SCHEDULE, Anna Zuñiga [employer] TRANSMISSION [facility]       Message sent to [address] [place] [address] to schedule appt with patient?   NO      Requested Prescriptions     Pending Prescriptions Disp Refills    allopurinol (ZYLOPRIM) 100 MG tablet 180 tablet 1     Sig: Take 2 tablets by mouth daily Take 2 tablets by mouth daily    glipiZIDE (GLUCOTROL) 5 MG tablet 180 tablet 1     Sig: Take 1 tablet by mouth 2 times daily (before meals) Take 1 tablet by mouth 2 times daily (before meals)    levothyroxine (SYNTHROID) 112 MCG tablet 90 tablet 1     Sig: Take 1 tablet by mouth daily    magnesium oxide (MAG-OX) 400 (240 Mg) MG tablet 180 tablet 0     Sig: TAKE 1 TABLET BY MOUTH 2 TIMES DAILY    metFORMIN (GLUCOPHAGE) 1000 MG tablet 180 tablet 1     Sig: Take 1 tablet by mouth 2 times daily (with meals)

## 2022-12-12 ENCOUNTER — NURSE ONLY (OUTPATIENT)
Dept: CARDIOLOGY CLINIC | Age: 57
End: 2022-12-12

## 2022-12-12 DIAGNOSIS — I25.5 ISCHEMIC CARDIOMYOPATHY: ICD-10-CM

## 2022-12-12 DIAGNOSIS — Z95.810 ICD (IMPLANTABLE CARDIOVERTER-DEFIBRILLATOR) IN PLACE: ICD-10-CM

## 2022-12-12 DIAGNOSIS — I42.6 ALCOHOLIC CARDIOMYOPATHY (HCC): Primary | ICD-10-CM

## 2022-12-12 DIAGNOSIS — I50.22 CHRONIC SYSTOLIC CHF (CONGESTIVE HEART FAILURE) (HCC): ICD-10-CM

## 2022-12-16 RX ORDER — CLOPIDOGREL BISULFATE 75 MG/1
75 TABLET ORAL DAILY
Qty: 90 TABLET | Refills: 0 | Status: SHIPPED | OUTPATIENT
Start: 2022-12-16

## 2022-12-16 NOTE — PROGRESS NOTES
End of 91-day monitoring period 12/12. No  arrhythmias recorded. Possible OptiVol fluid accumulation: 27-Nov-2022 -- ongoing-consistent w/ decreased TI.-office staff to call pt and RKG to review. Remote transmission received for patients single chamber ICD. Transmission shows normal sensing and pacing function. EP physician will review. See interrogation under the cardiology tab in the [address] [place] [address] [address] field for more details. Will continue to monitor remotely. He takes mag-ox, coreg.

## 2022-12-19 DIAGNOSIS — E78.2 MIXED HYPERLIPIDEMIA: ICD-10-CM

## 2022-12-19 DIAGNOSIS — I42.6 ALCOHOLIC CARDIOMYOPATHY (HCC): ICD-10-CM

## 2022-12-19 DIAGNOSIS — I10 ESSENTIAL HYPERTENSION: ICD-10-CM

## 2022-12-19 DIAGNOSIS — Z79.899 MEDICATION MANAGEMENT: ICD-10-CM

## 2022-12-19 RX ORDER — SPIRONOLACTONE 25 MG/1
25 TABLET ORAL 2 TIMES DAILY
Qty: 60 TABLET | Refills: 1 | Status: SHIPPED | OUTPATIENT
Start: 2022-12-19

## 2022-12-19 RX ORDER — ASPIRIN 81 MG/1
TABLET, COATED ORAL
Qty: 30 TABLET | Refills: 1 | Status: SHIPPED | OUTPATIENT
Start: 2022-12-19

## 2023-01-26 RX ORDER — DULAGLUTIDE 3 MG/.5ML
3 INJECTION, SOLUTION SUBCUTANEOUS WEEKLY
Qty: 6 ML | Refills: 0 | Status: SHIPPED | OUTPATIENT
Start: 2023-01-26

## 2023-01-26 NOTE — TELEPHONE ENCOUNTER
.Refill Request     CONFIRM preferrred pharmacy with the patient. If Mail Order Rx - Pend for 90 day refill. Last Seen: Last Seen Department: 11/16/2022  Last Seen by PCP: 11/16/2022    Last Written: 11-29-22 2ml with 0     If no future appointment scheduled, route STAFF MESSAGE with patient name to the [facility] for scheduling. Next Appointment:   Future Appointments   Date Time Provider dAa Ovalles   2/13/2023 12:30 PM Landry Maza MD P CLER CAR [facility]   2/16/2023  9:30 AM JOSE Smith CNP  [place] [facility]   3/13/2023  8:15 AM SCHEDULE, [employer] [facility] REMOTE TRANSMISSION [facility]       Message sent to [address] [place] [address] to schedule appt with patient?   N/A      Requested Prescriptions     Pending Prescriptions Disp Refills    TRULICITY 3 MY/0.1LK SOPN [Pharmacy Med Name: *TRULICITY 3 WK/5.3 ML PEN] 2 mL 0     Sig: Inject 3 mg into the skin once a week

## 2023-02-09 NOTE — PROGRESS NOTES
[facility] Office Note  2023     Subjective:  Mr. Filomena Wheatley presents today for follow up. CABG x 3  on 2020, Non ischemic (alcohol) and ischemic cardiomyopathy s/p ICD  and Chronic Systolic CHF  C/O occasional sob    [place]:     Today, he reports occasional short of breath when he gets down and plays with his 1year old grandson. He has not been working as he has been seeing Dr. Yanira Pryor for open sores on both of his feet. Ulcers not healing because he has been walking on hot asphalt due to his work Patient denies chest pain,  palpitations, dizziness or syncope. He needs special shoes and does not have money for copay/deductible. PMH:  Alcoholic CMP, ICD placement  by Dr. Fiona Garcia for non ischemic CM with low EF that failed to improve with guide line based medical therapy. He is no longer drinking ETOH products. On 19 he underwent left foot ulcer debridement. He works for [employer]. A left heart cath performed 20 demonstrated multivessel CAD. He then underwent a CABG x 3 on 2020. Review of Systems: 12 point ROS negative in all areas as listed below except as in [place]  Constitutional, EENT,pulmonary, GI, ,  skin, neurological, hematological, endocrine, Psychiatric    Reviewed past medical history, social, and family history. No alcohol nonsmoker  HE  IS DISABLED  Mother: Heart disease, MI x4, young age. Father: Heart disease, CABG age 67.    Siblings: brother  age 40 of CHF, CVA  Past Medical History:   Diagnosis Date    Acute on chronic systolic congestive heart failure ([address] 2013    Acute osteomyelitis of left foot ([address] 2017    Acute osteomyelitis of right foot ([address] 2016    Acute respiratory failure ([address] 2020    Ascites     Blood transfusion reaction     Burst fracture of lumbar vertebra ([address] 2012    Cellulitis of left foot     Cellulitis of right lower extremity ,     Chronic systolic CHF (congestive heart failure) [facility])     Community acquired pneumonia     Diabetes ([address]     Diabetic ulcer of left foot associated with type 2 diabetes mellitus, with muscle involvement without evidence of necrosis ([address] 04/27/2017    Diabetic ulcer of right foot ([address] early 2016    Diabetic ulcer of 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 2 diabetes mellitus, with necrosis of bone ([address]     ETOH abuse     Fracture of tibial plateau 56/47/4470    High cholesterol     History of blood transfusion     reaction    HTN (hypertension)     Hx of blood clots     MI (myocardial infarction) ([address] 08/04/2020    + Troponins    MRSA (methicillin resistant staph aureus) culture positive 4/28/16, 4/20/16    foot wound    Neuropathic ulcer of left foot, limited to breakdown of skin ([address] 11/03/2016    Neuropathic ulcer of toe ([address] 02/13/2014    NSVT (nonsustained ventricular tachycardia) 2014    Pleural effusion due to congestive heart failure (HCC)     Septicemia (HCC)     Smoker     quit [id number]    Systolic CHF, acute ([address] 07/08/2013    Thyroid disease      Past Surgical History:   Procedure Laterality Date    CARDIAC DEFIBRILLATOR PLACEMENT  01/29/2014    ICD Placement    CORONARY ARTERY BYPASS GRAFT N/A 8/11/2020    CORONARY ARTERY BYPASS GRAFTING X3, LEFT ATRIAL APPENDAGE CLIP, INTERNAL MAMMARY ARTERY, SAPHENOUS VEIN GRAFT, ON PUMP, 5 LEVEL BILATERAL INTERCOSTAL NERVE BLOCK performed by Hanna Shukla MD at [address] [place] [address] Left 08/09/2018    PARTIAL FOOT AMPUTATION w. Dr Peyman Melgar Left 1/31/2019    EXOSTECTOMY LEFT FOOT, ULCER DEBRIDEMENT LEFT FOOT WITH GRAFT APPLICATION performed by Toby Daniel DPM at [address] [facility] Left 09/27/2017    LEFT FOOT ULCER DEBRIDEMENT, POSSIBLE SECOND METATARSAL    FOOT SURGERY Left 01/31/2019    Exostectomy left foot, ulcer debridement with graft application left foot    FOOT TENDON SURGERY Right 2016    FOOT TENDON SURGERY Left 06/30/2017    KNEE SURGERY Left     OTHER SURGICAL HISTORY Bilateral 9/17/15    amputation 2nd digit bilateral, flexor tenotomy right hallux    OTHER SURGICAL HISTORY Left 08/17/2017    PARTIAL LEFT FOOT AMPUTATION      OTHER SURGICAL HISTORY Left 12/07/2017    Debridement infected bone and tissue left foot; ulcer debridement left foot with graft application with dr mathur     OTHER SURGICAL HISTORY Right 01/04/2018    DEBRIDEMENT INFECTED BONE AND TISSUE RIGHT FOOT, ULCER DEBRIDEMENT RIGHT FOOT WITH GRAFT APPLICATION    OTHER SURGICAL HISTORY Left 11/01/2018    Procedure: PARTIAL RESECTION LEFT METATARSAL, ULCER DEBRIDEMENT LEFT FOOT WITH GRAFT APPLICATION     OTHER SURGICAL HISTORY       AMPUTATION LEFT FOURTH AND FIFTH DIGITS, PARTIAL RESECTION  SECOND AND THIRD METATARSAL LEFT FOOT, PARTIAL RESECTION RIGHT FIRST METATARSAL (Bilateral Foot)    MA OFFICE/OUTPT VISIT,PROCEDURE ONLY Left 8/23/2018    ULCER DEBRIDEMENT LEFT FOOT WITH GRAFT APPLICATION performed by Emma Ennis DPM at [address] [place] [address] B1 TARSAL/METAR B1 XCP TALUS/CALCANEUS Left 8/9/2018    PARTIAL FOOT AMPUTATION WITH GRAFT APPLICATION performed by Emma Ennis DPM at [address] [place] [address] B1 TARSAL/METAR B1 XCP TALUS/CALCANEUS Left 11/1/2018    PARTIAL RESECTION LEFT METATARSAL, ULCER DEBRIDEMENT LEFT FOOT WITH GRAFT APPLICATION performed by Emma Ennis DPM at [address]      2 toes    TOE AMPUTATION Bilateral 3/18/2021    AMPUTATION LEFT FOURTH AND FIFTH DIGITS, PARTIAL RESECTION  SECOND AND THIRD METATARSAL LEFT FOOT, PARTIAL RESECTION RIGHT FIRST METATARSAL performed by Emma Ennis DPM at [address] [employer] Right 5/20/2021    PARTIAL RESECTION RIGHT FIRST METATARSAL performed by Emma Ennis DPM at [facility] OR       Objective:   /73   Pulse 89   Ht 5' 9.5\" (1.765 m)   Wt 219 lb 12.8 oz (99.7 kg)   SpO2 99%   BMI 31.99 kg/m²     Wt Readings from Last 3 Encounters:   02/13/23 219 lb 12.8 oz (99.7 kg)   11/16/22 218 lb (98.9 kg)   10/04/22 215 lb (97.5 kg)           Physical Exam:  General: No Respiratory distress, appears well developed and well nourished. Eyes:  Sclera nonicteric  Nose/Sinuses:  negative findings: nose shows no deformity, asymmetry, or inflammation, nasal mucosa normal, septum midline with no perforation or bleeding  Back:  no pain to palpation  Joint:  no active joint inflammation  Musculoskeletal:  negative  Skin:  Warm and dry  Neck: No JVD and no Carotid Bruits. Chest:  Clear to ascultation,  respiration easy  Cardiovascular:  RRR, S1S2 normal, no murmur, no rub or thrill. Extremities  No edema. Left foot all toes and right big toe removed related to diabetes and non healing ulcers. no clubbing or cyanosis  Abdomen  Liver not palpable. Pulses: Femoral pulses are 2+  Radial pulses nonpalpable bilat.    Neuro: intact    Medications:   Outpatient Encounter Medications as of 2/13/2023   Medication Sig Dispense Refill    Dulaglutide (TRULICITY) 3 FP/5.4IQ SOPN Inject 3 mg into the skin once a week 6 mL 0    ASPIRIN LOW DOSE 81 MG EC tablet Take 1 tablet by mouth daily 30 tablet 1    spironolactone (ALDACTONE) 25 MG tablet Take 1 tablet by mouth 2 times daily 60 tablet 1    clopidogrel (PLAVIX) 75 MG tablet Take 1 tablet by mouth daily 90 tablet 0    allopurinol (ZYLOPRIM) 100 MG tablet Take 2 tablets by mouth daily Take 2 tablets by mouth daily 180 tablet 1    glipiZIDE (GLUCOTROL) 5 MG tablet Take 1 tablet by mouth 2 times daily (before meals) Take 1 tablet by mouth 2 times daily (before meals) 180 tablet 1    levothyroxine (SYNTHROID) 112 MCG tablet Take 1 tablet by mouth daily 90 tablet 1    magnesium oxide (MAG-OX) 400 (240 Mg) MG tablet TAKE 1 TABLET BY MOUTH 2 TIMES DAILY 180 tablet 0    metFORMIN (GLUCOPHAGE) 1000 MG tablet Take 1 tablet by mouth 2 times daily (with meals) 180 tablet 1    Blood Pressure KIT 1 kit by Does not apply route daily 1 kit 0    carvedilol (COREG) 25 MG tablet TAKE 1 TABLET BY MOUTH TWICE DAILY 60 tablet 11    furosemide (LASIX) 80 MG tablet Take 80 mg in the morning and 40 mg at noon (Patient taking differently: 80 mg 2 times daily Take 80 mg in the morning and 80 mg at night) 60 tablet 5    losartan (COZAAR) 25 MG tablet Take 1 tablet by mouth daily 30 tablet 11    BD PEN NEEDLE WINNIE U/F 32G X 4 MM MISC 1 each by Does not apply route daily 100 each 1    atorvastatin (LIPITOR) 40 MG tablet Take 1 tablet by mouth nightly 90 tablet 2    insulin glargine (BASAGLAR KWIKPEN) 100 UNIT/ML injection pen Inject 100 Units into the skin daily 30 pen 0    isosorbide mononitrate (IMDUR) 30 MG extended release tablet Take 1 tablet by mouth daily 90 tablet 5    OneTouch Delica Lancets 16M MISC USE TO CHECK FOUR TIMES DAILY. DX;E11.9 100 each 3    Continuous Blood Gluc Sensor (FREESTYLE MARY 14 DAY SENSOR) MISC CGM 2 each 2    INSULIN SYRINGE 1CC/29G (AIMSCO INSULIN SYR ULTRA THIN) 29G X 1/2\" 1 ML MISC 1 each by Does not apply route 3 times daily 100 each 3    Insulin Pen Needle (UNIFINE PENTIPS) 31G X 5 MM MISC USE 1 EACH DAY AS NEEDED FOR TESTING 100 each 3    Multiple Vitamins-Minerals (THERAPEUTIC MULTIVITAMIN-MINERALS) tablet Take 1 tablet by mouth daily       No facility-administered encounter medications on file as of 2/13/2023.         Lab Data:  Lab Results   Component Value Date     07/20/2022    K 4.9 07/20/2022    CL 98 (L) 07/20/2022    CO2 27 07/20/2022    BUN 36 (H) 07/20/2022    CREATININE 1.3 07/20/2022    GLUCOSE 114 (H) 07/20/2022    CALCIUM 9.4 07/20/2022    PROT 7.3 07/20/2022    LABALBU 4.9 07/20/2022    BILITOT 1.1 (H) 07/20/2022    ALKPHOS 117 07/20/2022    AST 23 07/20/2022    ALT 29 07/20/2022    LABGLOM 57 (A) 07/20/2022    GFRAA >60 07/20/2022    AGRATIO 2.0 07/20/2022    GLOB 2.7 06/16/2021     Lab Results   Component Value Date    WBC 8.3 07/20/2022    HGB 11.8 (L) 07/20/2022    HCT 35.2 (L) 07/20/2022    MCV 92.1 07/20/2022     07/20/2022     Lab Results   Component Value Date    CHOL 67 07/20/2022    CHOL 108 08/11/2020    CHOL 100 10/16/2019     Lab Results   Component Value Date    TRIG 109 07/20/2022    TRIG 248 (H) 08/11/2020    TRIG 176 (H) 10/16/2019     Lab Results   Component Value Date    HDL 27 (L) 07/20/2022    HDL 26 (L) 06/16/2021    HDL 23 (L) 08/11/2020     Lab Results   Component Value Date    LDLCALC 18 07/20/2022    LDLCALC 12 06/16/2021    LDLCALC 35 08/11/2020     Lab Results   Component Value Date    LABVLDL 22 07/20/2022    LABVLDL 38 06/16/2021    LABVLDL 50 08/11/2020     No results found for: CHOLHDLRATIO      A1C:   Lab Results   Component Value Date    LABA1C 6.6 11/16/2022       IMAGING:   Reviewed and discussed with patient  EKG 2.13.23  NSR IVCD  QRSD 142  first degree AV block  old inferior wall MI LAFB non specific T wave inversion in lateral leads. Device Check 6/12/22  Normal sensing and function  No sustained arrhthymias noted  SUMAN 3 yrs    Limited Echo 4/25/2022  Summary  Limited exam for LVEF. The left ventricular systolic function is moderately reduced with an  ejection fraction of 30-35 %. There is diastolic septal flattening consistent with right ventricular  volume overload. There is hypokinesis of the inferior, anteroseptal, inferoseptal and  inferolateral walls. Anterolateral wall appears normal.  Compared to exam done 1/20/2021, left ventricular systolic function appears  slightly improved. Mild mitral regurgitation. Mild to moderate tricuspid regurgitation. Systolic pulmonary artery pressure (SPAP) is estimated at 47 mmHg consistent  with mild pulmonary hypertension (Right atrial pressure of 3 mmHg). Device check 3/14/2022  Normal sensing and pacing  SUMAN 3.3 years  No arrhythmias recorded  PVC count improved    Device check 12/13/2021  Normal device function PVC   PVC count increased from 24/hr in 85 days to 124/hr in 11 days. (mag-ox, coreg).   SUMAN 3.7 yrs    Device check 12.4.21  Normal device function   optivol up    ECHO 1/20/21  Conclusions      Summary   Left ventricular systolic function is reduced with ejection fraction   estimated at 25-30 %. All remaining wall segments appear severely hypokinetic . Abnormal (paradoxical) septal motion is present likely due to pacemaker. There is hypokinesis of the mid and basal anterior and anteroseptal wall   segments. All remaining wall segments appear severely hypokinetic . Pacer / ICD wire is visualized in the right ventricle and right atrium. Echo 8- 25-30% EF severe global hypokinesis. EKG 4/6/21  Normal sinus rhythmLeft axis deviationCannot rule out Anterior infarct     Lexiscan Stress Test 3/8/2021  Summary Moderate to severely reduced LVEF 37%  Global hypokinesis with regional variation (severe inferoapical hypokinesis)  Large area of distal anterior and inferoapical scar. No ischemia     EKG 2/11/21  SR,HR 80    EKG 8/4/2020  Sinus tachycardia Intra-ventricular conduction delaySeptal infarct , age undeterminedAbnormal ECGNo significant change was foundWhen compared with ECG of1.31.19Confirmed by GIL HUFFMAN, [address] (1986) on 8/4/2020 6:51:55 AM     CABG:  Surgeon:  Rose Mary Law     S/P :  CABG x3, AIDEN clip on 8/11/20    Chest Xray 1/15/2021  Heart is enlarged but unchanged. Pulmonary vessels are upper normal.  No acute or focal airspace disease. Sternotomy wire and single lead left subclavian device noted. No pneumothorax or pleural effusion. Stable moderate cardiomegaly. CHEST XRAY 8/13/2020  FINDINGS: Recent surgical history of CABG. Left vascular sheath remains in place but Rialto-Seth catheter has been removed. ICD stable on the left. Improving aeration in the left lung base with some residual pleural fluid and airspace change. Cardiomegaly is demonstrated. No skeletal finding. Limited Echo 1/20/21   Summary   Left ventricular systolic function is reduced with ejection fraction   estimated at 25-30 %.    All remaining wall segments appear severely hypokinetic . Abnormal (paradoxical) septal motion is present likely due to pacemaker. There is hypokinesis of the mid and basal anterior and anteroseptal wall   segments. All remaining wall segments appear severely hypokinetic . Pacer / ICD wire is visualized in the right ventricle and right atrium. Echo 8- 25-30% EF severe global hypokinesis. ECHO 8/5/2020   Summary   Left ventricular systolic function is severely reduced with ejection   fraction estimated at 25-30 %. Severe global hypokinesis is present. Left ventricle size is normal.   Normal left ventricular wall thickness. Grade III diastolic dysfunction with elevated filing pressure. Mild posterior mitral annular calcification is present. No evidence of mitral valve stenosis. Severe mitral regurgitation. The left atrium is mildly dilated. No evidence of aortic valve stenosis. Mild aortic regurgitation is present. Mild tricuspid regurgitation. Normal systolic pulmonary artery pressure (SPAP) estimated at 39 mmHg (RA   pressure 8 mmHg). Stress test: 7/2013   No scintigraphic evidence of stress-induced myocardial  ischemia. 2. Chronic inferoapical infarct. 3. Severe left ventricular dilatation with severe diffuse  hypokinesis. 4. Severely reduced LVEF of approximately 20%. Assessment/Plan:    Encounter Diagnoses   Name Primary? Chronic systolic CHF (congestive heart failure) (HCC) Yes    Ischemic cardiomyopathy     Coronary artery disease involving native coronary artery of native heart without angina pectoris     Mixed hyperlipidemia     Primary hypertension     NSVT (nonsustained ventricular tachycardia)     Medication management      BP low normal He is asymptomatic    CAD s/p CABG 8/11/20 no angina stable     -on DAPT  beta blockers and statin   2.    Alcoholic cardiomyopathy      -On Coreg, Lasix, aldactone and lisinopril  -S/P SC ICD with Optivol 1/23/14  -last device check 12/11/2022 normal sensing and pacing. SUMAN 2.5 yrs.   -4/25/2022 L Echo EF=30-35%  3. Hypertension  -controlled   4. HLD   -on atorvastatin 80 mg nightly   -Labs from 7/20/2022 reviewed with patient  5. CHF   -On Lasix 80 mg daily Carvedilol isosorbide mononitrate losartan spironolactone  Stage C functional class 2-3  We will refer for cardiac rehab once he is cleared from Dr. Tremaine Ortiz due to ulcers on both feet. Reevaluate for CRT-D once ulcers are healed for device upgrade in presence of IVCD    CBC, CMP, TSH, and fasting lipids (8 hours) ordered. Due July. Automatic implantable cardiac defibrillator in situ recent check normal function    Plan:  1. Continue current meds  Refills warranted at this time  2. Plan follow up in 6 months      QUALITY MEASURES  1. Tobacco Cessation Counseling: NA  2. Retake of BP if >140/90:   NA  3. Documentation to PCP/referring for new patient:  Sent to PCP at close of office visit  4. CAD patient on anti-platelet: Yes DAPT  5. CAD patient on STATIN therapy:  Yes  6. Patient with CHF and aFib on anticoagulation:  NA     This note was scribed in the presence of Alex Lew MD by Alida Franco RN. I, Dr. Blanca Aguayo, personally performed the services described in this documentation, as scribed by the above signed scribe in my presence. It is both accurate and complete to my knowledge. I agree with the details independently gathered by the clinical support staff, while the remaining scribed note accurately describes my personal service to the patient.           Blanca Aguayo MD 2/13/2023 2:13 PM  [facility]  [phone] [facility]  [phone] [facility]

## 2023-02-13 ENCOUNTER — OFFICE VISIT (OUTPATIENT)
Dept: CARDIOLOGY CLINIC | Age: 58
End: 2023-02-13
Payer: MEDICAID

## 2023-02-13 VITALS
HEIGHT: 70 IN | SYSTOLIC BLOOD PRESSURE: 104 MMHG | BODY MASS INDEX: 31.47 KG/M2 | WEIGHT: 219.8 LBS | OXYGEN SATURATION: 99 % | HEART RATE: 89 BPM | DIASTOLIC BLOOD PRESSURE: 73 MMHG

## 2023-02-13 DIAGNOSIS — I50.22 CHRONIC SYSTOLIC CHF (CONGESTIVE HEART FAILURE) (HCC): Primary | ICD-10-CM

## 2023-02-13 DIAGNOSIS — I47.29 NSVT (NONSUSTAINED VENTRICULAR TACHYCARDIA): ICD-10-CM

## 2023-02-13 DIAGNOSIS — Z79.899 MEDICATION MANAGEMENT: ICD-10-CM

## 2023-02-13 DIAGNOSIS — I10 PRIMARY HYPERTENSION: ICD-10-CM

## 2023-02-13 DIAGNOSIS — E78.2 MIXED HYPERLIPIDEMIA: ICD-10-CM

## 2023-02-13 DIAGNOSIS — I25.5 ISCHEMIC CARDIOMYOPATHY: ICD-10-CM

## 2023-02-13 DIAGNOSIS — I25.10 CORONARY ARTERY DISEASE INVOLVING NATIVE CORONARY ARTERY OF NATIVE HEART WITHOUT ANGINA PECTORIS: ICD-10-CM

## 2023-02-13 PROCEDURE — 99214 OFFICE O/P EST MOD 30 MIN: CPT | Performed by: INTERNAL MEDICINE

## 2023-02-13 PROCEDURE — 3078F DIAST BP <80 MM HG: CPT | Performed by: INTERNAL MEDICINE

## 2023-02-13 PROCEDURE — 93000 ELECTROCARDIOGRAM COMPLETE: CPT | Performed by: INTERNAL MEDICINE

## 2023-02-13 PROCEDURE — 3074F SYST BP LT 130 MM HG: CPT | Performed by: INTERNAL MEDICINE

## 2023-02-13 RX ORDER — FUROSEMIDE 80 MG
80 TABLET ORAL 2 TIMES DAILY
Qty: 60 TABLET | Refills: 11 | Status: SHIPPED | OUTPATIENT
Start: 2023-02-13

## 2023-02-13 RX ORDER — SPIRONOLACTONE 25 MG/1
25 TABLET ORAL 2 TIMES DAILY
Qty: 60 TABLET | Refills: 11 | Status: SHIPPED | OUTPATIENT
Start: 2023-02-13

## 2023-02-13 RX ORDER — CLOPIDOGREL BISULFATE 75 MG/1
75 TABLET ORAL DAILY
Qty: 30 TABLET | Refills: 11 | Status: SHIPPED | OUTPATIENT
Start: 2023-02-13

## 2023-02-13 NOTE — PATIENT INSTRUCTIONS
Plan:  1. Continue current meds  Refills warranted at this time  2. Plan follow up in 6 months    CBC, CMP, TSH, and fasting lipids (8 hours) ordered. Due July.

## 2023-02-16 ENCOUNTER — OFFICE VISIT (OUTPATIENT)
Dept: FAMILY MEDICINE CLINIC | Age: 58
End: 2023-02-16

## 2023-02-16 VITALS
HEART RATE: 82 BPM | WEIGHT: 217 LBS | OXYGEN SATURATION: 100 % | BODY MASS INDEX: 31.59 KG/M2 | DIASTOLIC BLOOD PRESSURE: 68 MMHG | SYSTOLIC BLOOD PRESSURE: 122 MMHG | RESPIRATION RATE: 16 BRPM

## 2023-02-16 DIAGNOSIS — I25.10 CORONARY ARTERY DISEASE INVOLVING NATIVE CORONARY ARTERY OF NATIVE HEART WITHOUT ANGINA PECTORIS: ICD-10-CM

## 2023-02-16 DIAGNOSIS — E11.42 DIABETIC POLYNEUROPATHY ASSOCIATED WITH TYPE 2 DIABETES MELLITUS (HCC): Primary | ICD-10-CM

## 2023-02-16 DIAGNOSIS — E78.2 MIXED HYPERLIPIDEMIA: ICD-10-CM

## 2023-02-16 DIAGNOSIS — I50.22 CHRONIC SYSTOLIC CHF (CONGESTIVE HEART FAILURE) (HCC): ICD-10-CM

## 2023-02-16 DIAGNOSIS — I25.5 ISCHEMIC CARDIOMYOPATHY: ICD-10-CM

## 2023-02-16 DIAGNOSIS — E03.9 ACQUIRED HYPOTHYROIDISM: ICD-10-CM

## 2023-02-16 LAB
A/G RATIO: 1.5 (ref 1.1–2.2)
ALBUMIN SERPL-MCNC: 4.4 G/DL (ref 3.4–5)
ALP BLD-CCNC: 151 U/L (ref 40–129)
ALT SERPL-CCNC: 28 U/L (ref 10–40)
ANION GAP SERPL CALCULATED.3IONS-SCNC: 15 MMOL/L (ref 3–16)
ANISOCYTOSIS: ABNORMAL
AST SERPL-CCNC: 17 U/L (ref 15–37)
BASOPHILS ABSOLUTE: 0 K/UL (ref 0–0.2)
BASOPHILS RELATIVE PERCENT: 0 %
BILIRUB SERPL-MCNC: 0.8 MG/DL (ref 0–1)
BUN BLDV-MCNC: 34 MG/DL (ref 7–20)
BURR CELLS: ABNORMAL
CALCIUM SERPL-MCNC: 10 MG/DL (ref 8.3–10.6)
CHLORIDE BLD-SCNC: 95 MMOL/L (ref 99–110)
CHOLESTEROL, TOTAL: 73 MG/DL (ref 0–199)
CO2: 25 MMOL/L (ref 21–32)
CREAT SERPL-MCNC: 1.4 MG/DL (ref 0.9–1.3)
EOSINOPHILS ABSOLUTE: 0.2 K/UL (ref 0–0.6)
EOSINOPHILS RELATIVE PERCENT: 3 %
GFR SERPL CREATININE-BSD FRML MDRD: 58 ML/MIN/{1.73_M2}
GLUCOSE BLD-MCNC: 179 MG/DL (ref 70–99)
HBA1C MFR BLD: 7.1 %
HCT VFR BLD CALC: 33.8 % (ref 40.5–52.5)
HDLC SERPL-MCNC: 27 MG/DL (ref 40–60)
HEMOGLOBIN: 11.5 G/DL (ref 13.5–17.5)
LDL CHOLESTEROL CALCULATED: 23 MG/DL
LYMPHOCYTES ABSOLUTE: 1.9 K/UL (ref 1–5.1)
LYMPHOCYTES RELATIVE PERCENT: 23 %
MCH RBC QN AUTO: 30.5 PG (ref 26–34)
MCHC RBC AUTO-ENTMCNC: 34.1 G/DL (ref 31–36)
MCV RBC AUTO: 89.6 FL (ref 80–100)
MONOCYTES ABSOLUTE: 0.6 K/UL (ref 0–1.3)
MONOCYTES RELATIVE PERCENT: 7 %
NEUTROPHILS ABSOLUTE: 5.5 K/UL (ref 1.7–7.7)
NEUTROPHILS RELATIVE PERCENT: 67 %
OVALOCYTES: ABNORMAL
PDW BLD-RTO: 18.1 % (ref 12.4–15.4)
PLATELET # BLD: 167 K/UL (ref 135–450)
PLATELET SLIDE REVIEW: ADEQUATE
PMV BLD AUTO: 7.9 FL (ref 5–10.5)
POLYCHROMASIA: ABNORMAL
POTASSIUM SERPL-SCNC: 4.8 MMOL/L (ref 3.5–5.1)
RBC # BLD: 3.77 M/UL (ref 4.2–5.9)
SCHISTOCYTES: ABNORMAL
SLIDE REVIEW: ABNORMAL
SODIUM BLD-SCNC: 135 MMOL/L (ref 136–145)
T4 FREE: 1.7 NG/DL (ref 0.9–1.8)
TOTAL PROTEIN: 7.3 G/DL (ref 6.4–8.2)
TRIGL SERPL-MCNC: 113 MG/DL (ref 0–150)
TSH REFLEX FT4: 1.7 UIU/ML (ref 0.27–4.2)
TSH REFLEX: 5.3 UIU/ML (ref 0.27–4.2)
VLDLC SERPL CALC-MCNC: 23 MG/DL
WBC # BLD: 8.2 K/UL (ref 4–11)

## 2023-02-16 SDOH — ECONOMIC STABILITY: FOOD INSECURITY: WITHIN THE PAST 12 MONTHS, YOU WORRIED THAT YOUR FOOD WOULD RUN OUT BEFORE YOU GOT MONEY TO BUY MORE.: NEVER TRUE

## 2023-02-16 SDOH — ECONOMIC STABILITY: FOOD INSECURITY: WITHIN THE PAST 12 MONTHS, THE FOOD YOU BOUGHT JUST DIDN'T LAST AND YOU DIDN'T HAVE MONEY TO GET MORE.: NEVER TRUE

## 2023-02-16 SDOH — ECONOMIC STABILITY: INCOME INSECURITY: HOW HARD IS IT FOR YOU TO PAY FOR THE VERY BASICS LIKE FOOD, HOUSING, MEDICAL CARE, AND HEATING?: NOT HARD AT ALL

## 2023-02-16 SDOH — ECONOMIC STABILITY: HOUSING INSECURITY
IN THE LAST 12 MONTHS, WAS THERE A TIME WHEN YOU DID NOT HAVE A STEADY PLACE TO SLEEP OR SLEPT IN A SHELTER (INCLUDING NOW)?: NO

## 2023-02-16 ASSESSMENT — ENCOUNTER SYMPTOMS
GASTROINTESTINAL NEGATIVE: 1
RESPIRATORY NEGATIVE: 1

## 2023-02-16 NOTE — PROGRESS NOTES
2023    Asya Hatch (:  1965) is a 62 y.o. male, here for a preventive medicine evaluation. Pt is here for his 3 month f/u for diabetes. Taking basaglar insulin, trulicity, metformin and glipizide as directed. Monitors BS at home. States that it runs around 120, but sometimes is higher. A1C 2022 was 6.6. Today his A1C is 7.1. He states that he has been eating a lot more sweets lately and is not surprised by the increased A1C. H/o hypothyroidism. TSH was 15.45 on 2022, T4 was normal.  We increased his dose of levothyroxine to 112 mcg daily. Due for repeat labs (orders are active). He also has some fasting lab orders to get done today for Dr. Brittney Best. He saw Dr. Brittney Best on 2023. Patient Active Problem List   Diagnosis    Hypertension    Diabetic polyneuropathy associated with type 2 diabetes mellitus (HCC)    Alcoholic cardiomyopathy    Automatic implantable cardioverter-defibrillator in situ    Hyperlipidemia    Onychomycosis    Dystrophic nail    Callus of foot    Hallux valgus    Acquired hypothyroidism    NSVT (nonsustained ventricular tachycardia)    NSTEMI (non-ST elevated myocardial infarction) ([address]    Coronary artery disease involving native coronary artery of native heart without angina pectoris    Obesity    Acute post-operative pain    S/P coronary artery bypass graft x 3    Chest pain    SOB (shortness of breath)    Ischemic cardiomyopathy    ACE-inhibitor cough    Diabetic retinopathy of both eyes without macular edema associated with type 2 diabetes mellitus ([address]    Diabetic nephropathy associated with type 2 diabetes mellitus (HCC)    Chronic systolic CHF (congestive heart failure) ([address]    Chronic renal disease, stage III ([address] [[id number]]       Review of Systems   Constitutional: Negative. Respiratory: Negative. Cardiovascular: Negative. Gastrointestinal: Negative. Prior to Visit Medications    Medication Sig Taking?  Authorizing Provider   clopidogrel (PLAVIX) 75 MG tablet Take 1 tablet by mouth daily Yes Miller You MD   furosemide (LASIX) 80 MG tablet Take 1 tablet by mouth 2 times daily Take 80 mg in the morning and 80 mg at night Yes Miller You MD   spironolactone (ALDACTONE) 25 MG tablet Take 1 tablet by mouth 2 times daily Yes Miller You MD   Dulaglutide (TRULICITY) 3 YQ/5.0XW SOPN Inject 3 mg into the skin once a week Yes JOSE Middleton CNP   ASPIRIN LOW DOSE 81 MG EC tablet Take 1 tablet by mouth daily Yes Miller You MD   allopurinol (ZYLOPRIM) 100 MG tablet Take 2 tablets by mouth daily Take 2 tablets by mouth daily Yes JOSE Middleton CNP   glipiZIDE (GLUCOTROL) 5 MG tablet Take 1 tablet by mouth 2 times daily (before meals) Take 1 tablet by mouth 2 times daily (before meals) Yes JOSE Middleton CNP   levothyroxine (SYNTHROID) 112 MCG tablet Take 1 tablet by mouth daily Yes JOSE Middleton CNP   magnesium oxide (MAG-OX) 400 (240 Mg) MG tablet TAKE 1 TABLET BY MOUTH 2 TIMES DAILY Yes JOSE Middleton CNP   metFORMIN (GLUCOPHAGE) 1000 MG tablet Take 1 tablet by mouth 2 times daily (with meals) Yes JOSE Middleton CNP   Blood Pressure KIT 1 kit by Does not apply route daily Yes JOSE Middleton CNP   carvedilol (COREG) 25 MG tablet TAKE 1 TABLET BY MOUTH TWICE DAILY Yes Miller You MD   losartan (COZAAR) 25 MG tablet Take 1 tablet by mouth daily Yes Miller You MD   BD PEN NEEDLE WINNIE U/F 32G X 4 MM MISC 1 each by Does not apply route daily Yes Nicki Moon MD   atorvastatin (LIPITOR) 40 MG tablet Take 1 tablet by mouth nightly Yes Miller You MD   insulin glargine (BASAGLAR KWIKPEN) 100 UNIT/ML injection pen Inject 100 Units into the skin daily Yes Nicki Moon MD   isosorbide mononitrate (IMDUR) 30 MG extended release tablet Take 1 tablet by mouth daily Yes BarnesMD Almaz Dumont Lancets 01H MISC USE TO CHECK FOUR TIMES DAILY.  DX;E11.9 Yes Braden Kang MD   Continuous Blood Gluc Sensor (FREESTYLE MARY [facility]) MISC CGM Yes Braden Kang MD   Insulin Pen Needle (Carol Kennedy) 31G X 5 MM MISC USE 1 EACH DAY AS NEEDED FOR TESTING Yes Kaushik Peterson MD   Multiple Vitamins-Minerals (THERAPEUTIC MULTIVITAMIN-MINERALS) tablet Take 1 tablet by mouth daily Yes Historical Provider, MD        Allergies   Allergen Reactions    Bactrim [Sulfamethoxazole-Trimethoprim] Rash     Pt reports that his lips tingle and then skin on his lips peel off,and rash on thigh       Bee Venom Swelling and Rash    Lisinopril Other (See Comments)     cough       Past Medical History:   Diagnosis Date    Acute on chronic systolic congestive heart failure ([address] 07/08/2013    Acute osteomyelitis of left foot ([address] 09/27/2017    Acute osteomyelitis of right foot ([address] 04/25/2016    Acute respiratory failure ([address] 08/04/2020    Ascites     Blood transfusion reaction     Burst fracture of lumbar vertebra ([address] 11/09/2012    Cellulitis of left foot     Cellulitis of right lower extremity 2/16, 5/16    Chronic systolic CHF (congestive heart failure) ([address]     Community acquired pneumonia     Diabetes ([address]     Diabetic ulcer of left foot associated with type 2 diabetes mellitus, with muscle involvement without evidence of necrosis ([address] 04/27/2017    Diabetic ulcer of right foot ([address] early 2016    Diabetic ulcer of 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 2 diabetes mellitus, with necrosis of bone ([address]     ETOH abuse     Fracture of tibial plateau 92/85/9144    High cholesterol     History of blood transfusion     reaction    HTN (hypertension)     Hx of blood clots     MI (myocardial infarction) ([address] 08/04/2020    + Troponins    MRSA (methicillin resistant staph aureus) culture positive 4/28/16, 4/20/16    foot wound    Neuropathic ulcer of left foot, limited to breakdown of skin ([address] 11/03/2016    Neuropathic ulcer of toe ([address] 02/13/2014    NSVT (nonsustained ventricular tachycardia) 2014    Pleural effusion due to congestive heart failure (HCC)     Septicemia (HCC)     Smoker     quit 4830    Systolic CHF, acute ([address] 07/08/2013    Thyroid disease        Past Surgical History:   Procedure Laterality Date    CARDIAC DEFIBRILLATOR PLACEMENT  01/29/2014    ICD Placement    CORONARY ARTERY BYPASS GRAFT N/A 8/11/2020    CORONARY ARTERY BYPASS GRAFTING X3, LEFT ATRIAL APPENDAGE CLIP, INTERNAL MAMMARY ARTERY, SAPHENOUS VEIN GRAFT, ON PUMP, 5 LEVEL BILATERAL INTERCOSTAL NERVE BLOCK performed by Clay Deleon MD at [address] [place] [address] Left 08/09/2018    PARTIAL FOOT AMPUTATION w. Dr Rc Alcala Left 1/31/2019    EXOSTECTOMY LEFT FOOT, ULCER DEBRIDEMENT LEFT FOOT WITH GRAFT APPLICATION performed by Yennifer Pierre DPM at [address] [facility] Left 09/27/2017    LEFT FOOT ULCER DEBRIDEMENT, POSSIBLE SECOND METATARSAL    FOOT SURGERY Left 01/31/2019    Exostectomy left foot, ulcer debridement with graft application left foot    FOOT TENDON SURGERY Right 2016    FOOT TENDON SURGERY Left 06/30/2017    KNEE SURGERY Left     OTHER SURGICAL HISTORY Bilateral 9/17/15    amputation 2nd digit bilateral, flexor tenotomy right hallux    OTHER SURGICAL HISTORY Left 08/17/2017    PARTIAL LEFT FOOT AMPUTATION      OTHER SURGICAL HISTORY Left 12/07/2017    Debridement infected bone and tissue left foot; ulcer debridement left foot with graft application with dr mathur     OTHER SURGICAL HISTORY Right 01/04/2018    DEBRIDEMENT INFECTED BONE AND TISSUE RIGHT FOOT, ULCER DEBRIDEMENT RIGHT FOOT WITH GRAFT APPLICATION    OTHER SURGICAL HISTORY Left 11/01/2018    Procedure: PARTIAL RESECTION LEFT METATARSAL, ULCER DEBRIDEMENT LEFT FOOT WITH GRAFT APPLICATION     OTHER SURGICAL HISTORY       AMPUTATION LEFT FOURTH AND FIFTH DIGITS, PARTIAL RESECTION  SECOND AND THIRD METATARSAL LEFT FOOT, PARTIAL RESECTION RIGHT FIRST METATARSAL (Bilateral Foot)    FL OFFICE/OUTPT VISIT,PROCEDURE ONLY Left 2018    ULCER DEBRIDEMENT LEFT FOOT WITH GRAFT APPLICATION performed by Archana Kimball DPM at [address] [facility] B1 TARSAL/METAR B1 XCP TALUS/CALCANEUS Left 2018    PARTIAL FOOT AMPUTATION WITH GRAFT APPLICATION performed by Archana Kimball DPM at [facility] B1 TARSAL/METAR B1 XCP TALUS/CALCANEUS Left 2018    PARTIAL RESECTION LEFT METATARSAL, ULCER DEBRIDEMENT LEFT FOOT WITH GRAFT APPLICATION performed by Archana Kimball DPM at [address] [place] [address]      2 toes    TOE AMPUTATION Bilateral 3/18/2021    AMPUTATION LEFT FOURTH AND FIFTH DIGITS, PARTIAL RESECTION  SECOND AND THIRD METATARSAL LEFT FOOT, PARTIAL RESECTION RIGHT FIRST METATARSAL performed by Archana Kimball DPM at [facility] [employer] Right 2021    PARTIAL RESECTION RIGHT FIRST METATARSAL performed by Archana Kimball DPM at [place] [address] History     Socioeconomic History    Marital status:      Spouse name: Not on file    Number of children: 2    Years of education: Not on file    Highest education level: Not on file   Occupational History    Not on file   Tobacco Use    Smoking status: Former     Packs/day: 0.25     Years: 1.00     Pack years: 0.25     Types: Cigarettes     Quit date: 1980     Years since quittin.0    Smokeless tobacco: Never   Vaping Use    Vaping Use: Never used   Substance and Sexual Activity    Alcohol use: No     Alcohol/week: 0.0 standard drinks    Drug use: No    Sexual activity: Not Currently     Partners: Female   Other Topics Concern    Not on file   Social History Narrative    Not on file     Social Determinants of Health     Financial Resource Strain: Low Risk     Difficulty of Paying Living Expenses: Not hard at all   Food Insecurity: No Food Insecurity    Worried About Running Out of Food in the Last Year: Never true    Ran Out of Food in the Last Year: Never true   Transportation Needs: Unknown    Lack of Transportation (Medical): Not on file    Lack of Transportation (Non-Medical): No   Physical Activity: Inactive    Days of Exercise per Week: 1 day    Minutes of Exercise per Session: 0 min   Stress: Not on file   Social Connections: Not on file   Intimate Partner Violence: Not on file   Housing Stability: Unknown    Unable to Pay for Housing in the Last Year: Not on file    Number of Places Lived in the Last Year: Not on file    Unstable Housing in the Last Year: No        Family History   Problem Relation Age of Onset    Heart Disease Mother     High Blood Pressure Mother     High Cholesterol Mother     Diabetes Mother     Anemia Mother     Heart Disease Father     High Blood Pressure Father     High Cholesterol Father     Heart Disease Maternal Grandmother     High Blood Pressure Maternal Grandmother     High Cholesterol Maternal Grandmother     Cancer Maternal Grandmother         bone marrow & breast    Heart Disease Maternal Grandfather     High Blood Pressure Maternal Grandfather     High Cholesterol Maternal Grandfather     Cancer Maternal Grandfather         pancreatic CA    Heart Disease Paternal Grandmother     High Blood Pressure Paternal Grandmother     High Cholesterol Paternal Grandmother     Heart Disease Paternal Grandfather     High Blood Pressure Paternal Grandfather     High Cholesterol Paternal Grandfather     Stroke Brother     Heart Failure Brother     Heart Disease Brother     Diabetes type 2  Brother     Diabetes type 2  Brother     Diabetes type 2  Brother        ADVANCE DIRECTIVE: N, <no information>    Vitals:    02/16/23 0924   BP: 122/68   Site: Right Upper Arm   Position: Sitting   Cuff Size: Medium Adult   Pulse: 82   Resp: 16   SpO2: 100%   Weight: 217 lb (98.4 kg)     Estimated body mass index is 31.59 kg/m² as calculated from the following:    Height as of 2/13/23: 5' 9.5\" (1.765 m). Weight as of this encounter: 217 lb (98.4 kg). Physical Exam  Vitals reviewed. Constitutional:       Appearance: Normal appearance. HENT:      Head: Normocephalic. Cardiovascular:      Rate and Rhythm: Normal rate and regular rhythm. Heart sounds: Murmur heard. Pulmonary:      Effort: Pulmonary effort is normal.      Breath sounds: Normal breath sounds. Musculoskeletal:      Cervical back: Normal range of motion. Neurological:      Mental Status: He is alert and oriented to person, place, and time. Psychiatric:         Mood and Affect: Mood normal.         Behavior: Behavior normal.         Thought Content: Thought content normal.         Judgment: Judgment normal.       No flowsheet data found. Lab Results   Component Value Date/Time    CHOL 67 07/20/2022 10:53 AM    CHOL 108 08/11/2020 03:29 AM    CHOL 100 10/16/2019 09:54 AM    CHOLFAST 76 06/16/2021 10:00 AM    CHOLFAST 115 05/26/2020 09:32 AM    CHOLFAST 142 01/23/2019 09:45 AM    TRIG 109 07/20/2022 10:53 AM    TRIG 248 08/11/2020 03:29 AM    TRIG 176 10/16/2019 09:54 AM    TRIGLYCFAST 192 06/16/2021 10:00 AM    TRIGLYCFAST 354 05/26/2020 09:32 AM    TRIGLYCFAST 266 01/23/2019 09:45 AM    HDL 27 07/20/2022 10:53 AM    HDL 26 06/16/2021 10:00 AM    HDL 23 08/11/2020 03:29 AM    LDLCALC 18 07/20/2022 10:53 AM    LDLCALC 12 06/16/2021 10:00 AM    LDLCALC 35 08/11/2020 03:29 AM    GLUF 246 01/23/2019 09:45 AM    GLUCOSE 114 07/20/2022 10:53 AM    LABA1C 6.6 11/16/2022 09:57 AM    LABA1C 6.1 07/20/2022 10:53 AM    LABA1C 6.2 03/22/2022 10:57 AM       The ASCVD Risk score (Carlos DK, et al., 2019) failed to calculate for the following reasons:     The patient has a prior MI or stroke diagnosis    Immunization History   Administered Date(s) Administered    COVID-19, PFIZER GRAY top, DO NOT Dilute, (age 15 y+), IM, 30 mcg/0.3 mL 01/21/2022    COVID-19, PFIZER PURPLE top, DILUTE for use, (age 15 y+), 30mcg/0.3mL 06/11/2021, 07/02/2021, 01/21/2022    INFLUENZA, INTRADERMAL, QUADRIVALENT, PRESERVATIVE FREE 01/17/2017 Influenza Virus Vaccine 11/03/2015    Influenza, FLUARIX, FLULAVAL, FLUZONE (age 10 mo+) AND AFLURIA, (age 1 y+), PF, 0.5mL 03/22/2022    Influenza, FLUCELVAX, (age 10 mo+), MDCK, PF, 0.5mL 09/28/2017, 11/16/2022    Influenza, Intradermal, Preservative free 11/03/2015    Pneumococcal Conjugate 13-valent ([id number]) 06/20/2016    Pneumococcal Polysaccharide ([id number]) 09/06/2017    Tdap (Boostrix, Adacel) 11/03/2015       Health Maintenance   Topic Date Due    Hepatitis B vaccine (1 of 3 - Risk 3-dose series) Never done    Shingles vaccine (1 of 2) Never done    COVID-19 Vaccine (4 - Booster for Pfizer series) 03/18/2022    Diabetic retinal exam  06/08/2023    Diabetic Alb to Cr ratio (uACR) test  07/20/2023    Lipids  07/20/2023    GFR test (Diabetes, CKD 3-4, OR last GFR 15-59)  07/20/2023    A1C test (Diabetic or Prediabetic)  11/16/2023    Depression Screen  11/16/2023    DTaP/Tdap/Td vaccine (2 - Td or Tdap) 11/03/2025    Colorectal Cancer Screen  12/16/2029    Pneumococcal 0-64 years Vaccine (3 - PPSV23 if available, else PCV20) 07/09/2030    Flu vaccine  Completed    Hepatitis C screen  Completed    HIV screen  Completed    Hepatitis A vaccine  Aged Out    Hib vaccine  Aged Out    Meningococcal (ACWY) vaccine  Aged Out       Assessment & Plan     Diabetic polyneuropathy associated with type 2 diabetes mellitus ([facility]  A1C is up to 7.1. Pt states that he is taking his medications as directed, but admits to not following a healthy diet lately. He would like to work on cutting back on sweets before making any medication changes. -     POCT glycosylated hemoglobin (Hb A1C)    Acquired hypothyroidism  Previous TSH was elevated. Pt states that he is taking increased dose of synthroid as directed. Reprinted repeat TSH order. Will f/u with pt regarding results and POC. Return in about 3 months (around 5/16/2023) for Diabetes.        --Pollyann Kanner, JOSE - CNP

## 2023-02-17 ENCOUNTER — TELEPHONE (OUTPATIENT)
Dept: CARDIOLOGY CLINIC | Age: 58
End: 2023-02-17

## 2023-02-17 DIAGNOSIS — E03.9 ACQUIRED HYPOTHYROIDISM: Primary | ICD-10-CM

## 2023-02-17 RX ORDER — LEVOTHYROXINE SODIUM 0.12 MG/1
125 TABLET ORAL DAILY
Qty: 90 TABLET | Refills: 1 | Status: SHIPPED | OUTPATIENT
Start: 2023-02-17 | End: 2023-08-16

## 2023-02-17 NOTE — TELEPHONE ENCOUNTER
Called patient with Dr Cecile Sal result message, No answer. Voice message left to call back office for result message.

## 2023-02-17 NOTE — TELEPHONE ENCOUNTER
----- Message from Jennifer Yost MD sent at 2/17/2023  6:20 AM EST -----  Blood test is good A1C 7.1

## 2023-02-17 NOTE — TELEPHONE ENCOUNTER
----- Message from Yanely Tiwari MD sent at 2/17/2023  6:31 AM EST -----  Already addressed in previous message this morning.

## 2023-03-01 RX ORDER — ISOSORBIDE MONONITRATE 30 MG/1
30 TABLET, EXTENDED RELEASE ORAL DAILY
Qty: 90 TABLET | Refills: 0 | OUTPATIENT
Start: 2023-03-01

## 2023-03-01 RX ORDER — ISOSORBIDE MONONITRATE 30 MG/1
30 TABLET, EXTENDED RELEASE ORAL DAILY
Qty: 90 TABLET | Refills: 3 | Status: SHIPPED | OUTPATIENT
Start: 2023-03-01

## 2023-03-13 ENCOUNTER — NURSE ONLY (OUTPATIENT)
Dept: CARDIOLOGY CLINIC | Age: 58
End: 2023-03-13

## 2023-03-13 DIAGNOSIS — I50.22 CHRONIC SYSTOLIC CHF (CONGESTIVE HEART FAILURE) (HCC): Primary | ICD-10-CM

## 2023-03-13 DIAGNOSIS — Z95.810 AUTOMATIC IMPLANTABLE CARDIOVERTER-DEFIBRILLATOR IN SITU: ICD-10-CM

## 2023-03-13 DIAGNOSIS — I42.6 ALCOHOLIC CARDIOMYOPATHY (HCC): ICD-10-CM

## 2023-03-13 DIAGNOSIS — I25.5 ISCHEMIC CARDIOMYOPATHY: ICD-10-CM

## 2023-03-15 ENCOUNTER — APPOINTMENT (OUTPATIENT)
Dept: GENERAL RADIOLOGY | Age: 58
End: 2023-03-15
Payer: MEDICARE

## 2023-03-15 ENCOUNTER — HOSPITAL ENCOUNTER (EMERGENCY)
Age: 58
Discharge: HOME | End: 2023-03-16
Payer: MEDICARE

## 2023-03-15 DIAGNOSIS — R11.2 NAUSEA AND VOMITING, UNSPECIFIED VOMITING TYPE: Primary | ICD-10-CM

## 2023-03-15 LAB
ALBUMIN SERPL-MCNC: 4.3 G/DL (ref 3.4–5)
ALBUMIN/GLOB SERPL: 1.7 {RATIO} (ref 1.1–2.2)
ALP SERPL-CCNC: 159 U/L (ref 40–129)
ALT SERPL-CCNC: 20 U/L (ref 10–40)
ANION GAP SERPL CALCULATED.3IONS-SCNC: 14 MMOL/L (ref 3–16)
AST SERPL-CCNC: 21 U/L (ref 15–37)
BASOPHILS # BLD: 0.1 K/UL (ref 0–0.2)
BASOPHILS NFR BLD: 0.4 %
BILIRUB SERPL-MCNC: 1.6 MG/DL (ref 0–1)
BILIRUB UR QL STRIP.AUTO: NEGATIVE
BUN SERPL-MCNC: 45 MG/DL (ref 7–20)
CALCIUM SERPL-MCNC: 9.7 MG/DL (ref 8.3–10.6)
CHLORIDE SERPL-SCNC: 95 MMOL/L (ref 99–110)
CLARITY UR: CLEAR
CO2 SERPL-SCNC: 24 MMOL/L (ref 21–32)
COLOR UR: YELLOW
CREAT SERPL-MCNC: 1.3 MG/DL (ref 0.9–1.3)
DEPRECATED RDW RBC AUTO: 18 % (ref 12.4–15.4)
EOSINOPHIL # BLD: 0 K/UL (ref 0–0.6)
EOSINOPHIL NFR BLD: 0.2 %
EPI CELLS #/AREA URNS HPF: ABNORMAL /HPF (ref 0–5)
GFR SERPLBLD CREATININE-BSD FMLA CKD-EPI: >60 ML/MIN/{1.73_M2}
GLUCOSE SERPL-MCNC: 191 MG/DL (ref 70–99)
GLUCOSE UR STRIP.AUTO-MCNC: NEGATIVE MG/DL
HCT VFR BLD AUTO: 34.5 % (ref 40.5–52.5)
HGB BLD-MCNC: 11.5 G/DL (ref 13.5–17.5)
HGB UR QL STRIP.AUTO: NEGATIVE
HYALINE CASTS #/AREA URNS LPF: ABNORMAL /LPF (ref 0–2)
KETONES UR STRIP.AUTO-MCNC: ABNORMAL MG/DL
LACTATE BLDV-SCNC: 1.7 MMOL/L (ref 0.4–1.9)
LEUKOCYTE ESTERASE UR QL STRIP.AUTO: NEGATIVE
LYMPHOCYTES # BLD: 0.6 K/UL (ref 1–5.1)
LYMPHOCYTES NFR BLD: 3.6 %
MCH RBC QN AUTO: 30.2 PG (ref 26–34)
MCHC RBC AUTO-ENTMCNC: 33.2 G/DL (ref 31–36)
MCV RBC AUTO: 90.9 FL (ref 80–100)
MONOCYTES # BLD: 1 K/UL (ref 0–1.3)
MONOCYTES NFR BLD: 6 %
MUCOUS THREADS #/AREA URNS LPF: ABNORMAL /LPF
NEUTROPHILS # BLD: 14.2 K/UL (ref 1.7–7.7)
NEUTROPHILS NFR BLD: 89.8 %
NITRITE UR QL STRIP.AUTO: NEGATIVE
PH UR STRIP.AUTO: 5.5 [PH] (ref 5–8)
PLATELET # BLD AUTO: 169 K/UL (ref 135–450)
PMV BLD AUTO: 7.7 FL (ref 5–10.5)
POTASSIUM SERPL-SCNC: 5.3 MMOL/L (ref 3.5–5.1)
PROT SERPL-MCNC: 6.9 G/DL (ref 6.4–8.2)
PROT UR STRIP.AUTO-MCNC: 30 MG/DL
RBC # BLD AUTO: 3.8 M/UL (ref 4.2–5.9)
RBC #/AREA URNS HPF: ABNORMAL /HPF (ref 0–4)
SODIUM SERPL-SCNC: 133 MMOL/L (ref 136–145)
SP GR UR STRIP.AUTO: 1.02 (ref 1–1.03)
UA COMPLETE W REFLEX CULTURE PNL UR: ABNORMAL
UA DIPSTICK W REFLEX MICRO PNL UR: YES
URN SPEC COLLECT METH UR: ABNORMAL
UROBILINOGEN UR STRIP-ACNC: 1 E.U./DL
WBC # BLD AUTO: 15.8 K/UL (ref 4–11)
WBC #/AREA URNS HPF: ABNORMAL /HPF (ref 0–5)

## 2023-03-15 PROCEDURE — 83605 ASSAY OF LACTIC ACID: CPT

## 2023-03-15 PROCEDURE — 73630 X-RAY EXAM OF FOOT: CPT

## 2023-03-15 PROCEDURE — 2580000003 HC RX 258: Performed by: NURSE PRACTITIONER

## 2023-03-15 PROCEDURE — 71045 X-RAY EXAM CHEST 1 VIEW: CPT

## 2023-03-15 PROCEDURE — 85025 COMPLETE CBC W/AUTO DIFF WBC: CPT

## 2023-03-15 PROCEDURE — 99284 EMERGENCY DEPT VISIT MOD MDM: CPT

## 2023-03-15 PROCEDURE — 80053 COMPREHEN METABOLIC PANEL: CPT

## 2023-03-15 PROCEDURE — 81001 URINALYSIS AUTO W/SCOPE: CPT

## 2023-03-15 RX ORDER — 0.9 % SODIUM CHLORIDE 0.9 %
1000 INTRAVENOUS SOLUTION INTRAVENOUS ONCE
Status: COMPLETED | OUTPATIENT
Start: 2023-03-15 | End: 2023-03-16

## 2023-03-15 RX ADMIN — SODIUM CHLORIDE 1000 ML: 9 INJECTION, SOLUTION INTRAVENOUS at 23:16

## 2023-03-15 ASSESSMENT — PAIN - FUNCTIONAL ASSESSMENT: PAIN_FUNCTIONAL_ASSESSMENT: NONE - DENIES PAIN

## 2023-03-16 VITALS
OXYGEN SATURATION: 95 % | TEMPERATURE: 98.7 F | RESPIRATION RATE: 16 BRPM | SYSTOLIC BLOOD PRESSURE: 115 MMHG | HEART RATE: 102 BPM | DIASTOLIC BLOOD PRESSURE: 67 MMHG

## 2023-03-16 RX ORDER — ONDANSETRON 4 MG/1
4 TABLET, FILM COATED ORAL EVERY 8 HOURS PRN
Qty: 6 TABLET | Refills: 0 | Status: SHIPPED | OUTPATIENT
Start: 2023-03-16

## 2023-03-16 ASSESSMENT — ENCOUNTER SYMPTOMS
RHINORRHEA: 0
EYE PAIN: 0
NAUSEA: 1
BACK PAIN: 0
COUGH: 0
ABDOMINAL PAIN: 0
VOMITING: 1
SORE THROAT: 0
DIARRHEA: 0
SHORTNESS OF BREATH: 0
BLOOD IN STOOL: 0

## 2023-03-16 NOTE — ED PROVIDER NOTES
[address] ED  EMERGENCY DEPARTMENT ENCOUNTER        Pt Name: Marcelle Choi  MRN: [id number]  [place] 1965  Date of evaluation: 3/15/2023  Provider: JOSE Maldonado CNP  PCP: JOSE Chopra CNP  Note Started: 12:45 PM EDT 3/16/23      VERITO. I have evaluated this patient. My supervising physician was available for consultation. CHIEF COMPLAINT       Chief Complaint   Patient presents with    Generalized Body Aches     Patient states that he has, body aches, nausea and chills also states that he has a diabetic ulcer on his left foot for one year       HISTORY OF PRESENT ILLNESS: 1 or more Elements     History From: Patient    Limitations to history : None        Chief Complaint: Patient here in the emergency department with symptoms of nausea vomiting. Marcelle Choi is a 62 y.o. male who presents emergency department with symptoms of nausea vomiting. Patient reported that he felt well earlier today and was just finishing late lunch when he had a burger from fast food restaurant. States that he had a sudden onset just nausea and then vomited. He states that he was not sure if he was beginning to have symptoms of further illness and upon his arrival to the emerged department symptoms have resolved. He states that he feels much better at this time. No recent fever or illness. No neck pain no back pain. No no nausea at this time. He has no abdominal pain. States he also has 2 wounds to both feet as he does have a history of diabetes. States that he has been treated with wound care and has been on antibiotics for some time now. He states that the wounds on his feet actually look much better than they had previously. Nursing Notes were all reviewed and agreed with or any disagreements were addressed in the HPI. REVIEW OF SYSTEMS :      Review of Systems   Constitutional:  Negative for chills, diaphoresis and fever.    HENT:  Negative for congestion, ear pain, rhinorrhea and sore throat. Eyes:  Negative for pain and visual disturbance. Respiratory:  Negative for cough and shortness of breath. Cardiovascular:  Negative for chest pain and leg swelling. Gastrointestinal:  Positive for nausea and vomiting. Negative for abdominal pain, blood in stool and diarrhea. Genitourinary:  Negative for difficulty urinating, dysuria, flank pain and frequency. Musculoskeletal:  Negative for back pain and neck pain. Skin:  Negative for rash and wound. Neurological:  Negative for dizziness and light-headedness. Positives and Pertinent negatives as per HPI.      SURGICAL HISTORY     Past Surgical History:   Procedure Laterality Date    CARDIAC DEFIBRILLATOR PLACEMENT  01/29/2014    ICD Placement    CORONARY ARTERY BYPASS GRAFT N/A 8/11/2020    CORONARY ARTERY BYPASS GRAFTING X3, LEFT ATRIAL APPENDAGE CLIP, INTERNAL MAMMARY ARTERY, SAPHENOUS VEIN GRAFT, ON PUMP, 5 LEVEL BILATERAL INTERCOSTAL NERVE BLOCK performed by Hermila Gillis MD at [address] [place] [address] Left 08/09/2018    PARTIAL FOOT AMPUTATION w. Dr Edith Espinal Left 1/31/2019    EXOSTECTOMY LEFT FOOT, ULCER DEBRIDEMENT LEFT FOOT WITH GRAFT APPLICATION performed by Eze Armendariz DPM at [address] [facility] Left 09/27/2017    LEFT FOOT ULCER DEBRIDEMENT, POSSIBLE SECOND METATARSAL    FOOT SURGERY Left 01/31/2019    Exostectomy left foot, ulcer debridement with graft application left foot    FOOT TENDON SURGERY Right 2016    FOOT TENDON SURGERY Left 06/30/2017    KNEE SURGERY Left     OTHER SURGICAL HISTORY Bilateral 9/17/15    amputation 2nd digit bilateral, flexor tenotomy right hallux    OTHER SURGICAL HISTORY Left 08/17/2017    PARTIAL LEFT FOOT AMPUTATION      OTHER SURGICAL HISTORY Left 12/07/2017    Debridement infected bone and tissue left foot; ulcer debridement left foot with graft application with dr mathur     OTHER SURGICAL HISTORY Right 01/04/2018 DEBRIDEMENT INFECTED BONE AND TISSUE RIGHT FOOT, ULCER DEBRIDEMENT RIGHT FOOT WITH GRAFT APPLICATION    OTHER SURGICAL HISTORY Left 11/01/2018    Procedure: PARTIAL RESECTION LEFT METATARSAL, ULCER DEBRIDEMENT LEFT FOOT WITH GRAFT APPLICATION     OTHER SURGICAL HISTORY       AMPUTATION LEFT FOURTH AND FIFTH DIGITS, PARTIAL RESECTION  SECOND AND THIRD METATARSAL LEFT FOOT, PARTIAL RESECTION RIGHT FIRST METATARSAL (Bilateral Foot)    [facility] OFFICE/OUTPT VISIT,PROCEDURE ONLY Left 8/23/2018    ULCER DEBRIDEMENT LEFT FOOT WITH GRAFT APPLICATION performed by Yobany Chacko DPM at [address] [facility] B1 TARSAL/METAR B1 XCP TALUS/CALCANEUS Left 8/9/2018    PARTIAL FOOT AMPUTATION WITH GRAFT APPLICATION performed by Yobany Chacko DPM at [address] [facility] B1 TARSAL/METAR B1 XCP TALUS/CALCANEUS Left 11/1/2018    PARTIAL RESECTION LEFT METATARSAL, ULCER DEBRIDEMENT LEFT FOOT WITH GRAFT APPLICATION performed by Yobany Chacko DPM at [address] [facility]      2 toes    TOE AMPUTATION Bilateral 3/18/2021    AMPUTATION LEFT FOURTH AND FIFTH DIGITS, PARTIAL RESECTION  SECOND AND THIRD METATARSAL LEFT FOOT, PARTIAL RESECTION RIGHT FIRST METATARSAL performed by Yobany Chacko DPM at [address] [facility] Right 5/20/2021    PARTIAL RESECTION RIGHT FIRST METATARSAL performed by Yobany Chacko DPM at [address] [facility]       Discharge Medication List as of 3/16/2023 12:17 AM        CONTINUE these medications which have NOT CHANGED    Details   isosorbide mononitrate (IMDUR) 30 MG extended release tablet Take 1 tablet by mouth daily, Disp-90 tablet, R-3Normal      levothyroxine (SYNTHROID) 125 MCG tablet Take 1 tablet by mouth daily, Disp-90 tablet, R-1Normal      clopidogrel (PLAVIX) 75 MG tablet Take 1 tablet by mouth daily, Disp-30 tablet, R-11Normal      furosemide (LASIX) 80 MG tablet Take 1 tablet by mouth 2 times daily Take 80 mg in the morning and 80 mg at night, Disp-60 tablet, R-11Normal      spironolactone (ALDACTONE) 25 MG tablet Take 1 tablet by mouth 2 times daily, Disp-60 tablet, R-11Normal      Dulaglutide (TRULICITY) 3 OE/1.6ED SOPN Inject 3 mg into the skin once a week, Disp-6 mL, R-0Normal      ASPIRIN LOW DOSE 81 MG EC tablet Take 1 tablet by mouth daily, Disp-30 tablet, R-1Normal      allopurinol (ZYLOPRIM) 100 MG tablet Take 2 tablets by mouth daily Take 2 tablets by mouth daily, Disp-180 tablet, R-1Normal      glipiZIDE (GLUCOTROL) 5 MG tablet Take 1 tablet by mouth 2 times daily (before meals) Take 1 tablet by mouth 2 times daily (before meals), Disp-180 tablet, R-1Normal      magnesium oxide (MAG-OX) 400 (240 Mg) MG tablet TAKE 1 TABLET BY MOUTH 2 TIMES DAILY, Disp-180 tablet, R-0Normal      metFORMIN (GLUCOPHAGE) 1000 MG tablet Take 1 tablet by mouth 2 times daily (with meals), Disp-180 tablet, R-1Normal      Blood Pressure KIT DAILY Starting Tue 10/4/2022, Disp-1 kit, R-0, Normal      carvedilol (COREG) 25 MG tablet TAKE 1 TABLET BY MOUTH TWICE DAILY, Disp-60 tablet, R-11Normal      losartan (COZAAR) 25 MG tablet Take 1 tablet by mouth daily, Disp-30 tablet, R-11Normal      !! BD PEN NEEDLE WINNIE U/F 32G X 4 MM MISC Disp-100 each, R-1, Normal      atorvastatin (LIPITOR) 40 MG tablet Take 1 tablet by mouth nightly, Disp-90 tablet, R-2Normal      insulin glargine (BASAGLAR KWIKPEN) 100 UNIT/ML injection pen Inject 100 Units into the skin daily, Disp-30 pen, R-0Normal      OneTouch Delica Lancets 31S MISC USE TO CHECK FOUR TIMES DAILY. DX;E11.9, Disp-100 each, R-3Normal      Continuous Blood Gluc Sensor (FREESTYLE MARY 14 DAY SENSOR) MISC CGM, Disp-2 each, R-2Normal      !! Insulin Pen Needle (UNIFINE PENTIPS) 31G X 5 MM MISC Disp-100 each, R-3, NormalUSE 1 EACH DAY AS NEEDED FOR TESTING      Multiple Vitamins-Minerals (THERAPEUTIC MULTIVITAMIN-MINERALS) tablet Take 1 tablet by mouth daily       ! ! - Potential duplicate medications found. Please discuss with provider.           ALLERGIES     Bactrim [sulfamethoxazole-trimethoprim], Bee venom, and Lisinopril    FAMILYHISTORY       Family History   Problem Relation Age of Onset    Heart Disease Mother     High Blood Pressure Mother     High Cholesterol Mother     Diabetes Mother     Anemia Mother     Heart Disease Father     High Blood Pressure Father     High Cholesterol Father     Heart Disease Maternal Grandmother     High Blood Pressure Maternal Grandmother     High Cholesterol Maternal Grandmother     Cancer Maternal Grandmother         bone marrow & breast    Heart Disease Maternal Grandfather     High Blood Pressure Maternal Grandfather     High Cholesterol Maternal Grandfather     Cancer Maternal Grandfather         pancreatic CA    Heart Disease Paternal Grandmother     High Blood Pressure Paternal Grandmother     High Cholesterol Paternal Grandmother     Heart Disease Paternal Grandfather     High Blood Pressure Paternal Grandfather     High Cholesterol Paternal Grandfather     Stroke Brother     Heart Failure Brother     Heart Disease Brother     Diabetes type 2  Brother     Diabetes type 2  Brother     Diabetes type 2  Brother         SOCIAL HISTORY       Social History     Tobacco Use    Smoking status: Former     Packs/day: 0.25     Years: 1.00     Pack years: 0.25     Types: Cigarettes     Quit date: 1980     Years since quittin.1    Smokeless tobacco: Never   Vaping Use    Vaping Use: Never used   Substance Use Topics    Alcohol use: No     Alcohol/week: 0.0 standard drinks    Drug use: No       SCREENINGS        Deville Coma Scale  Eye Opening: Spontaneous  Best Verbal Response: Oriented  Best Motor Response: Obeys commands  Lizzie Coma Scale Score: 15                CIWA Assessment  BP: 115/67  Heart Rate: (!) 102           PHYSICAL EXAM  1 or more Elements     ED Triage Vitals [03/15/23 2035]   BP Temp Temp Source Heart Rate Resp SpO2 Height Weight   (!) 138/95 98.7 °F (37.1 °C) Oral (!) 111 16 98 % -- --       Physical Exam  Vitals and nursing note reviewed. Constitutional:       Appearance: Normal appearance. He is not toxic-appearing or diaphoretic. HENT:      Head: Normocephalic and atraumatic. Nose: Nose normal.   Eyes:      General:         Right eye: No discharge. Left eye: No discharge. Cardiovascular:      Rate and Rhythm: Normal rate and regular rhythm. Pulses: Normal pulses. Heart sounds: No murmur heard. Pulmonary:      Effort: Pulmonary effort is normal. No respiratory distress. Breath sounds: No wheezing. Abdominal:      Tenderness: There is no abdominal tenderness. There is no guarding or rebound. Musculoskeletal:         General: Normal range of motion. Cervical back: Normal range of motion and neck supple. Comments: No evidence of erythema or tenderness or any evidence of infection involving the digits of the right foot. Skin:     General: Skin is warm and dry. Comments: Bilateral wounds to both soles of feet. Well-appearing. No evidence of drainage. Breaks wound bed. Neurological:      General: No focal deficit present. Mental Status: He is alert and oriented to person, place, and time.    Psychiatric:         Mood and Affect: Mood normal.         Behavior: Behavior normal.         DIAGNOSTIC RESULTS   LABS:    Labs Reviewed   CBC WITH AUTO DIFFERENTIAL - Abnormal; Notable for the following components:       Result Value    WBC 15.8 (*)     RBC 3.80 (*)     Hemoglobin 11.5 (*)     Hematocrit 34.5 (*)     RDW 18.0 (*)     Neutrophils Absolute 14.2 (*)     Lymphocytes Absolute 0.6 (*)     All other components within normal limits   COMPREHENSIVE METABOLIC PANEL W/ REFLEX TO MG FOR LOW K - Abnormal; Notable for the following components:    Sodium 133 (*)     Potassium reflex Magnesium 5.3 (*)     Chloride 95 (*)     Glucose 191 (*)     BUN 45 (*)     Total Bilirubin 1.6 (*)     Alkaline Phosphatase 159 (*)     All other components within normal limits   URINALYSIS WITH REFLEX TO CULTURE - Abnormal; Notable for the following components:    Ketones, Urine TRACE (*)     Protein, UA 30 (*)     All other components within normal limits   MICROSCOPIC URINALYSIS - Abnormal; Notable for the following components:    Hyaline Casts, UA 6-10 (*)     Mucus, UA 2+ (*)     RBC, UA 5-10 (*)     All other components within normal limits   COVID-19 & INFLUENZA COMBO   LACTATE, SEPSIS       When ordered only abnormal lab results are displayed. All other labs were within normal range or not returned as of this dictation. EKG: When ordered, EKG's are interpreted by the Emergency Department Physician in the absence of a cardiologist.  Please see their note for interpretation of EKG. RADIOLOGY:   Non-plain film images such as CT, Ultrasound and MRI are read by the radiologist. Plain radiographic images are visualized and preliminarily interpreted by the ED Provider with the below findings:        Interpretation per the Radiologist below, if available at the time of this note:    XR CHEST PORTABLE   Final Result   No acute abnormality visualized. XR FOOT RIGHT (MIN 3 VIEWS)   Final Result   1. Subtle osseous erosion along the plantar surface of the 5th distal phalanx   which may be degenerative or artifactual in nature or reflective of acute   osteomyelitis. Correlate for soft tissue infection in this region. 2. Lateral forefoot soft tissue ulceration. Associated forefoot soft tissue   swelling with no radiographic evidence of acute osteomyelitis in this region. If there is persistent clinical concern for this diagnosis, MRI is more   sensitive for evaluation. XR FOOT LEFT (MIN 3 VIEWS)   Final Result   1. Generalized soft tissue swelling without radiographic evidence of acute   osteomyelitis. 2. Suspected plantar midfoot soft tissue ulceration with unchanged erosive   changes along the plantar midfoot osseous structures.   If there is sufficient   clinical concern for acute osteomyelitis, MRI is more sensitive for   evaluation. XR FOOT LEFT (MIN 3 VIEWS)    Result Date: 3/15/2023  EXAMINATION: THREE XRAY VIEWS OF THE LEFT FOOT 3/15/2023 9:59 pm COMPARISON: 03/18/2021 in 03/01/2021 HISTORY: ORDERING SYSTEM PROVIDED HISTORY: Check for osteo. Ulcer TECHNOLOGIST PROVIDED HISTORY: Reason for exam:->Check for osteo. Ulcer Reason for Exam: Check for osteo. Ulcer FINDINGS: Status post amputation of the toes at the 1st through 3rd mid metatarsal level and at proximal 4th and 5th phalanges. Generalized soft tissue swelling. There may be an area of ulceration along the plantar midfoot. Similar appearance of erosive changes along the plantar midfoot osseous structures and similar appearance of moderate midfoot degenerative changes. Small plantar calcaneal enthesophyte. Peripheral vascular calcifications are noted. No abnormal gas density. No ankle joint effusion. 1. Generalized soft tissue swelling without radiographic evidence of acute osteomyelitis. 2. Suspected plantar midfoot soft tissue ulceration with unchanged erosive changes along the plantar midfoot osseous structures. If there is sufficient clinical concern for acute osteomyelitis, MRI is more sensitive for evaluation. XR FOOT RIGHT (MIN 3 VIEWS)    Result Date: 3/15/2023  EXAMINATION: 3 XRAY VIEWS OF THE RIGHT FOOT 3/15/2023 9:59 pm COMPARISON: 05/20/2021 HISTORY: ORDERING SYSTEM PROVIDED HISTORY: ulcer. check for osteo TECHNOLOGIST PROVIDED HISTORY: Reason for exam:->ulcer. check for osteo Reason for Exam: ulcer. check for osteo FINDINGS: Unchanged postoperative appearance status post 2nd right amputation at the metatarsal neck. Chronic changes of 1st metatarsal.  Lateral forefoot soft tissue ulceration with forefoot soft tissue swelling. No adjacent osseous erosion. Osseous erosion at the 5th metatarsal base is favored to be chronic.   Subtle cortical irregularity along plantar surface of the 5th distal phalanx. Peripheral vascular calcifications. 1. Subtle osseous erosion along the plantar surface of the 5th distal phalanx which may be degenerative or artifactual in nature or reflective of acute osteomyelitis. Correlate for soft tissue infection in this region. 2. Lateral forefoot soft tissue ulceration. Associated forefoot soft tissue swelling with no radiographic evidence of acute osteomyelitis in this region. If there is persistent clinical concern for this diagnosis, MRI is more sensitive for evaluation. XR CHEST PORTABLE    Result Date: 3/15/2023  EXAMINATION: ONE XRAY VIEW OF THE CHEST 3/15/2023 9:59 pm COMPARISON: January 15, 2021 HISTORY: ORDERING SYSTEM PROVIDED HISTORY: tachycardia TECHNOLOGIST PROVIDED HISTORY: Reason for exam:->tachycardia Reason for Exam: tachycardia FINDINGS: No infiltrate or consolidation or effusion is identified. There is cardiomegaly. The patient is status post CABG and left atrial appendage clipping. A pulse generator is present within the left anterior chest wall with a single lead projecting over the heart. No acute abnormality visualized. No results found.     PROCEDURES   Unless otherwise noted below, none     Procedures    CRITICAL CARE TIME (.cctime)       PAST MEDICAL HISTORY      has a past medical history of Acute on chronic systolic congestive heart failure ([address] (07/08/2013), Acute osteomyelitis of left foot ([address] (09/27/2017), Acute osteomyelitis of right foot ([address] (04/25/2016), Acute respiratory failure ([address] (08/04/2020), Ascites, Blood transfusion reaction, Burst fracture of lumbar vertebra ([address] (11/09/2012), Cellulitis of left foot, Cellulitis of right lower extremity (2/16, 2/14), Chronic systolic CHF (congestive heart failure) ([address] Community acquired pneumonia, Diabetes ([address] Diabetic ulcer of left foot associated with type 2 diabetes mellitus, with muscle involvement without evidence of necrosis ([address] (04/27/2017), Diabetic ulcer of right foot ([address] (early 2016), Diabetic ulcer of 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 2 diabetes mellitus, with necrosis of bone ([facility] ETOH abuse, Fracture of tibial plateau (21/67/7860), High cholesterol, History of blood transfusion, HTN (hypertension), blood clots, MI (myocardial infarction) ([facility] (08/04/2020), MRSA (methicillin resistant staph aureus) culture positive (4/28/16, 4/20/16), Neuropathic ulcer of left foot, limited to breakdown of skin ([facility] (11/03/2016), Neuropathic ulcer of toe ([facility] (02/13/2014), NSVT (nonsustained ventricular tachycardia) (2014), Pleural effusion due to congestive heart failure ([address] Septicemia ([facility] Smoker, Systolic CHF, acute ([facility] (07/08/2013), and Thyroid disease. EMERGENCY DEPARTMENT COURSE and DIFFERENTIAL DIAGNOSIS/MDM:   Vitals:    Vitals:    03/15/23 2232 03/15/23 2238 03/16/23 0002 03/16/23 0013   BP: 123/77  115/67    Pulse: (!) 110 (!) 110 (!) 105 (!) 102   Resp:       Temp:       TempSrc:       SpO2: 95%  95%        Patient was given the following medications:  Medications   0.9 % sodium chloride bolus (0 mLs IntraVENous Stopped 3/16/23 0017)             Is this patient to be included in the SEP-1 Core Measure due to severe sepsis or septic shock? No   Exclusion criteria - the patient is NOT to be included for SEP-1 Core Measure due to:   Infection is not suspected        Chronic Conditions affecting care:    has a past medical history of Acute on chronic systolic congestive heart failure ([facility] (07/08/2013), Acute osteomyelitis of left foot ([facility] (09/27/2017), Acute osteomyelitis of right foot ([facility] (04/25/2016), Acute respiratory failure ([address] (08/04/2020), Ascites, Blood transfusion reaction, Burst fracture of lumbar vertebra ([facility] (11/09/2012), Cellulitis of left foot, Cellulitis of right lower extremity (2/16, 7/85), Chronic systolic CHF (congestive heart failure) ([facility] Community acquired pneumonia, Diabetes ([facility] Diabetic ulcer of left foot associated with type 2 diabetes mellitus, with muscle involvement without evidence of necrosis ([facility] (04/27/2017), Diabetic ulcer of right foot ([address] (early 2016), Diabetic ulcer of 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 2 diabetes mellitus, with necrosis of bone ([address] ETOH abuse, Fracture of tibial plateau (44/98/5471), High cholesterol, History of blood transfusion, HTN (hypertension), blood clots, MI (myocardial infarction) ([address] (08/04/2020), MRSA (methicillin resistant staph aureus) culture positive (4/28/16, 4/20/16), Neuropathic ulcer of left foot, limited to breakdown of skin ([facility] (11/03/2016), Neuropathic ulcer of toe ([facility] (02/13/2014), NSVT (nonsustained ventricular tachycardia) (2014), Pleural effusion due to congestive heart failure ([address] Septicemia ([facility] Smoker, Systolic CHF, acute ([facility] (07/08/2013), and Thyroid disease. CONSULTS: (Who and What was discussed)  None          Records Reviewed (External and Source)     CC/HPI Summary, DDx, ED Course, and Reassessment: Had detailed discussion with the patient involving his current symptoms here in the emergency department. His symptoms of nausea vomiting have drastically resolved. He states that he feels great. He is requesting discharge. His work-up in the emerged part did display a mildly elevated white blood cell count of 15. His lactic is normal.  He initially was little tachycardic but since that has resolved to heart rate of 98 bpm.  No other acute concerns at this time. Patient is no abdominal pain. His abdominal exam is benign. His bilateral foot exam after x-ray displayed no significant evidence of abnormality including no evidence of osteomyelitis. I reviewed the digits of the toes that had some concern for possible demineralization/possible osteo but there is no evidence of infection in that toe on physical exam.  There is no redness. No serration or open wound. I have low suspicion for osteomyelitis and that affected digit.   X-ray was inconclusive. At this time I encouraged follow-up PCP. Encouraged follow-up with wound care. The meantime patient will be discharged home in good condition. Disposition Considerations (tests considered but not done, Admit vs D/C, Shared Decision Making, Pt Expectation of Test or Tx.):       I am the Primary Clinician of Record. FINAL IMPRESSION      1.  Nausea and vomiting, unspecified vomiting type          DISPOSITION/PLAN     DISPOSITION Decision To Discharge 03/16/2023 12:14:25 AM      PATIENT REFERRED TO:  JOSE Alberts CNP  [facility] [address]  [phone]    Schedule an appointment as soon as possible for a visit       [facility] ED  [address] [address]  [phone]  Go to   If symptoms worsen      DISCHARGE MEDICATIONS:  Discharge Medication List as of 3/16/2023 12:17 AM        START taking these medications    Details   ondansetron (ZOFRAN) 4 MG tablet Take 1 tablet by mouth every 8 hours as needed for Nausea, Disp-6 tablet, R-0Normal             DISCONTINUED MEDICATIONS:  Discharge Medication List as of 3/16/2023 12:17 AM                 (Please note that portions of this note were completed with a voice recognition program.  Efforts were made to edit the dictations but occasionally words are mis-transcribed.)    JOSE Zazueta CNP (electronically signed)       JOSE Zazueta CNP  03/16/23 9589

## 2023-03-17 DIAGNOSIS — E78.2 MIXED HYPERLIPIDEMIA: ICD-10-CM

## 2023-03-17 DIAGNOSIS — I42.6 ALCOHOLIC CARDIOMYOPATHY (HCC): ICD-10-CM

## 2023-03-17 DIAGNOSIS — I10 ESSENTIAL HYPERTENSION: ICD-10-CM

## 2023-03-17 RX ORDER — ASPIRIN 81 MG/1
TABLET, COATED ORAL
Qty: 90 TABLET | Refills: 3 | Status: SHIPPED | OUTPATIENT
Start: 2023-03-17

## 2023-03-17 RX ORDER — LANOLIN ALCOHOL/MO/W.PET/CERES
CREAM (GRAM) TOPICAL
Qty: 180 TABLET | Refills: 0 | Status: SHIPPED | OUTPATIENT
Start: 2023-03-17

## 2023-03-17 NOTE — PROGRESS NOTES
End of 91-day monitoring period 3/13  Thoracic impedance trend stable. No  arrhythmias recorded. Remote transmission received for patients single chamber ICD. Transmission shows normal sensing and pacing function. EP physician will review. See interrogation under the cardiology tab in the [address] [place] [address] [address] field for more details. Will continue to monitor remotely. He takes mag-ox, coreg.

## 2023-03-17 NOTE — TELEPHONE ENCOUNTER
Refill Request     CONFIRM preferred pharmacy with the patient. If Mail Order Rx - Pend for 90 day refill. Last Seen: Last Seen Department: 2/16/2023  Last Seen by PCP: 2/16/2023    Last Written: 12/2/2022    If no future appointment scheduled, route STAFF MESSAGE with patient name to the [facility] for scheduling. Next Appointment:   Future Appointments   Date Time Provider Ada Ovalles   5/16/2023  9:30 AM JOSE Paula CNP  [place]   8/14/2023  1:00 PM Yue Nichole MD P [facility]       Message sent to [address] [place] [address] to schedule appt with patient?   NO      Requested Prescriptions     Pending Prescriptions Disp Refills    magnesium oxide (MAG-OX) 400 (240 Mg) MG tablet [Pharmacy Med Name: *MAGNESIUM OXIDE 400 MG TABLET] 180 tablet 0     Sig: TAKE 1 TABLET BY MOUTH 2 TIMES DAILY

## 2023-03-22 RX ORDER — INSULIN GLARGINE 100 [IU]/ML
INJECTION, SOLUTION SUBCUTANEOUS
OUTPATIENT
Start: 2023-03-22

## 2023-03-22 NOTE — TELEPHONE ENCOUNTER
Spoke with pt he stated that he is taking the basaglar   Pt was on Toujeo before Saqib Hollis and the insurance wont pay for the toujeo any longer and they switched pt to the basaglar   Trulicity-3.5  [id number] mg twice daily  Glipizide-5mg twice daily  Basaglar kwikpen-70U per day

## 2023-03-22 NOTE — TELEPHONE ENCOUNTER
Refill Request     CONFIRM preferred pharmacy with the patient. If Mail Order Rx - Pend for 90 day refill. Last Seen: Last Seen Department: 2/16/2023    Last Seen by PCP: 2/16/2023    Last Written: 6/7/22 30 pen 0 refills    If no future appointment scheduled, route STAFF MESSAGE with patient name to the [facility] for scheduling. Next Appointment: 5/16/23  Future Appointments   Date Time Provider Ada Ovalles   5/16/2023  9:30 AM JOSE Melo - CNP [facility]  [place] - [place]   8/14/2023  1:00 PM Malcolm Fraga MD [facility]       Message sent to [address] [place] [address] to schedule appt with patient?   NO      Requested Prescriptions     Pending Prescriptions Disp Refills    insulin glargine (BASAGLAR KWIKPEN) 100 UNIT/ML injection pen       Sig: Inject 100 Units into the skin daily

## 2023-04-29 ENCOUNTER — APPOINTMENT (OUTPATIENT)
Dept: GENERAL RADIOLOGY | Age: 58
End: 2023-04-29
Payer: MEDICARE

## 2023-04-29 ENCOUNTER — HOSPITAL ENCOUNTER (EMERGENCY)
Age: 58
Discharge: HOME | End: 2023-04-29
Attending: STUDENT IN AN ORGANIZED HEALTH CARE EDUCATION/TRAINING PROGRAM
Payer: MEDICARE

## 2023-04-29 VITALS
BODY MASS INDEX: 31.83 KG/M2 | DIASTOLIC BLOOD PRESSURE: 61 MMHG | OXYGEN SATURATION: 97 % | RESPIRATION RATE: 22 BRPM | WEIGHT: 218.7 LBS | HEART RATE: 97 BPM | SYSTOLIC BLOOD PRESSURE: 92 MMHG | TEMPERATURE: 98 F

## 2023-04-29 DIAGNOSIS — R68.89 GENERAL ILL FEELING: Primary | ICD-10-CM

## 2023-04-29 LAB
ALBUMIN SERPL-MCNC: 4.1 G/DL (ref 3.4–5)
ALBUMIN/GLOB SERPL: 1.3 {RATIO} (ref 1.1–2.2)
ALP SERPL-CCNC: 163 U/L (ref 40–129)
ALT SERPL-CCNC: 30 U/L (ref 10–40)
ANION GAP SERPL CALCULATED.3IONS-SCNC: 12 MMOL/L (ref 3–16)
AST SERPL-CCNC: 20 U/L (ref 15–37)
BASOPHILS # BLD: 0.2 K/UL (ref 0–0.2)
BASOPHILS NFR BLD: 1.2 %
BILIRUB SERPL-MCNC: 1.6 MG/DL (ref 0–1)
BUN SERPL-MCNC: 36 MG/DL (ref 7–20)
CALCIUM SERPL-MCNC: 9.4 MG/DL (ref 8.3–10.6)
CHLORIDE SERPL-SCNC: 87 MMOL/L (ref 99–110)
CO2 SERPL-SCNC: 27 MMOL/L (ref 21–32)
CREAT SERPL-MCNC: 1.7 MG/DL (ref 0.9–1.3)
D DIMER: 0.39 UG/ML FEU (ref 0–0.6)
DEPRECATED RDW RBC AUTO: 18.6 % (ref 12.4–15.4)
EOSINOPHIL # BLD: 0 K/UL (ref 0–0.6)
EOSINOPHIL NFR BLD: 0.2 %
FLUAV RNA RESP QL NAA+PROBE: NOT DETECTED
FLUBV RNA RESP QL NAA+PROBE: NOT DETECTED
GFR SERPLBLD CREATININE-BSD FMLA CKD-EPI: 46 ML/MIN/{1.73_M2}
GLUCOSE SERPL-MCNC: 249 MG/DL (ref 70–99)
HCT VFR BLD AUTO: 35.2 % (ref 40.5–52.5)
HGB BLD-MCNC: 11.6 G/DL (ref 13.5–17.5)
LYMPHOCYTES # BLD: 0.5 K/UL (ref 1–5.1)
LYMPHOCYTES NFR BLD: 2.9 %
MCH RBC QN AUTO: 30.2 PG (ref 26–34)
MCHC RBC AUTO-ENTMCNC: 32.9 G/DL (ref 31–36)
MCV RBC AUTO: 91.8 FL (ref 80–100)
MONOCYTES # BLD: 0.7 K/UL (ref 0–1.3)
MONOCYTES NFR BLD: 4.5 %
NEUTROPHILS # BLD: 14.5 K/UL (ref 1.7–7.7)
NEUTROPHILS NFR BLD: 91.2 %
NT-PROBNP SERPL-MCNC: 6306 PG/ML (ref 0–124)
PLATELET # BLD AUTO: 212 K/UL (ref 135–450)
PMV BLD AUTO: 7.8 FL (ref 5–10.5)
POTASSIUM SERPL-SCNC: 5.3 MMOL/L (ref 3.5–5.1)
PROT SERPL-MCNC: 7.3 G/DL (ref 6.4–8.2)
RBC # BLD AUTO: 3.83 M/UL (ref 4.2–5.9)
SARS-COV-2 RNA RESP QL NAA+PROBE: NOT DETECTED
SODIUM SERPL-SCNC: 126 MMOL/L (ref 136–145)
TROPONIN, HIGH SENSITIVITY: 33 NG/L (ref 0–22)
TROPONIN, HIGH SENSITIVITY: 36 NG/L (ref 0–22)
WBC # BLD AUTO: 15.9 K/UL (ref 4–11)

## 2023-04-29 PROCEDURE — 85025 COMPLETE CBC W/AUTO DIFF WBC: CPT

## 2023-04-29 PROCEDURE — 71045 X-RAY EXAM CHEST 1 VIEW: CPT

## 2023-04-29 PROCEDURE — 6370000000 HC RX 637 (ALT 250 FOR IP): Performed by: NURSE PRACTITIONER

## 2023-04-29 PROCEDURE — 93005 ELECTROCARDIOGRAM TRACING: CPT | Performed by: STUDENT IN AN ORGANIZED HEALTH CARE EDUCATION/TRAINING PROGRAM

## 2023-04-29 PROCEDURE — 83880 ASSAY OF NATRIURETIC PEPTIDE: CPT

## 2023-04-29 PROCEDURE — 87636 SARSCOV2 & INF A&B AMP PRB: CPT

## 2023-04-29 PROCEDURE — 84484 ASSAY OF TROPONIN QUANT: CPT

## 2023-04-29 PROCEDURE — 99285 EMERGENCY DEPT VISIT HI MDM: CPT

## 2023-04-29 PROCEDURE — 80053 COMPREHEN METABOLIC PANEL: CPT

## 2023-04-29 PROCEDURE — 85379 FIBRIN DEGRADATION QUANT: CPT

## 2023-04-29 RX ADMIN — ALUMINUM HYDROXIDE, MAGNESIUM HYDROXIDE, AND SIMETHICONE: 200; 200; 20 SUSPENSION ORAL at 18:34

## 2023-04-29 ASSESSMENT — PAIN SCALES - GENERAL: PAINLEVEL_OUTOF10: 4

## 2023-04-29 ASSESSMENT — PAIN DESCRIPTION - LOCATION: LOCATION: CHEST

## 2023-04-29 ASSESSMENT — PAIN - FUNCTIONAL ASSESSMENT: PAIN_FUNCTIONAL_ASSESSMENT: 0-10

## 2023-04-30 LAB
EKG ATRIAL RATE: 100 BPM
EKG DIAGNOSIS: NORMAL
EKG P AXIS: 55 DEGREES
EKG P-R INTERVAL: 204 MS
EKG Q-T INTERVAL: 382 MS
EKG QRS DURATION: 134 MS
EKG QTC CALCULATION (BAZETT): 492 MS
EKG R AXIS: -54 DEGREES
EKG T AXIS: 97 DEGREES
EKG VENTRICULAR RATE: 100 BPM

## 2023-04-30 PROCEDURE — 93010 ELECTROCARDIOGRAM REPORT: CPT | Performed by: INTERNAL MEDICINE

## 2023-05-02 DIAGNOSIS — E11.42 DIABETIC POLYNEUROPATHY ASSOCIATED WITH TYPE 2 DIABETES MELLITUS (HCC): Primary | ICD-10-CM

## 2023-05-02 RX ORDER — INSULIN GLARGINE 100 [IU]/ML
INJECTION, SOLUTION SUBCUTANEOUS
Qty: 30 ML | Refills: 0 | Status: SHIPPED | OUTPATIENT
Start: 2023-05-02

## 2023-05-02 NOTE — ED PROVIDER NOTES
stable, BNP was elevated, D-dimer was negative. I did have the patient evaluated by the attending physician, she did feel comfortable with patient being discharged home. Patient agreeable with this plan he states that he feels better and has no complaints currently. I did consider CHF exacerbation, ACS, pneumonia/infectious etiology. I have discussed with the patient my clinical impression and the result of the HEART Score to screen for MACE, as well as the risks of further testing and hospitalization. The HEART Score shows that the risk for MACE is less than 1%. Although the risk of MACE has not been completely eliminated, the risks of further testing or hospitalization for MACE likely exceed any potential benefit, and the patient agrees with not pursuing further emergent evaluation or hospitalization for MACE at this time. I estimate there is LOW risk for PULMONARY EMBOLISM, ACUTE CORONARY SYNDROME, OR THORACIC AORTIC DISSECTION, thus I consider the discharge disposition reasonable. Ronit Farrell and I have discussed the diagnosis and risks, and we agree with discharging home to follow-up with their primary doctor. We also discussed returning to the Emergency Department immediately if new or worsening symptoms occur. We have discussed the symptoms which are most concerning (e.g., bloody sputum, fever, worsening pain or shortness of breath, vomiting) that necessitate immediate return. I am the Primary Clinician of Record. FINAL IMPRESSION      1.  General ill feeling          DISPOSITION/PLAN     DISPOSITION Decision to Discharge    PATIENT REFERRED TO:  JOSE Roa CNP  [address] [place] [address] S    Call   For follow up      DISCHARGE MEDICATIONS:  Discharge Medication List as of 4/29/2023  8:14 PM          DISCONTINUED MEDICATIONS:  Discharge Medication List as of 4/29/2023  8:14 PM                 (Please note that portions of this note were completed

## 2023-05-02 NOTE — TELEPHONE ENCOUNTER
Refill Request     CONFIRM preferred pharmacy with the patient. If Mail Order Rx - Pend for 90 day refill. Last Seen: Last Seen Department: 2/16/2023  Last Seen by PCP: 2/16/2023    Last Written: 3/28/2023    If no future appointment scheduled:  Review the last OV with PCP and review information for follow-up visit,  Route STAFF MESSAGE with patient name to the [facility] for scheduling with the following information:            -  Timing of next visit           -  Visit type ie Physical, OV, etc           -  Diagnoses/Reason ie. COPD, HTN - Do not use MEDICATION, Follow-up or CHECK UP - Give reason for visit      Next Appointment:   Future Appointments   Date Time Provider Ada Ovalles   5/16/2023  9:30 AM JOSE De La Torre - CNP [facility] [place] [facility] [place]   6/12/2023  9:45 AM SCHEDULE, Stan Turner [employer]OTE [employer]SMISSION Dipesh WU   8/14/2023  1:00 PM Steffen Stuart MD [facility]       Message sent to [address] [place] [address] to schedule appt with patient?   NO      Requested Prescriptions     Pending Prescriptions Disp Refills    BASAGLAR KWIKPEN 100 UNIT/ML injection pen [Pharmacy Med Name: Frances Taylor 100UNIT SOLN PEN-INJ] 30 mL 0     Sig: INJECT 70 UNITS INTO THE SKIN DAILY

## 2023-05-10 RX ORDER — ATORVASTATIN CALCIUM 40 MG/1
TABLET, FILM COATED ORAL
Qty: 90 TABLET | Refills: 2 | Status: SHIPPED | OUTPATIENT
Start: 2023-05-10

## 2023-05-16 ENCOUNTER — OFFICE VISIT (OUTPATIENT)
Dept: FAMILY MEDICINE CLINIC | Age: 58
End: 2023-05-16
Payer: MEDICARE

## 2023-05-16 VITALS
BODY MASS INDEX: 31.84 KG/M2 | HEIGHT: 69 IN | OXYGEN SATURATION: 100 % | HEART RATE: 98 BPM | WEIGHT: 215 LBS | SYSTOLIC BLOOD PRESSURE: 116 MMHG | DIASTOLIC BLOOD PRESSURE: 66 MMHG

## 2023-05-16 DIAGNOSIS — Z95.810 ICD (IMPLANTABLE CARDIOVERTER-DEFIBRILLATOR) IN PLACE: ICD-10-CM

## 2023-05-16 DIAGNOSIS — N18.31 STAGE 3A CHRONIC KIDNEY DISEASE (HCC): ICD-10-CM

## 2023-05-16 DIAGNOSIS — D72.829 LEUKOCYTOSIS, UNSPECIFIED TYPE: ICD-10-CM

## 2023-05-16 DIAGNOSIS — R77.8 ELEVATED TROPONIN: ICD-10-CM

## 2023-05-16 DIAGNOSIS — E03.9 ACQUIRED HYPOTHYROIDISM: ICD-10-CM

## 2023-05-16 DIAGNOSIS — Z79.4 TYPE 2 DIABETES MELLITUS WITH OTHER SPECIFIED COMPLICATION, WITH LONG-TERM CURRENT USE OF INSULIN (HCC): ICD-10-CM

## 2023-05-16 DIAGNOSIS — R74.8 ELEVATED LIVER ENZYMES: ICD-10-CM

## 2023-05-16 DIAGNOSIS — E87.5 HYPERKALEMIA: ICD-10-CM

## 2023-05-16 DIAGNOSIS — E11.69 TYPE 2 DIABETES MELLITUS WITH OTHER SPECIFIED COMPLICATION, WITH LONG-TERM CURRENT USE OF INSULIN (HCC): Primary | ICD-10-CM

## 2023-05-16 DIAGNOSIS — K42.9 UMBILICAL HERNIA WITHOUT OBSTRUCTION AND WITHOUT GANGRENE: ICD-10-CM

## 2023-05-16 DIAGNOSIS — E87.1 HYPONATREMIA: ICD-10-CM

## 2023-05-16 DIAGNOSIS — D64.9 ANEMIA, UNSPECIFIED TYPE: ICD-10-CM

## 2023-05-16 DIAGNOSIS — E11.69 TYPE 2 DIABETES MELLITUS WITH OTHER SPECIFIED COMPLICATION, WITH LONG-TERM CURRENT USE OF INSULIN (HCC): ICD-10-CM

## 2023-05-16 DIAGNOSIS — Z79.4 TYPE 2 DIABETES MELLITUS WITH OTHER SPECIFIED COMPLICATION, WITH LONG-TERM CURRENT USE OF INSULIN (HCC): Primary | ICD-10-CM

## 2023-05-16 DIAGNOSIS — R17 ELEVATED BILIRUBIN: ICD-10-CM

## 2023-05-16 PROBLEM — I77.1 STRICTURE OF ARTERY (HCC): Status: ACTIVE | Noted: 2023-05-16

## 2023-05-16 PROBLEM — I47.29 NSVT (NONSUSTAINED VENTRICULAR TACHYCARDIA) (HCC): Status: RESOLVED | Noted: 2019-11-27 | Resolved: 2023-05-16

## 2023-05-16 LAB
ALBUMIN SERPL-MCNC: 4.1 G/DL (ref 3.4–5)
ALBUMIN/GLOB SERPL: 1.2 {RATIO} (ref 1.1–2.2)
ALP SERPL-CCNC: 130 U/L (ref 40–129)
ALT SERPL-CCNC: 12 U/L (ref 10–40)
ANION GAP SERPL CALCULATED.3IONS-SCNC: 12 MMOL/L (ref 3–16)
AST SERPL-CCNC: 19 U/L (ref 15–37)
BASOPHILS # BLD: 0.1 K/UL (ref 0–0.2)
BASOPHILS NFR BLD: 0.9 %
BILIRUB SERPL-MCNC: 0.6 MG/DL (ref 0–1)
BUN SERPL-MCNC: 34 MG/DL (ref 7–20)
CALCIUM SERPL-MCNC: 9.2 MG/DL (ref 8.3–10.6)
CHLORIDE SERPL-SCNC: 101 MMOL/L (ref 99–110)
CO2 SERPL-SCNC: 24 MMOL/L (ref 21–32)
CREAT SERPL-MCNC: 1.1 MG/DL (ref 0.9–1.3)
DEPRECATED RDW RBC AUTO: 18.1 % (ref 12.4–15.4)
EOSINOPHIL # BLD: 0.1 K/UL (ref 0–0.6)
EOSINOPHIL NFR BLD: 1.7 %
FERRITIN SERPL IA-MCNC: 368.6 NG/ML (ref 30–400)
GFR SERPLBLD CREATININE-BSD FMLA CKD-EPI: >60 ML/MIN/{1.73_M2}
GLUCOSE SERPL-MCNC: 189 MG/DL (ref 70–99)
HBA1C MFR BLD: 7 %
HCT VFR BLD AUTO: 32.4 % (ref 40.5–52.5)
HGB BLD-MCNC: 11 G/DL (ref 13.5–17.5)
IRON SATN MFR SERPL: 19 % (ref 20–50)
IRON SERPL-MCNC: 59 UG/DL (ref 59–158)
LYMPHOCYTES # BLD: 1.6 K/UL (ref 1–5.1)
LYMPHOCYTES NFR BLD: 19.9 %
MCH RBC QN AUTO: 31.7 PG (ref 26–34)
MCHC RBC AUTO-ENTMCNC: 33.8 G/DL (ref 31–36)
MCV RBC AUTO: 93.8 FL (ref 80–100)
MONOCYTES # BLD: 0.4 K/UL (ref 0–1.3)
MONOCYTES NFR BLD: 5.6 %
NEUTROPHILS # BLD: 5.7 K/UL (ref 1.7–7.7)
NEUTROPHILS NFR BLD: 71.9 %
PLATELET # BLD AUTO: 201 K/UL (ref 135–450)
PMV BLD AUTO: 7.6 FL (ref 5–10.5)
POTASSIUM SERPL-SCNC: 5.5 MMOL/L (ref 3.5–5.1)
PROT SERPL-MCNC: 7.4 G/DL (ref 6.4–8.2)
RBC # BLD AUTO: 3.45 M/UL (ref 4.2–5.9)
SODIUM SERPL-SCNC: 137 MMOL/L (ref 136–145)
TIBC SERPL-MCNC: 303 UG/DL (ref 260–445)
TROPONIN, HIGH SENSITIVITY: 49 NG/L (ref 0–22)
TSH SERPL DL<=0.005 MIU/L-ACNC: 1.77 UIU/ML (ref 0.27–4.2)
VIT B12 SERPL-MCNC: 631 PG/ML (ref 211–911)
WBC # BLD AUTO: 8 K/UL (ref 4–11)

## 2023-05-16 PROCEDURE — 83036 HEMOGLOBIN GLYCOSYLATED A1C: CPT | Performed by: NURSE PRACTITIONER

## 2023-05-16 PROCEDURE — 1036F TOBACCO NON-USER: CPT | Performed by: NURSE PRACTITIONER

## 2023-05-16 PROCEDURE — G8427 DOCREV CUR MEDS BY ELIG CLIN: HCPCS | Performed by: NURSE PRACTITIONER

## 2023-05-16 PROCEDURE — 3017F COLORECTAL CA SCREEN DOC REV: CPT | Performed by: NURSE PRACTITIONER

## 2023-05-16 PROCEDURE — 99215 OFFICE O/P EST HI 40 MIN: CPT | Performed by: NURSE PRACTITIONER

## 2023-05-16 PROCEDURE — 3051F HG A1C>EQUAL 7.0%<8.0%: CPT | Performed by: NURSE PRACTITIONER

## 2023-05-16 PROCEDURE — 3078F DIAST BP <80 MM HG: CPT | Performed by: NURSE PRACTITIONER

## 2023-05-16 PROCEDURE — 3074F SYST BP LT 130 MM HG: CPT | Performed by: NURSE PRACTITIONER

## 2023-05-16 PROCEDURE — G8417 CALC BMI ABV UP PARAM F/U: HCPCS | Performed by: NURSE PRACTITIONER

## 2023-05-16 PROCEDURE — 2022F DILAT RTA XM EVC RTNOPTHY: CPT | Performed by: NURSE PRACTITIONER

## 2023-05-16 ASSESSMENT — ENCOUNTER SYMPTOMS
RESPIRATORY NEGATIVE: 1
COLOR CHANGE: 0
GASTROINTESTINAL NEGATIVE: 1

## 2023-05-16 ASSESSMENT — PATIENT HEALTH QUESTIONNAIRE - PHQ9
SUM OF ALL RESPONSES TO PHQ QUESTIONS 1-9: 0
SUM OF ALL RESPONSES TO PHQ QUESTIONS 1-9: 0
SUM OF ALL RESPONSES TO PHQ9 QUESTIONS 1 & 2: 0
2. FEELING DOWN, DEPRESSED OR HOPELESS: 0
SUM OF ALL RESPONSES TO PHQ QUESTIONS 1-9: 0
1. LITTLE INTEREST OR PLEASURE IN DOING THINGS: 0
SUM OF ALL RESPONSES TO PHQ QUESTIONS 1-9: 0

## 2023-05-16 NOTE — PROGRESS NOTES
06/08/2023    Diabetic Alb to Cr ratio (uACR) test  07/20/2023    Annual Wellness Visit (AWV)  11/17/2023    A1C test (Diabetic or Prediabetic)  02/16/2024    Lipids  02/16/2024    Depression Screen  02/16/2024    GFR test (Diabetes, CKD 3-4, OR last GFR 15-59)  04/29/2024    DTaP/Tdap/Td vaccine (2 - Td or Tdap) 11/03/2025    Colorectal Cancer Screen  12/16/2029    Pneumococcal 0-64 years Vaccine (3 - PPSV23 if available, else PCV20) 07/09/2030    Flu vaccine  Completed    Hepatitis C screen  Completed    HIV screen  Completed    Hepatitis A vaccine  Aged Out    Hib vaccine  Aged Out    Meningococcal (ACWY) vaccine  Aged Out    Diabetic foot exam  Discontinued       Assessment & Plan     Type 2 diabetes mellitus with other specified complication, with long-term current use of insulin (HCC)  A1C is down to 7.0. Continue medications as directed. Encouraged pt to try to cut back on carbs more. Will recheck an A1C in 3 months.     -     POCT glycosylated hemoglobin (Hb A1C)  -     Comprehensive Metabolic Panel w/ Reflex to MG; Future    Stage 3a chronic kidney disease ([facility] [address]  Encouraged pt to avoid NSAIDs. Will f/u with the pt regarding repeat labs and plan. -     Comprehensive Metabolic Panel w/ Reflex to MG; Future    Elevated liver enzymes  H/o alcohol abuse. Has not drank alcohol in 15-20 years. Will recheck liver enzymes, check alk phos isoenzymes and liver US.    -     Comprehensive Metabolic Panel w/ Reflex to MG; Future  -     ALKALINE PHOSPHATASE, ISOENZYMES; Future  -     US LIVER; Future    Leukocytosis, unspecified type  Likely d/t infection at that time (when he was in the ER). Will recheck CBC.    -     CBC with Auto Differential; Future    Anemia, unspecified type  Denies any s/s of blood loss. Stated that he had a h/o iron deficiency, but that his previous PCP took him off the iron supplement. Will check labs and f/u with pt regarding results and plan.     -     CBC with Auto Differential;

## 2023-05-17 ENCOUNTER — HOSPITAL ENCOUNTER (OUTPATIENT)
Dept: ULTRASOUND IMAGING | Age: 58
Discharge: HOME | End: 2023-05-17
Payer: MEDICARE

## 2023-05-17 DIAGNOSIS — R74.8 ELEVATED LIVER ENZYMES: ICD-10-CM

## 2023-05-17 DIAGNOSIS — R17 ELEVATED BILIRUBIN: ICD-10-CM

## 2023-05-17 PROCEDURE — 76705 ECHO EXAM OF ABDOMEN: CPT

## 2023-05-19 ENCOUNTER — TELEPHONE (OUTPATIENT)
Dept: CARDIOLOGY | Age: 58
End: 2023-05-19

## 2023-05-19 DIAGNOSIS — Z79.899 MEDICATION MANAGEMENT: ICD-10-CM

## 2023-05-19 DIAGNOSIS — Z79.899 MEDICATION MANAGEMENT: Primary | ICD-10-CM

## 2023-05-19 LAB
ALP BONE SERPL-CCNC: 31 U/L (ref 0–55)
ALP ISOS SERPL HS-CCNC: 0 U/L
ALP LIVER SERPL-CCNC: 110 U/L (ref 0–94)
ALP SERPL-CCNC: 141 U/L (ref 40–120)

## 2023-05-19 RX ORDER — SPIRONOLACTONE 25 MG/1
25 TABLET ORAL DAILY
Qty: 30 TABLET | Refills: 0 | Status: SHIPPED | OUTPATIENT
Start: 2023-05-19 | End: 2023-06-18

## 2023-05-19 NOTE — TELEPHONE ENCOUNTER
Spoke with pt and appt made with JERRICA 5/31/23 at [facility] ([facility] Pt will go have blood work done 5/29/23. Pt will decrease spironolactone to 25 mg once daily instead of BID. Pt verbalized understanding and has no further questions.

## 2023-05-19 NOTE — TELEPHONE ENCOUNTER
Spoke with Jerome GARCIA. Noted labs. No ischemic symptoms. We will reduce aldactone to 25 mg ONCE daily and follow up in office with Kylah Shaffer in 2 weeks with repeat BMP.      Ismael Tovar MD, [address]  [phone] [facility]  [phone] [facility]

## 2023-05-26 DIAGNOSIS — Z79.899 MEDICATION MANAGEMENT: ICD-10-CM

## 2023-05-30 NOTE — PROGRESS NOTES
[address] Office Note  2023     Subjective:  Mr. Coby Soria presents today for follow up of   CABG x 3  on 2020, Non ischemic (alcohol) and ischemic cardiomyopathy s/p ICD  and Chronic Systolic CHF  C/O shortness of breath with more than ordinary exertion, off and on left leg edema  Hyperkalemia. [place]:     Device check 3/12/23 SUMAN 2 yr 1 mo. No arrhythmias recorded. Transmission shows normal sensing and pacing function. Today, he reports she gets short of breath when he is working and staying busy. He is able to walk and do daily activities without any shortness of breath. He is able to lay flat without any difficulty. He is here because pcp said his potassium was high and other numbers were elevated. He no longer drinks alcohol. He has never tried [place] or [place]. He needs to have surgery on his  left foot for an ulcer and Dr. Margo Nielson (foot and ankle specialist  Dr. Jameson Vu referred him to) will be doing the reconstructive  surgery. Patient denies current chest pain, palpitations, dizziness or syncope. Patient is taking all cardiac medications as prescribed and tolerates them well. PMH:  Alcoholic CMP, ICD placement  by Dr. Ruthie Jensen for non ischemic CM with low EF that failed to improve with guide line based medical therapy. He is no longer drinking ETOH products. On 19 he underwent left foot ulcer debridement. He works for [employer]. A left heart cath performed 20 demonstrated multivessel CAD. He then underwent a CABG x 3 on 2020. Review of Systems: 12 point ROS negative in all areas as listed below except as in [place]  Constitutional, EENT,pulmonary, GI, ,  skin, neurological, hematological, endocrine, Psychiatric    Reviewed past medical history, social, and family history. No alcohol nonsmoker  HE  IS DISABLED  Mother: Heart disease, MI x4, young age. Father: Heart disease, CABG age 67.    Siblings: brother  age 40 of CHF, CVA  Past Medical

## 2023-05-31 ENCOUNTER — OFFICE VISIT (OUTPATIENT)
Dept: CARDIOLOGY CLINIC | Age: 58
End: 2023-05-31
Payer: MEDICARE

## 2023-05-31 VITALS
HEIGHT: 69 IN | WEIGHT: 213.6 LBS | SYSTOLIC BLOOD PRESSURE: 100 MMHG | OXYGEN SATURATION: 97 % | DIASTOLIC BLOOD PRESSURE: 60 MMHG | HEART RATE: 91 BPM | BODY MASS INDEX: 31.64 KG/M2

## 2023-05-31 DIAGNOSIS — E78.2 MIXED HYPERLIPIDEMIA: ICD-10-CM

## 2023-05-31 DIAGNOSIS — Z79.899 MEDICATION MANAGEMENT: ICD-10-CM

## 2023-05-31 DIAGNOSIS — R60.0 LOCALIZED EDEMA: ICD-10-CM

## 2023-05-31 DIAGNOSIS — I10 PRIMARY HYPERTENSION: ICD-10-CM

## 2023-05-31 DIAGNOSIS — R06.02 SOB (SHORTNESS OF BREATH): ICD-10-CM

## 2023-05-31 DIAGNOSIS — E11.42 DIABETIC POLYNEUROPATHY ASSOCIATED WITH TYPE 2 DIABETES MELLITUS (HCC): ICD-10-CM

## 2023-05-31 DIAGNOSIS — I42.6 ALCOHOLIC CARDIOMYOPATHY (HCC): ICD-10-CM

## 2023-05-31 DIAGNOSIS — I50.22 CHRONIC SYSTOLIC CHF (CONGESTIVE HEART FAILURE) (HCC): Primary | ICD-10-CM

## 2023-05-31 DIAGNOSIS — I25.10 CORONARY ARTERY DISEASE INVOLVING NATIVE CORONARY ARTERY OF NATIVE HEART WITHOUT ANGINA PECTORIS: ICD-10-CM

## 2023-05-31 PROCEDURE — 3017F COLORECTAL CA SCREEN DOC REV: CPT | Performed by: INTERNAL MEDICINE

## 2023-05-31 PROCEDURE — 99214 OFFICE O/P EST MOD 30 MIN: CPT | Performed by: INTERNAL MEDICINE

## 2023-05-31 PROCEDURE — G8417 CALC BMI ABV UP PARAM F/U: HCPCS | Performed by: INTERNAL MEDICINE

## 2023-05-31 PROCEDURE — 1036F TOBACCO NON-USER: CPT | Performed by: INTERNAL MEDICINE

## 2023-05-31 PROCEDURE — G8427 DOCREV CUR MEDS BY ELIG CLIN: HCPCS | Performed by: INTERNAL MEDICINE

## 2023-05-31 PROCEDURE — 3074F SYST BP LT 130 MM HG: CPT | Performed by: INTERNAL MEDICINE

## 2023-05-31 PROCEDURE — 3078F DIAST BP <80 MM HG: CPT | Performed by: INTERNAL MEDICINE

## 2023-05-31 RX ORDER — INSULIN GLARGINE 100 [IU]/ML
INJECTION, SOLUTION SUBCUTANEOUS
Qty: 30 ML | Refills: 0 | Status: SHIPPED | OUTPATIENT
Start: 2023-05-31

## 2023-05-31 NOTE — PATIENT INSTRUCTIONS
Plan:  Labs reviewed in epic and discussed with patient. Current medications reviewed. Refills given as warranted. Start taking jardiance 10 mg daily to help remove fluid from the body and it also improves your heart function.  -if you can not afford the medication, call my office and let me know. Stop taking glipizide when you start taking the jardiance  Follow up with pcp to have your sugars followed. I will need to know how long the surgery is going to last.  I will be reluctant to give you clearance for surgery lasting over an hour.    -call my office with the Name, phone number, fax number, and the surgery he will be doing so we can send a letter  No cardiac testing at this time. Repeat blood work in one week to check your kidneys. You do not have to be fasting.      Follow up with me in 3 months

## 2023-05-31 NOTE — TELEPHONE ENCOUNTER
.Refill Request     CONFIRM preferred pharmacy with the patient. If Mail Order Rx - Pend for 90 day refill. Last Seen: Last Seen Department: 5/16/2023  Last Seen by PCP: 5/16/2023    Last Written: 5-2-23 30 ml with 0     If no future appointment scheduled:  Review the last OV with PCP and review information for follow-up visit,  Route STAFF MESSAGE with patient name to the [facility] for scheduling with the following information:            -  Timing of next visit           -  Visit type ie Physical, OV, etc           -  Diagnoses/Reason ie. COPD, HTN - Do not use MEDICATION, Follow-up or CHECK UP - Give reason for visit      Next Appointment:   Future Appointments   Date Time Provider Ada Ovalles   6/5/2023  1:15 PM Mita Choi MD [facility]   6/12/2023  9:45 AM [facility]   8/14/2023  1:00 PM Romana Bulla, MD [facility] CLELORENA JASMINE [facility]   8/23/2023  9:30 AM Romana Bulla, MD [place] KENROY [facility]       Message sent to [address] [place] [address] to schedule appt with patient?   N/A      Requested Prescriptions     Pending Prescriptions Disp Refills    BASAGLAR KWIKPEN 100 UNIT/ML injection pen [Pharmacy Med Name: Stepan Herbert 100UNIT SOLN PEN-INJ] 30 mL 0     Sig: INJECT 70 UNITS INTO THE SKIN DAILY

## 2023-06-05 ENCOUNTER — INITIAL CONSULT (OUTPATIENT)
Dept: SURGERY | Age: 58
End: 2023-06-05

## 2023-06-05 VITALS
DIASTOLIC BLOOD PRESSURE: 38 MMHG | BODY MASS INDEX: 31.7 KG/M2 | HEART RATE: 98 BPM | HEIGHT: 69 IN | WEIGHT: 214 LBS | SYSTOLIC BLOOD PRESSURE: 107 MMHG

## 2023-06-05 DIAGNOSIS — Z87.19 HISTORY OF UMBILICAL HERNIA: Primary | ICD-10-CM

## 2023-06-05 NOTE — PROGRESS NOTES
of children: 2    Years of education: Not on file    Highest education level: Not on file   Occupational History    Not on file   Tobacco Use    Smoking status: Former     Packs/day: 0.25     Years: 1.00     Pack years: 0.25     Types: Cigarettes     Quit date: 1980     Years since quittin.3    Smokeless tobacco: Never   Vaping Use    Vaping Use: Never used   Substance and Sexual Activity    Alcohol use: No     Alcohol/week: 0.0 standard drinks    Drug use: No    Sexual activity: Not Currently     Partners: Female   Other Topics Concern    Not on file   Social History Narrative    Not on file     Social Determinants of Health     Financial Resource Strain: Low Risk     Difficulty of Paying Living Expenses: Not hard at all   Food Insecurity: No Food Insecurity    Worried About [address] in the Last Year: Never true    [address] in the Last Year: Never true   Transportation Needs: Unknown    Lack of Transportation (Medical): Not on file    Lack of Transportation (Non-Medical):  No   Physical Activity: Inactive    Days of Exercise per Week: 1 day    Minutes of Exercise per Session: 0 min   Stress: Not on file   Social Connections: Not on file   Intimate Partner Violence: Not on file   Housing Stability: Unknown    Unable to Pay for Housing in the Last Year: Not on file    Number of Places Lived in the Last Year: Not on file    Unstable Housing in the Last Year: No         Family History:        Problem Relation Age of Onset    Heart Disease Mother     High Blood Pressure Mother     High Cholesterol Mother     Diabetes Mother     Anemia Mother     Heart Disease Father     High Blood Pressure Father     High Cholesterol Father     Heart Disease Maternal Grandmother     High Blood Pressure Maternal Grandmother     High Cholesterol Maternal Grandmother     Cancer Maternal Grandmother         bone marrow & breast    Heart Disease Maternal Grandfather     High Blood Pressure Maternal Grandfather

## 2023-06-13 DIAGNOSIS — Z79.899 MEDICATION MANAGEMENT: ICD-10-CM

## 2023-06-13 LAB
ANION GAP SERPL CALCULATED.3IONS-SCNC: 14 MMOL/L (ref 3–16)
BUN SERPL-MCNC: 27 MG/DL (ref 7–20)
CALCIUM SERPL-MCNC: 9.3 MG/DL (ref 8.3–10.6)
CHLORIDE SERPL-SCNC: 102 MMOL/L (ref 99–110)
CO2 SERPL-SCNC: 28 MMOL/L (ref 21–32)
CREAT SERPL-MCNC: 1.5 MG/DL (ref 0.9–1.3)
GFR SERPLBLD CREATININE-BSD FMLA CKD-EPI: 54 ML/MIN/{1.73_M2}
GLUCOSE SERPL-MCNC: 220 MG/DL (ref 70–99)
POTASSIUM SERPL-SCNC: 3.9 MMOL/L (ref 3.5–5.1)
SODIUM SERPL-SCNC: 144 MMOL/L (ref 136–145)

## 2023-06-22 ENCOUNTER — OFFICE VISIT (OUTPATIENT)
Dept: FAMILY MEDICINE CLINIC | Age: 58
End: 2023-06-22
Payer: MEDICAID

## 2023-06-22 VITALS
OXYGEN SATURATION: 98 % | SYSTOLIC BLOOD PRESSURE: 132 MMHG | HEART RATE: 86 BPM | HEIGHT: 69 IN | DIASTOLIC BLOOD PRESSURE: 70 MMHG | WEIGHT: 207 LBS | BODY MASS INDEX: 30.66 KG/M2

## 2023-06-22 DIAGNOSIS — Z79.4 TYPE 2 DIABETES MELLITUS WITH OTHER SPECIFIED COMPLICATION, WITH LONG-TERM CURRENT USE OF INSULIN (HCC): Primary | ICD-10-CM

## 2023-06-22 DIAGNOSIS — E11.69 TYPE 2 DIABETES MELLITUS WITH OTHER SPECIFIED COMPLICATION, WITH LONG-TERM CURRENT USE OF INSULIN (HCC): Primary | ICD-10-CM

## 2023-06-22 PROCEDURE — 3075F SYST BP GE 130 - 139MM HG: CPT | Performed by: NURSE PRACTITIONER

## 2023-06-22 PROCEDURE — 99214 OFFICE O/P EST MOD 30 MIN: CPT | Performed by: NURSE PRACTITIONER

## 2023-06-22 PROCEDURE — 3051F HG A1C>EQUAL 7.0%<8.0%: CPT | Performed by: NURSE PRACTITIONER

## 2023-06-22 PROCEDURE — 3078F DIAST BP <80 MM HG: CPT | Performed by: NURSE PRACTITIONER

## 2023-06-22 NOTE — PROGRESS NOTES
Left 08/17/2017    PARTIAL LEFT FOOT AMPUTATION      OTHER SURGICAL HISTORY Left 12/07/2017    Debridement infected bone and tissue left foot; ulcer debridement left foot with graft application with dr mathur     OTHER SURGICAL HISTORY Right 01/04/2018    DEBRIDEMENT INFECTED BONE AND TISSUE RIGHT FOOT, ULCER DEBRIDEMENT RIGHT FOOT WITH GRAFT APPLICATION    OTHER SURGICAL HISTORY Left 11/01/2018    Procedure: PARTIAL RESECTION LEFT METATARSAL, ULCER DEBRIDEMENT LEFT FOOT WITH GRAFT APPLICATION     OTHER SURGICAL HISTORY       AMPUTATION LEFT FOURTH AND FIFTH DIGITS, PARTIAL RESECTION  SECOND AND THIRD METATARSAL LEFT FOOT, PARTIAL RESECTION RIGHT FIRST METATARSAL (Bilateral Foot)    AL OFFICE/OUTPT VISIT,PROCEDURE ONLY Left 8/23/2018    ULCER DEBRIDEMENT LEFT FOOT WITH GRAFT APPLICATION performed by Anh Torres DPM at [address] [place] [address] TARSAL/METAR B1 XCP TALUS/CALCANEUS Left 8/9/2018    PARTIAL FOOT AMPUTATION WITH GRAFT APPLICATION performed by Anh Torres DPM at [address] [place] [address] TARSAL/METAR B1 XCP TALUS/CALCANEUS Left 11/1/2018    PARTIAL RESECTION LEFT METATARSAL, ULCER DEBRIDEMENT LEFT FOOT WITH GRAFT APPLICATION performed by Anh Torres DPM at [address]      2 toes    TOE AMPUTATION Bilateral 3/18/2021    AMPUTATION LEFT FOURTH AND FIFTH DIGITS, PARTIAL RESECTION  SECOND AND THIRD METATARSAL LEFT FOOT, PARTIAL RESECTION RIGHT FIRST METATARSAL performed by Anh Torres DPM at [facility] OR    TOE AMPUTATION Right 5/20/2021    PARTIAL RESECTION RIGHT FIRST METATARSAL performed by Anh Torres DPM at [facility] OR       Social History     Socioeconomic History    Marital status:      Spouse name: Not on file    Number of children: 2    Years of education: Not on file    Highest education level: Not on file   Occupational History    Not on file   Tobacco Use    Smoking status: Former     Packs/day: 0.25     Years: 1.00     Pack years: 0.25     Types: Cigarettes     Quit date:

## 2023-06-29 NOTE — TELEPHONE ENCOUNTER
Refill Request     CONFIRM preferred pharmacy with the patient. If Mail Order Rx - Pend for 90 day refill. Last Seen: Last Seen Department: 6/22/2023  Last Seen by PCP: 6/22/2023    Last Written: 12/02/2022, #180, 1 refill    If no future appointment scheduled:  Review the last OV with PCP and review information for follow-up visit,  Route STAFF MESSAGE with patient name to the [facility] for scheduling with the following information:            -  Timing of next visit           -  Visit type ie Physical, OV, etc           -  Diagnoses/Reason ie. COPD, HTN - Do not use MEDICATION, Follow-up or CHECK UP - Give reason for visit      Next Appointment:   Future Appointments   Date Time Provider [address] [place] [address]   8/14/2023  1:00 PM Ousmane Tong MD [facility] CL[facility] CAR [place]   8/23/2023  9:30 AM Ousmane Tong MD [place] [facility] [place]   9/11/2023  8:55 AM SCHEDULE, [address] [facility] [place]       Message sent to [address] [place] [address] to schedule appt with patient?   N/A      Requested Prescriptions     Pending Prescriptions Disp Refills    metFORMIN (GLUCOPHAGE) 1000 MG tablet [Pharmacy Med Name: METFORMIN HYDROCHLORIDE 1000MG TABLET] 180 tablet 1     Sig: TAKE ONE (1) TABLET BY MOUTH TWO (2) TIMES DAILY (WITH MEALS)

## 2023-07-27 DIAGNOSIS — E11.69 TYPE 2 DIABETES MELLITUS WITH OTHER SPECIFIED COMPLICATION, WITH LONG-TERM CURRENT USE OF INSULIN (HCC): Primary | ICD-10-CM

## 2023-07-27 DIAGNOSIS — Z79.4 TYPE 2 DIABETES MELLITUS WITH OTHER SPECIFIED COMPLICATION, WITH LONG-TERM CURRENT USE OF INSULIN (HCC): Primary | ICD-10-CM

## 2023-07-27 RX ORDER — DULAGLUTIDE 3 MG/.5ML
INJECTION, SOLUTION SUBCUTANEOUS
Qty: 6 ML | Refills: 1 | Status: SHIPPED | OUTPATIENT
Start: 2023-07-27

## 2023-07-27 NOTE — TELEPHONE ENCOUNTER
Refill Request     CONFIRM preferred pharmacy with the patient. If Mail Order Rx - Pend for 90 day refill. Last Seen: Last Seen Department: 6/22/2023  Last Seen by PCP: 6/22/2023    Last Written: 1/26/2023    If no future appointment scheduled:  Review the last OV with PCP and review information for follow-up visit,  Route STAFF MESSAGE with patient name to the [facility] for scheduling with the following information:            -  Timing of next visit           -  Visit type ie Physical, OV, etc           -  Diagnoses/Reason ie. COPD, HTN - Do not use MEDICATION, Follow-up or CHECK UP - Give reason for visit      Next Appointment:   Future Appointments   Date Time Provider [address]   8/14/2023  1:00 PM Lilly Arias MD [facility] CAR [facility]   8/23/2023  9:30 AM Lilly Arias MD [place] CAR [facility]   9/11/2023  8:55 AM SCHEDULE, [address] [facility]       Message sent to [place] to schedule appt with patient?   NO      Requested Prescriptions     Pending Prescriptions Disp Refills    TRULICITY 3 ZM/3.6PB SOPN [Pharmacy Med Name: Yuliya Newer 5/2.2 SOLN PEN-INJ] 6 mL 1     Sig: INJECT THREE (3) MG INTO THE SKIN ONCE A WEEK

## 2023-08-11 NOTE — PROGRESS NOTES
[facility] Office Note  2023     Subjective:  Mr. Salty Moreno presents today for follow up of   CABG x 3  on 2020, Non ischemic (alcohol) and ischemic cardiomyopathy s/p ICD  and Chronic Systolic CHF  C/O no cardiac complaints  edema in legs resolved A1C 6.4 breathing better since on Jardiance. [place]:  Device check 2023 noted SUMAN 2 yrs; 1 NSVT and 12 SVT episodes; thoracic impedence stable. Today, he reports doing well. Left foot surgery 23 at [facility] with Dr. Dewayne Garcia. Foot is wrapped today but patient notes that is healing. He has taken two rounds of antibiotics and on pain meds for foot. He notes that his breathing is better and blood sugar was been controlled on Jardiance. PMH:  Alcoholic CMP, ICD placement  by Dr. Gregory Navarrete for non ischemic CM with low EF that failed to improve with guide line based medical therapy. He is no longer drinking ETOH products. On 19 he underwent left foot ulcer debridement. He works for [employer]. A left heart cath performed 20 demonstrated multivessel CAD. He then underwent a CABG x 3 on 2020. OV 2023 stopped glipizide and started jardiance    Review of Systems: 12 point ROS negative in all areas as listed below except as in [place]  Constitutional, EENT,pulmonary, GI, ,  skin, neurological, hematological, endocrine, Psychiatric    Reviewed past medical history, social, and family history. No alcohol nonsmoker  HE  IS DISABLED  Mother: Heart disease, MI x4, young age. Father: Heart disease, CABG age 67.    Siblings: brother  age 40 of CHF, CVA  Past Medical History:   Diagnosis Date    Acute on chronic systolic congestive heart failure ([address]) 2013    Acute osteomyelitis of left foot ([address]) 2017    Acute osteomyelitis of right foot ([address]) 2016    Acute respiratory failure ([address]) 2020    Ascites     Blood transfusion reaction     Burst fracture of lumbar vertebra ([address]) 2012

## 2023-08-14 ENCOUNTER — OFFICE VISIT (OUTPATIENT)
Dept: CARDIOLOGY CLINIC | Age: 58
End: 2023-08-14
Payer: MEDICARE

## 2023-08-14 VITALS
OXYGEN SATURATION: 94 % | WEIGHT: 205.4 LBS | HEART RATE: 50 BPM | HEIGHT: 69 IN | SYSTOLIC BLOOD PRESSURE: 110 MMHG | DIASTOLIC BLOOD PRESSURE: 78 MMHG | BODY MASS INDEX: 30.42 KG/M2

## 2023-08-14 DIAGNOSIS — I50.22 CHRONIC SYSTOLIC CONGESTIVE HEART FAILURE (HCC): Primary | ICD-10-CM

## 2023-08-14 DIAGNOSIS — I10 ESSENTIAL HYPERTENSION: ICD-10-CM

## 2023-08-14 DIAGNOSIS — I42.6 ALCOHOLIC CARDIOMYOPATHY (HCC): ICD-10-CM

## 2023-08-14 DIAGNOSIS — E78.2 MIXED HYPERLIPIDEMIA: ICD-10-CM

## 2023-08-14 DIAGNOSIS — I10 PRIMARY HYPERTENSION: ICD-10-CM

## 2023-08-14 DIAGNOSIS — Z79.899 MEDICATION MANAGEMENT: ICD-10-CM

## 2023-08-14 DIAGNOSIS — I25.10 CORONARY ARTERY DISEASE INVOLVING NATIVE CORONARY ARTERY OF NATIVE HEART WITHOUT ANGINA PECTORIS: ICD-10-CM

## 2023-08-14 PROCEDURE — G8417 CALC BMI ABV UP PARAM F/U: HCPCS | Performed by: INTERNAL MEDICINE

## 2023-08-14 PROCEDURE — 99214 OFFICE O/P EST MOD 30 MIN: CPT | Performed by: INTERNAL MEDICINE

## 2023-08-14 PROCEDURE — 1036F TOBACCO NON-USER: CPT | Performed by: INTERNAL MEDICINE

## 2023-08-14 PROCEDURE — G8427 DOCREV CUR MEDS BY ELIG CLIN: HCPCS | Performed by: INTERNAL MEDICINE

## 2023-08-14 PROCEDURE — 3017F COLORECTAL CA SCREEN DOC REV: CPT | Performed by: INTERNAL MEDICINE

## 2023-08-14 PROCEDURE — 3074F SYST BP LT 130 MM HG: CPT | Performed by: INTERNAL MEDICINE

## 2023-08-14 PROCEDURE — 3078F DIAST BP <80 MM HG: CPT | Performed by: INTERNAL MEDICINE

## 2023-08-14 RX ORDER — CARVEDILOL 25 MG/1
TABLET ORAL
Qty: 180 TABLET | Refills: 3 | Status: SHIPPED | OUTPATIENT
Start: 2023-08-14

## 2023-08-14 RX ORDER — LANOLIN ALCOHOL/MO/W.PET/CERES
400 CREAM (GRAM) TOPICAL 2 TIMES DAILY
Qty: 180 TABLET | Refills: 3 | Status: SHIPPED | OUTPATIENT
Start: 2023-08-14

## 2023-08-14 RX ORDER — SPIRONOLACTONE 25 MG/1
25 TABLET ORAL DAILY
Qty: 90 TABLET | Refills: 3 | Status: SHIPPED | OUTPATIENT
Start: 2023-08-14

## 2023-08-14 RX ORDER — LOSARTAN POTASSIUM 25 MG/1
25 TABLET ORAL DAILY
Qty: 90 TABLET | Refills: 3 | Status: SHIPPED | OUTPATIENT
Start: 2023-08-14

## 2023-08-14 RX ORDER — DOXYCYCLINE HYCLATE 100 MG/1
100 CAPSULE ORAL 2 TIMES DAILY
COMMUNITY
Start: 2023-08-08

## 2023-09-21 PROCEDURE — 93295 DEV INTERROG REMOTE 1/2/MLT: CPT | Performed by: INTERNAL MEDICINE

## 2023-09-21 PROCEDURE — 93296 REM INTERROG EVL PM/IDS: CPT | Performed by: INTERNAL MEDICINE

## 2023-09-25 NOTE — TELEPHONE ENCOUNTER
Provider is no longer with this practice refill refused. Called patient, and told him that we would not be able to refill his prescription. Patient verbalized understanding.

## 2023-11-06 RX ORDER — EMPAGLIFLOZIN 10 MG/1
10 TABLET, FILM COATED ORAL DAILY
Qty: 30 TABLET | Refills: 5 | Status: SHIPPED | OUTPATIENT
Start: 2023-11-06

## 2023-11-16 DIAGNOSIS — Z79.899 MEDICATION MANAGEMENT: ICD-10-CM

## 2023-11-16 DIAGNOSIS — I42.6 ALCOHOLIC CARDIOMYOPATHY (HCC): ICD-10-CM

## 2023-11-16 DIAGNOSIS — E78.2 MIXED HYPERLIPIDEMIA: ICD-10-CM

## 2023-11-16 DIAGNOSIS — I10 PRIMARY HYPERTENSION: ICD-10-CM

## 2023-11-16 LAB
ANION GAP SERPL CALCULATED.3IONS-SCNC: 9 MMOL/L (ref 3–16)
BUN SERPL-MCNC: 23 MG/DL (ref 7–20)
CALCIUM SERPL-MCNC: 9 MG/DL (ref 8.3–10.6)
CHLORIDE SERPL-SCNC: 101 MMOL/L (ref 99–110)
CO2 SERPL-SCNC: 29 MMOL/L (ref 21–32)
CREAT SERPL-MCNC: 1.3 MG/DL (ref 0.9–1.3)
GFR SERPLBLD CREATININE-BSD FMLA CKD-EPI: >60 ML/MIN/{1.73_M2}
GLUCOSE SERPL-MCNC: 124 MG/DL (ref 70–99)
POTASSIUM SERPL-SCNC: 4.4 MMOL/L (ref 3.5–5.1)
SODIUM SERPL-SCNC: 139 MMOL/L (ref 136–145)

## 2023-11-17 ENCOUNTER — TELEPHONE (OUTPATIENT)
Dept: CARDIOLOGY CLINIC | Age: 58
End: 2023-11-17

## 2023-11-17 NOTE — TELEPHONE ENCOUNTER
Created telephone encounter, attempted to reach pt to relay results, no answer, lvm with results ok per HIPAA.

## 2023-11-17 NOTE — TELEPHONE ENCOUNTER
----- Message from Zay Estevez MD sent at 11/17/2023  7:23 AM EST -----  Kidney function and electrolytes on blood test stable   no medication changes needed.   ----- Message -----  From: Frantz Wolff Incoming Lab Results From Soft ([facility])  Sent: 11/16/2023   7:25 PM EST  To: Zay Estevez MD

## 2023-11-25 NOTE — PROGRESS NOTES
[address] [facility] Ramy Cherry is a 46 y.o. male who presented to the [facility] today for hyperbaric oxygen treatment #30 of 40 for the diagnosis as stated above. Treatment given at 2 CAREY for 90 minutes, with no air breaks. Patient Active Problem List   Diagnosis Code    Hypertension I10    Uncontrolled type 2 diabetes mellitus with diabetic polyneuropathy, with long-term current use of insulin (HCC) E11.42, Z79.4, C17.00    Alcoholic cardiomyopathy G04.9    Automatic implantable cardioverter-defibrillator in situ Z95.810    Hypothyroidism E03.9    Hyperlipidemia E78.5    Onychomycosis B35.1    Dystrophic nail L60.3    Callus of foot L84    Hallux valgus M20.10    Diabetic ulcer of right foot associated with type 2 diabetes mellitus, limited to breakdown of skin (HCC) E11.621, L97.511    Acquired hypothyroidism E03.9    Diabetic ulcer of left foot associated with type 2 diabetes mellitus, with muscle involvement without evidence of necrosis (HCC) E11.621, L97.525       In my clinical judgement, ongoing HBO therapy is necessary at this time, given a threat to patient function, limb or life from the current condition. Adjunctive Rx, objective weekly progress and goals of Rx will generally be updated on Mondays. Plan     1. Hyperbaric Oxygen - Ruben Eagle tolerated the treatment well today without complications. Continue HBO treatment as outlined above. 2. Other -     I was present on these premises and immediately available to furnish assistance & direction throughout the procedure.      -- Electronically signed by Joaquin Archuleta MD on 12/6/2017 at 2:58 PM
Digestive

## 2023-12-01 ENCOUNTER — APPOINTMENT (OUTPATIENT)
Dept: GENERAL RADIOLOGY | Age: 58
DRG: 617 | End: 2023-12-01
Payer: MEDICARE

## 2023-12-01 ENCOUNTER — HOSPITAL ENCOUNTER (INPATIENT)
Age: 58
LOS: 5 days | Discharge: HOME | DRG: 617 | End: 2023-12-06
Attending: STUDENT IN AN ORGANIZED HEALTH CARE EDUCATION/TRAINING PROGRAM | Admitting: INTERNAL MEDICINE
Payer: MEDICARE

## 2023-12-01 DIAGNOSIS — M86.9 DIABETIC FOOT ULCER WITH OSTEOMYELITIS (HCC): ICD-10-CM

## 2023-12-01 DIAGNOSIS — L08.9 DIABETIC FOOT INFECTION (HCC): Primary | ICD-10-CM

## 2023-12-01 DIAGNOSIS — E11.69 DIABETIC FOOT ULCER WITH OSTEOMYELITIS (HCC): ICD-10-CM

## 2023-12-01 DIAGNOSIS — E11.42 DIABETIC POLYNEUROPATHY ASSOCIATED WITH TYPE 2 DIABETES MELLITUS (HCC): ICD-10-CM

## 2023-12-01 DIAGNOSIS — L97.509 DIABETIC FOOT ULCER WITH OSTEOMYELITIS (HCC): ICD-10-CM

## 2023-12-01 DIAGNOSIS — E11.628 DIABETIC FOOT INFECTION (HCC): Primary | ICD-10-CM

## 2023-12-01 DIAGNOSIS — E11.621 DIABETIC FOOT ULCER WITH OSTEOMYELITIS (HCC): ICD-10-CM

## 2023-12-01 PROBLEM — E11.8 DIABETIC FOOT (HCC): Status: ACTIVE | Noted: 2023-12-01

## 2023-12-01 LAB
ALBUMIN SERPL-MCNC: 4 G/DL (ref 3.4–5)
ALBUMIN/GLOB SERPL: 1.1 {RATIO} (ref 1.1–2.2)
ALP SERPL-CCNC: 153 U/L (ref 40–129)
ALT SERPL-CCNC: 32 U/L (ref 10–40)
ANION GAP SERPL CALCULATED.3IONS-SCNC: 14 MMOL/L (ref 3–16)
AST SERPL-CCNC: 27 U/L (ref 15–37)
BASOPHILS # BLD: 0.1 K/UL (ref 0–0.2)
BASOPHILS NFR BLD: 0.8 %
BILIRUB SERPL-MCNC: 1.1 MG/DL (ref 0–1)
BUN SERPL-MCNC: 40 MG/DL (ref 7–20)
CALCIUM SERPL-MCNC: 9.4 MG/DL (ref 8.3–10.6)
CHLORIDE SERPL-SCNC: 94 MMOL/L (ref 99–110)
CO2 SERPL-SCNC: 24 MMOL/L (ref 21–32)
CREAT SERPL-MCNC: 1.6 MG/DL (ref 0.9–1.3)
CRP SERPL-MCNC: 126.7 MG/L (ref 0–5.1)
DEPRECATED RDW RBC AUTO: 17.7 % (ref 12.4–15.4)
EOSINOPHIL # BLD: 0.1 K/UL (ref 0–0.6)
EOSINOPHIL NFR BLD: 1.1 %
ERYTHROCYTE [SEDIMENTATION RATE] IN BLOOD BY WESTERGREN METHOD: 82 MM/HR (ref 0–20)
FLUAV RNA RESP QL NAA+PROBE: NOT DETECTED
FLUBV RNA RESP QL NAA+PROBE: NOT DETECTED
GFR SERPLBLD CREATININE-BSD FMLA CKD-EPI: 50 ML/MIN/{1.73_M2}
GLUCOSE BLD-MCNC: 187 MG/DL (ref 70–99)
GLUCOSE SERPL-MCNC: 227 MG/DL (ref 70–99)
HCT VFR BLD AUTO: 33.9 % (ref 40.5–52.5)
HGB BLD-MCNC: 11.1 G/DL (ref 13.5–17.5)
INR PPP: 1.4 (ref 0.84–1.16)
LACTATE BLDV-SCNC: 2 MMOL/L (ref 0.4–2)
LYMPHOCYTES # BLD: 0.9 K/UL (ref 1–5.1)
LYMPHOCYTES NFR BLD: 9.8 %
MCH RBC QN AUTO: 28.4 PG (ref 26–34)
MCHC RBC AUTO-ENTMCNC: 32.9 G/DL (ref 31–36)
MCV RBC AUTO: 86.3 FL (ref 80–100)
MONOCYTES # BLD: 1.1 K/UL (ref 0–1.3)
MONOCYTES NFR BLD: 11.4 %
NEUTROPHILS # BLD: 7.3 K/UL (ref 1.7–7.7)
NEUTROPHILS NFR BLD: 76.9 %
PERFORMED ON: ABNORMAL
PLATELET # BLD AUTO: 228 K/UL (ref 135–450)
PMV BLD AUTO: 7.6 FL (ref 5–10.5)
POTASSIUM SERPL-SCNC: 4.3 MMOL/L (ref 3.5–5.1)
PROCALCITONIN SERPL IA-MCNC: 0.41 NG/ML (ref 0–0.15)
PROT SERPL-MCNC: 7.5 G/DL (ref 6.4–8.2)
PROTHROMBIN TIME: 17.2 SEC (ref 11.5–14.8)
RBC # BLD AUTO: 3.93 M/UL (ref 4.2–5.9)
SARS-COV-2 RNA RESP QL NAA+PROBE: NOT DETECTED
SODIUM SERPL-SCNC: 132 MMOL/L (ref 136–145)
TROPONIN, HIGH SENSITIVITY: 50 NG/L (ref 0–22)
WBC # BLD AUTO: 9.5 K/UL (ref 4–11)

## 2023-12-01 PROCEDURE — 2580000003 HC RX 258: Performed by: STUDENT IN AN ORGANIZED HEALTH CARE EDUCATION/TRAINING PROGRAM

## 2023-12-01 PROCEDURE — 84484 ASSAY OF TROPONIN QUANT: CPT

## 2023-12-01 PROCEDURE — 84145 PROCALCITONIN (PCT): CPT

## 2023-12-01 PROCEDURE — 86140 C-REACTIVE PROTEIN: CPT

## 2023-12-01 PROCEDURE — 85652 RBC SED RATE AUTOMATED: CPT

## 2023-12-01 PROCEDURE — 36415 COLL VENOUS BLD VENIPUNCTURE: CPT

## 2023-12-01 PROCEDURE — 87636 SARSCOV2 & INF A&B AMP PRB: CPT

## 2023-12-01 PROCEDURE — 85610 PROTHROMBIN TIME: CPT

## 2023-12-01 PROCEDURE — 85025 COMPLETE CBC W/AUTO DIFF WBC: CPT

## 2023-12-01 PROCEDURE — 73630 X-RAY EXAM OF FOOT: CPT

## 2023-12-01 PROCEDURE — 71045 X-RAY EXAM CHEST 1 VIEW: CPT

## 2023-12-01 PROCEDURE — 96366 THER/PROPH/DIAG IV INF ADDON: CPT

## 2023-12-01 PROCEDURE — 6360000002 HC RX W HCPCS: Performed by: STUDENT IN AN ORGANIZED HEALTH CARE EDUCATION/TRAINING PROGRAM

## 2023-12-01 PROCEDURE — 1200000000 HC SEMI PRIVATE

## 2023-12-01 PROCEDURE — 80053 COMPREHEN METABOLIC PANEL: CPT

## 2023-12-01 PROCEDURE — 83605 ASSAY OF LACTIC ACID: CPT

## 2023-12-01 PROCEDURE — 99285 EMERGENCY DEPT VISIT HI MDM: CPT

## 2023-12-01 PROCEDURE — 96367 TX/PROPH/DG ADDL SEQ IV INF: CPT

## 2023-12-01 PROCEDURE — 96365 THER/PROPH/DIAG IV INF INIT: CPT

## 2023-12-01 PROCEDURE — 87040 BLOOD CULTURE FOR BACTERIA: CPT

## 2023-12-01 RX ORDER — ACETAMINOPHEN 650 MG/1
650 SUPPOSITORY RECTAL EVERY 6 HOURS PRN
Status: DISCONTINUED | OUTPATIENT
Start: 2023-12-01 | End: 2023-12-06 | Stop reason: HOSPADM

## 2023-12-01 RX ORDER — ASPIRIN 81 MG/1
81 TABLET ORAL DAILY
Status: DISCONTINUED | OUTPATIENT
Start: 2023-12-02 | End: 2023-12-06 | Stop reason: HOSPADM

## 2023-12-01 RX ORDER — ONDANSETRON 2 MG/ML
4 INJECTION INTRAMUSCULAR; INTRAVENOUS EVERY 6 HOURS PRN
Status: DISCONTINUED | OUTPATIENT
Start: 2023-12-01 | End: 2023-12-06 | Stop reason: HOSPADM

## 2023-12-01 RX ORDER — MAGNESIUM SULFATE IN WATER 40 MG/ML
2000 INJECTION, SOLUTION INTRAVENOUS PRN
Status: DISCONTINUED | OUTPATIENT
Start: 2023-12-01 | End: 2023-12-06 | Stop reason: HOSPADM

## 2023-12-01 RX ORDER — INSULIN GLARGINE 100 [IU]/ML
20 INJECTION, SOLUTION SUBCUTANEOUS DAILY
Status: DISCONTINUED | OUTPATIENT
Start: 2023-12-02 | End: 2023-12-06 | Stop reason: HOSPADM

## 2023-12-01 RX ORDER — POTASSIUM CHLORIDE 7.45 MG/ML
10 INJECTION INTRAVENOUS PRN
Status: DISCONTINUED | OUTPATIENT
Start: 2023-12-01 | End: 2023-12-06 | Stop reason: HOSPADM

## 2023-12-01 RX ORDER — SODIUM CHLORIDE 9 MG/ML
INJECTION, SOLUTION INTRAVENOUS PRN
Status: DISCONTINUED | OUTPATIENT
Start: 2023-12-01 | End: 2023-12-06 | Stop reason: HOSPADM

## 2023-12-01 RX ORDER — PROMETHAZINE HYDROCHLORIDE 25 MG/1
12.5 TABLET ORAL EVERY 6 HOURS PRN
Status: DISCONTINUED | OUTPATIENT
Start: 2023-12-01 | End: 2023-12-06 | Stop reason: HOSPADM

## 2023-12-01 RX ORDER — SODIUM CHLORIDE 0.9 % (FLUSH) 0.9 %
10 SYRINGE (ML) INJECTION EVERY 12 HOURS SCHEDULED
Status: DISCONTINUED | OUTPATIENT
Start: 2023-12-01 | End: 2023-12-06 | Stop reason: HOSPADM

## 2023-12-01 RX ORDER — NICOTINE 21 MG/24HR
1 PATCH, TRANSDERMAL 24 HOURS TRANSDERMAL DAILY
Status: DISCONTINUED | OUTPATIENT
Start: 2023-12-02 | End: 2023-12-02

## 2023-12-01 RX ORDER — LANOLIN ALCOHOL/MO/W.PET/CERES
3 CREAM (GRAM) TOPICAL NIGHTLY
Status: DISCONTINUED | OUTPATIENT
Start: 2023-12-01 | End: 2023-12-06 | Stop reason: HOSPADM

## 2023-12-01 RX ORDER — SODIUM CHLORIDE 0.9 % (FLUSH) 0.9 %
10 SYRINGE (ML) INJECTION PRN
Status: DISCONTINUED | OUTPATIENT
Start: 2023-12-01 | End: 2023-12-06 | Stop reason: HOSPADM

## 2023-12-01 RX ORDER — SODIUM CHLORIDE, SODIUM LACTATE, POTASSIUM CHLORIDE, AND CALCIUM CHLORIDE .6; .31; .03; .02 G/100ML; G/100ML; G/100ML; G/100ML
1000 INJECTION, SOLUTION INTRAVENOUS ONCE
Status: COMPLETED | OUTPATIENT
Start: 2023-12-01 | End: 2023-12-01

## 2023-12-01 RX ORDER — DEXTROSE MONOHYDRATE 100 MG/ML
INJECTION, SOLUTION INTRAVENOUS CONTINUOUS PRN
Status: DISCONTINUED | OUTPATIENT
Start: 2023-12-01 | End: 2023-12-06 | Stop reason: HOSPADM

## 2023-12-01 RX ORDER — CLOPIDOGREL BISULFATE 75 MG/1
75 TABLET ORAL DAILY
Status: DISCONTINUED | OUTPATIENT
Start: 2023-12-02 | End: 2023-12-06 | Stop reason: HOSPADM

## 2023-12-01 RX ORDER — INSULIN LISPRO 100 [IU]/ML
0-8 INJECTION, SOLUTION INTRAVENOUS; SUBCUTANEOUS EVERY 4 HOURS
Status: DISCONTINUED | OUTPATIENT
Start: 2023-12-01 | End: 2023-12-02

## 2023-12-01 RX ORDER — SPIRONOLACTONE 25 MG/1
25 TABLET ORAL DAILY
Status: DISCONTINUED | OUTPATIENT
Start: 2023-12-02 | End: 2023-12-06 | Stop reason: HOSPADM

## 2023-12-01 RX ORDER — ISOSORBIDE MONONITRATE 30 MG/1
30 TABLET, EXTENDED RELEASE ORAL DAILY
Status: DISCONTINUED | OUTPATIENT
Start: 2023-12-02 | End: 2023-12-03

## 2023-12-01 RX ORDER — SODIUM CHLORIDE 9 MG/ML
INJECTION, SOLUTION INTRAVENOUS CONTINUOUS
Status: ACTIVE | OUTPATIENT
Start: 2023-12-02 | End: 2023-12-02

## 2023-12-01 RX ORDER — ATORVASTATIN CALCIUM 40 MG/1
40 TABLET, FILM COATED ORAL NIGHTLY
Status: DISCONTINUED | OUTPATIENT
Start: 2023-12-01 | End: 2023-12-06 | Stop reason: HOSPADM

## 2023-12-01 RX ORDER — GLUCAGON 1 MG/ML
1 KIT INJECTION PRN
Status: DISCONTINUED | OUTPATIENT
Start: 2023-12-01 | End: 2023-12-06 | Stop reason: HOSPADM

## 2023-12-01 RX ORDER — ACETAMINOPHEN 325 MG/1
650 TABLET ORAL EVERY 6 HOURS PRN
Status: DISCONTINUED | OUTPATIENT
Start: 2023-12-01 | End: 2023-12-06 | Stop reason: HOSPADM

## 2023-12-01 RX ADMIN — SODIUM CHLORIDE, POTASSIUM CHLORIDE, SODIUM LACTATE AND CALCIUM CHLORIDE 1000 ML: 600; 310; 30; 20 INJECTION, SOLUTION INTRAVENOUS at 16:12

## 2023-12-01 RX ADMIN — PIPERACILLIN AND TAZOBACTAM 4500 MG: 4; .5 INJECTION, POWDER, FOR SOLUTION INTRAVENOUS; PARENTERAL at 16:40

## 2023-12-01 RX ADMIN — VANCOMYCIN HYDROCHLORIDE 2000 MG: 10 INJECTION, POWDER, LYOPHILIZED, FOR SOLUTION INTRAVENOUS at 17:35

## 2023-12-01 ASSESSMENT — PAIN SCALES - GENERAL
PAINLEVEL_OUTOF10: 4
PAINLEVEL_OUTOF10: 0

## 2023-12-01 ASSESSMENT — PAIN DESCRIPTION - PAIN TYPE: TYPE: ACUTE PAIN

## 2023-12-01 ASSESSMENT — PAIN - FUNCTIONAL ASSESSMENT
PAIN_FUNCTIONAL_ASSESSMENT: 0-10
PAIN_FUNCTIONAL_ASSESSMENT: PREVENTS OR INTERFERES SOME ACTIVE ACTIVITIES AND ADLS

## 2023-12-01 ASSESSMENT — PAIN DESCRIPTION - ORIENTATION: ORIENTATION: RIGHT

## 2023-12-01 ASSESSMENT — PAIN DESCRIPTION - DESCRIPTORS: DESCRIPTORS: ACHING

## 2023-12-01 NOTE — ED PROVIDER NOTES
[address] [address] ED  EMERGENCY DEPARTMENT ENCOUNTER        Patient Name: Compa Sears  MRN: [id number]  [facility] 1965  Date of evaluation: 12/1/2023  PCP: JOSE Martinez CNP  Note Started: 3:13 PM EST 12/1/23      CHIEF COMPLAINT  Wound Check       HISTORY & PHYSICAL     HISTORY OF PRESENT ILLNESS  History from : Patient    Limitations to history : None    Compa Sears is a 62 y.o. male  has a past medical history of Acute on chronic systolic congestive heart failure ([address]) (07/08/2013), Acute osteomyelitis of left foot ([address]) (09/27/2017), Acute osteomyelitis of right foot ([address]) (04/25/2016), Acute respiratory failure ([address]) (08/04/2020), Ascites, Blood transfusion reaction, Burst fracture of lumbar vertebra ([address]) (11/09/2012), Cellulitis of left foot, Cellulitis of right lower extremity (2/16, 3/54), Chronic systolic CHF (congestive heart failure) ([address]), Community acquired pneumonia, Diabetes ([address]), Diabetic ulcer of left foot associated with type 2 diabetes mellitus, with muscle involvement without evidence of necrosis ([address]) (04/27/2017), Diabetic ulcer of right foot ([address]) (early 2016), Diabetic ulcer of 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 2 diabetes mellitus, with necrosis of bone ([address]), ETOH abuse, Fracture of tibial plateau (52/23/4339), High cholesterol, History of blood transfusion, HTN (hypertension), blood clots, MI (myocardial infarction) ([address]) (08/04/2020), MRSA (methicillin resistant staph aureus) culture positive (4/28/16, 4/20/16), Neuropathic ulcer of left foot, limited to breakdown of skin ([address]) (11/03/2016), Neuropathic ulcer of toe ([address]) (02/13/2014), NSVT (nonsustained ventricular tachycardia) ([address]) (2014), Pleural effusion due to congestive heart failure ([address]), Septicemia ([address]), Smoker, Systolic CHF, acute ([address]) (07/08/2013), and Thyroid disease., who presents to the ED complaining of foot infection.  Follows with Dr Romero Clarke, seen today and sent in for IV Abx

## 2023-12-01 NOTE — ED NOTES
1658- Call to Dr. Ranjana Winston and connected with Dr. Carolina Orantes at this time.      Willow Jain RN  12/01/23 [id number]

## 2023-12-02 ENCOUNTER — APPOINTMENT (OUTPATIENT)
Dept: GENERAL RADIOLOGY | Age: 58
DRG: 617 | End: 2023-12-02
Payer: MEDICARE

## 2023-12-02 ENCOUNTER — ANESTHESIA (OUTPATIENT)
Dept: OPERATING ROOM | Age: 58
End: 2023-12-02
Payer: MEDICARE

## 2023-12-02 ENCOUNTER — ANESTHESIA EVENT (OUTPATIENT)
Dept: OPERATING ROOM | Age: 58
End: 2023-12-02
Payer: MEDICARE

## 2023-12-02 PROBLEM — E11.628 DIABETIC FOOT INFECTION (HCC): Status: ACTIVE | Noted: 2023-12-02

## 2023-12-02 PROBLEM — L08.9 DIABETIC FOOT INFECTION (HCC): Status: ACTIVE | Noted: 2023-12-02

## 2023-12-02 PROBLEM — I42.0 DILATED CARDIOMYOPATHY (HCC): Status: ACTIVE | Noted: 2023-12-02

## 2023-12-02 LAB
ALBUMIN SERPL-MCNC: 3.1 G/DL (ref 3.4–5)
ALBUMIN/GLOB SERPL: 1.1 {RATIO} (ref 1.1–2.2)
ALP SERPL-CCNC: 122 U/L (ref 40–129)
ALT SERPL-CCNC: 36 U/L (ref 10–40)
ANION GAP SERPL CALCULATED.3IONS-SCNC: 12 MMOL/L (ref 3–16)
AST SERPL-CCNC: 33 U/L (ref 15–37)
BASOPHILS # BLD: 0 K/UL (ref 0–0.2)
BASOPHILS NFR BLD: 0.7 %
BILIRUB SERPL-MCNC: 0.6 MG/DL (ref 0–1)
BUN SERPL-MCNC: 37 MG/DL (ref 7–20)
CALCIUM SERPL-MCNC: 8.4 MG/DL (ref 8.3–10.6)
CHLORIDE SERPL-SCNC: 100 MMOL/L (ref 99–110)
CO2 SERPL-SCNC: 23 MMOL/L (ref 21–32)
CREAT SERPL-MCNC: 1.4 MG/DL (ref 0.9–1.3)
DEPRECATED RDW RBC AUTO: 17.2 % (ref 12.4–15.4)
EOSINOPHIL # BLD: 0.2 K/UL (ref 0–0.6)
EOSINOPHIL NFR BLD: 2.7 %
GFR SERPLBLD CREATININE-BSD FMLA CKD-EPI: 58 ML/MIN/{1.73_M2}
GLUCOSE BLD-MCNC: 138 MG/DL (ref 70–99)
GLUCOSE BLD-MCNC: 141 MG/DL (ref 70–99)
GLUCOSE BLD-MCNC: 148 MG/DL (ref 70–99)
GLUCOSE BLD-MCNC: 154 MG/DL (ref 70–99)
GLUCOSE BLD-MCNC: 164 MG/DL (ref 70–99)
GLUCOSE BLD-MCNC: 176 MG/DL (ref 70–99)
GLUCOSE BLD-MCNC: 177 MG/DL (ref 70–99)
GLUCOSE SERPL-MCNC: 143 MG/DL (ref 70–99)
HCT VFR BLD AUTO: 30.4 % (ref 40.5–52.5)
HGB BLD-MCNC: 10.1 G/DL (ref 13.5–17.5)
LYMPHOCYTES # BLD: 0.9 K/UL (ref 1–5.1)
LYMPHOCYTES NFR BLD: 15.4 %
MCH RBC QN AUTO: 28.5 PG (ref 26–34)
MCHC RBC AUTO-ENTMCNC: 33.1 G/DL (ref 31–36)
MCV RBC AUTO: 86.2 FL (ref 80–100)
MONOCYTES # BLD: 0.7 K/UL (ref 0–1.3)
MONOCYTES NFR BLD: 12.1 %
NEUTROPHILS # BLD: 3.9 K/UL (ref 1.7–7.7)
NEUTROPHILS NFR BLD: 69.1 %
PERFORMED ON: ABNORMAL
PLATELET # BLD AUTO: 186 K/UL (ref 135–450)
PMV BLD AUTO: 7.7 FL (ref 5–10.5)
POTASSIUM SERPL-SCNC: 3.9 MMOL/L (ref 3.5–5.1)
PROT SERPL-MCNC: 5.9 G/DL (ref 6.4–8.2)
RBC # BLD AUTO: 3.52 M/UL (ref 4.2–5.9)
SODIUM SERPL-SCNC: 135 MMOL/L (ref 136–145)
TROPONIN, HIGH SENSITIVITY: 38 NG/L (ref 0–22)
WBC # BLD AUTO: 5.6 K/UL (ref 4–11)

## 2023-12-02 PROCEDURE — 84484 ASSAY OF TROPONIN QUANT: CPT

## 2023-12-02 PROCEDURE — 0Y6M0ZD DETACHMENT AT RIGHT FOOT, PARTIAL 4TH RAY, OPEN APPROACH: ICD-10-PCS | Performed by: PODIATRIST

## 2023-12-02 PROCEDURE — 6360000002 HC RX W HCPCS: Performed by: PODIATRIST

## 2023-12-02 PROCEDURE — 80053 COMPREHEN METABOLIC PANEL: CPT

## 2023-12-02 PROCEDURE — 6360000002 HC RX W HCPCS: Performed by: INTERNAL MEDICINE

## 2023-12-02 PROCEDURE — 36415 COLL VENOUS BLD VENIPUNCTURE: CPT

## 2023-12-02 PROCEDURE — 3700000000 HC ANESTHESIA ATTENDED CARE: Performed by: PODIATRIST

## 2023-12-02 PROCEDURE — 88305 TISSUE EXAM BY PATHOLOGIST: CPT

## 2023-12-02 PROCEDURE — 73620 X-RAY EXAM OF FOOT: CPT

## 2023-12-02 PROCEDURE — 0Y6M0ZC DETACHMENT AT RIGHT FOOT, PARTIAL 3RD RAY, OPEN APPROACH: ICD-10-PCS | Performed by: PODIATRIST

## 2023-12-02 PROCEDURE — 2500000003 HC RX 250 WO HCPCS: Performed by: PODIATRIST

## 2023-12-02 PROCEDURE — 3600000002 HC SURGERY LEVEL 2 BASE: Performed by: PODIATRIST

## 2023-12-02 PROCEDURE — 99233 SBSQ HOSP IP/OBS HIGH 50: CPT | Performed by: INTERNAL MEDICINE

## 2023-12-02 PROCEDURE — 87077 CULTURE AEROBIC IDENTIFY: CPT

## 2023-12-02 PROCEDURE — 85025 COMPLETE CBC W/AUTO DIFF WBC: CPT

## 2023-12-02 PROCEDURE — 7100000000 HC PACU RECOVERY - FIRST 15 MIN: Performed by: PODIATRIST

## 2023-12-02 PROCEDURE — 2709999900 HC NON-CHARGEABLE SUPPLY: Performed by: PODIATRIST

## 2023-12-02 PROCEDURE — 6360000002 HC RX W HCPCS: Performed by: ANESTHESIOLOGY

## 2023-12-02 PROCEDURE — 88311 DECALCIFY TISSUE: CPT

## 2023-12-02 PROCEDURE — 6370000000 HC RX 637 (ALT 250 FOR IP): Performed by: PODIATRIST

## 2023-12-02 PROCEDURE — 0Y6M0ZB DETACHMENT AT RIGHT FOOT, PARTIAL 2ND RAY, OPEN APPROACH: ICD-10-PCS | Performed by: PODIATRIST

## 2023-12-02 PROCEDURE — 87070 CULTURE OTHR SPECIMN AEROBIC: CPT

## 2023-12-02 PROCEDURE — 2500000003 HC RX 250 WO HCPCS: Performed by: ANESTHESIOLOGY

## 2023-12-02 PROCEDURE — 7100000001 HC PACU RECOVERY - ADDTL 15 MIN: Performed by: PODIATRIST

## 2023-12-02 PROCEDURE — 1200000000 HC SEMI PRIVATE

## 2023-12-02 PROCEDURE — 3600000012 HC SURGERY LEVEL 2 ADDTL 15MIN: Performed by: PODIATRIST

## 2023-12-02 PROCEDURE — 0Y6M0ZF DETACHMENT AT RIGHT FOOT, PARTIAL 5TH RAY, OPEN APPROACH: ICD-10-PCS | Performed by: PODIATRIST

## 2023-12-02 PROCEDURE — 3700000001 HC ADD 15 MINUTES (ANESTHESIA): Performed by: PODIATRIST

## 2023-12-02 PROCEDURE — 87205 SMEAR GRAM STAIN: CPT

## 2023-12-02 PROCEDURE — 83036 HEMOGLOBIN GLYCOSYLATED A1C: CPT

## 2023-12-02 PROCEDURE — 2580000003 HC RX 258: Performed by: ANESTHESIOLOGY

## 2023-12-02 PROCEDURE — 87075 CULTR BACTERIA EXCEPT BLOOD: CPT

## 2023-12-02 PROCEDURE — 99222 1ST HOSP IP/OBS MODERATE 55: CPT | Performed by: INTERNAL MEDICINE

## 2023-12-02 PROCEDURE — 2580000003 HC RX 258: Performed by: PODIATRIST

## 2023-12-02 PROCEDURE — 2580000003 HC RX 258: Performed by: INTERNAL MEDICINE

## 2023-12-02 DEVICE — PATCH AMNION 2 LAYR PROTCT 4 X 4CM STERISHIELD II: Type: IMPLANTABLE DEVICE | Site: FOOT | Status: FUNCTIONAL

## 2023-12-02 RX ORDER — OXYCODONE HYDROCHLORIDE 5 MG/1
10 TABLET ORAL PRN
Status: DISCONTINUED | OUTPATIENT
Start: 2023-12-02 | End: 2023-12-02 | Stop reason: HOSPADM

## 2023-12-02 RX ORDER — INSULIN LISPRO 100 [IU]/ML
0-4 INJECTION, SOLUTION INTRAVENOUS; SUBCUTANEOUS NIGHTLY
Status: DISCONTINUED | OUTPATIENT
Start: 2023-12-02 | End: 2023-12-06 | Stop reason: HOSPADM

## 2023-12-02 RX ORDER — OXYCODONE HYDROCHLORIDE AND ACETAMINOPHEN 5; 325 MG/1; MG/1
1 TABLET ORAL EVERY 4 HOURS PRN
Status: DISCONTINUED | OUTPATIENT
Start: 2023-12-02 | End: 2023-12-06 | Stop reason: HOSPADM

## 2023-12-02 RX ORDER — INSULIN LISPRO 100 [IU]/ML
0-8 INJECTION, SOLUTION INTRAVENOUS; SUBCUTANEOUS
Status: DISCONTINUED | OUTPATIENT
Start: 2023-12-02 | End: 2023-12-06 | Stop reason: HOSPADM

## 2023-12-02 RX ORDER — SODIUM CHLORIDE 9 MG/ML
INJECTION, SOLUTION INTRAVENOUS PRN
Status: DISCONTINUED | OUTPATIENT
Start: 2023-12-02 | End: 2023-12-02 | Stop reason: HOSPADM

## 2023-12-02 RX ORDER — MEPERIDINE HYDROCHLORIDE 25 MG/ML
12.5 INJECTION INTRAMUSCULAR; INTRAVENOUS; SUBCUTANEOUS EVERY 5 MIN PRN
Status: DISCONTINUED | OUTPATIENT
Start: 2023-12-02 | End: 2023-12-02 | Stop reason: HOSPADM

## 2023-12-02 RX ORDER — LABETALOL HYDROCHLORIDE 5 MG/ML
10 INJECTION, SOLUTION INTRAVENOUS
Status: DISCONTINUED | OUTPATIENT
Start: 2023-12-02 | End: 2023-12-02 | Stop reason: HOSPADM

## 2023-12-02 RX ORDER — DIPHENHYDRAMINE HYDROCHLORIDE 50 MG/ML
12.5 INJECTION INTRAMUSCULAR; INTRAVENOUS
Status: DISCONTINUED | OUTPATIENT
Start: 2023-12-02 | End: 2023-12-02 | Stop reason: HOSPADM

## 2023-12-02 RX ORDER — SODIUM CHLORIDE, SODIUM LACTATE, POTASSIUM CHLORIDE, CALCIUM CHLORIDE 600; 310; 30; 20 MG/100ML; MG/100ML; MG/100ML; MG/100ML
INJECTION, SOLUTION INTRAVENOUS CONTINUOUS PRN
Status: DISCONTINUED | OUTPATIENT
Start: 2023-12-02 | End: 2023-12-02 | Stop reason: SDUPTHER

## 2023-12-02 RX ORDER — INSULIN LISPRO 100 [IU]/ML
0-8 INJECTION, SOLUTION INTRAVENOUS; SUBCUTANEOUS
Status: DISCONTINUED | OUTPATIENT
Start: 2023-12-02 | End: 2023-12-02

## 2023-12-02 RX ORDER — LIDOCAINE HYDROCHLORIDE 10 MG/ML
INJECTION, SOLUTION EPIDURAL; INFILTRATION; INTRACAUDAL; PERINEURAL PRN
Status: DISCONTINUED | OUTPATIENT
Start: 2023-12-02 | End: 2023-12-02 | Stop reason: ALTCHOICE

## 2023-12-02 RX ORDER — BUPIVACAINE HYDROCHLORIDE 5 MG/ML
INJECTION, SOLUTION EPIDURAL; INTRACAUDAL PRN
Status: DISCONTINUED | OUTPATIENT
Start: 2023-12-02 | End: 2023-12-02 | Stop reason: ALTCHOICE

## 2023-12-02 RX ORDER — OXYCODONE HYDROCHLORIDE 5 MG/1
5 TABLET ORAL PRN
Status: DISCONTINUED | OUTPATIENT
Start: 2023-12-02 | End: 2023-12-02 | Stop reason: HOSPADM

## 2023-12-02 RX ORDER — METRONIDAZOLE 500 MG/100ML
500 INJECTION, SOLUTION INTRAVENOUS EVERY 8 HOURS
Status: DISCONTINUED | OUTPATIENT
Start: 2023-12-02 | End: 2023-12-06 | Stop reason: HOSPADM

## 2023-12-02 RX ORDER — LIDOCAINE HYDROCHLORIDE 20 MG/ML
INJECTION, SOLUTION INFILTRATION; PERINEURAL PRN
Status: DISCONTINUED | OUTPATIENT
Start: 2023-12-02 | End: 2023-12-02 | Stop reason: SDUPTHER

## 2023-12-02 RX ORDER — SODIUM CHLORIDE 0.9 % (FLUSH) 0.9 %
5-40 SYRINGE (ML) INJECTION EVERY 12 HOURS SCHEDULED
Status: DISCONTINUED | OUTPATIENT
Start: 2023-12-02 | End: 2023-12-02 | Stop reason: HOSPADM

## 2023-12-02 RX ORDER — ONDANSETRON 2 MG/ML
4 INJECTION INTRAMUSCULAR; INTRAVENOUS
Status: DISCONTINUED | OUTPATIENT
Start: 2023-12-02 | End: 2023-12-02 | Stop reason: HOSPADM

## 2023-12-02 RX ORDER — PROPOFOL 10 MG/ML
INJECTION, EMULSION INTRAVENOUS PRN
Status: DISCONTINUED | OUTPATIENT
Start: 2023-12-02 | End: 2023-12-02 | Stop reason: SDUPTHER

## 2023-12-02 RX ORDER — SODIUM CHLORIDE 0.9 % (FLUSH) 0.9 %
5-40 SYRINGE (ML) INJECTION PRN
Status: DISCONTINUED | OUTPATIENT
Start: 2023-12-02 | End: 2023-12-02 | Stop reason: HOSPADM

## 2023-12-02 RX ORDER — OXYCODONE HYDROCHLORIDE AND ACETAMINOPHEN 5; 325 MG/1; MG/1
2 TABLET ORAL EVERY 4 HOURS PRN
Status: DISCONTINUED | OUTPATIENT
Start: 2023-12-02 | End: 2023-12-06 | Stop reason: HOSPADM

## 2023-12-02 RX ORDER — ENOXAPARIN SODIUM 100 MG/ML
30 INJECTION SUBCUTANEOUS NIGHTLY
Status: DISCONTINUED | OUTPATIENT
Start: 2023-12-02 | End: 2023-12-04

## 2023-12-02 RX ADMIN — LIDOCAINE HYDROCHLORIDE 5 ML: 20 INJECTION, SOLUTION INFILTRATION; PERINEURAL at 13:57

## 2023-12-02 RX ADMIN — PROMETHAZINE HYDROCHLORIDE 12.5 MG: 25 TABLET ORAL at 23:21

## 2023-12-02 RX ADMIN — PROPOFOL 40 MG: 10 INJECTION, EMULSION INTRAVENOUS at 14:00

## 2023-12-02 RX ADMIN — METRONIDAZOLE 500 MG: 500 INJECTION, SOLUTION INTRAVENOUS at 06:22

## 2023-12-02 RX ADMIN — PROPOFOL 40 MG: 10 INJECTION, EMULSION INTRAVENOUS at 14:24

## 2023-12-02 RX ADMIN — OXYCODONE AND ACETAMINOPHEN 1 TABLET: 5; 325 TABLET ORAL at 20:28

## 2023-12-02 RX ADMIN — ENOXAPARIN SODIUM 30 MG: 100 INJECTION SUBCUTANEOUS at 20:09

## 2023-12-02 RX ADMIN — SODIUM CHLORIDE: 9 INJECTION, SOLUTION INTRAVENOUS at 11:45

## 2023-12-02 RX ADMIN — PROPOFOL 60 MG: 10 INJECTION, EMULSION INTRAVENOUS at 13:57

## 2023-12-02 RX ADMIN — SODIUM CHLORIDE, POTASSIUM CHLORIDE, SODIUM LACTATE AND CALCIUM CHLORIDE: 600; 310; 30; 20 INJECTION, SOLUTION INTRAVENOUS at 13:54

## 2023-12-02 RX ADMIN — Medication 10 ML: at 11:41

## 2023-12-02 RX ADMIN — Medication 10 ML: at 00:15

## 2023-12-02 RX ADMIN — METRONIDAZOLE 500 MG: 500 INJECTION, SOLUTION INTRAVENOUS at 20:08

## 2023-12-02 RX ADMIN — CEFTRIAXONE SODIUM 2000 MG: 2 INJECTION, POWDER, FOR SOLUTION INTRAMUSCULAR; INTRAVENOUS at 05:29

## 2023-12-02 RX ADMIN — SODIUM CHLORIDE: 0.9 INJECTION, SOLUTION INTRAVENOUS at 00:16

## 2023-12-02 RX ADMIN — VANCOMYCIN HYDROCHLORIDE 750 MG: 750 INJECTION, POWDER, LYOPHILIZED, FOR SOLUTION INTRAVENOUS at 17:13

## 2023-12-02 RX ADMIN — ATORVASTATIN CALCIUM 40 MG: 40 TABLET, FILM COATED ORAL at 20:09

## 2023-12-02 RX ADMIN — METRONIDAZOLE 500 MG: 500 INJECTION, SOLUTION INTRAVENOUS at 11:47

## 2023-12-02 RX ADMIN — PROPOFOL 20 MG: 10 INJECTION, EMULSION INTRAVENOUS at 14:11

## 2023-12-02 RX ADMIN — Medication 3 MG: at 20:09

## 2023-12-02 ASSESSMENT — ENCOUNTER SYMPTOMS: SHORTNESS OF BREATH: 1

## 2023-12-02 ASSESSMENT — PAIN SCALES - GENERAL
PAINLEVEL_OUTOF10: 2
PAINLEVEL_OUTOF10: 0
PAINLEVEL_OUTOF10: 4

## 2023-12-02 ASSESSMENT — PAIN DESCRIPTION - DESCRIPTORS: DESCRIPTORS: DISCOMFORT

## 2023-12-02 ASSESSMENT — PAIN DESCRIPTION - ORIENTATION: ORIENTATION: LOWER

## 2023-12-02 ASSESSMENT — PAIN DESCRIPTION - LOCATION: LOCATION: ABDOMEN

## 2023-12-02 NOTE — ANESTHESIA POSTPROCEDURE EVALUATION
Department of Anesthesiology  Postprocedure Note    Patient: Fadia Talamantes  MRN: [id number]  YOB: 1965  Date of evaluation: 12/2/2023      Procedure Summary       Date: 12/02/23 Room / Location: [address] [place] [address] / [facility]    Anesthesia Start: 1354 Anesthesia Stop:     Procedure: partial right foot amputation (Foot) Diagnosis:       Diabetic foot ulcer with osteomyelitis ([address])      (diabetic foot ulcer with osteomyelitis)    Surgeons: Pancho Angel DPM Responsible Provider: Nahum Pillai MD    Anesthesia Type: MAC ASA Status: 3            Anesthesia Type: No value filed. Stephan Phase I: Stephan Score: 10    Stephan Phase II: Stephan Score: 10      Anesthesia Post Evaluation    Patient location during evaluation: PACU  Patient participation: complete - patient participated  Level of consciousness: awake and alert  Airway patency: patent  Nausea & Vomiting: no nausea and no vomiting  Complications: no  Cardiovascular status: blood pressure returned to baseline  Respiratory status: acceptable  Hydration status: euvolemic  Comments: VSS on transfer to phase 2 recovery. No anesthetic complications.   Pain management: adequate

## 2023-12-02 NOTE — FLOWSHEET NOTE
12/02/23 1602   Whiteboard info   Activity Up as tolerated   Precautions   Precautions None   Safe Environment   Arm Bands On ID   Overbed Table Within Reach Yes   Safety Measures Standard Safety Measures   Fall Risk Interventions   Toilet Every 2 Hours-In Advance of Need No (Comment)   Hourly Visual Checks In bed;Quiet; Awake   Room Door Open Deferred to promote rest   Alarm On Other (Comment)  (independent/low fall risk)   Mobility   Level of Assistance Independent   Assistive Device None   Repositioned Turns self   Head of Bed Elevated  Self regulated   Range of Motion Active; All extremities   Transport Method Ambulatory; Wheelchair   Anti-Embolism Intervention Medication  (asa)   Hygiene   Level of Assistance Independent   Skin Care Chlorhexidine wipes   Nutrition   Feeding Able to feed self  (cc)   Tolerating PO Fluids Yes     From PACU. A/O x 4. Denies pain. Orders release post op. Dietary notified. Ice water given. Walking boot at bedside. Up as tolerated. Bed locked/lowest position. Call light within reach.

## 2023-12-02 NOTE — BRIEF OP NOTE
Brief Postoperative Note      Patient: Ben Beth  YOB: 1965  MRN: [id number]    Date of Procedure: 12/2/2023    Pre-Op Diagnosis Codes:     * Diabetic foot ulcer with osteomyelitis ([address]) [E11.621, E11.69, L97.509, M86.9]    Post-Op Diagnosis: Same       Procedure(s):  partial right foot amputation    Surgeon(s):  Gerson Holguin DPM    Assistant:  Surgical Assistant: Francisco Cortez    Anesthesia: General    Estimated Blood Loss (mL): less than 461     Complications: None    Specimens:   ID Type Source Tests Collected by Time Destination   1 : right foot culture Tissue Tissue CULTURE, ANAEROBIC AND AEROBIC Gerson Holguin DPM 12/2/2023 1407    A : partial right foot Tissue Tissue SURGICAL PATHOLOGY Gerson Holguin DPM 12/2/2023 1411        Implants:  Implant Name Type Inv.  Item Serial No.  Lot No. LRB No. Used Action   PATCH AMNION 2 LAYR PROTCT 4 X 4CM STERISHIELD II - [id number]  PATCH AMNION 2 LAYR PROTCT 4 X 4CM STERISHIELD II  [employer] [id number] Right 1 Implanted   PATCH AMNION 2 LAYR PROTCT 4 X 4CM STERISHIELD II - [id number]  PATCH AMNION 2 LAYR PROTCT 4 X 4CM STERISHIELD II  [employer] [id number] Right 1 Implanted         Drains: * No LDAs found *    Findings: ulcer right foot, osteomyelitic bone      Electronically signed by Gerson Holguin DPM on 12/2/2023 at 2:47 PM

## 2023-12-02 NOTE — H&P
Hospital Medicine History & Physical      PCP: JOSE Santana - CNP    Date of Admission: 12/1/2023    Date of Service: Pt seen/examined on 12/1/2023    Pt seen/examined face to face on and admitted as inpatient with expected LOS to be two days but can change depending on diagnostic work up and treatment response. Chief Complaint:    Chief Complaint   Patient presents with    Wound Check     Pt states he has wound to rt foot, states he is diabetic and has already had one toe removed 4 years ago. States erythema present for 2 days. History Of Present Illness:      62 y.o. male who presented to [facility] with past medical history of hypertension, cigarette smoking however has quit, hypothyroidism, presented ED with chief complaint of right foot worsening    Patient reported that he is seeing podiatry for his right foot has progressively worsened the past 2 days was initially redness at the tip and now has gone across his foot. Patient reports that the wound was probed by the podiatrist and noted that it was superficial.  No known alleviating or exacerbating factor no associate with fever chills nausea vomiting chest pain shortness of breath abdominal pain or dysuria. Patient reports that his left foot had similar outcome and that he had to have all toe amputation.       Past Medical History:          Diagnosis Date    Acute on chronic systolic congestive heart failure ([address]) 07/08/2013    Acute osteomyelitis of left foot ([address]) 09/27/2017    Acute osteomyelitis of right foot ([address]) 04/25/2016    Acute respiratory failure ([address]) 08/04/2020    Ascites     Blood transfusion reaction     Burst fracture of lumbar vertebra ([address]) 11/09/2012    Cellulitis of left foot     Cellulitis of right lower extremity 2/16, 5/16    Chronic systolic CHF (congestive heart failure) ([address])     Community acquired pneumonia     Diabetes ([address])     Diabetic ulcer of left foot associated with type 2 diabetes mellitus,

## 2023-12-03 LAB
BASOPHILS # BLD: 0 K/UL (ref 0–0.2)
BASOPHILS NFR BLD: 0.5 %
DEPRECATED RDW RBC AUTO: 17.5 % (ref 12.4–15.4)
EOSINOPHIL # BLD: 0 K/UL (ref 0–0.6)
EOSINOPHIL NFR BLD: 0.5 %
GLUCOSE BLD-MCNC: 142 MG/DL (ref 70–99)
GLUCOSE BLD-MCNC: 147 MG/DL (ref 70–99)
GLUCOSE BLD-MCNC: 164 MG/DL (ref 70–99)
GLUCOSE BLD-MCNC: 164 MG/DL (ref 70–99)
GLUCOSE BLD-MCNC: 172 MG/DL (ref 70–99)
GLUCOSE BLD-MCNC: 212 MG/DL (ref 70–99)
HCT VFR BLD AUTO: 31.6 % (ref 40.5–52.5)
HGB BLD-MCNC: 10.5 G/DL (ref 13.5–17.5)
LYMPHOCYTES # BLD: 1.1 K/UL (ref 1–5.1)
LYMPHOCYTES NFR BLD: 15.6 %
MCH RBC QN AUTO: 28.9 PG (ref 26–34)
MCHC RBC AUTO-ENTMCNC: 33.2 G/DL (ref 31–36)
MCV RBC AUTO: 87 FL (ref 80–100)
MONOCYTES # BLD: 0.9 K/UL (ref 0–1.3)
MONOCYTES NFR BLD: 12.8 %
NEUTROPHILS # BLD: 5.1 K/UL (ref 1.7–7.7)
NEUTROPHILS NFR BLD: 70.6 %
PERFORMED ON: ABNORMAL
PLATELET # BLD AUTO: 199 K/UL (ref 135–450)
PMV BLD AUTO: 7.7 FL (ref 5–10.5)
RBC # BLD AUTO: 3.63 M/UL (ref 4.2–5.9)
VANCOMYCIN TROUGH SERPL-MCNC: 17.4 UG/ML (ref 10–20)
WBC # BLD AUTO: 7.2 K/UL (ref 4–11)

## 2023-12-03 PROCEDURE — 85025 COMPLETE CBC W/AUTO DIFF WBC: CPT

## 2023-12-03 PROCEDURE — 6370000000 HC RX 637 (ALT 250 FOR IP): Performed by: INTERNAL MEDICINE

## 2023-12-03 PROCEDURE — 2580000003 HC RX 258: Performed by: PODIATRIST

## 2023-12-03 PROCEDURE — 87086 URINE CULTURE/COLONY COUNT: CPT

## 2023-12-03 PROCEDURE — 36415 COLL VENOUS BLD VENIPUNCTURE: CPT

## 2023-12-03 PROCEDURE — 6370000000 HC RX 637 (ALT 250 FOR IP): Performed by: PODIATRIST

## 2023-12-03 PROCEDURE — 6360000002 HC RX W HCPCS: Performed by: PODIATRIST

## 2023-12-03 PROCEDURE — 80202 ASSAY OF VANCOMYCIN: CPT

## 2023-12-03 PROCEDURE — 99233 SBSQ HOSP IP/OBS HIGH 50: CPT | Performed by: INTERNAL MEDICINE

## 2023-12-03 PROCEDURE — 1200000000 HC SEMI PRIVATE

## 2023-12-03 RX ORDER — CARVEDILOL 6.25 MG/1
12.5 TABLET ORAL 2 TIMES DAILY WITH MEALS
Status: DISCONTINUED | OUTPATIENT
Start: 2023-12-03 | End: 2023-12-06 | Stop reason: HOSPADM

## 2023-12-03 RX ORDER — ALLOPURINOL 100 MG/1
100 TABLET ORAL DAILY
Status: DISCONTINUED | OUTPATIENT
Start: 2023-12-03 | End: 2023-12-06 | Stop reason: HOSPADM

## 2023-12-03 RX ORDER — LEVOTHYROXINE SODIUM 0.12 MG/1
125 TABLET ORAL DAILY
Status: DISCONTINUED | OUTPATIENT
Start: 2023-12-03 | End: 2023-12-06 | Stop reason: HOSPADM

## 2023-12-03 RX ORDER — LOSARTAN POTASSIUM 25 MG/1
25 TABLET ORAL DAILY
Status: DISCONTINUED | OUTPATIENT
Start: 2023-12-03 | End: 2023-12-06 | Stop reason: HOSPADM

## 2023-12-03 RX ORDER — FUROSEMIDE 40 MG/1
80 TABLET ORAL 2 TIMES DAILY
Status: DISCONTINUED | OUTPATIENT
Start: 2023-12-03 | End: 2023-12-06 | Stop reason: HOSPADM

## 2023-12-03 RX ORDER — LANOLIN ALCOHOL/MO/W.PET/CERES
400 CREAM (GRAM) TOPICAL 2 TIMES DAILY
Status: DISCONTINUED | OUTPATIENT
Start: 2023-12-03 | End: 2023-12-06 | Stop reason: HOSPADM

## 2023-12-03 RX ADMIN — ATORVASTATIN CALCIUM 40 MG: 40 TABLET, FILM COATED ORAL at 20:54

## 2023-12-03 RX ADMIN — Medication 400 MG: at 23:27

## 2023-12-03 RX ADMIN — Medication 3 MG: at 20:54

## 2023-12-03 RX ADMIN — SPIRONOLACTONE 25 MG: 25 TABLET ORAL at 09:25

## 2023-12-03 RX ADMIN — CLOPIDOGREL BISULFATE 75 MG: 75 TABLET ORAL at 09:25

## 2023-12-03 RX ADMIN — METRONIDAZOLE 500 MG: 500 INJECTION, SOLUTION INTRAVENOUS at 11:03

## 2023-12-03 RX ADMIN — EMPAGLIFLOZIN 10 MG: 10 TABLET, FILM COATED ORAL at 12:55

## 2023-12-03 RX ADMIN — CARVEDILOL 12.5 MG: 6.25 TABLET, FILM COATED ORAL at 16:48

## 2023-12-03 RX ADMIN — LOSARTAN POTASSIUM 25 MG: 25 TABLET, FILM COATED ORAL at 12:54

## 2023-12-03 RX ADMIN — Medication 400 MG: at 12:55

## 2023-12-03 RX ADMIN — METRONIDAZOLE 500 MG: 500 INJECTION, SOLUTION INTRAVENOUS at 05:36

## 2023-12-03 RX ADMIN — Medication 10 ML: at 20:55

## 2023-12-03 RX ADMIN — VANCOMYCIN HYDROCHLORIDE 750 MG: 750 INJECTION, POWDER, LYOPHILIZED, FOR SOLUTION INTRAVENOUS at 07:22

## 2023-12-03 RX ADMIN — ENOXAPARIN SODIUM 30 MG: 100 INJECTION SUBCUTANEOUS at 20:55

## 2023-12-03 RX ADMIN — METFORMIN HYDROCHLORIDE 1000 MG: 500 TABLET ORAL at 16:48

## 2023-12-03 RX ADMIN — INSULIN GLARGINE 20 UNITS: 100 INJECTION, SOLUTION SUBCUTANEOUS at 09:25

## 2023-12-03 RX ADMIN — Medication 10 ML: at 09:25

## 2023-12-03 RX ADMIN — ASPIRIN 81 MG: 81 TABLET, COATED ORAL at 09:25

## 2023-12-03 RX ADMIN — METRONIDAZOLE 500 MG: 500 INJECTION, SOLUTION INTRAVENOUS at 20:54

## 2023-12-03 RX ADMIN — OXYCODONE AND ACETAMINOPHEN 1 TABLET: 5; 325 TABLET ORAL at 21:06

## 2023-12-03 RX ADMIN — PROMETHAZINE HYDROCHLORIDE 12.5 MG: 25 TABLET ORAL at 21:06

## 2023-12-03 RX ADMIN — FUROSEMIDE 80 MG: 40 TABLET ORAL at 16:48

## 2023-12-03 RX ADMIN — LEVOTHYROXINE SODIUM 125 MCG: 125 TABLET ORAL at 12:55

## 2023-12-03 RX ADMIN — INSULIN LISPRO 2 UNITS: 100 INJECTION, SOLUTION INTRAVENOUS; SUBCUTANEOUS at 11:07

## 2023-12-03 RX ADMIN — CEFTRIAXONE SODIUM 2000 MG: 2 INJECTION, POWDER, FOR SOLUTION INTRAMUSCULAR; INTRAVENOUS at 05:36

## 2023-12-03 RX ADMIN — ALLOPURINOL 100 MG: 100 TABLET ORAL at 12:55

## 2023-12-03 ASSESSMENT — PAIN DESCRIPTION - ORIENTATION: ORIENTATION: LOWER

## 2023-12-03 ASSESSMENT — PAIN SCALES - GENERAL
PAINLEVEL_OUTOF10: 6
PAINLEVEL_OUTOF10: 6
PAINLEVEL_OUTOF10: 0

## 2023-12-03 ASSESSMENT — PAIN DESCRIPTION - PAIN TYPE: TYPE: ACUTE PAIN

## 2023-12-03 ASSESSMENT — PAIN DESCRIPTION - LOCATION: LOCATION: ABDOMEN

## 2023-12-03 ASSESSMENT — PAIN DESCRIPTION - DESCRIPTORS: DESCRIPTORS: DISCOMFORT

## 2023-12-03 ASSESSMENT — PAIN - FUNCTIONAL ASSESSMENT: PAIN_FUNCTIONAL_ASSESSMENT: PREVENTS OR INTERFERES SOME ACTIVE ACTIVITIES AND ADLS

## 2023-12-03 NOTE — OP NOTE
[facility],[address], [address]                                OPERATIVE REPORT    PATIENT NAME: Sonya Celestin                    :        1965  MED REC NO:   [id number]                          ROOM:       0201  ACCOUNT NO:   [de-identified]                           ADMIT DATE: 2023  PROVIDER:     Landry Bond DPM    DATE OF PROCEDURE:  2023    SURGEON:  Landry Bond DPM    ANESTHESIA:  Local with MAC. PREOPERATIVE DIAGNOSES:  1. Diabetic foot ulceration, right. 2.  Osteomyelitis, right foot. 3.  Diabetes mellitus. POSTOPERATIVE DIAGNOSES:  1. Diabetic foot ulceration, right. 2.  Osteomyelitis, right foot. 3.  Diabetes mellitus. PROCEDURE PERFORMED:  Right foot amputation. HEMOSTASIS:  Ankle tourniquet at 250 mmHg. ESTIMATED BLOOD LOSS:  Less than 100 mL. REPORT OF OPERATION:  The patient was brought into the operating room  and placed on the operating table in supine position. Under mild IV  sedation, the right forefoot was anesthetized with 20 mL of 1:1 mixture  of 1% lidocaine plain and 0.5% Marcaine plain. The foot was then  scrubbed, prepped, and draped in the usual sterile manner. Esmarch was  utilized to exsanguinate the right foot. The ankle tourniquet was then  inflated to 250 mmHg. Attention was then directed to the right foot where the ulceration was  identified on the plantar aspect of the right forefoot. There was  periwound erythema and edema of the forefoot consistent with its  preoperative diagnosis of cellulitis or skin infection. There was also  purulence noted deep within the ulcer bed of the forefoot and this was  compressed revealing further purulence. This was consistent with the  preoperative diagnosis of a deep space infection.   This was also probed  to bone which was softer to palpation and was later found to be  discovered consistent with the preoperative

## 2023-12-04 LAB
ANION GAP SERPL CALCULATED.3IONS-SCNC: 10 MMOL/L (ref 3–16)
BACTERIA UR CULT: NORMAL
BUN SERPL-MCNC: 41 MG/DL (ref 7–20)
CALCIUM SERPL-MCNC: 8 MG/DL (ref 8.3–10.6)
CHLORIDE SERPL-SCNC: 101 MMOL/L (ref 99–110)
CO2 SERPL-SCNC: 24 MMOL/L (ref 21–32)
CREAT SERPL-MCNC: 1.2 MG/DL (ref 0.9–1.3)
EST. AVERAGE GLUCOSE BLD GHB EST-MCNC: 137 MG/DL
GFR SERPLBLD CREATININE-BSD FMLA CKD-EPI: >60 ML/MIN/{1.73_M2}
GLUCOSE BLD-MCNC: 146 MG/DL (ref 70–99)
GLUCOSE BLD-MCNC: 146 MG/DL (ref 70–99)
GLUCOSE BLD-MCNC: 197 MG/DL (ref 70–99)
GLUCOSE BLD-MCNC: 91 MG/DL (ref 70–99)
GLUCOSE SERPL-MCNC: 107 MG/DL (ref 70–99)
HBA1C MFR BLD: 6.4 %
PERFORMED ON: ABNORMAL
PERFORMED ON: NORMAL
POTASSIUM SERPL-SCNC: 3.8 MMOL/L (ref 3.5–5.1)
SODIUM SERPL-SCNC: 135 MMOL/L (ref 136–145)

## 2023-12-04 PROCEDURE — 6370000000 HC RX 637 (ALT 250 FOR IP): Performed by: INTERNAL MEDICINE

## 2023-12-04 PROCEDURE — 80048 BASIC METABOLIC PNL TOTAL CA: CPT

## 2023-12-04 PROCEDURE — 6370000000 HC RX 637 (ALT 250 FOR IP): Performed by: PODIATRIST

## 2023-12-04 PROCEDURE — 36415 COLL VENOUS BLD VENIPUNCTURE: CPT

## 2023-12-04 PROCEDURE — 99232 SBSQ HOSP IP/OBS MODERATE 35: CPT | Performed by: NURSE PRACTITIONER

## 2023-12-04 PROCEDURE — 2580000003 HC RX 258: Performed by: INTERNAL MEDICINE

## 2023-12-04 PROCEDURE — 2580000003 HC RX 258: Performed by: PODIATRIST

## 2023-12-04 PROCEDURE — 6360000002 HC RX W HCPCS: Performed by: INTERNAL MEDICINE

## 2023-12-04 PROCEDURE — 1200000000 HC SEMI PRIVATE

## 2023-12-04 PROCEDURE — 6360000002 HC RX W HCPCS: Performed by: PODIATRIST

## 2023-12-04 PROCEDURE — 99232 SBSQ HOSP IP/OBS MODERATE 35: CPT

## 2023-12-04 RX ORDER — ENOXAPARIN SODIUM 100 MG/ML
40 INJECTION SUBCUTANEOUS NIGHTLY
Status: DISCONTINUED | OUTPATIENT
Start: 2023-12-04 | End: 2023-12-06 | Stop reason: HOSPADM

## 2023-12-04 RX ADMIN — METFORMIN HYDROCHLORIDE 1000 MG: 500 TABLET ORAL at 08:29

## 2023-12-04 RX ADMIN — Medication 400 MG: at 08:29

## 2023-12-04 RX ADMIN — ALLOPURINOL 100 MG: 100 TABLET ORAL at 08:29

## 2023-12-04 RX ADMIN — VANCOMYCIN HYDROCHLORIDE 1000 MG: 1 INJECTION, POWDER, LYOPHILIZED, FOR SOLUTION INTRAVENOUS at 06:01

## 2023-12-04 RX ADMIN — CARVEDILOL 12.5 MG: 6.25 TABLET, FILM COATED ORAL at 08:29

## 2023-12-04 RX ADMIN — LOSARTAN POTASSIUM 25 MG: 25 TABLET, FILM COATED ORAL at 08:29

## 2023-12-04 RX ADMIN — CEFTRIAXONE SODIUM 2000 MG: 2 INJECTION, POWDER, FOR SOLUTION INTRAMUSCULAR; INTRAVENOUS at 04:36

## 2023-12-04 RX ADMIN — Medication 10 ML: at 20:27

## 2023-12-04 RX ADMIN — METRONIDAZOLE 500 MG: 500 INJECTION, SOLUTION INTRAVENOUS at 20:34

## 2023-12-04 RX ADMIN — METRONIDAZOLE 500 MG: 500 INJECTION, SOLUTION INTRAVENOUS at 11:52

## 2023-12-04 RX ADMIN — Medication 400 MG: at 20:26

## 2023-12-04 RX ADMIN — CARVEDILOL 12.5 MG: 6.25 TABLET, FILM COATED ORAL at 16:51

## 2023-12-04 RX ADMIN — CLOPIDOGREL BISULFATE 75 MG: 75 TABLET ORAL at 08:29

## 2023-12-04 RX ADMIN — FUROSEMIDE 80 MG: 40 TABLET ORAL at 08:29

## 2023-12-04 RX ADMIN — METRONIDAZOLE 500 MG: 500 INJECTION, SOLUTION INTRAVENOUS at 04:42

## 2023-12-04 RX ADMIN — ATORVASTATIN CALCIUM 40 MG: 40 TABLET, FILM COATED ORAL at 20:26

## 2023-12-04 RX ADMIN — ENOXAPARIN SODIUM 40 MG: 100 INJECTION SUBCUTANEOUS at 21:01

## 2023-12-04 RX ADMIN — Medication 3 MG: at 20:26

## 2023-12-04 RX ADMIN — INSULIN GLARGINE 20 UNITS: 100 INJECTION, SOLUTION SUBCUTANEOUS at 08:29

## 2023-12-04 RX ADMIN — ASPIRIN 81 MG: 81 TABLET, COATED ORAL at 08:29

## 2023-12-04 RX ADMIN — EMPAGLIFLOZIN 10 MG: 10 TABLET, FILM COATED ORAL at 08:29

## 2023-12-04 RX ADMIN — LEVOTHYROXINE SODIUM 125 MCG: 125 TABLET ORAL at 06:05

## 2023-12-04 RX ADMIN — FUROSEMIDE 80 MG: 40 TABLET ORAL at 16:51

## 2023-12-04 RX ADMIN — METFORMIN HYDROCHLORIDE 1000 MG: 500 TABLET ORAL at 16:51

## 2023-12-04 RX ADMIN — Medication 10 ML: at 08:31

## 2023-12-04 ASSESSMENT — ENCOUNTER SYMPTOMS
RESPIRATORY NEGATIVE: 1
GASTROINTESTINAL NEGATIVE: 1

## 2023-12-04 ASSESSMENT — PAIN SCALES - GENERAL: PAINLEVEL_OUTOF10: 2

## 2023-12-04 ASSESSMENT — PAIN DESCRIPTION - LOCATION: LOCATION: FOOT

## 2023-12-04 NOTE — CARE COORDINATION
Case Management Assessment  Initial Evaluation    Date/Time of Evaluation: 12/4/2023 9:21 AM  Assessment Completed by: [place], [place]    If patient is discharged prior to next notation, then this note serves as note for discharge by case management. Patient Name: Kay Dove                   YOB: 1965  Diagnosis: Diabetic foot ([address]) [E11.8]  Diabetic foot infection ([address]) [E11.628, L08.9]                   Date / Time: 12/1/2023  2:57 PM    Patient Admission Status: Inpatient   Readmission Risk (Low < 19, Mod (19-27), High > 27): Readmission Risk Score: 15.5    Current PCP: JOSE Woodward CNP  PCP verified by CM? (P) Yes    Chart Reviewed: Yes      History Provided by: (P) Patient  Patient Orientation: (P) Alert and Oriented, Person, Place, Situation, Self    Patient Cognition: (P) Alert    Hospitalization in the last 30 days (Readmission):  No    If yes, Readmission Assessment in CM Navigator will be completed.     Advance Directives:      Code Status: Full Code   Patient's Primary Decision Maker is: (P) Legal Next of Kin      Discharge Planning:    Patient lives with: Parent Type of Home: House  Primary Care Giver: (P) Self  Patient Support Systems include: (P) Parent, Family Members   Current Financial resources: (P) None  Current community resources: (P) None  Current services prior to admission: None            Current DME:              Type of Home Care services:  None    ADLS  Prior functional level: (P) Independent in ADLs/IADLs  Current functional level: (P) Mobility, Other (see comment)    PT AM-PAC:   /24  OT AM-PAC:   /24    Family can provide assistance at DC: (P) Yes  Would you like Case Management to discuss the discharge plan with any other family members/significant others, and if so, who? (P) No  Plans to Return to Present Housing: (P) Yes  Other Identified Issues/Barriers to RETURNING to current housing: none identified currently  Potential Assistance needed at

## 2023-12-04 NOTE — DISCHARGE INSTRUCTIONS
Your information:  Name: Garett Morin  : 1965    Your instructions: Follow instructions below. What to do after you leave the hospital:    Recommended diet: diabetic diet    Recommended activity: activity as tolerated        The following personal items were collected during your admission and were returned to you:    Belongings  Dental Appliances: None  Vision - Corrective Lenses: Eyeglasses (in car)  Hearing Aid: None  Clothing:  (gown only)  Jewelry: None  Body Piercings Removed: N/A  Electronic Devices: None  Weapons (Notify Protective Services/Security): None  Other Valuables: [address], Sent home  Home Medications: None  Valuables Given To: Other (Comment) (belongings in room)  Provide Name(s) of Who Valuable(s) Were Given To: patient has belongings at bedside, brother took home wallet  Responsible person(s) in the waiting room: no family present  Patient approves for provider to speak to responsible person post operatively: Yes    Information obtained by:  By signing below, I understand that if any problems occur once I leave the hospital I am to contact MD.  I understand and acknowledge receipt of the instructions indicated above. Heart Failure Resources:  Heart Failure Interactive Workbook:  Go to https://[employer]. [employer]/publication/?p=111543 for a Free Heart Failure Interactive Workbook provided by [employer]. This interactive workbook will provide information on Healthier Living with Heart Failure. Please copy and paste link into search bar. Use your mouse to scroll through the pages. HF Wray sayda:   Heart Failure Free smart phone sayda available for iPhone and Android download. Use your phone to track sodium intake, fluid intake, symptoms, and weight. Low Sodium Diet / Recipes:  Go to www. [employer]. org website for Automatic Data which is Low Sodium! [employer] is a dialysis company, but this website offers free seasonal cookbooks.  Each quarter, they will

## 2023-12-05 LAB
ANION GAP SERPL CALCULATED.3IONS-SCNC: 9 MMOL/L (ref 3–16)
BACTERIA BLD CULT ORG #2: NORMAL
BACTERIA BLD CULT: NORMAL
BASOPHILS # BLD: 0.1 K/UL (ref 0–0.2)
BASOPHILS NFR BLD: 1.9 %
BUN SERPL-MCNC: 30 MG/DL (ref 7–20)
CALCIUM SERPL-MCNC: 7.9 MG/DL (ref 8.3–10.6)
CHLORIDE SERPL-SCNC: 103 MMOL/L (ref 99–110)
CO2 SERPL-SCNC: 25 MMOL/L (ref 21–32)
CREAT SERPL-MCNC: 1 MG/DL (ref 0.9–1.3)
DEPRECATED RDW RBC AUTO: 18.3 % (ref 12.4–15.4)
EOSINOPHIL # BLD: 0.1 K/UL (ref 0–0.6)
EOSINOPHIL NFR BLD: 2.2 %
GFR SERPLBLD CREATININE-BSD FMLA CKD-EPI: >60 ML/MIN/{1.73_M2}
GLUCOSE BLD-MCNC: 112 MG/DL (ref 70–99)
GLUCOSE BLD-MCNC: 176 MG/DL (ref 70–99)
GLUCOSE BLD-MCNC: 198 MG/DL (ref 70–99)
GLUCOSE BLD-MCNC: 211 MG/DL (ref 70–99)
GLUCOSE SERPL-MCNC: 123 MG/DL (ref 70–99)
HCT VFR BLD AUTO: 33.6 % (ref 40.5–52.5)
HGB BLD-MCNC: 10.9 G/DL (ref 13.5–17.5)
LYMPHOCYTES # BLD: 0.6 K/UL (ref 1–5.1)
LYMPHOCYTES NFR BLD: 12.2 %
MAGNESIUM SERPL-MCNC: 2 MG/DL (ref 1.8–2.4)
MCH RBC QN AUTO: 28.4 PG (ref 26–34)
MCHC RBC AUTO-ENTMCNC: 32.5 G/DL (ref 31–36)
MCV RBC AUTO: 87.6 FL (ref 80–100)
MONOCYTES # BLD: 0.5 K/UL (ref 0–1.3)
MONOCYTES NFR BLD: 9 %
NEUTROPHILS # BLD: 3.7 K/UL (ref 1.7–7.7)
NEUTROPHILS NFR BLD: 74.7 %
PERFORMED ON: ABNORMAL
PLATELET # BLD AUTO: 195 K/UL (ref 135–450)
PMV BLD AUTO: 7.6 FL (ref 5–10.5)
POTASSIUM SERPL-SCNC: 3.3 MMOL/L (ref 3.5–5.1)
RBC # BLD AUTO: 3.83 M/UL (ref 4.2–5.9)
SODIUM SERPL-SCNC: 137 MMOL/L (ref 136–145)
VANCOMYCIN TROUGH SERPL-MCNC: 10.5 UG/ML (ref 10–20)
WBC # BLD AUTO: 5 K/UL (ref 4–11)

## 2023-12-05 PROCEDURE — 36415 COLL VENOUS BLD VENIPUNCTURE: CPT

## 2023-12-05 PROCEDURE — 80048 BASIC METABOLIC PNL TOTAL CA: CPT

## 2023-12-05 PROCEDURE — 83735 ASSAY OF MAGNESIUM: CPT

## 2023-12-05 PROCEDURE — 1200000000 HC SEMI PRIVATE

## 2023-12-05 PROCEDURE — 2580000003 HC RX 258: Performed by: INTERNAL MEDICINE

## 2023-12-05 PROCEDURE — 80202 ASSAY OF VANCOMYCIN: CPT

## 2023-12-05 PROCEDURE — 6370000000 HC RX 637 (ALT 250 FOR IP): Performed by: PODIATRIST

## 2023-12-05 PROCEDURE — 2580000003 HC RX 258: Performed by: PODIATRIST

## 2023-12-05 PROCEDURE — 6360000002 HC RX W HCPCS: Performed by: INTERNAL MEDICINE

## 2023-12-05 PROCEDURE — 6360000002 HC RX W HCPCS: Performed by: PODIATRIST

## 2023-12-05 PROCEDURE — 6370000000 HC RX 637 (ALT 250 FOR IP): Performed by: INTERNAL MEDICINE

## 2023-12-05 PROCEDURE — 85025 COMPLETE CBC W/AUTO DIFF WBC: CPT

## 2023-12-05 PROCEDURE — 99232 SBSQ HOSP IP/OBS MODERATE 35: CPT | Performed by: INTERNAL MEDICINE

## 2023-12-05 RX ORDER — POTASSIUM CHLORIDE 20 MEQ/1
20 TABLET, EXTENDED RELEASE ORAL
Status: DISCONTINUED | OUTPATIENT
Start: 2023-12-05 | End: 2023-12-06 | Stop reason: HOSPADM

## 2023-12-05 RX ADMIN — CLOPIDOGREL BISULFATE 75 MG: 75 TABLET ORAL at 08:35

## 2023-12-05 RX ADMIN — VANCOMYCIN HYDROCHLORIDE 750 MG: 750 INJECTION, POWDER, LYOPHILIZED, FOR SOLUTION INTRAVENOUS at 08:41

## 2023-12-05 RX ADMIN — VANCOMYCIN HYDROCHLORIDE 750 MG: 750 INJECTION, POWDER, LYOPHILIZED, FOR SOLUTION INTRAVENOUS at 20:57

## 2023-12-05 RX ADMIN — INSULIN GLARGINE 20 UNITS: 100 INJECTION, SOLUTION SUBCUTANEOUS at 08:35

## 2023-12-05 RX ADMIN — CEFTRIAXONE SODIUM 2000 MG: 2 INJECTION, POWDER, FOR SOLUTION INTRAMUSCULAR; INTRAVENOUS at 04:00

## 2023-12-05 RX ADMIN — FUROSEMIDE 80 MG: 40 TABLET ORAL at 08:35

## 2023-12-05 RX ADMIN — Medication 3 MG: at 20:51

## 2023-12-05 RX ADMIN — CARVEDILOL 12.5 MG: 6.25 TABLET, FILM COATED ORAL at 08:35

## 2023-12-05 RX ADMIN — Medication 10 ML: at 08:43

## 2023-12-05 RX ADMIN — ATORVASTATIN CALCIUM 40 MG: 40 TABLET, FILM COATED ORAL at 20:51

## 2023-12-05 RX ADMIN — METFORMIN HYDROCHLORIDE 1000 MG: 500 TABLET ORAL at 08:35

## 2023-12-05 RX ADMIN — METRONIDAZOLE 500 MG: 500 INJECTION, SOLUTION INTRAVENOUS at 04:00

## 2023-12-05 RX ADMIN — ALLOPURINOL 100 MG: 100 TABLET ORAL at 08:35

## 2023-12-05 RX ADMIN — EMPAGLIFLOZIN 10 MG: 10 TABLET, FILM COATED ORAL at 08:35

## 2023-12-05 RX ADMIN — ASPIRIN 81 MG: 81 TABLET, COATED ORAL at 08:35

## 2023-12-05 RX ADMIN — METRONIDAZOLE 500 MG: 500 INJECTION, SOLUTION INTRAVENOUS at 20:55

## 2023-12-05 RX ADMIN — Medication 400 MG: at 08:35

## 2023-12-05 RX ADMIN — FUROSEMIDE 80 MG: 40 TABLET ORAL at 17:40

## 2023-12-05 RX ADMIN — ENOXAPARIN SODIUM 40 MG: 100 INJECTION SUBCUTANEOUS at 20:52

## 2023-12-05 RX ADMIN — POTASSIUM CHLORIDE 20 MEQ: 1500 TABLET, EXTENDED RELEASE ORAL at 12:29

## 2023-12-05 RX ADMIN — CARVEDILOL 12.5 MG: 6.25 TABLET, FILM COATED ORAL at 17:40

## 2023-12-05 RX ADMIN — METRONIDAZOLE 500 MG: 500 INJECTION, SOLUTION INTRAVENOUS at 12:32

## 2023-12-05 RX ADMIN — METFORMIN HYDROCHLORIDE 1000 MG: 500 TABLET ORAL at 17:40

## 2023-12-05 RX ADMIN — Medication 400 MG: at 20:51

## 2023-12-05 RX ADMIN — LOSARTAN POTASSIUM 25 MG: 25 TABLET, FILM COATED ORAL at 08:35

## 2023-12-05 RX ADMIN — LEVOTHYROXINE SODIUM 125 MCG: 125 TABLET ORAL at 08:35

## 2023-12-05 RX ADMIN — Medication 10 ML: at 20:52

## 2023-12-05 ASSESSMENT — PAIN SCALES - GENERAL: PAINLEVEL_OUTOF10: 0

## 2023-12-06 VITALS
DIASTOLIC BLOOD PRESSURE: 70 MMHG | RESPIRATION RATE: 16 BRPM | TEMPERATURE: 97.5 F | SYSTOLIC BLOOD PRESSURE: 117 MMHG | WEIGHT: 206 LBS | HEIGHT: 69 IN | BODY MASS INDEX: 30.51 KG/M2 | OXYGEN SATURATION: 97 % | HEART RATE: 77 BPM

## 2023-12-06 LAB
ANION GAP SERPL CALCULATED.3IONS-SCNC: 9 MMOL/L (ref 3–16)
BASOPHILS # BLD: 0 K/UL (ref 0–0.2)
BASOPHILS NFR BLD: 1 %
BUN SERPL-MCNC: 20 MG/DL (ref 7–20)
CALCIUM SERPL-MCNC: 7.9 MG/DL (ref 8.3–10.6)
CHLORIDE SERPL-SCNC: 103 MMOL/L (ref 99–110)
CO2 SERPL-SCNC: 25 MMOL/L (ref 21–32)
CREAT SERPL-MCNC: 0.9 MG/DL (ref 0.9–1.3)
DEPRECATED RDW RBC AUTO: 18 % (ref 12.4–15.4)
EOSINOPHIL # BLD: 0.1 K/UL (ref 0–0.6)
EOSINOPHIL NFR BLD: 2.9 %
GFR SERPLBLD CREATININE-BSD FMLA CKD-EPI: >60 ML/MIN/{1.73_M2}
GLUCOSE BLD-MCNC: 110 MG/DL (ref 70–99)
GLUCOSE BLD-MCNC: 183 MG/DL (ref 70–99)
GLUCOSE SERPL-MCNC: 111 MG/DL (ref 70–99)
HCT VFR BLD AUTO: 33.5 % (ref 40.5–52.5)
HGB BLD-MCNC: 10.9 G/DL (ref 13.5–17.5)
LYMPHOCYTES # BLD: 0.9 K/UL (ref 1–5.1)
LYMPHOCYTES NFR BLD: 18.4 %
MCH RBC QN AUTO: 28.3 PG (ref 26–34)
MCHC RBC AUTO-ENTMCNC: 32.6 G/DL (ref 31–36)
MCV RBC AUTO: 86.9 FL (ref 80–100)
MONOCYTES # BLD: 0.5 K/UL (ref 0–1.3)
MONOCYTES NFR BLD: 10.3 %
NEUTROPHILS # BLD: 3.1 K/UL (ref 1.7–7.7)
NEUTROPHILS NFR BLD: 67.4 %
PERFORMED ON: ABNORMAL
PERFORMED ON: ABNORMAL
PLATELET # BLD AUTO: 193 K/UL (ref 135–450)
PMV BLD AUTO: 7.3 FL (ref 5–10.5)
POTASSIUM SERPL-SCNC: 3.6 MMOL/L (ref 3.5–5.1)
RBC # BLD AUTO: 3.86 M/UL (ref 4.2–5.9)
SODIUM SERPL-SCNC: 137 MMOL/L (ref 136–145)
VANCOMYCIN TROUGH SERPL-MCNC: 14.2 UG/ML (ref 10–20)
WBC # BLD AUTO: 4.6 K/UL (ref 4–11)

## 2023-12-06 PROCEDURE — 6370000000 HC RX 637 (ALT 250 FOR IP): Performed by: INTERNAL MEDICINE

## 2023-12-06 PROCEDURE — 6360000002 HC RX W HCPCS: Performed by: INTERNAL MEDICINE

## 2023-12-06 PROCEDURE — 2580000003 HC RX 258: Performed by: PODIATRIST

## 2023-12-06 PROCEDURE — 6360000002 HC RX W HCPCS: Performed by: PODIATRIST

## 2023-12-06 PROCEDURE — 85025 COMPLETE CBC W/AUTO DIFF WBC: CPT

## 2023-12-06 PROCEDURE — 80048 BASIC METABOLIC PNL TOTAL CA: CPT

## 2023-12-06 PROCEDURE — 6370000000 HC RX 637 (ALT 250 FOR IP): Performed by: PODIATRIST

## 2023-12-06 PROCEDURE — 2580000003 HC RX 258: Performed by: INTERNAL MEDICINE

## 2023-12-06 PROCEDURE — 36415 COLL VENOUS BLD VENIPUNCTURE: CPT

## 2023-12-06 PROCEDURE — 80202 ASSAY OF VANCOMYCIN: CPT

## 2023-12-06 RX ORDER — INSULIN GLARGINE 100 [IU]/ML
25 INJECTION, SOLUTION SUBCUTANEOUS NIGHTLY
Qty: 30 ML | Refills: 0 | Status: SHIPPED | OUTPATIENT
Start: 2023-12-06

## 2023-12-06 RX ORDER — AMOXICILLIN AND CLAVULANATE POTASSIUM 875; 125 MG/1; MG/1
1 TABLET, FILM COATED ORAL 2 TIMES DAILY
Qty: 28 TABLET | Refills: 0 | Status: SHIPPED | OUTPATIENT
Start: 2023-12-06 | End: 2023-12-20

## 2023-12-06 RX ORDER — OXYCODONE HYDROCHLORIDE AND ACETAMINOPHEN 5; 325 MG/1; MG/1
1 TABLET ORAL EVERY 6 HOURS PRN
Qty: 20 TABLET | Refills: 0 | Status: SHIPPED | OUTPATIENT
Start: 2023-12-06 | End: 2023-12-11

## 2023-12-06 RX ORDER — CARVEDILOL 12.5 MG/1
12.5 TABLET ORAL 2 TIMES DAILY WITH MEALS
Qty: 60 TABLET | Refills: 3 | Status: SHIPPED | OUTPATIENT
Start: 2023-12-06

## 2023-12-06 RX ADMIN — METFORMIN HYDROCHLORIDE 1000 MG: 500 TABLET ORAL at 08:09

## 2023-12-06 RX ADMIN — SPIRONOLACTONE 25 MG: 25 TABLET ORAL at 08:10

## 2023-12-06 RX ADMIN — CARVEDILOL 12.5 MG: 6.25 TABLET, FILM COATED ORAL at 08:09

## 2023-12-06 RX ADMIN — POTASSIUM CHLORIDE 20 MEQ: 1500 TABLET, EXTENDED RELEASE ORAL at 08:10

## 2023-12-06 RX ADMIN — LEVOTHYROXINE SODIUM 125 MCG: 125 TABLET ORAL at 05:45

## 2023-12-06 RX ADMIN — CLOPIDOGREL BISULFATE 75 MG: 75 TABLET ORAL at 08:10

## 2023-12-06 RX ADMIN — VANCOMYCIN HYDROCHLORIDE 1000 MG: 1 INJECTION, POWDER, LYOPHILIZED, FOR SOLUTION INTRAVENOUS at 09:16

## 2023-12-06 RX ADMIN — CEFTRIAXONE SODIUM 2000 MG: 2 INJECTION, POWDER, FOR SOLUTION INTRAMUSCULAR; INTRAVENOUS at 03:39

## 2023-12-06 RX ADMIN — Medication 400 MG: at 08:09

## 2023-12-06 RX ADMIN — METRONIDAZOLE 500 MG: 500 INJECTION, SOLUTION INTRAVENOUS at 03:40

## 2023-12-06 RX ADMIN — ASPIRIN 81 MG: 81 TABLET, COATED ORAL at 08:10

## 2023-12-06 RX ADMIN — EMPAGLIFLOZIN 10 MG: 10 TABLET, FILM COATED ORAL at 08:10

## 2023-12-06 RX ADMIN — ALLOPURINOL 100 MG: 100 TABLET ORAL at 08:09

## 2023-12-06 RX ADMIN — FUROSEMIDE 80 MG: 40 TABLET ORAL at 08:10

## 2023-12-06 RX ADMIN — LOSARTAN POTASSIUM 25 MG: 25 TABLET, FILM COATED ORAL at 08:10

## 2023-12-06 RX ADMIN — Medication 10 ML: at 09:16

## 2023-12-06 RX ADMIN — INSULIN GLARGINE 20 UNITS: 100 INJECTION, SOLUTION SUBCUTANEOUS at 08:10

## 2023-12-06 ASSESSMENT — PAIN SCALES - GENERAL
PAINLEVEL_OUTOF10: 0
PAINLEVEL_OUTOF10: 0

## 2023-12-06 NOTE — PLAN OF CARE
Problem: Discharge Planning  Goal: Discharge to home or other facility with appropriate resources  12/4/2023 2024 by Brenda Valdes RN  Outcome: Progressing  12/4/2023 2024 by Brenda Valdes RN  Outcome: Progressing     Problem: Pain  Goal: Verbalizes/displays adequate comfort level or baseline comfort level  12/4/2023 2024 by Brenda Valdes RN  Outcome: Progressing  12/4/2023 2024 by Brenda Valdes RN  Outcome: Progressing  Flowsheets (Taken 12/4/2023 2000)  Verbalizes/displays adequate comfort level or baseline comfort level: Encourage patient to monitor pain and request assistance  12/4/2023 1103 by Sb Low RN  Outcome: Progressing  Flowsheets (Taken 12/4/2023 1103)  Verbalizes/displays adequate comfort level or baseline comfort level:   Encourage patient to monitor pain and request assistance   Assess pain using appropriate pain scale   Administer analgesics based on type and severity of pain and evaluate response   Implement non-pharmacological measures as appropriate and evaluate response     Problem: Safety - Adult  Goal: Free from fall injury  12/4/2023 2024 by Brenda Valdes RN  Outcome: Progressing  12/4/2023 2024 by Brenda Valdes RN  Outcome: Progressing  12/4/2023 1103 by Sb Low RN  Outcome: Progressing  Flowsheets (Taken 12/4/2023 1103)  Free From Fall Injury:   Instruct family/caregiver on patient safety   Based on caregiver fall risk screen, instruct family/caregiver to ask for assistance with transferring infant if caregiver noted to have fall risk factors     Problem: Skin/Tissue Integrity  Goal: Absence of new skin breakdown  Description: 1. Monitor for areas of redness and/or skin breakdown  2. Assess vascular access sites hourly  3. Every 4-6 hours minimum:  Change oxygen saturation probe site  4. Every 4-6 hours:  If on nasal continuous positive airway pressure, respiratory therapy assess nares and determine need for appliance change or resting period.   12/4/2023 2024 by
Problem: Discharge Planning  Goal: Discharge to home or other facility with appropriate resources  Outcome: Progressing     Problem: Pain  Goal: Verbalizes/displays adequate comfort level or baseline comfort level  12/5/2023 2330 by Yaquelin Strickland RN  Outcome: Progressing  12/5/2023 1017 by Rey Urbano RN  Outcome: Progressing  Flowsheets (Taken 12/4/2023 2000 by Yaquelin Strickland RN)  Verbalizes/displays adequate comfort level or baseline comfort level: Encourage patient to monitor pain and request assistance     Problem: Safety - Adult  Goal: Free from fall injury  12/5/2023 2330 by Yaquelin Strickland RN  Outcome: Progressing  Flowsheets (Taken 12/5/2023 1017 by Rey Urbano RN)  Free From Fall Injury:   Based on caregiver fall risk screen, instruct family/caregiver to ask for assistance with transferring infant if caregiver noted to have fall risk factors   Instruct family/caregiver on patient safety  12/5/2023 1017 by Rey Urbano RN  Outcome: Progressing  Flowsheets (Taken 12/5/2023 1017)  Free From Fall Injury:   Based on caregiver fall risk screen, instruct family/caregiver to ask for assistance with transferring infant if caregiver noted to have fall risk factors   Instruct family/caregiver on patient safety  Note: Patient reminded of non weight bearing status but still continues to ambulate on foot. Problem: Skin/Tissue Integrity  Goal: Absence of new skin breakdown  Description: 1. Monitor for areas of redness and/or skin breakdown  2. Assess vascular access sites hourly  3. Every 4-6 hours minimum:  Change oxygen saturation probe site  4. Every 4-6 hours:  If on nasal continuous positive airway pressure, respiratory therapy assess nares and determine need for appliance change or resting period.   Outcome: Progressing     Problem: Chronic Conditions and Co-morbidities  Goal: Patient's chronic conditions and co-morbidity symptoms are monitored and maintained or improved  12/5/2023
Problem: Discharge Planning  Goal: Discharge to home or other facility with appropriate resources  Outcome: Progressing     Problem: Pain  Goal: Verbalizes/displays adequate comfort level or baseline comfort level  Outcome: Progressing     Problem: Safety - Adult  Goal: Free from fall injury  Outcome: Progressing     Problem: Skin/Tissue Integrity  Goal: Absence of new skin breakdown  Description: 1. Monitor for areas of redness and/or skin breakdown  2. Assess vascular access sites hourly  3. Every 4-6 hours minimum:  Change oxygen saturation probe site  4. Every 4-6 hours:  If on nasal continuous positive airway pressure, respiratory therapy assess nares and determine need for appliance change or resting period.   Outcome: Progressing     Problem: Chronic Conditions and Co-morbidities  Goal: Patient's chronic conditions and co-morbidity symptoms are monitored and maintained or improved  Outcome: Progressing
Problem: Discharge Planning  Goal: Discharge to home or other facility with appropriate resources  Outcome: Progressing     Problem: Pain  Goal: Verbalizes/displays adequate comfort level or baseline comfort level  Outcome: Progressing     Problem: Safety - Adult  Goal: Free from fall injury  Outcome: Progressing     Problem: Skin/Tissue Integrity  Goal: Absence of new skin breakdown  Description: 1. Monitor for areas of redness and/or skin breakdown  2. Assess vascular access sites hourly  3. Every 4-6 hours minimum:  Change oxygen saturation probe site  4. Every 4-6 hours:  If on nasal continuous positive airway pressure, respiratory therapy assess nares and determine need for appliance change or resting period.   Outcome: Progressing     Problem: Chronic Conditions and Co-morbidities  Goal: Patient's chronic conditions and co-morbidity symptoms are monitored and maintained or improved  Outcome: Progressing
Problem: Pain  Goal: Verbalizes/displays adequate comfort level or baseline comfort level  12/2/2023 0729 by Darryn Sarmiento RN  Outcome: Progressing  12/2/2023 0044 by Margy Degroot RN  Outcome: Progressing     Problem: Skin/Tissue Integrity  Goal: Absence of new skin breakdown  Description: 1. Monitor for areas of redness and/or skin breakdown  2. Assess vascular access sites hourly  3. Every 4-6 hours minimum:  Change oxygen saturation probe site  4. Every 4-6 hours:  If on nasal continuous positive airway pressure, respiratory therapy assess nares and determine need for appliance change or resting period.   Outcome: Progressing
Problem: Pain  Goal: Verbalizes/displays adequate comfort level or baseline comfort level  12/3/2023 0909 by Luly Baez RN  Outcome: Progressing  12/3/2023 0241 by Radha Lawson RN  Outcome: Progressing     Problem: Safety - Adult  Goal: Free from fall injury  12/3/2023 0909 by Luly Baez RN  Outcome: Progressing  12/3/2023 0241 by Radha Lawson RN  Outcome: Progressing     Problem: Skin/Tissue Integrity  Goal: Absence of new skin breakdown  Description: 1. Monitor for areas of redness and/or skin breakdown  2. Assess vascular access sites hourly  3. Every 4-6 hours minimum:  Change oxygen saturation probe site  4. Every 4-6 hours:  If on nasal continuous positive airway pressure, respiratory therapy assess nares and determine need for appliance change or resting period.   12/3/2023 0909 by Luly Baez RN  Outcome: Progressing  12/3/2023 0241 by Radha Lawson RN  Outcome: Progressing
Problem: Pain  Goal: Verbalizes/displays adequate comfort level or baseline comfort level  12/4/2023 1103 by Abby Julien RN  Outcome: Progressing  Flowsheets (Taken 12/4/2023 1103)  Verbalizes/displays adequate comfort level or baseline comfort level:   Encourage patient to monitor pain and request assistance   Assess pain using appropriate pain scale   Administer analgesics based on type and severity of pain and evaluate response   Implement non-pharmacological measures as appropriate and evaluate response       Problem: Safety - Adult  Goal: Free from fall injury  12/4/2023 1103 by Abby Julien RN  Outcome: Progressing  Flowsheets (Taken 12/4/2023 1103)  Free From Fall Injury:   Instruct family/caregiver on patient safety   Based on caregiver fall risk screen, instruct family/caregiver to ask for assistance with transferring infant if caregiver noted to have fall risk factors       Problem: Skin/Tissue Integrity  Goal: Absence of new skin breakdown  Description: 1. Monitor for areas of redness and/or skin breakdown  2. Assess vascular access sites hourly  3. Every 4-6 hours minimum:  Change oxygen saturation probe site  4. Every 4-6 hours:  If on nasal continuous positive airway pressure, respiratory therapy assess nares and determine need for appliance change or resting period.   12/4/2023 1103 by Abby Julien RN  Outcome: Progressing       Problem: Chronic Conditions and Co-morbidities  Goal: Patient's chronic conditions and co-morbidity symptoms are monitored and maintained or improved  12/4/2023 1103 by Abby Julien RN  Outcome: Progressing  Flowsheets (Taken 12/4/2023 1103)  Care Plan - Patient's Chronic Conditions and Co-Morbidity Symptoms are Monitored and Maintained or Improved:   Monitor and assess patient's chronic conditions and comorbid symptoms for stability, deterioration, or improvement   Collaborate with multidisciplinary team to address chronic and
Problem: Pain  Goal: Verbalizes/displays adequate comfort level or baseline comfort level  12/5/2023 1017 by Earlene Major RN  Outcome: Progressing  Flowsheets (Taken 12/4/2023 2000 by Chalo Molina RN)  Verbalizes/displays adequate comfort level or baseline comfort level: Encourage patient to monitor pain and request assistance    Problem: Safety - Adult  Goal: Free from fall injury  12/5/2023 1017 by Earlene Major RN  Outcome: Progressing  Flowsheets (Taken 12/5/2023 1017)  Free From Fall Injury:   Based on caregiver fall risk screen, instruct family/caregiver to ask for assistance with transferring infant if caregiver noted to have fall risk factors   Instruct family/caregiver on patient safety  Note: Patient reminded of non weight bearing status but still continues to ambulate on foot.     Problem: Chronic Conditions and Co-morbidities  Goal: Patient's chronic conditions and co-morbidity symptoms are monitored and maintained or improved  12/5/2023 1017 by Earlene Major RN  Outcome: Progressing  Flowsheets (Taken 12/5/2023 1017)  Care Plan - Patient's Chronic Conditions and Co-Morbidity Symptoms are Monitored and Maintained or Improved:   Monitor and assess patient's chronic conditions and comorbid symptoms for stability, deterioration, or improvement   Collaborate with multidisciplinary team to address chronic and comorbid conditions and prevent exacerbation or deterioration   Update acute care plan with appropriate goals if chronic or comorbid symptoms are exacerbated and prevent overall improvement and discharge    Problem: ABCDS Injury Assessment  Goal: Absence of physical injury  12/5/2023 1017 by Earlene Major RN  Outcome: Progressing  Flowsheets (Taken 12/5/2023 1017)  Absence of Physical Injury: Implement safety measures based on patient assessment
Problem: Pain  Goal: Verbalizes/displays adequate comfort level or baseline comfort level  Outcome: Progressing     Problem: Safety - Adult  Goal: Free from fall injury  Outcome: Progressing
carvedilol  12.5 mg Oral BID     empagliflozin  10 mg Oral Daily    furosemide  80 mg Oral BID    levothyroxine  125 mcg Oral Daily    losartan  25 mg Oral Daily    magnesium oxide  400 mg Oral BID    metFORMIN  1,000 mg Oral BID     cefTRIAXone (ROCEPHIN) IV  2,000 mg IntraVENous Q24H    metroNIDAZOLE  500 mg IntraVENous Q8H    insulin lispro  0-4 Units SubCUTAneous Nightly    insulin lispro  0-8 Units SubCUTAneous TID     aspirin  81 mg Oral Daily    atorvastatin  40 mg Oral Nightly    insulin glargine  20 Units SubCUTAneous Daily    clopidogrel  75 mg Oral Daily    spironolactone  25 mg Oral Daily    sodium chloride flush  10 mL IntraVENous 2 times per day    melatonin  3 mg Oral Nightly     ACE/ARB/ARNI is REQUIRED for EF </= 37% SYSTOLIC FAILURE:   ACE[de-identified] None  ARB[de-identified] None  ARNI[de-identified] None    Evidenced-Based Beta Blocker is REQUIRED for EF </= 00% SYSTOLIC FAILURE:   [de-identified] Carvedilol- Coreg    Diuretics:  [de-identified] None    Diet Reviewed: Yes   ADULT DIET; Regular; 4 carb choices (60 gm/meal); Low Fat/Low Chol/High Fiber/2 gm Na; Low Sodium (2 gm)    Goal of Care Reviewed: Yes   Patient and/or Family's stated Goal of Care this Admission: reduce shortness of breath, increase activity tolerance, better understand heart failure and disease management, be more comfortable, and reduce lower extremity edema prior to discharge.

## 2023-12-06 NOTE — DISCHARGE INSTR - COC
Continuity of Care Form    Patient Name: Pola Ring   :  1965  MRN:  [id number]    Admit date:  2023  Discharge date:  ***    Code Status Order: Full Code   Advance Directives:   Advance Care Flowsheet Documentation       Date/Time Healthcare Directive Type of Healthcare Directive Copy in [address] Agent's Name Healthcare Agent's Phone Number    23 1321 No, patient does not have an advance directive for healthcare treatment -- -- -- -- --            Admitting Physician:  Liliana Boles DO  PCP: JOSE Valentino CNP    Discharging Nurse: [facility] Unit/Room#: 0201/0201-01  Discharging Unit Phone Number: ***    Emergency Contact:   Extended Emergency Contact Information  Primary Emergency Contact: Ruba Olivera  Address: [address] [place] [address] [address], [address] [facility] [address] [address] [facility] Phone: [phone]  Work Phone: [phone]  Mobile Phone: [phone]  Relation: Parent  Secondary Emergency Contact: [address]  Phone: [phone]  Work Phone: [phone]  Mobile Phone: [phone]  Relation: Brother/Sister    Past Surgical History:  Past Surgical History:   Procedure Laterality Date    CARDIAC DEFIBRILLATOR PLACEMENT  2014    ICD Placement    CORONARY ARTERY BYPASS GRAFT N/A 2020    CORONARY ARTERY BYPASS GRAFTING X3, LEFT ATRIAL APPENDAGE CLIP, INTERNAL MAMMARY ARTERY, SAPHENOUS VEIN GRAFT, ON PUMP, 5 LEVEL BILATERAL INTERCOSTAL NERVE BLOCK performed by Jewell Weber MD at [facility] Left 2018    PARTIAL FOOT AMPUTATION w. Dr Lisa Fuentes Left 2019    EXOSTECTOMY LEFT FOOT, ULCER DEBRIDEMENT LEFT FOOT WITH GRAFT APPLICATION performed by Kady Oneal DPM at [facility] Left 2017    LEFT FOOT ULCER DEBRIDEMENT, POSSIBLE SECOND METATARSAL    FOOT SURGERY Left 2019    Exostectomy left foot, ulcer debridement

## 2023-12-07 ENCOUNTER — FOLLOWUP TELEPHONE ENCOUNTER (OUTPATIENT)
Dept: ADMINISTRATIVE | Age: 58
End: 2023-12-07

## 2023-12-07 LAB
BACTERIA SPEC AEROBE CULT: ABNORMAL
BACTERIA SPEC ANAEROBE CULT: ABNORMAL
GRAM STN SPEC: ABNORMAL
ORGANISM: ABNORMAL

## 2023-12-07 NOTE — TELEPHONE ENCOUNTER
Heart Failure Follow-Up Call:    Call within 72 Hours of Discharge: Yes     Patient: Sydnee Fagan   Patient : 1965   MRN: [id number]    Date of discharge: 23    Discharge department/facility: [facility]    Discharge Disposition: Home    RARS: Readmission Risk Score: 12.9    Spoke with: Krystyna Vo is doing well since being home. Stated pain is the same. No new swelling or redness noted. Unable to get weights so far since being home. Reinforced Heart Failure education on: signs/symptoms to monitor, medications, daily weights, low sodium diet, 2000 ml fluid restriction, and activity. Reminded patient of follow-up appointment with PCP on  and Dr. Cristhian Yanez on . Provided Heart Failure Nurse number at [phone] for any further questions or assistance with resources.      Follow Up  Future Appointments   Date Time Provider [address]   2023  8:00 AM Ousmane Tong MD P [facility]   1/3/2024  1:15 PM Radha Navarrete MD AND [facility] BARGER       Electronically signed by Carol Mccray, MSN, RN  on 2023 at 10:54 AM

## 2023-12-11 ENCOUNTER — TELEPHONE (OUTPATIENT)
Dept: CARDIOLOGY CLINIC | Age: 58
End: 2023-12-11

## 2023-12-11 PROCEDURE — 93297 REM INTERROG DEV EVAL ICPMS: CPT | Performed by: NURSE PRACTITIONER

## 2023-12-11 PROCEDURE — G2066 INTER DEVC REMOTE 30D: HCPCS | Performed by: NURSE PRACTITIONER

## 2023-12-11 NOTE — TELEPHONE ENCOUNTER
Aicha Labella shows decreased thoracic impedance consistent with increased fluid index though impedance heading back to baseline. He was recently hospitalized for diabetic foot infection s/p partial amputation. Please make sure he is still taking the furosemide 80 mg twice daily.      Thank you, Nolvia Parekh

## 2023-12-21 PROCEDURE — 93296 REM INTERROG EVL PM/IDS: CPT | Performed by: INTERNAL MEDICINE

## 2023-12-21 PROCEDURE — 93295 DEV INTERROG REMOTE 1/2/MLT: CPT | Performed by: INTERNAL MEDICINE

## 2023-12-24 ENCOUNTER — HOSPITAL ENCOUNTER (EMERGENCY)
Age: 58
Discharge: HOME | End: 2023-12-24
Attending: EMERGENCY MEDICINE
Payer: MEDICARE

## 2023-12-24 VITALS
RESPIRATION RATE: 20 BRPM | OXYGEN SATURATION: 99 % | TEMPERATURE: 97.6 F | DIASTOLIC BLOOD PRESSURE: 68 MMHG | SYSTOLIC BLOOD PRESSURE: 101 MMHG | WEIGHT: 205 LBS | HEART RATE: 62 BPM | HEIGHT: 69 IN | BODY MASS INDEX: 30.36 KG/M2

## 2023-12-24 DIAGNOSIS — Z79.82 LONG-TERM USE OF ASPIRIN THERAPY: ICD-10-CM

## 2023-12-24 DIAGNOSIS — R58 BLEEDING: Primary | ICD-10-CM

## 2023-12-24 LAB
ALBUMIN SERPL-MCNC: 4.2 G/DL (ref 3.4–5)
ALBUMIN/GLOB SERPL: 1.3 {RATIO} (ref 1.1–2.2)
ALP SERPL-CCNC: 129 U/L (ref 40–129)
ALT SERPL-CCNC: 33 U/L (ref 10–40)
ANION GAP SERPL CALCULATED.3IONS-SCNC: 9 MMOL/L (ref 3–16)
AST SERPL-CCNC: 24 U/L (ref 15–37)
BASOPHILS # BLD: 0.1 K/UL (ref 0–0.2)
BASOPHILS NFR BLD: 1.1 %
BILIRUB SERPL-MCNC: 0.6 MG/DL (ref 0–1)
BUN SERPL-MCNC: 31 MG/DL (ref 7–20)
CALCIUM SERPL-MCNC: 8.7 MG/DL (ref 8.3–10.6)
CHLORIDE SERPL-SCNC: 97 MMOL/L (ref 99–110)
CO2 SERPL-SCNC: 27 MMOL/L (ref 21–32)
CREAT SERPL-MCNC: 1.1 MG/DL (ref 0.9–1.3)
DEPRECATED RDW RBC AUTO: 18.8 % (ref 12.4–15.4)
EOSINOPHIL # BLD: 0.3 K/UL (ref 0–0.6)
EOSINOPHIL NFR BLD: 3.5 %
GFR SERPLBLD CREATININE-BSD FMLA CKD-EPI: >60 ML/MIN/{1.73_M2}
GLUCOSE SERPL-MCNC: 170 MG/DL (ref 70–99)
HCT VFR BLD AUTO: 34.1 % (ref 40.5–52.5)
HGB BLD-MCNC: 11.2 G/DL (ref 13.5–17.5)
LYMPHOCYTES # BLD: 1.4 K/UL (ref 1–5.1)
LYMPHOCYTES NFR BLD: 18 %
MCH RBC QN AUTO: 27.9 PG (ref 26–34)
MCHC RBC AUTO-ENTMCNC: 32.7 G/DL (ref 31–36)
MCV RBC AUTO: 85.3 FL (ref 80–100)
MONOCYTES # BLD: 0.7 K/UL (ref 0–1.3)
MONOCYTES NFR BLD: 8.9 %
NEUTROPHILS # BLD: 5.4 K/UL (ref 1.7–7.7)
NEUTROPHILS NFR BLD: 68.5 %
PLATELET # BLD AUTO: 185 K/UL (ref 135–450)
PMV BLD AUTO: 7.1 FL (ref 5–10.5)
POTASSIUM SERPL-SCNC: 3.8 MMOL/L (ref 3.5–5.1)
PROT SERPL-MCNC: 7.4 G/DL (ref 6.4–8.2)
RBC # BLD AUTO: 4 M/UL (ref 4.2–5.9)
SODIUM SERPL-SCNC: 133 MMOL/L (ref 136–145)
WBC # BLD AUTO: 7.9 K/UL (ref 4–11)

## 2023-12-24 PROCEDURE — 99284 EMERGENCY DEPT VISIT MOD MDM: CPT

## 2023-12-24 PROCEDURE — 96366 THER/PROPH/DIAG IV INF ADDON: CPT

## 2023-12-24 PROCEDURE — 2500000003 HC RX 250 WO HCPCS: Performed by: EMERGENCY MEDICINE

## 2023-12-24 PROCEDURE — 85025 COMPLETE CBC W/AUTO DIFF WBC: CPT

## 2023-12-24 PROCEDURE — 2580000003 HC RX 258: Performed by: EMERGENCY MEDICINE

## 2023-12-24 PROCEDURE — 36415 COLL VENOUS BLD VENIPUNCTURE: CPT

## 2023-12-24 PROCEDURE — 96365 THER/PROPH/DIAG IV INF INIT: CPT

## 2023-12-24 PROCEDURE — 80053 COMPREHEN METABOLIC PANEL: CPT

## 2023-12-24 RX ORDER — TRANEXAMIC ACID 100 MG/ML
1000 INJECTION, SOLUTION INTRAVENOUS ONCE
Status: DISCONTINUED | OUTPATIENT
Start: 2023-12-24 | End: 2023-12-25 | Stop reason: HOSPADM

## 2023-12-24 RX ADMIN — TRANEXAMIC ACID: 100 INJECTION, SOLUTION INTRAVENOUS at 20:06

## 2023-12-25 NOTE — ED PROVIDER NOTES
[facility] ED     Pt Name: Parish Lorenzo   MRN: [id number]   [address] 1965   Date of evaluation: 12/24/2023   Provider: Lizandro Lee MD   PCP: JOSE Baum CNP   Note Started: 10:40 PM EST 12/24/23     CHIEF COMPLAINT     Chief Complaint   Patient presents with    Wound Check     Pt states he had his toes amputated 12/7 started bleeding yesterday        HISTORY OF PRESENT ILLNESS:  History from : Patient   Limitations to history : None     Parish Lorenzo is a 62 y.o. male who presents for bleeding. Patient reports that he is postop day 22 from a transmet amputation of the right foot performed Dr. Casey Murphy at this hospital.  Emily Pellant he was walking on his foot and carried some heavy objects and was driving his car using his right foot and today he has had bleeding throughout the day. He takes aspirin and Plavix but no anticoagulation. He denies any injury. No fevers, chills or sweats. Nursing Notes were all reviewed and agreed with or any disagreements were addressed in the HPI.     ROS: Positives and Pertinent negatives as per HPI.     PAST MEDICAL HISTORY     Past medical history:  has a past medical history of Acute on chronic systolic congestive heart failure ([address]) (07/08/2013), Acute osteomyelitis of left foot ([address]) (09/27/2017), Acute osteomyelitis of right foot ([address]) (04/25/2016), Acute respiratory failure ([address]) (08/04/2020), Ascites, Blood transfusion reaction, Burst fracture of lumbar vertebra ([address]) (11/09/2012), Cellulitis of left foot, Cellulitis of right lower extremity (2/16, 6/45), Chronic systolic CHF (congestive heart failure) ([address]), Community acquired pneumonia, Coronary artery disease involving native coronary artery of native heart without angina pectoris (08/06/2020), Diabetes ([address]), Diabetic ulcer of left foot associated with type 2 diabetes mellitus, with muscle involvement without evidence of necrosis ([address])

## 2023-12-27 ENCOUNTER — ANESTHESIA EVENT (OUTPATIENT)
Dept: OPERATING ROOM | Age: 58
End: 2023-12-27
Payer: MEDICARE

## 2023-12-27 NOTE — PROGRESS NOTES

## 2023-12-28 ENCOUNTER — HOSPITAL ENCOUNTER (OUTPATIENT)
Age: 58
Setting detail: OUTPATIENT SURGERY
Discharge: HOME | End: 2023-12-28
Attending: PODIATRIST | Admitting: PODIATRIST
Payer: MEDICARE

## 2023-12-28 ENCOUNTER — ANESTHESIA (OUTPATIENT)
Dept: OPERATING ROOM | Age: 58
End: 2023-12-28
Payer: MEDICARE

## 2023-12-28 VITALS
HEART RATE: 98 BPM | OXYGEN SATURATION: 94 % | DIASTOLIC BLOOD PRESSURE: 54 MMHG | BODY MASS INDEX: 30.36 KG/M2 | TEMPERATURE: 98 F | WEIGHT: 205 LBS | HEIGHT: 69 IN | RESPIRATION RATE: 16 BRPM | SYSTOLIC BLOOD PRESSURE: 101 MMHG

## 2023-12-28 LAB
ANION GAP SERPL CALCULATED.3IONS-SCNC: 11 MMOL/L (ref 3–16)
BUN SERPL-MCNC: 19 MG/DL (ref 7–20)
CALCIUM SERPL-MCNC: 9.3 MG/DL (ref 8.3–10.6)
CHLORIDE SERPL-SCNC: 97 MMOL/L (ref 99–110)
CO2 SERPL-SCNC: 28 MMOL/L (ref 21–32)
CREAT SERPL-MCNC: 1.1 MG/DL (ref 0.9–1.3)
EKG ATRIAL RATE: 99 BPM
EKG DIAGNOSIS: NORMAL
EKG P AXIS: 83 DEGREES
EKG P-R INTERVAL: 150 MS
EKG Q-T INTERVAL: 372 MS
EKG QRS DURATION: 132 MS
EKG QTC CALCULATION (BAZETT): 477 MS
EKG R AXIS: -73 DEGREES
EKG T AXIS: 100 DEGREES
EKG VENTRICULAR RATE: 99 BPM
GFR SERPLBLD CREATININE-BSD FMLA CKD-EPI: >60 ML/MIN/{1.73_M2}
GLUCOSE BLD-MCNC: 121 MG/DL (ref 70–99)
GLUCOSE BLD-MCNC: 161 MG/DL (ref 70–99)
GLUCOSE SERPL-MCNC: 149 MG/DL (ref 70–99)
PERFORMED ON: ABNORMAL
PERFORMED ON: ABNORMAL
POTASSIUM SERPL-SCNC: 3.8 MMOL/L (ref 3.5–5.1)
SODIUM SERPL-SCNC: 136 MMOL/L (ref 136–145)

## 2023-12-28 PROCEDURE — 3600000013 HC SURGERY LEVEL 3 ADDTL 15MIN: Performed by: PODIATRIST

## 2023-12-28 PROCEDURE — 80048 BASIC METABOLIC PNL TOTAL CA: CPT

## 2023-12-28 PROCEDURE — 93005 ELECTROCARDIOGRAM TRACING: CPT | Performed by: ANESTHESIOLOGY

## 2023-12-28 PROCEDURE — 36415 COLL VENOUS BLD VENIPUNCTURE: CPT

## 2023-12-28 PROCEDURE — 7100000011 HC PHASE II RECOVERY - ADDTL 15 MIN: Performed by: PODIATRIST

## 2023-12-28 PROCEDURE — 7100000010 HC PHASE II RECOVERY - FIRST 15 MIN: Performed by: PODIATRIST

## 2023-12-28 PROCEDURE — 6360000002 HC RX W HCPCS: Performed by: PODIATRIST

## 2023-12-28 PROCEDURE — 3700000001 HC ADD 15 MINUTES (ANESTHESIA): Performed by: PODIATRIST

## 2023-12-28 PROCEDURE — 2580000003 HC RX 258: Performed by: PODIATRIST

## 2023-12-28 PROCEDURE — 6360000002 HC RX W HCPCS: Performed by: NURSE ANESTHETIST, CERTIFIED REGISTERED

## 2023-12-28 PROCEDURE — 2500000003 HC RX 250 WO HCPCS: Performed by: PODIATRIST

## 2023-12-28 PROCEDURE — 3700000000 HC ANESTHESIA ATTENDED CARE: Performed by: PODIATRIST

## 2023-12-28 PROCEDURE — 3600000003 HC SURGERY LEVEL 3 BASE: Performed by: PODIATRIST

## 2023-12-28 PROCEDURE — 2500000003 HC RX 250 WO HCPCS: Performed by: ANESTHESIOLOGY

## 2023-12-28 PROCEDURE — 2580000003 HC RX 258: Performed by: ANESTHESIOLOGY

## 2023-12-28 PROCEDURE — 2709999900 HC NON-CHARGEABLE SUPPLY: Performed by: PODIATRIST

## 2023-12-28 PROCEDURE — A4216 STERILE WATER/SALINE, 10 ML: HCPCS | Performed by: ANESTHESIOLOGY

## 2023-12-28 PROCEDURE — 93010 ELECTROCARDIOGRAM REPORT: CPT | Performed by: INTERNAL MEDICINE

## 2023-12-28 DEVICE — PATCH AMNION 2 LAYR PROTCT 4 X 4CM STERISHIELD II: Type: IMPLANTABLE DEVICE | Site: FOOT | Status: FUNCTIONAL

## 2023-12-28 RX ORDER — SODIUM CHLORIDE 0.9 % (FLUSH) 0.9 %
5-40 SYRINGE (ML) INJECTION PRN
Status: CANCELLED | OUTPATIENT
Start: 2023-12-28

## 2023-12-28 RX ORDER — SODIUM CHLORIDE 9 MG/ML
INJECTION, SOLUTION INTRAVENOUS PRN
Status: CANCELLED | OUTPATIENT
Start: 2023-12-28

## 2023-12-28 RX ORDER — HYDRALAZINE HYDROCHLORIDE 20 MG/ML
5 INJECTION INTRAMUSCULAR; INTRAVENOUS
Status: CANCELLED | OUTPATIENT
Start: 2023-12-28

## 2023-12-28 RX ORDER — ONDANSETRON 2 MG/ML
4 INJECTION INTRAMUSCULAR; INTRAVENOUS EVERY 10 MIN PRN
Status: CANCELLED | OUTPATIENT
Start: 2023-12-28

## 2023-12-28 RX ORDER — MIDAZOLAM HYDROCHLORIDE 1 MG/ML
1 INJECTION INTRAMUSCULAR; INTRAVENOUS EVERY 5 MIN PRN
Status: CANCELLED | OUTPATIENT
Start: 2023-12-28

## 2023-12-28 RX ORDER — SODIUM CHLORIDE 0.9 % (FLUSH) 0.9 %
5-40 SYRINGE (ML) INJECTION EVERY 12 HOURS SCHEDULED
Status: DISCONTINUED | OUTPATIENT
Start: 2023-12-28 | End: 2023-12-28 | Stop reason: HOSPADM

## 2023-12-28 RX ORDER — SODIUM CHLORIDE 0.9 % (FLUSH) 0.9 %
5-40 SYRINGE (ML) INJECTION PRN
Status: DISCONTINUED | OUTPATIENT
Start: 2023-12-28 | End: 2023-12-28 | Stop reason: HOSPADM

## 2023-12-28 RX ORDER — OXYCODONE HYDROCHLORIDE 5 MG/1
5 TABLET ORAL
Status: CANCELLED | OUTPATIENT
Start: 2023-12-28 | End: 2023-12-29

## 2023-12-28 RX ORDER — PROPOFOL 10 MG/ML
INJECTION, EMULSION INTRAVENOUS PRN
Status: DISCONTINUED | OUTPATIENT
Start: 2023-12-28 | End: 2023-12-28 | Stop reason: SDUPTHER

## 2023-12-28 RX ORDER — MEPERIDINE HYDROCHLORIDE 25 MG/ML
12.5 INJECTION INTRAMUSCULAR; INTRAVENOUS; SUBCUTANEOUS EVERY 5 MIN PRN
Status: CANCELLED | OUTPATIENT
Start: 2023-12-28

## 2023-12-28 RX ORDER — LIDOCAINE HYDROCHLORIDE 10 MG/ML
INJECTION, SOLUTION EPIDURAL; INFILTRATION; INTRACAUDAL; PERINEURAL PRN
Status: DISCONTINUED | OUTPATIENT
Start: 2023-12-28 | End: 2023-12-28 | Stop reason: ALTCHOICE

## 2023-12-28 RX ORDER — SODIUM CHLORIDE 0.9 % (FLUSH) 0.9 %
5-40 SYRINGE (ML) INJECTION EVERY 12 HOURS SCHEDULED
Status: CANCELLED | OUTPATIENT
Start: 2023-12-28

## 2023-12-28 RX ORDER — SODIUM CHLORIDE 9 MG/ML
INJECTION, SOLUTION INTRAVENOUS PRN
Status: DISCONTINUED | OUTPATIENT
Start: 2023-12-28 | End: 2023-12-28 | Stop reason: HOSPADM

## 2023-12-28 RX ORDER — DIPHENHYDRAMINE HYDROCHLORIDE 50 MG/ML
12.5 INJECTION INTRAMUSCULAR; INTRAVENOUS
Status: CANCELLED | OUTPATIENT
Start: 2023-12-28 | End: 2023-12-29

## 2023-12-28 RX ORDER — SODIUM CHLORIDE, SODIUM LACTATE, POTASSIUM CHLORIDE, CALCIUM CHLORIDE 600; 310; 30; 20 MG/100ML; MG/100ML; MG/100ML; MG/100ML
INJECTION, SOLUTION INTRAVENOUS CONTINUOUS
Status: DISCONTINUED | OUTPATIENT
Start: 2023-12-28 | End: 2023-12-28 | Stop reason: HOSPADM

## 2023-12-28 RX ORDER — BUPIVACAINE HYDROCHLORIDE 5 MG/ML
INJECTION, SOLUTION EPIDURAL; INTRACAUDAL PRN
Status: DISCONTINUED | OUTPATIENT
Start: 2023-12-28 | End: 2023-12-28 | Stop reason: ALTCHOICE

## 2023-12-28 RX ADMIN — PROPOFOL 75 MCG/KG/MIN: 10 INJECTION, EMULSION INTRAVENOUS at 08:56

## 2023-12-28 RX ADMIN — FAMOTIDINE 20 MG: 10 INJECTION, SOLUTION INTRAVENOUS at 07:58

## 2023-12-28 RX ADMIN — CEFAZOLIN 2000 MG: 2 INJECTION, POWDER, FOR SOLUTION INTRAMUSCULAR; INTRAVENOUS at 08:55

## 2023-12-28 RX ADMIN — PROPOFOL 40 MG: 10 INJECTION, EMULSION INTRAVENOUS at 08:55

## 2023-12-28 RX ADMIN — SODIUM CHLORIDE, POTASSIUM CHLORIDE, SODIUM LACTATE AND CALCIUM CHLORIDE: 600; 310; 30; 20 INJECTION, SOLUTION INTRAVENOUS at 07:58

## 2023-12-28 ASSESSMENT — PAIN - FUNCTIONAL ASSESSMENT
PAIN_FUNCTIONAL_ASSESSMENT: NONE - DENIES PAIN
PAIN_FUNCTIONAL_ASSESSMENT: 0-10
PAIN_FUNCTIONAL_ASSESSMENT: NONE - DENIES PAIN
PAIN_FUNCTIONAL_ASSESSMENT: NONE - DENIES PAIN

## 2023-12-28 NOTE — BRIEF OP NOTE
Brief Postoperative Note      Patient: Kirk Ruano  YOB: 1965  MRN: [id number]    Date of Procedure: 12/28/2023    Pre-Op Diagnosis Codes:     * Right foot ulcer, with necrosis of muscle ([address]) [L97.513]     * Diabetic ulcer of right foot associated with other specified diabetes mellitus, unspecified part of foot, unspecified ulcer stage ([address]) [O99.511, L97.519]    Post-Op Diagnosis: Post-Op Diagnosis Codes:     * Right foot ulcer, with necrosis of muscle ([address]) [L97.513]     * Diabetic ulcer of right foot associated with other specified diabetes mellitus, unspecified part of foot, unspecified ulcer stage ([address]) [F28.453, L97.519]       Procedure(s):  ULCER DEBRIDEMENT RIGHT FOOT    Surgeon(s):  Evan Jarrell DPM    Assistant:  Surgical Assistant: Xiomara Mora    Anesthesia: Monitor Anesthesia Care    Estimated Blood Loss (mL): less than 742     Complications: None    Specimens:   * No specimens in log *    Implants:  Implant Name Type Inv.  Item Serial No.  Lot No. LRB No. Used Action   PATCH AMNION 2 LAYR PROTCT 4 X 4CM STERISHIELD II - [id number]  PATCH AMNION 2 LAYR PROTCT 4 X 4CM STERISHIELD II [id number] [employer] [id number] Right 1 Implanted         Drains: * No LDAs found *    Findings: ulcer right foot      Electronically signed by Gardenia Morin DPM on 12/28/2023 at 9:28 AM

## 2023-12-28 NOTE — ANESTHESIA POSTPROCEDURE EVALUATION
Department of Anesthesiology  Postprocedure Note    Patient: Danielle Diallo  MRN: [id number]  YOB: 1965  Date of evaluation: 12/28/2023    Procedure Summary       Date: 12/28/23 Room / Location: [address] [place] [address] / [facility]    Anesthesia Start: 7012 Anesthesia Stop: 0930    Procedure: ULCER DEBRIDEMENT RIGHT FOOT (Right: Foot) Diagnosis:       Right foot ulcer, with necrosis of muscle ([address])      Diabetic ulcer of right foot associated with other specified diabetes mellitus, unspecified part of foot, unspecified ulcer stage ([address])      (Right foot ulcer, with necrosis of muscle ([address]) [L97.513])      (Diabetic ulcer of right foot associated with other specified diabetes mellitus, unspecified part of foot, unspecified ulcer stage ([address]) [B62.823, L97.519])    Surgeons: Tami Faye DPM Responsible Provider: Mark Melendez MD    Anesthesia Type: MAC ASA Status: 3            Anesthesia Type: No value filed. Stephan Phase I: Stephan Score: 10    Stephan Phase II: Stephan Score: 10    Anesthesia Post Evaluation    Patient location during evaluation: PACU  Level of consciousness: awake  Airway patency: patent  Nausea & Vomiting: no nausea  Cardiovascular status: blood pressure returned to baseline  Respiratory status: acceptable  Hydration status: euvolemic  Pain management: adequate    No notable events documented.

## 2023-12-28 NOTE — PROGRESS NOTES
Pre-Operative:  1.  Patient/Caregiver identifies - states name and date of birth.  2.  The patient is free from signs and symptoms of injury.  3.  The patient receives appropriate medication(s), safely administered during the Perioperative period.  4.  The patient is free from signs and symptoms of infection.  5.  The patient has wound / tissue perfusion.  6.  The patients's fluid, electrolyte, and acid-base balances are established preoperatively.  7.  The patient's pulmonary function is established preoperatively.  8.  The patient's cardiovascular status is established preoperatively.  9.  The patient / caregiver demonstrates knowledge of nutritional management related to the operative or other invasive procedure.  10.  The patient/caregiver demonstrates knowledge of medication management.  11.  The patient/caregiver demonstrates knowledge of pain management.  12.  The patient participates in the rehabilitation process as applicable.  13.  The patient/caregiver participates in decisions affection his or her Perioperative plan of care.  14.  The patient's care is consistent with the individualized Perioperative plan of care.  15.  The patient's right to privacy is maintained.  16.  The patient is the recipient of competent and ethical care within legal standards of practice.  17.  The patient's value system, lifestyle, ethnicity, and culture are considered, respected, and incorporated in the Perioperative plan of care and understands special services available.  18.  The patient demonstrates and/or reports adequate pain control throughout the the Perioperative period.  19.  The patient's neurological status is established preoperatively.  20.  The patient/caregiver demonstrates knowledge of the expected responses to the operative or invasive procedure.  21.  Patient/Caregiver has reduced anxiety.  Interventions- Familiarize with environment and equipment.  22. Patient/Caregiver verbalizes understanding of Phase I  and Phase II process. 23.  Patient pain level is established preoperatively using age appropriate pain scale. 24.  The patient will move to fall risk upon sedation- during and through the recovery phase. Interventions- orient the patient to the environment, especially the location of the bathroom; provide treaded socks/non-skid footwear; demonstrate and teach back use of the nurse's call system; instruct the patient to call for help before getting out of bed; lock all movable equipment before transferring patient; keep bed in lowest position possible.  25.  Other:

## 2024-01-03 ENCOUNTER — OFFICE VISIT (OUTPATIENT)
Dept: SURGERY | Age: 59
End: 2024-01-03
Payer: MEDICARE

## 2024-01-03 VITALS
WEIGHT: 203.6 LBS | DIASTOLIC BLOOD PRESSURE: 42 MMHG | HEIGHT: 69 IN | SYSTOLIC BLOOD PRESSURE: 115 MMHG | BODY MASS INDEX: 30.16 KG/M2 | HEART RATE: 94 BPM

## 2024-01-03 DIAGNOSIS — Z87.19 HISTORY OF UMBILICAL HERNIA: Primary | ICD-10-CM

## 2024-01-03 PROCEDURE — 99213 OFFICE O/P EST LOW 20 MIN: CPT | Performed by: SURGERY

## 2024-01-03 PROCEDURE — 3074F SYST BP LT 130 MM HG: CPT | Performed by: SURGERY

## 2024-01-03 PROCEDURE — 1111F DSCHRG MED/CURRENT MED MERGE: CPT | Performed by: SURGERY

## 2024-01-03 PROCEDURE — 3078F DIAST BP <80 MM HG: CPT | Performed by: SURGERY

## 2024-01-03 PROCEDURE — G8417 CALC BMI ABV UP PARAM F/U: HCPCS | Performed by: SURGERY

## 2024-01-03 PROCEDURE — 1036F TOBACCO NON-USER: CPT | Performed by: SURGERY

## 2024-01-03 PROCEDURE — G8484 FLU IMMUNIZE NO ADMIN: HCPCS | Performed by: SURGERY

## 2024-01-03 PROCEDURE — G8427 DOCREV CUR MEDS BY ELIG CLIN: HCPCS | Performed by: SURGERY

## 2024-01-03 PROCEDURE — 3017F COLORECTAL CA SCREEN DOC REV: CPT | Performed by: SURGERY

## 2024-01-04 ENCOUNTER — TELEPHONE (OUTPATIENT)
Dept: CARDIOLOGY CLINIC | Age: 59
End: 2024-01-04

## 2024-01-04 NOTE — TELEPHONE ENCOUNTER
CARELINK shows increasing thoracic impedance consistent with decreasing fluid index.       This is improved from last month.  Continue current Lasix dosing.   Thank you, Tracey

## 2024-01-04 NOTE — PROGRESS NOTES
HPI: Nursing notes reviewed.  Patient with some increase in size of hernia. No pain. No nasuea or emesis.  Mild constipation at times.    ROS:  10 point review of systems performed with pertinent positives in HPI    Phys:    Abd - soft, nontender, nondistended, +umbilical hernia       Assesment: 57 yo with umbilical hernia    Plan: Discussed their diagnosis and indications for operation.  Risks of operation and typical recovery reviewed.  Plan for OR soon.    Humble Guajardo MD

## 2024-01-05 ENCOUNTER — TELEPHONE (OUTPATIENT)
Dept: CARDIOLOGY CLINIC | Age: 59
End: 2024-01-05

## 2024-01-05 RX ORDER — OXYCODONE HYDROCHLORIDE AND ACETAMINOPHEN 5; 325 MG/1; MG/1
1 TABLET ORAL EVERY 6 HOURS PRN
COMMUNITY
Start: 2023-12-20

## 2024-01-05 RX ORDER — AMOXICILLIN AND CLAVULANATE POTASSIUM 875; 125 MG/1; MG/1
1 TABLET, FILM COATED ORAL 2 TIMES DAILY
COMMUNITY
Start: 2023-12-20

## 2024-01-05 NOTE — TELEPHONE ENCOUNTER
Christina [phone] called to see if we had received clearance yet because pt needs to stop blood thinner today if ok to hold for 1 week before surgery.  Please call Christina who will be in until 5:15 today.

## 2024-01-05 NOTE — TELEPHONE ENCOUNTER
CARDIAC CLEARANCE REQUEST    What type of procedure are you having:umbilical hernia repair with/ mesh    Are you taking any blood thinners:Aspirin, Plavix    Type on anesthesia:general    When is your procedure scheduled for:01/12/24    What physician is performing your procedure:    Phone Number:[phone]    Fax number to send the letter: [phone]    Last OV: 12/18/23 JERRICA

## 2024-01-05 NOTE — PROGRESS NOTES
Preoperative Screening for Elective Surgery/Invasive Procedures While COVID-19 present in the community    Have you had any of the following symptoms?  Fever, chills  Cough  Shortness of breath  Muscle aches/pain  Diarrhea  Abdominal pain, nausea, vomiting  Loss or decrease in taste and / or smell  Risk of Exposure  Have you recently been hospitalized for COVID-19 or flu-like illness, if so when?  Recently diagnosed with COVID-19, if so when?  Recently tested for COVID-19, if so when?  Have you been in close contact with a person or family member who currently has or recently had COVID-19? If yes, when and in what context?  Do you live with anybody who in the last 14 days has had fever, chills, shortness of breath, muscle aches, flu-like illness?  Do you have any close contacts or family members who are currently in the hospital for COVID-19 or flu-like illness? If yes, assess recent close contact with this person.    Indicate if the patient has a positive screen by answering yes to one or more of the above questions.  Patients who test positive or screen positive prior to surgery or on the day of surgery should be evaluated in conjunction with the surgeon/proceduralist/anesthesiologist to determine the urgency of the procedure.    Pt states is symptom free and denies covid exposure

## 2024-01-05 NOTE — PROGRESS NOTES
Pt notified to stop baby aspirin as of today and to stop plavix after Saturday 1/6/24 per Dr. Carmichael.Understanding verbalized.

## 2024-01-05 NOTE — PROGRESS NOTES
1. Do not eat or drink anything after 12 midnight prior to surgery. This includes no water, chewing gum mints, or ice chips. You may brush your teeth and gargle the day of surgery but DO NOT SWALLOW THE WATER.   2. Please see your family doctor/pediatrician for a history and physical and/or concerning medications. Bring any test results/reports from your physician's office. If you are under the care of a heart doctor or specialist please be aware that you may be asked to see him or her for clearance.   3. You may be asked to stop blood thinners such as Coumadin, Plavix, Fragmin, and Lovenox or Anti-inflammatories such as Aspirin, Ibuprofen, Advil, and Naproxen prior to your surgery. Please check with your doctor before stopping these or any other medications.   4. Do not smoke, and do not drink any alcoholic beverages 24 hours prior to surgery.    5. You MUST make arrangements for a responsible adult to take you home after your surgery. For your safety, you will not be allowed to leave alone or drive yourself home. Your surgery will be cancelled if you do not have a ride home.Also for your safety, it is strongly suggested someone stay with you the first 24 hrs after your surgery.   6. A parent/legal guardian must accompany a child scheduled for surgery and plan to stay at the hospital until the child is discharged.  Please do not bring other children with you.   7. For your comfort,please wear simple, loose fitting clothing to the hospital.  Please do not bring valuables (money, credit cards, checkbooks, etc.) Do not wear any makeup (including no eye makeup) or nail polish on your fingers or toes.   8. For your safety, please DO NOT wear any jewelry or piercings on day of surgery.  All body piercing jewelry must be removed.   9. If you have dentures, they will be removed before going to the OR; for your convenience we will provide you with a container.  If you wear contact lenses or glasses, they will be removed,

## 2024-01-05 NOTE — PROGRESS NOTES
Ruben Eagle    Age 58 y.o.    male    1965    MRN [id number]    1/12/2024  Arrival Time_____________  OR Time____________123 Min     Procedure(s):  ROBOTIC UMBILICAL HERNIA REPAIR WITHMESH, POSSIBLE OPEN PROCEDURE                      General   Surgeon(s):  Mau Guajardo, MD      DAY ADMIT ___  SDS/OP ___  OUTPT IN BED ___        Phone [phone] (home)     PCP _____________________ Phone_________________ Epic ( ) Epic CE ( ) Appt ________    ADDITIONAL INFO __________________________________ Cardio/Consult _____________    NOTES _____________________________________________________________________    ____________________________________________________________________________    PAT APPT DATE:________ TIME: ________  FAXED QAD: _______  (__) H&P w/ Hospitalist  __________________________________________________________________________  Preop Nurse phone screen complete: _____________  (__) CBC     (__) W/ DIFF ___________     (__) Hgb A1C    ___________  (__) CHEST X RAY   __________  (__) LIPID PROFILE  ___________  (__) EKG   __________  (__) PT-INR / APTT  ___________  (__) PFT's   __________  (__) BMP   ___________  (__) CAROTIDS  __________  (__) CMP   ___________  (__) VEIN MAPPING  __________  (__) U/A   ___________  (__) HISTORY & PHYSICAL __________  (__) URINE C & S  ___________  (__) CARDIAC CLEARANCE __________  (__) U/A W/ FLEX  ___________  (__) PULM. CLEARANCE __________  (__) SERUM PREGNANCY ___________  (__) Check Epic DOS orders __________  (__) TYPE & SCREEN __________repeat ( ) (__)  __________________ __________  (__) Albumin / Prealbumin ___________  (__)  __________________ __________  (__) TRANSFERRIN  ___________  (__)  __________________ __________  (__) LIVER PROFILE  ___________  (__)  __________________ __________  (__) MRSA NASAL SWAB ___________  (__) URINE PREG DOS __________  (__) SED RATE  ___________  (__) BLOOD SUGAR DOS __________  (__) C-REACTIVE  PROTEIN ___________    (__) VITAMIN D HYDROXY ___________  (__) OAC    ________________________    (__) ACE/ ARBS: _____________________     (__) BETABLOCKERS __________________    Ride home/Contact #__________________________

## 2024-01-07 NOTE — OP NOTE
[address] [address]                                OPERATIVE REPORT    PATIENT NAME: GEGE HANNAH                    :        1965  MED REC NO:   [id number]                          ROOM:  ACCOUNT NO:   [id number]                           ADMIT DATE: 2023  PROVIDER:     Floyd Jarrell DPM    DATE OF PROCEDURE:  2023    PREOPERATIVE DIAGNOSES:  1.  Diabetic foot ulceration, right foot.  2.  Osteomyelitis, right foot.  3.  Diabetes mellitus.    POSTOPERATIVE DIAGNOSES:  1.  Diabetic foot ulceration, right foot.  2.  Osteomyelitis, right foot.  3.  Diabetes mellitus.    OPERATION PERFORMED:  Ulcer debridement, right foot.    SURGEON:  Floyd Jarrell DPM    ANESTHESIA:  Local MAC.    HEMOSTASIS:  Ankle tourniquet at 250 mmHg.    ESTIMATED BLOOD LOSS:  Less than 100 mL.    REPORT OF OPERATION:  The patient was brought into the operating room  and placed on the operating table in supine position.  Under mild IV  sedation, the right foot was anesthetized with 20 mL of 1:1 mixture of  1% lidocaine plain and 0.5% Marcaine plain.  The foot was then scrubbed,  prepped, and draped in the usual sterile manner.  Esmarch was then  utilized to exsanguinate the right foot.  Ankle tourniquet was then  inflated to 250 mmHg.    Attention was then directed to the right foot where the ulceration was  identified at the distal aspect of the transmetatarsal amputation site.   There was a large hematoma on the distal aspect of this wound bed and  this was evacuated in total.  There was obviously exposed bone of the  third and fourth metatarsals.  The distal aspect was soft to palpation  and discolored consistent with preoperative diagnosis of osteomyelitis  or skin infection.  There was periwound erythema and edema consistent  with preoperative diagnosis of cellulitis or skin infection.  At this  time, we utilized the combination

## 2024-01-11 PROCEDURE — 93297 REM INTERROG DEV EVAL ICPMS: CPT | Performed by: NURSE PRACTITIONER

## 2024-01-12 ENCOUNTER — HOSPITAL ENCOUNTER (OUTPATIENT)
Age: 59
Setting detail: OUTPATIENT SURGERY
Discharge: HOME | End: 2024-01-12
Attending: SURGERY | Admitting: SURGERY
Payer: MEDICARE

## 2024-01-12 ENCOUNTER — ANESTHESIA (OUTPATIENT)
Dept: OPERATING ROOM | Age: 59
End: 2024-01-12
Payer: MEDICARE

## 2024-01-12 ENCOUNTER — TELEPHONE (OUTPATIENT)
Dept: SURGERY | Age: 59
End: 2024-01-12

## 2024-01-12 ENCOUNTER — ANESTHESIA EVENT (OUTPATIENT)
Dept: OPERATING ROOM | Age: 59
End: 2024-01-12
Payer: MEDICARE

## 2024-01-12 VITALS
WEIGHT: 198.56 LBS | SYSTOLIC BLOOD PRESSURE: 99 MMHG | RESPIRATION RATE: 14 BRPM | HEART RATE: 80 BPM | DIASTOLIC BLOOD PRESSURE: 63 MMHG | TEMPERATURE: 97.7 F | BODY MASS INDEX: 29.41 KG/M2 | HEIGHT: 69 IN | OXYGEN SATURATION: 93 %

## 2024-01-12 DIAGNOSIS — G89.18 POSTOPERATIVE PAIN: Primary | ICD-10-CM

## 2024-01-12 PROBLEM — K42.9 UMBILICAL HERNIA WITHOUT OBSTRUCTION AND WITHOUT GANGRENE: Status: ACTIVE | Noted: 2024-01-12

## 2024-01-12 LAB
GLUCOSE BLD-MCNC: 240 MG/DL (ref 70–99)
GLUCOSE BLD-MCNC: 242 MG/DL (ref 70–99)
PERFORMED ON: ABNORMAL
PERFORMED ON: ABNORMAL

## 2024-01-12 PROCEDURE — 6370000000 HC RX 637 (ALT 250 FOR IP): Performed by: ANESTHESIOLOGY

## 2024-01-12 PROCEDURE — 6360000002 HC RX W HCPCS: Performed by: NURSE ANESTHETIST, CERTIFIED REGISTERED

## 2024-01-12 PROCEDURE — 3600000009 HC SURGERY ROBOT BASE: Performed by: SURGERY

## 2024-01-12 PROCEDURE — 7100000000 HC PACU RECOVERY - FIRST 15 MIN: Performed by: SURGERY

## 2024-01-12 PROCEDURE — 2580000003 HC RX 258: Performed by: ANESTHESIOLOGY

## 2024-01-12 PROCEDURE — 6360000002 HC RX W HCPCS: Performed by: ANESTHESIOLOGY

## 2024-01-12 PROCEDURE — 3700000000 HC ANESTHESIA ATTENDED CARE: Performed by: SURGERY

## 2024-01-12 PROCEDURE — 7100000011 HC PHASE II RECOVERY - ADDTL 15 MIN: Performed by: SURGERY

## 2024-01-12 PROCEDURE — 2500000003 HC RX 250 WO HCPCS: Performed by: NURSE ANESTHETIST, CERTIFIED REGISTERED

## 2024-01-12 PROCEDURE — 2500000003 HC RX 250 WO HCPCS: Performed by: SURGERY

## 2024-01-12 PROCEDURE — 3700000001 HC ADD 15 MINUTES (ANESTHESIA): Performed by: SURGERY

## 2024-01-12 PROCEDURE — S2900 ROBOTIC SURGICAL SYSTEM: HCPCS | Performed by: SURGERY

## 2024-01-12 PROCEDURE — C1781 MESH (IMPLANTABLE): HCPCS | Performed by: SURGERY

## 2024-01-12 PROCEDURE — 2709999900 HC NON-CHARGEABLE SUPPLY: Performed by: SURGERY

## 2024-01-12 PROCEDURE — 7100000010 HC PHASE II RECOVERY - FIRST 15 MIN: Performed by: SURGERY

## 2024-01-12 PROCEDURE — 6360000002 HC RX W HCPCS: Performed by: SURGERY

## 2024-01-12 PROCEDURE — P9045 ALBUMIN (HUMAN), 5%, 250 ML: HCPCS | Performed by: NURSE ANESTHETIST, CERTIFIED REGISTERED

## 2024-01-12 PROCEDURE — 7100000001 HC PACU RECOVERY - ADDTL 15 MIN: Performed by: SURGERY

## 2024-01-12 PROCEDURE — 3600000019 HC SURGERY ROBOT ADDTL 15MIN: Performed by: SURGERY

## 2024-01-12 DEVICE — MESH HERN W7.5XL15CM INGUINAL POLYPR SYN FLAT L PORE MFIL: Type: IMPLANTABLE DEVICE | Site: UMBILICAL | Status: FUNCTIONAL

## 2024-01-12 RX ORDER — ROCURONIUM BROMIDE 10 MG/ML
INJECTION, SOLUTION INTRAVENOUS PRN
Status: DISCONTINUED | OUTPATIENT
Start: 2024-01-12 | End: 2024-01-12 | Stop reason: SDUPTHER

## 2024-01-12 RX ORDER — PROPOFOL 10 MG/ML
INJECTION, EMULSION INTRAVENOUS PRN
Status: DISCONTINUED | OUTPATIENT
Start: 2024-01-12 | End: 2024-01-12

## 2024-01-12 RX ORDER — DEXAMETHASONE SODIUM PHOSPHATE 10 MG/ML
INJECTION INTRAMUSCULAR; INTRAVENOUS PRN
Status: DISCONTINUED | OUTPATIENT
Start: 2024-01-12 | End: 2024-01-12 | Stop reason: SDUPTHER

## 2024-01-12 RX ORDER — SODIUM CHLORIDE, SODIUM LACTATE, POTASSIUM CHLORIDE, CALCIUM CHLORIDE 600; 310; 30; 20 MG/100ML; MG/100ML; MG/100ML; MG/100ML
INJECTION, SOLUTION INTRAVENOUS CONTINUOUS
Status: DISCONTINUED | OUTPATIENT
Start: 2024-01-12 | End: 2024-01-12 | Stop reason: HOSPADM

## 2024-01-12 RX ORDER — SODIUM CHLORIDE 0.9 % (FLUSH) 0.9 %
5-40 SYRINGE (ML) INJECTION PRN
Status: DISCONTINUED | OUTPATIENT
Start: 2024-01-12 | End: 2024-01-12 | Stop reason: HOSPADM

## 2024-01-12 RX ORDER — CEFAZOLIN SODIUM IN 0.9 % NACL 2 G/100 ML
2000 PLASTIC BAG, INJECTION (ML) INTRAVENOUS
Status: COMPLETED | OUTPATIENT
Start: 2024-01-12 | End: 2024-01-12

## 2024-01-12 RX ORDER — OXYCODONE HYDROCHLORIDE 5 MG/1
5 TABLET ORAL EVERY 6 HOURS PRN
Qty: 20 TABLET | Refills: 0 | Status: SHIPPED | OUTPATIENT
Start: 2024-01-12 | End: 2024-01-17

## 2024-01-12 RX ORDER — LIDOCAINE HYDROCHLORIDE 10 MG/ML
2 INJECTION, SOLUTION INFILTRATION; PERINEURAL
Status: DISCONTINUED | OUTPATIENT
Start: 2024-01-12 | End: 2024-01-12 | Stop reason: HOSPADM

## 2024-01-12 RX ORDER — BUPIVACAINE HYDROCHLORIDE AND EPINEPHRINE 5; 5 MG/ML; UG/ML
INJECTION, SOLUTION PERINEURAL PRN
Status: DISCONTINUED | OUTPATIENT
Start: 2024-01-12 | End: 2024-01-12 | Stop reason: ALTCHOICE

## 2024-01-12 RX ORDER — PHENYLEPHRINE HCL IN 0.9% NACL 1 MG/10 ML
SYRINGE (ML) INTRAVENOUS PRN
Status: DISCONTINUED | OUTPATIENT
Start: 2024-01-12 | End: 2024-01-12 | Stop reason: SDUPTHER

## 2024-01-12 RX ORDER — SODIUM CHLORIDE 0.9 % (FLUSH) 0.9 %
5-40 SYRINGE (ML) INJECTION EVERY 12 HOURS SCHEDULED
Status: DISCONTINUED | OUTPATIENT
Start: 2024-01-12 | End: 2024-01-12 | Stop reason: HOSPADM

## 2024-01-12 RX ORDER — ETOMIDATE 2 MG/ML
INJECTION INTRAVENOUS PRN
Status: DISCONTINUED | OUTPATIENT
Start: 2024-01-12 | End: 2024-01-12 | Stop reason: SDUPTHER

## 2024-01-12 RX ORDER — ALBUMIN, HUMAN INJ 5% 5 %
SOLUTION INTRAVENOUS PRN
Status: DISCONTINUED | OUTPATIENT
Start: 2024-01-12 | End: 2024-01-12 | Stop reason: SDUPTHER

## 2024-01-12 RX ORDER — FENTANYL CITRATE 50 UG/ML
INJECTION, SOLUTION INTRAMUSCULAR; INTRAVENOUS PRN
Status: DISCONTINUED | OUTPATIENT
Start: 2024-01-12 | End: 2024-01-12 | Stop reason: SDUPTHER

## 2024-01-12 RX ORDER — ONDANSETRON 2 MG/ML
INJECTION INTRAMUSCULAR; INTRAVENOUS PRN
Status: DISCONTINUED | OUTPATIENT
Start: 2024-01-12 | End: 2024-01-12 | Stop reason: SDUPTHER

## 2024-01-12 RX ORDER — INSULIN LISPRO 100 [IU]/ML
4 INJECTION, SOLUTION INTRAVENOUS; SUBCUTANEOUS ONCE
Status: COMPLETED | OUTPATIENT
Start: 2024-01-12 | End: 2024-01-12

## 2024-01-12 RX ORDER — ONDANSETRON 2 MG/ML
4 INJECTION INTRAMUSCULAR; INTRAVENOUS
Status: DISCONTINUED | OUTPATIENT
Start: 2024-01-12 | End: 2024-01-12 | Stop reason: HOSPADM

## 2024-01-12 RX ORDER — LIDOCAINE HYDROCHLORIDE 20 MG/ML
INJECTION, SOLUTION EPIDURAL; INFILTRATION; INTRACAUDAL; PERINEURAL PRN
Status: DISCONTINUED | OUTPATIENT
Start: 2024-01-12 | End: 2024-01-12 | Stop reason: SDUPTHER

## 2024-01-12 RX ORDER — OXYCODONE HYDROCHLORIDE 5 MG/1
5 TABLET ORAL PRN
Status: DISCONTINUED | OUTPATIENT
Start: 2024-01-12 | End: 2024-01-12 | Stop reason: HOSPADM

## 2024-01-12 RX ORDER — MEPERIDINE HYDROCHLORIDE 50 MG/ML
12.5 INJECTION INTRAMUSCULAR; INTRAVENOUS; SUBCUTANEOUS EVERY 5 MIN PRN
Status: DISCONTINUED | OUTPATIENT
Start: 2024-01-12 | End: 2024-01-12 | Stop reason: HOSPADM

## 2024-01-12 RX ORDER — DIPHENHYDRAMINE HYDROCHLORIDE 50 MG/ML
12.5 INJECTION INTRAMUSCULAR; INTRAVENOUS
Status: DISCONTINUED | OUTPATIENT
Start: 2024-01-12 | End: 2024-01-12 | Stop reason: HOSPADM

## 2024-01-12 RX ORDER — SODIUM CHLORIDE 9 MG/ML
INJECTION, SOLUTION INTRAVENOUS PRN
Status: DISCONTINUED | OUTPATIENT
Start: 2024-01-12 | End: 2024-01-12 | Stop reason: HOSPADM

## 2024-01-12 RX ORDER — OXYCODONE HYDROCHLORIDE 5 MG/1
10 TABLET ORAL PRN
Status: DISCONTINUED | OUTPATIENT
Start: 2024-01-12 | End: 2024-01-12 | Stop reason: HOSPADM

## 2024-01-12 RX ORDER — LABETALOL HYDROCHLORIDE 5 MG/ML
10 INJECTION, SOLUTION INTRAVENOUS
Status: DISCONTINUED | OUTPATIENT
Start: 2024-01-12 | End: 2024-01-12 | Stop reason: HOSPADM

## 2024-01-12 RX ADMIN — SODIUM CHLORIDE, SODIUM LACTATE, POTASSIUM CHLORIDE, AND CALCIUM CHLORIDE: .6; .31; .03; .02 INJECTION, SOLUTION INTRAVENOUS at 14:00

## 2024-01-12 RX ADMIN — Medication 100 MCG: at 14:57

## 2024-01-12 RX ADMIN — ONDANSETRON 4 MG: 2 INJECTION INTRAMUSCULAR; INTRAVENOUS at 14:05

## 2024-01-12 RX ADMIN — ETOMIDATE 20 MG: 2 INJECTION INTRAVENOUS at 14:05

## 2024-01-12 RX ADMIN — FENTANYL CITRATE 25 MCG: 50 INJECTION, SOLUTION INTRAMUSCULAR; INTRAVENOUS at 15:12

## 2024-01-12 RX ADMIN — DEXAMETHASONE SODIUM PHOSPHATE 10 MG: 10 INJECTION INTRAMUSCULAR; INTRAVENOUS at 14:05

## 2024-01-12 RX ADMIN — Medication 100 MCG: at 14:51

## 2024-01-12 RX ADMIN — INSULIN LISPRO 4 UNITS: 100 INJECTION, SOLUTION INTRAVENOUS; SUBCUTANEOUS at 15:32

## 2024-01-12 RX ADMIN — HYDROMORPHONE HYDROCHLORIDE 0.5 MG: 1 INJECTION, SOLUTION INTRAMUSCULAR; INTRAVENOUS; SUBCUTANEOUS at 15:36

## 2024-01-12 RX ADMIN — Medication 100 MCG: at 14:54

## 2024-01-12 RX ADMIN — ALBUMIN (HUMAN) 12.5 G: 12.5 INJECTION, SOLUTION INTRAVENOUS at 14:43

## 2024-01-12 RX ADMIN — SUGAMMADEX 200 MG: 100 INJECTION, SOLUTION INTRAVENOUS at 15:08

## 2024-01-12 RX ADMIN — ROCURONIUM BROMIDE 80 MG: 50 INJECTION, SOLUTION INTRAVENOUS at 14:05

## 2024-01-12 RX ADMIN — INSULIN HUMAN 4 UNITS: 100 INJECTION, SOLUTION PARENTERAL at 13:28

## 2024-01-12 RX ADMIN — ROCURONIUM BROMIDE 10 MG: 50 INJECTION, SOLUTION INTRAVENOUS at 14:28

## 2024-01-12 RX ADMIN — LIDOCAINE HYDROCHLORIDE 80 MG: 20 INJECTION, SOLUTION EPIDURAL; INFILTRATION; INTRACAUDAL; PERINEURAL at 14:05

## 2024-01-12 RX ADMIN — Medication 2000 MG: at 14:09

## 2024-01-12 ASSESSMENT — PAIN SCALES - GENERAL: PAINLEVEL_OUTOF10: 7

## 2024-01-12 ASSESSMENT — PAIN DESCRIPTION - LOCATION: LOCATION: ABDOMEN

## 2024-01-12 ASSESSMENT — PAIN - FUNCTIONAL ASSESSMENT: PAIN_FUNCTIONAL_ASSESSMENT: 0-10

## 2024-01-12 ASSESSMENT — PAIN DESCRIPTION - ORIENTATION: ORIENTATION: LEFT

## 2024-01-12 ASSESSMENT — ENCOUNTER SYMPTOMS: SHORTNESS OF BREATH: 1

## 2024-01-12 ASSESSMENT — PAIN DESCRIPTION - DESCRIPTORS: DESCRIPTORS: ACHING;DULL

## 2024-01-12 NOTE — PROGRESS NOTES
Dr. Guerra notified of poc glucose of 240. See new orders. Pt is alert. Stated no nasuea. No emesis. Medicated for dull, aching pain in abdomen.

## 2024-01-12 NOTE — ANESTHESIA POSTPROCEDURE EVALUATION
Department of Anesthesiology  Postprocedure Note    Patient: Ruben Eagle  MRN: [id number]  YOB: 1965  Date of evaluation: 1/12/2024    Procedure Summary       Date: 01/12/24 Room / Location: [address]    Anesthesia Start: 1400 Anesthesia Stop: 1518    Procedure: ROBOTIC UMBILICAL HERNIA REPAIR WITH MESH (Abdomen) Diagnosis:       History of umbilical hernia      (History of umbilical hernia [Z87.19])    Surgeons: Mau Guajardo MD Responsible Provider: Marcus Newman MD    Anesthesia Type: general ASA Status: 4            Anesthesia Type: No value filed.    Stephan Phase I: Stephan Score: 10    Stephan Phase II: Stephan Score: 10    Anesthesia Post Evaluation    Patient location during evaluation: PACU  Patient participation: complete - patient participated  Level of consciousness: awake and alert  Airway patency: patent  Nausea & Vomiting: no nausea and no vomiting  Cardiovascular status: blood pressure returned to baseline  Respiratory status: acceptable  Hydration status: euvolemic  Comments: VSS on transfer to phase 2 recovery.  No anesthetic complications.  Pain management: adequate    No notable events documented.

## 2024-01-12 NOTE — PROGRESS NOTES
Pt admitted to [facility] room 2 to prep for surgery. Consents reviewed. Family members at bedside

## 2024-01-12 NOTE — PROGRESS NOTES
Report obtained from JUAN Perez. Patient alert, off oxygen, tolerating PO. Phase II initiated, VSS.

## 2024-01-12 NOTE — PROGRESS NOTES
Patient arrived to PACU bay 5, phase one initiated. Placed on bedside monitor, VSS. Report obtained from OR RN and anesthesia. Patient on O2 via NC  at 3 L. Side rails in place. Warm blankets applied.

## 2024-01-12 NOTE — OP NOTE
Date of Surgery: 1/12/24    Preop Dx:  umbilica hernia    Postop Dx:  Same    Procedure:  Robotic umbilical Hernia Repair with Mesh    Surgeon:  Casandra    Assistant:      Anesthesia:  GETA    EBL:   <50ml    Specimen:  none    Complications: none    Drains/Lines:  none    Indications:  59 yo with umbilical hernia    Description:  Patient was given adequate description of the risks and rewards of the procedure, including bleeding, infection and freely consented.  They were given appropriate antibiotics and brought to the OR where GETA anesthesia was induced.  They was placed in supine position.  Prepped and draped in usual sterile fashion.     Incision made in left upper abdomen and 5mm optiview trocar was inserted.  Abdomen insufflated to 15mmHg pressure.  8mm trocar inserted in left lower abdomen and 8mm in left mid abdomen.  5mm increased to 8mm and robot docked.  Hernia contents of omentum were taken down with sharp dissection and cautery.  The peritoneum was mobilized from left to right side with reduciton of hernia sac as well.  The hernia defect was 3cm long and closed with 0-0 strattafix suture.  7.5x7.5cm softmesh inserted and positioned.  Secured with 2-0 vicryl sutures.  Peritoneum closed posterior to this with strattafix suture.   Abdomen desufflated and trocars were removed.  All port sites closed in subcutaneous plane with 3-0 vicryl suture.     Sterile dressing placed.  All suture, sponge and instrument count correct times two at end of case.  Transferred to PACU in stable condition.    Humble Guajardo MD

## 2024-01-12 NOTE — DISCHARGE INSTRUCTIONS
[facility]    Hussein Irvin M.D.   [facility]      [facility]          [facility]               [address]                [address]  Humble Guajardo M.D.              [address]           [address]            [place] [address]         [place] [address]  Floyd Cooney M.D                         [phone]          [facility]                   Marcus Morgan M.D.            [facility]                                                        POST-OPERATIVE INSTRUCTIONS HERNIA REPAIR    Call the office to schedule your post-operative appointment with your surgeon for two (2) weeks.     If you still have bandages over your incisions, you  may remove them in 2-3 days.    Place an ice pack over your incisions on and off (15min) at a time for the next 2 days.    General guidelines for activity:      Avoid strenuous activity or lifting anything heavier than 15 pounds.       It is OK to be up and walking around. Going up and down stairs is   also OK.      Do what is comfortable: stop and rest when you feel tired.     You will have pain medicine ordered. Take as directed.     Do NOT drive for one week and while taking your narcotic pain medicine.      Watch for signs of infection:    Excessive warmth or bright redness around your incisions    Leakage of bloody or cloudy fluid from you incisions    Fever over 100.5    If you experience constipation  Increase your water intake.  Increase your activity; walking is best.  A stool softener or mild laxative may be necessary if you still have not had a bowel  movement ; call the office for further instructions.    Please take note: IF you do not take all of your narcotic pain medication, we ask that you dispose of these responsibly.   Drug drop off boxes are located in the [facility] and [facility] emergency

## 2024-01-12 NOTE — ANESTHESIA PRE PROCEDURE
renal disease: CRI          Endo/Other:    (+) DiabetesType II DM, hypothyroidism::..                 Abdominal:      Obesity: full beard.       Vascular:   + PVD, aortic or cerebral.       Other Findings:       Anesthesia Plan      general     ASA 4     (Pt agrees to risks, benefits and alternatives of GETA.  Questions answered.  Willing to proceed with plan.)  Induction: intravenous.      Anesthetic plan and risks discussed with patient.                    BRITNI FLETCHER MD   1/12/2024

## 2024-01-12 NOTE — PROGRESS NOTES
Discharge instructions given to pt and family. Verbalized understanding. PIV removed. Pt dressed and wheeled out and discharged to the care of their family in stable condition.

## 2024-01-12 NOTE — TELEPHONE ENCOUNTER
Pt was seen in the office on 01/03/24 and was given surgery instructions for a robotic umbilical hernia repair w/mesh, possible open procedure (general anesthesia). Scheduled on Fri 01/12/24 @ 1345/1145 arrival [facility] after midnight adali before surgery, pt will need  day of surgery,   to complete pre-op physical, pt understood and agreed to above noted.

## 2024-01-12 NOTE — H&P
I have reviewed the history and physical and examined the patient.  I find no relevant changes.  I have reviewed with the patient and/or family members, during the preoperative office visit the risks, benefits, and alternatives to the procedure.    Mau Guajardo MD

## 2024-01-24 ENCOUNTER — OFFICE VISIT (OUTPATIENT)
Dept: SURGERY | Age: 59
End: 2024-01-24
Payer: MEDICARE

## 2024-01-24 VITALS
DIASTOLIC BLOOD PRESSURE: 68 MMHG | SYSTOLIC BLOOD PRESSURE: 120 MMHG | BODY MASS INDEX: 29.51 KG/M2 | HEIGHT: 69 IN | WEIGHT: 199.2 LBS

## 2024-01-24 DIAGNOSIS — Z87.19 HISTORY OF UMBILICAL HERNIA: Primary | ICD-10-CM

## 2024-01-24 PROCEDURE — 99211 OFF/OP EST MAY X REQ PHY/QHP: CPT | Performed by: SURGERY

## 2024-01-24 PROCEDURE — 3078F DIAST BP <80 MM HG: CPT | Performed by: SURGERY

## 2024-01-24 PROCEDURE — 3074F SYST BP LT 130 MM HG: CPT | Performed by: SURGERY

## 2024-01-24 PROCEDURE — G8417 CALC BMI ABV UP PARAM F/U: HCPCS | Performed by: SURGERY

## 2024-01-24 PROCEDURE — G8427 DOCREV CUR MEDS BY ELIG CLIN: HCPCS | Performed by: SURGERY

## 2024-01-24 NOTE — PROGRESS NOTES
HPI: Nursing notes reviewed.  Patient without pain or nausea.    ROS:  10 point review of systems performed with pertinent positives in HPI    Phys:    Abd - soft, nontender, nondistended, no hernia   Incisions - no signs of infection    Assesment: 57 yo s/p robotic umbilical hernia repair with mesh    Plan: 1.  Doing well postop   2.  No heavy lifting another two weeks   3.  Call with concerns

## 2024-02-11 PROCEDURE — 93297 REM INTERROG DEV EVAL ICPMS: CPT | Performed by: NURSE PRACTITIONER

## 2024-03-13 ENCOUNTER — APPOINTMENT (OUTPATIENT)
Dept: VASCULAR LAB | Age: 59
DRG: 629 | End: 2024-03-13
Payer: MEDICARE

## 2024-03-13 ENCOUNTER — HOSPITAL ENCOUNTER (INPATIENT)
Age: 59
LOS: 5 days | Discharge: HOME | DRG: 629 | End: 2024-03-18
Attending: EMERGENCY MEDICINE | Admitting: INTERNAL MEDICINE
Payer: MEDICARE

## 2024-03-13 ENCOUNTER — APPOINTMENT (OUTPATIENT)
Dept: GENERAL RADIOLOGY | Age: 59
DRG: 629 | End: 2024-03-13
Payer: MEDICARE

## 2024-03-13 DIAGNOSIS — M86.9 OSTEOMYELITIS OF LEFT FOOT, UNSPECIFIED TYPE (HCC): ICD-10-CM

## 2024-03-13 DIAGNOSIS — L97.529 DIABETIC ULCER OF LEFT FOOT ASSOCIATED WITH OTHER SPECIFIED DIABETES MELLITUS, UNSPECIFIED PART OF FOOT, UNSPECIFIED ULCER STAGE (HCC): ICD-10-CM

## 2024-03-13 DIAGNOSIS — E13.621 DIABETIC ULCER OF LEFT FOOT ASSOCIATED WITH OTHER SPECIFIED DIABETES MELLITUS, UNSPECIFIED PART OF FOOT, UNSPECIFIED ULCER STAGE (HCC): ICD-10-CM

## 2024-03-13 DIAGNOSIS — L97.929 INFECTED STASIS ULCER OF LEFT LOWER EXTREMITY (HCC): Primary | ICD-10-CM

## 2024-03-13 DIAGNOSIS — I83.229 INFECTED STASIS ULCER OF LEFT LOWER EXTREMITY (HCC): Primary | ICD-10-CM

## 2024-03-13 PROBLEM — I95.9 HYPOTENSION: Status: ACTIVE | Noted: 2024-03-13

## 2024-03-13 PROBLEM — D64.9 ANEMIA: Status: ACTIVE | Noted: 2024-03-13

## 2024-03-13 PROBLEM — I50.9 CONGESTIVE HEART FAILURE (HCC): Status: ACTIVE | Noted: 2024-03-13

## 2024-03-13 LAB
ALBUMIN SERPL-MCNC: 3.4 G/DL (ref 3.4–5)
ALBUMIN/GLOB SERPL: 1.1 {RATIO} (ref 1.1–2.2)
ALP SERPL-CCNC: 150 U/L (ref 40–129)
ALT SERPL-CCNC: 20 U/L (ref 10–40)
ANION GAP SERPL CALCULATED.3IONS-SCNC: 11 MMOL/L (ref 3–16)
AST SERPL-CCNC: 22 U/L (ref 15–37)
BASOPHILS # BLD: 0.1 K/UL (ref 0–0.2)
BASOPHILS NFR BLD: 0.8 %
BILIRUB SERPL-MCNC: 0.9 MG/DL (ref 0–1)
BILIRUB UR QL STRIP.AUTO: NEGATIVE
BUN SERPL-MCNC: 34 MG/DL (ref 7–20)
CALCIUM SERPL-MCNC: 8.2 MG/DL (ref 8.3–10.6)
CHLORIDE SERPL-SCNC: 95 MMOL/L (ref 99–110)
CLARITY UR: CLEAR
CO2 SERPL-SCNC: 25 MMOL/L (ref 21–32)
COLOR UR: YELLOW
CREAT SERPL-MCNC: 1.4 MG/DL (ref 0.9–1.3)
CRP SERPL-MCNC: 153.8 MG/L (ref 0–5.1)
DEPRECATED RDW RBC AUTO: 18.3 % (ref 12.4–15.4)
EOSINOPHIL # BLD: 0.1 K/UL (ref 0–0.6)
EOSINOPHIL NFR BLD: 1.3 %
ERYTHROCYTE [SEDIMENTATION RATE] IN BLOOD BY WESTERGREN METHOD: 59 MM/HR (ref 0–20)
FLUAV RNA RESP QL NAA+PROBE: NOT DETECTED
FLUBV RNA RESP QL NAA+PROBE: NOT DETECTED
GFR SERPLBLD CREATININE-BSD FMLA CKD-EPI: 58 ML/MIN/{1.73_M2}
GLUCOSE BLD-MCNC: 169 MG/DL (ref 70–99)
GLUCOSE BLD-MCNC: 215 MG/DL (ref 70–99)
GLUCOSE SERPL-MCNC: 185 MG/DL (ref 70–99)
GLUCOSE UR STRIP.AUTO-MCNC: >=1000 MG/DL
HCT VFR BLD AUTO: 28.8 % (ref 40.5–52.5)
HGB BLD-MCNC: 9.2 G/DL (ref 13.5–17.5)
HGB UR QL STRIP.AUTO: NEGATIVE
KETONES UR STRIP.AUTO-MCNC: NEGATIVE MG/DL
LACTATE BLDV-SCNC: 1.4 MMOL/L (ref 0.4–2)
LEUKOCYTE ESTERASE UR QL STRIP.AUTO: NEGATIVE
LYMPHOCYTES # BLD: 0.7 K/UL (ref 1–5.1)
LYMPHOCYTES NFR BLD: 7.2 %
MCH RBC QN AUTO: 25.4 PG (ref 26–34)
MCHC RBC AUTO-ENTMCNC: 32.1 G/DL (ref 31–36)
MCV RBC AUTO: 79 FL (ref 80–100)
MONOCYTES # BLD: 0.7 K/UL (ref 0–1.3)
MONOCYTES NFR BLD: 8 %
NEUTROPHILS # BLD: 7.7 K/UL (ref 1.7–7.7)
NEUTROPHILS NFR BLD: 82.7 %
NITRITE UR QL STRIP.AUTO: NEGATIVE
NT-PROBNP SERPL-MCNC: 6770 PG/ML (ref 0–124)
PERFORMED ON: ABNORMAL
PERFORMED ON: ABNORMAL
PH UR STRIP.AUTO: 6 [PH] (ref 5–8)
PLATELET # BLD AUTO: 242 K/UL (ref 135–450)
PMV BLD AUTO: 7.2 FL (ref 5–10.5)
POTASSIUM SERPL-SCNC: 3.8 MMOL/L (ref 3.5–5.1)
PROCALCITONIN SERPL IA-MCNC: 0.36 NG/ML (ref 0–0.15)
PROT SERPL-MCNC: 6.4 G/DL (ref 6.4–8.2)
PROT UR STRIP.AUTO-MCNC: NEGATIVE MG/DL
RBC # BLD AUTO: 3.64 M/UL (ref 4.2–5.9)
SARS-COV-2 RNA RESP QL NAA+PROBE: NOT DETECTED
SODIUM SERPL-SCNC: 131 MMOL/L (ref 136–145)
SP GR UR STRIP.AUTO: <=1.005 (ref 1–1.03)
TROPONIN, HIGH SENSITIVITY: 41 NG/L (ref 0–22)
UA COMPLETE W REFLEX CULTURE PNL UR: ABNORMAL
UA DIPSTICK W REFLEX MICRO PNL UR: ABNORMAL
URN SPEC COLLECT METH UR: ABNORMAL
UROBILINOGEN UR STRIP-ACNC: 0.2 E.U./DL
WBC # BLD AUTO: 9.2 K/UL (ref 4–11)

## 2024-03-13 PROCEDURE — 83036 HEMOGLOBIN GLYCOSYLATED A1C: CPT

## 2024-03-13 PROCEDURE — 87040 BLOOD CULTURE FOR BACTERIA: CPT

## 2024-03-13 PROCEDURE — 81003 URINALYSIS AUTO W/O SCOPE: CPT

## 2024-03-13 PROCEDURE — 80053 COMPREHEN METABOLIC PANEL: CPT

## 2024-03-13 PROCEDURE — 83540 ASSAY OF IRON: CPT

## 2024-03-13 PROCEDURE — 6360000002 HC RX W HCPCS: Performed by: EMERGENCY MEDICINE

## 2024-03-13 PROCEDURE — 2580000003 HC RX 258: Performed by: EMERGENCY MEDICINE

## 2024-03-13 PROCEDURE — 84484 ASSAY OF TROPONIN QUANT: CPT

## 2024-03-13 PROCEDURE — 6370000000 HC RX 637 (ALT 250 FOR IP)

## 2024-03-13 PROCEDURE — 85652 RBC SED RATE AUTOMATED: CPT

## 2024-03-13 PROCEDURE — 85025 COMPLETE CBC W/AUTO DIFF WBC: CPT

## 2024-03-13 PROCEDURE — 87636 SARSCOV2 & INF A&B AMP PRB: CPT

## 2024-03-13 PROCEDURE — 99223 1ST HOSP IP/OBS HIGH 75: CPT

## 2024-03-13 PROCEDURE — 96374 THER/PROPH/DIAG INJ IV PUSH: CPT

## 2024-03-13 PROCEDURE — 83880 ASSAY OF NATRIURETIC PEPTIDE: CPT

## 2024-03-13 PROCEDURE — 93971 EXTREMITY STUDY: CPT

## 2024-03-13 PROCEDURE — 82746 ASSAY OF FOLIC ACID SERUM: CPT

## 2024-03-13 PROCEDURE — 93005 ELECTROCARDIOGRAM TRACING: CPT

## 2024-03-13 PROCEDURE — 86140 C-REACTIVE PROTEIN: CPT

## 2024-03-13 PROCEDURE — 36415 COLL VENOUS BLD VENIPUNCTURE: CPT

## 2024-03-13 PROCEDURE — 83550 IRON BINDING TEST: CPT

## 2024-03-13 PROCEDURE — 99285 EMERGENCY DEPT VISIT HI MDM: CPT

## 2024-03-13 PROCEDURE — 73630 X-RAY EXAM OF FOOT: CPT

## 2024-03-13 PROCEDURE — 2580000003 HC RX 258: Performed by: NURSE PRACTITIONER

## 2024-03-13 PROCEDURE — 1200000000 HC SEMI PRIVATE

## 2024-03-13 PROCEDURE — 84145 PROCALCITONIN (PCT): CPT

## 2024-03-13 PROCEDURE — 82607 VITAMIN B-12: CPT

## 2024-03-13 PROCEDURE — 83605 ASSAY OF LACTIC ACID: CPT

## 2024-03-13 PROCEDURE — 6360000002 HC RX W HCPCS: Performed by: NURSE PRACTITIONER

## 2024-03-13 RX ORDER — POTASSIUM CHLORIDE 20 MEQ/1
40 TABLET, EXTENDED RELEASE ORAL PRN
Status: DISCONTINUED | OUTPATIENT
Start: 2024-03-13 | End: 2024-03-18 | Stop reason: HOSPADM

## 2024-03-13 RX ORDER — 0.9 % SODIUM CHLORIDE 0.9 %
1000 INTRAVENOUS SOLUTION INTRAVENOUS ONCE
Status: COMPLETED | OUTPATIENT
Start: 2024-03-13 | End: 2024-03-13

## 2024-03-13 RX ORDER — GLUCAGON 1 MG/ML
1 KIT INJECTION PRN
Status: DISCONTINUED | OUTPATIENT
Start: 2024-03-13 | End: 2024-03-18 | Stop reason: HOSPADM

## 2024-03-13 RX ORDER — CLOPIDOGREL BISULFATE 75 MG/1
75 TABLET ORAL DAILY
Status: DISCONTINUED | OUTPATIENT
Start: 2024-03-13 | End: 2024-03-18 | Stop reason: HOSPADM

## 2024-03-13 RX ORDER — ASPIRIN 81 MG/1
81 TABLET ORAL DAILY
Status: DISCONTINUED | OUTPATIENT
Start: 2024-03-14 | End: 2024-03-18 | Stop reason: HOSPADM

## 2024-03-13 RX ORDER — POLYETHYLENE GLYCOL 3350 17 G/17G
17 POWDER, FOR SOLUTION ORAL DAILY PRN
Status: DISCONTINUED | OUTPATIENT
Start: 2024-03-13 | End: 2024-03-18 | Stop reason: HOSPADM

## 2024-03-13 RX ORDER — SODIUM CHLORIDE 0.9 % (FLUSH) 0.9 %
5-40 SYRINGE (ML) INJECTION PRN
Status: DISCONTINUED | OUTPATIENT
Start: 2024-03-13 | End: 2024-03-14

## 2024-03-13 RX ORDER — SODIUM CHLORIDE 9 MG/ML
INJECTION, SOLUTION INTRAVENOUS CONTINUOUS
Status: DISCONTINUED | OUTPATIENT
Start: 2024-03-13 | End: 2024-03-14

## 2024-03-13 RX ORDER — SPIRONOLACTONE 25 MG/1
25 TABLET ORAL DAILY
Status: DISCONTINUED | OUTPATIENT
Start: 2024-03-13 | End: 2024-03-14

## 2024-03-13 RX ORDER — ENOXAPARIN SODIUM 100 MG/ML
40 INJECTION SUBCUTANEOUS DAILY
Status: DISCONTINUED | OUTPATIENT
Start: 2024-03-13 | End: 2024-03-18 | Stop reason: HOSPADM

## 2024-03-13 RX ORDER — SODIUM CHLORIDE 0.9 % (FLUSH) 0.9 %
5-40 SYRINGE (ML) INJECTION EVERY 12 HOURS SCHEDULED
Status: DISCONTINUED | OUTPATIENT
Start: 2024-03-13 | End: 2024-03-14

## 2024-03-13 RX ORDER — ACETAMINOPHEN 325 MG/1
650 TABLET ORAL EVERY 6 HOURS PRN
Status: DISCONTINUED | OUTPATIENT
Start: 2024-03-13 | End: 2024-03-14

## 2024-03-13 RX ORDER — OXYCODONE HYDROCHLORIDE AND ACETAMINOPHEN 5; 325 MG/1; MG/1
1 TABLET ORAL EVERY 6 HOURS PRN
Status: DISCONTINUED | OUTPATIENT
Start: 2024-03-13 | End: 2024-03-14 | Stop reason: ALTCHOICE

## 2024-03-13 RX ORDER — SODIUM CHLORIDE 9 MG/ML
INJECTION, SOLUTION INTRAVENOUS PRN
Status: DISCONTINUED | OUTPATIENT
Start: 2024-03-13 | End: 2024-03-14

## 2024-03-13 RX ORDER — CARVEDILOL 6.25 MG/1
12.5 TABLET ORAL 2 TIMES DAILY WITH MEALS
Status: DISCONTINUED | OUTPATIENT
Start: 2024-03-13 | End: 2024-03-14

## 2024-03-13 RX ORDER — INSULIN LISPRO 100 [IU]/ML
0-4 INJECTION, SOLUTION INTRAVENOUS; SUBCUTANEOUS
Status: DISCONTINUED | OUTPATIENT
Start: 2024-03-13 | End: 2024-03-18 | Stop reason: HOSPADM

## 2024-03-13 RX ORDER — SODIUM CHLORIDE 9 MG/ML
INJECTION, SOLUTION INTRAVENOUS CONTINUOUS
Status: DISCONTINUED | OUTPATIENT
Start: 2024-03-13 | End: 2024-03-13

## 2024-03-13 RX ORDER — ATORVASTATIN CALCIUM 40 MG/1
40 TABLET, FILM COATED ORAL NIGHTLY
Status: DISCONTINUED | OUTPATIENT
Start: 2024-03-13 | End: 2024-03-18 | Stop reason: HOSPADM

## 2024-03-13 RX ORDER — POTASSIUM CHLORIDE 7.45 MG/ML
10 INJECTION INTRAVENOUS PRN
Status: DISCONTINUED | OUTPATIENT
Start: 2024-03-13 | End: 2024-03-18 | Stop reason: HOSPADM

## 2024-03-13 RX ORDER — FUROSEMIDE 40 MG/1
40 TABLET ORAL 2 TIMES DAILY
Status: DISCONTINUED | OUTPATIENT
Start: 2024-03-13 | End: 2024-03-16

## 2024-03-13 RX ORDER — DEXTROSE MONOHYDRATE 100 MG/ML
INJECTION, SOLUTION INTRAVENOUS CONTINUOUS PRN
Status: DISCONTINUED | OUTPATIENT
Start: 2024-03-13 | End: 2024-03-18 | Stop reason: HOSPADM

## 2024-03-13 RX ORDER — ISOSORBIDE MONONITRATE 30 MG/1
30 TABLET, EXTENDED RELEASE ORAL DAILY
Status: DISCONTINUED | OUTPATIENT
Start: 2024-03-13 | End: 2024-03-14

## 2024-03-13 RX ORDER — ONDANSETRON 4 MG/1
4 TABLET, ORALLY DISINTEGRATING ORAL EVERY 8 HOURS PRN
Status: DISCONTINUED | OUTPATIENT
Start: 2024-03-13 | End: 2024-03-18 | Stop reason: HOSPADM

## 2024-03-13 RX ORDER — LOSARTAN POTASSIUM 25 MG/1
25 TABLET ORAL DAILY
Status: DISCONTINUED | OUTPATIENT
Start: 2024-03-13 | End: 2024-03-14

## 2024-03-13 RX ORDER — INSULIN LISPRO 100 [IU]/ML
0-4 INJECTION, SOLUTION INTRAVENOUS; SUBCUTANEOUS NIGHTLY
Status: DISCONTINUED | OUTPATIENT
Start: 2024-03-13 | End: 2024-03-18 | Stop reason: HOSPADM

## 2024-03-13 RX ORDER — INSULIN GLARGINE 100 [IU]/ML
10 INJECTION, SOLUTION SUBCUTANEOUS NIGHTLY
Status: DISCONTINUED | OUTPATIENT
Start: 2024-03-13 | End: 2024-03-18 | Stop reason: HOSPADM

## 2024-03-13 RX ORDER — ACETAMINOPHEN 650 MG/1
650 SUPPOSITORY RECTAL EVERY 6 HOURS PRN
Status: DISCONTINUED | OUTPATIENT
Start: 2024-03-13 | End: 2024-03-14

## 2024-03-13 RX ORDER — ONDANSETRON 2 MG/ML
4 INJECTION INTRAMUSCULAR; INTRAVENOUS EVERY 6 HOURS PRN
Status: DISCONTINUED | OUTPATIENT
Start: 2024-03-13 | End: 2024-03-18 | Stop reason: HOSPADM

## 2024-03-13 RX ORDER — LEVOTHYROXINE SODIUM 0.12 MG/1
125 TABLET ORAL DAILY
Status: DISCONTINUED | OUTPATIENT
Start: 2024-03-13 | End: 2024-03-18 | Stop reason: HOSPADM

## 2024-03-13 RX ADMIN — INSULIN GLARGINE 10 UNITS: 100 INJECTION, SOLUTION SUBCUTANEOUS at 21:13

## 2024-03-13 RX ADMIN — VANCOMYCIN HYDROCHLORIDE 1750 MG: 10 INJECTION, POWDER, LYOPHILIZED, FOR SOLUTION INTRAVENOUS at 13:21

## 2024-03-13 RX ADMIN — SODIUM CHLORIDE 1000 ML: 9 INJECTION, SOLUTION INTRAVENOUS at 13:20

## 2024-03-13 RX ADMIN — SODIUM CHLORIDE: 9 INJECTION, SOLUTION INTRAVENOUS at 16:54

## 2024-03-13 RX ADMIN — DESMOPRESSIN ACETATE 40 MG: 0.2 TABLET ORAL at 21:14

## 2024-03-13 RX ADMIN — LEVOTHYROXINE SODIUM 125 MCG: 125 TABLET ORAL at 21:22

## 2024-03-13 RX ADMIN — CEFEPIME 2000 MG: 2 INJECTION, POWDER, FOR SOLUTION INTRAVENOUS at 17:17

## 2024-03-13 RX ADMIN — ENOXAPARIN SODIUM 40 MG: 100 INJECTION SUBCUTANEOUS at 17:14

## 2024-03-13 ASSESSMENT — PAIN DESCRIPTION - LOCATION: LOCATION: FOOT

## 2024-03-13 ASSESSMENT — PAIN - FUNCTIONAL ASSESSMENT
PAIN_FUNCTIONAL_ASSESSMENT: 0-10
PAIN_FUNCTIONAL_ASSESSMENT: 0-10

## 2024-03-13 ASSESSMENT — PAIN SCALES - GENERAL
PAINLEVEL_OUTOF10: 7
PAINLEVEL_OUTOF10: 2

## 2024-03-13 ASSESSMENT — PAIN DESCRIPTION - ORIENTATION: ORIENTATION: RIGHT;LEFT

## 2024-03-13 ASSESSMENT — PAIN DESCRIPTION - PAIN TYPE: TYPE: ACUTE PAIN

## 2024-03-13 NOTE — PROGRESS NOTES
Transferred care to JUAN Quigley. Face to face bedside report given, no need voiced at this time.

## 2024-03-13 NOTE — CONSULTS
Pharmacy Note  Vancomycin Consult    Ruben Eagle is a 58 y.o. male started on Vancomycin for SSTI; consult received from JOSE Vega to manage therapy. Also receiving the following antibiotics: cefepime.    Allergies:  Bactrim [sulfamethoxazole-trimethoprim], Bee venom, and Lisinopril     Recent Labs     03/13/24  1238   CREATININE 1.4*       Recent Labs     03/13/24  1238   WBC 9.2       Estimated Creatinine Clearance: 65 mL/min (A) (based on SCr of 1.4 mg/dL (H)).    No intake or output data in the 24 hours ending 03/13/24 1643    Wt Readings from Last 1 Encounters:   03/13/24 95.3 kg (210 lb)         Body mass index is 31.01 kg/m².    Loading dose (critically ill or in ICU, require dialysis or renal replacement therapy): Vancomycin 25 mg/kg IVPB x 1 (maximum 2500 mg).  Maintenance dose: 10-20 mg/kg (maximum: 2000 mg/dose and 4500 mg/day) starting at the next dosing interval determined by renal function  Pulse dose: fluctuating renal function, KRYSTAL, ESRD  CRRT: 7.5-10 mg/kg q12h   Goal Vancomycin trough: 15-20 mcg/mL   Goal Vancomycin AUC: 400-600     Assessment/Plan:  Will initiate Vancomycin with a one time loading dose of 1750 mg x1, followed by 750 mg IV every 12 hours. Calculated Vancomycin AUC = 483 mg/L*h with an estimated steady-state vancomycin trough = 15.6 mcg/mL. Vancomycin level ordered for 3/15 @ 0100. Timing of trough level will be determined based on culture results, renal function, and clinical response.     Thank you for the consult.

## 2024-03-13 NOTE — PROGRESS NOTES
Consult has been called to Dr. mathur on 3/13/24. Spoke with dr mathur via perfect serve. 4:44 PM    Leann Espinosa  3/13/2024

## 2024-03-13 NOTE — PROGRESS NOTES
AM assessment completed. VSS. No complaints of pain or discomfort voiced. No signs of symptoms of distress noted. Patient tolerated morning medications well. Respirations easy and even. Bed in lowest position, bed alarm in place and functioning properly, SR up x 2 and bed in low position. Call light within reach.     Bedside Mobility Assessment Tool (BMAT):     Assessment Level 1- Sit and Shake    1. From a semi-reclined position, ask patient to sit up and rotate to a seated position at the side of the bed. Can use the bedrail.    2. Ask patient to reach out and grab your hand and shake making sure patient reaches across his/her midline.   Pass- Patient is able to come to a seated position, maintain core strength. Maintains seated balance while reaching across midline. Move on to Assessment Level 2.     Assessment Level 2- Stretch and Point   1. With patient in seated position at the side of the bed, have patient place both feet on the floor (or stool) with knees no higher than hips.    2. Ask patient to stretch one leg and straighten the knee, then bend the ankle/flex and point the toes. If appropriate, repeat with the other leg.   Pass- Patient is able to demonstrate appropriate quad strength on intended weight bearing limb(s). Move onto Assessment Level 3.     Assessment Level 3- Stand   1. Ask patient to elevate off the bed or chair (seated to standing) using an assistive device (cane, bedrail).    2. Patient should be able to raise buttocks off be and hold for a count of five. May repeat once.   Fail- Patient unable to demonstrate standing stability. Patient is MOBILITY LEVEL 3.     Assessment Level 4- Walk   1. Ask patient to march in place at bedside.    2. Then ask patient to advance step and return each foot. Some medical conditions may render a patient from stepping backwards, use your best clinical judgement.   Fail- Patient not able to complete tasks OR requires use of assistive device. Patient is

## 2024-03-13 NOTE — H&P
NIGHTLY 5/10/23   Gold Carmichael MD   ASPIRIN LOW DOSE 81 MG EC tablet Take 1 tablet by mouth daily 3/17/23   Basilio Clemons MD   isosorbide mononitrate (IMDUR) 30 MG extended release tablet Take 1 tablet by mouth daily 3/1/23   Gold Carmichael MD   levothyroxine (SYNTHROID) 125 MCG tablet Take 1 tablet by mouth daily 2/17/23 3/13/24  Leann Marie APRN - CNP   clopidogrel (PLAVIX) 75 MG tablet Take 1 tablet by mouth daily 2/13/23   Gold Carmichael MD   furosemide (LASIX) 80 MG tablet Take 1 tablet by mouth 2 times daily Take 80 mg in the morning and 80 mg at night 2/13/23   Gold Carmichael MD   Blood Pressure KIT 1 kit by Does not apply route daily 10/4/22   Leann Marie APRN - CNP   BD PEN NEEDLE WINNIE U/F 32G X 4 MM MISC 1 each by Does not apply route daily 8/3/22   Hood Sanchez MD   OneTouch Delica Lancets 33G MISC USE TO CHECK FOUR TIMES DAILY. DX;E11.9 8/18/21   Hood Sanchez MD   Continuous Blood Gluc Sensor (FREESTYLE MARY 14 DAY SENSOR) MISC CGM 4/29/21   Hood Sanchez MD   Insulin Pen Needle (UNIFINE PENTIPS) 31G X 5 MM MISC USE 1 EACH DAY AS NEEDED FOR TESTING 1/3/18   Cb Murrell MD       Allergies:  Bactrim [sulfamethoxazole-trimethoprim], Bee venom, and Lisinopril    Social History:    TOBACCO:   reports that he quit smoking about 44 years ago. His smoking use included cigarettes. He started smoking about 45 years ago. He has a 0.3 pack-year smoking history. He has never used smokeless tobacco.  ETOH:   reports no history of alcohol use.      Family History:   Positive as follows:        Problem Relation Age of Onset    Heart Disease Mother     High Blood Pressure Mother     High Cholesterol Mother     Diabetes Mother     Anemia Mother     Heart Disease Father     High Blood Pressure Father     High Cholesterol Father     Heart Disease Maternal Grandmother     High Blood Pressure Maternal Grandmother     High Cholesterol Maternal Grandmother      Status: Completed Specimen: Nasopharyngeal Swab Updated: 03/13/24 1358     SARS-CoV-2 RNA, RT PCR NOT DETECTED     Comment: Not Detected results do not preclude SARS-CoV-2 infection and  should not be used as the sole basis for patient management  decisions.  Results must be combined with clinical observations,  patient history, and epidemiological information.  Testing was performed using MIGUELITO ALMAS SARS-CoV-2 and Influenza A/B  nucleic acid assay. This test is a multiplex Real-Time Reverse  Transcriptase Polymerase Chain Reaction (RT-PCR)-based in vitro  diagnostic test intended for the qualitative detection of nucleic  acids from SARS-CoV-2, influenza A, and influenza B in nasopharyngeal  and nasal swab specimens for use under the FDA’s Emergency Use  Authorization (EUA) only.    Patient Fact Sheet:  https://www.fda.gov/media/089417/download  Provider Fact Sheet: https://www.fda.gov/media/067634/download  EUA: https://www.fda.gov/media/589791/download  IFU: https://www.fda.gov/media/351921/download    Methodology:  RT-PCR          INFLUENZA A NOT DETECTED     INFLUENZA B NOT DETECTED    Culture, Blood 1 [[id number]] Collected: 03/13/24 1238    Order Status: Sent Specimen: Blood Updated: 03/13/24 1253              EKG:    Pending     RADIOLOGY  XR FOOT LEFT (MIN 3 VIEWS)   Final Result   1. No evidence of bony lysis to suggest osteomyelitis.   2. Soft tissue gas involving the stump, presumably secondary to a ulcer.   3. Postsurgical changes as described above.               Pertinent previous results reviewed     Limited Echo 4/25/2022  Summary  Limited exam for LVEF.  The left ventricular systolic function is moderately reduced with an  ejection fraction of 30-35 %.  There is diastolic septal flattening consistent with right ventricular  volume overload.  There is hypokinesis of the inferior, anteroseptal, inferoseptal and  inferolateral walls.Anterolateral wall appears normal.  Compared to exam done 1/20/2021,

## 2024-03-13 NOTE — ED PROVIDER NOTES
TARSAL/METAR B1 XCP TALUS/CALCANEUS Left 11/01/2018    PARTIAL RESECTION LEFT METATARSAL, ULCER DEBRIDEMENT LEFT FOOT WITH GRAFT APPLICATION performed by Floyd Jarrell DPM at [facility] OR    TOE AMPUTATION      2 toes    TOE AMPUTATION Bilateral 03/18/2021    AMPUTATION LEFT FOURTH AND FIFTH DIGITS, PARTIAL RESECTION  SECOND AND THIRD METATARSAL LEFT FOOT, PARTIAL RESECTION RIGHT FIRST METATARSAL performed by Floyd Jarrell DPM at [facility] OR    TOE AMPUTATION Right 05/20/2021    PARTIAL RESECTION RIGHT FIRST METATARSAL performed by Floyd Jarrell DPM at [facility] OR    UMBILICAL HERNIA REPAIR N/A 1/12/2024    ROBOTIC UMBILICAL HERNIA REPAIR WITH MESH performed by Mau Guajardo MD at [facility] OR         CURRENT MEDICATIONS       Previous Medications    ALLOPURINOL (ZYLOPRIM) 100 MG TABLET    TAKE TWO (2) TABLETS BY MOUTH DAILY    AMOXICILLIN-CLAVULANATE (AUGMENTIN) 875-125 MG PER TABLET    Take 1 tablet by mouth 2 times daily    ASPIRIN LOW DOSE 81 MG EC TABLET    Take 1 tablet by mouth daily    ATORVASTATIN (LIPITOR) 40 MG TABLET    TAKE ONE (1) TABLET BY MOUTH NIGHTLY    BD PEN NEEDLE WINNIE U/F 32G X 4 MM MISC    1 each by Does not apply route daily    BLOOD PRESSURE KIT    1 kit by Does not apply route daily    CARVEDILOL (COREG) 12.5 MG TABLET    Take 1 tablet by mouth 2 times daily (with meals)    CLOPIDOGREL (PLAVIX) 75 MG TABLET    Take 1 tablet by mouth daily    CONTINUOUS BLOOD GLUC SENSOR (FREESTYLE MARY 14 DAY SENSOR) MISC    CGM    FUROSEMIDE (LASIX) 80 MG TABLET    Take 1 tablet by mouth 2 times daily Take 80 mg in the morning and 80 mg at night    INSULIN GLARGINE (BASAGLAR KWIKPEN) 100 UNIT/ML INJECTION PEN    Inject 25 Units into the skin nightly    INSULIN PEN NEEDLE (UNIFINE PENTIPS) 31G X 5 MM MISC    USE 1 EACH DAY AS NEEDED FOR TESTING    INSULIN PEN NEEDLE 29G X 12MM MISC    1 each by Does not apply route daily    ISOSORBIDE MONONITRATE (IMDUR) 30 MG EXTENDED RELEASE TABLET    Take 1 tablet by mouth daily     normal.   Neck:      Vascular: No JVD.      Trachea: No tracheal deviation.   Cardiovascular:      Rate and Rhythm: Normal rate.      Pulses: Normal pulses.      Comments: Palpable DP pulse left lower extremity  Pulmonary:      Effort: Pulmonary effort is normal. No respiratory distress.   Musculoskeletal:         General: Swelling and tenderness present.      Comments: Partial amputation to left foot with ulcer and spreading erythema and induration to stump   Skin:     General: Skin is warm and dry.   Neurological:      Mental Status: He is alert.         DIAGNOSTIC RESULTS         RADIOLOGY:     Interpretation per the Radiologist below, if available at the time of this note:    XR FOOT LEFT (MIN 3 VIEWS)   Final Result   1. No evidence of bony lysis to suggest osteomyelitis.   2. Soft tissue gas involving the stump, presumably secondary to a ulcer.   3. Postsurgical changes as described above.                 LABS:  Labs Reviewed   CBC WITH AUTO DIFFERENTIAL - Abnormal; Notable for the following components:       Result Value    RBC 3.64 (*)     Hemoglobin 9.2 (*)     Hematocrit 28.8 (*)     MCV 79.0 (*)     MCH 25.4 (*)     RDW 18.3 (*)     Lymphocytes Absolute 0.7 (*)     All other components within normal limits   COMPREHENSIVE METABOLIC PANEL W/ REFLEX TO MG FOR LOW K - Abnormal; Notable for the following components:    Sodium 131 (*)     Chloride 95 (*)     Glucose 185 (*)     BUN 34 (*)     Creatinine 1.4 (*)     Est, Glom Filt Rate 58 (*)     Calcium 8.2 (*)     Alkaline Phosphatase 150 (*)     All other components within normal limits   SEDIMENTATION RATE - Abnormal; Notable for the following components:    Sed Rate 59 (*)     All other components within normal limits   CULTURE, BLOOD 1   CULTURE, BLOOD 2   COVID-19 & INFLUENZA COMBO   LACTIC ACID   C-REACTIVE PROTEIN   URINALYSIS WITH REFLEX TO CULTURE       All otherlabs were within normal range or not returned as of this dictation.      EMERGENCY

## 2024-03-14 ENCOUNTER — ANESTHESIA (OUTPATIENT)
Dept: OPERATING ROOM | Age: 59
End: 2024-03-14
Payer: MEDICARE

## 2024-03-14 ENCOUNTER — ANESTHESIA EVENT (OUTPATIENT)
Dept: OPERATING ROOM | Age: 59
End: 2024-03-14
Payer: MEDICARE

## 2024-03-14 LAB
ANION GAP SERPL CALCULATED.3IONS-SCNC: 11 MMOL/L (ref 3–16)
BASOPHILS # BLD: 0 K/UL (ref 0–0.2)
BASOPHILS NFR BLD: 0.6 %
BUN SERPL-MCNC: 35 MG/DL (ref 7–20)
CALCIUM SERPL-MCNC: 7.7 MG/DL (ref 8.3–10.6)
CHLORIDE SERPL-SCNC: 98 MMOL/L (ref 99–110)
CO2 SERPL-SCNC: 23 MMOL/L (ref 21–32)
CREAT SERPL-MCNC: 1.4 MG/DL (ref 0.9–1.3)
DEPRECATED RDW RBC AUTO: 18.1 % (ref 12.4–15.4)
EKG ATRIAL RATE: 82 BPM
EKG DIAGNOSIS: NORMAL
EKG P AXIS: 49 DEGREES
EKG P-R INTERVAL: 216 MS
EKG Q-T INTERVAL: 426 MS
EKG QRS DURATION: 140 MS
EKG QTC CALCULATION (BAZETT): 497 MS
EKG R AXIS: -53 DEGREES
EKG T AXIS: 79 DEGREES
EKG VENTRICULAR RATE: 82 BPM
EOSINOPHIL # BLD: 0.2 K/UL (ref 0–0.6)
EOSINOPHIL NFR BLD: 3.3 %
EST. AVERAGE GLUCOSE BLD GHB EST-MCNC: 145.6 MG/DL
FOLATE SERPL-MCNC: 12.91 NG/ML (ref 4.78–24.2)
GFR SERPLBLD CREATININE-BSD FMLA CKD-EPI: 58 ML/MIN/{1.73_M2}
GLUCOSE BLD-MCNC: 122 MG/DL (ref 70–99)
GLUCOSE BLD-MCNC: 138 MG/DL (ref 70–99)
GLUCOSE BLD-MCNC: 166 MG/DL (ref 70–99)
GLUCOSE BLD-MCNC: 170 MG/DL (ref 70–99)
GLUCOSE BLD-MCNC: 181 MG/DL (ref 70–99)
GLUCOSE BLD-MCNC: 182 MG/DL (ref 70–99)
GLUCOSE BLD-MCNC: 86 MG/DL (ref 70–99)
GLUCOSE SERPL-MCNC: 143 MG/DL (ref 70–99)
HBA1C MFR BLD: 6.7 %
HCT VFR BLD AUTO: 29.1 % (ref 40.5–52.5)
HGB BLD-MCNC: 9.3 G/DL (ref 13.5–17.5)
IRON SATN MFR SERPL: 6 % (ref 20–50)
IRON SERPL-MCNC: 18 UG/DL (ref 59–158)
LYMPHOCYTES # BLD: 0.8 K/UL (ref 1–5.1)
LYMPHOCYTES NFR BLD: 11.8 %
MCH RBC QN AUTO: 25.3 PG (ref 26–34)
MCHC RBC AUTO-ENTMCNC: 31.9 G/DL (ref 31–36)
MCV RBC AUTO: 79.3 FL (ref 80–100)
MONOCYTES # BLD: 0.6 K/UL (ref 0–1.3)
MONOCYTES NFR BLD: 9.2 %
NEUTROPHILS # BLD: 5.3 K/UL (ref 1.7–7.7)
NEUTROPHILS NFR BLD: 75.1 %
PERFORMED ON: ABNORMAL
PERFORMED ON: NORMAL
PLATELET # BLD AUTO: 207 K/UL (ref 135–450)
PMV BLD AUTO: 7.5 FL (ref 5–10.5)
POTASSIUM SERPL-SCNC: 3.7 MMOL/L (ref 3.5–5.1)
RBC # BLD AUTO: 3.68 M/UL (ref 4.2–5.9)
SODIUM SERPL-SCNC: 132 MMOL/L (ref 136–145)
TIBC SERPL-MCNC: 278 UG/DL (ref 260–445)
VIT B12 SERPL-MCNC: 473 PG/ML (ref 211–911)
WBC # BLD AUTO: 7.1 K/UL (ref 4–11)

## 2024-03-14 PROCEDURE — 6360000002 HC RX W HCPCS: Performed by: INTERNAL MEDICINE

## 2024-03-14 PROCEDURE — 6370000000 HC RX 637 (ALT 250 FOR IP): Performed by: PODIATRIST

## 2024-03-14 PROCEDURE — 2709999900 HC NON-CHARGEABLE SUPPLY: Performed by: PODIATRIST

## 2024-03-14 PROCEDURE — 7100000011 HC PHASE II RECOVERY - ADDTL 15 MIN: Performed by: PODIATRIST

## 2024-03-14 PROCEDURE — 6370000000 HC RX 637 (ALT 250 FOR IP): Performed by: INTERNAL MEDICINE

## 2024-03-14 PROCEDURE — 6370000000 HC RX 637 (ALT 250 FOR IP): Performed by: NURSE PRACTITIONER

## 2024-03-14 PROCEDURE — 87186 SC STD MICRODIL/AGAR DIL: CPT

## 2024-03-14 PROCEDURE — 2580000003 HC RX 258: Performed by: ANESTHESIOLOGY

## 2024-03-14 PROCEDURE — 6360000002 HC RX W HCPCS: Performed by: PODIATRIST

## 2024-03-14 PROCEDURE — 87070 CULTURE OTHR SPECIMN AEROBIC: CPT

## 2024-03-14 PROCEDURE — 6370000000 HC RX 637 (ALT 250 FOR IP): Performed by: HOSPITALIST

## 2024-03-14 PROCEDURE — 6360000002 HC RX W HCPCS: Performed by: NURSE ANESTHETIST, CERTIFIED REGISTERED

## 2024-03-14 PROCEDURE — 87077 CULTURE AEROBIC IDENTIFY: CPT

## 2024-03-14 PROCEDURE — 2580000003 HC RX 258: Performed by: PODIATRIST

## 2024-03-14 PROCEDURE — 2500000003 HC RX 250 WO HCPCS: Performed by: ANESTHESIOLOGY

## 2024-03-14 PROCEDURE — A4217 STERILE WATER/SALINE, 500 ML: HCPCS | Performed by: PODIATRIST

## 2024-03-14 PROCEDURE — 86403 PARTICLE AGGLUT ANTBDY SCRN: CPT

## 2024-03-14 PROCEDURE — 2580000003 HC RX 258: Performed by: NURSE PRACTITIONER

## 2024-03-14 PROCEDURE — 2500000003 HC RX 250 WO HCPCS: Performed by: PODIATRIST

## 2024-03-14 PROCEDURE — 6370000000 HC RX 637 (ALT 250 FOR IP)

## 2024-03-14 PROCEDURE — 3700000001 HC ADD 15 MINUTES (ANESTHESIA): Performed by: PODIATRIST

## 2024-03-14 PROCEDURE — 7100000010 HC PHASE II RECOVERY - FIRST 15 MIN: Performed by: PODIATRIST

## 2024-03-14 PROCEDURE — 93010 ELECTROCARDIOGRAM REPORT: CPT | Performed by: INTERNAL MEDICINE

## 2024-03-14 PROCEDURE — 85025 COMPLETE CBC W/AUTO DIFF WBC: CPT

## 2024-03-14 PROCEDURE — 2580000003 HC RX 258: Performed by: HOSPITALIST

## 2024-03-14 PROCEDURE — 1200000000 HC SEMI PRIVATE

## 2024-03-14 PROCEDURE — 2500000003 HC RX 250 WO HCPCS: Performed by: NURSE ANESTHETIST, CERTIFIED REGISTERED

## 2024-03-14 PROCEDURE — 87205 SMEAR GRAM STAIN: CPT

## 2024-03-14 PROCEDURE — 3600000012 HC SURGERY LEVEL 2 ADDTL 15MIN: Performed by: PODIATRIST

## 2024-03-14 PROCEDURE — 36415 COLL VENOUS BLD VENIPUNCTURE: CPT

## 2024-03-14 PROCEDURE — 99232 SBSQ HOSP IP/OBS MODERATE 35: CPT | Performed by: INTERNAL MEDICINE

## 2024-03-14 PROCEDURE — 80048 BASIC METABOLIC PNL TOTAL CA: CPT

## 2024-03-14 PROCEDURE — 6360000002 HC RX W HCPCS: Performed by: NURSE PRACTITIONER

## 2024-03-14 PROCEDURE — 2580000003 HC RX 258: Performed by: INTERNAL MEDICINE

## 2024-03-14 PROCEDURE — 93306 TTE W/DOPPLER COMPLETE: CPT

## 2024-03-14 PROCEDURE — 3700000000 HC ANESTHESIA ATTENDED CARE: Performed by: PODIATRIST

## 2024-03-14 PROCEDURE — 87075 CULTR BACTERIA EXCEPT BLOOD: CPT

## 2024-03-14 PROCEDURE — 3600000002 HC SURGERY LEVEL 2 BASE: Performed by: PODIATRIST

## 2024-03-14 DEVICE — PATCH AMNION 2 LAYR PROTCT 4 X 4CM STERISHIELD II: Type: IMPLANTABLE DEVICE | Site: FOOT | Status: FUNCTIONAL

## 2024-03-14 RX ORDER — SODIUM CHLORIDE 0.9 % (FLUSH) 0.9 %
5-40 SYRINGE (ML) INJECTION EVERY 12 HOURS SCHEDULED
Status: DISCONTINUED | OUTPATIENT
Start: 2024-03-14 | End: 2024-03-14 | Stop reason: HOSPADM

## 2024-03-14 RX ORDER — CARVEDILOL 6.25 MG/1
6.25 TABLET ORAL 2 TIMES DAILY WITH MEALS
Status: DISCONTINUED | OUTPATIENT
Start: 2024-03-14 | End: 2024-03-18 | Stop reason: HOSPADM

## 2024-03-14 RX ORDER — SODIUM CHLORIDE 0.9 % (FLUSH) 0.9 %
5-40 SYRINGE (ML) INJECTION EVERY 12 HOURS SCHEDULED
Status: DISCONTINUED | OUTPATIENT
Start: 2024-03-14 | End: 2024-03-18 | Stop reason: HOSPADM

## 2024-03-14 RX ORDER — SODIUM CHLORIDE 0.9 % (FLUSH) 0.9 %
5-40 SYRINGE (ML) INJECTION PRN
Status: DISCONTINUED | OUTPATIENT
Start: 2024-03-14 | End: 2024-03-18 | Stop reason: HOSPADM

## 2024-03-14 RX ORDER — LIDOCAINE HYDROCHLORIDE 20 MG/ML
INJECTION, SOLUTION EPIDURAL; INFILTRATION; INTRACAUDAL; PERINEURAL PRN
Status: DISCONTINUED | OUTPATIENT
Start: 2024-03-14 | End: 2024-03-14 | Stop reason: SDUPTHER

## 2024-03-14 RX ORDER — SODIUM CHLORIDE 9 MG/ML
INJECTION, SOLUTION INTRAVENOUS PRN
Status: DISCONTINUED | OUTPATIENT
Start: 2024-03-14 | End: 2024-03-14

## 2024-03-14 RX ORDER — OXYCODONE HYDROCHLORIDE AND ACETAMINOPHEN 5; 325 MG/1; MG/1
1 TABLET ORAL EVERY 4 HOURS PRN
Status: DISCONTINUED | OUTPATIENT
Start: 2024-03-14 | End: 2024-03-14

## 2024-03-14 RX ORDER — SODIUM CHLORIDE, SODIUM LACTATE, POTASSIUM CHLORIDE, AND CALCIUM CHLORIDE .6; .31; .03; .02 G/100ML; G/100ML; G/100ML; G/100ML
500 INJECTION, SOLUTION INTRAVENOUS ONCE
Status: DISCONTINUED | OUTPATIENT
Start: 2024-03-14 | End: 2024-03-14

## 2024-03-14 RX ORDER — SODIUM CHLORIDE 0.9 % (FLUSH) 0.9 %
5-40 SYRINGE (ML) INJECTION PRN
Status: DISCONTINUED | OUTPATIENT
Start: 2024-03-14 | End: 2024-03-14 | Stop reason: HOSPADM

## 2024-03-14 RX ORDER — ACETAMINOPHEN 500 MG
1000 TABLET ORAL 3 TIMES DAILY
Status: DISCONTINUED | OUTPATIENT
Start: 2024-03-14 | End: 2024-03-18 | Stop reason: HOSPADM

## 2024-03-14 RX ORDER — OXYCODONE HYDROCHLORIDE 5 MG/1
5 TABLET ORAL PRN
Status: DISCONTINUED | OUTPATIENT
Start: 2024-03-14 | End: 2024-03-14

## 2024-03-14 RX ORDER — OXYCODONE HYDROCHLORIDE AND ACETAMINOPHEN 5; 325 MG/1; MG/1
2 TABLET ORAL EVERY 4 HOURS PRN
Status: DISCONTINUED | OUTPATIENT
Start: 2024-03-14 | End: 2024-03-14

## 2024-03-14 RX ORDER — OXYCODONE HYDROCHLORIDE 5 MG/1
10 TABLET ORAL PRN
Status: DISCONTINUED | OUTPATIENT
Start: 2024-03-14 | End: 2024-03-14

## 2024-03-14 RX ORDER — MAGNESIUM HYDROXIDE 1200 MG/15ML
LIQUID ORAL CONTINUOUS PRN
Status: COMPLETED | OUTPATIENT
Start: 2024-03-14 | End: 2024-03-14

## 2024-03-14 RX ORDER — PROPOFOL 10 MG/ML
INJECTION, EMULSION INTRAVENOUS CONTINUOUS PRN
Status: DISCONTINUED | OUTPATIENT
Start: 2024-03-14 | End: 2024-03-14 | Stop reason: SDUPTHER

## 2024-03-14 RX ORDER — NALOXONE HYDROCHLORIDE 0.4 MG/ML
INJECTION, SOLUTION INTRAMUSCULAR; INTRAVENOUS; SUBCUTANEOUS PRN
Status: DISCONTINUED | OUTPATIENT
Start: 2024-03-14 | End: 2024-03-18 | Stop reason: HOSPADM

## 2024-03-14 RX ORDER — ONDANSETRON 2 MG/ML
4 INJECTION INTRAMUSCULAR; INTRAVENOUS
Status: DISCONTINUED | OUTPATIENT
Start: 2024-03-14 | End: 2024-03-14

## 2024-03-14 RX ORDER — MEPERIDINE HYDROCHLORIDE 25 MG/ML
12.5 INJECTION INTRAMUSCULAR; INTRAVENOUS; SUBCUTANEOUS EVERY 5 MIN PRN
Status: DISCONTINUED | OUTPATIENT
Start: 2024-03-14 | End: 2024-03-14

## 2024-03-14 RX ORDER — SODIUM CHLORIDE 9 MG/ML
INJECTION, SOLUTION INTRAVENOUS PRN
Status: DISCONTINUED | OUTPATIENT
Start: 2024-03-14 | End: 2024-03-14 | Stop reason: HOSPADM

## 2024-03-14 RX ORDER — SODIUM CHLORIDE, SODIUM LACTATE, POTASSIUM CHLORIDE, CALCIUM CHLORIDE 600; 310; 30; 20 MG/100ML; MG/100ML; MG/100ML; MG/100ML
INJECTION, SOLUTION INTRAVENOUS CONTINUOUS
Status: DISCONTINUED | OUTPATIENT
Start: 2024-03-14 | End: 2024-03-14

## 2024-03-14 RX ORDER — SPIRONOLACTONE 25 MG/1
12.5 TABLET ORAL DAILY
Status: DISCONTINUED | OUTPATIENT
Start: 2024-03-15 | End: 2024-03-18 | Stop reason: HOSPADM

## 2024-03-14 RX ADMIN — LEVOTHYROXINE SODIUM 125 MCG: 125 TABLET ORAL at 13:56

## 2024-03-14 RX ADMIN — CARVEDILOL 6.25 MG: 6.25 TABLET, FILM COATED ORAL at 17:34

## 2024-03-14 RX ADMIN — CEFEPIME 2000 MG: 2 INJECTION, POWDER, FOR SOLUTION INTRAVENOUS at 17:30

## 2024-03-14 RX ADMIN — ONDANSETRON 4 MG: 4 TABLET, ORALLY DISINTEGRATING ORAL at 17:34

## 2024-03-14 RX ADMIN — VANCOMYCIN HYDROCHLORIDE 750 MG: 750 INJECTION, POWDER, LYOPHILIZED, FOR SOLUTION INTRAVENOUS at 01:57

## 2024-03-14 RX ADMIN — SODIUM CHLORIDE, POTASSIUM CHLORIDE, SODIUM LACTATE AND CALCIUM CHLORIDE: 600; 310; 30; 20 INJECTION, SOLUTION INTRAVENOUS at 09:55

## 2024-03-14 RX ADMIN — ASPIRIN 81 MG: 81 TABLET, COATED ORAL at 13:56

## 2024-03-14 RX ADMIN — ENOXAPARIN SODIUM 40 MG: 100 INJECTION SUBCUTANEOUS at 13:56

## 2024-03-14 RX ADMIN — VANCOMYCIN HYDROCHLORIDE 750 MG: 750 INJECTION, POWDER, LYOPHILIZED, FOR SOLUTION INTRAVENOUS at 15:51

## 2024-03-14 RX ADMIN — ACETAMINOPHEN 1000 MG: 500 TABLET ORAL at 23:19

## 2024-03-14 RX ADMIN — PROPOFOL 75 MCG/KG/MIN: 10 INJECTION, EMULSION INTRAVENOUS at 10:44

## 2024-03-14 RX ADMIN — CEFEPIME 2000 MG: 2 INJECTION, POWDER, FOR SOLUTION INTRAVENOUS at 05:09

## 2024-03-14 RX ADMIN — DESMOPRESSIN ACETATE 40 MG: 0.2 TABLET ORAL at 21:48

## 2024-03-14 RX ADMIN — FUROSEMIDE 40 MG: 40 TABLET ORAL at 21:48

## 2024-03-14 RX ADMIN — LIDOCAINE HYDROCHLORIDE 100 MG: 20 INJECTION, SOLUTION EPIDURAL; INFILTRATION; INTRACAUDAL; PERINEURAL at 10:44

## 2024-03-14 RX ADMIN — Medication 20 MG: at 09:55

## 2024-03-14 RX ADMIN — Medication 10 ML: at 21:51

## 2024-03-14 RX ADMIN — IRON SUCROSE 100 MG: 20 INJECTION, SOLUTION INTRAVENOUS at 13:55

## 2024-03-14 RX ADMIN — INSULIN GLARGINE 10 UNITS: 100 INJECTION, SOLUTION SUBCUTANEOUS at 21:48

## 2024-03-14 ASSESSMENT — PAIN - FUNCTIONAL ASSESSMENT
PAIN_FUNCTIONAL_ASSESSMENT: NONE - DENIES PAIN
PAIN_FUNCTIONAL_ASSESSMENT: NONE - DENIES PAIN
PAIN_FUNCTIONAL_ASSESSMENT: 0-10
PAIN_FUNCTIONAL_ASSESSMENT: NONE - DENIES PAIN
PAIN_FUNCTIONAL_ASSESSMENT: NONE - DENIES PAIN

## 2024-03-14 ASSESSMENT — PAIN SCALES - GENERAL: PAINLEVEL_OUTOF10: 0

## 2024-03-14 ASSESSMENT — LIFESTYLE VARIABLES: SMOKING_STATUS: 0

## 2024-03-14 ASSESSMENT — ENCOUNTER SYMPTOMS: SHORTNESS OF BREATH: 1

## 2024-03-14 NOTE — BRIEF OP NOTE
Brief Postoperative Note      Patient: Ruben Eagle  YOB: 1965  MRN: [id number]    Date of Procedure: 3/14/2024    Pre-Op Diagnosis Codes:     * Osteomyelitis of left foot, unspecified type ([facility]) [M86.9]     * Diabetic ulcer of left foot associated with other specified diabetes mellitus, unspecified part of foot, unspecified ulcer stage ([facility]) [E13.621, L97.529]    Post-Op Diagnosis: Same       Procedure(s):  LEFT AND RIGHT FOOT DEBRIDEMENT INCISION AND DRAINAGE    Surgeon(s):  Floyd Jarrell DPM    Assistant:  Surgical Assistant: Soumya Tolliver    Anesthesia: Monitor Anesthesia Care    Estimated Blood Loss (mL): less than 100     Complications: None    Specimens:   ID Type Source Tests Collected by Time Destination   1 : LEFT FOOT CULTURE Specimen Foot CULTURE, SURGICAL Floyd Jarrell DPM 3/14/2024 1058        Implants:  Implant Name Type Inv. Item Serial No.  Lot No. LRB No. Used Action   PATCH AMNION 2 LAYR PROTCT 4 X 4CM STERISHIELD II - [id number]  PATCH AMNION 2 LAYR PROTCT 4 X 4CM STERISHIELD II [id number] [employer]RAFT[employer]-WD [id number] Left 1 Implanted         Drains: * No LDAs found *    Findings: ulcer bilateral      Electronically signed by Floyd Jarrell DPM on 3/14/2024 at 11:11 AM

## 2024-03-14 NOTE — PROGRESS NOTES
Pre-op report received from JUAN Davis.     Request made for pt to be come to [facility] for pre-op at this time.

## 2024-03-14 NOTE — DISCHARGE INSTRUCTIONS
F/w Podiatry as scheduled in 1 week  Keep sugars less than 150  Dressing change Thursday        Heart Failure Resources:  Heart Failure Interactive Workbook:  Go to https://Dryad.[employer]/publication/?v=480748 for a Free Heart Failure Interactive Workbook provided by The American Heart Association. This interactive workbook will provide information on Healthier Living with Heart Failure. Please copy and paste link into search bar. Use your mouse to scroll through the pages.    HF Kennard sayda:   Heart Failure Free smart phone sayda available for iPhone and Android download. Use your phone to track sodium intake, fluid intake, symptoms, and weight.     Low Sodium Diet / Recipes:  Go to www.[employer] website for “renal” diet which is Low Sodium! [employer] is a dialysis company, but this website offers free seasonal cookbooks. Each quarter, they will release 25-30 new recipes with a breakdown of calories, sodium, and glucose. You can also go to www.[employer]/recipes website for free recipes.     Discharge Instruction Video:  Scan the QR code below with your camera and click the canva.com link to open the video and watch educational information on Heart Failure and Medications from one of our nurses.   https://www.[employer]/design/DAFZnsH_JRk/7UfhslfNWRByaYBhbN4oab/edit    Home Exercise Program:   Identification of Green/Yellow/Red zones:  You should be able to identify when you feel good (green zone), if you have 1-2 symptoms of HF (yellow zone), or if you are in need of medical attention (red zone).  In your CHF education folder you were provided a “stop light tool” to outline this information.     We want to you to rate your exertion levels:    Our therapy team has discussed means of identification with you such as the \"Shikha scale.\"  The Shikha rating scale ranges from 6 to 20, where 6 means \"no exertion at all\" and 20 means \"maximal exertion.\" The goal is to use this to gauge how much effort it is taking for

## 2024-03-14 NOTE — CONSULTS
CONSULT    Admit Date:  3/13/2024    Subjective:  58 y.o. male who is seen for evaluation of cellulitis left foot and bilateral foot ulcers. Patient well known to me.   Developed increased drainage and redness in the left foot over the last couple days. States redness and swelling has improved since being in the hospital.       Past Medical History:        Diagnosis Date    Acute on chronic systolic congestive heart failure ([facility]) 07/08/2013    Acute osteomyelitis of left foot ([facility]) 09/27/2017    Acute osteomyelitis of right foot ([facility]) 04/25/2016    Acute respiratory failure ([facility]) 08/04/2020    Ascites     Blood transfusion reaction     Burst fracture of lumbar vertebra ([facility]) 11/09/2012    Cellulitis of left foot     Cellulitis of right lower extremity 2/16, 5/16    Chronic systolic CHF (congestive heart failure) ([facility])     Community acquired pneumonia     Coronary artery disease involving native coronary artery of native heart without angina pectoris 08/06/2020    Diabetes ([facility])     Diabetic ulcer of left foot associated with type 2 diabetes mellitus, with muscle involvement without evidence of necrosis ([facility]) 04/27/2017    Diabetic ulcer of right foot ([facility]) early 2016    Diabetic ulcer of 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 2 diabetes mellitus, with necrosis of bone ([facility])     ETOH abuse     Fracture of tibial plateau 11/09/2012    High cholesterol     History of blood transfusion     reaction    HTN (hypertension)     Hx of blood clots     MI (myocardial infarction) ([facility]) 08/04/2020    + Troponins    MRSA (methicillin resistant staph aureus) culture positive 4/28/16, 4/20/16    foot wound    Neuropathic ulcer of left foot, limited to breakdown of skin ([facility]) 11/03/2016    Neuropathic ulcer of toe ([facility]) 02/13/2014    NSVT (nonsustained ventricular tachycardia) ([facility]) 2014    Pleural effusion due to congestive heart failure ([facility])     Septicemia ([facility])     Smoker     quit 1985    Systolic CHF, acute ([facility]) 07/08/2013

## 2024-03-14 NOTE — CARE COORDINATION
Case Management Assessment  Initial Evaluation    Date/Time of Evaluation: 3/14/2024 8:56 AM  Assessment Completed by: Beverly Aguayo    If patient is discharged prior to next notation, then this note serves as note for discharge by case management.    Patient Name: Ruben Eagle                   YOB: 1965  Diagnosis: Diabetic foot infection (HCC) [E11.628, L08.9]  Infected stasis ulcer of left lower extremity (HCC) [I83.229, L97.929]                   Date / Time: 3/13/2024 11:55 AM    Patient Admission Status: Inpatient   Readmission Risk (Low < 19, Mod (19-27), High > 27): Readmission Risk Score: 15.5    Current PCP: Leann Marie APRN - CNP  PCP verified by CM? Yes (Cynthia)    Chart Reviewed: Yes      History Provided by: Patient  Patient Orientation: Alert and Oriented    Patient Cognition: Alert    Hospitalization in the last 30 days (Readmission):  No    If yes, Readmission Assessment in CM Navigator will be completed.    Advance Directives:      Code Status: Full Code   Patient's Primary Decision Maker is: Patient Declined (Legal Next of Kin Remains as Decision Maker)      Discharge Planning:    Patient lives with: Parent Type of Home: House  Primary Care Giver: Self  Patient Support Systems include: Family Members   Current Financial resources: Medicare  Current community resources: None  Current services prior to admission: None            Current DME:              Type of [facility] Care services:  [facility]    ADLS  Prior functional level: Independent in ADLs/IADLs  Current functional level: Independent in ADLs/IADLs    PT AM-PAC:   /24  OT AM-PAC:   /24    Family can provide assistance at DC: Yes  Would you like Case Management to discuss the discharge plan with any other family members/significant others, and if so, who? No  Plans to Return to Present Housing: Yes  Other Identified Issues/Barriers to RETURNING to current housing: none  Potential Assistance needed at discharge: N/A

## 2024-03-14 NOTE — ACP (ADVANCE CARE PLANNING)
Advance Care Planning     General Advance Care Planning (ACP) Conversation    Date of Conversation: 3/14/2024  Conducted with: Patient with Decision Making Capacity    Healthcare Decision Maker:  No healthcare decision makers have been documented.  Click here to complete HealthCare Decision Makers including selection of the Healthcare Decision Maker Relationship (ie \"Primary\")   Today we documented Decision Maker(s) consistent with Legal Next of Kin hierarchy.    Content/Action Overview:  DECLINED ACP Conversation - will revisit periodically  Reviewed DNR/DNI and patient elects Full Code (Attempt Resuscitation)        Length of Voluntary ACP Conversation in minutes:  <16 minutes (Non-Billable)    Beverly Aguayo

## 2024-03-14 NOTE — PROGRESS NOTES
Patient currently resting in bed with eyes closed. On room air and in no distress.  Pleasant and cooperative with care. Sinus rhythm with first degree block.  Using urinal at bedside.  Bed in lowest position and wheels locked. Has no complaints of pain or discomfort at this time.  Nurse will continue to monitor/reassess. Call light within reach.

## 2024-03-14 NOTE — PROGRESS NOTES
Dr. May sent Perfect Serve message to make aware that patients bp is 76/42.  Patient is alert and oriented and has no complaints of dizziness or lethargy.  Also has NS infusing at 100 ml/hr.  Patient has a history of CHF with an EF of 30-35%.      2153-No new orders received since patient is not symptomataic, making urine, and mentation is normal.        As of now has produced 300 ml of urine since 1900.        2224-clinical supervisor, Kristin, made aware.

## 2024-03-14 NOTE — ANESTHESIA PRE PROCEDURE
Department of Anesthesiology  Preprocedure Note       Name:  Ruben Eagle   Age:  58 y.o.  :  1965                                          MRN:  [id number]         Date:  3/14/2024      Surgeon: Surgeon(s):  Floyd Jarrell DPM    Procedure: Procedure(s):  LEFT AND RIGHT FOOT DEBRIDEMENT INCISION AND DRAINAGE    Medications prior to admission:   Prior to Admission medications    Medication Sig Start Date End Date Taking? Authorizing Provider   amoxicillin-clavulanate (AUGMENTIN) 875-125 MG per tablet Take 1 tablet by mouth 2 times daily  Patient not taking: Reported on 3/13/2024 12/20/23   Sherrie Thomas MD   oxyCODONE-acetaminophen (PERCOCET) 5-325 MG per tablet Take 1 tablet by mouth every 6 hours as needed.  Patient not taking: Reported on 3/13/2024 12/20/23   Sherrie Thomas MD   insulin glargine (BASAGLAR KWIKPEN) 100 UNIT/ML injection pen Inject 25 Units into the skin nightly 23   Florina Hoffman MD   carvedilol (COREG) 12.5 MG tablet Take 1 tablet by mouth 2 times daily (with meals) 23   Florina Hoffman MD   Insulin Pen Needle 29G X 12MM MISC 1 each by Does not apply route daily 23   Florina Hoffman MD   JARDIANCE 10 MG tablet TAKE ONE (1) TABLET BY MOUTH DAILY 23   Gold Carmichael MD   losartan (COZAAR) 25 MG tablet Take 1 tablet by mouth daily 23   Gold Carmichael MD   magnesium oxide (MAG-OX) 400 (240 Mg) MG tablet Take 1 tablet by mouth 2 times daily 23   Gold Carmichael MD   spironolactone (ALDACTONE) 25 MG tablet Take 1 tablet by mouth daily 23   Gold Carmichael MD   TRULICITY 3 MG/0.5ML SOPN INJECT THREE (3) MG INTO THE SKIN ONCE A WEEK 23   Leann Marie, APRN - CNP   metFORMIN (GLUCOPHAGE) 1000 MG tablet TAKE ONE (1) TABLET BY MOUTH TWO (2) TIMES DAILY (WITH MEALS) 23   Julianne Ruggiero PA   allopurinol (ZYLOPRIM) 100 MG tablet TAKE TWO (2) TABLETS BY MOUTH DAILY 23   Leann Marie, APRN - CNP

## 2024-03-14 NOTE — PROGRESS NOTES
Patients bp 68/44.  Patient alert and oriented x 4. Not lethargic.  Dr. May made aware.    2756-Clinical supervisor aware of patients bp.  Order requested for Midodrine.  Perfect serve message received and read by Dr. May.      0002-new order given for LR bolus 500 ml, Echo, d/c Coreg, lactic acid.      0013-bp 95/68 in left arm, 93/55 in right arm.  Dr. May made aware.  LR bolus held.

## 2024-03-14 NOTE — PROGRESS NOTES
IM Progress Note    Admit Date:  3/13/2024  1    Interval history:  diabetic foot infection   No fevers  No sob  remains on RA    Subjective:  Mr. Eagle seen up in bed, watching TV. No pain issues  Seen by podiatry and planned for right foot debridement    No fevers  No sob  remains on RA    Objective:   BP (!) 93/55   Pulse 74   Temp 97.9 °F (36.6 °C) (Oral)   Resp 20   Ht 1.753 m (5' 9\")   Wt 93.9 kg (207 lb)   SpO2 98%   BMI 30.57 kg/m²     Intake/Output Summary (Last 24 hours) at 3/14/2024 0717  Last data filed at 3/14/2024 0609  Gross per 24 hour   Intake --   Output 975 ml   Net -975 ml       Physical Exam:      General:  middle aged male looking older than his age  Awake, alert and oriented. Appears to be not in any distress  Mucous Membranes:  Pink , anicteric  Neck: No JVD, no carotid bruit, no thyromegaly  Chest:  Clear to auscultation bilaterally, no added sounds  Left sided pacer   Cardiovascular:  RRR S1S2 heard, no murmurs or gallops  Abdomen:  Soft, undistended, non tender, no organomegaly, BS present  Extremities: bilateral TMA . Left foot healthy with callus on bottom , no infection  Right foot TMA with open ulcer with draining yellow purulent liquid  Minimal cellulitis around  . Distal pulses well felt  Neurological : grossly normal      Medications:   Scheduled Medications:    lactated ringers  500 mL IntraVENous Once    sodium chloride flush  5-40 mL IntraVENous 2 times per day    enoxaparin  40 mg SubCUTAneous Daily    cefepime  2,000 mg IntraVENous Q12H    insulin lispro  0-4 Units SubCUTAneous TID     insulin lispro  0-4 Units SubCUTAneous Nightly    vancomycin  750 mg IntraVENous Q12H    aspirin  81 mg Oral Daily    atorvastatin  40 mg Oral Nightly    [Held by provider] clopidogrel  75 mg Oral Daily    [Held by provider] furosemide  40 mg Oral BID    insulin glargine  10 Units SubCUTAneous Nightly    [Held by provider] isosorbide mononitrate  30 mg Oral Daily    levothyroxine  125

## 2024-03-14 NOTE — PROGRESS NOTES
Vancomycin Day: 2/5  Current Regimen: 750 mg IV every 12 hours    Patient's labs, cultures, vitals, and vancomycin regimen reviewed.   SCr stable  U/O not measured/well documented  InsightRx updated    Plan:   Order level for 3/15 at 0100  Lurdes Sethi Pharm D 3/14/617151:04 PM  .

## 2024-03-14 NOTE — PROGRESS NOTES
HEART FAILURE CARE PLAN:    Comorbidities Reviewed: Yes   Patient has a past medical history of Acute on chronic systolic congestive heart failure (HCC), Acute osteomyelitis of left foot (HCC), Acute osteomyelitis of right foot (HCC), Acute respiratory failure (HCC), Ascites, Blood transfusion reaction, Burst fracture of lumbar vertebra (HCC), Cellulitis of left foot, Cellulitis of right lower extremity, Chronic systolic CHF (congestive heart failure) (HCC), Community acquired pneumonia, Coronary artery disease involving native coronary artery of native heart without angina pectoris, Diabetes (HCC), Diabetic ulcer of left foot associated with type 2 diabetes mellitus, with muscle involvement without evidence of necrosis (HCC), Diabetic ulcer of right foot (HCC), Diabetic 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     Weights Reviewed: Yes   Admission weight: 95.3 kg (210 lb)   Wt Readings from Last 3 Encounters:   03/13/24 95 kg (209 lb 8 oz)   01/24/24 90.4 kg (199 lb 3.2 oz)   01/12/24 90.1 kg (198 lb 9 oz)     Intake & Output Reviewed: Yes     Intake/Output Summary (Last 24 hours) at 3/13/2024 2217  Last data filed at 3/13/2024 2001  Gross per 24 hour   Intake --   Output 300 ml   Net -300 ml       ECHOCARDIOGRAM Reviewed: Yes   Patient's Ejection Fraction (EF) is less than or equal to 40%. Discuss HFrEF Guideline Directed Medical Therapy (GDMT) with Cardiologist or

## 2024-03-14 NOTE — PLAN OF CARE
Problem: Discharge Planning  Goal: Discharge to home or other facility with appropriate resources  Outcome: Progressing     Problem: Safety - Adult  Goal: Free from fall injury  Outcome: Progressing     Problem: Chronic Conditions and Co-morbidities  Goal: Patient's chronic conditions and co-morbidity symptoms are monitored and maintained or improved  Outcome: Progressing     Problem: Cardiovascular - Adult  Goal: Absence of cardiac dysrhythmias or at baseline  Outcome: Progressing

## 2024-03-14 NOTE — ANESTHESIA POSTPROCEDURE EVALUATION
Department of Anesthesiology  Postprocedure Note    Patient: Ruben Eagle  MRN: [id number]  YOB: 1965  Date of evaluation: 3/14/2024    Procedure Summary       Date: 03/14/24 Room / Location: [address]    Anesthesia Start: 1040 Anesthesia Stop: 1112    Procedure: LEFT AND RIGHT FOOT DEBRIDEMENT INCISION AND DRAINAGE (Bilateral: Foot) Diagnosis:       Osteomyelitis of left foot, unspecified type (HCC)      Diabetic ulcer of left foot associated with other specified diabetes mellitus, unspecified part of foot, unspecified ulcer stage (HCC)      (Osteomyelitis of left foot, unspecified type (HCC) [M86.9])      (Diabetic ulcer of left foot associated with other specified diabetes mellitus, unspecified part of foot, unspecified ulcer stage (HCC) [E13.621, L97.529])    Surgeons: Floyd Jarrell DPM Responsible Provider: Kevin Chamorro MD    Anesthesia Type: general, TIVA ASA Status: 3            Anesthesia Type: No value filed.    Stephan Phase I: Stephan Score: 10    Stephan Phase II: Stephan Score: 9    Anesthesia Post Evaluation    Patient location during evaluation: bedside  Patient participation: complete - patient participated  Level of consciousness: awake and alert  Airway patency: patent  Nausea & Vomiting: no nausea  Cardiovascular status: hemodynamically stable  Respiratory status: acceptable  Hydration status: euvolemic  Pain management: adequate      Vitals:    03/14/24 0207 03/14/24 0730 03/14/24 0931 03/14/24 1115   BP:  106/75 111/76 98/89   Pulse:  84 86 79   Resp:  16 18 16   Temp:  97.9 °F (36.6 °C) 98.4 °F (36.9 °C) 97.1 °F (36.2 °C)   TempSrc:  Oral Temporal Temporal   SpO2:  100% 99% 98%   Weight: 93.9 kg (207 lb)      Height:            No notable events documented.

## 2024-03-15 LAB
ANION GAP SERPL CALCULATED.3IONS-SCNC: 12 MMOL/L (ref 3–16)
BASOPHILS # BLD: 0 K/UL (ref 0–0.2)
BASOPHILS NFR BLD: 0.3 %
BUN SERPL-MCNC: 39 MG/DL (ref 7–20)
CALCIUM SERPL-MCNC: 8.2 MG/DL (ref 8.3–10.6)
CHLORIDE SERPL-SCNC: 98 MMOL/L (ref 99–110)
CO2 SERPL-SCNC: 21 MMOL/L (ref 21–32)
CREAT SERPL-MCNC: 1.2 MG/DL (ref 0.9–1.3)
DEPRECATED RDW RBC AUTO: 18.8 % (ref 12.4–15.4)
EOSINOPHIL # BLD: 0 K/UL (ref 0–0.6)
EOSINOPHIL NFR BLD: 0.2 %
GFR SERPLBLD CREATININE-BSD FMLA CKD-EPI: >60 ML/MIN/{1.73_M2}
GLUCOSE BLD-MCNC: 184 MG/DL (ref 70–99)
GLUCOSE BLD-MCNC: 212 MG/DL (ref 70–99)
GLUCOSE BLD-MCNC: 234 MG/DL (ref 70–99)
GLUCOSE BLD-MCNC: 238 MG/DL (ref 70–99)
GLUCOSE BLD-MCNC: 258 MG/DL (ref 70–99)
GLUCOSE SERPL-MCNC: 197 MG/DL (ref 70–99)
HCT VFR BLD AUTO: 29 % (ref 40.5–52.5)
HGB BLD-MCNC: 9.5 G/DL (ref 13.5–17.5)
LYMPHOCYTES # BLD: 0.7 K/UL (ref 1–5.1)
LYMPHOCYTES NFR BLD: 7.6 %
MCH RBC QN AUTO: 25.7 PG (ref 26–34)
MCHC RBC AUTO-ENTMCNC: 32.6 G/DL (ref 31–36)
MCV RBC AUTO: 78.8 FL (ref 80–100)
MONOCYTES # BLD: 0.8 K/UL (ref 0–1.3)
MONOCYTES NFR BLD: 8.7 %
NEUTROPHILS # BLD: 7.7 K/UL (ref 1.7–7.7)
NEUTROPHILS NFR BLD: 83.2 %
PERFORMED ON: ABNORMAL
PLATELET # BLD AUTO: 196 K/UL (ref 135–450)
PMV BLD AUTO: 7.7 FL (ref 5–10.5)
POTASSIUM SERPL-SCNC: 4.3 MMOL/L (ref 3.5–5.1)
RBC # BLD AUTO: 3.68 M/UL (ref 4.2–5.9)
SODIUM SERPL-SCNC: 131 MMOL/L (ref 136–145)
VANCOMYCIN TROUGH SERPL-MCNC: 17.1 UG/ML (ref 10–20)
WBC # BLD AUTO: 9.3 K/UL (ref 4–11)

## 2024-03-15 PROCEDURE — 2580000003 HC RX 258: Performed by: PODIATRIST

## 2024-03-15 PROCEDURE — 6370000000 HC RX 637 (ALT 250 FOR IP): Performed by: HOSPITALIST

## 2024-03-15 PROCEDURE — 6360000002 HC RX W HCPCS: Performed by: NURSE PRACTITIONER

## 2024-03-15 PROCEDURE — 1200000000 HC SEMI PRIVATE

## 2024-03-15 PROCEDURE — 36415 COLL VENOUS BLD VENIPUNCTURE: CPT

## 2024-03-15 PROCEDURE — 85025 COMPLETE CBC W/AUTO DIFF WBC: CPT

## 2024-03-15 PROCEDURE — 99232 SBSQ HOSP IP/OBS MODERATE 35: CPT | Performed by: INTERNAL MEDICINE

## 2024-03-15 PROCEDURE — 6370000000 HC RX 637 (ALT 250 FOR IP): Performed by: PODIATRIST

## 2024-03-15 PROCEDURE — 2580000003 HC RX 258: Performed by: ANESTHESIOLOGY

## 2024-03-15 PROCEDURE — 6360000002 HC RX W HCPCS: Performed by: PODIATRIST

## 2024-03-15 PROCEDURE — 6370000000 HC RX 637 (ALT 250 FOR IP)

## 2024-03-15 PROCEDURE — 80048 BASIC METABOLIC PNL TOTAL CA: CPT

## 2024-03-15 PROCEDURE — 80202 ASSAY OF VANCOMYCIN: CPT

## 2024-03-15 RX ADMIN — CARVEDILOL 6.25 MG: 6.25 TABLET, FILM COATED ORAL at 09:10

## 2024-03-15 RX ADMIN — Medication 10 ML: at 09:12

## 2024-03-15 RX ADMIN — Medication 1500 MG: at 02:53

## 2024-03-15 RX ADMIN — ACETAMINOPHEN 1000 MG: 500 TABLET ORAL at 09:11

## 2024-03-15 RX ADMIN — INSULIN LISPRO 1 UNITS: 100 INJECTION, SOLUTION INTRAVENOUS; SUBCUTANEOUS at 17:52

## 2024-03-15 RX ADMIN — CEFEPIME 2000 MG: 2 INJECTION, POWDER, FOR SOLUTION INTRAVENOUS at 05:31

## 2024-03-15 RX ADMIN — CARVEDILOL 6.25 MG: 6.25 TABLET, FILM COATED ORAL at 17:52

## 2024-03-15 RX ADMIN — FUROSEMIDE 40 MG: 40 TABLET ORAL at 21:12

## 2024-03-15 RX ADMIN — INSULIN GLARGINE 10 UNITS: 100 INJECTION, SOLUTION SUBCUTANEOUS at 21:13

## 2024-03-15 RX ADMIN — EMPAGLIFLOZIN 10 MG: 10 TABLET, FILM COATED ORAL at 09:11

## 2024-03-15 RX ADMIN — ENOXAPARIN SODIUM 40 MG: 100 INJECTION SUBCUTANEOUS at 09:11

## 2024-03-15 RX ADMIN — ACETAMINOPHEN 1000 MG: 500 TABLET ORAL at 21:13

## 2024-03-15 RX ADMIN — IRON SUCROSE 100 MG: 20 INJECTION, SOLUTION INTRAVENOUS at 12:08

## 2024-03-15 RX ADMIN — ASPIRIN 81 MG: 81 TABLET, COATED ORAL at 09:10

## 2024-03-15 RX ADMIN — ACETAMINOPHEN 1000 MG: 500 TABLET ORAL at 15:15

## 2024-03-15 RX ADMIN — CEFEPIME 2000 MG: 2 INJECTION, POWDER, FOR SOLUTION INTRAVENOUS at 17:51

## 2024-03-15 RX ADMIN — DESMOPRESSIN ACETATE 40 MG: 0.2 TABLET ORAL at 21:12

## 2024-03-15 RX ADMIN — INSULIN LISPRO 2 UNITS: 100 INJECTION, SOLUTION INTRAVENOUS; SUBCUTANEOUS at 11:38

## 2024-03-15 RX ADMIN — LEVOTHYROXINE SODIUM 125 MCG: 125 TABLET ORAL at 09:18

## 2024-03-15 RX ADMIN — CLOPIDOGREL BISULFATE 75 MG: 75 TABLET ORAL at 09:11

## 2024-03-15 RX ADMIN — FUROSEMIDE 40 MG: 40 TABLET ORAL at 09:11

## 2024-03-15 RX ADMIN — SPIRONOLACTONE 12.5 MG: 25 TABLET ORAL at 09:11

## 2024-03-15 ASSESSMENT — PAIN SCALES - GENERAL
PAINLEVEL_OUTOF10: 0
PAINLEVEL_OUTOF10: 0

## 2024-03-15 NOTE — PLAN OF CARE
Problem: Discharge Planning  Goal: Discharge to home or other facility with appropriate resources  3/15/2024 1042 by Susan Maldonado RN  Outcome: Progressing  3/14/2024 2153 by Radha Escalona RN  Outcome: Progressing     Problem: Safety - Adult  Goal: Free from fall injury  3/15/2024 1042 by Susan Maldonado RN  Outcome: Progressing  3/14/2024 2153 by Radha Escalona RN  Outcome: Progressing     Problem: Chronic Conditions and Co-morbidities  Goal: Patient's chronic conditions and co-morbidity symptoms are monitored and maintained or improved  3/15/2024 1042 by Susan Maldonado RN  Outcome: Progressing  3/14/2024 2153 by Radha Escalona RN  Outcome: Progressing     Problem: Cardiovascular - Adult  Goal: Maintains optimal cardiac output and hemodynamic stability  3/15/2024 1042 by Susan Maldonado RN  Outcome: Progressing  3/14/2024 2153 by Radha Escalona RN  Outcome: Progressing

## 2024-03-15 NOTE — PROGRESS NOTES
/66   Pulse 81   Temp 97.6 °F (36.4 °C) (Oral)   Resp 16   Ht 1.753 m (5' 9\")   Wt 93.9 kg (207 lb)   SpO2 99%   BMI 30.57 kg/m²     Pt awake in bed. Pt alert and orientedX4. Pt bilateral foot dressings are clean, dry, and intact. Assessment complete. Meds passed. Pt denies needs at this time.        Bedside Mobility Assessment Tool (BMAT):     Assessment Level 1- Sit and Shake    1. From a semi-reclined position, ask patient to sit up and rotate to a seated position at the side of the bed. Can use the bedrail.    2. Ask patient to reach out and grab your hand and shake making sure patient reaches across his/her midline.   Pass- Patient is able to come to a seated position, maintain core strength. Maintains seated balance while reaching across midline. Move on to Assessment Level 2.     Assessment Level 2- Stretch and Point   1. With patient in seated position at the side of the bed, have patient place both feet on the floor (or stool) with knees no higher than hips.    2. Ask patient to stretch one leg and straighten the knee, then bend the ankle/flex and point the toes. If appropriate, repeat with the other leg.   Pass- Patient is able to demonstrate appropriate quad strength on intended weight bearing limb(s). Move onto Assessment Level 3.     Assessment Level 3- Stand   1. Ask patient to elevate off the bed or chair (seated to standing) using an assistive device (cane, bedrail).    2. Patient should be able to raise buttocks off be and hold for a count of five. May repeat once.   Fail- Patient unable to demonstrate standing stability. Patient is MOBILITY LEVEL 3.     Assessment Level 4- Walk   1. Ask patient to march in place at bedside.    2. Then ask patient to advance step and return each foot. Some medical conditions may render a patient from stepping backwards, use your best clinical judgement.   Fail- Patient not able to complete tasks OR requires use of assistive device. Patient is MOBILITY

## 2024-03-15 NOTE — FLOWSHEET NOTE
03/15/24 0041   Vital Signs   Temp 98.5 °F (36.9 °C)   Temp Source Oral   Pulse 78   Heart Rate Source Monitor   Respirations 14   BP (!) 96/57   MAP (Calculated) 70   BP Location Right upper arm   BP Method Automatic   Patient Position Up in chair   Opioid-Induced Sedation   POSS Score 1   Oxygen Therapy   SpO2 97 %   O2 Device None (Room air)     Pt VS as shown above.

## 2024-03-15 NOTE — PLAN OF CARE
Problem: Discharge Planning  Goal: Discharge to home or other facility with appropriate resources  3/14/2024 2153 by Radha Escalona RN  Outcome: Progressing  3/14/2024 1441 by Susan Maldonado RN  Outcome: Progressing     Problem: Safety - Adult  Goal: Free from fall injury  3/14/2024 2153 by Radha Escalona RN  Outcome: Progressing  3/14/2024 1441 by Susan Maldonado RN  Outcome: Progressing     Problem: Chronic Conditions and Co-morbidities  Goal: Patient's chronic conditions and co-morbidity symptoms are monitored and maintained or improved  3/14/2024 2153 by Radha Escalona RN  Outcome: Progressing  3/14/2024 1441 by Susan Maldonado RN  Outcome: Progressing     Problem: Cardiovascular - Adult  Goal: Maintains optimal cardiac output and hemodynamic stability  3/14/2024 2153 by Radha Escalona RN  Outcome: Progressing  3/14/2024 1441 by Susan Maldonado RN  Outcome: Progressing  Goal: Absence of cardiac dysrhythmias or at baseline  3/14/2024 2153 by Radha Escalona RN  Outcome: Progressing  3/14/2024 1441 by Susan Maldonado RN  Outcome: Progressing     Problem: Pain  Goal: Verbalizes/displays adequate comfort level or baseline comfort level  3/14/2024 2153 by Radha Escalona RN  Outcome: Progressing  3/14/2024 1441 by Susan Maldonado RN  Outcome: Progressing

## 2024-03-15 NOTE — PROGRESS NOTES
Pt is upset that his meds isosorbide, losartan, spirolactone have been held. He was educated that they are held currently by the physician and that he is currently taking lasix to help with fluid retention. He states he needs those 3 meds and that they are the only thing that will help. He states he feels SOB. Based on my assessment his Resp are even and unlabored at 16RR. His O2sat is 96-99%. Pt is stating if he can not get these meds he will be leaving AMA in the morning. Maury Deluca DO is aware. MD wanted it added to a sticky note to primary and no new orders at this time.

## 2024-03-15 NOTE — PROGRESS NOTES
Pharmacy Vancomycin Consult     Vancomycin Day: 3/5  Current Dosin mg q12h  Current indication: SSTI    Temp max:  98.5    Recent Labs     24  1238 24  0540 24  0541   BUN 34*  --  35*   CREATININE 1.4*  --  1.4*   WBC 9.2 7.1  --        Intake/Output Summary (Last 24 hours) at 3/15/2024 0153  Last data filed at 3/14/2024 2150  Gross per 24 hour   Intake 140 ml   Output 675 ml   Net -535 ml     Culture Date      Source                       Results      Ht Readings from Last 1 Encounters:   24 1.753 m (5' 9\")        Wt Readings from Last 1 Encounters:   24 93.9 kg (207 lb)       Body mass index is 30.57 kg/m².    Estimated Creatinine Clearance: 65 mL/min (A) (based on SCr of 1.4 mg/dL (H)).    Trough: 17.1    Assessment/Plan:  Current dosage yields an estimated AUC of 553, but with 15% chance of toxicity.  Will change vancomycin dosing to 1500 mg q24h which yields a comparable AUC of 558, but with only 10% chance of toxicity and a trough at steady state of 15.2 mcg/ml.

## 2024-03-15 NOTE — CARE COORDINATION
INTERDISCIPLINARY PLAN OF CARE CONFERENCE    Date/Time: 3/15/2024 5:25 PM  Completed by: Colette Estrada RN, Case Management      Patient Name:  Ruben Eagle  YOB: 1965  Admitting Diagnosis: Diabetic foot infection (HCC) [E11.628, L08.9]  Infected stasis ulcer of left lower extremity (HCC) [I83.229, L97.929]     Admit Date/Time:  3/13/2024 11:55 AM    Chart reviewed. Interdisciplinary team contacted or reviewed plan related to patient progress and discharge plans.   Disciplines included Case Management, Nursing, and Dietitian.    Current Status:Stable  PT/OT recommendation for discharge plan of care: N/A    Expected D/C Disposition:  Home  Confirmed plan with patient Yes   Met with:patient  Discharge Plan Comments: Reviewed chart and met with  pt who cont plan for home at [place]. PT is IPTA. Anticipate no needs but will follow for poss home IVABx.    Home O2 in place on admit: No  Pt informed of need to bring portable home O2 tank on day of discharge for nursing to connect prior to leaving:  Not Indicated  Verbalized agreement/Understanding:  Not Indicated

## 2024-03-15 NOTE — PROGRESS NOTES
Asked to see pt regarding change of medications. Education provided that since his blood pressure has been low and he even required fluid bolus the previous evening, providers have not resumed all of his home medications. Also discussed his low EF. Pt states \"I don't care about that\" and states he is upset and insists his home medications should be resumed. He is refusing to wear his telemetry monitor.    Perfect Serve below sent to in house nocturnist.   \"Pt upset and feeling of doom. Upset that Lasis has been ordered and not his home meds. Wants his Imdur, losartan and Aldactone. EF 20% and states he feels short of breath and bloated due to change of medications. Hospitalist noted today to resume these medications, but did not order them. Can you review the POC and/or come see this pt that is very upset with the current POC. On call provider suggested we \"make a sticky note\" to the hospitalist, this has not satisfied this pt with a cardiac history and home meds that he feels work better. He is also now refusing to wear the telemetry monitor. Thanks, Nursing\"    Cedricurnist reviewing chart, continue POC at this time. @2320, new orders noted.  Kristin Llanes,RN Clinical

## 2024-03-15 NOTE — PROGRESS NOTES
IM Progress Note    Admit Date:  3/13/2024  2    Interval history:  diabetic foot infection   No fevers  No sob  remains on RA    Subjective:      Mr. Eagle seen up in bed, no distress    Sp left and right foot debridement with I and D  Pain present overnight with nausea but resolved now . Tolerating diet well       No fevers  No sob  remains on RA    Objective:   /81   Pulse 71   Temp 98.3 °F (36.8 °C) (Oral)   Resp 16   Ht 1.753 m (5' 9\")   Wt 95.9 kg (211 lb 6.4 oz)   SpO2 98%   BMI 31.22 kg/m²     Intake/Output Summary (Last 24 hours) at 3/15/2024 0722  Last data filed at 3/14/2024 2150  Gross per 24 hour   Intake 140 ml   Output --   Net 140 ml         Physical Exam:      General:  middle aged male looking older than his age  Awake, alert and oriented. Appears to be not in any distress  Mucous Membranes:  Pink , anicteric  Neck: No JVD, no carotid bruit, no thyromegaly  Chest:  Clear to auscultation bilaterally, no added sounds  Left sided pacer   Cardiovascular:  RRR S1S2 heard, no murmurs or gallops  Abdomen:  Soft, undistended, non tender, no organomegaly, BS present  Extremities: bilateral TMA  left foot ulcer debridement done  Both feet dry dressing   . Distal pulses well felt  Neurological : grossly normal      Medications:   Scheduled Medications:    vancomycin  1,500 mg IntraVENous Q24H    carvedilol  6.25 mg Oral BID WC    iron sucrose (VENOFER) 100 mg in sodium chloride 0.9 % 100 mL IVPB  100 mg IntraVENous Q24H    sodium chloride flush  5-40 mL IntraVENous 2 times per day    empagliflozin  10 mg Oral Daily    spironolactone  12.5 mg Oral Daily    acetaminophen  1,000 mg Oral TID    enoxaparin  40 mg SubCUTAneous Daily    cefepime  2,000 mg IntraVENous Q12H    insulin lispro  0-4 Units SubCUTAneous TID WC    insulin lispro  0-4 Units SubCUTAneous Nightly    aspirin  81 mg Oral Daily    atorvastatin  40 mg Oral Nightly    clopidogrel  75 mg Oral Daily    furosemide  40 mg Oral BID

## 2024-03-15 NOTE — PROGRESS NOTES
Monitor room called about telemetry not showing up and had no rhythm. When checked on patient he is alert and orientedX4 and states he took it off because he us upset and doesn't want it on now. He states he will not be putting it back on tonight. Maury Deluca,  is aware.

## 2024-03-15 NOTE — PROGRESS NOTES
HEART FAILURE CARE PLAN:    Comorbidities Reviewed: Yes   Patient has a past medical history of Acute on chronic systolic congestive heart failure (HCC), Acute osteomyelitis of left foot (HCC), Acute osteomyelitis of right foot (HCC), Acute respiratory failure (HCC), Ascites, Blood transfusion reaction, Burst fracture of lumbar vertebra (HCC), Cellulitis of left foot, Cellulitis of right lower extremity, Chronic systolic CHF (congestive heart failure) (HCC), Community acquired pneumonia, Coronary artery disease involving native coronary artery of native heart without angina pectoris, Diabetes (HCC), Diabetic ulcer of left foot associated with type 2 diabetes mellitus, with muscle involvement without evidence of necrosis (HCC), Diabetic ulcer of right foot (HCC), Diabetic 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     Weights Reviewed: Yes   Admission weight: 95.3 kg (210 lb)   Wt Readings from Last 3 Encounters:   03/15/24 95.9 kg (211 lb 6.4 oz)   01/24/24 90.4 kg (199 lb 3.2 oz)   01/12/24 90.1 kg (198 lb 9 oz)     Intake & Output Reviewed: Yes     Intake/Output Summary (Last 24 hours) at 3/15/2024 0626  Last data filed at 3/14/2024 2150  Gross per 24 hour   Intake 140 ml   Output --   Net 140 ml       ECHOCARDIOGRAM Reviewed: Yes   Patient's Ejection Fraction (EF) is less than or equal to 40%. Discuss HFrEF Guideline Directed Medical Therapy (GDMT) with Cardiologist  or Hospitalist:          Medications Reviewed: Yes   SCHEDULED HOSPITAL MEDICATIONS:   vancomycin  1,500 mg IntraVENous Q24H    carvedilol  6.25 mg Oral BID WC    iron sucrose (VENOFER) 100 mg in sodium chloride 0.9 % 100 mL IVPB  100 mg IntraVENous Q24H    sodium chloride flush  5-40 mL IntraVENous 2 times per day    empagliflozin  10 mg Oral Daily    spironolactone  12.5 mg Oral Daily    acetaminophen  1,000 mg Oral TID    enoxaparin  40 mg SubCUTAneous Daily    cefepime  2,000 mg IntraVENous Q12H    insulin lispro  0-4 Units SubCUTAneous TID WC    insulin lispro  0-4 Units SubCUTAneous Nightly    aspirin  81 mg Oral Daily    atorvastatin  40 mg Oral Nightly    clopidogrel  75 mg Oral Daily    furosemide  40 mg Oral BID    insulin glargine  10 Units SubCUTAneous Nightly    levothyroxine  125 mcg Oral Daily     HOME MEDICATIONS:  Prior to Admission medications    Medication Sig Start Date End Date Taking? Authorizing Provider   amoxicillin-clavulanate (AUGMENTIN) 875-125 MG per tablet Take 1 tablet by mouth 2 times daily  Patient not taking: Reported on 3/13/2024 12/20/23   Sherrie Thomas MD   oxyCODONE-acetaminophen (PERCOCET) 5-325 MG per tablet Take 1 tablet by mouth every 6 hours as needed.  Patient not taking: Reported on 3/13/2024 12/20/23   Sherrie Thomas MD   insulin glargine (BASAGLAR KWIKPEN) 100 UNIT/ML injection pen Inject 25 Units into the skin nightly 12/6/23   Florina Hoffman MD   carvedilol (COREG) 12.5 MG tablet Take 1 tablet by mouth 2 times daily (with meals) 12/6/23   Florina Hoffman MD   Insulin Pen Needle 29G X 12MM MISC 1 each by Does not apply route daily 12/6/23   Florina Hoffman MD   JARDIANCE 10 MG tablet TAKE ONE (1) TABLET BY MOUTH DAILY 11/6/23   Gold Carmichael MD   losartan (COZAAR) 25 MG tablet Take 1 tablet by mouth daily 8/14/23   Gold Carmichael MD   magnesium oxide (MAG-OX) 400 (240 Mg) MG tablet Take 1 tablet by mouth 2 times daily 8/14/23

## 2024-03-15 NOTE — PROGRESS NOTES
PROGRESS NOTE    Admit Date:  3/13/2024    Subjective:  58 y.o. male who is seen for evaluation of cellulitis left foot and bilateral foot ulcers. S/P OR debridement 3/14/24. States feeling OK.       Past Medical History:        Diagnosis Date    Acute on chronic systolic congestive heart failure ([facility]) 07/08/2013    Acute osteomyelitis of left foot ([facility]) 09/27/2017    Acute osteomyelitis of right foot ([facility]) 04/25/2016    Acute respiratory failure ([facility]) 08/04/2020    Ascites     Blood transfusion reaction     Burst fracture of lumbar vertebra ([facility]) 11/09/2012    Cellulitis of left foot     Cellulitis of right lower extremity 2/16, 5/16    Chronic systolic CHF (congestive heart failure) ([facility])     Community acquired pneumonia     Coronary artery disease involving native coronary artery of native heart without angina pectoris 08/06/2020    Diabetes ([facility])     Diabetic ulcer of left foot associated with type 2 diabetes mellitus, with muscle involvement without evidence of necrosis ([facility]) 04/27/2017    Diabetic ulcer of right foot ([facility]) early 2016    Diabetic ulcer of 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 2 diabetes mellitus, with necrosis of bone ([facility])     ETOH abuse     Fracture of tibial plateau 11/09/2012    High cholesterol     History of blood transfusion     reaction    HTN (hypertension)     Hx of blood clots     MI (myocardial infarction) ([facility]) 08/04/2020    + Troponins    MRSA (methicillin resistant staph aureus) culture positive 4/28/16, 4/20/16    foot wound    Neuropathic ulcer of left foot, limited to breakdown of skin ([facility]) 11/03/2016    Neuropathic ulcer of toe ([facility]) 02/13/2014    NSVT (nonsustained ventricular tachycardia) ([facility]) 2014    Pleural effusion due to congestive heart failure ([facility])     Septicemia ([facility])     Smoker     quit 1985    Systolic CHF, acute ([facility]) 07/08/2013    Thyroid disease        Past Surgical History:        Procedure Laterality Date    CARDIAC DEFIBRILLATOR PLACEMENT  01/29/2014

## 2024-03-15 NOTE — FLOWSHEET NOTE
03/15/24 0900   Vital Signs   Temp 97.5 °F (36.4 °C)   Temp Source Oral   Pulse 72   Heart Rate Source Monitor   Respirations 16   /66   MAP (Calculated) 78   BP Location Left upper arm   BP Method Automatic   Patient Position Semi fowlers   Pain Assessment   Pain Assessment None - Denies Pain   Opioid-Induced Sedation   POSS Score 1   Oxygen Therapy   SpO2 98 %   O2 Device None (Room air)     Assessment complete and morning medications administered

## 2024-03-15 NOTE — CONSULTS
[facility]   HEART FAILURE PROGRAM      NAME:  Ruben Eagle  AGE: 58 y.o.   GENDER: male  : 1965  TODAY'S DATE:  3/15/2024    Subjective:     VISIT TYPE: Evaluation / Consult    ADMIT DATE: 3/13/2024    PAST MEDICAL HISTORY:      Diagnosis Date    Acute on chronic systolic congestive heart failure ([facility]) 2013    Acute osteomyelitis of left foot ([facility]) 2017    Acute osteomyelitis of right foot ([facility]) 2016    Acute respiratory failure ([facility]) 2020    Ascites     Blood transfusion reaction     Burst fracture of lumbar vertebra ([facility]) 2012    Cellulitis of left foot     Cellulitis of right lower extremity ,     Chronic systolic CHF (congestive heart failure) ([facility])     Community acquired pneumonia     Coronary artery disease involving native coronary artery of native heart without angina pectoris 2020    Diabetes ([facility])     Diabetic ulcer of left foot associated with type 2 diabetes mellitus, with muscle involvement without evidence of necrosis ([facility]) 2017    Diabetic ulcer of right foot ([facility]) early     Diabetic ulcer of 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 2 diabetes mellitus, with necrosis of bone ([facility])     ETOH abuse     Fracture of tibial plateau 2012    High cholesterol     History of blood transfusion     reaction    HTN (hypertension)     Hx of blood clots     MI (myocardial infarction) ([facility]) 2020    + Troponins    MRSA (methicillin resistant staph aureus) culture positive 16, 16    foot wound    Neuropathic ulcer of left foot, limited to breakdown of skin ([facility]) 2016    Neuropathic ulcer of toe ([facility]) 2014    NSVT (nonsustained ventricular tachycardia) ([facility])     Pleural effusion due to congestive heart failure ([facility])     Septicemia ([facility])     Smoker     quit 1985    Systolic CHF, acute ([facility]) 2013    Thyroid disease      HOME MEDICATIONS:  Prior to Admission medications    Medication Sig Start Date  APRN - CNP   clopidogrel (PLAVIX) 75 MG tablet Take 1 tablet by mouth daily 2/13/23   Gold Carmichael MD   furosemide (LASIX) 80 MG tablet Take 1 tablet by mouth 2 times daily Take 80 mg in the morning and 80 mg at night 2/13/23   Gold Carmichael MD   Blood Pressure KIT 1 kit by Does not apply route daily 10/4/22   Leann Marie, APRN - CNP   BD PEN NEEDLE WINNIE U/F 32G X 4 MM MISC 1 each by Does not apply route daily 8/3/22   Hood Sanchez MD   OneTouch Delica Lancets 33G MISC USE TO CHECK FOUR TIMES DAILY. DX;E11.9 8/18/21   Hood Sanchez MD   Continuous Blood Gluc Sensor (FREESTYLE MARY 14 DAY SENSOR) MISC CGM 4/29/21   Hood Sanchez MD   Insulin Pen Needle (UNIFINE PENTIPS) 31G X 5 MM MISC USE 1 EACH DAY AS NEEDED FOR TESTING 1/3/18   Cb Murrell MD      Objective:     ADMISSION DIAGNOSIS:   Diabetic foot infection (HCC) [E11.628, L08.9]  Infected stasis ulcer of left lower extremity (HCC) [I83.229, L97.929]    WEIGHTS:    Admission weight: 95.3 kg (210 lb)   Wt Readings from Last 3 Encounters:   03/15/24 95.9 kg (211 lb 6.4 oz)   01/24/24 90.4 kg (199 lb 3.2 oz)   01/12/24 90.1 kg (198 lb 9 oz)     INTAKE & OUTPUT:   Intake/Output Summary (Last 24 hours) at 3/15/2024 0901  Last data filed at 3/14/2024 2150  Gross per 24 hour   Intake 140 ml   Output --   Net 140 ml     ECHOCARDIOGRAM: EF 20% 3/14/24, prior EF 30-35% 4/22    Assessment:     Patient resting in bed at this time on room air.  Pt denies shortness of breath; no complaints of chest pain. Patient stated \"I am not here for CHF\". Reinforced education on chronic systolic HF and offered resources. Patient stated he has a working scale at home but does not weigh daily. Patient stated he will be here till Monday. Plans to return home at discharge.     Reinforced Heart Failure education on: signs/symptoms to monitor, medications, daily weights, low sodium diet, 2000 ml fluid restriction, and activity. Reviewed HF

## 2024-03-16 LAB
ANION GAP SERPL CALCULATED.3IONS-SCNC: 12 MMOL/L (ref 3–16)
BASOPHILS # BLD: 0 K/UL (ref 0–0.2)
BASOPHILS NFR BLD: 0.8 %
BUN SERPL-MCNC: 41 MG/DL (ref 7–20)
CALCIUM SERPL-MCNC: 7.7 MG/DL (ref 8.3–10.6)
CHLORIDE SERPL-SCNC: 98 MMOL/L (ref 99–110)
CO2 SERPL-SCNC: 22 MMOL/L (ref 21–32)
CREAT SERPL-MCNC: 1.5 MG/DL (ref 0.9–1.3)
DEPRECATED RDW RBC AUTO: 18.5 % (ref 12.4–15.4)
EOSINOPHIL # BLD: 0.1 K/UL (ref 0–0.6)
EOSINOPHIL NFR BLD: 2 %
GFR SERPLBLD CREATININE-BSD FMLA CKD-EPI: 53 ML/MIN/{1.73_M2}
GLUCOSE BLD-MCNC: 165 MG/DL (ref 70–99)
GLUCOSE BLD-MCNC: 174 MG/DL (ref 70–99)
GLUCOSE BLD-MCNC: 181 MG/DL (ref 70–99)
GLUCOSE BLD-MCNC: 252 MG/DL (ref 70–99)
GLUCOSE BLD-MCNC: 262 MG/DL (ref 70–99)
GLUCOSE SERPL-MCNC: 187 MG/DL (ref 70–99)
HCT VFR BLD AUTO: 29.2 % (ref 40.5–52.5)
HGB BLD-MCNC: 9.3 G/DL (ref 13.5–17.5)
LYMPHOCYTES # BLD: 0.4 K/UL (ref 1–5.1)
LYMPHOCYTES NFR BLD: 8.6 %
MCH RBC QN AUTO: 25.5 PG (ref 26–34)
MCHC RBC AUTO-ENTMCNC: 31.9 G/DL (ref 31–36)
MCV RBC AUTO: 80 FL (ref 80–100)
MONOCYTES # BLD: 0.4 K/UL (ref 0–1.3)
MONOCYTES NFR BLD: 7.8 %
NEUTROPHILS # BLD: 4.2 K/UL (ref 1.7–7.7)
NEUTROPHILS NFR BLD: 80.8 %
PERFORMED ON: ABNORMAL
PLATELET # BLD AUTO: 190 K/UL (ref 135–450)
PMV BLD AUTO: 7.8 FL (ref 5–10.5)
POTASSIUM SERPL-SCNC: 4.1 MMOL/L (ref 3.5–5.1)
RBC # BLD AUTO: 3.64 M/UL (ref 4.2–5.9)
SODIUM SERPL-SCNC: 132 MMOL/L (ref 136–145)
WBC # BLD AUTO: 5.2 K/UL (ref 4–11)

## 2024-03-16 PROCEDURE — 6370000000 HC RX 637 (ALT 250 FOR IP): Performed by: PODIATRIST

## 2024-03-16 PROCEDURE — 6370000000 HC RX 637 (ALT 250 FOR IP)

## 2024-03-16 PROCEDURE — 6370000000 HC RX 637 (ALT 250 FOR IP): Performed by: HOSPITALIST

## 2024-03-16 PROCEDURE — 6360000002 HC RX W HCPCS: Performed by: PODIATRIST

## 2024-03-16 PROCEDURE — 6360000002 HC RX W HCPCS: Performed by: NURSE PRACTITIONER

## 2024-03-16 PROCEDURE — 36415 COLL VENOUS BLD VENIPUNCTURE: CPT

## 2024-03-16 PROCEDURE — 85025 COMPLETE CBC W/AUTO DIFF WBC: CPT

## 2024-03-16 PROCEDURE — 80048 BASIC METABOLIC PNL TOTAL CA: CPT

## 2024-03-16 PROCEDURE — 1200000000 HC SEMI PRIVATE

## 2024-03-16 PROCEDURE — 99232 SBSQ HOSP IP/OBS MODERATE 35: CPT | Performed by: INTERNAL MEDICINE

## 2024-03-16 PROCEDURE — 2580000003 HC RX 258: Performed by: PODIATRIST

## 2024-03-16 RX ORDER — FUROSEMIDE 40 MG/1
40 TABLET ORAL DAILY
Status: DISCONTINUED | OUTPATIENT
Start: 2024-03-17 | End: 2024-03-18 | Stop reason: HOSPADM

## 2024-03-16 RX ADMIN — CARVEDILOL 6.25 MG: 6.25 TABLET, FILM COATED ORAL at 17:10

## 2024-03-16 RX ADMIN — DESMOPRESSIN ACETATE 40 MG: 0.2 TABLET ORAL at 20:40

## 2024-03-16 RX ADMIN — ACETAMINOPHEN 1000 MG: 500 TABLET ORAL at 15:24

## 2024-03-16 RX ADMIN — INSULIN GLARGINE 10 UNITS: 100 INJECTION, SOLUTION SUBCUTANEOUS at 20:40

## 2024-03-16 RX ADMIN — CEFEPIME 2000 MG: 2 INJECTION, POWDER, FOR SOLUTION INTRAVENOUS at 06:06

## 2024-03-16 RX ADMIN — ACETAMINOPHEN 1000 MG: 500 TABLET ORAL at 08:38

## 2024-03-16 RX ADMIN — CLOPIDOGREL BISULFATE 75 MG: 75 TABLET ORAL at 08:39

## 2024-03-16 RX ADMIN — CEFEPIME 2000 MG: 2 INJECTION, POWDER, FOR SOLUTION INTRAVENOUS at 17:13

## 2024-03-16 RX ADMIN — EMPAGLIFLOZIN 10 MG: 10 TABLET, FILM COATED ORAL at 08:39

## 2024-03-16 RX ADMIN — FUROSEMIDE 40 MG: 40 TABLET ORAL at 08:38

## 2024-03-16 RX ADMIN — ASPIRIN 81 MG: 81 TABLET, COATED ORAL at 08:39

## 2024-03-16 RX ADMIN — ACETAMINOPHEN 1000 MG: 500 TABLET ORAL at 20:40

## 2024-03-16 RX ADMIN — CARVEDILOL 6.25 MG: 6.25 TABLET, FILM COATED ORAL at 08:39

## 2024-03-16 RX ADMIN — INSULIN LISPRO 2 UNITS: 100 INJECTION, SOLUTION INTRAVENOUS; SUBCUTANEOUS at 17:10

## 2024-03-16 RX ADMIN — IRON SUCROSE 100 MG: 20 INJECTION, SOLUTION INTRAVENOUS at 10:51

## 2024-03-16 RX ADMIN — Medication 1500 MG: at 02:46

## 2024-03-16 RX ADMIN — LEVOTHYROXINE SODIUM 125 MCG: 125 TABLET ORAL at 08:39

## 2024-03-16 RX ADMIN — INSULIN LISPRO 2 UNITS: 100 INJECTION, SOLUTION INTRAVENOUS; SUBCUTANEOUS at 12:08

## 2024-03-16 RX ADMIN — SPIRONOLACTONE 12.5 MG: 25 TABLET ORAL at 08:39

## 2024-03-16 RX ADMIN — ENOXAPARIN SODIUM 40 MG: 100 INJECTION SUBCUTANEOUS at 08:40

## 2024-03-16 ASSESSMENT — PAIN SCALES - GENERAL
PAINLEVEL_OUTOF10: 0

## 2024-03-16 NOTE — PROGRESS NOTES
Nutrition Education    Educated on:  Declined CHF Diet Education at this time d/t he stated \"I am Good with that\"   Learners: Patient  Readiness: Refuses    Mariaelena Melton RD, LD  Contact Number: [phone]

## 2024-03-16 NOTE — PROGRESS NOTES
/68   Pulse 73   Temp 97.3 °F (36.3 °C) (Oral)   Resp 16   Ht 1.753 m (5' 9\")   Wt 95.9 kg (211 lb 6.4 oz)   SpO2 98%   BMI 31.22 kg/m²     Pt awake in bed. Pt is alert and orientedX4. Pt bilateral foot dressings are clean, dry, and intact. Pt denies pain at this time. Assessment complete. Meds passed. Pt denies needs at this time.        Bedside Mobility Assessment Tool (BMAT):     Assessment Level 1- Sit and Shake    1. From a semi-reclined position, ask patient to sit up and rotate to a seated position at the side of the bed. Can use the bedrail.    2. Ask patient to reach out and grab your hand and shake making sure patient reaches across his/her midline.   Pass- Patient is able to come to a seated position, maintain core strength. Maintains seated balance while reaching across midline. Move on to Assessment Level 2.     Assessment Level 2- Stretch and Point   1. With patient in seated position at the side of the bed, have patient place both feet on the floor (or stool) with knees no higher than hips.    2. Ask patient to stretch one leg and straighten the knee, then bend the ankle/flex and point the toes. If appropriate, repeat with the other leg.   Pass- Patient is able to demonstrate appropriate quad strength on intended weight bearing limb(s). Move onto Assessment Level 3.     Assessment Level 3- Stand   1. Ask patient to elevate off the bed or chair (seated to standing) using an assistive device (cane, bedrail).    2. Patient should be able to raise buttocks off be and hold for a count of five. May repeat once.   Pass- Patient maintains standing stability for at least 5 seconds, proceed to assessment level 4.    Assessment Level 4- Walk   1. Ask patient to march in place at bedside.    2. Then ask patient to advance step and return each foot. Some medical conditions may render a patient from stepping backwards, use your best clinical judgement.   Fail- Patient not able to complete tasks OR  requires use of assistive device. Patient is MOBILITY LEVEL 3.       Mobility Level- 3

## 2024-03-16 NOTE — PROGRESS NOTES
8/14/23   Gold Carmichael MD   spironolactone (ALDACTONE) 25 MG tablet Take 1 tablet by mouth daily 8/14/23   Gold Carmichael MD   TRULICITY 3 MG/0.5ML SOPN INJECT THREE (3) MG INTO THE SKIN ONCE A WEEK 7/27/23   Leann Marie APRN - CNP   metFORMIN (GLUCOPHAGE) 1000 MG tablet TAKE ONE (1) TABLET BY MOUTH TWO (2) TIMES DAILY (WITH MEALS) 6/29/23   Julianne Ruggiero PA   allopurinol (ZYLOPRIM) 100 MG tablet TAKE TWO (2) TABLETS BY MOUTH DAILY 6/14/23   Leann Marie APRN - CNP   atorvastatin (LIPITOR) 40 MG tablet TAKE ONE (1) TABLET BY MOUTH NIGHTLY 5/10/23   Gold Carmichael MD   ASPIRIN LOW DOSE 81 MG EC tablet Take 1 tablet by mouth daily 3/17/23   Basilio Clemons MD   isosorbide mononitrate (IMDUR) 30 MG extended release tablet Take 1 tablet by mouth daily 3/1/23   Gold Carmichael MD   levothyroxine (SYNTHROID) 125 MCG tablet Take 1 tablet by mouth daily 2/17/23 3/13/24  Leann Marie APRN - CNP   clopidogrel (PLAVIX) 75 MG tablet Take 1 tablet by mouth daily 2/13/23   Gold Carmichael MD   furosemide (LASIX) 80 MG tablet Take 1 tablet by mouth 2 times daily Take 80 mg in the morning and 80 mg at night 2/13/23   Gold Carmichael MD   Blood Pressure KIT 1 kit by Does not apply route daily 10/4/22   Leann Marie APRN - CNP   BD PEN NEEDLE WINNIE U/F 32G X 4 MM MISC 1 each by Does not apply route daily 8/3/22   Hood Sanchez MD   OneTouch Delica Lancets 33G MISC USE TO CHECK FOUR TIMES DAILY. DX;E11.9 8/18/21   Hood Sanchez MD   Continuous Blood Gluc Sensor (FREESTYLE MARY 14 DAY SENSOR) MISC CGM 4/29/21   Hood Sanchez MD   Insulin Pen Needle (UNIFINE PENTIPS) 31G X 5 MM MISC USE 1 EACH DAY AS NEEDED FOR TESTING 1/3/18   Cb Murrell MD      Diet Reviewed: Yes   ADULT DIET; Regular; 3 carb choices (45 gm/meal); Low Fat/Low Chol/High Fiber/2 gm Na    Goal of Care Reviewed: Yes   Patient and/or Family's stated Goal of Care this Admission: Reduce shortness of

## 2024-03-16 NOTE — PROGRESS NOTES
HEART FAILURE CARE PLAN:    Comorbidities Reviewed: Yes   Patient has a past medical history of Acute on chronic systolic congestive heart failure (HCC), Acute osteomyelitis of left foot (HCC), Acute osteomyelitis of right foot (HCC), Acute respiratory failure (HCC), Ascites, Blood transfusion reaction, Burst fracture of lumbar vertebra (HCC), Cellulitis of left foot, Cellulitis of right lower extremity, Chronic systolic CHF (congestive heart failure) (HCC), Community acquired pneumonia, Coronary artery disease involving native coronary artery of native heart without angina pectoris, Diabetes (HCC), Diabetic ulcer of left foot associated with type 2 diabetes mellitus, with muscle involvement without evidence of necrosis (HCC), Diabetic ulcer of right foot (HCC), Diabetic 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     Weights Reviewed: Yes   Admission weight: 95.3 kg (210 lb)   Wt Readings from Last 3 Encounters:   03/16/24 96.3 kg (212 lb 4.9 oz)   01/24/24 90.4 kg (199 lb 3.2 oz)   01/12/24 90.1 kg (198 lb 9 oz)     Intake & Output Reviewed: Yes     Intake/Output Summary (Last 24 hours) at 3/16/2024 1358  Last data filed at 3/16/2024 0832  Gross per 24 hour   Intake 320 ml   Output 450 ml   Net -130 ml       ECHOCARDIOGRAM Reviewed: Yes   Patient's Ejection Fraction (EF) is less than or equal to 40%. Discuss HFrEF Guideline Directed Medical Therapy (GDMT) with  Reduce shortness of breath, increase activity tolerance, better understand heart failure and disease management, be more comfortable, and reduce lower extremity edema prior to discharge.     Electronically signed by SHAJI MURDOCK RN on 3/16/2024 at 1:58 PM

## 2024-03-16 NOTE — PLAN OF CARE
Problem: Discharge Planning  Goal: Discharge to home or other facility with appropriate resources  3/15/2024 2119 by Radha Escalona RN  Outcome: Progressing  3/15/2024 1042 by Susan Maldonado RN  Outcome: Progressing     Problem: Safety - Adult  Goal: Free from fall injury  3/15/2024 2119 by Radha Escalona RN  Outcome: Progressing  3/15/2024 1042 by Susan Maldonado RN  Outcome: Progressing     Problem: Chronic Conditions and Co-morbidities  Goal: Patient's chronic conditions and co-morbidity symptoms are monitored and maintained or improved  3/15/2024 2119 by Radha Escalona RN  Outcome: Progressing  3/15/2024 1042 by Susan Maldonado RN  Outcome: Progressing     Problem: Cardiovascular - Adult  Goal: Maintains optimal cardiac output and hemodynamic stability  3/15/2024 2119 by Radha Escalona RN  Outcome: Progressing  3/15/2024 1042 by Susan Maldonado RN  Outcome: Progressing  Goal: Absence of cardiac dysrhythmias or at baseline  3/15/2024 2119 by Radha Escalona RN  Outcome: Progressing  3/15/2024 1042 by Susan Maldonado RN  Outcome: Progressing     Problem: Pain  Goal: Verbalizes/displays adequate comfort level or baseline comfort level  3/15/2024 2119 by Radha Escalona RN  Outcome: Progressing  3/15/2024 1042 by Susan Maldonado RN  Outcome: Progressing

## 2024-03-16 NOTE — FLOWSHEET NOTE
03/16/24 0210   Vital Signs   Temp 98.3 °F (36.8 °C)   Temp Source Oral   Pulse 72   Heart Rate Source Monitor   Respirations 16   /76   MAP (Calculated) 88   BP Location Left upper arm   BP Method Automatic   Patient Position High tatyana   Opioid-Induced Sedation   POSS Score 1   Oxygen Therapy   SpO2 100 %   O2 Device None (Room air)   Height and Weight   Weight - Scale 96.3 kg (212 lb 4.9 oz)   Weight Method Bed scale   BMI (Calculated) 31.4     Pt VS as shown above.

## 2024-03-16 NOTE — FLOWSHEET NOTE
03/16/24 0830   Vital Signs   Temp 98 °F (36.7 °C)   Temp Source Oral   Pulse 75   Heart Rate Source Monitor   Respirations 16   /70   MAP (Calculated) 84   BP Location Left upper arm   BP Method Automatic   [facility]   Pain Assessment   Pain Assessment None - Denies Pain   Oxygen Therapy   SpO2 98 %   O2 Device None (Room air)        03/16/24 0830   Vital Signs   Temp 98 °F (36.7 °C)   Temp Source Oral   Pulse 75   Heart Rate Source Monitor   Respirations 16   /70   MAP (Calculated) 84   BP Location Left upper arm   BP Method Automatic   Patient Position High wlers   Pain Assessment   Pain Assessment None - Denies Pain   Oxygen Therapy   SpO2 98 %   O2 Device None (Room air)     AM assessment completed. No complaints of pain or discomfort voiced.  No signs of symptoms of distress noted. Patient tolerated medications well. Respirations easy and even. Bed in lowest position, bed alarm in place and functioning properly, SR up x 2 and bed in low position. Call light within reach.     Bedside Mobility Assessment Tool (BMAT):     Assessment Level 1- Sit and Shake    1. From a semi-reclined position, ask patient to sit up and rotate to a seated position at the side of the bed. Can use the bedrail.    2. Ask patient to reach out and grab your hand and shake making sure patient reaches across his/her midline.   Pass- Patient is able to come to a seated position, maintain core strength. Maintains seated balance while reaching across midline. Move on to Assessment Level 2. \     Assessment Level 2- Stretch and Point   1. With patient in seated position at the side of the bed, have patient place both feet on the floor (or stool) with knees no higher than hips.    2. Ask patient to stretch one leg and straighten the knee, then bend the ankle/flex and point the toes. If appropriate, repeat with the other leg.   Fail- Patient is unable to complete task. Patient is MOBILITY LEVEL 2.

## 2024-03-16 NOTE — PROGRESS NOTES
IM Progress Note    Admit Date:  3/13/2024  3    Interval history:  diabetic foot infection   No fevers  No sob  remains on RA    Subjective:      Mr. Eagle seen up in bed, no distress    Sp left and right foot debridement with I and D  Tolerating diet well   No more nausea  Wound cx with MRSA      No fevers  No sob  remains on RA    Objective:   /76   Pulse 72   Temp 98.3 °F (36.8 °C) (Oral)   Resp 16   Ht 1.753 m (5' 9\")   Wt 96.3 kg (212 lb 4.9 oz)   SpO2 100%   BMI 31.35 kg/m²     Intake/Output Summary (Last 24 hours) at 3/16/2024 0715  Last data filed at 3/16/2024 0210  Gross per 24 hour   Intake 120 ml   Output 450 ml   Net -330 ml         Physical Exam:      General:  middle aged male looking older than his age  Awake, alert and oriented. Appears to be not in any distress  Mucous Membranes:  Pink , anicteric  Neck: No JVD, no carotid bruit, no thyromegaly  Chest:  Clear to auscultation bilaterally, no added sounds  Left sided pacer   Cardiovascular:  RRR S1S2 heard, no murmurs or gallops  Abdomen:  Soft, undistended, non tender, no organomegaly, BS present  Extremities: bilateral TMA  left foot ulcer debridement done  Both feet dry dressing   . Distal pulses well felt  Neurological : grossly normal      Medications:   Scheduled Medications:    vancomycin  1,500 mg IntraVENous Q24H    carvedilol  6.25 mg Oral BID WC    iron sucrose (VENOFER) 100 mg in sodium chloride 0.9 % 100 mL IVPB  100 mg IntraVENous Q24H    sodium chloride flush  5-40 mL IntraVENous 2 times per day    empagliflozin  10 mg Oral Daily    spironolactone  12.5 mg Oral Daily    acetaminophen  1,000 mg Oral TID    enoxaparin  40 mg SubCUTAneous Daily    cefepime  2,000 mg IntraVENous Q12H    insulin lispro  0-4 Units SubCUTAneous TID WC    insulin lispro  0-4 Units SubCUTAneous Nightly    aspirin  81 mg Oral Daily    atorvastatin  40 mg Oral Nightly    clopidogrel  75 mg Oral Daily    furosemide  40 mg Oral BID    insulin  elevated Cr, CHF makes patient higher risk for morbidity and mortality requiring testing and treatment.         DVT Prophylaxis: Lovenox   Diet: diabetic   Full code    Up in chair  Dc planning after weekend      Sheldon Leland Resendez MD, 3/16/2024 7:15 AM

## 2024-03-16 NOTE — PROGRESS NOTES
PROGRESS NOTE    Admit Date:  3/13/2024    Subjective:  58 y.o. male who is seen for evaluation of cellulitis left foot and bilateral foot ulcers. S/P OR debridement 3/14/24. States feeling OK. Pain is controlled.       Past Medical History:        Diagnosis Date    Acute on chronic systolic congestive heart failure ([facility]) 07/08/2013    Acute osteomyelitis of left foot ([facility]) 09/27/2017    Acute osteomyelitis of right foot ([facility]) 04/25/2016    Acute respiratory failure ([facility]) 08/04/2020    Ascites     Blood transfusion reaction     Burst fracture of lumbar vertebra ([facility]) 11/09/2012    Cellulitis of left foot     Cellulitis of right lower extremity 2/16, 5/16    Chronic systolic CHF (congestive heart failure) ([facility])     Community acquired pneumonia     Coronary artery disease involving native coronary artery of native heart without angina pectoris 08/06/2020    Diabetes ([facility])     Diabetic ulcer of left foot associated with type 2 diabetes mellitus, with muscle involvement without evidence of necrosis ([facility]) 04/27/2017    Diabetic ulcer of right foot ([facility]) early 2016    Diabetic ulcer of 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 2 diabetes mellitus, with necrosis of bone ([facility])     ETOH abuse     Fracture of tibial plateau 11/09/2012    High cholesterol     History of blood transfusion     reaction    HTN (hypertension)     Hx of blood clots     MI (myocardial infarction) ([facility]) 08/04/2020    + Troponins    MRSA (methicillin resistant staph aureus) culture positive 4/28/16, 4/20/16    foot wound    Neuropathic ulcer of left foot, limited to breakdown of skin ([facility]) 11/03/2016    Neuropathic ulcer of toe ([facility]) 02/13/2014    NSVT (nonsustained ventricular tachycardia) ([facility]) 2014    Pleural effusion due to congestive heart failure ([facility])     Septicemia ([facility])     Smoker     quit 1985    Systolic CHF, acute ([facility]) 07/08/2013    Thyroid disease        Past Surgical History:        Procedure Laterality Date    CARDIAC DEFIBRILLATOR

## 2024-03-17 LAB
BACTERIA BLD CULT ORG #2: NORMAL
BACTERIA BLD CULT: NORMAL
GLUCOSE BLD-MCNC: 137 MG/DL (ref 70–99)
GLUCOSE BLD-MCNC: 147 MG/DL (ref 70–99)
GLUCOSE BLD-MCNC: 175 MG/DL (ref 70–99)
GLUCOSE BLD-MCNC: 181 MG/DL (ref 70–99)
PERFORMED ON: ABNORMAL

## 2024-03-17 PROCEDURE — 6370000000 HC RX 637 (ALT 250 FOR IP): Performed by: INTERNAL MEDICINE

## 2024-03-17 PROCEDURE — 2580000003 HC RX 258: Performed by: ANESTHESIOLOGY

## 2024-03-17 PROCEDURE — 6370000000 HC RX 637 (ALT 250 FOR IP): Performed by: PODIATRIST

## 2024-03-17 PROCEDURE — 6370000000 HC RX 637 (ALT 250 FOR IP): Performed by: HOSPITALIST

## 2024-03-17 PROCEDURE — 2580000003 HC RX 258: Performed by: PODIATRIST

## 2024-03-17 PROCEDURE — 6360000002 HC RX W HCPCS: Performed by: INTERNAL MEDICINE

## 2024-03-17 PROCEDURE — 1200000000 HC SEMI PRIVATE

## 2024-03-17 PROCEDURE — 6370000000 HC RX 637 (ALT 250 FOR IP)

## 2024-03-17 PROCEDURE — 6360000002 HC RX W HCPCS: Performed by: PODIATRIST

## 2024-03-17 PROCEDURE — 99232 SBSQ HOSP IP/OBS MODERATE 35: CPT | Performed by: INTERNAL MEDICINE

## 2024-03-17 RX ADMIN — DESMOPRESSIN ACETATE 40 MG: 0.2 TABLET ORAL at 20:38

## 2024-03-17 RX ADMIN — Medication 10 ML: at 20:38

## 2024-03-17 RX ADMIN — CARVEDILOL 6.25 MG: 6.25 TABLET, FILM COATED ORAL at 17:03

## 2024-03-17 RX ADMIN — IRON SUCROSE 100 MG: 20 INJECTION, SOLUTION INTRAVENOUS at 11:28

## 2024-03-17 RX ADMIN — FUROSEMIDE 40 MG: 40 TABLET ORAL at 08:21

## 2024-03-17 RX ADMIN — CLOPIDOGREL BISULFATE 75 MG: 75 TABLET ORAL at 08:21

## 2024-03-17 RX ADMIN — INSULIN GLARGINE 10 UNITS: 100 INJECTION, SOLUTION SUBCUTANEOUS at 20:38

## 2024-03-17 RX ADMIN — ACETAMINOPHEN 1000 MG: 500 TABLET ORAL at 20:38

## 2024-03-17 RX ADMIN — CARVEDILOL 6.25 MG: 6.25 TABLET, FILM COATED ORAL at 08:21

## 2024-03-17 RX ADMIN — EMPAGLIFLOZIN 10 MG: 10 TABLET, FILM COATED ORAL at 08:21

## 2024-03-17 RX ADMIN — Medication 10 ML: at 08:23

## 2024-03-17 RX ADMIN — ACETAMINOPHEN 1000 MG: 500 TABLET ORAL at 08:22

## 2024-03-17 RX ADMIN — ENOXAPARIN SODIUM 40 MG: 100 INJECTION SUBCUTANEOUS at 08:22

## 2024-03-17 RX ADMIN — ACETAMINOPHEN 1000 MG: 500 TABLET ORAL at 14:54

## 2024-03-17 RX ADMIN — CEFEPIME 2000 MG: 2 INJECTION, POWDER, FOR SOLUTION INTRAVENOUS at 05:48

## 2024-03-17 RX ADMIN — Medication 1500 MG: at 02:32

## 2024-03-17 RX ADMIN — LEVOTHYROXINE SODIUM 125 MCG: 125 TABLET ORAL at 08:21

## 2024-03-17 RX ADMIN — SPIRONOLACTONE 12.5 MG: 25 TABLET ORAL at 08:21

## 2024-03-17 RX ADMIN — ASPIRIN 81 MG: 81 TABLET, COATED ORAL at 08:21

## 2024-03-17 ASSESSMENT — PAIN SCALES - GENERAL
PAINLEVEL_OUTOF10: 0

## 2024-03-17 NOTE — PLAN OF CARE
Problem: Discharge Planning  Goal: Discharge to home or other facility with appropriate resources  3/17/2024 1028 by Brina Bell RN  Outcome: Progressing  3/16/2024 2045 by Radha Escalona RN  Outcome: Progressing     Problem: Safety - Adult  Goal: Free from fall injury  3/17/2024 1028 by Brina Bell RN  Outcome: Progressing  3/16/2024 2045 by Radha Escalona RN  Outcome: Progressing     Problem: Chronic Conditions and Co-morbidities  Goal: Patient's chronic conditions and co-morbidity symptoms are monitored and maintained or improved  3/17/2024 1028 by Brina Bell RN  Outcome: Progressing  3/16/2024 2045 by Radha Escalona RN  Outcome: Progressing     Problem: Cardiovascular - Adult  Goal: Maintains optimal cardiac output and hemodynamic stability  3/17/2024 1028 by Brina Bell RN  Outcome: Progressing  3/16/2024 2045 by Radha Escalona RN  Outcome: Progressing  Goal: Absence of cardiac dysrhythmias or at baseline  3/17/2024 1028 by Brina Bell RN  Outcome: Progressing  3/16/2024 2045 by Radha Escalona RN  Outcome: Progressing     Problem: Pain  Goal: Verbalizes/displays adequate comfort level or baseline comfort level  3/17/2024 1028 by Brina Bell RN  Outcome: Progressing  3/16/2024 2045 by Radha Escalona RN  Outcome: Progressing

## 2024-03-17 NOTE — PROGRESS NOTES
HEART FAILURE CARE PLAN:    Comorbidities Reviewed: Yes   Patient has a past medical history of Acute on chronic systolic congestive heart failure (HCC), Acute osteomyelitis of left foot (HCC), Acute osteomyelitis of right foot (HCC), Acute respiratory failure (HCC), Ascites, Blood transfusion reaction, Burst fracture of lumbar vertebra (HCC), Cellulitis of left foot, Cellulitis of right lower extremity, Chronic systolic CHF (congestive heart failure) (HCC), Community acquired pneumonia, Coronary artery disease involving native coronary artery of native heart without angina pectoris, Diabetes (HCC), Diabetic ulcer of left foot associated with type 2 diabetes mellitus, with muscle involvement without evidence of necrosis (HCC), Diabetic ulcer of right foot (HCC), Diabetic 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     Weights Reviewed: Yes   Admission weight: 95.3 kg (210 lb)   Wt Readings from Last 3 Encounters:   03/17/24 96.7 kg (213 lb 3.2 oz)   01/24/24 90.4 kg (199 lb 3.2 oz)   01/12/24 90.1 kg (198 lb 9 oz)     Intake & Output Reviewed: Yes     Intake/Output Summary (Last 24 hours) at 3/17/2024 0637  Last data filed at 3/16/2024 1918  Gross per 24 hour   Intake 440 ml   Output --   Net 440 ml       ECHOCARDIOGRAM Reviewed: Yes   Patient's Ejection Fraction (EF) is less than or equal to 40%. Discuss HFrEF Guideline Directed Medical Therapy (GDMT) with Cardiologist  increase activity tolerance, better understand heart failure and disease management, be more comfortable, and reduce lower extremity edema prior to discharge.     Electronically signed by CORRINA OSBORN RN on 3/17/2024 at 6:37 AM

## 2024-03-17 NOTE — FLOWSHEET NOTE
03/17/24 0815   Vital Signs   Temp 97 °F (36.1 °C)   Temp Source Oral   Pulse 72   Heart Rate Source Monitor   Respirations 16   /76   MAP (Calculated) 89   BP Location Left upper arm   BP Method Automatic   Patient Position High fowlers   Pain Assessment   Pain Assessment None - Denies Pain   Oxygen Therapy   SpO2 97 %   O2 Device None (Room air)     AM assessment completed. No complaints of pain or discomfort voiced.  No signs of symptoms of distress noted. Patient tolerated medications well. Respirations easy and even. Bed in lowest position, bed alarm in place and functioning properly, SR up x 2 and bed in low position. Call light within reach.     Bedside Mobility Assessment Tool (BMAT):     Assessment Level 1- Sit and Shake    1. From a semi-reclined position, ask patient to sit up and rotate to a seated position at the side of the bed. Can use the bedrail.    2. Ask patient to reach out and grab your hand and shake making sure patient reaches across his/her midline.   Pass- Patient is able to come to a seated position, maintain core strength. Maintains seated balance while reaching across midline. Move on to Assessment Level 2.     Assessment Level 2- Stretch and Point   1. With patient in seated position at the side of the bed, have patient place both feet on the floor (or stool) with knees no higher than hips.    2. Ask patient to stretch one leg and straighten the knee, then bend the ankle/flex and point the toes. If appropriate, repeat with the other leg.   Fail- Patient is unable to complete task. Patient is MOBILITY LEVEL 2.     Assessment Level 3- Stand   1. Ask patient to elevate off the bed or chair (seated to standing) using an assistive device (cane, bedrail).    2. Patient should be able to raise buttocks off be and hold for a count of five. May repeat once.   Pass- Patient maintains standing stability for at least 5 seconds, proceed to assessment level 4.    Assessment Level 4- Walk   1.  Ask patient to march in place at bedside.    2. Then ask patient to advance step and return each foot. Some medical conditions may render a patient from stepping backwards, use your best clinical judgement.   Pass- Patient demonstrates balance while shifting weight and ability to step, takes independent steps, does not use assistive device patient is MOBILITY LEVEL 4.      Mobility Level- 3

## 2024-03-17 NOTE — FLOWSHEET NOTE
03/17/24 0554   Vital Signs   Temp 97.6 °F (36.4 °C)   Temp Source Oral   Pulse 73   Heart Rate Source Monitor   Respirations 16   /74   MAP (Calculated) 87   BP Location Left upper arm   BP Method Automatic   Patient Position Semi wlers   Pain Assessment   Pain Assessment None - Denies Pain   Pain Level 0   Opioid-Induced Sedation   POSS Score 1   Oxygen Therapy   SpO2 97 %   O2 Device None (Room air)     Pt VS as shown above.

## 2024-03-17 NOTE — PROGRESS NOTES
HEART FAILURE CARE PLAN:    Comorbidities Reviewed: Yes   Patient has a past medical history of Acute on chronic systolic congestive heart failure (HCC), Acute osteomyelitis of left foot (HCC), Acute osteomyelitis of right foot (HCC), Acute respiratory failure (HCC), Ascites, Blood transfusion reaction, Burst fracture of lumbar vertebra (HCC), Cellulitis of left foot, Cellulitis of right lower extremity, Chronic systolic CHF (congestive heart failure) (HCC), Community acquired pneumonia, Coronary artery disease involving native coronary artery of native heart without angina pectoris, Diabetes (HCC), Diabetic ulcer of left foot associated with type 2 diabetes mellitus, with muscle involvement without evidence of necrosis (HCC), Diabetic ulcer of right foot (HCC), Diabetic 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     Weights Reviewed: Yes   Admission weight: 95.3 kg (210 lb)   Wt Readings from Last 3 Encounters:   03/17/24 96.7 kg (213 lb 3.2 oz)   01/24/24 90.4 kg (199 lb 3.2 oz)   01/12/24 90.1 kg (198 lb 9 oz)     Intake & Output Reviewed: Yes     Intake/Output Summary (Last 24 hours) at 3/17/2024 1031  Last data filed at 3/17/2024 0722  Gross per 24 hour   Intake 120 ml   Output 600 ml   Net -480 ml       ECHOCARDIOGRAM Reviewed: Yes   Patient's Ejection Fraction (EF) is less than or equal to 40%. Discuss HFrEF Guideline Directed Medical Therapy (GDMT) with

## 2024-03-17 NOTE — PROGRESS NOTES
IM Progress Note    Admit Date:  3/13/2024  4    Interval history:  diabetic foot infection   No fevers  No sob  remains on RA    Subjective:      Mr. Eagle seen up in bed, no distress    No new issues  Pain controlled    Sp left and right foot debridement with I and D    Wound cx with MRSA      No fevers  No sob  remains on RA    Objective:   /74   Pulse 73   Temp 97.6 °F (36.4 °C) (Oral)   Resp 16   Ht 1.753 m (5' 9\")   Wt 96.7 kg (213 lb 3.2 oz)   SpO2 97%   BMI 31.48 kg/m²     Intake/Output Summary (Last 24 hours) at 3/17/2024 0714  Last data filed at 3/16/2024 1918  Gross per 24 hour   Intake 440 ml   Output --   Net 440 ml         Physical Exam:      General:  middle aged male looking older than his age  Awake, alert and oriented. Appears to be not in any distress  Mucous Membranes:  Pink , anicteric  Neck: No JVD, no carotid bruit, no thyromegaly  Chest:  Clear to auscultation bilaterally, no added sounds  Left sided pacer   Cardiovascular:  RRR S1S2 heard, no murmurs or gallops  Abdomen:  Soft, undistended, non tender, no organomegaly, BS present  Extremities: bilateral TMA  left foot ulcer debridement done  Both feet dry dressing   . Distal pulses well felt  Neurological : grossly normal      Medications:   Scheduled Medications:    furosemide  40 mg Oral Daily    vancomycin  1,500 mg IntraVENous Q24H    carvedilol  6.25 mg Oral BID WC    iron sucrose (VENOFER) 100 mg in sodium chloride 0.9 % 100 mL IVPB  100 mg IntraVENous Q24H    sodium chloride flush  5-40 mL IntraVENous 2 times per day    empagliflozin  10 mg Oral Daily    spironolactone  12.5 mg Oral Daily    acetaminophen  1,000 mg Oral TID    enoxaparin  40 mg SubCUTAneous Daily    insulin lispro  0-4 Units SubCUTAneous TID WC    insulin lispro  0-4 Units SubCUTAneous Nightly    aspirin  81 mg Oral Daily    atorvastatin  40 mg Oral Nightly    clopidogrel  75 mg Oral Daily    insulin glargine  10 Units SubCUTAneous Nightly

## 2024-03-17 NOTE — PLAN OF CARE
Problem: Discharge Planning  Goal: Discharge to home or other facility with appropriate resources  Outcome: Progressing     Problem: Safety - Adult  Goal: Free from fall injury  Outcome: Progressing     Problem: Chronic Conditions and Co-morbidities  Goal: Patient's chronic conditions and co-morbidity symptoms are monitored and maintained or improved  Outcome: Progressing     Problem: Cardiovascular - Adult  Goal: Maintains optimal cardiac output and hemodynamic stability  Outcome: Progressing  Goal: Absence of cardiac dysrhythmias or at baseline  Outcome: Progressing     Problem: Pain  Goal: Verbalizes/displays adequate comfort level or baseline comfort level  Outcome: Progressing

## 2024-03-17 NOTE — PROGRESS NOTES
/71   Pulse 76   Temp 97.6 °F (36.4 °C) (Oral)   Resp 16   Ht 1.753 m (5' 9\")   Wt 96.3 kg (212 lb 4.9 oz)   SpO2 98%   BMI 31.35 kg/m²     Pt awake in bed. Pt alert and orientedX4. Pt denies pain. Pt bilateral feet dressings are clean dry and intact. Assessment complete. Meds passed. Pt denies needs at this time.        Bedside Mobility Assessment Tool (BMAT):     Assessment Level 1- Sit and Shake    1. From a semi-reclined position, ask patient to sit up and rotate to a seated position at the side of the bed. Can use the bedrail.    2. Ask patient to reach out and grab your hand and shake making sure patient reaches across his/her midline.   Pass- Patient is able to come to a seated position, maintain core strength. Maintains seated balance while reaching across midline. Move on to Assessment Level 2.     Assessment Level 2- Stretch and Point   1. With patient in seated position at the side of the bed, have patient place both feet on the floor (or stool) with knees no higher than hips.    2. Ask patient to stretch one leg and straighten the knee, then bend the ankle/flex and point the toes. If appropriate, repeat with the other leg.   Pass- Patient is able to demonstrate appropriate quad strength on intended weight bearing limb(s). Move onto Assessment Level 3.     Assessment Level 3- Stand   1. Ask patient to elevate off the bed or chair (seated to standing) using an assistive device (cane, bedrail).    2. Patient should be able to raise buttocks off be and hold for a count of five. May repeat once.   Pass- Patient maintains standing stability for at least 5 seconds, proceed to assessment level 4.    Assessment Level 4- Walk   1. Ask patient to march in place at bedside.    2. Then ask patient to advance step and return each foot. Some medical conditions may render a patient from stepping backwards, use your best clinical judgement.   Fail- Patient not able to complete tasks OR requires use of  assistive device. Patient is MOBILITY LEVEL 3.       Mobility Level- 3

## 2024-03-18 ENCOUNTER — HOSPITAL ENCOUNTER (EMERGENCY)
Age: 59
Discharge: HOME | End: 2024-03-18
Payer: MEDICARE

## 2024-03-18 VITALS
TEMPERATURE: 96.8 F | BODY MASS INDEX: 32.33 KG/M2 | OXYGEN SATURATION: 99 % | HEIGHT: 69 IN | RESPIRATION RATE: 16 BRPM | HEART RATE: 70 BPM | DIASTOLIC BLOOD PRESSURE: 74 MMHG | SYSTOLIC BLOOD PRESSURE: 118 MMHG | WEIGHT: 218.3 LBS

## 2024-03-18 VITALS
HEART RATE: 99 BPM | OXYGEN SATURATION: 98 % | DIASTOLIC BLOOD PRESSURE: 78 MMHG | SYSTOLIC BLOOD PRESSURE: 114 MMHG | TEMPERATURE: 97.4 F | RESPIRATION RATE: 16 BRPM

## 2024-03-18 DIAGNOSIS — T14.8XXA BLEEDING FROM WOUND: Primary | ICD-10-CM

## 2024-03-18 DIAGNOSIS — M86.9 OSTEOMYELITIS OF LEFT FOOT, UNSPECIFIED TYPE (HCC): ICD-10-CM

## 2024-03-18 LAB
ANION GAP SERPL CALCULATED.3IONS-SCNC: 10 MMOL/L (ref 3–16)
BASOPHILS # BLD: 0 K/UL (ref 0–0.2)
BASOPHILS NFR BLD: 0.6 %
BUN SERPL-MCNC: 34 MG/DL (ref 7–20)
CALCIUM SERPL-MCNC: 7.5 MG/DL (ref 8.3–10.6)
CHLORIDE SERPL-SCNC: 103 MMOL/L (ref 99–110)
CO2 SERPL-SCNC: 23 MMOL/L (ref 21–32)
CREAT SERPL-MCNC: 1.2 MG/DL (ref 0.9–1.3)
DEPRECATED RDW RBC AUTO: 18.9 % (ref 12.4–15.4)
EOSINOPHIL # BLD: 0.1 K/UL (ref 0–0.6)
EOSINOPHIL NFR BLD: 1.7 %
GFR SERPLBLD CREATININE-BSD FMLA CKD-EPI: >60 ML/MIN/{1.73_M2}
GLUCOSE BLD-MCNC: 157 MG/DL (ref 70–99)
GLUCOSE BLD-MCNC: 174 MG/DL (ref 70–99)
GLUCOSE BLD-MCNC: 225 MG/DL (ref 70–99)
GLUCOSE SERPL-MCNC: 164 MG/DL (ref 70–99)
HCT VFR BLD AUTO: 30.2 % (ref 40.5–52.5)
HGB BLD-MCNC: 9.7 G/DL (ref 13.5–17.5)
LYMPHOCYTES # BLD: 0.7 K/UL (ref 1–5.1)
LYMPHOCYTES NFR BLD: 13.7 %
MCH RBC QN AUTO: 25.8 PG (ref 26–34)
MCHC RBC AUTO-ENTMCNC: 31.9 G/DL (ref 31–36)
MCV RBC AUTO: 80.9 FL (ref 80–100)
MONOCYTES # BLD: 0.6 K/UL (ref 0–1.3)
MONOCYTES NFR BLD: 11.9 %
NEUTROPHILS # BLD: 3.8 K/UL (ref 1.7–7.7)
NEUTROPHILS NFR BLD: 72.1 %
PERFORMED ON: ABNORMAL
PLATELET # BLD AUTO: 178 K/UL (ref 135–450)
PMV BLD AUTO: 7.5 FL (ref 5–10.5)
POTASSIUM SERPL-SCNC: 4 MMOL/L (ref 3.5–5.1)
RBC # BLD AUTO: 3.74 M/UL (ref 4.2–5.9)
SODIUM SERPL-SCNC: 136 MMOL/L (ref 136–145)
WBC # BLD AUTO: 5.3 K/UL (ref 4–11)

## 2024-03-18 PROCEDURE — 85025 COMPLETE CBC W/AUTO DIFF WBC: CPT

## 2024-03-18 PROCEDURE — 36415 COLL VENOUS BLD VENIPUNCTURE: CPT

## 2024-03-18 PROCEDURE — 6370000000 HC RX 637 (ALT 250 FOR IP)

## 2024-03-18 PROCEDURE — 6370000000 HC RX 637 (ALT 250 FOR IP): Performed by: PODIATRIST

## 2024-03-18 PROCEDURE — 99283 EMERGENCY DEPT VISIT LOW MDM: CPT

## 2024-03-18 PROCEDURE — 0QBN0ZZ EXCISION OF RIGHT METATARSAL, OPEN APPROACH: ICD-10-PCS | Performed by: PODIATRIST

## 2024-03-18 PROCEDURE — 6370000000 HC RX 637 (ALT 250 FOR IP): Performed by: HOSPITALIST

## 2024-03-18 PROCEDURE — 6360000002 HC RX W HCPCS: Performed by: INTERNAL MEDICINE

## 2024-03-18 PROCEDURE — 80048 BASIC METABOLIC PNL TOTAL CA: CPT

## 2024-03-18 PROCEDURE — 0QBP0ZZ EXCISION OF LEFT METATARSAL, OPEN APPROACH: ICD-10-PCS | Performed by: PODIATRIST

## 2024-03-18 PROCEDURE — 99238 HOSP IP/OBS DSCHRG MGMT 30/<: CPT | Performed by: INTERNAL MEDICINE

## 2024-03-18 PROCEDURE — 6370000000 HC RX 637 (ALT 250 FOR IP): Performed by: INTERNAL MEDICINE

## 2024-03-18 PROCEDURE — 6360000002 HC RX W HCPCS: Performed by: PODIATRIST

## 2024-03-18 RX ORDER — OXYCODONE HYDROCHLORIDE AND ACETAMINOPHEN 5; 325 MG/1; MG/1
1 TABLET ORAL EVERY 6 HOURS PRN
Qty: 12 TABLET | Refills: 0 | Status: SHIPPED | OUTPATIENT
Start: 2024-03-18 | End: 2024-03-21

## 2024-03-18 RX ORDER — LACTOBACILLUS RHAMNOSUS GG 10B CELL
1 CAPSULE ORAL 2 TIMES DAILY
Qty: 20 CAPSULE | Refills: 0 | Status: SHIPPED | OUTPATIENT
Start: 2024-03-18 | End: 2024-03-28

## 2024-03-18 RX ORDER — CLINDAMYCIN HYDROCHLORIDE 300 MG/1
300 CAPSULE ORAL 4 TIMES DAILY
Qty: 28 CAPSULE | Refills: 0 | Status: SHIPPED | OUTPATIENT
Start: 2024-03-18 | End: 2024-03-25

## 2024-03-18 RX ADMIN — SPIRONOLACTONE 12.5 MG: 25 TABLET ORAL at 07:55

## 2024-03-18 RX ADMIN — LEVOTHYROXINE SODIUM 125 MCG: 125 TABLET ORAL at 07:55

## 2024-03-18 RX ADMIN — ENOXAPARIN SODIUM 40 MG: 100 INJECTION SUBCUTANEOUS at 08:02

## 2024-03-18 RX ADMIN — CLOPIDOGREL BISULFATE 75 MG: 75 TABLET ORAL at 07:55

## 2024-03-18 RX ADMIN — INSULIN LISPRO 1 UNITS: 100 INJECTION, SOLUTION INTRAVENOUS; SUBCUTANEOUS at 12:10

## 2024-03-18 RX ADMIN — Medication 1500 MG: at 02:18

## 2024-03-18 RX ADMIN — ACETAMINOPHEN 1000 MG: 500 TABLET ORAL at 07:54

## 2024-03-18 RX ADMIN — ASPIRIN 81 MG: 81 TABLET, COATED ORAL at 08:03

## 2024-03-18 RX ADMIN — CARVEDILOL 6.25 MG: 6.25 TABLET, FILM COATED ORAL at 07:56

## 2024-03-18 RX ADMIN — EMPAGLIFLOZIN 10 MG: 10 TABLET, FILM COATED ORAL at 07:55

## 2024-03-18 RX ADMIN — FUROSEMIDE 40 MG: 40 TABLET ORAL at 07:55

## 2024-03-18 ASSESSMENT — PAIN SCALES - GENERAL: PAINLEVEL_OUTOF10: 1

## 2024-03-18 ASSESSMENT — PAIN DESCRIPTION - LOCATION: LOCATION: FOOT

## 2024-03-18 ASSESSMENT — PAIN DESCRIPTION - ORIENTATION: ORIENTATION: LEFT;RIGHT

## 2024-03-18 ASSESSMENT — PAIN - FUNCTIONAL ASSESSMENT
PAIN_FUNCTIONAL_ASSESSMENT: NONE - DENIES PAIN
PAIN_FUNCTIONAL_ASSESSMENT: NONE - DENIES PAIN

## 2024-03-18 ASSESSMENT — PAIN DESCRIPTION - DESCRIPTORS: DESCRIPTORS: DULL

## 2024-03-18 NOTE — PLAN OF CARE
Problem: Discharge Planning  Goal: Discharge to home or other facility with appropriate resources  3/18/2024 0952 by Brina Bell RN  Outcome: Progressing  3/17/2024 2315 by Breanna Norton RN  Outcome: Progressing     Problem: Safety - Adult  Goal: Free from fall injury  3/18/2024 0952 by Brina Bell RN  Outcome: Progressing  3/17/2024 2315 by Breanna Norton RN  Outcome: Progressing     Problem: Chronic Conditions and Co-morbidities  Goal: Patient's chronic conditions and co-morbidity symptoms are monitored and maintained or improved  3/18/2024 0952 by Brina Bell RN  Outcome: Progressing  3/17/2024 2315 by Breanna Norton RN  Outcome: Progressing     Problem: Cardiovascular - Adult  Goal: Maintains optimal cardiac output and hemodynamic stability  Outcome: Progressing  Goal: Absence of cardiac dysrhythmias or at baseline  Outcome: Progressing

## 2024-03-18 NOTE — PLAN OF CARE
Problem: Discharge Planning  Goal: Discharge to home or other facility with appropriate resources  3/17/2024 2315 by Breanna Norton RN  Outcome: Progressing  3/17/2024 1028 by Brina Bell RN  Outcome: Progressing     Problem: Safety - Adult  Goal: Free from fall injury  3/17/2024 2315 by Breanna Norton RN  Outcome: Progressing  3/17/2024 1028 by rBina Bell RN  Outcome: Progressing     Problem: Chronic Conditions and Co-morbidities  Goal: Patient's chronic conditions and co-morbidity symptoms are monitored and maintained or improved  3/17/2024 2315 by Breanna Norton RN  Outcome: Progressing  3/17/2024 1028 by Brina Bell RN  Outcome: Progressing

## 2024-03-18 NOTE — FLOWSHEET NOTE
proceed to assessment level 4.    Assessment Level 4- Walk   1. Ask patient to march in place at bedside.    2. Then ask patient to advance step and return each foot. Some medical conditions may render a patient from stepping backwards, use your best clinical judgement.   Pass- Patient demonstrates balance while shifting weight and ability to step, takes independent steps, does not use assistive device patient is MOBILITY LEVEL 4.      Mobility Level- 4

## 2024-03-18 NOTE — CARE COORDINATION
DISCHARGE ORDER  Date/Time 3/18/2024 12:26 PM  Completed by: Beverly Aguayo, Case Management    Patient Name: Ruben Eagle      : 1965  Admitting Diagnosis: Diabetic foot infection (HCC) [E11.628, L08.9]  Infected stasis ulcer of left lower extremity (HCC) [I83.229, L97.929]      Admit order Date and Status:3/13/2024 Stable  (verify MD's last order for status of admission)      Noted discharge order.     If applicable PT/OT recommendation at Discharge: NA      Confirmed discharge plan: Yes  with whom___Ruben____________  If pt confirmed DC plan does family need to be contacted by CM No    Discharge Plan: Reviewed chart, noted DC order, met with pt at bedside. Pt to be DC home today, family providing transportation. Denies and HC/DME at DC.       Date of Last IMM Given: 3/18/2024    Reviewed chart.  Role of discharge planner explained and patient verbalized understanding.     Discharge order is noted.      Has Home O2 in place on admit:  No  Informed of need to bring portable home O2 tank on day of discharge for nursing to connect prior to leaving:   Not Indicated  Verbalized agreement/Understanding:   Not Indicated    Pt is being d/c'd to home today. Pt's O2 sats are 99% on RA.      Discharge timeout done with PT, CM, Brina RN. All discharge needs and concerns addressed.

## 2024-03-18 NOTE — ED PROVIDER NOTES
appearance.      Comments: Chronically ill-appearing male, nontoxic, pleasant cooperative talkative   HENT:      Head: Normocephalic and atraumatic.   Eyes:      General: No scleral icterus.     Extraocular Movements: Extraocular movements intact.      Conjunctiva/sclera: Conjunctivae normal.   Pulmonary:      Effort: Pulmonary effort is normal. No respiratory distress.   Musculoskeletal:      Cervical back: Normal range of motion and neck supple.      Right lower leg: No edema.      Left lower leg: No edema.      Comments: DP PT pulses 2+ equal bilateral, no reproducible bony tenderness to the left lower extremity   Skin:     General: Skin is warm and dry.      Findings: No rash.      Comments: There is a 1 cm circumferential shallow ulcer to the plantar aspect of the left forefoot with minimal bleeding, no purulence.  Probes to soft tissue only mild periwound erythema and edema with no malodor.     Neurological:      General: No focal deficit present.      Mental Status: He is alert and oriented to person, place, and time. Mental status is at baseline.   Psychiatric:         Mood and Affect: Mood normal.         Behavior: Behavior normal.         Thought Content: Thought content normal.         Judgment: Judgment normal.                 DIAGNOSTIC RESULTS   LABS:    Labs Reviewed   CBC WITH AUTO DIFFERENTIAL - Abnormal; Notable for the following components:       Result Value    RBC 3.74 (*)     Hemoglobin 9.7 (*)     Hematocrit 30.2 (*)     MCH 25.8 (*)     RDW 18.9 (*)     Lymphocytes Absolute 0.7 (*)     All other components within normal limits       When ordered only abnormal lab results are displayed. All other labs were within normal range or not returned as of this dictation.    EKG: When ordered, EKG's are interpreted by the Emergency Department Physician in the absence of a cardiologist.  Please see their note for interpretation of EKG.    RADIOLOGY:   Non-plain film images such as CT, Ultrasound and MRI

## 2024-03-18 NOTE — PROGRESS NOTES
PROGRESS NOTE    Admit Date:  3/13/2024    Subjective:  58 y.o. male who is seen for evaluation of cellulitis left foot and bilateral foot ulcers. S/P OR debridement 3/14/24. States feeling OK. Pain is controlled.   Ready to go home.       Past Medical History:        Diagnosis Date    Acute on chronic systolic congestive heart failure (Formerly [facility]) 07/08/2013    Acute osteomyelitis of left foot (Formerly [facility]) 09/27/2017    Acute osteomyelitis of right foot (Formerly [facility]) 04/25/2016    Acute respiratory failure (Formerly [facility]) 08/04/2020    Ascites     Blood transfusion reaction     Burst fracture of lumbar vertebra ([facility]ly [facility]) 11/09/2012    Cellulitis of left foot     Cellulitis of right lower extremity 2/16, 5/16    Chronic systolic CHF (congestive heart failure) (Formerly [facility])     Community acquired pneumonia     Coronary artery disease involving native coronary artery of native heart without angina pectoris 08/06/2020    Diabetes (Formerly [facility])     Diabetic ulcer of left foot associated with type 2 diabetes mellitus, with muscle involvement without evidence of necrosis ([facility]) 04/27/2017    Diabetic ulcer of right foot (Formerly [facility]) early 2016    Diabetic ulcer of 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 2 diabetes mellitus, with necrosis of bone ([facility]ly [facility])     ETOH abuse     Fracture of tibial plateau 11/09/2012    High cholesterol     History of blood transfusion     reaction    HTN (hypertension)     Hx of blood clots     MI (myocardial infarction) ([facility]) 08/04/2020    + Troponins    MRSA (methicillin resistant staph aureus) culture positive 4/28/16, 4/20/16    foot wound    Neuropathic ulcer of left foot, limited to breakdown of skin ([facility]) 11/03/2016    Neuropathic ulcer of toe ([facility]ly [facility]) 02/13/2014    NSVT (nonsustained ventricular tachycardia) (Formerly [facility]) 2014    Pleural effusion due to congestive heart failure ([facility]ly [facility])     Septicemia ([facility]ly [facility])     Smoker     quit 1985    Systolic CHF, acute ([facility]ly [facility]) 07/08/2013    Thyroid disease        Past Surgical History:        Procedure Laterality Date    CARDIAC  -    LV systolic function is severely decreased with EF estimated <20%.    Severe global hypokinesis.    There is systolic and diastolic septal flattening c/w RV pressure and volume    overload.    LV cavity appears mildly dilated.    Grade II diastolic dysfunction with elevated left ventricular filling    pressure.    The left atrium is moderately dilated.    Right-sided chamber enlargement.    Moderate mitral regurgitation.    Moderate-severe tricuspid regurgitation.    Mild pulmonic regurgitation.    Pacemaker / ICD lead is visualized in the right-sided chambers.    Systolic pulmonary artery pressure (SPAP) is estimated at 55 mmHg c/w    moderate pulm HTN (RAP of 15 mmHg).    Compared to exam done 4/25/2022, left ventricular systolic function has    decreased.      Physical Exam:    Dressing bilateral foot -   Clean, dry and intact. No strikethrough noted. Mild edema of the left LE.   No calf tenderness noted bilateral.   Left foot - ulcer plantar aspect forefoot with small serosanguinous drainage noted. No purulence noted. Mild periwound erythema and edema noted. No malodor noted. Decreased erythema and edema noted. Probes to soft tissue only. Mild lysing skin periwound.   Right foot - sutures intact distal aspect TMA with mild serous drainage noted from central aspect. Small amount of gapping noted centrally. Mild erythema and edema noted. No purulence noted. No malodor noted.           Assessment:  Patient Active Problem List   Diagnosis Code    Hypertension I10    Diabetic polyneuropathy associated with type 2 diabetes mellitus (Formerly [facility]) E11.42    Alcoholic cardiomyopathy I42.6    ICD (implantable cardioverter-defibrillator) in place Z95.810    Diabetic foot ulcer with osteomyelitis (Former[facility]) E11.621, E11.69, L97.509, M86.9    Hyperlipidemia E78.5    Onychomycosis B35.1    Dystrophic nail L60.3    Type 2 diabetes mellitus without complication, with long-term current use of insulin (Formerly [facility]) E11.9, Z79.4    Callus of foot

## 2024-03-18 NOTE — PLAN OF CARE
Problem: Discharge Planning  Goal: Discharge to home or other facility with appropriate resources  3/18/2024 1243 by Brina Bell RN  Outcome: Completed  3/18/2024 0952 by Brina Bell RN  Outcome: Progressing  3/17/2024 2315 by Breanna Norton RN  Outcome: Progressing     Problem: Safety - Adult  Goal: Free from fall injury  3/18/2024 1243 by Brina Bell RN  Outcome: Completed  3/18/2024 0952 by Brina Bell RN  Outcome: Progressing  3/17/2024 2315 by Breanna Norton RN  Outcome: Progressing     Problem: Chronic Conditions and Co-morbidities  Goal: Patient's chronic conditions and co-morbidity symptoms are monitored and maintained or improved  3/18/2024 1243 by Brina Bell RN  Outcome: Completed  3/18/2024 0952 by Brina Bell RN  Outcome: Progressing  3/17/2024 2315 by Breanna Norton RN  Outcome: Progressing     Problem: Cardiovascular - Adult  Goal: Maintains optimal cardiac output and hemodynamic stability  3/18/2024 1243 by Brina Bell RN  Outcome: Completed  3/18/2024 0952 by Brina Bell RN  Outcome: Progressing  Goal: Absence of cardiac dysrhythmias or at baseline  3/18/2024 1243 by Brina Bell RN  Outcome: Completed  3/18/2024 0952 by Brina Bell RN  Outcome: Progressing     Problem: Pain  Goal: Verbalizes/displays adequate comfort level or baseline comfort level  3/18/2024 1243 by Brina Bell RN  Outcome: Completed  3/18/2024 0952 by Brina Bell RN  Outcome: Progressing

## 2024-03-18 NOTE — PROGRESS NOTES
HEART FAILURE CARE PLAN:    Comorbidities Reviewed: Yes   Patient has a past medical history of Acute on chronic systolic congestive heart failure (HCC), Acute osteomyelitis of left foot (HCC), Acute osteomyelitis of right foot (HCC), Acute respiratory failure (HCC), Ascites, Blood transfusion reaction, Burst fracture of lumbar vertebra (HCC), Cellulitis of left foot, Cellulitis of right lower extremity, Chronic systolic CHF (congestive heart failure) (HCC), Community acquired pneumonia, Coronary artery disease involving native coronary artery of native heart without angina pectoris, Diabetes (HCC), Diabetic ulcer of left foot associated with type 2 diabetes mellitus, with muscle involvement without evidence of necrosis (HCC), Diabetic ulcer of right foot (HCC), Diabetic 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     Weights Reviewed: Yes   Admission weight: 95.3 kg (210 lb)   Wt Readings from Last 3 Encounters:   03/18/24 99 kg (218 lb 4.8 oz)   01/24/24 90.4 kg (199 lb 3.2 oz)   01/12/24 90.1 kg (198 lb 9 oz)     Intake & Output Reviewed: Yes     Intake/Output Summary (Last 24 hours) at 3/18/2024 1008  Last data filed at 3/17/2024 1855  Gross per 24 hour   Intake 540 ml   Output --   Net 540 ml       ECHOCARDIOGRAM Reviewed: Yes   Patient's Ejection Fraction (EF) is less than or equal to 40%. Discuss HFrEF Guideline Directed Medical Therapy (GDMT) with Cardiologist or  daily 8/14/23   Gold Carmichael MD   TRULICITY 3 MG/0.5ML SOPN INJECT THREE (3) MG INTO THE SKIN ONCE A WEEK 7/27/23   Leann Marie APRN - CNP   metFORMIN (GLUCOPHAGE) 1000 MG tablet TAKE ONE (1) TABLET BY MOUTH TWO (2) TIMES DAILY (WITH MEALS) 6/29/23   Julianne Ruggiero PA   allopurinol (ZYLOPRIM) 100 MG tablet TAKE TWO (2) TABLETS BY MOUTH DAILY 6/14/23   Leann Marie APRN - CNP   atorvastatin (LIPITOR) 40 MG tablet TAKE ONE (1) TABLET BY MOUTH NIGHTLY 5/10/23   Gold Carmichael MD   ASPIRIN LOW DOSE 81 MG EC tablet Take 1 tablet by mouth daily 3/17/23   Basilio Clemons MD   isosorbide mononitrate (IMDUR) 30 MG extended release tablet Take 1 tablet by mouth daily 3/1/23   Gold Carmichael MD   levothyroxine (SYNTHROID) 125 MCG tablet Take 1 tablet by mouth daily 2/17/23 3/13/24  Leann Marie APRN - CNP   clopidogrel (PLAVIX) 75 MG tablet Take 1 tablet by mouth daily 2/13/23   Gold Carmichael MD   furosemide (LASIX) 80 MG tablet Take 1 tablet by mouth 2 times daily Take 80 mg in the morning and 80 mg at night 2/13/23   Gold Carmichael MD   Blood Pressure KIT 1 kit by Does not apply route daily 10/4/22   Leann Marie APRN - CNP   BD PEN NEEDLE WINNIE U/F 32G X 4 MM MISC 1 each by Does not apply route daily 8/3/22   Hood Sanchez MD   OneTouch Delica Lancets 33G MISC USE TO CHECK FOUR TIMES DAILY. DX;E11.9 8/18/21   Hood Sanchez MD   Continuous Blood Gluc Sensor (FREESTYLE MARY 14 DAY SENSOR) MISC CGM 4/29/21   Hood Sanchez MD   Insulin Pen Needle (UNIFINE PENTIPS) 31G X 5 MM MISC USE 1 EACH DAY AS NEEDED FOR TESTING 1/3/18   Cb Murrell MD      Diet Reviewed: Yes   ADULT DIET; Regular; 3 carb choices (45 gm/meal); Low Fat/Low Chol/High Fiber/2 gm Na    Goal of Care Reviewed: Yes   Patient and/or Family's stated Goal of Care this Admission: Reduce shortness of breath, increase activity tolerance, better understand heart failure and disease management,

## 2024-03-18 NOTE — PROGRESS NOTES
Pt given written and verbal discharge instructions with prescriptions. Pt indicated understanding and received prescription and home medication instructions. Pt packed own belongings. Pt taken down to family car via wheelchair.

## 2024-03-18 NOTE — PROGRESS NOTES
Clinical Pharmacy Progress Note   Heart Failure Medication Review    NAME:  Ruben Eagle  AGE: 58 y.o.   GENDER: male  : 1965  TODAY'S DATE:  3/18/2024    PAST MEDICAL HISTORY:  @[facility]@  HOME MEDICATIONS:  Prior to Admission medications    Medication Sig Start Date End Date Taking? Authorizing Provider   oxyCODONE-acetaminophen (PERCOCET) 5-325 MG per tablet Take 1 tablet by mouth every 6 hours as needed for Pain for up to 3 days. Max Daily Amount: 4 tablets 3/18/24 3/21/24 Yes Sheldon Resendez MD   clindamycin (CLEOCIN) 300 MG capsule Take 1 capsule by mouth 4 times daily for 7 days 3/18/24 3/25/24 Yes Sheldon Resendez MD   lactobacillus (CULTURELLE) capsule Take 1 capsule by mouth 2 times daily for 10 days 3/18/24 3/28/24 Yes Sheldon Resendez MD   amoxicillin-clavulanate (AUGMENTIN) 875-125 MG per tablet Take 1 tablet by mouth 2 times daily  Patient not taking: Reported on 3/13/2024 12/20/23 3/18/24  Sherrie Thomas MD   oxyCODONE-acetaminophen (PERCOCET) 5-325 MG per tablet Take 1 tablet by mouth every 6 hours as needed.  Patient not taking: Reported on 3/13/2024 12/20/23 3/18/24  Sherrie Thomas MD   insulin glargine (BASAGLAR KWIKPEN) 100 UNIT/ML injection pen Inject 25 Units into the skin nightly 23   Florina Hoffman MD   carvedilol (COREG) 12.5 MG tablet Take 1 tablet by mouth 2 times daily (with meals) 23   Florina Hoffman MD   Insulin Pen Needle 29G X 12MM MISC 1 each by Does not apply route daily 23   Florina Hoffman MD   JARDIANCE 10 MG tablet TAKE ONE (1) TABLET BY MOUTH DAILY 23   Gold Carmichael MD   losartan (COZAAR) 25 MG tablet Take 1 tablet by mouth daily 23   Gold Carmichael MD   magnesium oxide (MAG-OX) 400 (240 Mg) MG tablet Take 1 tablet by mouth 2 times daily 23   Gold Carmichael MD   spironolactone (ALDACTONE) 25 MG tablet Take 1 tablet by mouth daily 23   Gold Carmichael MD   TRULICITY 3 MG/0.5ML SOPN

## 2024-03-18 NOTE — PROGRESS NOTES
sodium chloride syringe, sodium chloride flush, potassium chloride **OR** potassium alternative oral replacement **OR** potassium chloride, ondansetron **OR** ondansetron, polyethylene glycol, glucose, dextrose bolus **OR** dextrose bolus, glucagon (rDNA), dextrose      Data:  CBC:   Recent Labs     03/16/24  0537   WBC 5.2   HGB 9.3*   HCT 29.2*   MCV 80.0        BMP:   Recent Labs     03/16/24  0537 03/18/24  0527   * 136   K 4.1 4.0   CL 98* 103   CO2 22 23   BUN 41* 34*   CREATININE 1.5* 1.2       CULTURES  Results       Procedure Component Value Units Date/Time    Culture, Surgical [[id number]]  (Abnormal)  (Susceptibility) Collected: 03/14/24 1058    Order Status: Completed Specimen: Specimen from Foot Updated: 03/16/24 1053     Gram Stain Result 3+ WBC's (Polymorphonuclear)  1+ Gram positive cocci  No Epithelial Cells seen       Anaerobic Culture --     Anaerobic culture further report to follow  No anaerobes isolated so far, Further report to follow       Organism Staph aureus MRSA     Culture Surgical --     Heavy growth  PBP2= POSITIVE  CONTACT PRECAUTIONS INDICATED      Narrative:      ORDER#: [id number]                          ORDERED BY: MARCI MCKEON  SOURCE: Foot left foot                     COLLECTED:  03/14/24 10:58  [facility]OTICS AT [facility].:                      RECEIVED :  03/14/24 14:33  CALL  Irvin  [facility] tel. [phone], [phone]  Microbiology results called to and read back by Susan Maldonado RN, 03/15/2024  10:30, by SEMMA    Susceptibility        Methicillin-Resistant Staphylococcus aureus      BACTERIAL SUSCEPTIBILITY PANEL BY ALENA      ceFAZolin <=8 mcg/mL Resistant      clindamycin <=0.25 mcg/mL Sensitive      erythromycin >4 mcg/mL Resistant      levofloxacin >4 mcg/mL Resistant      oxacillin >2 mcg/mL Resistant      tetracycline <=4 mcg/mL Sensitive      trimethoprim-sulfamethoxazole <=0.5/9.5 mcg/mL Sensitive      vancomycin 1 mcg/mL Sensitive                           Culture,  Anaerobic and Aerobic [[id number]] Collected: 03/13/24 2001    Order Status: Sent Specimen: Wound     Culture, Blood 2 [[id number]] Collected: 03/13/24 1324    Order Status: Completed Specimen: Blood Updated: 03/17/24 1415     Culture, Blood 2 No Growth after 4 days of incubation.    Narrative:      ORDER#: [id number]                          ORDERED BY: BRAXTON OLIVO  SOURCE: Blood                              COLLECTED:  03/13/24 13:24  [facility]IOTICS [facility].:                      RECEIVED :  03/13/24 13:28  If child <=2 yrs old please draw pediatric bottle.~Blood Culture #2    COVID-19 & Influenza Combo [[id number]] Collected: 03/13/24 1324    Order Status: Completed Specimen: Nasopharyngeal Swab Updated: 03/13/24 1358     SARS-CoV-2 RNA, RT PCR NOT DETECTED     Comment: Not Detected results do not preclude SARS-CoV-2 infection and  should not be used as the sole basis for patient management  decisions.  Results must be combined with clinical observations,  patient history, and epidemiological information.  Testing was performed using MIGUEL[employer] ALMAS SARS-CoV-2 and Influenza A/B  nucleic acid assay. This test is a multiplex Real-Time Reverse  Transcriptase Polymerase Chain Reaction (RT-PCR)-based in vitro  diagnostic test intended for the qualitative detection of nucleic  acids from SARS-CoV-2, influenza A, and influenza B in nasopharyngeal  and nasal swab specimens for use under the FDA’s Emergency Use  Authorization (EUA) only.    Patient Fact Sheet:  https://www.fda.gov/media/814079/download  Provider Fact Sheet: https://www.fda.gov/media/951918/download  EUA: https://www.fda.gov/media/745834/download  IFU: https://www.fda.gov/media/719960/download    Methodology:  RT-PCR          INFLUENZA A NOT DETECTED     INFLUENZA B NOT DETECTED    Culture, Blood 1 [[id number]] Collected: 03/13/24 1238    Order Status: Completed Specimen: Blood Updated: 03/17/24 1315     Blood Culture, Routine No Growth after 4 days of

## 2024-03-18 NOTE — PROGRESS NOTES
Shift assessment complete. Patient denies any other needs at this time. Bed in lowest position. Side rails up x2. Call light in reach.

## 2024-03-19 ENCOUNTER — CARE COORDINATION (OUTPATIENT)
Dept: CASE MANAGEMENT | Age: 59
End: 2024-03-19

## 2024-03-19 ENCOUNTER — FOLLOWUP TELEPHONE ENCOUNTER (OUTPATIENT)
Dept: ADMINISTRATIVE | Age: 59
End: 2024-03-19

## 2024-03-19 NOTE — TELEPHONE ENCOUNTER
Heart Failure Follow-Up Call:    Call within 72 Hours of Discharge: Yes     Patient: Ruben Eagle   Patient : 1965   MRN: [id number]    Date of discharge: 3/18/24    [facility]    Discharge Disposition: Home    RARS: Readmission Risk Score: 15.9    Spoke with: Ruben Miranda is doing okay since discharge. Stated he started having bleeding from food wound last night and had to come to ED. The ED rewrapped and put medications to decrease bleeding. Reinforced Heart Failure education on: signs/symptoms to monitor, medications, daily weights, low sodium diet, 2000 ml fluid restriction, and activity. Patient has not weighed self yet but will start tomorrow. Stated he is tired from going to ED last night. Reminded patient of follow-up appointment on 3/25 at 0920 AM with Leann Marie CNP his PCP. Patient will see Dr. Jarrell on Thursday 3/28.     Told patient that Gayathri Bradley, Care Transition nurse would be reaching out as well. Provided Heart Failure Nurse number at [phone] for any further questions or assistance with resources.     Follow Up  Future Appointments   Date Time Provider Department Center   2024  8:00 AM Gold Carmichael MD [facility]       Electronically signed by Ann Marie Hdz MSN, RN  on 3/19/2024 at 12:50 PM

## 2024-03-19 NOTE — CARE COORDINATION
Care Transitions Initial Follow Up Call    Call within 2 business days of discharge: Yes    Patient: Ruben Eagle Patient : 1965   MRN: [id number]  Reason for Admission: 5 days -> osteomyelitis L diabetic foot ulcer w/ cellulitis s/p OR debridement 3/14/2024 Dr Jarrell, hx recent partial right foot amputation, hypoNa, elevated Cr, hypotension-acute on chronic, CAD s/p CABG, chronic sCHF, alcoholic cardiomyopathy, HTN-low BPs, DM2 (A1c 6.4% 3/13/24), NSVT hx, microcytic anemia-acute on chronic, hypothyroidism -> home no services, ED visit without admission day of DC for wound bleeding, dressing change Thursday by patient, see Dr Jarrell 1 week  Discharge Date: 3/18/24 RARS: Readmission Risk Score: 15.9      Last Discharge Facility       Date Complaint Diagnosis Description Type Department Provider    3/18/24 Bleeding/Bruising Bleeding from wound [place] [facility] [place]      CTN attempted follow-up outreach to patient. Message left including CTN contact information.     Follow Up  Future Appointments   Date Time Provider Department Center   2024  8:00 AM Gold Carmichael MD [facility] JAZMIN Bradley, RN  Care Transition Nurse  [phone] mobile

## 2024-03-20 ENCOUNTER — CARE COORDINATION (OUTPATIENT)
Dept: CASE MANAGEMENT | Age: 59
End: 2024-03-20

## 2024-03-20 NOTE — CARE COORDINATION
Care Transitions Initial Follow Up Call    Call within 2 business days of discharge: Yes    Patient: Ruben Eagle Patient : 1965   MRN: [id number]  Reason for Admission: 5 days -> osteomyelitis L diabetic foot ulcer w/ cellulitis s/p OR debridement 3/14/2024 Dr Jarrell, hx recent partial right foot amputation, hypoNa, elevated Cr, hypotension-acute on chronic, CAD s/p CABG, chronic sCHF, alcoholic cardiomyopathy, HTN-low BPs, DM2 (A1c 6.4% 3/13/24), NSVT hx, microcytic anemia-acute on chronic, hypothyroidism -> home no services, ED visit without admission day of DC for wound bleeding, dressing change Thursday by patient, see Dr Jarrell 1 week   Discharge Date: 3/18/24 RARS: Readmission Risk Score: 15.9    Last Discharge Facility       Date Complaint Diagnosis Description Type Department Provider    3/18/24 Bleeding/Bruising Bleeding from wound ... [place] [facility] [place]      CTN attempted follow-up outreach to patient. Message left including CTN contact information. No further CTN outreach scheduled at this time.     Follow Up  Future Appointments   Date Time Provider Department Center   2024  8:00 AM Gold Carmichael MD [facility] JAZMIN Bradley, RN  Care Transition Nurse  [phone] mobile

## 2024-03-20 NOTE — TELEPHONE ENCOUNTER
Patient discharged on 3/18/24, Seen eder felton though, not sure if anyone would want to see as a NP?

## 2024-03-21 PROCEDURE — 93296 REM INTERROG EVL PM/IDS: CPT | Performed by: INTERNAL MEDICINE

## 2024-03-21 PROCEDURE — 93295 DEV INTERROG REMOTE 1/2/MLT: CPT | Performed by: INTERNAL MEDICINE

## 2024-03-27 NOTE — OP NOTE
[address] [address]                            OPERATIVE REPORT      PATIENT NAME: GEGE HANNAH              : 1965  MED REC NO: [id number]                      ROOM: 0203  ACCOUNT NO: [id number]                       ADMIT DATE: 2024  PROVIDER: Floyd Jarrell DPM      DATE OF PROCEDURE:  2024    SURGEON:  Floyd Jarrell DPM    ANESTHESIA:  Local and MAC.    PREOPERATIVE DIAGNOSES:    1. Osteomyelitis, bilateral foot.  2. Diabetic foot ulceration, bilateral foot.  3. Diabetes mellitus.    POSTOPERATIVE DIAGNOSES:    1. Osteomyelitis, bilateral foot.  2. Diabetic foot ulceration, bilateral foot.  3. Diabetes mellitus.    PROCEDURE:  Ulcer debridement, bilateral foot.    HEMOSTASIS:  Ankle tourniquet, 250 mmHg, bilateral.    DESCRIPTION OF PROCEDURE:  The patient was brought into the operating room and placed on the operating table in supine position.  Under moderate sedation, the ulceration sites on bilateral feet were anesthetized with 20 mL of 1:1 mixture of 1% lidocaine plain and 0.5% Marcaine plain.  The feet were then scrubbed, prepped, and draped in usual sterile manner.  Esmarch was utilized to exsanguinate both feet.  Ankle tourniquets were then inflated to 250 mmHg.    Attention was then directed to the ulceration at the distal aspect of the right foot as well as the distal plantar aspect of the left foot.  They are both surrounded by periwound erythema and edema consistent with preoperative diagnosis of cellulitis or skin infection.  There was exposed bone noted in both wound beds, which was softer to palpation as well as discolored, consistent with a preoperative diagnosis of osteomyelitis or bone infection.    Attention was then directed to the ulceration of the right foot where the scalpel was utilized to ellipse the ulceration in full thickness.  I continued dissection with a scalpel to excise

## 2024-03-28 NOTE — PROGRESS NOTES
Physician Progress Note      PATIENT:               GEGE HANNAH  [facility] #:                  [id number]  :                       1965  ADMIT DATE:       3/13/2024 11:55 AM  DISCH DATE:        3/18/2024 2:16 PM  RESPONDING  PROVIDER #:        Floyd Jarrell DPM          QUERY TEXT:    Patient admitted with diabetic foot ulcers and OM.  Patient with necrotic   tissue.  Gas is noted on xray.   If possible, please document in progress   notes and discharge summary further specificity regarding gangrene as follows:    The medical record reflects the following:  Risk Factors: DM, foot ulcer  Clinical Indicators: ulceration of the right foot where the scalpel was   utilized to ellipse the ulceration in full thickness.I continued dissection   with a scalpel to excise fibrotic necrotic nonviable and viable tissues, foot   XR: Soft tissue gas involving the stump, presumably secondary to a ulcer.  Treatment: surgical debridement, imaging, Cefepime, Vanco, clindamycin at   discharge    Thank you,  Honey Clayton RN,BSN,CCDS,CRCR  Options provided:  -- Gas gangrene  -- DM foot ulcer with gangrene, no gas gangrene  -- Other - I will add my own diagnosis  -- Disagree - Not applicable / Not valid  -- Disagree - Clinically unable to determine / Unknown  -- Refer to Clinical Documentation Reviewer    PROVIDER RESPONSE TEXT:    Provider disagreed with this query.  Patient had diabetic foot ulcer with underlying osteomyelitis. Gas on imaging   related to his wound bed and not gas gangrene.    Query created by: Honey Clayton on 3/27/2024 7:56 AM      Electronically signed by:  Floyd Jarrell DPM 3/28/2024 8:49 AM

## 2024-03-29 ENCOUNTER — TELEPHONE (OUTPATIENT)
Dept: CARDIOLOGY CLINIC | Age: 59
End: 2024-03-29

## 2024-03-29 NOTE — TELEPHONE ENCOUNTER
Will continue with current medications given that he is not having symptoms of SOB, edema, weight gain, orthopnea/PND. Continue to monitor.

## 2024-03-29 NOTE — TELEPHONE ENCOUNTER
Called and discussed with Ruben his device readings.    He was hospitalized 2 weeks ago and since released he feels much better. He denies SOB, cough, orthopnea/PND, weight gain (has had weight loss) or edema.    Given that he is asymptomatic will continue to monitor. Advised to continue with his current medications including his diuretic.

## 2024-03-29 NOTE — TELEPHONE ENCOUNTER
[facility]UR[facility]- DE (DD is out of the office)    Please review Optivol status in MUR[facility]. Patient has been in and out of the hospital. Thanks.

## 2024-03-29 NOTE — TELEPHONE ENCOUNTER
Attempted to contact pt, LMOVM for pt to call the office. If pt returns call please ask if they are experiencing CHF symptoms. Any swelling, weight gain, shortness of breath or if they have increased their fluid and sodium intake.

## 2024-04-03 ENCOUNTER — TELEPHONE (OUTPATIENT)
Dept: CARDIOLOGY CLINIC | Age: 59
End: 2024-04-03

## 2024-04-03 DIAGNOSIS — Z79.899 MEDICATION MANAGEMENT: ICD-10-CM

## 2024-04-03 RX ORDER — SPIRONOLACTONE 25 MG/1
TABLET ORAL
Qty: 180 TABLET | Refills: 3 | Status: SHIPPED | OUTPATIENT
Start: 2024-04-03

## 2024-04-03 RX ORDER — FUROSEMIDE 40 MG/1
TABLET ORAL
Qty: 360 TABLET | Refills: 3 | Status: SHIPPED | OUTPATIENT
Start: 2024-04-03

## 2024-04-03 RX ORDER — CLOPIDOGREL BISULFATE 75 MG/1
75 TABLET ORAL DAILY
Qty: 90 TABLET | Refills: 3 | Status: SHIPPED | OUTPATIENT
Start: 2024-04-03

## 2024-04-03 NOTE — TELEPHONE ENCOUNTER
PT called and stated he went to the ED for a leg infection. PT stated he was prescribed Clindamycin. PT stated while on the medication he experiences sharp pains in the center of his chest. Pts pcp advised him to go off of the medication for a day to see how he felt then restart medication. PT stated he does not experience any pain while off the medication. PT stated he is supposed to restart the medication today but is hesitant because of the pain. PT wants to check with rkg about what he should do. Please advise. Pt ph#[phone]

## 2024-06-03 PROCEDURE — 93005 ELECTROCARDIOGRAM TRACING: CPT | Performed by: STUDENT IN AN ORGANIZED HEALTH CARE EDUCATION/TRAINING PROGRAM

## 2024-06-06 RX ORDER — ATORVASTATIN CALCIUM 40 MG/1
TABLET, FILM COATED ORAL
Qty: 90 TABLET | Refills: 0 | Status: SHIPPED | OUTPATIENT
Start: 2024-06-06

## 2024-06-13 ENCOUNTER — ANCILLARY PROCEDURE (OUTPATIENT)
Dept: EMERGENCY DEPT | Age: 59
DRG: 690 | End: 2024-06-13
Attending: STUDENT IN AN ORGANIZED HEALTH CARE EDUCATION/TRAINING PROGRAM
Payer: MEDICARE

## 2024-06-13 ENCOUNTER — HOSPITAL ENCOUNTER (INPATIENT)
Age: 59
LOS: 4 days | Discharge: HOME | DRG: 690 | End: 2024-06-17
Attending: STUDENT IN AN ORGANIZED HEALTH CARE EDUCATION/TRAINING PROGRAM | Admitting: INTERNAL MEDICINE
Payer: MEDICARE

## 2024-06-13 ENCOUNTER — APPOINTMENT (OUTPATIENT)
Dept: GENERAL RADIOLOGY | Age: 59
DRG: 690 | End: 2024-06-13
Payer: MEDICARE

## 2024-06-13 DIAGNOSIS — Z79.899 MEDICATION MANAGEMENT: ICD-10-CM

## 2024-06-13 DIAGNOSIS — I95.9 HYPOTENSION, UNSPECIFIED HYPOTENSION TYPE: Primary | ICD-10-CM

## 2024-06-13 DIAGNOSIS — N18.9 ACUTE KIDNEY INJURY SUPERIMPOSED ON CHRONIC KIDNEY DISEASE (HCC): ICD-10-CM

## 2024-06-13 DIAGNOSIS — E03.9 ACQUIRED HYPOTHYROIDISM: ICD-10-CM

## 2024-06-13 DIAGNOSIS — R42 LIGHTHEADEDNESS: ICD-10-CM

## 2024-06-13 DIAGNOSIS — E11.42 DIABETIC POLYNEUROPATHY ASSOCIATED WITH TYPE 2 DIABETES MELLITUS (HCC): ICD-10-CM

## 2024-06-13 DIAGNOSIS — I50.9 CONGESTIVE HEART FAILURE, UNSPECIFIED HF CHRONICITY, UNSPECIFIED HEART FAILURE TYPE (HCC): ICD-10-CM

## 2024-06-13 DIAGNOSIS — N17.9 ACUTE KIDNEY INJURY SUPERIMPOSED ON CHRONIC KIDNEY DISEASE (HCC): ICD-10-CM

## 2024-06-13 DIAGNOSIS — R73.9 HYPERGLYCEMIA: ICD-10-CM

## 2024-06-13 LAB
ALBUMIN SERPL-MCNC: 3.1 G/DL (ref 3.4–5)
ALBUMIN/GLOB SERPL: 0.7 {RATIO} (ref 1.1–2.2)
ALP SERPL-CCNC: 169 U/L (ref 40–129)
ALT SERPL-CCNC: 13 U/L (ref 10–40)
ANION GAP SERPL CALCULATED.3IONS-SCNC: 15 MMOL/L (ref 3–16)
AST SERPL-CCNC: 12 U/L (ref 15–37)
BACTERIA URNS QL MICRO: ABNORMAL /HPF
BASOPHILS # BLD: 0.2 K/UL (ref 0–0.2)
BASOPHILS NFR BLD: 1.3 %
BILIRUB SERPL-MCNC: 0.7 MG/DL (ref 0–1)
BILIRUB UR QL STRIP.AUTO: NEGATIVE
BUN SERPL-MCNC: 53 MG/DL (ref 7–20)
CALCIUM SERPL-MCNC: 9.2 MG/DL (ref 8.3–10.6)
CHLORIDE SERPL-SCNC: 88 MMOL/L (ref 99–110)
CLARITY UR: CLEAR
CO2 SERPL-SCNC: 28 MMOL/L (ref 21–32)
COLOR UR: YELLOW
CREAT SERPL-MCNC: 1.8 MG/DL (ref 0.9–1.3)
DEPRECATED RDW RBC AUTO: 20.9 % (ref 12.4–15.4)
EKG ATRIAL RATE: 94 BPM
EKG DIAGNOSIS: NORMAL
EKG P AXIS: 64 DEGREES
EKG P-R INTERVAL: 208 MS
EKG Q-T INTERVAL: 388 MS
EKG QRS DURATION: 124 MS
EKG QTC CALCULATION (BAZETT): 485 MS
EKG R AXIS: -44 DEGREES
EKG T AXIS: 83 DEGREES
EKG VENTRICULAR RATE: 94 BPM
EOSINOPHIL # BLD: 0.1 K/UL (ref 0–0.6)
EOSINOPHIL NFR BLD: 0.5 %
FLUAV RNA RESP QL NAA+PROBE: NOT DETECTED
FLUBV RNA RESP QL NAA+PROBE: NOT DETECTED
GFR SERPLBLD CREATININE-BSD FMLA CKD-EPI: 43 ML/MIN/{1.73_M2}
GLUCOSE SERPL-MCNC: 367 MG/DL (ref 70–99)
GLUCOSE UR STRIP.AUTO-MCNC: >=1000 MG/DL
HCT VFR BLD AUTO: 33 % (ref 40.5–52.5)
HGB BLD-MCNC: 10.6 G/DL (ref 13.5–17.5)
HGB UR QL STRIP.AUTO: ABNORMAL
KETONES UR STRIP.AUTO-MCNC: NEGATIVE MG/DL
LACTATE BLDV-SCNC: 1.6 MMOL/L (ref 0.4–1.9)
LACTATE BLDV-SCNC: 2.2 MMOL/L (ref 0.4–1.9)
LEUKOCYTE ESTERASE UR QL STRIP.AUTO: ABNORMAL
LYMPHOCYTES # BLD: 0.7 K/UL (ref 1–5.1)
LYMPHOCYTES NFR BLD: 6.3 %
MAGNESIUM SERPL-MCNC: 2.1 MG/DL (ref 1.8–2.4)
MCH RBC QN AUTO: 26.3 PG (ref 26–34)
MCHC RBC AUTO-ENTMCNC: 32.1 G/DL (ref 31–36)
MCV RBC AUTO: 81.8 FL (ref 80–100)
MONOCYTES # BLD: 0.8 K/UL (ref 0–1.3)
MONOCYTES NFR BLD: 6.8 %
NEUTROPHILS # BLD: 9.9 K/UL (ref 1.7–7.7)
NEUTROPHILS NFR BLD: 85.1 %
NITRITE UR QL STRIP.AUTO: NEGATIVE
NT-PROBNP SERPL-MCNC: ABNORMAL PG/ML (ref 0–124)
PH UR STRIP.AUTO: 6 [PH] (ref 5–8)
PLATELET # BLD AUTO: 343 K/UL (ref 135–450)
PMV BLD AUTO: 6.6 FL (ref 5–10.5)
POTASSIUM SERPL-SCNC: 4 MMOL/L (ref 3.5–5.1)
PROCALCITONIN SERPL IA-MCNC: 0.24 NG/ML (ref 0–0.15)
PROT SERPL-MCNC: 7.5 G/DL (ref 6.4–8.2)
PROT UR STRIP.AUTO-MCNC: NEGATIVE MG/DL
RBC # BLD AUTO: 4.03 M/UL (ref 4.2–5.9)
RBC #/AREA URNS HPF: ABNORMAL /HPF (ref 0–4)
RENAL EPI CELLS #/AREA UR COMP ASSIST: ABNORMAL /HPF (ref 0–1)
SARS-COV-2 RNA RESP QL NAA+PROBE: NOT DETECTED
SODIUM SERPL-SCNC: 131 MMOL/L (ref 136–145)
SP GR UR STRIP.AUTO: 1.01 (ref 1–1.03)
TROPONIN, HIGH SENSITIVITY: 58 NG/L (ref 0–22)
TROPONIN, HIGH SENSITIVITY: 67 NG/L (ref 0–22)
UA COMPLETE W REFLEX CULTURE PNL UR: YES
UA DIPSTICK W REFLEX MICRO PNL UR: YES
URN SPEC COLLECT METH UR: ABNORMAL
UROBILINOGEN UR STRIP-ACNC: 1 E.U./DL
WBC # BLD AUTO: 11.7 K/UL (ref 4–11)
WBC #/AREA URNS HPF: ABNORMAL /HPF (ref 0–5)

## 2024-06-13 PROCEDURE — 2580000003 HC RX 258: Performed by: INTERNAL MEDICINE

## 2024-06-13 PROCEDURE — 71046 X-RAY EXAM CHEST 2 VIEWS: CPT

## 2024-06-13 PROCEDURE — 2580000003 HC RX 258: Performed by: STUDENT IN AN ORGANIZED HEALTH CARE EDUCATION/TRAINING PROGRAM

## 2024-06-13 PROCEDURE — 84484 ASSAY OF TROPONIN QUANT: CPT

## 2024-06-13 PROCEDURE — 83880 ASSAY OF NATRIURETIC PEPTIDE: CPT

## 2024-06-13 PROCEDURE — 80053 COMPREHEN METABOLIC PANEL: CPT

## 2024-06-13 PROCEDURE — 81001 URINALYSIS AUTO W/SCOPE: CPT

## 2024-06-13 PROCEDURE — 6370000000 HC RX 637 (ALT 250 FOR IP): Performed by: INTERNAL MEDICINE

## 2024-06-13 PROCEDURE — 93308 TTE F-UP OR LMTD: CPT

## 2024-06-13 PROCEDURE — 87636 SARSCOV2 & INF A&B AMP PRB: CPT

## 2024-06-13 PROCEDURE — 84145 PROCALCITONIN (PCT): CPT

## 2024-06-13 PROCEDURE — 87077 CULTURE AEROBIC IDENTIFY: CPT

## 2024-06-13 PROCEDURE — 83605 ASSAY OF LACTIC ACID: CPT

## 2024-06-13 PROCEDURE — 83735 ASSAY OF MAGNESIUM: CPT

## 2024-06-13 PROCEDURE — 2580000003 HC RX 258: Performed by: PHYSICIAN ASSISTANT

## 2024-06-13 PROCEDURE — 87186 SC STD MICRODIL/AGAR DIL: CPT

## 2024-06-13 PROCEDURE — 36415 COLL VENOUS BLD VENIPUNCTURE: CPT

## 2024-06-13 PROCEDURE — 93010 ELECTROCARDIOGRAM REPORT: CPT | Performed by: INTERNAL MEDICINE

## 2024-06-13 PROCEDURE — 96365 THER/PROPH/DIAG IV INF INIT: CPT

## 2024-06-13 PROCEDURE — 1200000000 HC SEMI PRIVATE

## 2024-06-13 PROCEDURE — 86403 PARTICLE AGGLUT ANTBDY SCRN: CPT

## 2024-06-13 PROCEDURE — 2060000000 HC ICU INTERMEDIATE R&B

## 2024-06-13 PROCEDURE — 85025 COMPLETE CBC W/AUTO DIFF WBC: CPT

## 2024-06-13 PROCEDURE — 93005 ELECTROCARDIOGRAM TRACING: CPT | Performed by: STUDENT IN AN ORGANIZED HEALTH CARE EDUCATION/TRAINING PROGRAM

## 2024-06-13 PROCEDURE — 6360000002 HC RX W HCPCS: Performed by: STUDENT IN AN ORGANIZED HEALTH CARE EDUCATION/TRAINING PROGRAM

## 2024-06-13 PROCEDURE — 87086 URINE CULTURE/COLONY COUNT: CPT

## 2024-06-13 PROCEDURE — 99285 EMERGENCY DEPT VISIT HI MDM: CPT

## 2024-06-13 RX ORDER — ONDANSETRON 4 MG/1
4 TABLET, ORALLY DISINTEGRATING ORAL EVERY 8 HOURS PRN
Status: DISCONTINUED | OUTPATIENT
Start: 2024-06-13 | End: 2024-06-17 | Stop reason: HOSPADM

## 2024-06-13 RX ORDER — LEVOTHYROXINE SODIUM 0.12 MG/1
125 TABLET ORAL DAILY
Status: DISCONTINUED | OUTPATIENT
Start: 2024-06-14 | End: 2024-06-17 | Stop reason: HOSPADM

## 2024-06-13 RX ORDER — ATORVASTATIN CALCIUM 40 MG/1
40 TABLET, FILM COATED ORAL NIGHTLY
Status: DISCONTINUED | OUTPATIENT
Start: 2024-06-13 | End: 2024-06-17 | Stop reason: HOSPADM

## 2024-06-13 RX ORDER — ONDANSETRON 2 MG/ML
4 INJECTION INTRAMUSCULAR; INTRAVENOUS EVERY 6 HOURS PRN
Status: DISCONTINUED | OUTPATIENT
Start: 2024-06-13 | End: 2024-06-17 | Stop reason: HOSPADM

## 2024-06-13 RX ORDER — ASPIRIN 81 MG/1
81 TABLET ORAL DAILY
Status: DISCONTINUED | OUTPATIENT
Start: 2024-06-14 | End: 2024-06-17 | Stop reason: HOSPADM

## 2024-06-13 RX ORDER — INSULIN GLARGINE 100 [IU]/ML
70 INJECTION, SOLUTION SUBCUTANEOUS DAILY
Status: DISCONTINUED | OUTPATIENT
Start: 2024-06-14 | End: 2024-06-17 | Stop reason: HOSPADM

## 2024-06-13 RX ORDER — SODIUM CHLORIDE 0.9 % (FLUSH) 0.9 %
5-40 SYRINGE (ML) INJECTION PRN
Status: DISCONTINUED | OUTPATIENT
Start: 2024-06-13 | End: 2024-06-17 | Stop reason: HOSPADM

## 2024-06-13 RX ORDER — MAGNESIUM SULFATE IN WATER 40 MG/ML
2000 INJECTION, SOLUTION INTRAVENOUS PRN
Status: DISCONTINUED | OUTPATIENT
Start: 2024-06-13 | End: 2024-06-17 | Stop reason: HOSPADM

## 2024-06-13 RX ORDER — POLYETHYLENE GLYCOL 3350 17 G/17G
17 POWDER, FOR SOLUTION ORAL DAILY PRN
Status: DISCONTINUED | OUTPATIENT
Start: 2024-06-13 | End: 2024-06-17 | Stop reason: HOSPADM

## 2024-06-13 RX ORDER — LANOLIN ALCOHOL/MO/W.PET/CERES
400 CREAM (GRAM) TOPICAL 2 TIMES DAILY
Status: DISCONTINUED | OUTPATIENT
Start: 2024-06-13 | End: 2024-06-17 | Stop reason: HOSPADM

## 2024-06-13 RX ORDER — SODIUM CHLORIDE 0.9 % (FLUSH) 0.9 %
5-40 SYRINGE (ML) INJECTION EVERY 12 HOURS SCHEDULED
Status: DISCONTINUED | OUTPATIENT
Start: 2024-06-13 | End: 2024-06-17 | Stop reason: HOSPADM

## 2024-06-13 RX ORDER — POTASSIUM CHLORIDE 7.45 MG/ML
10 INJECTION INTRAVENOUS PRN
Status: DISCONTINUED | OUTPATIENT
Start: 2024-06-13 | End: 2024-06-17 | Stop reason: HOSPADM

## 2024-06-13 RX ORDER — SODIUM CHLORIDE 9 MG/ML
INJECTION, SOLUTION INTRAVENOUS PRN
Status: DISCONTINUED | OUTPATIENT
Start: 2024-06-13 | End: 2024-06-17 | Stop reason: HOSPADM

## 2024-06-13 RX ORDER — POTASSIUM CHLORIDE 20 MEQ/1
40 TABLET, EXTENDED RELEASE ORAL PRN
Status: DISCONTINUED | OUTPATIENT
Start: 2024-06-13 | End: 2024-06-17 | Stop reason: HOSPADM

## 2024-06-13 RX ORDER — ACETAMINOPHEN 650 MG/1
650 SUPPOSITORY RECTAL EVERY 6 HOURS PRN
Status: DISCONTINUED | OUTPATIENT
Start: 2024-06-13 | End: 2024-06-17 | Stop reason: HOSPADM

## 2024-06-13 RX ORDER — CLOPIDOGREL BISULFATE 75 MG/1
75 TABLET ORAL DAILY
Status: DISCONTINUED | OUTPATIENT
Start: 2024-06-14 | End: 2024-06-17 | Stop reason: HOSPADM

## 2024-06-13 RX ORDER — ENOXAPARIN SODIUM 100 MG/ML
40 INJECTION SUBCUTANEOUS DAILY
Status: DISCONTINUED | OUTPATIENT
Start: 2024-06-14 | End: 2024-06-17 | Stop reason: HOSPADM

## 2024-06-13 RX ORDER — FERROUS SULFATE 325(65) MG
325 TABLET ORAL NIGHTLY
Status: DISCONTINUED | OUTPATIENT
Start: 2024-06-13 | End: 2024-06-17 | Stop reason: HOSPADM

## 2024-06-13 RX ORDER — ACETAMINOPHEN 325 MG/1
650 TABLET ORAL EVERY 6 HOURS PRN
Status: DISCONTINUED | OUTPATIENT
Start: 2024-06-13 | End: 2024-06-17 | Stop reason: HOSPADM

## 2024-06-13 RX ORDER — 0.9 % SODIUM CHLORIDE 0.9 %
1000 INTRAVENOUS SOLUTION INTRAVENOUS ONCE
Status: COMPLETED | OUTPATIENT
Start: 2024-06-13 | End: 2024-06-13

## 2024-06-13 RX ORDER — ALLOPURINOL 100 MG/1
100 TABLET ORAL DAILY
Status: DISCONTINUED | OUTPATIENT
Start: 2024-06-14 | End: 2024-06-17 | Stop reason: HOSPADM

## 2024-06-13 RX ORDER — M-VIT,TX,IRON,MINS/CALC/FOLIC 27MG-0.4MG
1 TABLET ORAL DAILY
COMMUNITY

## 2024-06-13 RX ORDER — FERROUS SULFATE 325(65) MG
325 TABLET ORAL NIGHTLY
COMMUNITY

## 2024-06-13 RX ADMIN — FERROUS SULFATE TAB 325 MG (65 MG ELEMENTAL FE) 325 MG: 325 (65 FE) TAB at 23:41

## 2024-06-13 RX ADMIN — SODIUM CHLORIDE, PRESERVATIVE FREE 10 ML: 5 INJECTION INTRAVENOUS at 23:41

## 2024-06-13 RX ADMIN — Medication 400 MG: at 23:41

## 2024-06-13 RX ADMIN — ATORVASTATIN CALCIUM 40 MG: 40 TABLET, FILM COATED ORAL at 23:41

## 2024-06-13 RX ADMIN — SODIUM CHLORIDE 1000 ML: 9 INJECTION, SOLUTION INTRAVENOUS at 17:06

## 2024-06-13 RX ADMIN — CEFTRIAXONE SODIUM 1000 MG: 1 INJECTION, POWDER, FOR SOLUTION INTRAMUSCULAR; INTRAVENOUS at 19:43

## 2024-06-13 ASSESSMENT — LIFESTYLE VARIABLES
HOW OFTEN DO YOU HAVE A DRINK CONTAINING ALCOHOL: NEVER
HOW MANY STANDARD DRINKS CONTAINING ALCOHOL DO YOU HAVE ON A TYPICAL DAY: PATIENT DOES NOT DRINK

## 2024-06-13 ASSESSMENT — PAIN - FUNCTIONAL ASSESSMENT: PAIN_FUNCTIONAL_ASSESSMENT: NONE - DENIES PAIN

## 2024-06-13 NOTE — ED PROVIDER NOTES
[facility] ED  EMERGENCY DEPARTMENT ENCOUNTER        Pt Name: Ruben Eagle  MRN: [id number]  Birthdate 1965  Date of evaluation: 6/13/2024  Provider: Rajni Rondon PA-C  PCP: Leann Marie APRN - CNP  Note Started: 4:33 PM EDT 6/13/24       I have seen and evaluated this patient with my supervising physician Priscilla Cunningham MD.      CHIEF COMPLAINT       Chief Complaint   Patient presents with    Shortness of Breath     Started feeling sob yesterday.  Pt states he went to his foot doctor today and his bp was in the 70's and he felt light headed and dizzy.         HISTORY OF PRESENT ILLNESS: 1 or more Elements     History From: patient   Limitations to history : None    Ruben Eagle is a 58 y.o. male who presents for evaluation of hypotension.  Patient was sent over from podiatry.  He has a well-healing wound on his right foot.  States that for the past couple of days he is felt short of breath and felt very dizzy and lightheaded.  They checked his blood pressure and it was 70s over 50s so they sent him to the emergency room.  He states that he has had a cough for the past several days as well, nonproductive.  No fevers or chills.  No abdominal pain nausea vomiting or diarrhea.  No urinary symptoms.  No known sick contacts with similar symptoms or history of similar symptoms.  He is not sure whether or not he takes blood pressure medication upon further chart review, however, he does have Cozaar, spironolactone, carvedilol, Lasix on his med list.  No falls or syncope.  No spinning type dizziness, focal weakness, visual disturbances, facial asymmetry speech difficulty.  States that symptoms are worse upon standing.  No other complaints or concerns at this time.    Nursing Notes were all reviewed and agreed with or any disagreements were addressed in the HPI.    REVIEW OF SYSTEMS :      Review of Systems   Constitutional:  Positive for fatigue. Negative for appetite change, chills and  THREE (3) MG INTO THE SKIN ONCE A WEEK       ALLERGIES     Bactrim [sulfamethoxazole-trimethoprim], Bee venom, and Lisinopril    FAMILYHISTORY       Family History   Problem Relation Age of Onset    Heart Disease Mother     High Blood Pressure Mother     High Cholesterol Mother     Diabetes Mother     Anemia Mother     Heart Disease Father     High Blood Pressure Father     High Cholesterol Father     Heart Disease Maternal Grandmother     High Blood Pressure Maternal Grandmother     High Cholesterol Maternal Grandmother     Cancer Maternal Grandmother         bone marrow & breast    Heart Disease Maternal Grandfather     High Blood Pressure Maternal Grandfather     High Cholesterol Maternal Grandfather     Cancer Maternal Grandfather         pancreatic CA    Heart Disease Paternal Grandmother     High Blood Pressure Paternal Grandmother     High Cholesterol Paternal Grandmother     Heart Disease Paternal Grandfather     High Blood Pressure Paternal Grandfather     High Cholesterol Paternal Grandfather     Stroke Brother     Heart Failure Brother     Heart Disease Brother     Diabetes type 2  Brother     Diabetes type 2  Brother     Diabetes type 2  Brother         SOCIAL HISTORY       Social History     Tobacco Use    Smoking status: Former     Current packs/day: 0.00     Average packs/day: 0.3 packs/day for 1 year (0.3 ttl pk-yrs)     Types: Cigarettes     Start date: 1979     Quit date: 1980     Years since quittin.3    Smokeless tobacco: Never   Vaping Use    Vaping Use: Never used   Substance Use Topics    Alcohol use: No     Alcohol/week: 0.0 standard drinks of alcohol    Drug use: No       SCREENINGS        Lizzie Coma Scale  Eye Opening: Spontaneous  Best Verbal Response: Oriented  Best Motor Response: Obeys commands  [place] Coma Scale Score: 15                CIWA Assessment  BP: 105/63  Pulse: 88           PHYSICAL EXAM  1 or more Elements     ED Triage Vitals [24 1552]   BP Temp  Cellulitis of left foot, Cellulitis of right lower extremity (2/16, 5/16), Chronic systolic CHF (congestive heart failure) ([facility]), Community acquired pneumonia, Coronary artery disease involving native coronary artery of native heart without angina pectoris (08/06/2020), Diabetes ([facility]), Diabetic ulcer of left foot associated with type 2 diabetes mellitus, with muscle involvement without evidence of necrosis ([facility]) (04/27/2017), Diabetic ulcer of right foot ([facility]) (early 2016), Diabetic ulcer of 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 2 diabetes mellitus, with necrosis of bone ([facility]), ETOH abuse, Fracture of tibial plateau (11/09/2012), High cholesterol, History of blood transfusion, HTN (hypertension), blood clots, MI (myocardial infarction) ([facility]) (08/04/2020), MRSA (methicillin resistant staph aureus) culture positive (4/28/16, 4/20/16), Neuropathic ulcer of left foot, limited to breakdown of skin ([facility]) (11/03/2016), Neuropathic ulcer of toe ([facility]) (02/13/2014), NSVT (nonsustained ventricular tachycardia) ([facility]) (2014), Pleural effusion due to congestive heart failure ([facility]), Septicemia ([facility]), Smoker, Systolic CHF, acute ([facility]) (07/08/2013), and Thyroid disease.    CONSULTS: (Who and What was discussed)  None      Social Determinants Significantly Affecting Health : None    Records Reviewed (External and Source) n/a    CC/HPI Summary, DDx, ED Course, and Reassessment: Patient presents for evaluation of hypotension feeling weak, dizzy and short of breath.  On exam, he is resting comfortably in bed no acute distress and nontoxic.  He is a little hypotensive but vitals are otherwise within normal limits and he is afebrile.  Slight diminished aeration but no obvious wheezing rales or rhonchi.  Chest is nontender abdomen is benign.  He is moving all extremities without difficulty or focal neurologic deficit.  Orthostatic vitals will be performed by nursing staff and started on fluid resuscitation.  He will be reevaluated.  Please

## 2024-06-13 NOTE — ED PROVIDER NOTES
[facility] ED  EMERGENCY DEPARTMENT ENCOUNTER        Patient Name: Ruben Eagle  MRN: [id number]  Birthdate 1965  Date of evaluation: 6/13/2024  Provider: Priscilla Cunningham MD  PCP: Leann Marie, APRN - CNP  Note Started: 6:07 PM EDT 6/13/24    I independently examined and evaluated Ruben Eagle. I personally saw the patient and performed a substantive portion of the visit including all aspects of the medical decision making.  I made/approved the management plan and take responsibility for the patient management.  I am the primary physician of record.    CHIEF COMPLAINT  Hypotension       HISTORY OF PRESENT ILLNESS  History from : Patient    Limitations to history : None    In brief, Ruben Eagle is a 58 y.o. male  has a past medical history of Acute on chronic systolic congestive heart failure ([facility]) (07/08/2013), Acute osteomyelitis of left foot ([facility]) (09/27/2017), Acute osteomyelitis of right foot ([facility]) (04/25/2016), Acute respiratory failure ([facility]) (08/04/2020), Ascites, Blood transfusion reaction, Burst fracture of lumbar vertebra ([facility]) (11/09/2012), Cellulitis of left foot, Cellulitis of right lower extremity (2/16, 5/16), Chronic systolic CHF (congestive heart failure) ([facility]), Community acquired pneumonia, Coronary artery disease involving native coronary artery of native heart without angina pectoris (08/06/2020), Diabetes ([facility]), Diabetic ulcer of left foot associated with type 2 diabetes mellitus, with muscle involvement without evidence of necrosis ([facility]) (04/27/2017), Diabetic ulcer of right foot ([facility]) (early 2016), Diabetic ulcer of 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 2 diabetes mellitus, with necrosis of bone ([facility]), ETOH abuse, Fracture of tibial plateau (11/09/2012), High cholesterol, History of blood transfusion, HTN (hypertension), blood clots, MI (myocardial infarction) ([facility]) (08/04/2020), MRSA (methicillin resistant staph aureus) culture positive (4/28/16, 4/20/16),

## 2024-06-14 PROBLEM — N18.9 ACUTE KIDNEY INJURY SUPERIMPOSED ON CHRONIC KIDNEY DISEASE (HCC): Status: ACTIVE | Noted: 2024-06-14

## 2024-06-14 PROBLEM — R42 LIGHTHEADEDNESS: Status: ACTIVE | Noted: 2024-06-14

## 2024-06-14 PROBLEM — R73.9 HYPERGLYCEMIA: Status: ACTIVE | Noted: 2024-06-14

## 2024-06-14 PROBLEM — R79.89 ELEVATED TROPONIN: Status: ACTIVE | Noted: 2024-06-14

## 2024-06-14 PROBLEM — N17.9 ACUTE KIDNEY INJURY SUPERIMPOSED ON CHRONIC KIDNEY DISEASE (HCC): Status: ACTIVE | Noted: 2024-06-14

## 2024-06-14 LAB
ALBUMIN SERPL-MCNC: 2.7 G/DL (ref 3.4–5)
ALBUMIN/GLOB SERPL: 0.6 {RATIO} (ref 1.1–2.2)
ALP SERPL-CCNC: 146 U/L (ref 40–129)
ALT SERPL-CCNC: 11 U/L (ref 10–40)
ANION GAP SERPL CALCULATED.3IONS-SCNC: 13 MMOL/L (ref 3–16)
AST SERPL-CCNC: 12 U/L (ref 15–37)
BASOPHILS # BLD: 0.1 K/UL (ref 0–0.2)
BASOPHILS NFR BLD: 0.7 %
BILIRUB SERPL-MCNC: 0.6 MG/DL (ref 0–1)
BUN SERPL-MCNC: 43 MG/DL (ref 7–20)
CALCIUM SERPL-MCNC: 8.9 MG/DL (ref 8.3–10.6)
CHLORIDE SERPL-SCNC: 96 MMOL/L (ref 99–110)
CO2 SERPL-SCNC: 24 MMOL/L (ref 21–32)
CREAT SERPL-MCNC: 1.1 MG/DL (ref 0.9–1.3)
DEPRECATED RDW RBC AUTO: 21.3 % (ref 12.4–15.4)
EKG ATRIAL RATE: 94 BPM
EKG DIAGNOSIS: NORMAL
EKG P AXIS: 64 DEGREES
EKG P-R INTERVAL: 208 MS
EKG Q-T INTERVAL: 388 MS
EKG QRS DURATION: 124 MS
EKG QTC CALCULATION (BAZETT): 485 MS
EKG R AXIS: -44 DEGREES
EKG T AXIS: 83 DEGREES
EKG VENTRICULAR RATE: 94 BPM
EOSINOPHIL # BLD: 0.1 K/UL (ref 0–0.6)
EOSINOPHIL NFR BLD: 1.3 %
GFR SERPLBLD CREATININE-BSD FMLA CKD-EPI: 77 ML/MIN/{1.73_M2}
GLUCOSE BLD-MCNC: 127 MG/DL (ref 70–99)
GLUCOSE BLD-MCNC: 165 MG/DL (ref 70–99)
GLUCOSE BLD-MCNC: 224 MG/DL (ref 70–99)
GLUCOSE BLD-MCNC: 255 MG/DL (ref 70–99)
GLUCOSE SERPL-MCNC: 110 MG/DL (ref 70–99)
HCT VFR BLD AUTO: 31.6 % (ref 40.5–52.5)
HGB BLD-MCNC: 10.3 G/DL (ref 13.5–17.5)
LYMPHOCYTES # BLD: 1.1 K/UL (ref 1–5.1)
LYMPHOCYTES NFR BLD: 10.9 %
MCH RBC QN AUTO: 26.4 PG (ref 26–34)
MCHC RBC AUTO-ENTMCNC: 32.5 G/DL (ref 31–36)
MCV RBC AUTO: 81.4 FL (ref 80–100)
MONOCYTES # BLD: 0.7 K/UL (ref 0–1.3)
MONOCYTES NFR BLD: 6.9 %
NEUTROPHILS # BLD: 8.4 K/UL (ref 1.7–7.7)
NEUTROPHILS NFR BLD: 80.2 %
NT-PROBNP SERPL-MCNC: 5652 PG/ML (ref 0–124)
PERFORMED ON: ABNORMAL
PLATELET # BLD AUTO: 282 K/UL (ref 135–450)
PMV BLD AUTO: 6.5 FL (ref 5–10.5)
POTASSIUM SERPL-SCNC: 4 MMOL/L (ref 3.5–5.1)
PROT SERPL-MCNC: 7.2 G/DL (ref 6.4–8.2)
RBC # BLD AUTO: 3.89 M/UL (ref 4.2–5.9)
SODIUM SERPL-SCNC: 133 MMOL/L (ref 136–145)
TROPONIN, HIGH SENSITIVITY: 55 NG/L (ref 0–22)
WBC # BLD AUTO: 10.4 K/UL (ref 4–11)

## 2024-06-14 PROCEDURE — 84484 ASSAY OF TROPONIN QUANT: CPT

## 2024-06-14 PROCEDURE — 6370000000 HC RX 637 (ALT 250 FOR IP): Performed by: INTERNAL MEDICINE

## 2024-06-14 PROCEDURE — 36415 COLL VENOUS BLD VENIPUNCTURE: CPT

## 2024-06-14 PROCEDURE — 99232 SBSQ HOSP IP/OBS MODERATE 35: CPT

## 2024-06-14 PROCEDURE — 80053 COMPREHEN METABOLIC PANEL: CPT

## 2024-06-14 PROCEDURE — 6370000000 HC RX 637 (ALT 250 FOR IP): Performed by: STUDENT IN AN ORGANIZED HEALTH CARE EDUCATION/TRAINING PROGRAM

## 2024-06-14 PROCEDURE — 83036 HEMOGLOBIN GLYCOSYLATED A1C: CPT

## 2024-06-14 PROCEDURE — 87040 BLOOD CULTURE FOR BACTERIA: CPT

## 2024-06-14 PROCEDURE — 1200000000 HC SEMI PRIVATE

## 2024-06-14 PROCEDURE — 83880 ASSAY OF NATRIURETIC PEPTIDE: CPT

## 2024-06-14 PROCEDURE — 2580000003 HC RX 258: Performed by: INTERNAL MEDICINE

## 2024-06-14 PROCEDURE — 6360000002 HC RX W HCPCS: Performed by: INTERNAL MEDICINE

## 2024-06-14 PROCEDURE — 85025 COMPLETE CBC W/AUTO DIFF WBC: CPT

## 2024-06-14 PROCEDURE — 6370000000 HC RX 637 (ALT 250 FOR IP)

## 2024-06-14 PROCEDURE — 93010 ELECTROCARDIOGRAM REPORT: CPT | Performed by: INTERNAL MEDICINE

## 2024-06-14 PROCEDURE — 99222 1ST HOSP IP/OBS MODERATE 55: CPT | Performed by: STUDENT IN AN ORGANIZED HEALTH CARE EDUCATION/TRAINING PROGRAM

## 2024-06-14 RX ORDER — SPIRONOLACTONE 25 MG/1
25 TABLET ORAL DAILY
Status: DISCONTINUED | OUTPATIENT
Start: 2024-06-14 | End: 2024-06-14

## 2024-06-14 RX ORDER — METOPROLOL SUCCINATE 50 MG/1
50 TABLET, EXTENDED RELEASE ORAL 2 TIMES DAILY
Status: DISCONTINUED | OUTPATIENT
Start: 2024-06-14 | End: 2024-06-17 | Stop reason: HOSPADM

## 2024-06-14 RX ORDER — SPIRONOLACTONE 25 MG/1
25 TABLET ORAL NIGHTLY
Status: DISCONTINUED | OUTPATIENT
Start: 2024-06-14 | End: 2024-06-17 | Stop reason: HOSPADM

## 2024-06-14 RX ORDER — INSULIN LISPRO 100 [IU]/ML
0-4 INJECTION, SOLUTION INTRAVENOUS; SUBCUTANEOUS NIGHTLY
Status: DISCONTINUED | OUTPATIENT
Start: 2024-06-14 | End: 2024-06-17 | Stop reason: HOSPADM

## 2024-06-14 RX ORDER — DEXTROSE MONOHYDRATE 100 MG/ML
INJECTION, SOLUTION INTRAVENOUS CONTINUOUS PRN
Status: DISCONTINUED | OUTPATIENT
Start: 2024-06-14 | End: 2024-06-17 | Stop reason: HOSPADM

## 2024-06-14 RX ORDER — CARVEDILOL 6.25 MG/1
12.5 TABLET ORAL 2 TIMES DAILY WITH MEALS
Status: DISCONTINUED | OUTPATIENT
Start: 2024-06-14 | End: 2024-06-14

## 2024-06-14 RX ORDER — LOSARTAN POTASSIUM 25 MG/1
12.5 TABLET ORAL DAILY
Status: DISCONTINUED | OUTPATIENT
Start: 2024-06-14 | End: 2024-06-17 | Stop reason: HOSPADM

## 2024-06-14 RX ORDER — GLUCAGON 1 MG/ML
1 KIT INJECTION PRN
Status: DISCONTINUED | OUTPATIENT
Start: 2024-06-14 | End: 2024-06-17 | Stop reason: HOSPADM

## 2024-06-14 RX ORDER — INSULIN LISPRO 100 [IU]/ML
0-4 INJECTION, SOLUTION INTRAVENOUS; SUBCUTANEOUS
Status: DISCONTINUED | OUTPATIENT
Start: 2024-06-14 | End: 2024-06-17 | Stop reason: HOSPADM

## 2024-06-14 RX ADMIN — ALLOPURINOL 100 MG: 100 TABLET ORAL at 10:51

## 2024-06-14 RX ADMIN — SODIUM CHLORIDE, PRESERVATIVE FREE 10 ML: 5 INJECTION INTRAVENOUS at 10:56

## 2024-06-14 RX ADMIN — ATORVASTATIN CALCIUM 40 MG: 40 TABLET, FILM COATED ORAL at 20:36

## 2024-06-14 RX ADMIN — METOPROLOL SUCCINATE 50 MG: 50 TABLET, EXTENDED RELEASE ORAL at 20:37

## 2024-06-14 RX ADMIN — Medication 400 MG: at 10:51

## 2024-06-14 RX ADMIN — ASPIRIN 81 MG: 81 TABLET, COATED ORAL at 10:51

## 2024-06-14 RX ADMIN — INSULIN LISPRO 1 UNITS: 100 INJECTION, SOLUTION INTRAVENOUS; SUBCUTANEOUS at 12:40

## 2024-06-14 RX ADMIN — INSULIN GLARGINE 70 UNITS: 100 INJECTION, SOLUTION SUBCUTANEOUS at 10:51

## 2024-06-14 RX ADMIN — FERROUS SULFATE TAB 325 MG (65 MG ELEMENTAL FE) 325 MG: 325 (65 FE) TAB at 20:36

## 2024-06-14 RX ADMIN — ENOXAPARIN SODIUM 40 MG: 100 INJECTION SUBCUTANEOUS at 10:53

## 2024-06-14 RX ADMIN — LOSARTAN POTASSIUM 12.5 MG: 25 TABLET, FILM COATED ORAL at 17:15

## 2024-06-14 RX ADMIN — Medication 400 MG: at 20:36

## 2024-06-14 RX ADMIN — SPIRONOLACTONE 25 MG: 25 TABLET ORAL at 20:36

## 2024-06-14 RX ADMIN — CLOPIDOGREL BISULFATE 75 MG: 75 TABLET ORAL at 10:51

## 2024-06-14 RX ADMIN — EMPAGLIFLOZIN 10 MG: 10 TABLET, FILM COATED ORAL at 20:36

## 2024-06-14 RX ADMIN — LEVOTHYROXINE SODIUM 125 MCG: 125 TABLET ORAL at 10:51

## 2024-06-14 RX ADMIN — VANCOMYCIN HYDROCHLORIDE 1750 MG: 10 INJECTION, POWDER, LYOPHILIZED, FOR SOLUTION INTRAVENOUS at 20:40

## 2024-06-14 ASSESSMENT — LIFESTYLE VARIABLES: HOW OFTEN DO YOU HAVE A DRINK CONTAINING ALCOHOL: NEVER

## 2024-06-14 NOTE — PROGRESS NOTES
Pt Aox4, VSS, RA, denies pain. Tele NSR. Respirations even and easy. LLE nonpitting edema. Dressing to right foot CDI. Pt denies needs at this time, call light in reach, bed alarm for safety.

## 2024-06-14 NOTE — PROGRESS NOTES
Progress Note    Admit Date:  6/13/2024    Sent over from podiatry office for hypotension   BP was 70/50s  and he was symptomatic  Light-headed and dizzy     Subjective:  Mr. Eagle today is feeling better. Denies any symptoms currently.   Unsure what doses of medications he takes but has been taking them as prescribed     Objective:   Patient Vitals for the past 4 hrs:   BP Temp Temp src Pulse Resp SpO2   06/14/24 0811 110/66 98.3 °F (36.8 °C) Oral 88 14 99 %   06/14/24 0527 -- 98 °F (36.7 °C) Axillary -- -- --          Intake/Output Summary (Last 24 hours) at 6/14/2024 0900  Last data filed at 6/14/2024 0249  Gross per 24 hour   Intake --   Output 550 ml   Net -550 ml       Physical Exam:    Gen: No distress. Alert. Pleasant male   Eyes: PERRL. No sclera icterus. No conjunctival injection.   Neck: No JVD.  Trachea midline.  Resp: No accessory muscle use. No crackles. No wheezes. No rhonchi.   CV: Regular rate. Regular rhythm. +murmur.  No rub. No edema.    Peripheral Pulses: +2 palpable, equal bilaterally   GI: Non-tender. Non-distended.  Normal bowel sounds.  Skin: Warm and dry. No nodule on exposed extremities. No rash on exposed extremities. +right foot wrapped in ace bandage, did not remove to examine as patient just had this wrapped by podiatry   M/S: No cyanosis. No joint deformity. No clubbing.   Neuro: Awake. Grossly nonfocal    Psych: Oriented x 3. No anxiety or agitation.         Medications:  cefTRIAXone (ROCEPHIN) IV, 1,000 mg, Q24H  allopurinol, 100 mg, Daily  aspirin, 81 mg, Daily  atorvastatin, 40 mg, Nightly  sodium chloride flush, 5-40 mL, 2 times per day  enoxaparin, 40 mg, Daily  levothyroxine, 125 mcg, Daily  magnesium oxide, 400 mg, BID  empagliflozin, 10 mg, Daily  insulin glargine, 70 Units, Daily  ferrous sulfate, 325 mg, Nightly  clopidogrel, 75 mg, Daily      PRN Medications:  sodium chloride flush, 5-40 mL, PRN  sodium chloride, , PRN  potassium chloride, 40 mEq, PRN   Or  potassium  alternative oral replacement, 40 mEq, PRN   Or  potassium chloride, 10 mEq, PRN  magnesium sulfate, 2,000 mg, PRN  ondansetron, 4 mg, Q8H PRN   Or  ondansetron, 4 mg, Q6H PRN  polyethylene glycol, 17 g, Daily PRN  acetaminophen, 650 mg, Q6H PRN   Or  acetaminophen, 650 mg, Q6H PRN          Data:  CBC:   Recent Labs     06/13/24  1600 06/14/24  0711   WBC 11.7* 10.4   HGB 10.6* 10.3*   HCT 33.0* 31.6*   MCV 81.8 81.4    282     BMP:   Recent Labs     06/13/24  1600 06/14/24  0711   * 133*   K 4.0 4.0   CL 88* 96*   CO2 28 24   BUN 53* 43*   CREATININE 1.8* 1.1     LIVER PROFILE:   Recent Labs     06/13/24  1600 06/14/24  0711   AST 12* 12*   ALT 13 11   BILITOT 0.7 0.6   ALKPHOS 169* 146*     PT/INR: No results for input(s): \"PROTIME\", \"INR\" in the last 72 hours.    CULTURES  Results       Procedure Component Value Units Date/Time    Culture, Urine [[id number]] Collected: 06/13/24 1849    Order Status: No result Updated: 06/13/24 1911    COVID-19 & Influenza Combo [[id number]] Collected: 06/13/24 1640    Order Status: Completed Specimen: Nasopharyngeal Swab Updated: 06/13/24 1713     SARS-CoV-2 RNA, RT PCR NOT DETECTED     Comment: Not Detected results do not preclude SARS-CoV-2 infection and  should not be used as the sole basis for patient management  decisions.  Results must be combined with clinical observations,  patient history, and epidemiological information.  Testing was performed using MIGUELITO ALMAS SARS-CoV-2 and Influenza A/B  nucleic acid assay. This test is a multiplex Real-Time Reverse  Transcriptase Polymerase Chain Reaction (RT-PCR)-based in vitro  diagnostic test intended for the qualitative detection of nucleic  acids from SARS-CoV-2, influenza A, and influenza B in nasopharyngeal  and nasal swab specimens for use under the FDA’s Emergency Use  Authorization (EUA) only.    Patient Fact Sheet:  https://www.fda.gov/media/873990/download  Provider Fact Sheet:  https://www.fda.gov/media/670754/download  EUA: https://www.fda.gov/media/781354/download  IFU: https://www.fda.gov/media/308118/download    Methodology:  RT-PCR          Influenza A NOT DETECTED     Influenza B NOT DETECTED             RADIOLOGY  POC US ECHOCARDIO TRANSTHORACIC LTD   Final Result      XR CHEST (2 VW)   Final Result   No acute cardiopulmonary findings           Pertinent previous results reviewed      Limited Echo 4/25/2022  Summary  Limited exam for LVEF.  The left ventricular systolic function is moderately reduced with an  ejection fraction of 30-35 %.  There is diastolic septal flattening consistent with right ventricular  volume overload.  There is hypokinesis of the inferior, anteroseptal, inferoseptal and  inferolateral walls.Anterolateral wall appears normal.  Compared to exam done 1/20/2021, left ventricular systolic function appears  slightly improved.  Mild mitral regurgitation.  Mild to moderate tricuspid regurgitation.  Systolic pulmonary artery pressure (SPAP) is estimated at 47 mmHg consistent  with mild pulmonary hypertension (Right atrial pressure of 3 mmHg).    Assessment/Plan:  #Symptomatic hypotension -acute on chronic with hx of HTN   #Hyponatremia   #KRYSTAL   -Cr 1.8 on presentation, ~1.0 at baseline  -IVF bolus given and Cr improved to 1.1  -monitor intake and output  -orthostatics pending  -PT/OT   -cardiology consulted for BP med adjustment     #Elevated troponin   -67--> 58, trend   -no CP   -EKG without acute ischemic changes  -could be 2/2 to renal function     #Pyuria   -asymptomatic   -urine culture pending  -IV rocephin    #Alcoholic and ischemic cardiomyopathy   #Chronic systolic HF   -s/p ICD with optivol 2014  -last limited echo as above  -on jardiance   -on lasix, aldactone, losartan- hold for now, doses may need to be reduced   -not acutely decompensated  -cardiology consulted, appreciate assistance with medications     #CAD s/p CABG  -on ASA, statin, plavix,

## 2024-06-14 NOTE — H&P
[facility] Medicine History & Physical      Patient Name: Ruben Eagle    : 1965    PCP: Leann Marie, APRN - CNP    Date of Service:  Patient seen and examined on 24     Chief Complaint: Shortness of breath    History Of Present Illness:    Ruben Eagle is a 58 y.o. male with a PMH of acute on chronic systolic heart failure status post pacemaker defibrillator.  CAD status post CABG, type 2 diabetes, hypertension, hyperlipidemia, who presented to ED with complaint of shortness of breath.    According to patient he was being seen at his podiatry appointment today.  Prior to seeing his doctor he had complained of some dizziness lightheadedness.  At the office found to have blood pressure of 70/50.  Advised to present to the ED for further management.  States that he has noticed shortness of breath over the last few days on activity not at rest.    Labs show sodium of 131 chloride of 88 BUN of 53 creatinine of 1.8 lactate of 2.2/1.6 glucose of 367 procalcitonin 0[place]24.  [place] 60[address], troponin of 67/58, WBC of 11.7 hemoglobin of 10.6.  Influenza A/B/COVID-negative.  Urinalysis concerning for UTI.  Chest x-ray shows no acute cardiopulmonary process.  In the ED patient received 1 L bolus NS and 1 g of Rocephin    Past Medical History:    Patient has a past medical history of Acute on chronic systolic congestive heart failure (HCC), Acute osteomyelitis of left foot ([facility]), Acute osteomyelitis of right foot (HCC), Acute respiratory failure (HCC), Ascites, Blood transfusion reaction, Burst fracture of lumbar vertebra (HCC), Cellulitis of left foot, Cellulitis of right lower extremity, Chronic systolic CHF (congestive heart failure) ([facility]), Community acquired pneumonia, Coronary artery disease involving native coronary artery of native heart without angina pectoris, Diabetes ([facility]), Diabetic ulcer of left foot associated with type 2 diabetes mellitus, with muscle involvement without evidence of  Grandfather         pancreatic CA    Heart Disease Paternal Grandmother     High Blood Pressure Paternal Grandmother     High Cholesterol Paternal Grandmother     Heart Disease Paternal Grandfather     High Blood Pressure Paternal Grandfather     High Cholesterol Paternal Grandfather     Stroke Brother     Heart Failure Brother     Heart Disease Brother     Diabetes type 2  Brother     Diabetes type 2  Brother     Diabetes type 2  Brother        REVIEW OF SYSTEMS:   Pertinent positives as noted in the HPI. All other systems reviewed and negative.    PHYSICAL EXAM PERFORMED:    /69   Pulse 88   Temp 96.9 °F (36.1 °C) (Axillary)   Resp 18   Wt 84.8 kg (187 lb)   SpO2 100%   BMI 27.62 kg/m²     General appearance:  Awake, alert, no apparent distress  HEENT:  Normocephalic, atraumatic   Neck: Supple,   Respiratory:  Clear to auscultation bilaterally   Cardiovascular:  Regular rate and rhythm   Abdomen: Soft, Normal bowel sounds.  Extremities:  No clubbing, cyanosis, or edema bilaterally.    Skin: No rashes or lesions. Warm/dry.  Neurologic:  grossly non-focal. Alert and oriented x 3. Normal speech.  Psychiatric:  Thought content appropriate, normal insight.    Labs:   CBC   Recent Labs     06/13/24  1600   WBC 11.7*   HGB 10.6*   HCT 33.0*           RENAL  Recent Labs     06/13/24  1600   *   K 4.0   CL 88*   CO2 28   BUN 53*   CREATININE 1.8*       LFTS  Recent Labs     06/13/24  1600   AST 12*   ALT 13   BILITOT 0.7   ALKPHOS 169*       COAG  No results for input(s): \"INR\" in the last 72 hours.    CARDIAC ENZYMES  No results for input(s): \"TROPONINI\" in the last 72 hours.    LIPIDS  Cholesterol, Total   Date/Time Value Ref Range Status   02/16/2023 10:12 AM 73 0 - 199 mg/dL Final     Triglycerides   Date/Time Value Ref Range Status   02/16/2023 10:12  0 - 150 mg/dL Final     HDL   Date/Time Value Ref Range Status   02/16/2023 10:12 AM 27 (L) 40 - 60 mg/dL Final     Comment:     An HDL  cholesterol less than 40 mg/dL is low and  constitutes a coronary heart disease risk factor.  An HDL cholesterol greater than 60 mg/dL is a  negative risk factor for coronary heart disease.           Radiology:     POC US ECHOCARDIO TRANSTHORACIC LTD   Final Result      XR CHEST (2 VW)   Final Result   No acute cardiopulmonary findings         3/14/2024   Summary   LV systolic function is severely decreased with EF estimated <20%. Severe global hypokinesis.There is systolic and diastolic septal flattening c/w RV pressure and volume overload.LV cavity appears mildly dilated.Grade II diastolic dysfunction with elevated left ventricular filling pressure.The left atrium is moderately dilated. Right-sided chamber enlargement.Moderate mitral regurgitation.Moderate-severe tricuspid regurgitation. Mild pulmonic regurgitation.Pacemaker / ICD lead is visualized in the right-sided chambers.  Systolic pulmonary artery pressure (SPAP) is estimated at 55 mmHg c/w moderate pulm HTN (RAP of 15 mmHg). Compared to exam done 4/25/2022, left ventricular systolic function has decreased.      ASSESSMENT/PLAN:  (Body mass index is 27.62 kg/m².)     58 y.o. male with a PMH of acute on chronic systolic heart failure status post pacemaker defibrillator.  CAD status post CABG, type 2 diabetes, hypertension, hyperlipidemia, who presented to ED with complaint of shortness of breath.    #Symptomatic hypotension  Concerns for distributive shock  Complains of dizziness lightheadedness over the last few days  Blood pressure systolic in the 70s.  Improvement with 1 L bolus IV fluid  Presence of elevated proBNP and troponin  Plan-monitor/telemetry,  Cardiology consult-will adjust patient's medication    #Acute on chronic HFrEF  Elevated proBNP and troponin  Chest x-ray shows no acute cardiopulmonary finding  2D echo March 2024 showed an EF of less than 20%.  Patient has a pacemaker/ICD  Plan-cardiology follow-up    #UTI  Urinalysis concerning for

## 2024-06-14 NOTE — DISCHARGE INSTRUCTIONS
Follow up with your PCP in 1 week      Heart Failure Resources:  Heart Failure Interactive Workbook:  Go to https://[employer].com/publication/?v=755709 for a Free Heart Failure Interactive Workbook provided by The American Heart Association. This interactive workbook will provide information on Healthier Living with Heart Failure. Please copy and paste link into search bar. Use your mouse to scroll through the pages.    HF [place] sayda:   Heart Failure Free smart phone sayda available for iPhone and Android download. Use your phone to track sodium intake, fluid intake, symptoms, and weight.     Low Sodium Diet / Recipes:  Go to www.[employer] website for “renal” diet which is Low Sodium! [employer] is a dialysis company, but this website offers free seasonal cookbooks. Each quarter, they will release 25-30 new recipes with a breakdown of calories, sodium, and glucose. You can also go to www.e[employer]io/recipes website for free recipes.     Discharge Instruction Video:  Scan the QR code below with your camera and click the canva.com link to open the video and watch educational information on Heart Failure and Medications from one of our nurses.   https://www.[employer]/design/DAFZnsH_JRk/9XlvpvwAAYPmbIHfoO2owv/edit    Home Exercise Program:   Identification of Green/Yellow/Red zones:  You should be able to identify when you feel good (green zone), if you have 1-2 symptoms of HF (yellow zone), or if you are in need of medical attention (red zone).  In your CHF education folder you were provided a “stop light tool” to outline this information.     We want to you to rate your exertion levels:    Our therapy team has discussed means of identification with you such as the \"Shikha scale.\"  The Shikha rating scale ranges from 6 to 20, where 6 means \"no exertion at all\" and 20 means \"maximal exertion.\" The goal is to use this to gauge how much effort it is taking for you to do your normal daily tasks.   You should be able

## 2024-06-14 NOTE — CONSULTS
[facility]   HEART FAILURE PROGRAM      NAME:  Ruben Eagle  AGE: 58 y.o.   GENDER: male  : 1965  TODAY'S DATE:  2024    Subjective:     VISIT TYPE: Evaluation / Consult    ADMIT DATE: 2024    PAST MEDICAL HISTORY:      Diagnosis Date    Acute on chronic systolic congestive heart failure ([facility]) 2013    Acute osteomyelitis of left foot ([facility]) 2017    Acute osteomyelitis of right foot ([facility]) 2016    Acute respiratory failure ([facility]) 2020    Ascites     Blood transfusion reaction     Burst fracture of lumbar vertebra ([facility]) 2012    Cellulitis of left foot     Cellulitis of right lower extremity ,     Chronic systolic CHF (congestive heart failure) ([facility])     Community acquired pneumonia     Coronary artery disease involving native coronary artery of native heart without angina pectoris 2020    Diabetes ([facility])     Diabetic ulcer of left foot associated with type 2 diabetes mellitus, with muscle involvement without evidence of necrosis ([facility]) 2017    Diabetic ulcer of right foot ([facility]) early     Diabetic ulcer of 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 2 diabetes mellitus, with necrosis of bone ([facility])     ETOH abuse     Fracture of tibial plateau 2012    High cholesterol     History of blood transfusion     reaction    HTN (hypertension)     Hx of blood clots     MI (myocardial infarction) ([facility]) 2020    + Troponins    MRSA (methicillin resistant staph aureus) culture positive 16, 16    foot wound    Neuropathic ulcer of left foot, limited to breakdown of skin ([facility]) 2016    Neuropathic ulcer of toe ([facility]) 2014    NSVT (nonsustained ventricular tachycardia) ([facility])     Pleural effusion due to congestive heart failure ([facility])     Septicemia ([facility])     Smoker     quit 1985    Systolic CHF, acute ([facility]) 2013    Thyroid disease      HOME MEDICATIONS:  Prior to Admission medications    Medication Sig Start Date

## 2024-06-14 NOTE — PLAN OF CARE
HEART FAILURE CARE PLAN:    Comorbidities Reviewed: Yes   Patient has a past medical history of Acute on chronic systolic congestive heart failure (HCC), Acute osteomyelitis of left foot (HCC), Acute osteomyelitis of right foot (HCC), Acute respiratory failure (HCC), Ascites, Blood transfusion reaction, Burst fracture of lumbar vertebra (HCC), Cellulitis of left foot, Cellulitis of right lower extremity, Chronic systolic CHF (congestive heart failure) (HCC), Community acquired pneumonia, Coronary artery disease involving native coronary artery of native heart without angina pectoris, Diabetes (HCC), Diabetic ulcer of left foot associated with type 2 diabetes mellitus, with muscle involvement without evidence of necrosis (HCC), Diabetic ulcer of right foot (HCC), Diabetic 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     Weights Reviewed: Yes   Admission weight: 84.8 kg (187 lb)   Wt Readings from Last 3 Encounters:   06/13/24 84.8 kg (187 lb)   03/18/24 99 kg (218 lb 4.8 oz)   01/24/24 90.4 kg (199 lb 3.2 oz)     Intake & Output Reviewed: Yes     Intake/Output Summary (Last 24 hours) at 6/14/2024 0931  Last data filed at 6/14/2024 0249  Gross per 24 hour   Intake --   Output 550 ml   Net -550 ml       ECHOCARDIOGRAM Reviewed: Yes   Patient's Ejection Fraction (EF) is less than or equal to 40%. Discuss HFrEF Guideline Directed Medical Therapy (GDMT) with Cardiologist or  (COZAAR) 25 MG tablet Take 1 tablet by mouth daily 8/14/23   Gold Carmichael MD   magnesium oxide (MAG-OX) 400 (240 Mg) MG tablet Take 1 tablet by mouth 2 times daily 8/14/23   Gold Carmichael MD   TRULICITY 3 MG/0.5ML SOPN INJECT THREE (3) MG INTO THE SKIN ONCE A WEEK 7/27/23   Leann Marie APRN - CNP   metFORMIN (GLUCOPHAGE) 1000 MG tablet TAKE ONE (1) TABLET BY MOUTH TWO (2) TIMES DAILY (WITH MEALS) 6/29/23   Julianne Ruggiero PA   allopurinol (ZYLOPRIM) 100 MG tablet TAKE TWO (2) TABLETS BY MOUTH DAILY  Patient taking differently: Take 1 tablet by mouth daily 6/14/23   Leann Marie APRN - CNP   ASPIRIN LOW DOSE 81 MG EC tablet Take 1 tablet by mouth daily 3/17/23   Basilio Clemons MD   isosorbide mononitrate (IMDUR) 30 MG extended release tablet Take 1 tablet by mouth daily 3/1/23   Gold Carmichael MD   levothyroxine (SYNTHROID) 125 MCG tablet Take 1 tablet by mouth daily 2/17/23 6/13/24  Leann Marie APRN - CNP   Blood Pressure KIT 1 kit by Does not apply route daily 10/4/22   Leann Marie APRN - CNP   BD PEN NEEDLE WINNIE U/F 32G X 4 MM MISC 1 each by Does not apply route daily 8/3/22   Hood Sanchez MD   OneTouch Delica Lancets 33G MISC USE TO CHECK FOUR TIMES DAILY. DX;E11.9 8/18/21   Hood Sanchez MD   Continuous Blood Gluc Sensor (FREESTYLE MARY 14 DAY SENSOR) MISC CGM 4/29/21   Hood Sanchez MD   Insulin Pen Needle (UNIFINE PENTIPS) 31G X 5 MM MISC USE 1 EACH DAY AS NEEDED FOR TESTING 1/3/18   Cb Murrell MD      Diet Reviewed: Yes   ADULT DIET; Regular    Goal of Care Reviewed: Yes   Patient and/or Family's stated Goal of Care this Admission: Reduce shortness of breath and increase activity tolerance prior to discharge.     Electronically signed by Ann Marie Hdz RN on 6/14/2024 at 9:31 AM

## 2024-06-14 NOTE — ED NOTES
Report received, pt laying in bed eyes closed, easily arousable to light noise, AAOx4, NAD, resp e/u on RA, bed locked lowest position, rails up x2, call light within reach, denies needs or c/o at this time.

## 2024-06-14 NOTE — CONSULTS
MD Hood   Continuous Blood Gluc Sensor (FREESTYLE MARY 14 DAY SENSOR) MISC CGM 4/29/21   Hood Sanchez MD   Insulin Pen Needle (UNIFINE PENTIPS) 31G X 5 MM MISC USE 1 EACH DAY AS NEEDED FOR TESTING 1/3/18   Cb Murrell MD        CURRENT Medications:  glucose chewable tablet 16 g, PRN  dextrose bolus 10% 125 mL, PRN   Or  dextrose bolus 10% 250 mL, PRN  glucagon injection 1 mg, PRN  dextrose 10 % infusion, Continuous PRN  insulin lispro (HUMALOG,ADMELOG) injection vial 0-4 Units, TID WC  insulin lispro (HUMALOG,ADMELOG) injection vial 0-4 Units, Nightly  cefTRIAXone (ROCEPHIN) 1,000 mg in sodium chloride 0.9 % 50 mL IVPB (mini-bag), Q24H  allopurinol (ZYLOPRIM) tablet 100 mg, Daily  aspirin EC tablet 81 mg, Daily  atorvastatin (LIPITOR) tablet 40 mg, Nightly  sodium chloride flush 0.9 % injection 5-40 mL, 2 times per day  sodium chloride flush 0.9 % injection 5-40 mL, PRN  0.9 % sodium chloride infusion, PRN  potassium chloride (KLOR-CON M) extended release tablet 40 mEq, PRN   Or  potassium bicarb-citric acid (EFFER-K) effervescent tablet 40 mEq, PRN   Or  potassium chloride 10 mEq/100 mL IVPB (Peripheral Line), PRN  magnesium sulfate 2000 mg in 50 mL IVPB premix, PRN  enoxaparin (LOVENOX) injection 40 mg, Daily  ondansetron (ZOFRAN-ODT) disintegrating tablet 4 mg, Q8H PRN   Or  ondansetron (ZOFRAN) injection 4 mg, Q6H PRN  polyethylene glycol (GLYCOLAX) packet 17 g, Daily PRN  acetaminophen (TYLENOL) tablet 650 mg, Q6H PRN   Or  acetaminophen (TYLENOL) suppository 650 mg, Q6H PRN  levothyroxine (SYNTHROID) tablet 125 mcg, Daily  magnesium oxide (MAG-OX) tablet 400 mg, BID  empagliflozin (JARDIANCE) tablet 10 mg, Daily  insulin glargine (LANTUS) injection vial 70 Units, Daily  ferrous sulfate (IRON 325) tablet 325 mg, Nightly  clopidogrel (PLAVIX) tablet 75 mg, Daily        Allergies:  Bactrim [sulfamethoxazole-trimethoprim], Bee venom, and Lisinopril     Review of Systems:   A 14 point  reinforced the need for patient directed risk factor modification.  All questions and concerns were addressed to the patient/family. Alternatives to my treatment were discussed.     Thank you for allowing us to participate in the care of Ruben Eagle. Please call me with any questions [phone].    Yoel Salguero, DO   Cardiovascular Disease  [facility]  [phone] [place] Office  [phone] [place] Office  6/14/2024 12:47 PM

## 2024-06-14 NOTE — PROGRESS NOTES
Heart failure diet guidelines  Consult has been added to Bree Harris on 6/14/24. 8:58 AM    Adriana Stubbs  6/14/2024

## 2024-06-15 PROBLEM — A49.02 MRSA (METHICILLIN RESISTANT STAPHYLOCOCCUS AUREUS) INFECTION: Status: ACTIVE | Noted: 2024-06-15

## 2024-06-15 LAB
BACTERIA UR CULT: ABNORMAL
EST. AVERAGE GLUCOSE BLD GHB EST-MCNC: 142.7 MG/DL
GLUCOSE BLD-MCNC: 109 MG/DL (ref 70–99)
GLUCOSE BLD-MCNC: 182 MG/DL (ref 70–99)
GLUCOSE BLD-MCNC: 198 MG/DL (ref 70–99)
GLUCOSE BLD-MCNC: 203 MG/DL (ref 70–99)
HBA1C MFR BLD: 6.6 %
ORGANISM: ABNORMAL
PERFORMED ON: ABNORMAL

## 2024-06-15 PROCEDURE — 6370000000 HC RX 637 (ALT 250 FOR IP): Performed by: INTERNAL MEDICINE

## 2024-06-15 PROCEDURE — 2580000003 HC RX 258: Performed by: INTERNAL MEDICINE

## 2024-06-15 PROCEDURE — 97162 PT EVAL MOD COMPLEX 30 MIN: CPT

## 2024-06-15 PROCEDURE — 97530 THERAPEUTIC ACTIVITIES: CPT

## 2024-06-15 PROCEDURE — 97110 THERAPEUTIC EXERCISES: CPT

## 2024-06-15 PROCEDURE — 97116 GAIT TRAINING THERAPY: CPT

## 2024-06-15 PROCEDURE — 1200000000 HC SEMI PRIVATE

## 2024-06-15 PROCEDURE — 6370000000 HC RX 637 (ALT 250 FOR IP): Performed by: STUDENT IN AN ORGANIZED HEALTH CARE EDUCATION/TRAINING PROGRAM

## 2024-06-15 PROCEDURE — 6360000002 HC RX W HCPCS: Performed by: INTERNAL MEDICINE

## 2024-06-15 PROCEDURE — 99233 SBSQ HOSP IP/OBS HIGH 50: CPT | Performed by: INTERNAL MEDICINE

## 2024-06-15 PROCEDURE — 97166 OT EVAL MOD COMPLEX 45 MIN: CPT

## 2024-06-15 PROCEDURE — 6370000000 HC RX 637 (ALT 250 FOR IP)

## 2024-06-15 RX ORDER — GUAIFENESIN/DEXTROMETHORPHAN 100-10MG/5
5 SYRUP ORAL EVERY 4 HOURS PRN
Status: DISCONTINUED | OUTPATIENT
Start: 2024-06-15 | End: 2024-06-17 | Stop reason: HOSPADM

## 2024-06-15 RX ADMIN — FERROUS SULFATE TAB 325 MG (65 MG ELEMENTAL FE) 325 MG: 325 (65 FE) TAB at 20:22

## 2024-06-15 RX ADMIN — LOSARTAN POTASSIUM 12.5 MG: 25 TABLET, FILM COATED ORAL at 10:39

## 2024-06-15 RX ADMIN — SODIUM CHLORIDE, PRESERVATIVE FREE 5 ML: 5 INJECTION INTRAVENOUS at 10:43

## 2024-06-15 RX ADMIN — ALLOPURINOL 100 MG: 100 TABLET ORAL at 10:39

## 2024-06-15 RX ADMIN — Medication 400 MG: at 10:39

## 2024-06-15 RX ADMIN — CLOPIDOGREL BISULFATE 75 MG: 75 TABLET ORAL at 10:39

## 2024-06-15 RX ADMIN — METOPROLOL SUCCINATE 50 MG: 50 TABLET, EXTENDED RELEASE ORAL at 20:24

## 2024-06-15 RX ADMIN — INSULIN GLARGINE 70 UNITS: 100 INJECTION, SOLUTION SUBCUTANEOUS at 10:39

## 2024-06-15 RX ADMIN — SPIRONOLACTONE 25 MG: 25 TABLET ORAL at 20:22

## 2024-06-15 RX ADMIN — EMPAGLIFLOZIN 10 MG: 10 TABLET, FILM COATED ORAL at 20:22

## 2024-06-15 RX ADMIN — SODIUM CHLORIDE, PRESERVATIVE FREE 10 ML: 5 INJECTION INTRAVENOUS at 20:24

## 2024-06-15 RX ADMIN — LEVOTHYROXINE SODIUM 125 MCG: 125 TABLET ORAL at 05:55

## 2024-06-15 RX ADMIN — ENOXAPARIN SODIUM 40 MG: 100 INJECTION SUBCUTANEOUS at 10:39

## 2024-06-15 RX ADMIN — METOPROLOL SUCCINATE 50 MG: 50 TABLET, EXTENDED RELEASE ORAL at 10:39

## 2024-06-15 RX ADMIN — VANCOMYCIN HYDROCHLORIDE 1000 MG: 1 INJECTION, POWDER, LYOPHILIZED, FOR SOLUTION INTRAVENOUS at 05:56

## 2024-06-15 RX ADMIN — VANCOMYCIN HYDROCHLORIDE 1000 MG: 1 INJECTION, POWDER, LYOPHILIZED, FOR SOLUTION INTRAVENOUS at 17:52

## 2024-06-15 RX ADMIN — ATORVASTATIN CALCIUM 40 MG: 40 TABLET, FILM COATED ORAL at 20:22

## 2024-06-15 RX ADMIN — INSULIN LISPRO 1 UNITS: 100 INJECTION, SOLUTION INTRAVENOUS; SUBCUTANEOUS at 13:14

## 2024-06-15 RX ADMIN — ASPIRIN 81 MG: 81 TABLET, COATED ORAL at 10:39

## 2024-06-15 RX ADMIN — Medication 400 MG: at 20:22

## 2024-06-15 NOTE — FLOWSHEET NOTE
06/14/24 2019   Assessment   Charting Type Shift assessment   Psychosocial   Psychosocial (WDL) WDL   Neurological   Neuro (WDL) WDL   Level of Consciousness 0   [place]sey Coma Scale   Eye Opening 4   Best Verbal Response 5   Best Motor Response 6   Lizzie Coma Scale Score 15   HEENT (Head, Ears, Eyes, Nose, & Throat)   HEENT (WDL) X   Teeth Edentulous   Respiratory   Respiratory (WDL) WDL   Respiratory Pattern Regular   Respiratory Depth Normal   Respiratory Quality/Effort Unlabored   Chest Assessment Chest expansion symmetrical   L Breath Sounds Clear   R Breath Sounds Clear   Cardiac   Cardiac (WDL) WDL   Gastrointestinal   Abdominal (WDL) WDL   Abdomen Inspection Soft;Rounded   Genitourinary   Genitourinary (WDL) X   Suprapubic Tenderness No   Dysuria (Pain/Burning w/Urination) Yes   Peripheral Vascular   Peripheral Vascular (WDL) X   Edema Left lower extremity   LLE Edema Non-pitting   Skin Integumentary    Skin Integumentary (WDL) X   Skin Color Pink   Skin Condition/Temp Warm   Skin Integrity Wound (see LDA)   Location l foot   Skin Integrity Site 2   Skin Integrity Location 2 Wound (see LDA)   Location 2 r foot   Musculoskeletal   Musculoskeletal (WDL) X   Musculoskeletal Details   R Toes Other (comment)  (amputated)   L Toes Other (comment)  (amputated)

## 2024-06-15 NOTE — PROGRESS NOTES
Shift assessment completed. Vital signs stable. Call light in reach and standard safety measures in place. Pt resting in bed; no needs or complaints at this time.

## 2024-06-15 NOTE — PLAN OF CARE
Problem: Chronic Conditions and Co-morbidities  Goal: Patient's chronic conditions and co-morbidity symptoms are monitored and maintained or improved  6/14/2024 2112 by Fadia Yanez, RN  Note:   HEART FAILURE CARE PLAN:    Comorbidities Reviewed: Yes   Patient has a past medical history of Acute on chronic systolic congestive heart failure (HCC), Acute osteomyelitis of left foot (HCC), Acute osteomyelitis of right foot (HCC), Acute respiratory failure (HCC), Ascites, Blood transfusion reaction, Burst fracture of lumbar vertebra (HCC), Cellulitis of left foot, Cellulitis of right lower extremity, Chronic systolic CHF (congestive heart failure) (HCC), Community acquired pneumonia, Coronary artery disease involving native coronary artery of native heart without angina pectoris, Diabetes (HCC), Diabetic ulcer of left foot associated with type 2 diabetes mellitus, with muscle involvement without evidence of necrosis (HCC), Diabetic ulcer of right foot (HCC), Diabetic 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     Weights Reviewed: Yes   Admission weight: 84.8 kg (187 lb)   Wt Readings from Last 3 Encounters:   06/14/24 85.3 kg (188 lb)   03/18/24 99 kg (218 lb 4.8 oz)   01/24/24 90.4 kg (199 lb 3.2 oz)     Intake & Output Reviewed: Yes     Intake/Output Summary (Last 24 hours) at 6/14/2024 2113  Last data filed at 6/14/2024 2020  Gross per 24 hour   Intake 540 ml    Output 1800 ml   Net -1260 ml       ECHOCARDIOGRAM Reviewed: Yes   Patient's Ejection Fraction (EF) is less than or equal to 40%. Discuss HFrEF Guideline Directed Medical Therapy (GDMT) with Cardiologist or Hospitalist:          Medications Reviewed: Yes   SCHEDULED HOSPITAL MEDICATIONS:   insulin lispro  0-4 Units SubCUTAneous TID WC    insulin lispro  0-4 Units SubCUTAneous Nightly    losartan  12.5 mg Oral Daily    metoprolol succinate  50 mg Oral BID    empagliflozin  10 mg Oral Nightly    spironolactone  25 mg Oral Nightly    [START ON 6/15/2024] vancomycin  1,000 mg IntraVENous Q12H    vancomycin  1,750 mg IntraVENous Once    allopurinol  100 mg Oral Daily    aspirin  81 mg Oral Daily    atorvastatin  40 mg Oral Nightly    sodium chloride flush  5-40 mL IntraVENous 2 times per day    enoxaparin  40 mg SubCUTAneous Daily    levothyroxine  125 mcg Oral Daily    magnesium oxide  400 mg Oral BID    insulin glargine  70 Units SubCUTAneous Daily    ferrous sulfate  325 mg Oral Nightly    clopidogrel  75 mg Oral Daily     HOME MEDICATIONS:  Prior to Admission medications    Medication Sig Start Date End Date Taking? Authorizing Provider   ferrous sulfate (IRON 325) 325 (65 Fe) MG tablet Take 1 tablet by mouth nightly   Yes ProviderSherrie MD   Multiple Vitamins-Minerals (THERAPEUTIC MULTIVITAMIN-MINERALS) tablet Take 1 tablet by mouth daily   Yes Sherrie Thomas MD   atorvastatin (LIPITOR) 40 MG tablet TAKE ONE (1) TABLET BY MOUTH NIGHTLY 6/6/24   Gold Carmichael MD   furosemide (LASIX) 40 MG tablet TAKE TWO (2) TABLETS (80MG) BY MOUTH EVERY MORNING AND AT NIGHT 4/3/24   Gold Carmichael MD   clopidogrel (PLAVIX) 75 MG tablet TAKE ONE (1) TABLET BY MOUTH DAILY 4/3/24   Gold Carmichael MD   spironolactone (ALDACTONE) 25 MG tablet TAKE ONE (1) TABLET BY MOUTH TWO (2) TIMES DAILY  Patient taking differently: 1 tablet at bedtime 4/3/24   Gold Carmichael MD   insulin glargine (BASAGLAR KWIKPEN) 100  UNIT/ML injection pen Inject 25 Units into the skin nightly  Patient taking differently: Inject 70 Units into the skin daily 12/6/23   Florina Hoffman MD   carvedilol (COREG) 12.5 MG tablet Take 1 tablet by mouth 2 times daily (with meals) 12/6/23   Florina Hoffman MD   Insulin Pen Needle 29G X 12MM MISC 1 each by Does not apply route daily 12/6/23   Florina Hoffman MD   JARDIANCE 10 MG tablet TAKE ONE (1) TABLET BY MOUTH DAILY  Patient taking differently: Take 1 tablet by mouth nightly 11/6/23   Gold Carmichael MD   losartan (COZAAR) 25 MG tablet Take 1 tablet by mouth daily 8/14/23   Gold Carmichael MD   magnesium oxide (MAG-OX) 400 (240 Mg) MG tablet Take 1 tablet by mouth 2 times daily 8/14/23   Gold Carmichael MD   TRULICITY 3 MG/0.5ML SOPN INJECT THREE (3) MG INTO THE SKIN ONCE A WEEK 7/27/23   Leann Marie APRN - CNP   metFORMIN (GLUCOPHAGE) 1000 MG tablet TAKE ONE (1) TABLET BY MOUTH TWO (2) TIMES DAILY (WITH MEALS) 6/29/23   Julianne Ruggiero PA   allopurinol (ZYLOPRIM) 100 MG tablet TAKE TWO (2) TABLETS BY MOUTH DAILY  Patient taking differently: Take 1 tablet by mouth daily 6/14/23   Leann Marie APRN - CNP   ASPIRIN LOW DOSE 81 MG EC tablet Take 1 tablet by mouth daily 3/17/23   Basilio Clemons MD   isosorbide mononitrate (IMDUR) 30 MG extended release tablet Take 1 tablet by mouth daily 3/1/23   Gold Carmichael MD   levothyroxine (SYNTHROID) 125 MCG tablet Take 1 tablet by mouth daily  Patient taking differently: Take 1 tablet by mouth Daily 2/17/23 6/13/24  Leann Marie APRN - CNP   Blood Pressure KIT 1 kit by Does not apply route daily 10/4/22   Leann Marie APRN - CNP   BD PEN NEEDLE WINNIE U/F 32G X 4 MM MISC 1 each by Does not apply route daily 8/3/22   Hood Sanchez MD   OneTouch Delica Lancets 33G MISC USE TO CHECK FOUR TIMES DAILY. DX;E11.9 8/18/21   Hood Sanchez MD   Continuous Blood Gluc Sensor (FREESTYLE MARY 14 DAY SENSOR) [facility]

## 2024-06-15 NOTE — PROGRESS NOTES
Inpatient Occupational Therapy Evaluation and Treatment    Unit: PCU  Date:  6/15/2024  Patient Name:    Ruben Eagle  Admitting diagnosis:  Lightheadedness [R42]  Hypotension [I95.9]  Hyperglycemia [R73.9]  Hypotension, unspecified hypotension type [I95.9]  Acute kidney injury superimposed on chronic kidney disease (HCC) [N17.9, N18.9]  Congestive heart failure, unspecified HF chronicity, unspecified heart failure type (HCC) [I50.9]  Admit Date:  6/13/2024  Precautions/Restrictions/WB Status/ Lines/ Wounds/ Oxygen: Fall risk, Telemetry, and fluid restriction (2000 mL)     Pt seen for cotreatment this date due to patient safety, patient endurance, and acute illness/injury  Treatment Time:  0815-0849  Treatment Number:  1  Timed Code Treatment Minutes: 24 minutes  Total Treatment Minutes:  34  minutes    Patient Goals for Therapy: none stated       Discharge Recommendations: Home with 24/7 supervision initially  DME needs for discharge: Needs Met       Therapy recommendations for staff:   Assist of 1 for ambulation with use of No AD and gait belt to/from BSC  to/from chair  to/from bathroom    History of Present Illness:   Per chart review, copied from internal medicine MD on 6/13/24:    \"Ruben Eagle is a 58 y.o. male with a PMH of acute on chronic systolic heart failure status post pacemaker defibrillator.  CAD status post CABG, type 2 diabetes, hypertension, hyperlipidemia, who presented to ED with complaint of shortness of breath.     According to patient he was being seen at his podiatry appointment today.  Prior to seeing his doctor he had complained of some dizziness lightheadedness.  At the office found to have blood pressure of 70/50.  Advised to present to the ED for further management.  States that he has noticed shortness of breath over the last few days on activity not at rest.\"    AM-PAC Score: AM-PAC Inpatient Daily Activity Raw Score: 19     Subjective:  Patient sitting EOB with no family  of fluid size  [] Identify mL to cup conversion  [] Understanding Jello, watermelon and Ice cream contributing to fluid intake   [] Acknowledge current fluid restriction limits/orders  Pt required assistance or correction of error in the following:   [] Not appropriate for patient  -------------------------------------------------------------------------------------------------------------------------------------------------------------------------------------------------------    Pt will benefit from reinforcement of education due to   [] Readiness to learn                               [] Decreased cognition  [] Language barrier                                  [] Decreased vision/hearing  [x] First introduction to new information      [] Current Living (LTC, SNF, etc)  []Other:    Education provided via:  [x] Oral instruction  [] Demonstration  [x] Written handout    Thank you,   Electronically signed by Zonia Young OT on 6/15/2024 at 10:05 AM    Assessment:  Pt seen for Occupational therapy evaluation in acute care setting.  Pt demonstrated decreased Activity tolerance, ADLs, and Safety Awareness. Pt functioning below baseline and will likely benefit from skilled occupational therapy services to maximize safety and independence.     Recommending Home PRN assist upon discharge as patient functioning below baseline level    Goal(s) :   To be met in 3 Visits:  Bed to toilet/BSC:       Independent  Pt will complete 3/3 CHF goals     3/3    To be met in 5 Visits:  Supine to/from Sit in preparation for ADL task:   Independent  Toileting        Supervision  Grooming       Independent  Upper Body Dressing:      Independent  Lower Body Dressing:      Independent  Pt to demonstrate UE therapeutic exs x 15 reps with minimal cues    Rehabilitation Potential: Good  Strengths for achieving goals include: PLOF and Pt cooperative   Barriers to achieving goals include:  No Barriers    Plan:  To be seen 2-3 x/wk  while in  acute care setting for therapeutic exercises, bed mobility, transfers, family/patient education, ADL/IADL retraining, and energy conservation training.    Electronically signed by Zonia Young OT on 6/15/2024 at 10:05 AM      If patient discharges from this facility prior to next visit, this note will serve as the Discharge Summary

## 2024-06-15 NOTE — PROGRESS NOTES
Handoff report given to Nicci RN.  Patient is in stable condition and has no needs at this time. Call light is in reach and bed is in the lowest position.  Care is transferred at this time.

## 2024-06-15 NOTE — PROGRESS NOTES
End of shift report given to Merced RN  -  care transferred  -  patient sitting up in bed, denying complaints of pain or discomfort, watching TV.  Call light in patient's reach

## 2024-06-15 NOTE — PLAN OF CARE
Problem: Chronic Conditions and Co-morbidities  Goal: Patient's chronic conditions and co-morbidity symptoms are monitored and maintained or improved  Outcome: Progressing  HEART FAILURE CARE PLAN:    Comorbidities Reviewed: Yes   Patient has a past medical history of Acute on chronic systolic congestive heart failure (HCC), Acute osteomyelitis of left foot (HCC), Acute osteomyelitis of right foot (HCC), Acute respiratory failure (HCC), Ascites, Blood transfusion reaction, Burst fracture of lumbar vertebra (HCC), Cellulitis of left foot, Cellulitis of right lower extremity, Chronic systolic CHF (congestive heart failure) (HCC), Community acquired pneumonia, Coronary artery disease involving native coronary artery of native heart without angina pectoris, Diabetes (HCC), Diabetic ulcer of left foot associated with type 2 diabetes mellitus, with muscle involvement without evidence of necrosis (HCC), Diabetic ulcer of right foot (HCC), Diabetic 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     Weights Reviewed: Yes   Admission weight: 84.8 kg (187 lb)   Wt Readings from Last 3 Encounters:   06/14/24 85.3 kg (188 lb)   03/18/24 99 kg (218 lb 4.8 oz)   01/24/24 90.4 kg (199 lb 3.2 oz)     Intake & Output Reviewed: Yes     Intake/Output Summary (Last 24 hours) at 6/14/2024 2027  Last data filed at 6/14/2024 2020  Gross per 24 hour   Intake 540 ml   Output 1800 ml   Net -1260 ml        ECHOCARDIOGRAM Reviewed: Yes   Patient's Ejection Fraction (EF) is less than or equal to 40%. Discuss HFrEF Guideline Directed Medical Therapy (GDMT) with Cardiologist or Hospitalist:          Medications Reviewed: Yes   SCHEDULED HOSPITAL MEDICATIONS:   insulin lispro  0-4 Units SubCUTAneous TID WC    insulin lispro  0-4 Units SubCUTAneous Nightly    losartan  12.5 mg Oral Daily    metoprolol succinate  50 mg Oral BID    empagliflozin  10 mg Oral Nightly    spironolactone  25 mg Oral Nightly    [START ON 6/15/2024] vancomycin  1,000 mg IntraVENous Q12H    vancomycin  1,750 mg IntraVENous Once    allopurinol  100 mg Oral Daily    aspirin  81 mg Oral Daily    atorvastatin  40 mg Oral Nightly    sodium chloride flush  5-40 mL IntraVENous 2 times per day    enoxaparin  40 mg SubCUTAneous Daily    levothyroxine  125 mcg Oral Daily    magnesium oxide  400 mg Oral BID    insulin glargine  70 Units SubCUTAneous Daily    ferrous sulfate  325 mg Oral Nightly    clopidogrel  75 mg Oral Daily     HOME MEDICATIONS:  Prior to Admission medications    Medication Sig Start Date End Date Taking? Authorizing Provider   ferrous sulfate (IRON 325) 325 (65 Fe) MG tablet Take 1 tablet by mouth nightly   Yes ProviderSherrie MD   Multiple Vitamins-Minerals (THERAPEUTIC MULTIVITAMIN-MINERALS) tablet Take 1 tablet by mouth daily   Yes ProviderSherrie MD   atorvastatin (LIPITOR) 40 MG tablet TAKE ONE (1) TABLET BY MOUTH NIGHTLY 6/6/24   Gold Carmichael MD   furosemide (LASIX) 40 MG tablet TAKE TWO (2) TABLETS (80MG) BY MOUTH EVERY MORNING AND AT NIGHT 4/3/24   Gold Carmichael MD   clopidogrel (PLAVIX) 75 MG tablet TAKE ONE (1) TABLET BY MOUTH DAILY 4/3/24   Gold Carmichael MD   spironolactone (ALDACTONE) 25 MG tablet TAKE ONE (1) TABLET BY MOUTH TWO (2) TIMES DAILY  Patient taking differently: 1 tablet at bedtime 4/3/24   Gold Carmichael MD   insulin glargine (BASAGLAR KWIKPEN) 100 UNIT/ML injection pen Inject 25 Units

## 2024-06-15 NOTE — PROGRESS NOTES
Vancomycin Day: 2  Current Regimen: 1000 mg IV every 12 hours    Patient's labs, vitals, and vancomycin regimen reviewed.     Plan:   No change today. Vancomycin trough level ordered for 6/16 at 0500.  Pharmacy will continue to monitor and adjust as necessary.

## 2024-06-15 NOTE — PROGRESS NOTES
HEART FAILURE CARE PLAN:    Comorbidities Reviewed: Yes   Patient has a past medical history of Acute on chronic systolic congestive heart failure (HCC), Acute osteomyelitis of left foot (HCC), Acute osteomyelitis of right foot (HCC), Acute respiratory failure (HCC), Ascites, Blood transfusion reaction, Burst fracture of lumbar vertebra (HCC), Cellulitis of left foot, Cellulitis of right lower extremity, Chronic systolic CHF (congestive heart failure) (HCC), Community acquired pneumonia, Coronary artery disease involving native coronary artery of native heart without angina pectoris, Diabetes (HCC), Diabetic ulcer of left foot associated with type 2 diabetes mellitus, with muscle involvement without evidence of necrosis (HCC), Diabetic ulcer of right foot (HCC), Diabetic 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     Weights Reviewed: Yes   Admission weight: 84.8 kg (187 lb)   Wt Readings from Last 3 Encounters:   06/15/24 86.1 kg (189 lb 13.1 oz)   03/18/24 99 kg (218 lb 4.8 oz)   01/24/24 90.4 kg (199 lb 3.2 oz)     Intake & Output Reviewed: Yes     Intake/Output Summary (Last 24 hours) at 6/15/2024 1910  Last data filed at 6/15/2024 1732  Gross per 24 hour   Intake 1200 ml   Output 1800 ml   Net -600 ml       ECHOCARDIOGRAM Reviewed: Yes   Patient's Ejection Fraction (EF) is less than or equal to 40%. Discuss HFrEF Guideline Directed Medical Therapy (GDMT) with

## 2024-06-15 NOTE — PROGRESS NOTES
Mathieu HUFFMAN to make aware of a consistent cough pt developed after starting and finishing his first dose of vancomycin.    Awaiting new orders

## 2024-06-15 NOTE — CONSULTS
Pharmacy to dose IV Vanco UTI x10 days  Blood cultures just collected per RN.  Please give a one time dose of 1,750mg IV x1 now to provide Gram+ organism coverage to include MRSA.  Continue with 1g Q12hrs starting in the AM.  Trough 6/16 at 0500 Pred , Tr 15.8.  Ramana Graham [facility] PharmD 6/14/2024 8:15 PM

## 2024-06-15 NOTE — PROGRESS NOTES
Progress Note    Admit Date:  6/13/2024    Sent over from podiatry office for hypotension   BP was 70/50s  and he was symptomatic  Light-headed and dizzy     Subjective:  Mr. Eagle today is feeling ok. He reports prior MRSA infections. No recent tolbert or instrumentation.     Objective:   Patient Vitals for the past 4 hrs:   BP Temp Temp src Pulse Resp SpO2 Weight   06/15/24 0720 (!) 93/58 97.5 °F (36.4 °C) Oral 84 16 97 % --   06/15/24 0600 -- -- -- -- -- -- 86.1 kg (189 lb 13.1 oz)            Intake/Output Summary (Last 24 hours) at 6/15/2024 0828  Last data filed at 6/15/2024 0720  Gross per 24 hour   Intake 900 ml   Output 2075 ml   Net -1175 ml         Physical Exam:    Gen: No distress. Alert. Pleasant male   Eyes: PERRL. No sclera icterus. No conjunctival injection.   Neck: No JVD.  Trachea midline.  Resp: No accessory muscle use. No crackles. No wheezes. No rhonchi.   CV: Regular rate. Regular rhythm. +murmur.  No rub. No edema.    Peripheral Pulses: +2 palpable, equal bilaterally   GI: Non-tender. Non-distended.  Normal bowel sounds.  Skin: Warm and dry. No nodule on exposed extremities. No rash on exposed extremities. +right foot wrapped in ace bandage, did not remove to examine as patient just had this wrapped by podiatry   M/S: No cyanosis. No joint deformity. No clubbing.   Neuro: Awake. Grossly nonfocal    Psych: Oriented x 3. No anxiety or agitation.         Medications:  insulin lispro, 0-4 Units, TID WC  insulin lispro, 0-4 Units, Nightly  losartan, 12.5 mg, Daily  metoprolol succinate, 50 mg, BID  empagliflozin, 10 mg, Nightly  spironolactone, 25 mg, Nightly  vancomycin, 1,000 mg, Q12H  allopurinol, 100 mg, Daily  aspirin, 81 mg, Daily  atorvastatin, 40 mg, Nightly  sodium chloride flush, 5-40 mL, 2 times per day  enoxaparin, 40 mg, Daily  levothyroxine, 125 mcg, Daily  magnesium oxide, 400 mg, BID  insulin glargine, 70 Units, Daily  ferrous sulfate, 325 mg, Nightly  clopidogrel, 75 mg,  Daily      PRN Medications:  guaiFENesin-dextromethorphan, 5 mL, Q4H PRN  glucose, 4 tablet, PRN  dextrose bolus, 125 mL, PRN   Or  dextrose bolus, 250 mL, PRN  glucagon (rDNA), 1 mg, PRN  dextrose, , Continuous PRN  sodium chloride flush, 5-40 mL, PRN  sodium chloride, , PRN  potassium chloride, 40 mEq, PRN   Or  potassium alternative oral replacement, 40 mEq, PRN   Or  potassium chloride, 10 mEq, PRN  magnesium sulfate, 2,000 mg, PRN  ondansetron, 4 mg, Q8H PRN   Or  ondansetron, 4 mg, Q6H PRN  polyethylene glycol, 17 g, Daily PRN  acetaminophen, 650 mg, Q6H PRN   Or  acetaminophen, 650 mg, Q6H PRN          Data:  CBC:   Recent Labs     06/13/24  1600 06/14/24  0711   WBC 11.7* 10.4   HGB 10.6* 10.3*   HCT 33.0* 31.6*   MCV 81.8 81.4    282       BMP:   Recent Labs     06/13/24  1600 06/14/24  0711   * 133*   K 4.0 4.0   CL 88* 96*   CO2 28 24   BUN 53* 43*   CREATININE 1.8* 1.1       LIVER PROFILE:   Recent Labs     06/13/24  1600 06/14/24  0711   AST 12* 12*   ALT 13 11   BILITOT 0.7 0.6   ALKPHOS 169* 146*       PT/INR: No results for input(s): \"PROTIME\", \"INR\" in the last 72 hours.    CULTURES  Results       Procedure Component Value Units Date/Time    Culture, Blood 1 [[id number]] Collected: 06/14/24 1951    Order Status: Sent Specimen: Blood Updated: 06/15/24 0036    Culture, Blood 2 [[id number]] Collected: 06/14/24 1951    Order Status: Sent Specimen: Blood Updated: 06/15/24 0036    Culture, Urine [[id number]]  (Abnormal) Collected: 06/13/24 1849    Order Status: Completed Specimen: Urine, clean catch Updated: 06/14/24 1430     Organism Staph aureus MRSA     Urine Culture, Routine --     50,000 CFU/ml  Sensitivity to follow  CONTACT PRECAUTIONS INDICATED  PBP2= POSITIVE      Narrative:      ORDER#: [id number]                          ORDERED BY: MALLORIE SENA  SOURCE: Urine Clean Catch                  COLLECTED:  06/13/24 18:49  ANTIBIOTICS AT [place].:                      RECEIVED :  06/13/24  19:11  CALL  Irvin  [facility] tel. [phone],  Microbiology results called to and read back by Fadia Yanez RN, 06/14/2024  14:28, by [facility]    COVID-19 & Influenza Combo [[id number]] Collected: 06/13/24 1640    Order Status: Completed Specimen: Nasopharyngeal Swab Updated: 06/13/24 1713     SARS-CoV-2 RNA, RT PCR NOT DETECTED     Comment: Not Detected results do not preclude SARS-CoV-2 infection and  should not be used as the sole basis for patient management  decisions.  Results must be combined with clinical observations,  patient history, and epidemiological information.  Testing was performed using MIGUELITO ALMAS SARS-CoV-2 and Influenza A/B  nucleic acid assay. This test is a multiplex Real-Time Reverse  Transcriptase Polymerase Chain Reaction (RT-PCR)-based in vitro  diagnostic test intended for the qualitative detection of nucleic  acids from SARS-CoV-2, influenza A, and influenza B in nasopharyngeal  and nasal swab specimens for use under the FDA’s Emergency Use  Authorization (EUA) only.    Patient Fact Sheet:  https://www.fda.gov/media/255568/download  Provider Fact Sheet: https://www.fda.gov/media/406142/download  EUA: https://www.fda.gov/media/349021/download  IFU: https://www.fda.gov/media/215586/download    Methodology:  RT-PCR          Influenza A NOT DETECTED     Influenza B NOT DETECTED             RADIOLOGY  POC US ECHOCARDIO TRANSTHORACIC LTD   Final Result      XR CHEST (2 VW)   Final Result   No acute cardiopulmonary findings           Pertinent previous results reviewed      Limited Echo 4/25/2022  Summary  Limited exam for LVEF.  The left ventricular systolic function is moderately reduced with an  ejection fraction of 30-35 %.  There is diastolic septal flattening consistent with right ventricular  volume overload.  There is hypokinesis of the inferior, anteroseptal, inferoseptal and  inferolateral walls.Anterolateral wall appears normal.  Compared to exam done 1/20/2021, left ventricular systolic

## 2024-06-15 NOTE — PROGRESS NOTES
Inpatient Physical Therapy Evaluation & Treatment    Unit: PCU  Date:  6/15/2024  Patient Name:    Ruben Eagle  Admitting diagnosis:  Lightheadedness [R42]  Hypotension [I95.9]  Hyperglycemia [R73.9]  Hypotension, unspecified hypotension type [I95.9]  Acute kidney injury superimposed on chronic kidney disease (HCC) [N17.9, N18.9]  Congestive heart failure, unspecified HF chronicity, unspecified heart failure type (HCC) [I50.9]  Admit Date:  6/13/2024  Precautions/Restrictions/WB Status/ Lines/ Wounds/ Oxygen: Telemetry and fluid restriction (2000mL)      Pt seen for cotreatment this date due to patient safety, acute illness/injury, and limited functional status information    Treatment Time:  0815- 0848  Treatment Number:  1   Timed Code Treatment Minutes: 23 minutes  Total Treatment Minutes:  33  minutes    Patient Stated Goals for Therapy: \" not verbalized \"          Discharge Recommendations: Home with 24hr supervision and assist initially (fading to PRN assist)  DME needs for discharge: Needs Met       Therapy recommendation for EMS Transport: can transport by wheelchair    Therapy recommendations for staff:   Assist of 1 for ambulation with use of No AD and gait belt to/from bathroom  within room    History of Present Illness:   Per chart review, copied from internal medicine MD on 6/13/24:    \"Ruben Eagle is a 58 y.o. male with a PMH of acute on chronic systolic heart failure status post pacemaker defibrillator.  CAD status post CABG, type 2 diabetes, hypertension, hyperlipidemia, who presented to ED with complaint of shortness of breath.     According to patient he was being seen at his podiatry appointment today.  Prior to seeing his doctor he had complained of some dizziness lightheadedness.  At the office found to have blood pressure of 70/50.  Advised to present to the ED for further management.  States that he has noticed shortness of breath over the last few days on activity not at

## 2024-06-16 LAB
ANION GAP SERPL CALCULATED.3IONS-SCNC: 12 MMOL/L (ref 3–16)
BUN SERPL-MCNC: 36 MG/DL (ref 7–20)
CALCIUM SERPL-MCNC: 8.6 MG/DL (ref 8.3–10.6)
CHLORIDE SERPL-SCNC: 97 MMOL/L (ref 99–110)
CO2 SERPL-SCNC: 24 MMOL/L (ref 21–32)
CREAT SERPL-MCNC: 1.1 MG/DL (ref 0.9–1.3)
GFR SERPLBLD CREATININE-BSD FMLA CKD-EPI: 77 ML/MIN/{1.73_M2}
GLUCOSE BLD-MCNC: 120 MG/DL (ref 70–99)
GLUCOSE BLD-MCNC: 164 MG/DL (ref 70–99)
GLUCOSE BLD-MCNC: 172 MG/DL (ref 70–99)
GLUCOSE BLD-MCNC: 224 MG/DL (ref 70–99)
GLUCOSE BLD-MCNC: 89 MG/DL (ref 70–99)
GLUCOSE SERPL-MCNC: 134 MG/DL (ref 70–99)
PERFORMED ON: ABNORMAL
PERFORMED ON: NORMAL
POTASSIUM SERPL-SCNC: 4 MMOL/L (ref 3.5–5.1)
SODIUM SERPL-SCNC: 133 MMOL/L (ref 136–145)
VANCOMYCIN TROUGH SERPL-MCNC: 21.2 UG/ML (ref 10–20)

## 2024-06-16 PROCEDURE — 6370000000 HC RX 637 (ALT 250 FOR IP): Performed by: STUDENT IN AN ORGANIZED HEALTH CARE EDUCATION/TRAINING PROGRAM

## 2024-06-16 PROCEDURE — 6360000002 HC RX W HCPCS: Performed by: INTERNAL MEDICINE

## 2024-06-16 PROCEDURE — 80202 ASSAY OF VANCOMYCIN: CPT

## 2024-06-16 PROCEDURE — 6370000000 HC RX 637 (ALT 250 FOR IP): Performed by: INTERNAL MEDICINE

## 2024-06-16 PROCEDURE — 99232 SBSQ HOSP IP/OBS MODERATE 35: CPT | Performed by: INTERNAL MEDICINE

## 2024-06-16 PROCEDURE — 80048 BASIC METABOLIC PNL TOTAL CA: CPT

## 2024-06-16 PROCEDURE — 1200000000 HC SEMI PRIVATE

## 2024-06-16 PROCEDURE — 2580000003 HC RX 258: Performed by: INTERNAL MEDICINE

## 2024-06-16 PROCEDURE — 36415 COLL VENOUS BLD VENIPUNCTURE: CPT

## 2024-06-16 RX ADMIN — FERROUS SULFATE TAB 325 MG (65 MG ELEMENTAL FE) 325 MG: 325 (65 FE) TAB at 21:26

## 2024-06-16 RX ADMIN — VANCOMYCIN HYDROCHLORIDE 750 MG: 750 INJECTION, POWDER, LYOPHILIZED, FOR SOLUTION INTRAVENOUS at 21:34

## 2024-06-16 RX ADMIN — ATORVASTATIN CALCIUM 40 MG: 40 TABLET, FILM COATED ORAL at 21:26

## 2024-06-16 RX ADMIN — SPIRONOLACTONE 25 MG: 25 TABLET ORAL at 21:26

## 2024-06-16 RX ADMIN — ENOXAPARIN SODIUM 40 MG: 100 INJECTION SUBCUTANEOUS at 08:30

## 2024-06-16 RX ADMIN — VANCOMYCIN HYDROCHLORIDE 750 MG: 750 INJECTION, POWDER, LYOPHILIZED, FOR SOLUTION INTRAVENOUS at 10:57

## 2024-06-16 RX ADMIN — METOPROLOL SUCCINATE 50 MG: 50 TABLET, EXTENDED RELEASE ORAL at 08:27

## 2024-06-16 RX ADMIN — Medication 400 MG: at 21:26

## 2024-06-16 RX ADMIN — ASPIRIN 81 MG: 81 TABLET, COATED ORAL at 08:27

## 2024-06-16 RX ADMIN — SODIUM CHLORIDE, PRESERVATIVE FREE 10 ML: 5 INJECTION INTRAVENOUS at 21:27

## 2024-06-16 RX ADMIN — INSULIN GLARGINE 70 UNITS: 100 INJECTION, SOLUTION SUBCUTANEOUS at 08:30

## 2024-06-16 RX ADMIN — EMPAGLIFLOZIN 10 MG: 10 TABLET, FILM COATED ORAL at 21:26

## 2024-06-16 RX ADMIN — LOSARTAN POTASSIUM 12.5 MG: 25 TABLET, FILM COATED ORAL at 08:27

## 2024-06-16 RX ADMIN — LEVOTHYROXINE SODIUM 125 MCG: 125 TABLET ORAL at 06:14

## 2024-06-16 RX ADMIN — METOPROLOL SUCCINATE 50 MG: 50 TABLET, EXTENDED RELEASE ORAL at 21:26

## 2024-06-16 RX ADMIN — Medication 400 MG: at 08:27

## 2024-06-16 RX ADMIN — ALLOPURINOL 100 MG: 100 TABLET ORAL at 08:27

## 2024-06-16 RX ADMIN — CLOPIDOGREL BISULFATE 75 MG: 75 TABLET ORAL at 08:27

## 2024-06-16 NOTE — PROGRESS NOTES
HEART FAILURE CARE PLAN:    Comorbidities Reviewed: Yes   Patient has a past medical history of Acute on chronic systolic congestive heart failure (HCC), Acute osteomyelitis of left foot (HCC), Acute osteomyelitis of right foot (HCC), Acute respiratory failure (HCC), Ascites, Blood transfusion reaction, Burst fracture of lumbar vertebra (HCC), Cellulitis of left foot, Cellulitis of right lower extremity, Chronic systolic CHF (congestive heart failure) (HCC), Community acquired pneumonia, Coronary artery disease involving native coronary artery of native heart without angina pectoris, Diabetes (HCC), Diabetic ulcer of left foot associated with type 2 diabetes mellitus, with muscle involvement without evidence of necrosis (HCC), Diabetic ulcer of right foot (HCC), Diabetic 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     Weights Reviewed: Yes   Admission weight: 84.8 kg (187 lb)   Wt Readings from Last 3 Encounters:   06/15/24 86.1 kg (189 lb 13.1 oz)   03/18/24 99 kg (218 lb 4.8 oz)   01/24/24 90.4 kg (199 lb 3.2 oz)     Intake & Output Reviewed: Yes     Intake/Output Summary (Last 24 hours) at 6/15/2024 2020  Last data filed at 6/15/2024 1732  Gross per 24 hour   Intake 960 ml   Output 1425 ml   Net -465 ml       ECHOCARDIOGRAM Reviewed: Yes   Patient's Ejection Fraction (EF) is less than or equal to 40%. Discuss HFrEF Guideline Directed Medical Therapy (GDMT) with  Florina Hoffman MD   Insulin Pen Needle 29G X 12MM MISC 1 each by Does not apply route daily 12/6/23   Florina Hoffman MD   JARDIANCE 10 MG tablet TAKE ONE (1) TABLET BY MOUTH DAILY  Patient taking differently: Take 1 tablet by mouth nightly 11/6/23   Gold Carmichael MD   losartan (COZAAR) 25 MG tablet Take 1 tablet by mouth daily 8/14/23   Gold Carmichael MD   magnesium oxide (MAG-OX) 400 (240 Mg) MG tablet Take 1 tablet by mouth 2 times daily 8/14/23   Gold Carmichael MD   TRULICITY 3 MG/0.5ML SOPN INJECT THREE (3) MG INTO THE SKIN ONCE A WEEK 7/27/23   Leann Marie APRN - CNP   metFORMIN (GLUCOPHAGE) 1000 MG tablet TAKE ONE (1) TABLET BY MOUTH TWO (2) TIMES DAILY (WITH MEALS) 6/29/23   Julianne Ruggiero PA   allopurinol (ZYLOPRIM) 100 MG tablet TAKE TWO (2) TABLETS BY MOUTH DAILY  Patient taking differently: Take 1 tablet by mouth daily 6/14/23   Leann Marie APRN - CNP   ASPIRIN LOW DOSE 81 MG EC tablet Take 1 tablet by mouth daily 3/17/23   Basilio Clemons MD   isosorbide mononitrate (IMDUR) 30 MG extended release tablet Take 1 tablet by mouth daily 3/1/23   Gold Carmichael MD   levothyroxine (SYNTHROID) 125 MCG tablet Take 1 tablet by mouth daily  Patient taking differently: Take 1 tablet by mouth Daily 2/17/23 6/13/24  Leann Marie APRN - CNP   Blood Pressure KIT 1 kit by Does not apply route daily 10/4/22   Leann Marie APRN - CNP   BD PEN NEEDLE WINNIE U/F 32G X 4 MM MISC 1 each by Does not apply route daily 8/3/22   Hood Sanchez MD   OneTouch Delica Lancets 33G MISC USE TO CHECK FOUR TIMES DAILY. DX;E11.9 8/18/21   Hood Sanchez MD   Continuous Blood Gluc Sensor (FREESTYLE MARY 14 DAY SENSOR) MISC CGM 4/29/21   Hood Sanchez MD   Insulin Pen Needle (UNIFINE PENTIPS) 31G X 5 MM MISC USE 1 EACH DAY AS NEEDED FOR TESTING 1/3/18   Cb Murrell MD      Diet Reviewed: Yes   ADULT DIET; Regular; 5 carb choices (75 gm/meal); 2000 ml    Goal  of Care Reviewed: Yes   Patient and/or Family's stated Goal of Care this Admission:   , Reduce shortness of breath, increase activity tolerance, better understand heart failure and disease management, be more comfortable, and reduce lower extremity edema prior to discharge.     Electronically signed by Tiffanie Wyatt RN on 6/15/2024 at 8:20 PM

## 2024-06-16 NOTE — PROGRESS NOTES
Shift reassessment completed; no change in Pt status noted. Vital signs stable. No needs or complaints at this time.

## 2024-06-16 NOTE — PROGRESS NOTES
NUTRITION     Nutrition screening referral was triggered based on results obtained during nursing admission assessment for Unintentional Weight Loss. Patient weighed 198# 9 oz (standing scale weight) on 1/12/24. CBW is 189# 14.4 oz. Patient has not had any significant weight loss. Suspect weights from March 2024 were inaccurate since they vary significantly (and they were obtained on a bed scale).     The patient's chart was reviewed and nutrition assessment is not indicated at this time.  Patient will be seen per nutrition standards of care.         Bree Harris RD, LD   [phone]

## 2024-06-16 NOTE — FLOWSHEET NOTE
06/15/24 2000   Pain Assessment   Pain Assessment None - Denies Pain   Opioid-Induced Sedation   POSS Score 1     Vital signs taken and shift assessment is complete, see flow sheet. Pt denies c/o pain at this time. Pt given ice and snack per request. Pt A&OX 4 with call light within reach and bed alarm on.

## 2024-06-16 NOTE — PROGRESS NOTES
6/16  Vanc trough = 21.2 mcg/mL at 0457.  Calculated AUC of 688.  Will decrease vancomycin to 750 mg q12.  Recheck vanc trough tomorrow (6/17 at 0900).  Floyd Munguia, PharmD  6/16/2024 6:14 AM

## 2024-06-16 NOTE — PLAN OF CARE
Problem: Respiratory - Adult  Goal: Achieves optimal ventilation and oxygenation  Outcome: Progressing     Problem: Cardiovascular - Adult  Goal: Maintains optimal cardiac output and hemodynamic stability  Outcome: Progressing  Goal: Absence of cardiac dysrhythmias or at baseline  Outcome: Progressing     Problem: Chronic Conditions and Co-morbidities  Goal: Patient's chronic conditions and co-morbidity symptoms are monitored and maintained or improved  Outcome: Progressing     Problem: Safety - Adult  Goal: Free from fall injury  Outcome: Progressing     Problem: Discharge Planning  Goal: Discharge to home or other facility with appropriate resources  Outcome: Progressing

## 2024-06-16 NOTE — PROGRESS NOTES
Bedside report and transfer of care given to Sonali Valentin. Pt currently resting in bed with the call light within reach. Pt denies any other care needs at this time. Pt stable at this time.

## 2024-06-16 NOTE — PROGRESS NOTES
Progress Note    Admit Date:  6/13/2024    Sent over from podiatry office for hypotension   BP was 70/50s  and he was symptomatic  Light-headed and dizzy     Subjective:  Mr. Eagle today is feeling fine blood cx so far negative. He explains he has allergies to clindamycin and Bactrim    Objective:   Patient Vitals for the past 4 hrs:   BP Temp Temp src Pulse SpO2   06/16/24 0827 102/73 -- -- 86 --   06/16/24 0751 108/70 97.6 °F (36.4 °C) Oral 87 97 %            Intake/Output Summary (Last 24 hours) at 6/16/2024 1011  Last data filed at 6/16/2024 0600  Gross per 24 hour   Intake 1180 ml   Output 1325 ml   Net -145 ml         Physical Exam:    Gen: No distress. Alert. Pleasant male   Eyes: PERRL. No sclera icterus. No conjunctival injection.   Neck: No JVD.  Trachea midline.  Resp: No accessory muscle use. No crackles. No wheezes. No rhonchi.   CV: Regular rate. Regular rhythm. +murmur.  No rub. No edema.    Peripheral Pulses: +2 palpable, equal bilaterally   GI: Non-tender. Non-distended.  Normal bowel sounds.  Skin: Warm and dry. No nodule on exposed extremities. No rash on exposed extremities. +right foot wrapped in ace bandage, did not remove to examine as patient just had this wrapped by podiatry   M/S: No cyanosis. No joint deformity. No clubbing.   Neuro: Awake. Grossly nonfocal    Psych: Oriented x 3. No anxiety or agitation.       Medications:  vancomycin, 750 mg, Q12H  insulin lispro, 0-4 Units, TID WC  insulin lispro, 0-4 Units, Nightly  losartan, 12.5 mg, Daily  metoprolol succinate, 50 mg, BID  empagliflozin, 10 mg, Nightly  spironolactone, 25 mg, Nightly  allopurinol, 100 mg, Daily  aspirin, 81 mg, Daily  atorvastatin, 40 mg, Nightly  sodium chloride flush, 5-40 mL, 2 times per day  enoxaparin, 40 mg, Daily  levothyroxine, 125 mcg, Daily  magnesium oxide, 400 mg, BID  insulin glargine, 70 Units, Daily  ferrous sulfate, 325 mg, Nightly  clopidogrel, 75 mg, Daily      PRN

## 2024-06-16 NOTE — PROGRESS NOTES
HEART FAILURE CARE PLAN:    Comorbidities Reviewed: Yes   Patient has a past medical history of Acute on chronic systolic congestive heart failure (HCC), Acute osteomyelitis of left foot (HCC), Acute osteomyelitis of right foot (HCC), Acute respiratory failure (HCC), Ascites, Blood transfusion reaction, Burst fracture of lumbar vertebra (HCC), Cellulitis of left foot, Cellulitis of right lower extremity, Chronic systolic CHF (congestive heart failure) (HCC), Community acquired pneumonia, Coronary artery disease involving native coronary artery of native heart without angina pectoris, Diabetes (HCC), Diabetic ulcer of left foot associated with type 2 diabetes mellitus, with muscle involvement without evidence of necrosis (HCC), Diabetic ulcer of right foot (HCC), Diabetic 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     Weights Reviewed: Yes   Admission weight: 84.8 kg (187 lb)   Wt Readings from Last 3 Encounters:   06/16/24 86.1 kg (189 lb 14.4 oz)   03/18/24 99 kg (218 lb 4.8 oz)   01/24/24 90.4 kg (199 lb 3.2 oz)     Intake & Output Reviewed: Yes     Intake/Output Summary (Last 24 hours) at 6/16/2024 1204  Last data filed at 6/16/2024 0600  Gross per 24 hour   Intake 1180 ml   Output 1325 ml   Net -145 ml       ECHOCARDIOGRAM Reviewed: Yes   Patient's Ejection Fraction (EF) is less than or equal to 40%. Discuss HFrEF Guideline Directed Medical Therapy (GDMT) with  Florina Hoffman MD   Insulin Pen Needle 29G X 12MM MISC 1 each by Does not apply route daily 12/6/23   Florina Hoffman MD   JARDIANCE 10 MG tablet TAKE ONE (1) TABLET BY MOUTH DAILY  Patient taking differently: Take 1 tablet by mouth nightly 11/6/23   Gold Carmichael MD   losartan (COZAAR) 25 MG tablet Take 1 tablet by mouth daily 8/14/23   Gold Carmichael MD   magnesium oxide (MAG-OX) 400 (240 Mg) MG tablet Take 1 tablet by mouth 2 times daily 8/14/23   Gold Carmichael MD   TRULICITY 3 MG/0.5ML SOPN INJECT THREE (3) MG INTO THE SKIN ONCE A WEEK 7/27/23   Leann Marie APRN - CNP   metFORMIN (GLUCOPHAGE) 1000 MG tablet TAKE ONE (1) TABLET BY MOUTH TWO (2) TIMES DAILY (WITH MEALS) 6/29/23   Julianne Ruggiero PA   allopurinol (ZYLOPRIM) 100 MG tablet TAKE TWO (2) TABLETS BY MOUTH DAILY  Patient taking differently: Take 1 tablet by mouth daily 6/14/23   Leann Marie APRN - CNP   ASPIRIN LOW DOSE 81 MG EC tablet Take 1 tablet by mouth daily 3/17/23   Basilio Clemons MD   isosorbide mononitrate (IMDUR) 30 MG extended release tablet Take 1 tablet by mouth daily 3/1/23   Gold Carmichael MD   levothyroxine (SYNTHROID) 125 MCG tablet Take 1 tablet by mouth daily  Patient taking differently: Take 1 tablet by mouth Daily 2/17/23 6/13/24  Leann Marie APRN - CNP   Blood Pressure KIT 1 kit by Does not apply route daily 10/4/22   Leann Marie APRN - CNP   BD PEN NEEDLE WINNIE U/F 32G X 4 MM MISC 1 each by Does not apply route daily 8/3/22   Hood Sanchez MD   OneTouch Delica Lancets 33G MISC USE TO CHECK FOUR TIMES DAILY. DX;E11.9 8/18/21   Hood Sanchez MD   Continuous Blood Gluc Sensor (FREESTYLE MARY 14 DAY SENSOR) MISC CGM 4/29/21   Hood Sanchez MD   Insulin Pen Needle (UNIFINE PENTIPS) 31G X 5 MM MISC USE 1 EACH DAY AS NEEDED FOR TESTING 1/3/18   Cb Murrell MD      Diet Reviewed: Yes   ADULT DIET; Regular; 5 carb choices (75 gm/meal); 2000 ml    Goal

## 2024-06-17 VITALS
DIASTOLIC BLOOD PRESSURE: 71 MMHG | RESPIRATION RATE: 18 BRPM | HEIGHT: 72 IN | BODY MASS INDEX: 25.76 KG/M2 | OXYGEN SATURATION: 99 % | TEMPERATURE: 97.7 F | SYSTOLIC BLOOD PRESSURE: 105 MMHG | WEIGHT: 190.2 LBS | HEART RATE: 82 BPM

## 2024-06-17 LAB
ANION GAP SERPL CALCULATED.3IONS-SCNC: 9 MMOL/L (ref 3–16)
BUN SERPL-MCNC: 33 MG/DL (ref 7–20)
CALCIUM SERPL-MCNC: 8.6 MG/DL (ref 8.3–10.6)
CHLORIDE SERPL-SCNC: 96 MMOL/L (ref 99–110)
CO2 SERPL-SCNC: 24 MMOL/L (ref 21–32)
CREAT SERPL-MCNC: 1 MG/DL (ref 0.9–1.3)
DEPRECATED RDW RBC AUTO: 20.7 % (ref 12.4–15.4)
GFR SERPLBLD CREATININE-BSD FMLA CKD-EPI: 87 ML/MIN/{1.73_M2}
GLUCOSE BLD-MCNC: 102 MG/DL (ref 70–99)
GLUCOSE BLD-MCNC: 111 MG/DL (ref 70–99)
GLUCOSE BLD-MCNC: 173 MG/DL (ref 70–99)
GLUCOSE SERPL-MCNC: 109 MG/DL (ref 70–99)
HCT VFR BLD AUTO: 28 % (ref 40.5–52.5)
HGB BLD-MCNC: 9.3 G/DL (ref 13.5–17.5)
MCH RBC QN AUTO: 26.5 PG (ref 26–34)
MCHC RBC AUTO-ENTMCNC: 33.3 G/DL (ref 31–36)
MCV RBC AUTO: 79.5 FL (ref 80–100)
PERFORMED ON: ABNORMAL
PLATELET # BLD AUTO: 252 K/UL (ref 135–450)
PMV BLD AUTO: 6.6 FL (ref 5–10.5)
POTASSIUM SERPL-SCNC: 3.8 MMOL/L (ref 3.5–5.1)
RBC # BLD AUTO: 3.52 M/UL (ref 4.2–5.9)
SODIUM SERPL-SCNC: 129 MMOL/L (ref 136–145)
VANCOMYCIN TROUGH SERPL-MCNC: 23 UG/ML (ref 10–20)
WBC # BLD AUTO: 8.5 K/UL (ref 4–11)

## 2024-06-17 PROCEDURE — 6360000002 HC RX W HCPCS: Performed by: INTERNAL MEDICINE

## 2024-06-17 PROCEDURE — 36415 COLL VENOUS BLD VENIPUNCTURE: CPT

## 2024-06-17 PROCEDURE — 6370000000 HC RX 637 (ALT 250 FOR IP): Performed by: INTERNAL MEDICINE

## 2024-06-17 PROCEDURE — 2580000003 HC RX 258: Performed by: INTERNAL MEDICINE

## 2024-06-17 PROCEDURE — 80202 ASSAY OF VANCOMYCIN: CPT

## 2024-06-17 PROCEDURE — 6370000000 HC RX 637 (ALT 250 FOR IP): Performed by: STUDENT IN AN ORGANIZED HEALTH CARE EDUCATION/TRAINING PROGRAM

## 2024-06-17 PROCEDURE — 85027 COMPLETE CBC AUTOMATED: CPT

## 2024-06-17 PROCEDURE — 80048 BASIC METABOLIC PNL TOTAL CA: CPT

## 2024-06-17 PROCEDURE — 99239 HOSP IP/OBS DSCHRG MGMT >30: CPT | Performed by: INTERNAL MEDICINE

## 2024-06-17 RX ORDER — SPIRONOLACTONE 25 MG/1
25 TABLET ORAL NIGHTLY
COMMUNITY
Start: 2024-06-17

## 2024-06-17 RX ORDER — INSULIN GLARGINE 100 [IU]/ML
70 INJECTION, SOLUTION SUBCUTANEOUS DAILY
COMMUNITY
Start: 2024-06-17

## 2024-06-17 RX ORDER — ALLOPURINOL 100 MG/1
100 TABLET ORAL DAILY
COMMUNITY
Start: 2024-06-17

## 2024-06-17 RX ORDER — DOXYCYCLINE HYCLATE 100 MG
100 TABLET ORAL 2 TIMES DAILY
Qty: 14 TABLET | Refills: 0 | Status: SHIPPED | OUTPATIENT
Start: 2024-06-17 | End: 2024-06-24

## 2024-06-17 RX ORDER — FUROSEMIDE 40 MG/1
40 TABLET ORAL 2 TIMES DAILY
COMMUNITY
Start: 2024-06-17

## 2024-06-17 RX ORDER — LOSARTAN POTASSIUM 25 MG/1
12.5 TABLET ORAL DAILY
Qty: 30 TABLET | Refills: 1 | Status: SHIPPED | OUTPATIENT
Start: 2024-06-17

## 2024-06-17 RX ORDER — FUROSEMIDE 40 MG/1
40 TABLET ORAL 2 TIMES DAILY
Status: DISCONTINUED | OUTPATIENT
Start: 2024-06-17 | End: 2024-06-17 | Stop reason: HOSPADM

## 2024-06-17 RX ORDER — METOPROLOL SUCCINATE 50 MG/1
50 TABLET, EXTENDED RELEASE ORAL 2 TIMES DAILY
Qty: 60 TABLET | Refills: 1 | Status: SHIPPED | OUTPATIENT
Start: 2024-06-17

## 2024-06-17 RX ORDER — LEVOTHYROXINE SODIUM 0.12 MG/1
125 TABLET ORAL DAILY
COMMUNITY
Start: 2024-06-17

## 2024-06-17 RX ADMIN — FUROSEMIDE 40 MG: 40 TABLET ORAL at 10:13

## 2024-06-17 RX ADMIN — CLOPIDOGREL BISULFATE 75 MG: 75 TABLET ORAL at 10:04

## 2024-06-17 RX ADMIN — SODIUM CHLORIDE, PRESERVATIVE FREE 10 ML: 5 INJECTION INTRAVENOUS at 10:05

## 2024-06-17 RX ADMIN — ASPIRIN 81 MG: 81 TABLET, COATED ORAL at 10:04

## 2024-06-17 RX ADMIN — LEVOTHYROXINE SODIUM 125 MCG: 125 TABLET ORAL at 05:19

## 2024-06-17 RX ADMIN — ENOXAPARIN SODIUM 40 MG: 100 INJECTION SUBCUTANEOUS at 12:11

## 2024-06-17 RX ADMIN — Medication 400 MG: at 10:12

## 2024-06-17 RX ADMIN — INSULIN GLARGINE 70 UNITS: 100 INJECTION, SOLUTION SUBCUTANEOUS at 10:04

## 2024-06-17 RX ADMIN — METOPROLOL SUCCINATE 50 MG: 50 TABLET, EXTENDED RELEASE ORAL at 10:13

## 2024-06-17 RX ADMIN — ALLOPURINOL 100 MG: 100 TABLET ORAL at 10:07

## 2024-06-17 RX ADMIN — LOSARTAN POTASSIUM 12.5 MG: 25 TABLET, FILM COATED ORAL at 10:04

## 2024-06-17 NOTE — PROGRESS NOTES
Bedside report and transfer of care given to JUAN Salgado. Pt currently resting in bed with the call light within reach. Pt denies any other care needs at this time. Pt stable at this time.

## 2024-06-17 NOTE — PROGRESS NOTES
Pharmacy Vancomycin Consult     Vancomycin Day: 4  Current Dosinmg q12h  Current indication: UTI    Recent Labs     24  0457 24  0547   BUN 36* 33*   CREATININE 1.1 1.0   WBC  --  8.5       Intake/Output Summary (Last 24 hours) at 2024 0903  Last data filed at 2024 0230  Gross per 24 hour   Intake 442 ml   Output 1500 ml   Net -1058 ml     Culture Date      Source                       Results      Ht Readings from Last 1 Encounters:   24 1.829 m (6')        Wt Readings from Last 1 Encounters:   24 86.3 kg (190 lb 3.2 oz)       Body mass index is 25.8 kg/m².    Estimated Creatinine Clearance: 88 mL/min (based on SCr of 1 mg/dL).    Trough: 23 (drawn early not true trough)    Assessment/Plan:  Continue current regimen of 750mg q12h. Trough was drawn early and with slightly improved renal function the predicted AUC is 538 with a trough of 17.1

## 2024-06-17 NOTE — FLOWSHEET NOTE
06/16/24 2120   Pain Assessment   Pain Assessment None - Denies Pain   Opioid-Induced Sedation   POSS Score 1     Vital signs taken and shift assessment is complete, see flow sheet. Pt denies c/o pain or further needs at this time. Pt A&OX 4 with call light within reach and uses appropriately.

## 2024-06-17 NOTE — CARE COORDINATION
CM delivered 2nd IMM delivered within 4 hours for DC, verbal explanation of patient rights at bedside. Pt voiced understanding of discharge MCR rights and is agreeable to discharge.

## 2024-06-17 NOTE — FLOWSHEET NOTE
06/17/24 0730   Vital Signs   Temp 97.7 °F (36.5 °C)   Temp Source Oral   Pulse 80   Heart Rate Source Monitor   Respirations 18   BP 96/69   MAP (Calculated) 78   BP Method Automatic   Patient Position Semi fowlers   Oxygen Therapy   SpO2 100 %   O2 Device None (Room air)     AM ASSESSMENT COMPLETED. PT STATED HE FELT MUCH BETTER TODAY. PT HOPEFUL TO GO HOME. DR KM LINDER, WILL PLAN TO DC HOME TODAY. WILL CONTINUE TO MONITOR.

## 2024-06-17 NOTE — CARE COORDINATION
Did not see pt for assessment. Pt admitted after hours on Friday and has been Dc'd. Spoke to pt at bedside and he denies any DC or DME needs. He states he has transport home.

## 2024-06-17 NOTE — CONSULTS
Clinical Pharmacy Progress Note   Heart Failure Medication Review    NAME:  Ruben Eagle  AGE: 58 y.o.   GENDER: male  : 1965  TODAY'S DATE:  2024    PAST MEDICAL HISTORY:      Diagnosis Date    Acute on chronic systolic congestive heart failure ([facility]) 2013    Acute osteomyelitis of left foot ([facility]) 2017    Acute osteomyelitis of right foot ([facility]) 2016    Acute respiratory failure ([facility]) 2020    Ascites     Blood transfusion reaction     Burst fracture of lumbar vertebra ([facility]) 2012    Cellulitis of left foot     Cellulitis of right lower extremity ,     Chronic systolic CHF (congestive heart failure) ([facility])     Community acquired pneumonia     Coronary artery disease involving native coronary artery of native heart without angina pectoris 2020    Diabetes ([facility])     Diabetic ulcer of left foot associated with type 2 diabetes mellitus, with muscle involvement without evidence of necrosis ([facility]) 2017    Diabetic ulcer of right foot ([facility]) early     Diabetic ulcer of 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 2 diabetes mellitus, with necrosis of bone ([facility])     ETOH abuse     Fracture of tibial plateau 2012    High cholesterol     History of blood transfusion     reaction    HTN (hypertension)     Hx of blood clots     MI (myocardial infarction) ([facility]) 2020    + Troponins    MRSA (methicillin resistant staph aureus) culture positive 16, 16    foot wound    Neuropathic ulcer of left foot, limited to breakdown of skin ([facility]) 2016    Neuropathic ulcer of toe ([facility]) 2014    NSVT (nonsustained ventricular tachycardia) ([facility])     Pleural effusion due to congestive heart failure ([facility])     Septicemia ([facility])     Smoker     quit 1985    Systolic CHF, acute ([facility]) 2013    Thyroid disease      HOME MEDICATIONS:  Prior to Admission medications    Medication Sig Start Date End Date Taking? Authorizing Provider   insulin glargine (BASAGLAR  KWIKPEN) 100 UNIT/ML injection pen Inject 70 Units into the skin daily 6/17/24  Yes Mackenzie Omer MD   empagliflozin (JARDIANCE) 10 MG tablet Take 1 tablet by mouth nightly 6/17/24  Yes Mackenzie Omer MD   losartan (COZAAR) 25 MG tablet Take 0.5 tablets by mouth daily Dose reduced 6/17/24  Yes Mackenzie Omer MD   metoprolol succinate (TOPROL XL) 50 MG extended release tablet Take 1 tablet by mouth in the morning and at bedtime 6/17/24  Yes Mackenzie Omer MD   furosemide (LASIX) 40 MG tablet Take 1 tablet by mouth 2 times daily Dose reduced .     Take extra dose of lasix 40 mg if you have shortness of breath or leg edema 6/17/24  Yes Mackenzie Omer MD   spironolactone (ALDACTONE) 25 MG tablet 1 tablet at bedtime 6/17/24  Yes Mackenzie Omer MD   allopurinol (ZYLOPRIM) 100 MG tablet Take 1 tablet by mouth daily 6/17/24  Yes Mackenzie Omer MD   doxycycline hyclate (VIBRA-TABS) 100 MG tablet Take 1 tablet by mouth 2 times daily for 7 days 6/17/24 6/24/24 Yes Mackenzie Omer MD   levothyroxine (SYNTHROID) 125 MCG tablet Take 1 tablet by mouth Daily 6/17/24  Yes Mackenzie Omer MD   ferrous sulfate (IRON 325) 325 (65 Fe) MG tablet Take 1 tablet by mouth nightly   Yes Sherrie Thomas MD   Multiple Vitamins-Minerals (THERAPEUTIC MULTIVITAMIN-MINERALS) tablet Take 1 tablet by mouth daily   Yes Sherrie Thomas MD   atorvastatin (LIPITOR) 40 MG tablet TAKE ONE (1) TABLET BY MOUTH NIGHTLY 6/6/24   Gold Carmichael MD   clopidogrel (PLAVIX) 75 MG tablet TAKE ONE (1) TABLET BY MOUTH DAILY 4/3/24   Gold Carmichael MD   furosemide (LASIX) 40 MG tablet TAKE TWO (2) TABLETS (80MG) BY MOUTH EVERY MORNING AND AT NIGHT 4/3/24 6/17/24  Gold Carmichael MD   spironolactone (ALDACTONE) 25 MG tablet TAKE ONE (1) TABLET BY MOUTH TWO (2) TIMES DAILY  Patient taking differently: 1 tablet at bedtime 4/3/24 6/17/24  Gold Carmichael MD   Insulin Pen Needle 29G X 12MM MISC 1 each by Does not apply route daily 12/6/23    Florina Hoffman MD   insulin glargine (BASAGLAR KWIKPEN) 100 UNIT/ML injection pen Inject 25 Units into the skin nightly  Patient taking differently: Inject 70 Units into the skin daily 12/6/23 6/17/24  Florina Hoffman MD   carvedilol (COREG) 12.5 MG tablet Take 1 tablet by mouth 2 times daily (with meals) 12/6/23 6/17/24  Florina Hoffman MD   JARDIANCE 10 MG tablet TAKE ONE (1) TABLET BY MOUTH DAILY  Patient taking differently: Take 1 tablet by mouth nightly 11/6/23 6/17/24  Gold Carmichael MD   magnesium oxide (MAG-OX) 400 (240 Mg) MG tablet Take 1 tablet by mouth 2 times daily 8/14/23   Gold Carmichael MD   losartan (COZAAR) 25 MG tablet Take 1 tablet by mouth daily 8/14/23 6/17/24  Gold Carmichael MD   TRULICITY 3 MG/0.5ML SOPN INJECT THREE (3) MG INTO THE SKIN ONCE A WEEK 7/27/23   Leann Marie APRN - CNP   metFORMIN (GLUCOPHAGE) 1000 MG tablet TAKE ONE (1) TABLET BY MOUTH TWO (2) TIMES DAILY (WITH MEALS) 6/29/23   Julianne Ruggiero PA   allopurinol (ZYLOPRIM) 100 MG tablet TAKE TWO (2) TABLETS BY MOUTH DAILY  Patient taking differently: Take 1 tablet by mouth daily 6/14/23 6/17/24  Leann Marie APRN - CNP   ASPIRIN LOW DOSE 81 MG EC tablet Take 1 tablet by mouth daily 3/17/23   Basilio Clemons MD   isosorbide mononitrate (IMDUR) 30 MG extended release tablet Take 1 tablet by mouth daily 3/1/23 6/17/24  Gold Carmichael MD   levothyroxine (SYNTHROID) 125 MCG tablet Take 1 tablet by mouth daily  Patient taking differently: Take 1 tablet by mouth Daily 2/17/23 6/17/24  Leann Marie APRN - CNP   Blood Pressure KIT 1 kit by Does not apply route daily 10/4/22   Marie, Lenan M, APRN - CNP   BD PEN NEEDLE WINNIE U/F 32G X 4 MM MISC 1 each by Does not apply route daily 8/3/22   Hood Sanchez MD   OneTouch Delica Lancets 33G MISC USE TO CHECK FOUR TIMES DAILY. DX;E11.9 8/18/21   Hood Sanchez MD   Continuous Blood Gluc Sensor (FREESTYLE MARY 14 DAY SENSOR) MISC CGM

## 2024-06-17 NOTE — PROGRESS NOTES
(RT-PCR)-based in vitro  diagnostic test intended for the qualitative detection of nucleic  acids from SARS-CoV-2, influenza A, and influenza B in nasopharyngeal  and nasal swab specimens for use under the FDA’s Emergency Use  Authorization (EUA) only.    Patient Fact Sheet:  https://www.fda.gov/media/000483/download  Provider Fact Sheet: https://www.fda.gov/media/356025/download  EUA: https://www.fda.gov/media/297217/download  IFU: https://www.fda.gov/media/242599/download    Methodology:  RT-PCR          Influenza A NOT DETECTED     Influenza B NOT DETECTED             RADIOLOGY  POC US ECHOCARDIO TRANSTHORACIC LTD   Final Result      XR CHEST (2 VW)   Final Result   No acute cardiopulmonary findings               Echocardiogram from March 2024   Summary   LV systolic function is severely decreased with EF estimated <20%.   Severe global hypokinesis.   There is systolic and diastolic septal flattening c/w RV pressure and volume   overload.   LV cavity appears mildly dilated.   Grade II diastolic dysfunction with elevated left ventricular filling   pressure.   The left atrium is moderately dilated.   Right-sided chamber enlargement.   Moderate mitral regurgitation.   Moderate-severe tricuspid regurgitation.   Mild pulmonic regurgitation.   Pacemaker / ICD lead is visualized in the right-sided chambers.   Systolic pulmonary artery pressure (SPAP) is estimated at 55 mmHg c/w   moderate pulm HTN (RAP of 15 mmHg).   Compared to exam done 4/25/2022, left ventricular systolic function has   decreased.        Assessment/Plan:    #Symptomatic hypotension -acute on chronic with hx of HTN   #Hyponatremia   #KRYSTAL   -Cr 1.8 on presentation, ~1.0 at baseline  -IVF bolus given and Cr improved to 1.1  -monitor intake and output  -orthostatics pending - negative  -PT/OT   -cardiology consulted for BP med adjustment      _Hypotension resolved dizziness resolved.  KRYSTAL resolved creatinine is down to 1.0 today    #Elevated troponin    -67--> 58, trend   -no CP   -EKG without acute ischemic changes  -could be 2/2 to renal function     #Pyuria MRSA UTI  -He reports he was having some dysuria when he thought about it  -urine culture +MRSA  -IV rocephin stopped  - Blood Cx ordered - so far now growth  - IV vancomycin ordered, monitor for toxicity  - follow-up culture data  Cultures grew MRSA blood cultures no growth to date  - possible dc tomorrow if blood cultures negative  MRSA UTI patient has been treated with vancomycin  Charge on oral antibiotics      #Alcoholic and ischemic cardiomyopathy   #Chronic systolic HF   -s/p ICD with optivol 2014  Recent echocardiogram EF of 20%  -on jardiance   -on lasix, aldactone, losartan- hold for now, doses may need to be reduced   -not acutely decompensated  -cardiology consulted, appreciate assistance with medications   Patient has been seen by cardiology they have adjusted medications.  Signed off a couple of days ago. .  Currently patient has been resumed on Aldactone, resumed on losartan.  Coreg discontinued and started on Toprol.  Imdur has been discontinued  Cardiology recommending resuming Lasix on discharge.  Creatinine stable today.  Systolic blood pressures .  I will resume Lasix at 40 mg twice daily. (Dose decreased from 80 mg twice daily prior to admission)  Has follow-up cardiology appointment with Dr. Carmichael on July 9.    #CAD s/p CABG  -on ASA, statin, plavix, BB    #Recent right partial foot amputation   -completed course of antibiotics s/p wound debridement with podiatry   -follows with podiatry outpatient, just seen end of last week in office, healing well     Patient to follow-up with the wound clinic with podiatry    #DM type 2  -lantus   -SSI   -monitor BG   Can resume metformin on discharge    #Hx of TIFFANIE  -on ferrous sulfate     #Hx of NSVT   -follows with EP   -on coreg     #Hypothyroidism   -synthroid     #Chronic gout   -on allopurinol     Hyponatremia   -resumed on

## 2024-06-17 NOTE — DISCHARGE SUMMARY
Name:  Ruben Eagle  Room:  /0325-02  MRN:    [id number]    Discharge Summary      This discharge summary is in conjunction with a complete physical exam done on the day of discharge.    Discharging Physician: Dr. Omer      Admit: 6/13/2024  Discharge:  6/17/2024    HPI taken from admission H&P:    Ruben Eagle is a 58 y.o. male with a PMH of acute on chronic systolic heart failure status post pacemaker defibrillator.  CAD status post CABG, type 2 diabetes, hypertension, hyperlipidemia, who presented to ED with complaint of shortness of breath.     According to patient he was being seen at his podiatry appointment today.  Prior to seeing his doctor he had complained of some dizziness lightheadedness.  At the office found to have blood pressure of 70/50.  Advised to present to the ED for further management.  States that he has noticed shortness of breath over the last few days on activity not at rest.     Labs show sodium of 131 chloride of 88 BUN of 53 creatinine of 1.8 lactate of 2.2/1.6 glucose of 367 procalcitonin 0[place]24[place] 0[address], troponin of 67/58, WBC of 11.7 hemoglobin of 10.6.  Influenza A/B/COVID-negative.  Urinalysis concerning for UTI.  Chest x-ray shows no acute cardiopulmonary process.  In the ED patient received 1 L bolus NS and 1 g of Rocephin     Diagnoses this Admission and Hospital Course    #Symptomatic hypotension -acute on chronic with hx of HTN   #Hyponatremia   #KRYSTAL   -Cr 1.8 on presentation, ~1.0 at baseline  -IVF bolus given and Cr improved to 1.1  -monitor intake and output  -orthostatics pending - negative  -PT/OT   -cardiology consulted for BP med adjustment       _>  Hypotension resolved; dizziness resolved.  KRYSTAL resolved. creatinine is down to 1.0 today     #Elevated troponin   -67--> 58, trend   -no CP   -EKG without acute ischemic changes  -could be 2/2 to renal function      #Pyuria MRSA UTI  -He reports he was having some dysuria when he thought about it  -urine  31G X 5 MM Misc  Commonly known as: Unifine Pentips  USE 1 EACH DAY AS NEEDED FOR TESTING     * BD Pen Needle Daria U/F 32G X 4 MM Misc  Generic drug: Insulin Pen Needle  1 each by Does not apply route daily     * Insulin Pen Needle 29G X 12MM Misc  1 each by Does not apply route daily     Jardiance 10 MG tablet  Generic drug: empagliflozin     levothyroxine 125 MCG tablet  Commonly known as: SYNTHROID     magnesium oxide 400 (240 Mg) MG tablet  Commonly known as: MAG-OX  Take 1 tablet by mouth 2 times daily     metFORMIN 1000 MG tablet  Commonly known as: GLUCOPHAGE  TAKE ONE (1) TABLET BY MOUTH TWO (2) TIMES DAILY (WITH MEALS)     OneTouch Delica Lancets 33G Misc  USE TO CHECK FOUR TIMES DAILY. DX;E11.9     spironolactone 25 MG tablet  Commonly known as: ALDACTONE     therapeutic multivitamin-minerals tablet     Trulicity 3 MG/0.5ML Sopn  Generic drug: Dulaglutide  INJECT THREE (3) MG INTO THE SKIN ONCE A WEEK           * This list has 3 medication(s) that are the same as other medications prescribed for you. Read the directions carefully, and ask your doctor or other care provider to review them with you.                STOP taking these medications      carvedilol 12.5 MG tablet  Commonly known as: COREG     isosorbide mononitrate 30 MG extended release tablet  Commonly known as: IMDUR               Where to Get Your Medications        These medications were sent to [facility] [place] [address] - P [phone] - F [phone]  Formerly named [facility] [address] [place] [id number]      Phone: [phone]   doxycycline hyclate 100 MG tablet  losartan 25 MG tablet  metoprolol succinate 50 MG extended release tablet         Discharged in stable condition to home.    Follow Up:  Follow up with PCP in 1 week.  Follow up with cardiology on 7/9/24 as scheduled.  Follow up with podiatry/W[facility].    Mackenzie Omer MD

## 2024-06-17 NOTE — PROGRESS NOTES
Received call from [facility] that pt Vancomycin level is 23.0. Writer called pharmacy and spoke to Tee. Hold next dose of Vancomycin.

## 2024-06-17 NOTE — PROGRESS NOTES
HEART FAILURE CARE PLAN:    Comorbidities Reviewed: Yes   Patient has a past medical history of Acute on chronic systolic congestive heart failure (HCC), Acute osteomyelitis of left foot (HCC), Acute osteomyelitis of right foot (HCC), Acute respiratory failure (HCC), Ascites, Blood transfusion reaction, Burst fracture of lumbar vertebra (HCC), Cellulitis of left foot, Cellulitis of right lower extremity, Chronic systolic CHF (congestive heart failure) (HCC), Community acquired pneumonia, Coronary artery disease involving native coronary artery of native heart without angina pectoris, Diabetes (HCC), Diabetic ulcer of left foot associated with type 2 diabetes mellitus, with muscle involvement without evidence of necrosis (HCC), Diabetic ulcer of right foot (HCC), Diabetic 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     Weights Reviewed: Yes   Admission weight: 84.8 kg (187 lb)   Wt Readings from Last 3 Encounters:   06/16/24 86.1 kg (189 lb 14.4 oz)   03/18/24 99 kg (218 lb 4.8 oz)   01/24/24 90.4 kg (199 lb 3.2 oz)     Intake & Output Reviewed: Yes     Intake/Output Summary (Last 24 hours) at 6/16/2024 2141  Last data filed at 6/16/2024 2120  Gross per 24 hour   Intake 1022 ml   Output 1825 ml   Net -803 ml       ECHOCARDIOGRAM Reviewed: Yes   Patient's Ejection Fraction (EF) is less than or equal to 40%. Discuss HFrEF Guideline Directed Medical Therapy (GDMT) with  of Care Reviewed: Yes   Patient and/or Family's stated Goal of Care this Admission:   , Reduce shortness of breath, increase activity tolerance, better understand heart failure and disease management, be more comfortable, and reduce lower extremity edema prior to discharge.     Electronically signed by Tiffanie Wyatt RN on 6/16/2024 at 9:41 PM

## 2024-06-18 ENCOUNTER — FOLLOWUP TELEPHONE ENCOUNTER (OUTPATIENT)
Dept: ADMINISTRATIVE | Age: 59
End: 2024-06-18

## 2024-06-18 NOTE — TELEPHONE ENCOUNTER
Heart Failure Follow-Up Call:    Call within 72 Hours of Discharge: Yes     Patient: Ruben Eagle   Patient : 1965   MRN: [id number]    Date of discharge: 24    Discharge department/facility: [facility]    Discharge Disposition: Home    RARS: Readmission Risk Score: 17.7    Spoke with: Ruben Miranda is doing well since leaving yesterday. Denies any new SOB or swelling. Follows up with Dr. Jarrell for podiatry tomorrow. Patient follows up with PCP on Thursday. Reviewed AVS and medication changes. Patient was questioning the reduced dosage of Metformin and Magnesium. Blood sugar this morning was 137. Instructed patient to review with PCP on Thursday.  Reinforced Heart Failure education on: signs/symptoms to monitor, medications, daily weights, low sodium diet, 2000 ml fluid restriction, and activity. Provided Heart Failure Nurse number at [phone] for any further questions or assistance with resources.     Follow Up  Future Appointments   Date Time Provider Department Center   2024  8:00 AM Gold Carmichael MD P [facility]       Electronically signed by Ann Marie Hdz MSN, RN  on 2024 at 2:22 PM

## 2024-06-19 LAB
BACTERIA BLD CULT ORG #2: NORMAL
BACTERIA BLD CULT: NORMAL

## 2024-06-20 ENCOUNTER — TELEPHONE (OUTPATIENT)
Dept: CARDIOLOGY CLINIC | Age: 59
End: 2024-06-20

## 2024-06-20 NOTE — TELEPHONE ENCOUNTER
Leann Marie pcp  Admit 6/13 stopped coreg and imdur and decreased lasix to 40 mg twice and decreased losartan to 12.5 mg qd     Started on metoprolol succinate 50 mg bid    At pcp today 90/50 asymptomatic.      Do you want to change any of these medications?  He has OV with you on the 9th.

## 2024-06-26 NOTE — PROGRESS NOTES
[facility] [place] Office Note  2024     Subjective:  Mr. Eagle presents today for follow up of   CABG x 3  on 2020, Non ischemic (alcohol) and ischemic cardiomyopathy s/p ICD  and Chronic Systolic CHF  C/O cough since last hospitalization for low BP 24.    [place]:  Hernia repaired 24 by Dr. Guajardo. Admitted to [facility] with a diabetic foot infection 3/13/24. Admitted 24 with hypotension, switched coreg to metoprolol 50mg twice daily, decreased losartan from 25mg to 12.5mg daily. Held imdur until appointment with you. Reduced lasix to half dose. Now on 40mg BID. Cut back on aldactone to half now on 25mg daily.           Echo 3/13/24 EF <20%, severe global hypokinesis, grade II DD, mod-severe TR, mild [place], moderate MR. Device check 24 1.67 years SUMAN. RV pacing 0%.     Today, he reports since changing to metoprolol he has a nagging cough. He reports that he has noticed swelling since being discharged and has gained 10lbs.  He has been checking his BP at home, averaging 117-122/70's.        PMH:  Alcoholic CMP, ICD placement  by Dr. MONTSERRAT Moon for non ischemic CM with low EF that failed to improve with guide line based medical therapy. He is no longer drinking ETOH products. On 19 he underwent left foot ulcer debridement. He works for [employer]. A left heart cath performed 20 demonstrated multivessel CAD. He then underwent a CABG x 3 on 2020.   OV 2023 stopped glipizide and started jardiance    Review of Systems: 12 point ROS negative in all areas as listed below except as in [place]  Constitutional, EENT,pulmonary, GI, ,  skin, neurological, hematological, endocrine, Psychiatric    Reviewed past medical history, social, and family history.   No alcohol nonsmoker  HE  IS DISABLED  Mother: Heart disease, MI x4, young age.   Father: Heart disease, CABG age 72.   Siblings: brother  age 44 of CHF, CVA  Past Medical History:   Diagnosis Date    Acute on chronic

## 2024-07-01 ENCOUNTER — TELEPHONE (OUTPATIENT)
Dept: CARDIOLOGY CLINIC | Age: 59
End: 2024-07-01

## 2024-07-01 NOTE — TELEPHONE ENCOUNTER
Called and discussed  device reading with Ruben. He is feeling okay and denies any SOB, cough, swelling or increase in abdominal girth. Overall he says he feels better than he did. He was hospitalized in mid June with a UTI which may have contributed to his abnormal thoracic impedance reading.  Will continue current medications and advised to monitor for symptoms. He has appt next week with Dr. Carmichael.

## 2024-07-09 ENCOUNTER — OFFICE VISIT (OUTPATIENT)
Dept: CARDIOLOGY CLINIC | Age: 59
End: 2024-07-09
Payer: MEDICARE

## 2024-07-09 VITALS
DIASTOLIC BLOOD PRESSURE: 72 MMHG | BODY MASS INDEX: 27.2 KG/M2 | OXYGEN SATURATION: 98 % | SYSTOLIC BLOOD PRESSURE: 120 MMHG | HEIGHT: 72 IN | HEART RATE: 75 BPM | WEIGHT: 200.8 LBS

## 2024-07-09 DIAGNOSIS — Z79.899 MEDICATION MANAGEMENT: ICD-10-CM

## 2024-07-09 PROCEDURE — G8417 CALC BMI ABV UP PARAM F/U: HCPCS | Performed by: INTERNAL MEDICINE

## 2024-07-09 PROCEDURE — 1036F TOBACCO NON-USER: CPT | Performed by: INTERNAL MEDICINE

## 2024-07-09 PROCEDURE — G8427 DOCREV CUR MEDS BY ELIG CLIN: HCPCS | Performed by: INTERNAL MEDICINE

## 2024-07-09 PROCEDURE — 1111F DSCHRG MED/CURRENT MED MERGE: CPT | Performed by: INTERNAL MEDICINE

## 2024-07-09 PROCEDURE — 3078F DIAST BP <80 MM HG: CPT | Performed by: INTERNAL MEDICINE

## 2024-07-09 PROCEDURE — 99214 OFFICE O/P EST MOD 30 MIN: CPT | Performed by: INTERNAL MEDICINE

## 2024-07-09 PROCEDURE — 3074F SYST BP LT 130 MM HG: CPT | Performed by: INTERNAL MEDICINE

## 2024-07-09 PROCEDURE — 3017F COLORECTAL CA SCREEN DOC REV: CPT | Performed by: INTERNAL MEDICINE

## 2024-07-09 RX ORDER — METOPROLOL SUCCINATE 50 MG/1
50 TABLET, EXTENDED RELEASE ORAL 2 TIMES DAILY
Qty: 180 TABLET | Refills: 3 | Status: SHIPPED | OUTPATIENT
Start: 2024-07-09

## 2024-07-09 RX ORDER — SPIRONOLACTONE 25 MG/1
25 TABLET ORAL 2 TIMES DAILY
Qty: 180 TABLET | Refills: 3 | Status: SHIPPED | OUTPATIENT
Start: 2024-07-09

## 2024-07-09 RX ORDER — FUROSEMIDE 80 MG
80 TABLET ORAL 2 TIMES DAILY
Qty: 180 TABLET | Refills: 3 | Status: SHIPPED | OUTPATIENT
Start: 2024-07-09

## 2024-07-09 RX ORDER — DOXYCYCLINE HYCLATE 100 MG
100 TABLET ORAL 2 TIMES DAILY
COMMUNITY
End: 2024-07-09

## 2024-07-09 RX ORDER — ATORVASTATIN CALCIUM 40 MG/1
TABLET, FILM COATED ORAL
Qty: 90 TABLET | Refills: 3 | Status: SHIPPED | OUTPATIENT
Start: 2024-07-09

## 2024-07-09 RX ORDER — LOSARTAN POTASSIUM 25 MG/1
12.5 TABLET ORAL DAILY
Qty: 90 TABLET | Refills: 1 | Status: SHIPPED | OUTPATIENT
Start: 2024-07-09

## 2024-07-09 NOTE — PATIENT INSTRUCTIONS
Labs reviewed in epic and discussed with patient.  Medications reviewed in office. Medications refilled as warranted  Increase your spironolactone 25mg to twice a day.   Increase your lasix to 80mg twice a day.   BMP in 2 weeks.

## 2024-07-14 PROBLEM — R79.89 ELEVATED TROPONIN: Status: RESOLVED | Noted: 2024-06-14 | Resolved: 2024-07-14

## 2024-07-22 DIAGNOSIS — Z79.899 MEDICATION MANAGEMENT: ICD-10-CM

## 2024-07-22 LAB
ANION GAP SERPL CALCULATED.3IONS-SCNC: 10 MMOL/L (ref 3–16)
BUN SERPL-MCNC: 52 MG/DL (ref 7–20)
CALCIUM SERPL-MCNC: 8.9 MG/DL (ref 8.3–10.6)
CHLORIDE SERPL-SCNC: 92 MMOL/L (ref 99–110)
CO2 SERPL-SCNC: 29 MMOL/L (ref 21–32)
CREAT SERPL-MCNC: 1.6 MG/DL (ref 0.9–1.3)
GFR SERPLBLD CREATININE-BSD FMLA CKD-EPI: 49 ML/MIN/{1.73_M2}
GLUCOSE SERPL-MCNC: 249 MG/DL (ref 70–99)
POTASSIUM SERPL-SCNC: 5.2 MMOL/L (ref 3.5–5.1)
SODIUM SERPL-SCNC: 131 MMOL/L (ref 136–145)

## 2024-07-24 ENCOUNTER — TELEPHONE (OUTPATIENT)
Dept: CARDIOLOGY CLINIC | Age: 59
End: 2024-07-24

## 2024-07-24 DIAGNOSIS — Z79.899 MEDICATION MANAGEMENT: ICD-10-CM

## 2024-07-24 DIAGNOSIS — Z95.810 ICD (IMPLANTABLE CARDIOVERTER-DEFIBRILLATOR) IN PLACE: ICD-10-CM

## 2024-07-24 DIAGNOSIS — I42.0 DILATED CARDIOMYOPATHY (HCC): Primary | ICD-10-CM

## 2024-07-24 NOTE — TELEPHONE ENCOUNTER
LMOVM for patient to call back.        Gold Carmichael MD  P [facility]  Potassium level is high  Please call him to change his spironolactone to 25mg once a day  currently taking aldactone twice a day. Make change on med sheet also.  Please make a referral to Dr Bach for possible revision of his device to CRT-D for heart failure and dilated cardiomyopathy. Check with patient if ok with him  I recommend he atleast see him and discuss.    Pt returned call and v/u. He is scheduled with kxa for 10/21. Routing to [place] to inform of kxa appt.

## 2024-07-31 ENCOUNTER — HOSPITAL ENCOUNTER (OUTPATIENT)
Dept: GENERAL RADIOLOGY | Age: 59
Discharge: HOME | End: 2024-07-31
Payer: MEDICARE

## 2024-07-31 ENCOUNTER — HOSPITAL ENCOUNTER (OUTPATIENT)
Age: 59
Discharge: HOME | End: 2024-07-31
Payer: MEDICARE

## 2024-07-31 DIAGNOSIS — M25.572 LEFT ANKLE PAIN, UNSPECIFIED CHRONICITY: ICD-10-CM

## 2024-07-31 PROCEDURE — 73610 X-RAY EXAM OF ANKLE: CPT

## 2024-08-14 ENCOUNTER — APPOINTMENT (OUTPATIENT)
Dept: GENERAL RADIOLOGY | Age: 59
DRG: 475 | End: 2024-08-14
Payer: MEDICARE

## 2024-08-14 ENCOUNTER — HOSPITAL ENCOUNTER (INPATIENT)
Age: 59
LOS: 5 days | Discharge: NURSING, REHAB OR LONG-TERM CARE | DRG: 475 | End: 2024-08-19
Attending: STUDENT IN AN ORGANIZED HEALTH CARE EDUCATION/TRAINING PROGRAM | Admitting: INTERNAL MEDICINE
Payer: MEDICARE

## 2024-08-14 DIAGNOSIS — I50.23 ACUTE ON CHRONIC SYSTOLIC CONGESTIVE HEART FAILURE (HCC): ICD-10-CM

## 2024-08-14 DIAGNOSIS — N17.9 AKI (ACUTE KIDNEY INJURY) (HCC): ICD-10-CM

## 2024-08-14 DIAGNOSIS — Z79.899 MEDICATION MANAGEMENT: ICD-10-CM

## 2024-08-14 DIAGNOSIS — A41.9 SEPTICEMIA (HCC): ICD-10-CM

## 2024-08-14 DIAGNOSIS — E87.1 HYPONATREMIA: ICD-10-CM

## 2024-08-14 DIAGNOSIS — I42.9 CARDIOMYOPATHY, UNSPECIFIED TYPE (HCC): ICD-10-CM

## 2024-08-14 DIAGNOSIS — E11.42 DIABETIC POLYNEUROPATHY ASSOCIATED WITH TYPE 2 DIABETES MELLITUS (HCC): ICD-10-CM

## 2024-08-14 DIAGNOSIS — M86.171 OTHER ACUTE OSTEOMYELITIS OF RIGHT FOOT (HCC): Primary | ICD-10-CM

## 2024-08-14 DIAGNOSIS — M86.171 OSTEOMYELITIS OF FOOT, RIGHT, ACUTE (HCC): ICD-10-CM

## 2024-08-14 DIAGNOSIS — Z89.511 STATUS POST BELOW-KNEE AMPUTATION OF RIGHT LOWER EXTREMITY (HCC): ICD-10-CM

## 2024-08-14 PROBLEM — M86.9 OSTEOMYELITIS (HCC): Status: ACTIVE | Noted: 2024-08-14

## 2024-08-14 LAB
ALBUMIN SERPL-MCNC: 2.8 G/DL (ref 3.4–5)
ALBUMIN/GLOB SERPL: 0.6 {RATIO} (ref 1.1–2.2)
ALP SERPL-CCNC: 243 U/L (ref 40–129)
ALT SERPL-CCNC: 10 U/L (ref 10–40)
ANION GAP SERPL CALCULATED.3IONS-SCNC: 13 MMOL/L (ref 3–16)
ANION GAP SERPL CALCULATED.3IONS-SCNC: 14 MMOL/L (ref 3–16)
AST SERPL-CCNC: 19 U/L (ref 15–37)
BASOPHILS # BLD: 0 K/UL (ref 0–0.2)
BASOPHILS NFR BLD: 0.1 %
BILIRUB SERPL-MCNC: 1.6 MG/DL (ref 0–1)
BUN SERPL-MCNC: 31 MG/DL (ref 7–20)
BUN SERPL-MCNC: 34 MG/DL (ref 7–20)
CALCIUM SERPL-MCNC: 8.3 MG/DL (ref 8.3–10.6)
CALCIUM SERPL-MCNC: 8.7 MG/DL (ref 8.3–10.6)
CHLORIDE SERPL-SCNC: 87 MMOL/L (ref 99–110)
CHLORIDE SERPL-SCNC: 90 MMOL/L (ref 99–110)
CO2 SERPL-SCNC: 23 MMOL/L (ref 21–32)
CO2 SERPL-SCNC: 26 MMOL/L (ref 21–32)
CREAT SERPL-MCNC: 1.8 MG/DL (ref 0.9–1.3)
CREAT SERPL-MCNC: 2 MG/DL (ref 0.9–1.3)
DEPRECATED RDW RBC AUTO: 20.3 % (ref 12.4–15.4)
EKG ATRIAL RATE: 87 BPM
EKG DIAGNOSIS: NORMAL
EKG P AXIS: 62 DEGREES
EKG P-R INTERVAL: 208 MS
EKG Q-T INTERVAL: 398 MS
EKG QRS DURATION: 132 MS
EKG QTC CALCULATION (BAZETT): 478 MS
EKG R AXIS: -54 DEGREES
EKG T AXIS: 108 DEGREES
EKG VENTRICULAR RATE: 87 BPM
EOSINOPHIL # BLD: 0 K/UL (ref 0–0.6)
EOSINOPHIL NFR BLD: 0 %
GFR SERPLBLD CREATININE-BSD FMLA CKD-EPI: 38 ML/MIN/{1.73_M2}
GFR SERPLBLD CREATININE-BSD FMLA CKD-EPI: 43 ML/MIN/{1.73_M2}
GLUCOSE BLD-MCNC: 213 MG/DL (ref 70–99)
GLUCOSE SERPL-MCNC: 207 MG/DL (ref 70–99)
GLUCOSE SERPL-MCNC: 234 MG/DL (ref 70–99)
HCT VFR BLD AUTO: 40.2 % (ref 40.5–52.5)
HGB BLD-MCNC: 12.4 G/DL (ref 13.5–17.5)
LACTATE BLDV-SCNC: 2 MMOL/L (ref 0.4–1.9)
LACTATE BLDV-SCNC: 2.5 MMOL/L (ref 0.4–1.9)
LYMPHOCYTES # BLD: 1 K/UL (ref 1–5.1)
LYMPHOCYTES NFR BLD: 9.2 %
MCH RBC QN AUTO: 24.7 PG (ref 26–34)
MCHC RBC AUTO-ENTMCNC: 30.9 G/DL (ref 31–36)
MCV RBC AUTO: 80 FL (ref 80–100)
MONOCYTES # BLD: 0.8 K/UL (ref 0–1.3)
MONOCYTES NFR BLD: 7.4 %
NEUTROPHILS # BLD: 8.8 K/UL (ref 1.7–7.7)
NEUTROPHILS NFR BLD: 83.3 %
PERFORMED ON: ABNORMAL
PLATELET # BLD AUTO: 289 K/UL (ref 135–450)
PMV BLD AUTO: 7.2 FL (ref 5–10.5)
POTASSIUM SERPL-SCNC: 4.1 MMOL/L (ref 3.5–5.1)
POTASSIUM SERPL-SCNC: 4.3 MMOL/L (ref 3.5–5.1)
PROCALCITONIN SERPL IA-MCNC: 0.72 NG/ML (ref 0–0.15)
PROT SERPL-MCNC: 7.7 G/DL (ref 6.4–8.2)
RBC # BLD AUTO: 5.03 M/UL (ref 4.2–5.9)
SODIUM SERPL-SCNC: 126 MMOL/L (ref 136–145)
SODIUM SERPL-SCNC: 127 MMOL/L (ref 136–145)
WBC # BLD AUTO: 10.5 K/UL (ref 4–11)

## 2024-08-14 PROCEDURE — 85025 COMPLETE CBC W/AUTO DIFF WBC: CPT

## 2024-08-14 PROCEDURE — 6370000000 HC RX 637 (ALT 250 FOR IP): Performed by: NURSE PRACTITIONER

## 2024-08-14 PROCEDURE — 87186 SC STD MICRODIL/AGAR DIL: CPT

## 2024-08-14 PROCEDURE — 96375 TX/PRO/DX INJ NEW DRUG ADDON: CPT

## 2024-08-14 PROCEDURE — 2580000003 HC RX 258

## 2024-08-14 PROCEDURE — 71045 X-RAY EXAM CHEST 1 VIEW: CPT

## 2024-08-14 PROCEDURE — 87075 CULTR BACTERIA EXCEPT BLOOD: CPT

## 2024-08-14 PROCEDURE — 83036 HEMOGLOBIN GLYCOSYLATED A1C: CPT

## 2024-08-14 PROCEDURE — 87077 CULTURE AEROBIC IDENTIFY: CPT

## 2024-08-14 PROCEDURE — 93010 ELECTROCARDIOGRAM REPORT: CPT | Performed by: INTERNAL MEDICINE

## 2024-08-14 PROCEDURE — 99223 1ST HOSP IP/OBS HIGH 75: CPT | Performed by: NURSE PRACTITIONER

## 2024-08-14 PROCEDURE — 87205 SMEAR GRAM STAIN: CPT

## 2024-08-14 PROCEDURE — 6360000002 HC RX W HCPCS: Performed by: NURSE PRACTITIONER

## 2024-08-14 PROCEDURE — 73630 X-RAY EXAM OF FOOT: CPT

## 2024-08-14 PROCEDURE — 80053 COMPREHEN METABOLIC PANEL: CPT

## 2024-08-14 PROCEDURE — 93005 ELECTROCARDIOGRAM TRACING: CPT

## 2024-08-14 PROCEDURE — 2580000003 HC RX 258: Performed by: NURSE PRACTITIONER

## 2024-08-14 PROCEDURE — 6360000002 HC RX W HCPCS

## 2024-08-14 PROCEDURE — 96365 THER/PROPH/DIAG IV INF INIT: CPT

## 2024-08-14 PROCEDURE — 84145 PROCALCITONIN (PCT): CPT

## 2024-08-14 PROCEDURE — 83605 ASSAY OF LACTIC ACID: CPT

## 2024-08-14 PROCEDURE — 51798 US URINE CAPACITY MEASURE: CPT

## 2024-08-14 PROCEDURE — 2060000000 HC ICU INTERMEDIATE R&B

## 2024-08-14 PROCEDURE — 87070 CULTURE OTHR SPECIMN AEROBIC: CPT

## 2024-08-14 PROCEDURE — 99285 EMERGENCY DEPT VISIT HI MDM: CPT

## 2024-08-14 PROCEDURE — 36415 COLL VENOUS BLD VENIPUNCTURE: CPT

## 2024-08-14 PROCEDURE — 87040 BLOOD CULTURE FOR BACTERIA: CPT

## 2024-08-14 RX ORDER — ONDANSETRON 4 MG/1
4 TABLET, ORALLY DISINTEGRATING ORAL EVERY 8 HOURS PRN
Status: DISCONTINUED | OUTPATIENT
Start: 2024-08-14 | End: 2024-08-19 | Stop reason: HOSPADM

## 2024-08-14 RX ORDER — SPIRONOLACTONE 25 MG/1
25 TABLET ORAL 2 TIMES DAILY
Status: DISCONTINUED | OUTPATIENT
Start: 2024-08-14 | End: 2024-08-16

## 2024-08-14 RX ORDER — 0.9 % SODIUM CHLORIDE 0.9 %
1000 INTRAVENOUS SOLUTION INTRAVENOUS ONCE
Status: DISCONTINUED | OUTPATIENT
Start: 2024-08-14 | End: 2024-08-14

## 2024-08-14 RX ORDER — POLYETHYLENE GLYCOL 3350 17 G/17G
17 POWDER, FOR SOLUTION ORAL DAILY PRN
Status: DISCONTINUED | OUTPATIENT
Start: 2024-08-14 | End: 2024-08-19 | Stop reason: HOSPADM

## 2024-08-14 RX ORDER — SODIUM CHLORIDE 0.9 % (FLUSH) 0.9 %
5-40 SYRINGE (ML) INJECTION PRN
Status: DISCONTINUED | OUTPATIENT
Start: 2024-08-14 | End: 2024-08-19 | Stop reason: HOSPADM

## 2024-08-14 RX ORDER — ASPIRIN 81 MG/1
81 TABLET ORAL DAILY
Status: DISCONTINUED | OUTPATIENT
Start: 2024-08-14 | End: 2024-08-19 | Stop reason: HOSPADM

## 2024-08-14 RX ORDER — SODIUM CHLORIDE, SODIUM LACTATE, POTASSIUM CHLORIDE, AND CALCIUM CHLORIDE .6; .31; .03; .02 G/100ML; G/100ML; G/100ML; G/100ML
30 INJECTION, SOLUTION INTRAVENOUS ONCE
Status: COMPLETED | OUTPATIENT
Start: 2024-08-14 | End: 2024-08-14

## 2024-08-14 RX ORDER — POTASSIUM CHLORIDE 20 MEQ/1
40 TABLET, EXTENDED RELEASE ORAL PRN
Status: DISCONTINUED | OUTPATIENT
Start: 2024-08-14 | End: 2024-08-19

## 2024-08-14 RX ORDER — SODIUM CHLORIDE 0.9 % (FLUSH) 0.9 %
5-40 SYRINGE (ML) INJECTION EVERY 12 HOURS SCHEDULED
Status: DISCONTINUED | OUTPATIENT
Start: 2024-08-14 | End: 2024-08-19 | Stop reason: HOSPADM

## 2024-08-14 RX ORDER — SODIUM CHLORIDE 9 MG/ML
INJECTION, SOLUTION INTRAVENOUS PRN
Status: DISCONTINUED | OUTPATIENT
Start: 2024-08-14 | End: 2024-08-19 | Stop reason: HOSPADM

## 2024-08-14 RX ORDER — LEVOTHYROXINE SODIUM 0.12 MG/1
125 TABLET ORAL DAILY
Status: DISCONTINUED | OUTPATIENT
Start: 2024-08-14 | End: 2024-08-19 | Stop reason: HOSPADM

## 2024-08-14 RX ORDER — ENOXAPARIN SODIUM 100 MG/ML
40 INJECTION SUBCUTANEOUS DAILY
Status: DISCONTINUED | OUTPATIENT
Start: 2024-08-14 | End: 2024-08-19 | Stop reason: HOSPADM

## 2024-08-14 RX ORDER — DEXTROSE MONOHYDRATE 100 MG/ML
INJECTION, SOLUTION INTRAVENOUS CONTINUOUS PRN
Status: DISCONTINUED | OUTPATIENT
Start: 2024-08-14 | End: 2024-08-19 | Stop reason: HOSPADM

## 2024-08-14 RX ORDER — ONDANSETRON 2 MG/ML
4 INJECTION INTRAMUSCULAR; INTRAVENOUS EVERY 6 HOURS PRN
Status: DISCONTINUED | OUTPATIENT
Start: 2024-08-14 | End: 2024-08-19 | Stop reason: HOSPADM

## 2024-08-14 RX ORDER — ACETAMINOPHEN 650 MG/1
650 SUPPOSITORY RECTAL EVERY 6 HOURS PRN
Status: DISCONTINUED | OUTPATIENT
Start: 2024-08-14 | End: 2024-08-19 | Stop reason: HOSPADM

## 2024-08-14 RX ORDER — ONDANSETRON 2 MG/ML
4 INJECTION INTRAMUSCULAR; INTRAVENOUS ONCE
Status: COMPLETED | OUTPATIENT
Start: 2024-08-14 | End: 2024-08-14

## 2024-08-14 RX ORDER — LOSARTAN POTASSIUM 25 MG/1
12.5 TABLET ORAL DAILY
Status: DISCONTINUED | OUTPATIENT
Start: 2024-08-14 | End: 2024-08-18

## 2024-08-14 RX ORDER — FERROUS SULFATE 325(65) MG
325 TABLET ORAL NIGHTLY
Status: DISCONTINUED | OUTPATIENT
Start: 2024-08-14 | End: 2024-08-19 | Stop reason: HOSPADM

## 2024-08-14 RX ORDER — MORPHINE SULFATE 4 MG/ML
4 INJECTION, SOLUTION INTRAMUSCULAR; INTRAVENOUS
Status: COMPLETED | OUTPATIENT
Start: 2024-08-14 | End: 2024-08-14

## 2024-08-14 RX ORDER — INSULIN LISPRO 100 [IU]/ML
0-4 INJECTION, SOLUTION INTRAVENOUS; SUBCUTANEOUS
Status: DISCONTINUED | OUTPATIENT
Start: 2024-08-14 | End: 2024-08-19 | Stop reason: HOSPADM

## 2024-08-14 RX ORDER — LANOLIN ALCOHOL/MO/W.PET/CERES
400 CREAM (GRAM) TOPICAL DAILY
Status: DISCONTINUED | OUTPATIENT
Start: 2024-08-14 | End: 2024-08-19 | Stop reason: HOSPADM

## 2024-08-14 RX ORDER — FUROSEMIDE 40 MG/1
80 TABLET ORAL 2 TIMES DAILY
Status: DISCONTINUED | OUTPATIENT
Start: 2024-08-14 | End: 2024-08-16

## 2024-08-14 RX ORDER — GLUCAGON 1 MG/ML
1 KIT INJECTION PRN
Status: DISCONTINUED | OUTPATIENT
Start: 2024-08-14 | End: 2024-08-19 | Stop reason: HOSPADM

## 2024-08-14 RX ORDER — INSULIN GLARGINE 100 [IU]/ML
35 INJECTION, SOLUTION SUBCUTANEOUS NIGHTLY
Status: DISCONTINUED | OUTPATIENT
Start: 2024-08-14 | End: 2024-08-17

## 2024-08-14 RX ORDER — SODIUM CHLORIDE 9 MG/ML
INJECTION, SOLUTION INTRAVENOUS CONTINUOUS
Status: DISCONTINUED | OUTPATIENT
Start: 2024-08-14 | End: 2024-08-16

## 2024-08-14 RX ORDER — CLOPIDOGREL BISULFATE 75 MG/1
75 TABLET ORAL DAILY
Status: DISCONTINUED | OUTPATIENT
Start: 2024-08-14 | End: 2024-08-19 | Stop reason: HOSPADM

## 2024-08-14 RX ORDER — ATORVASTATIN CALCIUM 40 MG/1
40 TABLET, FILM COATED ORAL NIGHTLY
Status: DISCONTINUED | OUTPATIENT
Start: 2024-08-14 | End: 2024-08-19 | Stop reason: HOSPADM

## 2024-08-14 RX ORDER — ACETAMINOPHEN 325 MG/1
650 TABLET ORAL EVERY 6 HOURS PRN
Status: DISCONTINUED | OUTPATIENT
Start: 2024-08-14 | End: 2024-08-19 | Stop reason: HOSPADM

## 2024-08-14 RX ORDER — POTASSIUM CHLORIDE 7.45 MG/ML
10 INJECTION INTRAVENOUS PRN
Status: DISCONTINUED | OUTPATIENT
Start: 2024-08-14 | End: 2024-08-19

## 2024-08-14 RX ORDER — ALLOPURINOL 100 MG/1
100 TABLET ORAL DAILY
Status: DISCONTINUED | OUTPATIENT
Start: 2024-08-14 | End: 2024-08-19 | Stop reason: HOSPADM

## 2024-08-14 RX ORDER — INSULIN LISPRO 100 [IU]/ML
0-4 INJECTION, SOLUTION INTRAVENOUS; SUBCUTANEOUS NIGHTLY
Status: DISCONTINUED | OUTPATIENT
Start: 2024-08-14 | End: 2024-08-19 | Stop reason: HOSPADM

## 2024-08-14 RX ADMIN — INSULIN GLARGINE 35 UNITS: 100 INJECTION, SOLUTION SUBCUTANEOUS at 21:53

## 2024-08-14 RX ADMIN — CEFEPIME 2000 MG: 2 INJECTION, POWDER, FOR SOLUTION INTRAVENOUS at 15:00

## 2024-08-14 RX ADMIN — VANCOMYCIN HYDROCHLORIDE 2000 MG: 10 INJECTION, POWDER, LYOPHILIZED, FOR SOLUTION INTRAVENOUS at 15:40

## 2024-08-14 RX ADMIN — ASPIRIN 81 MG: 81 TABLET, COATED ORAL at 21:52

## 2024-08-14 RX ADMIN — MORPHINE SULFATE 4 MG: 4 INJECTION, SOLUTION INTRAMUSCULAR; INTRAVENOUS at 15:00

## 2024-08-14 RX ADMIN — Medication 400 MG: at 21:52

## 2024-08-14 RX ADMIN — SODIUM CHLORIDE, POTASSIUM CHLORIDE, SODIUM LACTATE AND CALCIUM CHLORIDE 2586 ML: 600; 310; 30; 20 INJECTION, SOLUTION INTRAVENOUS at 16:20

## 2024-08-14 RX ADMIN — ONDANSETRON 4 MG: 2 INJECTION INTRAMUSCULAR; INTRAVENOUS at 19:02

## 2024-08-14 RX ADMIN — ONDANSETRON 4 MG: 2 INJECTION INTRAMUSCULAR; INTRAVENOUS at 15:01

## 2024-08-14 RX ADMIN — ENOXAPARIN SODIUM 40 MG: 100 INJECTION SUBCUTANEOUS at 21:53

## 2024-08-14 RX ADMIN — FERROUS SULFATE TAB 325 MG (65 MG ELEMENTAL FE) 325 MG: 325 (65 FE) TAB at 21:52

## 2024-08-14 RX ADMIN — ALLOPURINOL 100 MG: 100 TABLET ORAL at 21:52

## 2024-08-14 RX ADMIN — ACETAMINOPHEN 650 MG: 325 TABLET ORAL at 19:02

## 2024-08-14 RX ADMIN — LEVOTHYROXINE SODIUM 125 MCG: 125 TABLET ORAL at 21:52

## 2024-08-14 RX ADMIN — ATORVASTATIN CALCIUM 40 MG: 40 TABLET, FILM COATED ORAL at 21:52

## 2024-08-14 RX ADMIN — SODIUM CHLORIDE: 9 INJECTION, SOLUTION INTRAVENOUS at 18:37

## 2024-08-14 RX ADMIN — SODIUM CHLORIDE, PRESERVATIVE FREE 10 ML: 5 INJECTION INTRAVENOUS at 21:54

## 2024-08-14 RX ADMIN — CLOPIDOGREL BISULFATE 75 MG: 75 TABLET ORAL at 21:52

## 2024-08-14 ASSESSMENT — PAIN SCALES - GENERAL
PAINLEVEL_OUTOF10: 0
PAINLEVEL_OUTOF10: 0
PAINLEVEL_OUTOF10: 7

## 2024-08-14 ASSESSMENT — PAIN DESCRIPTION - ORIENTATION: ORIENTATION: RIGHT

## 2024-08-14 ASSESSMENT — PAIN DESCRIPTION - LOCATION: LOCATION: FOOT

## 2024-08-14 ASSESSMENT — PAIN DESCRIPTION - DESCRIPTORS: DESCRIPTORS: ACHING

## 2024-08-14 NOTE — ED PROVIDER NOTES
THIS IS MY VERITO SUPERVISORY AND SHARED VISIT NOTE:    I personally saw the patient and made/approved the management plan and take responsibility for the patient management.    Labs Reviewed   CBC WITH AUTO DIFFERENTIAL - Abnormal; Notable for the following components:       Result Value    Hemoglobin 12.4 (*)     Hematocrit 40.2 (*)     MCH 24.7 (*)     MCHC 30.9 (*)     RDW 20.3 (*)     Neutrophils Absolute 8.8 (*)     All other components within normal limits   COMPREHENSIVE METABOLIC PANEL W/ REFLEX TO MG FOR LOW K - Abnormal; Notable for the following components:    Sodium 127 (*)     Chloride 87 (*)     Glucose 234 (*)     BUN 31 (*)     Creatinine 2.0 (*)     Est, Glom Filt Rate 38 (*)     Albumin 2.8 (*)     Albumin/Globulin Ratio 0.6 (*)     Total Bilirubin 1.6 (*)     Alkaline Phosphatase 243 (*)     All other components within normal limits   LACTATE, SEPSIS - Abnormal; Notable for the following components:    Lactic Acid, Sepsis 2.5 (*)     All other components within normal limits   PROCALCITONIN - Abnormal; Notable for the following components:    Procalcitonin 0.72 (*)     All other components within normal limits   CULTURE, BLOOD 1   CULTURE, BLOOD 2   CULTURE, ANAEROBIC AND AEROBIC   LACTATE, SEPSIS   URINALYSIS WITH REFLEX TO CULTURE     XR CHEST PORTABLE   Final Result   No acute cardiopulmonary findings         XR FOOT RIGHT (MIN 3 VIEWS)   Final Result   Fragmented appearance of the remaining base of the 5th metatarsal and   adjacent cuboid.  Cannot exclude osteomyelitis in the appropriate clinical   setting.           The Ekg interpreted by me shows  normal sinus rhythm with a rate of 87  Axis is   Left axis deviation  QTc is  within an acceptable range  Intervals and Durations are unremarkable.      ST Segments: nonspecific changes  No significant change from prior EKG dated 6/13/2024    History: Patient is a 59-year-old male, presenting with concerns for wound to the right foot.  Has been

## 2024-08-14 NOTE — ED NOTES
Writer went in and introduced self to the patient. Did an assessment and recycled vitals. Pt denies pain at this time.

## 2024-08-14 NOTE — ED PROVIDER NOTES
[facility] ED  EMERGENCY DEPARTMENT ENCOUNTER        Pt Name: Ruben Eagle  MRN: [id number]  Birthdate 1965  Date of evaluation: 8/14/2024  Provider: JOSE Chou CNP  PCP: Leann Marie APRN - CNP  Note Started: 3:25 PM EDT 8/14/24       I have seen and evaluated this patient with my supervising physician Brittany Reyes MD.      CHIEF COMPLAINT       Chief Complaint   Patient presents with    Wound Infection     Pt has wound to outside of right foot       HISTORY OF PRESENT ILLNESS: 1 or more Elements     History From: Patient    Chief Complaint: Wound versus ulceration to the right foot    Ruben Eagle is a 59 y.o. male who presents to the emergency department for evaluation of a wound versus ulceration to the right foot.  Patient states that this has been an ongoing recurrent problem for approximately 1 year.  He is seen by podiatry (Dr. Jarrell) and was seen most recently this morning at 930.  States that he was referred to the emergency department for concern for need for infection that would require surgical intervention including a washout in the OR.  Patient does have a history of diabetes but states his blood sugars have been running in the 130s to 150s.  No associated fevers, chills, nausea, vomiting.  The most concerning thing for the patient is the level of pain has been increasing recently.  Pain is currently reported as an 8 out of 10 in intensity.  Has been taking OTCs with minimal improvement of symptoms.    Nursing Notes were all reviewed and agreed with or any disagreements were addressed in the HPI.    REVIEW OF SYSTEMS :      Review of Systems   Constitutional:  Negative for chills, fatigue and fever.   HENT:  Negative for congestion, postnasal drip, rhinorrhea, sinus pressure, sinus pain, sneezing, sore throat and trouble swallowing.    Respiratory:  Negative for cough, chest tightness, shortness of breath and wheezing.    Cardiovascular:  Negative

## 2024-08-14 NOTE — ED NOTES
1501-Perfect Serve sent to Dr. Jarrell Podiatry for consult requested by Bree Leal NP.   1524-Call back received from Podiatry Dr. Jarrell spoke with Bree Leal NP.

## 2024-08-14 NOTE — FLOWSHEET NOTE
08/14/24 1839   Vital Signs   Temp 97 °F (36.1 °C)   Temp Source Oral   Pulse 85   Heart Rate Source Monitor   Respirations 16   /84   MAP (Calculated) 96   BP Location Left upper arm   BP Method Automatic   Patient Position Semi fowlers   Pain Assessment   Pain Assessment 0-10   Pain Level 7   Patient's Stated Pain Goal 0 - No pain   Pain Location Foot   Pain Orientation Right   Pain Descriptors Aching   Oxygen Therapy   SpO2 98 %   O2 Device None (Room air)   Height and Weight   Height 1.778 m (5' 10\")   Weight - Scale 88.4 kg (194 lb 14.4 oz)   Weight Method Standing scale   BSA (Calculated - sq m) 2.09 sq meters   BMI (Calculated) 28     Arrived to unit. Pt is a/o x4. Large wound on right foot. Writer wrapped in gauze. Bloody/clear drainage from wound. Small skin tear noted on left shin. Coccyx is red but blanchable. Bilateral lung fields easy/diminished. Admissions assessment to be completed by night shift RN. Spoke to REMA Scanlon. Will hold IVF. Bladder scan to see if pt is retaining.

## 2024-08-15 ENCOUNTER — ANESTHESIA EVENT (OUTPATIENT)
Dept: OPERATING ROOM | Age: 59
End: 2024-08-15
Payer: MEDICARE

## 2024-08-15 ENCOUNTER — ANESTHESIA (OUTPATIENT)
Dept: OPERATING ROOM | Age: 59
End: 2024-08-15
Payer: MEDICARE

## 2024-08-15 ENCOUNTER — PREP FOR PROCEDURE (OUTPATIENT)
Dept: SURGERY | Age: 59
End: 2024-08-15

## 2024-08-15 DIAGNOSIS — M86.171 OSTEOMYELITIS OF FOOT, RIGHT, ACUTE: ICD-10-CM

## 2024-08-15 PROBLEM — L08.9 DIABETIC INFECTION OF RIGHT FOOT (HCC): Status: ACTIVE | Noted: 2024-08-15

## 2024-08-15 PROBLEM — E11.628 DIABETIC INFECTION OF RIGHT FOOT (HCC): Status: ACTIVE | Noted: 2024-08-15

## 2024-08-15 LAB
ABO + RH BLD: NORMAL
ANION GAP SERPL CALCULATED.3IONS-SCNC: 12 MMOL/L (ref 3–16)
BASOPHILS # BLD: 0 K/UL (ref 0–0.2)
BASOPHILS NFR BLD: 0.3 %
BLD GP AB SCN SERPL QL: NORMAL
BUN SERPL-MCNC: 36 MG/DL (ref 7–20)
CALCIUM SERPL-MCNC: 8.1 MG/DL (ref 8.3–10.6)
CHLORIDE SERPL-SCNC: 91 MMOL/L (ref 99–110)
CO2 SERPL-SCNC: 23 MMOL/L (ref 21–32)
CREAT SERPL-MCNC: 1.9 MG/DL (ref 0.9–1.3)
DEPRECATED RDW RBC AUTO: 19.4 % (ref 12.4–15.4)
EOSINOPHIL # BLD: 0 K/UL (ref 0–0.6)
EOSINOPHIL NFR BLD: 0.3 %
EST. AVERAGE GLUCOSE BLD GHB EST-MCNC: 162.8 MG/DL
GFR SERPLBLD CREATININE-BSD FMLA CKD-EPI: 40 ML/MIN/{1.73_M2}
GLUCOSE BLD-MCNC: 100 MG/DL (ref 70–99)
GLUCOSE BLD-MCNC: 109 MG/DL (ref 70–99)
GLUCOSE BLD-MCNC: 117 MG/DL (ref 70–99)
GLUCOSE BLD-MCNC: 130 MG/DL (ref 70–99)
GLUCOSE BLD-MCNC: 135 MG/DL (ref 70–99)
GLUCOSE BLD-MCNC: 181 MG/DL (ref 70–99)
GLUCOSE SERPL-MCNC: 168 MG/DL (ref 70–99)
HBA1C MFR BLD: 7.3 %
HCT VFR BLD AUTO: 33.6 % (ref 40.5–52.5)
HGB BLD-MCNC: 10.5 G/DL (ref 13.5–17.5)
LYMPHOCYTES # BLD: 1.3 K/UL (ref 1–5.1)
LYMPHOCYTES NFR BLD: 18.5 %
MCH RBC QN AUTO: 24.9 PG (ref 26–34)
MCHC RBC AUTO-ENTMCNC: 31.3 G/DL (ref 31–36)
MCV RBC AUTO: 79.5 FL (ref 80–100)
MONOCYTES # BLD: 0.8 K/UL (ref 0–1.3)
MONOCYTES NFR BLD: 11.1 %
NEUTROPHILS # BLD: 5 K/UL (ref 1.7–7.7)
NEUTROPHILS NFR BLD: 69.8 %
PERFORMED ON: ABNORMAL
PLATELET # BLD AUTO: 216 K/UL (ref 135–450)
PMV BLD AUTO: 7.1 FL (ref 5–10.5)
POTASSIUM SERPL-SCNC: 4 MMOL/L (ref 3.5–5.1)
RBC # BLD AUTO: 4.23 M/UL (ref 4.2–5.9)
SODIUM SERPL-SCNC: 126 MMOL/L (ref 136–145)
VANCOMYCIN SERPL-MCNC: 17.5 UG/ML
WBC # BLD AUTO: 7.1 K/UL (ref 4–11)

## 2024-08-15 PROCEDURE — 86901 BLOOD TYPING SEROLOGIC RH(D): CPT

## 2024-08-15 PROCEDURE — 36415 COLL VENOUS BLD VENIPUNCTURE: CPT

## 2024-08-15 PROCEDURE — 99233 SBSQ HOSP IP/OBS HIGH 50: CPT | Performed by: INTERNAL MEDICINE

## 2024-08-15 PROCEDURE — 6360000002 HC RX W HCPCS: Performed by: NURSE PRACTITIONER

## 2024-08-15 PROCEDURE — 99223 1ST HOSP IP/OBS HIGH 75: CPT | Performed by: INTERNAL MEDICINE

## 2024-08-15 PROCEDURE — 3600000003 HC SURGERY LEVEL 3 BASE: Performed by: SURGERY

## 2024-08-15 PROCEDURE — APPSS30 APP SPLIT SHARED TIME 16-30 MINUTES

## 2024-08-15 PROCEDURE — 2580000003 HC RX 258: Performed by: ANESTHESIOLOGY

## 2024-08-15 PROCEDURE — A4217 STERILE WATER/SALINE, 500 ML: HCPCS | Performed by: SURGERY

## 2024-08-15 PROCEDURE — 2500000003 HC RX 250 WO HCPCS: Performed by: NURSE ANESTHETIST, CERTIFIED REGISTERED

## 2024-08-15 PROCEDURE — 2709999900 HC NON-CHARGEABLE SUPPLY: Performed by: SURGERY

## 2024-08-15 PROCEDURE — 80202 ASSAY OF VANCOMYCIN: CPT

## 2024-08-15 PROCEDURE — 7100000000 HC PACU RECOVERY - FIRST 15 MIN: Performed by: SURGERY

## 2024-08-15 PROCEDURE — 0Y6H0Z3 DETACHMENT AT RIGHT LOWER LEG, LOW, OPEN APPROACH: ICD-10-PCS | Performed by: SURGERY

## 2024-08-15 PROCEDURE — 3700000001 HC ADD 15 MINUTES (ANESTHESIA): Performed by: SURGERY

## 2024-08-15 PROCEDURE — 2580000003 HC RX 258: Performed by: NURSE PRACTITIONER

## 2024-08-15 PROCEDURE — 2060000000 HC ICU INTERMEDIATE R&B

## 2024-08-15 PROCEDURE — 6360000002 HC RX W HCPCS: Performed by: ANESTHESIOLOGY

## 2024-08-15 PROCEDURE — 85025 COMPLETE CBC W/AUTO DIFF WBC: CPT

## 2024-08-15 PROCEDURE — 88311 DECALCIFY TISSUE: CPT

## 2024-08-15 PROCEDURE — 6370000000 HC RX 637 (ALT 250 FOR IP): Performed by: SURGERY

## 2024-08-15 PROCEDURE — 2500000003 HC RX 250 WO HCPCS: Performed by: ANESTHESIOLOGY

## 2024-08-15 PROCEDURE — 6360000002 HC RX W HCPCS: Performed by: NURSE ANESTHETIST, CERTIFIED REGISTERED

## 2024-08-15 PROCEDURE — 86850 RBC ANTIBODY SCREEN: CPT

## 2024-08-15 PROCEDURE — 6370000000 HC RX 637 (ALT 250 FOR IP): Performed by: NURSE PRACTITIONER

## 2024-08-15 PROCEDURE — 2720000010 HC SURG SUPPLY STERILE: Performed by: SURGERY

## 2024-08-15 PROCEDURE — 7100000001 HC PACU RECOVERY - ADDTL 15 MIN: Performed by: SURGERY

## 2024-08-15 PROCEDURE — L1830 KO IMMOB CANVAS LONG PRE OTS: HCPCS | Performed by: SURGERY

## 2024-08-15 PROCEDURE — 99222 1ST HOSP IP/OBS MODERATE 55: CPT | Performed by: SURGERY

## 2024-08-15 PROCEDURE — 27880 AMPUTATION OF LOWER LEG: CPT | Performed by: SURGERY

## 2024-08-15 PROCEDURE — 3600000013 HC SURGERY LEVEL 3 ADDTL 15MIN: Performed by: SURGERY

## 2024-08-15 PROCEDURE — 80048 BASIC METABOLIC PNL TOTAL CA: CPT

## 2024-08-15 PROCEDURE — 2580000003 HC RX 258: Performed by: SURGERY

## 2024-08-15 PROCEDURE — 88307 TISSUE EXAM BY PATHOLOGIST: CPT

## 2024-08-15 PROCEDURE — 86900 BLOOD TYPING SEROLOGIC ABO: CPT

## 2024-08-15 PROCEDURE — 3700000000 HC ANESTHESIA ATTENDED CARE: Performed by: SURGERY

## 2024-08-15 RX ORDER — OXYCODONE HYDROCHLORIDE AND ACETAMINOPHEN 5; 325 MG/1; MG/1
2 TABLET ORAL EVERY 4 HOURS PRN
Status: DISCONTINUED | OUTPATIENT
Start: 2024-08-15 | End: 2024-08-19 | Stop reason: HOSPADM

## 2024-08-15 RX ORDER — SODIUM CHLORIDE, SODIUM LACTATE, POTASSIUM CHLORIDE, CALCIUM CHLORIDE 600; 310; 30; 20 MG/100ML; MG/100ML; MG/100ML; MG/100ML
INJECTION, SOLUTION INTRAVENOUS CONTINUOUS
Status: DISCONTINUED | OUTPATIENT
Start: 2024-08-15 | End: 2024-08-15 | Stop reason: HOSPADM

## 2024-08-15 RX ORDER — MEPERIDINE HYDROCHLORIDE 25 MG/ML
12.5 INJECTION INTRAMUSCULAR; INTRAVENOUS; SUBCUTANEOUS EVERY 5 MIN PRN
Status: DISCONTINUED | OUTPATIENT
Start: 2024-08-15 | End: 2024-08-15 | Stop reason: HOSPADM

## 2024-08-15 RX ORDER — OXYCODONE HYDROCHLORIDE 5 MG/1
5 TABLET ORAL PRN
Status: DISCONTINUED | OUTPATIENT
Start: 2024-08-15 | End: 2024-08-15 | Stop reason: HOSPADM

## 2024-08-15 RX ORDER — NALOXONE HYDROCHLORIDE 0.4 MG/ML
INJECTION, SOLUTION INTRAMUSCULAR; INTRAVENOUS; SUBCUTANEOUS PRN
Status: DISCONTINUED | OUTPATIENT
Start: 2024-08-15 | End: 2024-08-15 | Stop reason: HOSPADM

## 2024-08-15 RX ORDER — ONDANSETRON 2 MG/ML
4 INJECTION INTRAMUSCULAR; INTRAVENOUS
Status: DISCONTINUED | OUTPATIENT
Start: 2024-08-15 | End: 2024-08-15 | Stop reason: HOSPADM

## 2024-08-15 RX ORDER — SODIUM CHLORIDE 0.9 % (FLUSH) 0.9 %
5-40 SYRINGE (ML) INJECTION EVERY 12 HOURS SCHEDULED
Status: DISCONTINUED | OUTPATIENT
Start: 2024-08-15 | End: 2024-08-15 | Stop reason: HOSPADM

## 2024-08-15 RX ORDER — MORPHINE SULFATE 2 MG/ML
2 INJECTION, SOLUTION INTRAMUSCULAR; INTRAVENOUS
Status: DISCONTINUED | OUTPATIENT
Start: 2024-08-15 | End: 2024-08-19

## 2024-08-15 RX ORDER — PROPOFOL 10 MG/ML
INJECTION, EMULSION INTRAVENOUS PRN
Status: DISCONTINUED | OUTPATIENT
Start: 2024-08-15 | End: 2024-08-15 | Stop reason: SDUPTHER

## 2024-08-15 RX ORDER — SODIUM CHLORIDE 9 MG/ML
INJECTION, SOLUTION INTRAVENOUS PRN
Status: DISCONTINUED | OUTPATIENT
Start: 2024-08-15 | End: 2024-08-15 | Stop reason: HOSPADM

## 2024-08-15 RX ORDER — SODIUM CHLORIDE 0.9 % (FLUSH) 0.9 %
5-40 SYRINGE (ML) INJECTION PRN
Status: DISCONTINUED | OUTPATIENT
Start: 2024-08-15 | End: 2024-08-15 | Stop reason: HOSPADM

## 2024-08-15 RX ORDER — DEXAMETHASONE SODIUM PHOSPHATE 4 MG/ML
INJECTION, SOLUTION INTRA-ARTICULAR; INTRALESIONAL; INTRAMUSCULAR; INTRAVENOUS; SOFT TISSUE PRN
Status: DISCONTINUED | OUTPATIENT
Start: 2024-08-15 | End: 2024-08-15 | Stop reason: SDUPTHER

## 2024-08-15 RX ORDER — OXYCODONE HYDROCHLORIDE 5 MG/1
10 TABLET ORAL PRN
Status: DISCONTINUED | OUTPATIENT
Start: 2024-08-15 | End: 2024-08-15 | Stop reason: HOSPADM

## 2024-08-15 RX ORDER — MAGNESIUM HYDROXIDE 1200 MG/15ML
LIQUID ORAL CONTINUOUS PRN
Status: COMPLETED | OUTPATIENT
Start: 2024-08-15 | End: 2024-08-15

## 2024-08-15 RX ORDER — ONDANSETRON 2 MG/ML
INJECTION INTRAMUSCULAR; INTRAVENOUS PRN
Status: DISCONTINUED | OUTPATIENT
Start: 2024-08-15 | End: 2024-08-15 | Stop reason: SDUPTHER

## 2024-08-15 RX ORDER — LIDOCAINE HYDROCHLORIDE 20 MG/ML
INJECTION, SOLUTION EPIDURAL; INFILTRATION; INTRACAUDAL; PERINEURAL PRN
Status: DISCONTINUED | OUTPATIENT
Start: 2024-08-15 | End: 2024-08-15 | Stop reason: SDUPTHER

## 2024-08-15 RX ORDER — MORPHINE SULFATE 4 MG/ML
4 INJECTION, SOLUTION INTRAMUSCULAR; INTRAVENOUS
Status: DISCONTINUED | OUTPATIENT
Start: 2024-08-15 | End: 2024-08-19

## 2024-08-15 RX ORDER — FENTANYL CITRATE 50 UG/ML
INJECTION, SOLUTION INTRAMUSCULAR; INTRAVENOUS PRN
Status: DISCONTINUED | OUTPATIENT
Start: 2024-08-15 | End: 2024-08-15 | Stop reason: SDUPTHER

## 2024-08-15 RX ORDER — OXYCODONE HYDROCHLORIDE AND ACETAMINOPHEN 5; 325 MG/1; MG/1
1 TABLET ORAL EVERY 4 HOURS PRN
Status: DISCONTINUED | OUTPATIENT
Start: 2024-08-15 | End: 2024-08-19 | Stop reason: HOSPADM

## 2024-08-15 RX ADMIN — ENOXAPARIN SODIUM 40 MG: 100 INJECTION SUBCUTANEOUS at 08:07

## 2024-08-15 RX ADMIN — ONDANSETRON 4 MG: 2 INJECTION INTRAMUSCULAR; INTRAVENOUS at 14:42

## 2024-08-15 RX ADMIN — ALLOPURINOL 100 MG: 100 TABLET ORAL at 08:08

## 2024-08-15 RX ADMIN — FENTANYL CITRATE 50 MCG: 50 INJECTION INTRAMUSCULAR; INTRAVENOUS at 14:49

## 2024-08-15 RX ADMIN — HYDROMORPHONE HYDROCHLORIDE 0.5 MG: 1 INJECTION, SOLUTION INTRAMUSCULAR; INTRAVENOUS; SUBCUTANEOUS at 16:17

## 2024-08-15 RX ADMIN — ATORVASTATIN CALCIUM 40 MG: 40 TABLET, FILM COATED ORAL at 20:18

## 2024-08-15 RX ADMIN — LIDOCAINE HYDROCHLORIDE 60 MG: 20 INJECTION, SOLUTION EPIDURAL; INFILTRATION; INTRACAUDAL; PERINEURAL at 14:37

## 2024-08-15 RX ADMIN — LEVOTHYROXINE SODIUM 125 MCG: 125 TABLET ORAL at 05:14

## 2024-08-15 RX ADMIN — PROPOFOL 200 MG: 10 INJECTION, EMULSION INTRAVENOUS at 14:37

## 2024-08-15 RX ADMIN — VANCOMYCIN HYDROCHLORIDE 1000 MG: 1 INJECTION, POWDER, LYOPHILIZED, FOR SOLUTION INTRAVENOUS at 09:55

## 2024-08-15 RX ADMIN — Medication 400 MG: at 08:08

## 2024-08-15 RX ADMIN — CEFEPIME HYDROCHLORIDE 2000 MG: 2 INJECTION, POWDER, FOR SOLUTION INTRAVENOUS at 14:37

## 2024-08-15 RX ADMIN — HYDROMORPHONE HYDROCHLORIDE 0.5 MG: 1 INJECTION, SOLUTION INTRAMUSCULAR; INTRAVENOUS; SUBCUTANEOUS at 15:57

## 2024-08-15 RX ADMIN — INSULIN GLARGINE 35 UNITS: 100 INJECTION, SOLUTION SUBCUTANEOUS at 20:18

## 2024-08-15 RX ADMIN — SODIUM CHLORIDE: 9 INJECTION, SOLUTION INTRAVENOUS at 20:27

## 2024-08-15 RX ADMIN — SODIUM CHLORIDE, PRESERVATIVE FREE 10 ML: 5 INJECTION INTRAVENOUS at 08:10

## 2024-08-15 RX ADMIN — SODIUM CHLORIDE, PRESERVATIVE FREE 10 ML: 5 INJECTION INTRAVENOUS at 20:20

## 2024-08-15 RX ADMIN — OXYCODONE HYDROCHLORIDE AND ACETAMINOPHEN 1 TABLET: 5; 325 TABLET ORAL at 20:06

## 2024-08-15 RX ADMIN — FERROUS SULFATE TAB 325 MG (65 MG ELEMENTAL FE) 325 MG: 325 (65 FE) TAB at 20:18

## 2024-08-15 RX ADMIN — DEXAMETHASONE SODIUM PHOSPHATE 4 MG: 4 INJECTION INTRA-ARTICULAR; INTRALESIONAL; INTRAMUSCULAR; INTRAVENOUS; SOFT TISSUE at 14:42

## 2024-08-15 RX ADMIN — FAMOTIDINE 20 MG: 10 INJECTION, SOLUTION INTRAVENOUS at 14:14

## 2024-08-15 RX ADMIN — HYDROMORPHONE HYDROCHLORIDE 0.5 MG: 1 INJECTION, SOLUTION INTRAMUSCULAR; INTRAVENOUS; SUBCUTANEOUS at 16:07

## 2024-08-15 ASSESSMENT — PAIN SCALES - GENERAL
PAINLEVEL_OUTOF10: 0
PAINLEVEL_OUTOF10: 6
PAINLEVEL_OUTOF10: 5
PAINLEVEL_OUTOF10: 4
PAINLEVEL_OUTOF10: 0
PAINLEVEL_OUTOF10: 5
PAINLEVEL_OUTOF10: 4
PAINLEVEL_OUTOF10: 0
PAINLEVEL_OUTOF10: 5
PAINLEVEL_OUTOF10: 8

## 2024-08-15 ASSESSMENT — PAIN DESCRIPTION - DESCRIPTORS
DESCRIPTORS: DISCOMFORT
DESCRIPTORS: DISCOMFORT
DESCRIPTORS: CRUSHING
DESCRIPTORS: DISCOMFORT
DESCRIPTORS: DISCOMFORT
DESCRIPTORS: DISCOMFORT;DULL
DESCRIPTORS: DISCOMFORT
DESCRIPTORS: DISCOMFORT;DULL

## 2024-08-15 ASSESSMENT — PAIN DESCRIPTION - ORIENTATION
ORIENTATION: RIGHT

## 2024-08-15 ASSESSMENT — PAIN DESCRIPTION - LOCATION
LOCATION: LEG

## 2024-08-15 ASSESSMENT — LIFESTYLE VARIABLES: SMOKING_STATUS: 0

## 2024-08-15 ASSESSMENT — ENCOUNTER SYMPTOMS: SHORTNESS OF BREATH: 1

## 2024-08-15 ASSESSMENT — PAIN - FUNCTIONAL ASSESSMENT: PAIN_FUNCTIONAL_ASSESSMENT: 0-10

## 2024-08-15 NOTE — CARE COORDINATION
Case Management Assessment  Initial Evaluation    Date/Time of Evaluation: 8/15/2024 9:35 AM  Assessment Completed by: Philip Helms RN    If patient is discharged prior to next notation, then this note serves as note for discharge by case management.    Patient Name: Ruben Eagle                   YOB: 1965  Diagnosis: Hyponatremia [E87.1]  Septicemia (HCC) [A41.9]  Osteomyelitis (HCC) [M86.9]  KRYSTAL (acute kidney injury) (HCC) [N17.9]  Other acute osteomyelitis of right foot (HCC) [M86.171]                   Date / Time: 8/14/2024  1:11 PM    Patient Admission Status: Inpatient   Readmission Risk (Low < 19, Mod (19-27), High > 27): Readmission Risk Score: 23.4    Current PCP: Leann Marie APRN - CNP  PCP verified by ? Yes    Chart Reviewed: Yes      History Provided by: Patient  Patient Orientation: Alert and Oriented    Patient Cognition: Alert    Hospitalization in the last 30 days (Readmission):  No    If yes, Readmission Assessment in CM Navigator will be completed.    Advance Directives:      Code Status: Full Code   Patient's Primary Decision Maker is: Legal Next of Kin      Discharge Planning:    Patient lives with: Parent Type of Home: House  Primary Care Giver: Self  Patient Support Systems include: Parent   Current Financial resources: Medicare  Current community resources: Other (Comment), None (na)  Current services prior to admission: Durable Medical Equipment            Current DME: Walker, Shower Chair, Crutches            Type of Home Care services:  None    ADLS  Prior functional level: Independent in ADLs/IADLs  Current functional level: Independent in ADLs/IADLs    PT AM-PAC:   /24  OT AM-PAC:   /24    Family can provide assistance at DC: Yes  Would you like Case Management to discuss the discharge plan with any other family members/significant others, and if so, who? No  Plans to Return to Present Housing: Yes  Other Identified Issues/Barriers to RETURNING to current

## 2024-08-15 NOTE — PLAN OF CARE
Problem: Discharge Planning  Goal: Discharge to home or other facility with appropriate resources  Outcome: Progressing     Problem: Pain  Goal: Verbalizes/displays adequate comfort level or baseline comfort level  Outcome: Progressing  Flowsheets (Taken 8/15/2024 0413)  Verbalizes/displays adequate comfort level or baseline comfort level:   Encourage patient to monitor pain and request assistance   Assess pain using appropriate pain scale   Administer analgesics based on type and severity of pain and evaluate response   Implement non-pharmacological measures as appropriate and evaluate response   Consider cultural and social influences on pain and pain management   Notify Licensed Independent Practitioner if interventions unsuccessful or patient reports new pain     Problem: Safety - Adult  Goal: Free from fall injury  Outcome: Progressing  Flowsheets (Taken 8/15/2024 0413)  Free From Fall Injury: Instruct family/caregiver on patient safety     Problem: Skin/Tissue Integrity  Goal: Absence of new skin breakdown  Description: 1.  Monitor for areas of redness and/or skin breakdown  2.  Assess vascular access sites hourly  3.  Every 4-6 hours minimum:  Change oxygen saturation probe site  4.  Every 4-6 hours:  If on nasal continuous positive airway pressure, respiratory therapy assess nares and determine need for appliance change or resting period.  Outcome: Progressing

## 2024-08-15 NOTE — ACP (ADVANCE CARE PLANNING)
Advance Care Planning     General Advance Care Planning (ACP) Conversation    Date of Conversation: 8/15/2024  Conducted with: Patient with Decision Making Capacity  Other persons present: None    Healthcare Decision Maker: No healthcare decision makers have been documented.       Content/Action Overview:  DECLINED ACP Conversation - will revisit periodically  Reviewed DNR/DNI and patient elects Full Code (Attempt Resuscitation)        Length of Voluntary ACP Conversation in minutes:  <16 minutes (Non-Billable)    Philip Helms RN

## 2024-08-15 NOTE — BRIEF OP NOTE
Brief Postoperative Note      Patient: Ruben Eagle  YOB: 1965  MRN: [id number]    Date of Procedure: 8/15/2024    Pre-Op Diagnosis Codes:      * Osteomyelitis of foot, right, acute (HCC) [M86.171]    Post-Op Diagnosis: Same       Procedure(s):  LEG AMPUTATION BELOW KNEE    Surgeon(s):  Floyd Cooney MD    Assistant:  Surgical Assistant: Hussein Goldstein    Anesthesia: General    Estimated Blood Loss (mL): less than 100     Complications: None    Specimens:   ID Type Source Tests Collected by Time Destination   A : Right Leg (Below the knee) Specimen Leg SURGICAL PATHOLOGY Floyd Cooney MD 8/15/2024 1519        Implants:  * No implants in log *      Drains: * No LDAs found *    Findings:  Infection Present At Time Of Surgery (PATOS) (choose all levels that have infection present):  No infection present  Other Findings: as above    Electronically signed by FLOYD COONEY MD on 8/15/2024 at 3:44 PM

## 2024-08-15 NOTE — ANESTHESIA POSTPROCEDURE EVALUATION
Department of Anesthesiology  Postprocedure Note    Patient: Ruben Eagle  MRN: [id number]  YOB: 1965  Date of evaluation: 8/15/2024    Procedure Summary       Date: 08/15/24 Room / Location: [address]    Anesthesia Start: 1433 Anesthesia Stop: 1544    Procedure: LEG AMPUTATION BELOW KNEE (Right) Diagnosis:       Osteomyelitis of foot, right, acute (HCC)      (Osteomyelitis of foot, right, acute (HCC) [M86.171])    Surgeons: Floyd Cooney MD Responsible Provider: Kevin Chamorro MD    Anesthesia Type: general ASA Status: 3            Anesthesia Type: No value filed.    Stephan Phase I: Stephan Score: 10    Stephan Phase II:      Anesthesia Post Evaluation    Patient location during evaluation: bedside  Patient participation: complete - patient participated  Level of consciousness: awake and alert  Airway patency: patent  Nausea & Vomiting: no nausea  Cardiovascular status: hemodynamically stable  Respiratory status: acceptable  Hydration status: euvolemic  Pain management: adequate      Vitals:    08/15/24 1547 08/15/24 1557 08/15/24 1601 08/15/24 1614   BP: 118/84 132/78 126/85 123/76   Pulse: 83  80    Resp: 15  13    Temp: 98.4 °F (36.9 °C) 98.4 °F (36.9 °C) 98.4 °F (36.9 °C) 98.1 °F (36.7 °C)   TempSrc: Temporal Temporal Temporal Temporal   SpO2: 93% 95% 91%    Weight:       Height:          This pre-anesthesia assessment may be used as a history and physical.    DOS STAFF ADDENDUM:    Pt seen and examined, chart reviewed (including anesthesia, drug and allergy history).  No interval changes to history and physical examination.  Anesthetic plan, risks, benefits, alternatives, and personnel involved discussed with patient.  Patient verbalized an understanding and agrees to proceed.      Kevin Chamorro MD  August 15, 2024  4:24 PM  No notable events documented.

## 2024-08-15 NOTE — FLOWSHEET NOTE
08/15/24 0752   Vital Signs   Temp 97.8 °F (36.6 °C)   Temp Source Axillary   Pulse 68   Heart Rate Source Monitor   Respirations 18   /72   MAP (Calculated) 84   BP Location Left upper arm   BP Method Automatic   Patient Position Semi fowlers   Oxygen Therapy   SpO2 98 %   O2 Device None (Room air)     Pt. Resting in bed. Call light in reach. Shift assessment completed see flow sheet. Denies any needs at this time. Will continue to monitor.

## 2024-08-15 NOTE — ANESTHESIA PRE PROCEDURE
Cellulitis of left foot     Cellulitis of right lower extremity 2/16, 5/16    Chronic systolic CHF (congestive heart failure) ([facility])     Community acquired pneumonia     Coronary artery disease involving native coronary artery of native heart without angina pectoris 08/06/2020    Diabetes ([facility])     Diabetic ulcer of left foot associated with type 2 diabetes mellitus, with muscle involvement without evidence of necrosis ([facility]) 04/27/2017    Diabetic ulcer of right foot ([facility]) early 2016    Diabetic ulcer of 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 2 diabetes mellitus, with necrosis of bone ([facility])     ETOH abuse     Fracture of tibial plateau 11/09/2012    High cholesterol     History of blood transfusion     reaction    HTN (hypertension)     Hx of blood clots     MI (myocardial infarction) ([facility]) 08/04/2020    + Troponins    MRSA (methicillin resistant staph aureus) culture positive 4/28/16, 4/20/16    foot wound    Neuropathic ulcer of left foot, limited to breakdown of skin ([facility]) 11/03/2016    Neuropathic ulcer of toe ([facility]) 02/13/2014    NSVT (nonsustained ventricular tachycardia) ([facility]) 2014    Pleural effusion due to congestive heart failure ([facility])     Septicemia ([facility])     Smoker     quit 1985    Systolic CHF, acute ([facility]) 07/08/2013    Thyroid disease        Past Surgical History:        Procedure Laterality Date    CARDIAC DEFIBRILLATOR PLACEMENT  01/29/2014    ICD Placement    CORONARY ARTERY BYPASS GRAFT N/A 08/11/2020    CORONARY ARTERY BYPASS GRAFTING X3, LEFT ATRIAL APPENDAGE CLIP, INTERNAL MAMMARY ARTERY, SAPHENOUS VEIN GRAFT, ON PUMP, 5 LEVEL BILATERAL INTERCOSTAL NERVE BLOCK performed by Stuart Mcneal MD at [facility]    EYE SURGERY      FOOT AMPUTATION Left 08/09/2018    PARTIAL FOOT AMPUTATION nan Jarrell     FOOT DEBRIDEMENT Left 01/31/2019    EXOSTECTOMY LEFT FOOT, ULCER DEBRIDEMENT LEFT FOOT WITH GRAFT APPLICATION performed by Floyd Jarrell DPM at [facility]    FOOT DEBRIDEMENT Right 12/28/2023    FOOT

## 2024-08-15 NOTE — PLAN OF CARE
Problem: Discharge Planning  Goal: Discharge to home or other facility with appropriate resources  8/15/2024 0810 by Ravi Cam RN  Outcome: Progressing  8/15/2024 0413 by Pratima Charlton RN  Outcome: Progressing     Problem: Pain  Goal: Verbalizes/displays adequate comfort level or baseline comfort level  8/15/2024 0810 by Ravi Cam RN  Outcome: Progressing  8/15/2024 0413 by Pratima Charlton RN  Outcome: Progressing  Flowsheets (Taken 8/15/2024 0413)  Verbalizes/displays adequate comfort level or baseline comfort level:   Encourage patient to monitor pain and request assistance   Assess pain using appropriate pain scale   Administer analgesics based on type and severity of pain and evaluate response   Implement non-pharmacological measures as appropriate and evaluate response   Consider cultural and social influences on pain and pain management   Notify Licensed Independent Practitioner if interventions unsuccessful or patient reports new pain     Problem: Safety - Adult  Goal: Free from fall injury  8/15/2024 0810 by Ravi Cam RN  Outcome: Progressing  8/15/2024 0413 by Pratima Charlton RN  Outcome: Progressing  Flowsheets (Taken 8/15/2024 0413)  Free From Fall Injury: Instruct family/caregiver on patient safety     Problem: Skin/Tissue Integrity  Goal: Absence of new skin breakdown  Description: 1.  Monitor for areas of redness and/or skin breakdown  2.  Assess vascular access sites hourly  3.  Every 4-6 hours minimum:  Change oxygen saturation probe site  4.  Every 4-6 hours:  If on nasal continuous positive airway pressure, respiratory therapy assess nares and determine need for appliance change or resting period.  8/15/2024 0810 by Ravi Cam RN  Outcome: Progressing  8/15/2024 0413 by Pratima Charlton RN  Outcome: Progressing     Problem: Chronic Conditions and Co-morbidities  Goal: Patient's chronic conditions and co-morbidity symptoms are monitored and maintained or

## 2024-08-15 NOTE — H&P
I have reviewed the history and physical dated August/14/2024 and examined the patient and find no relevant changes.    I have reviewed with the patient and/or family the risks, benefits, and alternatives to the procedure.     
[[id number]] Collected: 08/14/24 1412    Order Status: Sent Specimen: Blood Updated: 08/14/24 1428    Blood Culture 2 [[id number]] Collected: 08/14/24 1412    Order Status: Sent Specimen: Blood Updated: 08/14/24 1428             EKG:  I have reviewed the EKG with the following interpretation:     Encounter Date: 08/14/24   EKG 12 Lead   Result Value    Ventricular Rate 87    Atrial Rate 87    P-R Interval 208    QRS Duration 132    Q-T Interval 398    QTc Calculation (Bazett) 478    P Axis 62    R Axis -54    T Axis 108    Diagnosis      Normal sinus rhythmLeft axis deviationNon-specific intra-ventricular conduction blockPossible Lateral infarct , age undeterminedAbnormal ECGWhen compared with ECG of 13-JUN-2024 02:37,No significant change was foundConfirmed by BRANDAN PÉREZ MD ([id number]) on 8/14/2024 5:25:18 PM          RADIOLOGY    XR CHEST PORTABLE   Final Result   No acute cardiopulmonary findings         XR FOOT RIGHT (MIN 3 VIEWS)   Final Result   Fragmented appearance of the remaining base of the 5th metatarsal and   adjacent cuboid.  Cannot exclude osteomyelitis in the appropriate clinical   setting.              Principal Problem:    Osteomyelitis (HCC)  Resolved Problems:    * No resolved hospital problems. *        ASSESSMENT/PLAN:      Right foot osteomyelitis  Hx of left foot DM wounds and parial amputation  - recent debridement   - followed with Dr. Jarrell today and sent to the ED for evaluation   - X-ray with osteomyelitis  - NPO after midnight  - discussed with Dr. Jarrell, may need BKA  - will consult cardiology for clearance      KRYSTAL  - possibly 2/2 poor intake over last 3-4 days  - creatinine on admission 2.0  - baseline creatinine 1.0  - given over 2.5 liters in the ED concerned for sepsis  - repeat BMP now  - bladder scan for urinary retention   - hold off on further IVF at this time until BMP complete as do not want to overload patient   - lasix and aldactone       Hyponatremia  - acute on

## 2024-08-16 PROBLEM — Z89.511 STATUS POST BELOW-KNEE AMPUTATION OF RIGHT LOWER EXTREMITY (HCC): Status: ACTIVE | Noted: 2024-08-16

## 2024-08-16 LAB
ANION GAP SERPL CALCULATED.3IONS-SCNC: 13 MMOL/L (ref 3–16)
BASOPHILS # BLD: 0 K/UL (ref 0–0.2)
BASOPHILS NFR BLD: 0.1 %
BUN SERPL-MCNC: 47 MG/DL (ref 7–20)
CALCIUM SERPL-MCNC: 7.9 MG/DL (ref 8.3–10.6)
CHLORIDE SERPL-SCNC: 92 MMOL/L (ref 99–110)
CO2 SERPL-SCNC: 23 MMOL/L (ref 21–32)
CREAT SERPL-MCNC: 2.2 MG/DL (ref 0.9–1.3)
DEPRECATED RDW RBC AUTO: 20.3 % (ref 12.4–15.4)
EOSINOPHIL # BLD: 0 K/UL (ref 0–0.6)
EOSINOPHIL NFR BLD: 0 %
GFR SERPLBLD CREATININE-BSD FMLA CKD-EPI: 34 ML/MIN/{1.73_M2}
GLUCOSE BLD-MCNC: 257 MG/DL (ref 70–99)
GLUCOSE BLD-MCNC: 308 MG/DL (ref 70–99)
GLUCOSE BLD-MCNC: 312 MG/DL (ref 70–99)
GLUCOSE BLD-MCNC: 313 MG/DL (ref 70–99)
GLUCOSE BLD-MCNC: 316 MG/DL (ref 70–99)
GLUCOSE BLD-MCNC: 344 MG/DL (ref 70–99)
GLUCOSE SERPL-MCNC: 308 MG/DL (ref 70–99)
HCT VFR BLD AUTO: 34.1 % (ref 40.5–52.5)
HGB BLD-MCNC: 10.8 G/DL (ref 13.5–17.5)
LYMPHOCYTES # BLD: 0.4 K/UL (ref 1–5.1)
LYMPHOCYTES NFR BLD: 7 %
MCH RBC QN AUTO: 25.1 PG (ref 26–34)
MCHC RBC AUTO-ENTMCNC: 31.7 G/DL (ref 31–36)
MCV RBC AUTO: 79.2 FL (ref 80–100)
MONOCYTES # BLD: 0.1 K/UL (ref 0–1.3)
MONOCYTES NFR BLD: 2.6 %
NEUTROPHILS # BLD: 4.9 K/UL (ref 1.7–7.7)
NEUTROPHILS NFR BLD: 90.3 %
PERFORMED ON: ABNORMAL
PLATELET # BLD AUTO: 221 K/UL (ref 135–450)
PMV BLD AUTO: 7.4 FL (ref 5–10.5)
POTASSIUM SERPL-SCNC: 4.5 MMOL/L (ref 3.5–5.1)
RBC # BLD AUTO: 4.3 M/UL (ref 4.2–5.9)
SODIUM SERPL-SCNC: 128 MMOL/L (ref 136–145)
VANCOMYCIN SERPL-MCNC: 19.8 UG/ML
WBC # BLD AUTO: 5.4 K/UL (ref 4–11)

## 2024-08-16 PROCEDURE — 99232 SBSQ HOSP IP/OBS MODERATE 35: CPT | Performed by: NURSE PRACTITIONER

## 2024-08-16 PROCEDURE — 99233 SBSQ HOSP IP/OBS HIGH 50: CPT | Performed by: INTERNAL MEDICINE

## 2024-08-16 PROCEDURE — 6370000000 HC RX 637 (ALT 250 FOR IP): Performed by: SURGERY

## 2024-08-16 PROCEDURE — 36415 COLL VENOUS BLD VENIPUNCTURE: CPT

## 2024-08-16 PROCEDURE — 97530 THERAPEUTIC ACTIVITIES: CPT

## 2024-08-16 PROCEDURE — 80202 ASSAY OF VANCOMYCIN: CPT

## 2024-08-16 PROCEDURE — 6360000002 HC RX W HCPCS: Performed by: SURGERY

## 2024-08-16 PROCEDURE — 97165 OT EVAL LOW COMPLEX 30 MIN: CPT

## 2024-08-16 PROCEDURE — 6370000000 HC RX 637 (ALT 250 FOR IP): Performed by: INTERNAL MEDICINE

## 2024-08-16 PROCEDURE — 85025 COMPLETE CBC W/AUTO DIFF WBC: CPT

## 2024-08-16 PROCEDURE — 80048 BASIC METABOLIC PNL TOTAL CA: CPT

## 2024-08-16 PROCEDURE — 2580000003 HC RX 258: Performed by: SURGERY

## 2024-08-16 PROCEDURE — 99024 POSTOP FOLLOW-UP VISIT: CPT

## 2024-08-16 PROCEDURE — 2060000000 HC ICU INTERMEDIATE R&B

## 2024-08-16 PROCEDURE — 97162 PT EVAL MOD COMPLEX 30 MIN: CPT

## 2024-08-16 PROCEDURE — APPSS15 APP SPLIT SHARED TIME 0-15 MINUTES

## 2024-08-16 RX ORDER — FUROSEMIDE 40 MG/1
80 TABLET ORAL 2 TIMES DAILY
Status: DISCONTINUED | OUTPATIENT
Start: 2024-08-16 | End: 2024-08-19 | Stop reason: HOSPADM

## 2024-08-16 RX ORDER — FUROSEMIDE 40 MG/1
40 TABLET ORAL 2 TIMES DAILY
Status: DISCONTINUED | OUTPATIENT
Start: 2024-08-16 | End: 2024-08-16

## 2024-08-16 RX ORDER — SPIRONOLACTONE 25 MG/1
25 TABLET ORAL
Status: DISCONTINUED | OUTPATIENT
Start: 2024-08-16 | End: 2024-08-19 | Stop reason: HOSPADM

## 2024-08-16 RX ADMIN — ASPIRIN 81 MG: 81 TABLET, COATED ORAL at 08:35

## 2024-08-16 RX ADMIN — INSULIN LISPRO 3 UNITS: 100 INJECTION, SOLUTION INTRAVENOUS; SUBCUTANEOUS at 16:36

## 2024-08-16 RX ADMIN — INSULIN GLARGINE 35 UNITS: 100 INJECTION, SOLUTION SUBCUTANEOUS at 21:18

## 2024-08-16 RX ADMIN — ACETAMINOPHEN 650 MG: 325 TABLET ORAL at 19:08

## 2024-08-16 RX ADMIN — OXYCODONE HYDROCHLORIDE AND ACETAMINOPHEN 1 TABLET: 5; 325 TABLET ORAL at 16:27

## 2024-08-16 RX ADMIN — CEFEPIME HYDROCHLORIDE 2000 MG: 2 INJECTION, POWDER, FOR SOLUTION INTRAVENOUS at 16:33

## 2024-08-16 RX ADMIN — ACETAMINOPHEN 650 MG: 325 TABLET ORAL at 08:34

## 2024-08-16 RX ADMIN — ATORVASTATIN CALCIUM 40 MG: 40 TABLET, FILM COATED ORAL at 21:07

## 2024-08-16 RX ADMIN — FERROUS SULFATE TAB 325 MG (65 MG ELEMENTAL FE) 325 MG: 325 (65 FE) TAB at 21:07

## 2024-08-16 RX ADMIN — INSULIN LISPRO 3 UNITS: 100 INJECTION, SOLUTION INTRAVENOUS; SUBCUTANEOUS at 08:35

## 2024-08-16 RX ADMIN — OXYCODONE HYDROCHLORIDE AND ACETAMINOPHEN 2 TABLET: 5; 325 TABLET ORAL at 00:12

## 2024-08-16 RX ADMIN — SPIRONOLACTONE 25 MG: 25 TABLET ORAL at 21:07

## 2024-08-16 RX ADMIN — ALLOPURINOL 100 MG: 100 TABLET ORAL at 08:35

## 2024-08-16 RX ADMIN — INSULIN LISPRO 3 UNITS: 100 INJECTION, SOLUTION INTRAVENOUS; SUBCUTANEOUS at 11:41

## 2024-08-16 RX ADMIN — LEVOTHYROXINE SODIUM 125 MCG: 125 TABLET ORAL at 05:06

## 2024-08-16 RX ADMIN — FUROSEMIDE 80 MG: 40 TABLET ORAL at 11:41

## 2024-08-16 RX ADMIN — SODIUM CHLORIDE, PRESERVATIVE FREE 10 ML: 5 INJECTION INTRAVENOUS at 21:09

## 2024-08-16 RX ADMIN — ENOXAPARIN SODIUM 40 MG: 100 INJECTION SUBCUTANEOUS at 08:34

## 2024-08-16 RX ADMIN — Medication 400 MG: at 08:34

## 2024-08-16 RX ADMIN — FUROSEMIDE 80 MG: 40 TABLET ORAL at 21:07

## 2024-08-16 ASSESSMENT — PAIN SCALES - GENERAL
PAINLEVEL_OUTOF10: 3
PAINLEVEL_OUTOF10: 4
PAINLEVEL_OUTOF10: 5
PAINLEVEL_OUTOF10: 5
PAINLEVEL_OUTOF10: 8

## 2024-08-16 ASSESSMENT — PAIN DESCRIPTION - DESCRIPTORS
DESCRIPTORS: ACHING
DESCRIPTORS: ACHING;DULL;DISCOMFORT
DESCRIPTORS: ACHING
DESCRIPTORS: ACHING

## 2024-08-16 ASSESSMENT — PAIN DESCRIPTION - ORIENTATION
ORIENTATION: RIGHT

## 2024-08-16 ASSESSMENT — PAIN DESCRIPTION - LOCATION
LOCATION: LEG

## 2024-08-16 NOTE — FLOWSHEET NOTE
08/15/24 1930   Vital Signs   Temp 97.2 °F (36.2 °C)   Temp Source Oral   Pulse 87   Heart Rate Source Monitor   Respirations 16   /83   MAP (Calculated) 97   BP Location Left upper arm   BP Method Automatic   Patient Position Semi fowlers     Assessment & vital signs completed. Evening meds given per MAR. Pt denies any needs at this time. Call light within reach, pt is stable.

## 2024-08-16 NOTE — FLOWSHEET NOTE
08/16/24 0737   Handoff   Communication Given Shift Handoff   Handoff Given To Sonali Ocampo   Handoff Received From SONALI Blackwood   Handoff Communication Face to Face   Time Handoff Given 0737   End of Shift Check Performed Yes     EOS report given to SONALI Ocampo. Pt in bed, call light within reach. Pt is stable, care is transferred.

## 2024-08-16 NOTE — PLAN OF CARE
HEART FAILURE CARE PLAN:    Comorbidities Reviewed: Yes   Patient has a past medical history of Acute on chronic systolic congestive heart failure (HCC), Acute osteomyelitis of left foot (HCC), Acute osteomyelitis of right foot (HCC), Acute respiratory failure (HCC), Ascites, Blood transfusion reaction, Burst fracture of lumbar vertebra (HCC), Cellulitis of left foot, Cellulitis of right lower extremity, Chronic systolic CHF (congestive heart failure) (HCC), Community acquired pneumonia, Coronary artery disease involving native coronary artery of native heart without angina pectoris, Diabetes (HCC), Diabetic ulcer of left foot associated with type 2 diabetes mellitus, with muscle involvement without evidence of necrosis (HCC), Diabetic ulcer of right foot (HCC), Diabetic 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     Weights Reviewed: Yes   Admission weight: 86.2 kg (190 lb)   Wt Readings from Last 3 Encounters:   08/15/24 88.8 kg (195 lb 12.8 oz)   07/09/24 91.1 kg (200 lb 12.8 oz)   06/17/24 86.3 kg (190 lb 3.2 oz)     Intake & Output Reviewed: Yes     Intake/Output Summary (Last 24 hours) at 8/15/2024 2022  Last data filed at 8/15/2024 1843  Gross per 24 hour   Intake 110 ml   Output 3415 ml   Net -3305 ml       ECHOCARDIOGRAM Reviewed: Yes   Patient's Ejection Fraction (EF) is greater than 40%     Medications Reviewed: Yes   SCHEDULED HOSPITAL MEDICATIONS:

## 2024-08-16 NOTE — DISCHARGE INSTRUCTIONS
[facility]    Hussein Irvin M.D.              Humble Guajardo M.D.              [facility]     Floyd Cooney M.D.               Marcus Morgan M.D.  [facility] - Thursday only                                                                                                                                                         [facility] Office        [facility] Office         [facility] - Dr. Morgan Only  [address]                     [address]                [address]  [address]                               [address]                                 [place] [address]  [place] [address]              [place] [address] [phone]      POST-OPERATIVE INSTRUCTIONS    Call the office to schedule your post-operative appointment with Dr. Cooney for 4 week(s).     Physical and occupational therapy will be working with you on regaining mobility. Keep your right leg in your brace when not working with therapy. It is okay to remove for short periods of time as well. Keep leg wrapped with ACE bandage to help protect your incision and control swelling. Change this daily and as needed. You should be non-weight bearing to this leg until your incision has fully healed to protect your incision and prevent complications.     Readiness to pursue prosthesis will be assessed during your follow up appointment.     You may shower after removing your dressing 2 days after surgery. Wash incisions gently, pat them dry. Do not rub your incisions. Do not soak in baths, hot tubs, pools, etc. until cleared by your surgeon.     Watch for signs of infection:   Fever over 100.5°   Excessive warmth or bright redness around your incision  Leakage of bloody or cloudy

## 2024-08-16 NOTE — FLOWSHEET NOTE
08/16/24 1619   Vital Signs   Temp 98.1 °F (36.7 °C)   Temp Source Oral   Pulse 100   Heart Rate Source Monitor   Respirations 17   /89   MAP (Calculated) 103   BP Location Left upper arm   BP Method Automatic   Patient Position Semi fowlers   Pain Assessment   Pain Assessment 0-10   Pain Level 4   Patient's Stated Pain Goal 0 - No pain   Pain Location Leg   Pain Orientation Right   Pain Descriptors Aching   Oxygen Therapy   SpO2 96 %   O2 Device None (Room air)     Pt resting in bed quietly. Pt states the pain is starting to get worse and asks for pain medicine. Pain meds given and pt denies any other needs. Will continue to monitor.

## 2024-08-16 NOTE — CARE COORDINATION
Met with pt at bedside. Pt aware of recommendation for ARU. Pt requested to go to [facility] due to closer to where he lives and easier for his elderly mother to visit. Pt also feels more comfortable going to [facility] due to sister in law being a charge nurse there and is familiar with the facility.     Explained to pt our goal is therapy driven and explained ARU has more intensive therapy vs SNF. Pt continues to request [facility].     Referral made to [facility]. Waiting on return call.     1440 Call made to [facility]. No answer. No call back. Still waiting on return call.    1454 met with pt at bedside. Updated pt on no return call back from [facility] at this time. Pt is willing to wait on an answer from [facility] until tomorrow. If staff unable to reach [facility] at that time then pt would like to proceed with ARU. ARU (Leann) called and updated on potential referral.

## 2024-08-16 NOTE — FLOWSHEET NOTE
08/16/24 0830   Vital Signs   Temp 97 °F (36.1 °C)   Temp Source Oral   Heart Rate Source Monitor   Respirations 17   /86   MAP (Calculated) 101   BP Location Left upper arm   BP Method Automatic   Patient Position Semi fowlers   Pain Assessment   Pain Assessment 0-10   Pain Level 5   Patient's Stated Pain Goal 0 - No pain   Pain Location Leg   Pain Orientation Right   Pain Descriptors Aching     AM assessment completed. Pt c/o right leg pain, requesting Tylenol at this time. Pt states that Drs recommend in pt therapy and rehab. Pt states he would like to go to [facility] because he has relatives that work there. Therapy is aware of pts wishes. Pt is a/o x4, bilateral lung fields easy/diminished and denies any sob or needs. Will continue to monitor.

## 2024-08-16 NOTE — OP NOTE
laterally and posteriorly were divided with the LigaSure device as well.  Using the bone saw, the tibia was divided and the anterior surface was then beveled.  The fibula was divided proximal to the tibia.  The tibia was elevated and the posterior muscle tissues were then divided with the LigaSure device.  The nerve was ligated as high as possible and divided distally.  The posterior flap was completely divided and some of the muscle was trimmed with the LigaSure device to ensure proper closure.  The wound was then irrigated.  Bone wax was placed over the tibia and fibula.  The anterior and posterior skin edges were approximated using 0 Vicryl sutures to close the fascial layer.  The skin was then closed with staples.  Dry dressings were applied.  All sponge, needle, and instrument counts were correct at the end of the case.  The patient was taken to the recovery area in stable condition.          MARCI MCBRIDE MD      D:  08/15/2024 16:01:18     T:  08/15/2024 23:10:19     STEFANI/ARABELLA  Job #:  [id number]     Doc#:  [id number]    CC:   Leann Marie NP

## 2024-08-17 LAB
ANION GAP SERPL CALCULATED.3IONS-SCNC: 12 MMOL/L (ref 3–16)
BUN SERPL-MCNC: 56 MG/DL (ref 7–20)
CALCIUM SERPL-MCNC: 8 MG/DL (ref 8.3–10.6)
CHLORIDE SERPL-SCNC: 95 MMOL/L (ref 99–110)
CO2 SERPL-SCNC: 23 MMOL/L (ref 21–32)
CREAT SERPL-MCNC: 2.2 MG/DL (ref 0.9–1.3)
GFR SERPLBLD CREATININE-BSD FMLA CKD-EPI: 34 ML/MIN/{1.73_M2}
GLUCOSE BLD-MCNC: 269 MG/DL (ref 70–99)
GLUCOSE BLD-MCNC: 272 MG/DL (ref 70–99)
GLUCOSE BLD-MCNC: 324 MG/DL (ref 70–99)
GLUCOSE SERPL-MCNC: 299 MG/DL (ref 70–99)
PERFORMED ON: ABNORMAL
POTASSIUM SERPL-SCNC: 4.2 MMOL/L (ref 3.5–5.1)
SODIUM SERPL-SCNC: 130 MMOL/L (ref 136–145)
VANCOMYCIN SERPL-MCNC: 15.9 UG/ML

## 2024-08-17 PROCEDURE — 6370000000 HC RX 637 (ALT 250 FOR IP): Performed by: SURGERY

## 2024-08-17 PROCEDURE — 36415 COLL VENOUS BLD VENIPUNCTURE: CPT

## 2024-08-17 PROCEDURE — 6360000002 HC RX W HCPCS: Performed by: SURGERY

## 2024-08-17 PROCEDURE — 80202 ASSAY OF VANCOMYCIN: CPT

## 2024-08-17 PROCEDURE — 6360000002 HC RX W HCPCS: Performed by: NURSE PRACTITIONER

## 2024-08-17 PROCEDURE — 99232 SBSQ HOSP IP/OBS MODERATE 35: CPT | Performed by: INTERNAL MEDICINE

## 2024-08-17 PROCEDURE — 2060000000 HC ICU INTERMEDIATE R&B

## 2024-08-17 PROCEDURE — 99024 POSTOP FOLLOW-UP VISIT: CPT | Performed by: SURGERY

## 2024-08-17 PROCEDURE — 6370000000 HC RX 637 (ALT 250 FOR IP): Performed by: INTERNAL MEDICINE

## 2024-08-17 PROCEDURE — 80048 BASIC METABOLIC PNL TOTAL CA: CPT

## 2024-08-17 PROCEDURE — 2580000003 HC RX 258: Performed by: SURGERY

## 2024-08-17 PROCEDURE — 2580000003 HC RX 258: Performed by: NURSE PRACTITIONER

## 2024-08-17 RX ORDER — INSULIN GLARGINE 100 [IU]/ML
42 INJECTION, SOLUTION SUBCUTANEOUS NIGHTLY
Status: DISCONTINUED | OUTPATIENT
Start: 2024-08-17 | End: 2024-08-19 | Stop reason: HOSPADM

## 2024-08-17 RX ADMIN — ENOXAPARIN SODIUM 40 MG: 100 INJECTION SUBCUTANEOUS at 08:02

## 2024-08-17 RX ADMIN — ATORVASTATIN CALCIUM 40 MG: 40 TABLET, FILM COATED ORAL at 20:54

## 2024-08-17 RX ADMIN — ACETAMINOPHEN 650 MG: 325 TABLET ORAL at 10:43

## 2024-08-17 RX ADMIN — Medication 400 MG: at 08:03

## 2024-08-17 RX ADMIN — INSULIN LISPRO 2 UNITS: 100 INJECTION, SOLUTION INTRAVENOUS; SUBCUTANEOUS at 17:07

## 2024-08-17 RX ADMIN — CEFEPIME HYDROCHLORIDE 2000 MG: 2 INJECTION, POWDER, FOR SOLUTION INTRAVENOUS at 15:37

## 2024-08-17 RX ADMIN — SODIUM CHLORIDE, PRESERVATIVE FREE 10 ML: 5 INJECTION INTRAVENOUS at 20:56

## 2024-08-17 RX ADMIN — ASPIRIN 81 MG: 81 TABLET, COATED ORAL at 08:03

## 2024-08-17 RX ADMIN — FUROSEMIDE 80 MG: 40 TABLET ORAL at 08:03

## 2024-08-17 RX ADMIN — OXYCODONE HYDROCHLORIDE AND ACETAMINOPHEN 1 TABLET: 5; 325 TABLET ORAL at 20:54

## 2024-08-17 RX ADMIN — VANCOMYCIN HYDROCHLORIDE 500 MG: 500 INJECTION, POWDER, LYOPHILIZED, FOR SOLUTION INTRAVENOUS at 08:11

## 2024-08-17 RX ADMIN — SODIUM CHLORIDE, PRESERVATIVE FREE 10 ML: 5 INJECTION INTRAVENOUS at 08:02

## 2024-08-17 RX ADMIN — INSULIN LISPRO 2 UNITS: 100 INJECTION, SOLUTION INTRAVENOUS; SUBCUTANEOUS at 08:03

## 2024-08-17 RX ADMIN — LEVOTHYROXINE SODIUM 125 MCG: 125 TABLET ORAL at 05:49

## 2024-08-17 RX ADMIN — FERROUS SULFATE TAB 325 MG (65 MG ELEMENTAL FE) 325 MG: 325 (65 FE) TAB at 20:54

## 2024-08-17 RX ADMIN — SPIRONOLACTONE 25 MG: 25 TABLET ORAL at 20:54

## 2024-08-17 RX ADMIN — INSULIN LISPRO 3 UNITS: 100 INJECTION, SOLUTION INTRAVENOUS; SUBCUTANEOUS at 11:02

## 2024-08-17 RX ADMIN — ACETAMINOPHEN 650 MG: 325 TABLET ORAL at 17:06

## 2024-08-17 RX ADMIN — INSULIN GLARGINE 42 UNITS: 100 INJECTION, SOLUTION SUBCUTANEOUS at 20:56

## 2024-08-17 RX ADMIN — ALLOPURINOL 100 MG: 100 TABLET ORAL at 08:03

## 2024-08-17 RX ADMIN — FUROSEMIDE 80 MG: 40 TABLET ORAL at 20:54

## 2024-08-17 ASSESSMENT — PAIN DESCRIPTION - DESCRIPTORS
DESCRIPTORS: SHARP
DESCRIPTORS: SHARP
DESCRIPTORS: ACHING

## 2024-08-17 ASSESSMENT — PAIN SCALES - GENERAL
PAINLEVEL_OUTOF10: 4
PAINLEVEL_OUTOF10: 5
PAINLEVEL_OUTOF10: 2
PAINLEVEL_OUTOF10: 4
PAINLEVEL_OUTOF10: 2
PAINLEVEL_OUTOF10: 4

## 2024-08-17 ASSESSMENT — PAIN - FUNCTIONAL ASSESSMENT
PAIN_FUNCTIONAL_ASSESSMENT: PREVENTS OR INTERFERES WITH MANY ACTIVE NOT PASSIVE ACTIVITIES
PAIN_FUNCTIONAL_ASSESSMENT: PREVENTS OR INTERFERES WITH MANY ACTIVE NOT PASSIVE ACTIVITIES

## 2024-08-17 ASSESSMENT — PAIN DESCRIPTION - ORIENTATION
ORIENTATION: RIGHT

## 2024-08-17 ASSESSMENT — PAIN DESCRIPTION - LOCATION
LOCATION: LEG

## 2024-08-17 ASSESSMENT — PAIN SCALES - WONG BAKER: WONGBAKER_NUMERICALRESPONSE: HURTS LITTLE MORE

## 2024-08-17 NOTE — PLAN OF CARE
Problem: Discharge Planning  Goal: Discharge to home or other facility with appropriate resources  Outcome: Progressing  Flowsheets (Taken 8/16/2024 2051)  Discharge to home or other facility with appropriate resources: Identify barriers to discharge with patient and caregiver     Problem: Pain  Goal: Verbalizes/displays adequate comfort level or baseline comfort level  Outcome: Progressing  Flowsheets (Taken 8/16/2024 2051)  Verbalizes/displays adequate comfort level or baseline comfort level: Encourage patient to monitor pain and request assistance     Problem: Safety - Adult  Goal: Free from fall injury  Outcome: Progressing     Problem: Skin/Tissue Integrity  Goal: Absence of new skin breakdown  Description: 1.  Monitor for areas of redness and/or skin breakdown  2.  Assess vascular access sites hourly  3.  Every 4-6 hours minimum:  Change oxygen saturation probe site  4.  Every 4-6 hours:  If on nasal continuous positive airway pressure, respiratory therapy assess nares and determine need for appliance change or resting period.  Outcome: Progressing     Problem: Chronic Conditions and Co-morbidities  Goal: Patient's chronic conditions and co-morbidity symptoms are monitored and maintained or improved  Outcome: Progressing  Flowsheets (Taken 8/16/2024 2051)  Care Plan - Patient's Chronic Conditions and Co-Morbidity Symptoms are Monitored and Maintained or Improved: Monitor and assess patient's chronic conditions and comorbid symptoms for stability, deterioration, or improvement

## 2024-08-17 NOTE — FLOWSHEET NOTE
08/16/24 2051   Vital Signs   Temp 97 °F (36.1 °C)   Temp Source Oral   Pulse 92   Heart Rate Source Monitor   Respirations 18   /76   MAP (Calculated) 89   BP Location Left upper arm   BP Method Automatic   Patient Position Semi faviolawlers   Pain Assessment   Pain Assessment None - Denies Pain   Care Plan - Pain Goals   Verbalizes/displays adequate comfort level or baseline comfort level Encourage patient to monitor pain and request assistance   Opioid-Induced Sedation   POSS Score 1   RASS   Dao Agitation Sedation Scale (RASS) 0   Oxygen Therapy   SpO2 98 %   Pulse Oximetry Type Intermittent   Pulse Oximeter Device Mode Intermittent   Pulse Oximeter Device Location Right;Finger   O2 Device None (Room air)   O2 Flow Rate (L/min) 0 L/min   Oxygen Therapy None (Room air)   Rhythm Interpretation   Cardiac Rhythm Pacer not pacing;Atrial paced     Pt resting comfortably in bed. Bed alarm on, call light within reach. No needs expressed at this time. Will continue to monitor.

## 2024-08-17 NOTE — FLOWSHEET NOTE
08/17/24 0356   Vital Signs   Temp (!) 96.5 °F (35.8 °C)   Temp Source Oral   Pulse 75   Heart Rate Source Monitor   Respirations 18   /69   MAP (Calculated) 83   BP Location Left upper arm   BP Method Automatic   Patient Position Semi fowlers   Pain Assessment   Pain Assessment None - Denies Pain   Oxygen Therapy   SpO2 96 %   Pulse Oximetry Type Intermittent   Pulse Oximeter Device Mode Intermittent   Pulse Oximeter Device Location Right;Finger   O2 Device None (Room air)   O2 Flow Rate (L/min) 0 L/min   Oxygen Therapy None (Room air)

## 2024-08-17 NOTE — FLOWSHEET NOTE
08/16/24 2320   Vital Signs   Temp 97.2 °F (36.2 °C)   Temp Source Axillary   Pulse 84   Heart Rate Source Monitor   Respirations 18   /70   MAP (Calculated) 84   BP Location Left upper arm   BP Method Automatic   Patient Position Semi fowlers   Pain Assessment   Pain Assessment None - Denies Pain   Oxygen Therapy   SpO2 97 %   Pulse Oximetry Type Intermittent   Pulse Oximeter Device Mode Intermittent   Pulse Oximeter Device Location Right;Finger   O2 Device None (Room air)   O2 Flow Rate (L/min) 0 L/min   Oxygen Therapy None (Room air)

## 2024-08-17 NOTE — PLAN OF CARE
Problem: Discharge Planning  Goal: Discharge to home or other facility with appropriate resources  8/17/2024 0953 by Soraida Lambert, RN  Outcome: Progressing  8/16/2024 2217 by Senia Herrera, RN  Outcome: Progressing  Flowsheets (Taken 8/16/2024 2051)  Discharge to home or other facility with appropriate resources: Identify barriers to discharge with patient and caregiver     Problem: Pain  Goal: Verbalizes/displays adequate comfort level or baseline comfort level  8/17/2024 0953 by Soraida Lambert, RN  Outcome: Progressing  8/16/2024 2217 by Senia Herrera, RN  Outcome: Progressing  Flowsheets (Taken 8/16/2024 2051)  Verbalizes/displays adequate comfort level or baseline comfort level: Encourage patient to monitor pain and request assistance

## 2024-08-18 ENCOUNTER — APPOINTMENT (OUTPATIENT)
Dept: ULTRASOUND IMAGING | Age: 59
DRG: 475 | End: 2024-08-18
Payer: MEDICARE

## 2024-08-18 LAB
ANION GAP SERPL CALCULATED.3IONS-SCNC: 13 MMOL/L (ref 3–16)
BUN SERPL-MCNC: 58 MG/DL (ref 7–20)
CALCIUM SERPL-MCNC: 7.9 MG/DL (ref 8.3–10.6)
CHLORIDE SERPL-SCNC: 97 MMOL/L (ref 99–110)
CO2 SERPL-SCNC: 23 MMOL/L (ref 21–32)
CREAT SERPL-MCNC: 2 MG/DL (ref 0.9–1.3)
GFR SERPLBLD CREATININE-BSD FMLA CKD-EPI: 38 ML/MIN/{1.73_M2}
GLUCOSE BLD-MCNC: 111 MG/DL (ref 70–99)
GLUCOSE BLD-MCNC: 143 MG/DL (ref 70–99)
GLUCOSE BLD-MCNC: 179 MG/DL (ref 70–99)
GLUCOSE BLD-MCNC: 218 MG/DL (ref 70–99)
GLUCOSE SERPL-MCNC: 120 MG/DL (ref 70–99)
PERFORMED ON: ABNORMAL
POTASSIUM SERPL-SCNC: 3.6 MMOL/L (ref 3.5–5.1)
SODIUM SERPL-SCNC: 133 MMOL/L (ref 136–145)
VANCOMYCIN SERPL-MCNC: 15.1 UG/ML

## 2024-08-18 PROCEDURE — 80048 BASIC METABOLIC PNL TOTAL CA: CPT

## 2024-08-18 PROCEDURE — 80202 ASSAY OF VANCOMYCIN: CPT

## 2024-08-18 PROCEDURE — 6370000000 HC RX 637 (ALT 250 FOR IP): Performed by: INTERNAL MEDICINE

## 2024-08-18 PROCEDURE — 99232 SBSQ HOSP IP/OBS MODERATE 35: CPT | Performed by: INTERNAL MEDICINE

## 2024-08-18 PROCEDURE — 6370000000 HC RX 637 (ALT 250 FOR IP): Performed by: SURGERY

## 2024-08-18 PROCEDURE — 2580000003 HC RX 258: Performed by: NURSE PRACTITIONER

## 2024-08-18 PROCEDURE — 76770 US EXAM ABDO BACK WALL COMP: CPT

## 2024-08-18 PROCEDURE — 99024 POSTOP FOLLOW-UP VISIT: CPT | Performed by: SURGERY

## 2024-08-18 PROCEDURE — 6360000002 HC RX W HCPCS: Performed by: SURGERY

## 2024-08-18 PROCEDURE — 36415 COLL VENOUS BLD VENIPUNCTURE: CPT

## 2024-08-18 PROCEDURE — 2580000003 HC RX 258: Performed by: SURGERY

## 2024-08-18 PROCEDURE — 6360000002 HC RX W HCPCS: Performed by: NURSE PRACTITIONER

## 2024-08-18 PROCEDURE — 2060000000 HC ICU INTERMEDIATE R&B

## 2024-08-18 RX ORDER — HYDRALAZINE HYDROCHLORIDE 10 MG/1
10 TABLET, FILM COATED ORAL EVERY 8 HOURS SCHEDULED
Status: DISCONTINUED | OUTPATIENT
Start: 2024-08-18 | End: 2024-08-19 | Stop reason: HOSPADM

## 2024-08-18 RX ORDER — ISOSORBIDE DINITRATE 10 MG/1
5 TABLET ORAL EVERY 8 HOURS
Status: DISCONTINUED | OUTPATIENT
Start: 2024-08-18 | End: 2024-08-19 | Stop reason: HOSPADM

## 2024-08-18 RX ADMIN — SPIRONOLACTONE 25 MG: 25 TABLET ORAL at 20:53

## 2024-08-18 RX ADMIN — FUROSEMIDE 80 MG: 40 TABLET ORAL at 08:28

## 2024-08-18 RX ADMIN — HYDRALAZINE HYDROCHLORIDE 10 MG: 10 TABLET, FILM COATED ORAL at 09:07

## 2024-08-18 RX ADMIN — ISOSORBIDE DINITRATE 5 MG: 10 TABLET ORAL at 15:54

## 2024-08-18 RX ADMIN — Medication 400 MG: at 08:29

## 2024-08-18 RX ADMIN — ALLOPURINOL 100 MG: 100 TABLET ORAL at 08:28

## 2024-08-18 RX ADMIN — INSULIN LISPRO 1 UNITS: 100 INJECTION, SOLUTION INTRAVENOUS; SUBCUTANEOUS at 16:15

## 2024-08-18 RX ADMIN — FERROUS SULFATE TAB 325 MG (65 MG ELEMENTAL FE) 325 MG: 325 (65 FE) TAB at 20:55

## 2024-08-18 RX ADMIN — OXYCODONE HYDROCHLORIDE AND ACETAMINOPHEN 2 TABLET: 5; 325 TABLET ORAL at 20:53

## 2024-08-18 RX ADMIN — SODIUM CHLORIDE, PRESERVATIVE FREE 10 ML: 5 INJECTION INTRAVENOUS at 20:54

## 2024-08-18 RX ADMIN — CEFEPIME HYDROCHLORIDE 2000 MG: 2 INJECTION, POWDER, FOR SOLUTION INTRAVENOUS at 14:00

## 2024-08-18 RX ADMIN — HYDRALAZINE HYDROCHLORIDE 10 MG: 10 TABLET, FILM COATED ORAL at 13:57

## 2024-08-18 RX ADMIN — CLOPIDOGREL BISULFATE 75 MG: 75 TABLET ORAL at 08:28

## 2024-08-18 RX ADMIN — ENOXAPARIN SODIUM 40 MG: 100 INJECTION SUBCUTANEOUS at 08:29

## 2024-08-18 RX ADMIN — ATORVASTATIN CALCIUM 40 MG: 40 TABLET, FILM COATED ORAL at 20:53

## 2024-08-18 RX ADMIN — INSULIN GLARGINE 42 UNITS: 100 INJECTION, SOLUTION SUBCUTANEOUS at 20:52

## 2024-08-18 RX ADMIN — FUROSEMIDE 80 MG: 40 TABLET ORAL at 20:55

## 2024-08-18 RX ADMIN — MORPHINE SULFATE 2 MG: 2 INJECTION, SOLUTION INTRAMUSCULAR; INTRAVENOUS at 05:42

## 2024-08-18 RX ADMIN — ASPIRIN 81 MG: 81 TABLET, COATED ORAL at 08:28

## 2024-08-18 RX ADMIN — HYDRALAZINE HYDROCHLORIDE 10 MG: 10 TABLET, FILM COATED ORAL at 20:53

## 2024-08-18 RX ADMIN — VANCOMYCIN HYDROCHLORIDE 500 MG: 500 INJECTION, POWDER, LYOPHILIZED, FOR SOLUTION INTRAVENOUS at 06:59

## 2024-08-18 RX ADMIN — OXYCODONE HYDROCHLORIDE AND ACETAMINOPHEN 2 TABLET: 5; 325 TABLET ORAL at 13:17

## 2024-08-18 RX ADMIN — SODIUM CHLORIDE, PRESERVATIVE FREE 10 ML: 5 INJECTION INTRAVENOUS at 08:29

## 2024-08-18 RX ADMIN — ISOSORBIDE DINITRATE 5 MG: 10 TABLET ORAL at 09:07

## 2024-08-18 RX ADMIN — LEVOTHYROXINE SODIUM 125 MCG: 125 TABLET ORAL at 05:37

## 2024-08-18 ASSESSMENT — PAIN SCALES - GENERAL
PAINLEVEL_OUTOF10: 2
PAINLEVEL_OUTOF10: 0
PAINLEVEL_OUTOF10: 7
PAINLEVEL_OUTOF10: 5
PAINLEVEL_OUTOF10: 0
PAINLEVEL_OUTOF10: 7

## 2024-08-18 ASSESSMENT — PAIN - FUNCTIONAL ASSESSMENT: PAIN_FUNCTIONAL_ASSESSMENT: ACTIVITIES ARE NOT PREVENTED

## 2024-08-18 ASSESSMENT — PAIN DESCRIPTION - LOCATION
LOCATION: FOOT
LOCATION: LEG
LOCATION: LEG

## 2024-08-18 ASSESSMENT — PAIN DESCRIPTION - ORIENTATION
ORIENTATION: RIGHT

## 2024-08-18 ASSESSMENT — PAIN DESCRIPTION - DESCRIPTORS
DESCRIPTORS: DISCOMFORT;SORE
DESCRIPTORS: THROBBING
DESCRIPTORS: THROBBING;SHARP

## 2024-08-18 ASSESSMENT — PAIN SCALES - WONG BAKER: WONGBAKER_NUMERICALRESPONSE: HURTS A LITTLE BIT

## 2024-08-18 NOTE — PLAN OF CARE
Problem: Discharge Planning  Goal: Discharge to home or other facility with appropriate resources  8/18/2024 1038 by Bean Burgess RN  Outcome: Progressing  8/17/2024 2150 by Randy Kelly RN  Outcome: Progressing     Problem: Pain  Goal: Verbalizes/displays adequate comfort level or baseline comfort level  8/18/2024 1038 by Bean Burgess RN  Outcome: Progressing  8/17/2024 2150 by Randy Kelly RN  Outcome: Progressing     Problem: Safety - Adult  Goal: Free from fall injury  8/18/2024 1038 by Bean Burgess RN  Outcome: Progressing  8/17/2024 2150 by Randy Kelly RN  Outcome: Progressing     Problem: Skin/Tissue Integrity  Goal: Absence of new skin breakdown  Description: 1.  Monitor for areas of redness and/or skin breakdown  2.  Assess vascular access sites hourly  3.  Every 4-6 hours minimum:  Change oxygen saturation probe site  4.  Every 4-6 hours:  If on nasal continuous positive airway pressure, respiratory therapy assess nares and determine need for appliance change or resting period.  8/18/2024 1038 by Bean Burgess RN  Outcome: Progressing  8/17/2024 2150 by Randy Kelly RN  Outcome: Progressing     Problem: Chronic Conditions and Co-morbidities  Goal: Patient's chronic conditions and co-morbidity symptoms are monitored and maintained or improved  8/18/2024 1038 by Bean Burgess RN  Outcome: Progressing  8/17/2024 2150 by Randy Kelly RN  Outcome: Progressing

## 2024-08-18 NOTE — CONSULTS
CARDIOLOGY CONSULTATION        Patient Name: Ruben Eagle  Date of admission: 8/14/2024  1:11 PM  Admission Dx: Hyponatremia [E87.1]  Septicemia (HCC) [A41.9]  Osteomyelitis (HCC) [M86.9]  KRYSTAL (acute kidney injury) (HCC) [N17.9]  Other acute osteomyelitis of right foot (HCC) [M86.171]  Requesting Physician: Jose Eduardo Burns MD  Primary Care physician: Leann Marie, JOSE - CNP    Reason for Consultation/Chief Complaint: Preoperative risk stratification    History of Present Illness:     Ruben Eagle is a 59 y.o. patient with prior medical history notable for coronary artery disease status post CABG, cardiomyopathy with severe LV dysfunction thought 2/2 ETOH/ischemic etiologies, NSVT, status post ICD, diabetes mellitus type 2, hypertension, hyperlipidemia who presented to the hospital with complaints of poor p.o. intake and lower extremity edema.      Noted to have right lower extremity wounds with possible need for below the knee amputation. Cardiology service asked for preoperative restratification.  Course complicated by poor PO intake and acute kidney injury.  Chest x-ray on admission was clear.  EKG showed sinus rhythm with nonspecific IVCD-no changes.  Concern for osteomyelitis fifth metatarsal by x-ray.  Labs notable for sodium 126 which appears chronically low, BUN 36, creatinine 1.9 from baseline 1.6 last known.  A1c 7.3.  Patient has been assessed by Dr. Jarrell is having cellulitis with ulceration and osteomyelitis of right foot.  He is status post bilateral TMA.  Plans for right BKA at 3:10 PM today.       Pt follows with Dr. Carmichael.     Patient evaluated today. Notes worsening RLE pain and dehydrated/weakness with nausea and poor PO intake. He notes feels heart has been doing fairly well. No congestive heart failure admissions this year. He has baseline LLE edema. Some swelling RLE with wound. No worsening LLE edema than usual, rapid wt gain, orthopnea or paroxysmal nocturnal dyspnea 
  Pharmacy Note  Vancomycin Consult    Ruben Eagle is a 59 y.o. male started on Vancomycin for SSTI; consult received from JOSE Avitia to manage therapy. Also receiving the following antibiotics: cefepime.    Allergies:  Clindamycin/lincomycin, Bactrim [sulfamethoxazole-trimethoprim], Bee venom, and Lisinopril       Recent Labs     08/14/24  1412   CREATININE 2.0*       Recent Labs     08/14/24  1412   WBC 10.5       Estimated Creatinine Clearance: 43 mL/min (A) (based on SCr of 2 mg/dL (H)).    No intake or output data in the 24 hours ending 08/14/24 1830    Wt Readings from Last 1 Encounters:   08/14/24 86.2 kg (190 lb)         Body mass index is 28.06 kg/m².    Loading dose (critically ill or in ICU, require dialysis or renal replacement therapy): Vancomycin 25 mg/kg IVPB x 1 (maximum 2500 mg).  Maintenance dose: 10-20 mg/kg (maximum: 2000 mg/dose and 4500 mg/day) starting at the next dosing interval determined by renal function  Pulse dose: fluctuating renal function, KRYSTAL, ESRD  CRRT: 7.5-10 mg/kg q12h   Goal Vancomycin trough: 15-20 mcg/mL   Goal Vancomycin AUC: 400-600     Assessment/Plan:  Will initiate Vancomycin with a one time loading dose of 2000 mg x1, followed by pulse dosing based on daily random levels. First level ordered for 8/15 @ 0600  Thank you for the consult.    
Pharmacy Consult  Per Bree Leal, CNP    RE: Vancomycin     Dx:  Diabetic foot ulcer     Ht 69 inches  Wt 86.2 kg  BUN/SCR  52/1.6 (7/22/2024)   Est CrCl  54 ml/min  WBC  10.5             Based on age, wt and renal fxn, dosed at 25 mg/kg (2155 mg) round down to 2000 mg IVPB x 1.    KIARA YuPh.8/14/20242:59 PM    
cardioverter-defibrillator) in place Z95.810    Diabetic foot ulcer with osteomyelitis ([facility]) E11.621, E11.69, L97.509, M86.9    Hyperlipidemia E78.5    Onychomycosis B35.1    Dystrophic nail L60.3    Type 2 diabetes mellitus without complication, with long-term current use of insulin ([facility]) E11.9, Z79.4    Callus of foot L84    Hallux valgus M20.10    Acquired hypothyroidism E03.9    Coronary artery disease involving native coronary artery of native heart without angina pectoris I25.10    Obesity E66.9    Acute post-operative pain G89.18    S/P coronary artery bypass graft x 3 Z95.1    Chest pain R07.9    SOB (shortness of breath) R06.02    Ischemic cardiomyopathy I25.5    ACE-inhibitor cough R05.8, T46.4X5A    Diabetic retinopathy of both eyes without macular edema associated with type 2 diabetes mellitus ([facility]) E11.319    Diabetic nephropathy associated with type 2 diabetes mellitus ([facility]) E11.21    Chronic systolic congestive heart failure ([facility]) I50.22    Chronic renal disease, stage III ([facility]) [947014] N18.30    Stricture of artery ([facility]) I77.1    Localized edema R60.0    Diabetic foot ([facility]) E11.8    Diabetic foot infection ([facility]) E11.628, L08.9    Dilated cardiomyopathy ([facility]) I42.0    Umbilical hernia without obstruction and without gangrene K42.9    Infected stasis ulcer of left lower extremity ([facility]) I83.229, L97.929    Congestive heart failure ([facility]) I50.9    Anemia D64.9    Hypotension I95.9    Acute kidney injury superimposed on chronic kidney disease ([facility]) N17.9, N18.9    Hyperglycemia R73.9    Lightheadedness R42    MRSA (methicillin resistant Staphylococcus aureus) infection A49.02    Osteomyelitis ([facility]) M86.9    Hyponatremia E87.1    Osteomyelitis of foot, right, acute ([facility]) M86.171     Osteomyelitis right foot secondary to diabetes mellitus   diabetic foot ulcer right secondary to peripheral neuropathy   Diabetic foot infection left  diabetes mellitus uncontrolled with peripheral neuropathy   Cellulitis right 
no distress  CV: RRR  ABD: Bowel sounds positive, soft  SKIN: Right foot with necrotic wound  X-rays show osteomyelitis of the cuboid    Assessment/plan: Diabetic foot infection with osteomyelitis, no longer salvageable by debridement localized to the foot.  We will plan for right below-knee amputation. I explained the procedure including risks, benefits, and alternatives. Questions were answered and the patient agrees to proceed.        MARCI MCBRIDE MD      
([facility])         Plan:    I had the opportunity to review the clinical symptoms and presentation of Ruben Eagle.     Assessment/Plan:  End stage cardiomyopathy  - acc c nyha III  - thought secondary to alcohol, ischemic, family history   - s/p ICD  Plan  - echocardiogram   - hold jardiance and losartan given sidra. Restart spironolactone tomorrow  - start hydralazine and nitrates   - hold betablocker   - interrogate device tomorrow  - needs goals of care, poor prognosis     2. CAD s/p cabg  -stable  Plan  - continue aspirin, statin, clopidogrel    3. Acute kidney injury  - cardiorenal?  Plan  - monitor urine output  - continue furosemide po for now    4. BKA on 8/15/24          I will address the patient's cardiac risk factors and adjusted pharmacologic treatment as needed. In addition, I have reinforced the need for patient directed risk factor modification.    Tobacco use was discussed with the patient and educated on the negative effects. I have asked the patient to not utilize these agents.    Thank you for allowing to us to participate in the care or Ruben Eagle. Further evaluation will be based upon the patient's clinical course and testing results.    All questions and concerns were addressed to the patient/family. Alternatives to my treatment were discussed. The note was completed using EMR. Every effort was made to ensure accuracy; however, inadvertent computerized transcription errors may be present.       All questions and concerns were addressed to the patient/family. Alternatives to my treatment were discussed. The note was completed using EMR. Every effort was made to ensure accuracy; however, inadvertent computerized transcription errors may be present.  ABIODUN DORSEY DO, MD 8/18/2024 8:21 AM

## 2024-08-18 NOTE — FLOWSHEET NOTE
08/17/24 2000   Vital Signs   Temp 97.2 °F (36.2 °C)   Temp Source Oral   Pulse 83   Heart Rate Source Monitor   Respirations 18   /73   MAP (Calculated) 86   BP Location Left upper arm   BP Method Automatic   Patient Position Semi fowlers   Pain Assessment   Pain Assessment 0-10   Pain Level 5   Pain Location Leg   Pain Orientation Right   Pain Descriptors Sharp     Shift assessment done   Vitals see FS  Rx given per MAR  Pt A/OX4  Unlabored breathing  Call light within reach  Bed in lowest position

## 2024-08-18 NOTE — PLAN OF CARE
Problem: Discharge Planning  Goal: Discharge to home or other facility with appropriate resources  8/17/2024 2150 by Randy Kelly RN  Outcome: Progressing  8/17/2024 0953 by Soraida Lambert, RN  Outcome: Progressing     Problem: Pain  Goal: Verbalizes/displays adequate comfort level or baseline comfort level  8/17/2024 2150 by Randy Kelly RN  Outcome: Progressing  8/17/2024 0953 by Soraida Lambert, RN  Outcome: Progressing     Problem: Safety - Adult  Goal: Free from fall injury  Outcome: Progressing     Problem: Skin/Tissue Integrity  Goal: Absence of new skin breakdown  Description: 1.  Monitor for areas of redness and/or skin breakdown  2.  Assess vascular access sites hourly  3.  Every 4-6 hours minimum:  Change oxygen saturation probe site  4.  Every 4-6 hours:  If on nasal continuous positive airway pressure, respiratory therapy assess nares and determine need for appliance change or resting period.  Outcome: Progressing     Problem: Chronic Conditions and Co-morbidities  Goal: Patient's chronic conditions and co-morbidity symptoms are monitored and maintained or improved  Outcome: Progressing

## 2024-08-19 ENCOUNTER — APPOINTMENT (OUTPATIENT)
Age: 59
DRG: 475 | End: 2024-08-19
Attending: INTERNAL MEDICINE
Payer: MEDICARE

## 2024-08-19 VITALS
RESPIRATION RATE: 16 BRPM | DIASTOLIC BLOOD PRESSURE: 83 MMHG | SYSTOLIC BLOOD PRESSURE: 127 MMHG | HEIGHT: 69 IN | TEMPERATURE: 97.2 F | WEIGHT: 205 LBS | BODY MASS INDEX: 30.36 KG/M2 | HEART RATE: 96 BPM | OXYGEN SATURATION: 100 %

## 2024-08-19 LAB
ANION GAP SERPL CALCULATED.3IONS-SCNC: 10 MMOL/L (ref 3–16)
BACTERIA BLD CULT ORG #2: NORMAL
BACTERIA BLD CULT: NORMAL
BACTERIA SPEC AEROBE CULT: ABNORMAL
BACTERIA SPEC AEROBE CULT: ABNORMAL
BACTERIA SPEC ANAEROBE CULT: ABNORMAL
BUN SERPL-MCNC: 51 MG/DL (ref 7–20)
CALCIUM SERPL-MCNC: 8.4 MG/DL (ref 8.3–10.6)
CHLORIDE SERPL-SCNC: 96 MMOL/L (ref 99–110)
CO2 SERPL-SCNC: 26 MMOL/L (ref 21–32)
CREAT SERPL-MCNC: 1.8 MG/DL (ref 0.9–1.3)
ECHO BSA: 2.13 M2
ECHO EST RA PRESSURE: 15 MMHG
ECHO RIGHT VENTRICULAR SYSTOLIC PRESSURE (RVSP): 65 MMHG
ECHO TV REGURGITANT MAX VELOCITY: 3.55 M/S
ECHO TV REGURGITANT PEAK GRADIENT: 50 MMHG
GFR SERPLBLD CREATININE-BSD FMLA CKD-EPI: 43 ML/MIN/{1.73_M2}
GLUCOSE BLD-MCNC: 106 MG/DL (ref 70–99)
GLUCOSE BLD-MCNC: 123 MG/DL (ref 70–99)
GLUCOSE BLD-MCNC: 130 MG/DL (ref 70–99)
GLUCOSE SERPL-MCNC: 94 MG/DL (ref 70–99)
GRAM STN SPEC: ABNORMAL
NT-PROBNP SERPL-MCNC: ABNORMAL PG/ML (ref 0–124)
ORGANISM: ABNORMAL
ORGANISM: ABNORMAL
PERFORMED ON: ABNORMAL
POTASSIUM SERPL-SCNC: 3.2 MMOL/L (ref 3.5–5.1)
SODIUM SERPL-SCNC: 132 MMOL/L (ref 136–145)
VANCOMYCIN SERPL-MCNC: 14.2 UG/ML

## 2024-08-19 PROCEDURE — 6370000000 HC RX 637 (ALT 250 FOR IP): Performed by: INTERNAL MEDICINE

## 2024-08-19 PROCEDURE — 83880 ASSAY OF NATRIURETIC PEPTIDE: CPT

## 2024-08-19 PROCEDURE — 93325 DOPPLER ECHO COLOR FLOW MAPG: CPT | Performed by: INTERNAL MEDICINE

## 2024-08-19 PROCEDURE — 93308 TTE F-UP OR LMTD: CPT

## 2024-08-19 PROCEDURE — 6360000002 HC RX W HCPCS: Performed by: SURGERY

## 2024-08-19 PROCEDURE — 99238 HOSP IP/OBS DSCHRG MGMT 30/<: CPT | Performed by: INTERNAL MEDICINE

## 2024-08-19 PROCEDURE — 6370000000 HC RX 637 (ALT 250 FOR IP): Performed by: SURGERY

## 2024-08-19 PROCEDURE — 80202 ASSAY OF VANCOMYCIN: CPT

## 2024-08-19 PROCEDURE — APPSS15 APP SPLIT SHARED TIME 0-15 MINUTES

## 2024-08-19 PROCEDURE — 2580000003 HC RX 258: Performed by: NURSE PRACTITIONER

## 2024-08-19 PROCEDURE — 36415 COLL VENOUS BLD VENIPUNCTURE: CPT

## 2024-08-19 PROCEDURE — 93308 TTE F-UP OR LMTD: CPT | Performed by: INTERNAL MEDICINE

## 2024-08-19 PROCEDURE — 80048 BASIC METABOLIC PNL TOTAL CA: CPT

## 2024-08-19 PROCEDURE — 6360000002 HC RX W HCPCS: Performed by: NURSE PRACTITIONER

## 2024-08-19 PROCEDURE — 99024 POSTOP FOLLOW-UP VISIT: CPT

## 2024-08-19 PROCEDURE — 2580000003 HC RX 258: Performed by: SURGERY

## 2024-08-19 PROCEDURE — 93321 DOPPLER ECHO F-UP/LMTD STD: CPT | Performed by: INTERNAL MEDICINE

## 2024-08-19 PROCEDURE — 99232 SBSQ HOSP IP/OBS MODERATE 35: CPT | Performed by: INTERNAL MEDICINE

## 2024-08-19 RX ORDER — OXYCODONE HYDROCHLORIDE AND ACETAMINOPHEN 5; 325 MG/1; MG/1
1 TABLET ORAL EVERY 6 HOURS PRN
Qty: 20 TABLET | Refills: 0 | Status: SHIPPED | OUTPATIENT
Start: 2024-08-19 | End: 2024-08-24

## 2024-08-19 RX ORDER — INSULIN GLARGINE 100 [IU]/ML
35 INJECTION, SOLUTION SUBCUTANEOUS NIGHTLY
DISCHARGE
Start: 2024-08-19

## 2024-08-19 RX ORDER — ISOSORBIDE DINITRATE 5 MG/1
5 TABLET ORAL EVERY 8 HOURS
DISCHARGE
Start: 2024-08-19

## 2024-08-19 RX ORDER — SPIRONOLACTONE 25 MG/1
25 TABLET ORAL
DISCHARGE
Start: 2024-08-19

## 2024-08-19 RX ORDER — INSULIN LISPRO 100 [IU]/ML
0-4 INJECTION, SOLUTION INTRAVENOUS; SUBCUTANEOUS
DISCHARGE
Start: 2024-08-19

## 2024-08-19 RX ORDER — METOPROLOL SUCCINATE 25 MG/1
25 TABLET, EXTENDED RELEASE ORAL DAILY
DISCHARGE
Start: 2024-08-19

## 2024-08-19 RX ORDER — HYDRALAZINE HYDROCHLORIDE 10 MG/1
10 TABLET, FILM COATED ORAL EVERY 8 HOURS SCHEDULED
DISCHARGE
Start: 2024-08-19

## 2024-08-19 RX ORDER — POTASSIUM CHLORIDE 20 MEQ/1
20 TABLET, EXTENDED RELEASE ORAL ONCE
Status: COMPLETED | OUTPATIENT
Start: 2024-08-19 | End: 2024-08-19

## 2024-08-19 RX ORDER — METOPROLOL SUCCINATE 25 MG/1
25 TABLET, EXTENDED RELEASE ORAL DAILY
Status: DISCONTINUED | OUTPATIENT
Start: 2024-08-19 | End: 2024-08-19 | Stop reason: HOSPADM

## 2024-08-19 RX ADMIN — ISOSORBIDE DINITRATE 5 MG: 10 TABLET ORAL at 16:45

## 2024-08-19 RX ADMIN — Medication 400 MG: at 08:13

## 2024-08-19 RX ADMIN — VANCOMYCIN HYDROCHLORIDE 750 MG: 750 INJECTION, POWDER, LYOPHILIZED, FOR SOLUTION INTRAVENOUS at 08:22

## 2024-08-19 RX ADMIN — FUROSEMIDE 80 MG: 40 TABLET ORAL at 08:14

## 2024-08-19 RX ADMIN — CLOPIDOGREL BISULFATE 75 MG: 75 TABLET ORAL at 08:14

## 2024-08-19 RX ADMIN — POTASSIUM CHLORIDE 20 MEQ: 1500 TABLET, EXTENDED RELEASE ORAL at 12:00

## 2024-08-19 RX ADMIN — EMPAGLIFLOZIN 5 MG: 10 TABLET, FILM COATED ORAL at 14:55

## 2024-08-19 RX ADMIN — SODIUM CHLORIDE, PRESERVATIVE FREE 10 ML: 5 INJECTION INTRAVENOUS at 08:17

## 2024-08-19 RX ADMIN — HYDRALAZINE HYDROCHLORIDE 10 MG: 10 TABLET, FILM COATED ORAL at 06:07

## 2024-08-19 RX ADMIN — ALLOPURINOL 100 MG: 100 TABLET ORAL at 08:13

## 2024-08-19 RX ADMIN — ISOSORBIDE DINITRATE 5 MG: 10 TABLET ORAL at 08:13

## 2024-08-19 RX ADMIN — ISOSORBIDE DINITRATE 5 MG: 10 TABLET ORAL at 00:34

## 2024-08-19 RX ADMIN — HYDRALAZINE HYDROCHLORIDE 10 MG: 10 TABLET, FILM COATED ORAL at 14:54

## 2024-08-19 RX ADMIN — METOPROLOL SUCCINATE 25 MG: 25 TABLET, EXTENDED RELEASE ORAL at 14:54

## 2024-08-19 RX ADMIN — OXYCODONE HYDROCHLORIDE AND ACETAMINOPHEN 2 TABLET: 5; 325 TABLET ORAL at 08:13

## 2024-08-19 RX ADMIN — ENOXAPARIN SODIUM 40 MG: 100 INJECTION SUBCUTANEOUS at 08:14

## 2024-08-19 RX ADMIN — OXYCODONE HYDROCHLORIDE AND ACETAMINOPHEN 2 TABLET: 5; 325 TABLET ORAL at 16:45

## 2024-08-19 RX ADMIN — CEFEPIME HYDROCHLORIDE 2000 MG: 2 INJECTION, POWDER, FOR SOLUTION INTRAVENOUS at 14:59

## 2024-08-19 RX ADMIN — LEVOTHYROXINE SODIUM 125 MCG: 125 TABLET ORAL at 06:07

## 2024-08-19 RX ADMIN — ASPIRIN 81 MG: 81 TABLET, COATED ORAL at 08:13

## 2024-08-19 ASSESSMENT — PAIN SCALES - GENERAL
PAINLEVEL_OUTOF10: 5
PAINLEVEL_OUTOF10: 5
PAINLEVEL_OUTOF10: 6
PAINLEVEL_OUTOF10: 7
PAINLEVEL_OUTOF10: 5

## 2024-08-19 ASSESSMENT — PAIN SCALES - WONG BAKER
WONGBAKER_NUMERICALRESPONSE: HURTS A LITTLE BIT

## 2024-08-19 ASSESSMENT — PAIN DESCRIPTION - LOCATION
LOCATION: LEG
LOCATION: LEG

## 2024-08-19 ASSESSMENT — PAIN - FUNCTIONAL ASSESSMENT: PAIN_FUNCTIONAL_ASSESSMENT: ACTIVITIES ARE NOT PREVENTED

## 2024-08-19 ASSESSMENT — PAIN DESCRIPTION - ORIENTATION
ORIENTATION: RIGHT
ORIENTATION: RIGHT

## 2024-08-19 ASSESSMENT — PAIN DESCRIPTION - DESCRIPTORS
DESCRIPTORS: THROBBING
DESCRIPTORS: DISCOMFORT

## 2024-08-19 NOTE — CARE COORDINATION
MASON looking at patient.    8/19/24 - 8:57 - Received call from [facility] who advised they cannot take the patient because they can not get the patient mobile before his time would be up with them. They recommend SNF.    LV for Shreya/Freddie Haque.

## 2024-08-19 NOTE — CARE COORDINATION
Met with pt at bedside. Pt agreeable to DC to [facility] today after testing results and IV antibiotics. Transport arranged for 1830. Pt will contact necessary family of DC. RN, CM, Pt all aware of Dc disposition.     Imm- 8/19    O2 100% RA

## 2024-08-19 NOTE — PLAN OF CARE
Problem: Discharge Planning  Goal: Discharge to home or other facility with appropriate resources  Recent Flowsheet Documentation  Taken 8/18/2024 1558 by Casie Yang, RN  Discharge to home or other facility with appropriate resources:   Identify barriers to discharge with patient and caregiver   Arrange for needed discharge resources and transportation as appropriate   Identify discharge learning needs (meds, wound care, etc)   Refer to discharge planning if patient needs post-hospital services based on physician order or complex needs related to functional status, cognitive ability or social support system  8/18/2024 1038 by Bean Burgess, RN  Outcome: Progressing     Problem: Pain  Goal: Verbalizes/displays adequate comfort level or baseline comfort level  8/19/2024 0003 by Nereida Conklin, RN  Outcome: Progressing  Flowsheets (Taken 8/19/2024 0003)  Verbalizes/displays adequate comfort level or baseline comfort level:   Encourage patient to monitor pain and request assistance   Assess pain using appropriate pain scale   Administer analgesics based on type and severity of pain and evaluate response   Implement non-pharmacological measures as appropriate and evaluate response   Consider cultural and social influences on pain and pain management   Notify Licensed Independent Practitioner if interventions unsuccessful or patient reports new pain  8/18/2024 1038 by Bean Burgess, RN  Outcome: Progressing     Problem: Safety - Adult  Goal: Free from fall injury  8/18/2024 1038 by Bean Burgess RN  Outcome: Progressing     Problem: Skin/Tissue Integrity  Goal: Absence of new skin breakdown  Description: 1.  Monitor for areas of redness and/or skin breakdown  2.  Assess vascular access sites hourly  3.  Every 4-6 hours minimum:  Change oxygen saturation probe site  4.  Every 4-6 hours:  If on nasal continuous positive airway pressure, respiratory therapy assess nares and determine need for appliance change or

## 2024-08-19 NOTE — DISCHARGE INSTR - COC
Continuity of Care Form    Patient Name: Ruben Eagle   :  1965  MRN:  [id number]    Admit date:  2024  Discharge date:  24      Code Status Order: Full Code   Advance Directives:   Advance Care Flowsheet Documentation        Date/Time Healthcare Directive Type of Healthcare Directive Copy in Chart Healthcare Agent Appointed Healthcare Agent's Name Healthcare Agent's Phone Number    08/15/24 1405 No, patient does not have an advance directive for healthcare treatment  --  --  --  --  --                     Admitting Physician:  Jose Eduardo Burns MD  PCP: Leann Marie, APRN - CNP    Discharging Nurse: Marilyn MARTINEZ  Discharging Hospital Unit/Room#: /0310-01  Discharging Unit Phone Number: [phone]    Emergency Contact:   Extended Emergency Contact Information  Primary Emergency Contact: Shen Eagle  Home Phone: [phone]  Mobile Phone: [phone]  Relation: Brother/Sister  Secondary Emergency Contact: Mirna Eagle  Address: [address] [place] [address]  Home Phone: [phone]  Work Phone: [phone]  Mobile Phone: [phone]  Relation: Parent    Past Surgical History:  Past Surgical History:   Procedure Laterality Date    CARDIAC DEFIBRILLATOR PLACEMENT  2014    ICD Placement    CORONARY ARTERY BYPASS GRAFT N/A 2020    CORONARY ARTERY BYPASS GRAFTING X3, LEFT ATRIAL APPENDAGE CLIP, INTERNAL MAMMARY ARTERY, SAPHENOUS VEIN GRAFT, ON PUMP, 5 LEVEL BILATERAL INTERCOSTAL NERVE BLOCK performed by Stuart Mcneal MD at [facility]    EYE SURGERY      FOOT AMPUTATION Left 2018    PARTIAL FOOT AMPUTATION w. Dr Jarrell     FOOT DEBRIDEMENT Left 2019    EXOSTECTOMY LEFT FOOT, ULCER DEBRIDEMENT LEFT FOOT WITH GRAFT APPLICATION performed by Floyd Jarrell DPM at [facility]    FOOT DEBRIDEMENT Right 2023    FOOT DEBRIDEMENT Right 2023    ULCER DEBRIDEMENT RIGHT FOOT performed by Floyd Jarrell DPM at

## 2024-08-19 NOTE — DISCHARGE SUMMARY
Hospital Medicine Discharge Summary    Patient: Ruben Eagle     Gender: male  : 1965   Age: 59 y.o.  MRN: [id number]    Admitting Physician: Jose Eduardo Burns MD  Discharge Physician: Rosario Perez DO    Code Status: Full Code     Admit Date: 2024   Discharge Date: 2024      Discharge Diagnoses:    Active Hospital Problems    Diagnosis Date Noted    Chronic systolic congestive heart failure (HCC) [I50.22] 2022     Priority: Medium    Status post below-knee amputation of right lower extremity (HCC) [Z89.511] 2024    Osteomyelitis of foot, right, acute (HCC) [M86.171] 08/15/2024    Diabetic infection of right foot (HCC) [E11.628, L08.9] 08/15/2024    Osteomyelitis (HCC) [M86.9] 2024    Hyponatremia [E87.1] 2024    KRYSTAL (acute kidney injury) (HCC) [N17.9] 2024    Ischemic cardiomyopathy [I25.5]     S/P coronary artery bypass graft x 3 [Z95.1] 2020    Coronary artery disease involving native coronary artery of native heart without angina pectoris [I25.10] 2020    Acquired hypothyroidism [E03.9] 2017    Diabetic foot ulcer with osteomyelitis (HCC) [E11.621, E11.69, L97.509, M86.9] 2014    ICD (implantable cardioverter-defibrillator) in place [Z95.810] 2014     Condition at Discharge: Stable    Hospital Course:     Right foot osteomyelitis  Hx of left foot DM wounds and parial amputation  - recent debridement   - followed with Dr. Jarrell and sent to the ED for evaluation   - X-ray with osteomyelitis  - discussed with Dr. Jarrell  Underwent BKA 8/15    Continue cefepime and iv vanc  through post-op day 4 - today is day 4 he will finish this afternoon.     KRYSTAL vs CKD  - possibly 2/2 poor intake over last 3-4 days  - creatinine on admission 2.0  - baseline creatinine 1.0  - given over 2.5 liters in the ED concerned for sepsis  - bladder scan for urinary retention   - hold off on further IVF at this time until BMP complete as do not want to

## 2024-08-19 NOTE — PLAN OF CARE
HEART FAILURE CARE PLAN:    Comorbidities Reviewed: Yes   Patient has a past medical history of Acute on chronic systolic congestive heart failure (HCC), Acute osteomyelitis of left foot (HCC), Acute osteomyelitis of right foot (HCC), Acute respiratory failure (HCC), Ascites, Blood transfusion reaction, Burst fracture of lumbar vertebra (HCC), Cellulitis of left foot, Cellulitis of right lower extremity, Chronic systolic CHF (congestive heart failure) (HCC), Community acquired pneumonia, Coronary artery disease involving native coronary artery of native heart without angina pectoris, Diabetes (HCC), Diabetic ulcer of left foot associated with type 2 diabetes mellitus, with muscle involvement without evidence of necrosis (HCC), Diabetic ulcer of right foot (HCC), Diabetic 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     Weights Reviewed: Yes   Admission weight: 86.2 kg (190 lb)   Wt Readings from Last 3 Encounters:   08/16/24 93.1 kg (205 lb 4.8 oz)   07/09/24 91.1 kg (200 lb 12.8 oz)   06/17/24 86.3 kg (190 lb 3.2 oz)     Intake & Output Reviewed: Yes     Intake/Output Summary (Last 24 hours) at 8/19/2024 0749  Last data filed at 8/19/2024 0608  Gross per 24 hour   Intake 900 ml   Output 2750 ml   Net -1850 ml       ECHOCARDIOGRAM Reviewed: Yes   Patient's Ejection Fraction (EF) is less than or equal to 40%. Discuss HFrEF Guideline Directed Medical Therapy (GDMT) with

## 2024-08-23 NOTE — PROGRESS NOTES
CARDIOLOGY PROGRESS NOTE      Patient Name: Ruben Eagle  Date of admission: 8/14/2024  1:11 PM  Admission Dx: Hyponatremia [E87.1]  Septicemia (HCC) [A41.9]  Osteomyelitis (HCC) [M86.9]  KRYSTAL (acute kidney injury) (HCC) [N17.9]  Other acute osteomyelitis of right foot (HCC) [M86.171]  Reason for Consult:  CHF  Requesting Physician: Jose Eduardo Burns MD  Primary Care physician: Leann Marie, APRN - CNP    Subjective:     Ruben Eagle is a 59 y.o. man with CAD s/p CABG, HFrEF with severe LV dysfunction, s/p ICD, NSVT, HTN, HLP, DM, PAD admitted for sepsis and osteomyelitis.  Cardiology consulted for perioperative stratification for right BKA.  Patient underwent R BKA on 8/15/24.    08/17 -- Patient reports feeling better today with less SOB and no CP.  He will most likely be discharged to a rehab facility upon discharge.    Home Medications:  Were reviewed and are listed in nursing record and/or below  Prior to Admission medications    Medication Sig Start Date End Date Taking? Authorizing Provider   atorvastatin (LIPITOR) 40 MG tablet TAKE ONE (1) TABLET BY MOUTH NIGHTLY 7/9/24  Yes Gold Carmichael MD   furosemide (LASIX) 80 MG tablet Take 1 tablet by mouth 2 times daily 7/9/24  Yes Gold Carmichael MD   spironolactone (ALDACTONE) 25 MG tablet Take 1 tablet by mouth 2 times daily  Patient taking differently: Take 1 tablet by mouth daily 7/9/24  Yes Gold Carmichael MD   metoprolol succinate (TOPROL XL) 50 MG extended release tablet Take 1 tablet by mouth in the morning and at bedtime 7/9/24  Yes Gold Carmichael MD   losartan (COZAAR) 25 MG tablet Take 0.5 tablets by mouth daily 7/9/24  Yes Gold Carmichael MD   empagliflozin (JARDIANCE) 10 MG tablet Take 1 tablet by mouth nightly 7/9/24  Yes Gold Carmichael MD   insulin glargine (BASAGLAR KWIKPEN) 100 UNIT/ML injection pen Inject 70 Units into the skin daily 6/17/24  Yes Mackenzie Omer MD   allopurinol (ZYLOPRIM) 100 MG tablet Take 1 
       [facility] GENERAL SURGERY    Surgery Progress Note           POD # 3    PATIENT NAME: Ruben Eagle     TODAY'S DATE: 8/18/2024    INTERVAL HISTORY:    Pt feels okay today.  Did require some pain medication overnight.     OBJECTIVE:   VITALS:  /79   Pulse 89   Temp 97.7 °F (36.5 °C) (Oral)   Resp 16   Ht 1.753 m (5' 9\")   Wt 93.1 kg (205 lb 4.8 oz)   SpO2 100%   BMI 30.32 kg/m²     INTAKE/OUTPUT:    I/O last 3 completed shifts:  In: 1080 [P.O.:1080]  Out: 3950 [Urine:3950]  I/O this shift:  In: 480 [P.O.:480]  Out: 800 [Urine:800]              CONSTITUTIONAL:  awake and alert  INCISION: Minimal bloody drainage on dressing    Data:  CBC:   Recent Labs     08/16/24  0502   WBC 5.4   HGB 10.8*   HCT 34.1*        BMP:    Recent Labs     08/16/24  0502 08/17/24  0454 08/18/24  0432   * 130* 133*   K 4.5 4.2 3.6   CL 92* 95* 97*   CO2 23 23 23   BUN 47* 56* 58*   CREATININE 2.2* 2.2* 2.0*   GLUCOSE 308* 299* 120*     Hepatic: No results for input(s): \"AST\", \"ALT\", \"BILITOT\", \"ALKPHOS\" in the last 72 hours.    Invalid input(s): \"ALB\"  Mag:    No results for input(s): \"MG\" in the last 72 hours.   Phos:   No results for input(s): \"PHOS\" in the last 72 hours.   INR: No results for input(s): \"INR\" in the last 72 hours.      Radiology Review: N/A    ASSESSMENT AND PLAN:  59 y.o. male status post right below-knee amputation.  Continue current care.  Discharge planning in process.  Okay for discharge to rehab facility from surgical standpoint when approved and arrangements made        Electronically signed by MARCI MCBRIDE MD   
       [facility] GENERAL SURGERY    Surgery Progress Note           POD # 2    PATIENT NAME: Ruben Eagle     TODAY'S DATE: 8/17/2024    INTERVAL HISTORY:    Pt feels well.  Pain controlled with Tylenol.     OBJECTIVE:   VITALS:  /74   Pulse 85   Temp (!) 96.6 °F (35.9 °C) (Axillary)   Resp 18   Ht 1.753 m (5' 9\")   Wt 93.1 kg (205 lb 4.8 oz)   SpO2 94%   BMI 30.32 kg/m²     INTAKE/OUTPUT:    I/O last 3 completed shifts:  In: 2824.8 [P.O.:1802; I.V.:772.8; IV Piggyback:250]  Out: 1700 [Urine:1700]  I/O this shift:  In: 720 [P.O.:720]  Out: 1350 [Urine:1350]              CONSTITUTIONAL:  awake and alert  INCISION: Right below-knee amputation stump wound is clean.  He has good strength in his leg    Data:  CBC:   Recent Labs     08/14/24  1412 08/15/24  0439 08/16/24  0502   WBC 10.5 7.1 5.4   HGB 12.4* 10.5* 10.8*   HCT 40.2* 33.6* 34.1*    216 221     BMP:    Recent Labs     08/15/24  0439 08/16/24  0502 08/17/24  0454   * 128* 130*   K 4.0 4.5 4.2   CL 91* 92* 95*   CO2 23 23 23   BUN 36* 47* 56*   CREATININE 1.9* 2.2* 2.2*   GLUCOSE 168* 308* 299*     Hepatic:   Recent Labs     08/14/24  1412   AST 19   ALT 10   BILITOT 1.6*   ALKPHOS 243*     Mag:    No results for input(s): \"MG\" in the last 72 hours.   Phos:   No results for input(s): \"PHOS\" in the last 72 hours.   INR: No results for input(s): \"INR\" in the last 72 hours.      Radiology Review: N/A    ASSESSMENT AND PLAN:  59 y.o. male status post right below-knee amputation.  The wound looks great.  He has very good strength in his leg.  Okay for discharge to rehab from surgical standpoint when medically stable and bed is available        Electronically signed by MARCI MCBRIDE MD   
      Hospitalist Progress Note      PCP: Leann Marie, APRN - CNP    Date of Admission: 8/14/2024    Subjective: pain controlled, surgery changed dressing yesterday, renal fn stable    Medications:  Reviewed    Infusion Medications    dextrose      sodium chloride       Scheduled Medications    hydrALAZINE  10 mg Oral 3 times per day    isosorbide dinitrate  5 mg Oral q8h    insulin glargine  42 Units SubCUTAneous Nightly    furosemide  80 mg Oral BID    spironolactone  25 mg Oral QHS    allopurinol  100 mg Oral Daily    aspirin  81 mg Oral Daily    atorvastatin  40 mg Oral Nightly    clopidogrel  75 mg Oral Daily    ferrous sulfate  325 mg Oral Nightly    levothyroxine  125 mcg Oral Daily    magnesium oxide  400 mg Oral Daily    sodium chloride flush  5-40 mL IntraVENous 2 times per day    enoxaparin  40 mg SubCUTAneous Daily    insulin lispro  0-4 Units SubCUTAneous TID WC    insulin lispro  0-4 Units SubCUTAneous Nightly    cefepime  2,000 mg IntraVENous Q24H    vancomycin (VANCOCIN) intermittent dosing (placeholder)   Other RX Placeholder     PRN Meds: perflutren lipid microspheres, morphine **OR** morphine, oxyCODONE-acetaminophen **OR** oxyCODONE-acetaminophen, glucose, dextrose bolus **OR** dextrose bolus, glucagon (rDNA), dextrose, sodium chloride flush, sodium chloride, potassium chloride **OR** potassium alternative oral replacement **OR** potassium chloride, ondansetron **OR** ondansetron, polyethylene glycol, acetaminophen **OR** acetaminophen      Intake/Output Summary (Last 24 hours) at 8/18/2024 1721  Last data filed at 8/18/2024 1319  Gross per 24 hour   Intake 960 ml   Output 3200 ml   Net -2240 ml       Physical Exam Performed:    /70   Pulse 81   Temp 97.5 °F (36.4 °C) (Oral)   Resp 16   Ht 1.753 m (5' 9\")   Wt 93.1 kg (205 lb 4.8 oz)   SpO2 100%   BMI 30.32 kg/m²       Gen: No distress. Alert. Appears older than stated age, chronically ill  Resp: No accessory muscle use. No 
     [facility]   Heart Failure Daily Progress Note    Admit Date:  8/14/2024  HPI:    Chief Complaint   Patient presents with    Wound Infection     Pt has wound to outside of right foot        Ruben Eagle is being followed for CHF.   Presented with sepsis, osteomyelitis  Cardiology asked to see patient for perioperative stratification for right BKA  Past medical history of CAD s/p CABG, cardiomyopathy severe LV dysfunction, NSVT, s/p ICD, diabetes, HTN, HLD    Interval history: Underwent right BKA on 8/15/2024    Subjective:  Mr. Eagle feels better this am. Nausea is improved.   No chest pain. Continues with edema of the left lower leg.     Objective:   /86   Pulse 87   Temp 97 °F (36.1 °C) (Oral)   Resp 17   Ht 1.753 m (5' 9\")   Wt 93.1 kg (205 lb 4.8 oz)   SpO2 99%   BMI 30.32 kg/m²     Intake/Output Summary (Last 24 hours) at 8/16/2024 0901  Last data filed at 8/16/2024 0500  Gross per 24 hour   Intake 2102.77 ml   Output 700 ml   Net 1402.77 ml       NYHA: IV    Physical Exam:  General:  Awake, alert, NAD  Skin:  Warm and dry  Chest:  Clear to auscultation, no wheezes/rhonchi/rales  Cardiovascular:  RRR S1S2, no m/r/g  Abdomen:  Soft, nontender, +bowel sounds  Extremities:  left lower extremity edema, right BKA    Medications:    furosemide  80 mg Oral BID    allopurinol  100 mg Oral Daily    aspirin  81 mg Oral Daily    atorvastatin  40 mg Oral Nightly    clopidogrel  75 mg Oral Daily    [Held by provider] empagliflozin  10 mg Oral Nightly    ferrous sulfate  325 mg Oral Nightly    insulin glargine  35 Units SubCUTAneous Nightly    levothyroxine  125 mcg Oral Daily    [Held by provider] losartan  12.5 mg Oral Daily    magnesium oxide  400 mg Oral Daily    spironolactone  25 mg Oral BID    sodium chloride flush  5-40 mL IntraVENous 2 times per day    enoxaparin  40 mg SubCUTAneous Daily    insulin lispro  0-4 Units SubCUTAneous TID     insulin lispro  0-4 Units SubCUTAneous 
  Hospitalist Progress Note      PCP: Leann Marie, APRN - CNP    Date of Admission: 8/14/2024    Subjective: pain controlled, still always has some pain. Feels like he is able to move around more in the bed.     Medications:  Reviewed    Infusion Medications    dextrose      sodium chloride       Scheduled Medications    potassium chloride  20 mEq Oral Once    hydrALAZINE  10 mg Oral 3 times per day    isosorbide dinitrate  5 mg Oral q8h    insulin glargine  42 Units SubCUTAneous Nightly    furosemide  80 mg Oral BID    spironolactone  25 mg Oral QHS    allopurinol  100 mg Oral Daily    aspirin  81 mg Oral Daily    atorvastatin  40 mg Oral Nightly    clopidogrel  75 mg Oral Daily    ferrous sulfate  325 mg Oral Nightly    levothyroxine  125 mcg Oral Daily    magnesium oxide  400 mg Oral Daily    sodium chloride flush  5-40 mL IntraVENous 2 times per day    enoxaparin  40 mg SubCUTAneous Daily    insulin lispro  0-4 Units SubCUTAneous TID WC    insulin lispro  0-4 Units SubCUTAneous Nightly    cefepime  2,000 mg IntraVENous Q24H    vancomycin (VANCOCIN) intermittent dosing (placeholder)   Other RX Placeholder     PRN Meds: perflutren lipid microspheres, perflutren lipid microspheres, oxyCODONE-acetaminophen **OR** oxyCODONE-acetaminophen, glucose, dextrose bolus **OR** dextrose bolus, glucagon (rDNA), dextrose, sodium chloride flush, sodium chloride, ondansetron **OR** ondansetron, polyethylene glycol, acetaminophen **OR** acetaminophen      Intake/Output Summary (Last 24 hours) at 8/19/2024 1009  Last data filed at 8/19/2024 0608  Gross per 24 hour   Intake 660 ml   Output 2350 ml   Net -1690 ml       Physical Exam Performed:    /77   Pulse 91   Temp 97.8 °F (36.6 °C) (Oral)   Resp 16   Ht 1.753 m (5' 9\")   Wt 93 kg (205 lb)   SpO2 100%   BMI 30.27 kg/m²       Gen: No distress. Alert. Appears older than stated age, chronically ill  Resp: No accessory muscle use. No crackles. No wheezes. No rhonchi. 
  Pharmacy Vancomycin Consult     Vancomycin Day: 4/7  Current Dosing: pulse-dosing due to KRYSTAL   Current indication: SSTI    Temp max:  97.2    Recent Labs     08/15/24  0439 08/16/24  0502 08/17/24  0454   BUN 36* 47* 56*   CREATININE 1.9* 2.2* 2.2*   WBC 7.1 5.4  --        Intake/Output Summary (Last 24 hours) at 8/17/2024 0620  Last data filed at 8/17/2024 0359  Gross per 24 hour   Intake 822 ml   Output 1500 ml   Net -678 ml     Culture Date      Source                       Results      Ht Readings from Last 1 Encounters:   08/16/24 1.753 m (5' 9\")        Wt Readings from Last 1 Encounters:   08/16/24 93.1 kg (205 lb 4.8 oz)       Body mass index is 30.32 kg/m².    Estimated Creatinine Clearance: 41 mL/min (A) (based on SCr of 2.2 mg/dL (H)).    Vanc Level: 15.9    Assessment/Plan:  Will give 500 mg vancomycin x1 dose today.  Continue to pulse dose.   
  Pharmacy Vancomycin Consult     Vancomycin Day: 5/7  Current Dosing: pulse dosing  Current indication: SSTI    Temp max:  97.2    Recent Labs     08/16/24  0502 08/17/24  0454 08/18/24  0432   BUN 47* 56* 58*   CREATININE 2.2* 2.2* 2.0*   WBC 5.4  --   --        Intake/Output Summary (Last 24 hours) at 8/18/2024 0620  Last data filed at 8/18/2024 0421  Gross per 24 hour   Intake 960 ml   Output 3550 ml   Net -2590 ml     Culture Date      Source                       Results      Ht Readings from Last 1 Encounters:   08/16/24 1.753 m (5' 9\")        Wt Readings from Last 1 Encounters:   08/16/24 93.1 kg (205 lb 4.8 oz)       Body mass index is 30.32 kg/m².    Estimated Creatinine Clearance: 45 mL/min (A) (based on SCr of 2 mg/dL (H)).    Random: 15.1    Assessment/Plan:  Will give vancomycin 500 mg x1 today.  Continue to pulse dose.   
  Pharmacy Vancomycin Consult     Vancomycin Day: 6 of 7  Current Dosing: Pulse  Current indication: SSTI    Recent Labs     08/18/24  0432 08/19/24  0442   BUN 58* 51*   CREATININE 2.0* 1.8*       Estimated Creatinine Clearance: 50 mL/min (A) (based on SCr of 1.8 mg/dL (H)).    Trough: 14.2    Assessment/Plan:  Will dose with 750 mg x1 and recheck a level with AM labs tomorrow.     Martín RossD, [facility], 8/19/2024 7:04 AM      
  Physician Progress Note      PATIENT:               GEGE HANNAH  CSN #:                  [id number]  :                       1965  ADMIT DATE:       2024 1:11 PM  DISCH DATE:  RESPONDING  PROVIDER #:        Jose Eduardo Burns MD          QUERY TEXT:    Pt admitted with osteomyelitis of right foot and noted has prior partial right   foot amputation.? If possible, please document in progress notes and   discharge summary the relationship if any between the infection and the prior   amputation:  ?  The medical record reflects the following:  Risk Factors: CHF, DM2, osteomyelitis    Clinical Indicators: per attending note 8/15-\"Right foot osteomyelitis  Hx of left foot DM wounds and parial amputation  - recent debridement\"    Treatment: Podiatry consult with plans for BKA, abx, IVF, labs, imaging,   supportive care    Thank you,  Swati Eaton RN BSN CRCR CCDS  [email or web address]  Options provided:  -- Osteomyelitis is due to the stump infection  -- Osteomyelitis is not due to the stump infection  -- Other - I will add my own diagnosis  -- Disagree - Not applicable / Not valid  -- Disagree - Clinically unable to determine / Unknown  -- Refer to Clinical Documentation Reviewer    PROVIDER RESPONSE TEXT:    Osteomyelitis is due to the stump infection    Query created by: Tyra Eaton on 8/15/2024 9:41 AM      Electronically signed by:  Jose Eduardo Burns MD 8/15/2024 12:26 PM          
  Physician Progress Note      PATIENT:               GEGE HANNAH  [facility] #:                  [id number]  :                       1965  ADMIT DATE:       2024 1:11 PM  DISCH DATE:  RESPONDING  PROVIDER #:        Jose Eduardo Burns MD          QUERY TEXT:    Patient admitted with osteomyelitis/stump infection. Noted documentation of   sepsis in ED provider and cardiology consult notes. Pt arrives to ED with WBC   10.5 and Temp 97.7. In order to support the diagnosis of sepsis, please   include additional clinical indicators in your documentation.  Or please   document if the diagnosis of sepsis has been ruled out after further study    The medical record reflects the following:  Risk Factors: DM2, osteomyelitis/stump infection,    Clinical Indicators: on admission - WBC 10.5, Temp 97.7, HR 88, RR 16,   Lactic acid 2.5->2.0, Procalcitonin 0.72  ED provider- \"Septicemia\"  Per cardiology consult 8/15-\"Sepsis-Right lower extremity wound with need for   right BKA\"    Treatment: Podiatry consult, Cardiology consult, labs, Tylenol,   Cefepime/Vancomycin, 1L NS IVF bolus,  ml/hr cont infusion, blood   cultures- NGTD, XR imaging, General surgery consult for amputation    Thank you,  Swati Eaton RN BSN CRCR CCDS  [email or web address]  Options provided:  -- Sepsis present as evidenced by, Please document evidence.  -- Sepsis was ruled out after study  -- Other - I will add my own diagnosis  -- Disagree - Not applicable / Not valid  -- Disagree - Clinically unable to determine / Unknown  -- Refer to Clinical Documentation Reviewer    PROVIDER RESPONSE TEXT:    Sepsis was ruled out after study.    Query created by: Tyra Eaton on 2024 10:00 AM      Electronically signed by:  Jose Eduardo Burns MD 2024 2:33 PM          
  Physician Progress Note      PATIENT:               GEGE HANNAH  [facility] #:                  [id number]  :                       1965  ADMIT DATE:       2024 1:11 PM  DISCH DATE:        2024 6:41 PM  RESPONDING  PROVIDER #:        Rosario Perez DO          QUERY TEXT:    Patient admitted with osteomyelitis.   Noted documentation of \"chronic   systolic CHF\" per attending note  and \"Acute on chronic systolic CHF\" per   cardiology note   If possible, please document in progress notes and discharge summary if you   are evaluating and /or treating any of the following:    The medical record reflects the following:  Risk Factors: CHF, defibrillator, end stage cardiomyopathy    Clinical Indicators: Per H/P- home med: Lasix 80 mg pod bid  Per attending note -\"diuresing slowly\"  Per Discharge summary -\"Chronic systolic HF\"  Per cardiology note -\"Acute on chronic systolic CHF\"  Echo - EF 15-20%  - BNP  (prior on 24-) , +JVD per cardiology consult , LE   edema,    Treatment: Lasix 80 mg po bid, Jardiance, Hydralazine, Isordil, Cozaar,   Spironolactone, ABX, Cardiology consult, weight, I/O    Thank you,  Swati Eaton RN BSN CRCR CCDS  [email or web address]  Options provided:  -- Chronic systolic CHF confirmed, acute component ruled out  -- Acute on chronic systolic CHF confirmed  -- Other - I will add my own diagnosis  -- Disagree - Not applicable / Not valid  -- Disagree - Clinically unable to determine / Unknown  -- Refer to Clinical Documentation Reviewer    PROVIDER RESPONSE TEXT:    Chronic systolic CHF confirmed, acute component ruled out    Query created by: Tyra Eaton on 2024 3:19 PM      Electronically signed by:  Rosario Perez DO 2024 12:30 PM          
Admit: 2024    Name:  Ruben Eagle  Room:  /0310-01  MRN:    [id number]    Daily Progress Note for 2024   Right foot osteomyelitis     Interval History:     Underwent right BKA yesterday    Scheduled Meds:   allopurinol  100 mg Oral Daily    aspirin  81 mg Oral Daily    atorvastatin  40 mg Oral Nightly    [Held by provider] clopidogrel  75 mg Oral Daily    [Held by provider] empagliflozin  10 mg Oral Nightly    ferrous sulfate  325 mg Oral Nightly    [Held by provider] furosemide  80 mg Oral BID    insulin glargine  35 Units SubCUTAneous Nightly    levothyroxine  125 mcg Oral Daily    [Held by provider] losartan  12.5 mg Oral Daily    magnesium oxide  400 mg Oral Daily    [Held by provider] spironolactone  25 mg Oral BID    sodium chloride flush  5-40 mL IntraVENous 2 times per day    enoxaparin  40 mg SubCUTAneous Daily    insulin lispro  0-4 Units SubCUTAneous TID WC    insulin lispro  0-4 Units SubCUTAneous Nightly    cefepime  2,000 mg IntraVENous Q24H    vancomycin (VANCOCIN) intermittent dosing (placeholder)   Other RX Placeholder       Continuous Infusions:   dextrose      sodium chloride      sodium chloride 50 mL/hr at 24 0013       PRN Meds:  morphine **OR** morphine, oxyCODONE-acetaminophen **OR** oxyCODONE-acetaminophen, glucose, dextrose bolus **OR** dextrose bolus, glucagon (rDNA), dextrose, sodium chloride flush, sodium chloride, potassium chloride **OR** potassium alternative oral replacement **OR** potassium chloride, ondansetron **OR** ondansetron, polyethylene glycol, acetaminophen **OR** acetaminophen                  Objective:     Temp  Av.8 °F (36.6 °C)  Min: 97 °F (36.1 °C)  Max: 98.4 °F (36.9 °C)  Pulse  Av.4  Min: 72  Max: 87  BP  Min: 107/74  Max: 132/86  SpO2  Av.1 %  Min: 91 %  Max: 100 %  Patient Vitals for the past 4 hrs:   BP Temp Temp src Pulse Resp SpO2 Height Weight   24 0830 132/86 97 °F (36.1 °C) Oral -- 17 -- -- --   24 0740 
Admit: 2024    Name:  Ruben Eagle  Room:  /0310-01  MRN:    [id number]    Daily Progress Note for 8/15/2024   Right foot osteomyelitis     Interval History:   Needs BKA    Scheduled Meds:   vancomycin  1,000 mg IntraVENous Once    allopurinol  100 mg Oral Daily    aspirin  81 mg Oral Daily    atorvastatin  40 mg Oral Nightly    clopidogrel  75 mg Oral Daily    [Held by provider] empagliflozin  10 mg Oral Nightly    ferrous sulfate  325 mg Oral Nightly    [Held by provider] furosemide  80 mg Oral BID    insulin glargine  35 Units SubCUTAneous Nightly    levothyroxine  125 mcg Oral Daily    [Held by provider] losartan  12.5 mg Oral Daily    magnesium oxide  400 mg Oral Daily    [Held by provider] spironolactone  25 mg Oral BID    sodium chloride flush  5-40 mL IntraVENous 2 times per day    enoxaparin  40 mg SubCUTAneous Daily    insulin lispro  0-4 Units SubCUTAneous TID WC    insulin lispro  0-4 Units SubCUTAneous Nightly    cefepime  2,000 mg IntraVENous Q24H    vancomycin (VANCOCIN) intermittent dosing (placeholder)   Other RX Placeholder       Continuous Infusions:   dextrose      sodium chloride      [Held by provider] sodium chloride Stopped (245)       PRN Meds:  glucose, dextrose bolus **OR** dextrose bolus, glucagon (rDNA), dextrose, sodium chloride flush, sodium chloride, potassium chloride **OR** potassium alternative oral replacement **OR** potassium chloride, ondansetron **OR** ondansetron, polyethylene glycol, acetaminophen **OR** acetaminophen                  Objective:     Temp  Av.7 °F (36.5 °C)  Min: 97 °F (36.1 °C)  Max: 98.8 °F (37.1 °C)  Pulse  Av.4  Min: 68  Max: 88  BP  Min: 103/70  Max: 126/86  SpO2  Av.5 %  Min: 96 %  Max: 99 %  Patient Vitals for the past 4 hrs:   BP Temp Temp src Pulse Resp SpO2 Weight   08/15/24 0752 109/72 97.8 °F (36.6 °C) Axillary 68 18 98 % --   08/15/24 0519 -- -- -- -- -- -- 88.8 kg (195 lb 12.8 oz)         Intake/Output Summary 
Consult has been called to Dr. Cooney on 8/15/24. Spoke with Yaneli. 8:05 AM    Adriana Stubbs  8/15/2024  
General Surgery  Daily Progress Note    Pt Name: Ruben Eagle  Medical Record Number: [id number]  Date of Birth 1965   Today's Date: 8/16/2024  Admit date: 8/14/2024  LOS: Day 2    SUBJECTIVE  Pain controlled today      OBJECTIVE  Vitals:    08/16/24 0042 08/16/24 0500 08/16/24 0740 08/16/24 0830   BP:  117/76 123/79 132/86   Pulse:  82 87    Resp: 16 16 17 17   Temp:  97.5 °F (36.4 °C)  97 °F (36.1 °C)   TempSrc:  Oral  Oral   SpO2:  100% 99%    Weight:  93.1 kg (205 lb 4.8 oz)     Height:  1.753 m (5' 9\")         Gen: No distress. Alert.   Resp: Normal rate. Easy and unlabored. No accessory muscle use.   CV: Regular rate. Regular rhythm.   GI: Non-tender. Non-distended.  Normal bowel sounds.  Skin: Warm and dry. No nodule or rash on exposed extremities.  MSK: s/p R BKA dressings CDI, immobilizer in place       I/O last 3 completed shifts:  In: 2112.8 [P.O.:980; I.V.:782.8; IV Piggyback:350]  Out: 3615 [Urine:3615]  No intake/output data recorded.    LABS  CBC:   Recent Labs     08/14/24  1412 08/15/24  0439   WBC 10.5 7.1   HGB 12.4* 10.5*   HCT 40.2* 33.6*   MCV 80.0 79.5*    216     BMP:   Recent Labs     08/14/24  1908 08/15/24  0439 08/16/24  0502   * 126* 128*   K 4.3 4.0 4.5   CL 90* 91* 92*   CO2 23 23 23   BUN 34* 36* 47*   CREATININE 1.8* 1.9* 2.2*     LIVER PROFILE:   Recent Labs     08/14/24  1412   AST 19   ALT 10   BILITOT 1.6*   ALKPHOS 243*     PT/INR: No results for input(s): \"PROTIME\", \"INR\" in the last 72 hours.  APTT: No results for input(s): \"APTT\" in the last 72 hours.  UA:No results for input(s): \"NITRITE\", \"COLORU\", \"PHUR\", \"LABCAST\", \"WBCUA\", \"RBCUA\", \"MUCUS\", \"TRICHOMONAS\", \"YEAST\", \"BACTERIA\", \"CLARITYU\", \"SPECGRAV\", \"LEUKOCYTESUR\", \"UROBILINOGEN\", \"BILIRUBINUR\", \"BLOODU\", \"GLUCOSEU\", \"AMORPHOUS\" in the last 72 hours.    Invalid input(s): \"KETONESU\"      IMAGING  XR CHEST PORTABLE   Final Result   No acute cardiopulmonary findings         XR FOOT RIGHT (MIN 3 VIEWS) 
General Surgery  Daily Progress Note    Pt Name: Ruben Eagle  Medical Record Number: [id number]  Date of Birth 1965   Today's Date: 8/19/2024  Admit date: 8/14/2024  LOS: Day 5    SUBJECTIVE  No complaints      OBJECTIVE  Vitals:    08/19/24 0702 08/19/24 0843 08/19/24 0954 08/19/24 1200   BP: 121/75  119/77 139/87   Pulse: 91   (!) 110   Resp: 18 16     Temp: 97.8 °F (36.6 °C)   97.9 °F (36.6 °C)   TempSrc: Oral   Oral   SpO2: 100%   100%   Weight:   93 kg (205 lb)    Height:   1.753 m (5' 9\")        Gen: No distress. Alert.   Resp: Normal rate. Easy and unlabored. No accessory muscle use.   CV: Regular rate. Regular rhythm.   GI: Non-tender. Non-distended.  Normal bowel sounds.  Skin: Warm and dry. No nodule or rash on exposed extremities.  MSK: s/p R BKA - incision looks great. Mild oozing blood. ABD placed and rewrapped with ACE x2.       Media Information      Document Information    Wound Care Image: Wound   POD4 RBKA   08/19/2024 12:41   Attached To:   Hospital Encounter on 8/14/24   Source Information    Cesar Sears PA  [facility]   Document History          I/O last 3 completed shifts:  In: 1140 [P.O.:1140]  Out: 4650 [Urine:4650]  I/O this shift:  In: 240 [P.O.:240]  Out: 700 [Urine:700]    LABS  CBC:   No results for input(s): \"WBC\", \"HGB\", \"HCT\", \"MCV\", \"PLT\" in the last 72 hours.    BMP:   Recent Labs     08/17/24  0454 08/18/24  0432 08/19/24  0442   * 133* 132*   K 4.2 3.6 3.2*   CL 95* 97* 96*   CO2 23 23 26   BUN 56* 58* 51*   CREATININE 2.2* 2.0* 1.8*     LIVER PROFILE:   No results for input(s): \"AST\", \"ALT\", \"LIPASE\", \"AMYLASE\", \"BILIDIR\", \"BILITOT\", \"ALKPHOS\" in the last 72 hours.    Invalid input(s): \"ALB\"    PT/INR: No results for input(s): \"PROTIME\", \"INR\" in the last 72 hours.  APTT: No results for input(s): \"APTT\" in the last 72 hours.  UA:No results for input(s): \"NITRITE\", \"COLORU\", \"PHUR\", \"LABCAST\", \"WBCUA\", \"RBCUA\", \"MUCUS\", \"TRICHOMONAS\", \"YEAST\", 
Inpatient Occupational Therapy Evaluation and Treatment    Unit: PCU  Date:  8/16/2024  Patient Name:    Ruben Eagle  Admitting diagnosis:  Hyponatremia [E87.1]  Septicemia ([facility]) [A41.9]  Osteomyelitis ([facility]) [M86.9]  KRYSTAL (acute kidney injury) ([facility]) [N17.9]  Other acute osteomyelitis of right foot ([facility]) [M86.171]  Admit Date:  8/14/2024  Precautions/Restrictions/WB Status/ Lines/ Wounds/ Oxygen: Fall risk, Bed/chair alarm, Lines (IV), Telemetry, WB Restrictions (NWB RLE s/p BKA on 8/15), Knee immobilizer, and Isolation Precautions: Contact    Pt seen for cotreatment this date due to patient safety, patient endurance, and complexity of condition    Treatment Time:  10:14-10:48  Treatment Number:  1  Timed Code Treatment Minutes: 24 minutes  Total Treatment Minutes: 34 minutes    Patient Goals for Therapy: \"I'll try sitting EOB\"          Discharge Recommendations: Acute Rehab (ARU)/ Inpatient Rehab Facility (IRF)  DME needs for discharge: Defer to facility       Therapy recommendations for staff:   Assist of 1 for sitting EOB    History of Present Illness: Per H&P on 8/14 from JOSE Alcala-CNP:     \"The patient is a 59 y.o. male with PMH of acute on chronic systolic heart failure status post pacemaker defibrillator. CAD status post CABG, type 2 diabetes, hypertension, hyperlipidemia who presents to [facility] with wound infection.  Pt stated he was overall doing well until about 3-4 days ago.  He stated he had a poor appetite and not drinking much over the last couple days.  He stated he hasn't been taking his meds due to not feeling well.  He went to see Dr. Jarrell today and was referred to the ED for further evaluation. He stated his BLE edema is overall stable. He did fall and strain his left ankle recently. He denies any current fever or chills.  Stated he has lost some weight over the last couple days. Discussed with Dr. Jarrell, he will assess again in the am.  Pt might possibly need right BKA and pt is 
Lester RN from PCU came down and face to face report was given.  Patient put back on telemetry and CMU called, they are able to see patient on monitor.   Transport request put in.   
Patient admitted to room 310 from ed. Patient oriented to room, call light, bed rails, phone, lights and bathroom. Patient instructed about the schedule of the day including: vital sign frequency, lab draws, possible tests, frequency of MD and staff rounds, daily weights, I &O's and prescribed diet. pcu Telemetry box in place, patient aware of placement and reason. Bed locked, in lowest position, side rails up 2/4, call light within reach.        Recliner Assessment  Patient is able to demonstrate the ability to move from a reclining position to an upright position within the recliner.       4 Eyes Skin Assessment     NAME:  Ruben Eagle  YOB: 1965  MEDICAL RECORD NUMBER:  [id number]    The patient is being assessed for  Admission    I agree that at least one RN has performed a thorough Head to Toe Skin Assessment on the patient. ALL assessment sites listed below have been assessed.      Areas assessed by both nurses:    Head, Face, Ears, Shoulders, Back, Chest, Arms, Elbows, Hands, Sacrum. Buttock, Coccyx, Ischium, Legs. Feet and Heels, and Under Medical Devices    Wound on R foot  Skin tear on left shin        Does the Patient have a Wound? Yes wound(s) were present on assessment. LDA wound assessment was Initiated and completed by RN       Christiano Prevention initiated by RN: Yes  Wound Care Orders initiated by RN: Yes    Pressure Injury (Stage 3,4, Unstageable, DTI, NWPT, and Complex wounds) if present, place Wound referral order by RN under : No    New Ostomies, if present place, Ostomy referral order under : No     Nurse 1 eSignature: Electronically signed by Damaris Oneill RN on 8/14/24 at 7:15 PM EDT    **SHARE this note so that the co-signing nurse can place an eSignature**    Nurse 2 eSignature: Electronically signed by Pratima Charlton RN on 8/15/24 at 6:28 AM EDT   
Patient currently resting in bed watching tv. On room air and in no distress. Pleasant and cooperative with care. Tylenol given for leg pain. Using urinal at bedside. Dressing on right lower extremity is dry, clean, and intact. Nurse will continue to monitor/reassess. Call light within reach.   
Patient is alert and oriented x 4. Patient has now had 1.5 mg of Dilaudid total and pain is down from a 10-4. RN called PCU to have patient nurse come down for report. Patient is stable to transfer to floor.   
Pt is in SDS. Placed on bedside monitor and CMU aware.   
Received report from Willie MARTINEZ  
Report called to [facility] to nurse Corinne, patient left in stable condition. Marilyn Conrad RN    
Shift assessment complete, morning medications given, patient resting with complaints of pain, pain medication given per MAR, will continue to monitor.    Marilyn Conrad RN      
[facility] Daily Progress Note      Admit Date:  8/14/2024    Subjective:  Mr. Eagle is seen for cardiology follow up.  Denies chest pain, shortness of breath, edema, dizziness, palpitations and syncope.  He is on RA and lying in bed at about 40degrees  No chest pain no shortness of breath    Ruben Eagle is a 59 y.o. man with CAD s/p CABG, HFrEF with severe LV dysfunction, s/p ICD, NSVT, HTN, HLP, DM, PAD admitted for sepsis and osteomyelitis.  Cardiology consulted for perioperative stratification for right BKA.  Patient underwent R BKA on 8/15/24.     ROS:  12 point ROS negative in all areas as listed below except as in [place]  Constitutional, EENT, GI, , skin, neurological, hematological, endocrine, Psychiatric    Past Medical History:   Diagnosis Date    Acute on chronic systolic congestive heart failure (HCC) 07/08/2013    Acute osteomyelitis of left foot ([facility]) 09/27/2017    Acute osteomyelitis of right foot ([facility]) 04/25/2016    Acute respiratory failure ([facility]) 08/04/2020    Ascites     Blood transfusion reaction     Burst fracture of lumbar vertebra ([facility]) 11/09/2012    Cellulitis of left foot     Cellulitis of right lower extremity 2/16, 5/16    Chronic systolic CHF (congestive heart failure) ([facility])     Community acquired pneumonia     Coronary artery disease involving native coronary artery of native heart without angina pectoris 08/06/2020    Diabetes ([facility])     Diabetic ulcer of left foot associated with type 2 diabetes mellitus, with muscle involvement without evidence of necrosis ([facility]) 04/27/2017    Diabetic ulcer of right foot ([facility]) early 2016    Diabetic ulcer of 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 2 diabetes mellitus, with necrosis of bone ([facility])     ETOH abuse     Fracture of tibial plateau 11/09/2012    High cholesterol     History of blood transfusion     reaction    HTN (hypertension)     Hx of blood clots     MI (myocardial infarction) ([facility]) 08/04/2020    + Troponins    MRSA 
Vancomycin Day # 2/7  Current dose = Pulse dosing per daily vancomycin levels due to KRYSTAL  BUN/SRCR 36/1.9      Est CrCl = 47 ml/min  WBC   7.1            Tmax 97.5  Vancomycin random level this am = 17.5 mcg/ml  Will give vancomycin 1000 mg x1 dose today around 10 AM.  Will recheck random level tomorrow am and re-dose as appropriate for current renal function and tx of SSTI.    
Services:None    Objective  Does this pt have an acute or acute on chronic diagnosis of CHF? No    Upper Extremity ROM/Strength  Please see OT evaluation.      Lower Extremity ROM / Strength   AROM WFL: No  ROM limitations: Knee immobilizer to R knee.    Strength Assessment (measured on a 0-5 scale):  R LE   Quad   Deferred    Ant Tib  N/A   Hamstring Deferred    Iliopsoas 3  L LE  Quad   4-   Ant Tib  4   Hamstring 4-   Iliopsoas 3+    Lower Extremity Sensation    Impaired; Neuropathy present; L foot    Coordination  WFL  Tremors present R hand    Tone  WNL    Balance  Static Sitting:  Good - ; SBA  Dynamic Sitting:  Fair +; SBA   Comments: EOB    Static Standing: Not tested; Not Tested  Dynamic Standing: Not tested; Not Tested  Comments: Pt declined attempts at standing/transfers.    Posture  Seated: Forward head and neck  Standing: Not Tested    Bed Mobility   Supine to Sit:    Min A   Sit to Supine:   SBA  Rolling:   Not Tested   Scooting in sitting: SBA   Scooting in supine:  SBA   Bridging:  Not Tested  Comments: HOB elevated    Transfer Training     Sit to stand:   Not Tested   Stand to sit:   Not Tested   Bed to/from Chair:  Not Tested with use of N/A  Comments: Pt declined OOB attempt this date. Pt agreeable on next therapy session. Pt nervous with getting out of bed since amputation yesterday.    Gait gait deferred due to Declined attempts at standing; pt ambulated 0 ft.   Distance:      0 ft  Deviations (firm surface/linoleum):  N/A  Assistive Device Used:    N/A  Level of Assist:    Not Tested     Stair Training deferred, pt unsafe/ not appropriate to complete stairs at this time    Therapeutic Exercises Initiated  deferred secondary to treatment focus on functional mobility    Positioning Needs   Pt in bed, no alarm needed, positioned in proper neutral alignment and pressure relief provided.   Call light provided and all needs within reach  RN aware of pt position/status    Other 
Yes Mackenzie Omer MD   levothyroxine (SYNTHROID) 125 MCG tablet Take 1 tablet by mouth Daily 6/17/24  Yes Mackenzie Omer MD   ferrous sulfate (IRON 325) 325 (65 Fe) MG tablet Take 1 tablet by mouth nightly   Yes ProviderSherrie MD   Multiple Vitamins-Minerals (THERAPEUTIC MULTIVITAMIN-MINERALS) tablet Take 1 tablet by mouth daily   Yes Sherrie Thomas MD   clopidogrel (PLAVIX) 75 MG tablet TAKE ONE (1) TABLET BY MOUTH DAILY 4/3/24  Yes Gold Carmichael MD   Insulin Pen Needle 29G X 12MM MISC 1 each by Does not apply route daily 12/6/23  Yes Florina Hoffman MD   magnesium oxide (MAG-OX) 400 (240 Mg) MG tablet Take 1 tablet by mouth 2 times daily  Patient taking differently: Take 1 tablet by mouth daily 8/14/23  Yes Gold Carmichael MD   TRULICITY 3 MG/0.5ML SOPN INJECT THREE (3) MG INTO THE SKIN ONCE A WEEK 7/27/23  Yes Leann Marie APRN - CNP   metFORMIN (GLUCOPHAGE) 1000 MG tablet TAKE ONE (1) TABLET BY MOUTH TWO (2) TIMES DAILY (WITH MEALS)  Patient taking differently: Take 1 tablet by mouth daily (with breakfast) 6/29/23  Yes Julianne Ruggiero PA   ASPIRIN LOW DOSE 81 MG EC tablet Take 1 tablet by mouth daily 3/17/23  Yes Basilio Clemons MD   Blood Pressure KIT 1 kit by Does not apply route daily 10/4/22  Yes Leann Marie APRN - CNP   BD PEN NEEDLE WINNIE U/F 32G X 4 MM MISC 1 each by Does not apply route daily 8/3/22  Yes Hood Sanchez MD   OneTouch Delica Lancets 33G MISC USE TO CHECK FOUR TIMES DAILY. DX;E11.9 8/18/21  Yes Hood Sanchez MD   Continuous Blood Gluc Sensor (FREESTYLE MARY 14 DAY SENSOR) MISC CGM 4/29/21  Yes Hood Sanchez MD   Insulin Pen Needle (UNIFINE PENTIPS) 31G X 5 MM MISC USE 1 EACH DAY AS NEEDED FOR TESTING 1/3/18  Yes Cb Murrell MD      Diet Reviewed: Yes   ADULT DIET; Regular; 4 carb choices (60 gm/meal)    Goal of Care Reviewed: Yes   Patient and/or Family's stated Goal of Care this Admission: Reduce shortness of 
Admission: Reduce shortness of breath, increase activity tolerance, better understand heart failure and disease management, be more comfortable, and reduce lower extremity edema prior to discharge.     Electronically signed by Damaris Oneill RN on 8/16/2024 at 11:01 AM   
dose  -SSI   -monitor BG   - carb control diet      Hx of TIFFANIE  -on ferrous sulfate      Hx of NSVT   -follows with EP   - ICD  -on coreg      Hypothyroidism   -synthroid      Chronic gout   -on allopurinol       Note above makes patient higher risk for morbidity and mortality requiring testing and treatment.         DVT Prophylaxis: Lovenox   Diet: ADULT DIET; Regular; 4 carb choices (60 gm/meal)  Code Status: Full Code  PT/OT Eval Status: ordered    Dispo - cont care      Florina Hoffman MD   
furosemide  80 mg Oral BID    spironolactone  25 mg Oral QHS    allopurinol  100 mg Oral Daily    aspirin  81 mg Oral Daily    atorvastatin  40 mg Oral Nightly    clopidogrel  75 mg Oral Daily    ferrous sulfate  325 mg Oral Nightly    levothyroxine  125 mcg Oral Daily    magnesium oxide  400 mg Oral Daily    sodium chloride flush  5-40 mL IntraVENous 2 times per day    enoxaparin  40 mg SubCUTAneous Daily    insulin lispro  0-4 Units SubCUTAneous TID WC    insulin lispro  0-4 Units SubCUTAneous Nightly    cefepime  2,000 mg IntraVENous Q24H    vancomycin (VANCOCIN) intermittent dosing (placeholder)   Other RX Placeholder       Allergies:  Clindamycin/lincomycin, Bactrim [sulfamethoxazole-trimethoprim], Bee venom, and Lisinopril    Social History:    Social History     Tobacco Use    Smoking status: Former     Current packs/day: 0.00     Average packs/day: 0.3 packs/day for 1 year (0.3 ttl pk-yrs)     Types: Cigarettes     Start date: 1979     Quit date: 1980     Years since quittin.5    Smokeless tobacco: Never   Vaping Use    Vaping status: Never Used   Substance Use Topics    Alcohol use: No     Alcohol/week: 0.0 standard drinks of alcohol    Drug use: No       Family History:       Problem Relation Age of Onset    Heart Disease Mother     High Blood Pressure Mother     High Cholesterol Mother     Diabetes Mother     Anemia Mother     Heart Disease Father     High Blood Pressure Father     High Cholesterol Father     Heart Disease Maternal Grandmother     High Blood Pressure Maternal Grandmother     High Cholesterol Maternal Grandmother     Cancer Maternal Grandmother         bone marrow & breast    Heart Disease Maternal Grandfather     High Blood Pressure Maternal Grandfather     High Cholesterol Maternal Grandfather     Cancer Maternal Grandfather         pancreatic CA    Heart Disease Paternal Grandmother     High Blood Pressure Paternal Grandmother     High Cholesterol Paternal Grandmother

## 2024-08-26 ENCOUNTER — TELEPHONE (OUTPATIENT)
Dept: SURGERY | Age: 59
End: 2024-08-26

## 2024-08-26 NOTE — TELEPHONE ENCOUNTER
Patient is at [facility] for rehab after BKA  He is having issues w/the therapy staff. He was unable to participate in therapy a few times because he was ill and weak. The staff told him that the insurance could step in and remove him from the facility if he refuses treatment.   He wanted to note his chart that he is not refusing treatment.  Also, he was told that the dressing needed to be changed daily and the staff said they would only change the dressing if its saturated.   Please advise  [phone]

## 2024-09-03 ENCOUNTER — OFFICE VISIT (OUTPATIENT)
Dept: SURGERY | Age: 59
End: 2024-09-03

## 2024-09-03 VITALS — HEIGHT: 69 IN | SYSTOLIC BLOOD PRESSURE: 100 MMHG | BODY MASS INDEX: 30.27 KG/M2 | DIASTOLIC BLOOD PRESSURE: 62 MMHG

## 2024-09-03 DIAGNOSIS — Z98.890 POST-OPERATIVE STATE: Primary | ICD-10-CM

## 2024-09-03 PROCEDURE — 99024 POSTOP FOLLOW-UP VISIT: CPT | Performed by: SURGERY

## 2024-09-03 NOTE — PROGRESS NOTES
[facility] SURGERY      S:   Patient presents s/p right below-knee amputation.  He reports doing well.    O:   Comfortable         Incision site healing well.  Incision is nicely intact with staples              A:   S/P right below-knee amputation    P:   He is not even 3 weeks out, so it is a little bit early for staple removal.  Continue daily Ace wrapping.  Follow up 2 weeks for staple removal.  We will arrange for the prosthetist to be present to fit him with a stump .   Cimzia Counseling:  I discussed with the patient the risks of Cimzia including but not limited to immunosuppression, allergic reactions and infections.  The patient understands that monitoring is required including a PPD at baseline and must alert us or the primary physician if symptoms of infection or other concerning signs are noted.

## 2024-09-12 DIAGNOSIS — E78.2 MIXED HYPERLIPIDEMIA: ICD-10-CM

## 2024-09-12 DIAGNOSIS — I42.6 ALCOHOLIC CARDIOMYOPATHY (HCC): ICD-10-CM

## 2024-09-12 DIAGNOSIS — I10 ESSENTIAL HYPERTENSION: ICD-10-CM

## 2024-09-12 RX ORDER — ASPIRIN 81 MG/1
81 TABLET, COATED ORAL DAILY
Qty: 90 TABLET | Refills: 3 | Status: SHIPPED | OUTPATIENT
Start: 2024-09-12

## 2024-09-16 ENCOUNTER — TELEPHONE (OUTPATIENT)
Dept: SURGERY | Age: 59
End: 2024-09-16

## 2024-09-17 ENCOUNTER — OFFICE VISIT (OUTPATIENT)
Dept: SURGERY | Age: 59
End: 2024-09-17

## 2024-09-17 VITALS — SYSTOLIC BLOOD PRESSURE: 106 MMHG | DIASTOLIC BLOOD PRESSURE: 66 MMHG

## 2024-09-17 DIAGNOSIS — Z98.890 POST-OPERATIVE STATE: Primary | ICD-10-CM

## 2024-09-17 PROCEDURE — 99024 POSTOP FOLLOW-UP VISIT: CPT | Performed by: SURGERY

## 2024-09-19 ENCOUNTER — APPOINTMENT (OUTPATIENT)
Dept: GENERAL RADIOLOGY | Age: 59
DRG: 871 | End: 2024-09-19
Payer: MEDICARE

## 2024-09-19 ENCOUNTER — HOSPITAL ENCOUNTER (INPATIENT)
Age: 59
LOS: 3 days | Discharge: HOME | DRG: 871 | End: 2024-09-22
Attending: STUDENT IN AN ORGANIZED HEALTH CARE EDUCATION/TRAINING PROGRAM | Admitting: FAMILY MEDICINE
Payer: MEDICARE

## 2024-09-19 DIAGNOSIS — E87.1 HYPONATREMIA: ICD-10-CM

## 2024-09-19 DIAGNOSIS — E11.610 CHARCOT FOOT DUE TO DIABETES MELLITUS (HCC): Primary | ICD-10-CM

## 2024-09-19 DIAGNOSIS — R70.0 ELEVATED SED RATE: ICD-10-CM

## 2024-09-19 DIAGNOSIS — Z74.09 IMPAIRED MOBILITY AND ADLS: ICD-10-CM

## 2024-09-19 DIAGNOSIS — R29.6 MULTIPLE FALLS: ICD-10-CM

## 2024-09-19 DIAGNOSIS — N17.9 ACUTE KIDNEY INJURY (HCC): ICD-10-CM

## 2024-09-19 DIAGNOSIS — Z78.9 IMPAIRED MOBILITY AND ADLS: ICD-10-CM

## 2024-09-19 DIAGNOSIS — R78.81 BACTEREMIA: ICD-10-CM

## 2024-09-19 DIAGNOSIS — M86.9 OSTEOMYELITIS OF LEFT FOOT, UNSPECIFIED TYPE: ICD-10-CM

## 2024-09-19 DIAGNOSIS — R79.82 ELEVATED C-REACTIVE PROTEIN (CRP): ICD-10-CM

## 2024-09-19 PROBLEM — M86.172 ACUTE OSTEOMYELITIS OF LEFT FOOT (HCC): Status: ACTIVE | Noted: 2024-09-19

## 2024-09-19 LAB
ALBUMIN SERPL-MCNC: 2.4 G/DL (ref 3.4–5)
ALBUMIN/GLOB SERPL: 0.6 {RATIO} (ref 1.1–2.2)
ALP SERPL-CCNC: 289 U/L (ref 40–129)
ALT SERPL-CCNC: 43 U/L (ref 10–40)
ANION GAP SERPL CALCULATED.3IONS-SCNC: 15 MMOL/L (ref 3–16)
AST SERPL-CCNC: 32 U/L (ref 15–37)
BACTERIA URNS QL MICRO: ABNORMAL /HPF
BASOPHILS # BLD: 0 K/UL (ref 0–0.2)
BASOPHILS NFR BLD: 0.1 %
BILIRUB SERPL-MCNC: 3.6 MG/DL (ref 0–1)
BILIRUB UR QL STRIP.AUTO: ABNORMAL
BUN SERPL-MCNC: 57 MG/DL (ref 7–20)
CALCIUM SERPL-MCNC: 7.8 MG/DL (ref 8.3–10.6)
CHLORIDE SERPL-SCNC: 85 MMOL/L (ref 99–110)
CLARITY UR: CLEAR
CO2 SERPL-SCNC: 24 MMOL/L (ref 21–32)
COLOR UR: ABNORMAL
CREAT SERPL-MCNC: 2.5 MG/DL (ref 0.9–1.3)
CRP SERPL-MCNC: 200.7 MG/L (ref 0–5.1)
DEPRECATED RDW RBC AUTO: 25.3 % (ref 12.4–15.4)
EOSINOPHIL # BLD: 0 K/UL (ref 0–0.6)
EOSINOPHIL NFR BLD: 0.2 %
ERYTHROCYTE [SEDIMENTATION RATE] IN BLOOD BY WESTERGREN METHOD: 56 MM/HR (ref 0–20)
ETHANOLAMINE SERPL-MCNC: NORMAL MG/DL (ref 0–0.08)
GFR SERPLBLD CREATININE-BSD FMLA CKD-EPI: 29 ML/MIN/{1.73_M2}
GLUCOSE SERPL-MCNC: 196 MG/DL (ref 70–99)
GLUCOSE UR STRIP.AUTO-MCNC: 500 MG/DL
HCT VFR BLD AUTO: 39.5 % (ref 40.5–52.5)
HGB BLD-MCNC: 12.4 G/DL (ref 13.5–17.5)
HGB UR QL STRIP.AUTO: ABNORMAL
KETONES UR STRIP.AUTO-MCNC: NEGATIVE MG/DL
LACTATE BLDV-SCNC: 2.4 MMOL/L (ref 0.4–1.9)
LACTATE BLDV-SCNC: 2.7 MMOL/L (ref 0.4–1.9)
LEUKOCYTE ESTERASE UR QL STRIP.AUTO: ABNORMAL
LYMPHOCYTES # BLD: 0.4 K/UL (ref 1–5.1)
LYMPHOCYTES NFR BLD: 2.4 %
MCH RBC QN AUTO: 25.3 PG (ref 26–34)
MCHC RBC AUTO-ENTMCNC: 31.4 G/DL (ref 31–36)
MCV RBC AUTO: 80.5 FL (ref 80–100)
MONOCYTES # BLD: 0.6 K/UL (ref 0–1.3)
MONOCYTES NFR BLD: 3.5 %
MUCOUS THREADS #/AREA URNS LPF: ABNORMAL /LPF
NEUTROPHILS # BLD: 15.7 K/UL (ref 1.7–7.7)
NEUTROPHILS NFR BLD: 93.8 %
NITRITE UR QL STRIP.AUTO: NEGATIVE
OSMOLALITY UR: 332 MOSM/KG (ref 390–1070)
PH UR STRIP.AUTO: 5.5 [PH] (ref 5–8)
PLATELET # BLD AUTO: 158 K/UL (ref 135–450)
PMV BLD AUTO: 7.1 FL (ref 5–10.5)
POTASSIUM SERPL-SCNC: 3.6 MMOL/L (ref 3.5–5.1)
PROT SERPL-MCNC: 6.1 G/DL (ref 6.4–8.2)
PROT UR STRIP.AUTO-MCNC: 100 MG/DL
RBC # BLD AUTO: 4.91 M/UL (ref 4.2–5.9)
RBC #/AREA URNS HPF: ABNORMAL /HPF (ref 0–4)
SODIUM SERPL-SCNC: 124 MMOL/L (ref 136–145)
SP GR UR STRIP.AUTO: 1.02 (ref 1–1.03)
UA COMPLETE W REFLEX CULTURE PNL UR: YES
UA DIPSTICK W REFLEX MICRO PNL UR: YES
URN SPEC COLLECT METH UR: ABNORMAL
UROBILINOGEN UR STRIP-ACNC: 1 E.U./DL
WBC # BLD AUTO: 16.7 K/UL (ref 4–11)
WBC #/AREA URNS HPF: >100 /HPF (ref 0–5)

## 2024-09-19 PROCEDURE — 96366 THER/PROPH/DIAG IV INF ADDON: CPT

## 2024-09-19 PROCEDURE — 82077 ASSAY SPEC XCP UR&BREATH IA: CPT

## 2024-09-19 PROCEDURE — 87040 BLOOD CULTURE FOR BACTERIA: CPT

## 2024-09-19 PROCEDURE — 87186 SC STD MICRODIL/AGAR DIL: CPT

## 2024-09-19 PROCEDURE — 85025 COMPLETE CBC W/AUTO DIFF WBC: CPT

## 2024-09-19 PROCEDURE — 83935 ASSAY OF URINE OSMOLALITY: CPT

## 2024-09-19 PROCEDURE — 87086 URINE CULTURE/COLONY COUNT: CPT

## 2024-09-19 PROCEDURE — 73562 X-RAY EXAM OF KNEE 3: CPT

## 2024-09-19 PROCEDURE — 99285 EMERGENCY DEPT VISIT HI MDM: CPT

## 2024-09-19 PROCEDURE — 86140 C-REACTIVE PROTEIN: CPT

## 2024-09-19 PROCEDURE — 80053 COMPREHEN METABOLIC PANEL: CPT

## 2024-09-19 PROCEDURE — 96365 THER/PROPH/DIAG IV INF INIT: CPT

## 2024-09-19 PROCEDURE — 87150 DNA/RNA AMPLIFIED PROBE: CPT

## 2024-09-19 PROCEDURE — 36415 COLL VENOUS BLD VENIPUNCTURE: CPT

## 2024-09-19 PROCEDURE — 2580000003 HC RX 258: Performed by: REGISTERED NURSE

## 2024-09-19 PROCEDURE — 81001 URINALYSIS AUTO W/SCOPE: CPT

## 2024-09-19 PROCEDURE — 96367 TX/PROPH/DG ADDL SEQ IV INF: CPT

## 2024-09-19 PROCEDURE — 6360000002 HC RX W HCPCS: Performed by: REGISTERED NURSE

## 2024-09-19 PROCEDURE — 96375 TX/PRO/DX INJ NEW DRUG ADDON: CPT

## 2024-09-19 PROCEDURE — 2060000000 HC ICU INTERMEDIATE R&B

## 2024-09-19 PROCEDURE — 6370000000 HC RX 637 (ALT 250 FOR IP): Performed by: REGISTERED NURSE

## 2024-09-19 PROCEDURE — 73590 X-RAY EXAM OF LOWER LEG: CPT

## 2024-09-19 PROCEDURE — 85652 RBC SED RATE AUTOMATED: CPT

## 2024-09-19 PROCEDURE — 87077 CULTURE AEROBIC IDENTIFY: CPT

## 2024-09-19 PROCEDURE — 83605 ASSAY OF LACTIC ACID: CPT

## 2024-09-19 PROCEDURE — 73610 X-RAY EXAM OF ANKLE: CPT

## 2024-09-19 PROCEDURE — 73630 X-RAY EXAM OF FOOT: CPT

## 2024-09-19 RX ORDER — HYDROCODONE BITARTRATE AND ACETAMINOPHEN 5; 325 MG/1; MG/1
1 TABLET ORAL ONCE
Status: COMPLETED | OUTPATIENT
Start: 2024-09-19 | End: 2024-09-19

## 2024-09-19 RX ORDER — CLINDAMYCIN PHOSPHATE 600 MG/50ML
600 INJECTION, SOLUTION INTRAVENOUS ONCE
Status: COMPLETED | OUTPATIENT
Start: 2024-09-19 | End: 2024-09-20

## 2024-09-19 RX ORDER — 0.9 % SODIUM CHLORIDE 0.9 %
500 INTRAVENOUS SOLUTION INTRAVENOUS ONCE
Status: DISCONTINUED | OUTPATIENT
Start: 2024-09-19 | End: 2024-09-19

## 2024-09-19 RX ORDER — 0.9 % SODIUM CHLORIDE 0.9 %
1000 INTRAVENOUS SOLUTION INTRAVENOUS ONCE
Status: COMPLETED | OUTPATIENT
Start: 2024-09-19 | End: 2024-09-19

## 2024-09-19 RX ADMIN — HYDROCODONE BITARTRATE AND ACETAMINOPHEN 1 TABLET: 5; 325 TABLET ORAL at 18:50

## 2024-09-19 RX ADMIN — SODIUM CHLORIDE 1000 ML: 9 INJECTION, SOLUTION INTRAVENOUS at 21:25

## 2024-09-19 RX ADMIN — VANCOMYCIN HYDROCHLORIDE 2000 MG: 10 INJECTION, POWDER, LYOPHILIZED, FOR SOLUTION INTRAVENOUS at 22:07

## 2024-09-19 RX ADMIN — CEFEPIME 2000 MG: 2 INJECTION, POWDER, FOR SOLUTION INTRAVENOUS at 21:22

## 2024-09-19 ASSESSMENT — PAIN DESCRIPTION - LOCATION: LOCATION: LEG

## 2024-09-19 ASSESSMENT — PAIN DESCRIPTION - ORIENTATION: ORIENTATION: RIGHT;LEFT

## 2024-09-19 ASSESSMENT — PAIN SCALES - GENERAL
PAINLEVEL_OUTOF10: 7
PAINLEVEL_OUTOF10: 5

## 2024-09-19 ASSESSMENT — PAIN DESCRIPTION - PAIN TYPE: TYPE: ACUTE PAIN

## 2024-09-19 ASSESSMENT — PAIN - FUNCTIONAL ASSESSMENT
PAIN_FUNCTIONAL_ASSESSMENT: ACTIVITIES ARE NOT PREVENTED
PAIN_FUNCTIONAL_ASSESSMENT: 0-10

## 2024-09-19 ASSESSMENT — PAIN DESCRIPTION - DESCRIPTORS: DESCRIPTORS: DISCOMFORT

## 2024-09-19 NOTE — ED PROVIDER NOTES
[facility] ED  EMERGENCY DEPARTMENT ENCOUNTER        Pt Name: Ruben Eagle  MRN: [id number]  Birthdate 1965  Date of evaluation: 9/19/2024  Provider: JOSE Riley CNP  PCP: Leann Marie, JOSE Goss CNP    This patient was seen and evaluated by the attending physician  Dr. Reyes.    I have evaluated this patient. My supervising physician was available for consultation.      CHIEF COMPLAINT       Chief Complaint   Patient presents with    Foot Pain     Left foot pain after twisting his foot x 3 weeks ago    Leg Pain     Right upper leg after slipping and falling around 3 pm. Patient has AKA on R side.        HISTORY OF PRESENT ILLNESS   (Location/Symptom, Timing/Onset, Context/Setting, Quality, Duration, Modifying Factors, Severity)  Note limiting factors.     History from : Patient  Ruben Eagle is a 59 y.o. male who presents via EMS for evaluation after 2 mechanical falls.  Patient has a recent below the knee amputation of the right leg and a partial foot amputation on the left.  Patient states that 3 weeks ago he slipped and fell and fell on his left leg.  He did speak with the surgeon about it and they have ordered outpatient x-rays with patient had not had these completed yet.  He states today that he was attempting to transfer him his recliner to the potty chair in his house and his nephew was helping him when his nephew got a pain in his neck and accidentally dropped the patient.  He states that he fell landing on his right stump with his left leg behind him.  He is endorsing bilateral knee pain and lower extremity pain.  He denies hitting his head.  He denies any changes in sensation such as numbness or tingling.  He denies any other injuries.  He denies pain to his hips, back.    Chart review reveals pt has significant PMHx of coronary artery disease, history of MI, diabetic ulcers, systolic congestive heart failure, history of alcohol abuse, high cholesterol,     FOOT TENDON SURGERY Left 06/30/2017    KNEE SURGERY Left     LEG AMPUTATION BELOW KNEE Right 8/15/2024    LEG AMPUTATION BELOW KNEE performed by Floyd Cooney MD at [facility]    OTHER SURGICAL HISTORY Bilateral 09/17/2015    amputation 2nd digit bilateral, flexor tenotomy right hallux    OTHER SURGICAL HISTORY Left 08/17/2017    PARTIAL LEFT FOOT AMPUTATION      OTHER SURGICAL HISTORY Left 12/07/2017    Debridement infected bone and tissue left foot; ulcer debridement left foot with graft application with dr jarrell     OTHER SURGICAL HISTORY Right 01/04/2018    DEBRIDEMENT INFECTED BONE AND TISSUE RIGHT FOOT, ULCER DEBRIDEMENT RIGHT FOOT WITH GRAFT APPLICATION    OTHER SURGICAL HISTORY Left 11/01/2018    Procedure: PARTIAL RESECTION LEFT METATARSAL, ULCER DEBRIDEMENT LEFT FOOT WITH GRAFT APPLICATION     OTHER SURGICAL HISTORY       AMPUTATION LEFT FOURTH AND FIFTH DIGITS, PARTIAL RESECTION  SECOND AND THIRD METATARSAL LEFT FOOT, PARTIAL RESECTION RIGHT FIRST METATARSAL (Bilateral Foot)    OTHER SURGICAL HISTORY Right 12/02/2023    partial right foot amputation    OTHER SURGICAL HISTORY  08/15/2024    LEG AMPUTATION BELOW KNEE - Right    AZ OFFICE/OUTPT VISIT,PROCEDURE ONLY Left 08/23/2018    ULCER DEBRIDEMENT LEFT FOOT WITH GRAFT APPLICATION performed by Floyd Jarrell DPM at [facility]    AZ PRTL EXC B1 TARSAL/METAR B1 XCP TALUS/CALCANEUS Left 08/09/2018    PARTIAL FOOT AMPUTATION WITH GRAFT APPLICATION performed by Floyd Jarrell DPM at [facility]    AZ PRTL EXC B1 TARSAL/METAR B1 XCP TALUS/CALCANEUS Left 11/01/2018    PARTIAL RESECTION LEFT METATARSAL, ULCER DEBRIDEMENT LEFT FOOT WITH GRAFT APPLICATION performed by Floyd Jarrell DPM at [facility]    TOE AMPUTATION      2 toes    TOE AMPUTATION Bilateral 03/18/2021    AMPUTATION LEFT FOURTH AND FIFTH DIGITS, PARTIAL RESECTION  SECOND AND THIRD METATARSAL LEFT FOOT, PARTIAL RESECTION RIGHT FIRST METATARSAL performed by Floyd Jarrell DPM at [facility]    TOE AMPUTATION Right

## 2024-09-20 ENCOUNTER — APPOINTMENT (OUTPATIENT)
Dept: GENERAL RADIOLOGY | Age: 59
DRG: 871 | End: 2024-09-20
Payer: MEDICARE

## 2024-09-20 PROBLEM — E11.610 CHARCOT FOOT DUE TO DIABETES MELLITUS (HCC): Status: ACTIVE | Noted: 2024-09-20

## 2024-09-20 LAB
ALBUMIN SERPL-MCNC: 2.3 G/DL (ref 3.4–5)
ALP SERPL-CCNC: 267 U/L (ref 40–129)
ALT SERPL-CCNC: 38 U/L (ref 10–40)
ANION GAP SERPL CALCULATED.3IONS-SCNC: 15 MMOL/L (ref 3–16)
AST SERPL-CCNC: 31 U/L (ref 15–37)
BASOPHILS # BLD: 0.1 K/UL (ref 0–0.2)
BASOPHILS NFR BLD: 0.9 %
BILIRUB DIRECT SERPL-MCNC: 2.7 MG/DL (ref 0–0.3)
BILIRUB INDIRECT SERPL-MCNC: 0.9 MG/DL (ref 0–1)
BILIRUB SERPL-MCNC: 3.6 MG/DL (ref 0–1)
BUN SERPL-MCNC: 59 MG/DL (ref 7–20)
CALCIUM SERPL-MCNC: 7.8 MG/DL (ref 8.3–10.6)
CHLORIDE SERPL-SCNC: 88 MMOL/L (ref 99–110)
CO2 SERPL-SCNC: 23 MMOL/L (ref 21–32)
CREAT SERPL-MCNC: 2.5 MG/DL (ref 0.9–1.3)
DEPRECATED RDW RBC AUTO: 25.4 % (ref 12.4–15.4)
EOSINOPHIL # BLD: 0 K/UL (ref 0–0.6)
EOSINOPHIL NFR BLD: 0.3 %
GFR SERPLBLD CREATININE-BSD FMLA CKD-EPI: 29 ML/MIN/{1.73_M2}
GLUCOSE BLD-MCNC: 104 MG/DL (ref 70–99)
GLUCOSE BLD-MCNC: 142 MG/DL (ref 70–99)
GLUCOSE BLD-MCNC: 189 MG/DL (ref 70–99)
GLUCOSE BLD-MCNC: 77 MG/DL (ref 70–99)
GLUCOSE BLD-MCNC: 78 MG/DL (ref 70–99)
GLUCOSE SERPL-MCNC: 175 MG/DL (ref 70–99)
HCT VFR BLD AUTO: 37.8 % (ref 40.5–52.5)
HGB BLD-MCNC: 12.1 G/DL (ref 13.5–17.5)
LACTATE BLDV-SCNC: 2.6 MMOL/L (ref 0.4–2)
LYMPHOCYTES # BLD: 0.5 K/UL (ref 1–5.1)
LYMPHOCYTES NFR BLD: 3.5 %
MCH RBC QN AUTO: 25.8 PG (ref 26–34)
MCHC RBC AUTO-ENTMCNC: 32 G/DL (ref 31–36)
MCV RBC AUTO: 80.7 FL (ref 80–100)
MONOCYTES # BLD: 0.8 K/UL (ref 0–1.3)
MONOCYTES NFR BLD: 5.7 %
NEUTROPHILS # BLD: 12.5 K/UL (ref 1.7–7.7)
NEUTROPHILS NFR BLD: 89.6 %
NT-PROBNP SERPL-MCNC: ABNORMAL PG/ML (ref 0–124)
PERFORMED ON: ABNORMAL
PERFORMED ON: NORMAL
PERFORMED ON: NORMAL
PLATELET # BLD AUTO: 151 K/UL (ref 135–450)
PMV BLD AUTO: 7.4 FL (ref 5–10.5)
POTASSIUM SERPL-SCNC: 3.8 MMOL/L (ref 3.5–5.1)
PROT SERPL-MCNC: 6.4 G/DL (ref 6.4–8.2)
RBC # BLD AUTO: 4.68 M/UL (ref 4.2–5.9)
REPORT: NORMAL
SODIUM SERPL-SCNC: 126 MMOL/L (ref 136–145)
VANCOMYCIN SERPL-MCNC: 23.4 UG/ML
WBC # BLD AUTO: 14 K/UL (ref 4–11)

## 2024-09-20 PROCEDURE — 85025 COMPLETE CBC W/AUTO DIFF WBC: CPT

## 2024-09-20 PROCEDURE — 6360000002 HC RX W HCPCS: Performed by: FAMILY MEDICINE

## 2024-09-20 PROCEDURE — 2060000000 HC ICU INTERMEDIATE R&B

## 2024-09-20 PROCEDURE — 80076 HEPATIC FUNCTION PANEL: CPT

## 2024-09-20 PROCEDURE — APPSS30 APP SPLIT SHARED TIME 16-30 MINUTES

## 2024-09-20 PROCEDURE — 2580000003 HC RX 258: Performed by: FAMILY MEDICINE

## 2024-09-20 PROCEDURE — 2580000003 HC RX 258: Performed by: INTERNAL MEDICINE

## 2024-09-20 PROCEDURE — 36415 COLL VENOUS BLD VENIPUNCTURE: CPT

## 2024-09-20 PROCEDURE — 83880 ASSAY OF NATRIURETIC PEPTIDE: CPT

## 2024-09-20 PROCEDURE — 6370000000 HC RX 637 (ALT 250 FOR IP): Performed by: FAMILY MEDICINE

## 2024-09-20 PROCEDURE — 80202 ASSAY OF VANCOMYCIN: CPT

## 2024-09-20 PROCEDURE — 83605 ASSAY OF LACTIC ACID: CPT

## 2024-09-20 PROCEDURE — 6360000002 HC RX W HCPCS: Performed by: REGISTERED NURSE

## 2024-09-20 PROCEDURE — 80048 BASIC METABOLIC PNL TOTAL CA: CPT

## 2024-09-20 PROCEDURE — 99024 POSTOP FOLLOW-UP VISIT: CPT

## 2024-09-20 PROCEDURE — 6370000000 HC RX 637 (ALT 250 FOR IP)

## 2024-09-20 PROCEDURE — 6370000000 HC RX 637 (ALT 250 FOR IP): Performed by: INTERNAL MEDICINE

## 2024-09-20 PROCEDURE — 99233 SBSQ HOSP IP/OBS HIGH 50: CPT

## 2024-09-20 PROCEDURE — 71045 X-RAY EXAM CHEST 1 VIEW: CPT

## 2024-09-20 PROCEDURE — 83036 HEMOGLOBIN GLYCOSYLATED A1C: CPT

## 2024-09-20 RX ORDER — FERROUS SULFATE 325(65) MG
325 TABLET ORAL NIGHTLY
Status: DISCONTINUED | OUTPATIENT
Start: 2024-09-20 | End: 2024-09-22 | Stop reason: HOSPADM

## 2024-09-20 RX ORDER — DEXTROSE MONOHYDRATE 100 MG/ML
INJECTION, SOLUTION INTRAVENOUS CONTINUOUS PRN
Status: DISCONTINUED | OUTPATIENT
Start: 2024-09-20 | End: 2024-09-22 | Stop reason: HOSPADM

## 2024-09-20 RX ORDER — HYDRALAZINE HYDROCHLORIDE 10 MG/1
10 TABLET, FILM COATED ORAL EVERY 8 HOURS SCHEDULED
Status: DISCONTINUED | OUTPATIENT
Start: 2024-09-20 | End: 2024-09-22

## 2024-09-20 RX ORDER — SODIUM CHLORIDE 0.9 % (FLUSH) 0.9 %
5-40 SYRINGE (ML) INJECTION EVERY 12 HOURS SCHEDULED
Status: DISCONTINUED | OUTPATIENT
Start: 2024-09-20 | End: 2024-09-22 | Stop reason: HOSPADM

## 2024-09-20 RX ORDER — ATORVASTATIN CALCIUM 40 MG/1
40 TABLET, FILM COATED ORAL NIGHTLY
Status: DISCONTINUED | OUTPATIENT
Start: 2024-09-20 | End: 2024-09-22 | Stop reason: HOSPADM

## 2024-09-20 RX ORDER — INSULIN LISPRO 100 [IU]/ML
0-4 INJECTION, SOLUTION INTRAVENOUS; SUBCUTANEOUS NIGHTLY
Status: DISCONTINUED | OUTPATIENT
Start: 2024-09-20 | End: 2024-09-22 | Stop reason: HOSPADM

## 2024-09-20 RX ORDER — ONDANSETRON 4 MG/1
4 TABLET, ORALLY DISINTEGRATING ORAL EVERY 8 HOURS PRN
Status: DISCONTINUED | OUTPATIENT
Start: 2024-09-20 | End: 2024-09-22 | Stop reason: HOSPADM

## 2024-09-20 RX ORDER — GLUCAGON 1 MG/ML
1 KIT INJECTION PRN
Status: DISCONTINUED | OUTPATIENT
Start: 2024-09-20 | End: 2024-09-22 | Stop reason: HOSPADM

## 2024-09-20 RX ORDER — ENOXAPARIN SODIUM 100 MG/ML
40 INJECTION SUBCUTANEOUS DAILY
Status: DISCONTINUED | OUTPATIENT
Start: 2024-09-20 | End: 2024-09-22

## 2024-09-20 RX ORDER — LEVOTHYROXINE SODIUM 100 UG/1
200 TABLET ORAL DAILY
Status: DISCONTINUED | OUTPATIENT
Start: 2024-09-20 | End: 2024-09-22 | Stop reason: HOSPADM

## 2024-09-20 RX ORDER — FUROSEMIDE 40 MG
80 TABLET ORAL 2 TIMES DAILY
Status: DISCONTINUED | OUTPATIENT
Start: 2024-09-20 | End: 2024-09-20

## 2024-09-20 RX ORDER — M-VIT,TX,IRON,MINS/CALC/FOLIC 27MG-0.4MG
1 TABLET ORAL DAILY
Status: DISCONTINUED | OUTPATIENT
Start: 2024-09-20 | End: 2024-09-22 | Stop reason: HOSPADM

## 2024-09-20 RX ORDER — SPIRONOLACTONE 25 MG/1
25 TABLET ORAL
Status: DISCONTINUED | OUTPATIENT
Start: 2024-09-20 | End: 2024-09-20

## 2024-09-20 RX ORDER — OXYCODONE HYDROCHLORIDE 5 MG/1
5 TABLET ORAL EVERY 6 HOURS PRN
Status: DISCONTINUED | OUTPATIENT
Start: 2024-09-20 | End: 2024-09-22 | Stop reason: HOSPADM

## 2024-09-20 RX ORDER — CLINDAMYCIN PHOSPHATE 600 MG/50ML
600 INJECTION, SOLUTION INTRAVENOUS EVERY 8 HOURS
Status: DISCONTINUED | OUTPATIENT
Start: 2024-09-20 | End: 2024-09-21

## 2024-09-20 RX ORDER — LANOLIN ALCOHOL/MO/W.PET/CERES
400 CREAM (GRAM) TOPICAL DAILY
Status: DISCONTINUED | OUTPATIENT
Start: 2024-09-20 | End: 2024-09-22 | Stop reason: HOSPADM

## 2024-09-20 RX ORDER — CLOPIDOGREL BISULFATE 75 MG/1
75 TABLET ORAL DAILY
Status: DISCONTINUED | OUTPATIENT
Start: 2024-09-20 | End: 2024-09-22 | Stop reason: HOSPADM

## 2024-09-20 RX ORDER — ALLOPURINOL 100 MG/1
100 TABLET ORAL DAILY
Status: DISCONTINUED | OUTPATIENT
Start: 2024-09-20 | End: 2024-09-22 | Stop reason: HOSPADM

## 2024-09-20 RX ORDER — INSULIN LISPRO 100 [IU]/ML
0-4 INJECTION, SOLUTION INTRAVENOUS; SUBCUTANEOUS
Status: DISCONTINUED | OUTPATIENT
Start: 2024-09-20 | End: 2024-09-22 | Stop reason: HOSPADM

## 2024-09-20 RX ORDER — SODIUM CHLORIDE 0.9 % (FLUSH) 0.9 %
5-40 SYRINGE (ML) INJECTION PRN
Status: DISCONTINUED | OUTPATIENT
Start: 2024-09-20 | End: 2024-09-22 | Stop reason: HOSPADM

## 2024-09-20 RX ORDER — POTASSIUM CHLORIDE 1500 MG/1
40 TABLET, EXTENDED RELEASE ORAL PRN
Status: DISCONTINUED | OUTPATIENT
Start: 2024-09-20 | End: 2024-09-22 | Stop reason: HOSPADM

## 2024-09-20 RX ORDER — ONDANSETRON 2 MG/ML
4 INJECTION INTRAMUSCULAR; INTRAVENOUS EVERY 6 HOURS PRN
Status: DISCONTINUED | OUTPATIENT
Start: 2024-09-20 | End: 2024-09-22 | Stop reason: HOSPADM

## 2024-09-20 RX ORDER — MAGNESIUM SULFATE IN WATER 40 MG/ML
2000 INJECTION, SOLUTION INTRAVENOUS PRN
Status: DISCONTINUED | OUTPATIENT
Start: 2024-09-20 | End: 2024-09-22 | Stop reason: HOSPADM

## 2024-09-20 RX ORDER — ISOSORBIDE DINITRATE 10 MG/1
5 TABLET ORAL EVERY 8 HOURS
Status: DISCONTINUED | OUTPATIENT
Start: 2024-09-20 | End: 2024-09-20 | Stop reason: SDUPTHER

## 2024-09-20 RX ORDER — POLYETHYLENE GLYCOL 3350 17 G/17G
17 POWDER, FOR SOLUTION ORAL DAILY PRN
Status: DISCONTINUED | OUTPATIENT
Start: 2024-09-20 | End: 2024-09-22 | Stop reason: HOSPADM

## 2024-09-20 RX ORDER — SODIUM CHLORIDE 9 MG/ML
INJECTION, SOLUTION INTRAVENOUS CONTINUOUS
Status: DISCONTINUED | OUTPATIENT
Start: 2024-09-20 | End: 2024-09-21

## 2024-09-20 RX ORDER — POTASSIUM CHLORIDE 7.45 MG/ML
10 INJECTION INTRAVENOUS PRN
Status: DISCONTINUED | OUTPATIENT
Start: 2024-09-20 | End: 2024-09-22 | Stop reason: HOSPADM

## 2024-09-20 RX ORDER — INSULIN GLARGINE 100 [IU]/ML
35 INJECTION, SOLUTION SUBCUTANEOUS NIGHTLY
Status: DISCONTINUED | OUTPATIENT
Start: 2024-09-20 | End: 2024-09-21

## 2024-09-20 RX ORDER — SODIUM CHLORIDE 9 MG/ML
INJECTION, SOLUTION INTRAVENOUS PRN
Status: DISCONTINUED | OUTPATIENT
Start: 2024-09-20 | End: 2024-09-22 | Stop reason: HOSPADM

## 2024-09-20 RX ORDER — ASPIRIN 81 MG/1
81 TABLET ORAL DAILY
Status: DISCONTINUED | OUTPATIENT
Start: 2024-09-20 | End: 2024-09-22 | Stop reason: HOSPADM

## 2024-09-20 RX ORDER — MORPHINE SULFATE 2 MG/ML
2 INJECTION, SOLUTION INTRAMUSCULAR; INTRAVENOUS
Status: DISCONTINUED | OUTPATIENT
Start: 2024-09-20 | End: 2024-09-20

## 2024-09-20 RX ORDER — ISOSORBIDE DINITRATE 10 MG/1
5 TABLET ORAL EVERY 8 HOURS
Status: DISCONTINUED | OUTPATIENT
Start: 2024-09-20 | End: 2024-09-22

## 2024-09-20 RX ORDER — METOPROLOL SUCCINATE 25 MG/1
25 TABLET, EXTENDED RELEASE ORAL DAILY
Status: DISCONTINUED | OUTPATIENT
Start: 2024-09-20 | End: 2024-09-22 | Stop reason: HOSPADM

## 2024-09-20 RX ORDER — ACETAMINOPHEN 650 MG/1
650 SUPPOSITORY RECTAL EVERY 6 HOURS PRN
Status: DISCONTINUED | OUTPATIENT
Start: 2024-09-20 | End: 2024-09-22 | Stop reason: HOSPADM

## 2024-09-20 RX ORDER — ACETAMINOPHEN 325 MG/1
650 TABLET ORAL EVERY 6 HOURS PRN
Status: DISCONTINUED | OUTPATIENT
Start: 2024-09-20 | End: 2024-09-22 | Stop reason: HOSPADM

## 2024-09-20 RX ADMIN — ALLOPURINOL 100 MG: 100 TABLET ORAL at 08:12

## 2024-09-20 RX ADMIN — ISOSORBIDE DINITRATE 5 MG: 10 TABLET ORAL at 14:42

## 2024-09-20 RX ADMIN — CLINDAMYCIN PHOSPHATE 600 MG: 600 INJECTION, SOLUTION INTRAVENOUS at 17:57

## 2024-09-20 RX ADMIN — HYDRALAZINE HYDROCHLORIDE 10 MG: 10 TABLET ORAL at 21:04

## 2024-09-20 RX ADMIN — MORPHINE SULFATE 2 MG: 2 INJECTION, SOLUTION INTRAMUSCULAR; INTRAVENOUS at 06:54

## 2024-09-20 RX ADMIN — CLINDAMYCIN PHOSPHATE 600 MG: 600 INJECTION, SOLUTION INTRAVENOUS at 10:25

## 2024-09-20 RX ADMIN — ASPIRIN 81 MG: 81 TABLET, COATED ORAL at 08:12

## 2024-09-20 RX ADMIN — Medication 1 TABLET: at 08:12

## 2024-09-20 RX ADMIN — OXYCODONE 5 MG: 5 TABLET ORAL at 21:03

## 2024-09-20 RX ADMIN — SODIUM CHLORIDE: 9 INJECTION, SOLUTION INTRAVENOUS at 18:01

## 2024-09-20 RX ADMIN — CEFEPIME 2000 MG: 2 INJECTION, POWDER, FOR SOLUTION INTRAVENOUS at 08:33

## 2024-09-20 RX ADMIN — FUROSEMIDE 80 MG: 40 TABLET ORAL at 08:12

## 2024-09-20 RX ADMIN — CLINDAMYCIN PHOSPHATE 600 MG: 600 INJECTION, SOLUTION INTRAVENOUS at 01:49

## 2024-09-20 RX ADMIN — ATORVASTATIN CALCIUM 40 MG: 40 TABLET, FILM COATED ORAL at 21:04

## 2024-09-20 RX ADMIN — CEFEPIME 2000 MG: 2 INJECTION, POWDER, FOR SOLUTION INTRAVENOUS at 21:17

## 2024-09-20 RX ADMIN — Medication 400 MG: at 08:12

## 2024-09-20 RX ADMIN — FERROUS SULFATE TAB 325 MG (65 MG ELEMENTAL FE) 325 MG: 325 (65 FE) TAB at 21:03

## 2024-09-20 RX ADMIN — ENOXAPARIN SODIUM 40 MG: 100 INJECTION SUBCUTANEOUS at 08:12

## 2024-09-20 RX ADMIN — LEVOTHYROXINE SODIUM 200 MCG: 100 TABLET ORAL at 06:54

## 2024-09-20 RX ADMIN — ISOSORBIDE DINITRATE 5 MG: 10 TABLET ORAL at 21:03

## 2024-09-20 ASSESSMENT — LIFESTYLE VARIABLES
HOW OFTEN DO YOU HAVE A DRINK CONTAINING ALCOHOL: NEVER
HOW MANY STANDARD DRINKS CONTAINING ALCOHOL DO YOU HAVE ON A TYPICAL DAY: PATIENT DOES NOT DRINK
HOW MANY STANDARD DRINKS CONTAINING ALCOHOL DO YOU HAVE ON A TYPICAL DAY: PATIENT DOES NOT DRINK
HOW OFTEN DO YOU HAVE A DRINK CONTAINING ALCOHOL: NEVER

## 2024-09-20 ASSESSMENT — PAIN DESCRIPTION - LOCATION
LOCATION: LEG

## 2024-09-20 ASSESSMENT — PAIN SCALES - GENERAL
PAINLEVEL_OUTOF10: 8
PAINLEVEL_OUTOF10: 6
PAINLEVEL_OUTOF10: 7
PAINLEVEL_OUTOF10: 5
PAINLEVEL_OUTOF10: 0

## 2024-09-20 ASSESSMENT — PAIN DESCRIPTION - PAIN TYPE
TYPE: ACUTE PAIN
TYPE: ACUTE PAIN

## 2024-09-20 ASSESSMENT — PAIN DESCRIPTION - ORIENTATION
ORIENTATION: RIGHT;LEFT
ORIENTATION: RIGHT;LEFT
ORIENTATION: LEFT;LOWER
ORIENTATION: LEFT

## 2024-09-20 ASSESSMENT — PAIN DESCRIPTION - FREQUENCY
FREQUENCY: CONTINUOUS
FREQUENCY: CONTINUOUS

## 2024-09-20 ASSESSMENT — PAIN DESCRIPTION - DESCRIPTORS
DESCRIPTORS: ACHING
DESCRIPTORS: ACHING
DESCRIPTORS: ACHING;SORE;STABBING
DESCRIPTORS: ACHING;NAGGING

## 2024-09-20 ASSESSMENT — PAIN - FUNCTIONAL ASSESSMENT
PAIN_FUNCTIONAL_ASSESSMENT: PREVENTS OR INTERFERES SOME ACTIVE ACTIVITIES AND ADLS
PAIN_FUNCTIONAL_ASSESSMENT: PREVENTS OR INTERFERES SOME ACTIVE ACTIVITIES AND ADLS
PAIN_FUNCTIONAL_ASSESSMENT: PREVENTS OR INTERFERES WITH MANY ACTIVE NOT PASSIVE ACTIVITIES

## 2024-09-20 ASSESSMENT — PAIN DESCRIPTION - ONSET: ONSET: ON-GOING

## 2024-09-20 NOTE — PROGRESS NOTES
Pt with reported h/o esophogeal sensation of \"burning\" with IV clindamycin administration. Not a true allergy. Pt is observed by this RN the first 15 min of administration at bedside without reported or observed discomfort. Will continue to monitor.

## 2024-09-20 NOTE — ED NOTES
0825-Call placed to Nephrology for inpatient consult requested by Ngoc Traylor on call. Consult completed.

## 2024-09-20 NOTE — PROGRESS NOTES
Inpatient Physical/Occupational Therapy     Orders received and chart reviewed. Awaiting podiatry consult. Will continue to follow and re-attempt tomorrow. Re-attempt as appropriate and schedule permits.     Edgar Martínez, PT, DPT   Basil Figueroa, OTR/L #[id number]

## 2024-09-20 NOTE — PROGRESS NOTES
Shift assessment complete- see flowsheets.  Pt is A&Ox4.  VSS  Respirations are easy, even and unlabored.  Tele placed on patient.   Pt BP soft early this am but improving  PIV WDL. Flushed at this time.  Plan of care and goals reviewed. Call light within reach.  Bed in lowest position with wheels locked. No needs expressed at this time. Will continue to monitor.

## 2024-09-20 NOTE — CONSULTS
Patient presented to the ED with left foot pain for 3 weeks. Work up revealed left sided charcot foot and possible infection. Abx started and podiatry was consulted.     He is 5 weeks s/p R BKA - we were consulted for stump swelling and pain    He states he feels the swelling has gone down with the stump  (SS) and he only has pain when the SS is taken on and off.     SS removed. Residual limb looks great. See below. No evidence of infection. Minimal pain reported with removal and replacement.     Right Tib / Fib XR looks normal.     No general surgical plans. Continue SS as tolerated. Follow up with [facility] / Prosthetist as planned. Follow up with Dr. Eros MENDOZA.       We will sign off.     Cesar Sears PA-C  9/20/2024 10:18 AM               Media Information          Document Information    Wound Care Image: Wound   5 wks s/p R BKA   09/20/2024 10:09   Attached To:   Hospital Encounter on 9/19/24   Source Information    Cesar Sears PA  [facility]   Document History

## 2024-09-20 NOTE — ED NOTES
Pt to bedside commode, full assist x's 2. Pt had extra large BM, stated he hadn't had BM in 4 days and now the full feeling is gone. BM was very hard and compact.

## 2024-09-20 NOTE — ED NOTES
3661-Call placed to General Surgery spoke with Tete in the office. Stated Cesar BAUTISTA and Dr. Cooney on call. Consult completed at this time.

## 2024-09-20 NOTE — PROGRESS NOTES
Received phone call from Micro, pt blood culture positive for MRSA. Pharmacy called writer, pt is on Vancomycin and is OK on their end. Writer notified Dr. Sanchez through perfect serve.

## 2024-09-20 NOTE — CONSULTS
[address] [address]                              CONSULTATION      PATIENT NAME: GEGE HANNAH              : 1965  MED REC NO: [id number]                      ROOM:   ACCOUNT NO: [id number]                       ADMIT DATE: 2024  PROVIDER: Chalo Traylor MD      CONSULT DATE: 2024    REASON FOR CONSULTATION:  Acute on chronic kidney disease.    HISTORY OF PRESENT ILLNESS:  The patient is a 59-year-old  male patient with a past medical history significant for chronic kidney disease and a baseline creatinine ranging between 1.8 and 2.2 mg/dL.  The patient presented to [facility] with left foot/ankle pain.  He recently sustained a trauma to his left foot, left ankle, which slowly and progressively became warm and painful to touch.  Upon presentation, his workup revealed signs of left osteomyelitis, for which he was started on IV antibiotics.  He was also noted to have an acute on chronic kidney injury, which prompted Nephrology consultation.    PAST MEDICAL HISTORY:    1. Chronic kidney disease.  2. Systolic heart failure.  3. Osteomyelitis.  4. Diabetes mellitus.  5. Hyperlipidemia.  6. Hypertension.  7. Peripheral vascular disease.  8. Hypothyroidism.    PAST SURGICAL HISTORY:    1. AICD placement.  2. Coronary artery bypass graft.  3. Right below-knee amputation.  4. Left transmetatarsal foot amputation.    ALLERGIES:  THE PATIENT IS ALLERGIC TO BACTRIM, CLINDAMYCIN, AND LISINOPRIL.      SOCIAL HISTORY:  The patient does not smoke or drink alcohol.    FAMILY HISTORY:  Negative for kidney disease.    REVIEW OF SYSTEMS:  The patient denied any nausea, vomiting, or abdominal pain.  Otherwise, a 10-point review of systems was relatively unremarkable.    PHYSICAL EXAMINATION:  VITAL SIGNS:  Reveal blood pressure 123/79, heart rate 91, respirations 16, temperature 97.4 Fahrenheit.  The

## 2024-09-20 NOTE — ED PROVIDER NOTES
THIS IS MY VERITO SUPERVISORY AND SHARED VISIT NOTE:    I personally saw the patient and made/approved the management plan and take responsibility for the patient management.    Labs Reviewed   CBC WITH AUTO DIFFERENTIAL - Abnormal; Notable for the following components:       Result Value    WBC 16.7 (*)     Hemoglobin 12.4 (*)     Hematocrit 39.5 (*)     MCH 25.3 (*)     RDW 25.3 (*)     Neutrophils Absolute 15.7 (*)     Lymphocytes Absolute 0.4 (*)     All other components within normal limits   COMPREHENSIVE METABOLIC PANEL W/ REFLEX TO MG FOR LOW K - Abnormal; Notable for the following components:    Sodium 124 (*)     Chloride 85 (*)     Glucose 196 (*)     BUN 57 (*)     Creatinine 2.5 (*)     Est, Glom Filt Rate 29 (*)     Calcium 7.8 (*)     Total Protein 6.1 (*)     Albumin 2.4 (*)     Albumin/Globulin Ratio 0.6 (*)     Total Bilirubin 3.6 (*)     Alkaline Phosphatase 289 (*)     ALT 43 (*)     All other components within normal limits   LACTATE, SEPSIS - Abnormal; Notable for the following components:    Lactic Acid, Sepsis 2.4 (*)     All other components within normal limits   LACTATE, SEPSIS - Abnormal; Notable for the following components:    Lactic Acid, Sepsis 2.7 (*)     All other components within normal limits   SEDIMENTATION RATE - Abnormal; Notable for the following components:    Sed Rate, Automated 56 (*)     All other components within normal limits   C-REACTIVE PROTEIN - Abnormal; Notable for the following components:    .7 (*)     All other components within normal limits   URINALYSIS WITH REFLEX TO CULTURE - Abnormal; Notable for the following components:    Glucose, Ur 500 (*)     Bilirubin, Urine MODERATE (*)     Blood, Urine LARGE (*)     Protein,  (*)     Leukocyte Esterase, Urine MODERATE (*)     All other components within normal limits   OSMOLALITY, URINE - Abnormal; Notable for the following components:    Osmolality, Ur 332 (*)     All other components within normal

## 2024-09-20 NOTE — CONSULTS
Patient seen and examined, consult note dictated.    Assessment and Plan:    1- A/CKD: The patient has chronic kidney disease with a baseline creatinine of 1.8 to 2.2 mg/dl. His KRYSTAL is likely secondary to a pre-renal component, although a non oliguric ATN injury cannot be ruled out.   - Monitor Vancomycin levels.  - Consider gentle volume expansion.  - Avoid all nephrotoxic agents at this time.  - Maintain systolic blood pressure > 120 mmHg.    2- Hyponatremia: Continue isotonic volume expansion as above.    3- HTN: Blood pressure within acceptable range.    4- Anemia: Stable hemoglobin, monitor.    5- Osteomyelitis: Currently on IV antibiotics per primary team.

## 2024-09-20 NOTE — CONSULTS
Pharmacy Note  Vancomycin Consult    Ruben Eagle is a 59 y.o. male started on Vancomycin for Bone & Joint/SSTI; consult received from Dr. Coker to manage therapy. Also receiving the following antibiotics: Clindamycin, Cefepime.    Allergies:  Sulfamethoxazole-trimethoprim, Clindamycin/lincomycin, Bee venom, and Lisinopril     Tmax:     Micro:     Recent Labs     09/19/24 2030   CREATININE 2.5*       Recent Labs     09/19/24 2030   WBC 16.7*       Estimated Creatinine Clearance: 32 mL/min (A) (based on SCr of 2.5 mg/dL (H)).      Intake/Output Summary (Last 24 hours) at 9/20/2024 1151  Last data filed at 9/20/2024 0239  Gross per 24 hour   Intake 150 ml   Output --   Net 150 ml       Wt Readings from Last 1 Encounters:   09/19/24 81.6 kg (180 lb)         Body mass index is 26.58 kg/m².    Loading dose (critically ill or in ICU, require dialysis or renal replacement therapy): Vancomycin 25 mg/kg IVPB x 1 (maximum 2500 mg).  Maintenance dose: 10-20 mg/kg (maximum: 2000 mg/dose and 4500 mg/day) starting at the next dosing interval determined by renal function  Pulse dose: fluctuating renal function, KRYSTAL, ESRD  Goal Vancomycin trough: 15-20 mcg/mL   Goal Vancomycin AUC: 400-600     Assessment/Plan:  Will initiate Vancomycin with a one time loading dose of 2000 mg x1. Based on patient renal function, will pulse dose patient to maintain vancomycin levels > 15 mcg/mL. Vancomycin level ordered for 9/20 at 1000 ( 12 hours post dose). Timing of trough level will be determined based on culture results, renal function, and clinical response.     Thank you for the consult.  Floyd Munguia, PharmD  9/20/2024 4:56 AM

## 2024-09-20 NOTE — PROGRESS NOTES
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 Eagle today seen resting in bed. Feels ok today  Having some left foot pain and significant swelling   Having reduced urine output and foul smell     Objective:   Patient Vitals for the past 4 hrs:   BP Temp Temp src Pulse Resp SpO2   09/20/24 0737 96/75 98 °F (36.7 °C) Oral 100 16 98 %          Intake/Output Summary (Last 24 hours) at 9/20/2024 1114  Last data filed at 9/20/2024 0835  Gross per 24 hour   Intake 150 ml   Output 350 ml   Net -200 ml       Physical Exam:    Gen: No distress. Alert. +Pleasant male   Eyes: PERRL. No sclera icterus. No conjunctival injection.   ENT: No discharge. Pharynx clear.   Neck: No JVD.  Trachea midline.  Resp: No accessory muscle use. No crackles. No wheezes. No rhonchi.   CV: Regular rate. Regular rhythm. + murmur.  No rub. ++significant pitting edema of left lower extremity, mild swelling of right stump    Peripheral Pulses: +2 palpable, equal bilaterally   GI: Non-tender. Non-distended.  Normal bowel sounds.  Skin: Warm and dry. No nodule on exposed extremities. No rash on exposed extremities. ++left lower extremity edema and partial foot amputation, some erythema noted, right BKA with healing incision site and some minor swelling   M/S: No cyanosis. No joint deformity. No clubbing.   Neuro: Awake. Grossly nonfocal    Psych: Oriented x 3. No anxiety or agitation.         Medications:  cefepime, 2,000 mg, Q12H  allopurinol, 100 mg, Daily  aspirin, 81 mg, Daily  atorvastatin, 40 mg, Nightly  [Held by provider] clopidogrel, 75 mg, Daily  ferrous sulfate, 325 mg, Nightly  hydrALAZINE, 10 mg, 3 times per day  insulin glargine, 35 Units, Nightly  isosorbide dinitrate, 5 mg, q8h  levothyroxine, 200 mcg, Daily  magnesium oxide, 400 mg, Daily  metoprolol succinate, 25 mg, Daily  therapeutic

## 2024-09-20 NOTE — CONSULTS
CONSULT    Admit Date:  9/19/2024    Subjective:  59 y.o. male who is seen for evaluation of possible cellulitis of the left LE and foot fracture on left. Patient had sprained his left ankle about 4 weeks ago.  Xrays at that time were negative and pain and swelling improved. Later ended up with right BKA due to severe infection and was placed in rehab.  States was doing PT and they had him using the foot to twist around. States he heard and felt a pop and then more swelling and pain developed.  I had seen him earlier this week and ordered xrays of the ankle but he did not get them done. The pain and swelling increased after being dropped by his nephew last night and he presented to the ER last night. Leg was more painful to step on and was giving out on him.  No recent open wounds on the left foot.   States swelling in the leg and foot have improved some today.   Does not have much help at home because his Mom can not help lift him.     Seen earlier today by General surgery to evaluate the right BKA stump.         Past Medical History:        Diagnosis Date    Acute on chronic systolic congestive heart failure ([facility]) 07/08/2013    Acute osteomyelitis of left foot ([facility]) 09/27/2017    Acute osteomyelitis of right foot ([facility]) 04/25/2016    Acute respiratory failure ([facility]) 08/04/2020    Ascites     Blood transfusion reaction     Burst fracture of lumbar vertebra ([facility]) 11/09/2012    Cellulitis of left foot     Cellulitis of right lower extremity 2/16, 5/16    Chronic systolic CHF (congestive heart failure) ([facility])     Community acquired pneumonia     Coronary artery disease involving native coronary artery of native heart without angina pectoris 08/06/2020    Diabetes ([facility])     Diabetic ulcer of left foot associated with type 2 diabetes mellitus, with muscle involvement without evidence of necrosis ([facility]) 04/27/2017    Diabetic ulcer of right foot ([facility]) early 2016    Diabetic ulcer of 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 2  Anemia Mother     Heart Disease Father     High Blood Pressure Father     High Cholesterol Father     Heart Disease Maternal Grandmother     High Blood Pressure Maternal Grandmother     High Cholesterol Maternal Grandmother     Cancer Maternal Grandmother         bone marrow & breast    Heart Disease Maternal Grandfather     High Blood Pressure Maternal Grandfather     High Cholesterol Maternal Grandfather     Cancer Maternal Grandfather         pancreatic CA    Heart Disease Paternal Grandmother     High Blood Pressure Paternal Grandmother     High Cholesterol Paternal Grandmother     Heart Disease Paternal Grandfather     High Blood Pressure Paternal Grandfather     High Cholesterol Paternal Grandfather     Stroke Brother     Heart Failure Brother     Heart Disease Brother     Diabetes type 2  Brother     Diabetes type 2  Brother     Diabetes type 2  Brother        Objective:   BP 97/70   Pulse 91   Temp 97.3 °F (36.3 °C) (Oral)   Resp 16   Ht 1.753 m (5' 9\")   Wt 87.4 kg (192 lb 10.9 oz)   SpO2 97%   BMI 28.45 kg/m²     Data:  CBC:   Recent Labs     09/19/24  2030 09/20/24  0615   WBC 16.7* 14.0*   HGB 12.4* 12.1*   HCT 39.5* 37.8*   MCV 80.5 80.7    151     BMP:   Recent Labs     09/19/24  2030 09/20/24  0615   * 126*   K 3.6 3.8   CL 85* 88*   CO2 24 23   BUN 57* 59*   CREATININE 2.5* 2.5*     LIVER PROFILE:   Recent Labs     09/19/24  2030 09/20/24  1020   AST 32 31   ALT 43* 38   BILIDIR  --  2.7*   BILITOT 3.6* 3.6*   ALKPHOS 289* 267*     PT/INR: No results for input(s): \"INR\" in the last 72 hours.    Invalid input(s): \"PROT\"  HgBA1c:  Lab Results   Component Value Date    LABA1C 7.3 08/14/2024     Lab Results   Component Value Date    .7 (H) 09/19/2024     Lab Results   Component Value Date    SEDRATE 56 (H) 09/19/2024         Cultures: Blood x2 - MRSA    Imaging: xray left ankle and foot -   Appearance of the Charcot foot post multiple amputations.  On the lateral   image, there

## 2024-09-20 NOTE — ED NOTES
2001 perfect serve sent to Dr. Jarrell   2009 consult completed with call back from Dr. Jarrell speaking with Sherly Landaverde, RUIZ

## 2024-09-20 NOTE — PROGRESS NOTES
Patient admitted to room 322 from ED. Patient oriented to room, call light, bed rails, phone, lights and bathroom. Patient instructed about the schedule of the day including: vital sign frequency, lab draws, possible tests, frequency of MD and staff rounds, daily weights, I &O's and prescribed diet. PCU Telemetry box in place, patient aware of placement and reason. Bed locked, in lowest position, side rails up 2/4, call light within reach.        Recliner Assessment  Patient is not able to demonstrated the ability to move from a reclining position to an upright position within the recliner. however patient is alert, oriented and able to provide informed consent       4 Eyes Skin Assessment     NAME:  Ruben Eagle  YOB: 1965  MEDICAL RECORD NUMBER:  [id number]    The patient is being assessed for  Admission    I agree that at least one RN has performed a thorough Head to Toe Skin Assessment on the patient. ALL assessment sites listed below have been assessed.      Areas assessed by both nurses:    Head, Face, Ears, Shoulders, Back, Chest, Arms, Elbows, Hands, Sacrum. Buttock, Coccyx, Ischium, Legs. Feet and Heels, and Under Medical Devices         Does the Patient have a Wound? Yes wound(s) were present on assessment. LDA wound assessment was Initiated and completed by RN  Stage 2 on coccyx surrounded by DTI which is non blanchable -mepelex applied  Right BKA  Left shin scattered abrasion and scarring          Christiano Prevention initiated by RN: Yes  Wound Care Orders initiated by RN: Yes    Pressure Injury (Stage 3,4, Unstageable, DTI, NWPT, and Complex wounds) if present, place Wound referral order by RN under : Yes    New Ostomies, if present place, Ostomy referral order under : No     Nurse 1 eSignature: Electronically signed by Damaris Oneill RN on 9/20/24 at 12:41 PM EDT    **SHARE this note so that the co-signing nurse can place an eSignature**    Nurse 2 eSignature:

## 2024-09-21 ENCOUNTER — APPOINTMENT (OUTPATIENT)
Age: 59
DRG: 871 | End: 2024-09-21
Payer: MEDICARE

## 2024-09-21 PROBLEM — R78.81 MRSA BACTEREMIA: Status: ACTIVE | Noted: 2024-09-21

## 2024-09-21 PROBLEM — B95.62 MRSA BACTEREMIA: Status: ACTIVE | Noted: 2024-09-21

## 2024-09-21 LAB
ANION GAP SERPL CALCULATED.3IONS-SCNC: 25 MMOL/L (ref 3–16)
BACTERIA UR CULT: ABNORMAL
BASOPHILS # BLD: 0 K/UL (ref 0–0.2)
BASOPHILS NFR BLD: 0 %
BUN SERPL-MCNC: 64 MG/DL (ref 7–20)
CALCIUM SERPL-MCNC: 7.8 MG/DL (ref 8.3–10.6)
CHLORIDE SERPL-SCNC: 87 MMOL/L (ref 99–110)
CO2 SERPL-SCNC: 13 MMOL/L (ref 21–32)
CREAT SERPL-MCNC: 2.9 MG/DL (ref 0.9–1.3)
DEPRECATED RDW RBC AUTO: 25.8 % (ref 12.4–15.4)
ECHO AO ROOT DIAM: 3.1 CM
ECHO AO ROOT INDEX: 1.52 CM/M2
ECHO AV MEAN GRADIENT: 1 MMHG
ECHO AV MEAN VELOCITY: 0.5 M/S
ECHO AV PEAK GRADIENT: 2 MMHG
ECHO AV PEAK VELOCITY: 0.8 M/S
ECHO AV VELOCITY RATIO: 0.63
ECHO AV VTI: 11.9 CM
ECHO BSA: 2.07 M2
ECHO EST RA PRESSURE: 15 MMHG
ECHO IVC EXP: 3.4 CM
ECHO IVC INSP: 3.2 CM
ECHO LA AREA 2C: 27.5 CM2
ECHO LA AREA 4C: 29.8 CM2
ECHO LA MAJOR AXIS: 6.9 CM
ECHO LA MINOR AXIS: 5.5 CM
ECHO LA VOL BP: 122 ML (ref 18–58)
ECHO LA VOL MOD A2C: 112 ML (ref 18–58)
ECHO LA VOL MOD A4C: 107 ML (ref 18–58)
ECHO LA VOL/BSA BIPLANE: 60 ML/M2 (ref 16–34)
ECHO LA VOLUME INDEX MOD A2C: 55 ML/M2 (ref 16–34)
ECHO LA VOLUME INDEX MOD A4C: 52 ML/M2 (ref 16–34)
ECHO LV E' LATERAL VELOCITY: 4.7 CM/S
ECHO LV E' SEPTAL VELOCITY: 4.9 CM/S
ECHO LV EF PHYSICIAN: 15 %
ECHO LV FRACTIONAL SHORTENING: 2 % (ref 28–44)
ECHO LV INTERNAL DIMENSION DIASTOLE INDEX: 2.7 CM/M2
ECHO LV INTERNAL DIMENSION DIASTOLIC: 5.5 CM (ref 4.2–5.9)
ECHO LV INTERNAL DIMENSION SYSTOLIC INDEX: 2.65 CM/M2
ECHO LV INTERNAL DIMENSION SYSTOLIC: 5.4 CM
ECHO LV IVSD: 1 CM (ref 0.6–1)
ECHO LV MASS 2D: 213.2 G (ref 88–224)
ECHO LV MASS INDEX 2D: 104.5 G/M2 (ref 49–115)
ECHO LV POSTERIOR WALL DIASTOLIC: 1 CM (ref 0.6–1)
ECHO LV RELATIVE WALL THICKNESS RATIO: 0.36
ECHO LVOT AV VTI INDEX: 0.59
ECHO LVOT MEAN GRADIENT: 1 MMHG
ECHO LVOT PEAK GRADIENT: 1 MMHG
ECHO LVOT PEAK VELOCITY: 0.5 M/S
ECHO LVOT VTI: 7 CM
ECHO MV A VELOCITY: 0.36 M/S
ECHO MV E DECELERATION TIME (DT): 137 MS
ECHO MV E VELOCITY: 0.72 M/S
ECHO MV E/A RATIO: 2
ECHO MV E/E' LATERAL: 15.32
ECHO MV E/E' RATIO (AVERAGED): 15.01
ECHO MV E/E' SEPTAL: 14.69
ECHO MV LVOT VTI INDEX: 2.03
ECHO MV MAX VELOCITY: 0.9 M/S
ECHO MV MEAN GRADIENT: 1 MMHG
ECHO MV MEAN VELOCITY: 0.5 M/S
ECHO MV PEAK GRADIENT: 3 MMHG
ECHO MV VTI: 14.2 CM
ECHO PULMONARY ARTERY END DIASTOLIC PRESSURE: 9 MMHG
ECHO PV MAX VELOCITY: 0.6 M/S
ECHO PV MEAN GRADIENT: 1 MMHG
ECHO PV MEAN VELOCITY: 0.4 M/S
ECHO PV PEAK GRADIENT: 1 MMHG
ECHO PV REGURGITANT MAX VELOCITY: 1.5 M/S
ECHO PV VTI: 9.4 CM
ECHO RA AREA 4C: 38.4 CM2
ECHO RA END SYSTOLIC VOLUME APICAL 4 CHAMBER INDEX BSA: 85 ML/M2
ECHO RA VOLUME: 173 ML
ECHO RIGHT VENTRICULAR SYSTOLIC PRESSURE (RVSP): 44 MMHG
ECHO RV BASAL DIMENSION: 6.4 CM
ECHO RV FREE WALL PEAK S': 7.9 CM/S
ECHO RV LONGITUDINAL DIMENSION: 9.2 CM
ECHO RV MID DIMENSION: 5.1 CM
ECHO RV TAPSE: 1.4 CM (ref 1.7–?)
ECHO TV REGURGITANT MAX VELOCITY: 2.69 M/S
ECHO TV REGURGITANT PEAK GRADIENT: 29 MMHG
EOSINOPHIL # BLD: 0.2 K/UL (ref 0–0.6)
EOSINOPHIL NFR BLD: 0.9 %
EST. AVERAGE GLUCOSE BLD GHB EST-MCNC: 177.2 MG/DL
GFR SERPLBLD CREATININE-BSD FMLA CKD-EPI: 24 ML/MIN/{1.73_M2}
GLUCOSE BLD-MCNC: 151 MG/DL (ref 70–99)
GLUCOSE BLD-MCNC: 158 MG/DL (ref 70–99)
GLUCOSE BLD-MCNC: 67 MG/DL (ref 70–99)
GLUCOSE BLD-MCNC: 82 MG/DL (ref 70–99)
GLUCOSE BLD-MCNC: 85 MG/DL (ref 70–99)
GLUCOSE BLD-MCNC: 91 MG/DL (ref 70–99)
GLUCOSE BLD-MCNC: 95 MG/DL (ref 70–99)
GLUCOSE BLD-MCNC: 97 MG/DL (ref 70–99)
GLUCOSE SERPL-MCNC: 86 MG/DL (ref 70–99)
HBA1C MFR BLD: 7.8 %
HCT VFR BLD AUTO: 42 % (ref 40.5–52.5)
HGB BLD-MCNC: 12.7 G/DL (ref 13.5–17.5)
LYMPHOCYTES # BLD: 1.1 K/UL (ref 1–5.1)
LYMPHOCYTES NFR BLD: 5.2 %
MCH RBC QN AUTO: 25.5 PG (ref 26–34)
MCHC RBC AUTO-ENTMCNC: 30.2 G/DL (ref 31–36)
MCV RBC AUTO: 84.4 FL (ref 80–100)
MONOCYTES # BLD: 1.4 K/UL (ref 0–1.3)
MONOCYTES NFR BLD: 7.1 %
NEUTROPHILS # BLD: 17.7 K/UL (ref 1.7–7.7)
NEUTROPHILS NFR BLD: 86.8 %
ORGANISM: ABNORMAL
PERFORMED ON: ABNORMAL
PERFORMED ON: NORMAL
PLATELET # BLD AUTO: 179 K/UL (ref 135–450)
PMV BLD AUTO: 7.6 FL (ref 5–10.5)
POTASSIUM SERPL-SCNC: 4.7 MMOL/L (ref 3.5–5.1)
RBC # BLD AUTO: 4.97 M/UL (ref 4.2–5.9)
SODIUM SERPL-SCNC: 125 MMOL/L (ref 136–145)
VANCOMYCIN TROUGH SERPL-MCNC: 16.7 UG/ML (ref 10–20)
WBC # BLD AUTO: 20.3 K/UL (ref 4–11)

## 2024-09-21 PROCEDURE — 85025 COMPLETE CBC W/AUTO DIFF WBC: CPT

## 2024-09-21 PROCEDURE — 93306 TTE W/DOPPLER COMPLETE: CPT

## 2024-09-21 PROCEDURE — 80048 BASIC METABOLIC PNL TOTAL CA: CPT

## 2024-09-21 PROCEDURE — 6370000000 HC RX 637 (ALT 250 FOR IP): Performed by: FAMILY MEDICINE

## 2024-09-21 PROCEDURE — 6370000000 HC RX 637 (ALT 250 FOR IP)

## 2024-09-21 PROCEDURE — 93306 TTE W/DOPPLER COMPLETE: CPT | Performed by: INTERNAL MEDICINE

## 2024-09-21 PROCEDURE — 80202 ASSAY OF VANCOMYCIN: CPT

## 2024-09-21 PROCEDURE — 6360000002 HC RX W HCPCS: Performed by: INTERNAL MEDICINE

## 2024-09-21 PROCEDURE — 36415 COLL VENOUS BLD VENIPUNCTURE: CPT

## 2024-09-21 PROCEDURE — 2580000003 HC RX 258: Performed by: FAMILY MEDICINE

## 2024-09-21 PROCEDURE — 2500000003 HC RX 250 WO HCPCS: Performed by: INTERNAL MEDICINE

## 2024-09-21 PROCEDURE — 2060000000 HC ICU INTERMEDIATE R&B

## 2024-09-21 PROCEDURE — 6360000002 HC RX W HCPCS: Performed by: FAMILY MEDICINE

## 2024-09-21 PROCEDURE — 2580000003 HC RX 258: Performed by: INTERNAL MEDICINE

## 2024-09-21 PROCEDURE — 99233 SBSQ HOSP IP/OBS HIGH 50: CPT | Performed by: INTERNAL MEDICINE

## 2024-09-21 PROCEDURE — 2580000003 HC RX 258

## 2024-09-21 PROCEDURE — 6360000002 HC RX W HCPCS

## 2024-09-21 RX ORDER — INSULIN GLARGINE 100 [IU]/ML
28 INJECTION, SOLUTION SUBCUTANEOUS NIGHTLY
Status: DISCONTINUED | OUTPATIENT
Start: 2024-09-21 | End: 2024-09-22 | Stop reason: HOSPADM

## 2024-09-21 RX ADMIN — ATORVASTATIN CALCIUM 40 MG: 40 TABLET, FILM COATED ORAL at 20:42

## 2024-09-21 RX ADMIN — FERROUS SULFATE TAB 325 MG (65 MG ELEMENTAL FE) 325 MG: 325 (65 FE) TAB at 20:42

## 2024-09-21 RX ADMIN — ONDANSETRON 4 MG: 2 INJECTION INTRAMUSCULAR; INTRAVENOUS at 21:32

## 2024-09-21 RX ADMIN — ONDANSETRON 4 MG: 4 TABLET, ORALLY DISINTEGRATING ORAL at 01:16

## 2024-09-21 RX ADMIN — ENOXAPARIN SODIUM 40 MG: 100 INJECTION SUBCUTANEOUS at 10:24

## 2024-09-21 RX ADMIN — CLINDAMYCIN PHOSPHATE 600 MG: 600 INJECTION, SOLUTION INTRAVENOUS at 01:52

## 2024-09-21 RX ADMIN — ONDANSETRON 4 MG: 2 INJECTION INTRAMUSCULAR; INTRAVENOUS at 15:19

## 2024-09-21 RX ADMIN — ONDANSETRON 4 MG: 2 INJECTION INTRAMUSCULAR; INTRAVENOUS at 08:05

## 2024-09-21 RX ADMIN — SODIUM BICARBONATE 50 MEQ: 84 INJECTION INTRAVENOUS at 15:02

## 2024-09-21 RX ADMIN — FUROSEMIDE 5 MG/HR: 10 INJECTION, SOLUTION INTRAMUSCULAR; INTRAVENOUS at 15:19

## 2024-09-21 RX ADMIN — SODIUM BICARBONATE: 84 INJECTION, SOLUTION INTRAVENOUS at 12:04

## 2024-09-21 RX ADMIN — VANCOMYCIN HYDROCHLORIDE 1000 MG: 1 INJECTION, POWDER, LYOPHILIZED, FOR SOLUTION INTRAVENOUS at 08:14

## 2024-09-21 RX ADMIN — LEVOTHYROXINE SODIUM 200 MCG: 100 TABLET ORAL at 05:00

## 2024-09-21 RX ADMIN — CEFEPIME 2000 MG: 2 INJECTION, POWDER, FOR SOLUTION INTRAVENOUS at 10:22

## 2024-09-21 RX ADMIN — Medication 16 G: at 10:16

## 2024-09-21 RX ADMIN — CEFEPIME 2000 MG: 2 INJECTION, POWDER, FOR SOLUTION INTRAVENOUS at 20:46

## 2024-09-21 RX ADMIN — SODIUM CHLORIDE: 9 INJECTION, SOLUTION INTRAVENOUS at 05:01

## 2024-09-21 NOTE — PLAN OF CARE
Problem: ABCDS Injury Assessment  Goal: Absence of physical injury  9/21/2024 1034 by Damaris Oneill RN  Outcome: Progressing  Flowsheets (Taken 9/20/2024 1232)  Absence of Physical Injury: Implement safety measures based on patient assessment  9/20/2024 2258 by Aubree Price RN  Outcome: Progressing  Flowsheets (Taken 9/20/2024 1232 by Damaris Oneill, RN)  Absence of Physical Injury: Implement safety measures based on patient assessment     Problem: Safety - Adult  Goal: Free from fall injury  9/21/2024 1034 by Damaris Oneill RN  Outcome: Progressing  Flowsheets (Taken 9/20/2024 0303 by Pratima Charlton, RN)  Free From Fall Injury: Instruct family/caregiver on patient safety  9/20/2024 2258 by Aubree Price RN  Outcome: Progressing     Problem: Skin/Tissue Integrity  Goal: Absence of new skin breakdown  Description: 1.  Monitor for areas of redness and/or skin breakdown  2.  Assess vascular access sites hourly  3.  Every 4-6 hours minimum:  Change oxygen saturation probe site  4.  Every 4-6 hours:  If on nasal continuous positive airway pressure, respiratory therapy assess nares and determine need for appliance change or resting period.  Outcome: Progressing     Problem: Discharge Planning  Goal: Discharge to home or other facility with appropriate resources  9/21/2024 1034 by Damaris Oneill RN  Outcome: Progressing  Flowsheets (Taken 9/20/2024 1237)  Discharge to home or other facility with appropriate resources:   Identify barriers to discharge with patient and caregiver   Arrange for needed discharge resources and transportation as appropriate   Identify discharge learning needs (meds, wound care, etc)   Refer to discharge planning if patient needs post-hospital services based on physician order or complex needs related to functional status, cognitive ability or social support system  9/20/2024 2258 by Aubree Price, RN  Outcome: Progressing  Flowsheets (Taken 9/20/2024 1237 by Damaris Oneill  RN)  Discharge to home or other facility with appropriate resources:   Identify barriers to discharge with patient and caregiver   Arrange for needed discharge resources and transportation as appropriate   Identify discharge learning needs (meds, wound care, etc)   Refer to discharge planning if patient needs post-hospital services based on physician order or complex needs related to functional status, cognitive ability or social support system     Problem: Pain  Goal: Verbalizes/displays adequate comfort level or baseline comfort level  9/20/2024 4178 by Aubree Price, RN  Outcome: Progressing

## 2024-09-21 NOTE — FLOWSHEET NOTE
09/21/24 0818   Vital Signs   Temp 97.3 °F (36.3 °C)   Temp Source Oral   Pulse 93   Heart Rate Source Monitor   Respirations 18   /77   MAP (Calculated) 89   BP Location Left upper arm   BP Method Automatic   Patient Position High fowlers   Pain Assessment   Pain Assessment None - Denies Pain   Oxygen Therapy   SpO2 98 %   O2 Device None (Room air)   Rhythm Interpretation   Cardiac Rhythm Sinus rhythm     Pt still having nausea. BG rechecked and was 67. Pt given glucose tablets and educated on importance of taking them for BG. Pt refused breakfast tray and is refusing PO meds this morning. Pt refusing crackers or other drinks at this time. Pt given water to help dissolve and swallow glucose tablets. Pt tolerating so far, taking a little time to consume because pt has no teeth. MD aware of pt condition. Will continue to monitor.

## 2024-09-21 NOTE — PLAN OF CARE
HEART FAILURE CARE PLAN:    Comorbidities Reviewed: Yes   Patient has a past medical history of Acute on chronic systolic congestive heart failure (HCC), Acute osteomyelitis of left foot (HCC), Acute osteomyelitis of right foot (HCC), Acute respiratory failure (HCC), Ascites, Blood transfusion reaction, Burst fracture of lumbar vertebra (HCC), Cellulitis of left foot, Cellulitis of right lower extremity, Chronic systolic CHF (congestive heart failure) (HCC), Community acquired pneumonia, Coronary artery disease involving native coronary artery of native heart without angina pectoris, Diabetes (HCC), Diabetic ulcer of left foot associated with type 2 diabetes mellitus, with muscle involvement without evidence of necrosis (HCC), Diabetic ulcer of right foot (HCC), Diabetic 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     Weights Reviewed: Yes   Admission weight: 81.6 kg (180 lb)   Wt Readings from Last 3 Encounters:   09/20/24 87.4 kg (192 lb 10.9 oz)   08/19/24 93 kg (205 lb)   07/09/24 91.1 kg (200 lb 12.8 oz)     Intake & Output Reviewed: Yes     Intake/Output Summary (Last 24 hours) at 9/20/2024 6937  Last data filed at 9/20/2024 2101  Gross per 24 hour   Intake 630 ml   Output 500 ml   Net 130 ml       ECHOCARDIOGRAM Reviewed: Yes   Patient's Ejection Fraction (EF) is less than or equal to 40%. Discuss HFrEF Guideline Directed Medical Therapy (GDMT) with Cardiologist or    ADULT DIET; Dysphagia - Minced and Moist; 5 carb choices (75 gm/meal)    Goal of Care Reviewed: Yes   Patient and/or Family's stated Goal of Care this Admission: Reduce shortness of breath, increase activity tolerance, better understand heart failure and disease management, be more comfortable, and reduce lower extremity edema prior to discharge.     Electronically signed by Aubree Price RN on 9/20/2024 at 11:17 PM

## 2024-09-21 NOTE — PLAN OF CARE
Problem: ABCDS Injury Assessment  Goal: Absence of physical injury  Outcome: Progressing     Problem: Safety - Adult  Goal: Free from fall injury  Outcome: Progressing     Problem: Discharge Planning  Goal: Discharge to home or other facility with appropriate resources  Outcome: Progressing     Problem: Pain  Goal: Verbalizes/displays adequate comfort level or baseline comfort level  Outcome: Progressing

## 2024-09-21 NOTE — PLAN OF CARE
HEART FAILURE CARE PLAN:    Comorbidities Reviewed: Yes   Patient has a past medical history of Acute on chronic systolic congestive heart failure (HCC), Acute osteomyelitis of left foot (HCC), Acute osteomyelitis of right foot (HCC), Acute respiratory failure (HCC), Ascites, Blood transfusion reaction, Burst fracture of lumbar vertebra (HCC), Cellulitis of left foot, Cellulitis of right lower extremity, Chronic systolic CHF (congestive heart failure) (HCC), Community acquired pneumonia, Coronary artery disease involving native coronary artery of native heart without angina pectoris, Diabetes (HCC), Diabetic ulcer of left foot associated with type 2 diabetes mellitus, with muscle involvement without evidence of necrosis (HCC), Diabetic ulcer of right foot (HCC), Diabetic 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     Weights Reviewed: Yes   Admission weight: 81.6 kg (180 lb)   Wt Readings from Last 3 Encounters:   09/21/24 88 kg (194 lb)   08/19/24 93 kg (205 lb)   07/09/24 91.1 kg (200 lb 12.8 oz)     Intake & Output Reviewed: Yes     Intake/Output Summary (Last 24 hours) at 9/21/2024 1954  Last data filed at 9/21/2024 1750  Gross per 24 hour   Intake 1595.36 ml   Output 300 ml   Net 1295.36 ml       ECHOCARDIOGRAM Reviewed: Yes   Patient's Ejection Fraction (EF) is less than or equal to 40%. Discuss HFrEF Guideline Directed Medical Therapy (GDMT) with Cardiologist or

## 2024-09-21 NOTE — PROGRESS NOTES
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 Eagle today seen resting in bed. Feels nauseous     Sugars low today    Objective:   Patient Vitals for the past 4 hrs:   BP Temp Temp src Pulse Resp SpO2 Height Weight   09/21/24 0957 106/76 -- -- -- -- -- 1.753 m (5' 9\") 88 kg (194 lb)   09/21/24 0818 112/77 97.3 °F (36.3 °C) Oral 93 18 98 % -- --   09/21/24 0711 106/74 97.3 °F (36.3 °C) Oral 88 17 99 % -- --          Intake/Output Summary (Last 24 hours) at 9/21/2024 1009  Last data filed at 9/21/2024 0910  Gross per 24 hour   Intake 1655.36 ml   Output 150 ml   Net 1505.36 ml       Physical Exam:    Gen: No distress. Alert. +Pleasant male   Eyes: PERRL. No sclera icterus. No conjunctival injection.   ENT: No discharge. Pharynx clear.   Neck: No JVD.  Trachea midline.  Resp: No accessory muscle use. No crackles. No wheezes. No rhonchi.   CV: Regular rate. Regular rhythm. + murmur.  No rub. ++significant pitting edema of left lower extremity, mild swelling of right stump    Peripheral Pulses: +2 palpable, equal bilaterally   GI: Non-tender. Non-distended.  Normal bowel sounds.  Skin: Warm and dry. No nodule on exposed extremities. No rash on exposed extremities. ++left lower extremity edema and partial foot amputation, some erythema noted, right BKA with healing incision site and some minor swelling   M/S: No cyanosis. No joint deformity. No clubbing.   Neuro: Awake. Grossly nonfocal    Psych: Oriented x 3. No anxiety or agitation.         Medications:  cefepime, 2,000 mg, Q12H  allopurinol, 100 mg, Daily  aspirin, 81 mg, Daily  atorvastatin, 40 mg, Nightly  clopidogrel, 75 mg, Daily  ferrous sulfate, 325 mg, Nightly  hydrALAZINE, 10 mg, 3 times per day  insulin glargine, 35 Units, Nightly  levothyroxine, 200 mcg, Daily  magnesium oxide, 400 mg, Daily  metoprolol succinate, 25 mg,  follow  Gram stain Anaerobic bottle:  Gram positive cocci in clusters  resembling Staphylococcus  Information to follow       Organism Staph aureus MRSA DNA Detected     Blood Culture, Routine --     CONTACT PRECAUTIONS INDICATED  See additional report for complete BCID panel.  mecA/C gene and MREJ gene Detected.       Organism Staph aureus MRSA     Blood Culture, Routine --     POSITIVE for  Sensitivity to follow  CONTACT PRECAUTIONS INDICATED  Isolated two of two sets      Narrative:      ORDER#: [id number]                          ORDERED BY: JORGE A CARDOZO  SOURCE: Blood Antecubital-Rig              COLLECTED:  09/19/24 20:50  ANTIBIOTICS AT [place].:                      RECEIVED :  09/20/24 00:44  CALL  Irvin  [facility] tel. [phone],  Microbiology results called to and read back by Martín Sethi, 09/20/2024  14:21, by BROWN  Microbiology results called to and read back by JUAN Oneill, 09/20/2024  14:20, by BROWN  If child <=2 yrs old please draw pediatric bottle.~Blood Culture 1    Culture, Blood, PCR ID Panel [[id number]] Collected: 09/19/24 2050    Order Status: Completed Updated: 09/20/24 1422     Report SEE IMAGE    Narrative:      CALL  Irvin  [facility] tel. [phone],  Microbiology results called to and read back by Martín Sethi, 09/20/2024  14:21, by BROWN  Microbiology results called to and read back by JUAN Oneill, 09/20/2024  14:20, by BROWN               RADIOLOGY  XR CHEST PORTABLE   Final Result   No acute cardiopulmonary process.         XR ANKLE LEFT (MIN 3 VIEWS)   Final Result   Soft tissue swelling without definite fracture.  Gas in the soft tissues         XR KNEE RIGHT (3 VIEWS)   Final Result   Status post below the knee amputation with surgical clips in the soft tissues   medially and moderate edema or cellulitis in the soft tissues distal to the   surgical site.  Recommend clinical follow-up of the area.      Mild osteoarthritic changes in the knee and diffuse osteopenia with no

## 2024-09-21 NOTE — PROGRESS NOTES
Pt vomiting into trash can. IVP zofran given. Holding PO meds until pt nausea subsides. Orange juice given to sip on.

## 2024-09-21 NOTE — FLOWSHEET NOTE
09/20/24 2355   Vital Signs   Temp 97.2 °F (36.2 °C)   Temp Source Oral   Pulse (!) 101   Heart Rate Source Monitor   Respirations 18   BP 95/66   MAP (Calculated) 76   Oxygen Therapy   SpO2 98 %   O2 Device None (Room air)     Resting quietly with respirations easy/even on RA.  Call in easy reach.  Bed alarm on for safety.  Continue to monitor closely.  Aubree Price RN

## 2024-09-21 NOTE — PROGRESS NOTES
Received panic test result this morning. CO2 13. Called respiratory and let them know. Perfect serve sent to MD.

## 2024-09-21 NOTE — PROGRESS NOTES
BG improved, held insulin again. Pt still nauseas but appetite improved and pt states he is feeling better.

## 2024-09-21 NOTE — PROGRESS NOTES
Pt states he feels a little better after administration of glucose tablets. Pt encouraged to eat crackers and take small sips of water. Pt refusing any juices or other food at this time. Will continue to monitor.

## 2024-09-21 NOTE — PROGRESS NOTES
Pt states he was able to eat a little of his lunch tray. Pt did eat half a cup of apple sauce and was just given orange sherbert per his request. Will continue to monitor.

## 2024-09-21 NOTE — PROGRESS NOTES
Department of Internal Medicine  Nephrology Progress Note        SUBJECTIVE:    We are following this patient for A/CKD.  The patient was seen and examined; he feels well today with no CP, SOB, nausea or vomiting.    ROS: No fever or chills.  Social: No family at bedside.    Physical Exam:    VITALS:  /76   Pulse 93   Temp 97.3 °F (36.3 °C) (Oral)   Resp 18   Ht 1.753 m (5' 9\")   Wt 88 kg (194 lb)   SpO2 98%   BMI 28.65 kg/m²     General appearance: Seems comfortable, no acute distress.  Neck: Trachea midline, thyroid normal.   Lungs:  Non labored breathing, CTA to anterior auscultation.  Heart:  S1S2 normal, rub or gallop. + peripheral edema.  Abdomen: Soft, non-tender, no organomegaly.   Skin: No lesions or rashes, warm to touch.     DATA:    CBC with Differential:    Lab Results   Component Value Date/Time    WBC 20.3 09/21/2024 06:30 AM    RBC 4.97 09/21/2024 06:30 AM    HGB 12.7 09/21/2024 06:30 AM    HCT 42.0 09/21/2024 06:30 AM     09/21/2024 06:30 AM    MCV 84.4 09/21/2024 06:30 AM    MCH 25.5 09/21/2024 06:30 AM    MCHC 30.2 09/21/2024 06:30 AM    RDW 25.8 09/21/2024 06:30 AM    BANDSPCT 1 08/04/2020 05:45 AM    LYMPHOPCT 5.2 09/21/2024 06:30 AM    MONOPCT 7.1 09/21/2024 06:30 AM    EOSPCT 0.9 09/21/2024 06:30 AM    BASOPCT 0.0 09/21/2024 06:30 AM    MONOSABS 1.4 09/21/2024 06:30 AM    LYMPHSABS 1.1 09/21/2024 06:30 AM    EOSABS 0.2 09/21/2024 06:30 AM    BASOSABS 0.0 09/21/2024 06:30 AM    DIFFTYPE Auto 04/22/2012 07:58 PM     BMP:    Lab Results   Component Value Date/Time     09/21/2024 06:30 AM    K 4.7 09/21/2024 06:30 AM    K 3.6 09/19/2024 08:30 PM    CL 87 09/21/2024 06:30 AM    CO2 13 09/21/2024 06:30 AM    BUN 64 09/21/2024 06:30 AM    CREATININE 2.9 09/21/2024 06:30 AM    CALCIUM 7.8 09/21/2024 06:30 AM    GFRAA >60 07/20/2022 10:53 AM    GFRAA >60 08/01/2012 11:46 AM    LABGLOM 24 09/21/2024 06:30 AM    LABGLOM >60 03/18/2024 05:27 AM    GLUCOSE 86 09/21/2024 06:30 AM

## 2024-09-21 NOTE — CARE COORDINATION
Case Management Assessment  Initial Evaluation    Date/Time of Evaluation: 9/21/2024 10:18 AM  Assessment Completed by: Vera Ledezma RN    If patient is discharged prior to next notation, then this note serves as note for discharge by case management.    Patient Name: Ruben Eagle                   YOB: 1965  Diagnosis: Elevated C-reactive protein (CRP) [R79.82]  Elevated sed rate [R70.0]  Hyponatremia [E87.1]  Charcot foot due to diabetes mellitus (HCC) [E11.610]  Acute osteomyelitis of left foot (HCC) [M86.172]  Acute kidney injury (HCC) [N17.9]  Impaired mobility and ADLs [Z74.09, Z78.9]  Multiple falls [R29.6]  Osteomyelitis of left foot, unspecified type (HCC) [M86.9]                   Date / Time: 9/19/2024  6:06 PM    Patient Admission Status: Inpatient   Readmission Risk (Low < 19, Mod (19-27), High > 27): Readmission Risk Score: 24.8    Current PCP: Leann Marie, JOSE - CNP  PCP verified by CM? Yes    Chart Reviewed: Yes      History Provided by: Patient  Patient Orientation: Alert and Oriented    Patient Cognition: Alert    Hospitalization in the last 30 days (Readmission):  No    If yes, Readmission Assessment in CM Navigator will be completed.    Advance Directives:      Code Status: Full Code   Patient's Primary Decision Maker is: Legal Next of Kin      Discharge Planning:    Patient lives with: Family Members Type of Home: House  Primary Care Giver: Self  Patient Support Systems include: Family Members   Current Financial resources: Medicare  Current community resources: ECF/Home Care  Current services prior to admission: Private Duty Homecare            Current DME:              Type of Home Care services:  Aide Services, PT, OT    ADLS  Prior functional level: Independent in ADLs/IADLs  Current functional level: Independent in ADLs/IADLs    PT AM-PAC:   /24  OT AM-PAC:   /24    Family can provide assistance at DC: No  Would you like Case Management to discuss the discharge  the providers was provided to: Patient   Patient Representative Name:       The Patient and/or Patient Representative Agree with the Discharge Plan? Yes    Vera Ledezma RN  Case Management Department

## 2024-09-21 NOTE — PROGRESS NOTES
Physical/Occupational Therapy    Orders received and chart reviewed. Hold AM due to nausea. Will continue to follow. Re-attempt as appropriate and schedule permits.     Edgar Martínez, PT, DPT   Zonia Young, OTR/L #[id number]

## 2024-09-21 NOTE — PROGRESS NOTES
HEART FAILURE CARE PLAN:    Comorbidities Reviewed: Yes   Patient has a past medical history of Acute on chronic systolic congestive heart failure (HCC), Acute osteomyelitis of left foot (HCC), Acute osteomyelitis of right foot (HCC), Acute respiratory failure (HCC), Ascites, Blood transfusion reaction, Burst fracture of lumbar vertebra (HCC), Cellulitis of left foot, Cellulitis of right lower extremity, Chronic systolic CHF (congestive heart failure) (HCC), Community acquired pneumonia, Coronary artery disease involving native coronary artery of native heart without angina pectoris, Diabetes (HCC), Diabetic ulcer of left foot associated with type 2 diabetes mellitus, with muscle involvement without evidence of necrosis (HCC), Diabetic ulcer of right foot (HCC), Diabetic 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     Weights Reviewed: Yes   Admission weight: 81.6 kg (180 lb)   Wt Readings from Last 3 Encounters:   09/21/24 88 kg (194 lb)   08/19/24 93 kg (205 lb)   07/09/24 91.1 kg (200 lb 12.8 oz)     Intake & Output Reviewed: Yes     Intake/Output Summary (Last 24 hours) at 9/21/2024 1044  Last data filed at 9/21/2024 0910  Gross per 24 hour   Intake 1655.36 ml   Output 150 ml   Net 1505.36 ml       ECHOCARDIOGRAM Reviewed: Yes   Patient's Ejection Fraction (EF) is less than or equal to 40%. Discuss HFrEF Guideline Directed Medical Therapy (GDMT) with Cardiologist or  MOUTH NIGHTLY 7/9/24  Yes Gold Carmichael MD   furosemide (LASIX) 80 MG tablet Take 1 tablet by mouth 2 times daily 7/9/24  Yes Gold Carmichael MD   empagliflozin (JARDIANCE) 10 MG tablet Take 1 tablet by mouth nightly 7/9/24  Yes Gold Carmichael MD   allopurinol (ZYLOPRIM) 100 MG tablet Take 1 tablet by mouth daily 6/17/24  Yes Mackenzie Omer MD   levothyroxine (SYNTHROID) 125 MCG tablet Take 1 tablet by mouth Daily 6/17/24  Yes Mackenzie Omer MD   ferrous sulfate (IRON 325) 325 (65 Fe) MG tablet Take 1 tablet by mouth nightly   Yes ProviderSherrie MD   Multiple Vitamins-Minerals (THERAPEUTIC MULTIVITAMIN-MINERALS) tablet Take 1 tablet by mouth daily   Yes ProviderSherrie MD   clopidogrel (PLAVIX) 75 MG tablet TAKE ONE (1) TABLET BY MOUTH DAILY 4/3/24  Yes Gold Carmichael MD   magnesium oxide (MAG-OX) 400 (240 Mg) MG tablet Take 1 tablet by mouth 2 times daily  Patient taking differently: Take 1 tablet by mouth daily 8/14/23  Yes Gold Carmichael MD   TRULICITY 3 MG/0.5ML SOPN INJECT THREE (3) MG INTO THE SKIN ONCE A WEEK 7/27/23  Yes Leann Marie APRN - CNP   insulin glargine (BASAGLAR KWIKPEN) 100 UNIT/ML injection pen Inject 35 Units into the skin nightly 8/19/24   Rosario Perez DO   Insulin Pen Needle 29G X 12MM MISC 1 each by Does not apply route daily 12/6/23   Florina Hoffman MD   Blood Pressure KIT 1 kit by Does not apply route daily 10/4/22   Leann Marie APRN - CNP   BD PEN NEEDLE WINNIE U/F 32G X 4 MM MISC 1 each by Does not apply route daily 8/3/22   Hood Sanchez MD   OneTouch Delica Lancets 33G MISC USE TO CHECK FOUR TIMES DAILY. DX;E11.9 8/18/21   Hood Sanchez MD   Continuous Blood Gluc Sensor (FREESTYLE MARY 14 DAY SENSOR) MISC CGM 4/29/21   Hood Sanchez MD   Insulin Pen Needle (UNIFINE PENTIPS) 31G X 5 MM MISC USE 1 EACH DAY AS NEEDED FOR TESTING 1/3/18   Cb Murrell MD      Diet Reviewed: Yes   ADULT DIET; Dysphagia - Minced

## 2024-09-22 ENCOUNTER — HOSPITAL ENCOUNTER (INPATIENT)
Age: 59
LOS: 6 days | DRG: 853 | End: 2024-09-28
Attending: INTERNAL MEDICINE | Admitting: INTERNAL MEDICINE
Payer: MEDICARE

## 2024-09-22 VITALS
HEIGHT: 69 IN | RESPIRATION RATE: 18 BRPM | OXYGEN SATURATION: 97 % | HEART RATE: 110 BPM | SYSTOLIC BLOOD PRESSURE: 107 MMHG | BODY MASS INDEX: 29.02 KG/M2 | TEMPERATURE: 97.3 F | WEIGHT: 195.9 LBS | DIASTOLIC BLOOD PRESSURE: 72 MMHG

## 2024-09-22 DIAGNOSIS — I96 GANGRENE (HCC): ICD-10-CM

## 2024-09-22 DIAGNOSIS — N17.9 ACUTE KIDNEY INJURY (HCC): ICD-10-CM

## 2024-09-22 DIAGNOSIS — Z98.890 S/P THORACENTESIS: ICD-10-CM

## 2024-09-22 DIAGNOSIS — I38 ENDOCARDITIS, UNSPECIFIED CHRONICITY, UNSPECIFIED ENDOCARDITIS TYPE: Primary | ICD-10-CM

## 2024-09-22 PROBLEM — R78.81 BACTEREMIA: Status: ACTIVE | Noted: 2024-09-22

## 2024-09-22 LAB
ANION GAP SERPL CALCULATED.3IONS-SCNC: 15 MMOL/L (ref 3–16)
BACTERIA BLD CULT ORG #2: ABNORMAL
BACTERIA BLD CULT: ABNORMAL
BACTERIA BLD CULT: ABNORMAL
BASOPHILS # BLD: 0 K/UL (ref 0–0.2)
BASOPHILS NFR BLD: 0.2 %
BUN SERPL-MCNC: 74 MG/DL (ref 7–20)
CALCIUM SERPL-MCNC: 7.3 MG/DL (ref 8.3–10.6)
CHLORIDE SERPL-SCNC: 87 MMOL/L (ref 99–110)
CO2 SERPL-SCNC: 18 MMOL/L (ref 21–32)
CREAT SERPL-MCNC: 3.2 MG/DL (ref 0.9–1.3)
DEPRECATED RDW RBC AUTO: 25.3 % (ref 12.4–15.4)
EOSINOPHIL # BLD: 0.1 K/UL (ref 0–0.6)
EOSINOPHIL NFR BLD: 0.5 %
GFR SERPLBLD CREATININE-BSD FMLA CKD-EPI: 21 ML/MIN/{1.73_M2}
GLUCOSE BLD-MCNC: 124 MG/DL (ref 70–99)
GLUCOSE BLD-MCNC: 143 MG/DL (ref 70–99)
GLUCOSE BLD-MCNC: 156 MG/DL (ref 70–99)
GLUCOSE BLD-MCNC: 160 MG/DL (ref 70–99)
GLUCOSE BLD-MCNC: 202 MG/DL (ref 70–99)
GLUCOSE SERPL-MCNC: 153 MG/DL (ref 70–99)
HCT VFR BLD AUTO: 44.3 % (ref 40.5–52.5)
HGB BLD-MCNC: 13.6 G/DL (ref 13.5–17.5)
LYMPHOCYTES # BLD: 1.1 K/UL (ref 1–5.1)
LYMPHOCYTES NFR BLD: 5.5 %
MCH RBC QN AUTO: 25.3 PG (ref 26–34)
MCHC RBC AUTO-ENTMCNC: 30.6 G/DL (ref 31–36)
MCV RBC AUTO: 82.5 FL (ref 80–100)
MONOCYTES # BLD: 0.9 K/UL (ref 0–1.3)
MONOCYTES NFR BLD: 4.2 %
NEUTROPHILS # BLD: 18.4 K/UL (ref 1.7–7.7)
NEUTROPHILS NFR BLD: 89.6 %
ORGANISM: ABNORMAL
PERFORMED ON: ABNORMAL
PLATELET # BLD AUTO: 156 K/UL (ref 135–450)
PMV BLD AUTO: 7.9 FL (ref 5–10.5)
POTASSIUM SERPL-SCNC: 4.3 MMOL/L (ref 3.5–5.1)
RBC # BLD AUTO: 5.38 M/UL (ref 4.2–5.9)
SODIUM SERPL-SCNC: 120 MMOL/L (ref 136–145)
VANCOMYCIN SERPL-MCNC: 23.5 UG/ML
WBC # BLD AUTO: 20.5 K/UL (ref 4–11)

## 2024-09-22 PROCEDURE — 87040 BLOOD CULTURE FOR BACTERIA: CPT

## 2024-09-22 PROCEDURE — 2580000003 HC RX 258

## 2024-09-22 PROCEDURE — 2580000003 HC RX 258: Performed by: INTERNAL MEDICINE

## 2024-09-22 PROCEDURE — 6370000000 HC RX 637 (ALT 250 FOR IP): Performed by: FAMILY MEDICINE

## 2024-09-22 PROCEDURE — P9047 ALBUMIN (HUMAN), 25%, 50ML: HCPCS | Performed by: INTERNAL MEDICINE

## 2024-09-22 PROCEDURE — 6370000000 HC RX 637 (ALT 250 FOR IP): Performed by: INTERNAL MEDICINE

## 2024-09-22 PROCEDURE — 51702 INSERT TEMP BLADDER CATH: CPT

## 2024-09-22 PROCEDURE — 6360000002 HC RX W HCPCS

## 2024-09-22 PROCEDURE — 93005 ELECTROCARDIOGRAM TRACING: CPT | Performed by: INTERNAL MEDICINE

## 2024-09-22 PROCEDURE — 36415 COLL VENOUS BLD VENIPUNCTURE: CPT

## 2024-09-22 PROCEDURE — 80202 ASSAY OF VANCOMYCIN: CPT

## 2024-09-22 PROCEDURE — 6360000002 HC RX W HCPCS: Performed by: INTERNAL MEDICINE

## 2024-09-22 PROCEDURE — 6370000000 HC RX 637 (ALT 250 FOR IP)

## 2024-09-22 PROCEDURE — 2500000003 HC RX 250 WO HCPCS: Performed by: INTERNAL MEDICINE

## 2024-09-22 PROCEDURE — 6360000002 HC RX W HCPCS: Performed by: FAMILY MEDICINE

## 2024-09-22 PROCEDURE — 2060000000 HC ICU INTERMEDIATE R&B

## 2024-09-22 PROCEDURE — 80048 BASIC METABOLIC PNL TOTAL CA: CPT

## 2024-09-22 PROCEDURE — 99239 HOSP IP/OBS DSCHRG MGMT >30: CPT | Performed by: INTERNAL MEDICINE

## 2024-09-22 PROCEDURE — 85025 COMPLETE CBC W/AUTO DIFF WBC: CPT

## 2024-09-22 RX ORDER — ENOXAPARIN SODIUM 100 MG/ML
30 INJECTION SUBCUTANEOUS DAILY
Status: DISCONTINUED | OUTPATIENT
Start: 2024-09-22 | End: 2024-09-28 | Stop reason: HOSPADM

## 2024-09-22 RX ORDER — HYDRALAZINE HYDROCHLORIDE 10 MG/1
10 TABLET, FILM COATED ORAL EVERY 8 HOURS SCHEDULED
Status: DISCONTINUED | OUTPATIENT
Start: 2024-09-22 | End: 2024-09-22

## 2024-09-22 RX ORDER — INSULIN GLARGINE 100 [IU]/ML
20 INJECTION, SOLUTION SUBCUTANEOUS NIGHTLY
Status: DISCONTINUED | OUTPATIENT
Start: 2024-09-22 | End: 2024-09-25

## 2024-09-22 RX ORDER — METOPROLOL SUCCINATE 25 MG/1
25 TABLET, EXTENDED RELEASE ORAL DAILY
Status: DISCONTINUED | OUTPATIENT
Start: 2024-09-23 | End: 2024-09-23

## 2024-09-22 RX ORDER — GLUCAGON 1 MG/ML
1 KIT INJECTION PRN
Status: DISCONTINUED | OUTPATIENT
Start: 2024-09-22 | End: 2024-09-28 | Stop reason: HOSPADM

## 2024-09-22 RX ORDER — OXYCODONE AND ACETAMINOPHEN 5; 325 MG/1; MG/1
1 TABLET ORAL EVERY 6 HOURS PRN
Status: DISCONTINUED | OUTPATIENT
Start: 2024-09-22 | End: 2024-09-26

## 2024-09-22 RX ORDER — ALBUMIN (HUMAN) 12.5 G/50ML
25 SOLUTION INTRAVENOUS EVERY 8 HOURS
Status: COMPLETED | OUTPATIENT
Start: 2024-09-22 | End: 2024-09-24

## 2024-09-22 RX ORDER — SODIUM CHLORIDE 0.9 % (FLUSH) 0.9 %
5-40 SYRINGE (ML) INJECTION PRN
Status: DISCONTINUED | OUTPATIENT
Start: 2024-09-22 | End: 2024-09-28 | Stop reason: HOSPADM

## 2024-09-22 RX ORDER — SODIUM CHLORIDE 0.9 % (FLUSH) 0.9 %
5-40 SYRINGE (ML) INJECTION EVERY 12 HOURS SCHEDULED
Status: DISCONTINUED | OUTPATIENT
Start: 2024-09-22 | End: 2024-09-28 | Stop reason: HOSPADM

## 2024-09-22 RX ORDER — ACETAMINOPHEN 325 MG/1
650 TABLET ORAL EVERY 6 HOURS PRN
Status: DISCONTINUED | OUTPATIENT
Start: 2024-09-22 | End: 2024-09-28 | Stop reason: HOSPADM

## 2024-09-22 RX ORDER — CLOPIDOGREL BISULFATE 75 MG/1
75 TABLET ORAL DAILY
Status: DISCONTINUED | OUTPATIENT
Start: 2024-09-23 | End: 2024-09-28 | Stop reason: HOSPADM

## 2024-09-22 RX ORDER — ALLOPURINOL 100 MG/1
100 TABLET ORAL DAILY
Status: DISCONTINUED | OUTPATIENT
Start: 2024-09-23 | End: 2024-09-28 | Stop reason: HOSPADM

## 2024-09-22 RX ORDER — ATORVASTATIN CALCIUM 40 MG/1
40 TABLET, FILM COATED ORAL NIGHTLY
Status: DISCONTINUED | OUTPATIENT
Start: 2024-09-22 | End: 2024-09-28 | Stop reason: HOSPADM

## 2024-09-22 RX ORDER — DEXTROSE MONOHYDRATE 100 MG/ML
INJECTION, SOLUTION INTRAVENOUS CONTINUOUS PRN
Status: DISCONTINUED | OUTPATIENT
Start: 2024-09-22 | End: 2024-09-28 | Stop reason: HOSPADM

## 2024-09-22 RX ORDER — INSULIN LISPRO 100 [IU]/ML
0-4 INJECTION, SOLUTION INTRAVENOUS; SUBCUTANEOUS
Status: DISCONTINUED | OUTPATIENT
Start: 2024-09-22 | End: 2024-09-27

## 2024-09-22 RX ORDER — ENOXAPARIN SODIUM 100 MG/ML
30 INJECTION SUBCUTANEOUS DAILY
Status: DISCONTINUED | OUTPATIENT
Start: 2024-09-22 | End: 2024-09-22 | Stop reason: HOSPADM

## 2024-09-22 RX ORDER — SPIRONOLACTONE 25 MG/1
25 TABLET ORAL
Status: DISCONTINUED | OUTPATIENT
Start: 2024-09-22 | End: 2024-09-22

## 2024-09-22 RX ORDER — FERROUS SULFATE 325(65) MG
325 TABLET ORAL NIGHTLY
Status: DISCONTINUED | OUTPATIENT
Start: 2024-09-22 | End: 2024-09-28 | Stop reason: HOSPADM

## 2024-09-22 RX ORDER — INSULIN LISPRO 100 [IU]/ML
0-4 INJECTION, SOLUTION INTRAVENOUS; SUBCUTANEOUS NIGHTLY
Status: DISCONTINUED | OUTPATIENT
Start: 2024-09-22 | End: 2024-09-27

## 2024-09-22 RX ORDER — ASPIRIN 81 MG/1
81 TABLET ORAL DAILY
Status: DISCONTINUED | OUTPATIENT
Start: 2024-09-23 | End: 2024-09-27

## 2024-09-22 RX ORDER — POLYETHYLENE GLYCOL 3350 17 G/17G
17 POWDER, FOR SOLUTION ORAL DAILY PRN
Status: DISCONTINUED | OUTPATIENT
Start: 2024-09-22 | End: 2024-09-28 | Stop reason: HOSPADM

## 2024-09-22 RX ORDER — SODIUM CHLORIDE 9 MG/ML
INJECTION, SOLUTION INTRAVENOUS PRN
Status: DISCONTINUED | OUTPATIENT
Start: 2024-09-22 | End: 2024-09-28 | Stop reason: HOSPADM

## 2024-09-22 RX ORDER — ISOSORBIDE DINITRATE 10 MG/1
5 TABLET ORAL EVERY 8 HOURS
Status: DISCONTINUED | OUTPATIENT
Start: 2024-09-22 | End: 2024-09-22

## 2024-09-22 RX ORDER — M-VIT,TX,IRON,MINS/CALC/FOLIC 27MG-0.4MG
1 TABLET ORAL DAILY
Status: DISCONTINUED | OUTPATIENT
Start: 2024-09-22 | End: 2024-09-28 | Stop reason: HOSPADM

## 2024-09-22 RX ORDER — ONDANSETRON 4 MG/1
4 TABLET, ORALLY DISINTEGRATING ORAL EVERY 8 HOURS PRN
Status: DISCONTINUED | OUTPATIENT
Start: 2024-09-22 | End: 2024-09-28 | Stop reason: HOSPADM

## 2024-09-22 RX ORDER — ONDANSETRON 2 MG/ML
4 INJECTION INTRAMUSCULAR; INTRAVENOUS EVERY 6 HOURS PRN
Status: DISCONTINUED | OUTPATIENT
Start: 2024-09-22 | End: 2024-09-28 | Stop reason: HOSPADM

## 2024-09-22 RX ORDER — ACETAMINOPHEN 650 MG/1
650 SUPPOSITORY RECTAL EVERY 6 HOURS PRN
Status: DISCONTINUED | OUTPATIENT
Start: 2024-09-22 | End: 2024-09-28 | Stop reason: HOSPADM

## 2024-09-22 RX ORDER — LEVOTHYROXINE SODIUM 125 UG/1
125 TABLET ORAL DAILY
Status: DISCONTINUED | OUTPATIENT
Start: 2024-09-23 | End: 2024-09-28 | Stop reason: HOSPADM

## 2024-09-22 RX ORDER — ALBUMIN (HUMAN) 12.5 G/50ML
25 SOLUTION INTRAVENOUS EVERY 8 HOURS
Status: DISCONTINUED | OUTPATIENT
Start: 2024-09-22 | End: 2024-09-22 | Stop reason: HOSPADM

## 2024-09-22 RX ADMIN — INSULIN GLARGINE 20 UNITS: 100 INJECTION, SOLUTION SUBCUTANEOUS at 21:24

## 2024-09-22 RX ADMIN — SODIUM CHLORIDE, PRESERVATIVE FREE 10 ML: 5 INJECTION INTRAVENOUS at 21:25

## 2024-09-22 RX ADMIN — LEVOTHYROXINE SODIUM 200 MCG: 100 TABLET ORAL at 05:19

## 2024-09-22 RX ADMIN — ASPIRIN 81 MG: 81 TABLET, COATED ORAL at 09:09

## 2024-09-22 RX ADMIN — ALLOPURINOL 100 MG: 100 TABLET ORAL at 09:09

## 2024-09-22 RX ADMIN — FERROUS SULFATE TAB 325 MG (65 MG ELEMENTAL FE) 325 MG: 325 (65 FE) TAB at 21:24

## 2024-09-22 RX ADMIN — ATORVASTATIN CALCIUM 40 MG: 40 TABLET, FILM COATED ORAL at 21:24

## 2024-09-22 RX ADMIN — Medication 400 MG: at 09:08

## 2024-09-22 RX ADMIN — ONDANSETRON 4 MG: 2 INJECTION INTRAMUSCULAR; INTRAVENOUS at 09:40

## 2024-09-22 RX ADMIN — ENOXAPARIN SODIUM 30 MG: 100 INJECTION SUBCUTANEOUS at 09:08

## 2024-09-22 RX ADMIN — Medication 1 TABLET: at 09:09

## 2024-09-22 RX ADMIN — CEFEPIME 1000 MG: 1 INJECTION, POWDER, FOR SOLUTION INTRAMUSCULAR; INTRAVENOUS at 09:27

## 2024-09-22 RX ADMIN — CEFEPIME 1000 MG: 1 INJECTION, POWDER, FOR SOLUTION INTRAMUSCULAR; INTRAVENOUS at 21:26

## 2024-09-22 RX ADMIN — CLOPIDOGREL BISULFATE 75 MG: 75 TABLET ORAL at 09:08

## 2024-09-22 RX ADMIN — OXYCODONE 5 MG: 5 TABLET ORAL at 02:48

## 2024-09-22 RX ADMIN — METOPROLOL SUCCINATE 25 MG: 25 TABLET, EXTENDED RELEASE ORAL at 09:08

## 2024-09-22 RX ADMIN — FUROSEMIDE 5 MG/HR: 10 INJECTION, SOLUTION INTRAMUSCULAR; INTRAVENOUS at 09:23

## 2024-09-22 RX ADMIN — SODIUM BICARBONATE 50 MEQ: 84 INJECTION, SOLUTION INTRAVENOUS at 11:08

## 2024-09-22 RX ADMIN — OXYCODONE HYDROCHLORIDE AND ACETAMINOPHEN 1 TABLET: 5; 325 TABLET ORAL at 15:57

## 2024-09-22 RX ADMIN — ALBUMIN (HUMAN) 25 G: 0.25 INJECTION, SOLUTION INTRAVENOUS at 17:46

## 2024-09-22 RX ADMIN — FUROSEMIDE 10 MG/HR: 10 INJECTION, SOLUTION INTRAMUSCULAR; INTRAVENOUS at 15:39

## 2024-09-22 RX ADMIN — ALBUMIN (HUMAN) 25 G: 0.25 INJECTION, SOLUTION INTRAVENOUS at 11:08

## 2024-09-22 RX ADMIN — ONDANSETRON 4 MG: 4 TABLET, ORALLY DISINTEGRATING ORAL at 15:57

## 2024-09-22 ASSESSMENT — PAIN DESCRIPTION - ORIENTATION: ORIENTATION: RIGHT;LEFT

## 2024-09-22 ASSESSMENT — PAIN - FUNCTIONAL ASSESSMENT: PAIN_FUNCTIONAL_ASSESSMENT: PREVENTS OR INTERFERES SOME ACTIVE ACTIVITIES AND ADLS

## 2024-09-22 ASSESSMENT — PAIN DESCRIPTION - PAIN TYPE: TYPE: ACUTE PAIN

## 2024-09-22 ASSESSMENT — PAIN SCALES - GENERAL
PAINLEVEL_OUTOF10: 6
PAINLEVEL_OUTOF10: 7

## 2024-09-22 ASSESSMENT — PAIN DESCRIPTION - DESCRIPTORS: DESCRIPTORS: DULL

## 2024-09-22 ASSESSMENT — PAIN DESCRIPTION - FREQUENCY: FREQUENCY: CONTINUOUS

## 2024-09-22 ASSESSMENT — PAIN DESCRIPTION - ONSET: ONSET: ON-GOING

## 2024-09-22 ASSESSMENT — PAIN DESCRIPTION - LOCATION
LOCATION: FOOT
LOCATION: LEG

## 2024-09-22 NOTE — PLAN OF CARE
Problem: Discharge Planning  Goal: Discharge to home or other facility with appropriate resources  9/22/2024 1019 by Damaris Oneill, RN  Outcome: Not Progressing  Flowsheets (Taken 9/20/2024 1237)  Discharge to home or other facility with appropriate resources:   Identify barriers to discharge with patient and caregiver   Arrange for needed discharge resources and transportation as appropriate   Identify discharge learning needs (meds, wound care, etc)   Refer to discharge planning if patient needs post-hospital services based on physician order or complex needs related to functional status, cognitive ability or social support system  9/22/2024 0112 by Aubree Price, RN  Outcome: Progressing     Problem: Nutrition Deficit:  Goal: Optimize nutritional status  Outcome: Not Progressing  Flowsheets (Taken 9/22/2024 1020)  Nutrient intake appropriate for improving, restoring, or maintaining nutritional needs:   Assess nutritional status and recommend course of action   Monitor oral intake, labs, and treatment plans   Recommend appropriate diets, oral nutritional supplements, and vitamin/mineral supplements     Problem: Discharge Planning  Goal: Discharge to home or other facility with appropriate resources  9/22/2024 1019 by Damaris Oneill RN  Outcome: Not Progressing  Flowsheets (Taken 9/20/2024 1237)  Discharge to home or other facility with appropriate resources:   Identify barriers to discharge with patient and caregiver   Arrange for needed discharge resources and transportation as appropriate   Identify discharge learning needs (meds, wound care, etc)   Refer to discharge planning if patient needs post-hospital services based on physician order or complex needs related to functional status, cognitive ability or social support system  9/22/2024 0112 by Aubree Price, RN  Outcome: Progressing     Problem: Nutrition Deficit:  Goal: Optimize nutritional status  Outcome: Not Progressing  Flowsheets (Taken  9/22/2024 1020)  Nutrient intake appropriate for improving, restoring, or maintaining nutritional needs:   Assess nutritional status and recommend course of action   Monitor oral intake, labs, and treatment plans   Recommend appropriate diets, oral nutritional supplements, and vitamin/mineral supplements

## 2024-09-22 NOTE — PLAN OF CARE
Problem: ABCDS Injury Assessment  Goal: Absence of physical injury  Outcome: Progressing     Problem: Safety - Adult  Goal: Free from fall injury  Outcome: Progressing     Problem: Skin/Tissue Integrity  Goal: Absence of new skin breakdown  Description: 1.  Monitor for areas of redness and/or skin breakdown  2.  Assess vascular access sites hourly  3.  Every 4-6 hours minimum:  Change oxygen saturation probe site  4.  Every 4-6 hours:  If on nasal continuous positive airway pressure, respiratory therapy assess nares and determine need for appliance change or resting period.  Outcome: Progressing     Problem: Discharge Planning  Goal: Discharge to home or other facility with appropriate resources  Outcome: Progressing     Problem: Chronic Conditions and Co-morbidities  Goal: Patient's chronic conditions and co-morbidity symptoms are monitored and maintained or improved  Outcome: Progressing     Problem: Pain  Goal: Verbalizes/displays adequate comfort level or baseline comfort level  Outcome: Progressing

## 2024-09-22 NOTE — CONSULTS
Pharmacy Note  Vancomycin Consult    Ruben Eagle is a 59 y.o. male started on Vancomycin for Bone & Joint Infection / Bacteremia; consult received from Dr. Perkins to manage therapy. Also receiving the following antibiotics: Cefepime.    Allergies:  Sulfamethoxazole-trimethoprim, Clindamycin/lincomycin, Bee venom, and Lisinopril     Tmax: 97.6     Micro:     Date Culture Results   9/19 [place] x2 [place] MRSA   9/19 Urine ([facility]) Enterobacter cloacae   9/22 Blood x2 ([facility]) In process       Recent Labs     09/21/24  0630 09/22/24  0807   CREATININE 2.9* 3.2*       Recent Labs     09/21/24  0630 09/22/24  0807   WBC 20.3* 20.5*       Estimated Creatinine Clearance: 27 mL/min (A) (based on SCr of 3.2 mg/dL (H)).    No intake or output data in the 24 hours ending 09/22/24 1525    Wt Readings from Last 1 Encounters:   09/22/24 88.9 kg (195 lb 14.4 oz)         There is no height or weight on file to calculate BMI.    Loading dose (critically ill or in ICU, require dialysis or renal replacement therapy): Vancomycin 25 mg/kg IVPB x 1 (maximum 2500 mg).  Maintenance dose: 10-20 mg/kg (maximum: 2000 mg/dose and 4500 mg/day) starting at the next dosing interval determined by renal function  Pulse dose: fluctuating renal function, KRYSTAL, ESRD  CRRT: 7.5-10 mg/kg q12h   Goal Vancomycin trough: 15-20 mcg/mL   Goal Vancomycin AUC: 400-600     Assessment/Plan:  Patient [place] [facility] [place]. Based on patient renal function, will pulse dose patient to maintain vancomycin levels > 15 mcg/mL. Vancomycin level ordered for tomorrow (9/23) at 0600. Timing of trough level will be determined based on culture results, renal function, and clinical response.     Thank you for the consult.  Senia Mantilla, PharmD 9/22/2024  3:32 PM    Vancomycin Day 4    Recent Labs     09/21/24  0630   VANCOTROUGH 16.7     Recent Labs     09/20/24  1020 09/22/24  0807 09/23/24  0600   VANCORANDOM 23.4 23.5 18.8     Recent Labs     09/21/24  0630

## 2024-09-22 NOTE — DISCHARGE INSTR - COC
Continuity of Care Form    Patient Name: Ruben Eagle   :  1965  MRN:  [id number]    Admit date:  2024  Discharge date:  2024    Code Status Order: Full Code   Advance Directives:   Advance Care Flowsheet Documentation             Admitting Physician:  Donato Coker MD  PCP: Leann Mraie, APRN - CNP    Discharging Nurse: MAGALI RN  Discharging Hospital Unit/Room#: /0322-01  Discharging Unit Phone Number: [phone]    Emergency Contact:   Extended Emergency Contact Information  Primary Emergency Contact: Shen Eagle  Home Phone: [phone]  Mobile Phone: [phone]  Relation: Brother/Sister  Secondary Emergency Contact: Mirna Eagle  Address: [address] [place] [address]  Home Phone: [phone]  Work Phone: [phone]  Mobile Phone: [phone]  Relation: Parent    Past Surgical History:  Past Surgical History:   Procedure Laterality Date    CARDIAC DEFIBRILLATOR PLACEMENT  2014    ICD Placement    CORONARY ARTERY BYPASS GRAFT N/A 2020    CORONARY ARTERY BYPASS GRAFTING X3, LEFT ATRIAL APPENDAGE CLIP, INTERNAL MAMMARY ARTERY, SAPHENOUS VEIN GRAFT, ON PUMP, 5 LEVEL BILATERAL INTERCOSTAL NERVE BLOCK performed by Stuart Mcneal MD at [facility]    EYE SURGERY      FOOT AMPUTATION Left 2018    PARTIAL FOOT AMPUTATION w. Dr Jarrell     FOOT DEBRIDEMENT Left 2019    EXOSTECTOMY LEFT FOOT, ULCER DEBRIDEMENT LEFT FOOT WITH GRAFT APPLICATION performed by Floyd Jarrell DPM at [facility]    FOOT DEBRIDEMENT Right 2023    FOOT DEBRIDEMENT Right 2023    ULCER DEBRIDEMENT RIGHT FOOT performed by Floyd Jarrell DPM at [facility]    FOOT DEBRIDEMENT Bilateral 2024    FOOT DEBRIDEMENT Bilateral 2024    LEFT AND RIGHT FOOT DEBRIDEMENT INCISION AND DRAINAGE performed by Floyd Jarrell DPM at [facility]    FOOT SURGERY Left 2017    LEFT FOOT ULCER DEBRIDEMENT, POSSIBLE SECOND METATARSAL    FOOT SURGERY  (Active)   Number of days: 192       Incision 03/14/24 Foot Left (Active)   Number of days: 192       Incision 08/15/24 Pretibial Right (Active)   Number of days: 37       Incision 01/31/19 Foot Left (Active)   Number of days: 2061       Incision 08/11/20 Leg Right (Active)   Number of days: 1503       Incision 08/11/20 Sternum (Active)   Number of days: 1503       Incision 03/18/21 Foot Anterior;Left (Active)   Number of days: 1284        Elimination:  Continence:   Bowel: Yes  Bladder: Yes  Urinary Catheter: Insertion Date: 9/22/2024    Colostomy/Ileostomy/Ileal Conduit: No       Date of Last BM: 9/19/24    Intake/Output Summary (Last 24 hours) at 9/22/2024 1134  Last data filed at 9/22/2024 0853  Gross per 24 hour   Intake 716.03 ml   Output 525 ml   Net 191.03 ml     I/O last 3 completed shifts:  In: 2011.4 [P.O.:920; I.V.:841.4; IV Piggyback:250]  Out: 450 [Urine:450]    Safety Concerns:     History of Falls (last 30 days) and At Risk for Falls    Impairments/Disabilities:      Amputation - Right BKA, Left partial foot    Nutrition Therapy:  Current Nutrition Therapy:   - Oral Diet:  Dysphagia - Soft and Bite Sized, 5 Carb    Routes of Feeding: Oral  Liquids: Thin Liquids  Daily Fluid Restriction: no  Last Modified Barium Swallow with Video (Video Swallowing Test): not done    Treatments at the Time of Hospital Discharge:   Respiratory Treatments: n/a  Oxygen Therapy:  is not on home oxygen therapy.  Ventilator:    - No ventilator support    Rehab Therapies: Physical Therapy and Occupational Therapy  Weight Bearing Status/Restrictions: Non-weight bearing on left leg  Other Medical Equipment (for information only, NOT a DME order):  wheelchair  Other Treatments: ***    Patient's personal belongings (please select all that are sent with patient):  Odilon    RN SIGNATURE:  Electronically signed by Damaris Oneill RN on 9/22/24 at 12:25 PM EDT    CASE MANAGEMENT/SOCIAL WORK SECTION    Inpatient Status Date:

## 2024-09-22 NOTE — FLOWSHEET NOTE
4 Eyes Skin Assessment     NAME:  Ruben Eagle  YOB: 1965  MEDICAL RECORD NUMBER:  [id number]    The patient is being assessed for  Admission    I agree that at least one RN has performed a thorough Head to Toe Skin Assessment on the patient. ALL assessment sites listed below have been assessed.      Areas assessed by both nurses:    Head, Face, Ears, Shoulders, Back, Chest, Arms, Elbows, Hands, Sacrum. Buttock, Coccyx, Ischium, Legs. Feet and Heels, and Under Medical Devices         Does the Patient have a Wound? Yes wound(s) were present on assessment. LDA wound assessment was Initiated and completed by RN       Christiano Prevention initiated by RN: Yes  Wound Care Orders initiated by RN: Yes    Pressure Injury (Stage 3,4, Unstageable, DTI, NWPT, and Complex wounds) if present, place Wound referral order by RN under : No    New Ostomies, if present place, Ostomy referral order under : No     Nurse 1 eSignature: {Esignature:[id number]}    **SHARE this note so that the co-signing nurse can place an eSignature**    Nurse 2 eSignature: {Esignature:[id number]}

## 2024-09-22 NOTE — DISCHARGE SUMMARY
Name:  Ruben Eagle  Room:  /0322-01  MRN:    [id number]    Discharge Summary      This discharge summary is in conjunction with a complete physical exam done on the day of discharge.      Discharging Physician: ELVIN NUNEZ MD      Admit: 9/19/2024  Discharge:  9/22/2024     Diagnoses this Admission    Principal Problem:    Acute osteomyelitis of left foot (HCC)  Active Problems:    Acute kidney injury (HCC)    Charcot foot due to diabetes mellitus (HCC)    MRSA bacteremia  Resolved Problems:    * No resolved hospital problems. *          Procedures (Please Review Full Report for Details)      Consults    IP CONSULT TO PODIATRY  PHARMACY TO DOSE VANCOMYCIN  IP CONSULT TO NEPHROLOGY  IP CONSULT TO GENERAL SURGERY      HPI:    59 y.o. male presents to the ER with left foot pain that started after twisting his foot about 3 weeks ago.  Patient has a recent right below-knee amputation and left partial foot amputation on the left.  Patient reported slipping and falling onto his left leg about 3 weeks ago.  Patient was attempted to transfer from his recliner to portable commode when his nephew was helping him had severe neck pain and accidentally dropped the patient.  Patient landed on his right stump with the left leg behind him.  Workup in the ER includes x-ray of the left ankle which showed gas in the soft tissues, soft tissue swelling without definite fracture.  X-ray of the foot shows an appearance of a Charcot fracture with multiple amputations.  He had does appear to be osteomyelitis/erosive disease along the distal talus.  Sizable displaced talar fragment.       Hospital Course    #Recent right BKA with soft tissue swelling vs cellulitis     #Left partial foot amputation   #Charcot foot with displaced talar fragment and ulcerating soft tissue wound of stump   #Sepsis   MRSA bacteremia   -criteria - WBC, LA, inflammatory markers, source   -IV vancomycin, cefepime  -blood cultures MRSA   -PT/OT

## 2024-09-22 NOTE — PROGRESS NOTES
Patient educated on discharge instructions as well as new medications use, dosage, administration and possible side effects.  Patient verified knowledge. IV removed without difficulty and dry dressing in place. Telemetry monitor removed and returned to [facility]. Pt left facility in stable condition to [facility] with all of their personal belongings.      Called report @ [phone] and spoke to JUAN Jesus. Pt is going to [facility], bed 437.

## 2024-09-22 NOTE — H&P
Hospital Medicine History & Physical      Date of Admission: 9/22/2024    Date of Service:  Pt seen/examined on 9/22/24     [x]Admitted to Inpatient with expected LOS greater than two midnights due to medical therapy.  []Placed in Observation status.    Chief Admission Complaint: He was hospital from [facility].    His complaint at time of admission at [place] was left foot pain    Presenting Admission History:      59 y.o. male  with PMHx significant for       He is status post right BKA in August/15/2024.  Surgery was completed at [facility] with general surgery/.  He had right lower extremity infection due to diabetic foot infection and osteomyelitis which was not responding to medical therapy limb was felt to be nonsalvageable and therefore right BKA was indicated.     He has PMH significant for chronic kidney disease and a baseline creatinine ranging between 1.8 and 2.2 mg/dL.    Diabetes mellitus type 2 insulin requiring, hypertension, CAD/status post CABG in 2020, CHF/with LV systolic function is significantly decreased and estimated EF of approximately 10 - 15 %, ( per Echo of 9/21/24)   hypothyroidism      He presented to [facility] with left foot/ankle pain.  He recently sustained a trauma to his left foot, left ankle, which progressively became warm and painful to touch.  Upon presentation, his workup revealed signs of left osteomyelitis, for which he was started on IV antibiotics.  He was also noted to have an acute on chronic kidney injury.  He has been receiving IV antibiotics with IV vancomycin and cefepime.  Blood cultures x 2 from 9/19/2024 are growing MRSA.  Repeat surveillance cultures from 9/26/2017 showed no growth  Urine culture from 9/19/2024 is revealing Enterococcus.    He was transferred from [facility] for specifically infectious disease consultation    On this admission at [facility] he is being followed by nephrology,  APPLICATION performed by Floyd Jarrell DPM at [facility]    FOOT DEBRIDEMENT Right 12/28/2023    FOOT DEBRIDEMENT Right 12/28/2023    ULCER DEBRIDEMENT RIGHT FOOT performed by Floyd Jarrell DPM at [facility]    FOOT DEBRIDEMENT Bilateral 03/14/2024    FOOT DEBRIDEMENT Bilateral 03/14/2024    LEFT AND RIGHT FOOT DEBRIDEMENT INCISION AND DRAINAGE performed by Floyd Jarrell DPM at [facility]    FOOT SURGERY Left 09/27/2017    LEFT FOOT ULCER DEBRIDEMENT, POSSIBLE SECOND METATARSAL    FOOT SURGERY Left 01/31/2019    Exostectomy left foot, ulcer debridement with graft application left foot    FOOT SURGERY Left 07/2023    FOOT SURGERY N/A 12/02/2023    partial right foot amputation performed by Floyd Jarrell DPM at [facility]    FOOT TENDON SURGERY Right 2016    FOOT TENDON SURGERY Left 06/30/2017    KNEE SURGERY Left     LEG AMPUTATION BELOW KNEE Right 8/15/2024    LEG AMPUTATION BELOW KNEE performed by Floyd Cooney MD at [facility]    OTHER SURGICAL HISTORY Bilateral 09/17/2015    amputation 2nd digit bilateral, flexor tenotomy right hallux    OTHER SURGICAL HISTORY Left 08/17/2017    PARTIAL LEFT FOOT AMPUTATION      OTHER SURGICAL HISTORY Left 12/07/2017    Debridement infected bone and tissue left foot; ulcer debridement left foot with graft application with dr jarrell     OTHER SURGICAL HISTORY Right 01/04/2018    DEBRIDEMENT INFECTED BONE AND TISSUE RIGHT FOOT, ULCER DEBRIDEMENT RIGHT FOOT WITH GRAFT APPLICATION    OTHER SURGICAL HISTORY Left 11/01/2018    Procedure: PARTIAL RESECTION LEFT METATARSAL, ULCER DEBRIDEMENT LEFT FOOT WITH GRAFT APPLICATION     OTHER SURGICAL HISTORY       AMPUTATION LEFT FOURTH AND FIFTH DIGITS, PARTIAL RESECTION  SECOND AND THIRD METATARSAL LEFT FOOT, PARTIAL RESECTION RIGHT FIRST METATARSAL (Bilateral Foot)    OTHER SURGICAL HISTORY Right 12/02/2023    partial right foot amputation    OTHER SURGICAL HISTORY  08/15/2024    LEG AMPUTATION BELOW KNEE - Right    [place] OFFICE/OUTPT VISIT,PROCEDURE ONLY

## 2024-09-22 NOTE — PROGRESS NOTES
Department of Internal Medicine  Nephrology Progress Note        SUBJECTIVE:    We are following this patient for A/CKD.  The patient was seen and examined; he feels well today with no CP, SOB, nausea or vomiting.    ROS: No fever or chills.  Social: No family at bedside.    Physical Exam:    VITALS:  /72   Pulse (!) 110   Temp 97.3 °F (36.3 °C) (Oral)   Resp 18   Ht 1.753 m (5' 9\")   Wt 88.9 kg (195 lb 14.4 oz)   SpO2 97%   BMI 28.93 kg/m²     General appearance: Seems comfortable, no acute distress.  Neck: Trachea midline, thyroid normal.   Lungs:  Non labored breathing, CTA to anterior auscultation.  Heart:  S1S2 normal, rub or gallop. + peripheral edema.  Abdomen: Soft, non-tender, no organomegaly.   Skin: No lesions or rashes, warm to touch.     DATA:    CBC with Differential:    Lab Results   Component Value Date/Time    WBC 20.5 09/22/2024 08:07 AM    RBC 5.38 09/22/2024 08:07 AM    HGB 13.6 09/22/2024 08:07 AM    HCT 44.3 09/22/2024 08:07 AM     09/22/2024 08:07 AM    MCV 82.5 09/22/2024 08:07 AM    MCH 25.3 09/22/2024 08:07 AM    MCHC 30.6 09/22/2024 08:07 AM    RDW 25.3 09/22/2024 08:07 AM    BANDSPCT 1 08/04/2020 05:45 AM    LYMPHOPCT 5.5 09/22/2024 08:07 AM    MONOPCT 4.2 09/22/2024 08:07 AM    EOSPCT 0.5 09/22/2024 08:07 AM    BASOPCT 0.2 09/22/2024 08:07 AM    MONOSABS 0.9 09/22/2024 08:07 AM    LYMPHSABS 1.1 09/22/2024 08:07 AM    EOSABS 0.1 09/22/2024 08:07 AM    BASOSABS 0.0 09/22/2024 08:07 AM    DIFFTYPE Auto 04/22/2012 07:58 PM     BMP:    Lab Results   Component Value Date/Time     09/22/2024 08:07 AM    K 4.3 09/22/2024 08:07 AM    K 3.6 09/19/2024 08:30 PM    CL 87 09/22/2024 08:07 AM    CO2 18 09/22/2024 08:07 AM    BUN 74 09/22/2024 08:07 AM    CREATININE 3.2 09/22/2024 08:07 AM    CALCIUM 7.3 09/22/2024 08:07 AM    GFRAA >60 07/20/2022 10:53 AM    GFRAA >60 08/01/2012 11:46 AM    LABGLOM 21 09/22/2024 08:07 AM    LABGLOM >60 03/18/2024 05:27 AM    GLUCOSE 153

## 2024-09-22 NOTE — PLAN OF CARE
Problem: Discharge Planning  Goal: Discharge to home or other facility with appropriate resources  9/22/2024 1019 by Damaris Oneill, RN  Outcome: Not Progressing  Flowsheets (Taken 9/20/2024 1237)  Discharge to home or other facility with appropriate resources:   Identify barriers to discharge with patient and caregiver   Arrange for needed discharge resources and transportation as appropriate   Identify discharge learning needs (meds, wound care, etc)   Refer to discharge planning if patient needs post-hospital services based on physician order or complex needs related to functional status, cognitive ability or social support system  9/22/2024 0112 by Aubree Price, RN  Outcome: Progressing

## 2024-09-22 NOTE — FLOWSHEET NOTE
09/22/24 0423   Vital Signs   Temp 97.1 °F (36.2 °C)   Temp Source Axillary   Pulse 96   Heart Rate Source Monitor   Respirations 18   /77   MAP (Calculated) 91   BP Method Automatic   Patient Position High fowlers   Oxygen Therapy   SpO2 97 %   O2 Device None (Room air)   Height and Weight   Weight - Scale 88.9 kg (195 lb 14.4 oz)   Weight Method Bed scale   BMI (Calculated) 29     Resting quietly with respirations easy/even on RA.  Call in easy reach.  Bed alarm on for safety.  Continue to monitor closely.  Aubree Price RN

## 2024-09-22 NOTE — FLOWSHEET NOTE
09/21/24 2310   Vital Signs   Temp 97.3 °F (36.3 °C)   Temp Source Axillary   Pulse 99   Heart Rate Source Monitor   Respirations 18   /80   MAP (Calculated) 90   BP Method Automatic   Patient Position High fowlers   Oxygen Therapy   SpO2 97 %   O2 Device None (Room air)     Resting quietly with respirations easy/even on RA.  Call in easy reach.  Bed alarm on for safety.  Continue to monitor closely.  Aubree Price RN

## 2024-09-22 NOTE — CONSULTS
Pharmacy Note  Pharmacy Consult Note      Consult received from Dr. Perkins to dose Cefepime therapy for Bone & Joint Infection / Bacteremia.     Allergies:  Sulfamethoxazole-trimethoprim, Clindamycin/lincomycin, Bee venom, and Lisinopril       Recent Labs     09/21/24  0630 09/22/24  0807   CREATININE 2.9* 3.2*     Estimated Creatinine Clearance: 27 mL/min (A) (based on SCr of 3.2 mg/dL (H)).    Wt Readings from Last 1 Encounters:   09/22/24 88.9 kg (195 lb 14.4 oz)       There is no height or weight on file to calculate BMI.    Cefepime-Extended Infusion (4-hour infusion) - Preferred Dosing Strategy   Renal Function (CrCl mL/min) >= 60 30 - 59 11 - 29 <= 10, HD PD CRRT   All indications - Loading dose of 2000 milligrams x 1 over 30 minutes or via IV push. Maintenance dose   should begin at the next regularly scheduled dosing interval based on indication/renal function.   Intra-abdominal infections, Skin and soft tissue infections, Urinary tract infections 2000mg q12h 2000mg q24h 1000mg q24h 500mg q24h 1000mg q24h 2000mg q12h   Bacteremia, CNS infections, Cystic fibrosis, Diabetic foot infections, Endocarditis, Febrile neutropenia, Healthcare-associated infections, Osteomyelitis/joint infections, Pneumonia, Sepsis, BMI > 40* 2000mg q8h 2000mg q12h 1000mg q12h 1000mg q24h 1000mg q24h 2000mg q8h     Cefepime 1000 mg q12h over 4h ordered.     Pharmacy will continue to follow & adjust dose based on renal function.    Thank you for the consult.  Senia Mantilla, PharmD  9/22/2024 3:11 PM

## 2024-09-22 NOTE — PROGRESS NOTES
Spoke to MD regarding pt status and decline. Orders to transfer to another facility. Pt is aware of this.

## 2024-09-22 NOTE — PROGRESS NOTES
Occupational and Physical Therapy  Orders received and chart reviewed. Attempted to see patient this AM. Patient a hold secondary to nausea and vomiting. Will continue to follow and re-attempt as patient tolerates and schedule allows.    Basil Figueroa, OTR/L #[id number]  Edgar Martínez, PT, DPT

## 2024-09-22 NOTE — PROGRESS NOTES
HEART FAILURE CARE PLAN:    Comorbidities Reviewed: Yes   Patient has a past medical history of Acute on chronic systolic congestive heart failure (HCC), Acute osteomyelitis of left foot (HCC), Acute osteomyelitis of right foot (HCC), Acute respiratory failure (HCC), Ascites, Blood transfusion reaction, Burst fracture of lumbar vertebra (HCC), Cellulitis of left foot, Cellulitis of right lower extremity, Chronic systolic CHF (congestive heart failure) (HCC), Community acquired pneumonia, Coronary artery disease involving native coronary artery of native heart without angina pectoris, Diabetes (HCC), Diabetic ulcer of left foot associated with type 2 diabetes mellitus, with muscle involvement without evidence of necrosis (HCC), Diabetic ulcer of right foot (HCC), Diabetic 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     Weights Reviewed: Yes   Admission weight: 81.6 kg (180 lb)   Wt Readings from Last 3 Encounters:   09/22/24 88.9 kg (195 lb 14.4 oz)   08/19/24 93 kg (205 lb)   07/09/24 91.1 kg (200 lb 12.8 oz)     Intake & Output Reviewed: Yes     Intake/Output Summary (Last 24 hours) at 9/22/2024 1219  Last data filed at 9/22/2024 1135  Gross per 24 hour   Intake 716.03 ml   Output 775 ml   Net -58.97 ml       ECHOCARDIOGRAM Reviewed: Yes   Patient's Ejection Fraction (EF) is less than or equal to 40%. Discuss HFrEF Guideline Directed Medical Therapy (GDMT) with  Yes Gold Carmichael MD   furosemide (LASIX) 80 MG tablet Take 1 tablet by mouth 2 times daily 7/9/24  Yes Gold Carmichael MD   empagliflozin (JARDIANCE) 10 MG tablet Take 1 tablet by mouth nightly 7/9/24  Yes Gold Carmichael MD   allopurinol (ZYLOPRIM) 100 MG tablet Take 1 tablet by mouth daily 6/17/24  Yes Mackenzie Omer MD   levothyroxine (SYNTHROID) 125 MCG tablet Take 1 tablet by mouth Daily 6/17/24  Yes Mackenzie Omer MD   ferrous sulfate (IRON 325) 325 (65 Fe) MG tablet Take 1 tablet by mouth nightly   Yes ProviderSherrie MD   Multiple Vitamins-Minerals (THERAPEUTIC MULTIVITAMIN-MINERALS) tablet Take 1 tablet by mouth daily   Yes ProviderSherrie MD   clopidogrel (PLAVIX) 75 MG tablet TAKE ONE (1) TABLET BY MOUTH DAILY 4/3/24  Yes Gold Carmichael MD   magnesium oxide (MAG-OX) 400 (240 Mg) MG tablet Take 1 tablet by mouth 2 times daily  Patient taking differently: Take 1 tablet by mouth daily 8/14/23  Yes Gold Carmichael MD   TRULICITY 3 MG/0.5ML SOPN INJECT THREE (3) MG INTO THE SKIN ONCE A WEEK 7/27/23  Yes Leann Marie APRN - CNP   insulin glargine (BASAGLAR KWIKPEN) 100 UNIT/ML injection pen Inject 35 Units into the skin nightly 8/19/24   Rosario Perez DO   Insulin Pen Needle 29G X 12MM MISC 1 each by Does not apply route daily 12/6/23   Florina Hoffman MD   Blood Pressure KIT 1 kit by Does not apply route daily 10/4/22   Leann Marie APRN - CNP   BD PEN NEEDLE WINNIE U/F 32G X 4 MM MISC 1 each by Does not apply route daily 8/3/22   Hood Sanchez MD   OneTouch Delica Lancets 33G MISC USE TO CHECK FOUR TIMES DAILY. DX;E11.9 8/18/21   Hood Sanchez MD   Continuous Blood Gluc Sensor (FREESTYLE MARY 14 DAY SENSOR) MISC CGM 4/29/21   Hood Sanchez MD   Insulin Pen Needle (UNIFINE PENTIPS) 31G X 5 MM MISC USE 1 EACH DAY AS NEEDED FOR TESTING 1/3/18   Cb Murrell MD      Diet Reviewed: Yes   ADULT DIET; Dysphagia - Soft and Bite Sized; 5 carb

## 2024-09-22 NOTE — PROGRESS NOTES
Vanco day 4  Random Vanco lab this AM is 23.5mcg/mL.  No Vanco this AM, repeat Random in AM 9/23 and replace accordingly.  Ramana Graham RPH PharmD 9/22/2024 9:00 AM

## 2024-09-22 NOTE — FLOWSHEET NOTE
09/22/24 0906   Vital Signs   Temp 97.3 °F (36.3 °C)   Temp Source Oral   Pulse (!) 110   Heart Rate Source Monitor   Respirations 18   /72   MAP (Calculated) 84   BP Location Left upper arm   BP Method Automatic   Patient Position Semi fowlers   Pain Assessment   Pain Assessment None - Denies Pain   Oxygen Therapy   SpO2 97 %   O2 Device None (Room air)     AM assessment completed. Pt resting in bed. Bilateral lung fields diminished. Pt states he ate a little bit of breakfast but could not eat much. Pt was able to take PO meds with water. Pt requested PO zofran. When writer gave pt zofran pt immediately spat pill back out and began dry heaving into emesis bag. No other pills vomited up, just some mucus. IVP zofran given for nausea. Cold rags placed behind neck and on forehead. Will continue to monitor.

## 2024-09-22 NOTE — PROGRESS NOTES
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 Eagle today seen resting in bed. Feels better today    Objective:   Patient Vitals for the past 4 hrs:   BP Temp Temp src Pulse Resp SpO2 Weight   09/22/24 0423 119/77 97.1 °F (36.2 °C) Axillary 96 18 97 % 88.9 kg (195 lb 14.4 oz)          Intake/Output Summary (Last 24 hours) at 9/22/2024 0746  Last data filed at 9/22/2024 0712  Gross per 24 hour   Intake 776.03 ml   Output 525 ml   Net 251.03 ml       Physical Exam:    Gen: No distress. Alert. +Pleasant male   Eyes: PERRL. No sclera icterus. No conjunctival injection.   ENT: No discharge. Pharynx clear.   Neck: No JVD.  Trachea midline.  Resp: No accessory muscle use. No crackles. No wheezes. No rhonchi.   CV: Regular rate. Regular rhythm. + murmur.  No rub. ++significant pitting edema of left lower extremity, mild swelling of right stump    Peripheral Pulses: +2 palpable, equal bilaterally   GI: Non-tender. Non-distended.  Normal bowel sounds.  Skin: Warm and dry. No nodule on exposed extremities. No rash on exposed extremities. ++left lower extremity edema and partial foot amputation, some erythema noted, right BKA with healing incision site and some minor swelling   M/S: No cyanosis. No joint deformity. No clubbing.   Neuro: Awake. Grossly nonfocal    Psych: Oriented x 3. No anxiety or agitation.         Medications:  [Held by provider] insulin glargine, 28 Units, Nightly  cefepime, 2,000 mg, Q12H  allopurinol, 100 mg, Daily  aspirin, 81 mg, Daily  atorvastatin, 40 mg, Nightly  clopidogrel, 75 mg, Daily  ferrous sulfate, 325 mg, Nightly  hydrALAZINE, 10 mg, 3 times per day  levothyroxine, 200 mcg, Daily  magnesium oxide, 400 mg, Daily  metoprolol succinate, 25 mg, Daily  therapeutic multivitamin-minerals, 1 tablet, Daily  sodium chloride flush, 5-40 mL, 2 times per  diameter is greater than 21 mm and decreases less than 50% during inspiration; therefore the estimated right atrial pressure is elevated (~15 mmHg). IVC is dilated.    Image quality is adequate.   Assessment/Plan:  #Recent right BKA with soft tissue swelling vs cellulitis     #Left partial foot amputation   #Charcot foot with displaced talar fragment and ulcerating soft tissue wound of stump   #Sepsis   MRSA bacteremia   -criteria - WBC, LA, inflammatory markers, source   -IV vancomycin, cefepime  -blood cultures MRSA   -PT/OT   -podiatry and general surgery consulted   Repeat blood cultures   ECHO to r/o vegetations - no vegetations   Source unclear  Consider transfer to [facility] for ID     #Hyponatremia   -acute on chronic   -baseline 127-131  -126 today   -received IVF bolus yesterday with mild improvement   -monitor renal function   -nephrology consulted      #KRYSTAL   -baseline Cr 1-1.6   -2.5 on presentation   -received IVF bolus and no improvement in Cr   -intake and output   -bladder scan for urinary retention   -nephrology consulted for diuresis management   CRTN worse today  Also has acidosis   Started on lasix drip      Alcoholic and ischemic cardiomyopathy   Acute on Chronic systolic HF   -s/p ICD with optivol 2014  -CXR non-acute    -has significant pitting edema   -BNP 69723  -Recent echocardiogram EF of 15-20%  -on lasix 80 BID, toprol XL, aldactone, hydralazine, isordil   -monitor intake and output, daily weights     #Transaminitis  -could be 2/2 to infection   -monitor LFTs     #Acute cystitis   -enterobacter   Cefepime      CAD s/p CABG  -on ASA, statin, plavix, BB, isordil       DM type 2  -lantus   -SSI   -monitor BG   - carb control diet  Hold lantus as he is having low sugars       Hx of TIFFANIE  -on ferrous sulfate      Hx of NSVT   - follows with EP   - ICD  - on coreg      Hypothyroidism   -synthroid      Chronic gout   -on allopurinol     DVT Prophylaxis: Lovenox   Diet: ADULT DIET; Dysphagia - Soft

## 2024-09-23 ENCOUNTER — APPOINTMENT (OUTPATIENT)
Dept: ULTRASOUND IMAGING | Age: 59
DRG: 853 | End: 2024-09-23
Attending: INTERNAL MEDICINE
Payer: MEDICARE

## 2024-09-23 LAB
ANION GAP SERPL CALCULATED.3IONS-SCNC: 14 MMOL/L (ref 3–16)
BASOPHILS # BLD: 0 K/UL (ref 0–0.2)
BASOPHILS # BLD: 0 K/UL (ref 0–0.2)
BASOPHILS NFR BLD: 0.1 %
BASOPHILS NFR BLD: 0.2 %
BUN SERPL-MCNC: 77 MG/DL (ref 7–20)
CALCIUM SERPL-MCNC: 7 MG/DL (ref 8.3–10.6)
CHLORIDE SERPL-SCNC: 87 MMOL/L (ref 99–110)
CO2 SERPL-SCNC: 24 MMOL/L (ref 21–32)
CREAT SERPL-MCNC: 3.5 MG/DL (ref 0.9–1.3)
DEPRECATED RDW RBC AUTO: 24.7 % (ref 12.4–15.4)
DEPRECATED RDW RBC AUTO: 25.8 % (ref 12.4–15.4)
EKG ATRIAL RATE: 108 BPM
EKG DIAGNOSIS: NORMAL
EKG P AXIS: -35 DEGREES
EKG P-R INTERVAL: 136 MS
EKG Q-T INTERVAL: 388 MS
EKG QRS DURATION: 146 MS
EKG QTC CALCULATION (BAZETT): 519 MS
EKG R AXIS: -57 DEGREES
EKG T AXIS: 116 DEGREES
EKG VENTRICULAR RATE: 108 BPM
EOSINOPHIL # BLD: 0.1 K/UL (ref 0–0.6)
EOSINOPHIL # BLD: 0.1 K/UL (ref 0–0.6)
EOSINOPHIL NFR BLD: 0.3 %
EOSINOPHIL NFR BLD: 0.5 %
GFR SERPLBLD CREATININE-BSD FMLA CKD-EPI: 19 ML/MIN/{1.73_M2}
GLUCOSE BLD-MCNC: 141 MG/DL (ref 70–99)
GLUCOSE BLD-MCNC: 150 MG/DL (ref 70–99)
GLUCOSE BLD-MCNC: 180 MG/DL (ref 70–99)
GLUCOSE BLD-MCNC: 183 MG/DL (ref 70–99)
GLUCOSE SERPL-MCNC: 179 MG/DL (ref 70–99)
HCT VFR BLD AUTO: 36.1 % (ref 40.5–52.5)
HCT VFR BLD AUTO: 40.1 % (ref 40.5–52.5)
HGB BLD-MCNC: 11.3 G/DL (ref 13.5–17.5)
HGB BLD-MCNC: 12.1 G/DL (ref 13.5–17.5)
LYMPHOCYTES # BLD: 1.2 K/UL (ref 1–5.1)
LYMPHOCYTES # BLD: 1.3 K/UL (ref 1–5.1)
LYMPHOCYTES NFR BLD: 6.1 %
LYMPHOCYTES NFR BLD: 6.8 %
MCH RBC QN AUTO: 24.7 PG (ref 26–34)
MCH RBC QN AUTO: 25.3 PG (ref 26–34)
MCHC RBC AUTO-ENTMCNC: 30.1 G/DL (ref 31–36)
MCHC RBC AUTO-ENTMCNC: 31.4 G/DL (ref 31–36)
MCV RBC AUTO: 80.8 FL (ref 80–100)
MCV RBC AUTO: 82.1 FL (ref 80–100)
MONOCYTES # BLD: 0.7 K/UL (ref 0–1.3)
MONOCYTES # BLD: 1 K/UL (ref 0–1.3)
MONOCYTES NFR BLD: 3.9 %
MONOCYTES NFR BLD: 4.8 %
NEUTROPHILS # BLD: 15.5 K/UL (ref 1.7–7.7)
NEUTROPHILS # BLD: 18.8 K/UL (ref 1.7–7.7)
NEUTROPHILS NFR BLD: 88.6 %
NEUTROPHILS NFR BLD: 88.7 %
PERFORMED ON: ABNORMAL
PLATELET # BLD AUTO: 132 K/UL (ref 135–450)
PLATELET # BLD AUTO: 133 K/UL (ref 135–450)
PMV BLD AUTO: 7.3 FL (ref 5–10.5)
PMV BLD AUTO: 7.4 FL (ref 5–10.5)
POTASSIUM SERPL-SCNC: 4.1 MMOL/L (ref 3.5–5.1)
RBC # BLD AUTO: 4.47 M/UL (ref 4.2–5.9)
RBC # BLD AUTO: 4.89 M/UL (ref 4.2–5.9)
SODIUM SERPL-SCNC: 125 MMOL/L (ref 136–145)
VANCOMYCIN SERPL-MCNC: 18.8 UG/ML
WBC # BLD AUTO: 17.5 K/UL (ref 4–11)
WBC # BLD AUTO: 21.2 K/UL (ref 4–11)

## 2024-09-23 PROCEDURE — P9047 ALBUMIN (HUMAN), 25%, 50ML: HCPCS | Performed by: INTERNAL MEDICINE

## 2024-09-23 PROCEDURE — 76770 US EXAM ABDO BACK WALL COMP: CPT

## 2024-09-23 PROCEDURE — 6360000002 HC RX W HCPCS: Performed by: INTERNAL MEDICINE

## 2024-09-23 PROCEDURE — 97165 OT EVAL LOW COMPLEX 30 MIN: CPT

## 2024-09-23 PROCEDURE — 97110 THERAPEUTIC EXERCISES: CPT

## 2024-09-23 PROCEDURE — 97162 PT EVAL MOD COMPLEX 30 MIN: CPT

## 2024-09-23 PROCEDURE — 80202 ASSAY OF VANCOMYCIN: CPT

## 2024-09-23 PROCEDURE — 2580000003 HC RX 258

## 2024-09-23 PROCEDURE — 85025 COMPLETE CBC W/AUTO DIFF WBC: CPT

## 2024-09-23 PROCEDURE — 2580000003 HC RX 258: Performed by: INTERNAL MEDICINE

## 2024-09-23 PROCEDURE — 6370000000 HC RX 637 (ALT 250 FOR IP): Performed by: INTERNAL MEDICINE

## 2024-09-23 PROCEDURE — 99223 1ST HOSP IP/OBS HIGH 75: CPT | Performed by: INTERNAL MEDICINE

## 2024-09-23 PROCEDURE — 6370000000 HC RX 637 (ALT 250 FOR IP): Performed by: STUDENT IN AN ORGANIZED HEALTH CARE EDUCATION/TRAINING PROGRAM

## 2024-09-23 PROCEDURE — 2060000000 HC ICU INTERMEDIATE R&B

## 2024-09-23 PROCEDURE — 80048 BASIC METABOLIC PNL TOTAL CA: CPT

## 2024-09-23 PROCEDURE — 97530 THERAPEUTIC ACTIVITIES: CPT

## 2024-09-23 PROCEDURE — 6370000000 HC RX 637 (ALT 250 FOR IP): Performed by: NURSE PRACTITIONER

## 2024-09-23 PROCEDURE — 36415 COLL VENOUS BLD VENIPUNCTURE: CPT

## 2024-09-23 PROCEDURE — 97535 SELF CARE MNGMENT TRAINING: CPT

## 2024-09-23 PROCEDURE — 93010 ELECTROCARDIOGRAM REPORT: CPT | Performed by: INTERNAL MEDICINE

## 2024-09-23 RX ORDER — SODIUM CHLORIDE 9 MG/ML
INJECTION, SOLUTION INTRAVENOUS PRN
Status: CANCELLED | OUTPATIENT
Start: 2024-09-26

## 2024-09-23 RX ORDER — SODIUM CHLORIDE 9 MG/ML
INJECTION, SOLUTION INTRAVENOUS CONTINUOUS
Status: DISCONTINUED | OUTPATIENT
Start: 2024-09-23 | End: 2024-09-24

## 2024-09-23 RX ORDER — SODIUM CHLORIDE 0.9 % (FLUSH) 0.9 %
5-40 SYRINGE (ML) INJECTION EVERY 12 HOURS SCHEDULED
Status: CANCELLED | OUTPATIENT
Start: 2024-09-26

## 2024-09-23 RX ORDER — GUAIFENESIN/DEXTROMETHORPHAN 100-10MG/5
5 SYRUP ORAL EVERY 4 HOURS PRN
Status: DISCONTINUED | OUTPATIENT
Start: 2024-09-23 | End: 2024-09-28 | Stop reason: HOSPADM

## 2024-09-23 RX ORDER — SODIUM CHLORIDE 0.9 % (FLUSH) 0.9 %
5-40 SYRINGE (ML) INJECTION PRN
Status: CANCELLED | OUTPATIENT
Start: 2024-09-26

## 2024-09-23 RX ORDER — METOPROLOL SUCCINATE 50 MG/1
50 TABLET, EXTENDED RELEASE ORAL 2 TIMES DAILY
Status: DISCONTINUED | OUTPATIENT
Start: 2024-09-23 | End: 2024-09-28 | Stop reason: HOSPADM

## 2024-09-23 RX ORDER — METRONIDAZOLE 500 MG/100ML
500 INJECTION, SOLUTION INTRAVENOUS EVERY 8 HOURS
Status: DISCONTINUED | OUTPATIENT
Start: 2024-09-23 | End: 2024-09-28 | Stop reason: HOSPADM

## 2024-09-23 RX ADMIN — CEFEPIME 1000 MG: 1 INJECTION, POWDER, FOR SOLUTION INTRAMUSCULAR; INTRAVENOUS at 11:40

## 2024-09-23 RX ADMIN — ENOXAPARIN SODIUM 30 MG: 100 INJECTION SUBCUTANEOUS at 10:11

## 2024-09-23 RX ADMIN — FUROSEMIDE 10 MG/HR: 10 INJECTION, SOLUTION INTRAMUSCULAR; INTRAVENOUS at 01:40

## 2024-09-23 RX ADMIN — ASPIRIN 81 MG: 81 TABLET, COATED ORAL at 10:11

## 2024-09-23 RX ADMIN — SODIUM CHLORIDE, PRESERVATIVE FREE 10 ML: 5 INJECTION INTRAVENOUS at 10:12

## 2024-09-23 RX ADMIN — METOPROLOL SUCCINATE 50 MG: 50 TABLET, EXTENDED RELEASE ORAL at 10:59

## 2024-09-23 RX ADMIN — METRONIDAZOLE 500 MG: 500 INJECTION, SOLUTION INTRAVENOUS at 20:49

## 2024-09-23 RX ADMIN — CEFEPIME 1000 MG: 1 INJECTION, POWDER, FOR SOLUTION INTRAMUSCULAR; INTRAVENOUS at 20:44

## 2024-09-23 RX ADMIN — ALLOPURINOL 100 MG: 100 TABLET ORAL at 10:11

## 2024-09-23 RX ADMIN — ONDANSETRON 4 MG: 2 INJECTION INTRAMUSCULAR; INTRAVENOUS at 21:57

## 2024-09-23 RX ADMIN — OXYCODONE HYDROCHLORIDE AND ACETAMINOPHEN 1 TABLET: 5; 325 TABLET ORAL at 04:52

## 2024-09-23 RX ADMIN — METOPROLOL SUCCINATE 50 MG: 50 TABLET, EXTENDED RELEASE ORAL at 20:41

## 2024-09-23 RX ADMIN — DAPTOMYCIN 700 MG: 500 INJECTION, POWDER, LYOPHILIZED, FOR SOLUTION INTRAVENOUS at 13:36

## 2024-09-23 RX ADMIN — CLOPIDOGREL BISULFATE 75 MG: 75 TABLET ORAL at 10:11

## 2024-09-23 RX ADMIN — ALBUMIN (HUMAN) 25 G: 0.25 INJECTION, SOLUTION INTRAVENOUS at 10:24

## 2024-09-23 RX ADMIN — ALBUMIN (HUMAN) 25 G: 0.25 INJECTION, SOLUTION INTRAVENOUS at 18:06

## 2024-09-23 RX ADMIN — GUAIFENESIN AND DEXTROMETHORPHAN 5 ML: 100; 10 SYRUP ORAL at 02:51

## 2024-09-23 RX ADMIN — INSULIN GLARGINE 20 UNITS: 100 INJECTION, SOLUTION SUBCUTANEOUS at 20:41

## 2024-09-23 RX ADMIN — ALBUMIN (HUMAN) 25 G: 0.25 INJECTION, SOLUTION INTRAVENOUS at 01:36

## 2024-09-23 RX ADMIN — LEVOTHYROXINE SODIUM 125 MCG: 0.12 TABLET ORAL at 04:48

## 2024-09-23 RX ADMIN — METRONIDAZOLE 500 MG: 500 INJECTION, SOLUTION INTRAVENOUS at 11:41

## 2024-09-23 RX ADMIN — ONDANSETRON 4 MG: 4 TABLET, ORALLY DISINTEGRATING ORAL at 01:31

## 2024-09-23 RX ADMIN — OXYCODONE HYDROCHLORIDE AND ACETAMINOPHEN 1 TABLET: 5; 325 TABLET ORAL at 10:59

## 2024-09-23 RX ADMIN — ATORVASTATIN CALCIUM 40 MG: 40 TABLET, FILM COATED ORAL at 20:41

## 2024-09-23 RX ADMIN — Medication 1 TABLET: at 10:11

## 2024-09-23 RX ADMIN — FERROUS SULFATE TAB 325 MG (65 MG ELEMENTAL FE) 325 MG: 325 (65 FE) TAB at 20:41

## 2024-09-23 RX ADMIN — SODIUM CHLORIDE: 9 INJECTION, SOLUTION INTRAVENOUS at 17:12

## 2024-09-23 ASSESSMENT — PAIN DESCRIPTION - LOCATION
LOCATION: LEG

## 2024-09-23 ASSESSMENT — PAIN - FUNCTIONAL ASSESSMENT: PAIN_FUNCTIONAL_ASSESSMENT: PREVENTS OR INTERFERES WITH ALL ACTIVE AND SOME PASSIVE ACTIVITIES

## 2024-09-23 ASSESSMENT — PAIN SCALES - GENERAL
PAINLEVEL_OUTOF10: 4
PAINLEVEL_OUTOF10: 7
PAINLEVEL_OUTOF10: 7
PAINLEVEL_OUTOF10: 6

## 2024-09-23 ASSESSMENT — PAIN DESCRIPTION - DESCRIPTORS
DESCRIPTORS: ACHING;DISCOMFORT
DESCRIPTORS: DISCOMFORT

## 2024-09-23 ASSESSMENT — PAIN DESCRIPTION - ORIENTATION
ORIENTATION: LEFT
ORIENTATION: LEFT

## 2024-09-23 NOTE — CONSULTS
([facility]) early 2016    Diabetic ulcer of 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 2 diabetes mellitus, with necrosis of bone ([facility])     ETOH abuse     Fracture of tibial plateau 11/09/2012    High cholesterol     History of blood transfusion     reaction    HTN (hypertension)     Hx of blood clots     MI (myocardial infarction) ([facility]) 08/04/2020    + Troponins    MRSA (methicillin resistant staph aureus) culture positive 4/28/16, 4/20/16    foot wound    Neuropathic ulcer of left foot, limited to breakdown of skin ([facility]) 11/03/2016    Neuropathic ulcer of toe ([facility]) 02/13/2014    NSVT (nonsustained ventricular tachycardia) ([facility]) 2014    Pleural effusion due to congestive heart failure ([facility])     Septicemia ([facility])     Smoker     quit 1985    Systolic CHF, acute ([facility]) 07/08/2013    Thyroid disease          Procedure Laterality Date    CARDIAC DEFIBRILLATOR PLACEMENT  01/29/2014    ICD Placement    CORONARY ARTERY BYPASS GRAFT N/A 08/11/2020    CORONARY ARTERY BYPASS GRAFTING X3, LEFT ATRIAL APPENDAGE CLIP, INTERNAL MAMMARY ARTERY, SAPHENOUS VEIN GRAFT, ON PUMP, 5 LEVEL BILATERAL INTERCOSTAL NERVE BLOCK performed by Stuart Mcneal MD at [facility]    EYE SURGERY      FOOT AMPUTATION Left 08/09/2018    PARTIAL FOOT AMPUTATION wPatrica Jarrell     FOOT DEBRIDEMENT Left 01/31/2019    EXOSTECTOMY LEFT FOOT, ULCER DEBRIDEMENT LEFT FOOT WITH GRAFT APPLICATION performed by Floyd Jarrell DPM at [facility]    FOOT DEBRIDEMENT Right 12/28/2023    FOOT DEBRIDEMENT Right 12/28/2023    ULCER DEBRIDEMENT RIGHT FOOT performed by Floyd Jarrell DPM at [facility]    FOOT DEBRIDEMENT Bilateral 03/14/2024    FOOT DEBRIDEMENT Bilateral 03/14/2024    LEFT AND RIGHT FOOT DEBRIDEMENT INCISION AND DRAINAGE performed by Floyd Jarrell DPM at [facility]    FOOT SURGERY Left 09/27/2017    LEFT FOOT ULCER DEBRIDEMENT, POSSIBLE SECOND METATARSAL    FOOT SURGERY Left 01/31/2019    Exostectomy left foot, ulcer debridement with graft application left foot    FOOT SURGERY Left    Net 423 ml     CONSTITUTIONAL:  Awake, alert, cooperative, no apparent distress  Appears older than stated age  HEENT: NCAT, PERRL, EOMI.  Sclera white, conjunctiva full.  OP with moist mucosal membranes, no thrush, tongue protrudes midline  NECK:  Supple, symmetrical, trachea midline, no adenopathy  LUNGS:  no increased work of breathing  CTA adelaida without W/R/R  CARDIOVASCULAR:  RRR with murmur LLSB   ABDOMEN:  normal bowel sounds, soft, flat, NT   PSYCHIATRIC: Oriented to person place and time. No obvious depression or anxiety.  MUSCULOSKELETAL:  R BKA stump well healed, no erythema or calor   L foot s/p TMA.  Warm, erythematous lateral aspect with large fluid collection, fluctuance.  No pain w ROM ankle.  No open wound   SKIN:  normal skin color, texture, turgor and no redness, warmth, or swelling. No palpable nodules or stigmata of embolic phenomenon  NEUROLOGIC: nonfocal exam    ACCESS:  [facility]    Evelio       DATA:    Old records have been reviewed    CBC:  Recent Labs     09/21/24  0630 09/22/24  0807 09/23/24  0600   WBC 20.3* 20.5* 17.5*   RBC 4.97 5.38 4.47   HGB 12.7* 13.6 11.3*   HCT 42.0 44.3 36.1*    156 133*   MCV 84.4 82.5 80.8   MCH 25.5* 25.3* 25.3*   MCHC 30.2* 30.6* 31.4   RDW 25.8* 25.3* 24.7*      BMP:  Recent Labs     09/21/24  0630 09/22/24  0807 09/23/24  0600   * 120* 125*   K 4.7 4.3 4.1   CL 87* 87* 87*   CO2 13* 18* 24   BUN 64* 74* 77*   CREATININE 2.9* 3.2* 3.5*   CALCIUM 7.8* 7.3* 7.0*   GLUCOSE 86 153* 179*      Sed Rate, Automated   Date Value Ref Range Status   09/19/2024 56 (H) 0 - 20 mm/Hr Final   03/13/2024 59 (H) 0 - 20 mm/Hr Final   12/01/2023 82 (H) 0 - 20 mm/Hr Final     CRP   Date Value Ref Range Status   09/19/2024 200.7 (H) 0.0 - 5.1 mg/L Final     Comment:     WR-CRP Reference range:  30D-199Y    <5.1  <30D        Not established    CRP is used in the detection and evaluation of infection,  tissue injury, inflammatory disorders, and associated  disease.   acidosis and marked elevation of CRP     MRSA bacteremia   Both sets collected on admission positive   Vanc ALENA is elevated at 2  Further complicated by presence of ICD   Suspected source is L foot   He did have MRSA in wound and urine cultures in 3/2024 and 6/2024   TTE 9/21/24 was reassuring but insufficiently sensitive in this context     Suspected L foot infection in context of underlying Charcot arthropathy  ST gas was noted on XR on 9/19/24  The foot and ankle are erythematous, warm, with fluctuant fluid.  Does not appear to be the ankle joint itself as there is no pain with ROM     Elevated LFTs on admission   Unclear if has liver disease  US 5/2023 showed FLD     Listed allergy to Bactrim, clinda         Recs:  Change to dapto   Check CK for baseline and weekly   Repeat BC are in process  SANDEEP will be needed prior to DC to rule out involvement of ICD - not urgent     Ask Podiatry to evaluate the L foot   MRI L foot if CEID is compatible, noting that MRI cannot distinguish Charcot from OM, though want to evaluate for abscess, etc    Continue empiric cefepime and add flagyl for additional coverage on suspected deep space infection of L foot     Close monitoring of renal function  Trend LFTs, CRP, WBC   Check viral hep serologies   HIV screen     Anticipate need of IV abx after DC   Defer CVC placement     Will follow  D/w RN         Medical Decision Making:  The following items were considered in medical decision making:  Discussion of patient care with other providers  Reviewed clinical lab tests  Reviewed radiology tests  Reviewed other diagnostic tests/interventions  Independent review of radiologic images  Microbiology cultures and other micro tests reviewed       Risk of Complications/Morbidity: High   Illness(es)/ Infection present that pose threat to bodily function.   There is potential for severe exacerbation of infection/side effects of treatment.  Therapy requires intensive monitoring for

## 2024-09-23 NOTE — PROGRESS NOTES
V2.0    [facility] Progress Note      Name:  Ruben Eagle /Age/Sex: 1965  (59 y.o. male)   MRN & CSN:  [id number] & [id number] Encounter Date/Time: 2024 9:01 AM EDT   Location:  [facility]7/0437-01 PCP: Leann Marie, APRN - CNP     Attending:Tobin Pierre DO       Hospital Day: 2    Assessment and Recommendations   Ruben Eagle is a 59 y.o. male with pmh of CKD, T2DM, HTN, CAD/status post CABG in , CHF/with LV systolic function is significantly decreased and estimated EF of approximately 10 - 15 %, ( per Echo of 24) hypothyroidism who presents with Bacteremia    Overnight events: None     Plan:   Bacteremia: Blood cultures are consistent with MRSA bacteremia.  Repeat blood cultures completed on 2024 will be followed.  Transfer echocardiogram was completed on 2024.  Esophageal echocardiogram will defer to infectious disease prior to ordering.  He has been maintained on IV vancomycin and cefepime and will continue.   ID started pt on Daptomycin 700mg  Continue to monitor      Acute on chronic kidney failure: He does have chronic kidney disease with a baseline creatinine of 1.8 to 2.2 mg/dl. His KRYSTAL is likely secondary to a pre-renal component.  on IV Lasix drip to 10 mg/h and continue to monitor.   We will follow urine output, avoid nephrotoxic agents.  Follow renal functions.  Nephrology was following him at [place] and we will consult.   Fraser catheter placed for strict I/O  Creatinine 3.5, significantly elevated  Pending nephrology consult     Hyponatremia: Sodium noted to be low for the past few days.  Placed on water restriction continue IV diuresis with IV Lasix drip.  Will check repeat sodium level in a.m.  Sodium today 125       CHF/Systolic:  LV systolic function is significantly decreased and estimated EF of approximately 10 - 15 %, ( per Echo of 24)     Will follow volume status closely.  Continue on Lasix he is currently on Lasix IV infusion.  Continue Aldactone and  9/19/2024  EXAMINATION: THREE XRAY VIEWS OF THE LEFT FOOT 9/19/2024 3:53 pm COMPARISON: March 13, 2024 HISTORY: ORDERING SYSTEM PROVIDED HISTORY: pain s/p mechanical fall TECHNOLOGIST PROVIDED HISTORY: Reason for exam:->pain s/p mechanical fall Reason for Exam: fall FINDINGS: Appearance of the Charcot foot post multiple amputations.  On the lateral image, there is free fragment of bone projecting above the tarsals.  There is some erosive process present at this level could be osteomyelitis.  There is also some erosive disease of distal aspect of talus which could represent active osteomyelitis.  There is diffuse soft tissue swelling present.  There appears to be ulcerating wound along distal aspect of the stump, this is seen on the frontal/oblique image.     Appearance of a Charcot foot with multiple amputations.  There does appear to be osteomyelitis/erosive disease along the distal talus, there is a sizable displaced talar fragment when this is new from March.  There is an ulcerating soft tissue wound of the stump.     XR TIBIA FIBULA RIGHT (2 VIEWS)    Result Date: 9/19/2024  EXAMINATION: TWO XRAY VIEWS OF THE RIGHT TIBIA/FIBULA 9/19/2024 6:53 pm COMPARISON: None. HISTORY: ORDERING SYSTEM PROVIDED HISTORY: pain s/p mechanical fall TECHNOLOGIST PROVIDED HISTORY: Reason for exam:->pain s/p mechanical fall Reason for Exam: fall FINDINGS: The patient has undergone prior amputation of the lower leg at the level of the proximal tibia and fibula.  No acute fracture or dislocation.  No destructive osseous changes.     No acute fracture or dislocation     XR KNEE LEFT (3 VIEWS)    Result Date: 9/19/2024  EXAMINATION: THREE XRAY VIEWS OF THE LEFT KNEE 9/19/2024 6:53 pm COMPARISON: Radiographs dated 12/28/2013. HISTORY: ORDERING SYSTEM PROVIDED HISTORY: pain s/p mechanical fall TECHNOLOGIST PROVIDED HISTORY: Reason for exam:->pain s/p mechanical fall Reason for Exam: fall FINDINGS: Patient is status post ORIF of the tibia.   Results   Component Value Date/Time    TSH 0.84 11/16/2023 10:14 AM    TSH 1.77 05/16/2023 10:45 AM     Troponin: No results found for: \"TROPONINT\"  Lactic Acid: No results for input(s): \"LACTA\" in the last 72 hours.  BNP: No results for input(s): \"PROBNP\" in the last 72 hours.  UA:  Lab Results   Component Value Date/Time    NITRU Negative 09/19/2024 10:21 PM    COLORU DK YELLOW 09/19/2024 10:21 PM    PHUR 5.5 09/19/2024 10:21 PM    PHUR 6.0 03/13/2024 08:04 PM    LABCAST 0-1 Hyaline 09/26/2017 06:40 PM    WBCUA >100 09/19/2024 10:21 PM    RBCUA 21-50 09/19/2024 10:21 PM    MUCUS 1+ 09/19/2024 10:21 PM    BACTERIA 4+ 09/19/2024 10:21 PM    CLARITYU Clear 09/19/2024 10:21 PM    LEUKOCYTESUR MODERATE 09/19/2024 10:21 PM    UROBILINOGEN 1.0 09/19/2024 10:21 PM    BILIRUBINUR MODERATE 09/19/2024 10:21 PM    BLOODU LARGE 09/19/2024 10:21 PM    GLUCOSEU 500 09/19/2024 10:21 PM    KETUA Negative 09/19/2024 10:21 PM     Urine Cultures:   Lab Results   Component Value Date/Time    LABURIN >100,000 CFU/ml 09/19/2024 10:59 PM     Blood Cultures:   Lab Results   Component Value Date/Time    BC  09/19/2024 08:50 PM     CONTACT PRECAUTIONS INDICATED  See additional report for complete BCID panel.  mecA/C gene and MREJ gene Detected.  mecA/C gene and MREJ gene Detected.      BC  09/19/2024 08:50 PM     POSITIVE for  CONTACT PRECAUTIONS INDICATED  Isolated two of two sets       Lab Results   Component Value Date/Time    BLOODCULT2  09/19/2024 08:55 PM     POSITIVE for  No further workup  CONTACT PRECAUTIONS INDICATED  Refer to culture [id number] for sensitivity results  Isolated two of two sets       Organism:   Lab Results   Component Value Date/Time    ORG Enterobacter cloacae complex 09/19/2024 10:59 PM         Electronically signed by Brooks Galeana MD on 9/23/2024 at 9:01 AM

## 2024-09-23 NOTE — CONSULTS
Consult Placed     Who: Dr. Monique Yoon/ Podiatry   Date:  Time:     Electronically signed by Karen Hall on 9/23/2024 at 8:15 AM

## 2024-09-23 NOTE — CARE COORDINATION
Case Management Assessment  Initial Evaluation    Date/Time of Evaluation: 9/23/2024 10:10 AM  Assessment Completed by: Adriana Wood RN    If patient is discharged prior to next notation, then this note serves as note for discharge by case management.    Patient Name: Ruben Eagle                   YOB: 1965  Diagnosis: Bacteremia [R78.81]                   Date / Time: 9/22/2024  1:45 PM    Patient Admission Status: Inpatient   Readmission Risk (Low < 19, Mod (19-27), High > 27): Readmission Risk Score: 27.3    Current PCP: Leann Marie APRN - CNP  PCP verified by CM? Yes    Chart Reviewed: Yes      History Provided by: Patient  Patient Orientation: Alert and Oriented    Patient Cognition: Alert    Hospitalization in the last 30 days (Readmission):  Yes    If yes, Readmission Assessment in CM Navigator will be completed.    Advance Directives:      Code Status: Full Code   Patient's Primary Decision Maker is: Named in Scanned ACP Document (states he will have family  bring in AD's)    Primary Decision Maker: Shen Eagle - Brother/Sister - [phone]    Secondary Decision Maker: Clifford Eagle - Brother/Sister - [phone]    Supplemental (Other) Decision Maker: Mirna Eagle Milena - Parent - [phone]    Discharge Planning:    Patient lives with: Family Members Type of Home: House  Primary Care Giver: Self  Patient Support Systems include: Family Members   Current Financial resources: Medicare  Current community resources: ECF/Home Care (Replaced by [facility] current PT/OT/RN)  Current services prior to admission: Private Duty Homecare            Current DME:              Type of Home Care services:  Aide Services, OT, PT, Skilled Therapy    ADLS  Prior functional level: Independent in ADLs/IADLs  Current functional level: Independent in ADLs/IADLs    PT AM-PAC:   /24  OT AM-PAC:   /24    Family can provide assistance at DC: Yes  Would you like Case Management to discuss the discharge plan with any

## 2024-09-23 NOTE — CONSULTS
Consult Placed     Who: Dr. Monique Spivey  Date:  Time:    *second consult for patient*     Electronically signed by Karen Hall on 9/23/2024 at 11:32 AM

## 2024-09-23 NOTE — CONSULTS
[facility] consult/progress note           Subjective/   59 y.o. year old male who we are seeing in consultation for KRYSTAL on CKD and hyponatremia likely related volume overload requiring Lasix gtt. he was transferred to [facility] on 9/22 for ID consult    Patient seen and examined at bedside.  He feels well today with no complaints of CP or SOB.  He denies N/V or adb pain.  He is having some pain and numbness in his LLE, which is why he is here. He states the swelling in that leg has been worse before but it is still much larger than his baseline.   - no family at bedside  - Labs and ON events reveiwed  - PMShx reviewed     ROS: 12 point review of systems negative unless stated above     Objective/   GEN:  Chronically ill, /69   Pulse 84   Temp 96.9 °F (36.1 °C) (Oral)   Resp 16   Ht 1.753 m (5' 9\")   Wt 90.6 kg (199 lb 11.8 oz)   SpO2 96%   BMI 29.50 kg/m²     HEENT: non-icteric, no JVD  CV: RRR, S1, S2 without m/r/g; +LLE edema  RESP: CTA B without w/r/r; breathing wnl  ABD: +bs, soft, nt, no hsm  SKIN: warm, no rashes, bruising/edema on LLE    Data/  Recent Labs     09/21/24  0630 09/22/24  0807 09/23/24  0600   WBC 20.3* 20.5* 17.5*   HGB 12.7* 13.6 11.3*   HCT 42.0 44.3 36.1*   MCV 84.4 82.5 80.8    156 133*     Recent Labs     09/21/24  0630 09/22/24  0807 09/23/24  0600   * 120* 125*   K 4.7 4.3 4.1   CL 87* 87* 87*   CO2 13* 18* 24   GLUCOSE 86 153* 179*   BUN 64* 74* 77*   CREATININE 2.9* 3.2* 3.5*   LABGLOM 24* 21* 19*       Assessment/Plan   KRYSTAL on CKD: likely pre-renal   - baseline Cre of 1.8-2.2  - Cre continue to trend upwards, 3.5 today   - Fraser placed to monitor strict I/Os  - Lasix gtt stopped on 9/20 3 AM  - BP low normal, SBP 90-100s   - Urine Output marginal overnight (550ml)   Plan:  - Gentle IVF   - NS 75ml/hr for 1L, reassess after this  - Strict I/Os  - Trend labs     Hyponatremia  - Continue free water restriction  - NS IVF 75ml/hr

## 2024-09-23 NOTE — DISCHARGE INSTRUCTIONS
Heart Failure Resources:  Heart Failure Interactive Workbook:  Go to https://[employer]/publication/?x=663598 for a Free Heart Failure Interactive Workbook provided by The American Heart Association. This interactive workbook will provide information on Healthier Living with Heart Failure. Please copy and paste link into search bar. Use your mouse to scroll through the pages.    HF Gray sayda:   Heart Failure Free smart phone sayda available for iPhone and Android download. Use your phone to track sodium intake, fluid intake, symptoms, and weight.     Low Sodium Diet / Recipes:  Go to www.[employer] website for “renal” diet which is Low Sodium! [employer] is a dialysis company, but this website offers free seasonal cookbooks. Each quarter, they will release 25-30 new recipes with a breakdown of calories, sodium, and glucose. You can also go to www.[employer]/recipes website for free recipes.     Home Exercise Program:   Identification of Green/Yellow/Red zones:  You should be able to identify when you feel good (green zone), if you have 1-2 symptoms of HF (yellow zone), or if you are in need of medical attention (red zone).  In your CHF education folder you were provided a “stop light tool” to outline this information.     We want to you to rate your exertion levels:    Our therapy team has discussed means of identification with you such as the \"Shikha scale.\"  The Shikha rating scale ranges from 6 to 20, where 6 means \"no exertion at all\" and 20 means \"maximal exertion.\" The goal is to use this to gauge how much effort it is taking for you to do your normal daily tasks.   You should be able to recognize when too much exertion is being expended.    Elements of Energy Conservation:   Prioritize/Plan: Decide what needs to be done today, and what can wait for a later date, write to do lists, plan ahead to avoid extra trips, and gather supplies and equipment needed before starting an activity.   Position:  Avoid tiring and awkward posture that may impair breathing.  Pace: Slow and steady pace, never rushing!  Pursed lip breathing.  Pursed Lip Breathing: \"Smell the roses, blow out the candles\".

## 2024-09-23 NOTE — CONSULTS
Consult Placed     Who: [facility]   Date:  Time:     Electronically signed by Karen Hall on 9/23/2024 at 1:29 PM

## 2024-09-23 NOTE — CARE COORDINATION
Cardiology consult received- spoke with Dr. Lizarraga and Dr. Crowe- SANDEEP only. We will schedule them for later this week for a SANDEEP (Thursday with Dr. Carmichael). Please call cardiology if anything else is needed.

## 2024-09-23 NOTE — PLAN OF CARE
Problem: Chronic Conditions and Co-morbidities  Goal: Patient's chronic conditions and co-morbidity symptoms are monitored and maintained or improved  Outcome: Progressing  Flowsheets (Taken 9/22/2024 2010)  Care Plan - Patient's Chronic Conditions and Co-Morbidity Symptoms are Monitored and Maintained or Improved:   Monitor and assess patient's chronic conditions and comorbid symptoms for stability, deterioration, or improvement   Collaborate with multidisciplinary team to address chronic and comorbid conditions and prevent exacerbation or deterioration   Update acute care plan with appropriate goals if chronic or comorbid symptoms are exacerbated and prevent overall improvement and discharge     Problem: Discharge Planning  Goal: Discharge to home or other facility with appropriate resources  Outcome: Progressing  Flowsheets (Taken 9/22/2024 2010)  Discharge to home or other facility with appropriate resources:   Identify barriers to discharge with patient and caregiver   Arrange for needed discharge resources and transportation as appropriate   Identify discharge learning needs (meds, wound care, etc)   Arrange for interpreters to assist at discharge as needed   Refer to discharge planning if patient needs post-hospital services based on physician order or complex needs related to functional status, cognitive ability or social support system     Problem: Safety - Adult  Goal: Free from fall injury  Outcome: Progressing     Problem: Skin/Tissue Integrity  Goal: Absence of new skin breakdown  Description: 1.  Monitor for areas of redness and/or skin breakdown  2.  Assess vascular access sites hourly  3.  Every 4-6 hours minimum:  Change oxygen saturation probe site  4.  Every 4-6 hours:  If on nasal continuous positive airway pressure, respiratory therapy assess nares and determine need for appliance change or resting period.  Outcome: Progressing

## 2024-09-23 NOTE — CONSULTS
Podiatry Consult Note      Reason for Consult:  left foot abscess/infection  Requesting Physician:  Madai Alanis    Chief Complaint:  pain and swelling to left foot, \"Broken bones\" to left foot    History of Present Illness:              The patient is a 59 y.o. male with significant past medical history of CHF, prior R BKA, CAD, DMII, and prior MI who presents with worsening swelling with \"broken bones\" to his left foot. Pt reports he has been following with Dr. Jarrell at [facility] for quite some time. He states his left foot and ankle have been swollen for the past several months. He states he lost his right leg to an infection and does not want the same to happen on the left. He denies any other pedal complaints at time of encounter.     Past Medical History:        Diagnosis Date    Acute on chronic systolic congestive heart failure ([facility]) 07/08/2013    Acute osteomyelitis of left foot ([facility]) 09/27/2017    Acute osteomyelitis of right foot ([facility]) 04/25/2016    Acute respiratory failure ([facility]) 08/04/2020    Ascites     Blood transfusion reaction     Burst fracture of lumbar vertebra ([facility]) 11/09/2012    Cellulitis of left foot     Cellulitis of right lower extremity 2/16, 5/16    Chronic systolic CHF (congestive heart failure) ([facility])     Community acquired pneumonia     Coronary artery disease involving native coronary artery of native heart without angina pectoris 08/06/2020    Diabetes ([facility])     Diabetic ulcer of left foot associated with type 2 diabetes mellitus, with muscle involvement without evidence of necrosis ([facility]) 04/27/2017    Diabetic ulcer of right foot ([facility]) early 2016    Diabetic ulcer of 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 2 diabetes mellitus, with necrosis of bone ([facility])     ETOH abuse     Fracture of tibial plateau 11/09/2012    High cholesterol     History of blood transfusion     reaction    HTN (hypertension)     Hx of blood clots     MI (myocardial infarction) ([facility]) 08/04/2020    +  80.8 09/23/2024 06:00 AM    MCH 25.3 09/23/2024 06:00 AM    MCHC 31.4 09/23/2024 06:00 AM    RDW 24.7 09/23/2024 06:00 AM    BANDSPCT 1 08/04/2020 05:45 AM    LYMPHOPCT 6.8 09/23/2024 06:00 AM    MONOPCT 3.9 09/23/2024 06:00 AM    EOSPCT 0.5 09/23/2024 06:00 AM    BASOPCT 0.1 09/23/2024 06:00 AM    MONOSABS 0.7 09/23/2024 06:00 AM    LYMPHSABS 1.2 09/23/2024 06:00 AM    EOSABS 0.1 09/23/2024 06:00 AM    BASOSABS 0.0 09/23/2024 06:00 AM    DIFFTYPE Auto 04/22/2012 07:58 PM     WBC:    Lab Results   Component Value Date/Time    WBC 17.5 09/23/2024 06:00 AM     Hemoglobin/Hematocrit:    Lab Results   Component Value Date/Time    HGB 11.3 09/23/2024 06:00 AM    HCT 36.1 09/23/2024 06:00 AM     CMP:    Lab Results   Component Value Date/Time     09/23/2024 06:00 AM    K 4.1 09/23/2024 06:00 AM    CL 87 09/23/2024 06:00 AM    CO2 24 09/23/2024 06:00 AM    BUN 77 09/23/2024 06:00 AM    CREATININE 3.5 09/23/2024 06:00 AM    GFRAA >60 07/20/2022 10:53 AM    GFRAA >60 08/01/2012 11:46 AM    AGRATIO 0.6 09/19/2024 08:30 PM    LABGLOM 19 09/23/2024 06:00 AM    LABGLOM >60 03/18/2024 05:27 AM    GLUCOSE 179 09/23/2024 06:00 AM    CALCIUM 7.0 09/23/2024 06:00 AM    BILITOT 3.6 09/20/2024 10:20 AM    ALKPHOS 267 09/20/2024 10:20 AM    AST 31 09/20/2024 10:20 AM    ALT 38 09/20/2024 10:20 AM     Uric Acid:    Lab Results   Component Value Date/Time    URICACID 7.2 07/20/2022 10:53 AM     PT/INR:    Lab Results   Component Value Date/Time    PROTIME 17.2 12/01/2023 03:25 PM    INR 1.40 12/01/2023 03:25 PM     Troponin:    Lab Results   Component Value Date/Time    TROPONINI <0.01 01/16/2021 10:01 AM     HgBA1c:    Lab Results   Component Value Date/Time    LABA1C 7.8 09/20/2024 10:20 AM       Imaging: L ankle xray - pathologic fracture of the talus consistent w/ OM given copious amount of purulence encountered to medial, plantar, and lateral heel as well as the lateral ankle; evidence of prior TMA   - L foot MRI ordered by

## 2024-09-23 NOTE — PROGRESS NOTES
Comprehensive Nutrition Assessment    Type and Reason for Visit:  Initial, Positive Nutrition Screen    Nutrition Recommendations/Plan:   Continue current PO intakes, 4CHO/meal  Q4h accuchecks, adjust insulin to promote euglycemia. May consider adding no concentrated sweets to diet order to further control BG  High Protein/Low Calorie ONS 2x/d  Consider adjusting ONS to Glucerna should BG remain high   Please monitor PO intakes, document % of meal and ONS consumed in I/O     Malnutrition Assessment:  Malnutrition Status:  Insufficient data (09/23/24 1740)    Context:  Acute Illness     Findings of the 6 clinical characteristics of malnutrition:  Energy Intake:  50% or less of estimated energy requirements for 5 or more days (per OSH records)  Weight Loss:  Unable to assess (fluid, recent amputations)     Body Fat Loss:  Unable to assess     Muscle Mass Loss:  Unable to assess    Fluid Accumulation:  No significant fluid accumulation (likelt r/t chf, recent amputations, CKD)     Strength:  Not Performed    Nutrition Assessment:    59 y.o. male admitted for bacteremia d/t chronic foot wounds, podiatry consulted. PMH significant for CHF, DM, and EtOH abuse. RD assessment for MST 2- 2-13# wt loss and poor appetite. Noted pt transfer from [facility], intakes <50% x2 day per I/O. Called pt x2, no answer. Suspect limited intakes r/t malaise from infection, will start ONS 2x/d for increased nutrient needs r/t wound healing and acute illness.      Nutrition Related Findings:    Labs: -202 mg/dL x24hrs, Na 125, BUN 77, Cr 3.5, GFR 19.  Meds: Allopurinol, cefepime, daptomycin, FeSu, lantus, levothyroxine, flagyl, MVI, lasix.   Edema:  +1 pitting RLE, +2 pitting LLE   LBM:  09/19/24, GI Symptoms: Nausea, Vomiting Wound Type: Multiple, Diabetic Ulcer, Surgical Incision         Current Nutrition Intake & Therapies:    Average Meal Intake: 26-50%, 1-25%  Average Supplements Intake: None Ordered  ADULT DIET; Regular;  4 carb choices (60 gm/meal)  ADULT ORAL NUTRITION SUPPLEMENT; Dinner, Breakfast; Low Calorie/High Protein Oral Supplement    Anthropometric Measures:  Height: 175.3 cm (5' 9.02\")  Ideal Body Weight (IBW): 160 lbs (73 kg)       Current Body Weight: 90.6 kg (199 lb 11.8 oz), 124.8 % IBW. Weight Source: Bed Scale  Current BMI (kg/m2): 29.5  Usual Body Weight:  (190-200# prior to BKA 8/2024)     Weight Adjustment For: Amputation  Total Adjusted Percentage (Calculated): 5.9  Adjusted Ideal Body Weight (lbs) (Calculated): 150.6 lbs  Adjusted Ideal Body Weight (kg) (Calculated): 68.45 kg  Adjusted % Ideal Body Weight (Calculated): 132.6  Adjusted BMI (kg/m2) (Calculated): 31.2  BMI Categories: Obese Class 1 (BMI 30.0-34.9)    Estimated Daily Nutrient Needs:  Energy Requirements Based On: Formula  Weight Used for Energy Requirements: Current  Energy (kcal/day): 2054-2226kcals (MSJ x12 AF, x1-1.1SF)  Weight Used for Protein Requirements: Current  Protein (g/day): 118g (1.3g/kg, wounds; 21-23% kcals from pro)  Method Used for Fluid Requirements: 1 ml/kcal  Fluid (ml/day): 2054-2226ml or per MD    Nutrition Diagnosis:   Increased nutrient needs related to increase demand for energy/nutrients as evidenced by wounds (acute illness)    Nutrition Interventions:   Food and/or Nutrient Delivery: Continue Current Diet, Modify Oral Nutrition Supplement  Nutrition Education/Counseling: No recommendation at this time  Coordination of Nutrition Care: Continue to monitor while inpatient  Plan of Care discussed with: called pt x2, no answer    Goals:     Goals: PO intake 50% or greater, by next RD assessment       Nutrition Monitoring and Evaluation:   Behavioral-Environmental Outcomes: None Identified  Food/Nutrient Intake Outcomes: Food and Nutrient Intake, Supplement Intake, Diet Advancement/Tolerance  Physical Signs/Symptoms Outcomes: Weight, Skin, Meal Time Behavior, Biochemical Data    Discharge Planning:    Continue current diet,

## 2024-09-23 NOTE — PROGRESS NOTES
Physical Therapy  [address]  Physical Therapy Initial Assessment/Treatment    Name: Ruben Eagle  : 1965  MRN: [id number]  Date of Service: 2024    Discharge Recommendations:  Subacute/Skilled Nursing Facility   PT Equipment Recommendations  Equipment Needed: No  Other: defer    Therapy discharge recommendations take into account each patient's current medical complexities and are subject to input/oversight from the patient's healthcare team.   Barriers to Home Discharge:   [] Steps to access home entry or bed/bath:   [x] Unable to transfer, ambulate, or propel wheelchair household distances without assist   [x] Limited available assist at home upon discharge    [] Patient or family requests d/c to post-acute facility    [] Poor cognition/safety awareness for d/c to home alone    []Unable to maintain ordered weight bearing status    [] Patient with salient signs of long-standing immobility   [x] Patient is at increased risk for falls   [] Other:    If pt is unable to be seen after this session, please let this note serve as discharge summary.  Please see case management note for discharge disposition.  Thank you.        Patient Diagnosis(es): Bacteremia  Past Medical History:  has a past medical history of Acute on chronic systolic congestive heart failure ([facility]), Acute osteomyelitis of left foot ([facility]), Acute osteomyelitis of right foot ([facility]), Acute respiratory failure ([facility]), Ascites, Blood transfusion reaction, Burst fracture of lumbar vertebra ([facility]), Cellulitis of left foot, Cellulitis of right lower extremity, Chronic systolic CHF (congestive heart failure) ([facility]), Community acquired pneumonia, Coronary artery disease involving native coronary artery of native heart without angina pectoris, Diabetes ([facility]), Diabetic ulcer of left foot associated with type 2 diabetes mellitus, with muscle involvement without evidence of necrosis ([facility]), Diabetic ulcer of right foot ([facility]), Diabetic             [x]Decreased vision/hearing  []First introduction to new information    []Requires intermittent cues  [x]Other: Decreased ability to recall edu    Education provided via:  [x]Oral instruction  [x]Demonstration  [x]Written handout            Therapy Time   Individual Concurrent Group Co-treatment   Time In 1001         Time Out 1035         Minutes 34         Timed Code Treatment Minutes: 23 Minutes  Evaluation time: 11 Minutes       Sandoval Washington PT, DPT

## 2024-09-23 NOTE — CARE COORDINATION
SNF recs per therapy.  Conversation at bedside with patient . He does not want to return to [facility]. Preferences as follows:  #1 [facility] - spoke with Emmy and faxed referral. She will review. Medicare is provider - no pre cert will be needed.    #2  [facility].    Following     Adriana Wood RN

## 2024-09-23 NOTE — PROGRESS NOTES
disability  Additional Comments: Patient had been home for 1 week after SNF stay following R BKA on 8/15/24. Patient was awaiting delivery of wheelchair, hospital bed and standard walker, so was spending most of his time on a love seat and only completing pivot transfers to commode with assistance of family. Patient reports prior to R BKA he was independent with ADLs. Patient declining to answer additional PLOF/home evironment questioning due to fatigue and wanting to sleep.    Objective  Temp: 96.9 °F (36.1 °C)  Pulse: (!) 106  Heart Rate Source: Monitor  Respirations: 16  SpO2: 96 %  O2 Device: None (Room air)  BP: 102/71  MAP (Calculated): 81  BP Location: Left upper arm  BP Method: Automatic  Patient Position: Semi fowlers          Observation/Palpation  Posture: Fair  Safety Devices  Type of Devices: Bed alarm in place;Call light within reach;Patient at risk for falls;Nurse notified;Left in bed  Bed Mobility Training  Bed Mobility Training: Yes  Overall Level of Assistance: Total assistance  Interventions: Tactile cues;Verbal cues;Manual cues;Safety awareness training  Rolling: Moderate assistance (bedrail)  Supine to Sit: Total assistance (Mod A x2 people using bedrail)  Sit to Supine: Total assistance (Min A x2 people using bedrail)  Duration: 10 (10 minutes seated EOB SBA)  Balance  Sitting: Impaired  Sitting - Static: Good (unsupported);None  Sitting - Dynamic: Good (unsupported);Occasional  Transfer Training  Transfer Training: No (R BKA, LLE NWB, stage 2 sacral ulcer. Will require MaxiMove Lift, but too fatigued to attempt at this time.)  Gait  Gait Training: No (R BKA, LLE NWB)     AROM: Within functional limits (BUE)  Strength: Grossly decreased, non-functional (BUE grossly 4/5)  Coordination: Within functional limits  Tone: Normal  ADL  Feeding: Independent  Feeding Skilled Clinical Factors: at bed level  Grooming: Setup;Stand by assistance  Grooming Skilled Clinical Factors: seated EOB SBA for sitting  Strategies;Energy Conservation  Education Provided Comments: bed mobility, sitting balance, importance of repositioning in bed for maintaining skin integrity, importance of flat supine positioning for maintaining hip extension, CHF signs/symptoms, FELIPA scale,  Education Method: Demonstration;Verbal;Printed Information/Hand-outs  Barriers to Learning: None  Education Outcome: Verbalized understanding;Demonstrated understanding;Continued education needed  LUE AROM (degrees)  LUE AROM : WNL  RUE AROM (degrees)  RUE AROM : WNL    OCCUPATIONAL THERAPY HEART FAILURE EDUCATION    OT Heart Failure Education Goals    Patient educated and demonstrates /verbalizes appropriate teach back with ability to self rate on FELIPA and identify HF activity zone, prior to initiation of ADL’s to appropriately determine need for daily activity level/ energy conservation/pacing.   [x] Goal Met, [] Goal Not Met    Patient demonstrates /verbalizes understanding of appropriate employment of Energy Conservation with every day activity.   [x] Goal Met, [] Goal Not Met    Patient demonstrates/verbalizes ability to correctly identify mL to cups conversion, what constitutes FLUID in diet, and knowledge of when to communicate changes in weight to physician consistent with patient orders and HF education.     [x] Goal Met, [] Goal Not Met, [] Goal not appropriate for pt     Pt voices and demonstrates appropriate teach back of Heart Failure Education Program with the use of:  [x] Energy Conservation techniques                              [x] Choosing daily activity level  [x] Importance of fluid restriction               Pt will benefit from reinforcement of education due to   [] Readiness to learn                               [] Decreased cognition  [] Language barrier                                  [] Decreased vision/hearing  [] First introduction to new information      [] Current Living (LTC, SNF, etc)  []Other:    Education provided via:  [x]  required assistance or correction of error in the following:   [x] Not appropriate for patient  ------------------------------------------------------------------------------------------------------------------------------------                           G-Code     OutComes Score                                                  AM-PAC - ADL  AM-PAC Daily Activity - Inpatient   How much help is needed for putting on and taking off regular lower body clothing?: Total  How much help is needed for bathing (which includes washing, rinsing, drying)?: Total  How much help is needed for toileting (which includes using toilet, bedpan, or urinal)?: Total  How much help is needed for putting on and taking off regular upper body clothing?: A Little  How much help is needed for taking care of personal grooming?: A Little  How much help for eating meals?: None  AM-[facility] Inpatient Daily Activity Raw Score: 13  AM-PAC Inpatient ADL T-Scale Score : 32.03  ADL Inpatient CMS 0-100% Score: 63.03  ADL Inpatient CMS G-Code Modifier : CL    Tinneti Score       Goals  Short Term Goals  Time Frame for Short Term Goals: 2 weeks (10/7/24)  Short Term Goal 1: Min A bed mobility supine<>EOB in preparation for ADLs at EOB  Short Term Goal 2: Increase sitting activity tolerance EOB to 20 minutes in preparation for ADLs and slide board transfers  Short Term Goal 3: Mod A LB dressing at bed level using AE as needed  Short Term Goal 4: Mod I BUE strengthening HEP 1x15 reps each exercise  Short Term Goal 5: Min A sponge bathing  Patient Goals   Patient goals : improve activity tolerance and pain management.      Therapy Time   Individual Concurrent Group Co-treatment   Time In 1000         Time Out 1034         Minutes 34         Timed Code Treatment Minutes: 19 Minutes (15 minute evaluation)     If pt is discharged prior to next OT session, this note will serve as the discharge summary.    Leia Ramirez OT

## 2024-09-23 NOTE — PROGRESS NOTES
[facility] Progress Note           Subjective/   59 y.o. year old male who we are seeing in consultation for KRYSTAL on CKD and hyponatremia likely related volume overload requiring Lasix gtt.     Patient seen and examined at bedside.  He feels well today with no complaints of CP or SOB.  He denies N/V or adb pain.  He is having some pain and numbness in his LLE, which is why he is here. He states the swelling in that leg has been worse before but it is still much larger than his baseline.   - no family at bedside  - Labs and ON events reveiwed  - PMShx reviewed     ROS: 12 point review of systems negative unless stated above     Objective/   GEN:  Chronically ill, /71   Pulse (!) 106   Temp 96.9 °F (36.1 °C) (Oral)   Resp 18   Ht 1.753 m (5' 9\")   Wt 90.6 kg (199 lb 11.8 oz)   SpO2 96%   BMI 29.50 kg/m²     HEENT: non-icteric, no JVD  CV: RRR, S1, S2 without m/r/g; +LLE edema  RESP: CTA B without w/r/r; breathing wnl  ABD: +bs, soft, nt, no hsm  SKIN: warm, no rashes, bruising/edema on LLE    Data/  Recent Labs     09/21/24  0630 09/22/24  0807 09/23/24  0600   WBC 20.3* 20.5* 17.5*   HGB 12.7* 13.6 11.3*   HCT 42.0 44.3 36.1*   MCV 84.4 82.5 80.8    156 133*     Recent Labs     09/21/24  0630 09/22/24  0807 09/23/24  0600   * 120* 125*   K 4.7 4.3 4.1   CL 87* 87* 87*   CO2 13* 18* 24   GLUCOSE 86 153* 179*   BUN 64* 74* 77*   CREATININE 2.9* 3.2* 3.5*   LABGLOM 24* 21* 19*       Assessment/Plan   KRYSTAL on CKD: likely pre-renal   - baseline Cre of 1.8-2.2  - Cre continue to trend upwards, 3.5 today   - Fraser placed to monitor strict I/Os  - Lasix gtt stopped yesterday     - BP low normal, SBP 90-100s   - Urine Output marginal overnight (550ml)   Plan:  - Gentle IVF   - NS 75ml/hr for 1L  - Strict I/Os  - Trend labs     Hyponatremia  - Continue free water restriction  - NS IVF 75ml/hr     Hypertension  - BP acceptable  - prevent hypotension to reduce risk of poor renal  perfusion      Anemia   - Stable     Osteomyelitis   - ID Consulted  - On IV antibiotics     ____________________________________  JOSE Edwards NP  [facility]  [email or web address]  Office: [phone]

## 2024-09-23 NOTE — ACP (ADVANCE CARE PLANNING)
Advance Care Planning   Advance Care Planning Clinical Specialist  Conversation Note      Date of ACP Conversation: 9/23/2024    Conversation Conducted with:   Patient with Decision Making Capacity   Healthcare Decision Maker: Named in Advance Directive or Healthcare Power of  brothgucci Gotti as below    ACP Clinical Specialist: Adriana Wood RN      *When Decision Maker makes decisions on behalf of the incapacitated patient: Decision Maker is asked to consider and make decisions based on patient values, known preferences, or best interests.       Current Designated Health Care Decision Maker:   Primary Decision Maker: Shen Eagle - Brother/Sister - [phone]    Secondary Decision Maker: Clifford Eagle - Brother/Sister - [phone]    Supplemental (Other) Decision Maker: Mirna Eagle Milena - Parent - [phone]  (as entered in ACP Activity Healthcare Decision Maker field.  Validate  this information as still accurate & up-to-date; edit Healthcare Decision Maker field as needed.)         Hospitalization  If your health were to worsen and it became clear that your chance of recovery was unlikely, what would your preferences be regarding hospitalization?:    Choice:  [x]  The patient would want hospitalization  []  The patient would prefer comfort-focused treatment without hospitalization.    Ventilation  If you were in your present state of health and suddenly became very ill and were unable to breathe on your own, what would your preference be about the use of a ventilator (breathing machine) if it were available to you?      If patient would desire the use of a ventilator (breathing machine), answer \"yes\", if not \"no\":yes    If your health were to worsen and it became clear that your chance of recovery was unlikely, would that change your answer? Yes    Resuscitation  CPR works best to restart the heart when there is a sudden event, like a heart attack, in someone who is otherwise healthy.  Unfortunately, CPR does not typically restart the heart for people who have serious health conditions or who are very sick.    In the event your heart stopped, would you want attempts to restart your heart (answer \"yes\") or would you prefer a natural death (answer \"no\")? yes    If your health were to worsen and it became clear that your chance of recovery was unlikely, would that change your answer? Yes    [x] Yes  [] No   Educated Patient / Decision Maker regarding differences between Advance Directives and portable DNR orders.    Length of ACP Conversation in minutes:  20 minutes    Conversation Outcomes:  [x] ACP discussion completed  [] Existing advance directive reviewed with patient; no changes to patient's previously recorded wishes   [] New Advance Directive completed   [] Portable Do Not Rescitate prepared for Provider review and signature  [] POLST/POST/MOLST/MOST prepared for Provider review and signature      Follow-up plan:    [] Schedule follow-up conversation to continue planning  [] Referred individual to Provider for additional questions/concerns   [] Advised patient/agent/surrogate to review completed ACP document and update if needed with changes in condition, patient preferences or care setting     [] This note routed to one or more involved healthcare providers     States family can bring in copy of AD's      Adriana Wood, JUAN  ACP Clinical Specialist

## 2024-09-23 NOTE — PROGRESS NOTES
CHF Care Plan      Patient's EF (Ejection Fraction) is less than 40%    Heart Failure Medications:  Diuretics:: Furosemide    (One of the following REQUIRED for EF </= 40%/SYSTOLIC FAILURE but MAY be used in EF% >40%/DIASTOLIC FAILURE)        ACE:: None        ARB:: None         ARNI:: None    (Beta Blockers)  NON- Evidenced Based Beta Blocker (for EF% >40%/DIASTOLIC FAILURE): None    Evidenced Based Beta Blocker::(REQUIRED for EF% <40%/SYSTOLIC FAILURE) Metoprolol SUCCinate- Toprol XL  ...................................................................................................................................................    Failed to redirect to the Timeline version of the [employer] SmartLink.      Patient's weights and intake/output reviewed    Daily Weight log at bedside, patient/family participation in use of log: \"yes    Patient's current weight today shows a difference of 4 lbs more than last documented weight.      Intake/Output Summary (Last 24 hours) at 9/23/2024 0317  Last data filed at 9/23/2024 0300  Gross per 24 hour   Intake 1023 ml   Output 600 ml   Net 423 ml       Education Booklet Provided: yes    Comorbidities Reviewed Yes    Patient has a past medical history of Acute on chronic systolic congestive heart failure (HCC), Acute osteomyelitis of left foot ([facility]), Acute osteomyelitis of right foot ([facility]), Acute respiratory failure ([facility]), Ascites, Blood transfusion reaction, Burst fracture of lumbar vertebra ([facility]), Cellulitis of left foot, Cellulitis of right lower extremity, Chronic systolic CHF (congestive heart failure) ([facility]), Community acquired pneumonia, Coronary artery disease involving native coronary artery of native heart without angina pectoris, Diabetes ([facility]), Diabetic ulcer of left foot associated with type 2 diabetes mellitus, with muscle involvement without evidence of necrosis (HCC), Diabetic ulcer of right foot (HCC), Diabetic ulcer of 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 2

## 2024-09-24 ENCOUNTER — HOSPITAL ENCOUNTER (OUTPATIENT)
Dept: CARDIAC CATH/INVASIVE PROCEDURES | Age: 59
Discharge: HOME | End: 2024-09-24
Attending: INTERNAL MEDICINE

## 2024-09-24 ENCOUNTER — APPOINTMENT (OUTPATIENT)
Dept: CT IMAGING | Age: 59
DRG: 853 | End: 2024-09-24
Attending: INTERNAL MEDICINE
Payer: MEDICARE

## 2024-09-24 PROBLEM — I96 GANGRENE (HCC): Status: ACTIVE | Noted: 2024-09-22

## 2024-09-24 LAB
ALBUMIN SERPL-MCNC: 3.8 G/DL (ref 3.4–5)
ALBUMIN/GLOB SERPL: 1.6 {RATIO} (ref 1.1–2.2)
ALP SERPL-CCNC: 256 U/L (ref 40–129)
ALT SERPL-CCNC: 62 U/L (ref 10–40)
ANION GAP SERPL CALCULATED.3IONS-SCNC: 20 MMOL/L (ref 3–16)
ANISOCYTOSIS BLD QL SMEAR: ABNORMAL
AST SERPL-CCNC: 131 U/L (ref 15–37)
BASOPHILS # BLD: 0 K/UL (ref 0–0.2)
BASOPHILS NFR BLD: 0 %
BILIRUB SERPL-MCNC: 8.4 MG/DL (ref 0–1)
BUN SERPL-MCNC: 79 MG/DL (ref 7–20)
CALCIUM SERPL-MCNC: 7 MG/DL (ref 8.3–10.6)
CHLORIDE SERPL-SCNC: 86 MMOL/L (ref 99–110)
CK SERPL-CCNC: 96 U/L (ref 39–308)
CO2 SERPL-SCNC: 19 MMOL/L (ref 21–32)
CREAT SERPL-MCNC: 3.6 MG/DL (ref 0.9–1.3)
CRP SERPL-MCNC: 117.4 MG/L (ref 0–5.1)
DEPRECATED RDW RBC AUTO: 25.4 % (ref 12.4–15.4)
EOSINOPHIL # BLD: 0 K/UL (ref 0–0.6)
EOSINOPHIL NFR BLD: 0 %
GFR SERPLBLD CREATININE-BSD FMLA CKD-EPI: 19 ML/MIN/{1.73_M2}
GLUCOSE BLD-MCNC: 117 MG/DL (ref 70–99)
GLUCOSE BLD-MCNC: 126 MG/DL (ref 70–99)
GLUCOSE BLD-MCNC: 130 MG/DL (ref 70–99)
GLUCOSE BLD-MCNC: 153 MG/DL (ref 70–99)
GLUCOSE BLD-MCNC: 164 MG/DL (ref 70–99)
GLUCOSE BLD-MCNC: 43 MG/DL (ref 70–99)
GLUCOSE BLD-MCNC: 49 MG/DL (ref 70–99)
GLUCOSE BLD-MCNC: 81 MG/DL (ref 70–99)
GLUCOSE SERPL-MCNC: 90 MG/DL (ref 70–99)
HBV SURFACE AB SERPL IA-ACNC: <3.5 MIU/ML
HBV SURFACE AG SERPL QL IA: NORMAL
HCT VFR BLD AUTO: 38.1 % (ref 40.5–52.5)
HGB BLD-MCNC: 11.9 G/DL (ref 13.5–17.5)
LACTATE BLDV-SCNC: 3.8 MMOL/L (ref 0.4–2)
LYMPHOCYTES # BLD: 1.1 K/UL (ref 1–5.1)
LYMPHOCYTES NFR BLD: 7 %
MACROCYTES BLD QL SMEAR: ABNORMAL
MCH RBC QN AUTO: 25.6 PG (ref 26–34)
MCHC RBC AUTO-ENTMCNC: 31.4 G/DL (ref 31–36)
MCV RBC AUTO: 81.7 FL (ref 80–100)
MICROCYTES BLD QL SMEAR: ABNORMAL
MONOCYTES # BLD: 0.6 K/UL (ref 0–1.3)
MONOCYTES NFR BLD: 4 %
MYELOCYTES NFR BLD MANUAL: 1 %
NEUTROPHILS # BLD: 13.5 K/UL (ref 1.7–7.7)
NEUTROPHILS NFR BLD: 85 %
NEUTS BAND NFR BLD MANUAL: 3 % (ref 0–7)
NRBC BLD-RTO: 1 /100 WBC
OVALOCYTES BLD QL SMEAR: ABNORMAL
PERFORMED ON: ABNORMAL
PERFORMED ON: NORMAL
PLATELET # BLD AUTO: 96 K/UL (ref 135–450)
PLATELET BLD QL SMEAR: ABNORMAL
PMV BLD AUTO: 8.2 FL (ref 5–10.5)
POTASSIUM SERPL-SCNC: 4.2 MMOL/L (ref 3.5–5.1)
POTASSIUM SERPL-SCNC: 4.2 MMOL/L (ref 3.5–5.1)
PROT SERPL-MCNC: 6.2 G/DL (ref 6.4–8.2)
RBC # BLD AUTO: 4.66 M/UL (ref 4.2–5.9)
SLIDE REVIEW: ABNORMAL
SODIUM SERPL-SCNC: 125 MMOL/L (ref 136–145)
T4 FREE SERPL-MCNC: 1.1 NG/DL (ref 0.9–1.8)
TSH SERPL DL<=0.005 MIU/L-ACNC: 4.72 UIU/ML (ref 0.27–4.2)
WBC # BLD AUTO: 15.2 K/UL (ref 4–11)

## 2024-09-24 PROCEDURE — 86704 HEP B CORE ANTIBODY TOTAL: CPT

## 2024-09-24 PROCEDURE — 2500000003 HC RX 250 WO HCPCS: Performed by: INTERNAL MEDICINE

## 2024-09-24 PROCEDURE — 85025 COMPLETE CBC W/AUTO DIFF WBC: CPT

## 2024-09-24 PROCEDURE — 2580000003 HC RX 258: Performed by: INTERNAL MEDICINE

## 2024-09-24 PROCEDURE — 86702 HIV-2 ANTIBODY: CPT

## 2024-09-24 PROCEDURE — 87390 HIV-1 AG IA: CPT

## 2024-09-24 PROCEDURE — 6360000002 HC RX W HCPCS: Performed by: INTERNAL MEDICINE

## 2024-09-24 PROCEDURE — 36415 COLL VENOUS BLD VENIPUNCTURE: CPT

## 2024-09-24 PROCEDURE — 6370000000 HC RX 637 (ALT 250 FOR IP): Performed by: STUDENT IN AN ORGANIZED HEALTH CARE EDUCATION/TRAINING PROGRAM

## 2024-09-24 PROCEDURE — 6370000000 HC RX 637 (ALT 250 FOR IP): Performed by: INTERNAL MEDICINE

## 2024-09-24 PROCEDURE — 86701 HIV-1ANTIBODY: CPT

## 2024-09-24 PROCEDURE — 82550 ASSAY OF CK (CPK): CPT

## 2024-09-24 PROCEDURE — 83605 ASSAY OF LACTIC ACID: CPT

## 2024-09-24 PROCEDURE — 74176 CT ABD & PELVIS W/O CONTRAST: CPT

## 2024-09-24 PROCEDURE — 84443 ASSAY THYROID STIM HORMONE: CPT

## 2024-09-24 PROCEDURE — 84439 ASSAY OF FREE THYROXINE: CPT

## 2024-09-24 PROCEDURE — 86706 HEP B SURFACE ANTIBODY: CPT

## 2024-09-24 PROCEDURE — 86140 C-REACTIVE PROTEIN: CPT

## 2024-09-24 PROCEDURE — 80053 COMPREHEN METABOLIC PANEL: CPT

## 2024-09-24 PROCEDURE — P9047 ALBUMIN (HUMAN), 25%, 50ML: HCPCS | Performed by: INTERNAL MEDICINE

## 2024-09-24 PROCEDURE — 2060000000 HC ICU INTERMEDIATE R&B

## 2024-09-24 PROCEDURE — 87522 HEPATITIS C REVRS TRNSCRPJ: CPT

## 2024-09-24 PROCEDURE — 87340 HEPATITIS B SURFACE AG IA: CPT

## 2024-09-24 PROCEDURE — 99233 SBSQ HOSP IP/OBS HIGH 50: CPT | Performed by: INTERNAL MEDICINE

## 2024-09-24 RX ORDER — MIDODRINE HYDROCHLORIDE 5 MG/1
2.5 TABLET ORAL 3 TIMES DAILY PRN
Status: DISCONTINUED | OUTPATIENT
Start: 2024-09-24 | End: 2024-09-26

## 2024-09-24 RX ADMIN — CEFEPIME 1000 MG: 1 INJECTION, POWDER, FOR SOLUTION INTRAMUSCULAR; INTRAVENOUS at 20:48

## 2024-09-24 RX ADMIN — CEFEPIME 1000 MG: 1 INJECTION, POWDER, FOR SOLUTION INTRAMUSCULAR; INTRAVENOUS at 10:15

## 2024-09-24 RX ADMIN — METRONIDAZOLE 500 MG: 500 INJECTION, SOLUTION INTRAVENOUS at 20:34

## 2024-09-24 RX ADMIN — METOPROLOL SUCCINATE 50 MG: 50 TABLET, EXTENDED RELEASE ORAL at 09:12

## 2024-09-24 RX ADMIN — FERROUS SULFATE TAB 325 MG (65 MG ELEMENTAL FE) 325 MG: 325 (65 FE) TAB at 20:33

## 2024-09-24 RX ADMIN — ASPIRIN 81 MG: 81 TABLET, COATED ORAL at 09:07

## 2024-09-24 RX ADMIN — METRONIDAZOLE 500 MG: 500 INJECTION, SOLUTION INTRAVENOUS at 03:24

## 2024-09-24 RX ADMIN — ALBUMIN (HUMAN) 25 G: 0.25 INJECTION, SOLUTION INTRAVENOUS at 01:02

## 2024-09-24 RX ADMIN — Medication 1 TABLET: at 09:11

## 2024-09-24 RX ADMIN — ATORVASTATIN CALCIUM 40 MG: 40 TABLET, FILM COATED ORAL at 20:33

## 2024-09-24 RX ADMIN — LEVOTHYROXINE SODIUM 125 MCG: 0.12 TABLET ORAL at 05:19

## 2024-09-24 RX ADMIN — DAPTOMYCIN 700 MG: 500 INJECTION, POWDER, LYOPHILIZED, FOR SOLUTION INTRAVENOUS at 14:26

## 2024-09-24 RX ADMIN — CLOPIDOGREL BISULFATE 75 MG: 75 TABLET ORAL at 09:07

## 2024-09-24 RX ADMIN — METRONIDAZOLE 500 MG: 500 INJECTION, SOLUTION INTRAVENOUS at 12:42

## 2024-09-24 RX ADMIN — ALLOPURINOL 100 MG: 100 TABLET ORAL at 09:07

## 2024-09-24 RX ADMIN — SODIUM BICARBONATE: 84 INJECTION, SOLUTION INTRAVENOUS at 13:13

## 2024-09-24 RX ADMIN — ALBUMIN (HUMAN) 25 G: 0.25 INJECTION, SOLUTION INTRAVENOUS at 09:18

## 2024-09-24 RX ADMIN — DEXTROSE MONOHYDRATE 125 ML: 100 INJECTION, SOLUTION INTRAVENOUS at 20:32

## 2024-09-24 RX ADMIN — ENOXAPARIN SODIUM 30 MG: 100 INJECTION SUBCUTANEOUS at 09:07

## 2024-09-24 RX ADMIN — SODIUM CHLORIDE, PRESERVATIVE FREE 10 ML: 5 INJECTION INTRAVENOUS at 09:11

## 2024-09-24 ASSESSMENT — PAIN SCALES - GENERAL
PAINLEVEL_OUTOF10: 0

## 2024-09-24 NOTE — CONSULTS
Vascular Surgery Consultation    Date of Admission:  9/22/2024  1:45 PM  Date of Consultation:  9/24/2024    PCP:  Leann Marie, APRN - CNP       Chief Complaint: Left foot infection    History of Present Illness:   We are asked to see this patient in consultation by Dr. Spivey regarding evaluation for BKA.   Ruben Eagle is a 59 y.o. male who has a history of prior R BKA.  He has severe infection and deep space abscess of the left foot with osteo and bony destruction.  He has been evaluated by Podiatry and deemed to have a non salvageable foot-  \"Severe fluctuance, edema, and erythema to the entire medial left heel extending around the plantar heel and ending at the superior lateral ankle concerning for deep space abscess. S/p I&D, approximately 50 mL satish purulence exsanguinated from the medial heel, plantar heel, and lateral left ankle. Following abscess evacuation, entire ankle highly destabilized and floppy. \"   He does have a history of prior left tibia fracture and has a lateral plate and screws.       Past Medical History:  Past Medical History:   Diagnosis Date    Acute on chronic systolic congestive heart failure ([facility]) 07/08/2013    Acute osteomyelitis of left foot ([facility]) 09/27/2017    Acute osteomyelitis of right foot ([facility]) 04/25/2016    Acute respiratory failure ([facility]) 08/04/2020    Ascites     Blood transfusion reaction     Burst fracture of lumbar vertebra ([facility]) 11/09/2012    Cellulitis of left foot     Cellulitis of right lower extremity 2/16, 5/16    Chronic systolic CHF (congestive heart failure) ([facility])     Community acquired pneumonia     Coronary artery disease involving native coronary artery of native heart without angina pectoris 08/06/2020    Diabetes ([facility])     Diabetic ulcer of left foot associated with type 2 diabetes mellitus, with muscle involvement without evidence of necrosis ([facility]) 04/27/2017    Diabetic ulcer of right foot ([facility]) early 2016    Diabetic ulcer of toe  physically or emotionally unsafe where currently live: Not on file     Harm by anyone: Not on file     Emotionally Harmed: Not on file   Housing Stability: Low Risk  (9/22/2024)    Housing Stability Vital Sign     Unable to Pay for Housing in the Last Year: No     Number of Times Moved in the Last Year: 1     Homeless in the Last Year: No       Family History:        Problem Relation Age of Onset    Heart Disease Mother     High Blood Pressure Mother     High Cholesterol Mother     Diabetes Mother     Anemia Mother     Heart Disease Father     High Blood Pressure Father     High Cholesterol Father     Heart Disease Maternal Grandmother     High Blood Pressure Maternal Grandmother     High Cholesterol Maternal Grandmother     Cancer Maternal Grandmother         bone marrow & breast    Heart Disease Maternal Grandfather     High Blood Pressure Maternal Grandfather     High Cholesterol Maternal Grandfather     Cancer Maternal Grandfather         pancreatic CA    Heart Disease Paternal Grandmother     High Blood Pressure Paternal Grandmother     High Cholesterol Paternal Grandmother     Heart Disease Paternal Grandfather     High Blood Pressure Paternal Grandfather     High Cholesterol Paternal Grandfather     Stroke Brother     Heart Failure Brother     Heart Disease Brother     Diabetes type 2  Brother     Diabetes type 2  Brother     Diabetes type 2  Brother        Review of Systems:  A 14 point review of systems was completed. Pertinent positives identified in the HPI, all other review of systems negative.      Physical Examination:    /75   Pulse 70   Temp 97.5 °F (36.4 °C) (Oral)   Resp 16   Ht 1.753 m (5' 9.02\")   Wt 91.3 kg (201 lb 4.5 oz)   SpO2 97%   BMI 29.71 kg/m²        Admission Weight - Scale: 88.9 kg (195 lb 14.4 oz)       General appearance: NAD  Eyes: PERRLA  Neck: no JVD, no lymphadenopathy.  Respiratory: effort is unlabored, no crackles, wheezes or rubs.  Cardiovascular: regular, no

## 2024-09-24 NOTE — PROGRESS NOTES
V2.0    [facility] Progress Note      Name:  Ruben Eagle /Age/Sex: 1965  (59 y.o. male)   MRN & CSN:  [id number] & [id number] Encounter Date/Time: 2024 9:01 AM EDT   Location:  [facility]7/0437-01 PCP: Leann Marie, APRN - CNP     Attending:Tobin Pierre DO       Hospital Day: 3    Assessment and Recommendations   Ruben Eagle is a 59 y.o. male with pmh of CKD, T2DM, HTN, CAD/status post CABG in , CHF/with LV systolic function is significantly decreased and estimated EF of approximately 10 - 15 %, ( per Echo of 24) hypothyroidism who presents with Bacteremia    Overnight events: None     Plan:   Bacteremia: Blood cultures are consistent with MRSA bacteremia.  Repeat blood cultures completed on 2024 will be followed.  Transfer echocardiogram was completed on 2024.  Esophageal echocardiogram will defer to infectious disease prior to ordering.  He has been maintained on cefepime and will continue.   ID started pt on Daptomycin 700mg  Continue to monitor      Acute on chronic kidney failure: He does have chronic kidney disease with a baseline creatinine of 1.8 to 2.2 mg/dl. His KRYSTAL is likely secondary to a pre-renal component.  We will follow urine output, avoid nephrotoxic agents.  Follow renal functions.  Nephrology was following him at [place] and we will consult.   Fraser catheter placed for strict I/O  Creatinine 3.6, significantly elevated  Lasix stopped by nephrology  Gentle IVF per nephro and continue trending labs    Sepsis  Improving  leukocytosis and tachycardia improved  Continue IVF as appropriate and monitor clinical response w/ ABX as written.     Hyponatremia: Sodium noted to be low for the past few days.  Placed on water restriction   Will check repeat sodium level in a.m.  Sodium today 125  Continue free water restriction per nephro  NS IVF 75ml/hr      CHF/Systolic:  LV systolic function is significantly decreased and estimated EF of approximately 10 - 15 %, ( per    Component Value Date/Time    LABURIN >100,000 CFU/ml 09/19/2024 10:59 PM     Blood Cultures:   Lab Results   Component Value Date/Time    BC  09/22/2024 08:07 AM     No Growth to date.  Any change in status will be called.     Lab Results   Component Value Date/Time    BLOODCULT2  09/22/2024 08:11 AM     No Growth to date.  Any change in status will be called.     Organism:   Lab Results   Component Value Date/Time    ORG Enterobacter cloacae complex 09/19/2024 10:59 PM         Electronically signed by Brooks Galeana MD on 9/24/2024 at 11:30 AM

## 2024-09-24 NOTE — PROGRESS NOTES
Infectious Disease Follow up Notes    CC : MRSA bacteremia and extensive infection of L foot and ankle      Antibiotics:  Cefepime 1g q12  Dapto 700mg q48  Flagyl 500 IV q8      Admit Date:   9/22/2024  Hospital Day: 3    Subjective:   Hypothermic today now w warming blanket in place   BP soft but stable.  Not tachycardic.  He denies chest and abd pain.  No change in the L foot from his perspective     Objective:     Patient Vitals for the past 8 hrs:   BP Temp Temp src Pulse Resp SpO2   09/24/24 1500 (!) 87/63 (!) 92.8 °F (33.8 °C) Oral 71 17 97 %   09/24/24 1238 -- (!) 92.6 °F (33.7 °C) Oral -- -- --   09/24/24 1118 91/64 (!) 93.4 °F (34.1 °C) Axillary 67 16 95 %   09/24/24 0833 107/75 97.5 °F (36.4 °C) Oral 70 16 97 %       EXAM:  General:   alert, conversant, answering questions appropriately.  No apparent distress    HEENT:   NCAT, PERRL, sclera icteric   Conjunctiva full   NECK:       LUNGS:  CTA upper lobes adelaida, no W/R/R    CV:   RRR  ABD: Soft, flat, NT    EXT: L foot wrapped.  Pitting edema LLE without ascending cellulitic change      SKIN: No acute focal rash         LINE:   PIV in place         Scheduled Meds:   [START ON 9/26/2024] DAPTOmycin (CUBICIN) 700 mg in sodium chloride 0.9 % 50 mL IVPB  8 mg/kg IntraVENous Q48H    metoprolol succinate  50 mg Oral BID    metroNIDAZOLE  500 mg IntraVENous Q8H    allopurinol  100 mg Oral Daily    aspirin  81 mg Oral Daily    atorvastatin  40 mg Oral Nightly    clopidogrel  75 mg Oral Daily    ferrous sulfate  325 mg Oral Nightly    levothyroxine  125 mcg Oral Daily    therapeutic multivitamin-minerals  1 tablet Oral Daily    insulin lispro  0-4 Units SubCUTAneous TID WC    insulin lispro  0-4 Units SubCUTAneous Nightly    sodium chloride flush  5-40 mL IntraVENous 2 times per day    enoxaparin  30 mg SubCUTAneous Daily    insulin glargine  20 Units SubCUTAneous Nightly    cefepime   ([facility]) 08/16/2024    Osteomyelitis of foot, right, acute ([facility]) 08/15/2024    Diabetic infection of right foot ([facility]) 08/15/2024    Osteomyelitis ([facility]) 08/14/2024    Hyponatremia 08/14/2024    MRSA (methicillin resistant Staphylococcus aureus) infection 06/15/2024    Acute kidney injury ([facility]) 06/14/2024    Hyperglycemia 06/14/2024    Lightheadedness 06/14/2024    Infected stasis ulcer of left lower extremity ([facility]) 03/13/2024    Congestive heart failure ([facility]) 03/13/2024    Anemia 03/13/2024    Hypotension 03/13/2024    Umbilical hernia without obstruction and without gangrene 01/12/2024    Diabetic foot infection ([facility]) 12/02/2023    Dilated cardiomyopathy ([facility]) 12/02/2023    Diabetic foot ([facility]) 12/01/2023    Localized edema 05/31/2023    Stricture of artery ([facility]) 05/16/2023    Diabetic retinopathy of both eyes without macular edema associated with type 2 diabetes mellitus ([facility]) 03/22/2022    Diabetic nephropathy associated with type 2 diabetes mellitus ([facility]) 03/22/2022    ACE-inhibitor cough 10/27/2021    Chest pain 01/16/2021    SOB (shortness of breath)     Ischemic cardiomyopathy     S/P coronary artery bypass graft x 3 12/09/2020    Acute post-operative pain     Coronary artery disease involving native coronary artery of native heart without angina pectoris 08/06/2020    Obesity 08/06/2020    Acquired hypothyroidism 04/18/2017    Hallux valgus 11/11/2016    Callus of foot 11/03/2016    Type 2 diabetes mellitus without complication, with long-term current use of insulin ([facility]) 05/19/2016    Onychomycosis 04/02/2016    Dystrophic nail 04/02/2016    Hyperlipidemia 02/23/2016    Diabetic foot ulcer with osteomyelitis ([facility]) 09/19/2014     Overview Note:     Left sub-3rd-met      ICD (implantable cardioverter-defibrillator) in place 01/30/2014     Overview Note:     Medtronic single chamber ICD with Optivol implanted 1/23/14 by Dr Moon.       Cardiomyopathy ([facility]) 01/23/2014    Hypertension     Diabetic polyneuropathy  LFTs  Viral hepatitis serologies and HIV screen were ordered on 9/23/24  Involve GI unless trending down in AM      No family in the room  D/w RN and Hospitalist         Medical Decision Making:  The following items were considered in medical decision making:  Discussion of patient care with other providers  Reviewed clinical lab tests  Reviewed radiology tests  Reviewed other diagnostic tests/interventions  Independent review of radiologic images  Microbiology cultures and other micro tests reviewed       Risk of Complications/Morbidity: High   Illness(es)/ Infection present that pose threat to bodily function.   There is potential for severe exacerbation of infection/side effects of treatment.  Therapy requires intensive monitoring for antimicrobial agent toxicity        Madai Alanis MD  Phone: [phone]   Fax : [phone]

## 2024-09-24 NOTE — CARE COORDINATION
LOS 2.  Care managed by Hosp [facility], Neph, ID, Podiatry. Here w Bacteremia, KRYSTAL. From [facility]- plan [facility].  Discussed w liaison-reviewing for remaining skilled days as pt appears to be MCR only w no secondary. Likely IV AB at MI. Has also named [facility] as back up plan. CM following. Dona Quijano, RN      1251 Pt found to have used 24 days in SNF- now in copay days- $203 per day. Family can work w [facility] fo rMCD eligibility, if desired. Noted that pt sched for BKA on Thurs. May be appropriate for ARU?  Continue to follow. Dona Quijano, RN

## 2024-09-24 NOTE — PLAN OF CARE
CHF Care Plan      Patient's EF (Ejection Fraction) is less than 40%    Heart Failure Medications:  Diuretics:: Furosemide    (One of the following REQUIRED for EF </= 40%/SYSTOLIC FAILURE but MAY be used in EF% >40%/DIASTOLIC FAILURE)        ACE:: None        ARB:: None         ARNI:: None    (Beta Blockers)  NON- Evidenced Based Beta Blocker (for EF% >40%/DIASTOLIC FAILURE): None    Evidenced Based Beta Blocker::(REQUIRED for EF% <40%/SYSTOLIC FAILURE) Metoprolol SUCCinate- Toprol XL  ...................................................................................................................................................    Failed to redirect to the Timeline version of the [employer] SmartLink.      Patient's weights and intake/output reviewed    Daily Weight log at bedside, patient/family participation in use of log: \"yes    Patient's current weight today shows a difference of 2 lbs more than last documented weight.      Intake/Output Summary (Last 24 hours) at 9/24/2024 0555  Last data filed at 9/24/2024 0534  Gross per 24 hour   Intake 600 ml   Output 375 ml   Net 225 ml       Education Booklet Provided: yes    Comorbidities Reviewed Yes    Patient has a past medical history of Acute on chronic systolic congestive heart failure ([facility]), Acute osteomyelitis of left foot ([facility]), Acute osteomyelitis of right foot ([facility]), Acute respiratory failure ([facility]), Ascites, Blood transfusion reaction, Burst fracture of lumbar vertebra ([facility]), Cellulitis of left foot, Cellulitis of right lower extremity, Chronic systolic CHF (congestive heart failure) ([facility]), Community acquired pneumonia, Coronary artery disease involving native coronary artery of native heart without angina pectoris, Diabetes ([facility]), Diabetic ulcer of left foot associated with type 2 diabetes mellitus, with muscle involvement without evidence of necrosis (HCC), Diabetic ulcer of right foot (HCC), Diabetic ulcer of 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 2 diabetes

## 2024-09-24 NOTE — PROGRESS NOTES
[facility] Progress Note           Subjective/   59 y.o. year old male who we are seeing in consultation for KRYSTAL on CKD and hyponatremia likely related volume overload requiring Lasix gtt. He was transferred to [facility] on 9/22/24 for ID consult.     Patient seen and examined at bedside.  He feels well today with no complaints of CP or SOB.  He denies N/V or adb pain.  He continues to have some pain and numbness in his LLE.  He doesn't think there is any change in the swelling of his leg.   - No family at bedside   - Labs and ON events reveiwed  - PMShx reviewed     ROS: 12 point review of systems negative unless stated above     Objective/   GEN:  Chronically ill, /75   Pulse 70   Temp 97.5 °F (36.4 °C) (Oral)   Resp 16   Ht 1.753 m (5' 9.02\")   Wt 91.3 kg (201 lb 4.5 oz)   SpO2 97%   BMI 29.71 kg/m²     HEENT: non-icteric, no JVD  CV: RRR, S1, S2 without m/r/g; +LLE edema  RESP: CTA B without w/r/r; breathing wnl  ABD: +bs, soft, nt, no hsm  SKIN: warm, no rashes, bruising/edema on LLE    Data/  Recent Labs     09/23/24  0600 09/23/24  1754 09/24/24  0529   WBC 17.5* 21.2* 15.2*   HGB 11.3* 12.1* 11.9*   HCT 36.1* 40.1* 38.1*   MCV 80.8 82.1 81.7   * 132* 96*     Recent Labs     09/22/24  0807 09/23/24  0600 09/24/24  0529   * 125* 125*   K 4.3 4.1 4.2  4.2   CL 87* 87* 86*   CO2 18* 24 19*   GLUCOSE 153* 179* 90   BUN 74* 77* 79*   CREATININE 3.2* 3.5* 3.6*   LABGLOM 21* 19* 19*       Assessment/Plan   KRYSTAL on CKD: likely pre-renal   - baseline Cre of 1.8-2.2  - Cre continue to trend upwards, 3.6 today   - Fraser placed to monitor strict I/Os  - Lasix gtt off since 9/22  - BP low normal, SBP 80-90s   - Urine Output low overnight   - Was given NS 75ml/hr for 13 hours overnight   - received Albumin this morning   Plan:  - Will start 0.9NS with 100mEq Sodium Bicarb infusion at 65ml/hr   - Strict I/Os  - Trend labs     Hyponatremia  - 1200ml fluid restriction   - 1.5% NS      Hypertension  - BP remains low  - prevent hypotension to reduce risk of poor renal perfusion      Anemia   - Stable     Osteomyelitis   - ID Consulted  - On IV antibiotics     ____________________________________  JOSE Edwards NP  [facility]  [email or web address]  Office: [phone]

## 2024-09-24 NOTE — CONSULTS
Consult for leg wounds.  Patient has already been seen by Dr Spivey, noted deep space abscess left foot and ankle. MD drained @ 50 ml from percutaneous stab incision to foot.  Patient seen by Dr Meza today and decision made for left BKA on Thursday 9/26/24.    Call to Dr Spivey for wound care orders prior to amputation.  MD ordered, clean left foot / ankle with normal saline, apply dry dressing, roll guaze and ace wrap 9/24/24 & 9/25/24.  BKA scheduled for 9/26/24. Order placed. No need for wound care to see at this time.  Will sign off.      Text to Dr Pierre: Dr Spivey has already seen this patient.  Dr Meza plans L BKA on 9/26/24.  I Clarified wound care orders with Dr Spivey and placed in chart until amputation.  NO need for wound care to see.  Will sign off.

## 2024-09-24 NOTE — PROGRESS NOTES
[facility] consult/progress note           Subjective/   59 y.o. year old male who we are seeing in consultation for KRYSTAL on CKD and hyponatremia likely related volume overload requiring Lasix gtt. he was transferred to [facility] on 9/22 for ID consult    Feels tired    Last 24 h uop 375 mL charted    ROS: No chest pain/shortness of breath/fever/nausea/vomiting  PSFH: No visitor    Scheduled Meds:   metoprolol succinate  50 mg Oral BID    DAPTOmycin (CUBICIN) 700 mg in sodium chloride 0.9 % 50 mL IVPB  8 mg/kg IntraVENous Q24H    metroNIDAZOLE  500 mg IntraVENous Q8H    allopurinol  100 mg Oral Daily    aspirin  81 mg Oral Daily    atorvastatin  40 mg Oral Nightly    clopidogrel  75 mg Oral Daily    ferrous sulfate  325 mg Oral Nightly    levothyroxine  125 mcg Oral Daily    therapeutic multivitamin-minerals  1 tablet Oral Daily    insulin lispro  0-4 Units SubCUTAneous TID WC    insulin lispro  0-4 Units SubCUTAneous Nightly    sodium chloride flush  5-40 mL IntraVENous 2 times per day    enoxaparin  30 mg SubCUTAneous Daily    insulin glargine  20 Units SubCUTAneous Nightly    cefepime  1,000 mg IntraVENous Q12H     Continuous Infusions:   sodium bicarbonate 100 mEq in sodium chloride 0.9 % 1,000 mL infusion 65 mL/hr at 09/24/24 1313    dextrose      [Held by provider] furosemide (LASIX) 100 mg in sodium chloride 0.9 % 100 mL infusion Stopped (09/23/24 1010)    sodium chloride       PRN Meds:.guaiFENesin-dextromethorphan, oxyCODONE-acetaminophen, glucose, dextrose bolus **OR** dextrose bolus, glucagon (rDNA), dextrose, sodium chloride flush, sodium chloride, ondansetron **OR** ondansetron, polyethylene glycol, acetaminophen **OR** acetaminophen      Objective/   GEN:  Chronically ill, BP 91/64   Pulse 67   Temp (!) 93.4 °F (34.1 °C) (Axillary)   Resp 16   Ht 1.753 m (5' 9.02\")   Wt 91.3 kg (201 lb 4.5 oz)   SpO2 95%   BMI 29.71 kg/m²     HEENT: non-icteric, no JVD  CV: RRR, S1,  S2 without m/r/g; +LLE edema  RESP: CTA B without w/r/r; breathing wnl  ABD: +bs, soft, nt, no hsm  SKIN: warm, no rashes, bruising/edema on LLE    Data/  Recent Labs     09/23/24  0600 09/23/24  1754 09/24/24  0529   WBC 17.5* 21.2* 15.2*   HGB 11.3* 12.1* 11.9*   HCT 36.1* 40.1* 38.1*   MCV 80.8 82.1 81.7   * 132* 96*     Recent Labs     09/22/24  0807 09/23/24  0600 09/24/24  0529   * 125* 125*   K 4.3 4.1 4.2  4.2   CL 87* 87* 86*   CO2 18* 24 19*   GLUCOSE 153* 179* 90   BUN 74* 77* 79*   CREATININE 3.2* 3.5* 3.6*   LABGLOM 21* 19* 19*       Assessment/Plan   KRYSTAL on CKD: likely pre-renal   - baseline Cre of 1.8-2.2  - Cre continue to trend upwards, 3.5 today   - Fraser placed to monitor strict I/Os  - Lasix gtt stopped on 9/20 3 AM  - BP low normal, SBP 90-100s   - Urine Output marginal overnight (550ml)   Plan:  -1.5% saline with bicarb at 65 mL/h  - Strict I/Os  - Trend labs     Hyponatremia  - Continue free water restriction  - NS IVF 75ml/hr     Hypertension  - BP acceptable  - prevent hypotension to reduce risk of poor renal perfusion      Anemia   - Stable     Osteomyelitis   - ID Consulted  - On IV antibiotics     ____________________________________  Scott Leach MD  [facility]  [email or web address]  Office: [phone]

## 2024-09-25 ENCOUNTER — APPOINTMENT (OUTPATIENT)
Dept: ULTRASOUND IMAGING | Age: 59
DRG: 853 | End: 2024-09-25
Attending: INTERNAL MEDICINE
Payer: MEDICARE

## 2024-09-25 ENCOUNTER — APPOINTMENT (OUTPATIENT)
Dept: GENERAL RADIOLOGY | Age: 59
DRG: 853 | End: 2024-09-25
Attending: INTERNAL MEDICINE
Payer: MEDICARE

## 2024-09-25 ENCOUNTER — ANESTHESIA EVENT (OUTPATIENT)
Dept: OPERATING ROOM | Age: 59
End: 2024-09-25
Payer: MEDICARE

## 2024-09-25 PROBLEM — I38 ENDOCARDITIS: Status: ACTIVE | Noted: 2024-09-25

## 2024-09-25 PROBLEM — N18.9 CHRONIC KIDNEY DISEASE: Status: ACTIVE | Noted: 2024-09-25

## 2024-09-25 PROBLEM — M14.672 CHARCOT'S JOINT OF ANKLE, LEFT: Status: ACTIVE | Noted: 2024-09-25

## 2024-09-25 LAB
ABO + RH BLD: NORMAL
ALBUMIN SERPL-MCNC: 3 G/DL (ref 3.4–5)
ALBUMIN SERPL-MCNC: 3.3 G/DL (ref 3.4–5)
ALBUMIN SERPL-MCNC: 3.4 G/DL (ref 3.4–5)
ALBUMIN/GLOB SERPL: 1.2 {RATIO} (ref 1.1–2.2)
ALP SERPL-CCNC: 246 U/L (ref 40–129)
ALT SERPL-CCNC: 57 U/L (ref 10–40)
ANION GAP SERPL CALCULATED.3IONS-SCNC: 17 MMOL/L (ref 3–16)
ANION GAP SERPL CALCULATED.3IONS-SCNC: 18 MMOL/L (ref 3–16)
ANION GAP SERPL CALCULATED.3IONS-SCNC: 23 MMOL/L (ref 3–16)
AST SERPL-CCNC: 114 U/L (ref 15–37)
BASE EXCESS BLDV CALC-SCNC: -6.8 MMOL/L (ref -3–3)
BASOPHILS # BLD: 0 K/UL (ref 0–0.2)
BASOPHILS NFR BLD: 0.1 %
BILIRUB DIRECT SERPL-MCNC: 6.5 MG/DL (ref 0–0.3)
BILIRUB INDIRECT SERPL-MCNC: 2.8 MG/DL (ref 0–1)
BILIRUB SERPL-MCNC: 9.3 MG/DL (ref 0–1)
BILIRUB SERPL-MCNC: 9.5 MG/DL (ref 0–1)
BLD GP AB SCN SERPL QL: NORMAL
BUN SERPL-MCNC: 81 MG/DL (ref 7–20)
BUN SERPL-MCNC: 89 MG/DL (ref 7–20)
BUN SERPL-MCNC: 91 MG/DL (ref 7–20)
CA-I BLD-SCNC: 0.81 MMOL/L (ref 1.12–1.32)
CA-I BLD-SCNC: 0.83 MMOL/L (ref 1.12–1.32)
CALCIUM SERPL-MCNC: 6.4 MG/DL (ref 8.3–10.6)
CALCIUM SERPL-MCNC: 6.6 MG/DL (ref 8.3–10.6)
CALCIUM SERPL-MCNC: 6.8 MG/DL (ref 8.3–10.6)
CHLORIDE SERPL-SCNC: 87 MMOL/L (ref 99–110)
CHLORIDE SERPL-SCNC: 89 MMOL/L (ref 99–110)
CHLORIDE SERPL-SCNC: 93 MMOL/L (ref 99–110)
CO2 BLDV-SCNC: 19 MMOL/L
CO2 SERPL-SCNC: 16 MMOL/L (ref 21–32)
CO2 SERPL-SCNC: 19 MMOL/L (ref 21–32)
CO2 SERPL-SCNC: 19 MMOL/L (ref 21–32)
COHGB MFR BLDV: 3.4 % (ref 0–1.5)
CREAT SERPL-MCNC: 3.6 MG/DL (ref 0.9–1.3)
CREAT SERPL-MCNC: 4.4 MG/DL (ref 0.9–1.3)
CREAT SERPL-MCNC: 4.4 MG/DL (ref 0.9–1.3)
DEPRECATED RDW RBC AUTO: 25 % (ref 12.4–15.4)
EKG ATRIAL RATE: 76 BPM
EKG DIAGNOSIS: NORMAL
EKG P AXIS: 93 DEGREES
EKG P-R INTERVAL: 240 MS
EKG Q-T INTERVAL: 488 MS
EKG QRS DURATION: 140 MS
EKG QTC CALCULATION (BAZETT): 549 MS
EKG R AXIS: -58 DEGREES
EKG T AXIS: 99 DEGREES
EKG VENTRICULAR RATE: 76 BPM
EOSINOPHIL # BLD: 0 K/UL (ref 0–0.6)
EOSINOPHIL NFR BLD: 0.1 %
GFR SERPLBLD CREATININE-BSD FMLA CKD-EPI: 15 ML/MIN/{1.73_M2}
GFR SERPLBLD CREATININE-BSD FMLA CKD-EPI: 15 ML/MIN/{1.73_M2}
GFR SERPLBLD CREATININE-BSD FMLA CKD-EPI: 19 ML/MIN/{1.73_M2}
GLUCOSE BLD-MCNC: 142 MG/DL (ref 70–99)
GLUCOSE BLD-MCNC: 149 MG/DL (ref 70–99)
GLUCOSE BLD-MCNC: 95 MG/DL (ref 70–99)
GLUCOSE BLD-MCNC: 99 MG/DL (ref 70–99)
GLUCOSE SERPL-MCNC: 100 MG/DL (ref 70–99)
GLUCOSE SERPL-MCNC: 112 MG/DL (ref 70–99)
GLUCOSE SERPL-MCNC: 96 MG/DL (ref 70–99)
HBV CORE AB SERPL QL IA: NEGATIVE
HCO3 BLDV-SCNC: 18.2 MMOL/L (ref 23–29)
HCT VFR BLD AUTO: 39.8 % (ref 40.5–52.5)
HGB BLD-MCNC: 11.9 G/DL (ref 13.5–17.5)
HIV 1+2 AB+HIV1 P24 AG SERPL QL IA: NORMAL
HIV 2 AB SERPL QL IA: NORMAL
HIV1 AB SERPL QL IA: NORMAL
HIV1 P24 AG SERPL QL IA: NORMAL
INR PPP: 4.45 (ref 0.85–1.15)
LACTATE BLDV-SCNC: 4.1 MMOL/L (ref 0.4–2)
LYMPHOCYTES # BLD: 1.4 K/UL (ref 1–5.1)
LYMPHOCYTES NFR BLD: 5.6 %
MAGNESIUM SERPL-MCNC: 2.4 MG/DL (ref 1.8–2.4)
MAGNESIUM SERPL-MCNC: 2.5 MG/DL (ref 1.8–2.4)
MCH RBC QN AUTO: 24.4 PG (ref 26–34)
MCHC RBC AUTO-ENTMCNC: 29.9 G/DL (ref 31–36)
MCV RBC AUTO: 81.9 FL (ref 80–100)
METHGB MFR BLDV: 0.2 %
MONOCYTES # BLD: 1.2 K/UL (ref 0–1.3)
MONOCYTES NFR BLD: 4.8 %
NEUTROPHILS # BLD: 22.1 K/UL (ref 1.7–7.7)
NEUTROPHILS NFR BLD: 89.4 %
NT-PROBNP SERPL-MCNC: ABNORMAL PG/ML (ref 0–124)
O2 THERAPY: ABNORMAL
PCO2 BLDV: 35.3 MMHG (ref 40–50)
PERFORMED ON: ABNORMAL
PERFORMED ON: ABNORMAL
PERFORMED ON: NORMAL
PERFORMED ON: NORMAL
PH BLDV: 7.31 [PH] (ref 7.35–7.45)
PH BLDV: 7.33 [PH] (ref 7.35–7.45)
PH BLDV: 7.33 [PH] (ref 7.35–7.45)
PHOSPHATE SERPL-MCNC: 5.7 MG/DL (ref 2.5–4.9)
PHOSPHATE SERPL-MCNC: 7.1 MG/DL (ref 2.5–4.9)
PLATELET # BLD AUTO: 110 K/UL (ref 135–450)
PMV BLD AUTO: 8.1 FL (ref 5–10.5)
PO2 BLDV: 40.7 MMHG (ref 25–40)
POTASSIUM SERPL-SCNC: 4.7 MMOL/L (ref 3.5–5.1)
POTASSIUM SERPL-SCNC: 4.9 MMOL/L (ref 3.5–5.1)
PROT SERPL-MCNC: 6.2 G/DL (ref 6.4–8.2)
PROTHROMBIN TIME: 41.9 SEC (ref 11.9–14.9)
RBC # BLD AUTO: 4.87 M/UL (ref 4.2–5.9)
SAO2 % BLDV: 71 %
SODIUM SERPL-SCNC: 126 MMOL/L (ref 136–145)
SODIUM SERPL-SCNC: 126 MMOL/L (ref 136–145)
SODIUM SERPL-SCNC: 129 MMOL/L (ref 136–145)
TROPONIN, HIGH SENSITIVITY: 80 NG/L (ref 0–22)
WBC # BLD AUTO: 24.7 K/UL (ref 4–11)

## 2024-09-25 PROCEDURE — 30233L1 TRANSFUSION OF NONAUTOLOGOUS FRESH PLASMA INTO PERIPHERAL VEIN, PERCUTANEOUS APPROACH: ICD-10-PCS

## 2024-09-25 PROCEDURE — 82247 BILIRUBIN TOTAL: CPT

## 2024-09-25 PROCEDURE — 86038 ANTINUCLEAR ANTIBODIES: CPT

## 2024-09-25 PROCEDURE — 82248 BILIRUBIN DIRECT: CPT

## 2024-09-25 PROCEDURE — 02HV33Z INSERTION OF INFUSION DEVICE INTO SUPERIOR VENA CAVA, PERCUTANEOUS APPROACH: ICD-10-PCS

## 2024-09-25 PROCEDURE — 2580000003 HC RX 258: Performed by: INTERNAL MEDICINE

## 2024-09-25 PROCEDURE — 5A1D90Z PERFORMANCE OF URINARY FILTRATION, CONTINUOUS, GREATER THAN 18 HOURS PER DAY: ICD-10-PCS

## 2024-09-25 PROCEDURE — 71045 X-RAY EXAM CHEST 1 VIEW: CPT

## 2024-09-25 PROCEDURE — 83605 ASSAY OF LACTIC ACID: CPT

## 2024-09-25 PROCEDURE — 6370000000 HC RX 637 (ALT 250 FOR IP): Performed by: INTERNAL MEDICINE

## 2024-09-25 PROCEDURE — 2000000000 HC ICU R&B

## 2024-09-25 PROCEDURE — 6360000002 HC RX W HCPCS: Performed by: NURSE PRACTITIONER

## 2024-09-25 PROCEDURE — 76705 ECHO EXAM OF ABDOMEN: CPT

## 2024-09-25 PROCEDURE — 82330 ASSAY OF CALCIUM: CPT

## 2024-09-25 PROCEDURE — P9017 PLASMA 1 DONOR FRZ W/IN 8 HR: HCPCS

## 2024-09-25 PROCEDURE — 86850 RBC ANTIBODY SCREEN: CPT

## 2024-09-25 PROCEDURE — 85025 COMPLETE CBC W/AUTO DIFF WBC: CPT

## 2024-09-25 PROCEDURE — 6360000002 HC RX W HCPCS: Performed by: INTERNAL MEDICINE

## 2024-09-25 PROCEDURE — 2580000003 HC RX 258: Performed by: NURSE PRACTITIONER

## 2024-09-25 PROCEDURE — 80053 COMPREHEN METABOLIC PANEL: CPT

## 2024-09-25 PROCEDURE — 85610 PROTHROMBIN TIME: CPT

## 2024-09-25 PROCEDURE — 86900 BLOOD TYPING SEROLOGIC ABO: CPT

## 2024-09-25 PROCEDURE — 6360000002 HC RX W HCPCS: Performed by: STUDENT IN AN ORGANIZED HEALTH CARE EDUCATION/TRAINING PROGRAM

## 2024-09-25 PROCEDURE — 84484 ASSAY OF TROPONIN QUANT: CPT

## 2024-09-25 PROCEDURE — 94761 N-INVAS EAR/PLS OXIMETRY MLT: CPT

## 2024-09-25 PROCEDURE — 99233 SBSQ HOSP IP/OBS HIGH 50: CPT | Performed by: INTERNAL MEDICINE

## 2024-09-25 PROCEDURE — 36415 COLL VENOUS BLD VENIPUNCTURE: CPT

## 2024-09-25 PROCEDURE — 90945 DIALYSIS ONE EVALUATION: CPT

## 2024-09-25 PROCEDURE — 82803 BLOOD GASES ANY COMBINATION: CPT

## 2024-09-25 PROCEDURE — 83880 ASSAY OF NATRIURETIC PEPTIDE: CPT

## 2024-09-25 PROCEDURE — 86901 BLOOD TYPING SEROLOGIC RH(D): CPT

## 2024-09-25 PROCEDURE — 83735 ASSAY OF MAGNESIUM: CPT

## 2024-09-25 PROCEDURE — 93010 ELECTROCARDIOGRAM REPORT: CPT | Performed by: INTERNAL MEDICINE

## 2024-09-25 PROCEDURE — 2580000003 HC RX 258: Performed by: STUDENT IN AN ORGANIZED HEALTH CARE EDUCATION/TRAINING PROGRAM

## 2024-09-25 PROCEDURE — 30233K1 TRANSFUSION OF NONAUTOLOGOUS FROZEN PLASMA INTO PERIPHERAL VEIN, PERCUTANEOUS APPROACH: ICD-10-PCS

## 2024-09-25 PROCEDURE — 99222 1ST HOSP IP/OBS MODERATE 55: CPT | Performed by: STUDENT IN AN ORGANIZED HEALTH CARE EDUCATION/TRAINING PROGRAM

## 2024-09-25 PROCEDURE — 6370000000 HC RX 637 (ALT 250 FOR IP): Performed by: STUDENT IN AN ORGANIZED HEALTH CARE EDUCATION/TRAINING PROGRAM

## 2024-09-25 PROCEDURE — 0W993ZZ DRAINAGE OF RIGHT PLEURAL CAVITY, PERCUTANEOUS APPROACH: ICD-10-PCS | Performed by: RADIOLOGY

## 2024-09-25 PROCEDURE — 89051 BODY FLUID CELL COUNT: CPT

## 2024-09-25 PROCEDURE — 2700000000 HC OXYGEN THERAPY PER DAY

## 2024-09-25 PROCEDURE — 99231 SBSQ HOSP IP/OBS SF/LOW 25: CPT | Performed by: CLINICAL NURSE SPECIALIST

## 2024-09-25 PROCEDURE — 93005 ELECTROCARDIOGRAM TRACING: CPT | Performed by: STUDENT IN AN ORGANIZED HEALTH CARE EDUCATION/TRAINING PROGRAM

## 2024-09-25 RX ORDER — INSULIN GLARGINE 100 [IU]/ML
10 INJECTION, SOLUTION SUBCUTANEOUS NIGHTLY
Status: DISCONTINUED | OUTPATIENT
Start: 2024-09-25 | End: 2024-09-28 | Stop reason: HOSPADM

## 2024-09-25 RX ORDER — NYSTATIN 100000 [USP'U]/ML
5 SUSPENSION ORAL 4 TIMES DAILY
Status: DISCONTINUED | OUTPATIENT
Start: 2024-09-25 | End: 2024-09-28 | Stop reason: HOSPADM

## 2024-09-25 RX ORDER — CALCIUM GLUCONATE 20 MG/ML
1000 INJECTION, SOLUTION INTRAVENOUS PRN
Status: DISCONTINUED | OUTPATIENT
Start: 2024-09-25 | End: 2024-09-28 | Stop reason: HOSPADM

## 2024-09-25 RX ORDER — HEPARIN SODIUM 1000 [USP'U]/ML
INJECTION, SOLUTION INTRAVENOUS; SUBCUTANEOUS PRN
Status: DISCONTINUED | OUTPATIENT
Start: 2024-09-25 | End: 2024-09-28 | Stop reason: HOSPADM

## 2024-09-25 RX ORDER — CALCIUM GLUCONATE 20 MG/ML
2000 INJECTION, SOLUTION INTRAVENOUS PRN
Status: DISCONTINUED | OUTPATIENT
Start: 2024-09-25 | End: 2024-09-28 | Stop reason: HOSPADM

## 2024-09-25 RX ORDER — SODIUM CHLORIDE 9 MG/ML
INJECTION, SOLUTION INTRAVENOUS CONTINUOUS
Status: DISCONTINUED | OUTPATIENT
Start: 2024-09-25 | End: 2024-09-26

## 2024-09-25 RX ORDER — MAGNESIUM SULFATE 1 G/100ML
1000 INJECTION INTRAVENOUS PRN
Status: DISCONTINUED | OUTPATIENT
Start: 2024-09-25 | End: 2024-09-28 | Stop reason: HOSPADM

## 2024-09-25 RX ADMIN — SODIUM CHLORIDE: 9 INJECTION, SOLUTION INTRAVENOUS at 13:09

## 2024-09-25 RX ADMIN — ATORVASTATIN CALCIUM 40 MG: 40 TABLET, FILM COATED ORAL at 20:25

## 2024-09-25 RX ADMIN — DAPTOMYCIN 700 MG: 500 INJECTION, POWDER, LYOPHILIZED, FOR SOLUTION INTRAVENOUS at 16:58

## 2024-09-25 RX ADMIN — CEFEPIME 1000 MG: 1 INJECTION, POWDER, FOR SOLUTION INTRAMUSCULAR; INTRAVENOUS at 10:35

## 2024-09-25 RX ADMIN — SODIUM CHLORIDE: 9 INJECTION, SOLUTION INTRAVENOUS at 10:29

## 2024-09-25 RX ADMIN — SODIUM CHLORIDE: 9 INJECTION, SOLUTION INTRAVENOUS at 23:46

## 2024-09-25 RX ADMIN — Medication: at 14:00

## 2024-09-25 RX ADMIN — SODIUM CHLORIDE: 9 INJECTION, SOLUTION INTRAVENOUS at 15:31

## 2024-09-25 RX ADMIN — INSULIN GLARGINE 10 UNITS: 100 INJECTION, SOLUTION SUBCUTANEOUS at 21:15

## 2024-09-25 RX ADMIN — NYSTATIN 500000 UNITS: 100000 SUSPENSION ORAL at 20:25

## 2024-09-25 RX ADMIN — Medication: at 23:00

## 2024-09-25 RX ADMIN — METRONIDAZOLE 500 MG: 500 INJECTION, SOLUTION INTRAVENOUS at 03:35

## 2024-09-25 RX ADMIN — METRONIDAZOLE 500 MG: 500 INJECTION, SOLUTION INTRAVENOUS at 20:28

## 2024-09-25 RX ADMIN — PHYTONADIONE 10 MG: 10 INJECTION, EMULSION INTRAMUSCULAR; INTRAVENOUS; SUBCUTANEOUS at 15:32

## 2024-09-25 RX ADMIN — SODIUM CHLORIDE, PRESERVATIVE FREE 10 ML: 5 INJECTION INTRAVENOUS at 21:11

## 2024-09-25 RX ADMIN — FERROUS SULFATE TAB 325 MG (65 MG ELEMENTAL FE) 325 MG: 325 (65 FE) TAB at 20:25

## 2024-09-25 RX ADMIN — METRONIDAZOLE 500 MG: 500 INJECTION, SOLUTION INTRAVENOUS at 13:42

## 2024-09-25 RX ADMIN — LEVOTHYROXINE SODIUM 125 MCG: 0.12 TABLET ORAL at 05:25

## 2024-09-25 RX ADMIN — SODIUM CHLORIDE, PRESERVATIVE FREE 10 ML: 5 INJECTION INTRAVENOUS at 13:44

## 2024-09-25 RX ADMIN — CALCIUM GLUCONATE 2000 MG: 20 INJECTION, SOLUTION INTRAVENOUS at 20:43

## 2024-09-25 RX ADMIN — CEFEPIME 2000 MG: 2 INJECTION, POWDER, FOR SOLUTION INTRAVENOUS at 17:04

## 2024-09-25 RX ADMIN — ONDANSETRON 4 MG: 2 INJECTION INTRAMUSCULAR; INTRAVENOUS at 04:01

## 2024-09-25 ASSESSMENT — ENCOUNTER SYMPTOMS
EYE DISCHARGE: 0
EYE REDNESS: 0
ABDOMINAL PAIN: 0
ABDOMINAL DISTENTION: 0
BACK PAIN: 0
STRIDOR: 0
EYE ITCHING: 0
WHEEZING: 0
SORE THROAT: 0
COUGH: 1
VOMITING: 0
EYE PAIN: 0
NAUSEA: 0
CONSTIPATION: 0
DIARRHEA: 0
TROUBLE SWALLOWING: 0
COLOR CHANGE: 0
SHORTNESS OF BREATH: 1

## 2024-09-25 ASSESSMENT — PAIN SCALES - GENERAL
PAINLEVEL_OUTOF10: 0
PAINLEVEL_OUTOF10: 0

## 2024-09-25 NOTE — PROGRESS NOTES
4 Eyes Skin Assessment and Patient belongings     The patient is being assess for  Transfer to New Unit    I agree that 2 Nurses have performed a thorough Head to Toe Skin Assessment on the patient. ALL assessment sites listed below have been assessed.       Areas assessed by both nurses:   [x]   Head, Face, and Ears   [x]   Shoulders, Back, and Chest  [x]   Arms, Elbows, and Hands   [x]   Coccyx, Sacrum, and IschIum  [x]   Legs, Feet, and Heels        Does the Patient have Skin Breakdown?  Yes LDA WOUND CARE was Initiated documentation include the Vanesa-wound, Wound Assessment, Measurements, Dressing Treatment, Drainage, and Color\",         Christiano Prevention initiated:  No   Wound Care Orders initiated:  No      WO nurse consulted for Pressure Injury (Stage 3,4, Unstageable, DTI, NWPT, and Complex wounds), New and Established Ostomies:  No      I agree that 2 Nurses have reviewed patient belongings with the patient/family and documented in the flowsheet upon admission or transfer to the unit.     Belongings  Dental Appliances: None  Vision - Corrective Lenses: None  Hearing Aid: None  Clothing: Shirt, Pants  Jewelry: None  Body Piercings Removed: No  Electronic Devices: Cell Phone,   Weapons (Notify Protective Services/Security): None  Other Valuables: Other (Comment) (none)  Home Medications: None  Valuables Given To: Patient  Provide Name(s) of Who Valuable(s) Were Given To: Ruben Eagle  Patient approves for provider to speak to responsible person post operatively: Yes       Nurse 1 eSignature: Electronically signed by Davida Clark RN on 9/25/24 at 5:51 PM EDT    **SHARE this note so that the co-signing nurse is able to place an eSignature**    Nurse 2 eSignature: Electronically signed by MERYL NUGENT RN on 9/25/24 at 5:52 PM EDT

## 2024-09-25 NOTE — PROGRESS NOTES
Vascular Surgery Progress Note      Chief Complaint:  Consult follow up      SUBJECTIVE:  has some shortness of breath. Transferred to ICU    OBJECTIVE    Physical  CURRENT VITALS:  BP 91/64   Pulse 74   Temp 97.7 °F (36.5 °C) (Oral)   Resp 18   Ht 1.753 m (5' 9.02\")   Wt 91.9 kg (202 lb 9.6 oz)   SpO2 100%   BMI 29.90 kg/m²   24 HR INTAKE/OUTPUT:    Intake/Output Summary (Last 24 hours) at 9/25/2024 1212  Last data filed at 9/25/2024 0529  Gross per 24 hour   Intake 240 ml   Output 150 ml   Net 90 ml     Chronic ill looking.  Palp femorals  Left foot ace wrapped, right BKA      Data  CBC:   Recent Labs     09/23/24  0600 09/23/24  1754 09/24/24  0529 09/25/24  0526   WBC 17.5* 21.2* 15.2* 24.7*   HGB 11.3* 12.1* 11.9* 11.9*   HCT 36.1* 40.1* 38.1* 39.8*   MCV 80.8 82.1 81.7 81.9   * 132* 96* 110*     BMP:   Recent Labs     09/23/24  0600 09/24/24  0529 09/25/24  0526   * 125* 126*   K 4.1 4.2  4.2 4.7  4.7   CL 87* 86* 87*   CO2 24 19* 16*   GLUCOSE 179* 90 100*   BUN 77* 79* 89*   CREATININE 3.5* 3.6* 4.4*   CALCIUM 7.0* 7.0* 6.8*     Lab Results   Component Value Date    CKTOTAL 96 09/24/2024    TROPONINI <0.01 01/16/2021    TROPHS 80 (H) 09/25/2024     Pro-BNP: 30,607    Lab Results   Component Value Date    LACTA 3.8 (H) 09/24/2024     Lab Results   Component Value Date    TSH 0.84 11/16/2023    TSHFT4 4.72 (H) 09/24/2024     Chest xray (portable) 9/25/2024:   Mild cardiomegaly.  Status post median sternotomy.  Left-sided pacemaker device in place.  Hazy airspace opacities are seen throughout the right lung. No pneumothorax or pleural effusion     IMPRESSION: Hazy airspace opacities are seen throughout the right lung concerning for pneumonia    Current Inpatient Medications  Current Facility-Administered Medications: prismaSATE BGK 4/2.5 dialysis solution, , Dialysis, Continuous  prismaSATE BGK 4/2.5 dialysis solution, , Dialysis, Continuous  prismaSATE BGK 4/2.5 dialysis solution, ,  Dialysis, Continuous  heparin (porcine) injection 1,100-1,900 Units, 1,100-1,900 Units, IntraCATHeter, PRN **AND** heparin (porcine) injection 1,100-1,900 Units, 1,100-1,900 Units, IntraCATHeter, PRN  0.9 % sodium chloride infusion, , IntraVENous, Continuous  magnesium sulfate 1000 mg in dextrose 5% 100 mL IVPB, 1,000 mg, IntraVENous, PRN  calcium gluconate 1,000 mg in sodium chloride 50 mL, 1,000 mg, IntraVENous, PRN **OR** calcium gluconate 2,000 mg in sodium chloride 100 mL, 2,000 mg, IntraVENous, PRN **OR** calcium gluconate 3,000 mg in sodium chloride 0.9 % 100 mL IVPB, 3,000 mg, IntraVENous, PRN **OR** calcium gluconate 4,000 mg in sodium chloride 0.9 % 100 mL IVPB, 4,000 mg, IntraVENous, PRN  sodium phosphate 6 mmol in sodium chloride 0.9 % 250 mL IVPB, 6 mmol, IntraVENous, PRN **OR** sodium phosphate 12 mmol in sodium chloride 0.9 % 250 mL IVPB, 12 mmol, IntraVENous, PRN **OR** sodium phosphate 18 mmol in sodium chloride 0.9 % 500 mL IVPB, 18 mmol, IntraVENous, PRN **OR** sodium phosphate 24 mmol in sodium chloride 0.9 % 500 mL IVPB, 24 mmol, IntraVENous, PRN  [START ON 9/26/2024] DAPTOmycin (CUBICIN) 700 mg in sodium chloride 0.9 % 50 mL IVPB, 8 mg/kg, IntraVENous, Q48H  midodrine (PROAMATINE) tablet 2.5 mg, 2.5 mg, Oral, TID PRN  guaiFENesin-dextromethorphan (ROBITUSSIN DM) 100-10 MG/5ML syrup 5 mL, 5 mL, Oral, Q4H PRN  metoprolol succinate (TOPROL XL) extended release tablet 50 mg, 50 mg, Oral, BID  metroNIDAZOLE (FLAGYL) 500 mg in 0.9% NaCl 100 mL IVPB premix, 500 mg, IntraVENous, Q8H  allopurinol (ZYLOPRIM) tablet 100 mg, 100 mg, Oral, Daily  aspirin EC tablet 81 mg, 81 mg, Oral, Daily  atorvastatin (LIPITOR) tablet 40 mg, 40 mg, Oral, Nightly  [Held by provider] clopidogrel (PLAVIX) tablet 75 mg, 75 mg, Oral, Daily  ferrous sulfate (IRON 325) tablet 325 mg, 325 mg, Oral, Nightly  levothyroxine (SYNTHROID) tablet 125 mcg, 125 mcg, Oral, Daily  therapeutic multivitamin-minerals 1 tablet, 1 tablet, Oral,

## 2024-09-25 NOTE — PROGRESS NOTES
Shift: [id number]    Admitting diagnosis: Bacteremia     Presentation to hospital: Ruben Eagle is a 59 y.o. male who presents via EMS for evaluation after 2 mechanical falls.  Patient has a recent below the knee amputation of the right leg and a partial foot amputation on the left.  Patient states that 3 weeks ago he slipped and fell and fell on his left leg. Workup in the ER includes x-ray of the left ankle which showed gas in the soft tissues, soft tissue swelling without definite fracture. X-ray of the foot shows an appearance of a Charcot fracture with multiple amputations. He does appear to have osteomyelitis/erosive disease along the distal talus. Sizable displaced talar fragment.     Surgery: no     Nursing assessment at handoff  stable    Emergency Contact/POA: Shen Eagle [phone]  Family updated: YES    Most recent vitals: /71   Pulse 96   Temp 97.6 °F (36.4 °C) (Oral)   Resp 29   Ht 1.753 m (5' 9.02\")   Wt 91.9 kg (202 lb 9.6 oz)   SpO2 99%   BMI 29.90 kg/m²      Rhythm: Sinus Rhythm w/first degree AV block    NC/HFNC- 2 lpm  Respiratory support: - No ventilator support    Vent days: Day N/A    Increased O2 requirements: YES    Admission weight Weight - Scale: 88.9 kg (195 lb 14.4 oz)  Today's weight   Wt Readings from Last 1 Encounters:   09/25/24 91.9 kg (202 lb 9.6 oz)         UOP >30ml/hr: No    Fraser need assessed each shift: Yes    Restraints: No  Order current and documentation up to date? N/A    Lines/Drains  LDA Insertion Date Discontinued Date Dressing Changes   PIV  9/19/24; 9/25/24     TLC       Arterial       Fraser  9/22/24     Vas Cath 9/25/24     ETT       Surgical drains        Night Shift Hospitalist Interventions    Problem(Brief) Date Time Intervention Physician contacted                                               Drip rates at handoff:    prismaSATE BGK 4/2.5      prismaSATE BGK 4/2.5      prismaSATE BGK 4/2.5      sodium chloride 100 mL/hr at 09/25/24 8480

## 2024-09-25 NOTE — PROGRESS NOTES
Physical Therapy  Chart review revealed pt transferred to ICU, pt will require new orders when medically appropriate  No charge  Thank you  Tyra Armstrong, PT

## 2024-09-25 NOTE — PROGRESS NOTES
Patient BG 49 and 43 tonight. Dextrose bolus given. Repeat BG after treatment 163 and 130.     Patient still on the Bare hugger tonight. Rectal temperature taken, 96.1. Patient remaining on Bare hugger. Lactic elevated at shift change, dayshift nurse said she notified, continue with antibiotics and soft fluids of Bicarb at 65 ml/hr per team.     Will continue to monitor VS and BG throughout the night.     Electronically signed: Sreedhar Medina RN, 2200, 9/24/24

## 2024-09-25 NOTE — PROGRESS NOTES
[facility] consult/progress note           Subjective/   59 y.o. year old male who we are seeing in consultation for KRYSTAL on CKD and hyponatremia likely related volume overload requiring Lasix gtt. he was transferred to [facility] on 9/22 for ID consult    Patient feels short of breath, does not feel well  He was hypothermic on 9/24, blood pressure has been in the 80s to 90s  IV fluid has not helped kidney function, patient remains oliguric    Last 24 h uop 150 mL    ROS: No chest pain/fever/nausea/vomiting  PSFH: No visitor    Scheduled Meds:   [START ON 9/26/2024] DAPTOmycin (CUBICIN) 700 mg in sodium chloride 0.9 % 50 mL IVPB  8 mg/kg IntraVENous Q48H    metoprolol succinate  50 mg Oral BID    metroNIDAZOLE  500 mg IntraVENous Q8H    allopurinol  100 mg Oral Daily    aspirin  81 mg Oral Daily    atorvastatin  40 mg Oral Nightly    [Held by provider] clopidogrel  75 mg Oral Daily    ferrous sulfate  325 mg Oral Nightly    levothyroxine  125 mcg Oral Daily    therapeutic multivitamin-minerals  1 tablet Oral Daily    insulin lispro  0-4 Units SubCUTAneous TID WC    insulin lispro  0-4 Units SubCUTAneous Nightly    sodium chloride flush  5-40 mL IntraVENous 2 times per day    enoxaparin  30 mg SubCUTAneous Daily    insulin glargine  20 Units SubCUTAneous Nightly    cefepime  1,000 mg IntraVENous Q12H     Continuous Infusions:   sodium bicarbonate 100 mEq in sodium chloride 0.9 % 1,000 mL infusion Stopped (09/25/24 0908)    dextrose      [Held by provider] furosemide (LASIX) 100 mg in sodium chloride 0.9 % 100 mL infusion Stopped (09/23/24 1010)    sodium chloride 5 mL/hr at 09/25/24 1029     PRN Meds:.midodrine, guaiFENesin-dextromethorphan, oxyCODONE-acetaminophen, glucose, dextrose bolus **OR** dextrose bolus, glucagon (rDNA), dextrose, sodium chloride flush, sodium chloride, ondansetron **OR** ondansetron, polyethylene glycol, acetaminophen **OR** acetaminophen      Objective/   GEN:   Chronically ill, BP 96/68   Pulse 77   Temp 97.7 °F (36.5 °C) (Oral)   Resp 20   Ht 1.753 m (5' 9.02\")   Wt 91.9 kg (202 lb 9.6 oz)   SpO2 97%   BMI 29.90 kg/m²     HEENT: non-icteric, no JVD  CV: RRR, S1, S2 without m/r/g; +LLE edema  RESP: CTA B without w/r/r; breathing wnl  ABD: +bs, soft, nt, no hsm  SKIN: warm, no rashes, bruising/edema on LLE    Data/  Recent Labs     09/23/24  1754 09/24/24  0529 09/25/24  0526   WBC 21.2* 15.2* 24.7*   HGB 12.1* 11.9* 11.9*   HCT 40.1* 38.1* 39.8*   MCV 82.1 81.7 81.9   * 96* 110*     Recent Labs     09/23/24  0600 09/24/24  0529 09/25/24  0526   * 125* 126*   K 4.1 4.2  4.2 4.7  4.7   CL 87* 86* 87*   CO2 24 19* 16*   GLUCOSE 179* 90 100*   BUN 77* 79* 89*   CREATININE 3.5* 3.6* 4.4*   LABGLOM 19* 19* 15*       Assessment/plan   KRYSTAL on CKD: likely pre-renal, now has progressed to oliguric ATN in the setting of worsening hypotension  - baseline Cr of 1.8-2.2  - Lasix gtt stopped on 9/20 3 AM    Hyponatremia  Due to decreased ability to excrete free water     Anemia   - Stable     Osteomyelitis   - ID and vascular surgery following, noted that patient will need left BKA per vascular surgery  - On IV antibiotics       Plan   -Transferred to ICU for hemodynamic support, patient is not able to tolerate fluid boluses and will likely need IV pressors, will also add midodrine  -Due to oliguric KRYSTAL with signs of volume overload, will start CRRT  -Continue serial labs, electrolyte replacement protocols per CRRT  -Due to hyponatremia, will dose CRRT at a lower rate  -Call nephrology for sodium less than 124 and or more than 132    31 min of critical care time used reviewing the chart, and managing/coordinating the care of this patient.        ____________________________________  Scott Leach MD  [facility]  [email or web address]  Office: [phone]

## 2024-09-25 NOTE — PLAN OF CARE
CHF Care Plan      Patient's EF (Ejection Fraction) is less than 40%    Heart Failure Medications:  Diuretics:: Furosemide ( on hold )    (One of the following REQUIRED for EF </= 40%/SYSTOLIC FAILURE but MAY be used in EF% >40%/DIASTOLIC FAILURE)        ACE:: None        ARB:: None         ARNI:: None    (Beta Blockers)  NON- Evidenced Based Beta Blocker (for EF% >40%/DIASTOLIC FAILURE): None    Evidenced Based Beta Blocker::(REQUIRED for EF% <40%/SYSTOLIC FAILURE) Metoprolol SUCCinate- Toprol XL  ...................................................................................................................................................    Failed to redirect to the Timeline version of the [employer] SmartLink.      Patient's weights and intake/output reviewed    Daily Weight log at bedside, patient/family participation in use of log: \"yes    Patient's current weight today shows a difference of 1.5 lbs more than last documented weight.      Intake/Output Summary (Last 24 hours) at 9/25/2024 0621  Last data filed at 9/25/2024 0529  Gross per 24 hour   Intake 0 ml   Output 150 ml   Net -150 ml       Education Booklet Provided: yes    Comorbidities Reviewed Yes    Patient has a past medical history of Acute on chronic systolic congestive heart failure (HCC), Acute osteomyelitis of left foot (HCC), Acute osteomyelitis of right foot (HCC), Acute respiratory failure (HCC), Ascites, Blood transfusion reaction, Burst fracture of lumbar vertebra (HCC), Cellulitis of left foot, Cellulitis of right lower extremity, Chronic systolic CHF (congestive heart failure) ([facility]), Community acquired pneumonia, Coronary artery disease involving native coronary artery of native heart without angina pectoris, Diabetes ([facility]), Diabetic ulcer of left foot associated with type 2 diabetes mellitus, with muscle involvement without evidence of necrosis (HCC), Diabetic ulcer of right foot (HCC), Diabetic ulcer of toe of left foot associated with  type 2 diabetes mellitus, with necrosis of bone (HCC), ETOH abuse, Fracture of tibial plateau, High cholesterol, History of blood transfusion, HTN (hypertension), Hx of blood clots, MI (myocardial infarction) (HCC), MRSA (methicillin resistant staph aureus) culture positive, Neuropathic ulcer of left foot, limited to breakdown of skin (HCC), Neuropathic ulcer of toe (HCC), NSVT (nonsustained ventricular tachycardia) (HCC), Pleural effusion due to congestive heart failure (HCC), Septicemia (HCC), Smoker, Systolic CHF, acute (HCC), and Thyroid disease.     >>For CHF and Comorbidity documentation on Education Time and Topics, please see Education Tab      Pt resting in bed at this time on room air. Pt denies shortness of breath. Pt with pitting lower extremity edema.     Patient and/or Family's stated Goal of Care this Admission: reduce shortness of breath, increase activity tolerance, better understand heart failure and disease management, be more comfortable, and reduce lower extremity edema prior to discharge        :

## 2024-09-25 NOTE — PROGRESS NOTES
Infectious Disease Follow up Notes    CC : MRSA bacteremia and extensive infection of L foot and ankle      Antibiotics:  Cefepime 2g q8  Dapto 700mg q24  Flagyl 500 IV q8      Admit Date:   9/22/2024  Hospital Day: 4    Subjective:   Moved to ICU with worsening MOF, oliguric KRYSTAL now on CRRT   Patient says he feels tired, weak, legs hurt.    Fraser with poor UOP.    He denies abd pain.  No diarrhea     Objective:     Patient Vitals for the past 8 hrs:   BP Temp Temp src Pulse Resp SpO2   09/25/24 1100 91/64 -- -- 74 18 100 %   09/25/24 0906 96/68 -- -- 77 -- 97 %   09/25/24 0803 (!) 88/60 97.7 °F (36.5 °C) Oral 73 20 96 %   09/25/24 0642 -- 97 °F (36.1 °C) Rectal -- -- --       EXAM:  General:   alert, conversant, answering questions appropriately.  No apparent distress    HEENT:   NCAT, PERRL, sclera icteric   Conjunctiva full   NECK:       LUNGS:  CTA upper lobes adelaida, no W/R/R    CV:   RRR  ABD: Soft, flat, NT    EXT: L foot wrapped.  Pitting edema LLE without ascending cellulitic change      SKIN: No acute focal rash         LINE:  R IJ vs cath    PIV   Fraser        Scheduled Meds:   phytonadione (ADULT) (VITAMIN K) 10 mg in sodium chloride 0.9 % 100 mL IVPB  10 mg IntraVENous Once    insulin glargine  10 Units SubCUTAneous Nightly    cefepime  2,000 mg IntraVENous Q8H    DAPTOmycin (CUBICIN) 700 mg in sodium chloride 0.9 % 50 mL IVPB  8 mg/kg IntraVENous Q24H    metoprolol succinate  50 mg Oral BID    metroNIDAZOLE  500 mg IntraVENous Q8H    allopurinol  100 mg Oral Daily    aspirin  81 mg Oral Daily    atorvastatin  40 mg Oral Nightly    [Held by provider] clopidogrel  75 mg Oral Daily    ferrous sulfate  325 mg Oral Nightly    levothyroxine  125 mcg Oral Daily    therapeutic multivitamin-minerals  1 tablet Oral Daily    insulin lispro  0-4 Units SubCUTAneous TID WC    insulin lispro  0-4 Units SubCUTAneous Nightly    sodium chloride  patient care with other providers  Reviewed clinical lab tests  Reviewed radiology tests  Reviewed other diagnostic tests/interventions  Independent review of radiologic images  Microbiology cultures and other micro tests reviewed       Risk of Complications/Morbidity: High   Illness(es)/ Infection present that pose threat to bodily function.   There is potential for severe exacerbation of infection/side effects of treatment.  Therapy requires intensive monitoring for antimicrobial agent toxicity        Madai Alanis MD  Phone: [phone]   Fax : [phone]

## 2024-09-25 NOTE — PLAN OF CARE
CHF Care Plan      Patient's EF (Ejection Fraction) is less than 40%    Heart Failure Medications:  Diuretics:: None    (One of the following REQUIRED for EF </= 40%/SYSTOLIC FAILURE but MAY be used in EF% >40%/DIASTOLIC FAILURE)        ACE:: None        ARB:: None         ARNI:: None    (Beta Blockers)  NON- Evidenced Based Beta Blocker (for EF% >40%/DIASTOLIC FAILURE): None    Evidenced Based Beta Blocker::(REQUIRED for EF% <40%/SYSTOLIC FAILURE) Metoprolol SUCCinate- Toprol XL  ...................................................................................................................................................    Failed to redirect to the Timeline version of the [employer] SmartLink.      Patient's weights and intake/output reviewed    Daily Weight log at bedside, patient/family participation in use of log: \"yes    Patient's current weight today shows a difference of 11 lbs more than last documented weight.      Intake/Output Summary (Last 24 hours) at 9/25/2024 1730  Last data filed at 9/25/2024 1700  Gross per 24 hour   Intake 240 ml   Output 243 ml   Net -3 ml       Education Booklet Provided: yes    Comorbidities Reviewed Yes    Patient has a past medical history of Acute on chronic systolic congestive heart failure (HCC), Acute osteomyelitis of left foot ([facility]), Acute osteomyelitis of right foot ([facility]), Acute respiratory failure ([facility]), Ascites, Blood transfusion reaction, Burst fracture of lumbar vertebra ([facility]), Cellulitis of left foot, Cellulitis of right lower extremity, Chronic systolic CHF (congestive heart failure) ([facility]), Community acquired pneumonia, Coronary artery disease involving native coronary artery of native heart without angina pectoris, Diabetes ([facility]), Diabetic ulcer of left foot associated with type 2 diabetes mellitus, with muscle involvement without evidence of necrosis (HCC), Diabetic ulcer of right foot (HCC), Diabetic ulcer of 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 2 diabetes

## 2024-09-25 NOTE — PROGRESS NOTES
Physician Progress Note      PATIENT:               GEGE HANNAH  CSN #:                  [id number]  :                       1965  ADMIT DATE:       2024 6:06 PM  DISCH DATE:        2024 1:00 PM  RESPONDING  PROVIDER #:        Jose Eduardo Burns MD          QUERY TEXT:    Pt s/p recent BKA with soft tissue swelling vs cellulitis. Patient also noted   with MRSA bacteremia and Sepsis.  Please clarify the following:    The medical record reflects the following:  Risk Factors: Recent right BKA with soft tissue swelling vs cellulitis, #Left   partial foot amputation, DM  Clinical Indicators: Sepsis, MRSA bacteremia  Treatment: Vancomycin, Cefepime, blood cultures, podiatry/general surgery   consult, echo    Thank you,  Honey Clayton RN,BSN,CCDS,CRCR  Options provided:  -- Sepsis related to stump infection  -- Sepsis unrelated to stump infection  -- Other - I will add my own diagnosis  -- Disagree - Not applicable / Not valid  -- Disagree - Clinically unable to determine / Unknown  -- Refer to Clinical Documentation Reviewer    PROVIDER RESPONSE TEXT:    Sepsis unrelated to stump infection    Query created by: Honey Clayton on 2024 10:29 AM      Electronically signed by:  Jose Eduardo Burns MD 2024 1:18 PM

## 2024-09-25 NOTE — CARE COORDINATION
LOS 3.  Care managed by Hosp Med, Vasc, Podiatry, Neph, ID. Likely to need IV AB at [place]. Plan BKA Thurs. Prev at [facility]- now plan [facility]-found to be in SNF copay days. No secondary insurance. Per [facility]- if family wishes to pursue poss JAYDEN secondary- [facility] will assist w application. However, post op, may be more ARU appropriate? Tentative referral to ARU. Will follow for updated dispo recs after surgery.  Referred to [facility] Fin Counselor also. Dona Quijano, RN

## 2024-09-25 NOTE — PROGRESS NOTES
Occupational Therapy  OT follow up attempted, pt transferred to ICU. Pt will require new orders when appropriate. Thank you,  KIT Watson/MARYANN

## 2024-09-25 NOTE — PROCEDURES
PROCEDURE NOTE  Date: 9/25/2024   Name: Ruben Eagle  YOB: 1965    Ai Joshi    Date/Time: 9/25/2024 1:37 PM    Performed by: Roel Lira APRN - NP  Authorized by: Roel Lira APRN - NP  Consent: Verbal consent obtained. Written consent obtained.  Risks and benefits: risks, benefits and alternatives were discussed  Consent given by: patient  Required items: required blood products, implants, devices, and special equipment available  Patient identity confirmed: verbally with patient and arm band  Time out: Immediately prior to procedure a \"time out\" was called to verify the correct patient, procedure, equipment, support staff and site/side marked as required.  Indications: CRRT.    Anesthesia:  Local Anesthetic: lidocaine 1% without epinephrine    Sedation:  Patient sedated: no    Preparation: skin prepped with ChloraPrep  Skin prep agent dried: skin prep agent completely dried prior to procedure  Sterile barriers: all five maximum sterile barriers used - cap, mask, sterile gown, sterile gloves, and large sterile sheet  Hand hygiene: hand hygiene performed prior to central venous catheter insertion  Location details: right internal jugular  Patient position: flat  Catheter type: triple lumen  Catheter size: 13fr 20cm catheter placed at the 16 tonya.  Pre-procedure: landmarks identified  Ultrasound guidance: yes  Sterile ultrasound techniques: sterile gel and sterile probe covers were used  Number of attempts: 1  Successful placement: yes  Post-procedure: line sutured and dressing applied  Assessment: blood return through all ports, free fluid flow and placement verified by x-ray  Patient tolerance: patient tolerated the procedure well with no immediate complications  Comments: EBL 10cc  Karolyn Jacobo CNP proctored procedure

## 2024-09-25 NOTE — CONSULTS
PULMONARY AND CRITICAL CARE INPATIENT CONSULTATION      9/25/2024    Patient Name:  Ruben Eagle       1965       Evaluation was requested by Dr. Pierre regarding renal failure.    HPI:   Patient is a 59 y.o. male with significant past medical history of diabetes mellitus, cardiomyopathy status post ICD, heart failure reduced EF, diabetic foot infection, left proximal tibia ORIF that presents to [facility] for mechanical fall.  Patient was admitted on 9/19/2024 for severe infection of left foot.  Infectious disease and vascular following and there is a plan for a left BKA tomorrow.  Nephrology is also following for history of CKD which has progressively worsened.    Patient was transferred to the ICU today because of worsening shortness of breath and he says that he feels terrible.  He denies any fever, chills, chest pain, cough, nausea, vomiting, abdominal pain.  He also has hypotension with a lactic acidosis.  Critical care was consulted for hypotension and lactic acidosis.      Invasive Lines: PICC D#None   CVC D#None  Art Line D#None            ROS:   Review of Systems   Constitutional:  Negative for activity change, appetite change, chills, diaphoresis and fatigue.   HENT:  Negative for congestion, sore throat and trouble swallowing.    Eyes:  Negative for pain, discharge, redness and itching.   Respiratory:  Positive for cough and shortness of breath. Negative for wheezing and stridor.    Cardiovascular:  Negative for chest pain, palpitations and leg swelling.   Gastrointestinal:  Negative for abdominal distention, abdominal pain, constipation, diarrhea, nausea and vomiting.   Endocrine: Negative for polydipsia, polyphagia and polyuria.   Genitourinary:  Negative for difficulty urinating.   Musculoskeletal:  Negative for back pain, myalgias and neck pain.   Skin:  Negative for color change.   Neurological:  Negative for dizziness, weakness and light-headedness.   Psychiatric/Behavioral:  Negative     High Blood Pressure Paternal Grandmother     High Cholesterol Paternal Grandmother     Heart Disease Paternal Grandfather     High Blood Pressure Paternal Grandfather     High Cholesterol Paternal Grandfather     Stroke Brother     Heart Failure Brother     Heart Disease Brother     Diabetes type 2  Brother     Diabetes type 2  Brother     Diabetes type 2  Brother         Social History     Tobacco Use    Smoking status: Former     Current packs/day: 0.00     Average packs/day: 0.3 packs/day for 1 year (0.3 ttl pk-yrs)     Types: Cigarettes     Start date: 1979     Quit date: 1980     Years since quittin.6    Smokeless tobacco: Never   Substance Use Topics    Alcohol use: No     Alcohol/week: 0.0 standard drinks of alcohol        Allergies   Allergen Reactions    Sulfamethoxazole-Trimethoprim Rash and Swelling     Pt reports that his lips tingle and then skin on his lips peel off,and rash on thigh    Lip will swell     LIPS SWELL/PEEL    Clindamycin/Lincomycin Other (See Comments)     Esophageal pain    Bee Venom Rash and Swelling    Lisinopril Other (See Comments) and Cough     cough    Constant nagging cough         Vital Signs:  Blood pressure 91/64, pulse 74, temperature 97.7 °F (36.5 °C), temperature source Oral, resp. rate 18, height 1.753 m (5' 9.02\"), weight 91.9 kg (202 lb 9.6 oz), SpO2 100%.' on RA  Body mass index is 29.9 kg/m².  CVP:      Intake/Output Summary (Last 24 hours) at 2024 1236  Last data filed at 2024 0529  Gross per 24 hour   Intake 240 ml   Output 150 ml   Net 90 ml         PHYSICAL EXAM:  Physical Exam  Constitutional:       Appearance: He is ill-appearing.   HENT:      Head: Normocephalic and atraumatic.      Nose: Nose normal.      Mouth/Throat:      Pharynx: No oropharyngeal exudate.   Eyes:      General: No scleral icterus.        Right eye: No discharge.         Left eye: No discharge.   Cardiovascular:      Rate and Rhythm: Normal rate.      Heart sounds:

## 2024-09-25 NOTE — CONSULTS
CONSULTATION  Ruben Eagle is a 59 y.o. male asked to see us in consultation by  Leann Marie, JOSE - RUIZ & Tobin Pierre DO for evaluation of hyperbilirubinemia.    Mr. Eagle is a 59-year-old male with past medical history of GERD, colon polyps, CKD, DM2, HTN, CAD s/p CABG 2020 on Plavix, CHF with significantly reduced EF of 10/15%, hypothyroidism and recent right lower extremity diabetic infection requiring right BKA 8/2024.    He presented to [facility] ER on 9/19 with left foot and ankle pain.  He was diagnosed with left osteomyelitis with growth of MRSA on blood cultures. He also was diagnosed with Enterococcus UTI.  He was transferred to [facility] for ID consult.  He going for left BKA tomorrow.      Our service has been asked to evaluate worsening hyperbilirubinemia.  Slight increase in bilirubin was first noted 8/2024. Alk phos also increased at that time. Although alk phos has stayed stable in 200s, his bilirubin has continued to trend up currently at 9.5.  Transaminases are also now abnormal with  and ALT 57.    Patient denies a history of liver disease.  Ultrasound in the past has noted fatty liver.  He reports abstinence from alcohol over the past 6 to 7 years.  He denies history of hepatitis.  He denies abdominal pain and blood in the stool.  He had a CT abdomen pelvis without contrast yesterday showing cholelithiasis without biliary ductal dilation. Constipation, mild ascites,mesenteric congestion, large right pleural effusion and moderate left pleural effusion and cardiomegaly were also noted.  He is getting a thoracentesis today.    He was hypothermic yesterday.  He was transferred to ICU for higher level of care given hypotension and worsening kidney function.  Plans are to start CRRT today.    He saw Dr. Kilpatrick in the past for iron deficiency anemia.  He underwent an EGD and colonoscopy in  PFIZER PURPLE top, DILUTE for use, (age 12 y+), 30mcg/0.3mL 06/11/2021, 07/02/2021, 01/21/2022    INFLUENZA, INTRADERMAL, QUADRIVALENT, PRESERVATIVE FREE 01/17/2017    Influenza Virus Vaccine 11/03/2015    Influenza, FLUARIX, FLULAVAL, FLUZONE (age 6 mo+) and AFLURIA, (age 3 y+), Quadv PF, 0.5mL 03/22/2022    Influenza, FLUCELVAX, (age 6 mo+), MDCK, Quadv PF, 0.5mL 09/28/2017, 11/16/2022    Influenza, Intradermal, Preservative free 11/03/2015    Pneumococcal, PCV-13, PREVNAR 13, (age 6w+), IM, 0.5mL 06/20/2016    Pneumococcal, PPSV23, PNEUMOVAX 23, (age 2y+), SC/IM, 0.5mL 09/06/2017    TDaP, ADACEL (age 10y-64y), BOOSTRIX (age 10y+), IM, 0.5mL 11/03/2015       Family history, past medical history, and  social  history  are  reviewed as  below.  Past Medical  History:  Past Medical History:   Diagnosis Date    Acute on chronic systolic congestive heart failure ([facility]) 07/08/2013    Acute osteomyelitis of left foot ([facility]) 09/27/2017    Acute osteomyelitis of right foot ([facility]) 04/25/2016    Acute respiratory failure ([facility]) 08/04/2020    Ascites     Blood transfusion reaction     Burst fracture of lumbar vertebra ([facility]) 11/09/2012    Cellulitis of left foot     Cellulitis of right lower extremity 2/16, 5/16    Chronic systolic CHF (congestive heart failure) ([facility])     Community acquired pneumonia     Coronary artery disease involving native coronary artery of native heart without angina pectoris 08/06/2020    Diabetes ([facility])     Diabetic ulcer of left foot associated with type 2 diabetes mellitus, with muscle involvement without evidence of necrosis ([facility]) 04/27/2017    Diabetic ulcer of right foot ([facility]) early 2016    Diabetic ulcer of 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 2 diabetes mellitus, with necrosis of bone ([facility])     ETOH abuse     Fracture of tibial plateau 11/09/2012    High cholesterol     History of blood transfusion     reaction    HTN (hypertension)     Hx of blood clots     MI (myocardial infarction) ([facility])  [place] [facility] [place]    OTHER SURGICAL HISTORY Bilateral 09/17/2015    amputation 2nd digit bilateral, flexor tenotomy right hallux    OTHER SURGICAL HISTORY Left 08/17/2017    PARTIAL LEFT FOOT AMPUTATION      OTHER SURGICAL HISTORY Left 12/07/2017    Debridement infected bone and tissue left foot; ulcer debridement left foot with graft application with dr jarrell     OTHER SURGICAL HISTORY Right 01/04/2018    DEBRIDEMENT INFECTED BONE AND TISSUE RIGHT FOOT, ULCER DEBRIDEMENT RIGHT FOOT WITH GRAFT APPLICATION    OTHER SURGICAL HISTORY Left 11/01/2018    Procedure: PARTIAL RESECTION LEFT METATARSAL, ULCER DEBRIDEMENT LEFT FOOT WITH GRAFT APPLICATION     OTHER SURGICAL HISTORY       AMPUTATION LEFT FOURTH AND FIFTH DIGITS, PARTIAL RESECTION  SECOND AND THIRD METATARSAL LEFT FOOT, PARTIAL RESECTION RIGHT FIRST METATARSAL (Bilateral Foot)    OTHER SURGICAL HISTORY Right 12/02/2023    partial right foot amputation    OTHER SURGICAL HISTORY  08/15/2024    LEG AMPUTATION BELOW KNEE - Right    CA OFFICE/OUTPT VISIT,PROCEDURE ONLY Left 08/23/2018    ULCER DEBRIDEMENT LEFT FOOT WITH GRAFT APPLICATION [place] Jarrell DPM [place] [facility] [place] [place]    CA PRTL EXC B1 TARSAL/METAR B1 XCP TALUS/CALCANEUS Left 08/09/2018    PARTIAL FOOT AMPUTATION WITH GRAFT APPLICATION performed by Floyd Jarrell DPM [place] [facility] [place]    CA PRTL EXC B1 TARSAL/METAR B1 XCP TALUS/CALCANEUS Left 11/01/2018    PARTIAL RESECTION LEFT METATARSAL, ULCER DEBRIDEMENT LEFT FOOT WITH GRAFT APPLICATION performed by Floyd Jarrell DPM [place] [facility] [place]    TOE AMPUTATION      2 toes    TOE AMPUTATION Bilateral 03/18/2021    AMPUTATION LEFT FOURTH AND FIFTH DIGITS, PARTIAL RESECTION  SECOND AND THIRD METATARSAL LEFT FOOT, PARTIAL RESECTION RIGHT FIRST METATARSAL performed by Floyd Jarrell DPM [place] [facility] [place]    TOE AMPUTATION Right 05/20/2021    PARTIAL RESECTION RIGHT FIRST METATARSAL performed by Floyd Jarrell DPM [place] [facility] [place]    UMBILICAL HERNIA REPAIR N/A

## 2024-09-25 NOTE — PROGRESS NOTES
V2.0    [facility] Progress Note      Name:  Ruben Eagle /Age/Sex: 1965  (59 y.o. male)   MRN & CSN:  [id number] & [id number] Encounter Date/Time: 2024 9:01 AM EDT   Location:  [facility]0437[facility]01 PCP: Leann Marie, APRN - CNP     Attending:Tobin Pierre DO       Hospital Day: 4    Assessment and Recommendations   Ruben Eagle is a 59 y.o. male with pmh of CKD, T2DM, HTN, CAD/status post CABG in , CHF/with LV systolic function is significantly decreased and estimated EF of approximately 10 - 15 %, ( per Echo of 24) hypothyroidism who presents with Bacteremia    Interim History: Pt complaining of SOB that began 10 minutes ago. States that he has not had similar breathing difficulty during his hospital stay. Pt had an episode of hypothermia with lower bp yesterday but has since satbilized. He otherwise has no complaints. Pt has been transferred to ICU    Plan:   Bacteremia:   Blood cultures are consistent with MRSA bacteremia.  Repeat blood cultures completed on 2024 will be followed.  Transfer echocardiogram was completed on 2024.  Esophageal echocardiogram will defer to infectious disease prior to ordering.  He has been maintained on cefepime and will continue.   ID started pt on Daptomycin 700mg  ID saw pt yesterday, Dapto dose reduced for KRYSTAL and Baseline CK On 24 was wnl   Continue to monitor   ID will continue to follow pt    Shortness of Breath  Pt put on Nasal Cannula  CXR Ordered   EKG, troponin, BNP ordered  VBG Ordered  Pt transferred to ICU by nephro for hemodynamic support      Acute on chronic kidney failure:   He does have chronic kidney disease with a baseline creatinine of 1.8 to 2.2 mg/dl. His KRYSTAL is likely secondary to a pre-renal component.  We will follow urine output, avoid nephrotoxic agents.  Follow renal functions.  Nephrology was following him at [place] and we will consult.   Fraser catheter placed for strict I/O  Creatinine 3.6-->4.4, significantly  elevated  Lasix stopped by nephrology  Gentle IVF per nephro and continue trending labs    Sepsis  Continue IVF as appropriate and monitor clinical response w/ ABX as written.   WBC 15.2-->24.7     Hyponatremia:   Sodium noted to be low for the past few days.  Placed on water restriction. Will check repeat sodium level in a.m.  -Sodium today 125  -Continue free water restriction per nephro  -NS IVF 75ml/hr      CHF/Systolic:    -LV systolic function is significantly decreased and estimated EF of approximately 10 - 15 %, ( per Echo of 9/21/24)     Will follow volume status closely.   -Lasix held by nephro     HypoThyroid   -Clinically euthyroid on oral replacement therapy. Continue, w/ outpt monitoring as previously arranged.       DM type II : Continue on Lantus, continue/scale insulin will adjust as needed to optimize blood sugar.  A1c= 7.8 from 9/20/2024     CAD : He is status post CABG.  Clinically stable.  Denies any shortness of breath or chest pain.  Continue daily aspirin, statin beta-blocker and nitrates.  Plavix has been held      HTN/CAD - w/ known CAD but no evidence of active signs/sxs of ischemia/failure. Currently controlled on home meds w/ vitals documented and reviewed.   Consult Cardiology for SANDEEP to rule out involvement of ICD as pt has MRSA bacteremia   Per nephro will prevent hypotension to reduce risk of poor renal perfusion    HyperLipidemia - Normally controlled on home Statin. Continued.  F/U w/ PCP outpt for medication initiation/adjustment as needed.       H/O Gout : continue on Allopurinol.      Recent right BKA: He is status post right BKA in August/15/2024.  Seems to be doing well, there was concern at [place] for soft tissue swelling and possible cellulitis at this surgical site/stump.   He is currently on antibiotics, Vancomycin and cefepime.  Continue antibiotics and follow.   Pt seen by wound care.      Left partial foot amputation: This was completed several years ago with podiatry  \"PROBNP\" in the last 72 hours.  UA:  Lab Results   Component Value Date/Time    NITRU Negative 09/19/2024 10:21 PM    COLORU DK YELLOW 09/19/2024 10:21 PM    PHUR 5.5 09/19/2024 10:21 PM    PHUR 6.0 03/13/2024 08:04 PM    LABCAST 0-1 Hyaline 09/26/2017 06:40 PM    WBCUA >100 09/19/2024 10:21 PM    RBCUA 21-50 09/19/2024 10:21 PM    MUCUS 1+ 09/19/2024 10:21 PM    BACTERIA 4+ 09/19/2024 10:21 PM    CLARITYU Clear 09/19/2024 10:21 PM    LEUKOCYTESUR MODERATE 09/19/2024 10:21 PM    UROBILINOGEN 1.0 09/19/2024 10:21 PM    BILIRUBINUR MODERATE 09/19/2024 10:21 PM    BLOODU LARGE 09/19/2024 10:21 PM    GLUCOSEU 500 09/19/2024 10:21 PM    KETUA Negative 09/19/2024 10:21 PM     Urine Cultures:   Lab Results   Component Value Date/Time    LABURIN >100,000 CFU/ml 09/19/2024 10:59 PM     Blood Cultures:   Lab Results   Component Value Date/Time    BC  09/22/2024 08:07 AM     No Growth to date.  Any change in status will be called.     Lab Results   Component Value Date/Time    BLOODCULT2  09/22/2024 08:11 AM     No Growth to date.  Any change in status will be called.     Organism:   Lab Results   Component Value Date/Time    ORG Enterobacter cloacae complex 09/19/2024 10:59 PM         Electronically signed by Brooks Galeana MD on 9/25/2024 at 8:45 AM

## 2024-09-25 NOTE — PROGRESS NOTES
Page sent to hospitalist to notify of nursing concerns and possible need for a higher level of care.

## 2024-09-25 NOTE — PROGRESS NOTES
This nurse was with pt and brother, Shen, at bedside. The patient stated \"After this surgery, I don't want to do anymore. If something happens during the surgery, let me go\". Will follow up in the morning with the critical care team.

## 2024-09-26 ENCOUNTER — APPOINTMENT (OUTPATIENT)
Dept: ULTRASOUND IMAGING | Age: 59
DRG: 853 | End: 2024-09-26
Attending: INTERNAL MEDICINE
Payer: MEDICARE

## 2024-09-26 ENCOUNTER — APPOINTMENT (OUTPATIENT)
Dept: CT IMAGING | Age: 59
DRG: 853 | End: 2024-09-26
Attending: INTERNAL MEDICINE
Payer: MEDICARE

## 2024-09-26 ENCOUNTER — APPOINTMENT (OUTPATIENT)
Dept: GENERAL RADIOLOGY | Age: 59
DRG: 853 | End: 2024-09-26
Attending: INTERNAL MEDICINE
Payer: MEDICARE

## 2024-09-26 ENCOUNTER — ANESTHESIA (OUTPATIENT)
Dept: OPERATING ROOM | Age: 59
End: 2024-09-26
Payer: MEDICARE

## 2024-09-26 LAB
ALBUMIN FLD-MCNC: 1 G/DL
ALBUMIN SERPL-MCNC: 3 G/DL (ref 3.4–5)
ALBUMIN SERPL-MCNC: 3 G/DL (ref 3.4–5)
ALBUMIN SERPL-MCNC: 3.2 G/DL (ref 3.4–5)
ALBUMIN/GLOB SERPL: 1.3 {RATIO} (ref 1.1–2.2)
ALP SERPL-CCNC: 239 U/L (ref 40–129)
ALT SERPL-CCNC: 52 U/L (ref 10–40)
AMMONIA PLAS-SCNC: 48 UMOL/L (ref 16–60)
ANION GAP SERPL CALCULATED.3IONS-SCNC: 13 MMOL/L (ref 3–16)
ANION GAP SERPL CALCULATED.3IONS-SCNC: 14 MMOL/L (ref 3–16)
ANION GAP SERPL CALCULATED.3IONS-SCNC: 16 MMOL/L (ref 3–16)
ANISOCYTOSIS BLD QL SMEAR: ABNORMAL
APPEARANCE FLUID: CLEAR
AST SERPL-CCNC: 107 U/L (ref 15–37)
BACTERIA BLD CULT ORG #2: NORMAL
BACTERIA BLD CULT: NORMAL
BASOPHILS # BLD: 0 K/UL (ref 0–0.2)
BASOPHILS NFR BLD: 0 %
BDY FLUID QUALITY: NORMAL
BILIRUB SERPL-MCNC: 9.2 MG/DL (ref 0–1)
BLOOD BANK DISPENSE STATUS: NORMAL
BLOOD BANK PRODUCT CODE: NORMAL
BPU ID: NORMAL
BUN SERPL-MCNC: 60 MG/DL (ref 7–20)
BUN SERPL-MCNC: 61 MG/DL (ref 7–20)
BUN SERPL-MCNC: 66 MG/DL (ref 7–20)
CA-I BLD-SCNC: 0.91 MMOL/L (ref 1.12–1.32)
CA-I BLD-SCNC: 0.92 MMOL/L (ref 1.12–1.32)
CA-I BLD-SCNC: 0.94 MMOL/L (ref 1.12–1.32)
CALCIUM SERPL-MCNC: 6.7 MG/DL (ref 8.3–10.6)
CALCIUM SERPL-MCNC: 6.8 MG/DL (ref 8.3–10.6)
CALCIUM SERPL-MCNC: 7.5 MG/DL (ref 8.3–10.6)
CELL COUNT FLUID TYPE: NORMAL
CHLORIDE SERPL-SCNC: 97 MMOL/L (ref 99–110)
CHLORIDE SERPL-SCNC: 97 MMOL/L (ref 99–110)
CHLORIDE SERPL-SCNC: 98 MMOL/L (ref 99–110)
CO2 SERPL-SCNC: 19 MMOL/L (ref 21–32)
CO2 SERPL-SCNC: 20 MMOL/L (ref 21–32)
CO2 SERPL-SCNC: 21 MMOL/L (ref 21–32)
COLOR FLUID: YELLOW
CREAT SERPL-MCNC: 2.7 MG/DL (ref 0.9–1.3)
CREAT SERPL-MCNC: 3 MG/DL (ref 0.9–1.3)
CREAT SERPL-MCNC: 3.1 MG/DL (ref 0.9–1.3)
DEPRECATED RDW RBC AUTO: 25.1 % (ref 12.4–15.4)
DESCRIPTION BLOOD BANK: NORMAL
EOSINOPHIL # BLD: 0 K/UL (ref 0–0.6)
EOSINOPHIL NFR BLD: 0 %
GFR SERPLBLD CREATININE-BSD FMLA CKD-EPI: 22 ML/MIN/{1.73_M2}
GFR SERPLBLD CREATININE-BSD FMLA CKD-EPI: 23 ML/MIN/{1.73_M2}
GFR SERPLBLD CREATININE-BSD FMLA CKD-EPI: 26 ML/MIN/{1.73_M2}
GLUCOSE BLD-MCNC: 86 MG/DL (ref 70–99)
GLUCOSE BLD-MCNC: 87 MG/DL (ref 70–99)
GLUCOSE BLD-MCNC: 88 MG/DL (ref 70–99)
GLUCOSE FLD-MCNC: 101 MG/DL
GLUCOSE SERPL-MCNC: 104 MG/DL (ref 70–99)
GLUCOSE SERPL-MCNC: 109 MG/DL (ref 70–99)
GLUCOSE SERPL-MCNC: 81 MG/DL (ref 70–99)
HCT VFR BLD AUTO: 37.2 % (ref 40.5–52.5)
HGB BLD-MCNC: 11.4 G/DL (ref 13.5–17.5)
INR PPP: 2.24 (ref 0.85–1.15)
INR PPP: 2.44 (ref 0.85–1.15)
INR PPP: 3.79 (ref 0.85–1.15)
INR PPP: 3.89 (ref 0.85–1.15)
LACTATE BLDV-SCNC: 1.9 MMOL/L (ref 0.4–2)
LDH FLD L TO P-CCNC: 142 U/L
LYMPHOCYTES # BLD: 0.3 K/UL (ref 1–5.1)
LYMPHOCYTES NFR BLD: 1 %
LYMPHOCYTES NFR FLD: 7 %
MACROCYTES BLD QL SMEAR: ABNORMAL
MACROPHAGES # FLD: 13 %
MAGNESIUM SERPL-MCNC: 2.5 MG/DL (ref 1.8–2.4)
MAGNESIUM SERPL-MCNC: 2.5 MG/DL (ref 1.8–2.4)
MAGNESIUM SERPL-MCNC: 2.6 MG/DL (ref 1.8–2.4)
MCH RBC QN AUTO: 25 PG (ref 26–34)
MCHC RBC AUTO-ENTMCNC: 30.7 G/DL (ref 31–36)
MCV RBC AUTO: 81.4 FL (ref 80–100)
MICROCYTES BLD QL SMEAR: ABNORMAL
MONOCYTES # BLD: 1 K/UL (ref 0–1.3)
MONOCYTES NFR BLD: 4 %
NEUTROPHIL, FLUID: 80 %
NEUTROPHILS # BLD: 24.3 K/UL (ref 1.7–7.7)
NEUTROPHILS NFR BLD: 95 %
NUC CELL # FLD: 297 /CUMM
OVALOCYTES BLD QL SMEAR: ABNORMAL
PERFORMED ON: NORMAL
PH BLDV: 7.21 [PH] (ref 7.35–7.45)
PH BLDV: 7.33 [PH] (ref 7.35–7.45)
PH BLDV: 7.34 [PH] (ref 7.35–7.45)
PHOSPHATE SERPL-MCNC: 4 MG/DL (ref 2.5–4.9)
PHOSPHATE SERPL-MCNC: 4.4 MG/DL (ref 2.5–4.9)
PHOSPHATE SERPL-MCNC: 5.5 MG/DL (ref 2.5–4.9)
PLATELET # BLD AUTO: 92 K/UL (ref 135–450)
PLATELET BLD QL SMEAR: ABNORMAL
PMV BLD AUTO: 8.3 FL (ref 5–10.5)
POIKILOCYTOSIS BLD QL SMEAR: ABNORMAL
POLYCHROMASIA BLD QL SMEAR: ABNORMAL
POTASSIUM SERPL-SCNC: 4.4 MMOL/L (ref 3.5–5.1)
POTASSIUM SERPL-SCNC: 4.5 MMOL/L (ref 3.5–5.1)
PROT FLD-MCNC: 1.6 G/DL
PROT SERPL-MCNC: 5.4 G/DL (ref 6.4–8.2)
PROTHROMBIN TIME: 24.8 SEC (ref 11.9–14.9)
PROTHROMBIN TIME: 26.5 SEC (ref 11.9–14.9)
PROTHROMBIN TIME: 37 SEC (ref 11.9–14.9)
PROTHROMBIN TIME: 37.8 SEC (ref 11.9–14.9)
RBC # BLD AUTO: 4.57 M/UL (ref 4.2–5.9)
RBC FLUID: 1800 /CUMM
REASON FOR REJECTION: NORMAL
REJECTED TEST: NORMAL
SCHISTOCYTES BLD QL SMEAR: ABNORMAL
SLIDE REVIEW: ABNORMAL
SODIUM SERPL-SCNC: 131 MMOL/L (ref 136–145)
SODIUM SERPL-SCNC: 131 MMOL/L (ref 136–145)
SODIUM SERPL-SCNC: 133 MMOL/L (ref 136–145)
SPECIMEN SOURCE FLD: NORMAL
TARGETS BLD QL SMEAR: ABNORMAL
TOTAL CELLS COUNTED FLD: 100
TOXIC GRANULES BLD QL SMEAR: PRESENT
WBC # BLD AUTO: 25.6 K/UL (ref 4–11)

## 2024-09-26 PROCEDURE — 83615 LACTATE (LD) (LDH) ENZYME: CPT

## 2024-09-26 PROCEDURE — 80053 COMPREHEN METABOLIC PANEL: CPT

## 2024-09-26 PROCEDURE — 90945 DIALYSIS ONE EVALUATION: CPT

## 2024-09-26 PROCEDURE — 84157 ASSAY OF PROTEIN OTHER: CPT

## 2024-09-26 PROCEDURE — 82140 ASSAY OF AMMONIA: CPT

## 2024-09-26 PROCEDURE — 6370000000 HC RX 637 (ALT 250 FOR IP): Performed by: INTERNAL MEDICINE

## 2024-09-26 PROCEDURE — 86015 ACTIN ANTIBODY EACH: CPT

## 2024-09-26 PROCEDURE — 6360000002 HC RX W HCPCS: Performed by: STUDENT IN AN ORGANIZED HEALTH CARE EDUCATION/TRAINING PROGRAM

## 2024-09-26 PROCEDURE — 3700000000 HC ANESTHESIA ATTENDED CARE: Performed by: SURGERY

## 2024-09-26 PROCEDURE — 6360000002 HC RX W HCPCS: Performed by: INTERNAL MEDICINE

## 2024-09-26 PROCEDURE — 87205 SMEAR GRAM STAIN: CPT

## 2024-09-26 PROCEDURE — 83516 IMMUNOASSAY NONANTIBODY: CPT

## 2024-09-26 PROCEDURE — 2580000003 HC RX 258: Performed by: INTERNAL MEDICINE

## 2024-09-26 PROCEDURE — 0Y6J0Z1 DETACHMENT AT LEFT LOWER LEG, HIGH, OPEN APPROACH: ICD-10-PCS | Performed by: SURGERY

## 2024-09-26 PROCEDURE — 2580000003 HC RX 258: Performed by: STUDENT IN AN ORGANIZED HEALTH CARE EDUCATION/TRAINING PROGRAM

## 2024-09-26 PROCEDURE — 82945 GLUCOSE OTHER FLUID: CPT

## 2024-09-26 PROCEDURE — 2709999900 HC NON-CHARGEABLE SUPPLY: Performed by: SURGERY

## 2024-09-26 PROCEDURE — 99232 SBSQ HOSP IP/OBS MODERATE 35: CPT | Performed by: STUDENT IN AN ORGANIZED HEALTH CARE EDUCATION/TRAINING PROGRAM

## 2024-09-26 PROCEDURE — 2500000003 HC RX 250 WO HCPCS: Performed by: STUDENT IN AN ORGANIZED HEALTH CARE EDUCATION/TRAINING PROGRAM

## 2024-09-26 PROCEDURE — 6360000002 HC RX W HCPCS: Performed by: CLINICAL NURSE SPECIALIST

## 2024-09-26 PROCEDURE — 87070 CULTURE OTHR SPECIMN AEROBIC: CPT

## 2024-09-26 PROCEDURE — 88307 TISSUE EXAM BY PATHOLOGIST: CPT

## 2024-09-26 PROCEDURE — 85610 PROTHROMBIN TIME: CPT

## 2024-09-26 PROCEDURE — 85025 COMPLETE CBC W/AUTO DIFF WBC: CPT

## 2024-09-26 PROCEDURE — 82390 ASSAY OF CERULOPLASMIN: CPT

## 2024-09-26 PROCEDURE — 99232 SBSQ HOSP IP/OBS MODERATE 35: CPT | Performed by: INTERNAL MEDICINE

## 2024-09-26 PROCEDURE — 84100 ASSAY OF PHOSPHORUS: CPT

## 2024-09-26 PROCEDURE — 83735 ASSAY OF MAGNESIUM: CPT

## 2024-09-26 PROCEDURE — 6370000000 HC RX 637 (ALT 250 FOR IP): Performed by: STUDENT IN AN ORGANIZED HEALTH CARE EDUCATION/TRAINING PROGRAM

## 2024-09-26 PROCEDURE — 94003 VENT MGMT INPAT SUBQ DAY: CPT

## 2024-09-26 PROCEDURE — 36415 COLL VENOUS BLD VENIPUNCTURE: CPT

## 2024-09-26 PROCEDURE — 36620 INSERTION CATHETER ARTERY: CPT

## 2024-09-26 PROCEDURE — 2580000003 HC RX 258: Performed by: CLINICAL NURSE SPECIALIST

## 2024-09-26 PROCEDURE — 5A1945Z RESPIRATORY VENTILATION, 24-96 CONSECUTIVE HOURS: ICD-10-PCS | Performed by: STUDENT IN AN ORGANIZED HEALTH CARE EDUCATION/TRAINING PROGRAM

## 2024-09-26 PROCEDURE — 32555 ASPIRATE PLEURA W/ IMAGING: CPT

## 2024-09-26 PROCEDURE — 27880 AMPUTATION OF LOWER LEG: CPT | Performed by: SURGERY

## 2024-09-26 PROCEDURE — 6360000002 HC RX W HCPCS: Performed by: NURSE PRACTITIONER

## 2024-09-26 PROCEDURE — A4217 STERILE WATER/SALINE, 500 ML: HCPCS | Performed by: SURGERY

## 2024-09-26 PROCEDURE — 6360000002 HC RX W HCPCS: Performed by: SURGERY

## 2024-09-26 PROCEDURE — 94761 N-INVAS EAR/PLS OXIMETRY MLT: CPT

## 2024-09-26 PROCEDURE — 83605 ASSAY OF LACTIC ACID: CPT

## 2024-09-26 PROCEDURE — 70450 CT HEAD/BRAIN W/O DYE: CPT

## 2024-09-26 PROCEDURE — 94002 VENT MGMT INPAT INIT DAY: CPT

## 2024-09-26 PROCEDURE — 6360000002 HC RX W HCPCS

## 2024-09-26 PROCEDURE — 3700000001 HC ADD 15 MINUTES (ANESTHESIA): Performed by: SURGERY

## 2024-09-26 PROCEDURE — 82103 ALPHA-1-ANTITRYPSIN TOTAL: CPT

## 2024-09-26 PROCEDURE — 2580000003 HC RX 258: Performed by: SURGERY

## 2024-09-26 PROCEDURE — 3600000015 HC SURGERY LEVEL 5 ADDTL 15MIN: Performed by: SURGERY

## 2024-09-26 PROCEDURE — 2000000000 HC ICU R&B

## 2024-09-26 PROCEDURE — 2580000003 HC RX 258

## 2024-09-26 PROCEDURE — 71045 X-RAY EXAM CHEST 1 VIEW: CPT

## 2024-09-26 PROCEDURE — 82042 OTHER SOURCE ALBUMIN QUAN EA: CPT

## 2024-09-26 PROCEDURE — 82330 ASSAY OF CALCIUM: CPT

## 2024-09-26 PROCEDURE — 3600000005 HC SURGERY LEVEL 5 BASE: Performed by: SURGERY

## 2024-09-26 PROCEDURE — 2580000003 HC RX 258: Performed by: NURSE PRACTITIONER

## 2024-09-26 PROCEDURE — 0BH17EZ INSERTION OF ENDOTRACHEAL AIRWAY INTO TRACHEA, VIA NATURAL OR ARTIFICIAL OPENING: ICD-10-PCS | Performed by: STUDENT IN AN ORGANIZED HEALTH CARE EDUCATION/TRAINING PROGRAM

## 2024-09-26 PROCEDURE — 2700000000 HC OXYGEN THERAPY PER DAY

## 2024-09-26 PROCEDURE — 82105 ALPHA-FETOPROTEIN SERUM: CPT

## 2024-09-26 PROCEDURE — 36430 TRANSFUSION BLD/BLD COMPNT: CPT

## 2024-09-26 PROCEDURE — 2500000003 HC RX 250 WO HCPCS

## 2024-09-26 PROCEDURE — 3E053XZ INTRODUCTION OF VASOPRESSOR INTO PERIPHERAL ARTERY, PERCUTANEOUS APPROACH: ICD-10-PCS

## 2024-09-26 RX ORDER — MORPHINE SULFATE 2 MG/ML
2 INJECTION, SOLUTION INTRAMUSCULAR; INTRAVENOUS
Status: CANCELLED | OUTPATIENT
Start: 2024-09-26

## 2024-09-26 RX ORDER — ONDANSETRON 2 MG/ML
4 INJECTION INTRAMUSCULAR; INTRAVENOUS EVERY 30 MIN PRN
Status: CANCELLED | OUTPATIENT
Start: 2024-09-26

## 2024-09-26 RX ORDER — PHENYLEPHRINE HCL IN 0.9% NACL 1 MG/10 ML
SYRINGE (ML) INTRAVENOUS
Status: DISCONTINUED | OUTPATIENT
Start: 2024-09-26 | End: 2024-09-26 | Stop reason: SDUPTHER

## 2024-09-26 RX ORDER — NOREPINEPHRINE BITARTRATE 1 MG/ML
INJECTION, SOLUTION INTRAVENOUS
Status: DISCONTINUED | OUTPATIENT
Start: 2024-09-26 | End: 2024-09-26 | Stop reason: SDUPTHER

## 2024-09-26 RX ORDER — CALCIUM CHLORIDE 100 MG/ML
INJECTION INTRAVENOUS; INTRAVENTRICULAR
Status: DISCONTINUED | OUTPATIENT
Start: 2024-09-26 | End: 2024-09-26 | Stop reason: SDUPTHER

## 2024-09-26 RX ORDER — OXYCODONE HYDROCHLORIDE 5 MG/1
5 TABLET ORAL PRN
Status: CANCELLED | OUTPATIENT
Start: 2024-09-26 | End: 2024-09-26

## 2024-09-26 RX ORDER — SODIUM CHLORIDE 9 MG/ML
INJECTION, SOLUTION INTRAVENOUS PRN
Status: CANCELLED | OUTPATIENT
Start: 2024-09-26

## 2024-09-26 RX ORDER — SODIUM CHLORIDE 9 MG/ML
50 INJECTION, SOLUTION INTRAVENOUS ONCE
Status: CANCELLED | OUTPATIENT
Start: 2024-09-26 | End: 2024-09-26

## 2024-09-26 RX ORDER — OXYCODONE HYDROCHLORIDE 5 MG/1
5 TABLET ORAL EVERY 4 HOURS PRN
Status: CANCELLED | OUTPATIENT
Start: 2024-09-26

## 2024-09-26 RX ORDER — EPINEPHRINE 1 MG/ML
INJECTION INTRAMUSCULAR; INTRAVENOUS; SUBCUTANEOUS
Status: DISCONTINUED | OUTPATIENT
Start: 2024-09-26 | End: 2024-09-26 | Stop reason: SDUPTHER

## 2024-09-26 RX ORDER — ROCURONIUM BROMIDE 10 MG/ML
INJECTION, SOLUTION INTRAVENOUS
Status: DISCONTINUED | OUTPATIENT
Start: 2024-09-26 | End: 2024-09-26 | Stop reason: SDUPTHER

## 2024-09-26 RX ORDER — SODIUM CHLORIDE 9 MG/ML
INJECTION, SOLUTION INTRAVENOUS PRN
Status: DISCONTINUED | OUTPATIENT
Start: 2024-09-26 | End: 2024-09-28 | Stop reason: HOSPADM

## 2024-09-26 RX ORDER — NALOXONE HYDROCHLORIDE 0.4 MG/ML
INJECTION, SOLUTION INTRAMUSCULAR; INTRAVENOUS; SUBCUTANEOUS PRN
Status: CANCELLED | OUTPATIENT
Start: 2024-09-26

## 2024-09-26 RX ORDER — FENTANYL CITRATE-0.9 % NACL/PF 10 MCG/ML
25-200 PLASTIC BAG, INJECTION (ML) INTRAVENOUS CONTINUOUS
Status: DISCONTINUED | OUTPATIENT
Start: 2024-09-26 | End: 2024-09-27

## 2024-09-26 RX ORDER — LIDOCAINE HYDROCHLORIDE 20 MG/ML
INJECTION, SOLUTION INFILTRATION; PERINEURAL
Status: DISCONTINUED | OUTPATIENT
Start: 2024-09-26 | End: 2024-09-26 | Stop reason: SDUPTHER

## 2024-09-26 RX ORDER — OXYCODONE HYDROCHLORIDE 5 MG/1
10 TABLET ORAL EVERY 4 HOURS PRN
Status: CANCELLED | OUTPATIENT
Start: 2024-09-26

## 2024-09-26 RX ORDER — MORPHINE SULFATE 4 MG/ML
4 INJECTION, SOLUTION INTRAMUSCULAR; INTRAVENOUS
Status: CANCELLED | OUTPATIENT
Start: 2024-09-26

## 2024-09-26 RX ORDER — OXYCODONE HYDROCHLORIDE 5 MG/1
10 TABLET ORAL PRN
Status: CANCELLED | OUTPATIENT
Start: 2024-09-26 | End: 2024-09-26

## 2024-09-26 RX ORDER — NEOSTIGMINE METHYLSULFATE 5 MG/5 ML
SYRINGE (ML) INTRAVENOUS
Status: DISCONTINUED | OUTPATIENT
Start: 2024-09-26 | End: 2024-09-26 | Stop reason: SDUPTHER

## 2024-09-26 RX ORDER — PROPOFOL 10 MG/ML
5-50 INJECTION, EMULSION INTRAVENOUS CONTINUOUS
Status: DISCONTINUED | OUTPATIENT
Start: 2024-09-26 | End: 2024-09-28 | Stop reason: HOSPADM

## 2024-09-26 RX ORDER — ONDANSETRON 2 MG/ML
INJECTION INTRAMUSCULAR; INTRAVENOUS
Status: DISCONTINUED | OUTPATIENT
Start: 2024-09-26 | End: 2024-09-26 | Stop reason: SDUPTHER

## 2024-09-26 RX ORDER — OXYCODONE AND ACETAMINOPHEN 5; 325 MG/1; MG/1
1 TABLET ORAL EVERY 6 HOURS PRN
Status: DISCONTINUED | OUTPATIENT
Start: 2024-09-26 | End: 2024-09-28 | Stop reason: HOSPADM

## 2024-09-26 RX ORDER — PROPOFOL 10 MG/ML
INJECTION, EMULSION INTRAVENOUS
Status: DISCONTINUED | OUTPATIENT
Start: 2024-09-26 | End: 2024-09-26 | Stop reason: SDUPTHER

## 2024-09-26 RX ORDER — CHLORHEXIDINE GLUCONATE ORAL RINSE 1.2 MG/ML
15 SOLUTION DENTAL 2 TIMES DAILY
Status: DISCONTINUED | OUTPATIENT
Start: 2024-09-26 | End: 2024-09-28 | Stop reason: HOSPADM

## 2024-09-26 RX ORDER — MIDODRINE HYDROCHLORIDE 5 MG/1
10 TABLET ORAL
Status: DISCONTINUED | OUTPATIENT
Start: 2024-09-26 | End: 2024-09-28 | Stop reason: HOSPADM

## 2024-09-26 RX ORDER — MUPIROCIN 20 MG/G
OINTMENT TOPICAL 2 TIMES DAILY
Status: DISCONTINUED | OUTPATIENT
Start: 2024-09-26 | End: 2024-09-28 | Stop reason: HOSPADM

## 2024-09-26 RX ORDER — SUCCINYLCHOLINE CHLORIDE 20 MG/ML
INJECTION INTRAMUSCULAR; INTRAVENOUS
Status: DISCONTINUED | OUTPATIENT
Start: 2024-09-26 | End: 2024-09-26 | Stop reason: SDUPTHER

## 2024-09-26 RX ORDER — SODIUM CHLORIDE 0.9 % (FLUSH) 0.9 %
5-40 SYRINGE (ML) INJECTION EVERY 12 HOURS SCHEDULED
Status: CANCELLED | OUTPATIENT
Start: 2024-09-26

## 2024-09-26 RX ORDER — GLYCOPYRROLATE 0.2 MG/ML
INJECTION INTRAMUSCULAR; INTRAVENOUS
Status: DISCONTINUED | OUTPATIENT
Start: 2024-09-26 | End: 2024-09-26 | Stop reason: SDUPTHER

## 2024-09-26 RX ORDER — SODIUM CHLORIDE 9 MG/ML
INJECTION, SOLUTION INTRAVENOUS
Status: DISCONTINUED | OUTPATIENT
Start: 2024-09-26 | End: 2024-09-26 | Stop reason: SDUPTHER

## 2024-09-26 RX ORDER — SODIUM CHLORIDE 0.9 % (FLUSH) 0.9 %
5-40 SYRINGE (ML) INJECTION PRN
Status: CANCELLED | OUTPATIENT
Start: 2024-09-26

## 2024-09-26 RX ADMIN — CALCIUM GLUCONATE 1000 MG: 20 INJECTION, SOLUTION INTRAVENOUS at 23:25

## 2024-09-26 RX ADMIN — PROPOFOL 40 MG: 10 INJECTION, EMULSION INTRAVENOUS at 16:41

## 2024-09-26 RX ADMIN — PHYTONADIONE 10 MG: 10 INJECTION, EMULSION INTRAMUSCULAR; INTRAVENOUS; SUBCUTANEOUS at 09:53

## 2024-09-26 RX ADMIN — AMIODARONE HYDROCHLORIDE 0.5 MG/MIN: 50 INJECTION, SOLUTION INTRAVENOUS at 08:32

## 2024-09-26 RX ADMIN — NYSTATIN 500000 UNITS: 100000 SUSPENSION ORAL at 08:11

## 2024-09-26 RX ADMIN — Medication: at 00:27

## 2024-09-26 RX ADMIN — Medication 100 MCG: at 16:53

## 2024-09-26 RX ADMIN — METRONIDAZOLE 500 MG: 500 INJECTION, SOLUTION INTRAVENOUS at 03:01

## 2024-09-26 RX ADMIN — Medication: at 22:15

## 2024-09-26 RX ADMIN — Medication 50 MCG/HR: at 18:38

## 2024-09-26 RX ADMIN — LIDOCAINE HYDROCHLORIDE 100 MG: 20 INJECTION, SOLUTION INFILTRATION; PERINEURAL at 16:38

## 2024-09-26 RX ADMIN — ONDANSETRON 4 MG: 2 INJECTION INTRAMUSCULAR; INTRAVENOUS at 17:15

## 2024-09-26 RX ADMIN — SUGAMMADEX 100 MG: 100 INJECTION, SOLUTION INTRAVENOUS at 17:49

## 2024-09-26 RX ADMIN — NOREPINEPHRINE BITARTRATE 8 MCG: 1 INJECTION INTRAVENOUS at 16:52

## 2024-09-26 RX ADMIN — METRONIDAZOLE 500 MG: 500 INJECTION, SOLUTION INTRAVENOUS at 11:11

## 2024-09-26 RX ADMIN — CEFEPIME 2000 MG: 2 INJECTION, POWDER, FOR SOLUTION INTRAVENOUS at 01:02

## 2024-09-26 RX ADMIN — SODIUM CHLORIDE: 9 INJECTION, SOLUTION INTRAVENOUS at 16:20

## 2024-09-26 RX ADMIN — MUPIROCIN: 20 OINTMENT TOPICAL at 08:11

## 2024-09-26 RX ADMIN — SODIUM CHLORIDE, PRESERVATIVE FREE 10 ML: 5 INJECTION INTRAVENOUS at 08:35

## 2024-09-26 RX ADMIN — SODIUM CHLORIDE: 9 INJECTION, SOLUTION INTRAVENOUS at 11:11

## 2024-09-26 RX ADMIN — NOREPINEPHRINE BITARTRATE 8 MCG: 1 INJECTION INTRAVENOUS at 17:35

## 2024-09-26 RX ADMIN — CALCIUM GLUCONATE 1000 MG: 20 INJECTION, SOLUTION INTRAVENOUS at 04:21

## 2024-09-26 RX ADMIN — SUGAMMADEX 100 MG: 100 INJECTION, SOLUTION INTRAVENOUS at 17:43

## 2024-09-26 RX ADMIN — EPINEPHRINE 10 MCG: 1 INJECTION PARENTERAL at 17:38

## 2024-09-26 RX ADMIN — Medication 200 MCG: at 17:34

## 2024-09-26 RX ADMIN — EPINEPHRINE 10 MCG: 1 INJECTION PARENTERAL at 17:40

## 2024-09-26 RX ADMIN — NOREPINEPHRINE BITARTRATE 8 MCG: 1 INJECTION INTRAVENOUS at 17:38

## 2024-09-26 RX ADMIN — SODIUM CHLORIDE: 9 INJECTION, SOLUTION INTRAVENOUS at 09:53

## 2024-09-26 RX ADMIN — SODIUM CHLORIDE, PRESERVATIVE FREE 10 ML: 5 INJECTION INTRAVENOUS at 20:23

## 2024-09-26 RX ADMIN — CHLORHEXIDINE GLUCONATE 15 ML: 1.2 RINSE ORAL at 20:24

## 2024-09-26 RX ADMIN — NOREPINEPHRINE BITARTRATE 8 MCG: 1 INJECTION INTRAVENOUS at 16:38

## 2024-09-26 RX ADMIN — PROPOFOL 80 MG: 10 INJECTION, EMULSION INTRAVENOUS at 16:38

## 2024-09-26 RX ADMIN — SODIUM CHLORIDE 5 MCG/MIN: 9 INJECTION, SOLUTION INTRAVENOUS at 12:49

## 2024-09-26 RX ADMIN — SODIUM CHLORIDE: 9 INJECTION, SOLUTION INTRAVENOUS at 09:13

## 2024-09-26 RX ADMIN — PROPOFOL 30 MG: 10 INJECTION, EMULSION INTRAVENOUS at 17:54

## 2024-09-26 RX ADMIN — SUCCINYLCHOLINE CHLORIDE 200 MG: 20 INJECTION, SOLUTION INTRAMUSCULAR; INTRAVENOUS at 16:40

## 2024-09-26 RX ADMIN — NOREPINEPHRINE BITARTRATE 8 MCG: 1 INJECTION INTRAVENOUS at 16:45

## 2024-09-26 RX ADMIN — NOREPINEPHRINE BITARTRATE 8 MCG: 1 INJECTION INTRAVENOUS at 16:46

## 2024-09-26 RX ADMIN — MUPIROCIN: 20 OINTMENT TOPICAL at 20:23

## 2024-09-26 RX ADMIN — GLYCOPYRROLATE 0.8 MG: 0.2 INJECTION, SOLUTION INTRAMUSCULAR; INTRAVENOUS at 17:27

## 2024-09-26 RX ADMIN — CALCIUM CHLORIDE 1 G: 100 INJECTION, SOLUTION INTRAVENOUS at 17:35

## 2024-09-26 RX ADMIN — NOREPINEPHRINE BITARTRATE 8 MCG: 1 INJECTION INTRAVENOUS at 17:40

## 2024-09-26 RX ADMIN — EPINEPHRINE 10 MCG: 1 INJECTION PARENTERAL at 17:35

## 2024-09-26 RX ADMIN — DAPTOMYCIN 700 MG: 500 INJECTION, POWDER, LYOPHILIZED, FOR SOLUTION INTRAVENOUS at 22:29

## 2024-09-26 RX ADMIN — PROPOFOL 20 MCG/KG/MIN: 10 INJECTION, EMULSION INTRAVENOUS at 18:38

## 2024-09-26 RX ADMIN — METRONIDAZOLE 500 MG: 500 INJECTION, SOLUTION INTRAVENOUS at 20:22

## 2024-09-26 RX ADMIN — CEFEPIME 2000 MG: 2 INJECTION, POWDER, FOR SOLUTION INTRAVENOUS at 09:13

## 2024-09-26 RX ADMIN — Medication 200 MCG: at 16:45

## 2024-09-26 RX ADMIN — NYSTATIN 500000 UNITS: 100000 SUSPENSION ORAL at 20:23

## 2024-09-26 RX ADMIN — Medication: at 00:07

## 2024-09-26 RX ADMIN — Medication 5 MG: at 17:27

## 2024-09-26 RX ADMIN — ROCURONIUM BROMIDE 50 MG: 50 INJECTION, SOLUTION INTRAVENOUS at 16:45

## 2024-09-26 ASSESSMENT — PAIN DESCRIPTION - LOCATION: LOCATION: BACK

## 2024-09-26 ASSESSMENT — PULMONARY FUNCTION TESTS
PIF_VALUE: 25
PIF_VALUE: 22
PIF_VALUE: 22
PIF_VALUE: 21
PIF_VALUE: 22

## 2024-09-26 ASSESSMENT — PAIN SCALES - GENERAL
PAINLEVEL_OUTOF10: 6
PAINLEVEL_OUTOF10: 0

## 2024-09-26 ASSESSMENT — PAIN DESCRIPTION - ORIENTATION: ORIENTATION: LOWER

## 2024-09-26 NOTE — PROGRESS NOTES
Infectious Disease Follow up Notes    CC : MRSA bacteremia and extensive infection of L foot and ankle      Antibiotics:  Cefepime 2g q8  Dapto 700mg q24  Flagyl 500 IV q8      Admit Date:   9/22/2024  Hospital Day: 5    Subjective:   No acute changes over night.  CRRT suspended for planned BKA  On osmin at 5  Stable respiratory status on 2L NC     Objective:     Patient Vitals for the past 8 hrs:   BP Temp Temp src Pulse Resp SpO2 Weight   09/26/24 1100 (!) 81/53 -- -- 75 15 98 % --   09/26/24 1000 92/61 -- -- 76 22 98 % --   09/26/24 0900 (!) 89/65 -- -- 76 18 100 % --   09/26/24 0834 98/65 -- -- 76 -- -- --   09/26/24 0800 (!) 86/61 (!) 92.5 °F (33.6 °C) Oral 73 13 95 % --   09/26/24 0700 90/67 -- -- 81 13 96 % --   09/26/24 0600 95/65 -- -- 77 21 98 % --   09/26/24 0500 96/76 -- -- 79 19 96 % 98.5 kg (217 lb 2.5 oz)       EXAM:  General:   alert, no apparent distress    Chronically ill appearing   HEENT:   NCAT, PERRL, sclera icteric   Conjunctiva full   NECK:       LUNGS:  CTA upper lobes adelaida, no W/R/R    CV:   RRR  ABD: Soft, flat, NT    EXT: L foot wrapped.  Pitting edema LLE without ascending cellulitic change      SKIN: No acute focal rash         LINE:  R IJ vs cath    PIV   Fraser        Scheduled Meds:   mupirocin   Each Nostril BID    midodrine  10 mg Oral TID WC    [Held by provider] insulin glargine  10 Units SubCUTAneous Nightly    cefepime  2,000 mg IntraVENous Q8H    DAPTOmycin (CUBICIN) 700 mg in sodium chloride 0.9 % 50 mL IVPB  8 mg/kg IntraVENous Q24H    nystatin  5 mL Oral 4x Daily    [Held by provider] metoprolol succinate  50 mg Oral BID    metroNIDAZOLE  500 mg IntraVENous Q8H    [Held by provider] allopurinol  100 mg Oral Daily    aspirin  81 mg Oral Daily    atorvastatin  40 mg Oral Nightly    [Held by provider] clopidogrel  75 mg Oral Daily    ferrous sulfate  325 mg Oral Nightly    [Held by provider]  with ST gas     CXR 9/20/24 NACPD      TTE 9/21/24    Left Ventricle: Severely reduced left ventricular systolic function with a visually estimated EF of 10 -15%. Left ventricle size is normal. Normal wall thickness. Septal flattening in diastole and systole consistent with right ventricular volume and pressure overload. Severe global hypokinesis present, with the inferior wall appearing akinetic and the septum being dyskinetic. Grade III diastolic dysfunction with increased LAP.    Right Ventricle: Right ventricle is severely dilated. Lead present in the right ventricle. Moderately reduced systolic function.    Aortic Valve: Valve structure is normal. Trace regurgitation. There is no vegetation present.    Mitral Valve: Mild annular calcification at the posterior leaflet. Mildly thickened leaflets. Mild regurgitation. There is no vegetation present.    Tricuspid Valve: Valve structure is normal. Moderate to severe regurgitation. Mildly elevated RVSP, consistent with mild pulmonary hypertension. Est RA pressure is 15 mmHg. The estimated RVSP is 44 mmHg. RVSP may be underestimated. No vegetation present.    Pulmonic Valve: Valve structure is normal. Mild regurgitation. No vegetation present.    Left Atrium: Left atrium is severely dilated.    Right Atrium: Single lead present in the right atrium with small, mobile, echo densities throughout (best noted in the PLAX RVIT view and PSAX base). Right atrium is severely dilated.    IVC/SVC: IVC diameter is greater than 21 mm and decreases less than 50% during inspiration; therefore the estimated right atrial pressure is elevated (~15 mmHg). IVC is dilated.    Image quality is adequate.    RUQ US 9/25/24  IMPRESSION:  1. Cirrhotic appearance of the liver.  2. Normal Doppler evaluation of the hepatic vasculature.  3. Cholelithiasis with no evidence of acute cholecystitis.  4. Right pleural effusion.    Assessment:     Patient Active Problem List    Diagnosis Date Noted     Hallux valgus 11/11/2016    Callus of foot 11/03/2016    Type 2 diabetes mellitus without complication, with long-term current use of insulin ([facility]) 05/19/2016    Onychomycosis 04/02/2016    Dystrophic nail 04/02/2016    Hyperlipidemia 02/23/2016    Diabetic foot ulcer with osteomyelitis ([facility]) 09/19/2014     Overview Note:     Left sub-3rd-met      ICD (implantable cardioverter-defibrillator) in place 01/30/2014     Overview Note:     Medtronic single chamber ICD with Optivol implanted 1/23/14 by Dr Moon.       Cardiomyopathy ([facility]) 01/23/2014    Hypertension     Diabetic polyneuropathy associated with type 2 diabetes mellitus ([facility])        Complex medical history   Longstanding DM, recently well controlled   ICM with ICD in place.  Device appears to be MRI compatible   Charcot arthropathy   DFI R foot s/p BKA 8/15/24.  Stump is well healed  L proximal tibia ORIF      Admitted [facility] 9/19/24 with sepsis picture after mechanical fall   Leukocytosis noted on admission with lactic acidosis and marked elevation of CRP      MRSA bacteremia   Both sets collected on admission positive   Vanc ALENA is elevated at 2  Further complicated by presence of ICD   Source is L foot   TTE 9/21/24 was reassuring but insufficiently sensitive in this context   Repeat BC collected 9/22 are negative   -continue dapto.  Weekly CK while on dapto, CK was wnl on 9/24/24  -SANDEEP prior to DC to direct LOT      Deep and extensive L foot infection in context of underlying Charcot arthropathy with large abscess and pathologic fracture   S/p bedside I&D for evacuation of purulence on 9/23/24, culture was lost  Plan for BKA today  -empiric cefepime and flagyl to be stopped after BKA     Hyperbilirubinemia  Cirrhotic appearing liver on US  GI following   HBs ag, sAb, cAb all neg  HIV screen neg  Additional serologies pending      Severe AoCKD   On CRRT     Thrush     Pleural effusion   Relalatively asymptomatic     Listed allergy to Bactrim, clinda         No

## 2024-09-26 NOTE — PROGRESS NOTES
Repeat INR reviewed per Dr. Mayorga ok to proceed with procedure once INR is under 3. If corrected before end of the day willing to complete thoracentesis, if not pending for tomorrow. Primary nurse spoke with Dr. Mayorga directly and explained plan of care.

## 2024-09-26 NOTE — PLAN OF CARE
CHF Care Plan      Patient's EF (Ejection Fraction) is less than 40%    Heart Failure Medications:  Diuretics:: None    (One of the following REQUIRED for EF </= 40%/SYSTOLIC FAILURE but MAY be used in EF% >40%/DIASTOLIC FAILURE)        ACE:: None        ARB:: None         ARNI:: None    (Beta Blockers)  NON- Evidenced Based Beta Blocker (for EF% >40%/DIASTOLIC FAILURE): None    Evidenced Based Beta Blocker::(REQUIRED for EF% <40%/SYSTOLIC FAILURE) Metoprolol SUCCinate- Toprol XL  ...................................................................................................................................................    Failed to redirect to the Timeline version of the [employer] SmartLink.      Patient's weights and intake/output reviewed    Daily Weight log at bedside, patient/family participation in use of log: \"yes    Patient's current weight today shows a difference of 6.6 kgs more than last documented weight.      Intake/Output Summary (Last 24 hours) at 9/26/2024 0506  Last data filed at 9/26/2024 0500  Gross per 24 hour   Intake 1594.82 ml   Output 2326 ml   Net -731.18 ml       Education Booklet Provided: yes    Comorbidities Reviewed Yes    Patient has a past medical history of Acute on chronic systolic congestive heart failure (HCC), Acute osteomyelitis of left foot (HCC), Acute osteomyelitis of right foot (HCC), Acute respiratory failure (HCC), Ascites, Blood transfusion reaction, Burst fracture of lumbar vertebra (HCC), Cellulitis of left foot, Cellulitis of right lower extremity, Chronic systolic CHF (congestive heart failure) ([facility]), Community acquired pneumonia, Coronary artery disease involving native coronary artery of native heart without angina pectoris, Diabetes ([facility]), Diabetic ulcer of left foot associated with type 2 diabetes mellitus, with muscle involvement without evidence of necrosis (HCC), Diabetic ulcer of right foot (HCC), Diabetic ulcer of 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 2  diabetes mellitus, with necrosis of bone (HCC), ETOH abuse, Fracture of tibial plateau, High cholesterol, History of blood transfusion, HTN (hypertension), Hx of blood clots, MI (myocardial infarction) (HCC), MRSA (methicillin resistant staph aureus) culture positive, Neuropathic ulcer of left foot, limited to breakdown of skin (HCC), Neuropathic ulcer of toe (HCC), NSVT (nonsustained ventricular tachycardia) (HCC), Pleural effusion due to congestive heart failure (HCC), Septicemia (HCC), Smoker, Systolic CHF, acute (HCC), and Thyroid disease.     >>For CHF and Comorbidity documentation on Education Time and Topics, please see Education Tab      Pt resting in bed at this time on 2L NC. Pt endorses shortness of breath. Pt with nonpitting lower extremity edema.     Patient and/or Family's stated Goal of Care this Admission: reduce shortness of breath, increase activity tolerance, better understand heart failure and disease management, be more comfortable, and reduce lower extremity edema prior to discharge        :

## 2024-09-26 NOTE — PROGRESS NOTES
Shift: [id number]    Admitting diagnosis: Bacteremia     Presentation to hospital: Ruben Eagle is a 59 y.o. male who presents via EMS for evaluation after 2 mechanical falls.  Patient has a recent below the knee amputation of the right leg and a partial foot amputation on the left.  Patient states that 3 weeks ago he slipped and fell and fell on his left leg. Workup in the ER includes x-ray of the left ankle which showed gas in the soft tissues, soft tissue swelling without definite fracture. X-ray of the foot shows an appearance of a Charcot fracture with multiple amputations. He does appear to have osteomyelitis/erosive disease along the distal talus. Sizable displaced talar fragment. MRSA culture positive. Transferred to [facility] for ID, Transferred to ICU for increase SOB and worsening kidney levels    Surgery: no     Nursing assessment at handoff  stable    Emergency Contact/POA: Shen Eagle [phone]  Family updated: YES    Most recent vitals: BP (!) 89/60   Pulse 72   Temp (!) 94 °F (34.4 °C)   Resp 14   Ht 1.753 m (5' 9.02\")   Wt 98.5 kg (217 lb 2.5 oz)   SpO2 98%   BMI 32.05 kg/m²      Rhythm: Sinus Rhythm w/first degree AV block    NC/HFNC- 2 lpm  Respiratory support: - No ventilator support    Vent days: Day N/A    Increased O2 requirements: Yes. NC 2L    Admission weight Weight - Scale: 88.9 kg (195 lb 14.4 oz)  Today's weight   Wt Readings from Last 1 Encounters:   09/26/24 98.5 kg (217 lb 2.5 oz)         UOP >30ml/hr: No    Fraser need assessed each shift: Yes    Restraints: No  Order current and documentation up to date? N/A    Lines/Drains  LDA Insertion Date Discontinued Date Dressing Changes   PIV  9/19/24; 9/25/24     TLC       Arterial  9/26/24     Fraser  9/22/24     Vas Cath 9/25/24     ETT       Surgical drains        Night Shift Hospitalist Interventions    Problem(Brief) Date Time Intervention Physician contacted                                               Alvarez zavaleta

## 2024-09-26 NOTE — BRIEF OP NOTE
Brief Postoperative Note      Patient: Ruben Eagel  YOB: 1965  MRN: [id number]    Date of Procedure: 9/26/2024    Pre-Op Diagnosis Codes:      * Gangrene (HCC) [I96]    Post-Op Diagnosis: Same       Procedure(s):  LEFT BELOW THE KNEE LEG AMPUTATION    Surgeon(s):  Tony Meza MD    Assistant:  Surgical Assistant: Nimesh Koch    Anesthesia: General    Estimated Blood Loss (mL): less than 100     Complications: None    Specimens:   ID Type Source Tests Collected by Time Destination   A : LEFT BELOW THE KNEE AMPUTATION Specimen Leg SURGICAL PATHOLOGY Tony Meza MD 9/26/2024 1700        Implants:  * No implants in log *      Drains:   Urinary Catheter 09/22/24 Fraser (Active)   $ Urethral catheter insertion $ Not inserted for procedure 09/22/24 1135   Catheter Indications Need for fluid volume management of the critically ill patient in a critical care setting 09/26/24 1200   Site Assessment No urethral drainage 09/26/24 1200   Urine Color Peri 09/25/24 2001   Urine Appearance Cloudy 09/25/24 2001   Urine Odor Other (Comment) 09/25/24 2001   Collection Container Standard 09/25/24 2001   Securement Method Securing device (Describe) 09/25/24 2001   Catheter Care  Perineal wipes 09/25/24 2001   Catheter Best Practices  Drainage tube clipped to bed;Catheter secured to thigh;Tamper seal intact;Bag below bladder;Bag not on floor;Lack of dependent loop in tubing;Drainage bag less than half full 09/25/24 2001   Status Draining;Patent 09/25/24 2001   Output (mL) 0 mL 09/26/24 0600       Findings:  Infection Present At Time Of Surgery (PATOS) (choose all levels that have infection present):  - Deep Infection (muscle/fascia) present as evidenced by abscess left foot      Electronically signed by Tony Meza MD on 9/26/2024 at 5:29 PM

## 2024-09-26 NOTE — PROGRESS NOTES
INR 3.79 today after vit k yesterday. Reviewed with Dr. Tierra MD who recommends another dose of vit k and repeating INR around 12. If INR less than 3, would be okay to proceed this afternoon with dx thoracentesis. Primary RN and US made aware of plan.

## 2024-09-26 NOTE — PLAN OF CARE
CHF Care Plan      Patient's EF (Ejection Fraction) is less than 40%    Heart Failure Medications:  Diuretics:: None    (One of the following REQUIRED for EF </= 40%/SYSTOLIC FAILURE but MAY be used in EF% >40%/DIASTOLIC FAILURE)        ACE:: None        ARB:: None         ARNI:: None    (Beta Blockers)  NON- Evidenced Based Beta Blocker (for EF% >40%/DIASTOLIC FAILURE): None    Evidenced Based Beta Blocker::(REQUIRED for EF% <40%/SYSTOLIC FAILURE) Metoprolol SUCCinate- Toprol XL  ...................................................................................................................................................    Failed to redirect to the Timeline version of the [employer] SmartLink.      Patient's weights and intake/output reviewed    Daily Weight log at bedside, patient/family participation in use of log: \"yes    Patient's current weight today shows a difference of 6.6 kgs more than last documented weight.      Intake/Output Summary (Last 24 hours) at 9/26/2024 1547  Last data filed at 9/26/2024 1240  Gross per 24 hour   Intake 2151.13 ml   Output 3214 ml   Net -1062.87 ml       Education Booklet Provided: yes    Comorbidities Reviewed Yes    Patient has a past medical history of Acute on chronic systolic congestive heart failure (HCC), Acute osteomyelitis of left foot, Acute osteomyelitis of right foot, Acute respiratory failure ([facility]), Ascites, Blood transfusion reaction, Burst fracture of lumbar vertebra ([facility]), Cellulitis of left foot, Cellulitis of right lower extremity, Chronic systolic CHF (congestive heart failure) ([facility]), Community acquired pneumonia, Coronary artery disease involving native coronary artery of native heart without angina pectoris, Diabetes ([facility]), Diabetic ulcer of left foot associated with type 2 diabetes mellitus, with muscle involvement without evidence of necrosis (HCC), Diabetic ulcer of right foot (HCC), Diabetic ulcer of 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 2 diabetes  mellitus, with necrosis of bone (HCC), ETOH abuse, Fracture of tibial plateau, High cholesterol, History of blood transfusion, HTN (hypertension), Hx of blood clots, MI (myocardial infarction) (HCC), MRSA (methicillin resistant staph aureus) culture positive, Neuropathic ulcer of left foot, limited to breakdown of skin (HCC), Neuropathic ulcer of toe (HCC), NSVT (nonsustained ventricular tachycardia) (HCC), Pleural effusion due to congestive heart failure (HCC), Septicemia (HCC), Smoker, Systolic CHF, acute (HCC), and Thyroid disease.     >>For CHF and Comorbidity documentation on Education Time and Topics, please see Education Tab      Pt resting in bed at this time on 2L NC. Pt endorses shortness of breath. Pt with pitting lower extremity edema.     Patient and/or Family's stated Goal of Care this Admission: reduce shortness of breath, increase activity tolerance, better understand heart failure and disease management, be more comfortable, and reduce lower extremity edema prior to discharge        :

## 2024-09-26 NOTE — PLAN OF CARE
Problem: Chronic Conditions and Co-morbidities  Goal: Patient's chronic conditions and co-morbidity symptoms are monitored and maintained or improved  9/25/2024 2328 by Cass Hodge RN  Outcome: Progressing  9/25/2024 1440 by Davida Clark RN  Outcome: Progressing     Problem: Discharge Planning  Goal: Discharge to home or other facility with appropriate resources  9/25/2024 2328 by Cass Hodge RN  Outcome: Progressing  9/25/2024 1440 by Davida Clark RN  Outcome: Progressing     Problem: Safety - Adult  Goal: Free from fall injury  9/25/2024 2328 by Cass Hodge RN  Outcome: Progressing  9/25/2024 1440 by Davida Clark RN  Outcome: Progressing     Problem: Skin/Tissue Integrity  Goal: Absence of new skin breakdown  Description: 1.  Monitor for areas of redness and/or skin breakdown  2.  Assess vascular access sites hourly  3.  Every 4-6 hours minimum:  Change oxygen saturation probe site  4.  Every 4-6 hours:  If on nasal continuous positive airway pressure, respiratory therapy assess nares and determine need for appliance change or resting period.  9/25/2024 2328 by Cass Hodge RN  Outcome: Progressing  9/25/2024 1440 by Davida Clark RN  Outcome: Progressing     Problem: ABCDS Injury Assessment  Goal: Absence of physical injury  9/25/2024 2328 by Cass Hodge RN  Outcome: Progressing  9/25/2024 1440 by Davida Clark RN  Outcome: Progressing     Problem: Pain  Goal: Verbalizes/displays adequate comfort level or baseline comfort level  9/25/2024 2328 by Cass Hodge RN  Outcome: Progressing  9/25/2024 1440 by Davida Clark RN  Outcome: Progressing

## 2024-09-26 NOTE — ACP (ADVANCE CARE PLANNING)
Advance Care Planning     Advance Care Planning Inpatient Note  [facility] Department    Today's Date: 9/26/2024  Unit: [place] C2 CARD TELEMETRY    Received request from IDT Member.  Upon review of chart and communication with care team, patient's decision making abilities are not in question.. Patient, Healthcare Decision Maker, and Mother  were present in the room during visit.    Goals of ACP Conversation:  Discuss advance care planning documents  Facilitate a discussion related to patient's goals of care as they align with the patient's values and beliefs.    Health Care Decision Makers:      Primary Decision Maker: Shen Eagle - Brother/Sister - [phone]    Secondary Decision Maker: Mirna Eagle \"Milena\" - Parent - [phone]    Supplemental (Other) Decision Maker: Clifford Eagle - Brother/Sister - [phone]  Summary:  Completed New Documents  Verified Healthcare Decision Maker  Updated Healthcare Decision Maker    Advance Care Planning Documents (Patient Wishes):  Healthcare Power of /Advance Directive Appointment of Health Care Agent         Interventions:  Requested patient/family to submit existing document for our records: Healthcare Power of /Advance Directive Appointment of Health Care Agent  Provided education on documents for clarity and greater understanding  Encouraged ongoing ACP conversation with future decision makers and loved ones    Care Preferences Communicated:   No    Outcomes/Plan:  ACP Discussion: Completed  New advance directive completed.  Returned original document(s) to patient, as well as copies for distribution to appointed agents  Copy of advance directive given to staff to scan into medical record.    Electronically signed by Chaplain Bruce on 9/26/2024 at 10:42 AM

## 2024-09-26 NOTE — PROGRESS NOTES
09/26/24 1811   Patient Observation   Pulse (!) 119   Respirations 16   Breath Sounds   Right Upper Lobe Diminished   Right Middle Lobe Diminished   Right Lower Lobe Diminished   Left Upper Lobe Diminished   Left Lower Lobe Diminished   Vent Information   Vent Mode AC/VC   $Ventilation $Initial Day   Ventilator Settings   FiO2  70 %   Vt (Set, mL) 550 mL   Resp Rate (Set) 16 bpm   PEEP/CPAP (cmH2O) 5   Vent Patient Data (Readings)   Vt (Measured) 558 mL   Peak Inspiratory Pressure (cmH2O) 25 cmH2O   Rate Measured 16 br/min   Minute Volume (L/min) 8.94 Liters   Mean Airway Pressure (cmH2O) 9.9 cmH20   Plateau Pressure (cm H2O) 0 cm H2O   Driving Pressure -5   I:E Ratio 1:2.80   Vent Alarm Settings   High Pressure (cmH2O) 40 cmH2O   Low Minute Volume (lpm) 2 L/min   Low Exhaled Vt (ml) 200 mL   RR High (bpm) 35 br/min   Apnea (secs) 20 secs   Additional Respiratoray Assessments   Humidification Source HME   Ambu Bag With Mask At Bedside Yes   Airway Clearance   Suction ET Tube   Suction Device Inline suction catheter   Sputum Method Obtained Endotracheal   Sputum Amount Small   Sputum Color/Odor Yellow   Sputum Consistency Thick   ETT    Placement Date/Time: 09/26/24 1643   Placed By: In surgery;Licensed provider  Placement Verified By: Direct visualization;Auscultation;Capnometry  Preoxygenation: Yes  Mask Ventilation: (c) Ventilated by mask with oral airway (2);Difficult mask ventil...   Secured At 23 cm   Measured From Lips   ETT Placement Center   Secured By Commercial tube wilson   Cuff Pressure 30 cm H2O

## 2024-09-26 NOTE — PROGRESS NOTES
handoff:    prismaSATE BGK 4/2.5 500 mL/hr at 09/26/24 0027    prismaSATE BGK 4/2.5 500 mL/hr at 09/25/24 2300    prismaSATE BGK 4/2.5 500 mL/hr at 09/26/24 0007    sodium chloride 100 mL/hr at 09/26/24 0500    norepinephrine      dextrose      sodium chloride Stopped (09/25/24 2028)       Hospital Course Daily Updates:  Admit Day#4     Days 8760-3844 Admit Day #4  -INR 4.45, given Vitamin K  -Lactic acid 4.1  -Started on CRRT @1400  -Vas Cath with pigtail placed  -Planned LEFT BKA (9/26)  -Thoracentesis cancelled due to INR, needs reevaluation    Days 3671-0970 Admit Day #4  - Calcium replaced  -NPO for Left BKA    Lab Data:   CBC:   Recent Labs     09/25/24  0526 09/26/24  0401   WBC 24.7* 25.6*   HGB 11.9* 11.4*   HCT 39.8* 37.2*   MCV 81.9 81.4   * 92*     BMP:    Recent Labs     09/25/24  1820 09/26/24  0401   * 131*   K 4.7 4.4  4.4   CO2 19* 20*   BUN 81* 66*   CREATININE 3.6* 3.0*     LIVR:   Recent Labs     09/25/24  0526 09/26/24  0401   * 107*   ALT 57* 52*     PT/INR:   Recent Labs     09/25/24  1229   INR 4.45*     APTT: No results for input(s): \"APTT\" in the last 72 hours.  ABG: No results for input(s): \"PHART\", \"[id number]\", \"PO2ART\" in the last 72 hours.  Consults (if GI or Nephrology- which group?)-  Vascular, Nephrology, Cardiology, Podiatry, GI, ID

## 2024-09-26 NOTE — PROGRESS NOTES
CRRT stopped and blood returned at 1100 for planned procedures today. Set discarded. Pt running fluid total this morning was -92mL. Pt tolerated well.    POA paperwork completed for Shen Eagle. Paperwork placed in chart.

## 2024-09-26 NOTE — CARE COORDINATION
Karolyn/Pulm NP updated writer; pt on CRRT, to go to OR with podiatry and vascular for BKA, POA paperwork done with .  IM, ID, Pulm, Vascular, Podiatry, Nephro, GI, and now Onc involved.  CM will continue to follow pt's progress and coordinate discharge arrangements, SNF vs IPR pending level of care and Medicare coverage.  ROSA ELENA Rivera-RN

## 2024-09-26 NOTE — PROGRESS NOTES
Pulmonary & Critical Care Medicine ICU Progress Note      Events of Last 24 hours:   CRRT was started yesterday afternoon. He is NPO for procedure today. He has no complaints.     Invasive Lines: PICC D#None   CVC D#0  Art Line D#0            Vitals:  BP 90/67   Pulse 81   Temp 97.9 °F (36.6 °C) (Oral)   Resp 13   Ht 1.753 m (5' 9.02\")   Wt 98.5 kg (217 lb 2.5 oz)   SpO2 96%   BMI 32.05 kg/m²    Tmax:  CVP:        Intake/Output Summary (Last 24 hours) at 9/26/2024 0800  Last data filed at 9/26/2024 0700  Gross per 24 hour   Intake 1814.13 ml   Output 2639 ml   Net -824.87 ml       EXAM:  Physical Exam  Constitutional:       Appearance: He is ill-appearing.   HENT:      Head: Normocephalic and atraumatic.      Nose: Nose normal.      Mouth/Throat:      Pharynx: No oropharyngeal exudate.   Eyes:      General: No scleral icterus.        Right eye: No discharge.         Left eye: No discharge.   Cardiovascular:      Rate and Rhythm: Normal rate.      Heart sounds: No murmur heard.     No gallop.   Pulmonary:      Effort: Pulmonary effort is normal. No respiratory distress.      Breath sounds: No wheezing or rales.   Abdominal:      General: Abdomen is flat. Bowel sounds are normal. There is no distension.      Tenderness: There is no abdominal tenderness.   Musculoskeletal:         General: No swelling.      Cervical back: Normal range of motion.   Skin:     General: Skin is warm and dry.   Neurological:      Mental Status: He is alert and oriented to person, place, and time.          Medications:  Scheduled Meds:   mupirocin   Each Nostril BID    insulin glargine  10 Units SubCUTAneous Nightly    cefepime  2,000 mg IntraVENous Q8H    DAPTOmycin (CUBICIN) 700 mg in sodium chloride 0.9 % 50 mL IVPB  8 mg/kg IntraVENous Q24H    nystatin  5 mL Oral 4x Daily    metoprolol succinate  50 mg Oral BID    metroNIDAZOLE  500 mg IntraVENous Q8H    [Held by provider] allopurinol  100 mg Oral Daily    aspirin  81 mg Oral  input(s): \"APTT\" in the last 72 hours.  UA:No results for input(s): \"NITRITE\", \"COLORU\", \"PHUR\", \"LABCAST\", \"WBCUA\", \"RBCUA\", \"MUCUS\", \"TRICHOMONAS\", \"YEAST\", \"BACTERIA\", \"CLARITYU\", \"SPECGRAV\", \"LEUKOCYTESUR\", \"UROBILINOGEN\", \"BILIRUBINUR\", \"BLOODU\", \"GLUCOSEU\", \"AMORPHOUS\" in the last 72 hours.    Invalid input(s): \"KETONESU\"    Cultures:  Pending    Films:  I have reviewed radiology images personally.     XR CHEST PORTABLE     Result Date: 9/25/2024  Hazy airspace opacities are seen throughout the right lung concerning for pneumonia      CT ABDOMEN PELVIS WO CONTRAST Additional Contrast? Radiologist Recommendation     Result Date: 9/24/2024  1. Large right and moderate left pleural effusions and lower lobe atelectatic changes. 2. Cardiomegaly. 3. Cholelithiasis. No evidence of dilation of the common bile duct. 4. Constipation and stool impaction in the rectum. 5. Mild ascites and mesenteric congestion. 6. L4 compression injury with significant retropulsion and significant spinal stenosis at this level also associated with foraminal stenosis. This was previously seen in 2013 as well. 7. Anasarca.         ASSESSMENT:  Septic Shock  Acute Hypoxic Respiratory Failure  MRSA bacteremia  L foot infection  HFrEF  CAD s/p ICD  Transaminitis  R pleural effusion  Lactic acidosis  KRYSTAL on CKD  Hyponatremia  AGMA  Elevated troponin  HTN  HLD  Charcot arthropathy  DM     Plan:   - Pt AOx3, he is answering questions appropriately  - No pressors, hypotensive but lactic acid normal this morning, Keep goal MAP > 65 or SBP > 90  - Elevated BNP with reduced EF, UOP poor, getting CRRT  - Cardiology following and patient is also scheduled for a SANDEEP  - Cont ASA/Statin  - Supplemental O2 therapy with goal saturation 88-92%, he is on 1-2L  - U/S showing large R sided pleural effusion which is consistent with CXR. IR consulted for thoracentesis and send labs/cytology, INR still high and received plavix, will wait til INR more improved

## 2024-09-26 NOTE — PROGRESS NOTES
FIBULA LEFT (2 VIEWS)    Result Date: 9/19/2024  EXAMINATION: XRAY VIEWS OF THE LEFT TIBIA AND FIBULA 9/19/2024 6:53 pm COMPARISON: None. HISTORY: ORDERING SYSTEM PROVIDED HISTORY: pain s/p mechanical fall TECHNOLOGIST PROVIDED HISTORY: Reason for exam:->pain s/p mechanical fall Reason for Exam: fall FINDINGS: The patient has undergone prior sideplate and screw fixation of the proximal left tibia.  The hardware is intact.  No perihardware lucency.  No acute fracture.  There is mild medial and lateral tibiofemoral joint space narrowing.     1.  No acute fracture or dislocation. 2.  No hardware complication.     XR FOOT LEFT (MIN 3 VIEWS)    Result Date: 9/19/2024  EXAMINATION: THREE XRAY VIEWS OF THE LEFT FOOT 9/19/2024 3:53 pm COMPARISON: March 13, 2024 HISTORY: ORDERING SYSTEM PROVIDED HISTORY: pain s/p mechanical fall TECHNOLOGIST PROVIDED HISTORY: Reason for exam:->pain s/p mechanical fall Reason for Exam: fall FINDINGS: Appearance of the Charcot foot post multiple amputations.  On the lateral image, there is free fragment of bone projecting above the tarsals.  There is some erosive process present at this level could be osteomyelitis.  There is also some erosive disease of distal aspect of talus which could represent active osteomyelitis.  There is diffuse soft tissue swelling present.  There appears to be ulcerating wound along distal aspect of the stump, this is seen on the frontal/oblique image.     Appearance of a Charcot foot with multiple amputations.  There does appear to be osteomyelitis/erosive disease along the distal talus, there is a sizable displaced talar fragment when this is new from March.  There is an ulcerating soft tissue wound of the stump.     XR TIBIA FIBULA RIGHT (2 VIEWS)    Result Date: 9/19/2024  EXAMINATION: TWO XRAY VIEWS OF THE RIGHT TIBIA/FIBULA 9/19/2024 6:53 pm COMPARISON: None. HISTORY: ORDERING SYSTEM PROVIDED HISTORY: pain s/p mechanical fall TECHNOLOGIST PROVIDED HISTORY:  09/24/24  0529 09/25/24  0526 09/26/24  0401   WBC 15.2* 24.7* 25.6*   HGB 11.9* 11.9* 11.4*   PLT 96* 110* 92*     BMP:    Recent Labs     09/25/24  1229 09/25/24  1820 09/26/24  0401   * 129* 131*   K 4.9 4.7 4.4  4.4   CL 89* 93* 97*   CO2 19* 19* 20*   BUN 91* 81* 66*   CREATININE 4.4* 3.6* 3.0*   GLUCOSE 112* 96 104*     Hepatic:   Recent Labs     09/24/24  0529 09/25/24  0526 09/25/24  1229 09/26/24  0401   * 114*  --  107*   ALT 62* 57*  --  52*   BILITOT 8.4* 9.5* 9.3* 9.2*   ALKPHOS 256* 246*  --  239*     Lipids:   Lab Results   Component Value Date/Time    CHOL 73 02/16/2023 10:12 AM    HDL 27 02/16/2023 10:12 AM    TRIG 113 02/16/2023 10:12 AM     Hemoglobin A1C:   Lab Results   Component Value Date/Time    LABA1C 7.8 09/20/2024 10:20 AM     TSH:   Lab Results   Component Value Date/Time    TSH 0.84 11/16/2023 10:14 AM    TSH 1.77 05/16/2023 10:45 AM     Troponin: No results found for: \"TROPONINT\"  Lactic Acid:   Recent Labs     09/24/24  1702 09/25/24  1515   LACTA 3.8* 4.1*     BNP:   Recent Labs     09/25/24  0952   PROBNP 30,607*     UA:  Lab Results   Component Value Date/Time    NITRU Negative 09/19/2024 10:21 PM    COLORU DK YELLOW 09/19/2024 10:21 PM    PHUR 5.5 09/19/2024 10:21 PM    PHUR 6.0 03/13/2024 08:04 PM    LABCAST 0-1 Hyaline 09/26/2017 06:40 PM    WBCUA >100 09/19/2024 10:21 PM    RBCUA 21-50 09/19/2024 10:21 PM    MUCUS 1+ 09/19/2024 10:21 PM    BACTERIA 4+ 09/19/2024 10:21 PM    CLARITYU Clear 09/19/2024 10:21 PM    LEUKOCYTESUR MODERATE 09/19/2024 10:21 PM    UROBILINOGEN 1.0 09/19/2024 10:21 PM    BILIRUBINUR MODERATE 09/19/2024 10:21 PM    BLOODU LARGE 09/19/2024 10:21 PM    GLUCOSEU 500 09/19/2024 10:21 PM    KETUA Negative 09/19/2024 10:21 PM     Urine Cultures:   Lab Results   Component Value Date/Time    LABURIN >100,000 CFU/ml 09/19/2024 10:59 PM     Blood Cultures:   Lab Results   Component Value Date/Time    BC  09/22/2024 08:07 AM     No Growth to date.  Any  change in status will be called.     Lab Results   Component Value Date/Time    BLOODCULT2  09/22/2024 08:11 AM     No Growth to date.  Any change in status will be called.     Organism:   Lab Results   Component Value Date/Time    ORG Enterobacter cloacae complex 09/19/2024 10:59 PM         Electronically signed by Brooks Galeana MD on 9/26/2024 at 8:01 AM

## 2024-09-26 NOTE — PROGRESS NOTES
[facility] consult/progress note           Subjective/   59 y.o. year old male who we are seeing in consultation for KRYSTAL on CKD and hyponatremia likely related volume overload requiring Lasix gtt. he was transferred to [facility] on 9/22 for ID consult    Seen and examined on CRRT, net UF even  Blood pressure soft but maintaining maps over 65  Ambulation plan for today    ROS: Feels scarletty  PSFH: + visitor    Scheduled Meds:   mupirocin   Each Nostril BID    [Held by provider] insulin glargine  10 Units SubCUTAneous Nightly    cefepime  2,000 mg IntraVENous Q8H    DAPTOmycin (CUBICIN) 700 mg in sodium chloride 0.9 % 50 mL IVPB  8 mg/kg IntraVENous Q24H    nystatin  5 mL Oral 4x Daily    [Held by provider] metoprolol succinate  50 mg Oral BID    metroNIDAZOLE  500 mg IntraVENous Q8H    [Held by provider] allopurinol  100 mg Oral Daily    aspirin  81 mg Oral Daily    atorvastatin  40 mg Oral Nightly    [Held by provider] clopidogrel  75 mg Oral Daily    ferrous sulfate  325 mg Oral Nightly    [Held by provider] levothyroxine  125 mcg Oral Daily    [Held by provider] therapeutic multivitamin-minerals  1 tablet Oral Daily    insulin lispro  0-4 Units SubCUTAneous TID WC    insulin lispro  0-4 Units SubCUTAneous Nightly    sodium chloride flush  5-40 mL IntraVENous 2 times per day    [Held by provider] enoxaparin  30 mg SubCUTAneous Daily     Continuous Infusions:   prismaSATE BGK 4/2.5 500 mL/hr at 09/26/24 0027    prismaSATE BGK 4/2.5 500 mL/hr at 09/25/24 2300    prismaSATE BGK 4/2.5 500 mL/hr at 09/26/24 0007    sodium chloride Stopped (09/26/24 1015)    norepinephrine      dextrose      sodium chloride 5 mL/hr at 09/26/24 0953     PRN Meds:.oxyCODONE-acetaminophen, heparin (porcine) **AND** heparin (porcine), magnesium sulfate, calcium gluconate **OR** calcium gluconate **OR** calcium gluconate **OR** calcium gluconate, sodium phosphate 6 mmol in sodium chloride 0.9 % 250 mL IVPB **OR**  sodium phosphate 12 mmol in sodium chloride 0.9 % 250 mL IVPB **OR** sodium phosphate 18 mmol in sodium chloride 0.9 % 500 mL IVPB **OR** sodium phosphate 24 mmol in sodium chloride 0.9 % 500 mL IVPB, midodrine, guaiFENesin-dextromethorphan, glucose, dextrose bolus **OR** dextrose bolus, glucagon (rDNA), dextrose, sodium chloride flush, sodium chloride, ondansetron **OR** ondansetron, polyethylene glycol, acetaminophen **OR** acetaminophen      Objective/   GEN:  Chronically ill, BP 92/61   Pulse 76   Temp (!) 92.5 °F (33.6 °C) (Oral)   Resp 22   Ht 1.753 m (5' 9.02\")   Wt 98.5 kg (217 lb 2.5 oz)   SpO2 98%   BMI 32.05 kg/m²     HEENT: non-icteric, no JVD  CV: RRR, S1, S2 without m/r/g; +LLE edema  RESP: CTA B without w/r/r; breathing wnl  ABD: +bs, soft, nt, no hsm  SKIN: warm, no rashes, bruising/edema on LLE    Data/  Recent Labs     09/24/24  0529 09/25/24  0526 09/26/24  0401   WBC 15.2* 24.7* 25.6*   HGB 11.9* 11.9* 11.4*   HCT 38.1* 39.8* 37.2*   MCV 81.7 81.9 81.4   PLT 96* 110* 92*     Recent Labs     09/25/24  1820 09/26/24  0401 09/26/24  0940   * 131* 131*   K 4.7 4.4  4.4 4.4   CL 93* 97* 97*   CO2 19* 20* 21   GLUCOSE 96 104* 109*   PHOS 5.7* 4.4 4.0   MG 2.40 2.50* 2.50*   BUN 81* 66* 60*   CREATININE 3.6* 3.0* 2.7*   LABGLOM 19* 23* 26*       Assessment/plan   KRYSTAL on CKD: likely pre-renal, now has progressed to oliguric ATN in the setting of worsening hypotension  - baseline Cr of 1.8-2.2  - Lasix gtt stopped on 9/20 3 AM  -CRRT from 9/25/24    Hyponatremia  Due to decreased ability to excrete free water     Anemia   - Stable     Osteomyelitis   - ID and vascular surgery following, plan for left BKA per vascular surgery on 9/26  - On IV antibiotics       Plan   -Continue CRRT as ordered with net even UF goal  -Serial labs and electrolyte replacement protocol per CRRT  -Midodrine 10 mg 3 times daily, hold for systolic blood pressure more than  110      ____________________________________  Scott Leach MD  [facility]  [email or web address]  Office: [phone]

## 2024-09-26 NOTE — ACP (ADVANCE CARE PLANNING)
Advance Care Planning     Palliative Team Advance Care Planning (ACP) Conversation    Date of Conversation: 09/26/24    Individuals present for the conversation:  Shift RN     ACP documents on file prior to discussion:  -Power of  for Healthcare    Previously completed document/s not on file:  N/A    Healthcare Decision Maker:    Primary Decision Maker: Shen Eagle - Brother/Sister - [phone]    Secondary Decision Maker: FcoMirna \"Milena\" - Parent - [phone]    Supplemental (Other) Decision Maker: Clifford Eagle - Brother/Sister - [phone]     Conversation Summary:  1510 D/w shift RN: Pt is scheduled for surgery @ 1540 today.  RN anticipates pt will want to amend his code status po-operatively as he \"has been saying all night he wants to die.\"  [facility] will f/u on Monday though suspect pts plan of care will have been developed before then.  If not, [facility] NP will f/u.     Resuscitation Status:   Code Status: Full Code     Documentation Completed:  -Power of  for Healthcare        I spent 40 minutes with the patient and/or surrogate decision maker discussing the patient's wishes and goals.                                                                                                                                                                                                                                                              Victoriano Larsen RN

## 2024-09-26 NOTE — PROGRESS NOTES
4 Eyes Skin Assessment     The patient is being assess for   Shift Handoff    I agree that 2 RN's have performed a thorough Head to Toe Skin Assessment on the patient. ALL assessment sites listed below have been assessed.      Areas assessed by both nurses:   [x]   Head, Face, and Ears   [x]   Shoulders, Back, and Chest, Abdomen  [x]   Arms, Elbows, and Hands   [x]   Coccyx, Sacrum, and Ischium  [x]   Legs, Feet, and Heels            **SHARE this note so that the co-signing nurse is able to place an eSignature**    Co-signer eSignature: Electronically signed by MERYL NUGENT RN on 9/26/24 at 5:34 PM EDT    Does the Patient have Skin Breakdown?  Yes LDA WOUND CARE was Initiated documentation include the Vanesa-wound, Wound Assessment, Measurements, Dressing Treatment, Drainage, and Color\",          Christiano Prevention initiated:  Yes   Wound Care Orders initiated:  No      WOC nurse consulted for Pressure Injury (Stage 3,4, Unstageable, DTI, NWPT, Complex wounds)and New or Established Ostomies:  No      Primary Nurse eSignature: Electronically signed by Davida Clark RN on 9/26/24 at 3:47 PM EDT

## 2024-09-26 NOTE — CONSENT
Informed Consent for Blood Component Transfusion Note    I have discussed with the patient the rationale for blood component transfusion; its benefits in treating or preventing fatigue, organ damage, or death; and its risk which includes mild transfusion reactions, rare risk of blood borne infection, or more serious but rare reactions. I have discussed the alternatives to transfusion, including the risk and consequences of not receiving transfusion. The patient had an opportunity to ask questions and had agreed to proceed with transfusion of blood components.    Electronically signed by JOSE Alicea CNP on 9/26/24 at 11:57 AM EDT

## 2024-09-26 NOTE — PROGRESS NOTES
PROGRESS NOTE    HPI: Ruben Eagle is a(n)59 y.o. male admitted for work-up and treatment for Bacteremia [R78.81].     We are following for elevated LFTs.    Subjective:     No acute events overnight.  Now on osmin.  CRRT off for planned BKA today.        Objective:     I/O last 3 completed shifts:  In: 2054.1 [P.O.:340; I.V.:1207.6; IV Piggyback:506.5]  Out: 2714 [Urine:136]      BP (!) 93/54   Pulse 70   Temp (!) 92.8 °F (33.8 °C)   Resp 21   Ht 1.753 m (5' 9.02\")   Wt 98.5 kg (217 lb 2.5 oz)   SpO2 91%   BMI 32.05 kg/m²     Physical Exam:  HEENT: + icteric sclera, oropharyngeal membranes pink and moist.  Cor: RRR  Lungs: non-labored, no respiratory distress  Abdomen: soft, NT. No ascites.  No hepatomegaly or splenomegaly  Neuro: alert and oriented x 3  Skin: + jaundice    Results:   Lab Results   Component Value Date    ALT 52 (H) 09/26/2024     (H) 09/26/2024    ALKPHOS 239 (H) 09/26/2024    BILIDIR 6.5 (H) 09/25/2024    INR 3.89 (H) 09/26/2024    LIPASE 22.0 08/05/2020     Lab Results   Component Value Date    WBC 25.6 (H) 09/26/2024    HGB 11.4 (L) 09/26/2024    HCT 37.2 (L) 09/26/2024    MCV 81.4 09/26/2024    PLT 92 (L) 09/26/2024     BUN/Cr/glu/ALT/AST/amyl/lip:  60/2.7/--/--/--/--/-- (09/26 0940)  US ABDOMEN LIMITED W DOPPLER    Result Date: 9/25/2024  1. Cirrhotic appearance of the liver. 2. Normal Doppler evaluation of the hepatic vasculature. 3. Cholelithiasis with no evidence of acute cholecystitis. 4. Right pleural effusion.     XR CHEST PORTABLE    Result Date: 9/25/2024  No evidence of pneumothorax following right internal jugular vein approach central venous catheter placement. Cardiomegaly with findings of pulmonary edema and moderate size right pleural effusion.     XR CHEST PORTABLE    Result Date: 9/25/2024  Hazy airspace opacities are seen throughout the right lung concerning for pneumonia     CT ABDOMEN PELVIS WO CONTRAST  Additional Contrast? Radiologist Recommendation    Result Date: 9/24/2024  1. Large right and moderate left pleural effusions and lower lobe atelectatic changes. 2. Cardiomegaly. 3. Cholelithiasis. No evidence of dilation of the common bile duct. 4. Constipation and stool impaction in the rectum. 5. Mild ascites and mesenteric congestion. 6. L4 compression injury with significant retropulsion and significant spinal stenosis at this level also associated with foraminal stenosis. This was previously seen in 2013 as well. 7. Anasarca.     US RENAL COMPLETE    Result Date: 9/23/2024  No sonographic abnormality.     XR CHEST PORTABLE    Result Date: 9/20/2024  No acute cardiopulmonary process.     XR ANKLE LEFT (MIN 3 VIEWS)    Result Date: 9/19/2024  Soft tissue swelling without definite fracture.  Gas in the soft tissues     XR TIBIA FIBULA LEFT (2 VIEWS)    Result Date: 9/19/2024  1.  No acute fracture or dislocation. 2.  No hardware complication.     XR FOOT LEFT (MIN 3 VIEWS)    Result Date: 9/19/2024  Appearance of a Charcot foot with multiple amputations.  There does appear to be osteomyelitis/erosive disease along the distal talus, there is a sizable displaced talar fragment when this is new from March.  There is an ulcerating soft tissue wound of the stump.     XR TIBIA FIBULA RIGHT (2 VIEWS)    Result Date: 9/19/2024  No acute fracture or dislocation     XR KNEE LEFT (3 VIEWS)    Result Date: 9/19/2024  No radiographic evidence of acute osseous abnormality.     XR KNEE RIGHT (3 VIEWS)    Result Date: 9/19/2024  Status post below the knee amputation with surgical clips in the soft tissues medially and moderate edema or cellulitis in the soft tissues distal to the surgical site.  Recommend clinical follow-up of the area. Mild osteoarthritic changes in the knee and diffuse osteopenia with no acute bony abnormality         Impression:  59-year-old male with past medical history of GERD, colon polyps, CKD, DM2, HTN,  CAD s/p CABG 2020 on Plavix, CHF with significantly reduced EF of 10/15%, hypothyroidism and recent right lower extremity diabetic infection requiring right BKA 8/2024, presenting with left foot pain.  GI consulted for hyperbilirubinemia.     Elevated LFTs, primarily direct hyperbilirubinemia,  CT (without contrast) does not suggest cause. Cholelithiasis is noted.  US with doppler shows cirrhotic appearing liver. No PVT.  Hep B, C, HIV negative.     2. Osteomyelitis left foot, bacteremia.  L BKA planned today or tomorrow.  On antibiotics per ID.  White count increasing.  Repeat blood cultures 9/22 NGTD.     3. Acute on chronic kidney disease, oliguria. On CRRT.     3. CHF. Lasix on hold currently.  BNP 30,000.     4. Pleural effusions.   Thoracentesis deferred until coagulopathy corrected.     5. Thrombocytopenia.  Coagulopathy (INR 4). Received vitamin K yesterday,  ? Related to sepsis (repeat BC NGTD), liver disease.    6. Hypotension.   On vasopressors.     Plan:  Suspect abnormal liver tests are related to underlying chronic liver disease with acute injury from DILI vs cardiac hepatopathy or sepsis/?MODS.  2. Liver serologies in evaluation for underlying autoimmune/chronic liver disease are pending.  AFP also pending.  3. Although source of coagulopathy remains unclear, it is possible this reflects acute liver failure.  Monitor closely for evidence of hepatic encephalopathy. He does not have asterixis on exam. Will check ammonia level.  4. Continue to trend LFTs daily, INR q 12 hours.      Please do not hesitate to call with questions or concerns.      Electronically signed by: JOSE Brandon 9/26/2024 1:17 PM     (Office) [phone]  (Fax) [phone]  Available via [employer]

## 2024-09-27 ENCOUNTER — ANESTHESIA EVENT (OUTPATIENT)
Dept: CARDIAC CATH/INVASIVE PROCEDURES | Age: 59
DRG: 853 | End: 2024-09-27
Payer: MEDICARE

## 2024-09-27 ENCOUNTER — HOSPITAL ENCOUNTER (INPATIENT)
Dept: CARDIAC CATH/INVASIVE PROCEDURES | Age: 59
Discharge: HOME | DRG: 853 | End: 2024-09-29
Attending: INTERNAL MEDICINE
Payer: MEDICARE

## 2024-09-27 ENCOUNTER — ANESTHESIA (OUTPATIENT)
Dept: CARDIAC CATH/INVASIVE PROCEDURES | Age: 59
DRG: 853 | End: 2024-09-27
Payer: MEDICARE

## 2024-09-27 PROBLEM — R57.0 CARDIOGENIC SHOCK: Status: ACTIVE | Noted: 2024-09-27

## 2024-09-27 LAB
ALBUMIN SERPL-MCNC: 2.9 G/DL (ref 3.4–5)
ALBUMIN SERPL-MCNC: 3.1 G/DL (ref 3.4–5)
ANION GAP SERPL CALCULATED.3IONS-SCNC: 13 MMOL/L (ref 3–16)
ANION GAP SERPL CALCULATED.3IONS-SCNC: 15 MMOL/L (ref 3–16)
ANISOCYTOSIS BLD QL SMEAR: ABNORMAL
APTT BLD: 45.8 SEC (ref 22.1–36.4)
BASE EXCESS BLDA CALC-SCNC: -6.2 MMOL/L (ref -3–3)
BASOPHILS # BLD: 0 K/UL (ref 0–0.2)
BASOPHILS NFR BLD: 0 %
BUN SERPL-MCNC: 55 MG/DL (ref 7–20)
BUN SERPL-MCNC: 58 MG/DL (ref 7–20)
CA-I BLD-SCNC: 0.97 MMOL/L (ref 1.12–1.32)
CA-I BLD-SCNC: 0.98 MMOL/L (ref 1.12–1.32)
CALCIUM SERPL-MCNC: 7.3 MG/DL (ref 8.3–10.6)
CALCIUM SERPL-MCNC: 7.3 MG/DL (ref 8.3–10.6)
CHLORIDE SERPL-SCNC: 100 MMOL/L (ref 99–110)
CHLORIDE SERPL-SCNC: 99 MMOL/L (ref 99–110)
CO2 BLDA-SCNC: 18.4 MMOL/L
CO2 SERPL-SCNC: 18 MMOL/L (ref 21–32)
CO2 SERPL-SCNC: 19 MMOL/L (ref 21–32)
COHGB MFR BLDA: 1.2 % (ref 0–1.5)
CREAT SERPL-MCNC: 2.6 MG/DL (ref 0.9–1.3)
CREAT SERPL-MCNC: 2.9 MG/DL (ref 0.9–1.3)
DEPRECATED RDW RBC AUTO: 25.5 % (ref 12.4–15.4)
ECHO BSA: 2.08 M2
ECHO EST RA PRESSURE: 8 MMHG
ECHO IVC PROX: 3.3 CM
ECHO LV EF PHYSICIAN: 15 %
ECHO RIGHT VENTRICULAR SYSTOLIC PRESSURE (RVSP): 34 MMHG
ECHO TV REGURGITANT MAX VELOCITY: 2.55 M/S
ECHO TV REGURGITANT PEAK GRADIENT: 26 MMHG
EOSINOPHIL # BLD: 0.5 K/UL (ref 0–0.6)
EOSINOPHIL NFR BLD: 2 %
FIBRINOGEN PPP-MCNC: 195 MG/DL (ref 227–534)
GFR SERPLBLD CREATININE-BSD FMLA CKD-EPI: 24 ML/MIN/{1.73_M2}
GFR SERPLBLD CREATININE-BSD FMLA CKD-EPI: 28 ML/MIN/{1.73_M2}
GLUCOSE BLD-MCNC: 72 MG/DL (ref 70–99)
GLUCOSE BLD-MCNC: 75 MG/DL (ref 70–99)
GLUCOSE BLD-MCNC: 79 MG/DL (ref 70–99)
GLUCOSE BLD-MCNC: 94 MG/DL (ref 70–99)
GLUCOSE SERPL-MCNC: 67 MG/DL (ref 70–99)
GLUCOSE SERPL-MCNC: 71 MG/DL (ref 70–99)
HCO3 BLDA-SCNC: 17.5 MMOL/L (ref 21–29)
HCT VFR BLD AUTO: 34.8 % (ref 40.5–52.5)
HCV RNA SERPL NAA+PROBE-ACNC: NOT DETECTED IU/ML
HCV RNA SERPL NAA+PROBE-LOG IU: NOT DETECTED LOG IU/ML
HCV RNA SERPL QL NAA+PROBE: NOT DETECTED
HGB BLD-MCNC: 10.9 G/DL (ref 13.5–17.5)
HGB BLDA-MCNC: 12.3 G/DL (ref 13.5–17.5)
INR PPP: 2.77 (ref 0.85–1.15)
LACTATE BLDV-SCNC: 2.5 MMOL/L (ref 0.4–2)
LYMPHOCYTES # BLD: 2.6 K/UL (ref 1–5.1)
LYMPHOCYTES NFR BLD: 10 %
MAGNESIUM SERPL-MCNC: 2.5 MG/DL (ref 1.8–2.4)
MAGNESIUM SERPL-MCNC: 2.5 MG/DL (ref 1.8–2.4)
MCH RBC QN AUTO: 25.5 PG (ref 26–34)
MCHC RBC AUTO-ENTMCNC: 31.4 G/DL (ref 31–36)
MCV RBC AUTO: 81.3 FL (ref 80–100)
METHGB MFR BLDA: 0.1 %
MONOCYTES # BLD: 1 K/UL (ref 0–1.3)
MONOCYTES NFR BLD: 4 %
NEUTROPHILS # BLD: 21.4 K/UL (ref 1.7–7.7)
NEUTROPHILS NFR BLD: 83 %
NEUTS BAND NFR BLD MANUAL: 1 % (ref 0–7)
NRBC BLD-RTO: 3 /100 WBC
O2 THERAPY: ABNORMAL
PATH INTERP BLD-IMP: NORMAL
PATH INTERP BLD-IMP: NORMAL
PCO2 BLDA: 29.3 MMHG (ref 35–45)
PERFORMED ON: NORMAL
PH BLDA: 7.39 [PH] (ref 7.35–7.45)
PH BLDV: 7.36 [PH] (ref 7.35–7.45)
PH BLDV: 7.39 [PH] (ref 7.35–7.45)
PHOSPHATE SERPL-MCNC: 3.9 MG/DL (ref 2.5–4.9)
PHOSPHATE SERPL-MCNC: 4.6 MG/DL (ref 2.5–4.9)
PLATELET # BLD AUTO: 107 K/UL (ref 135–450)
PLATELET BLD QL SMEAR: ABNORMAL
PMV BLD AUTO: 8.2 FL (ref 5–10.5)
PO2 BLDA: 70.8 MMHG (ref 75–108)
POTASSIUM SERPL-SCNC: 4.5 MMOL/L (ref 3.5–5.1)
PROTHROMBIN TIME: 29.1 SEC (ref 11.9–14.9)
RBC # BLD AUTO: 4.27 M/UL (ref 4.2–5.9)
SAO2 % BLDA: 93.8 %
SLIDE REVIEW: ABNORMAL
SODIUM SERPL-SCNC: 132 MMOL/L (ref 136–145)
SODIUM SERPL-SCNC: 132 MMOL/L (ref 136–145)
TOXIC GRANULES BLD QL SMEAR: PRESENT
WBC # BLD AUTO: 25.5 K/UL (ref 4–11)

## 2024-09-27 PROCEDURE — 2580000003 HC RX 258: Performed by: INTERNAL MEDICINE

## 2024-09-27 PROCEDURE — 6370000000 HC RX 637 (ALT 250 FOR IP): Performed by: STUDENT IN AN ORGANIZED HEALTH CARE EDUCATION/TRAINING PROGRAM

## 2024-09-27 PROCEDURE — 6360000002 HC RX W HCPCS: Performed by: INTERNAL MEDICINE

## 2024-09-27 PROCEDURE — 6360000002 HC RX W HCPCS

## 2024-09-27 PROCEDURE — 85730 THROMBOPLASTIN TIME PARTIAL: CPT

## 2024-09-27 PROCEDURE — 2000000000 HC ICU R&B

## 2024-09-27 PROCEDURE — 82330 ASSAY OF CALCIUM: CPT

## 2024-09-27 PROCEDURE — 83735 ASSAY OF MAGNESIUM: CPT

## 2024-09-27 PROCEDURE — P9047 ALBUMIN (HUMAN), 25%, 50ML: HCPCS

## 2024-09-27 PROCEDURE — 88305 TISSUE EXAM BY PATHOLOGIST: CPT

## 2024-09-27 PROCEDURE — 2700000000 HC OXYGEN THERAPY PER DAY

## 2024-09-27 PROCEDURE — 85610 PROTHROMBIN TIME: CPT

## 2024-09-27 PROCEDURE — 2580000003 HC RX 258: Performed by: STUDENT IN AN ORGANIZED HEALTH CARE EDUCATION/TRAINING PROGRAM

## 2024-09-27 PROCEDURE — 99233 SBSQ HOSP IP/OBS HIGH 50: CPT | Performed by: INTERNAL MEDICINE

## 2024-09-27 PROCEDURE — 80069 RENAL FUNCTION PANEL: CPT

## 2024-09-27 PROCEDURE — 99024 POSTOP FOLLOW-UP VISIT: CPT | Performed by: CLINICAL NURSE SPECIALIST

## 2024-09-27 PROCEDURE — 99291 CRITICAL CARE FIRST HOUR: CPT | Performed by: STUDENT IN AN ORGANIZED HEALTH CARE EDUCATION/TRAINING PROGRAM

## 2024-09-27 PROCEDURE — 6360000002 HC RX W HCPCS: Performed by: STUDENT IN AN ORGANIZED HEALTH CARE EDUCATION/TRAINING PROGRAM

## 2024-09-27 PROCEDURE — 83605 ASSAY OF LACTIC ACID: CPT

## 2024-09-27 PROCEDURE — 2580000003 HC RX 258

## 2024-09-27 PROCEDURE — 88112 CYTOPATH CELL ENHANCE TECH: CPT

## 2024-09-27 PROCEDURE — 85230 CLOT FACTOR VII PROCONVERTIN: CPT

## 2024-09-27 PROCEDURE — 82803 BLOOD GASES ANY COMBINATION: CPT

## 2024-09-27 PROCEDURE — 94761 N-INVAS EAR/PLS OXIMETRY MLT: CPT

## 2024-09-27 PROCEDURE — 6360000002 HC RX W HCPCS: Performed by: NURSE PRACTITIONER

## 2024-09-27 PROCEDURE — 93325 DOPPLER ECHO COLOR FLOW MAPG: CPT

## 2024-09-27 PROCEDURE — 2500000003 HC RX 250 WO HCPCS

## 2024-09-27 PROCEDURE — 85384 FIBRINOGEN ACTIVITY: CPT

## 2024-09-27 PROCEDURE — 2500000003 HC RX 250 WO HCPCS: Performed by: STUDENT IN AN ORGANIZED HEALTH CARE EDUCATION/TRAINING PROGRAM

## 2024-09-27 PROCEDURE — 37799 UNLISTED PX VASCULAR SURGERY: CPT

## 2024-09-27 PROCEDURE — 85025 COMPLETE CBC W/AUTO DIFF WBC: CPT

## 2024-09-27 RX ORDER — LORAZEPAM 2 MG/ML
0.5 INJECTION INTRAMUSCULAR
Status: DISCONTINUED | OUTPATIENT
Start: 2024-09-27 | End: 2024-09-28 | Stop reason: HOSPADM

## 2024-09-27 RX ORDER — PANTOPRAZOLE SODIUM 40 MG/10ML
40 INJECTION, POWDER, LYOPHILIZED, FOR SOLUTION INTRAVENOUS DAILY
Status: DISCONTINUED | OUTPATIENT
Start: 2024-09-27 | End: 2024-09-28 | Stop reason: HOSPADM

## 2024-09-27 RX ORDER — 0.9 % SODIUM CHLORIDE 0.9 %
500 INTRAVENOUS SOLUTION INTRAVENOUS ONCE
Status: COMPLETED | OUTPATIENT
Start: 2024-09-27 | End: 2024-09-27

## 2024-09-27 RX ORDER — MINERAL OIL AND WHITE PETROLATUM 150; 830 MG/G; MG/G
OINTMENT OPHTHALMIC
Status: DISCONTINUED | OUTPATIENT
Start: 2024-09-27 | End: 2024-09-28 | Stop reason: HOSPADM

## 2024-09-27 RX ORDER — ASPIRIN 81 MG/1
81 TABLET, CHEWABLE ORAL DAILY
Status: DISCONTINUED | OUTPATIENT
Start: 2024-09-27 | End: 2024-09-28 | Stop reason: HOSPADM

## 2024-09-27 RX ORDER — LORAZEPAM 2 MG/ML
1 INJECTION INTRAMUSCULAR
Status: DISCONTINUED | OUTPATIENT
Start: 2024-09-27 | End: 2024-09-28 | Stop reason: HOSPADM

## 2024-09-27 RX ORDER — FENTANYL CITRATE-0.9 % NACL/PF 10 MCG/ML
25-200 PLASTIC BAG, INJECTION (ML) INTRAVENOUS CONTINUOUS
Status: DISCONTINUED | OUTPATIENT
Start: 2024-09-27 | End: 2024-09-28 | Stop reason: HOSPADM

## 2024-09-27 RX ORDER — INSULIN LISPRO 100 [IU]/ML
0-4 INJECTION, SOLUTION INTRAVENOUS; SUBCUTANEOUS EVERY 4 HOURS
Status: DISCONTINUED | OUTPATIENT
Start: 2024-09-27 | End: 2024-09-28 | Stop reason: HOSPADM

## 2024-09-27 RX ORDER — ALBUMIN (HUMAN) 12.5 G/50ML
25 SOLUTION INTRAVENOUS EVERY 6 HOURS
Status: DISCONTINUED | OUTPATIENT
Start: 2024-09-27 | End: 2024-09-28 | Stop reason: HOSPADM

## 2024-09-27 RX ORDER — MORPHINE SULFATE 2 MG/ML
2 INJECTION, SOLUTION INTRAMUSCULAR; INTRAVENOUS
Status: DISCONTINUED | OUTPATIENT
Start: 2024-09-27 | End: 2024-09-28 | Stop reason: HOSPADM

## 2024-09-27 RX ADMIN — PANTOPRAZOLE SODIUM 40 MG: 40 INJECTION, POWDER, FOR SOLUTION INTRAVENOUS at 08:57

## 2024-09-27 RX ADMIN — HYDROCORTISONE SODIUM SUCCINATE 50 MG: 100 INJECTION, POWDER, FOR SOLUTION INTRAMUSCULAR; INTRAVENOUS at 09:41

## 2024-09-27 RX ADMIN — CALCIUM GLUCONATE 1000 MG: 20 INJECTION, SOLUTION INTRAVENOUS at 01:16

## 2024-09-27 RX ADMIN — MORPHINE SULFATE 2 MG: 2 INJECTION, SOLUTION INTRAMUSCULAR; INTRAVENOUS at 12:46

## 2024-09-27 RX ADMIN — CALCIUM GLUCONATE 1000 MG: 20 INJECTION, SOLUTION INTRAVENOUS at 05:41

## 2024-09-27 RX ADMIN — CEFEPIME 2000 MG: 2 INJECTION, POWDER, FOR SOLUTION INTRAVENOUS at 01:22

## 2024-09-27 RX ADMIN — MORPHINE SULFATE 2 MG: 2 INJECTION, SOLUTION INTRAMUSCULAR; INTRAVENOUS at 16:30

## 2024-09-27 RX ADMIN — Medication: at 08:10

## 2024-09-27 RX ADMIN — PHENYLEPHRINE HYDROCHLORIDE 15 MCG/MIN: 50 INJECTION INTRAVENOUS at 03:36

## 2024-09-27 RX ADMIN — MINERAL OIL, PETROLATUM: 425; 568 OINTMENT OPHTHALMIC at 08:57

## 2024-09-27 RX ADMIN — CHLORHEXIDINE GLUCONATE 15 ML: 1.2 RINSE ORAL at 08:58

## 2024-09-27 RX ADMIN — SODIUM CHLORIDE 30 MCG/MIN: 9 INJECTION, SOLUTION INTRAVENOUS at 07:54

## 2024-09-27 RX ADMIN — SODIUM CHLORIDE: 9 INJECTION, SOLUTION INTRAVENOUS at 01:20

## 2024-09-27 RX ADMIN — METRONIDAZOLE 500 MG: 500 INJECTION, SOLUTION INTRAVENOUS at 05:27

## 2024-09-27 RX ADMIN — LORAZEPAM 1 MG: 2 INJECTION INTRAMUSCULAR; INTRAVENOUS at 16:30

## 2024-09-27 RX ADMIN — SODIUM CHLORIDE, PRESERVATIVE FREE 10 ML: 5 INJECTION INTRAVENOUS at 08:58

## 2024-09-27 RX ADMIN — CEFEPIME 2000 MG: 2 INJECTION, POWDER, FOR SOLUTION INTRAVENOUS at 08:56

## 2024-09-27 RX ADMIN — LORAZEPAM 1 MG: 2 INJECTION INTRAMUSCULAR; INTRAVENOUS at 12:46

## 2024-09-27 RX ADMIN — SODIUM BICARBONATE 50 MEQ: 84 INJECTION INTRAVENOUS at 10:38

## 2024-09-27 RX ADMIN — DEXTROSE MONOHYDRATE 125 ML: 100 INJECTION, SOLUTION INTRAVENOUS at 06:01

## 2024-09-27 RX ADMIN — PROPOFOL 30 MCG/KG/MIN: 10 INJECTION, EMULSION INTRAVENOUS at 00:43

## 2024-09-27 RX ADMIN — SODIUM CHLORIDE 47 MCG/MIN: 9 INJECTION, SOLUTION INTRAVENOUS at 00:27

## 2024-09-27 RX ADMIN — MUPIROCIN: 20 OINTMENT TOPICAL at 08:57

## 2024-09-27 RX ADMIN — ALBUMIN (HUMAN) 25 G: 0.25 INJECTION, SOLUTION INTRAVENOUS at 10:48

## 2024-09-27 RX ADMIN — SODIUM CHLORIDE 500 ML: 9 INJECTION, SOLUTION INTRAVENOUS at 10:43

## 2024-09-27 RX ADMIN — VASOPRESSIN 0.04 UNITS/MIN: 20 INJECTION INTRAVENOUS at 08:28

## 2024-09-27 ASSESSMENT — PULMONARY FUNCTION TESTS
PIF_VALUE: 21
PIF_VALUE: 30
PIF_VALUE: 21
PIF_VALUE: 22
PIF_VALUE: 22
PIF_VALUE: 24
PIF_VALUE: 18

## 2024-09-27 ASSESSMENT — PAIN SCALES - GENERAL: PAINLEVEL_OUTOF10: 4

## 2024-09-27 NOTE — CONSULTS
BINU Jarrell at [facility]    IN PRTL EXC B1 TARSAL/METAR B1 XCP TALUS/CALCANEUS Left 11/01/2018    PARTIAL RESECTION LEFT METATARSAL, ULCER DEBRIDEMENT LEFT FOOT WITH GRAFT APPLICATION performed by Floyd Jarrell DPM at [facility]    TOE AMPUTATION      2 toes    TOE AMPUTATION Bilateral 03/18/2021    AMPUTATION LEFT FOURTH AND FIFTH DIGITS, PARTIAL RESECTION  SECOND AND THIRD METATARSAL LEFT FOOT, PARTIAL RESECTION RIGHT FIRST METATARSAL performed by Floyd Jarrell DPM at [facility]    TOE AMPUTATION Right 05/20/2021    PARTIAL RESECTION RIGHT FIRST METATARSAL performed by Floyd Jarrell DPM at [facility]    UMBILICAL HERNIA REPAIR N/A 01/12/2024    ROBOTIC UMBILICAL HERNIA REPAIR WITH MESH performed by Mau Guajardo MD at [facility]       Current Medications:  Current Facility-Administered Medications   Medication Dose Route Frequency Provider Last Rate Last Admin    phenylephrine (MK-SYNEPHRINE) 50 mg in sodium chloride 0.9 % 250 mL infusion   mcg/min IntraVENous Continuous Maury Deluca DO 28.5 mL/hr at 09/27/24 0831 95 mcg/min at 09/27/24 0831    insulin lispro (HUMALOG,ADMELOG) injection vial 0-4 Units  0-4 Units SubCUTAneous Q4H Willian Aguirre MD        aspirin chewable tablet 81 mg  81 mg Oral Daily Willian Aguirre MD        lubrifresh P.M. (artificial tears) ophthalmic ointment   Both Eyes 6 times per day Willian Aguirre MD        fentaNYL (SUBLIMAZE) 1,000 mcg in sodium chloride 0.9% 100 mL infusion   mcg/hr IntraVENous Continuous Willian Aguirre MD 5 mL/hr at 09/27/24 0837 50 mcg/hr at 09/27/24 0837    pantoprazole (PROTONIX) injection 40 mg  40 mg IntraVENous Daily Willian Aguirre MD        VASOpressin 20 Units in sodium chloride 0.9 % 100 mL infusion  0.04 Units/min IntraVENous Continuous Willian Aguirre MD 12 mL/hr at 09/27/24 0831 0.04 Units/min at 09/27/24 0831    mupirocin (BACTROBAN) 2 % ointment   Each Nostril BID Willian Aguirre MD   Given at 09/26/24 2023     oxyCODONE-acetaminophen (PERCOCET) 5-325 MG per tablet 1 tablet  1 tablet Oral Q6H PRN Karolyn Jacobo APRN - CNP        midodrine (PROAMATINE) tablet 10 mg  10 mg Oral TID  Scott Leach MD        0.9 % sodium chloride infusion   IntraVENous PRN Leia Gotti APRN - CNP        chlorhexidine (PERIDEX) 0.12 % solution 15 mL  15 mL Mouth/Throat BID Willian Aguirre MD   15 mL at 09/26/24 2024    propofol infusion  5-50 mcg/kg/min IntraVENous Continuous Willian Aguirre MD   Stopped at 09/27/24 0740    prismaSATE BGK 4/2.5 dialysis solution   Dialysis Continuous Scott Leach  mL/hr at 09/27/24 0810 New Bag at 09/27/24 0810    prismaSATE BGK 4/2.5 dialysis solution   Dialysis Continuous Scott Leach  mL/hr at 09/27/24 0810 New Bag at 09/27/24 0810    prismaSATE BGK 4/2.5 dialysis solution   Dialysis Continuous Scott Leach  mL/hr at 09/27/24 0810 New Bag at 09/27/24 0810    heparin (porcine) injection 1,100-1,900 Units  1,100-1,900 Units IntraCATHeter PRN Scott Leach MD        And    heparin (porcine) injection 1,100-1,900 Units  1,100-1,900 Units IntraCATHeter PRN Scott Leach MD        magnesium sulfate 1000 mg in dextrose 5% 100 mL IVPB  1,000 mg IntraVENous PRN Scott Leach MD        calcium gluconate 1,000 mg in sodium chloride 50 mL  1,000 mg IntraVENous PRN Scott Leach MD   Stopped at 09/27/24 0641    Or    calcium gluconate 2,000 mg in sodium chloride 100 mL  2,000 mg IntraVENous ARLEYN Scott Leach MD   Stopped at 09/25/24 2243    Or    calcium gluconate 3,000 mg in sodium chloride 0.9 % 100 mL IVPB  3,000 mg IntraVENous PRN Scott Leach MD        Or    calcium gluconate 4,000 mg in sodium chloride 0.9 % 100 mL IVPB  4,000 mg IntraVENous PRN Scott Leach MD        sodium phosphate 6 mmol in sodium chloride 0.9 % 250 mL IVPB  6 mmol IntraVENous PRN Scott Leach MD        Or    sodium phosphate 12 mmol in sodium chloride 0.9 % 250 mL IVPB  12  estimated EF of 10 -15%. Left ventricle is dilated. Severe   global hypokinesis present.    Tricuspid Valve: Severe regurgitation. Reversed hepatic vein systolic   flow. RVSP may be underestimated in the setting of severe TR. The   estimated RVSP is 34 mmHg.    Right Atrium: Multiple leads present in the right atrium with multiple   large vegetations visualized.This is consistent with infective   endocarditis. This patient has MRSA bacteremia.    Pericardium: Large (>2 cm) localized pericardial effusion present   around the left ventricle.    IVC/SVC: Patient is ventilated, cannot use IVC diameter to estimate   right atrial pressure. IVC is severely dilated.    Image quality is adequate. Procedure performed with the patient in a   supine position.    No diastolic collapse of right ventricle to indicate Pericardial   tamponade.  CT HEAD WO CONTRAST  Narrative: EXAMINATION:  CT OF THE HEAD WITHOUT CONTRAST  9/26/2024 9:24 pm    TECHNIQUE:  CT of the head was performed without the administration of intravenous  contrast. Automated exposure control, iterative reconstruction, and/or weight  based adjustment of the mA/kV was utilized to reduce the radiation dose to as  low as reasonably achievable.    COMPARISON:  None.    HISTORY:  ORDERING SYSTEM PROVIDED HISTORY: change in mental status  TECHNOLOGIST PROVIDED HISTORY:  Reason for exam:->change in mental status  Has a \"code stroke\" or \"stroke alert\" been called?->No    FINDINGS:  BRAIN/VENTRICLES: There is no acute intracranial hemorrhage, mass effect or  midline shift.  No abnormal extra-axial fluid collection.  The gray-white  differentiation is maintained without evidence of an acute infarct.  There is  no evidence of hydrocephalus.    ORBITS: The visualized portion of the orbits demonstrate no acute abnormality.    SINUSES: The visualized paranasal sinuses and mastoid air cells demonstrate  no acute abnormality.    SOFT TISSUES/SKULL:  No acute abnormality of the

## 2024-09-27 NOTE — PROGRESS NOTES
09/26/24 2036   Vent Information   Equipment Changed HME   Vent Mode AC/VC   Vent Patient Data (Readings)   Backup Apnea On   Backup Rate 16 Breaths Per Minute   Backup Vt 550   Vent Alarm Settings   High Minute Volume (lpm) 20 L/min   Low Exhaled Vt (ml) 200 mL   Apnea (secs) 20 secs   Additional Respiratoray Assessments   Humidification Source HME   Ambu Bag With Mask At Bedside Yes   Airway Clearance   Suction ET Tube   Suction Device Inline suction catheter   Sputum Method Obtained Endotracheal   Sputum Amount Small   Sputum Color/Odor Yellow   Sputum Consistency Thick   ETT    Placement Date/Time: 09/26/24 1643   Placed By: In surgery;Licensed provider  Placement Verified By: Direct visualization;Auscultation;Capnometry  Preoxygenation: Yes  Mask Ventilation: (c) Ventilated by mask with oral airway (2);Difficult mask ventil...   Secured At 23 cm   Measured From Lips   ETT Placement Right   Secured By Commercial tube [email or web address]   Site Assessment Cool;Dry   Cuff Pressure 30 cm H2O   Tie/Mcneil Changed No

## 2024-09-27 NOTE — PROGRESS NOTES
Cardiology note:  Asked by hospitalist and ID specialist for SANDEEP  Patient has MRSA two out of two positive on 9.19.21   5 min apart.  There are obvious vegetations on ICD lead best visible in right atrium seen on transthoracic echo 9.21.24.  This lead needs to be removed.  SANDEEP is unnecessary in this unstable patient on two pressors intubated and sedated on propofol with heart rate of 120's. He is also on CRRT.  Will ask EP service to see him and help out in decision making.

## 2024-09-27 NOTE — PROGRESS NOTES
09/26/24 5415   Vent Information   Vent Mode AC/VC   $Ventilation $Subsequent Day   Vent Patient Data (Readings)   Backup Apnea On   Vent Alarm Settings   High Minute Volume (lpm) 20 L/min   Low Exhaled Vt (ml) 200 mL   Apnea (secs) 20 secs   Additional Respiratoray Assessments   Humidification Source HME   Circuit Condensation Drained   Ambu Bag With Mask At Bedside Yes   Airway Clearance   Suction Device Inline suction catheter   Sputum Method Obtained Endotracheal   Sputum Amount Small   Sputum Color/Odor Yellow   Sputum Consistency Thick   ETT    Placement Date/Time: 09/26/24 1643   Placed By: In surgery;Licensed provider  Placement Verified By: Direct visualization;Auscultation;Capnometry  Preoxygenation: Yes  Mask Ventilation: (c) Ventilated by mask with oral airway (2);Difficult mask ventil...   Secured At 23 cm   Measured From Lips   ETT Placement Center   Secured By Commercial tube [email or web address]   Site Assessment Cool;Dry   Cuff Pressure 30 cm H2O   Tie/Mcneil Changed No

## 2024-09-27 NOTE — PROGRESS NOTES
Pulmonary & Critical Care Medicine ICU Progress Note      Events of Last 24 hours:   Pt's pressor requirements have increased and he has a new pericardial effusion. He was kept intubated after vascular procedure.      Invasive Lines: PICC D#None   CVC D#None  Art Line D#1            Vitals:  BP 96/68   Pulse (!) 120   Temp 98.3 °F (36.8 °C) (Oral)   Resp 16   Ht 1.753 m (5' 9.02\")   Wt 93.7 kg (206 lb 9.1 oz)   SpO2 98%   BMI 30.49 kg/m²    Tmax:  CVP:        Intake/Output Summary (Last 24 hours) at 9/27/2024 0743  Last data filed at 9/27/2024 0723  Gross per 24 hour   Intake 2549.7 ml   Output 1313 ml   Net 1236.7 ml       EXAM:  Physical Exam  Constitutional:       Appearance: He is ill-appearing and toxic-appearing.      Comments: Intubated and sedated   HENT:      Head: Normocephalic and atraumatic.      Nose: Nose normal.      Mouth/Throat:      Pharynx: No oropharyngeal exudate.   Eyes:      General: No scleral icterus.        Right eye: No discharge.         Left eye: No discharge.   Cardiovascular:      Rate and Rhythm: Tachycardia present.      Heart sounds: No murmur heard.     No gallop.   Pulmonary:      Effort: Pulmonary effort is normal.      Breath sounds: Rales present. No wheezing.   Abdominal:      General: Bowel sounds are normal. There is distension.      Tenderness: There is no abdominal tenderness.   Musculoskeletal:         General: No swelling.      Cervical back: Normal range of motion.   Skin:     General: Skin is warm and dry.   Neurological:      Comments: Intubated and sedated          Medications:  Scheduled Meds:   insulin lispro  0-4 Units SubCUTAneous Q4H    aspirin  81 mg Oral Daily    artificial tears   Both Eyes 6 times per day    pantoprazole  40 mg IntraVENous Daily    mupirocin   Each Nostril BID    midodrine  10 mg Oral TID WC    chlorhexidine  15 mL Mouth/Throat BID    [Held by provider] insulin glargine  10 Units SubCUTAneous Nightly    cefepime  2,000 mg  decided to withdraw care and will provide comfort medications and support.    Critical care time spent reviewing labs/films, examining patient, collaborating with other physicians but excluding procedures for life threatening organ failure is 43 minutes.        Electronically signed by:  Willian Aguirre MD    9/27/2024    7:43 AM.

## 2024-09-27 NOTE — PLAN OF CARE
Problem: Chronic Conditions and Co-morbidities  Goal: Patient's chronic conditions and co-morbidity symptoms are monitored and maintained or improved  Outcome: Progressing  Flowsheets (Taken 9/26/2024 2000)  Care Plan - Patient's Chronic Conditions and Co-Morbidity Symptoms are Monitored and Maintained or Improved:   Monitor and assess patient's chronic conditions and comorbid symptoms for stability, deterioration, or improvement   Collaborate with multidisciplinary team to address chronic and comorbid conditions and prevent exacerbation or deterioration     Problem: Discharge Planning  Goal: Discharge to home or other facility with appropriate resources  Outcome: Progressing  Flowsheets (Taken 9/26/2024 2000)  Discharge to home or other facility with appropriate resources: Refer to discharge planning if patient needs post-hospital services based on physician order or complex needs related to functional status, cognitive ability or social support system     Problem: Safety - Adult  Goal: Free from fall injury  Outcome: Progressing     Problem: Skin/Tissue Integrity  Goal: Absence of new skin breakdown  Description: 1.  Monitor for areas of redness and/or skin breakdown  2.  Assess vascular access sites hourly  3.  Every 4-6 hours minimum:  Change oxygen saturation probe site  4.  Every 4-6 hours:  If on nasal continuous positive airway pressure, respiratory therapy assess nares and determine need for appliance change or resting period.  Outcome: Progressing

## 2024-09-27 NOTE — PROGRESS NOTES
Vascular Surgery Progress Note      POD#  1  S/P Left below knee leg amputation (9/26/2024)    Chief Complaint: Postop follow up      SUBJECTIVE:  is sedated, intubated on CRRT    OBJECTIVE    Physical  CURRENT VITALS:  BP 96/70   Pulse (!) 127   Temp 97.5 °F (36.4 °C) (Axillary)   Resp 17   Ht 1.753 m (5' 9.02\")   Wt 93.7 kg (206 lb 9.1 oz)   SpO2 98%   BMI 30.49 kg/m²   24 HR INTAKE/OUTPUT:    Intake/Output Summary (Last 24 hours) at 9/27/2024 1003  Last data filed at 9/27/2024 1000  Gross per 24 hour   Intake 2739.69 ml   Output 846 ml   Net 1893.69 ml     UO:  NR-7-26  ml /shift   CRRT:  578- 0-702 ml/shift    Palpable femoral pulse  Left BKA dressing intact with knee immobilizer in place    Data  CBC:   Recent Labs     09/25/24  0526 09/26/24  0401 09/27/24  0514   WBC 24.7* 25.6* 25.5*   HGB 11.9* 11.4* 10.9*   HCT 39.8* 37.2* 34.8*   MCV 81.9 81.4 81.3   * 92* 107*     BMP:   Recent Labs     09/26/24  0401 09/26/24  0940 09/26/24  1830 09/27/24  0035 09/27/24  0514   * 131* 133* 132* 132*   K 4.4  4.4 4.4 4.5 4.5 4.5  4.5   CL 97* 97* 98* 99 100   CO2 20* 21 19* 18* 19*   PHOS 4.4 4.0 5.5* 4.6 3.9   GLUCOSE 104* 109* 81 71 67*   BUN 66* 60* 61* 58* 55*   CREATININE 3.0* 2.7* 3.1* 2.9* 2.6*   CALCIUM 6.7* 6.8* 7.5* 7.3* 7.3*   MG 2.50* 2.50* 2.60* 2.50* 2.50*     Lab Results   Component Value Date/Time    ALKPHOS 239 09/26/2024 04:01 AM    ALT 52 09/26/2024 04:01 AM     09/26/2024 04:01 AM    BILITOT 9.2 09/26/2024 04:01 AM    BILIDIR 6.5 09/25/2024 12:29 PM     PT/INR:   Recent Labs     09/26/24  1326 09/26/24  1555 09/27/24  0750   PROTIME 26.5* 24.8* 29.1*   INR 2.44* 2.24* 2.77*     Current Inpatient Medications  Current Facility-Administered Medications: phenylephrine (MK-SYNEPHRINE) 50 mg in sodium chloride 0.9 % 250 mL infusion,  mcg/min, IntraVENous, Continuous  insulin lispro (HUMALOG,ADMELOG) injection vial 0-4 Units, 0-4 Units, SubCUTAneous, Q4H  aspirin

## 2024-09-27 NOTE — PROGRESS NOTES
09/27/24 0846   Patient Observation   Pulse (!) 127   Respirations 17   SpO2 98 %   Breath Sounds   Breath Sounds Bilateral Diminished;Clear   Vent Information   Equipment Changed HME   Vent Mode AC/VC   Ventilator Settings   FiO2  35 %   Vt (Set, mL) 550 mL   Resp Rate (Set) 16 bpm   PEEP/CPAP (cmH2O) 5   Vent Patient Data (Readings)   Vt (Measured) 576 mL   Peak Inspiratory Pressure (cmH2O) 18 cmH2O   Rate Measured 16 br/min   Minute Volume (L/min) 9.34 Liters   Mean Airway Pressure (cmH2O) 9 cmH20   Plateau Pressure (cm H2O) 0 cm H2O   Driving Pressure -5   I:E Ratio 1:1.90   Backup Apnea On   Vent Alarm Settings   High Pressure (cmH2O) 40 cmH2O   Low Minute Volume (lpm) 2 L/min   Low Exhaled Vt (ml) 200 mL   RR High (bpm) 35 br/min   Apnea (secs) 20 secs   Additional Respiratoray Assessments   Humidification Source HME   Circuit Condensation Drained   Ambu Bag With Mask At Bedside Yes   Airway Clearance   Suction Oral   Suction Device Kishor   Sputum Method Obtained Oral   Sputum Amount Scant   Sputum Color/Odor Clear   ETT    Placement Date/Time: 09/26/24 1643   Placed By: In surgery;Licensed provider  Placement Verified By: Direct visualization;Auscultation;Capnometry  Preoxygenation: Yes  Mask Ventilation: (c) Ventilated by mask with oral airway (2);Difficult mask ventil...   Secured At 23 cm   Measured From Lips   ETT Placement Center   Secured By Commercial tube [email or web address]   Site Assessment Dry

## 2024-09-27 NOTE — PROGRESS NOTES
09/27/24 1142   Patient Observation   Pulse (!) 117   Respirations 16   SpO2 96 %   Breath Sounds   Breath Sounds Bilateral Diminished;Rhonchi   Vent Information   Equipment Changed HME   Vent Mode AC/VC   Ventilator Settings   FiO2  30 %   Vt (Set, mL) 550 mL   Resp Rate (Set) 16 bpm   PEEP/CPAP (cmH2O) 5   Vent Patient Data (Readings)   Vt (Measured) 562 mL   Peak Inspiratory Pressure (cmH2O) 24 cmH2O   Rate Measured 16 br/min   Minute Volume (L/min) 9.04 Liters   Mean Airway Pressure (cmH2O) 9.8 cmH20   Plateau Pressure (cm H2O) 0 cm H2O   Driving Pressure -5   I:E Ratio 1:2.80   Backup Apnea On   Vent Alarm Settings   High Pressure (cmH2O) 40 cmH2O   Low Minute Volume (lpm) 2 L/min   Low Exhaled Vt (ml) 200 mL   RR High (bpm) 35 br/min   Apnea (secs) 20 secs   Additional Respiratoray Assessments   Humidification Source HME   Circuit Condensation Drained   Ambu Bag With Mask At Bedside Yes   Airway Clearance   Suction Oral;ET Tube   Subglottic Suction Done Yes   Suction Device Eliasuer;Inline suction catheter   Sputum Method Obtained Endotracheal   Sputum Amount Large   Sputum Color/Odor Yellow;Tan   Sputum Consistency Thick;Mucous plugs  (saline)   ETT    Placement Date/Time: 09/26/24 1643   Placed By: In surgery;Licensed provider  Placement Verified By: Direct visualization;Auscultation;Capnometry  Preoxygenation: Yes  Mask Ventilation: (c) Ventilated by mask with oral airway (2);Difficult mask ventil...   Secured At 23 cm   Measured From Lips   ETT Placement Right   Secured By Commercial tube wilson   Site Assessment Dry

## 2024-09-27 NOTE — PROGRESS NOTES
09/27/24 0347   Vent Information   Vent Mode AC/VC   Vent Patient Data (Readings)   Backup Apnea On   Backup Rate 16 Breaths Per Minute   Backup Vt 550   Vent Alarm Settings   High Minute Volume (lpm) 20 L/min   Low Exhaled Vt (ml) 200 mL   Apnea (secs) 20 secs   Additional Respiratoray Assessments   Humidification Source HME   Circuit Condensation Drained   Ambu Bag With Mask At Bedside Yes   ETT    Placement Date/Time: 09/26/24 1643   Placed By: In surgery;Licensed provider  Placement Verified By: Direct visualization;Auscultation;Capnometry  Preoxygenation: Yes  Mask Ventilation: (c) Ventilated by mask with oral airway (2);Difficult mask ventil...   Secured At 23 cm   Measured From Lips   ETT Placement Right   Secured By Commercial tube [email or web address]   Site Assessment Cool;Dry   Cuff Pressure 30 cm H2O   Tie/Mcneil Changed No

## 2024-09-27 NOTE — PROGRESS NOTES
CHF Care Plan      Patient's EF (Ejection Fraction) is less than 40%    Heart Failure Medications:  Diuretics:: Furosemide, Torsemide, Spironolactone, Metalozone, Other, and None    (One of the following REQUIRED for EF </= 40%/SYSTOLIC FAILURE but MAY be used in EF% >40%/DIASTOLIC FAILURE)        ACE:: None        ARB:: None         ARNI:: None    (Beta Blockers)  NON- Evidenced Based Beta Blocker (for EF% >40%/DIASTOLIC FAILURE): None    Evidenced Based Beta Blocker::(REQUIRED for EF% <40%/SYSTOLIC FAILURE) None  ...................................................................................................................................................    Failed to redirect to the Timeline version of the [employer].      Patient's weights and intake/output reviewed    Daily Weight log at bedside, patient/family participation in use of log: patient unable to participate    Patient's current weight today shows a difference of 10.6 lbs less than last documented weight.      Intake/Output Summary (Last 24 hours) at 9/27/2024 0616  Last data filed at 9/27/2024 0600  Gross per 24 hour   Intake 2377.32 ml   Output 1349 ml   Net 1028.32 ml       Education Booklet Provided: yes    Comorbidities Reviewed Yes    Patient has a past medical history of Acute on chronic systolic congestive heart failure (HCC), Acute osteomyelitis of left foot, Acute osteomyelitis of right foot, Acute respiratory failure (HCC), Ascites, Blood transfusion reaction, Burst fracture of lumbar vertebra (HCC), Cellulitis of left foot, Cellulitis of right lower extremity, Chronic systolic CHF (congestive heart failure) ([facility]), Community acquired pneumonia, Coronary artery disease involving native coronary artery of native heart without angina pectoris, Diabetes ([facility]), Diabetic ulcer of left foot associated with type 2 diabetes mellitus, with muscle involvement without evidence of necrosis (HCC), Diabetic ulcer of right foot (HCC), Diabetic  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     >>For CHF and Comorbidity documentation on Education Time and Topics, please see Education Tab      Pt resting in bed at this time on  ventilator . Unable to assess patient if w/  shortness of breath. Pt with pitting lower extremity edema.     Patient and/or Family's stated Goal of Care this Admission: reduce shortness of breath, increase activity tolerance, better understand heart failure and disease management, be more comfortable, and reduce lower extremity edema prior to discharge       :

## 2024-09-27 NOTE — PROGRESS NOTES
PROGRESS NOTE    HPI: Ruben Eagle is a(n)59 y.o. male admitted for work-up and treatment for Bacteremia [R78.81].     We are following for elevated LFTs.    Subjective:     Remains intubated post OR last night.  On 3 vasopressors.  Brother at bedside.        Objective:     I/O last 3 completed shifts:  In: 4340.3 [P.O.:100; I.V.:2909.7; Blood:327; IV Piggyback:1003.7]  Out: 3451 [Urine:68]      BP 96/70   Pulse (!) 127   Temp 97.5 °F (36.4 °C) (Axillary)   Resp 17   Ht 1.753 m (5' 9.02\")   Wt 93.7 kg (206 lb 9.1 oz)   SpO2 98%   BMI 30.49 kg/m²     Physical Exam:  HEENT: + icteric sclera, oropharyngeal membranes pink and moist.  Cor: tachy  Lungs: vent  Abdomen: soft, non distended.  Neuro: sedated  Skin: + jaundice    Results:   Lab Results   Component Value Date    ALT 52 (H) 09/26/2024     (H) 09/26/2024    ALKPHOS 239 (H) 09/26/2024    BILIDIR 6.5 (H) 09/25/2024    INR 2.77 (H) 09/27/2024    LIPASE 22.0 08/05/2020     Lab Results   Component Value Date    WBC 25.5 (H) 09/27/2024    HGB 10.9 (L) 09/27/2024    HCT 34.8 (L) 09/27/2024    MCV 81.3 09/27/2024     (L) 09/27/2024     BUN/Cr/glu/ALT/AST/amyl/lip:  55/2.6/--/--/--/--/-- (09/27 0514)  US ABDOMEN LIMITED W DOPPLER    Result Date: 9/25/2024  1. Cirrhotic appearance of the liver. 2. Normal Doppler evaluation of the hepatic vasculature. 3. Cholelithiasis with no evidence of acute cholecystitis. 4. Right pleural effusion.     XR CHEST PORTABLE    Result Date: 9/25/2024  No evidence of pneumothorax following right internal jugular vein approach central venous catheter placement. Cardiomegaly with findings of pulmonary edema and moderate size right pleural effusion.     XR CHEST PORTABLE    Result Date: 9/25/2024  Hazy airspace opacities are seen throughout the right lung concerning for pneumonia     CT ABDOMEN PELVIS WO CONTRAST Additional Contrast? Radiologist  with significantly reduced EF of 10/15%, hypothyroidism and recent right lower extremity diabetic infection requiring right BKA 8/2024, presenting with left foot pain.  GI consulted for hyperbilirubinemia.     Elevated LFTs, primarily direct hyperbilirubinemia,  CT (without contrast) does not suggest cause. Cholelithiasis is noted.  US with doppler shows cirrhotic appearing liver. No PVT.  Hep B, C, HIV negative.     2. Osteomyelitis left foot, bacteremia.  S/p L BKA 9/26.  On antibiotics per ID.  Repeat blood cultures 9/22 NGTD.     3. Acute on chronic kidney disease, oliguria. On CRRT.     3. CHF. Lasix on hold currently.  BNP 30,000.     4. Pleural effusions.   S/p thoracentesis yesterday.  Albumin gradient > 1.1, low serum protein as would be seen with ascites.     5. Thrombocytopenia.  Coagulopathy (INR 4). Treated prior to OR.  Suspect related to liver disease.  Sepsis may also be contributing.  ? DIC. Hematology following.    6. Hypotension.   On vasopressors.     7. Vegetation on ICD lead.    Plan:  Suspect abnormal liver tests are related to underlying chronic liver disease with acute injury from DILI vs cardiac hepatopathy or sepsis/?MODS. Coagulopathy may reflect acute liver failure.  Unclear if he exhibits hepatic encephalopathy which would further support acute liver failure as he is sedated. Ammonia was normal yesterday.  2. Liver serologies in evaluation for underlying autoimmune/chronic liver disease are pending.  AFP also pending.  3. Continue to trend LFTs daily, INR q 12 hours.    Family to discuss goals of care this afternoon.       Please do not hesitate to call with questions or concerns.      Electronically signed by: JOSE Brandon 9/27/2024 10:39 AM     (Office) [phone]  (Fax) [phone]  Available via perfect serve

## 2024-09-27 NOTE — PROGRESS NOTES
CRRT restarted at this time. Flushed and accessed vascath aseptically. VS are as follows; BP-92/69  RR-16. Plan of care ongoing.

## 2024-09-27 NOTE — DISCHARGE SUMMARY
V2.0  Discharge Summary    Name:  Ruben Eagle /Age/Sex: 1965 (59 y.o. male)   Admit Date: 2024  Discharge Date: 24    MRN & CSN:  [id number] & [id number] Encounter Date and Time 24 2:21 PM EDT    Attending:  Tobin Pierre DO Discharging Provider: Brooks Galeana MD       Hospital Course:   This note will serve as a progress note in the event that patient is not DC'd today      Brief HPI: Ruben Eagle is a 59 y.o. male who presented with  pmh of CKD, T2DM, HTN, CAD/status post CABG in , CHF/with LV systolic function is significantly decreased and estimated EF of approximately 10 - 15 %, ( per Echo of 24) hypothyroidism who presents with Bacteremia     Brief Problem Based Course:     MRSA bacteremia:  ID was consulted inpatient.   Pt was treated with IV vancomycin, cefepime, and daptomyin.   Source of bacteremia likely from Left foot  Blood cultures were negative on 2024  TTE 24 was reassuring but insufficiently sensitive in this context       Cellulitis of left foot  Osteomyelitis of left foot  Severe deep space abscess of left foot ankle  L BKA  Patient received cefepime, vancomycin, and daptomycin.   CK levels were monitored.   Flagyl was added for additional coverage  Per podiatry:   Pt determined to have severe deep space abscess encompassing his entire remaining left foot and ankle, approximately 50 mL evacuated beside. Fluid sent for C&S, tailor IV abx accordingly. Left ankle xray reviewed, given copious abscess noted bedside changes on xray are consistent w/ pathologic fracture and OM as this amount of purulent fluid has been collecting for quite some time. Recommend proximal amputation for full resolution of infection given talar OM   Repeat CBC ordered to trend WBC count   Pt is s/p L BKA and in ICU currently  Empiric cefepime and flagyl stopped after BKA         Shortness of Breath  Patient required supplemental oxygen while in hospital  Chest x-ray, EKG,  daily     OneTouch Delica Lancets 33G Misc  USE TO CHECK FOUR TIMES DAILY. DX;E11.9           * This list has 3 medication(s) that are the same as other medications prescribed for you. Read the directions carefully, and ask your doctor or other care provider to review them with you.                ASK your doctor about these medications      allopurinol 100 MG tablet  Commonly known as: ZYLOPRIM     Aspirin Low Dose 81 MG EC tablet  Generic drug: aspirin  TAKE ONE (1) TABLET BY MOUTH DAILY     atorvastatin 40 MG tablet  Commonly known as: LIPITOR  TAKE ONE (1) TABLET BY MOUTH NIGHTLY     Basaglar KwikPen 100 UNIT/ML injection pen  Generic drug: insulin glargine  Inject 35 Units into the skin nightly     clopidogrel 75 MG tablet  Commonly known as: PLAVIX  TAKE ONE (1) TABLET BY MOUTH DAILY     empagliflozin 10 MG tablet  Commonly known as: Jardiance  Take 1 tablet by mouth nightly     ferrous sulfate 325 (65 Fe) MG tablet  Commonly known as: IRON 325     furosemide 80 MG tablet  Commonly known as: LASIX  Take 1 tablet by mouth 2 times daily     hydrALAZINE 10 MG tablet  Commonly known as: APRESOLINE  Take 1 tablet by mouth every 8 hours     insulin lispro 100 UNIT/ML Soln injection vial  Commonly known as: HUMALOG,ADMELOG  Inject 0-4 Units into the skin 3 times daily (with meals) **Corrective Low Dose Algorithm**  Glucose: Dose:    No Insulin  200-249 1 Unit  250-299 2 Units  300-349 3 Units  Over 349 4 Units and notify physician     isosorbide dinitrate 5 MG tablet  Commonly known as: ISORDIL  Take 1 tablet by mouth every 8 (eight) hours     levothyroxine 200 MCG tablet  Commonly known as: SYNTHROID     magnesium oxide 400 (240 Mg) MG tablet  Commonly known as: MAG-OX  Take 1 tablet by mouth 2 times daily     metoprolol succinate 25 MG extended release tablet  Commonly known as: TOPROL XL  Take 1 tablet by mouth daily     spironolactone 25 MG tablet  Commonly known as: ALDACTONE  Take 1 tablet by mouth  estimated EF of 10 -15%. Left ventricle size is normal. Normal wall thickness. Septal flattening in diastole and systole consistent with right ventricular volume and pressure overload. Severe global hypokinesis present, with the inferior wall appearing akinetic and the septum being dyskinetic. Grade III diastolic dysfunction with increased LAP.   Right Ventricle: Right ventricle is severely dilated. Lead present in the right ventricle. Moderately reduced systolic function.   Aortic Valve: Valve structure is normal. Trace regurgitation. There is no vegetation present.   Mitral Valve: Mild annular calcification at the posterior leaflet. Mildly thickened leaflets. Mild regurgitation. There is no vegetation present.   Tricuspid Valve: Valve structure is normal. Moderate to severe regurgitation. Mildly elevated RVSP, consistent with mild pulmonary hypertension. Est RA pressure is 15 mmHg. The estimated RVSP is 44 mmHg. RVSP may be underestimated. No vegetation present.   Pulmonic Valve: Valve structure is normal. Mild regurgitation. No vegetation present.   Left Atrium: Left atrium is severely dilated.   Right Atrium: Single lead present in the right atrium with small, mobile, echo densities throughout (best noted in the PLAX RVIT view and PSAX base). Right atrium is severely dilated.   IVC/SVC: IVC diameter is greater than 21 mm and decreases less than 50% during inspiration; therefore the estimated right atrial pressure is elevated (~15 mmHg). IVC is dilated.   Image quality is adequate.       CBC:   Recent Labs     09/25/24  0526 09/26/24  0401 09/27/24  0514   WBC 24.7* 25.6* 25.5*   HGB 11.9* 11.4* 10.9*   * 92* 107*     BMP:    Recent Labs     09/26/24  1830 09/27/24  0035 09/27/24  0514   * 132* 132*   K 4.5 4.5 4.5  4.5   CL 98* 99 100   CO2 19* 18* 19*   BUN 61* 58* 55*   CREATININE 3.1* 2.9* 2.6*   GLUCOSE 81 71 67*     Hepatic:   Recent Labs     09/25/24  0526 09/25/24  1229 09/26/24  0401   AST

## 2024-09-27 NOTE — PROGRESS NOTES
V2.0    [facility] [place]      Name:  Ruben Eagle /Age/Sex: 1965  (59 y.o. male)   MRN & CSN:  [id number] & [id number] Encounter Date/Time: 2024 9:01 AM EDT   Location:  [facility]9/0239-01 PCP: Leann Marie, APRN - CNP     Attending:Tobin Pierre DO       Hospital Day: 6    Assessment and Recommendations   Ruben Eagle is a 59 y.o. male with pmh of CKD, T2DM, HTN, CAD/status post CABG in , CHF/with LV systolic function is significantly decreased and estimated EF of approximately 10 - 15 %, ( per Echo of 24) hypothyroidism who presents with Bacteremia    Interim History: Pt currently in ICU s/p  L BKA. His wbc continues being elevated and is very tired today. Pt has been placed on continuous Levophed and osmin-synephrine. Currently being weaned off of levophed. Today patient is very tired and is unable to communicate as he is currently intubated.      Plan:     Cellulitis of left foot  Osteomyelitis of left foot  Severe deep space abscess of left foot ankle  He has been maintained oncefepime and will continue.   ID started pt on Daptomycin 700mg  Monitor CK for baseline and weekly due to taking daptomycin  Add flagyl for additional coverage on suspected deep space infection of L foot   Appreciate Podiatry consultation  Per podiatry: Pt determined to have severe deep space abscess encompassing his entire remaining left foot and ankle, approximately 50 mL evacuated beside. Fluid sent for C&S, tailor IV abx accordingly. Left ankle xray reviewed, given copious abscess noted bedside changes on xray are consistent w/ pathologic fracture and OM as this amount of purulent fluid has been collecting for quite some time. Recommend proximal amputation for full resolution of infection given talar OM   Repeat CBC ordered to trend WBC   Pt is s/p L BKA and in ICU currently  Empiric cefepime and flagyl to be stopped after BKA     MRSA bacteremia:  Continue to monitor  Currently being followed by ID,  their plan below:  Both sets collected on admission positive   Vanc ALENA is elevated at 2  Further complicated by presence of ICD   Source is L foot   TTE 9/21/24 was reassuring but insufficiently sensitive in this context   Repeat BC collected 9/22 are negative   -continue dapto.  Weekly CK while on dapto, CK was wnl on 9/24/24  -SANDEEP prior to DC to direct LOT     Vegetation on ICD Lead  Evaluated by cardiology but was not currently stable for lead extraction    Shortness of Breath  Pt put on Nasal Cannula  CXR Ordered   EKG, troponin, BNP ordered  VBG Ordered  Pt transferred to ICU by nephro for hemodynamic support  Patient being currently followed by nephrology    Acute on chronic kidney failure:   He does have chronic kidney disease with a baseline creatinine of 1.8 to 2.2 mg/dl. His KRYSTAL is likely secondary to a pre-renal component.  We will follow urine output, avoid nephrotoxic agents.  Nephrology following pt  Per nephrology:  -Continue CRRT as ordered with net even UF goal  -Serial labs and electrolyte replacement protocol per CRRT  -Midodrine 10 mg 3 times daily, hold for systolic blood pressure more than 110  -Baseline Cr of 1.8-2.2  -Lasix gtt stopped on 9/20 3 AM  -CRRT from 9/25/24    Hyperbilirubinemia  Cirrhotic appearing liver on US  GI following   HBs ag, sAb, cAb all neg  HIV screen neg  Additional serologies pending     Sepsis  Continue IVF as appropriate and monitor clinical response w/ ABX as written.   WBC continues being elevated     Hyponatremia:   Sodium noted to be low for the past few days.  Placed on water restriction. Will check repeat sodium level in a.m.  -Sodium today 125  -Continue free water restriction per nephro  -NS IVF 75ml/hr     Hypothermia - etiology unclear at this time  -IMPROVED  -Fred minaya ordered  -Will continue to monitor temperature rectally    CHF/Systolic:    -LV systolic function is significantly decreased and estimated EF of approximately 10 - 15 %, ( per Echo of      Subjective:     Chief Complaint: Bacteremia     Ruben Eagle is a 59 y.o. male who presents with Bacteremia    He is status post right BKA in August/15/2024.  Surgery was completed at [facility] with general surgery/.  He had right lower extremity infection due to diabetic foot infection and osteomyelitis which was not responding to medical therapy limb was felt to be nonsalvageable and therefore right BKA was indicated.      He has PMH significant for chronic kidney disease and a baseline creatinine ranging between 1.8 and 2.2 mg/dL.    Diabetes mellitus type 2 insulin requiring, hypertension, CAD/status post CABG in 2020, CHF/with LV systolic function is significantly decreased and estimated EF of approximately 10 - 15 %, ( per Echo of 9/21/24)   hypothyroidism        He presented to [facility] with left foot/ankle pain.  He recently sustained a trauma to his left foot, left ankle, which progressively became warm and painful to touch.  Upon presentation, his workup revealed signs of left osteomyelitis, for which he was started on IV antibiotics.  He was also noted to have an acute on chronic kidney injury.  He has been receiving IV antibiotics with IV vancomycin and cefepime.  Blood cultures x 2 from 9/19/2024 are growing MRSA.  Repeat surveillance cultures from 9/26/2017 showed no growth  Urine culture from 9/19/2024 is revealing Enterococcus.     He was transferred from [facility] for specifically infectious disease consultation     On this admission at [place] he is being followed by nephrology, podiatry in consultation.       Review of Systems:      Pertinent positives and negatives discussed in HPI    Objective:     Intake/Output Summary (Last 24 hours) at 9/27/2024 0736  Last data filed at 9/27/2024 0723  Gross per 24 hour   Intake 2549.7 ml   Output 1313 ml   Net 1236.7 ml      Vitals:   Vitals:    09/27/24 0400 09/27/24 0500 09/27/24 0533

## 2024-09-27 NOTE — PROGRESS NOTES
Spontaneous Awakening Trial    Patient meets criteria for spontaneous awakening trial. Started at 0624 hours.     Sedation is currently off.     Current RASS score is RASS -3 (Moderate Sedation) .     Safety assessed. Restraints continued.

## 2024-09-27 NOTE — OP NOTE
[address] [address]                            OPERATIVE REPORT      PATIENT NAME: GEGE HANNAH              : 1965  MED REC NO: [id number]                      ROOM: 0239  ACCOUNT NO: [id number]                       ADMIT DATE: 2024  PROVIDER: Tony Meza MD      DATE OF PROCEDURE:  2024    SURGEON:  Tony Meza MD    PREOPERATIVE DIAGNOSIS:  Severe left foot infection with nonsalvageable foot.    POSTOPERATIVE DIAGNOSIS:  Severe left foot infection with nonsalvageable foot.    PROCEDURE:  Left below-knee amputation.    ANESTHESIA:  General endotracheal.    INDICATIONS:  The patient is a 59-year-old male who presented with sepsis due to abscess of the left foot.  This underwent incision and drainage at the bedside.  X-rays show complete bony destruction of the bones of the foot with a nonfunctional foot.  It was felt to be nonsalvageable and the patient is brought to the operating room at this time to undergo amputation.    DESCRIPTION OF PROCEDURE:  The patient is brought to the operating room, placed in supine position.  General endotracheal anesthesia induced.  After adequate anesthesia, the left lower extremity was prepped and draped in sterile fashion.  A standard below-knee amputation incision was made incising the skin and subcutaneous tissues using cautery.  The muscles were divided using cautery as well.  Neurovascular bundles were clamped and ligated using 2-0 Vicryl ties.  There was a plate in the lateral aspect of the tibia from a prior tibial fracture.  Using a Midas saw with a lanie blade, a portion of the plate was excised, this then allowed the tibia to be transected with an oscillating saw.  The fibula was then transected as well and the lower leg specimen was removed.  The tourniquet had been applied at the start of the procedure, inflated to 250 mmHg.  This was now deflated.  Small

## 2024-09-27 NOTE — PROGRESS NOTES
Shift: [id number]    Admitting diagnosis: Bacteremia     Presentation to hospital: Ruben Eagle is a 59 y.o. male who presents via EMS for evaluation after 2 mechanical falls.  Patient has a recent below the knee amputation of the right leg and a partial foot amputation on the left.  Patient states that 3 weeks ago he slipped and fell and fell on his left leg. Workup in the ER includes x-ray of the left ankle which showed gas in the soft tissues, soft tissue swelling without definite fracture. X-ray of the foot shows an appearance of a Charcot fracture with multiple amputations. He does appear to have osteomyelitis/erosive disease along the distal talus. Sizable displaced talar fragment. MRSA culture positive. Transferred to [facility] for ID, Transferred to ICU for increase SOB and worsening kidney levels    Surgery: no     Nursing assessment at handoff  stable    Emergency Contact/POA: Shen Eagle [phone]  Family updated: YES    Most recent vitals: BP 94/69   Pulse (!) 120   Temp 99.2 °F (37.3 °C) (Oral)   Resp 16   Ht 1.753 m (5' 9.02\")   Wt 93.7 kg (206 lb 9.1 oz)   SpO2 98%   BMI 30.49 kg/m²      Rhythm: Sinus Rhythm w/first degree AV block    NC/HFNC- 2 lpm  Respiratory support: - No ventilator support    Vent days: Day N/A    Increased O2 requirements: Yes. NC 2L    Admission weight Weight - Scale: 88.9 kg (195 lb 14.4 oz)  Today's weight   Wt Readings from Last 1 Encounters:   09/27/24 93.7 kg (206 lb 9.1 oz)         UOP >30ml/hr: No    Fraser need assessed each shift: Yes    Restraints: No  Order current and documentation up to date? N/A    Lines/Drains  LDA Insertion Date Discontinued Date Dressing Changes   PIV  9/19/24; 9/25/24     TLC       Arterial  9/26/24     Fraser  9/22/24     Vas Cath 9/25/24     ETT       Surgical drains        Night Shift Hospitalist Interventions    Problem(Brief) Date Time Intervention Physician contacted                                               Alvarez

## 2024-09-27 NOTE — PROGRESS NOTES
Infectious Disease Follow up Notes    CC : MRSA bacteremia and extensive infection of L foot and ankle      Antibiotics:  Cefepime 2g q8  Dapto 700mg q24  Flagyl 500 IV q8      Admit Date:   9/22/2024  Hospital Day: 6    Subjective:   Status discussed with RN, Pulm NP   He remained sedated after L BKA yesterday.  Now on 3 pressors, CRRT running even.  BP remains soft.      Objective:     Patient Vitals for the past 8 hrs:   BP Temp Temp src Pulse Resp SpO2 Weight   09/27/24 1142 -- -- -- (!) 117 16 96 % --   09/27/24 1050 -- -- -- (!) 122 16 95 % --   09/27/24 1040 -- -- -- (!) 119 16 -- --   09/27/24 1030 -- -- -- (!) 120 18 -- --   09/27/24 1020 -- -- -- (!) 120 16 -- --   09/27/24 1010 -- -- -- (!) 121 16 -- --   09/27/24 1000 99/70 -- -- (!) 121 16 95 % --   09/27/24 0950 -- -- -- (!) 122 16 -- --   09/27/24 0940 -- -- -- (!) 121 16 -- --   09/27/24 0930 -- -- -- (!) 119 16 -- --   09/27/24 0920 -- -- -- (!) 120 16 -- --   09/27/24 0910 -- -- -- (!) 122 16 -- --   09/27/24 0900 -- -- -- (!) 125 16 -- --   09/27/24 0850 -- -- -- (!) 126 18 -- --   09/27/24 0846 -- -- -- (!) 127 17 98 % --   09/27/24 0840 -- -- -- (!) 128 16 98 % --   09/27/24 0830 -- -- -- (!) 128 17 98 % --   09/27/24 0820 101/70 -- -- (!) 125 16 97 % --   09/27/24 0810 -- -- -- (!) 122 16 98 % --   09/27/24 0800 96/70 97.5 °F (36.4 °C) Axillary (!) 123 16 99 % --   09/27/24 0750 98/70 -- -- (!) 124 16 -- --   09/27/24 0740 -- -- -- (!) 123 16 -- --   09/27/24 0730 95/69 -- -- (!) 124 16 -- --   09/27/24 0720 101/75 -- -- (!) 125 16 -- --   09/27/24 0710 -- -- -- (!) 125 21 -- --   09/27/24 0700 97/70 -- -- (!) 122 16 -- --   09/27/24 0600 96/68 98.3 °F (36.8 °C) Oral (!) 120 16 -- --   09/27/24 0533 -- -- -- -- -- -- 93.7 kg (206 lb 9.1 oz)   09/27/24 0500 94/69 -- -- (!) 120 16 -- --   09/27/24 0400 90/67 99.2 °F (37.3 °C) Oral (!) 123 16 98 % --  diabetes mellitus ([facility]) 09/20/2024    Acute osteomyelitis of left foot 09/19/2024    Status post below-knee amputation of right lower extremity ([facility]) 08/16/2024    Osteomyelitis of foot, right, acute 08/15/2024    Diabetic infection of right foot ([facility]) 08/15/2024    Osteomyelitis ([facility]) 08/14/2024    Hyponatremia 08/14/2024    MRSA (methicillin resistant Staphylococcus aureus) infection 06/15/2024    Acute kidney injury ([facility]) 06/14/2024    Hyperglycemia 06/14/2024    Lightheadedness 06/14/2024    Infected stasis ulcer of left lower extremity ([facility]) 03/13/2024    Congestive heart failure ([facility]) 03/13/2024    Anemia 03/13/2024    Hypotension 03/13/2024    Umbilical hernia without obstruction and without gangrene 01/12/2024    Diabetic foot infection ([facility]) 12/02/2023    Dilated cardiomyopathy ([facility]) 12/02/2023    Diabetic foot ([facility]) 12/01/2023    Localized edema 05/31/2023    Stricture of artery ([facility]) 05/16/2023    Diabetic retinopathy of both eyes without macular edema associated with type 2 diabetes mellitus ([facility]) 03/22/2022    Diabetic nephropathy associated with type 2 diabetes mellitus ([facility]) 03/22/2022    ACE-inhibitor cough 10/27/2021    Chest pain 01/16/2021    SOB (shortness of breath)     Ischemic cardiomyopathy     S/P coronary artery bypass graft x 3 12/09/2020    Acute post-operative pain     Coronary artery disease involving native coronary artery of native heart without angina pectoris 08/06/2020    Obesity 08/06/2020    Acquired hypothyroidism 04/18/2017    Hallux valgus 11/11/2016    Callus of foot 11/03/2016    Type 2 diabetes mellitus without complication, with long-term current use of insulin ([facility]) 05/19/2016    Onychomycosis 04/02/2016    Dystrophic nail 04/02/2016    Hyperlipidemia 02/23/2016    Diabetic foot ulcer with osteomyelitis ([facility]) 09/19/2014     Overview Note:     Left sub-3rd-met      ICD (implantable cardioverter-defibrillator) in place 01/30/2014     Overview Note:     [employer] single

## 2024-09-27 NOTE — CONSULTS
[facility]   CONSULTATION  [phone]        Reason for Consultation/Chief Complaint: \"I have been asked to do transesophageal echo \"    History of Present Illness:  Ruben Eagle is a 59 y.o. patient who presented to the hospital as a transfer from [facility] for suspected infective endocarditis. He is not able to provide any history but he was treated for left foot infection charcoat arthritis. He is s/p right BKA and had fallen at home.  Two out of two blood cultures on 9.19.24 showed MRSA.  Currently he is intubated and on ventilator. He is on two pressors and undergoing CRRT.  I have been asked to provide consultation regarding further management and testing.      Past Medical History:   has a past medical history of Acute on chronic systolic congestive heart failure (HCC), Acute osteomyelitis of left foot, Acute osteomyelitis of right foot, Acute respiratory failure ([facility]), Ascites, Blood transfusion reaction, Burst fracture of lumbar vertebra ([facility]), Cellulitis of left foot, Cellulitis of right lower extremity, Chronic systolic CHF (congestive heart failure) ([facility]), Community acquired pneumonia, Coronary artery disease involving native coronary artery of native heart without angina pectoris, Diabetes ([facility]), Diabetic ulcer of left foot associated with type 2 diabetes mellitus, with muscle involvement without evidence of necrosis ([facility]), Diabetic ulcer of right foot ([facility]), Diabetic ulcer of 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 2 diabetes mellitus, with necrosis of bone ([facility]), ETOH abuse, Fracture of tibial plateau, High cholesterol, History of blood transfusion, HTN (hypertension), Hx of blood clots, MI (myocardial infarction) ([facility]), MRSA (methicillin resistant staph aureus) culture positive, Neuropathic ulcer of left foot, limited to breakdown of skin ([facility]), Neuropathic ulcer of toe ([facility]), NSVT (nonsustained ventricular tachycardia) ([facility]), Pleural effusion due to congestive heart failure  compared with ECG of 22-SEP-2024 17:39,Sinus rhythm has replaced Ectopic atrial rhythmConfirmed by MARCELL WATTS ([id number]) on 9/25/2024 12:17:46 PM   TTE 9.27.24  Limited study       Limited study to re-evaluate pacemaker vegetation    Left Ventricle: Severely reduced left ventricular systolic function with a visually estimated EF of 10 -15%. Left ventricle is dilated. Severe global hypokinesis present.    Tricuspid Valve: Severe regurgitation. Reversed hepatic vein systolic flow. RVSP may be underestimated in the setting of severe TR. The estimated RVSP is 34 mmHg.    Right Atrium: Multiple leads present in the right atrium with multiple large vegetations visualized.This is consistent with infective endocarditis. This patient has MRSA bacteremia.    Pericardium: Large (>2 cm) localized pericardial effusion present around the left ventricle.    IVC/SVC: Patient is ventilated, cannot use IVC diameter to estimate right atrial pressure. IVC is severely dilated.    Image quality is adequate. Procedure performed with the patient in a supine position.    No diastolic collapse of right ventricle to indicate Pericardial tamponade.   CXRAY 9.26.24   IMPRESSION:  1. Interval decrease in size of the patient's right pleural effusion status  post thoracentesis, without radiographic evidence of a pneumothorax.  2. Bibasilar airspace disease, right greater than left.  3. Stable cardiomegaly.              Exam Ended: 09/26/24 15:44 ED          Assessment  Vegetations on right atrial lead ( ICD) with two positive blood cultures with MRSA.The lead needs to be removed. Patient is in cardiogenic shock and not stable for surgery. SANDEEP not needed as it is not going to change the diagnosis or help in management. Continue antibiotics. As and when he becomes stable we can plan lead extraction.Incidental pericardial effusion no signs of tamponade.  Severe ischemic and nonischemic cardiomyopathy with EF 20%or less  CAD s/p CABG  Diabetes  mellitus with complications  S/p ICD implant   His renal failure likely due to cardiogenic hypotension   Prognosis is very poor for any survival with a quality life   Recommend palliative consult   Patient Active Problem List   Diagnosis    Hypertension    Diabetic polyneuropathy associated with type 2 diabetes mellitus (HCC)    Alcoholic cardiomyopathy    Cardiomyopathy (HCC)    ICD (implantable cardioverter-defibrillator) in place    Diabetic foot ulcer with osteomyelitis (HCC)    Hyperlipidemia    Onychomycosis    Dystrophic nail    Type 2 diabetes mellitus without complication, with long-term current use of insulin (HCC)    Callus of foot    Hallux valgus    Acquired hypothyroidism    Coronary artery disease involving native coronary artery of native heart without angina pectoris    Obesity    Acute post-operative pain    S/P coronary artery bypass graft x 3    Chest pain    SOB (shortness of breath)    Ischemic cardiomyopathy    ACE-inhibitor cough    Diabetic retinopathy of both eyes without macular edema associated with type 2 diabetes mellitus (HCC)    Diabetic nephropathy associated with type 2 diabetes mellitus (HCC)    Chronic systolic congestive heart failure (HCC)    Chronic renal disease, stage III ([facility]) [595608]    Stricture of artery (HCC)    Localized edema    Diabetic foot (HCC)    Diabetic foot infection (HCC)    Dilated cardiomyopathy (HCC)    Umbilical hernia without obstruction and without gangrene    Infected stasis ulcer of left lower extremity (HCC)    Congestive heart failure (HCC)    Anemia    Hypotension    Acute kidney injury (HCC)    Hyperglycemia    Lightheadedness    MRSA (methicillin resistant Staphylococcus aureus) infection    Osteomyelitis (HCC)    Hyponatremia    Osteomyelitis of foot, right, acute    Diabetic infection of right foot (HCC)    Status post below-knee amputation of right lower extremity (HCC)    Acute osteomyelitis of left foot    Charcot foot due to diabetes

## 2024-09-27 NOTE — PROGRESS NOTES
[facility] consult/progress note           Subjective/   59 y.o. year old male who we are seeing in consultation for KRYSTAL on CKD and hyponatremia likely related volume overload requiring Lasix gtt. he was transferred to [facility] on 9/22 for ID consult    Seen and examined on CRRT, net UF even  Patient underwent amputation on 9/26   He is on Levophed, vasopressin and Mk-Synephrine drips w maps over 65    ROS: Unable to obtain  PSFH: No visitor    Scheduled Meds:   insulin lispro  0-4 Units SubCUTAneous Q4H    aspirin  81 mg Oral Daily    artificial tears   Both Eyes 6 times per day    pantoprazole  40 mg IntraVENous Daily    hydrocortisone sodium succinate PF  50 mg IntraVENous Q6H    sodium bicarbonate        sodium chloride  500 mL IntraVENous Once    albumin human 25%  25 g IntraVENous Q6H    mupirocin   Each Nostril BID    midodrine  10 mg Oral TID WC    chlorhexidine  15 mL Mouth/Throat BID    [Held by provider] insulin glargine  10 Units SubCUTAneous Nightly    cefepime  2,000 mg IntraVENous Q8H    DAPTOmycin (CUBICIN) 700 mg in sodium chloride 0.9 % 50 mL IVPB  8 mg/kg IntraVENous Q24H    nystatin  5 mL Oral 4x Daily    [Held by provider] metoprolol succinate  50 mg Oral BID    metroNIDAZOLE  500 mg IntraVENous Q8H    [Held by provider] allopurinol  100 mg Oral Daily    atorvastatin  40 mg Oral Nightly    [Held by provider] clopidogrel  75 mg Oral Daily    ferrous sulfate  325 mg Oral Nightly    [Held by provider] levothyroxine  125 mcg Oral Daily    [Held by provider] therapeutic multivitamin-minerals  1 tablet Oral Daily    sodium chloride flush  5-40 mL IntraVENous 2 times per day    [Held by provider] enoxaparin  30 mg SubCUTAneous Daily     Continuous Infusions:   phenylephrine (MK-SYNEPHRINE) 50 mg in sodium chloride 0.9 % 250 mL infusion 130 mcg/min (09/27/24 1059)    fentaNYL 75 mcg/hr (09/27/24 1059)    VASOpressin 20 Units in sodium chloride 0.9 % 100 mL infusion 0.04  Units/min (09/27/24 1059)    sodium chloride      propofol Stopped (09/27/24 0740)    prismaSATE BGK 4/2.5 1,500 mL/hr at 09/27/24 0913    prismaSATE BGK 4/2.5 500 mL/hr at 09/27/24 0810    prismaSATE BGK 4/2.5 500 mL/hr at 09/27/24 0810    norepinephrine 15 mcg/min (09/27/24 1059)    dextrose      sodium chloride Stopped (09/27/24 0856)     PRN Meds:.sodium bicarbonate, oxyCODONE-acetaminophen, sodium chloride, heparin (porcine) **AND** heparin (porcine), magnesium sulfate, calcium gluconate **OR** calcium gluconate **OR** calcium gluconate **OR** calcium gluconate, sodium phosphate 6 mmol in sodium chloride 0.9 % 250 mL IVPB **OR** sodium phosphate 12 mmol in sodium chloride 0.9 % 250 mL IVPB **OR** sodium phosphate 18 mmol in sodium chloride 0.9 % 500 mL IVPB **OR** sodium phosphate 24 mmol in sodium chloride 0.9 % 500 mL IVPB, guaiFENesin-dextromethorphan, glucose, dextrose bolus **OR** dextrose bolus, glucagon (rDNA), dextrose, sodium chloride flush, sodium chloride, ondansetron **OR** ondansetron, polyethylene glycol, acetaminophen **OR** acetaminophen      Objective/   GEN:  Chronically ill, BP 99/70   Pulse (!) 122   Temp 97.5 °F (36.4 °C) (Axillary)   Resp 16   Ht 1.753 m (5' 9.02\")   Wt 93.7 kg (206 lb 9.1 oz)   SpO2 95%   BMI 30.49 kg/m²     HEENT: non-icteric, no JVD  CV: RRR, S1, S2 without m/r/g; +LLE edema  RESP: CTA B without w/r/r; breathing wnl  ABD: +bs, soft, nt, no hsm  SKIN: warm, no rashes, bruising/edema on LLE    Data/  Recent Labs     09/25/24  0526 09/26/24  0401 09/27/24  0514   WBC 24.7* 25.6* 25.5*   HGB 11.9* 11.4* 10.9*   HCT 39.8* 37.2* 34.8*   MCV 81.9 81.4 81.3   * 92* 107*     Recent Labs     09/26/24  1830 09/27/24  0035 09/27/24  0514   * 132* 132*   K 4.5 4.5 4.5  4.5   CL 98* 99 100   CO2 19* 18* 19*   GLUCOSE 81 71 67*   PHOS 5.5* 4.6 3.9   MG 2.60* 2.50* 2.50*   BUN 61* 58* 55*   CREATININE 3.1* 2.9* 2.6*   LABGLOM 22* 24* 28*     Echocardiogram from

## 2024-09-28 VITALS
WEIGHT: 206.57 LBS | HEIGHT: 69 IN | DIASTOLIC BLOOD PRESSURE: 46 MMHG | TEMPERATURE: 97.5 F | SYSTOLIC BLOOD PRESSURE: 56 MMHG | HEART RATE: 86 BPM | OXYGEN SATURATION: 84 % | RESPIRATION RATE: 7 BRPM | BODY MASS INDEX: 30.6 KG/M2

## 2024-09-28 LAB
ANA SER QL IA: NEGATIVE
SMA IGG SER-ACNC: 8 UNITS (ref 0–19)

## 2024-09-28 PROCEDURE — 6360000002 HC RX W HCPCS: Performed by: NURSE PRACTITIONER

## 2024-09-28 RX ADMIN — MORPHINE SULFATE 2 MG: 2 INJECTION, SOLUTION INTRAMUSCULAR; INTRAVENOUS at 01:22

## 2024-09-28 NOTE — DISCHARGE SUMMARY
Hospital Medicine Discharge Summary    Patient: Ruben aEgle   : 1965     Primary Care Provider: Leann Marie, APRN - CNP  Admitting Provider: Anthony Godoy MD  Discharge Provider: Kenji WHITESIDE DO     Admit Date: 2024   Disposition:      Date of Death: 24  Time of Death: 05    Immediate Cause of Death: Pulmonary arrest with multi-organ system failure  Underlying Conditions: MRSA bacteremia, osteomyelitis, septic shock, endocarditis, KRYSTAL on CKD, hypothermia  Other Contributing Conditions: CHF, T2DM, Hypothyroid, HTN, CAD    Discharge Diagnoses:    Active Hospital Problems    Diagnosis     Endocarditis [I38]     Charcot's joint of ankle, left [M14.672]     Chronic kidney disease [N18.9]     Bacteremia [R78.81]     Gangrene (HCC) [I96]        Presenting Admission History:   59 y.o. male  with PMHx significant for      He is status post right BKA in August/15/2024.  Surgery was completed at [facility] with general surgery/.  He had right lower extremity infection due to diabetic foot infection and osteomyelitis which was not responding to medical therapy limb was felt to be nonsalvageable and therefore right BKA was indicated.      He has PMH significant for chronic kidney disease and a baseline creatinine ranging between 1.8 and 2.2 mg/dL.    Diabetes mellitus type 2 insulin requiring, hypertension, CAD/status post CABG in , CHF/with LV systolic function is significantly decreased and estimated EF of approximately 10 - 15 %, ( per Echo of 24)   hypothyroidism     He presented to [facility] with left foot/ankle pain.  He recently sustained a trauma to his left foot, left ankle, which progressively became warm and painful to touch.  Upon presentation, his workup revealed signs of left osteomyelitis, for which he was started on IV antibiotics.  He was also noted to have an acute on chronic kidney injury.  He has been receiving IV  were ordered  Patient  transferred to ICU by nephrology for hemodynamic support     Acute on chronic kidney failure:   Patient has a history of chronic kidney disease with baseline creatinine of 1.8 to 2.2 mg/dl.  CRRT was ordered for patient by nephrology  Cell counts and electrolytes monitored  Input/output monitored   Patient received Lasix during his hospital stay     Hyperbilirubinemia  Cirrhotic appearing liver on US  GI was consulted  HBs ag, sAb, cAb all neg  HIV screen neg  Additional serologies pending      Sepsis  IV fluids were continued with appropriate antibiotics being given  Cell counts were monitored        Hyponatremia:   Patient was on water restriction  Sodium was monitored  Patient was given fluids for correction     Hypothermia   Patient's temperature was monitored rectally     CHF/Systolic:    Echo done on 9/21/2024 revealed EF 10-15%  Volume status was closely followed by nephro  BNP remained elevated     HypoThyroid   Clinically euthyroid on oral replacement therapy.   Continue, w/ outpt monitoring as previously arranged.       DM type II :   Continued on Lantus while in hospital  Sliding scale while in hospital  A1c= 7.8 from 9/20/2024     CAD:   Status post CABG.  Clinically stable on initial presentation.    Later developed shortness of breath  Cardiology consulted  Continue daily aspirin, statin beta-blocker and nitrates.  Plavix was held       HTN/CAD:  W/ known CAD but no evidence of active signs/sxs of ischemia/failure.   Currently controlled on home meds  Cardio consulted for SANDEEP to rule out infection advised  Nephro consulted due to risk of poor renal perfusion     HyperLipidemia:  Controlled  Hold home statin continue  Follow-up with PCP outpatient for medication adjustment     H/O Gout:   Maintained on allopurinol  Dc'd before surgery     Recent right BKA:   Status post right BKA in August/15/2024 due to nonhealing infection  Amputated due to osteomyelitis  Concern for soft tissue

## 2024-09-28 NOTE — DEATH NOTES
Death Pronouncement Note  Patient's Name: Ruben Eagle   Patient's YOB: 1965  MRN Number: [id number]    Admitting Provider: Anthony Godoy MD  Attending Provider: Tobin Pierre DO    Patient was examined and the following were absent: Pulses, Blood Pressure, and Respiratory effort    I declared the patient dead on 9/28/24 at 0529    Preliminary Cause of Death:   Pulmonary arrest with multi-organ system failure     Electronically signed by Tobin Pierre DO on 9/28/24 at 12:16 PM EDT

## 2024-09-28 NOTE — PROGRESS NOTES
Wasted 110mL of Fentanyl  with Gem MARTINEZ. Med disposed of into the Rx Destroyer per protocol.    Primary Nurse eSignature: Electronically signed by Tobin Salinas RN on 9/27/24 at 8:38 PM EDT    **SHARE this note so that the co-signing nurse is able to place an eSignature**    Co-signer eSignature: {Esignature:[id number]}

## 2024-09-29 LAB
BACTERIA FLD AEROBE CULT: NORMAL
GRAM STN SPEC: NORMAL

## 2024-09-30 LAB — FACT VII ACT/NOR PPP: 11 % (ref 80–181)

## 2024-09-30 NOTE — PROGRESS NOTES
Required reporting for death within 24hrs of removal of 2 point soft wrist restraints completed- added to facility database.

## 2024-10-01 LAB
A1AT SERPL-MCNC: 171 MG/DL (ref 90–200)
AFP-TM SERPL-MCNC: <1.8 UG/L
CERULOPLASMIN SERPL-MCNC: 33 MG/DL (ref 15–30)

## 2024-10-01 NOTE — PROGRESS NOTES
Physician Progress Note      PATIENT:               GEGE HANNAH  CSN #:                  [id number]  :                       1965  ADMIT DATE:       2024 6:06 PM  DISCH DATE:        2024 1:00 PM  RESPONDING  PROVIDER #:        Jose Eduardo Burns MD          QUERY TEXT:    Patient was documented to have a diagnosis of ATN per renal consult notes.    Creatinine was noted to be 2.5 on admission increasing to 3.2 in 2 days.  No   urine chemistry available, UA noted to be dark yellow without cast. Renal   consult notes at time of transfer, \"KRYSTAL is likely secondary to a pre-renal   component, although a non oliguric ATN injury cannot be ruled out. His urinary   output is marginal and his serum creatinine is slowly trending up; we will   place tolbert catheter for strict I/O, increase IV Lasix drip to 10 mg/h and   continue to monitor\". Patient was treated with IVF    The medical record reflects the following:  Risk Factors: age, Sepsis, alcoholic and ischemic CM,DM, CKD  Clinical Indicators: KRYSTAL -baseline Cr 1-1.6 -2.5 on presentation -received IVF   bolus and no improvement in Cr, acidosis, per nephrology consult- His acute   kidney injury is likely secondary to a prerenal component, although a   nonoliguric acute tubular necrosis injury cannot be ruled out.  Treatment: nephrology consult, lasix gtt, serial labs, I/O's, IVF bolus,   bladder scan, Avoid all nephrotoxic agents at this time.    Thank you,  Honey Clayton RN,BSN,CCDS,CRCR  Options provided:  -- ATN present as evidenced by, Please document evidence.  -- ATN was ruled out, KRYSTAL only  -- Other - I will add my own diagnosis  -- Disagree - Not applicable / Not valid  -- Disagree - Clinically unable to determine / Unknown  -- Refer to Clinical Documentation Reviewer    PROVIDER RESPONSE TEXT:    ATN was ruled out after study, KRYSTAL only    Query created by: Honey Clayton on 10/1/2024 5:38 AM      QUERY TEXT:    Patient with documentation of

## 2024-10-02 LAB — MITOCHONDRIA M2 AB SER IA-ACNC: 0.7 U/ML (ref 0–4)

## 2024-10-05 NOTE — PROGRESS NOTES
Physician Progress Note      PATIENT:               GEGE HANNAH  CSN #:                  [id number]  :                       1965  ADMIT DATE:       2024 1:45 PM  DISCH DATE:        2024 9:16 AM  RESPONDING  PROVIDER #:        Tobin Pierre DO          QUERY TEXT:    Pt admitted with left ankle osteomyelitis and underwent previous left partial   foot amputation.? If possible, please document in progress notes and discharge   summary the relationship if any between the current left foot infection and   previous left partial foot amputation:  ?  The medical record reflects the following:  Risk Factors: Sepsis, KRYSTAL, diabetes  Clinical Indicators: Per H&P \" Left partial foot amputation : This was   completed several years ago with podiatry due to non healing wounds\". Source   of bacteremia likely from Left foot.  Treatment: Amputation, cefepime, vancomycin, and daptomycin, serial labs,   supportive care  Options provided:  -- Left ankle osteomyelitis related to previous left partial amputation  -- Left ankle osteomyelitis unrelated to previous left partial amputation  -- Other - I will add my own diagnosis  -- Disagree - Not applicable / Not valid  -- Disagree - Clinically unable to determine / Unknown  -- Refer to Clinical Documentation Reviewer    PROVIDER RESPONSE TEXT:    Left ankle osteomyelitis related to previous left partial amputation    Query created by: Dora Yin on 10/3/2024 2:51 PM      Electronically signed by:  Tobin Pierre DO 10/5/2024 5:27 PM

## 2025-03-06 NOTE — PROGRESS NOTES
Spoke with pt, she will come in 3/7/25 for audio    Cardiologist or Hospitalist:          Medications Reviewed: Yes   SCHEDULED HOSPITAL MEDICATIONS:   furosemide  40 mg Oral BID    vancomycin  750 mg IntraVENous Q12H    insulin lispro  0-4 Units SubCUTAneous TID WC    insulin lispro  0-4 Units SubCUTAneous Nightly    losartan  12.5 mg Oral Daily    metoprolol succinate  50 mg Oral BID    empagliflozin  10 mg Oral Nightly    spironolactone  25 mg Oral Nightly    allopurinol  100 mg Oral Daily    aspirin  81 mg Oral Daily    atorvastatin  40 mg Oral Nightly    sodium chloride flush  5-40 mL IntraVENous 2 times per day    enoxaparin  40 mg SubCUTAneous Daily    levothyroxine  125 mcg Oral Daily    magnesium oxide  400 mg Oral BID    insulin glargine  70 Units SubCUTAneous Daily    ferrous sulfate  325 mg Oral Nightly    clopidogrel  75 mg Oral Daily     HOME MEDICATIONS:  Prior to Admission medications    Medication Sig Start Date End Date Taking? Authorizing Provider   insulin glargine (BASAGLAR KWIKPEN) 100 UNIT/ML injection pen Inject 70 Units into the skin daily 6/17/24  Yes Mackenzie Omer MD   empagliflozin (JARDIANCE) 10 MG tablet Take 1 tablet by mouth nightly 6/17/24  Yes Mackenzie Omer MD   losartan (COZAAR) 25 MG tablet Take 0.5 tablets by mouth daily Dose reduced 6/17/24  Yes Mackenzie Omer MD   metoprolol succinate (TOPROL XL) 50 MG extended release tablet Take 1 tablet by mouth in the morning and at bedtime 6/17/24  Yes Mackenzie Omer MD   furosemide (LASIX) 40 MG tablet Take 1 tablet by mouth 2 times daily Dose reduced .     Take extra dose of lasix 40 mg if you have shortness of breath or leg edema 6/17/24  Yes Mackenzie Omer MD   spironolactone (ALDACTONE) 25 MG tablet 1 tablet at bedtime 6/17/24  Yes Mackenzie Omer MD   allopurinol (ZYLOPRIM) 100 MG tablet Take 1 tablet by mouth daily 6/17/24  Yes Mackenzie Omer MD   doxycycline hyclate (VIBRA-TABS) 100 MG tablet Take 1 tablet by mouth 2 times daily for 7 days 6/17/24 6/24/24 Yes Kan  failure and disease management, be more comfortable, and reduce lower extremity edema prior to discharge.     Electronically signed by Damaris Oneill RN on 6/17/2024 at 12:14 PM
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
## 2025-06-20 NOTE — ANESTHESIA PRE PROCEDURE
Department of Anesthesiology  Preprocedure Note       Name:  Meliton Davila   Age:  62 y.o.  :  1965                                          MRN:  [id number]         Date:  2023      Surgeon: Marie Andrews):  Anika Lovell DPM    Procedure: Procedure(s):  ULCER DEBRIDEMENT RIGHT FOOT    Medications prior to admission:   Prior to Admission medications    Medication Sig Start Date End Date Taking?  Authorizing Provider   insulin glargine (BASAGLAR KWIKPEN) 100 UNIT/ML injection pen Inject 25 Units into the skin nightly 23   Carrie NIETO MD   carvedilol (COREG) 12.5 MG tablet Take 1 tablet by mouth 2 times daily (with meals) 23   José Hoffman MD   Insulin Pen Needle 29G X 12MM MISC 1 each by Does not apply route daily 23   Nick Scales MD   JARDIANCE 10 MG tablet TAKE ONE (1) TABLET BY MOUTH DAILY 23   Lashae Yousif MD   losartan (COZAAR) 25 MG tablet Take 1 tablet by mouth daily 23   Lashae Yousif MD   magnesium oxide (MAG-OX) 400 (240 Mg) MG tablet Take 1 tablet by mouth 2 times daily 23   Lashae Yousif MD   spironolactone (ALDACTONE) 25 MG tablet Take 1 tablet by mouth daily 23   Lashae Yousif MD   TRULICITY 3 MN/0.9RA SOPN INJECT THREE (3) MG INTO THE SKIN ONCE A WEEK 23   JOSE Vargas CNP   metFORMIN (GLUCOPHAGE) 1000 MG tablet TAKE ONE (1) TABLET BY MOUTH TWO (2) TIMES DAILY (WITH MEALS) 23   LILY Gusman   allopurinol (ZYLOPRIM) 100 MG tablet TAKE TWO (2) TABLETS BY MOUTH DAILY 23   JOSE Vargas CNP   atorvastatin (LIPITOR) 40 MG tablet TAKE ONE (1) TABLET BY MOUTH NIGHTLY 5/10/23   Lashae Yousif MD   ASPIRIN LOW DOSE 81 MG EC tablet Take 1 tablet by mouth daily 3/17/23   Jam Sorensen MD   isosorbide mononitrate (IMDUR) 30 MG extended release tablet Take 1 tablet by mouth daily 3/1/23   Lashae Yousif MD   levothyroxine (SYNTHROID) 125 MCG tablet Take 1 tablet by mouth daily Patient called and was informed of his results. He would like to know if you can send in a b 12 and Vit d Rx to his pharmacy.

## (undated) PROCEDURE — 5A1D90Z PERFORMANCE OF URINARY FILTRATION, CONTINUOUS, GREATER THAN 18 HOURS PER DAY: ICD-10-PCS

## (undated) PROCEDURE — 0W993ZZ DRAINAGE OF RIGHT PLEURAL CAVITY, PERCUTANEOUS APPROACH: ICD-10-PCS

## (undated) PROCEDURE — 0Y6J0Z1 DETACHMENT AT LEFT LOWER LEG, HIGH, OPEN APPROACH: ICD-10-PCS

## (undated) DEVICE — SHOE, POST-OPERATIVE: Brand: DEROYAL

## (undated) DEVICE — INTENDED FOR TISSUE SEPARATION, AND OTHER PROCEDURES THAT REQUIRE A SHARP SURGICAL BLADE TO PUNCTURE OR CUT.: Brand: BARD-PARKER ® STAINLESS STEEL BLADES

## (undated) DEVICE — WAX SURG 2.5GM HEMSTAT BNE BEESWAX PARAFFIN ISO PALMITATE

## (undated) DEVICE — Device

## (undated) DEVICE — TIP SUCT DIA12FR W STYL CTRL VENT DISPOSABLE FRAZ

## (undated) DEVICE — COTTON UNDERCAST PADDING,CRIMPED FINISH: Brand: WEBRIL

## (undated) DEVICE — GLOVE ORANGE PI 7 1/2   MSG9075

## (undated) DEVICE — CANNULA PERF L15IN DIA29FR VEN 3 STG THN WALL DSGN W  VENT

## (undated) DEVICE — TUBING FLTR PLUME AWAY EVAC W/ SUCT DEV DISP PUREVIEW

## (undated) DEVICE — CANNULA NSL 13FT TUBE AD ETCO2 DIV SAMP M

## (undated) DEVICE — DRAIN SURG FLAT W7MMXL20CM FULL PERF

## (undated) DEVICE — SUTURE VICRYL + SZ 0 L27IN ABSRB UD L36MM CT-1 1/2 CIR VCPP41D

## (undated) DEVICE — MAJOR SET UP PK

## (undated) DEVICE — BANDAGE COMPR W4INXL12FT E DISP ESMARCH EVEN

## (undated) DEVICE — GOWN SIRUS NONREIN XL W/TWL: Brand: MEDLINE INDUSTRIES, INC.

## (undated) DEVICE — AGENT HEMSTAT W2XL4IN OXIDIZED REGENERATED CELOS ABSRB

## (undated) DEVICE — APPLICATOR PREP 26ML 0.7% IOD POVACRYLEX 74% ISO ALC ST

## (undated) DEVICE — SOLUTION IRRIG 3000ML 0.9% SOD CHL USP UROMATIC PLAS CONT

## (undated) DEVICE — AGENT HEMSTAT W3XL4IN OXIDIZED REGENERATED CELOS ABSRB FOR

## (undated) DEVICE — MHCZ EXTREMITY: Brand: MEDLINE INDUSTRIES, INC.

## (undated) DEVICE — GAUZE,SPONGE,4"X4",8PLY,STRL,LF,10/TRAY: Brand: MEDLINE

## (undated) DEVICE — SUTURE ETHLN SZ 3-0 L30IN NONABSORBABLE BLK FSL L30MM 3/8 1671H

## (undated) DEVICE — BANDAGE,GAUZE,BULKEE II,4.5"X4.1YD,STRL: Brand: MEDLINE

## (undated) DEVICE — SUTURE PERMAHAND SZ 2-0 L18IN NONABSORBABLE BLK L26MM SH C012D

## (undated) DEVICE — X-RAY CASSETTE COVER: Brand: CONVERTORS

## (undated) DEVICE — SUTURE VCRL SZ 3-0 L27IN ABSRB UD L26MM SH 1/2 CIR J416H

## (undated) DEVICE — SUTURE ETHBND EXCEL SZ 0 L18IN NONABSORBABLE GRN L36MM CT-1 CX21D

## (undated) DEVICE — BANDAGE GZ W45INXL4 1 10YD FLUF RL 6 PLY DERMACEA

## (undated) DEVICE — KIT BLWR MISTER 5P 15L W/ TBNG SET IRRIG MIST TO IMPROVE

## (undated) DEVICE — SUTURE NONABSORBABLE MONOFILAMENT 7-0 BV-1 1X24 IN PROLENE 8702H

## (undated) DEVICE — CURITY ABDOMINAL PAD: Brand: CURITY

## (undated) DEVICE — ADHESIVE SKIN CLSR 0.7ML TOP DERMBND ADV

## (undated) DEVICE — DRESSING,GAUZE,XEROFORM,CURAD,1"X8",ST: Brand: CURAD

## (undated) DEVICE — ELECTRODE PT RET AD L9FT HI MOIST COND ADH HYDRGEL CORDED

## (undated) DEVICE — ABDOMINAL PAD: Brand: DERMACEA

## (undated) DEVICE — GLOVE,SURG,SENSICARE SLT,LF,PF,7: Brand: MEDLINE

## (undated) DEVICE — PADDING CAST W4INXL4YD NONSTERILE COT RAYON MICROPLEATED

## (undated) DEVICE — Z INACTIVE USE 2660664 SOLUTION IRRIG 3000ML 0.9% SOD CHL USP UROMATIC PLAS CONT

## (undated) DEVICE — SOLUTION IV IRRIG 500ML 0.9% SODIUM CHL 2F7123

## (undated) DEVICE — Z INACTIVE USE 2855096 SPONGE GZ W4XL4IN 8 PLY 100% COT

## (undated) DEVICE — HANDPIECE SET WITH HIGH FLOW TIP AND SUCTION TUBE: Brand: INTERPULSE

## (undated) DEVICE — DUAL CUT SAGITTAL BLADE

## (undated) DEVICE — BANDAGE ROLL,100% COTTON, 8 PLY, LARGE: Brand: KERLIX

## (undated) DEVICE — PACK ORTH LO EXT VI

## (undated) DEVICE — SPONGE LAP W18XL18IN WHT COT 4 PLY FLD STRUNG RADPQ DISP ST 2 PER PACK

## (undated) DEVICE — SUTURE VCRL SZ 4-0 L18IN ABSRB UD L19MM PS-2 3/8 CIR PRIM J496H

## (undated) DEVICE — SUTURE PDS II SZ 0 L36IN ABSRB VLT L36MM CT-1 1/2 CIR Z346H

## (undated) DEVICE — SYRINGE MED 10ML LUERLOCK TIP W/O SFTY DISP

## (undated) DEVICE — LIQUIBAND RAPID ADHESIVE 36/CS 0.8ML: Brand: MEDLINE

## (undated) DEVICE — ARM DRAPE

## (undated) DEVICE — MEGA SUTURECUT ND: Brand: ENDOWRIST

## (undated) DEVICE — STAPLER EXT SKIN 35 WIDE S STL STPL SQUEEZE HNDL VISISTAT

## (undated) DEVICE — OPTIFOAM GENTLE,NON-BORDERED,4X4: Brand: MEDLINE

## (undated) DEVICE — 3M™ COBAN™ NL STERILE NON-LATEX SELF-ADHERENT WRAP, 2084S, 4 IN X 5 YD (10 CM X 4,5 M), 18 ROLLS/CASE: Brand: 3M™ COBAN™

## (undated) DEVICE — BASIC SINGLE BASIN 1-LF: Brand: MEDLINE INDUSTRIES, INC.

## (undated) DEVICE — ESMARK: Brand: DEROYAL

## (undated) DEVICE — RETRACTOR VEIN LOOP

## (undated) DEVICE — SUTURE VCRL + SZ 3-0 L18IN ABSRB UD SH 1/2 CIR TAPERCUT NDL VCP864D

## (undated) DEVICE — PACK PROCEDURE SURG OPN HRT A BASIC

## (undated) DEVICE — PUNCH AORT DIA4MM LNG HNDL

## (undated) DEVICE — SYSTEM BLD DEL

## (undated) DEVICE — SHOE POSTOP M MAN 9-11 UNIV FOAM TRICOT SEMI FLX SKID

## (undated) DEVICE — KIT APPL 11:1 PROC W/ FIBRIJET MED CUP APPL TIP TY

## (undated) DEVICE — SUTURE VCRL + SZ 0 L27IN ABSRB VLT L26MM UR-6 5/8 CIR VCP603H

## (undated) DEVICE — COVER,MAYO STAND,STERILE: Brand: MEDLINE

## (undated) DEVICE — PAD,ABDOMINAL,8"X10",ST,LF: Brand: MEDLINE

## (undated) DEVICE — SUTURE SZ 7 L18IN NONABSORBABLE SIL CCS L48MM 1/2 CIR STRNM M655G

## (undated) DEVICE — SHOE POSTOP PREMIER PRO M RUB SOLE HK AND LOOP CLSR NYL L

## (undated) DEVICE — BLADE ES L2.75IN ELASTOMERIC COAT DURABLE BEND UPTO 90DEG

## (undated) DEVICE — OCCLUSIVE GAUZE STRIP,3% BISMUTH TRIBROMOPHENATE IN PETROLATUM BLEND: Brand: XEROFORM

## (undated) DEVICE — SOLUTION IRRIG 1000ML 0.9% SOD CHL USP POUR PLAS BTL

## (undated) DEVICE — SUTURE VCRL + SZ 2-0 L27IN ABSRB VLT SH 1/2 CIR TAPERPOINT VCP317H

## (undated) DEVICE — SUTURE STRATAFIX SPRL MCRYL + SZ 3 0 L8IN ABSRB UD L26MM SH SXMP1B427

## (undated) DEVICE — MONOPOLAR CURVED SCISSORS: Brand: ENDOWRIST

## (undated) DEVICE — BOWL MED L 32OZ PLAS W/ MOLD GRAD EZ OPN PEEL PCH

## (undated) DEVICE — NEEDLE HYPO 25GA L1.5IN BLU POLYPR HUB S STL REG BVL STR

## (undated) DEVICE — SUMP INTCARD SUCT AD 20FR PERICARD MAYO STYL FLX VERSATILE

## (undated) DEVICE — CANNULA PERF AD 20FR L8.5IN ART 3/8IN CONN NVENT MTL TIP

## (undated) DEVICE — TIP COVER ACCESSORY

## (undated) DEVICE — STERILE LATEX POWDER-FREE SURGICAL GLOVESWITH NITRILE COATING: Brand: PROTEXIS

## (undated) DEVICE — EVERGRIP INSERT SET 61MM: Brand: FOGARTY EVERGRIP

## (undated) DEVICE — SUTURE PERMA-HAND SZ 2 L60IN NONABSORBABLE BLK SILK BRAID SA8H

## (undated) DEVICE — PMI DISPOSABLE PUNCTURE CLOSURE DEVICE / SUTURE GRASPER: Brand: PMI

## (undated) DEVICE — Z INACTIVE USE 2540311 LEAD PACE L475MM CHN A OR V MYOCARDIAL STEROID ELUT SIL

## (undated) DEVICE — SYRINGE 10ML HYPO W/O NDL W/ LUER SLP TP

## (undated) DEVICE — COLUMN DRAPE

## (undated) DEVICE — SEALER ENDOSCP NANO COAT OPN DIV CRV L JAW LIGASURE IMPACT

## (undated) DEVICE — RESERVOIR,SUCTION,100CC,SILICONE: Brand: MEDLINE

## (undated) DEVICE — SUTURE NONABSORBABLE MONOFILAMENT 4-0 RB-1 36 IN BLU PROLENE 8557H

## (undated) DEVICE — SUTURE MCRYL SZ 4-0 L18IN ABSRB UD L19MM PS-2 3/8 CIR PRIM Y496G

## (undated) DEVICE — TEMP PACING WIRE: Brand: MYO/WIRE

## (undated) DEVICE — CONNECTOR STRL 3/8X3/8X1/2IN CLR Y PARA TMP

## (undated) DEVICE — SPLINT KNEE UNIV FOR LESS THAN 36IN L20IN FOAM LAM E CNTCT

## (undated) DEVICE — CANNULA SEAL

## (undated) DEVICE — TIP APPL 20 GAX5 CM 2 CANN MALL

## (undated) DEVICE — BANDAGE,GAUZE,4.5"X4.1YD,STERILE,LF: Brand: MEDLINE

## (undated) DEVICE — SUTURE PERMAHAND SZ 2-0 L30IN NONABSORBABLE BLK SILK W/O A305H

## (undated) DEVICE — SPLINT KNEE L20IN FOR 32IN THGH UNIV FOAM 3 PC DSGN TRIMMED

## (undated) DEVICE — BLADE SAW W10XL54MM FOR PRI REPEAT STRNOTMY

## (undated) DEVICE — BLADELESS OBTURATOR: Brand: WECK VISTA

## (undated) DEVICE — SUTURE MCRYL + SZ 4-0 L18IN ABSRB UD L19MM PS-2 3/8 CIR MCP496G

## (undated) DEVICE — SEAL

## (undated) DEVICE — APPLICATOR LNG TP 12.5IN

## (undated) DEVICE — Z DISC USE 2218136 SUTURE ETHLN SZ 3-0 L30IN NONABSORBABLE BLK FSL L30MM 3/8 1671H

## (undated) DEVICE — FENESTRATED BIPOLAR FORCEPS: Brand: ENDOWRIST

## (undated) DEVICE — SUTURE VICRYL + SZ 2-0 L18IN ABSRB CLR TIE POLYGLACTIN 910 VCP111G

## (undated) DEVICE — Z INACTIVE USE 2641838 CLIP INT L ORNG TI TRNSVRS GRV CHEVRON SHP W/ PRECIS TIP TO

## (undated) DEVICE — NEEDLE SPNL 22GA L3.5IN BLK HUB S STL REG WALL FIT STYL W/

## (undated) DEVICE — STOCKINETTE,IMPERVIOUS,12X48,STERILE: Brand: MEDLINE

## (undated) DEVICE — APPLICATOR MEDICATED 26 CC SOLUTION HI LT ORNG CHLORAPREP

## (undated) DEVICE — YANKAUER,OPEN TIP,W/O VENT,STERILE: Brand: MEDLINE INDUSTRIES, INC.

## (undated) DEVICE — GUIDE SURG SELECTION FOR GILLINOV COSGROVE LAA EXCLUSION SYS

## (undated) DEVICE — PRECISION THIN (9.0 X 0.38 X 18.5MM)

## (undated) DEVICE — COVER TRNSDUC W6XL96IN POLY TELESCOPICALLY FLD W/ ATTCH FRM

## (undated) DEVICE — SUTURE VICRYL + SZ 2-0 L18IN ABSRB UD CT1 L36MM 1/2 CIR VCP839D

## (undated) DEVICE — GLOVE SURG SZ 8 L11.77IN FNGR THK9.8MIL STRW LTX POLYMER

## (undated) DEVICE — CATHETER THOR L21IN DIA28FR R ANG SFT RADPQ STRP SIL

## (undated) DEVICE — Z CONVERTED USE 2276073 ROLL BNDG L W4.5INXL4 1/8YD WHT COT 6 PLY CNFRM KERLIX

## (undated) DEVICE — REDUCER: Brand: ENDOWRIST

## (undated) DEVICE — CATHETER THORACENTHESIS 9 FRX20 IN EYES TOP

## (undated) DEVICE — Z TEMPORARILY DISCONTINUED NO SUB IDED DRAIN SURG BLD RECVRY PT TB FOR ATS BG OASIS

## (undated) DEVICE — BLADE SAW SAG OSCIL LNG MED 9X31MM

## (undated) DEVICE — SUTURE STRATAFIX SYMMETRIC PDS + SZ 0 L18IN ABSRB VLT CT 2 SXPP1A407

## (undated) DEVICE — TIP APPL TOP 2 SPRY

## (undated) DEVICE — [HIGH FLOW INSUFFLATOR,  DO NOT USE IF PACKAGE IS DAMAGED,  KEEP DRY,  KEEP AWAY FROM SUNLIGHT,  PROTECT FROM HEAT AND RADIOACTIVE SOURCES.]: Brand: PNEUMOSURE

## (undated) DEVICE — TROCAR: Brand: KII FIOS FIRST ENTRY

## (undated) DEVICE — SOLUTION IRRIG 2000ML STRL H2O UROMATIC PLAS CONT USP

## (undated) DEVICE — CONNECTOR PERF W0.25XH3/8IN BASE Y SHP REDUC W/O LUERLOCK